# Patient Record
Sex: MALE | Race: WHITE | Employment: OTHER | URBAN - METROPOLITAN AREA
[De-identification: names, ages, dates, MRNs, and addresses within clinical notes are randomized per-mention and may not be internally consistent; named-entity substitution may affect disease eponyms.]

---

## 2022-01-13 ENCOUNTER — APPOINTMENT (OUTPATIENT)
Dept: CT IMAGING | Age: 77
DRG: 981 | End: 2022-01-13
Payer: MEDICARE

## 2022-01-13 ENCOUNTER — HOSPITAL ENCOUNTER (INPATIENT)
Age: 77
LOS: 21 days | Discharge: NURSING, REHAB OR LONG-TERM CARE | DRG: 981 | End: 2022-02-04
Attending: EMERGENCY MEDICINE | Admitting: STUDENT IN AN ORGANIZED HEALTH CARE EDUCATION/TRAINING PROGRAM
Payer: MEDICARE

## 2022-01-13 DIAGNOSIS — N30.00 ACUTE CYSTITIS WITHOUT HEMATURIA: ICD-10-CM

## 2022-01-13 DIAGNOSIS — U07.1 COVID: ICD-10-CM

## 2022-01-13 DIAGNOSIS — K92.2 GASTROINTESTINAL HEMORRHAGE, UNSPECIFIED GASTROINTESTINAL HEMORRHAGE TYPE: ICD-10-CM

## 2022-01-13 DIAGNOSIS — N17.9 AKI (ACUTE KIDNEY INJURY) (HCC): Primary | ICD-10-CM

## 2022-01-13 DIAGNOSIS — C18.9 MALIGNANT NEOPLASM OF COLON, UNSPECIFIED PART OF COLON (HCC): ICD-10-CM

## 2022-01-13 DIAGNOSIS — F41.9 ANXIETY: ICD-10-CM

## 2022-01-13 LAB
A/G RATIO: 0.8 (ref 1.1–2.2)
ALBUMIN SERPL-MCNC: 3 G/DL (ref 3.4–5)
ALP BLD-CCNC: 84 U/L (ref 40–129)
ALT SERPL-CCNC: 7 U/L (ref 10–40)
ANION GAP SERPL CALCULATED.3IONS-SCNC: 11 MMOL/L (ref 3–16)
AST SERPL-CCNC: 9 U/L (ref 15–37)
BASOPHILS ABSOLUTE: 0 K/UL (ref 0–0.2)
BASOPHILS RELATIVE PERCENT: 0.5 %
BILIRUB SERPL-MCNC: 0.3 MG/DL (ref 0–1)
BUN BLDV-MCNC: 34 MG/DL (ref 7–20)
CALCIUM SERPL-MCNC: 8 MG/DL (ref 8.3–10.6)
CHLORIDE BLD-SCNC: 95 MMOL/L (ref 99–110)
CO2: 24 MMOL/L (ref 21–32)
CREAT SERPL-MCNC: 3.7 MG/DL (ref 0.8–1.3)
EOSINOPHILS ABSOLUTE: 0.1 K/UL (ref 0–0.6)
EOSINOPHILS RELATIVE PERCENT: 1.9 %
GFR AFRICAN AMERICAN: 19
GFR NON-AFRICAN AMERICAN: 16
GLUCOSE BLD-MCNC: 208 MG/DL (ref 70–99)
HCT VFR BLD CALC: 29.7 % (ref 40.5–52.5)
HEMOGLOBIN: 9.9 G/DL (ref 13.5–17.5)
LYMPHOCYTES ABSOLUTE: 0.9 K/UL (ref 1–5.1)
LYMPHOCYTES RELATIVE PERCENT: 16.3 %
MCH RBC QN AUTO: 29.6 PG (ref 26–34)
MCHC RBC AUTO-ENTMCNC: 33.3 G/DL (ref 31–36)
MCV RBC AUTO: 88.8 FL (ref 80–100)
MONOCYTES ABSOLUTE: 0.5 K/UL (ref 0–1.3)
MONOCYTES RELATIVE PERCENT: 8.2 %
NEUTROPHILS ABSOLUTE: 4 K/UL (ref 1.7–7.7)
NEUTROPHILS RELATIVE PERCENT: 73.1 %
PDW BLD-RTO: 14 % (ref 12.4–15.4)
PLATELET # BLD: 253 K/UL (ref 135–450)
PMV BLD AUTO: 7.7 FL (ref 5–10.5)
POTASSIUM SERPL-SCNC: 3.7 MMOL/L (ref 3.5–5.1)
RBC # BLD: 3.34 M/UL (ref 4.2–5.9)
SODIUM BLD-SCNC: 130 MMOL/L (ref 136–145)
TOTAL PROTEIN: 6.6 G/DL (ref 6.4–8.2)
WBC # BLD: 5.5 K/UL (ref 4–11)

## 2022-01-13 PROCEDURE — 2580000003 HC RX 258: Performed by: EMERGENCY MEDICINE

## 2022-01-13 PROCEDURE — 82436 ASSAY OF URINE CHLORIDE: CPT

## 2022-01-13 PROCEDURE — 96375 TX/PRO/DX INJ NEW DRUG ADDON: CPT

## 2022-01-13 PROCEDURE — 85025 COMPLETE CBC W/AUTO DIFF WBC: CPT

## 2022-01-13 PROCEDURE — 70450 CT HEAD/BRAIN W/O DYE: CPT

## 2022-01-13 PROCEDURE — 99283 EMERGENCY DEPT VISIT LOW MDM: CPT

## 2022-01-13 PROCEDURE — 36415 COLL VENOUS BLD VENIPUNCTURE: CPT

## 2022-01-13 PROCEDURE — 74176 CT ABD & PELVIS W/O CONTRAST: CPT

## 2022-01-13 PROCEDURE — 80053 COMPREHEN METABOLIC PANEL: CPT

## 2022-01-13 PROCEDURE — 96365 THER/PROPH/DIAG IV INF INIT: CPT

## 2022-01-13 PROCEDURE — 84300 ASSAY OF URINE SODIUM: CPT

## 2022-01-13 PROCEDURE — 82270 OCCULT BLOOD FECES: CPT

## 2022-01-13 PROCEDURE — 83935 ASSAY OF URINE OSMOLALITY: CPT

## 2022-01-13 PROCEDURE — 84133 ASSAY OF URINE POTASSIUM: CPT

## 2022-01-13 PROCEDURE — 81001 URINALYSIS AUTO W/SCOPE: CPT

## 2022-01-13 PROCEDURE — 83880 ASSAY OF NATRIURETIC PEPTIDE: CPT

## 2022-01-13 RX ORDER — 0.9 % SODIUM CHLORIDE 0.9 %
1000 INTRAVENOUS SOLUTION INTRAVENOUS ONCE
Status: COMPLETED | OUTPATIENT
Start: 2022-01-13 | End: 2022-01-14

## 2022-01-13 RX ADMIN — SODIUM CHLORIDE 1000 ML: 9 INJECTION, SOLUTION INTRAVENOUS at 23:52

## 2022-01-14 ENCOUNTER — APPOINTMENT (OUTPATIENT)
Dept: ULTRASOUND IMAGING | Age: 77
DRG: 981 | End: 2022-01-14
Payer: MEDICARE

## 2022-01-14 ENCOUNTER — APPOINTMENT (OUTPATIENT)
Dept: MRI IMAGING | Age: 77
DRG: 981 | End: 2022-01-14
Payer: MEDICARE

## 2022-01-14 PROBLEM — R39.89 SUSPECTED UTI: Status: ACTIVE | Noted: 2022-01-14

## 2022-01-14 PROBLEM — N17.9 ACUTE KIDNEY INJURY SUPERIMPOSED ON CKD (HCC): Status: ACTIVE | Noted: 2022-01-14

## 2022-01-14 PROBLEM — K92.2 GI BLEED: Status: ACTIVE | Noted: 2022-01-14

## 2022-01-14 PROBLEM — Z86.16 HISTORY OF COVID-19: Status: ACTIVE | Noted: 2022-01-14

## 2022-01-14 PROBLEM — N18.9 ACUTE KIDNEY INJURY SUPERIMPOSED ON CKD (HCC): Status: ACTIVE | Noted: 2022-01-14

## 2022-01-14 PROBLEM — R53.83 LETHARGY: Status: ACTIVE | Noted: 2022-01-14

## 2022-01-14 PROBLEM — E87.1 HYPONATREMIA: Status: ACTIVE | Noted: 2022-01-14

## 2022-01-14 PROBLEM — R53.83 FATIGUE: Status: ACTIVE | Noted: 2022-01-14

## 2022-01-14 LAB
BASOPHILS ABSOLUTE: 0 K/UL (ref 0–0.2)
BASOPHILS RELATIVE PERCENT: 0.6 %
BASOPHILS RELATIVE PERCENT: 0.6 %
BASOPHILS RELATIVE PERCENT: 0.8 %
BILIRUBIN URINE: ABNORMAL
BLOOD, URINE: ABNORMAL
CHLORIDE URINE RANDOM: <20 MMOL/L
CLARITY: ABNORMAL
COLOR: YELLOW
COMMENT UA: ABNORMAL
EOSINOPHILS ABSOLUTE: 0.1 K/UL (ref 0–0.6)
EOSINOPHILS RELATIVE PERCENT: 2.2 %
EOSINOPHILS RELATIVE PERCENT: 2.4 %
EOSINOPHILS RELATIVE PERCENT: 2.7 %
EPITHELIAL CELLS, UA: 17 /HPF (ref 0–5)
GLUCOSE BLD-MCNC: 168 MG/DL (ref 70–99)
GLUCOSE URINE: NEGATIVE MG/DL
HCT VFR BLD CALC: 26.3 % (ref 40.5–52.5)
HCT VFR BLD CALC: 28.4 % (ref 40.5–52.5)
HCT VFR BLD CALC: 28.7 % (ref 40.5–52.5)
HEMOGLOBIN: 8.7 G/DL (ref 13.5–17.5)
HEMOGLOBIN: 9.4 G/DL (ref 13.5–17.5)
HEMOGLOBIN: 9.5 G/DL (ref 13.5–17.5)
KETONES, URINE: ABNORMAL MG/DL
LEUKOCYTE ESTERASE, URINE: ABNORMAL
LV EF: 58 %
LVEF MODALITY: NORMAL
LYMPHOCYTES ABSOLUTE: 0.8 K/UL (ref 1–5.1)
LYMPHOCYTES ABSOLUTE: 0.8 K/UL (ref 1–5.1)
LYMPHOCYTES ABSOLUTE: 0.9 K/UL (ref 1–5.1)
LYMPHOCYTES RELATIVE PERCENT: 16.8 %
LYMPHOCYTES RELATIVE PERCENT: 17.2 %
LYMPHOCYTES RELATIVE PERCENT: 19.3 %
MCH RBC QN AUTO: 29.3 PG (ref 26–34)
MCH RBC QN AUTO: 29.3 PG (ref 26–34)
MCH RBC QN AUTO: 29.4 PG (ref 26–34)
MCHC RBC AUTO-ENTMCNC: 32.9 G/DL (ref 31–36)
MCHC RBC AUTO-ENTMCNC: 33.2 G/DL (ref 31–36)
MCHC RBC AUTO-ENTMCNC: 33.2 G/DL (ref 31–36)
MCV RBC AUTO: 88.4 FL (ref 80–100)
MCV RBC AUTO: 88.5 FL (ref 80–100)
MCV RBC AUTO: 88.8 FL (ref 80–100)
MICROSCOPIC EXAMINATION: YES
MONOCYTES ABSOLUTE: 0.3 K/UL (ref 0–1.3)
MONOCYTES ABSOLUTE: 0.4 K/UL (ref 0–1.3)
MONOCYTES ABSOLUTE: 0.4 K/UL (ref 0–1.3)
MONOCYTES RELATIVE PERCENT: 6.5 %
MONOCYTES RELATIVE PERCENT: 8.6 %
MONOCYTES RELATIVE PERCENT: 9 %
MUCUS: ABNORMAL /LPF
NEUTROPHILS ABSOLUTE: 2.8 K/UL (ref 1.7–7.7)
NEUTROPHILS ABSOLUTE: 3.6 K/UL (ref 1.7–7.7)
NEUTROPHILS ABSOLUTE: 3.7 K/UL (ref 1.7–7.7)
NEUTROPHILS RELATIVE PERCENT: 68.9 %
NEUTROPHILS RELATIVE PERCENT: 71.3 %
NEUTROPHILS RELATIVE PERCENT: 73.1 %
NITRITE, URINE: NEGATIVE
OCCULT BLOOD DIAGNOSTIC: ABNORMAL
OSMOLALITY URINE: 319 MOSM/KG (ref 390–1070)
OSMOLALITY: 294 MOSM/KG (ref 280–301)
PDW BLD-RTO: 14.1 % (ref 12.4–15.4)
PDW BLD-RTO: 14.5 % (ref 12.4–15.4)
PDW BLD-RTO: 14.6 % (ref 12.4–15.4)
PERFORMED ON: ABNORMAL
PH UA: 5 (ref 5–8)
PLATELET # BLD: 213 K/UL (ref 135–450)
PLATELET # BLD: 229 K/UL (ref 135–450)
PLATELET # BLD: 234 K/UL (ref 135–450)
PMV BLD AUTO: 7.5 FL (ref 5–10.5)
PMV BLD AUTO: 7.5 FL (ref 5–10.5)
PMV BLD AUTO: 7.7 FL (ref 5–10.5)
POTASSIUM, UR: 38.3 MMOL/L
PRO-BNP: 1997 PG/ML (ref 0–449)
PROTEIN UA: 100 MG/DL
RAPID INFLUENZA  B AGN: NEGATIVE
RAPID INFLUENZA A AGN: NEGATIVE
RBC # BLD: 2.97 M/UL (ref 4.2–5.9)
RBC # BLD: 3.21 M/UL (ref 4.2–5.9)
RBC # BLD: 3.23 M/UL (ref 4.2–5.9)
RBC UA: ABNORMAL /HPF (ref 0–4)
SARS-COV-2, NAAT: NOT DETECTED
SODIUM URINE: 41 MMOL/L
SPECIFIC GRAVITY UA: 1.02 (ref 1–1.03)
URINE REFLEX TO CULTURE: YES
URINE TYPE: ABNORMAL
UROBILINOGEN, URINE: 0.2 E.U./DL
WBC # BLD: 4.1 K/UL (ref 4–11)
WBC # BLD: 5 K/UL (ref 4–11)
WBC # BLD: 5.1 K/UL (ref 4–11)
WBC UA: >900 /HPF (ref 0–5)

## 2022-01-14 PROCEDURE — 36415 COLL VENOUS BLD VENIPUNCTURE: CPT

## 2022-01-14 PROCEDURE — 6360000002 HC RX W HCPCS: Performed by: STUDENT IN AN ORGANIZED HEALTH CARE EDUCATION/TRAINING PROGRAM

## 2022-01-14 PROCEDURE — 2580000003 HC RX 258: Performed by: STUDENT IN AN ORGANIZED HEALTH CARE EDUCATION/TRAINING PROGRAM

## 2022-01-14 PROCEDURE — 6360000002 HC RX W HCPCS: Performed by: EMERGENCY MEDICINE

## 2022-01-14 PROCEDURE — 2060000000 HC ICU INTERMEDIATE R&B

## 2022-01-14 PROCEDURE — C9113 INJ PANTOPRAZOLE SODIUM, VIA: HCPCS | Performed by: EMERGENCY MEDICINE

## 2022-01-14 PROCEDURE — 83930 ASSAY OF BLOOD OSMOLALITY: CPT

## 2022-01-14 PROCEDURE — 87086 URINE CULTURE/COLONY COUNT: CPT

## 2022-01-14 PROCEDURE — 85025 COMPLETE CBC W/AUTO DIFF WBC: CPT

## 2022-01-14 PROCEDURE — 70551 MRI BRAIN STEM W/O DYE: CPT

## 2022-01-14 PROCEDURE — 86706 HEP B SURFACE ANTIBODY: CPT

## 2022-01-14 PROCEDURE — 87804 INFLUENZA ASSAY W/OPTIC: CPT

## 2022-01-14 PROCEDURE — 80074 ACUTE HEPATITIS PANEL: CPT

## 2022-01-14 PROCEDURE — 87186 SC STD MICRODIL/AGAR DIL: CPT

## 2022-01-14 PROCEDURE — 2580000003 HC RX 258: Performed by: EMERGENCY MEDICINE

## 2022-01-14 PROCEDURE — 76770 US EXAM ABDO BACK WALL COMP: CPT

## 2022-01-14 PROCEDURE — 87635 SARS-COV-2 COVID-19 AMP PRB: CPT

## 2022-01-14 PROCEDURE — 87077 CULTURE AEROBIC IDENTIFY: CPT

## 2022-01-14 PROCEDURE — 93306 TTE W/DOPPLER COMPLETE: CPT

## 2022-01-14 PROCEDURE — C9113 INJ PANTOPRAZOLE SODIUM, VIA: HCPCS | Performed by: STUDENT IN AN ORGANIZED HEALTH CARE EDUCATION/TRAINING PROGRAM

## 2022-01-14 RX ORDER — SODIUM CHLORIDE 0.9 % (FLUSH) 0.9 %
5-40 SYRINGE (ML) INJECTION PRN
Status: DISCONTINUED | OUTPATIENT
Start: 2022-01-14 | End: 2022-02-04 | Stop reason: HOSPADM

## 2022-01-14 RX ORDER — ATENOLOL 25 MG/1
25 TABLET ORAL EVERY EVENING
COMMUNITY

## 2022-01-14 RX ORDER — TAMSULOSIN HYDROCHLORIDE 0.4 MG/1
0.4 CAPSULE ORAL EVERY MORNING
COMMUNITY

## 2022-01-14 RX ORDER — LORAZEPAM 0.5 MG/1
0.5 TABLET ORAL 2 TIMES DAILY
Status: ON HOLD | COMMUNITY
End: 2022-02-04 | Stop reason: SDUPTHER

## 2022-01-14 RX ORDER — ASPIRIN 81 MG/1
81 TABLET, CHEWABLE ORAL DAILY
Status: ON HOLD | COMMUNITY
End: 2022-02-09 | Stop reason: SDUPTHER

## 2022-01-14 RX ORDER — SEVELAMER CARBONATE 800 MG/1
800 TABLET, FILM COATED ORAL
COMMUNITY

## 2022-01-14 RX ORDER — SODIUM CHLORIDE 9 MG/ML
25 INJECTION, SOLUTION INTRAVENOUS PRN
Status: DISCONTINUED | OUTPATIENT
Start: 2022-01-14 | End: 2022-02-04 | Stop reason: HOSPADM

## 2022-01-14 RX ORDER — PANTOPRAZOLE SODIUM 40 MG/10ML
80 INJECTION, POWDER, LYOPHILIZED, FOR SOLUTION INTRAVENOUS ONCE
Status: COMPLETED | OUTPATIENT
Start: 2022-01-14 | End: 2022-01-14

## 2022-01-14 RX ORDER — CLOPIDOGREL BISULFATE 75 MG/1
75 TABLET ORAL EVERY EVENING
Status: ON HOLD | COMMUNITY
End: 2022-02-09 | Stop reason: SDUPTHER

## 2022-01-14 RX ORDER — SODIUM CHLORIDE 9 MG/ML
INJECTION, SOLUTION INTRAVENOUS CONTINUOUS
Status: ACTIVE | OUTPATIENT
Start: 2022-01-14 | End: 2022-01-14

## 2022-01-14 RX ORDER — SODIUM CHLORIDE 0.9 % (FLUSH) 0.9 %
5-40 SYRINGE (ML) INJECTION EVERY 12 HOURS SCHEDULED
Status: DISCONTINUED | OUTPATIENT
Start: 2022-01-14 | End: 2022-02-04 | Stop reason: HOSPADM

## 2022-01-14 RX ORDER — ACETAMINOPHEN 325 MG/1
650 TABLET ORAL EVERY 6 HOURS PRN
Status: DISCONTINUED | OUTPATIENT
Start: 2022-01-14 | End: 2022-02-04 | Stop reason: HOSPADM

## 2022-01-14 RX ORDER — PANTOPRAZOLE SODIUM 20 MG/1
20 TABLET, DELAYED RELEASE ORAL NIGHTLY
COMMUNITY

## 2022-01-14 RX ORDER — CALCITRIOL 0.25 UG/1
0.25 CAPSULE, LIQUID FILLED ORAL EVERY EVENING
COMMUNITY

## 2022-01-14 RX ORDER — AMLODIPINE BESYLATE 5 MG/1
5 TABLET ORAL EVERY EVENING
Status: ON HOLD | COMMUNITY
End: 2022-02-04 | Stop reason: HOSPADM

## 2022-01-14 RX ORDER — ATORVASTATIN CALCIUM 10 MG/1
10 TABLET, FILM COATED ORAL NIGHTLY
COMMUNITY

## 2022-01-14 RX ORDER — GABAPENTIN 100 MG/1
100 CAPSULE ORAL 3 TIMES DAILY
COMMUNITY

## 2022-01-14 RX ORDER — SODIUM CHLORIDE 9 MG/ML
INJECTION, SOLUTION INTRAVENOUS CONTINUOUS
Status: CANCELLED | OUTPATIENT
Start: 2022-01-14 | End: 2022-01-14

## 2022-01-14 RX ORDER — ACETAMINOPHEN 650 MG/1
650 SUPPOSITORY RECTAL EVERY 6 HOURS PRN
Status: DISCONTINUED | OUTPATIENT
Start: 2022-01-14 | End: 2022-01-26

## 2022-01-14 RX ORDER — ONDANSETRON 2 MG/ML
4 INJECTION INTRAMUSCULAR; INTRAVENOUS EVERY 6 HOURS PRN
Status: DISCONTINUED | OUTPATIENT
Start: 2022-01-14 | End: 2022-01-15

## 2022-01-14 RX ADMIN — SODIUM CHLORIDE: 9 INJECTION, SOLUTION INTRAVENOUS at 04:07

## 2022-01-14 RX ADMIN — CEFTRIAXONE 1000 MG: 1 INJECTION, POWDER, FOR SOLUTION INTRAMUSCULAR; INTRAVENOUS at 02:34

## 2022-01-14 RX ADMIN — SODIUM CHLORIDE: 9 INJECTION, SOLUTION INTRAVENOUS at 21:30

## 2022-01-14 RX ADMIN — PANTOPRAZOLE SODIUM 80 MG: 40 INJECTION, POWDER, FOR SOLUTION INTRAVENOUS at 01:43

## 2022-01-14 RX ADMIN — SODIUM CHLORIDE, PRESERVATIVE FREE 10 ML: 5 INJECTION INTRAVENOUS at 08:41

## 2022-01-14 RX ADMIN — SODIUM CHLORIDE: 9 INJECTION, SOLUTION INTRAVENOUS at 16:48

## 2022-01-14 RX ADMIN — SODIUM CHLORIDE 8 MG/HR: 9 INJECTION, SOLUTION INTRAVENOUS at 04:07

## 2022-01-14 RX ADMIN — SODIUM CHLORIDE, PRESERVATIVE FREE 10 ML: 5 INJECTION INTRAVENOUS at 21:25

## 2022-01-14 ASSESSMENT — PAIN SCALES - GENERAL
PAINLEVEL_OUTOF10: 0

## 2022-01-14 NOTE — ED NOTES
Pts O2 placed at 2L NC due to sleep apnea. Per daughter dxd with sleep apnea while in [place] 2 weeks ago and pt to be getting cpap.      Jessie Morris RN  01/14/22 5515

## 2022-01-14 NOTE — ED PROVIDER NOTES
[address] [facility]      Pt Name: Bambi Wood  MRN: [id number]  [place] 1945  Date of evaluation: 1/13/2022  Provider: Elizabeth Chauhan MD    CHIEF COMPLAINT       Chief Complaint   Patient presents with    Fatigue     pt was recently d/cd from the hospital still with complaints of weakness and no energy    Fall     pt fell x 2 today        HISTORY OF PRESENT ILLNESS    Bambi Wood is a 77 y.o. male who presents to the emergency department with generalized weakness. Patient was recently discharged from hospital a week ago for COVID. Has had multiple falls with continued weakness. Patient is confused not able to answer a lot of questions secondary to confusions therefore history is limited    Nursing Notes were reviewed. Including nursing noted for FM, Surgical History, Past Medical History, Social History, vitals, and allergies; agree with all. REVIEW OF SYSTEMS       Review of Systems   Unable to perform ROS: Mental status change     PAST MEDICAL HISTORY   No past medical history on file. SURGICAL HISTORY     No past surgical history on file. CURRENT MEDICATIONS       Previous Medications    No medications on file       ALLERGIES     Patient has no known allergies. FAMILY HISTORY      No family history on file.     SOCIAL HISTORY       Social History     Socioeconomic History    Marital status:      Spouse name: Not on file    Number of children: Not on file    Years of education: Not on file    Highest education level: Not on file   Occupational History    Not on file   Tobacco Use    Smoking status: Not on file    Smokeless tobacco: Not on file   Substance and Sexual Activity    Alcohol use: Not on file    Drug use: Not on file    Sexual activity: Not on file   Other Topics Concern    Not on file   Social History Narrative    Not on file     Social Determinants of Health     Financial Resource Strain:    Rayo Difficulty of Paying Living Expenses: Not on file   Food Insecurity:     Worried About Running Out of Food in the Last Year: Not on file    Ran Out of Food in the Last Year: Not on file   Transportation Needs:     Lack of Transportation (Medical): Not on file    Lack of Transportation (Non-Medical): Not on file   Physical Activity:     Days of Exercise per Week: Not on file    Minutes of Exercise per Session: Not on file   Stress:     Feeling of Stress : Not on file   Social Connections:     Frequency of Communication with Friends and Family: Not on file    Frequency of Social Gatherings with Friends and Family: Not on file    Attends Hindu Services: Not on file    Active Member of Clubs or Organizations: Not on file    Attends Club or Organization Meetings: Not on file    Marital Status: Not on file   Intimate Partner Violence:     Fear of Current or Ex-Partner: Not on file    Emotionally Abused: Not on file    Physically Abused: Not on file    Sexually Abused: Not on file   Housing Stability:     Unable to Pay for Housing in the Last Year: Not on file    Number of [place] in the Last Year: Not on file    Unstable Housing in the Last Year: Not on file       PHYSICAL EXAM       ED Triage Vitals   BP Temp Temp Source Pulse Resp SpO2 Height Weight   01/13/22 2048 01/13/22 2048 01/13/22 2048 01/13/22 2048 01/13/22 2048 01/13/22 2048 01/13/22 2048 01/14/22 0345   102/61 96.8 °F (36 °C) Tympanic 62 16 97 % 6' 0.5\" (1.842 m) 244 lb 11.4 oz (111 kg)       Physical Exam  Vitals and nursing note reviewed. Constitutional:       General: He is not in acute distress. Appearance: He is well-developed. He is ill-appearing. He is not diaphoretic. HENT:      Head: Normocephalic and atraumatic. Eyes:      General:         Right eye: No discharge. Left eye: No discharge. Pupils: Pupils are equal, round, and reactive to light. Neck:      Thyroid: No thyromegaly.       Trachea: No Non- 16 (*)     GFR  19 (*)     Calcium 8.0 (*)     Albumin 3.0 (*)     Albumin/Globulin Ratio 0.8 (*)     ALT 7 (*)     AST 9 (*)     All other components within normal limits    Narrative:     Performed at:  [facility]  [address] [facility] [employer] [facility]   Phone [phone]   URINE RT REFLEX TO CULTURE - Abnormal; Notable for the following components:    Clarity, UA TURBID (*)     Bilirubin Urine SMALL (*)     Ketones, Urine TRACE (*)     Blood, Urine LARGE (*)     Protein,  (*)     Leukocyte Esterase, Urine LARGE (*)     All other components within normal limits    Narrative:     Performed at:  [facility]  [address] [facility] [employer] [facility]   Phone [phone]   BLOOD OCCULT STOOL DIAGNOSTIC - Abnormal; Notable for the following components:    Occult Blood Diagnostic   (*)     Value: Result: POSITIVE  Normal range: Negative      All other components within normal limits    Narrative:     ORDER#: [id number]                          ORDERED BY: Alfred Baeza  SOURCE: Stool                              COLLECTED:  01/13/22 23:32  ANTIBIOTICS AT JOSE.:                      RECEIVED :  01/13/22 23:46  Performed at:  [facility]  [address] [facility] [address]   Phone [phone]   BRAIN NATRIURETIC PEPTIDE - Abnormal; Notable for the following components:    Pro-BNP 1,997 (*)     All other components within normal limits    Narrative:     Performed at:  [facility]  [address] [facility] [employer] [facility]   Phone [phone]   MICROSCOPIC URINALYSIS - Abnormal; Notable for the following components:    Mucus, UA 1+ (*)     WBC, UA >900 (*)     Epithelial Cells, UA 17 (*)     All other components within normal limits    Narrative:     Performed at:  [facility]  [facility] [address] [facility] [facility] [address]   Phone [phone]   OSMOLALITY, URINE - Abnormal; Notable for the following components:    Osmolality, Ur 319 (*)     All other components within normal limits    Narrative:     Performed at:  [facility]  [address] [facility] [address] [facility] [address]   Phone [phone] [phone]    Narrative:     Performed at:  [facility]  [address] [place] [address]   Phone [phone]   RAPID INFLUENZA A/B ANTIGENS    Narrative:     Performed at:  [facility]  [address] [place] [address]   Phone [phone]   CULTURE, URINE   ELECTROLYTES URINE RANDOM    Narrative:     Performed at:  [facility]  [address] [place] [address]   Phone [phone]   OSMOLALITY   CBC WITH AUTO DIFFERENTIAL   CBC WITH AUTO DIFFERENTIAL   CBC WITH AUTO DIFFERENTIAL       All other labs were withinnormal range or not returned as of this dictation. EMERGENCY DEPARTMENT COURSE and DIFFERENTIAL DIAGNOSIS/MDM:     PMH, Surgical Hx, FH, Social Hx reviewed by myself (ETOH usage, Tobacco usage, Drug usage reviewed by myself, no pertinent Hx)- No Pertinent Hx     Old records were reviewed by me     36-year-old with weakness. Found to have a GI bleed for which Protonix was given. Also has evidence of an LESLEE for which fluids were given. Sounds like patient had COVID a week ago for which CT showed evidence of multifocal pneumonia. This is probably COVID. Patient given Rocephin for acute cystitis. Patient is to be admitted for GI bleed, cystitis, multifocal pneumonia, LESLEE, further evaluation    CRITICAL CARE TIME   Total Critical Caretime was 39 minutes, excluding separately reportable procedures.   There was a high probability of clinically significant/life threatening deterioration in the patient's condition which required my urgent intervention. PROCEDURES:  Unlessotherwise noted below, none    FINAL IMPRESSION      1. LESLEE (acute kidney injury) ([facility] [address]    2. Gastrointestinal hemorrhage, unspecified gastrointestinal hemorrhage type    3. COVID    4. Acute cystitis without hematuria          DISPOSITION/PLAN   DISPOSITION Admitted 01/14/2022 03:33:30 AM    PATIENT REFERRED TO:  No follow-up provider specified.     DISCHARGE MEDICATIONS:  New Prescriptions    No medications on file          (Please note that portions ofthis note were completed with a voice recognition program.  Efforts were made to edit the dictations but occasionally words are mis-transcribed.)    Stephani Ruiz MD(electronically signed)  Attending Emergency Physician       Stephani Ruiz MD  01/14/22 5341

## 2022-01-14 NOTE — ED NOTES
Attempted to call wife to get information for MRI screening number has been disconnected MRI staff notified of inability to complete screen      Oziel Bowling RN  01/14/22 5444

## 2022-01-14 NOTE — H&P
Hospital Medicine History & Physical      PCP: No primary care provider on file. Date of Admission: 1/13/2022    Date of Service: Pt seen/examined on 1/14/2022 and Admitted to Inpatient     Chief Complaint:  AMS, lethargy      History Of Present Illness: The patient is a 77 y.o. male w/ uncertain past medical hx but possibly was just hospitalized and recovered from [facility] a few weeks ago in [place] who presents to [facility] possibly from home for progressive worsening fatigue and somnolence that he reports has been going on for at least a week. Patient is somnolent at this time and difficult to clarify full history. He admits that he has not been eating or drinking much however but unable to quantify. He denies other symptoms of fever chills or shortness of breath. Unable to fully obtain further review of systems questions but he denies any focal weakness or vision changes. At this time he just feels as he is very sleepy. Unable to confirm any further chronic illnesses at this time however. Past Medical History:    No past medical history on file. Past Surgical History:    No past surgical history on file. Medications Prior to Admission:    Prior to Admission medications    Not on File       Allergies:  Patient has no known allergies. Social History:  The patient currently lives supposedly home    TOBACCO:   has no history on file for tobacco use. ETOH:   has no history on file for alcohol use. Family History:  Reviewed in detail and negative for DM, Early CAD, Cancer, CVA. Positive as follows:    No family history on file. REVIEW OF SYSTEMS:    as noted in the HPI. All other systems reviewed and negative.     PHYSICAL EXAM:    /70   Pulse 64   Temp 97.3 °F (36.3 °C) (Oral)   Resp 14   Ht 6' 0.5\" (1.842 m)   Wt 244 lb 11.4 oz (111 kg)   SpO2 99%   BMI 32.73 kg/m²     General appearance: Fatigued appearing, somnolent although still alert to presence and seems to able to answer some orientation questions but again questionable historian  HEENT Normal cephalic, atraumatic without obvious deformity. Pupils equal, round, and reactive to light. Extra ocular muscles intact. Dry mucous membranes  Neck: Supple, no JVD  Lungs: Clear to auscultation, bilaterally without Rales/Wheezes/Rhonchi with good respiratory effort. Heart: Regular rate and rhythm with Normal S1/S2 without murmurs, rubs or gallops, point of maximum impulse non-displaced  Abdomen: Soft, nontender, nondistended, active bowel sounds  Extremities: No edema  Skin: No rashes  Neurologic: Cranial nerves II through XII on rudimentary exam seems to be intact, strength seems to be 5 out of 5 to all extremities and negative pronator drift  Mental status: Alert although somnolent and sometimes difficult to understand conversation  Capillary Refill: Acceptable  < 3 seconds  Peripheral Pulses: +3 Easily felt, not easily obliterated with pressure    01/13/22 2345  CT CHEST ABDOMEN PELVIS WO CONTRAST   Performed: 01/13/22 2319  Final        Impression: No evidence of acute intrathoracic, intraabdominal or intrapelvic injury. Multifocal airspace disease. This pattern may reflect COVID pneumonia. Correlate with COVID testing.       01/13/22 2335  CT HEAD WO CONTRAST   Performed: 01/13/22 2319  Final        Impression: No acute intracranial abnormality. Specifically, no acute intracranial hemorrhage or mass effect. Extensive chronic small vessel ischemic disease.                CBC   Recent Labs     01/13/22 2110 01/14/22  0633   WBC 5.5 4.1   HGB 9.9* 8.7*   HCT 29.7* 26.3*    213      RENAL  Recent Labs     01/13/22 2110   *   K 3.7   CL 95*   CO2 24   BUN 34*   CREATININE 3.7*     LFT'S  Recent Labs     01/13/22 2110   AST 9*   ALT 7*   BILITOT 0.3   ALKPHOS 84 COAG  No results for input(s): INR in the last 72 hours. CARDIAC ENZYMES  No results for input(s): CKTOTAL, CKMB, CKMBINDEX, TROPONINI in the last 72 hours. U/A:    Lab Results   Component Value Date    COLORU Yellow 01/13/2022    WBCUA >900 01/13/2022    RBCUA 0-2 01/13/2022    MUCUS 1+ 01/13/2022    CLARITYU TURBID 01/13/2022    SPECGRAV 1.025 01/13/2022    LEUKOCYTESUR LARGE 01/13/2022    BLOODU LARGE 01/13/2022    GLUCOSEU Negative 01/13/2022       ABG  No results found for: ZMI9KZE, BEART, J5ADMJGS, PHART, THGBART, WKA1AQS, PO2ART, [place]        Active Hospital Problems    Diagnosis Date Noted    GI bleed [K92.2] 01/14/2022    History of COVID-19 [Z86.16] 01/14/2022    Hyponatremia [E87.1] 01/14/2022    Fatigue [R53.83] 01/14/2022    Acute kidney injury superimposed on CKD ([facility] [address] [N17.9, N18.9] 01/14/2022    Lethargy [R53.83] 01/14/2022    Suspected UTI [R39.89] 01/14/2022         PHYSICIANS CERTIFICATION:    I certify that Ruthy Lewis is expected to be hospitalized for greater than 2 midnights based on the following assessment and plan:      ASSESSMENT/PLAN:  · Fatigue  · GI bleed  · Hyponatremia  · Suspected UTI  · LESLEE on CKD  · Lethargy  · History of COVID infection    Plan:  · Difficulty get details from patient, patient notes that he just feels very sleepy but he has been fatigued for at least a number of days but uncertain etiology as to this. He is notably anemic and seems to have significant kidney injury but uncertain if this is superimposed on chronic kidney injury, notably also hyponatremic and of uncertain etiology.   Do not have any other labs to compare to previous baseline  · Start patient on Rocephin for suspected UTI  · Start patient on normal saline at 100/h for 20 hours for IV fluid hydration  · Ordered kidney ultrasound for further evaluation of LESLEE  · Start patient on Protonix IV infusion for suspected GI bleed  · Stool occult was already noted to be positive, iron studies ordered and pending  · Ordered urine electrolytes as well as urine and serum osmolality to further clarify hyponatremia  · MRI ordered without contrast of the brain to evaluate further for mental status change  · Ordering transthoracic echo for the morning  · Nephrology consult for LESLEE  · GI consult for suspicion of GI bleed  · Repeat labs in the morning    DVT Prophylaxis: SCDs  Diet: Diet NPO Exceptions are: Ice Chips  Code Status: Full Code  PT/OT Eval Status: Ambulatory    Dispo -pending clinical course       Raulito Jo DO    Thank you No primary care provider on file. for the opportunity to be involved in this patient's care. If you have any questions or concerns please feel free to contact me at [phone].

## 2022-01-14 NOTE — ED NOTES
Pt placed in hospital bed. No wants or needs at this time. Report given to hold RN.       [address] [facility], [address] [place] [address]  01/14/22 7669

## 2022-01-14 NOTE — CONSULTS
Neurology Consult Note  Reason for Consult: positive MRI for acute infarct in the posteromedial left parietal lobe within deep white matter    Chief complaint: \"My foot\"    Dr London Owens, DO asked me to see Norma Jones in consultation for evaluation of positive MRI for acute infarct in the posteromedial left parietal lobe within deep white matter    History of Present Illness:  I obtained my information via the patient, supplemented with chart review. Norma Jones is a 77 y.o. male with hx of recent covid 23, colon cancer, kidney disease is reportedly on dialysis who presented to the ED on 1/13/22 for evaluation of progressive worsening fatigue and somnolence for the last week. He reportedly had not been eating or drinking well and also reported multiple falls. His colon cancer diagnosis is a recent after he had been having hematochezia. He is in the process per reports of getting surgery for resection and colostomy. On arrival to the ED BP was 102/61. He was alert but seemed to be disoriented. Initial work up was revealing for GI bleed, LESLEE, multi focal pneumonia. A MRI brain was completed for altered mental status. Per RN there does not appear to have been an acute change in the patients mentation. Not quite sure what alterations there was as there is conflicting information in chart. MRI brain did reveal a punctate acute ischemic stroke of the posteromedial left parietal lobe, within the deep white matter. At time of encounter the patient was seen during dialysis. He is confused, unable to provide much reliable history. Does not appear to be in distress. He reports chronic LLE weakness. Denies any headache, vision changes, chest pain, shortness of breath. Again it is not clear how reliable he is. He denies any hx of stroke, also denies any hx of cancer. He tells me he uses wheelchair a lot in the nursing home.       Home medications listed in chart include DAPT with ASA and plavix, 10mg atorvastatin. Per RN patient was more oriented this morning. He has been having mild confusion per family, not sure if chronic. He does live at home, and is ambulatory for which is different than what he reports. Medical History:  Past Medical History:   Diagnosis Date    Arthritis     Cancer ([address]     Colon Cancer diagnosed last month    Hemodialysis patient [facility])      Past Surgical History:   Procedure Laterality Date    IR PERC ARTERIOVENOUS FISTULA CREATION Left     unsure of date     Scheduled Meds:   sodium chloride flush  5-40 mL IntraVENous 2 times per day    [START ON 1/15/2022] cefTRIAXone (ROCEPHIN) IV  1,000 mg IntraVENous Q24H     Continuous Infusions:   sodium chloride      sodium chloride 100 mL/hr at 01/14/22 1648     PRN Meds:.sodium chloride flush, sodium chloride, acetaminophen **OR** acetaminophen, ondansetron, perflutren lipid microspheres    Medications Prior to Admission: tamsulosin (FLOMAX) 0.4 MG capsule, Take 0.4 mg by mouth every morning  LORazepam (ATIVAN) 0.5 MG tablet, Take 0.5 mg by mouth 2 times daily. gabapentin (NEURONTIN) 100 MG capsule, Take 100 mg by mouth 3 times daily. sevelamer (RENVELA) 800 MG tablet, Take 1 tablet by mouth 3 times daily (with meals)  clopidogrel (PLAVIX) 75 MG tablet, Take 75 mg by mouth every evening  atenolol (TENORMIN) 25 MG tablet, Take 25 mg by mouth every evening  pantoprazole (PROTONIX) 20 MG tablet, Take 20 mg by mouth nightly  atorvastatin (LIPITOR) 10 MG tablet, Take 10 mg by mouth nightly  amLODIPine (NORVASC) 5 MG tablet, Take 5 mg by mouth every evening  calcitRIOL (ROCALTROL) 0.25 MCG capsule, Take 0.25 mcg by mouth every evening  aspirin 81 MG chewable tablet, Take 81 mg by mouth daily    Allergies   Allergen Reactions    Lisinopril      Allergy listed on paperwork from [facility], no reaction noted       History reviewed. No pertinent family history.     Social History     Tobacco Use   Smoking Status Former Smoker   Smokeless Tobacco Never Used     Social History     Substance and Sexual Activity   Drug Use Not on file     Social History     Substance and Sexual Activity   Alcohol Use Not Currently       ROS: limited given encephalopathy, uncertain reliability. Constitutional- No fevers  Eyes- No diplopia. No photophobia. Ears/nose/throat- No dysphagia. No Dysarthria  Cardiovascular- No palpitations. No chest pain  Respiratory- No dyspnea. No Cough  Gastrointestinal- No Abdominal pain. No Vomiting. Musculoskeletal- No myalgia. No arthralgia  Skin- No rash. No easy bruising. Endocrine- No diabetes. No thyroid issues. Hematologic- + bleeding difficulty. + fatigue  Neurologic- + chronic LLE weakness. No Headache. Exam:  Vitals:    01/14/22 0900 01/14/22 1145 01/14/22 1500 01/14/22 1601   BP: 117/67 125/64  110/64   Pulse: 64 53 56 95   Resp: 20 14 15 18   Temp:    97.8 °F (36.6 °C)   TempSrc:    Axillary   SpO2: 100% 94% 100% 98%   Weight:       Height:          Constitutional    Vital signs: BP, HR, and RR reviewed   General Alert, no distress, chronically ill appearing. Eyes: unable to visualize the fundi  Cardiovascular: pulses symmetric in all 4 extremities. No peripheral edema. Psychiatric: cooperative with examination, no  psychotic behavior noted. Neurologic  Mental status:   orientation to person, month. Uncertain of location, year, situation    General fund of knowledge:  Poor. Initially tells me Michelle Agudelo is president, Second attempt identifies Biden with choices. Memory: Long term intact. Poor short term. Attention  Mildly impaired, attends will to exam.     Language No obvious aphasia, answer in simple sentences. Comprehensionfollows simple commands  Cranial nerves:   CN2: Visual Fields full w/o extinction on confrontational testing  CN 3,4,6: extraocular muscles intact, pupillary assessment difficult given cooperation.  Seems to have a mild dysconjugate primary gaze, difficult to vessel ischemic disease. TTE 1/14/22:    Left ventricular size is normal.   Moderate concentric left ventricular hypertrophy is present. Global left ventricular function is normal with ejection fraction estimated   from 55 % to 60 %. Grade II diastolic dysfunction with elevated LV filling pressures. Normal right ventricular size and function. Left atrium severely dilated. Impression  1. Acute ischemic stroke   2. Encephalopathy: Uncertain of patients baseline  3. GI bleed  4. ESRD  5. Colon Cancer  6. Recent Covid 23.   7. UTI    Yanira Spencer is a 77 y.o. male with recent Covid 23, diagnosis of colon cancer, ESRD on dialysis who presented with progressive worsening fatigue, somnolence for the last week, falls. He was found to have GI bleed, LESLEE, multifocal pneumonia. Covid 19 was negative on this admission. MRI Brain w/o was completed for ? altered mental status and revealed a punctate acute infarct in the posteromedial left parietal lobe, within the deep white matter which is likely incidental.     Antiplatelets have been held 2/2 GI bleed. Given known cancer, recent Covid 23, mechanism could be of hypercoagulable state. TTE has severely left atrium dilation which can be seen in poorly controlled HTN, vs. Underlying atrial fibrillation/flutter. Do suspect a component of metabolic encephalopathy given recent infections, GI bleed/anemia. May have underlying neurocognitive disorder as family reports confusion, possibly at baseline. Recommendations  - MRA head & neck w/o to complete stroke work up. - Will check HbA1c, Lipid panel, TSH (ordered)   - Continue antiplatelet therapy if/when able from a GI bleed standpoint.   - Statin therapy. LDL goal < 70  - Trend towards normotension. Long term goal < 140/90  - Euglycemia HbA1c <7  - Telemetry monitoring. Please notify neurology of any atrial fibrillation/flutter. 30 day event monitor on discharge.    - Continued efforts addressing underlying infectious and metabolic abnormalities per primary team.   - PT, OT, SLP.   - Neurology attending will see this afternoon.        Sharif Cruz, [address] Neurology    A copy of this note was provided for Dr J Carlos Patterson, DO

## 2022-01-14 NOTE — CONSULTS
[address] [place] [address] Nephrology   [facility]  [phone]  Nephrology Consult Note          Patient ID: Tadeo Porras  Referring/ Physician: Altaf Tony MD      HPI/Summary:   Tadeo Porras is being seen by nephrology for ESRD. This is a 76 yo man with ESRD on HD three times weekly via left AVF who is here after a fall and AMS. He has been diagnosed with colon cancer and has been having rectal bleeding off and on for the past several weeks. From [facility]. Had [facility] last month so has been dialyzing at a different dialysis unit for 20 day period. Last HD on Thursday this week. No rectal bleeding since being here at [facility]. GI has no plans for him. Daughter is at bedside. Apparently her father and mother both had a fall yesterday prompting the ED visit. /64  94% on room air. Confused not able to provide much history   Hgb 8.7      Plan:   - no acute indications for for dialysis today. - HD tomorrow.   -Blood pressure acceptable, no change      ESRD  Does dialysis Monday Wednesday Friday usually but has been doing TTS at the [facility] unit near [facility]  He has a left AV fistula, positive thrill and bruit  Last at dialysis was on Thursday 1/14    Electrolytes  Hyponatremia, blood sugar 208  No acute issues. Hypertension  Blood pressure is soft, no acute issues. S HPT  Calcium 8  Check phosphorus    GI bleed  Has known colon cancer  No active bleeding  Hemoglobin stable    Altered mental status  MRI showed small acute stroke  Neurology consulted              [facility] would like to thank you for the opportunity to serve this patient. Please call with any questions or concerns.     Isaura Arredondo MD  [facility]  [address], [address]  Fax: [phone]  Office: [phone]         CC/Reason for consult:   Reason for consult: esrd  Chief Complaint   Patient presents with    Fatigue     pt was recently d/cd from the hospital still with complaints of weakness and no energy    Fall     pt fell x 2 today            Review of Systems:   [address]. All other remaining systems are negative. Constitutional:  fever, chills, weakness, weight change, fatigue,      Skin:  rash, pruritus, hair loss, bruising, dry skin, petechiae. Head, Face, Neck   headaches, swelling,  cervical adenopathy. Respiratory: shortness of breath, cough, or wheezing  Cardiovascular: chest pain, palpitations, dizzy, edema  Gastrointestinal: nausea, vomiting, diarrhea, constipation,belly pain    Yellow skin, blood in stool  Musculoskeletal:  back pain, muscle weakness, gait problems,       joint pain or swelling. Genitourinary:  dysuria, poor urine flow, flank pain, blood in urine  Neurologic:  vertigo, TIA'S, syncope, seizures, focal weakness  Psychosocial:  insomnia, anxiety, or depression. Additional positive findings: -     PMH/SH/FH:    Medical Hx: reviewed and updated as appropriate  No past medical history on file. Surgical Hx: reviewed and updated as appropriate   has no past surgical history on file. Social Hx: reviewed and updated as appropriate  Social History     Tobacco Use    Smoking status: Not on file    Smokeless tobacco: Not on file   Substance Use Topics    Alcohol use: Not on file        Family hx: reviewed and updated as appropriate  family history is not on file. Medications:   sodium chloride flush, 5-40 mL, 2 times per day  [START ON 1/15/2022] cefTRIAXone (ROCEPHIN) IV, 1,000 mg, Q24H       Lisinopril    Allergies: Allergies   Allergen Reactions    Lisinopril      Allergy listed on paperwork from [facility], no reaction noted         Physical Exam/Objective:   Vitals:    01/14/22 1145   BP: 125/64   Pulse: 53   Resp: 14   Temp:    SpO2: 94%     No intake or output data in the 24 hours ending 01/14/22 1400      General appearance: Male in no acute distress, comfortable, communicative, awake and alert.    HEENT: no icterus, EOM intact, trachea midline. Neck : no masses, appears symmetrical and no JVD appreciated. Respiratory: Respiratory effort normal, bilateral equal chest rise. No wheeze, no crackles   Cardiovascular: Ausculation shows RRR and no edema   Abdomen: abdomen is soft, non distended, no masses, no pain with palpation. Musculoskeletal:  no joint swelling, no deformity*  Skin: no rashes, no induration, no tightening, no jaundice   Neuro:   Follows commands, moves all extremities spontaneously   Left AV fistula positive thrill and bruit      Data:   CBC:   Recent Labs     01/13/22 2110 01/14/22  0633   WBC 5.5 4.1   HGB 9.9* 8.7*   HCT 29.7* 26.3*    213     BMP:    Recent Labs     01/13/22 2110   *   K 3.7   CL 95*   CO2 24   BUN 34*   CREATININE 3.7*   GLUCOSE 208*

## 2022-01-14 NOTE — CONSULTS
posteromedial left parietal lobe, within the   deep white matter. 2. 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 US renal  1. Simple cysts in the left kidney with no other significant renal finding. 2. Borderline enlargement of the prostate. Correlate with urologic history. Prior Endoscopic Evaluations: 1 month ago as reported by patient and family. No access to records. PAST MEDICAL, SURGICAL, FAMILY, and SOCIAL HISTORY   No past medical history on file. No past surgical history on file. No family history on file. Social History     Socioeconomic History    Marital status:      Spouse name: Not on file    Number of children: Not on file    Years of education: Not on file    Highest education level: Not on file   Occupational History    Not on file   Tobacco Use    Smoking status: Not on file    Smokeless tobacco: Not on file   Substance and Sexual Activity    Alcohol use: Not on file    Drug use: Not on file    Sexual activity: Not on file   Other Topics Concern    Not on file   Social History Narrative    Not on file     Social Determinants of Health     Financial Resource Strain:     Difficulty of Paying Living Expenses: Not on file   Food Insecurity:     Worried About Running Out of Food in the Last Year: Not on file    Brooklynn of Food in the Last Year: Not on file   Transportation Needs:     Lack of Transportation (Medical): Not on file    Lack of Transportation (Non-Medical):  Not on file   Physical Activity:     Days of Exercise per Week: Not on file    Minutes of Exercise per Session: Not on file   Stress:     Feeling of Stress : Not on file   Social Connections:     Frequency of Communication with Friends and Family: Not on file    Frequency of Social Gatherings with Friends and Family: Not on file    Attends Sikhism Services: Not on file    Active Member of Clubs or Organizations: Not on file   Simón Mohamud Attends Club or Organization Meetings: Not on file    Marital Status: Not on file   Intimate Partner Violence:     Fear of Current or Ex-Partner: Not on file    Emotionally Abused: Not on file    Physically Abused: Not on file    Sexually Abused: Not on file   Housing Stability:     Unable to Pay for Housing in the Last Year: Not on file    Number of Bhavanamojuan in the Last Year: Not on file    Unstable Housing in the Last Year: Not on file       MEDICATIONS   SCHEDULED:  sodium chloride flush, 5-40 mL, 2 times per day  [START ON 1/15/2022] cefTRIAXone (ROCEPHIN) IV, 1,000 mg, Q24H      FLUIDS/DRIPS:     sodium chloride      pantoprazole 8 mg/hr (01/14/22 0407)    sodium chloride 100 mL/hr at 01/14/22 0407     PRNs: sodium chloride flush, 5-40 mL, PRN  sodium chloride, 25 mL, PRN  acetaminophen, 650 mg, Q6H PRN   Or  acetaminophen, 650 mg, Q6H PRN  ondansetron, 4 mg, Q6H PRN  perflutren lipid microspheres, 1.5 mL, ONCE PRN      ALLERGIES:  He   Allergies   Allergen Reactions    Lisinopril      Allergy listed on paperwork from [address] [place] [address], no reaction noted       REVIEW OF SYSTEMS   Pertinent ROS noted in HPI    PHYSICAL EXAM     Vitals:    01/14/22 0700 01/14/22 0800 01/14/22 0900 01/14/22 1145   BP: 120/70 (!) 147/75 117/67 125/64   Pulse: 64 66 64 53   Resp: 14 21 20 14   Temp:       TempSrc:       SpO2: 99% 98% 100% 94%   Weight:       Height:           No intake/output data recorded. Physical Exam:  General appearance: alert, cooperative, no distress, appears stated age. Laying comfortably in bed. Able to answer questions appropriately. Eyes: Anicteric  Head: Normocephalic, without obvious abnormality  Lungs: clear to auscultation bilaterally, Normal Effort  Heart: regular rate and rhythm, normal S1 and S2, no murmurs or rubs  Abdomen: soft, non-distended, non-tender. Bowel sounds normal. No masses,  no organomegaly.    Extremities: atraumatic, no cyanosis or edema  Skin: warm and dry, no jaundice  Neuro: Grossly intact, A&OX3      LABS AND IMAGING   Laboratory   Recent Labs     01/13/22 2110 01/14/22  0633   WBC 5.5 4.1   HGB 9.9* 8.7*   HCT 29.7* 26.3*   MCV 88.8 88.4    213     Recent Labs     01/13/22 2110   *   K 3.7   CL 95*   CO2 24   BUN 34*   CREATININE 3.7*     Recent Labs     01/13/22 2110   AST 9*   ALT 7*   BILITOT 0.3   ALKPHOS 84     No results for input(s): LIPASE, AMYLASE in the last 72 hours. No results for input(s): PROTIME, INR in the last 72 hours. Imaging  MRI BRAIN WO CONTRAST   Preliminary Result   1. Punctate acute infarct in the posteromedial left parietal lobe, within the   deep white matter. 2. 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 RECOMMENDATIONS:   Unavailable         US RENAL COMPLETE   Final Result   1. Simple cysts in the left kidney with no other significant renal finding. 2. Borderline enlargement of the prostate. Correlate with urologic history. CT CHEST ABDOMEN PELVIS WO CONTRAST   Final Result   No evidence of acute intrathoracic, intraabdominal or intrapelvic injury. Multifocal airspace disease. This pattern may reflect COVID pneumonia. Correlate with COVID testing. CT HEAD WO CONTRAST   Final Result   No acute intracranial abnormality. Specifically, no acute intracranial   hemorrhage or mass effect. Extensive chronic small vessel ischemic disease. ASSESSMENT AND RECOMMENDATIONS   Bambi Wood is a 77 y.o. male with PMHx of CKD on HD, recent hospital admission for COVID / Pneumonia (d/c 1 week ago), and Colon cancer (dx 1 month ago) Presented to the ED on  1/13/2022 with Generalized Weakness. We have been consulted regarding Anemia    IMPRESSION:    1. Normocytic Anemia  -Patient denies recent hematochezia or melena. HgB levels stable at 8.7. Low suspicion for active hemorrhaging.   Source of anemia most likely due to colon cancer recently diagnosed, and anemia of chronic diease (CKD). No further diagnostic imaging indicated at this time. Continue to monitor HgB and support as needed. Ok to convert PPI drip to BID dosing  2. Colon Cancer  -Family in process of scheduling outpatient procedure for colon resection with colectomy  3. AMS. MRI brain suggestive of acute infarct  4. CKD  5. Recent COVID pneumonia      RECOMMENDATIONS:    -Monitor H&H and support as needed  -No further GI workup necessary d/t patient stable and blood loss likely chronic d/t PMHx  -Okay to advance diet as tolerated per GI team, defer diet orders based on further workup for non-GI related hospital conditions  -Family in process of scheduling surgery outpatient for colon cancer    If you have any questions or need any further information, please feel free to contact our consult team.  Thank you for allowing us to participate in the care of Cate Yu.    The note was completed using Dragon voice recognition transcription. Every effort was made to ensure accuracy; however, inadvertent transcription errors may be present despite my best efforts to edit errors.       Dorsie Meigs PA-C

## 2022-01-14 NOTE — ED NOTES
Handoff report given to [facility], RN to assume care. Denies further questions.      Any Naylor RN  01/14/22 09

## 2022-01-14 NOTE — PROGRESS NOTES
4 Eyes Skin Assessment     NAME:  Carter Marie  YOB: 1945  MEDICAL RECORD NUMBER:  [id number]    The patient is being assess for  Admission    I agree that 2 RN's have performed a thorough Head to Toe Skin Assessment on the patient. ALL assessment sites listed below have been assessed. Areas assessed by both nurses:    Head, Face, Ears, Shoulders, Back, Chest, Arms, Elbows, Hands, Sacrum. Buttock, Coccyx, Ischium and Legs. Feet and Heels        Does the Patient have a Wound?  No noted wound(s)       Cordell Prevention initiated:  Yes   Wound Care Orders initiated:  NA    Pressure Injury (Stage 3,4, Unstageable, DTI, NWPT, and Complex wounds) if present place consult order under [de-identified] NA    New and Established Ostomies if present place consult order under : NA      Nurse 1 eSignature: Electronically signed by Emma Vora RN on 1/14/22 at 4:27 PM EST    **SHARE this note so that the co-signing nurse is able to place an eSignature**    Nurse 2 eSignature: Electronically signed by Maddi Vargas RN on 1/14/22 at 4:33 PM EST

## 2022-01-14 NOTE — ED NOTES
Pt placed on 3 L nasal cannula at this time due to sleep apnea. Nikki Nunez MD made aware.      Chary Lopez RN  01/14/22 2449

## 2022-01-14 NOTE — ED NOTES
Per pts daughter pt was admitted in [facility] 2 weeks ago for fall and fatigue and tested positive for Covid.  Pt was released approx a week ago and fell yesterday twice after dialysis so they brought him here for further eval.      Marcos Salamanca RN  01/14/22 7598

## 2022-01-14 NOTE — ED NOTES
Report called to [employer]. Pt to go to floor.       Adrianne Reinoso RN  01/14/22 5520

## 2022-01-14 NOTE — ED NOTES
Vesta De La Cruz (patient's daughter) [phone] (patient's daughter) [phone]     Kalyn Westbrook RN  01/14/22 1230

## 2022-01-14 NOTE — PROGRESS NOTES
Medication Reconciliation    List of medications for Bambi Wood is currently taking is in progress. Source of Information:   Epic records  Medication list from [facility] list not thoroughly reviewed with patient at this time  Allergies listed in Epic as follows: Lisinopril    Notes Regarding Home Medications:    Added medications from [address] list in patient room  Medication list states last updated on 10/8/21, unsure if any changes were made during recent hospitalization  Calcitriol listed as requiring prior authorization as of 8/6/21  Medication list states allergy to lisinopril, no reaction noted  Patient too confused to confirm medications with him at this time       Sreedhar Lala [facility], PharmD   1/14/2022 11:48 AM

## 2022-01-15 LAB
ANION GAP SERPL CALCULATED.3IONS-SCNC: 9 MMOL/L (ref 3–16)
BASOPHILS ABSOLUTE: 0 K/UL (ref 0–0.2)
BASOPHILS RELATIVE PERCENT: 0.2 %
BASOPHILS RELATIVE PERCENT: 0.5 %
BASOPHILS RELATIVE PERCENT: 0.5 %
BASOPHILS RELATIVE PERCENT: 0.8 %
BUN BLDV-MCNC: 37 MG/DL (ref 7–20)
CALCIUM SERPL-MCNC: 6 MG/DL (ref 8.3–10.6)
CHLORIDE BLD-SCNC: 113 MMOL/L (ref 99–110)
CHOLESTEROL, TOTAL: 60 MG/DL (ref 0–199)
CO2: 19 MMOL/L (ref 21–32)
CREAT SERPL-MCNC: 3.5 MG/DL (ref 0.8–1.3)
EOSINOPHILS ABSOLUTE: 0.1 K/UL (ref 0–0.6)
EOSINOPHILS RELATIVE PERCENT: 2.3 %
EOSINOPHILS RELATIVE PERCENT: 2.5 %
EOSINOPHILS RELATIVE PERCENT: 3 %
EOSINOPHILS RELATIVE PERCENT: 3.1 %
ESTIMATED AVERAGE GLUCOSE: 128.4 MG/DL
GFR AFRICAN AMERICAN: 21
GFR NON-AFRICAN AMERICAN: 17
GLUCOSE BLD-MCNC: 95 MG/DL (ref 70–99)
HAV IGM SER IA-ACNC: NORMAL
HBA1C MFR BLD: 6.1 %
HBV SURFACE AB TITR SER: 7.69 MIU/ML
HCT VFR BLD CALC: 25.7 % (ref 40.5–52.5)
HCT VFR BLD CALC: 26.2 % (ref 40.5–52.5)
HCT VFR BLD CALC: 29.4 % (ref 40.5–52.5)
HCT VFR BLD CALC: 29.6 % (ref 40.5–52.5)
HDLC SERPL-MCNC: 24 MG/DL (ref 40–60)
HEMOGLOBIN: 8.6 G/DL (ref 13.5–17.5)
HEMOGLOBIN: 8.7 G/DL (ref 13.5–17.5)
HEMOGLOBIN: 9.3 G/DL (ref 13.5–17.5)
HEMOGLOBIN: 9.7 G/DL (ref 13.5–17.5)
HEPATITIS B CORE IGM ANTIBODY: NORMAL
HEPATITIS B SURFACE ANTIGEN INTERPRETATION: NORMAL
HEPATITIS C ANTIBODY INTERPRETATION: NORMAL
LDL CHOLESTEROL CALCULATED: 25 MG/DL
LYMPHOCYTES ABSOLUTE: 0.7 K/UL (ref 1–5.1)
LYMPHOCYTES ABSOLUTE: 0.8 K/UL (ref 1–5.1)
LYMPHOCYTES ABSOLUTE: 0.8 K/UL (ref 1–5.1)
LYMPHOCYTES ABSOLUTE: 0.9 K/UL (ref 1–5.1)
LYMPHOCYTES RELATIVE PERCENT: 15.4 %
LYMPHOCYTES RELATIVE PERCENT: 16.3 %
LYMPHOCYTES RELATIVE PERCENT: 16.8 %
LYMPHOCYTES RELATIVE PERCENT: 19.4 %
MAGNESIUM: 1.6 MG/DL (ref 1.8–2.4)
MCH RBC QN AUTO: 28.3 PG (ref 26–34)
MCH RBC QN AUTO: 29.3 PG (ref 26–34)
MCH RBC QN AUTO: 29.5 PG (ref 26–34)
MCH RBC QN AUTO: 29.8 PG (ref 26–34)
MCHC RBC AUTO-ENTMCNC: 31.6 G/DL (ref 31–36)
MCHC RBC AUTO-ENTMCNC: 32.7 G/DL (ref 31–36)
MCHC RBC AUTO-ENTMCNC: 33.2 G/DL (ref 31–36)
MCHC RBC AUTO-ENTMCNC: 33.4 G/DL (ref 31–36)
MCV RBC AUTO: 88.8 FL (ref 80–100)
MCV RBC AUTO: 89.3 FL (ref 80–100)
MCV RBC AUTO: 89.3 FL (ref 80–100)
MCV RBC AUTO: 89.7 FL (ref 80–100)
MONOCYTES ABSOLUTE: 0.3 K/UL (ref 0–1.3)
MONOCYTES RELATIVE PERCENT: 6.4 %
MONOCYTES RELATIVE PERCENT: 6.8 %
MONOCYTES RELATIVE PERCENT: 6.9 %
MONOCYTES RELATIVE PERCENT: 7.5 %
NEUTROPHILS ABSOLUTE: 3.3 K/UL (ref 1.7–7.7)
NEUTROPHILS ABSOLUTE: 3.3 K/UL (ref 1.7–7.7)
NEUTROPHILS ABSOLUTE: 3.4 K/UL (ref 1.7–7.7)
NEUTROPHILS ABSOLUTE: 3.8 K/UL (ref 1.7–7.7)
NEUTROPHILS RELATIVE PERCENT: 69.8 %
NEUTROPHILS RELATIVE PERCENT: 73.4 %
NEUTROPHILS RELATIVE PERCENT: 73.9 %
NEUTROPHILS RELATIVE PERCENT: 74.5 %
PDW BLD-RTO: 14.1 % (ref 12.4–15.4)
PDW BLD-RTO: 14.2 % (ref 12.4–15.4)
PDW BLD-RTO: 14.3 % (ref 12.4–15.4)
PDW BLD-RTO: 14.3 % (ref 12.4–15.4)
PLATELET # BLD: 205 K/UL (ref 135–450)
PLATELET # BLD: 214 K/UL (ref 135–450)
PLATELET # BLD: 214 K/UL (ref 135–450)
PLATELET # BLD: 234 K/UL (ref 135–450)
PMV BLD AUTO: 7.2 FL (ref 5–10.5)
PMV BLD AUTO: 7.3 FL (ref 5–10.5)
PMV BLD AUTO: 7.5 FL (ref 5–10.5)
PMV BLD AUTO: 7.7 FL (ref 5–10.5)
POTASSIUM SERPL-SCNC: 3.4 MMOL/L (ref 3.5–5.1)
RBC # BLD: 2.87 M/UL (ref 4.2–5.9)
RBC # BLD: 2.94 M/UL (ref 4.2–5.9)
RBC # BLD: 3.3 M/UL (ref 4.2–5.9)
RBC # BLD: 3.3 M/UL (ref 4.2–5.9)
SODIUM BLD-SCNC: 141 MMOL/L (ref 136–145)
TRIGL SERPL-MCNC: 54 MG/DL (ref 0–150)
TSH REFLEX FT4: 1.18 UIU/ML (ref 0.27–4.2)
VLDLC SERPL CALC-MCNC: 11 MG/DL
WBC # BLD: 4.5 K/UL (ref 4–11)
WBC # BLD: 4.6 K/UL (ref 4–11)
WBC # BLD: 4.7 K/UL (ref 4–11)
WBC # BLD: 5.1 K/UL (ref 4–11)

## 2022-01-15 PROCEDURE — 80061 LIPID PANEL: CPT

## 2022-01-15 PROCEDURE — 2580000003 HC RX 258: Performed by: STUDENT IN AN ORGANIZED HEALTH CARE EDUCATION/TRAINING PROGRAM

## 2022-01-15 PROCEDURE — 6360000002 HC RX W HCPCS: Performed by: HOSPITALIST

## 2022-01-15 PROCEDURE — 84443 ASSAY THYROID STIM HORMONE: CPT

## 2022-01-15 PROCEDURE — 90935 HEMODIALYSIS ONE EVALUATION: CPT

## 2022-01-15 PROCEDURE — 2060000000 HC ICU INTERMEDIATE R&B

## 2022-01-15 PROCEDURE — 80048 BASIC METABOLIC PNL TOTAL CA: CPT

## 2022-01-15 PROCEDURE — 5A1D70Z PERFORMANCE OF URINARY FILTRATION, INTERMITTENT, LESS THAN 6 HOURS PER DAY: ICD-10-PCS | Performed by: STUDENT IN AN ORGANIZED HEALTH CARE EDUCATION/TRAINING PROGRAM

## 2022-01-15 PROCEDURE — 6360000002 HC RX W HCPCS: Performed by: STUDENT IN AN ORGANIZED HEALTH CARE EDUCATION/TRAINING PROGRAM

## 2022-01-15 PROCEDURE — 85025 COMPLETE CBC W/AUTO DIFF WBC: CPT

## 2022-01-15 PROCEDURE — 36415 COLL VENOUS BLD VENIPUNCTURE: CPT

## 2022-01-15 PROCEDURE — 83735 ASSAY OF MAGNESIUM: CPT

## 2022-01-15 PROCEDURE — 83036 HEMOGLOBIN GLYCOSYLATED A1C: CPT

## 2022-01-15 PROCEDURE — 6370000000 HC RX 637 (ALT 250 FOR IP): Performed by: HOSPITALIST

## 2022-01-15 PROCEDURE — 94760 N-INVAS EAR/PLS OXIMETRY 1: CPT

## 2022-01-15 PROCEDURE — 2580000003 HC RX 258: Performed by: HOSPITALIST

## 2022-01-15 RX ORDER — MAGNESIUM SULFATE IN WATER 40 MG/ML
2000 INJECTION, SOLUTION INTRAVENOUS ONCE
Status: COMPLETED | OUTPATIENT
Start: 2022-01-15 | End: 2022-01-15

## 2022-01-15 RX ORDER — POLYETHYLENE GLYCOL 3350 17 G/17G
17 POWDER, FOR SOLUTION ORAL DAILY PRN
Status: DISCONTINUED | OUTPATIENT
Start: 2022-01-15 | End: 2022-02-04 | Stop reason: HOSPADM

## 2022-01-15 RX ORDER — TAMSULOSIN HYDROCHLORIDE 0.4 MG/1
0.4 CAPSULE ORAL EVERY MORNING
Status: DISCONTINUED | OUTPATIENT
Start: 2022-01-16 | End: 2022-02-04 | Stop reason: HOSPADM

## 2022-01-15 RX ORDER — ONDANSETRON 2 MG/ML
4 INJECTION INTRAMUSCULAR; INTRAVENOUS EVERY 6 HOURS PRN
Status: DISCONTINUED | OUTPATIENT
Start: 2022-01-15 | End: 2022-02-04 | Stop reason: HOSPADM

## 2022-01-15 RX ORDER — SEVELAMER CARBONATE 800 MG/1
800 TABLET, FILM COATED ORAL
Status: DISCONTINUED | OUTPATIENT
Start: 2022-01-15 | End: 2022-02-04 | Stop reason: HOSPADM

## 2022-01-15 RX ORDER — ATORVASTATIN CALCIUM 80 MG/1
80 TABLET, FILM COATED ORAL NIGHTLY
Status: DISCONTINUED | OUTPATIENT
Start: 2022-01-15 | End: 2022-02-04 | Stop reason: HOSPADM

## 2022-01-15 RX ORDER — GABAPENTIN 100 MG/1
100 CAPSULE ORAL 3 TIMES DAILY
Status: DISCONTINUED | OUTPATIENT
Start: 2022-01-15 | End: 2022-02-04 | Stop reason: HOSPADM

## 2022-01-15 RX ORDER — ONDANSETRON 4 MG/1
4 TABLET, ORALLY DISINTEGRATING ORAL EVERY 8 HOURS PRN
Status: DISCONTINUED | OUTPATIENT
Start: 2022-01-15 | End: 2022-02-04 | Stop reason: HOSPADM

## 2022-01-15 RX ORDER — PANTOPRAZOLE SODIUM 20 MG/1
20 TABLET, DELAYED RELEASE ORAL NIGHTLY
Status: DISCONTINUED | OUTPATIENT
Start: 2022-01-15 | End: 2022-02-04 | Stop reason: HOSPADM

## 2022-01-15 RX ADMIN — SODIUM CHLORIDE, PRESERVATIVE FREE 10 ML: 5 INJECTION INTRAVENOUS at 20:53

## 2022-01-15 RX ADMIN — SODIUM CHLORIDE 25 ML: 9 INJECTION, SOLUTION INTRAVENOUS at 21:01

## 2022-01-15 RX ADMIN — GABAPENTIN 100 MG: 100 CAPSULE ORAL at 16:03

## 2022-01-15 RX ADMIN — SODIUM CHLORIDE, PRESERVATIVE FREE 10 ML: 5 INJECTION INTRAVENOUS at 16:07

## 2022-01-15 RX ADMIN — GABAPENTIN 100 MG: 100 CAPSULE ORAL at 20:53

## 2022-01-15 RX ADMIN — PANTOPRAZOLE SODIUM 20 MG: 20 TABLET, DELAYED RELEASE ORAL at 20:53

## 2022-01-15 RX ADMIN — CEFTRIAXONE 1000 MG: 1 INJECTION, POWDER, FOR SOLUTION INTRAMUSCULAR; INTRAVENOUS at 00:16

## 2022-01-15 RX ADMIN — ATORVASTATIN CALCIUM 80 MG: 80 TABLET, FILM COATED ORAL at 20:53

## 2022-01-15 RX ADMIN — MAGNESIUM SULFATE HEPTAHYDRATE 2000 MG: 2 INJECTION, SOLUTION INTRAVENOUS at 16:05

## 2022-01-15 RX ADMIN — CEFTRIAXONE 1000 MG: 1 INJECTION, POWDER, FOR SOLUTION INTRAMUSCULAR; INTRAVENOUS at 21:02

## 2022-01-15 RX ADMIN — SEVELAMER CARBONATE 800 MG: 800 TABLET, FILM COATED ORAL at 18:07

## 2022-01-15 ASSESSMENT — PAIN SCALES - GENERAL
PAINLEVEL_OUTOF10: 0

## 2022-01-15 NOTE — PLAN OF CARE
Problem: Falls - Risk of:  Goal: Will remain free from falls  Description: Will remain free from falls  1/15/2022 1741 by Remedios Craft RN  Outcome: Ongoing  Note: Fall risk assessment completed every shift. All precautions in place. Pt has call light within reach at all times. Room clear of clutter. Pt aware to call for assistance when getting up. Bed alarm is on. Pt has made no attempts to get out of bed. Problem: Falls - Risk of:  Goal: Absence of physical injury  Description: Absence of physical injury  1/15/2022 1741 by Remedios Craft RN  Outcome: Ongoing  Note: No signs of physical injury. Problem: Skin Integrity:  Goal: Will show no infection signs and symptoms  Description: Will show no infection signs and symptoms  Outcome: Ongoing  Note: Skin assessment completed every shift. Pt assessed for incontinence, appropriate barrier cream applied prn. Pt encouraged to turn/rotate every 2 hours. Assistance provided if pt unable to do so themselves. Problem: Skin Integrity:  Goal: Absence of new skin breakdown  Description: Absence of new skin breakdown  1/15/2022 1741 by Remedios Craft RN  Outcome: Ongoing  Note: No signs of new skin breakdown. Problem: Discharge Planning:  Goal: Discharged to appropriate level of care  Description: Discharged to appropriate level of care  Outcome: Ongoing  Note: No discharge plans in place. Problem: Bowel Function - Altered:  Goal: Bowel elimination is within specified parameters  Description: Bowel elimination is within specified parameters  Outcome: Ongoing  Note: Pt has had no bleeding noted. He did have a small soft stool that was brown in color. Problem: Fluid Volume - Imbalance:  Description: Avoid the routine use of orogastric or nasogastric lavage. Goal: Absence of imbalanced fluid volume signs and symptoms  Description: Absence of imbalanced fluid volume signs and symptoms  Outcome: Ongoing  Note: VSS. Labs being monitored.  Pt did

## 2022-01-15 NOTE — PROGRESS NOTES
Patient's daughter Shorty Bermudez requested for pt to be treated here at [facility] for his colon cancer. Pt has known colon cancer and she said they were told he needs surgery and she is asking if he could be seen here by general surgery. Spoke to Dr. Bea Bell, new order received.

## 2022-01-15 NOTE — PROGRESS NOTES
[facility]                                               Progress note    Summary:   Gregory Sullivan is being seen by nephrology for ESRD. This is a 76 yo man with ESRD on HD three times weekly via left AVF who is here after a fall and AMS. He has been diagnosed with colon cancer and has been having rectal bleeding off and on for the past several weeks. From [facility]. Had [facility] last month so has been dialyzing at a different dialysis unit for 20 day period. Last HD on Thursday this week. No rectal bleeding since being here at [facility]. GI has no plans for him. Daughter is at bedside. Apparently her father and mother both had a fall yesterday prompting the ED visit. Interval History  Dialyzed this AM   Tolerated it well. No new complaints. Plan:   - HD today per schedule  - post HD weight 110.5 kg, 2 L removed. Jovani nAdujar MD  [address] [facility]  Office: [phone]    Assessment:   ESRD  Does dialysis Monday Wednesday Friday usually but has been doing TTS at the [facility] unit near [facility]  He has a left AV fistula, positive thrill and bruit  Last at dialysis was on Thursday 1/14     Electrolytes  Hyponatremia, blood sugar 208  No acute issues.     Hypertension  Blood pressure is soft, no acute issues.     S HPT  Calcium 8  Check phosphorus     GI bleed  Has known colon cancer  No active bleeding  Hemoglobin stable     Altered mental status  MRI showed small acute stroke  Neurology consulted      ROS:   [address]. All other remaining systems are negative.     Constitutional:  fever, chills, weakness, weight change, fatigue,      Skin:  rash, pruritus, hair loss, bruising, dry skin, petechiae.   Head, Face, Neck   headaches, swelling,  cervical adenopathy.     Respiratory: shortness of breath, cough, or wheezing  Cardiovascular: chest pain, palpitations, dizzy, edema  Gastrointestinal: nausea, vomiting, diarrhea, constipation,belly pain    Yellow skin, blood in stool  Musculoskeletal:  back pain, muscle weakness, gait problems,       joint pain or swelling. Genitourinary:  dysuria, poor urine flow, flank pain, blood in urine  Neurologic:  vertigo, TIA'S, syncope, seizures, focal weakness  Psychosocial:  insomnia, anxiety, or depression. Additional positive findings: -     PMH:   Past medical history, surgical history, social history, family history are reviewed and updated as appropriate. Reviewed current medication list.   Allergies reviewed and updated as needed. PE:   Vitals:    01/15/22 1142   BP: 137/70   Pulse: 60   Resp: 18   Temp: 97.6 °F (36.4 °C)   SpO2: 95%       General appearance: Male in no acute distress, comfortable, communicative, awake and alert. HEENT: no icterus, EOM intact, trachea midline. Neck : no masses, appears symmetrical and no JVD appreciated. Respiratory: Respiratory effort normal, bilateral equal chest rise. No wheeze, no crackles   Cardiovascular: Ausculation shows RRR and no edema   Abdomen: abdomen is soft, non distended, no masses, no pain with palpation. Musculoskeletal:  no joint swelling, no deformity*  Skin: no rashes, no induration, no tightening, no jaundice   Neuro:   Follows commands, moves all extremities spontaneously   Left AV fistula positive thrill and bruit      Lab Results   Component Value Date    CREATININE 3.5 (H) 01/15/2022    BUN 37 (H) 01/15/2022     01/15/2022    K 3.4 (L) 01/15/2022     (H) 01/15/2022    CO2 19 (L) 01/15/2022      Lab Results   Component Value Date    WBC 4.5 01/15/2022    HGB 8.6 (L) 01/15/2022    HCT 25.7 (L) 01/15/2022    MCV 89.3 01/15/2022     01/15/2022     Lab Results   Component Value Date    CALCIUM 6.0 (L) 01/15/2022

## 2022-01-15 NOTE — PROGRESS NOTES
Pt is a poor historian as far as providing medical history. Pt is a type II diabetic w/ continuous monitor in place to right upper arm. Daughter stated that pt takes insulin at home (possibly humalog and levemir) but these medications are not listed on his home medication list.  BS on arrival in ED on 1/13 was 208. BS at bedtime tonight 1/14 was 168. Pt appetite has decrease significantly since colon cancer diagnosis. Pt ate less than 25% of tray.

## 2022-01-15 NOTE — PROGRESS NOTES
Spoke to Dr. Светлана Raymond, general surgery, explained reason for consult. He said he would be in tomorrow to see him.

## 2022-01-15 NOTE — PROGRESS NOTES
Patient taken to dialysis via stretcher at this time. Pt is alert and oriented to person, place, time. Conversation is appropriate this morning.

## 2022-01-15 NOTE — PLAN OF CARE
Problem: Falls - Risk of:  Goal: Will remain free from falls  Description: Will remain free from falls  Outcome: Ongoing     Problem: Falls - Risk of:  Goal: Absence of physical injury  Description: Absence of physical injury  Outcome: Ongoing     Problem: Skin Integrity:  Goal: Absence of new skin breakdown  Description: Absence of new skin breakdown  Outcome: Ongoing     Problem: Fluid Volume - Imbalance:  Goal: Will show no signs and symptoms of excessive bleeding  Description: Will show no signs and symptoms of excessive bleeding  Outcome: Ongoing     Problem: HEMODYNAMIC STATUS  Goal: Patient has stable vital signs and fluid balance  Outcome: Ongoing     Problem: ACTIVITY INTOLERANCE/IMPAIRED MOBILITY  Goal: Mobility/activity is maintained at optimum level for patient  Outcome: Ongoing

## 2022-01-15 NOTE — PROGRESS NOTES
Magnesium level is 1.6, magnesium 2gm IV started at this time. Pt incontinent of urine and stool, small soft brown stool. Incontinent care provided, pt able to follow commands. Pt is currently resting in bed watching television. Call light within reach. Bed alarm is on.

## 2022-01-15 NOTE — PROGRESS NOTES
Treatment time: 3 hours  Net UF: 2000 ml     Pre weight: 112.6 kg   Post weight: 110.5 kg  EDW: TBD kg     Access used: LFT FA AVF  Access function: GOOD with  ml/min     Medications or blood products given: n/a     Regular outpatient schedule: TTS     Summary of response to treatment: HD tx completed in full, VSS, pt alert no distress, tolerated tx well w/o complications, 44XAP per sites held, hemostasis achieved, report given to RN, pt returned to room     Copy of dialysis treatment record placed in chart, to be scanned into EMR.       01/15/22 0754 01/15/22 1100   Vital Signs   BP (!) 142/73 (!) 145/72   Temp 97.5 °F (36.4 °C) 97.5 °F (36.4 °C)   Pulse 56 58   Resp 16 16   SpO2 97 %  --    Weight 248 lb 3.8 oz (112.6 kg) 243 lb 9.7 oz (110.5 kg)   Weight Method Bed scale Bed scale   Percent Weight Change 0 -1.87   Pain Assessment   Pain Assessment 0-10 0-10   Pain Level 0 0   Post-Hemodialysis Assessment   Post-Treatment Procedures  --  Blood returned; Access bleeding time < 10 minutes   Machine Disinfection Process  --  Acid/Vinegar Clean;Heat Disinfect; Exterior Machine Disinfection   Rinseback Volume (ml)  --  400 ml   Total Liters Processed (l/min)  --  62.7 l/min   Dialyzer Clearance  --  Moderately streaked   Duration of Treatment (minutes)  --  180 minutes   Heparin amount administered during treatment (units)  --  0 units   Hemodialysis Intake (ml)  --  400 ml   Hemodialysis Output (ml)  --  2400 ml   NET Removed (ml)  --  2000 ml   Tolerated Treatment  --  Good   Bilateral Breath Sounds Clear Clear   Edema None None   Physician Notified?  --  Yes

## 2022-01-15 NOTE — PROGRESS NOTES
Hospitalist Progress Note    Patient:  Tylor Del Real  Unit/Bed:[id number]-9487/5107-01   YOB: 1945       MRN: [id number] Acct: [de-identified]  PCP: No primary care provider on file. Date of Admission: 1/13/2022  --------------------------    Chief Complaint:     Mental status changes    Hospital Course:     Tylor Del Real is a 77 y.o. male hospitalized on 1/13/2022   multiple comorbidities presented to the ED found to have acute CVA. Assessment/plan:     Acute encephalopathy secondary to CVA  -MRI of the brain showed punctate acute infarct in the posterior medial left parietal lobe  -Probable underlying cognitive impairment  -Appreciate neurology input, holding dual antiplatelet therapy secondary to suspected GI bleed  Continue statin therapy  -Mental status improving.  -Activate stroke order set. End-stage renal failure, on hemodialysis  Nephrology following, continue HD    Hypomagnesemia  Replete      Hypokalemia  Monitoring, mild. Recent hospitalization for COVID  Currently on room air. History of colon cancer/GI bleed/acute blood loss anemia  -Doing antiplatelet therapy, awaiting GI input. Bacteriuria without urinary symptoms, culture data show Enterococcus faecalis  Discontinue ceftriaxone. Obesity Body mass index is 32.59 kg/m². Complicating assessment and treatment. Placing patient at risk for multiple co-morbidities as well as early death and contributing to the patient's presentation. Code Status: Full Code         DVT prophylaxis: SCD     Disposition: Continue patient care, may need ECF. Lives with his wife. I discussed my thought processes at length with patient/family and patient understood.  Question and concerns  Addressed      Discussed with RN      ----------------      Subjective:     Patient seen and examined  Overnight events noted  RN and ancillary staff note reviewed    Awake, conversant, poor insight of his medical problems, no major overnight events      Diet: ADULT DIET; Regular; Low Sodium (2 gm); Low Potassium (Less than 3000 mg/day); Low Phosphorus (Less than 1000 mg); 2000 ml    OBJECTIVE     Exam:  /70   Pulse 60   Temp 97.6 °F (36.4 °C) (Oral)   Resp 18   Ht 6' 0.5\" (1.842 m)   Wt 243 lb 9.7 oz (110.5 kg)   SpO2 95%   BMI 32.59 kg/m²            Gen: Not in distress. Alert. Oriented, pleasant but poor insight  Head: Normocephalic. Atraumatic. Eyes: Conjunctivae/corneas clear. ENT: Oral mucosa moist  Neck: No JVD. No obvious thyromegaly. CVS: Nml S1S2, no murmur  , RRR  Pulmomary: Clear bilaterally. No crackles. No wheezes. Gastrointestinal: Soft, non tender, non distend, . Musculoskeletal: Left upper extremity with fistula  Neuro: No focal deficit. Moves extremity spontaneously. Psychiatry: Appropriate affect. Not agitated. Medications:  Reviewed    Infusion Medications    sodium chloride       Scheduled Medications    sodium chloride flush  5-40 mL IntraVENous 2 times per day    cefTRIAXone (ROCEPHIN) IV  1,000 mg IntraVENous Q24H     PRN Meds: sodium chloride flush, sodium chloride, acetaminophen **OR** acetaminophen, ondansetron, perflutren lipid microspheres      Intake/Output Summary (Last 24 hours) at 1/15/2022 1413  Last data filed at 1/15/2022 1100  Gross per 24 hour   Intake 2590 ml   Output 2975 ml   Net -385 ml             Labs:   Recent Labs     01/15/22  0034 01/15/22  0531 01/15/22  1353   WBC 4.6 4.5 5.1   HGB 8.7* 8.6* 9.3*   HCT 26.2* 25.7* 29.4*    205 214     Recent Labs     01/13/22  2110 01/15/22  0531   * 141   K 3.7 3.4*   CL 95* 113*   CO2 24 19*   BUN 34* 37*   CREATININE 3.7* 3.5*   CALCIUM 8.0* 6.0*     Recent Labs     01/13/22 2110   AST 9*   ALT 7*   BILITOT 0.3   ALKPHOS 84     No results for input(s): INR in the last 72 hours. No results for input(s): Johnathon Vivas in the last 72 hours.     Urinalysis:      Lab Results   Component Value Date    NITRU Negative 01/13/2022 WBCUA >900 01/13/2022    RBCUA 0-2 01/13/2022    BLOODU LARGE 01/13/2022    SPECGRAV 1.025 01/13/2022    GLUCOSEU Negative 01/13/2022       Radiology:  MRI BRAIN WO CONTRAST   Final Result   1. Punctate acute infarct in the posteromedial left parietal lobe, within the   deep white matter. 2. 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 RECOMMENDATIONS:   Unavailable         US RENAL COMPLETE   Final Result   1. Simple cysts in the left kidney with no other significant renal finding. 2. Borderline enlargement of the prostate. Correlate with urologic history. CT CHEST ABDOMEN PELVIS WO CONTRAST   Final Result   No evidence of acute intrathoracic, intraabdominal or intrapelvic injury. Multifocal airspace disease. This pattern may reflect COVID pneumonia. Correlate with COVID testing. CT HEAD WO CONTRAST   Final Result   No acute intracranial abnormality. Specifically, no acute intracranial   hemorrhage or mass effect. Extensive chronic small vessel ischemic disease.                      Electronically signed by Familia Brice MD on 1/15/2022 at 2:13 PM

## 2022-01-15 NOTE — PROGRESS NOTES
Late entry  1600-pt arrived to PCU from the emergency room via bed. Pt is drowsy but awakens easily. Pt is alert to person, place and time but unable to verbalize why he is in the hospital. Pt is able to follow directions. Neuro check is unremarkable, PERRLA, pt able to move all extremities. Pt placed on telemetry monitor and verified with [facility]. Pt instructed on safety measures including not to get out of bed on his own. Pt verbalized understanding. Bed alarm on. Call light within reach. 1635-perfect serve message sent to Dr. Dallas Rodriguez regarding results of MRI that was completed in the emergency room this morning. New orders received for neurology consult. 1920-patient's daughter Ignacia Martinez is in room. She was given an update on patient. Patient is resting in bed with eyes closed, no signs of pain. Bed alarm is on. Call light within reach.

## 2022-01-16 PROBLEM — I63.9 ACUTE CVA (CEREBROVASCULAR ACCIDENT) (HCC): Status: ACTIVE | Noted: 2022-01-16

## 2022-01-16 LAB
ANION GAP SERPL CALCULATED.3IONS-SCNC: 11 MMOL/L (ref 3–16)
BASOPHILS ABSOLUTE: 0 K/UL (ref 0–0.2)
BASOPHILS RELATIVE PERCENT: 0.3 %
BASOPHILS RELATIVE PERCENT: 0.7 %
BASOPHILS RELATIVE PERCENT: 0.8 %
BUN BLDV-MCNC: 29 MG/DL (ref 7–20)
CALCIUM SERPL-MCNC: 8.2 MG/DL (ref 8.3–10.6)
CHLORIDE BLD-SCNC: 98 MMOL/L (ref 99–110)
CO2: 24 MMOL/L (ref 21–32)
CREAT SERPL-MCNC: 3.4 MG/DL (ref 0.8–1.3)
EOSINOPHILS ABSOLUTE: 0.1 K/UL (ref 0–0.6)
EOSINOPHILS RELATIVE PERCENT: 2.4 %
EOSINOPHILS RELATIVE PERCENT: 2.5 %
EOSINOPHILS RELATIVE PERCENT: 2.6 %
GFR AFRICAN AMERICAN: 21
GFR NON-AFRICAN AMERICAN: 18
GLUCOSE BLD-MCNC: 114 MG/DL (ref 70–99)
HCT VFR BLD CALC: 28 % (ref 40.5–52.5)
HCT VFR BLD CALC: 29.1 % (ref 40.5–52.5)
HCT VFR BLD CALC: 29.3 % (ref 40.5–52.5)
HEMOGLOBIN: 9.2 G/DL (ref 13.5–17.5)
HEMOGLOBIN: 9.6 G/DL (ref 13.5–17.5)
HEMOGLOBIN: 9.7 G/DL (ref 13.5–17.5)
LYMPHOCYTES ABSOLUTE: 0.9 K/UL (ref 1–5.1)
LYMPHOCYTES ABSOLUTE: 1 K/UL (ref 1–5.1)
LYMPHOCYTES ABSOLUTE: 1.1 K/UL (ref 1–5.1)
LYMPHOCYTES RELATIVE PERCENT: 15.6 %
LYMPHOCYTES RELATIVE PERCENT: 21.7 %
LYMPHOCYTES RELATIVE PERCENT: 23.5 %
MAGNESIUM: 2.3 MG/DL (ref 1.8–2.4)
MCH RBC QN AUTO: 29.2 PG (ref 26–34)
MCH RBC QN AUTO: 29.3 PG (ref 26–34)
MCH RBC QN AUTO: 29.5 PG (ref 26–34)
MCHC RBC AUTO-ENTMCNC: 32.8 G/DL (ref 31–36)
MCHC RBC AUTO-ENTMCNC: 32.9 G/DL (ref 31–36)
MCHC RBC AUTO-ENTMCNC: 33.3 G/DL (ref 31–36)
MCV RBC AUTO: 88.6 FL (ref 80–100)
MCV RBC AUTO: 88.9 FL (ref 80–100)
MCV RBC AUTO: 89 FL (ref 80–100)
MONOCYTES ABSOLUTE: 0.3 K/UL (ref 0–1.3)
MONOCYTES ABSOLUTE: 0.4 K/UL (ref 0–1.3)
MONOCYTES ABSOLUTE: 0.4 K/UL (ref 0–1.3)
MONOCYTES RELATIVE PERCENT: 6.3 %
MONOCYTES RELATIVE PERCENT: 7.6 %
MONOCYTES RELATIVE PERCENT: 8 %
NEUTROPHILS ABSOLUTE: 2.8 K/UL (ref 1.7–7.7)
NEUTROPHILS ABSOLUTE: 3.4 K/UL (ref 1.7–7.7)
NEUTROPHILS ABSOLUTE: 4.4 K/UL (ref 1.7–7.7)
NEUTROPHILS RELATIVE PERCENT: 65.6 %
NEUTROPHILS RELATIVE PERCENT: 67 %
NEUTROPHILS RELATIVE PERCENT: 75.4 %
ORGANISM: ABNORMAL
PDW BLD-RTO: 14 % (ref 12.4–15.4)
PDW BLD-RTO: 14.2 % (ref 12.4–15.4)
PDW BLD-RTO: 14.3 % (ref 12.4–15.4)
PLATELET # BLD: 219 K/UL (ref 135–450)
PLATELET # BLD: 223 K/UL (ref 135–450)
PLATELET # BLD: 235 K/UL (ref 135–450)
PMV BLD AUTO: 7.1 FL (ref 5–10.5)
PMV BLD AUTO: 7.2 FL (ref 5–10.5)
PMV BLD AUTO: 7.2 FL (ref 5–10.5)
POTASSIUM SERPL-SCNC: 4.4 MMOL/L (ref 3.5–5.1)
RBC # BLD: 3.15 M/UL (ref 4.2–5.9)
RBC # BLD: 3.27 M/UL (ref 4.2–5.9)
RBC # BLD: 3.3 M/UL (ref 4.2–5.9)
SODIUM BLD-SCNC: 133 MMOL/L (ref 136–145)
URINE CULTURE, ROUTINE: ABNORMAL
WBC # BLD: 4.3 K/UL (ref 4–11)
WBC # BLD: 5 K/UL (ref 4–11)
WBC # BLD: 5.9 K/UL (ref 4–11)

## 2022-01-16 PROCEDURE — 83735 ASSAY OF MAGNESIUM: CPT

## 2022-01-16 PROCEDURE — 2060000000 HC ICU INTERMEDIATE R&B

## 2022-01-16 PROCEDURE — 36415 COLL VENOUS BLD VENIPUNCTURE: CPT

## 2022-01-16 PROCEDURE — 97116 GAIT TRAINING THERAPY: CPT

## 2022-01-16 PROCEDURE — 97162 PT EVAL MOD COMPLEX 30 MIN: CPT

## 2022-01-16 PROCEDURE — 2580000003 HC RX 258: Performed by: STUDENT IN AN ORGANIZED HEALTH CARE EDUCATION/TRAINING PROGRAM

## 2022-01-16 PROCEDURE — 85025 COMPLETE CBC W/AUTO DIFF WBC: CPT

## 2022-01-16 PROCEDURE — 80048 BASIC METABOLIC PNL TOTAL CA: CPT

## 2022-01-16 PROCEDURE — 6370000000 HC RX 637 (ALT 250 FOR IP): Performed by: HOSPITALIST

## 2022-01-16 PROCEDURE — 97530 THERAPEUTIC ACTIVITIES: CPT

## 2022-01-16 RX ORDER — ASPIRIN 81 MG/1
81 TABLET, CHEWABLE ORAL DAILY
Status: DISCONTINUED | OUTPATIENT
Start: 2022-01-16 | End: 2022-02-04 | Stop reason: HOSPADM

## 2022-01-16 RX ADMIN — TAMSULOSIN HYDROCHLORIDE 0.4 MG: 0.4 CAPSULE ORAL at 08:48

## 2022-01-16 RX ADMIN — GABAPENTIN 100 MG: 100 CAPSULE ORAL at 14:02

## 2022-01-16 RX ADMIN — PANTOPRAZOLE SODIUM 20 MG: 20 TABLET, DELAYED RELEASE ORAL at 21:49

## 2022-01-16 RX ADMIN — SODIUM CHLORIDE, PRESERVATIVE FREE 10 ML: 5 INJECTION INTRAVENOUS at 08:49

## 2022-01-16 RX ADMIN — SEVELAMER CARBONATE 800 MG: 800 TABLET, FILM COATED ORAL at 12:44

## 2022-01-16 RX ADMIN — SEVELAMER CARBONATE 800 MG: 800 TABLET, FILM COATED ORAL at 08:48

## 2022-01-16 RX ADMIN — GABAPENTIN 100 MG: 100 CAPSULE ORAL at 08:48

## 2022-01-16 RX ADMIN — SEVELAMER CARBONATE 800 MG: 800 TABLET, FILM COATED ORAL at 18:00

## 2022-01-16 RX ADMIN — ATORVASTATIN CALCIUM 80 MG: 80 TABLET, FILM COATED ORAL at 21:49

## 2022-01-16 RX ADMIN — SODIUM CHLORIDE, PRESERVATIVE FREE 10 ML: 5 INJECTION INTRAVENOUS at 21:50

## 2022-01-16 RX ADMIN — ASPIRIN 81 MG 81 MG: 81 TABLET ORAL at 12:44

## 2022-01-16 RX ADMIN — GABAPENTIN 100 MG: 100 CAPSULE ORAL at 21:49

## 2022-01-16 ASSESSMENT — PAIN SCALES - GENERAL
PAINLEVEL_OUTOF10: 0

## 2022-01-16 NOTE — PLAN OF CARE
Problem: Falls - Risk of:  Goal: Will remain free from falls  Description: Will remain free from falls  1/16/2022 0132 by Cordell Choudhary RN  Outcome: Ongoing     Problem: Falls - Risk of:  Goal: Absence of physical injury  Description: Absence of physical injury  1/16/2022 0132 by Cordell Choudhary RN  Outcome: Ongoing     Problem: Skin Integrity:  Goal: Will show no infection signs and symptoms  Description: Will show no infection signs and symptoms  1/16/2022 0132 by Cordell Choudhary RN  Outcome: Ongoing     Problem: Skin Integrity:  Goal: Absence of new skin breakdown  Description: Absence of new skin breakdown  1/16/2022 0132 by Cordell Choudhary RN  Outcome: Ongoing     Problem: Discharge Planning:  Goal: Discharged to appropriate level of care  Description: Discharged to appropriate level of care  1/16/2022 0132 by Cordell Choudhary RN  Outcome: Ongoing     Problem: Bowel Function - Altered:  Goal: Bowel elimination is within specified parameters  Description: Bowel elimination is within specified parameters  1/16/2022 0132 by Cordell Choudhary RN  Outcome: Ongoing     Problem: Fluid Volume - Imbalance:  Description: Avoid the routine use of orogastric or nasogastric lavage. Goal: Absence of imbalanced fluid volume signs and symptoms  Description: Absence of imbalanced fluid volume signs and symptoms  1/16/2022 0132 by Cordell Choudhary RN  Outcome: Ongoing     Problem: Fluid Volume - Imbalance:  Description: Avoid the routine use of orogastric or nasogastric lavage.   Goal: Will show no signs and symptoms of excessive bleeding  Description: Will show no signs and symptoms of excessive bleeding  1/16/2022 0132 by Cordell Choudhary RN  Outcome: Ongoing     Problem: HEMODYNAMIC STATUS  Goal: Patient has stable vital signs and fluid balance  1/16/2022 0132 by Cordell Choudhary RN  Outcome: Ongoing     Problem: ACTIVITY INTOLERANCE/IMPAIRED MOBILITY  Goal: Mobility/activity is maintained at optimum level for patient  1/16/2022 0132 by Anthony Zavala RN  Outcome: Ongoing     Problem: Fluid Volume:  Goal: Will show no signs or symptoms of fluid imbalance  Description: Will show no signs or symptoms of fluid imbalance  Outcome: Ongoing

## 2022-01-16 NOTE — PROGRESS NOTES
This nurse has taken care of this patient since Friday. Pt is much more alert and appropriate in conversation than on Friday. On Friday and Saturday pt slept more. Today he has been more oriented, conversation appropriate. He has been up to the chair twice this shift. Appetite is good, tolerating well. Used call light appropriately. Pt is currently up in the chair, call light within reach, bed alarm is on.

## 2022-01-16 NOTE — PLAN OF CARE
Problem: Falls - Risk of:  Goal: Will remain free from falls  Description: Will remain free from falls  1/16/2022 1432 by Cesario Ferguson RN  Outcome: Ongoing  Note: Fall risk assessment completed every shift. All precautions in place. Pt has call light within reach at all times. Room clear of clutter. Pt aware to call for assistance when getting up. Bed/chair alarms in use. Problem: Falls - Risk of:  Goal: Absence of physical injury  Description: Absence of physical injury  1/16/2022 1432 by Cesario Ferguson RN  Outcome: Ongoing  Note: No signs of physical injury. Problem: Skin Integrity:  Goal: Will show no infection signs and symptoms  Description: Will show no infection signs and symptoms  1/16/2022 1432 by Cesario Ferguson RN  Outcome: Ongoing  Note: Skin assessment completed every shift. Pt assessed for incontinence, appropriate barrier cream applied prn. Pt encouraged to turn/rotate every 2 hours. Assistance provided if pt unable to do so themselves. Pt does change his position independently while in bed. Mepilex border dressings in place to his heels. Problem: Skin Integrity:  Goal: Absence of new skin breakdown  Description: Absence of new skin breakdown  1/16/2022 1432 by Cesario Ferguson RN  Outcome: Ongoing  Note: No areas of new skin breakdown noted. Problem: HEMODYNAMIC STATUS  Goal: Patient has stable vital signs and fluid balance  1/16/2022 1432 by Cesario Ferguson RN  Outcome: Ongoing  Note: VSS. Labs being monitored. Difficult to keep track of output due to pt being incontinent at times. Problem: ACTIVITY INTOLERANCE/IMPAIRED MOBILITY  Goal: Mobility/activity is maintained at optimum level for patient  1/16/2022 1432 by Cesario Ferguson RN  Outcome: Ongoing  Note: Patient is working with PT and OT. Pt was able to ambulate from the bed to the chair with walker and assist of one. Patient is able to move all extremities with no difficulty.      Problem: Fluid Volume:  Goal: Will show no signs or symptoms of fluid imbalance  Description: Will show no signs or symptoms of fluid imbalance  1/16/2022 1432 by Israel Genao RN  Outcome: Ongoing  Note: Patient is taking PO fluids well. No difficulty swallowing. No edema noted. Problem: Discharge Planning:  Goal: Discharged to appropriate level of care  Description: Discharged to appropriate level of care  1/16/2022 1432 by Israel Genao RN  Outcome: Completed     Problem: Bowel Function - Altered:  Goal: Bowel elimination is within specified parameters  Description: Bowel elimination is within specified parameters  1/16/2022 1432 by Israel Genao RN  Outcome: Completed     Problem: Fluid Volume - Imbalance:  Description: Avoid the routine use of orogastric or nasogastric lavage. Goal: Absence of imbalanced fluid volume signs and symptoms  Description: Absence of imbalanced fluid volume signs and symptoms  1/16/2022 1432 by Israel Genao RN  Outcome: Completed     Problem: Fluid Volume - Imbalance:  Description: Avoid the routine use of orogastric or nasogastric lavage.   Goal: Will show no signs and symptoms of excessive bleeding  Description: Will show no signs and symptoms of excessive bleeding  1/16/2022 1432 by Israel Genao RN  Outcome: Completed

## 2022-01-16 NOTE — PROGRESS NOTES
Hospitalist Progress Note    Patient:  Shivam Mooney  Unit/Bed:[id number]-4489/5107-01   YOB: 1945       MRN: [id number] Acct: [de-identified]  PCP: No primary care provider on file. Date of Admission: 1/13/2022  --------------------------    Chief Complaint:     Mental status changes    Hospital Course:     Shivam Mooney is a 77 y.o. male hospitalized on 1/13/2022   multiple comorbidities presented to the ED found to have acute CVA. Assessment/plan:     Acute encephalopathy secondary to CVA with underlying cognitive impairment. -MRI of the brain showed punctate acute infarct in the posterior medial left parietal lobe     Restart aspirin, okay per GI continue statin  -   Awaiting MRI of the head and neck  PT and OT. End-stage renal failure, on hemodialysis  Continue hemodialysis. Hypomagnesemia  Resolved      Hypokalemia  Resolved spontaneously    Mild hypokalemia        Recent hospitalization for COVID  Currently on room air. History of colon cancer/GI bleed/acute blood loss anemia  -Evaluated by GI, hemoglobin stable. Supposed to have colectomy as an outpatient but the patient up being hospitalized. Bacteriuria without urinary symptoms, culture data show Enterococcus faecalis  No symptoms, discontinue ceftriaxone. Obesity Body mass index is 32.64 kg/m². Complicating assessment and treatment. Placing patient at risk for multiple co-morbidities as well as early death and contributing to the patient's presentation. Code Status: Full Code         DVT prophylaxis: SCD     Disposition: Awaiting MRA of the head and neck, PT and OT evaluation. Family would like the patient to be see general surgery here.       Discussed with the patient    Discussed with RN      ----------------      Subjective:     Patient seen and examined  Overnight events noted  RN and ancillary staff note reviewed    Doing well, no complaint, no major overnight events, no nausea, vomiting, hematemesis. Diet: ADULT DIET; Regular; Low Sodium (2 gm); Low Potassium (Less than 3000 mg/day); Low Phosphorus (Less than 1000 mg); 2000 ml    OBJECTIVE     Exam:  BP (!) 157/84   Pulse 54   Temp 98 °F (36.7 °C) (Oral)   Resp 18   Ht 6' 0.5\" (1.842 m)   Wt 244 lb 0.8 oz (110.7 kg)   SpO2 96%   BMI 32.64 kg/m²          Overall unchanged physical finding from exam 1/15/2022  Gen: Not in distress. Alert. Oriented, pleasant but poor insight  Head: Normocephalic. Atraumatic. Eyes: Conjunctivae/corneas clear. ENT: Oral mucosa moist  Neck: No JVD. No obvious thyromegaly. CVS: Nml S1S2, no murmur  , RRR  Pulmomary: Clear bilaterally. No crackles. No wheezes. Gastrointestinal: Soft, non tender, non distend, . Musculoskeletal: Left upper extremity with fistula  Neuro: No focal deficit. Moves extremity spontaneously. Psychiatry: Appropriate affect. Not agitated.         Medications:  Reviewed    Infusion Medications    sodium chloride 5 mL/hr at 01/15/22 2102     Scheduled Medications    aspirin  81 mg Oral Daily    atorvastatin  80 mg Oral Nightly    gabapentin  100 mg Oral TID    pantoprazole  20 mg Oral Nightly    sevelamer  800 mg Oral TID WC    tamsulosin  0.4 mg Oral QAM    cefTRIAXone (ROCEPHIN) IV  1,000 mg IntraVENous Q24H    sodium chloride flush  5-40 mL IntraVENous 2 times per day     PRN Meds: ondansetron **OR** ondansetron, polyethylene glycol, sodium chloride flush, sodium chloride, acetaminophen **OR** acetaminophen, perflutren lipid microspheres      Intake/Output Summary (Last 24 hours) at 1/16/2022 1355  Last data filed at 1/16/2022 0905  Gross per 24 hour   Intake 654.77 ml   Output 100 ml   Net 554.77 ml             Labs:   Recent Labs     01/16/22  0100 01/16/22  0618 01/16/22  1208   WBC 5.0 4.3 5.9   HGB 9.2* 9.7* 9.6*   HCT 28.0* 29.3* 29.1*    219 223     Recent Labs     01/13/22  2110 01/15/22  0531 01/16/22  0618   * 141 133*   K 3.7 3.4* 4.4   CL 95* 113* 98* CO2 24 19* 24   BUN 34* 37* 29*   CREATININE 3.7* 3.5* 3.4*   CALCIUM 8.0* 6.0* 8.2*     Recent Labs     01/13/22 2110   AST 9*   ALT 7*   BILITOT 0.3   ALKPHOS 84     No results for input(s): INR in the last 72 hours. No results for input(s): Luis Sol in the last 72 hours. Urinalysis:      Lab Results   Component Value Date    NITRU Negative 01/13/2022    WBCUA >900 01/13/2022    RBCUA 0-2 01/13/2022    BLOODU LARGE 01/13/2022    SPECGRAV 1.025 01/13/2022    GLUCOSEU Negative 01/13/2022       Radiology:  MRI BRAIN WO CONTRAST   Final Result   1. Punctate acute infarct in the posteromedial left parietal lobe, within the   deep white matter. 2. 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 RECOMMENDATIONS:   Unavailable         US RENAL COMPLETE   Final Result   1. Simple cysts in the left kidney with no other significant renal finding. 2. Borderline enlargement of the prostate. Correlate with urologic history. CT CHEST ABDOMEN PELVIS WO CONTRAST   Final Result   No evidence of acute intrathoracic, intraabdominal or intrapelvic injury. Multifocal airspace disease. This pattern may reflect COVID pneumonia. Correlate with COVID testing. CT HEAD WO CONTRAST   Final Result   No acute intracranial abnormality. Specifically, no acute intracranial   hemorrhage or mass effect. Extensive chronic small vessel ischemic disease.          MRA head without contrast    (Results Pending)   MRA neck without contrast    (Results Pending)               Electronically signed by Josi Kathleen MD on 1/16/2022 at 1:55 PM

## 2022-01-16 NOTE — PROGRESS NOTES
General Surgery    Asked to see patient for colon mass    Workup has been at and OSH, will pursue records    Apparently there was a plan for colectomy but patient developed Covid then had CVA and GI bleed    No acute surgical plans given recent medical issues    Will obtain records and make additional recommendations when available    Electronically signed by Ottoniel Leslie MD on 1/16/2022 at 9:58 AM

## 2022-01-16 NOTE — PROGRESS NOTES
Physical Therapy    Facility/Department: [facility] 4E PROGRESSIVE CARE  Initial Assessment    NAME: Pedro Pablo Burnham  :   MRN: [id number]    Date of Service: 2022    Discharge Recommendations:  Continue to assess pending progress   PT Equipment Recommendations  Other: Will monitor for potential equipt needs. Assessment   Body structures, Functions, Activity limitations: Decreased functional mobility ; Decreased strength;Decreased safe awareness;Decreased endurance;Decreased balance  Assessment: 76 y/o male admit 2022 with LESLEE, GI Bleed; Weakness. 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 Surg consult (recent dx Colon Ca ~ 1 month ago; await surg). Recent dx Pneumonia, COVID+ (cont weak/falls). PMH as noted including CKD (HD), COVID+. PTA pt living with wife in mobile home with ramp access; independent daily care and functional mobility although recent cont weak following Pneumonia/COVID. Pt francisco eob/oob amb short distance (10') with Belvie Sake assist; very limited endurance. At this time, may need to consider cont PT (5-7) although will monitor pt's progress. Prognosis: Fair;Good  Decision Making: Medium Complexity  History: 76 y/o male admit 2022 with LESLEE, GI Bleed; Weakness. 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 Surg consult (recent dx Colon Ca ~ 1 month ago; await surg). Recent dx Pneumonia, COVID+ (cont weak/falls). PMH as noted including CKD (HD), COVID+. Exam: See above. Clinical Presentation: See above. Patient Education: Role of PT, POC, Need to call for assist, Safe use of Walker. Barriers to Learning: Alittle delayed at times with answers to various ques. REQUIRES PT FOLLOW UP: Yes  Activity Tolerance  Activity Tolerance: Patient limited by endurance  Activity Tolerance: Pt francisco eob/oob amb short distance (10') with Antione Fell Min assist; very limited endurance.        Patient Diagnosis(es): The primary encounter diagnosis was LESLEE (acute Independent (Occass use of Walker (longer distances). )  Transfer Assistance: Independent  Active : No (Able, although doesn't drive. Family takes care of errands. Medical transport to/from HD.)  Occupation: Retired  Type of occupation: Retired : various jobs ([employer], [employer], Jenni ). Additional Comments: 2 Dtrs, 1 Son live in area. Reports recent fall (slid off bed onto floor), wife able to assist up. Cognition   Cognition  Overall Cognitive Status: Exceptions  Arousal/Alertness: Appropriate responses to stimuli  Following Commands: Follows one step commands consistently  Attention Span: Attends with cues to redirect  Memory: Appears intact  Safety Judgement: Decreased awareness of need for safety  Insights: Decreased awareness of deficits    Objective          AROM RLE (degrees)  RLE AROM: WFL  AROM LLE (degrees)  LLE AROM : WFL  AROM RUE (degrees)  RUE AROM : WFL  AROM LUE (degrees)  LUE AROM : WFL  Strength RLE  Comment: Grossly 4- 4/5. Strength LLE  Comment: Grossly 4- 4/5. Strength RUE  Comment: Mild weak Shld. Strength LUE  Comment: Mild weak Shld. Bed mobility  Supine to Sit: Stand by assistance (HOB elevated.)  Sit to Supine: Contact guard assistance (Effort with LEs onto bed.)  Transfers  Sit to Stand: Contact guard assistance;Minimal Assistance  Stand to sit: Contact guard assistance;Minimal Assistance  Bed to Chair:  (Pt request return to bed following PT Eval.)  Comment: Initial Transfer from eob without assist device Min assist.  With Walker CGA; cues for safe hand placement. Ambulation  Ambulation?: Yes  Ambulation 1  Device: Rolling Walker  Distance: Pt amb 10' with Walker Min assist.  Diminished step length/clearance. Mild LE buckling R/L due to fatigue. Limited endurance.      Balance  Sitting - Static: Good  Sitting - Dynamic: Good  Standing - Static: Fair;+  Standing - Dynamic: [address]  Times per week: 3-5x week while in acute care setting. Current Treatment Recommendations: Strengthening,Functional Mobility Training,Transfer Training,Gait Training,Safety Education & Training,Patient/Caregiver Education & Training  Safety Devices  Type of devices: Bed alarm in place,Call light within reach,Left in bed,Nurse notified         AM-PAC Score  AM-PAC Inpatient Mobility Raw Score : 15 (01/16/22 1137)  AM-PAC Inpatient T-Scale Score : 39.45 (01/16/22 1137)  Mobility Inpatient CMS 0-100% Score: 57.7 (01/16/22 1137)  Mobility Inpatient CMS G-Code Modifier : CK (01/16/22 1137)          Goals  Short term goals  Time Frame for Short term goals: Upon d/c acute care setting. Short term goal 1: Bed Mob SBA. Short term goal 2: Transfers with assist device SBA. Short term goal 3: Amb with assist device 25-50' SBA/CGA. Short term goal 4: Pt participating in approp Strength Exs. Patient Goals   Patient goals : Get stronger and return home.        Therapy Time   Individual Concurrent Group Co-treatment   Time In 1000         Time Out 1040         Minutes [address] [facility] Jody Bass

## 2022-01-17 ENCOUNTER — APPOINTMENT (OUTPATIENT)
Dept: MRI IMAGING | Age: 77
DRG: 981 | End: 2022-01-17
Payer: MEDICARE

## 2022-01-17 LAB
ANION GAP SERPL CALCULATED.3IONS-SCNC: 11 MMOL/L (ref 3–16)
BASOPHILS ABSOLUTE: 0 K/UL (ref 0–0.2)
BASOPHILS RELATIVE PERCENT: 0.7 %
BUN BLDV-MCNC: 32 MG/DL (ref 7–20)
CALCIUM SERPL-MCNC: 7.8 MG/DL (ref 8.3–10.6)
CHLORIDE BLD-SCNC: 101 MMOL/L (ref 99–110)
CO2: 25 MMOL/L (ref 21–32)
CREAT SERPL-MCNC: 4.5 MG/DL (ref 0.8–1.3)
EOSINOPHILS ABSOLUTE: 0.1 K/UL (ref 0–0.6)
EOSINOPHILS RELATIVE PERCENT: 2 %
GFR AFRICAN AMERICAN: 16
GFR NON-AFRICAN AMERICAN: 13
GLUCOSE BLD-MCNC: 118 MG/DL (ref 70–99)
HCT VFR BLD CALC: 26.9 % (ref 40.5–52.5)
HEMOGLOBIN: 9 G/DL (ref 13.5–17.5)
LYMPHOCYTES ABSOLUTE: 1 K/UL (ref 1–5.1)
LYMPHOCYTES RELATIVE PERCENT: 23 %
MAGNESIUM: 2.3 MG/DL (ref 1.8–2.4)
MCH RBC QN AUTO: 29.9 PG (ref 26–34)
MCHC RBC AUTO-ENTMCNC: 33.7 G/DL (ref 31–36)
MCV RBC AUTO: 88.7 FL (ref 80–100)
MONOCYTES ABSOLUTE: 0.3 K/UL (ref 0–1.3)
MONOCYTES RELATIVE PERCENT: 6.4 %
NEUTROPHILS ABSOLUTE: 2.8 K/UL (ref 1.7–7.7)
NEUTROPHILS RELATIVE PERCENT: 67.9 %
PDW BLD-RTO: 13.8 % (ref 12.4–15.4)
PLATELET # BLD: 206 K/UL (ref 135–450)
PMV BLD AUTO: 7.6 FL (ref 5–10.5)
POTASSIUM SERPL-SCNC: 4.3 MMOL/L (ref 3.5–5.1)
RBC # BLD: 3.03 M/UL (ref 4.2–5.9)
SODIUM BLD-SCNC: 137 MMOL/L (ref 136–145)
WBC # BLD: 4.1 K/UL (ref 4–11)

## 2022-01-17 PROCEDURE — 2060000000 HC ICU INTERMEDIATE R&B

## 2022-01-17 PROCEDURE — 6370000000 HC RX 637 (ALT 250 FOR IP): Performed by: HOSPITALIST

## 2022-01-17 PROCEDURE — 92610 EVALUATE SWALLOWING FUNCTION: CPT

## 2022-01-17 PROCEDURE — 97116 GAIT TRAINING THERAPY: CPT | Performed by: PHYSICAL THERAPIST

## 2022-01-17 PROCEDURE — 83735 ASSAY OF MAGNESIUM: CPT

## 2022-01-17 PROCEDURE — 70547 MR ANGIOGRAPHY NECK W/O DYE: CPT

## 2022-01-17 PROCEDURE — 36415 COLL VENOUS BLD VENIPUNCTURE: CPT

## 2022-01-17 PROCEDURE — 2580000003 HC RX 258: Performed by: STUDENT IN AN ORGANIZED HEALTH CARE EDUCATION/TRAINING PROGRAM

## 2022-01-17 PROCEDURE — 70544 MR ANGIOGRAPHY HEAD W/O DYE: CPT

## 2022-01-17 PROCEDURE — APPSS45 APP SPLIT SHARED TIME 31-45 MINUTES: Performed by: PHYSICIAN ASSISTANT

## 2022-01-17 PROCEDURE — 92523 SPEECH SOUND LANG COMPREHEN: CPT

## 2022-01-17 PROCEDURE — 85025 COMPLETE CBC W/AUTO DIFF WBC: CPT

## 2022-01-17 PROCEDURE — 97166 OT EVAL MOD COMPLEX 45 MIN: CPT

## 2022-01-17 PROCEDURE — 97535 SELF CARE MNGMENT TRAINING: CPT

## 2022-01-17 PROCEDURE — 97530 THERAPEUTIC ACTIVITIES: CPT

## 2022-01-17 PROCEDURE — APPNB45 APP NON BILLABLE 31-45 MINUTES: Performed by: PHYSICIAN ASSISTANT

## 2022-01-17 PROCEDURE — 80048 BASIC METABOLIC PNL TOTAL CA: CPT

## 2022-01-17 PROCEDURE — 97530 THERAPEUTIC ACTIVITIES: CPT | Performed by: PHYSICAL THERAPIST

## 2022-01-17 PROCEDURE — 94761 N-INVAS EAR/PLS OXIMETRY MLT: CPT

## 2022-01-17 RX ADMIN — PANTOPRAZOLE SODIUM 20 MG: 20 TABLET, DELAYED RELEASE ORAL at 20:30

## 2022-01-17 RX ADMIN — GABAPENTIN 100 MG: 100 CAPSULE ORAL at 20:30

## 2022-01-17 RX ADMIN — ASPIRIN 81 MG 81 MG: 81 TABLET ORAL at 09:22

## 2022-01-17 RX ADMIN — GABAPENTIN 100 MG: 100 CAPSULE ORAL at 09:22

## 2022-01-17 RX ADMIN — GABAPENTIN 100 MG: 100 CAPSULE ORAL at 15:04

## 2022-01-17 RX ADMIN — ATORVASTATIN CALCIUM 80 MG: 80 TABLET, FILM COATED ORAL at 20:30

## 2022-01-17 RX ADMIN — SODIUM CHLORIDE, PRESERVATIVE FREE 10 ML: 5 INJECTION INTRAVENOUS at 09:22

## 2022-01-17 RX ADMIN — SEVELAMER CARBONATE 800 MG: 800 TABLET, FILM COATED ORAL at 12:47

## 2022-01-17 RX ADMIN — SEVELAMER CARBONATE 800 MG: 800 TABLET, FILM COATED ORAL at 09:22

## 2022-01-17 RX ADMIN — SODIUM CHLORIDE, PRESERVATIVE FREE 10 ML: 5 INJECTION INTRAVENOUS at 20:30

## 2022-01-17 RX ADMIN — TAMSULOSIN HYDROCHLORIDE 0.4 MG: 0.4 CAPSULE ORAL at 09:22

## 2022-01-17 RX ADMIN — SEVELAMER CARBONATE 800 MG: 800 TABLET, FILM COATED ORAL at 18:36

## 2022-01-17 ASSESSMENT — PAIN SCALES - GENERAL
PAINLEVEL_OUTOF10: 0

## 2022-01-17 NOTE — PROGRESS NOTES
General and Vascular Surgery                                                           Daily Progress Note                                                             Robyn Curry PA-C     Pt Name: Kaleb Mooney  Medical Record Number: [id number]  Date of Birth 1945   Today's Date: 1/17/2022    ASSESSMENT/PLAN  1. Acute encephalopathy, CVA  2. ESRD: gets HD  3. Melena secondary to colon cancer, has been worked up at [facility], was a plan for a colectomy but patient developed COVID and then had a CVA. Will need to obtain medical records and then will give more recommendations when available. 4. Up working with PT this AM  5. Will monitor for other medical illnesses to improve    EDUCATION  Patient educated about their illness/diagnosis, stated above, and all questions answered. We discussed the importance of nutrition, medications they are taking, and healthy lifestyle. SUBJECTIVE  Pain is well controlled. He has no nausea and no vomiting. He has passed flatus and has had a bowel movement. OBJECTIVE  VITALS:  height is 6' 0.5\" (1.842 m) and weight is 233 lb 0.4 oz (105.7 kg). His oral temperature is 98.1 °F (36.7 °C). His blood pressure is 155/77 (abnormal) and his pulse is 73. His respiration is 16 and oxygen saturation is 95%. VITALS:  BP (!) 155/77   Pulse 73   Temp 98.1 °F (36.7 °C) (Oral)   Resp 16   Ht 6' 0.5\" (1.842 m)   Wt 233 lb 0.4 oz (105.7 kg)   SpO2 95%   BMI 31.17 kg/m²   GENERAL: alert, no distress  ABDOMEN: soft, non-tender and non-distended  I/O last 3 completed shifts: In: 774.8 [P.O.:680; I.V.:0.4; IV Piggyback:94.4]  Out: 200 [Urine:200]  No intake/output data recorded.     LABS  Recent Labs     01/17/22  0449   WBC 4.1   HGB 9.0*   HCT 26.9*         K 4.3      CO2 25   BUN 32*   CREATININE 4.5*   MG 2.30   CALCIUM 7.8*     CBC:   Lab Results   Component Value Date    WBC 4.1 01/17/2022    RBC 3.03 01/17/2022    HGB 9.0 01/17/2022    HCT 26.9 01/17/2022    MCV 88.7 01/17/2022    MCH 29.9 01/17/2022    MCHC 33.7 01/17/2022    RDW 13.8 01/17/2022     01/17/2022    MPV 7.6 01/17/2022     CMP:    Lab Results   Component Value Date     01/17/2022    K 4.3 01/17/2022     01/17/2022    CO2 25 01/17/2022    BUN 32 01/17/2022    CREATININE 4.5 01/17/2022    GFRAA 16 01/17/2022    GFRAA 35 07/26/2011    AGRATIO 0.8 01/13/2022    LABGLOM 13 01/17/2022    GLUCOSE 118 01/17/2022    PROT 6.6 01/13/2022    PROT 7.5 07/26/2011    LABALBU 3.0 01/13/2022    CALCIUM 7.8 01/17/2022    BILITOT 0.3 01/13/2022    ALKPHOS 84 01/13/2022    AST 9 01/13/2022    ALT 7 01/13/2022         Jose Pandya PA-C  Electronically signed 1/17/2022 at 9:59 AM    As above  Stable overall  Will review records from OSH and make recommendation soon    Electronically signed by Carol Yap MD on 1/17/2022 at 4:04 PM

## 2022-01-17 NOTE — PROGRESS NOTES
Hospitalist Progress Note    Patient:  Tylor Del Real  Unit/Bed:Z8S-3835/5107-01   YOB: 1945       MRN: [id number] Acct: [de-identified]  PCP: No primary care provider on file. Date of Admission: 1/13/2022  --------------------------    Chief Complaint:     Mental status changes    Hospital Course:     Tylor Del Real is a 77 y.o. male hospitalized on 1/13/2022   multiple comorbidities presented to the ED found to have acute CVA. Assessment/plan:     Acute encephalopathy secondary to CVA with underlying cognitive impairment. -MRI of the brain showed punctate acute infarct in the posterior medial left parietal lobe  MRA of the head and neck showed no flow-limiting stenosis     Continue aspirin,   statin  -    PT and OT. Likely need ECF. End-stage renal failure, on hemodialysis  Continue hemodialysis. Discussed with nephrology, patient [place] to relocate to [place], nephrologist is assisting with finding a location for his outpatient HD. Hypomagnesemia  Resolved      Hypokalemia  Resolved       Recent hospitalization for COVID  Currently on room air. History of colon cancer/GI bleed/acute blood loss anemia     -Hemoglobin remained stable, evaluated by GI, tolerating aspirin  -Surgery service consulted, awaiting their final recommendation in terms of doing the surgery here as the patient is relocating to [facility]. Bacteriuria without urinary symptoms, culture data show Enterococcus faecalis  No symptoms, discontinue ceftriaxone. Obesity Body mass index is 31.17 kg/m². Complicating assessment and treatment. Placing patient at risk for multiple co-morbidities as well as early death and contributing to the patient's presentation.         Code Status: Full Code         DVT prophylaxis: SCD     Disposition:   Awaiting final surgery, input whether the surgery will be done this admissions  Patient is requesting to [facility], need ECF and HD chair    Discussed with the patient  Discussed with 4.4 4.3   * 98* 101   CO2 19* 24 25   BUN 37* 29* 32*   CREATININE 3.5* 3.4* 4.5*   CALCIUM 6.0* 8.2* 7.8*     No results for input(s): AST, ALT, BILIDIR, BILITOT, ALKPHOS in the last 72 hours. No results for input(s): INR in the last 72 hours. No results for input(s): Skippy Gault in the last 72 hours. Urinalysis:      Lab Results   Component Value Date    NITRU Negative 01/13/2022    WBCUA >900 01/13/2022    RBCUA 0-2 01/13/2022    BLOODU LARGE 01/13/2022    SPECGRAV 1.025 01/13/2022    GLUCOSEU Negative 01/13/2022       Radiology:  MRA neck without contrast   Final Result   No convincing flow limiting stenosis of the visualized carotid/vertebral   arteries within the limitations of this exam.         MRA head without contrast   Final Result   No apparent intracranial arterial high grade stenosis, occlusion or aneurysm   head at 5 within constraints of acquisition. MRI BRAIN WO CONTRAST   Final Result   1. Punctate acute infarct in the posteromedial left parietal lobe, within the   deep white matter. 2. 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 RECOMMENDATIONS:   Unavailable         US RENAL COMPLETE   Final Result   1. Simple cysts in the left kidney with no other significant renal finding. 2. Borderline enlargement of the prostate. Correlate with urologic history. CT CHEST ABDOMEN PELVIS WO CONTRAST   Final Result   No evidence of acute intrathoracic, intraabdominal or intrapelvic injury. Multifocal airspace disease. This pattern may reflect COVID pneumonia. Correlate with COVID testing. CT HEAD WO CONTRAST   Final Result   No acute intracranial abnormality. Specifically, no acute intracranial   hemorrhage or mass effect. Extensive chronic small vessel ischemic disease.                      Electronically signed by Amy Del Toro MD on 1/17/2022 at 2:27 PM

## 2022-01-17 NOTE — PROGRESS NOTES
Physical Therapy  Facility/Department: [facility] 5W PROGRESSIVE CARE  Daily Treatment Note  NAME: Katiana Lomax  :   MRN: [id number]    Date of Service: 2022    Discharge Recommendations:  5-7 sessions per week   PT Equipment Recommendations  Other: Will monitor for potential equipt needs. Katiana Lomax scored a 17/24 on the AM-PAC short mobility form. Current research shows that an AM-PAC score of 17 or less is typically not associated with a discharge to the patient's home setting. Based on the patient's AM-PAC score and their current functional mobility deficits, it is recommended that the patient have 5-7 sessions per week of Physical Therapy at d/c to increase the patient's independence. At this time, this patient demonstrates the endurance, and/or tolerance for 3 hours of therapy each day, with a treatment frequency of 5-7x/wk. Please see assessment section for further patient specific details. If patient discharges prior to next session this note will serve as a discharge summary. Please see below for the latest assessment towards goals. Assessment   Body structures, Functions, Activity limitations: Decreased functional mobility ; Decreased strength;Decreased safe awareness;Decreased endurance;Decreased balance  Assessment: 78 y/o male admit 2022 with LESLEE, GI Bleed; Weakness. 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 Surg consult (recent dx Colon Ca ~ 1 month ago; await surg). Recent dx Pneumonia, COVID+ (cont weak/falls). PMH as noted including CKD (HD), COVID+. PTA pt living with wife in mobile home with ramp access; independent daily care and functional mobility although recent cont weak following Pneumonia/COVID.  Pt weaker than his normal self and needed min A for transfers and CGA for amb short distances with a RW; pt is an elevated fall risk and needs to be more Ind to return home as his wife is unable to provide assist pt needs plus he needs to be able to get in and out of house to go to dialysis; In light of pt's current mobility status and need for surgery for colon CA recommend pt have conintued therapy 5-7x/wk to address deficits to improve his balance, strength and endurance to allow pt to return home safely  Prognosis: Good  History: 78 y/o male admit 1/13/2022 with LESLEE, GI Bleed; Weakness. 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 Surg consult (recent dx Colon Ca ~ 1 month ago; await surg). Recent dx Pneumonia, COVID+ (cont weak/falls). PMH as noted including CKD (HD), COVID+. PT Education: General Safety  Patient Education: reviewed call light and not getting up without assist  Barriers to Learning: Alittle delayed at times with answers to various ques. REQUIRES PT FOLLOW UP: Yes  Activity Tolerance  Activity Tolerance: Patient limited by endurance  Activity Tolerance: weaker than his normal self     Patient Diagnosis(es): The primary encounter diagnosis was LESLEE (acute kidney injury) ([facility] [address] Diagnoses of Gastrointestinal hemorrhage, unspecified gastrointestinal hemorrhage type, COVID, and Acute cystitis without hematuria were also pertinent to this visit. has a past medical history of Arthritis, Cancer ([address] Diabetes mellitus ([address] Hemodialysis patient ([address] and Hypertension. has a past surgical history that includes IR PERC ARTERIOVENOUS FISTULA CREATION (Left). Social/Functional History  Lives With: Spouse (Wife Ashton Ahumada). )  Type of Home: Mobile home  Home Layout: One level  Home Access: Ramped entrance  Bathroom Shower/Tub: Walk-in shower  Bathroom Toilet: Handicap height  Bathroom Equipment: Grab bars in [address] Accessibility: Martín Rocha: Rolling walker  ADL Assistance: [address]:  (Wife takes care of most homemaking needs.)  Ambulation Assistance: Independent (Occass use of Walker (longer distances). )  Transfer Assistance: Independent  Active : Good  Sitting - Dynamic: Good  Standing - Static: Fair;+  Standing - Dynamic: Fair            Comment: pt sat at sink to complete grooming tasks; positioned in chair for comfort with all needs in reach              G-Code     OutComes Score                                                     AM-PAC Score  AM-PAC Inpatient Mobility Raw Score : 17 (01/17/22 0930)  AM-PAC Inpatient T-Scale Score : 42.13 (01/17/22 0930)  Mobility Inpatient CMS 0-100% Score: 50.57 (01/17/22 0930)  Mobility Inpatient CMS G-Code Modifier : CK (01/17/22 0930)          Goals  Short term goals  Time Frame for Short term goals: Upon d/c acute care setting. Short term goal 1: Bed Mob SBA. Short term goal 2: Transfers with assist device SBA. Short term goal 3: Amb with assist device 25-50' SBA/CGA. Short term goal 4: Pt participating in approp Strength Exs. Patient Goals   Patient goals : Get stronger and return home. Plan    Plan  Times per week: 3-5x week while in acute care setting.   Current Treatment Recommendations: Strengthening,Functional Mobility Training,Transfer Training,Gait Training,Safety Education & Training,Patient/Caregiver Education & Training  Safety Devices  Type of devices: Chair alarm in place,Call light within reach,Left in chair,Nurse notified,All fall risk precautions in place     Therapy Time   Individual Concurrent Group Co-treatment   Time In 0855         Time Out 0930         Minutes 35                 ESPERANZA CADET PT    Electronically signed by ESPERANZA CADET PT on 1/17/2022 at 9:31 AM

## 2022-01-17 NOTE — PROGRESS NOTES
Late entry: Chart correction. Documentation of foot wound is incorrect please disregard. All remaining documentation unchanged.

## 2022-01-17 NOTE — PROGRESS NOTES
Occupational Therapy   Occupational Therapy Initial Assessment  Date: 2022   Patient Name: Shi Nixon  MRN: [id number]     : 1945    Date of Service: 2022    Discharge Recommendations:  Patient would benefit from continued therapy after discharge,5-7 sessions per week  OT Equipment Recommendations  Other: defer to [place] facility    Shi Nixon scored a 17/24 on the AM-PAC ADL Inpatient form. Current research shows that an AM-PAC score of 17 or less is typically not associated with a discharge to the patient's home setting. Based on the patient's AM-PAC score and their current ADL deficits, it is recommended that the patient have 5-7 sessions per week of Occupational Therapy at d/c to increase the patient's independence. At this time, this patient demonstrates the endurance, and/or tolerance for 3 hours of therapy each day, with a treatment frequency of 5-7x/wk. Please see assessment section for further patient specific details. If patient discharges prior to next session this note will serve as a discharge summary. Please see below for the latest assessment towards goals. Assessment   Performance deficits / Impairments: Decreased functional mobility ; Decreased safe awareness;Decreased balance;Decreased ADL status; Decreased cognition;Decreased high-level IADLs;Decreased endurance;Decreased strength  Assessment: 78 y/o male admit 2022 with LESLEE, GI Bleed; Weakness. 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 Surg consult (recent dx Colon Ca ~ 1 month ago; await surg). Recent dx Pneumonia, COVID+ (cont weak/falls). PTA pt lives at home with spouse and was independent with ADLs and functional mobility (RW only for long distances). Today, pt required min A to stand and CGA for functional mobility around room with RW. Pt with functional UE ROM for self care and anticipate will require min A for balance during standing components of ADLs.  Pt reports generalized fatigue/weakness following recent COVID and would benefit from skilled therapy. Prognosis: Good  Decision Making: Medium Complexity  OT Education: OT Role;Transfer Training;Plan of Care  REQUIRES OT FOLLOW UP: Yes  Activity Tolerance  Activity Tolerance: Patient Tolerated treatment well  Safety Devices  Safety Devices in place: Yes  Type of devices: Nurse notified;Gait belt;Call light within reach; Chair alarm in place; Left in chair           Patient Diagnosis(es): The primary encounter diagnosis was LESLEE (acute kidney injury) ([facility] Diagnoses of Gastrointestinal hemorrhage, unspecified gastrointestinal hemorrhage type, COVID, and Acute cystitis without hematuria were also pertinent to this visit. has a past medical history of Arthritis, Cancer ([facility] Diabetes mellitus ([facility] Hemodialysis patient ([facility] and Hypertension. has a past surgical history that includes IR PERC ARTERIOVENOUS FISTULA CREATION (Left). Restrictions  Restrictions/Precautions  Restrictions/Precautions: Fall Risk    Subjective   General  Chart Reviewed: Yes  Patient assessed for rehabilitation services?: Yes  Additional Pertinent Hx: 76 y/o male admit 1/13/2022 with LESLEE, GI Bleed; Weakness. 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 Surg consult (recent dx Colon Ca ~ 1 month ago; await surg). Recent dx Pneumonia, COVID+ (cont weak/falls). PMH as noted including CKD (HD), COVID+. Family / Caregiver Present: No  Referring Practitioner: Dr. Alfonso Kolb  Subjective  Subjective: Pt seen bedside and agreeable to therapy. Patient Currently in Pain: No  Vital Signs  Patient Currently in Pain: No  Oxygen Therapy  SpO2: 95 %  Pulse Oximeter Device Mode: Intermittent  Pulse Oximeter Device Location: Finger  O2 Device: None (Room air)     Social/Functional History  Social/Functional History  Lives With: Spouse (Wife Queen Jimena). )  Type of Home: Mobile home  Home Layout: One level  Home Access: Ramped entrance  [address] [facility] Shower/Tub: Walk-in shower  Bathroom Toilet: Handicap height  Bathroom Equipment: Grab bars in shower,Shower chair,Toilet raiser  Bathroom Accessibility: Accessible  Home Equipment: Rolling walker  ADL Assistance: [address]:  (Wife takes care of most homemaking needs.)  Ambulation Assistance: Independent (Occass use of Walker (longer distances). )  Transfer Assistance: Independent  Active : No (Able, although doesn't drive. Family takes care of errands. Medical transport to/from .)  Occupation: Retired  Type of occupation: Retired : various jobs ([employer], [employer], [employer]). Additional Comments: 2 Dtrs, 1 Son live in area. Reports recent fall (slid off bed onto floor), wife able to assist up. Objective   Vision: Impaired  Vision Exceptions: Wears glasses for reading  Hearing: Within functional limits      Orientation  Overall Orientation Status: Impaired  Orientation Level: Oriented to person;Oriented to place;Oriented to time;Disoriented to situation     Balance  Sitting Balance: Stand by assistance  Standing Balance: Contact guard assistance  Functional Mobility  Functional Mobility Comments: ambulated chair <> bathroom and chair <> doorway with RW and CGA     ADL  Grooming: Stand by assistance (seated in chair in front of sink)  LE Dressing:  (able to cross LE over knee to fernando/doff socks, anticipate will require min A to stand when pulling pants over hips)  Additional Comments: Anticipate pt will require min A for toileting, SBA for UB bathing/dressing and grooming based on balance and endurance observed           Transfers  Sit to stand: Minimal assistance  Stand to sit: Minimal assistance  Transfer Comments: to/from RW     Cognition  Overall Cognitive Status: Exceptions  Arousal/Alertness: Appropriate responses to stimuli  Following Commands:  Follows one step commands consistently  Attention Span: Attends with cues to redirect  Memory: Appears intact  Safety Judgement: Decreased awareness of need for safety  Insights: Decreased awareness of deficits  Perception  Overall Perceptual Status: WFL              LUE AROM (degrees)  LUE AROM : WFL  Left Hand AROM (degrees)  Left Hand AROM: WFL  RUE AROM (degrees)  RUE AROM : WFL  Right Hand AROM (degrees)  Right Hand AROM: [facility]     Plan   Plan  Times per week: 3-5  Current Treatment Recommendations: Strengthening,Endurance Training,Balance Training,Gait Training,Functional Mobility Training,Self-Care / ADL    AM-PAC Score   AM-PAC Inpatient Daily Activity Raw Score: 17 (01/17/22 0929)  AM-PAC Inpatient ADL T-Scale Score : 37.26 (01/17/22 0929)  ADL Inpatient CMS 0-100% Score: 50.11 (01/17/22 0929)  ADL Inpatient CMS G-Code Modifier : CK (01/17/22 0929)    Goals  Short term goals  Time Frame for Short term goals: Prior to DC: Short term goal 1: Pt will complete ADL transfers with supervision  Short term goal 2: Pt will complete functional mobility with supervision  Short term goal 3: Pt will tolerate standing > 5 min for functional task with supervision  Short term goal 4: Pt will complete toileting with supervision  Short term goal 5: Pt will complete LB dressing with supervision  Patient Goals   Patient goals : to return home       Therapy Time   Individual Concurrent Group Co-treatment   Time In 0855         Time Out 0929         Minutes 34         Timed Code Treatment Minutes: 15 Minutes     This note to serve as OT d/c summary if pt is d/c-ed prior to next therapy session.     Alberto Vance OTR/L

## 2022-01-17 NOTE — PROGRESS NOTES
small acute stroke  Neurology consulted  Mental status has improved. ROS:   Positives Listed Bold. All other remaining systems are negative.     Constitutional:  fever, chills, weakness, weight change, fatigue,      Skin:  rash, pruritus, hair loss, bruising, dry skin, petechiae. Head, Face, Neck   headaches, swelling,  cervical adenopathy.     Respiratory: shortness of breath, cough, or wheezing  Cardiovascular: chest pain, palpitations, dizzy, edema  Gastrointestinal: nausea, vomiting, diarrhea, constipation,belly pain    Yellow skin, blood in stool  Musculoskeletal:  back pain, muscle weakness, gait problems,       joint pain or swelling. Genitourinary:  dysuria, poor urine flow, flank pain, blood in urine  Neurologic:  vertigo, TIA'S, syncope, seizures, focal weakness  Psychosocial:  insomnia, anxiety, or depression. Additional positive findings: -     PMH:   Past medical history, surgical history, social history, family history are reviewed and updated as appropriate. Reviewed current medication list.   Allergies reviewed and updated as needed. PE:   Vitals:    01/17/22 1100   BP: (!) 147/77   Pulse: 70   Resp: 18   Temp: 97.6 °F (36.4 °C)   SpO2: 96%       General appearance: Male in no acute distress,awake and alert. HEENT: no icterus, EOM intact, trachea midline. Neck : no masses, appears symmetrical and no JVD appreciated. Respiratory: Respiratory effort normal, bilateral equal chest rise. Cardiovascular: Ausculation shows RRR and no edema   Abdomen: abdomen is soft, non distended, no masses, no pain with palpation. Musculoskeletal:  no joint swelling, no deformity  Skin: no rashes, no induration, no tightening, no jaundice   Neuro:   Follows commands, moves all extremities spontaneously   Left AV fistula positive thrill and bruit      Lab Results   Component Value Date    CREATININE 4.5 (H) 01/17/2022    BUN 32 (H) 01/17/2022     01/17/2022    K 4.3 01/17/2022     01/17/2022    CO2 25 01/17/2022      Lab Results   Component Value Date    WBC 4.1 01/17/2022    HGB 9.0 (L) 01/17/2022    HCT 26.9 (L) 01/17/2022    MCV 88.7 01/17/2022     01/17/2022     Lab Results   Component Value Date    CALCIUM 7.8 (L) 01/17/2022

## 2022-01-17 NOTE — PROGRESS NOTES
Progress Note  The patient is being evaluated for acute ischemic stroke. Updates  Mental status reportedly improved yesterday. Patient is alert, grossly oriented. Family is at bedside reports he has some confusion at baseline. Continues to be improved.      Active Ambulatory Problems     Diagnosis Date Noted    No Active Ambulatory Problems     Resolved Ambulatory Problems     Diagnosis Date Noted    No Resolved Ambulatory Problems     Past Medical History:   Diagnosis Date    Arthritis     [facility]     [facility] mellitus ([facility]     Hemodialysis patient ([facility]     Hypertension        Current Facility-Administered Medications:     aspirin chewable tablet 81 mg, 81 mg, Oral, Daily, Andressa Hernandez MD, 81 mg at 01/17/22 0922    ondansetron (ZOFRAN-ODT) disintegrating tablet 4 mg, 4 mg, Oral, Q8H PRN **OR** ondansetron (ZOFRAN) injection 4 mg, 4 mg, IntraVENous, Q6H PRN, Andressa Hernandez MD    polyethylene glycol (GLYCOLAX) packet 17 g, 17 g, Oral, Daily PRN, Andressa Hernandez MD    atorvastatin (LIPITOR) tablet 80 mg, 80 mg, Oral, Nightly, Andressa Hernandez MD, 80 mg at 01/16/22 2149    gabapentin (NEURONTIN) capsule 100 mg, 100 mg, Oral, TID, Andressa Hernandez MD, 100 mg at 01/17/22 0922    pantoprazole (PROTONIX) tablet 20 mg, 20 mg, Oral, Nightly, Andressa Hernandez MD, 20 mg at 01/16/22 2149    sevelamer (RENVELA) tablet 800 mg, 800 mg, Oral, TID WC, Andressa Hernandez MD, 800 mg at 01/17/22 0922    tamsulosin (FLOMAX) capsule 0.4 mg, 0.4 mg, Oral, QAM, Andressa Hernandez MD, 0.4 mg at 01/17/22 0270    sodium chloride flush 0.9 % injection 5-40 mL, 5-40 mL, IntraVENous, 2 times per day, Raulito DULCE Jo DO, 10 mL at 01/17/22 1128    sodium chloride flush 0.9 % injection 5-40 mL, 5-40 mL, IntraVENous, PRN, Raulito Jo DO    0.9 % sodium chloride infusion, 25 mL, IntraVENous, PRN, Raulito DULCE Jo DO, Last Rate: 5 mL/hr at 01/15/22 2102, Rate Verify at 01/15/22 2102    acetaminophen (TYLENOL) tablet 650 mg, 650 mg, Oral, Q6H PRN **OR** acetaminophen (TYLENOL) suppository 650 mg, 650 mg, Rectal, Q6H PRN, Raulito DULCE Jo,     perflutren lipid microspheres (DEFINITY) injection 1.65 mg, 1.5 mL, IntraVENous, ONCE PRN, Raulito M Deysi, DO      ROS -   Constitutional- No fevers  Eyes- No diplopia. No photophobia. Ears/nose/throat- No dysphagia. No Dysarthria  Cardiovascular- No palpitations. No chest pain  Respiratory- No dyspnea. No Cough  Neurologic- No weakness. No Headache.  aspirin  81 mg Oral Daily    atorvastatin  80 mg Oral Nightly    gabapentin  100 mg Oral TID    pantoprazole  20 mg Oral Nightly    sevelamer  800 mg Oral TID WC    tamsulosin  0.4 mg Oral QAM    sodium chloride flush  5-40 mL IntraVENous 2 times per day         sodium chloride 5 mL/hr at 01/15/22 2102        ondansetron **OR** ondansetron, polyethylene glycol, sodium chloride flush, sodium chloride, acetaminophen **OR** acetaminophen, perflutren lipid microspheres     Exam:  Blood pressure (!) 155/77, pulse 73, temperature 98.1 °F (36.7 °C), temperature source Oral, resp. rate 16, height 6' 0.5\" (1.842 m), weight 233 lb 0.4 oz (105.7 kg), SpO2 95 %. Constitutional    Vital signs: BP, HR, and RR reviewed   General Alert, no distress, well-nourished, chronically ill appearing. Eyes: unable to visualize the fundi  Cardiovascular: pulses symmetric in all 4 extremities. No peripheral edema. Psychiatric: cooperative with examination, no  psychotic behavior noted. Neurologic  Mental status:   orientation to person, place, time and situation   General fund of knowledge:  grossly intact   Memory: Short term and long term intact. Poor historian to hospitalization. Identifies family in room. Attention intact as able to attend well to the exam     Language answers in simple questions. No aphasia.     Comprehension  follows simple commands  Cranial nerves:   CN2: Visual Fields full w/o extinction on confrontational testing, CN 3,4,6: extraocular muscles intact. CN7: upper and lower facial symmetric without dysarthria  CN8: hearing grossly intact to conversation. CN9/10: palate elevated symmetrically  CN11: trap full strength on shoulder shrug bilaterally, SCM without weakness. CN12: tongue midline with protrusion. Able to move tongue side to side. Strength:  BUE: 4+/5. Initialyl LUE felt mildly weaker though symmetric on repeat testing. BLE: 3+/5 symmetric. Sensory: light symmetric in 4/4. Cerebellar/coordination: finger nose finger normal without ataxia. Continues to have mild tremor. Family at bedside reports chronic. Tone: normal in all 4 extremities  Gait: deferred for safety. Labs  Na: 137  K: 4.3  BUN: 32  Creatinine: 4.5  Glucose: 118  Calcium: 7.8     HbA1c: 6.1  LDL: 25  TSH: 1.18    ALT: 7  AST: 9     WBC: 4.1  RBC: 3.03  Hgb: 9.0  Hct: 26.9  Platelet: 510     Occult stool: positive.      Hepatitis Panel: Non reactive.      UA: Large leukocyte esterase. Negative for nitrites. >900. Urine Culture: 75,000 CFU/mL Enterococcus faecalis    Influenza A&B: negative. Covid 19: Not detected. Studies  MRI Brain w/o 1/14/22:  1. Punctate acute infarct in the posteromedial left parietal lobe, within the deep white matter. 2. Cerebral parenchymal volume loss with severe chronic microvascular white matter ischemic disease.         CT Head w/o 1/13/22: No acute intracranial abnormality.  Specifically, no acute intracranial hemorrhage or mass effect.   Extensive chronic small vessel ischemic disease. MRA Head & Neck w/o 1/17/22: Reviewed the read. Motion limited. HEAD: No apparent intracranial arterial high grade stenosis, occlusion or aneurysm head at 5 within constraints of acquisition.    NECK: No convincing flow limiting stenosis of the visualized carotid/vertebral arteries within the limitations of this exam.     TTE 1/14/22:    Left ventricular size is normal.   Moderate concentric left ventricular hypertrophy is present.   Global left ventricular function is normal with ejection fraction estimated   from 55 % to 60 %.  Grade II diastolic dysfunction with elevated LV filling pressures.   Normal right ventricular size and function. Left atrium severely dilated. Impression  1. Acute ischemic stroke   2. Encephalopathy  3. GI bleed  4. ESRD   5. Colon Cancer  6. Recent Covid 19.   7. UTI     uRthy Lewis is a 77 y.o. male with recent Covid 23, diagnosis of colon cancer, ESRD on dialysis who presented with progressive worsening fatigue, somnolence for the last week, falls. He was found to have GI bleed, LESLEE, multifocal pneumonia. Covid 19 was negative on this admission.      MRI Brain w/o was completed for ? altered mental status and revealed a punctate acute infarct in the posteromedial left parietal lobe, within the deep white matter which is incidental. MRA head & neck w/o limited given motion though without significant stenosis. Given known cancer, recent Covid 23, mechanism could be of hypercoagulable state. TTE has severely left atrium dilation which can be seen in poorly controlled HTN, vs. Underlying atrial fibrillation/flutter.      Do suspect a component of metabolic encephalopathy given recent infections, GI bleed/anemia. May have underlying neurocognitive disorder as family reports confusion, possibly at baseline. Recommendations  - Continue antiplatelet therapy as tolerable from an anemia standpoint.   - Statin therapy. LDL goal < 70  Maintain good control of secondary vascular risk factors with LDL < 70, HbA1c < 7, Long term BP goal < 140/90  - Telemetry monitoring. Please notify neurology of any atrial fibrillation/flutter. 30 day event monitor on discharge. - Continued efforts addressing underlying infectious and metabolic abnormalities per primary team.   - PT, OT, SLP.   - Follow up with Chas Courtney CNP in 1 month or sooner if needed.  May benefit from neurocognitive testing. Neurology will sign off. Please call back with any questions or concerns.        Maria Teresa Armstrong, [address] Neurology    A copy of this note was provided for Dr Radha Adams MD

## 2022-01-17 NOTE — PROGRESS NOTES
Physician Progress Note      Garret Wilks  CSN #:                  [id number]  :                       1945  ADMIT DATE:       2022 10:37 PM  [address] DATE:  RESPONDING  PROVIDER #:        Wendy Monzon MD          QUERY TEXT:    Patient with ESRD and colon cancer who was recently discharged after being   treated for COVID pneumonia admitted with suspected UTI, CVA, LESLEE, and   encephalopathy. Also has melena thought to be secondary to colon cancer. Noted documentation of \"Acute encephalopathy secondary to CVA\" per attending   however \"Acute ischemic stroke, very small, would not explain encephalopathy\"   and \"Do suspect a component of metabolic encephalopathy given recent   infections, GI bleed/anemia\" documented per neuro consult. May have   underlying neurocognitive disorder as family reports confusion, po    If possible, please document in progress notes and discharge summary if you   are evaluating and /or treating any of the following: The medical record reflects the following:  Risk Factors: ESRD with LESLEE, CVA, recent COVID pneumonia, GI bleed, colon   cancer  Clinical Indicators: confusion, disorientated to situation per nursing   flowsheet  Treatment: nephrology consult, neuro consult    Thank you,  Tammy Mcclendon, RN, BSN, CCDS  [email or web address]  Options provided:  -- Metabolic encephalopathy  -- Acute encephalopathy due to CVA  -- Acute encephalopathy due to CVA and metabolic encephalopathy  -- Other - I will add my own diagnosis  -- Disagree - Not applicable / Not valid  -- Disagree - Clinically unable to determine / Unknown  -- Refer to Clinical Documentation Reviewer    PROVIDER RESPONSE TEXT:    This patient has acute encephalopathy due to CVA.     Query created by: Sandra Bowie on 2022 5:55 AM      Electronically signed by:  Wendy Monzon MD 2022 9:50 AM

## 2022-01-17 NOTE — PROGRESS NOTES
Speech Language/Pathology   SPEECH LANGUAGE AND CLINICAL BEDSIDE SWALLOWING EVALUATION   Speech Therapy Department     Indira Horn  AGE: 77 y.o. GENDER: male  : 1945  [id number]  EPISODE DATE:  2022    MEDICAL DIAGNOSIS: CVA  ONSET: 2022     Chief Complaint   Patient presents with    Fatigue     pt was recently d/cd from the hospital still with complaints of weakness and no energy    Fall     pt fell x 2 today      PAST MEDICAL HISTORY        Diagnosis Date    Arthritis     Cancer [facility])     Colon Cancer diagnosed last month    [facility]     [facility]od[facility]     Hypertension        PAST SURGICAL HISTORY    Past Surgical History:   Procedure Laterality Date    IR PERC ARTERIOVENOUS FISTULA CREATION Left     unsure of date       ALLERGIES    Allergies   Allergen Reactions    Lisinopril      Allergy listed on paperwork from [facility], no reaction noted     CHART REVIEW:  2022 admitted with c/o AMS and lethargy  MD ADMISSION H&P HPI: The patient is a 77 y.o. male w/ uncertain past medical hx but possibly was just hospitalized and recovered from [place] a few weeks ago in [place] who presents to [facility] possibly from home for progressive worsening fatigue and somnolence that he reports has been going on for at least a week. Patient is somnolent at this time and difficult to clarify full history. He admits that he has not been eating or drinking much however but unable to quantify. He denies other symptoms of fever chills or shortness of breath. Unable to fully obtain further review of systems questions but he denies any focal weakness or vision changes. At this time he just feels as he is very sleepy. Unable to confirm any further chronic illnesses at this time however.     2022 CT CHEST  Impression   No evidence of acute intrathoracic, intraabdominal or intrapelvic injury.       Multifocal airspace disease.  This pattern may reflect COVID pneumonia. Correlate with COVID testing. 1/14/2022 MRI BRAIN  Impression   1. Punctate acute infarct in the posteromedial left parietal lobe, within the   deep white matter. 2. 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 SUBJECTIVE  Chart Reviewed: Yes  Patient assessed for rehabilitation services?: Yes  Family / Caregiver Present: No  Comments: patient denies motor speech, receptive/expressive/cognitive language, and/or swallowing difficulties; med team concern/note for CVA  Patient Currently in Pain: No  Vision Exceptions: Wears glasses for reading  Hearing Exceptions: Hard of hearing/hearing concerns    Prior Status: lives with wife; reports independent with ADLs; wife completes cooking, cleaning, laundry with assist of cleaning lady; wife manages medications/appointments; wife manages finances; drives sometimes - reports \"I can\";' completed 10th grade; retired ; enjoys piddling around    Reason for referral: Marvel Moctezuma was referred for a Speech-Language and Bedside Swallow Evaluation to assess the efficiency of communicative effectiveness; the efficiency of swallow function, rule out aspiration and make recommendations regarding safe dietary consistencies, effective compensatory strategies, and safe eating environment. Dysphagia Treatment Diagnosis: Oropharyngeal Dysphagia   Speech Language Treatment Diagnosis: Apraxia, Dysarthria, Aphasia, Cognitive-Language      IMPRESSIONS  Dysphagia Diagnosis: Mild oral stage dysphagia characterized by decreased mastication r/t edentulism and decreased lingual manipulation; prolonged but adequate bolus formation and movement with all with concern for premature bolus loss to the pharynx.  Mild pharyngeal stage dysphagia characterized by delayed swallow and decreased laryngeal elevation; cough x1 with serial thin swallows unable to be reduplicated -rec continued monitor for tolerance. Cognitive Diagnosis: Moderate-severe cognitive language impairments in the domains for selective attention, orientation, immediate/delayed recall, and problem solving. Patient reports dependence on wife prior to admission and reports current function as comparable to baseline; recommend confirmation of baseline function/skills  Aphasia Diagnosis: Mild receptive aphasia characterized by reduced comprehension for complex lengthy information with suspicion for reduced recall as a factor. Moderate expressive aphasia characterized by decreased word-finding for responsive and confirmation naming at word level, limited convergent/divergent naming, and suspicion for reduced word-finding contributing to limited conversation. Speech Diagnosis: Moderately reduced speech intelligibility d/t mild apraxic impairments and mild-mod dysarthric features (reduced articulatory precision and reduced breath support for phonation/volume). Recommended Diet:  Diet Solids Recommendation: Regular  Liquid Consistency Recommendation: Thin  Compensatory Swallowing Strategies: Upright as possible for all oral intake,Remain upright for 30-45 minutes after meals      PLAN  Requires SLP Intervention: Yes  Therapeutic Interventions: Diet tolerance monitoring,Patient/Family education, cognitive-communication remediation, motor speech remediation; aphasia remediation  Duration/Frequency of Treatment: ST to tx 3-5 times per week during acute admission unless otherwise notified    Dysphagia Goals  Dysphagia Goals: The patient will tolerate recommended diet without observed clinical signs of aspiration,The patient/caregiver will demonstrate understanding of compensatory strategies for improved swallowing safety.   Speech and Language Goals  Goals:   Short-term Goals  Goal 1: Pt will improve verbal expression for naming and self expression via graded tasks to min cues  Goal 2: Pt will improve speech intelligibility in connected speech via breath support with low volume)  Pragmatics/Social Functioning  Pragmatics: Within functional limits  Cognitive-Language  Orientation Level: Oriented to person;Oriented to place (disoriented to situation; disoriented to date/day; oriented to month/year)  Attention  Alternating Attention: To be assessed in therapy  Divided Attention: To be assessed in therapy  Selective Attention: Moderate  Sustained Attention:  [facility])  Memory  Daily Routines: Moderate  Short-term Memory: Moderate  Working Memory: Moderate  Problem Solving  Complex Functional Tasks: To be assessed in therapy  Simple Functional Tasks: Mild  Verbal Reasoning Skills: Severe  Numeric Reasoning  Calculations: Severe  Dysphagia  Impaired Mastication: Reg Solid (prolonged but adequate despite edentulism)  Decreased Anterior to Posterior Transit: All  Suspected Premature Bolus Loss: All  Delayed Swallow: All  Decreased Laryngeal Elevation: All  Pharyngeal: isolated coughing episode x1 with thin serial swallows unable to be reduplicated - rec monitor      Please accept this as Speech Therapy Discharge status, if pt is discharged prior to next therapy session. Timed Code Treatment:  SLP Individual Minutes  Time In: 8519  Time Out: 8616  Minutes: 60    Total Treatment Time:  20\" swallow; 40\" SLP     SIGNED:   Bee Guerra, #[id number]  Speech-Language Pathologist  Portable phone: [phone]  1/17/2022 1005 AM

## 2022-01-17 NOTE — PLAN OF CARE
Problem: Falls - Risk of:  Goal: Will remain free from falls  Description: Will remain free from falls  Outcome: Ongoing     Problem: Skin Integrity:  Goal: Will show no infection signs and symptoms  Description: Will show no infection signs and symptoms  Outcome: Ongoing     Problem: HEMODYNAMIC STATUS  Goal: Patient has stable vital signs and fluid balance  Outcome: Ongoing     Problem: ACTIVITY INTOLERANCE/IMPAIRED MOBILITY  Goal: Mobility/activity is maintained at optimum level for patient  Outcome: Ongoing

## 2022-01-18 LAB
ANION GAP SERPL CALCULATED.3IONS-SCNC: 13 MMOL/L (ref 3–16)
BASOPHILS ABSOLUTE: 0 K/UL (ref 0–0.2)
BASOPHILS RELATIVE PERCENT: 0.8 %
BUN BLDV-MCNC: 40 MG/DL (ref 7–20)
CALCIUM SERPL-MCNC: 8 MG/DL (ref 8.3–10.6)
CHLORIDE BLD-SCNC: 102 MMOL/L (ref 99–110)
CO2: 21 MMOL/L (ref 21–32)
CREAT SERPL-MCNC: 4.8 MG/DL (ref 0.8–1.3)
EOSINOPHILS ABSOLUTE: 0.1 K/UL (ref 0–0.6)
EOSINOPHILS RELATIVE PERCENT: 2.5 %
GFR AFRICAN AMERICAN: 14
GFR NON-AFRICAN AMERICAN: 12
GLUCOSE BLD-MCNC: 105 MG/DL (ref 70–99)
HCT VFR BLD CALC: 30.8 % (ref 40.5–52.5)
HEMOGLOBIN: 10.1 G/DL (ref 13.5–17.5)
LYMPHOCYTES ABSOLUTE: 1.1 K/UL (ref 1–5.1)
LYMPHOCYTES RELATIVE PERCENT: 23.2 %
MAGNESIUM: 2.3 MG/DL (ref 1.8–2.4)
MCH RBC QN AUTO: 29.6 PG (ref 26–34)
MCHC RBC AUTO-ENTMCNC: 32.8 G/DL (ref 31–36)
MCV RBC AUTO: 90.2 FL (ref 80–100)
MONOCYTES ABSOLUTE: 0.2 K/UL (ref 0–1.3)
MONOCYTES RELATIVE PERCENT: 4.8 %
NEUTROPHILS ABSOLUTE: 3.2 K/UL (ref 1.7–7.7)
NEUTROPHILS RELATIVE PERCENT: 68.7 %
PDW BLD-RTO: 13.9 % (ref 12.4–15.4)
PLATELET # BLD: 197 K/UL (ref 135–450)
PMV BLD AUTO: 7.4 FL (ref 5–10.5)
POTASSIUM SERPL-SCNC: 4.6 MMOL/L (ref 3.5–5.1)
RBC # BLD: 3.42 M/UL (ref 4.2–5.9)
SODIUM BLD-SCNC: 136 MMOL/L (ref 136–145)
WBC # BLD: 4.6 K/UL (ref 4–11)

## 2022-01-18 PROCEDURE — 97116 GAIT TRAINING THERAPY: CPT

## 2022-01-18 PROCEDURE — 36415 COLL VENOUS BLD VENIPUNCTURE: CPT

## 2022-01-18 PROCEDURE — 6370000000 HC RX 637 (ALT 250 FOR IP): Performed by: HOSPITALIST

## 2022-01-18 PROCEDURE — 83735 ASSAY OF MAGNESIUM: CPT

## 2022-01-18 PROCEDURE — 92507 TX SP LANG VOICE COMM INDIV: CPT

## 2022-01-18 PROCEDURE — APPNB45 APP NON BILLABLE 31-45 MINUTES: Performed by: PHYSICIAN ASSISTANT

## 2022-01-18 PROCEDURE — 97530 THERAPEUTIC ACTIVITIES: CPT | Performed by: PHYSICAL THERAPIST

## 2022-01-18 PROCEDURE — APPSS45 APP SPLIT SHARED TIME 31-45 MINUTES: Performed by: PHYSICIAN ASSISTANT

## 2022-01-18 PROCEDURE — 80048 BASIC METABOLIC PNL TOTAL CA: CPT

## 2022-01-18 PROCEDURE — 97129 THER IVNTJ 1ST 15 MIN: CPT

## 2022-01-18 PROCEDURE — 90935 HEMODIALYSIS ONE EVALUATION: CPT

## 2022-01-18 PROCEDURE — 85025 COMPLETE CBC W/AUTO DIFF WBC: CPT

## 2022-01-18 PROCEDURE — 2580000003 HC RX 258: Performed by: STUDENT IN AN ORGANIZED HEALTH CARE EDUCATION/TRAINING PROGRAM

## 2022-01-18 PROCEDURE — 97530 THERAPEUTIC ACTIVITIES: CPT

## 2022-01-18 PROCEDURE — 2060000000 HC ICU INTERMEDIATE R&B

## 2022-01-18 RX ADMIN — SODIUM CHLORIDE, PRESERVATIVE FREE 10 ML: 5 INJECTION INTRAVENOUS at 21:30

## 2022-01-18 RX ADMIN — ATORVASTATIN CALCIUM 80 MG: 80 TABLET, FILM COATED ORAL at 21:30

## 2022-01-18 RX ADMIN — ASPIRIN 81 MG 81 MG: 81 TABLET ORAL at 12:59

## 2022-01-18 RX ADMIN — PANTOPRAZOLE SODIUM 20 MG: 20 TABLET, DELAYED RELEASE ORAL at 21:30

## 2022-01-18 RX ADMIN — SODIUM CHLORIDE, PRESERVATIVE FREE 10 ML: 5 INJECTION INTRAVENOUS at 12:59

## 2022-01-18 RX ADMIN — SEVELAMER CARBONATE 800 MG: 800 TABLET, FILM COATED ORAL at 17:19

## 2022-01-18 RX ADMIN — GABAPENTIN 100 MG: 100 CAPSULE ORAL at 12:59

## 2022-01-18 RX ADMIN — GABAPENTIN 100 MG: 100 CAPSULE ORAL at 21:30

## 2022-01-18 RX ADMIN — SEVELAMER CARBONATE 800 MG: 800 TABLET, FILM COATED ORAL at 13:00

## 2022-01-18 RX ADMIN — TAMSULOSIN HYDROCHLORIDE 0.4 MG: 0.4 CAPSULE ORAL at 12:59

## 2022-01-18 ASSESSMENT — PAIN SCALES - GENERAL
PAINLEVEL_OUTOF10: 0

## 2022-01-18 NOTE — PROGRESS NOTES
Hospitalist Progress Note    Patient:  Elfego Torres  Unit/Bed:[id number]/5107-01   YOB: 1945       MRN: [id number] Acct: [de-identified]  PCP: Khushboo Dumont    Date of Admission: 1/13/2022  --------------------------    Chief Complaint:     Mental status changes    Hospital Course:     Elfego Torres is a 77 y.o. male hospitalized on 1/13/2022   multiple comorbidities presented to the ED found to have acute CVA. Assessment/plan:     Acute encephalopathy secondary to CVA with underlying cognitive impairment. -MRI of the brain showed punctate acute infarct in the posterior medial left parietal lobe  MRA of the head and neck showed no flow-limiting stenosis     Continue aspirin,   statin  -    PT and OT. Likely need ECF. End-stage renal failure, on hemodialysis  Continue hemodialysis. Discussed with nephrology, patient [place] to relocate to [place], nephrologist is assisting with finding a location for his outpatient HD. Hypomagnesemia  Resolved      Hypokalemia  Resolved       Recent hospitalization for COVID  Currently on room air. History of colon cancer/GI bleed/acute blood loss anemia     -Hemoglobin remained stable, evaluated by GI, tolerating aspirin  -Surgery service consulted, awaiting their final recommendation in terms of doing the surgery here as the patient is relocating to [facility]. Bacteriuria without urinary symptoms, culture data show Enterococcus faecalis  No symptoms, discontinue ceftriaxone. Obesity Body mass index is 30.2 kg/m². Complicating assessment and treatment. Placing patient at risk for multiple co-morbidities as well as early death and contributing to the patient's presentation.         Code Status: Full Code         DVT prophylaxis: SCD     Disposition:   Awaiting final surgery input whether the surgery will be done this admission  Patient is requesting to [facility], need ECF and HD chair    Discussed with the patient  ----------------      Subjective: Patient seen and examined  Doing well, has no complaints. Diet: ADULT DIET; Regular; Low Sodium (2 gm); Low Potassium (Less than 3000 mg/day); Low Phosphorus (Less than 1000 mg); 2000 ml    OBJECTIVE     Exam:  BP (!) 149/84   Pulse 66   Temp 97.6 °F (36.4 °C)   Resp 18   Ht 6' 0.5\" (1.842 m)   Wt 225 lb 12 oz (102.4 kg)   SpO2 92%   BMI 30.20 kg/m²        Gen: Not in distress. Alert. Head: Normocephalic. Atraumatic. Eyes: Conjunctivae/corneas clear. ENT: Oral mucosa moist  Neck: No JVD. No obvious thyromegaly. CVS: Nml S1S2, no murmur  , RRR  Pulmomary: Clear bilaterally. No crackles. No wheezes. Gastrointestinal: Soft, non tender, non distend, . Musculoskeletal: Left upper extremity fistula  Neuro: No focal deficit. Moves extremity spontaneously. Psychiatry: Appropriate affect. Not agitated.       Medications:  Reviewed    Infusion Medications    sodium chloride 5 mL/hr at 01/15/22 2102     Scheduled Medications    aspirin  81 mg Oral Daily    atorvastatin  80 mg Oral Nightly    gabapentin  100 mg Oral TID    pantoprazole  20 mg Oral Nightly    sevelamer  800 mg Oral TID WC    tamsulosin  0.4 mg Oral QAM    sodium chloride flush  5-40 mL IntraVENous 2 times per day     PRN Meds: ondansetron **OR** ondansetron, polyethylene glycol, sodium chloride flush, sodium chloride, acetaminophen **OR** acetaminophen, perflutren lipid microspheres      Intake/Output Summary (Last 24 hours) at 1/18/2022 1333  Last data filed at 1/18/2022 1259  Gross per 24 hour   Intake 1200 ml   Output 2950 ml   Net -1750 ml             Labs:   Recent Labs     01/16/22  1208 01/17/22  0449 01/18/22  0426   WBC 5.9 4.1 4.6   HGB 9.6* 9.0* 10.1*   HCT 29.1* 26.9* 30.8*    206 197     Recent Labs     01/16/22  0618 01/17/22  0449 01/18/22  0426   * 137 136   K 4.4 4.3 4.6   CL 98* 101 102   CO2 24 25 21   BUN 29* 32* 40*   CREATININE 3.4* 4.5* 4.8*   CALCIUM 8.2* 7.8* 8.0*     No results for input(s): AST, ALT, BILIDIR, BILITOT, ALKPHOS in the last 72 hours. No results for input(s): INR in the last 72 hours. No results for input(s): Johnny Norman in the last 72 hours. Urinalysis:      Lab Results   Component Value Date    NITRU Negative 01/13/2022    WBCUA >900 01/13/2022    RBCUA 0-2 01/13/2022    BLOODU LARGE 01/13/2022    SPECGRAV 1.025 01/13/2022    GLUCOSEU Negative 01/13/2022       Radiology:  MRA neck without contrast   Final Result   No convincing flow limiting stenosis of the visualized carotid/vertebral   arteries within the limitations of this exam.         MRA head without contrast   Final Result   No apparent intracranial arterial high grade stenosis, occlusion or aneurysm   head at 5 within constraints of acquisition. MRI BRAIN WO CONTRAST   Final Result   1. Punctate acute infarct in the posteromedial left parietal lobe, within the   deep white matter. 2. 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 RECOMMENDATIONS:   Unavailable         US RENAL COMPLETE   Final Result   1. Simple cysts in the left kidney with no other significant renal finding. 2. Borderline enlargement of the prostate. Correlate with urologic history. CT CHEST ABDOMEN PELVIS WO CONTRAST   Final Result   No evidence of acute intrathoracic, intraabdominal or intrapelvic injury. Multifocal airspace disease. This pattern may reflect COVID pneumonia. Correlate with COVID testing. CT HEAD WO CONTRAST   Final Result   No acute intracranial abnormality. Specifically, no acute intracranial   hemorrhage or mass effect. Extensive chronic small vessel ischemic disease.                      Electronically signed by Johnny Turner MD on 1/18/2022 at 1:33 PM

## 2022-01-18 NOTE — PROGRESS NOTES
General and Vascular Surgery                                                           Daily Progress Note                                                             Wesley Leslie PA-C     Pt Name: Rosa Elena Ardon  Medical Record Number: [id number]  Date of Birth 1945   Today's Date: 1/18/2022    ASSESSMENT/PLAN  1. Acute encephalopathy, CVA  2. ESRD: gets HD  3. Feeling better today. 4. Denies noticing any bleeding  5. Melena secondary to colon cancer, has been worked up at [facility], was a plan for a colectomy but patient developed COVID and then had a CVA. Searching for Old medical records. Will need to obtain medical records and then will give more recommendations when available. 6. Will monitor for other medical illnesses to improve    EDUCATION  Patient educated about their illness/diagnosis, stated above, and all questions answered. We discussed the importance of nutrition, medications they are taking, and healthy lifestyle. SUBJECTIVE  Pain is well controlled. He has no nausea and no vomiting. He has passed flatus and has had a bowel movement. OBJECTIVE  VITALS:  height is 6' 0.5\" (1.842 m) and weight is 233 lb 11 oz (106 kg). His oral temperature is 98.5 °F (36.9 °C). His blood pressure is 153/76 (abnormal) and his pulse is 61. His respiration is 17 and oxygen saturation is 92%. VITALS:  BP (!) 153/76   Pulse 61   Temp 98.5 °F (36.9 °C) (Oral)   Resp 17   Ht 6' 0.5\" (1.842 m)   Wt 233 lb 11 oz (106 kg)   SpO2 92%   BMI 31.26 kg/m²   GENERAL: alert, no distress  ABDOMEN: soft, non-tender and non-distended  I/O last 3 completed shifts: In: 360 [P.O.:360]  Out: 550 [Urine:550]  No intake/output data recorded.     LABS  Recent Labs     01/18/22  0426   WBC 4.6   HGB 10.1*   HCT 30.8*         K 4.6      CO2 21   BUN 40*   CREATININE 4.8*   MG 2.30   CALCIUM 8.0*     CBC:   Lab Results   Component Value Date    WBC 4.6 01/18/2022    RBC 3.42 01/18/2022    HGB 10.1 01/18/2022    HCT 30.8 01/18/2022    MCV 90.2 01/18/2022    MCH 29.6 01/18/2022    MCHC 32.8 01/18/2022    RDW 13.9 01/18/2022     01/18/2022    MPV 7.4 01/18/2022     CMP:    Lab Results   Component Value Date     01/18/2022    K 4.6 01/18/2022     01/18/2022    CO2 21 01/18/2022    BUN 40 01/18/2022    CREATININE 4.8 01/18/2022    GFRAA 14 01/18/2022    GFRAA 35 07/26/2011    AGRATIO 0.8 01/13/2022    LABGLOM 12 01/18/2022    GLUCOSE 105 01/18/2022    PROT 6.6 01/13/2022    PROT 7.5 07/26/2011    LABALBU 3.0 01/13/2022    CALCIUM 8.0 01/18/2022    BILITOT 0.3 01/13/2022    ALKPHOS 84 01/13/2022    AST 9 01/13/2022    ALT 7 01/13/2022         Erendira Pastrana PA-C  Electronically signed 1/18/2022 at 7:54 AM

## 2022-01-18 NOTE — PROGRESS NOTES
Physical Therapy  Facility/Department: [facility] 5W PROGRESSIVE CARE  Daily Treatment Note  NAME: Lalita Ontiveros  : [birth year]  MRN: [id number]    Date of Service: 2022    Discharge Recommendations:  5-7 sessions per week   PT Equipment Recommendations  Other: Will monitor for potential equipt needs. Lalita Ontiveros scored a 17/24 on the AM-PAC short mobility form. Current research shows that an AM-PAC score of 17 or less is typically not associated with a discharge to the patient's home setting. Based on the patient's AM-PAC score and their current functional mobility deficits, it is recommended that the patient have 5-7 sessions per week of Physical Therapy at d/c to increase the patient's independence. At this time, this patient demonstrates the endurance, and/or tolerance for 3 hours of therapy each day, with a treatment frequency of 5-7x/wk. Please see assessment section for further patient specific details. If patient discharges prior to next session this note will serve as a discharge summary. Please see below for the latest assessment towards goals. Assessment   Body structures, Functions, Activity limitations: Decreased functional mobility ; Decreased strength;Decreased safe awareness;Decreased endurance;Decreased balance  Assessment: 76 y/o male admit 2022 with LESLEE, GI Bleed; Weakness. 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 Surg consult (recent dx Colon Ca ~ 1 month ago; await surg). Recent dx Pneumonia, COVID+ (cont weak/falls). PMH as noted including CKD (HD), COVID+. PTA pt living with wife in mobile home with ramp access; independent daily care and functional mobility although recent cont weak following Pneumonia/COVID. Today, , pt continues to be weaker than his normal self. Pt able to ambulate increased distance of 72' with RW and CGA. Pt continued to need min A for transfers.  Pt is an elevated fall risk and needs to be more Ind to return home as his wife is placement)  Stand to sit: Minimal Assistance (Cues from hand placement and safe RW management prior to trans)  Ambulation  Ambulation?: Yes  Ambulation 1  Surface: level tile  Device: Rolling Walker  Assistance: Contact guard assistance;Stand by assistance  Quality of Gait: slow pace with leaning on walker, increased KEY with narrowed KEY during turns, mild unsteadiness/shakiness throughout, without any overt LOB  Gait Deviations: Slow [place]; Increased KEY; Decreased step length;Decreased step height;Staggers  Distance: 72' with several turns throughout  Comments: Pt reports slightly limited by fatigue, but \"mostly limited by wanting to eat lunch\". Stairs/Curb  Stairs?: No        Exercises  Hip Flexion: x15 BLE  Knee Long Arc Quad: x15 BLE  Ankle Pumps: x15 BLE (poor coordination)  Comments: Above therex done sitting in recliner                   AM-PAC Score  AM-PAC Inpatient Mobility Raw Score : 17 (01/18/22 1518)  AM-PAC Inpatient T-Scale Score : 42.13 (01/18/22 1518)  Mobility Inpatient CMS 0-100% Score: 50.57 (01/18/22 1518)  Mobility Inpatient CMS G-Code Modifier : CK (01/18/22 1518)          Goals  Short term goals  Time Frame for Short term goals: Upon d/c acute care setting. Short term goal 1: Bed Mob SBA. Short term goal 2: Transfers with assist device SBA. Short term goal 3: Amb with assist device 25-50' SBA/CGA. Short term goal 4: Pt participating in approp Strength Exs. Patient Goals   Patient goals : Get stronger and return home. Plan    Plan  Times per week: 3-5x week while in acute care setting.   Current Treatment Recommendations: Strengthening,Functional Mobility Training,Transfer Training,Gait Training,Safety Education & Training,Patient/Caregiver Education & Training  Safety Devices  Type of devices: Chair alarm in place,Call light within reach,Left in chair,Nurse notified,All fall risk precautions in place,Patient at risk for falls     Therapy Time   Individual Concurrent Group Co-treatment   Time In 1315         Time Out 1345         Minutes 30         Timed Code Treatment Minutes: 30 Minutes       Electronically signed by Van Mchugh, [place] [id number] on 1/18/2022 at 3:19 PM

## 2022-01-18 NOTE — CARE COORDINATION
CM received call from Jeanette at [facility]. Admission will depend on qualifications. Jeanette stated family is looking for LTC. Will require EMILIA pending. Patient is ambulating with CGA. History of falls. Jeanette is meeting with the family.      Sherine GARAY RN  Case Management  [phone]    Electronically signed by Sherine Santacruz RN on 1/18/2022 at 12:58 PM

## 2022-01-18 NOTE — CARE COORDINATION
INITIAL CASE MANAGEMENT ASSESSMENT    Met with patient to assess possible discharge needs. Explained Case Management role/services. Living Situation: Lives with spouse in a one story double wide trailer on their son's property. No GIUSEPPE. Has a ramp. ADLs: Requires some assistance. DME: FWW, cane, ramp, has recently been using a wheelchair since he fell and broke his hand. PT/OT Recs: Recommendations for 5-7 sessions per week. Active Services: Active with [facility] for aide services for 2-4 hours per day. Transportation: Not an active . Daughters provide transportation. Medications: [address]    PCP: Bhupendra Durbin      HD/PD: MARGE Brooks in [address]    PLAN/COMMENTS: Discussed discharge planning with the daughter Odessa Liao. Unable to meet with the patient due to confusion. Janeen stated the family would like to move the family to [facility] due to increased needs regarding care. Patient requires a skilled nursing facility discharge at this time and outpatient HD setup. Medical records faxed to [facility] as requested by Dr. Mel Maddox. Referral faxed to [facility]. The Plan for Transition of Care is related to the following treatment goals: SNF    The Patient and/or patient representative was provided with a choice of provider and agrees   with the discharge plan. [x] Yes [] No    Freedom of choice list was provided with basic dialogue that supports the patient's individualized plan of care/goals, treatment preferences and shares the quality data associated with the providers. [x] Yes [] No    Provided contact information for patient or family to call with any questions. Will follow and assist as needed.     Moses GARAY RN  Case Management  [phone]    Electronically signed by Moses Hernandez RN on 1/18/2022 at 11:45 AM

## 2022-01-18 NOTE — PROGRESS NOTES
Occupational Therapy      Attempted to see pt for OT tx. Pt off floor for dialysis. Will follow up as schedule and pt condition permit.     Electronically signed by Jorge Martinez OT on 1/18/2022 at 9:39 AM

## 2022-01-18 NOTE — PROGRESS NOTES
Physical Therapy  Ashly Rodriguez  [id number]  [id number]    Pt's chair alarm sounding; entered room and pt starting to get up; assisted pt with min A for sit<->stand to walker from recliner chair and to bed with verbal cues for hand placement; amb 3' to bed with RW CGA/min A; pt able to transfer sit to supine with SBA; assisted pt with positioning for comfort and warm blankest; bed alarm engaged; assisted with calling pt's wife and daughter as he wasn't able to manage the phone. All needs in reach.  Will continue to follow;   Electronically signed by ESPERANZA CADET, PT on 1/18/2022 at 3:41 PM   Time spent with pt: 10 min

## 2022-01-18 NOTE — FLOWSHEET NOTE
Treatment time:3hours  Net UF: 2000 ml     Pre weight: 104.7 kg   Post weight: 102.4 kg  EDW: TBD     Access used: LFA AVG  Access function: Good with  ml/min     Medications or blood products given: None     Regular outpatient schedule: [address], TTS     Summary of response to treatment: Tolerated tx well.  No HD related complaints or complications.      Copy of dialysis treatment record placed in chart, to be scanned into EMR.       01/18/22 0820 01/18/22 1150   Treatment   Time On  --  8867   Time Off  --  1143   Vital Signs   BP (!) 159/80 (!) 149/84   Temp 98.9 °F (37.2 °C) 97.6 °F (36.4 °C)   Pulse 58 66   Resp 18 18   Dialysis Bath   K+ (Potassium) 3  --    Ca+ (Calcium) 2.5  --    Na+ (Sodium) 138  --    HCO3 (Bicarb) 32  --

## 2022-01-18 NOTE — ACP (ADVANCE CARE PLANNING)
Advance Care Planning     Advance Care Planning Activator (Inpatient)  Conversation Note      Date of ACP Conversation: 1/18/2022     Conversation Conducted with:  Healthcare Decision Maker: Next of Kin by law (only applies in absence of above) (name) Radha Goldberg    ACP Activator: Booker Billings RN    Health Care Decision Maker:     Current Designated Health Care Decision Maker:     Primary Decision Maker: Dano Acosta Spouse - [phone]    Care Preferences    Ventilation: \"If you were in your present state of health and suddenly became very ill and were unable to breathe on your own, what would your preference be about the use of a ventilator (breathing machine) if it were available to you? \"      Would the patient desire the use of ventilator (breathing machine)?: yes    \"If your health worsens and it becomes clear that your chance of recovery is unlikely, what would your preference be about the use of a ventilator (breathing machine) if it were available to you? \"     Would the patient desire the use of ventilator (breathing machine)?: Unsure      Resuscitation  \"CPR works best to restart the heart when there is a sudden event, like a heart attack, in someone who is otherwise healthy. Unfortunately, CPR does not typically restart the heart for people who have serious health conditions or who are very sick. \"    \"In the event your heart stopped as a result of an underlying serious health condition, would you want attempts to be made to restart your heart (answer \"yes\" for attempt to resuscitate) or would you prefer a natural death (answer \"no\" for do not attempt to resuscitate)? \" yes       [] Yes   [x] No   Educated Patient / Nehal Salinas regarding differences between Advance Directives and portable DNR orders.     Length of ACP Conversation in minutes:  5    Conversation Outcomes:  [x] ACP discussion completed  [] Existing advance directive reviewed with patient; no changes to patient's previously recorded wishes  [] New Advance Directive completed  [] Portable Do Not Rescitate prepared for Provider review and signature  [] POLST/POST/MOLST/MOST prepared for Provider review and signature      Follow-up plan:    [] Schedule follow-up conversation to continue planning  [] Referred individual to Provider for additional questions/concerns   [] Advised patient/agent/surrogate to review completed ACP document and update if needed with changes in condition, patient preferences or care setting    [x] This note routed to one or more involved healthcare providers    Monica GARAY RN  Case Management  [phone]    Electronically signed by Monica Correa RN on 1/18/2022 at 11:44 AM

## 2022-01-18 NOTE — PROGRESS NOTES
Physical Therapy  Attempt Note  Sofiya Seek  [id number]  Attempted to see pt this AM for PT session. Pt off floor at this time for dialysis. Will check back later as the schedule permits.   Electronically signed by John Bush on 1/18/2022 at 11:23 AM

## 2022-01-18 NOTE — PLAN OF CARE
Problem: Falls - Risk of:  Goal: Will remain free from falls  Description: Will remain free from falls  Outcome: Ongoing  Goal: Absence of physical injury  Description: Absence of physical injury  Outcome: Ongoing     Problem: Skin Integrity:  Goal: Will show no infection signs and symptoms  Description: Will show no infection signs and symptoms  Outcome: Ongoing  Goal: Absence of new skin breakdown  Description: Absence of new skin breakdown  Outcome: Ongoing     Problem: HEMODYNAMIC STATUS  Goal: Patient has stable vital signs and fluid balance  Outcome: Ongoing     Problem: ACTIVITY INTOLERANCE/IMPAIRED MOBILITY  Goal: Mobility/activity is maintained at optimum level for patient  Outcome: Ongoing     Problem: Fluid Volume:  Goal: Will show no signs or symptoms of fluid imbalance  Description: Will show no signs or symptoms of fluid imbalance  Outcome: Ongoing

## 2022-01-18 NOTE — PROGRESS NOTES
Speech Language/Pathology   SPEECH LANGUAGE AND CLINICAL BEDSIDE SWALLOWING TREATMENT  Speech Therapy Department       Yanira Spencer  AGE: 77 y.o. GENDER: male  : 1945  [id number]  EPISODE DATE:  2022    MEDICAL DIAGNOSIS: CVA  Chief Complaint   Patient presents with    Fatigue     pt was recently d/cd from the hospital still with complaints of weakness and no energy    Fall     pt fell x 2 today            PAST MEDICAL HISTORY        Diagnosis Date    Arthritis     Cancer [facility])     Colon Cancer diagnosed last month    [facility]     [facility]odialysis [facility]     Hypertension        PAST SURGICAL HISTORY    Past Surgical History:   Procedure Laterality Date    IR PERC ARTERIOVENOUS FISTULA CREATION Left     unsure of date       ALLERGIES    Allergies   Allergen Reactions    Lisinopril      Allergy listed on paperwork from [facility], no reaction noted     2022 admitted with c/o AMS and lethargy  MD ADMISSION H&P HPI: The patient is a 68 y. o. male w/ uncertain past medical hx but possibly was just hospitalized and recovered from [facility] a few weeks ago in [place] presents to [facility] from home for progressive worsening fatigue and somnolence that he reports has been going on for at least a week.  Patient is somnolent at this time and difficult to clarify full history. Troy Daniels admits that he has not been eating or drinking much however but unable to quantify. Troy Daniels denies other symptoms of fever chills or shortness of breath.  Unable to fully obtain further review of systems questions but he denies any focal weakness or vision changes.  At this time he just feels as he is very sleepy.  Unable to confirm any further chronic illnesses at this time however.     2022 CT CHEST  Impression   No evidence of acute intrathoracic, intraabdominal or intrapelvic injury.       Multifocal airspace disease.  This pattern may reflect COVID pneumonia.    Correlate with COVID testing.      1/14/2022 MRI BRAIN  Impression   1. Punctate acute infarct in the posteromedial left parietal lobe, within the   deep white matter. 2. 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          Prior Status: lives with wife; reports independent with ADLs; wife completes cooking, cleaning, laundry with assist of cleaning lady; wife manages medications/appointments; wife manages finances; drives sometimes - reports \"I can\";' completed 10th grade; retired ; enjoys piddling around  Subjective:     Current diet  Regular  Thin    Pt/staff statements regarding diet tolerance:   RN reports pt at dialysis all morning, did not eat breakfast  Pt denies any difficulty     Cognitive-communication treatment progress:   Pt without complaint; pleasant and cooperative; pt unaware of reason for admission    Objective: Assessment/Therapy activities and impression of progress/goal status   Treatment this session  Objective:  COMPREHENSION  Auditory Comprehension: [x]WFL []Mild   [] Moderate  []Severe  []To be assessed  Functional for 1-2 step commands, basic questions, concrete questions, simple conversation  Occasionally delayed responses appears more r/t expressive deficits     EXPRESSION  Primary Mode of Expression: verbal  Verbal Expression: []WFL [x]Mild   [x] Moderate  []Severe  []To be assessed  Wh question/responsive naming: mild-moderate  Automatics: WFL  Simple conversation: mild-moderate for simple responses, requires extended time, mild anomia.   Moderately impaired expansion      Pragmatics/Social Functioning: [x]WFL []Mild   [] Moderate  []Severe  []To be assessed        MOTOR SPEECH  Motor Speech: []WFL [x]Mild   [] Moderate  []Severe  []To be assessed   [x]Apraxia (appears min compared to 1/17 eval notes)   [x]Dysarthria   [x]Decreased Intelligibility: responded to max cues/model for breath support and increased volume for hard count 1-5 after model; minimal to no carryover to spontaneous speech at word/phrase level    VOICE  Voice: []WFL [x]Mild   [] Moderate  []Severe  []To be assessed    COGNITION  []Unable to be assessed secondary to Aphasia     Overall Orientation : []WFL [x]Mild   [] Moderate  []Severe  []To be assessed   []Unable to be assessed secondary to Aphasia   Oriented to self, month, year (aphasic errors); mod cues for location, situation, city; min cues for EMMIE/date. Unable to read whiteboard from chair    Attention: []WFL [x]Mild   [x] Moderate  []Severe  []To be assessed  Sustained mild-mod  Memory: []WFL []Mild   [x] Moderate  []Severe  []To be assessed  Min cues for general recall of daily events  Mod-max cues for recall of details from day, MD visits, recommendations    Problem Solving: []WFL []Mild   [x] Moderate  [x]Severe  []To be assessed    Safety/Judgement: []WFL []Mild   [x] Moderate  [x]Severe  []To be assessed    Goal Status: Speech and Language Goals  Goals:   Goal 1: Pt will improve verbal expression for naming and self expression via graded tasks to min cues ongoing; mod cues  Goal 2: Pt will improve speech intelligibility in connected speech via graded tasks to min cues ongoing; min to max cues, reduced carryover, does follow a model  Goal 3: Patient will be Ox4 with independent use of external aids. Ongoing; unable to see whiteboard; not oriented to situation, location, city, LOS  Goal 4: The patient will tolerate ongoing evaluation/monitor for baseline level of function and determination of additional skilled ST needs ongoing       Positioning   Upright in chair    PO Trials: deferred this session; pt awaiting arrival of lunch tray and desires to defer PO until tray arrives    Dysphagia Tx:   Deferred this session, see above    Status of Goals:    The patient will tolerate recommended diet without observed clinical signs of aspiration not targeted  The patient/caregiver will demonstrate understanding of compensatory strategies for improved swallowing safety. Not targeted    IMPRESSIONS  Speech Therapy Diagnosis  Cognitive Diagnosis: Moderate to mod/severe congitive language impairments in the domains for selective attention, orientation, immedite/delayed recall, and problem solving. Patient reports dependence on wife prior to admission and reports current funcion as comparable to baseline; recommend confirmation of baseline function/skills  Aphasia Diagnosis: Mild receptive aphasia characterizeed by reduced comprehension for complex lengthy information with suspicion for reduced recall as a factor. Moderate expressive aphaisa characteirzed by decreased word-finding for responsive and confrontation naming at word level, limited convergent/divergent naming, and suspicion for reduced word-finding contributing to limited conversation. Speech Diagnosis: Mild-moderately reduced speech intelligiblity d/t mild apraxic impairments and mild-mod dysarthirc features (reduced articulatory precision and reduced breath support for phonation/volume). Dysphagia Diagnosis (from 1/17 initial eval): Mild oral stage dysphagia characterized by decreased mastication r/t edentulism and decreased lingual manipulation; prolonged but adequate bolus formation and movement with all with concern for premature bolus loss to the pharynx. Mild pharyngeal stage dysphagia characterized by delayed swallow and decreased laryngeal elevation; cough x1 with serial thin swallows unable to be reduplicated -rec continued monitor for tolerance.     Recommended Diet:  Regular, thin per 1/17 recommendations    Compensatory Strategies:   Compensatory Swallowing Strategies: Upright as possible for all oral intake,Remain upright for 30-45 minutes after meals    Plan   Plan/Recommendations: Speech therapy 3-5 times a weeks for speech-language treatment, and dysphagia treatment    Interventions:   Therapeutic Interventions: Diet tolerance monitoring,Patient/Family education,  cognitive-communication remediation, motor speech remediation; aphasia remediation    Therapy  Requires SLP Intervention: Yes  Therapeutic Interventions: Diet tolerance monitoring,Patient/Family education  Duration/Frequency of Treatment: ST to tx 3-5 times per week during aucte admission unless otherwise notified    Barriers toward progress toward pt goals:  Cognitive deficit    Prognosis  Guarded for ST d/t previous level of function and severity of imps    Education  Consulted and agree with results and recommendations: Patient,RN  Patient Education: Completed on results/recs/plan  Patient Education Response: Needs reinforcement    Discharge Recommendations:  Pt will benefit from continued skilled Speech Therapy for Speech and Dysphagia services, prior to returning home. Please accept this as Speech Therapy Discharge status, if pt is discharged prior to next therapy session.     Timed Code Treatment:  SLP Individual Minutes  Time In: 1300  Time Out: 5431  Minutes: 40  Total Treatment Time:  15 cognitive tx  25 SLP tx     Signed:  Kortney Lancaster MS, CCC-SLP #[id number]  Speech Language Pathologist

## 2022-01-19 LAB
ANION GAP SERPL CALCULATED.3IONS-SCNC: 11 MMOL/L (ref 3–16)
BASOPHILS ABSOLUTE: 0 K/UL (ref 0–0.2)
BASOPHILS RELATIVE PERCENT: 0.9 %
BUN BLDV-MCNC: 35 MG/DL (ref 7–20)
CALCIUM SERPL-MCNC: 8.2 MG/DL (ref 8.3–10.6)
CHLORIDE BLD-SCNC: 100 MMOL/L (ref 99–110)
CO2: 24 MMOL/L (ref 21–32)
CREAT SERPL-MCNC: 4.1 MG/DL (ref 0.8–1.3)
EOSINOPHILS ABSOLUTE: 0.2 K/UL (ref 0–0.6)
EOSINOPHILS RELATIVE PERCENT: 3 %
GFR AFRICAN AMERICAN: 17
GFR NON-AFRICAN AMERICAN: 14
GLUCOSE BLD-MCNC: 129 MG/DL (ref 70–99)
HCT VFR BLD CALC: 26.6 % (ref 40.5–52.5)
HEMOGLOBIN: 8.6 G/DL (ref 13.5–17.5)
LYMPHOCYTES ABSOLUTE: 1.3 K/UL (ref 1–5.1)
LYMPHOCYTES RELATIVE PERCENT: 24.2 %
MAGNESIUM: 2.2 MG/DL (ref 1.8–2.4)
MCH RBC QN AUTO: 29.7 PG (ref 26–34)
MCHC RBC AUTO-ENTMCNC: 32.4 G/DL (ref 31–36)
MCV RBC AUTO: 91.7 FL (ref 80–100)
MONOCYTES ABSOLUTE: 0.2 K/UL (ref 0–1.3)
MONOCYTES RELATIVE PERCENT: 4.3 %
NEUTROPHILS ABSOLUTE: 3.7 K/UL (ref 1.7–7.7)
NEUTROPHILS RELATIVE PERCENT: 67.6 %
PDW BLD-RTO: 14 % (ref 12.4–15.4)
PLATELET # BLD: 189 K/UL (ref 135–450)
PMV BLD AUTO: 7.4 FL (ref 5–10.5)
POTASSIUM SERPL-SCNC: 4.7 MMOL/L (ref 3.5–5.1)
RBC # BLD: 2.9 M/UL (ref 4.2–5.9)
SODIUM BLD-SCNC: 135 MMOL/L (ref 136–145)
WBC # BLD: 5.4 K/UL (ref 4–11)

## 2022-01-19 PROCEDURE — 2580000003 HC RX 258: Performed by: STUDENT IN AN ORGANIZED HEALTH CARE EDUCATION/TRAINING PROGRAM

## 2022-01-19 PROCEDURE — APPSS45 APP SPLIT SHARED TIME 31-45 MINUTES: Performed by: PHYSICIAN ASSISTANT

## 2022-01-19 PROCEDURE — 6370000000 HC RX 637 (ALT 250 FOR IP): Performed by: HOSPITALIST

## 2022-01-19 PROCEDURE — 83735 ASSAY OF MAGNESIUM: CPT

## 2022-01-19 PROCEDURE — 97530 THERAPEUTIC ACTIVITIES: CPT | Performed by: PHYSICAL THERAPIST

## 2022-01-19 PROCEDURE — 2060000000 HC ICU INTERMEDIATE R&B

## 2022-01-19 PROCEDURE — 94761 N-INVAS EAR/PLS OXIMETRY MLT: CPT

## 2022-01-19 PROCEDURE — 80048 BASIC METABOLIC PNL TOTAL CA: CPT

## 2022-01-19 PROCEDURE — APPNB45 APP NON BILLABLE 31-45 MINUTES: Performed by: NURSE PRACTITIONER

## 2022-01-19 PROCEDURE — 92507 TX SP LANG VOICE COMM INDIV: CPT

## 2022-01-19 PROCEDURE — 85025 COMPLETE CBC W/AUTO DIFF WBC: CPT

## 2022-01-19 PROCEDURE — 92526 ORAL FUNCTION THERAPY: CPT

## 2022-01-19 PROCEDURE — 97116 GAIT TRAINING THERAPY: CPT | Performed by: PHYSICAL THERAPIST

## 2022-01-19 PROCEDURE — APPNB45 APP NON BILLABLE 31-45 MINUTES: Performed by: PHYSICIAN ASSISTANT

## 2022-01-19 PROCEDURE — 97535 SELF CARE MNGMENT TRAINING: CPT

## 2022-01-19 PROCEDURE — 36415 COLL VENOUS BLD VENIPUNCTURE: CPT

## 2022-01-19 RX ADMIN — SEVELAMER CARBONATE 800 MG: 800 TABLET, FILM COATED ORAL at 14:47

## 2022-01-19 RX ADMIN — SEVELAMER CARBONATE 800 MG: 800 TABLET, FILM COATED ORAL at 17:26

## 2022-01-19 RX ADMIN — SEVELAMER CARBONATE 800 MG: 800 TABLET, FILM COATED ORAL at 09:30

## 2022-01-19 RX ADMIN — SODIUM CHLORIDE, PRESERVATIVE FREE 10 ML: 5 INJECTION INTRAVENOUS at 09:30

## 2022-01-19 RX ADMIN — SODIUM CHLORIDE, PRESERVATIVE FREE 10 ML: 5 INJECTION INTRAVENOUS at 21:20

## 2022-01-19 RX ADMIN — ASPIRIN 81 MG 81 MG: 81 TABLET ORAL at 09:30

## 2022-01-19 RX ADMIN — TAMSULOSIN HYDROCHLORIDE 0.4 MG: 0.4 CAPSULE ORAL at 09:30

## 2022-01-19 RX ADMIN — GABAPENTIN 100 MG: 100 CAPSULE ORAL at 21:19

## 2022-01-19 RX ADMIN — GABAPENTIN 100 MG: 100 CAPSULE ORAL at 14:47

## 2022-01-19 RX ADMIN — PANTOPRAZOLE SODIUM 20 MG: 20 TABLET, DELAYED RELEASE ORAL at 21:20

## 2022-01-19 RX ADMIN — ATORVASTATIN CALCIUM 80 MG: 80 TABLET, FILM COATED ORAL at 21:20

## 2022-01-19 RX ADMIN — GABAPENTIN 100 MG: 100 CAPSULE ORAL at 09:30

## 2022-01-19 ASSESSMENT — PAIN SCALES - GENERAL
PAINLEVEL_OUTOF10: 0
PAINLEVEL_OUTOF10: 0

## 2022-01-19 NOTE — PROGRESS NOTES
Speech Language/Pathology   SPEECH LANGUAGE AND CLINICAL BEDSIDE SWALLOWING TREATMENT  Speech Therapy Department       Saige Blind  AGE: 77 y.o. GENDER: male  : 1945  [id number]  EPISODE DATE:  2022    MEDICAL DIAGNOSIS: CVA  ONSET: 2022     Chief Complaint   Patient presents with    Fatigue     pt was recently d/cd from the hospital still with complaints of weakness and no energy    Fall     pt fell x 2 today            PAST MEDICAL HISTORY        Diagnosis Date    Arthritis     Cancer [facility])     Colon Cancer diagnosed last month    [facility]     [facility]od[facility] ([facility]     Hypertension        PAST SURGICAL HISTORY    Past Surgical History:   Procedure Laterality Date    IR PERC ARTERIOVENOUS FISTULA CREATION Left     unsure of date       ALLERGIES    Allergies   Allergen Reactions    Lisinopril      Allergy listed on paperwork from [facility], no reaction noted     2022 admitted with c/o AMS and lethargy  MD ADMISSION H&P HPI: The patient is a 68 y. o. male w/ uncertain past medical hx but possibly was just hospitalized and recovered from [facility] a few weeks ago in [place] presents to [facility] from home for progressive worsening fatigue and somnolence that he reports has been going on for at least a week.  Patient is somnolent at this time and difficult to clarify full history. Melly Braswell admits that he has not been eating or drinking much however but unable to quantify. Melly Braswell denies other symptoms of fever chills or shortness of breath.  Unable to fully obtain further review of systems questions but he denies any focal weakness or vision changes.  At this time he just feels as he is very sleepy.  Unable to confirm any further chronic illnesses at this time however.     2022 CT CHEST  Impression   No evidence of acute intrathoracic, intraabdominal or intrapelvic injury.       Multifocal airspace disease.  This pattern may reflect COVID pneumonia. Correlate with COVID testing.      1/14/2022 MRI BRAIN  Impression   1. Punctate acute infarct in the posteromedial left parietal lobe, within the   deep white matter. 2. 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      Prior Status: lives with wife; reports independent with ADLs; wife completes cooking, cleaning, laundry with assist of cleaning lady; wife manages medications/appointments; wife manages finances; drives sometimes - reports \"I can\";' completed 10th grade; retired ; enjoys piddling around    Subjective:     Current diet  Regular  Thin    Pt/staff statements regarding diet tolerance:   RN reports adequate tolerance  Pt denies any difficulty     Cognitive-communication treatment progress:   Pt without complaint; pleasant and cooperative; persistent reduced insight    Objective: Assessment/Therapy activities and impression of progress/goal status   Treatment this session  Objective:  COMPREHENSION  Auditory Comprehension: [x]WFL for concrete []Mild   [] Moderate  []Severe  []To be assessed  Functional for 1-2 step commands, basic questions, concrete questions, simple conversation    EXPRESSION  Primary Mode of Expression: verbal  Verbal Expression: []WFL [x]Mild   [x] Moderate  []Severe  []To be assessed  Wh question/responsive naming: mild-moderate  Simple conversation: mild-moderate for simple responses, requires extended time, mild anomia.   Moderately impaired expansion      Pragmatics/Social Functioning: [x]WFL []Mild   [] Moderate  []Severe  []To be assessed        MOTOR SPEECH  Motor Speech: [x]WFL []Mild   [] Moderate  []Severe  []To be assessed   []Apraxia (appears min compared to 1/17 eval notes)   []Dysarthria   []Decreased Intelligibility:    VOICE  Voice: [x]WFL []Mild   [] Moderate  []Severe  []To be assessed    COGNITION  []Unable to be assessed secondary to Aphasia     Overall Orientation : []WFL [x]Mild [] Moderate  []Severe  []To be assessed   []Unable to be assessed secondary to Aphasia   Oriented to self, month, year (aphasic errors); mod cues for location, situation, city; min cues for EMMIE/date. Unable to read whiteboard from chair    Attention: []WFL [x]Mild   [x] Moderate  []Severe  []To be assessed  Sustained mild-mod  Memory: []WFL []Mild   [x] Moderate  []Severe  []To be assessed  Min cues for general recall of daily events  Mod-max cues for recall of details from day, MD visits, recommendations    Problem Solving: []WFL []Mild   [x] Moderate  [x]Severe  []To be assessed    Safety/Judgement: []WFL []Mild   [x] Moderate  [x]Severe  []To be assessed    Goal Status: Speech and Language Goals  Goals:   Goal 1: Pt will improve verbal expression for naming and self expression via graded tasks to min cues ongoing; mod cues  Goal 2: Pt will improve speech intelligibility in connected speech via graded tasks to min cues appears resolved this date; monitor  Goal 3: Patient will be Ox4 with independent use of external aids. Ongoing; unable to see whiteboard; not oriented to situation, location, city, LOS  Goal 4: The patient will tolerate ongoing evaluation/monitor for baseline level of function and determination of additional skilled ST needs ongoing     DYSPHAGIA  Positioning: Upright in chair  PO Trials: seen with remainder of lunch tray: applesauce and coffee; already consumed hamburger and green beans  · Suspect premature bolus loss to the pharynx  · Delayed swallow  · Decreased laryngeal elevation  · No overt signs/symptoms of penetration/aspiration  · Rec continue d attempt to follow-up with regular solid to confirm tolerance    Status of Goals: The patient will tolerate recommended diet without observed clinical signs of aspiration continue  The patient/caregiver will demonstrate understanding of compensatory strategies for improved swallowing safety.  continue    IMPRESSIONS  Cognitive Diagnosis: Moderate to mod/severe cognitive language impairments in the domains for selective attention, orientation, immediate/delayed recall, and problem solving. Patient reports dependence on wife prior to admission and reports current function as comparable to baseline; recommend confirmation of baseline function/skills  Aphasia Diagnosis: Mild receptive aphasia characterized by reduced comprehension for complex lengthy information with suspicion for reduced recall as a factor. Moderate expressive aphasia characterized by decreased word-finding for responsive and confrontation naming at word level, limited convergent/divergent naming, and suspicion for reduced word-finding contributing to limited conversation. Speech Diagnosis: Motor speech impairments appear resolved     Dysphagia Diagnosis: Mild oral stage dysphagia characterized by decreased mastication r/t edentulism and decreased lingual manipulation; prolonged but adequate bolus formation and movement with all with concern for premature bolus loss to the pharynx. Mild pharyngeal stage dysphagia characterized by delayed swallow and decreased laryngeal elevation; No overt signs/symptoms of penetration/aspiration -rec continued monitor for tolerance.     Recommended Diet: Regular, thin   Compensatory Strategies: Upright as possible for all oral intake,Remain upright for 30-45 minutes after meals    Plan     Plan/Recommendations:   Speech therapy 3-5 times a weeks for speech-language treatment, and dysphagia treatment     Interventions:   Therapeutic Interventions: Diet tolerance monitoring,Patient/Family education,  cognitive-communication remediation, motor speech remediation; aphasia remediation    Therapy  Requires SLP Intervention: Yes  Therapeutic Interventions: Diet tolerance monitoring,Patient/Family education  Duration/Frequency of Treatment: ST to tx 3-5 times per week during acute admission unless otherwise notified    Barriers toward progress toward pt goals:  Cognitive deficit    Prognosis  Guarded for ST d/t previous level of function and severity of imps    Education  Consulted and agree with results and recommendations: Patient,RN  Patient Education: Completed on results/recs/plan  Patient Education Response: Needs reinforcement    Discharge Recommendations:  Pt will benefit from continued skilled Speech Therapy for Speech and Dysphagia services, prior to returning home. Please accept this as Speech Therapy Discharge status, if pt is discharged prior to next therapy session. Timed Code Treatment:  SLP Individual Minutes  Time In: 1430  Time Out: 1500  Minutes: 30    Total Treatment Time:  15 dysphagia tx  15 SLP tx     Signed:  Tali Villatoro, [id number] [place] [address], #[id number]  Speech-Language Pathologist  Portable phone: [phone]  01/19/22 3:00 PM None

## 2022-01-19 NOTE — PROGRESS NOTES
Hospitalist Progress Note    Patient:  Saige Birmingham  Unit/Bed:[id number]-1173/5107-01   YOB: 1945       MRN: [id number] Acct: [de-identified]  PCP: Natalie Haider    Date of Admission: 1/13/2022  --------------------------    Chief Complaint:     Mental status changes    Hospital Course:     Saige Birmingham is a 77 y.o. male hospitalized on 1/13/2022   multiple comorbidities presented to the ED found to have acute CVA. Assessment/plan:     Acute encephalopathy secondary to CVA with underlying cognitive impairment. -MRI of the brain showed punctate acute infarct in the posterior medial left parietal lobe  MRA of the head and neck showed no flow-limiting stenosis     Continue aspirin,   statin  -    PT and OT. Likely need ECF. End-stage renal failure, on hemodialysis  Continue hemodialysis. Discussed with nephrology, patient trying to relocate to [place], nephrologist is assisting with finding a location for his outpatient HD. Hypomagnesemia  Resolved      Hypokalemia  Resolved       Recent hospitalization for COVID  Currently on room air. History of colon cancer/GI bleed/acute blood loss anemia     -Hemoglobin remained stable, evaluated by GI, tolerating aspirin  -Surgery service consulted, awaiting their final recommendation in terms of doing the surgery here as the patient is relocating to [facility]. Bacteriuria without urinary symptoms, culture data show Enterococcus faecalis  No symptoms, discontinue ceftriaxone. Obesity Body mass index is 29.61 kg/m². Complicating assessment and treatment. Placing patient at risk for multiple co-morbidities as well as early death and contributing to the patient's presentation.         Code Status: Full Code         DVT prophylaxis: SCD     Disposition:   Awaiting final surgery input whether the surgery will be done this admission  Patient is requesting to [facility], need ECF and HD chair    Discussed with the patient  ----------------      Subjective: Patient seen and examined  Doing well, has no complaints. Resting. Diet: ADULT DIET; Regular; Low Sodium (2 gm); Low Potassium (Less than 3000 mg/day); Low Phosphorus (Less than 1000 mg); 2000 ml    OBJECTIVE     Exam:  BP (!) 155/83   Pulse 67   Temp 97.5 °F (36.4 °C) (Oral)   Resp 18   Ht 6' 0.5\" (1.842 m)   Wt 221 lb 5.5 oz (100.4 kg)   SpO2 91%   BMI 29.61 kg/m²        Gen: Not in distress. Alert. Head: Normocephalic. Atraumatic. Eyes: Conjunctivae/corneas clear. ENT: Oral mucosa moist  Neck: No JVD. No obvious thyromegaly. CVS: Nml S1S2, no murmur  , RRR  Pulmomary: Clear bilaterally. No crackles. No wheezes. Gastrointestinal: Soft, non tender, non distend, . Musculoskeletal: Left upper extremity fistula  Neuro: No focal deficit. Moves extremity spontaneously. Psychiatry: Appropriate affect. Not agitated.       Medications:  Reviewed    Infusion Medications    sodium chloride 5 mL/hr at 01/15/22 2102     Scheduled Medications    aspirin  81 mg Oral Daily    atorvastatin  80 mg Oral Nightly    gabapentin  100 mg Oral TID    pantoprazole  20 mg Oral Nightly    sevelamer  800 mg Oral TID WC    tamsulosin  0.4 mg Oral QAM    sodium chloride flush  5-40 mL IntraVENous 2 times per day     PRN Meds: ondansetron **OR** ondansetron, polyethylene glycol, sodium chloride flush, sodium chloride, acetaminophen **OR** acetaminophen, perflutren lipid microspheres      Intake/Output Summary (Last 24 hours) at 1/19/2022 1145  Last data filed at 1/18/2022 2133  Gross per 24 hour   Intake 1200 ml   Output 2600 ml   Net -1400 ml             Labs:   Recent Labs     01/17/22 0449 01/18/22 0426 01/19/22 0449   WBC 4.1 4.6 5.4   HGB 9.0* 10.1* 8.6*   HCT 26.9* 30.8* 26.6*    197 189     Recent Labs     01/17/22  0449 01/18/22 0426 01/19/22 0448    136 135*   K 4.3 4.6 4.7    102 100   CO2 25 21 24   BUN 32* 40* 35*   CREATININE 4.5* 4.8* 4.1*   CALCIUM 7.8* 8.0* 8.2*     No results for input(s): AST, ALT, BILIDIR, BILITOT, ALKPHOS in the last 72 hours. No results for input(s): INR in the last 72 hours. No results for input(s): Asia Rosaline in the last 72 hours. Urinalysis:      Lab Results   Component Value Date    NITRU Negative 01/13/2022    WBCUA >900 01/13/2022    RBCUA 0-2 01/13/2022    BLOODU LARGE 01/13/2022    SPECGRAV 1.025 01/13/2022    GLUCOSEU Negative 01/13/2022       Radiology:  MRA neck without contrast   Final Result   No convincing flow limiting stenosis of the visualized carotid/vertebral   arteries within the limitations of this exam.         MRA head without contrast   Final Result   No apparent intracranial arterial high grade stenosis, occlusion or aneurysm   head at 5 within constraints of acquisition. MRI BRAIN WO CONTRAST   Final Result   1. Punctate acute infarct in the posteromedial left parietal lobe, within the   deep white matter. 2. 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 RECOMMENDATIONS:   Unavailable         US RENAL COMPLETE   Final Result   1. Simple cysts in the left kidney with no other significant renal finding. 2. Borderline enlargement of the prostate. Correlate with urologic history. CT CHEST ABDOMEN PELVIS WO CONTRAST   Final Result   No evidence of acute intrathoracic, intraabdominal or intrapelvic injury. Multifocal airspace disease. This pattern may reflect COVID pneumonia. Correlate with COVID testing. CT HEAD WO CONTRAST   Final Result   No acute intracranial abnormality. Specifically, no acute intracranial   hemorrhage or mass effect. Extensive chronic small vessel ischemic disease.                      Electronically signed by Sophie Mehta MD on 1/19/2022 at 11:45 AM

## 2022-01-19 NOTE — PROGRESS NOTES
PLEASE NOTE: Family reports that patient's medical records are at [facility] in [address] [place] [address]. Phone number is [phone].

## 2022-01-19 NOTE — PROGRESS NOTES
[facility]                                               Progress note    Summary:   Sofiya Chen is being seen by nephrology for ESRD. This is a 78 yo man with ESRD on HD three times weekly via left AVF who is here after a fall and AMS. He has been diagnosed with colon cancer and has been having rectal bleeding off and on for the past several weeks. From [facility]. Had [facility] last month so has been dialyzing at a different dialysis unit for 20 day period. Last HD on Thursday this week. No rectal bleeding since being here at [facility]. GI has no plans for him. Daughter is at bedside. Apparently her father and mother both had a fall yesterday prompting the ED visit. Interval History  Seen and examined in his room . Feeling well. No complaints    /69  Sating 91% on room air. No edema    Labs reviewed. Had dialysis yesterday 2 L UF   Post weight 102.4 kilos    Plan:   - no indications for dialysis today   - BP acceptable. - HD tomorrow. - working on outpatient dialysis placement. Brendan Alvarado MD  [facility]  Office: [phone]    Assessment:   ESRD  Does dialysis Monday Wednesday Friday usually but has been doing TTS at the [facility] unit near [facility]  He has a left AV fistula, positive thrill and bruit  Last at dialysis was on Thursday 1/14     Electrolytes  No acute issues.     Hypertension  Blood pressure is acceptable. No changes.      S HPT  Calcium ok  Check phosphorus     GI bleed  Has known colon cancer  No active bleeding  Hemoglobin stable  General surgery consulted.      Altered mental status  MRI showed small acute stroke  Neurology consulted  Mental status has improved. ROS:   Positives Listed Bold. All other remaining systems are negative.     Constitutional:  fever, chills, weakness, weight change, fatigue,      Skin:  rash, pruritus, hair loss, bruising, dry skin, petechiae.   Head, Face, Neck   headaches, swelling,  cervical adenopathy.     Respiratory: shortness of breath, cough, or wheezing  Cardiovascular: chest pain, palpitations, dizzy, edema  Gastrointestinal: nausea, vomiting, diarrhea, constipation,belly pain    Yellow skin, blood in stool  Musculoskeletal:  back pain, muscle weakness, gait problems,       joint pain or swelling. Genitourinary:  dysuria, poor urine flow, flank pain, blood in urine  Neurologic:  vertigo, TIA'S, syncope, seizures, focal weakness  Psychosocial:  insomnia, anxiety, or depression. Additional positive findings: -     PMH:   Past medical history, surgical history, social history, family history are reviewed and updated as appropriate. Reviewed current medication list.   Allergies reviewed and updated as needed. PE:   Vitals:    01/19/22 1209   BP: 129/69   Pulse: 61   Resp:    Temp: 97.9 °F (36.6 °C)   SpO2:        General appearance: Male in no acute distress,awake and alert. HEENT: no icterus, EOM intact, trachea midline. Neck : no masses, appears symmetrical and no JVD appreciated. Respiratory: Respiratory effort normal, bilateral equal chest rise. Cardiovascular: Ausculation shows RRR and no edema   Abdomen: abdomen is soft, non distended, no masses, no pain with palpation. Musculoskeletal:  no joint swelling, no deformity  Skin: no rashes, no induration, no tightening, no jaundice   Neuro:   Follows commands, moves all extremities spontaneously   Left AV fistula positive thrill and bruit      Lab Results   Component Value Date    CREATININE 4.1 (H) 01/19/2022    BUN 35 (H) 01/19/2022     (L) 01/19/2022    K 4.7 01/19/2022     01/19/2022    CO2 24 01/19/2022      Lab Results   Component Value Date    WBC 5.4 01/19/2022    HGB 8.6 (L) 01/19/2022    HCT 26.6 (L) 01/19/2022    MCV 91.7 01/19/2022     01/19/2022     Lab Results   Component Value Date    CALCIUM 8.2 (L) 01/19/2022

## 2022-01-19 NOTE — PROGRESS NOTES
Occupational Therapy  Facility/Department: [facility] 5W PROGRESSIVE CARE  Daily Treatment Note  NAME: Tadeo Porras  :   MRN: [id number]    Date of Service: 2022    Discharge Recommendations:  Patient would benefit from continued therapy after discharge,5-7 sessions per week  OT Equipment Recommendations  Other: defer to SD facility    Tadeo Menchacas scored a 17/24 on the AM-PAC ADL Inpatient form. Current research shows that an AM-PAC score of 17 or less is typically not associated with a discharge to the patient's home setting. Based on the patient's AM-PAC score and their current ADL deficits, it is recommended that the patient have 5-7 sessions per week of Occupational Therapy at d/c to increase the patient's independence. At this time, this patient demonstrates the endurance, and/or tolerance for 3 hours of therapy each day, with a treatment frequency of 5-7x/wk. Please see assessment section for further patient specific details. If patient discharges prior to next session this note will serve as a discharge summary. Please see below for the latest assessment towards goals. Assessment   Performance deficits / Impairments: Decreased functional mobility ; Decreased safe awareness;Decreased balance;Decreased ADL status; Decreased cognition;Decreased high-level IADLs;Decreased endurance;Decreased strength  Assessment: 76 y/o male admit 2022 with LESLEE, GI Bleed; Weakness. 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 Surg consult (recent dx Colon Ca ~ 1 month ago; await surg). Recent dx Pneumonia, COVID+ (cont weak/falls). PTA pt lives at home with spouse and was independent with ADLs and functional mobility (RW only for long distances). Today, pt required min A to stand and CGA for functional mobility around room with RW. Pt completed grooming tasks seated in front of sink with set up.  Pt with functional UE ROM for self care and anticipate will require min A for balance during standing components of ADLs. Pt reports generalized fatigue/weakness following recent COVID and would benefit from skilled therapy. Prognosis: Good  OT Education: OT Role;Transfer Training;Plan of Care  REQUIRES OT FOLLOW UP: Yes  Activity Tolerance  Activity Tolerance: Patient Tolerated treatment well  Safety Devices  Safety Devices in place: Yes  Type of devices: Nurse notified;Gait belt;Call light within reach; Chair alarm in place; Left in chair         Patient Diagnosis(es): The primary encounter diagnosis was LESLEE (acute kidney injury) ([facility] [address] Diagnoses of Gastrointestinal hemorrhage, unspecified gastrointestinal hemorrhage type, COVID, and Acute cystitis without hematuria were also pertinent to this visit. has a past medical history of Arthritis, Cancer ([facility] [address] Diabetes mellitus ([facility] [address] Hemodialysis patient ([facility] [address] and Hypertension. has a past surgical history that includes IR PERC ARTERIOVENOUS FISTULA CREATION (Left). Restrictions  Restrictions/Precautions  Restrictions/Precautions: Fall Risk     Subjective   General  Chart Reviewed: Yes  Patient assessed for rehabilitation services?: Yes  Additional Pertinent Hx: 76 y/o male admit 1/13/2022 with LESLEE, GI Bleed; Weakness. 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 Surg consult (recent dx Colon Ca ~ 1 month ago; await surg). Recent dx Pneumonia, COVID+ (cont weak/falls). PMH as noted including CKD (HD), COVID+. Family / Caregiver Present: No  Referring Practitioner: Dr. Fito Galvan  Subjective  Subjective: Pt seen bedside and agreeable to therapy. Pt supine upon arrival and denies pain. General Comment  Comments: Per RN ok for therapy      Orientation  Orientation  Orientation Level: Oriented to person;Oriented to place;Oriented to time;Disoriented to situation     Objective    ADL  Grooming: Stand by assistance (completed grooming tasks seated in chair in front of sink.  Assisted with cleaning under pt nails seated in front of sink)  Additional Comments: Anticipate pt will require min A for toileting, SBA for UB bathing/dressing and grooming based on balance and endurance observed        Balance  Sitting Balance: Stand by assistance (EOB in prep for transfer)  Standing Balance: Contact guard assistance  Functional Mobility  Functional Mobility Comments: ambulated bed > bathroom > chair with RW and CGA     Bed mobility  Supine to Sit: Contact guard assistance (HOB elevated, cuing to initiate tasks 2/2 just waking up)  Sit to Supine:  (pt in chair at end of session)     Transfers  Sit to stand: Minimal assistance  Stand to sit: Minimal assistance  Transfer Comments: to/from RW, stood from bed and bedside chair in front of sink     Cognition  Overall Cognitive Status: Exceptions  Arousal/Alertness: Appropriate responses to stimuli  Following Commands:  Follows one step commands consistently  Attention Span: Attends with cues to redirect  Memory: Appears intact  Safety Judgement: Decreased awareness of need for safety  Insights: Decreased awareness of deficits        Plan   Plan  Times per week: 3-5  Current Treatment Recommendations: Strengthening,Endurance Training,Balance Training,Gait Training,Functional Mobility Training,Self-Care / ADL    AM-PAC Score  AM-[facility] Inpatient Daily Activity Raw Score: 17 (01/19/22 1446)  AM-PAC Inpatient ADL T-Scale Score : 37.26 (01/19/22 1446)  ADL Inpatient CMS 0-100% Score: 50.11 (01/19/22 1446)  ADL Inpatient CMS G-Code Modifier : CK (01/19/22 1446)    Goals  Short term goals  Time Frame for Short term goals: Prior to DC: goals ongoing  Short term goal 1: Pt will complete ADL transfers with supervision  Short term goal 2: Pt will complete functional mobility with supervision  Short term goal 3: Pt will tolerate standing > 5 min for functional task with supervision  Short term goal 4: Pt will complete toileting with supervision  Short term goal 5: Pt will complete LB dressing with supervision  Patient Goals Patient goals : to return home       Therapy Time   Individual Concurrent Group Co-treatment   Time In 1415         Time Out 1445         Minutes 30         Timed Code Treatment Minutes: 30 Minutes     This note to serve as OT d/c summary if pt is d/c-ed prior to next therapy session.     Pasquale Ybarra, OTR/L

## 2022-01-19 NOTE — PROGRESS NOTES
Physical Therapy  Facility/Department: [facility] 5W PROGRESSIVE CARE  Daily Treatment Note  NAME: Tylor Del Real  :   MRN: [id number]    Date of Service: 2022    Discharge Recommendations:  5-7 sessions per week   PT Equipment Recommendations  Other: Will monitor for potential equipt needs. Tylor Del Real scored a 17/24 on the AM-PAC short mobility form. Current research shows that an AM-PAC score of 17 or less is typically not associated with a discharge to the patient's home setting. Based on the patient's AM-PAC score and their current functional mobility deficits, it is recommended that the patient have 5-7 sessions per week of Physical Therapy at d/c to increase the patient's independence. At this time, this patient demonstrates the endurance, and/or tolerance for 3 hours of therapy each day, with a treatment frequency of 5-7x/wk. Please see assessment section for further patient specific details. If patient discharges prior to next session this note will serve as a discharge summary. Please see below for the latest assessment towards goals. Assessment   Body structures, Functions, Activity limitations: Decreased functional mobility ; Decreased strength;Decreased safe awareness;Decreased endurance;Decreased balance  Assessment: 76 y/o male admit 2022 with LESLEE, GI Bleed; Weakness. 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 Surg consult (recent dx Colon Ca ~ 1 month ago; await surg). Recent dx Pneumonia, COVID+ (cont weak/falls). PMH as noted including CKD (HD), COVID+. PTA pt living with wife in mobile home with ramp access; independent daily care and functional mobility although recent cont weak following Pneumonia/COVID. Today, , pt conotinues to be weaker than his normal self. Pt making slow steady progress however continues to need min A for transfers.  Pt is an elevated fall risk and needs to be more Ind to return home as his wife is unable to provide assist pt needs plus he needs to be able to get in and out of house to go to dialysis; In light of pt's current mobility status and need for surgery for colon CA recommend pt have conintued therapy 5-7x/wk to address deficits to improve his balance, strength and endurance to allow pt to return home safely  Prognosis: Good  Decision Making: Medium Complexity  History: 76 y/o male admit 1/13/2022 with LESLEE, GI Bleed; Weakness. 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 Surg consult (recent dx Colon Ca ~ 1 month ago; await surg). Recent dx Pneumonia, COVID+ (cont weak/falls). PMH as noted including CKD (HD), COVID+. PT Education: General Safety; Functional Mobility Training;Plan of Care  Patient Education: reviewed call light and not getting up without assist  Barriers to Learning: forgetful  REQUIRES PT FOLLOW UP: Yes  Activity Tolerance  Activity Tolerance: Patient limited by endurance     Patient Diagnosis(es): The primary encounter diagnosis was LESLEE (acute kidney injury) ([facility] [address] Diagnoses of Gastrointestinal hemorrhage, unspecified gastrointestinal hemorrhage type, COVID, and Acute cystitis without hematuria were also pertinent to this visit. has a past medical history of Arthritis, Cancer ([address] Diabetes mellitus ([address] Hemodialysis patient ([address] and Hypertension. has a past surgical history that includes IR PERC ARTERIOVENOUS FISTULA CREATION (Left). Restrictions  Restrictions/Precautions  Restrictions/Precautions: Fall Risk  Subjective   General  Chart Reviewed: Yes  Additional Pertinent Hx: 76 y/o male admit 1/13/2022 with LESLEE, GI Bleed; Weakness. 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 Surg consult (recent dx Colon Ca ~ 1 month ago; await surg). Recent dx Pneumonia, COVID+ (cont weak/falls). PMH as noted including CKD (HD), COVID+. Response To Previous Treatment: Patient with no complaints from previous session.   Family / Caregiver Present: No  Referring Practitioner: Dr. Denita Hair. Subjective  Subjective: pt supine in bed upon arrival, lunch tray on counter and pt agreeable to getting up to chair to eat lunch  General Comment  Comments: no c/o pain          Orientation  Orientation  Orientation Level: Oriented to person;Oriented to time;Oriented to place; Disoriented to situation  Cognition   Cognition  Overall Cognitive Status: Exceptions  Arousal/Alertness: Appropriate responses to stimuli  Following Commands: Follows one step commands consistently  Attention Span: Attends with cues to redirect  Memory: Appears intact  Safety Judgement: Decreased awareness of need for safety  Insights: Decreased awareness of deficits  Objective   Bed mobility  Supine to Sit: Stand by assistance (HOB elevated)  Transfers  Sit to Stand: Minimal Assistance; Moderate Assistance (min A from EOB and mod A from chair in front of sink)  Stand to sit: Minimal Assistance;Contact guard assistance  Ambulation  Ambulation?: Yes  Ambulation 1  Surface: level tile  Device: Rolling Walker  Assistance: Contact guard assistance;Stand by assistance  Quality of Gait: slow pace with flexed posture on walker; slightly unsteady with turns but no overt LOB while walking  Gait Deviations: Slow [place]; Increased KEY; Decreased step length;Decreased step height;Staggers  Distance: 25' x2     Balance  Sitting - Static: Good  Sitting - Dynamic: Good  Standing - Static: Fair;+  Standing - Dynamic: Fair            Comment: pt sat in front of the sink to wash his hands, assisted with cleaning under his nails with a toothbrush as his nails were soiled; pt completed some grooming tasks; positioned in chair for comfort and set up for lunch tray              G-Code     OutComes Score                                                     AM-PAC Score  AM-PAC Inpatient Mobility Raw Score : 17 (01/19/22 0934)  AM-PAC Inpatient T-Scale Score : 42.13 (01/19/22 0934)  Mobility Inpatient CMS 0-100% Score: 50.57 (01/19/22 1588)  Mobility Inpatient CMS G-Code Modifier : CK (01/19/22 0934)          Goals  Short term goals  Time Frame for Short term goals: Upon d/c acute care setting. Short term goal 1: Bed Mob SBA. Short term goal 2: Transfers with assist device SBA. Short term goal 3: Amb with assist device 25-50' SBA/CGA. Short term goal 4: Pt participating in approp Strength Exs. Patient Goals   Patient goals : Get stronger and return home. Plan    Plan  Times per week: 3-5x week while in acute care setting.   Current Treatment Recommendations: Strengthening,Functional Mobility Training,Transfer Training,Gait Training,Safety Education & Training,Patient/Caregiver Education & Training  Safety Devices  Type of devices: Call light within reach,Chair alarm in place,Left in chair,Nurse notified,All fall risk precautions in place     Therapy Time   Individual Concurrent Group Co-treatment   Time In 1415         Time Out 1445         Minutes 30                 ESPERANZA CADET, PT    Electronically signed by ESPERANZA CADET PT on 1/19/2022 at 2:46 PM

## 2022-01-19 NOTE — PROGRESS NOTES
General and Vascular Surgery                                                           Daily Progress Note                                                             Sofia Alberto PA-C     Pt Name: Zach Ford  Medical Record Number: [id number]  Date of Birth 1945   Today's Date: 1/19/2022    ASSESSMENT/PLAN  1. Acute encephalopathy, CVA  2. ESRD: gets HD  3. Sitting up in chair. Feeling better  4. Denies noticing any bleeding  5. Melena secondary to colon cancer, has been worked up at [facility] include 234 beds at the [facility], was a plan for a colectomy but patient developed COVID and then had a CVA. Searching for Old medical records, unable to find any records at this time. Will continue to see if we can find anything. Will need to obtain medical records and then will give more recommendations when available. 6. Will monitor for other medical illnesses to improve    EDUCATION  Patient educated about their illness/diagnosis, stated above, and all questions answered. We discussed the importance of nutrition, medications they are taking, and healthy lifestyle. SUBJECTIVE  Pain is well controlled. He has no nausea and no vomiting. He has passed flatus and has had a bowel movement. OBJECTIVE  VITALS:  height is 6' 0.5\" (1.842 m) and weight is 221 lb 5.5 oz (100.4 kg). His oral temperature is 97.5 °F (36.4 °C). His blood pressure is 155/83 (abnormal) and his pulse is 67. His respiration is 18 and oxygen saturation is 91%. VITALS:  BP (!) 155/83   Pulse 67   Temp 97.5 °F (36.4 °C) (Oral)   Resp 18   Ht 6' 0.5\" (1.842 m)   Wt 221 lb 5.5 oz (100.4 kg)   SpO2 91%   BMI 29.61 kg/m²   GENERAL: alert, no distress  ABDOMEN: soft, non-tender and non-distended  I/O last 3 completed shifts: In: 1200 [P.O.:600]  Out: 2950 [Urine:350]  No intake/output data recorded.     LABS  Recent Labs     01/18/22  0426 01/19/22  0448 01/19/22  0449   WBC   < >  --  5.4   HGB   < >  --  8.6*   HCT   < > --  26.6*   PLT   < >  --  189   NA  --  135*  --    K  --  4.7  --    CL  --  100  --    CO2  --  24  --    BUN  --  35*  --    CREATININE  --  4.1*  --    MG  --  2.20  --    CALCIUM  --  8.2*  --     < > = values in this interval not displayed.      CBC:   Lab Results   Component Value Date    WBC 5.4 01/19/2022    RBC 2.90 01/19/2022    HGB 8.6 01/19/2022    HCT 26.6 01/19/2022    MCV 91.7 01/19/2022    MCH 29.7 01/19/2022    MCHC 32.4 01/19/2022    RDW 14.0 01/19/2022     01/19/2022    MPV 7.4 01/19/2022     CMP:    Lab Results   Component Value Date     01/19/2022    K 4.7 01/19/2022     01/19/2022    CO2 24 01/19/2022    BUN 35 01/19/2022    CREATININE 4.1 01/19/2022    GFRAA 17 01/19/2022    GFRAA 35 07/26/2011    AGRATIO 0.8 01/13/2022    LABGLOM 14 01/19/2022    GLUCOSE 129 01/19/2022    PROT 6.6 01/13/2022    PROT 7.5 07/26/2011    LABALBU 3.0 01/13/2022    CALCIUM 8.2 01/19/2022    BILITOT 0.3 01/13/2022    ALKPHOS 84 01/13/2022    AST 9 01/13/2022    ALT 7 01/13/2022         Kleber Patrick PA-C  Electronically signed 1/19/2022 at 10:54 AM

## 2022-01-19 NOTE — PROGRESS NOTES
[facility]                                               Progress note    Summary:   Pedro Pablo Burnham is being seen by nephrology for ESRD. This is a 78 yo man with ESRD on HD three times weekly via left AVF who is here after a fall and AMS. He has been diagnosed with colon cancer and has been having rectal bleeding off and on for the past several weeks. From [facility]. Had [facility] last month so has been dialyzing at a different dialysis unit for 20 day period. Last HD on Thursday this week. No rectal bleeding since being here at [facility]. GI has no plans for him. Daughter is at bedside. Apparently her father and mother both had a fall yesterday prompting the ED visit. Interval History  Seen and examined in his room . Feeling well. No complaints  Daughters live here and would like him to go to a SNF here for rehab. He will need dialysis arranged while here > working on it. Vitals reviewed. Labs reviewed. No acute electrolyte issues. just had stroke. Not on ELENA right now. Has active colon cancer      Plan:   - HD today, keeping on TTS schedule. - BP acceptable. Appears euvolemic. 1-2 L UF  - dispo pending general surgery plans for colon mass. - discussed with SW about above dispo needs in terms of dialysis placement. Ananda Zhao MD  [facility]  Office: [phone]    Assessment:   ESRD  Does dialysis Monday Wednesday Friday usually but has been doing TTS at the [facility] unit near [facility]  He has a left AV fistula, positive thrill and bruit  Last at dialysis was on Thursday 1/14     Electrolytes  No acute issues.     Hypertension  Blood pressure is acceptable. No changes.      S HPT  Calcium ok  Check phosphorus     GI bleed  Has known colon cancer  No active bleeding  Hemoglobin stable  General surgery consulted.      Altered mental status  MRI showed small acute stroke  Neurology consulted  Mental status has improved.        ROS:   Positives Listed Bold. All other remaining systems are negative.     Constitutional:  fever, chills, weakness, weight change, fatigue,      Skin:  rash, pruritus, hair loss, bruising, dry skin, petechiae. Head, Face, Neck   headaches, swelling,  cervical adenopathy.     Respiratory: shortness of breath, cough, or wheezing  Cardiovascular: chest pain, palpitations, dizzy, edema  Gastrointestinal: nausea, vomiting, diarrhea, constipation,belly pain    Yellow skin, blood in stool  Musculoskeletal:  back pain, muscle weakness, gait problems,       joint pain or swelling. Genitourinary:  dysuria, poor urine flow, flank pain, blood in urine  Neurologic:  vertigo, TIA'S, syncope, seizures, focal weakness  Psychosocial:  insomnia, anxiety, or depression. Additional positive findings: -     PMH:   Past medical history, surgical history, social history, family history are reviewed and updated as appropriate. Reviewed current medication list.   Allergies reviewed and updated as needed. PE:   Vitals:    01/19/22 0800   BP: (!) 155/83   Pulse: 67   Resp: 18   Temp: 97.5 °F (36.4 °C)   SpO2: 91%       General appearance: Male in no acute distress,awake and alert. HEENT: no icterus, EOM intact, trachea midline. Neck : no masses, appears symmetrical and no JVD appreciated. Respiratory: Respiratory effort normal, bilateral equal chest rise. Cardiovascular: Ausculation shows RRR and no edema   Abdomen: abdomen is soft, non distended, no masses, no pain with palpation. Musculoskeletal:  no joint swelling, no deformity  Skin: no rashes, no induration, no tightening, no jaundice   Neuro:   Follows commands, moves all extremities spontaneously   Left AV fistula positive thrill and bruit      Lab Results   Component Value Date    CREATININE 4.1 (H) 01/19/2022    BUN 35 (H) 01/19/2022     (L) 01/19/2022    K 4.7 01/19/2022     01/19/2022    CO2 24 01/19/2022      Lab Results   Component Value Date    WBC 5.4 01/19/2022    HGB 8.6 (L) 01/19/2022    HCT 26.6 (L) 01/19/2022    MCV 91.7 01/19/2022     01/19/2022     Lab Results   Component Value Date    CALCIUM 8.2 (L) 01/19/2022

## 2022-01-19 NOTE — PROGRESS NOTES
Physical Therapy  Facility/Department: [facility] 5W PROGRESSIVE CARE  Daily Treatment Note  NAME: Marvel Moctezuma  :   MRN: [id number]    Date of Service: 2022    Discharge Recommendations:  5-7 sessions per week   PT Equipment Recommendations  Other: Will monitor for potential equipt needs. Marvel Moctezuma scored a 17/24 on the AM-PAC short mobility form. Current research shows that an AM-PAC score of 17 or less is typically not associated with a discharge to the patient's home setting. Based on the patient's AM-PAC score and their current functional mobility deficits, it is recommended that the patient have 5-7 sessions per week of Physical Therapy at d/c to increase the patient's independence. At this time, this patient demonstrates the endurance, and/or tolerance for 3 hours of therapy each day, with a treatment frequency of 5-7x/wk. Please see assessment section for further patient specific details. If patient discharges prior to next session this note will serve as a discharge summary. Please see below for the latest assessment towards goals. Assessment   Body structures, Functions, Activity limitations: Decreased functional mobility ; Decreased strength;Decreased safe awareness;Decreased endurance;Decreased balance  Assessment: 76 y/o male admit 2022 with LESLEE, GI Bleed; Weakness. 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 Surg consult (recent dx Colon Ca ~ 1 month ago; await surg). Recent dx Pneumonia, COVID+ (cont weak/falls). PMH as noted including CKD (HD), COVID+. PTA pt living with wife in mobile home with ramp access; independent daily care and functional mobility although recent cont weak following Pneumonia/COVID. Today, , pt conotinues to be weaker than his normal self. Pt making slow steady progress however continues to need min A for transfers.  Pt is an elevated fall risk and needs to be more Ind to return home as his wife is unable to provide assist pt needs plus he needs to be able to get in and out of house to go to dialysis; In light of pt's current mobility status and need for surgery for colon CA recommend pt have conintued therapy 5-7x/wk to address deficits to improve his balance, strength and endurance to allow pt to return home safely  Prognosis: Good  Decision Making: Medium Complexity  History: 78 y/o male admit 1/13/2022 with LESLEE, GI Bleed; Weakness. 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 Surg consult (recent dx Colon Ca ~ 1 month ago; await surg). Recent dx Pneumonia, COVID+ (cont weak/falls). PMH as noted including CKD (HD), COVID+. Patient Education: reviewed call light and not getting up without assist  Barriers to Learning: forgetful  REQUIRES PT FOLLOW UP: Yes  Activity Tolerance  Activity Tolerance: Patient limited by endurance     Patient Diagnosis(es): The primary encounter diagnosis was LESLEE (acute kidney injury) ([facility] [address] Diagnoses of Gastrointestinal hemorrhage, unspecified gastrointestinal hemorrhage type, COVID, and Acute cystitis without hematuria were also pertinent to this visit. has a past medical history of Arthritis, Cancer ([address] Diabetes mellitus ([address] Hemodialysis patient ([address] and Hypertension. has a past surgical history that includes IR PERC ARTERIOVENOUS FISTULA CREATION (Left). Restrictions  Restrictions/Precautions  Restrictions/Precautions: Fall Risk  Subjective   General  Chart Reviewed: Yes  Additional Pertinent Hx: 78 y/o male admit 1/13/2022 with LESLEE, GI Bleed; Weakness. 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 Surg consult (recent dx Colon Ca ~ 1 month ago; await surg). Recent dx Pneumonia, COVID+ (cont weak/falls). PMH as noted including CKD (HD), COVID+. Response To Previous Treatment: Patient with no complaints from previous session. Family / Caregiver Present: No  Referring Practitioner: Dr. Evelio Najera.   Subjective  Subjective: pt supine in bed upon arrival, breakfast tray arriving; pt agreeable to getting up to chair to allow pt to eat breakfast  General Comment  Comments: no c/o pain          Orientation  Orientation  Orientation Level: Oriented to person;Oriented to time;Oriented to place; Disoriented to situation  Cognition   Cognition  Overall Cognitive Status: Exceptions  Arousal/Alertness: Appropriate responses to stimuli  Following Commands: Follows one step commands consistently  Attention Span: Attends with cues to redirect  Memory: Appears intact  Safety Judgement: Decreased awareness of need for safety  Insights: Decreased awareness of deficits  Objective   Bed mobility  Supine to Sit: Stand by assistance (HOB elevated)  Transfers  Sit to Stand: Minimal Assistance (cues for hand placement)  Stand to sit: Contact guard assistance  Ambulation  Ambulation?: Yes  Ambulation 1  Surface: level tile  Device: Rolling Walker  Assistance: Contact guard assistance;Stand by assistance  Quality of Gait: slow pace with flexed posture on walker; slightly unsteady with turns but no overt LOB while walking     Balance  Sitting - Static: Good  Sitting - Dynamic: Good  Standing - Static: Fair;+  Standing - Dynamic: Fair            Comment: pt stood at sink to wash his hands; needed CGA due to slight posterior lean when focusing on washing his hands; positioned in chair for comfort and set up breakfast tray, all needs in reach              G-Code     OutComes Score                                                     AM-PAC Score  AM-PAC Inpatient Mobility Raw Score : 17 (01/19/22 0934)  AM-PAC Inpatient T-Scale Score : 42.13 (01/19/22 0934)  Mobility Inpatient CMS 0-100% Score: 50.57 (01/19/22 0934)  Mobility Inpatient CMS G-Code Modifier : CK (01/19/22 0934)          Goals  Short term goals  Time Frame for Short term goals: Upon d/c acute care setting. Short term goal 1: Bed Mob SBA. Short term goal 2: Transfers with assist device SBA.   Short term goal 3: Amb with assist device 25-50' SBA/CGA. Short term goal 4: Pt participating in approp Strength Exs. Patient Goals   Patient goals : Get stronger and return home. Plan    Plan  Times per week: 3-5x week while in acute care setting.   Current Treatment Recommendations: Strengthening,Functional Mobility Training,Transfer Training,Gait Training,Safety Education & Training,Patient/Caregiver Education & Training  Safety Devices  Type of devices: Call light within reach,Chair alarm in place,Left in chair,Nurse notified,All fall risk precautions in place     Therapy Time   Individual Concurrent Group Co-treatment   Time In 0836         Time Out 0910         Minutes 34                 ESPERANZA CADET, PT    Electronically signed by ESPERANZA CADET PT on 1/19/2022 at 9:35 AM

## 2022-01-20 LAB
ANION GAP SERPL CALCULATED.3IONS-SCNC: 14 MMOL/L (ref 3–16)
BASOPHILS ABSOLUTE: 0 K/UL (ref 0–0.2)
BASOPHILS RELATIVE PERCENT: 0.8 %
BUN BLDV-MCNC: 42 MG/DL (ref 7–20)
CALCIUM SERPL-MCNC: 8.5 MG/DL (ref 8.3–10.6)
CHLORIDE BLD-SCNC: 101 MMOL/L (ref 99–110)
CO2: 24 MMOL/L (ref 21–32)
CREAT SERPL-MCNC: 5.1 MG/DL (ref 0.8–1.3)
EOSINOPHILS ABSOLUTE: 0.1 K/UL (ref 0–0.6)
EOSINOPHILS RELATIVE PERCENT: 2.2 %
GFR AFRICAN AMERICAN: 13
GFR NON-AFRICAN AMERICAN: 11
GLUCOSE BLD-MCNC: 125 MG/DL (ref 70–99)
HCT VFR BLD CALC: 29.5 % (ref 40.5–52.5)
HEMOGLOBIN: 9.7 G/DL (ref 13.5–17.5)
LYMPHOCYTES ABSOLUTE: 1 K/UL (ref 1–5.1)
LYMPHOCYTES RELATIVE PERCENT: 18.6 %
MAGNESIUM: 2.2 MG/DL (ref 1.8–2.4)
MCH RBC QN AUTO: 29.5 PG (ref 26–34)
MCHC RBC AUTO-ENTMCNC: 32.8 G/DL (ref 31–36)
MCV RBC AUTO: 90 FL (ref 80–100)
MONOCYTES ABSOLUTE: 0.2 K/UL (ref 0–1.3)
MONOCYTES RELATIVE PERCENT: 4.6 %
NEUTROPHILS ABSOLUTE: 4 K/UL (ref 1.7–7.7)
NEUTROPHILS RELATIVE PERCENT: 73.8 %
PDW BLD-RTO: 13.8 % (ref 12.4–15.4)
PLATELET # BLD: 194 K/UL (ref 135–450)
PMV BLD AUTO: 7.3 FL (ref 5–10.5)
POTASSIUM SERPL-SCNC: 4.4 MMOL/L (ref 3.5–5.1)
RBC # BLD: 3.28 M/UL (ref 4.2–5.9)
SODIUM BLD-SCNC: 139 MMOL/L (ref 136–145)
WBC # BLD: 5.4 K/UL (ref 4–11)

## 2022-01-20 PROCEDURE — 97116 GAIT TRAINING THERAPY: CPT

## 2022-01-20 PROCEDURE — 94761 N-INVAS EAR/PLS OXIMETRY MLT: CPT

## 2022-01-20 PROCEDURE — 2060000000 HC ICU INTERMEDIATE R&B

## 2022-01-20 PROCEDURE — 83735 ASSAY OF MAGNESIUM: CPT

## 2022-01-20 PROCEDURE — 6370000000 HC RX 637 (ALT 250 FOR IP): Performed by: HOSPITALIST

## 2022-01-20 PROCEDURE — 90935 HEMODIALYSIS ONE EVALUATION: CPT

## 2022-01-20 PROCEDURE — 97110 THERAPEUTIC EXERCISES: CPT

## 2022-01-20 PROCEDURE — 92526 ORAL FUNCTION THERAPY: CPT

## 2022-01-20 PROCEDURE — 2580000003 HC RX 258: Performed by: STUDENT IN AN ORGANIZED HEALTH CARE EDUCATION/TRAINING PROGRAM

## 2022-01-20 PROCEDURE — APPSS45 APP SPLIT SHARED TIME 31-45 MINUTES: Performed by: NURSE PRACTITIONER

## 2022-01-20 PROCEDURE — APPNB45 APP NON BILLABLE 31-45 MINUTES: Performed by: NURSE PRACTITIONER

## 2022-01-20 PROCEDURE — 36415 COLL VENOUS BLD VENIPUNCTURE: CPT

## 2022-01-20 PROCEDURE — 85025 COMPLETE CBC W/AUTO DIFF WBC: CPT

## 2022-01-20 PROCEDURE — 80048 BASIC METABOLIC PNL TOTAL CA: CPT

## 2022-01-20 PROCEDURE — 92507 TX SP LANG VOICE COMM INDIV: CPT

## 2022-01-20 PROCEDURE — 97530 THERAPEUTIC ACTIVITIES: CPT

## 2022-01-20 RX ADMIN — SEVELAMER CARBONATE 800 MG: 800 TABLET, FILM COATED ORAL at 16:46

## 2022-01-20 RX ADMIN — ATORVASTATIN CALCIUM 80 MG: 80 TABLET, FILM COATED ORAL at 20:35

## 2022-01-20 RX ADMIN — PANTOPRAZOLE SODIUM 20 MG: 20 TABLET, DELAYED RELEASE ORAL at 20:35

## 2022-01-20 RX ADMIN — GABAPENTIN 100 MG: 100 CAPSULE ORAL at 20:35

## 2022-01-20 RX ADMIN — SODIUM CHLORIDE, PRESERVATIVE FREE 10 ML: 5 INJECTION INTRAVENOUS at 20:35

## 2022-01-20 RX ADMIN — GABAPENTIN 100 MG: 100 CAPSULE ORAL at 14:41

## 2022-01-20 ASSESSMENT — PAIN SCALES - GENERAL
PAINLEVEL_OUTOF10: 0

## 2022-01-20 NOTE — PROGRESS NOTES
[facility]                                               Progress note    Summary:   Katiana Lomax is being seen by nephrology for ESRD. This is a 76 yo man with ESRD on HD three times weekly via left AVF who is here after a fall and AMS. He has been diagnosed with colon cancer and has been having rectal bleeding off and on for the past several weeks. From [facility]. Had [facility] last month so has been dialyzing at a different dialysis unit for 20 day period. Last HD on Thursday this week. No rectal bleeding since being here at [place]. GI has no plans for him. Daughter is at bedside. Apparently her father and mother both had a fall yesterday prompting the ED visit. Interval History  Seen and examined on dialysis. Feeling well. No complaints  No chest pain or SOB. /68  UF 1600 cc   Post weight 101.5 kilos and appears euvolemic. Good BFR with catheter 400 cc/min  Labs reviewed. No issues. Plan:   - HD today, keeping on TTS schedule. - BP acceptable. Appears euvolemic. 1.6 L UF  - timing of colon surgery still pending. Chelsie Ruiz MD  [facility]  Office: [phone]    Assessment:   ESRD  Does dialysis Monday Wednesday Friday usually but has been doing TTS at the [facility] unit near [facility]  He has a left AV fistula, positive thrill and bruit  Last at dialysis was on Thursday 1/14     Electrolytes  No acute issues.     Hypertension  Blood pressure is acceptable. No changes.      S HPT  Calcium ok  Check phosphorus     GI bleed  Has known colon cancer  No active bleeding  Hemoglobin stable  General surgery consulted.      Altered mental status  MRI showed small acute stroke  Neurology consulted  Mental status has improved. ROS:   Positives Listed Bold. All other remaining systems are negative.     Constitutional:  fever, chills, weakness, weight change, fatigue,      Skin:  rash, pruritus, hair loss, bruising, dry skin, petechiae.   Head, Face, Neck   headaches, swelling,  cervical adenopathy.     Respiratory: shortness of breath, cough, or wheezing  Cardiovascular: chest pain, palpitations, dizzy, edema  Gastrointestinal: nausea, vomiting, diarrhea, constipation,belly pain    Yellow skin, blood in stool  Musculoskeletal:  back pain, muscle weakness, gait problems,       joint pain or swelling. Genitourinary:  dysuria, poor urine flow, flank pain, blood in urine  Neurologic:  vertigo, TIA'S, syncope, seizures, focal weakness  Psychosocial:  insomnia, anxiety, or depression. Additional positive findings: -     PMH:   Past medical history, surgical history, social history, family history are reviewed and updated as appropriate. Reviewed current medication list.   Allergies reviewed and updated as needed. PE:   Vitals:    01/20/22 1528   BP: 129/68   Pulse: 71   Resp:    Temp: 98.1 °F (36.7 °C)   SpO2:        General appearance: Male in no acute distress,awake and alert. HEENT: no icterus, EOM intact, trachea midline. Neck : no masses, appears symmetrical and no JVD appreciated. Respiratory: Respiratory effort normal, bilateral equal chest rise. Cardiovascular: Ausculation shows RRR and no edema   Abdomen: abdomen is soft, non distended, no masses, no pain with palpation. Musculoskeletal:  no joint swelling, no deformity  Skin: no rashes, no induration, no tightening, no jaundice   Neuro:   Follows commands, moves all extremities spontaneously   Left AV fistula positive thrill and bruit      Lab Results   Component Value Date    CREATININE 5.1 (HH) 01/20/2022    BUN 42 (H) 01/20/2022     01/20/2022    K 4.4 01/20/2022     01/20/2022    CO2 24 01/20/2022      Lab Results   Component Value Date    WBC 5.4 01/20/2022    HGB 9.7 (L) 01/20/2022    HCT 29.5 (L) 01/20/2022    MCV 90.0 01/20/2022     01/20/2022     Lab Results   Component Value Date    CALCIUM 8.5 01/20/2022

## 2022-01-20 NOTE — FLOWSHEET NOTE
Treatment time: 3hours  Net UF: 1600 ml     Pre weight: 103.5 kg   Post weight: 101.5 kg  EDW: TBD     Access used: LFaA AVF  Access function: Good with  ml/min     Medications or blood products given: None     Regular outpatient schedule: [address], TTS     Summary of response to treatment: Tolerated tx well. No HD related complaints or complications. Tx ended early because system clotted. Blood was able to be returned. Fluid removal goal of 2L was unable to be met due tx ending early . 1.6L removed. Dressings applied to cannulation sites, no abnormal bleeding noted.      Copy of dialysis treatment record placed in chart, to be scanned into EMR.

## 2022-01-20 NOTE — PROGRESS NOTES
Speech Language Pathology    SLP Treatment Attempt:    Pt currently is off the unit at dialysis. Will reattempt later today as schedule permits.     Kaitlynn Valencia MS, CCC-SLP #[id number]  Speech Language Pathologist

## 2022-01-20 NOTE — PROGRESS NOTES
Physical Therapy  Facility[facility]Department[facility] PROGRESSIVE CARE  Daily Treatment Note  NAME: Vannesa Eldridge  : 1519  MRN: [id number]    Date of Service: 2022    Discharge Recommendations:  5-7 sessions per week     Vannesa Eldridge scored a 17/24 on the AM-PAC short mobility form. Current research shows that an AM-PAC score of 17 or less is typically not associated with a discharge to the patient's home setting. Based on the patient's AM-PAC score and their current functional mobility deficits, it is recommended that the patient have 5-7 sessions per week of Physical Therapy at d/c to increase the patient's independence. At this time, this patient demonstrates the endurance, and/or tolerance for 3 hours of therapy each day, with a treatment frequency of 5-7x/wk. Please see assessment section for further patient specific details. If patient discharges prior to next session this note will serve as a discharge summary. Please see below for the latest assessment towards goals. Assessment   Body structures, Functions, Activity limitations: Decreased functional mobility ; Decreased strength;Decreased safe awareness;Decreased endurance;Decreased balance  Assessment: 76 y/o male admit 2022 with LESLEE, GI Bleed; Weakness. 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 Surg consult (recent dx Colon Ca ~ 1 month ago; await surg). Recent dx Pneumonia, COVID+ (cont weak/falls). PMH as noted including CKD (HD), COVID+. PTA pt living with wife in mobile home with ramp access; independent daily care and functional mobility although recent cont weak following Pneumonia/COVID. Today, , pt continues to be weaker than his normal self. Pt able to trasnfer from recliner with CGA and ambulate 72' with a wh walker CGA. Pt is impulsive and needs cues for safety.  Pt is an elevated fall risk and needs to be more Ind to return home as his wife is unable to provide assist pt needs plus he needs to be able to get in and out of house to go to dialysis; In light of pt's current mobility status and need for surgery for colon CA recommend pt have conintued therapy 5-7x/wk to address deficits to improve his balance, strength and endurance to allow pt to return home safely  REQUIRES PT FOLLOW UP: Yes  Activity Tolerance  Activity Tolerance: Patient Tolerated treatment well;Patient limited by endurance     Patient Diagnosis(es): The primary encounter diagnosis was LESLEE (acute kidney injury) ([facility] Diagnoses of Gastrointestinal hemorrhage, unspecified gastrointestinal hemorrhage type, COVID, and Acute cystitis without hematuria were also pertinent to this visit. has a past medical history of Arthritis, Cancer ([facility] [facility] Diabetes mellitus ([facility] [facility] Hemodialysis patient ([facility] [address] [facility] and Hypertension. has a past surgical history that includes IR PERC ARTERIOVENOUS FISTULA CREATION (Left). Restrictions  Restrictions/Precautions  Restrictions/Precautions: Fall Risk  Subjective   General  Chart Reviewed: Yes  Additional Pertinent Hx: 76 y/o male admit 1/13/2022 with LESLEE, GI Bleed; Weakness. 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 Surg consult (recent dx Colon Ca ~ 1 month ago; await surg). Recent dx Pneumonia, COVID+ (cont weak/falls). PMH as noted including CKD (HD), COVID+. Response To Previous Treatment: Patient with no complaints from previous session. Family / Caregiver Present: No  Referring Practitioner: Dr. Daisy Biswas.   Subjective  Subjective: Pt up in reclienr, agreeable to working with therapy, mildly impulsive and multilpe times starts to get up before therapist has retreived wh walker or put gt belt on pt  General Comment  Comments: no c/o pain  Pain Screening  Patient Currently in Pain: No  Vital Signs  Patient Currently in Pain: No       Orientation     Cognition      Objective      Transfers  Sit to Stand: Contact guard assistance;Minimal Assistance  Stand to sit: Contact guard assistance  Ambulation  Ambulation?: Yes  More Ambulation?: No  Ambulation 1  Surface: level tile  Device: Rolling Walker  Assistance: Contact guard assistance  Quality of Gait: slow pace with flexed posture on walker; needs cues for wh walker placement and direction (pt beagn to go around the bed toward the wall instead of toward the foot of the bed when trying to go to recliner on the other side of the bed)  Distance: 72'  Comments: pt turned when far away from chair,  when PT asked where he was going pt saed he was going to back up to the chair, pt backed up about 7' with wh walker CGA  Stairs/Curb  Stairs?: No     Balance  Posture: Fair  Sitting - Static: Good  Sitting - Dynamic: Good  Standing - Static: Good;-  Standing - Dynamic: Good;-  Exercises  Hip Flexion: x15 BLE  Hip Abduction: add sets x 15  Knee Long Arc Quad: x15 BLE  Ankle Pumps: x15 BLE  Comments: shoulder push-outs x 10, press-ups x 10 with pt reporting fatigue      AM-PAC Score  AM-PAC Inpatient Mobility Raw Score : 17 (01/20/22 1553)  AM-PAC Inpatient T-Scale Score : 42.13 (01/20/22 1553)  Mobility Inpatient CMS 0-100% Score: 50.57 (01/20/22 1553)  Mobility Inpatient CMS G-Code Modifier : CK (01/20/22 1553)     Goals  Short term goals  Time Frame for Short term goals: Upon d/c acute care setting. Short term goal 1: Bed Mob SBA. Short term goal 2: Transfers with assist device SBA. Short term goal 3: Amb with assist device 25-50' SBA/CGA. met 1-20: new goal amb 100' with wh walker SBA  Short term goal 4: Pt participating in approp Strength Exs. Patient Goals   Patient goals : Get stronger and return home. Plan    Plan  Times per week: 3-5x week while in acute care setting.   Current Treatment Recommendations: Strengthening,Functional Mobility Training,Transfer Training,Gait Training,Safety Education & Training,Patient/Caregiver Education & Training  Safety Devices  Type of devices: Call light within reach,Chair alarm in place,Left in chair,Nurse notified,All fall risk precautions in place,Gait belt     Therapy Time   Individual Concurrent Group Co-treatment   Time In 1520         Time Out 1553         Minutes 33         Timed Code Treatment Minutes: 33 Minutes   charges = 18 min gt 15 min ettax  Steven Linn, [address]

## 2022-01-20 NOTE — PROGRESS NOTES
Occupational Therapy  Facility/Department: [facility] 5W PROGRESSIVE CARE  Daily Treatment Note  NAME: Natividad Samano  :   MRN: [id number]    Date of Service: 2022    Discharge Recommendations:  Patient would benefit from continued therapy after discharge,5-7 sessions per week       Assessment   Performance deficits / Impairments: Decreased functional mobility ; Decreased safe awareness;Decreased balance;Decreased ADL status; Decreased cognition;Decreased high-level IADLs;Decreased endurance;Decreased strength  Assessment: 76 y/o male admit 2022 with LESLEE, GI Bleed; Weakness. 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 Surg consult (recent dx Colon Ca ~ 1 month ago; await surg). Recent dx Pneumonia, COVID+ (cont weak/falls). PTA pt lives at home with spouse and was independent with ADLs and functional mobility (RW only for long distances). Today, pt required min A to stand and CGA for functional mobility around room with RW. Pt completed grooming tasks standing at the sink with CGA. Pt reports has been requiring assist with transfers at times due to weakness. Pt would benefit from continued therapy prior to discharge home to increase strength and safety with transfers, and ADL's prior to discharge home. Prognosis: Good  Patient Education: Safe transfers  REQUIRES OT FOLLOW UP: Yes  Activity Tolerance  Activity Tolerance: Patient Tolerated treatment well  Safety Devices  Type of devices: Call light within reach; Chair alarm in place; Left in chair;Gait belt         Patient Diagnosis(es): The primary encounter diagnosis was LESLEE (acute kidney injury) ([facility] [address] Diagnoses of Gastrointestinal hemorrhage, unspecified gastrointestinal hemorrhage type, COVID, and Acute cystitis without hematuria were also pertinent to this visit. has a past medical history of Arthritis, Cancer ([address] Diabetes mellitus ([address] Hemodialysis patient ([facility] [address] and Hypertension.    has a past surgical history that includes IR PERC ARTERIOVENOUS FISTULA CREATION (Left). Restrictions  Restrictions/Precautions  Restrictions/Precautions: Fall Risk  Subjective   General  Chart Reviewed: Yes,Progress Notes  Patient assessed for rehabilitation services?: Yes  Additional Pertinent Hx: 76 y/o male admit 1/13/2022 with LESLEE, GI Bleed; Weakness. 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 Surg consult (recent dx Colon Ca ~ 1 month ago; await surg). Recent dx Pneumonia, COVID+ (cont weak/falls). PMH as noted including CKD (HD), COVID+. Family / Caregiver Present: No  Referring Practitioner: Dr. Abdias Huerta  Subjective  Subjective: Pt supine in bed upon entry and agreed to OT treatment. General Comment  Comments: Per RN ok for therapy      Objective    ADL  Grooming: Contact guard assistance (Stood at the sink to wash his hands and comb his hair.)  LE Dressing: Stand by assistance (Pt was able to cross his legs to manage both socks.)        Balance  Sitting Balance: Stand by assistance  Standing Balance: Contact guard assistance  Functional Mobility  Functional - Mobility Device: Rolling Walker  Activity: To/from bathroom  Assist Level: Contact guard assistance  Wheelchair Bed Transfers  Wheelchair/Bed - Technique: Ambulating  Equipment Used: Other (recliner)  Level of Asssistance: Minimal assistance  Wheelchair Transfers Comments: Cues to lean forward and for hand placement. Bed mobility  Supine to Sit: Stand by assistance  Sit to Supine: Unable to assess (Pt in the recliner at the end of the session.)  Transfers  Sit to stand: Minimal assistance  Stand to sit: Minimal assistance                       Cognition  Arousal/Alertness: Appropriate responses to stimuli  Following Commands:  Follows one step commands consistently  Attention Span: Attends with cues to redirect  Memory: Appears intact  Safety Judgement: Decreased awareness of need for safety  Insights: Decreased awareness of deficits Plan   Plan  Times per week: 3-5  Current Treatment Recommendations: Strengthening,Endurance Training,Balance Training,Gait Training,Functional Mobility Training,Self-Care / ADL    AM-PAC Score        AM-PAC Inpatient Daily Activity Raw Score: 17 (01/20/22 1535)  AM-PAC Inpatient ADL T-Scale Score : 37.26 (01/20/22 1535)  ADL Inpatient CMS 0-100% Score: 50.11 (01/20/22 1535)  ADL Inpatient CMS G-Code Modifier : CK (01/20/22 1535)    Goals  Short term goals  Time Frame for Short term goals: Prior to DC: goals ongoing  Short term goal 1: Pt will complete ADL transfers with supervision  Short term goal 2: Pt will complete functional mobility with supervision  Short term goal 3: Pt will tolerate standing > 5 min for functional task with supervision  Short term goal 4: Pt will complete toileting with supervision  Short term goal 5: Pt will complete LB dressing with supervision  Patient Goals   Patient goals : to return home       Therapy Time   Individual Concurrent Group Co-treatment   Time In 1430         Time Out 1510         Minutes 40               This note to serve as OT d/c summary if pt is d/c-ed from hospital prior to next OT session.       Cande Stapleton, [address]

## 2022-01-20 NOTE — PROGRESS NOTES
pneumonia. Correlate with COVID testing.      1/14/2022 MRI BRAIN  Impression   1. Punctate acute infarct in the posteromedial left parietal lobe, within the   deep white matter. 2. 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      Prior Status: lives with wife; reports independent with ADLs; wife completes cooking, cleaning, laundry with assist of cleaning lady; wife manages medications/appointments; wife manages finances; drives sometimes - reports \"I can\";' completed 10th grade; retired ; enjoys piddling around    Subjective:     Current diet  Regular  Thin    Pt/staff statements regarding diet tolerance:   aide reports adequate tolerance with AM breakfast  Pt denies any difficulty     Cognitive-communication treatment progress:   Pt without complaint; pleasant and cooperative; persistent reduced insight    Objective: Assessment/Therapy activities and impression of progress/goal status   Treatment this session  Objective:  COMPREHENSION  Auditory Comprehension: [x]WFL for concrete []Mild   [] Moderate  []Severe  []To be assessed  Functional for 1-2 step commands, basic questions, concrete questions, simple conversation    EXPRESSION  Primary Mode of Expression: verbal  Verbal Expression: []WFL [x]Mild   [] Moderate  []Severe  []To be assessed  Wh question/responsive naming: mild; functional word retrieval with extended time for information processing  Simple conversation: mild for simple responses, requires extended time, occasional anomia.   Moderately impaired expansion persists     Pragmatics/Social Functioning: [x]WFL []Mild   [] Moderate  []Severe  []To be assessed      MOTOR SPEECH  Motor Speech: [x]WFL []Mild   [] Moderate  []Severe  []To be assessed   []Apraxia (appears resolved)   []Dysarthria   []Decreased Intelligibility: slurred speech appears resolved; mildly low volume which is suspected as baseline, pt does respond to cues for increased volume but no carryover    VOICE  Voice: [x]WFL []Mild   [] Moderate  []Severe  []To be assessed    COGNITION  []Unable to be assessed secondary to Aphasia     Overall Orientation : []WFL [x]Mild   [] Moderate  []Severe  []To be assessed   []Unable to be assessed secondary to Aphasia   Oriented to self, month, year, location, situation IND; min cues for city, EMMIE/date. Unable to read whiteboard from chair    Attention: []WFL [x]Mild   [x] Moderate  []Severe  []To be assessed  Sustained mild-mod  Memory: []WFL []Mild   [x] Moderate  []Severe  []To be assessed  Min cues for general recall of daily events  Mod-max cues for recall of details from day, MD visits, recommendations    Problem Solving: []WFL []Mild   [x] Moderate  [x]Severe  []To be assessed    Safety/Judgement: []WFL []Mild   [x] Moderate  [x]Severe  []To be assessed    Goal Status: Speech and Language Goals  Goals:   Goal 1: Pt will improve verbal expression for naming and self expression via graded tasks to min cues ongoing; improved to min cues 1/20  Goal 2: Pt will improve speech intelligibility in connected speech via graded tasks to min cues appears resolved 1/19 and 1/20; monitor  Goal 3: Patient will be Ox4 with independent use of external aids.  Ongoing; unable to see whiteboard; not oriented to city, LOS  Goal 4: The patient will tolerate ongoing evaluation/monitor for baseline level of function and determination of additional skilled ST needs ongoing; suspect cog skills are at/near baseline     DYSPHAGIA  Positioning: Upright in chair  PO Trials: seen with lunch tray: hamburger, applesauce, thin via cup  · Prolonged but effective mastication suspected r/t edentulism; decreased bolus control  · Suspect premature bolus loss to the pharynx  · Delayed swallow  · Decreased laryngeal elevation  · No overt signs/symptoms of penetration/aspiration  · Occasional wet vocal quality and low volume which is present prior to PO; suspected as unrelated to PO but recommend ongoing monitoring    Status of Goals: The patient will tolerate recommended diet without observed clinical signs of aspiration continue  The patient/caregiver will demonstrate understanding of compensatory strategies for improved swallowing safety. continue    IMPRESSIONS  Cognitive Diagnosis: Moderate cognitive language impairments in the domains for selective attention, orientation, immediate/delayed recall, and problem solving. Patient reports dependence on wife prior to admission and reports current function as comparable to baseline; will continue to monitor and assess re: baseline function/skills, but suspect pt is at/near PLOF  Aphasia Diagnosis: Mild receptive aphasia characterized by reduced comprehension for complex lengthy information with suspicion for reduced recall as a factor. Mild to moderate expressive aphasia characterized by decreased word-finding for responsive and confrontation naming at word level, limited convergent/divergent naming, and suspicion for reduced word-finding contributing to delayed and limited to basic/concrete conversation. Speech Diagnosis: Motor speech impairments appear resolved     Dysphagia Diagnosis: Mild oral stage dysphagia characterized by decreased mastication r/t edentulism and decreased lingual manipulation; prolonged but adequate bolus formation and movement with all with concern for premature bolus loss to the pharynx. Mild pharyngeal stage dysphagia characterized by delayed swallow and decreased laryngeal elevation; No overt signs/symptoms of penetration/aspiration; however, variable wet vocal quality with and without PO -rec continued monitor for tolerance.     Recommended Diet: Regular, thin   Compensatory Strategies: Upright as possible for all oral intake,Remain upright for 30-45 minutes after meals    Plan     Plan/Recommendations:   Speech therapy 3-5 times a weeks for speech-language treatment, and dysphagia treatment Interventions:   Therapeutic Interventions: Diet tolerance monitoring,Patient/Family education,  cognitive-communication remediation, motor speech monitoring; aphasia remediation    Therapy  Requires SLP Intervention: Yes  Therapeutic Interventions: Diet tolerance monitoring,Patient/Family education  Duration/Frequency of Treatment: ST to tx 3-5 times per week during acute admission unless otherwise notified    Barriers toward progress toward pt goals:  Cognitive deficit    Prognosis  Guarded for ST d/t previous level of function and severity of imps    Education  Consulted and agree with results and recommendations: Patient,RN  Patient Education: Completed on results/recs/plan  Patient Education Response: Needs reinforcement    Discharge Recommendations:  Pt will benefit from continued skilled Speech Therapy for Speech and Dysphagia services, prior to returning home. Please accept this as Speech Therapy Discharge status, if pt is discharged prior to next therapy session.     Timed Code Treatment:  SLP Individual Minutes  Time In: 1325  Time Out: 1400  Minutes: 35    Total Treatment Time:  20 dysphagia tx  15 SLP tx     Signed:  Amador Lynn MS, CCC-SLP #[id number]  Speech Language Pathologist  Portable phone: [phone]  01/20/22 211 PM

## 2022-01-20 NOTE — PROGRESS NOTES
Physical Therapy  Attempted to see pt at 9:00, pt being transported to dialysis. Will see in the afternoon if schedule permits.   Electronically signed by Carlisle Seip, PT on 1/20/2022 at 9:53 AM

## 2022-01-20 NOTE — PROGRESS NOTES
[facility] General Surgery                                   Daily Progress Note                                                         Pt Name: Zach Ford  Medical Record Number: [id number]  Date of Birth 1945   Today's Date: 1/20/2022  Chief Complaint   Patient presents with    Fatigue     pt was recently d/cd from the hospital still with complaints of weakness and no energy    Fall     pt fell x 2 today         ASSESSMENT/PLAN  Colon cancer  -11/4/2021 Colonoscopy invasive moderately differentiated adenocarcinoma sigmoid mass. Partially obstructing. Was being worked up for surgery by Dr. Ira Garcia at [facility] in [place], [place]. Records now available in care everywhere.     -Discussed with wife. [facility] is [facility] distance from home as [facility] and she prefers to have his care transitioned here as she also has two daughters in [place]. She wants to proceed with surgery here when feasible. -Tentatively planning surgery Wednesday. Tia Mars is sitting up to the chair, no c/o. To get dialysis today. OBJECTIVE  VITALS:  height is 6' 0.5\" (1.842 m) and weight is 228 lb 2.8 oz (103.5 kg). His temperature is 97.6 °F (36.4 °C). His blood pressure is 132/71 and his pulse is 70. His respiration is 18 and oxygen saturation is 95%. INTAKE/OUTPUT:      Intake/Output Summary (Last 24 hours) at 1/20/2022 1150  Last data filed at 1/20/2022 0905  Gross per 24 hour   Intake 360 ml   Output 0 ml   Net 360 ml     GENERAL: alert, cooperative, no distress  LUNGS: clear to ausculation, without wheezes, rales or rhonci  HEART: normal rate and regular rhythm  ABDOMEN: Soft, non tender, non distended. EXTREMITY: no cyanosis, clubbing or edema    I/O last 3 completed shifts:   In: 120 [P.O.:120]  Out: 0       LABS  Recent Labs     01/20/22  0425   WBC 5.4   HGB 9.7*   HCT 29.5*         K 4.4      CO2 24   BUN 42* CREATININE 5.1*   MG 2.20   CALCIUM 8.5       EDUCATION  Patient educated about Disease Process, Medications, Smoking Cessation, Oxygenation, Incentive Spirometry and Deep Breath and Cough, Diabetes, Hyperlipidemia, Smoking Cessation, Nutrition, Exercise and Hypertension    Electronically signed by IRINA Kunz CNP on 1/20/2022 at 11:50 AM      [facility]   [phone] Office  [phone] Fax

## 2022-01-21 ENCOUNTER — APPOINTMENT (OUTPATIENT)
Dept: CT IMAGING | Age: 77
DRG: 981 | End: 2022-01-21
Payer: MEDICARE

## 2022-01-21 LAB
AMMONIA: 20 UMOL/L (ref 16–60)
ANION GAP SERPL CALCULATED.3IONS-SCNC: 13 MMOL/L (ref 3–16)
BACTERIA: ABNORMAL /HPF
BASOPHILS ABSOLUTE: 0 K/UL (ref 0–0.2)
BASOPHILS RELATIVE PERCENT: 0.7 %
BILIRUBIN URINE: ABNORMAL
BLOOD, URINE: ABNORMAL
BUN BLDV-MCNC: 29 MG/DL (ref 7–20)
CALCIUM SERPL-MCNC: 8.2 MG/DL (ref 8.3–10.6)
CHLORIDE BLD-SCNC: 100 MMOL/L (ref 99–110)
CLARITY: ABNORMAL
CO2: 25 MMOL/L (ref 21–32)
COLOR: ABNORMAL
CREAT SERPL-MCNC: 4.4 MG/DL (ref 0.8–1.3)
EOSINOPHILS ABSOLUTE: 0.1 K/UL (ref 0–0.6)
EOSINOPHILS RELATIVE PERCENT: 2.2 %
EPITHELIAL CELLS, UA: 21 /HPF (ref 0–5)
GFR AFRICAN AMERICAN: 16
GFR NON-AFRICAN AMERICAN: 13
GLUCOSE BLD-MCNC: 109 MG/DL (ref 70–99)
GLUCOSE URINE: NEGATIVE MG/DL
HCT VFR BLD CALC: 27.9 % (ref 40.5–52.5)
HEMOGLOBIN: 9.4 G/DL (ref 13.5–17.5)
KETONES, URINE: ABNORMAL MG/DL
LEUKOCYTE ESTERASE, URINE: ABNORMAL
LYMPHOCYTES ABSOLUTE: 1.3 K/UL (ref 1–5.1)
LYMPHOCYTES RELATIVE PERCENT: 25.1 %
MAGNESIUM: 2.1 MG/DL (ref 1.8–2.4)
MCH RBC QN AUTO: 29.8 PG (ref 26–34)
MCHC RBC AUTO-ENTMCNC: 33.5 G/DL (ref 31–36)
MCV RBC AUTO: 88.9 FL (ref 80–100)
MICROSCOPIC EXAMINATION: YES
MONOCYTES ABSOLUTE: 0.3 K/UL (ref 0–1.3)
MONOCYTES RELATIVE PERCENT: 5.9 %
NEUTROPHILS ABSOLUTE: 3.4 K/UL (ref 1.7–7.7)
NEUTROPHILS RELATIVE PERCENT: 66.1 %
NITRITE, URINE: NEGATIVE
PDW BLD-RTO: 14.5 % (ref 12.4–15.4)
PH UA: 6 (ref 5–8)
PLATELET # BLD: 166 K/UL (ref 135–450)
PMV BLD AUTO: 7.5 FL (ref 5–10.5)
POTASSIUM SERPL-SCNC: 4.6 MMOL/L (ref 3.5–5.1)
PROTEIN UA: >=300 MG/DL
RBC # BLD: 3.14 M/UL (ref 4.2–5.9)
RBC UA: 32 /HPF (ref 0–4)
SODIUM BLD-SCNC: 138 MMOL/L (ref 136–145)
SPECIFIC GRAVITY UA: 1.02 (ref 1–1.03)
T4 FREE: 1.2 NG/DL (ref 0.9–1.8)
TSH SERPL DL<=0.05 MIU/L-ACNC: 1.81 UIU/ML (ref 0.27–4.2)
URINE TYPE: ABNORMAL
UROBILINOGEN, URINE: 1 E.U./DL
WBC # BLD: 5.1 K/UL (ref 4–11)
WBC UA: >900 /HPF (ref 0–5)

## 2022-01-21 PROCEDURE — 97129 THER IVNTJ 1ST 15 MIN: CPT

## 2022-01-21 PROCEDURE — 6370000000 HC RX 637 (ALT 250 FOR IP): Performed by: HOSPITALIST

## 2022-01-21 PROCEDURE — 97530 THERAPEUTIC ACTIVITIES: CPT

## 2022-01-21 PROCEDURE — 87186 SC STD MICRODIL/AGAR DIL: CPT

## 2022-01-21 PROCEDURE — 81001 URINALYSIS AUTO W/SCOPE: CPT

## 2022-01-21 PROCEDURE — 87086 URINE CULTURE/COLONY COUNT: CPT

## 2022-01-21 PROCEDURE — 83735 ASSAY OF MAGNESIUM: CPT

## 2022-01-21 PROCEDURE — 85025 COMPLETE CBC W/AUTO DIFF WBC: CPT

## 2022-01-21 PROCEDURE — 36415 COLL VENOUS BLD VENIPUNCTURE: CPT

## 2022-01-21 PROCEDURE — 82140 ASSAY OF AMMONIA: CPT

## 2022-01-21 PROCEDURE — 92507 TX SP LANG VOICE COMM INDIV: CPT

## 2022-01-21 PROCEDURE — 84439 ASSAY OF FREE THYROXINE: CPT

## 2022-01-21 PROCEDURE — 70450 CT HEAD/BRAIN W/O DYE: CPT

## 2022-01-21 PROCEDURE — 2060000000 HC ICU INTERMEDIATE R&B

## 2022-01-21 PROCEDURE — 6360000002 HC RX W HCPCS: Performed by: INTERNAL MEDICINE

## 2022-01-21 PROCEDURE — 2580000003 HC RX 258: Performed by: STUDENT IN AN ORGANIZED HEALTH CARE EDUCATION/TRAINING PROGRAM

## 2022-01-21 PROCEDURE — 80048 BASIC METABOLIC PNL TOTAL CA: CPT

## 2022-01-21 PROCEDURE — 84443 ASSAY THYROID STIM HORMONE: CPT

## 2022-01-21 PROCEDURE — 87077 CULTURE AEROBIC IDENTIFY: CPT

## 2022-01-21 PROCEDURE — 97116 GAIT TRAINING THERAPY: CPT

## 2022-01-21 RX ADMIN — PANTOPRAZOLE SODIUM 20 MG: 20 TABLET, DELAYED RELEASE ORAL at 20:27

## 2022-01-21 RX ADMIN — SEVELAMER CARBONATE 800 MG: 800 TABLET, FILM COATED ORAL at 12:46

## 2022-01-21 RX ADMIN — ASPIRIN 81 MG 81 MG: 81 TABLET ORAL at 08:38

## 2022-01-21 RX ADMIN — ATORVASTATIN CALCIUM 80 MG: 80 TABLET, FILM COATED ORAL at 20:27

## 2022-01-21 RX ADMIN — SODIUM CHLORIDE, PRESERVATIVE FREE 10 ML: 5 INJECTION INTRAVENOUS at 20:27

## 2022-01-21 RX ADMIN — GABAPENTIN 100 MG: 100 CAPSULE ORAL at 08:38

## 2022-01-21 RX ADMIN — TAMSULOSIN HYDROCHLORIDE 0.4 MG: 0.4 CAPSULE ORAL at 08:37

## 2022-01-21 RX ADMIN — SEVELAMER CARBONATE 800 MG: 800 TABLET, FILM COATED ORAL at 08:38

## 2022-01-21 RX ADMIN — SEVELAMER CARBONATE 800 MG: 800 TABLET, FILM COATED ORAL at 17:18

## 2022-01-21 RX ADMIN — Medication 1500 MG: at 23:38

## 2022-01-21 RX ADMIN — SODIUM CHLORIDE, PRESERVATIVE FREE 10 ML: 5 INJECTION INTRAVENOUS at 08:38

## 2022-01-21 ASSESSMENT — PAIN SCALES - GENERAL: PAINLEVEL_OUTOF10: 0

## 2022-01-21 NOTE — PROGRESS NOTES
Speech Language/Pathology   SPEECH LANGUAGE AND CLINICAL BEDSIDE SWALLOWING TREATMENT  Speech Therapy Department       Malissa Lanes  AGE: 77 y.o. GENDER: male  : 1945  [id number]  EPISODE DATE:  2022    MEDICAL DIAGNOSIS: CVA  ONSET: 2022     Chief Complaint   Patient presents with    Fatigue     pt was recently d/cd from the hospital still with complaints of weakness and no energy    Fall     pt fell x 2 today            PAST MEDICAL HISTORY        Diagnosis Date    Arthritis     Cancer [facility])     Colon Cancer diagnosed last month    [facility] [address]     Hemodialysis patient [facility] [address] [facility]     Hypertension        PAST SURGICAL HISTORY    Past Surgical History:   Procedure Laterality Date    IR PERC ARTERIOVENOUS FISTULA CREATION Left     unsure of date       ALLERGIES    Allergies   Allergen Reactions    Lisinopril      Allergy listed on paperwork from [facility], no reaction noted     2022 admitted with c/o AMS and lethargy  MD ADMISSION H&P HPI: The patient is a 68 y. o. male w/ uncertain past medical hx but possibly was just hospitalized and recovered from [facility] a few weeks ago in [place] presents to [facility] from home for progressive worsening fatigue and somnolence that he reports has been going on for at least a week.  Patient is somnolent at this time and difficult to clarify full history. Magdalene Sylvester admits that he has not been eating or drinking much however but unable to quantify. Magdalene Sylvester denies other symptoms of fever chills or shortness of breath.  Unable to fully obtain further review of systems questions but he denies any focal weakness or vision changes.  At this time he just feels as he is very sleepy.  Unable to confirm any further chronic illnesses at this time however.     2022 CT CHEST  Impression   No evidence of acute intrathoracic, intraabdominal or intrapelvic injury.       Multifocal airspace disease.  This pattern may reflect COVID pneumonia. Correlate with COVID testing.      1/14/2022 MRI BRAIN  Impression   1. Punctate acute infarct in the posteromedial left parietal lobe, within the   deep white matter.    2. 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      Prior Status: lives with wife; reports independent with ADLs; wife completes cooking, cleaning, laundry with assist of cleaning lady; wife manages medications/appointments; wife manages finances; drives sometimes - reports \"I can\";' completed 10th grade; retired ; enjoys piddling around    Subjective:     Current diet  Regular  Thin    Pt/staff statements regarding diet tolerance:   Pt denies any difficulty     Cognitive-communication treatment progress:   Pt without complaint; pleasant and cooperative; persistent reduced insight    Objective: Assessment/Therapy activities and impression of progress/goal status   Treatment this session  Objective:  COMPREHENSION  Auditory Comprehension: [x]WFL for concrete []Mild   [] Moderate  []Severe  []To be assessed  Functional for 1-2 step commands, basic questions, concrete questions, simple conversation    EXPRESSION  Primary Mode of Expression: verbal  Verbal Expression: [x]WFL []Mild   [] Moderate  []Severe  []To be assessed  Wh question/responsive naming: functional word retrieval with extended time for information processing  Simple conversation: functional word retrieval with extended time for information processing     Pragmatics/Social Functioning: [x]WFL []Mild   [] Moderate  []Severe  []To be assessed      MOTOR SPEECH  Motor Speech: [x]WFL []Mild   [] Moderate  []Severe  []To be assessed   []Apraxia (appears resolved)   []Dysarthria   []Decreased Intelligibility: slurred speech appears resolved; mildly low volume which is suspected as baseline, pt does respond to cues for increased volume but no carryover    VOICE  Voice: [x]WFL []Mild   [] Moderate  []Severe  []To be assessed    COGNITION  []Unable to be assessed secondary to Aphasia     Overall Orientation : []WFL [x]Mild   [] Moderate  []Severe  []To be assessed   []Unable to be assessed secondary to Aphasia   Oriented to self, month, year, location, situation IND; min cues for city, [place]/date. Unable to read whiteboard from chair    Attention: []WFL [x]Mild   [x] Moderate  []Severe  []To be assessed  Sustained mild-mod    Memory: []WFL []Mild   [x] Moderate  []Severe  []To be assessed  Min cues for general recall of daily events  Mod-max cues for recall of details from day, MD visits, recommendations  Mod cues (category/semantic) for delayed recall (5\") of 3 novel units    Problem Solving: []WFL [x]Mild   [] Moderate  []Severe  []To be assessed  Generates solutions to pictured safety problems independently  Sequences steps (4) for daily tasks independently    Safety/Judgement: []WFL []Mild   [x] Moderate  [x]Severe  []To be assessed  Reduced insight persists    Goal Status: Speech and Language Goals  Goals:   Goal 1: Pt will improve verbal expression for naming and self expression via graded tasks to min cues ongoing  Goal 2: Pt will improve speech intelligibility in connected speech via graded tasks to min cues appears resolved  Goal 3: Patient will be Ox4 with independent use of external aids. Ongoing; unable to see whiteboard; not oriented to city, [facility]  Goal 4: The patient will tolerate ongoing evaluation/monitor for baseline level of function and determination of additional skilled ST needs ongoing; suspect cog skills are at/near baseline     DYSPHAGIA  Positioning: Upright in chair  PO Trials: Not addressed this date    Status of Goals: The patient will tolerate recommended diet without observed clinical signs of aspiration continue  The patient/caregiver will demonstrate understanding of compensatory strategies for improved swallowing safety.  continue    IMPRESSIONS  Cognitive Diagnosis: Moderate cognitive language impairments in the domains for selective attention, orientation, immediate/delayed recall, and problem solving. Patient reports dependence on wife prior to admission and reports current function as comparable to baseline; will continue to monitor and assess re: baseline function/skills, but suspect pt is at/near PLOF  Aphasia Diagnosis: Mildly reduced responsive naming d/t reduced thought organization for self expression  Speech Diagnosis: Motor speech impairments appear resolved     1/20/2022 Dysphagia Diagnosis: Mild oral stage dysphagia characterized by decreased mastication r/t edentulism and decreased lingual manipulation; prolonged but adequate bolus formation and movement with all with concern for premature bolus loss to the pharynx. Mild pharyngeal stage dysphagia characterized by delayed swallow and decreased laryngeal elevation; No overt signs/symptoms of penetration/aspiration; however, variable wet vocal quality with and without PO -rec continued monitor for tolerance.     Recommended Diet: Regular, thin   Compensatory Strategies: Upright as possible for all oral intake,Remain upright for 30-45 minutes after meals    Plan     Plan/Recommendations:   Requires SLP Intervention: Yes  Therapeutic Interventions: Diet tolerance monitoring,Patient/Family education, cognitive-communication remediation, motor speech monitoring; aphasia remediation  Duration/Frequency of Treatment: ST to tx 3-5 times per week during acute admission unless otherwise notified    Barriers toward progress toward pt goals:  Cognitive deficit    Prognosis  Guarded for ST d/t previous level of function and severity of imps    Education  Consulted and agree with results and recommendations: Patient,RN  Patient Education: Completed on results/recs/plan  Patient Education Response: Needs reinforcement    Discharge Recommendations:  Pt will benefit from continued skilled Speech Therapy for Speech and Dysphagia services, prior to returning home.    Please accept this as Speech Therapy Discharge status, if pt is discharged prior to next therapy session. Timed Code Treatment:  SLP Individual Minutes  Time In: 4803   Time Out: 1625  Minutes: 30    Total Treatment Time:  15 SLP, 15 cog     Signed:  Tali Donovan, [address] [facility], [id number]  Speech-Language Pathologist  Portable phone: [phone]  01/21/22 4:25 PM

## 2022-01-21 NOTE — PROGRESS NOTES
Speech Language Pathology    Treatment attempted. PT/OT with patient at this time. ST to re-attempt as schedule permits unless otherwise notified. Thank you. Tali Jiang [facility], [address] [facility], [id number]  Speech-Language Pathologist  Portable phone: [phone]

## 2022-01-21 NOTE — PROGRESS NOTES
Occupational Therapy  Facility/Department: [facility] 5W PROGRESSIVE CARE  Daily Treatment Note  NAME: Katiana Lomax  : [birth year]  MRN: [id number]    Date of Service: 2022    Discharge Recommendations:  Patient would benefit from continued therapy after discharge,5-7 sessions per week  OT Equipment Recommendations  Other: defer to DC facility    Katiana Lomax scored a 16/24 on the AM-PAC ADL Inpatient form. Current research shows that an AM-PAC score of 17 or less is typically not associated with a discharge to the patient's home setting. Based on the patient's AM-PAC score and their current ADL deficits, it is recommended that the patient have 5-7 sessions per week of Occupational Therapy at d/c to increase the patient's independence. At this time, this patient demonstrates the endurance, and/or tolerance for 3 hours of therapy each day, with a treatment frequency of 5-7x/wk. Please see assessment section for further patient specific details. If patient discharges prior to next session this note will serve as a discharge summary. Please see below for the latest assessment towards goals. Assessment   Performance deficits / Impairments: Decreased functional mobility ; Decreased safe awareness;Decreased balance;Decreased ADL status; Decreased cognition;Decreased high-level IADLs;Decreased endurance;Decreased strength  Assessment: 78 y/o male admit 2022 with LESLEE, GI Bleed; Weakness. 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 Surg consult (recent dx Colon Ca ~ 1 month ago; await surg). Recent dx Pneumonia, COVID+ (cont weak/falls). PTA pt lives at home with spouse and was independent with ADLs and functional mobility (RW only for long distances). Today, pt lethargic and difficult maintaining alertness. Pt required mod A to stand and min A to take a few steps chair > bed with RW. RN aware and in room to assess lethargy. Anticipate pt will require up to max A for LB dressing at this time. Pt would benefit from continued therapy prior to discharge home to increase strength and safety with transfers, and ADL's prior to discharge home. Prognosis: Good  OT Education: OT Role;Transfer Training;Plan of Care  REQUIRES OT FOLLOW UP: Yes  Activity Tolerance  Activity Tolerance: Patient limited by fatigue         Patient Diagnosis(es): The primary encounter diagnosis was LESLEE (acute kidney injury) ([facility] [address] Diagnoses of Gastrointestinal hemorrhage, unspecified gastrointestinal hemorrhage type, COVID, and Acute cystitis without hematuria were also pertinent to this visit. has a past medical history of Arthritis, Cancer ([facility] [address] Diabetes mellitus ([facility] [address] Hemodialysis patient ([facility] [address] and Hypertension. has a past surgical history that includes IR PERC ARTERIOVENOUS FISTULA CREATION (Left). Restrictions  Restrictions/Precautions  Restrictions/Precautions: Fall Risk     Subjective   General  Chart Reviewed: Yes  Patient assessed for rehabilitation services?: Yes  Additional Pertinent Hx: 78 y/o male admit 1/13/2022 with LESLEE, GI Bleed; Weakness. 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 Surg consult (recent dx Colon Ca ~ 1 month ago; await surg). Recent dx Pneumonia, COVID+ (cont weak/falls). PMH as noted including CKD (HD), COVID+. Family / Caregiver Present: No  Referring Practitioner: Dr. Hermila Tong: Pt seen beside, sleeping in chair upon arrival. Pt lethargic and difficult keeping eyes open to participate.  Pt mumbles \"I don't feel myself\"- RN aware of pt lethargy  General Comment  Comments: Per RN ok for therapy      Orientation  Orientation  Orientation Level: Oriented to person;Oriented to place;Oriented to time;Disoriented to situation     Objective     Balance  Sitting Balance: Contact guard assistance (EOB ~ 5 min -  falling asleep quickly and loosing balance)  Standing Balance: Minimal assistance  Functional Mobility  Functional Mobility Comments: few steps chair > bed with RW and min/mod A. Difficult with turning     Bed mobility  Supine to Sit:  (pt in chair at start of session)  Sit to Supine: Moderate assistance  Scooting: Dependent/Total (to move up towards [facility])     Transfers  Sit to stand: Moderate assistance  Stand to sit: Minimal assistance  Transfer Comments: stood from chair > RW- significant effort        Cognition  Overall Cognitive Status: Exceptions  Arousal/Alertness: Inconsistent responses to stimuli  Following Commands: Follows one step commands with repetition; Follows one step commands with increased time  Memory: Decreased recall of recent events  Safety Judgement: Decreased awareness of need for safety  Insights: Decreased awareness of deficits  Cognition Comment: Pt more lethargic this date- falling back to sleep quickly.  RN aware               Plan   Plan  Times per week: 3-5  Current Treatment Recommendations: Strengthening,Endurance Training,Balance Training,Gait Training,Functional Mobility Training,Self-Care / ADL    AM-PAC Score  [facility] Inpatient Daily Activity Raw Score: 16 (01/21/22 1302)  AM-PAC Inpatient ADL T-Scale Score : 35.96 (01/21/22 1302)  ADL Inpatient CMS 0-100% Score: 53.32 (01/21/22 1302)  ADL Inpatient CMS G-Code Modifier : CK (01/21/22 1302)    Goals  Short term goals  Time Frame for Short term goals: Prior to DC: goals ongoing  Short term goal 1: Pt will complete ADL transfers with supervision  Short term goal 2: Pt will complete functional mobility with supervision  Short term goal 3: Pt will tolerate standing > 5 min for functional task with supervision  Short term goal 4: Pt will complete toileting with supervision  Short term goal 5: Pt will complete LB dressing with supervision  Patient Goals   Patient goals : to return home       Therapy Time   Individual Concurrent Group Co-treatment   Time In 1105         Time Out 1130         Minutes 25         Timed Code Treatment Minutes: 25 Minutes     This note to serve as OT d/c summary if pt is d/c-ed prior to next therapy session.     Harini Patricio, OTR/L

## 2022-01-21 NOTE — PROGRESS NOTES
Physical Therapy  Facility/Department: [facility] 5W PROGRESSIVE CARE  Daily Treatment Note  NAME: Elfego Torres  : 1945  MRN: [id number]    Date of Service: 2022    Discharge Recommendations:  5-7 sessions per week,Patient would benefit from continued therapy after discharge   PT Equipment Recommendations  Equipment Needed: No  Other: defer to next level of care     Elfego Torres scored a 16/24 on the AM-PAC short mobility form. Current research shows that an AM-PAC score of 17 or less is typically not associated with a discharge to the patient's home setting. Based on the patient's AM-PAC score and their current functional mobility deficits, it is recommended that the patient have 5-7 sessions per week of Physical Therapy at d/c to increase the patient's independence. At this time, this patient demonstrates the endurance, and/or tolerance for 3 hours of therapy each day, with a treatment frequency of 5-7x/wk. Please see assessment section for further patient specific details. If patient discharges prior to next session this note will serve as a discharge summary. Please see below for the latest assessment towards goals. Assessment   Body structures, Functions, Activity limitations: Decreased functional mobility ; Decreased strength;Decreased safe awareness;Decreased endurance;Decreased balance  Assessment: 78 y/o male admit 2022 with LESLEE, GI Bleed; Weakness. 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 Surg consult (recent dx Colon Ca ~ 1 month ago; await surg). Recent dx Pneumonia, COVID+ (cont weak/falls). PMH as noted including CKD (HD), COVID+. PTA pt living with wife in mobile home with ramp access; independent daily care and functional mobility although recent cont weak following Pneumonia/COVID. Today, pt lethargic and limited participation in therapy session. He did ambulated 3' bed to chair with RW and CGA-Shelton and required Shelton to stand to RW from elevated bed.   Pt is an elevated fall risk and needs to be more Ind to return home as his wife is unable to provide assist pt needs plus he needs to be able to get in and out of house to go to dialysis; In light of pt's current mobility status and need for surgery for colon CA recommend pt have conintued therapy 5-7x/wk to address deficits to improve his balance, strength and endurance to allow pt to return home safely  Prognosis: Good  PT Education: General Safety; Functional Mobility Training;Plan of Care;Transfer Training;Orientation  REQUIRES PT FOLLOW UP: Yes  Activity Tolerance  Activity Tolerance: Patient limited by endurance; Patient limited by fatigue;Patient limited by cognitive status; Other  Activity Tolerance: limited by lethargy this session- RN Jane notified     Patient Diagnosis(es): The primary encounter diagnosis was LESLEE (acute kidney injury) ([facility] [address] Diagnoses of Gastrointestinal hemorrhage, unspecified gastrointestinal hemorrhage type, COVID, and Acute cystitis without hematuria were also pertinent to this visit. has a past medical history of Arthritis, Cancer ([facility] [address] Diabetes mellitus ([facility] [address] Hemodialysis patient ([facility] [address] and Hypertension. has a past surgical history that includes IR PERC ARTERIOVENOUS FISTULA CREATION (Left). Restrictions  Restrictions/Precautions  Restrictions/Precautions: Fall Risk     Social/Functional History  Lives With: Spouse (Wife Sridevi Rosario). )  Type of Home: Mobile home  Home Layout: One level  Home Access: Ramped entrance  Bathroom Shower/Tub: Walk-in shower  Bathroom Toilet: Handicap height  Bathroom Equipment: Grab bars in [address] Accessibility: Martín Rocha: Rolling walker  ADL Assistance: [address]:  (Wife takes care of most homemaking needs.)  Ambulation Assistance: Independent (Occass use of Walker (longer distances). )  Transfer Assistance: Independent  Active : No (Able, although doesn't drive.   Family takes care of errands. Medical transport to/from HD.)  Occupation: Retired  Type of occupation: Retired : various jobs ([employer], [employer], [employer]). Additional Comments: 2 Dtrs, 1 Son live in area. Reports recent fall (slid off bed onto floor), wife able to assist up. Subjective   General  Chart Reviewed: Yes  Additional Pertinent Hx: 78 y/o male admit 1/13/2022 with LESLEE, GI Bleed; Weakness. 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 Surg consult (recent dx Colon Ca ~ 1 month ago; await surg). Recent dx Pneumonia, COVID+ (cont weak/falls). PMH as noted including CKD (HD), COVID+. Response To Previous Treatment: Patient with no complaints from previous session. Family / Caregiver Present: No  Referring Practitioner: Dr. Denita Hair. Subjective  Subjective: Pt supine in bed upon arrival.  Required encouragement to participate in therapy this date. Lethargic. States \"I have no energy\"  throughout the session. Denies pain. Orientation  Orientation  Overall Orientation Status: Impaired  Orientation Level: Oriented to person;Oriented to place; Disoriented to situation;Disoriented to time     Objective   Bed mobility  Supine to Sit: Minimal assistance; Moderate assistance (HOB elevated, cues for technique)  Sit to Supine: Unable to assess (up in chair at end of session)     Transfers  Sit to Stand: Minimal Assistance  Stand to sit: Minimal Assistance  Comment: cues for hand placement and safety     Ambulation  Ambulation?: Yes  More Ambulation?: No  Ambulation 1  Surface: level tile  Device: Rolling Walker  Assistance: Contact guard assistance;Minimal assistance  Quality of Gait: slow pace with flexed posture on walker; assist with walker management during turn; walker too far in front; attempts to sit in chair too soon  Gait Deviations: Slow Tierra; Increased KEY; Decreased step length;Decreased step height;Staggers  Distance: 3' bed to chair  Comments: max distance this date due to lethargy  Stairs/Curb  Stairs?: No     Balance  Posture: Fair  Comments: CGA for sitting balance EOB; CGA-Shelton for standing balance at RW     Exercises  Knee Long Arc Quad: pt refused due to fatigue  Ankle Pumps: x10 heydi seated         Other Activities: Other (see comment)  Comment: pt set up with breakfast tray at end of session           AM-PAC Score  AM-PAC Inpatient Mobility Raw Score : 16 (01/21/22 0927)  AM-PAC Inpatient T-Scale Score : 40.78 (01/21/22 0927)  Mobility Inpatient CMS 0-100% Score: 54.16 (01/21/22 0927)  Mobility Inpatient CMS G-Code Modifier : CK (01/21/22 0927)          Goals  Short term goals  Time Frame for Short term goals: Upon d/c acute care setting. (goals ongoing as of 1/21)  Short term goal 1: Bed Mob SBA. Short term goal 2: Transfers with assist device SBA. Short term goal 3: Amb with assist device 25-50' SBA/CGA. met 1-20: new goal amb 100' with wh walker SBA  Short term goal 4: Pt participating in approp Strength Exs. Patient Goals   Patient goals : Get stronger and return home. Plan    Plan  Times per week: 3-5x week while in acute care setting.   Current Treatment Recommendations: Strengthening,Functional Mobility Training,Transfer Training,Gait Training,Safety Education & Training,Patient/Caregiver Education & Training  Safety Devices  Type of devices: Call light within reach,Chair alarm in place,Left in chair,Nurse notified,All fall risk precautions in place,Gait belt,Patient at risk for falls (RN Jane notified)  Restraints  Initially in place: No     Therapy Time   Individual Concurrent Group Co-treatment   Time In 0855         Time Out 0928         Minutes 33         Timed Code Treatment Minutes: 33 Minutes         Electronically signed by Gabrielle Cosby on 1/21/2022 at 9:31 AM

## 2022-01-21 NOTE — PROGRESS NOTES
Hospitalist Progress Note    Patient:  Gregory Sullivan  Unit/Bed:P3C-1889/5107-01   YOB: 1945       MRN: [id number] Acct: [de-identified]  PCP: Brittany Patrick    Date of Admission: 1/13/2022  --------------------------    Chief Complaint:     Mental status changes    Hospital Course:     Gregory Sullivan is a 77 y.o. male hospitalized on 1/13/2022   multiple comorbidities presented to the ED found to have acute CVA. Assessment/plan:     Acute encephalopathy secondary to CVA with underlying cognitive impairment. -MRI of the brain showed punctate acute infarct in the posterior medial left parietal lobe  MRA of the head and neck showed no flow-limiting stenosis    Check U/A, ammonia, TSH/free T4, CT Head without contrast today     Continue aspirin,   statin  -    PT and OT. Likely need ECF. End-stage renal failure, on hemodialysis  Continue hemodialysis. Discussed with nephrology, patient trying to relocate to [place], nephrologist is assisting with finding a location for his outpatient HD. Hypomagnesemia  Resolved      Hypokalemia  Resolved       Recent hospitalization for COVID  Currently on room air. History of colon cancer/GI bleed/acute blood loss anemia     -Hemoglobin remained stable, evaluated by GI, tolerating aspirin  -Surgery service consulted, awaiting their final recommendation in terms of doing the surgery here as the patient is relocating to [place]. Patient has a revised cardiac risk index score of 2 (with hx CVA and pre-operative creatinine > 2), indicating a class III risk, which carries a 10.1% 30-day risk of death, MI, or cardiac arrest. Patient is a moderate risk for the planned procedure and may proceed with the planned procedure as the benefit of the procedure outweigh the risks. Bacteriuria without urinary symptoms, culture data show Enterococcus faecalis  No symptoms, discontinue ceftriaxone. Obesity Body mass index is 30.02 kg/m².    Complicating assessment and treatment. Placing patient at risk for multiple co-morbidities as well as early death and contributing to the patient's presentation. Code Status: Full Code         DVT prophylaxis: SCD     Disposition:   Surgery tentatively planned for next Wednesday    Discussed with the patient  ----------------      Subjective:     Patient seen and examined  Doing well, has no complaints. Diet: ADULT DIET; Regular; Low Sodium (2 gm); Low Potassium (Less than 3000 mg/day); Low Phosphorus (Less than 1000 mg); 2000 ml    OBJECTIVE     Exam:  /75   Pulse 67   Temp 97.8 °F (36.6 °C) (Oral)   Resp 18   Ht 6' 0.5\" (1.842 m)   Wt 224 lb 6.9 oz (101.8 kg)   SpO2 97%   BMI 30.02 kg/m²        Gen: Not in distress. Alert. Head: Normocephalic. Atraumatic. Eyes: Conjunctivae/corneas clear. ENT: Oral mucosa moist  Neck: No JVD. No obvious thyromegaly. CVS: Nml S1S2, no murmur  , RRR  Pulmomary: Clear bilaterally. No crackles. No wheezes. Gastrointestinal: Soft, non tender, non distend, . Musculoskeletal: Left upper extremity fistula  Neuro: No focal deficit. Moves extremity spontaneously. Psychiatry: Appropriate affect. Not agitated.       Medications:  Reviewed    Infusion Medications    sodium chloride 5 mL/hr at 01/15/22 2102     Scheduled Medications    aspirin  81 mg Oral Daily    atorvastatin  80 mg Oral Nightly    [Held by provider] gabapentin  100 mg Oral TID    pantoprazole  20 mg Oral Nightly    sevelamer  800 mg Oral TID WC    tamsulosin  0.4 mg Oral QAM    sodium chloride flush  5-40 mL IntraVENous 2 times per day     PRN Meds: ondansetron **OR** ondansetron, polyethylene glycol, sodium chloride flush, sodium chloride, acetaminophen **OR** acetaminophen, perflutren lipid microspheres      Intake/Output Summary (Last 24 hours) at 1/21/2022 1130  Last data filed at 1/21/2022 0949  Gross per 24 hour   Intake 1120 ml   Output 2000 ml   Net -880 ml             Labs:   Recent Labs 01/19/22  0449 01/20/22  0425 01/21/22  0413   WBC 5.4 5.4 5.1   HGB 8.6* 9.7* 9.4*   HCT 26.6* 29.5* 27.9*    194 166     Recent Labs     01/19/22  0448 01/20/22  0425 01/21/22  0413   * 139 138   K 4.7 4.4 4.6    101 100   CO2 24 24 25   BUN 35* 42* 29*   CREATININE 4.1* 5.1* 4.4*   CALCIUM 8.2* 8.5 8.2*     No results for input(s): AST, ALT, BILIDIR, BILITOT, ALKPHOS in the last 72 hours. No results for input(s): INR in the last 72 hours. No results for input(s): Ellene Lazier in the last 72 hours. Urinalysis:      Lab Results   Component Value Date    NITRU Negative 01/13/2022    WBCUA >900 01/13/2022    RBCUA 0-2 01/13/2022    BLOODU LARGE 01/13/2022    SPECGRAV 1.025 01/13/2022    GLUCOSEU Negative 01/13/2022       Radiology:  MRA neck without contrast   Final Result   No convincing flow limiting stenosis of the visualized carotid/vertebral   arteries within the limitations of this exam.         MRA head without contrast   Final Result   No apparent intracranial arterial high grade stenosis, occlusion or aneurysm   head at 5 within constraints of acquisition. MRI BRAIN WO CONTRAST   Final Result   1. Punctate acute infarct in the posteromedial left parietal lobe, within the   deep white matter. 2. 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 RECOMMENDATIONS:   Unavailable         US RENAL COMPLETE   Final Result   1. Simple cysts in the left kidney with no other significant renal finding. 2. Borderline enlargement of the prostate. Correlate with urologic history. CT CHEST ABDOMEN PELVIS WO CONTRAST   Final Result   No evidence of acute intrathoracic, intraabdominal or intrapelvic injury. Multifocal airspace disease. This pattern may reflect COVID pneumonia. Correlate with COVID testing. CT HEAD WO CONTRAST   Final Result   No acute intracranial abnormality.   Specifically, no acute intracranial   hemorrhage or mass effect. Extensive chronic small vessel ischemic disease.          CT HEAD WO CONTRAST    (Results Pending)               Electronically signed by Cecile Mcneil MD on 1/21/2022 at 11:30 AM

## 2022-01-21 NOTE — PROGRESS NOTES
[facility]                                               Progress note    Summary:   Lalita Ontiveros is being seen by nephrology for ESRD. This is a 76 yo man with ESRD on HD three times weekly via left AVF who is here after a fall and AMS. He has been diagnosed with colon cancer and has been having rectal bleeding off and on for the past several weeks. From [facility]. Had [facility] last month so has been dialyzing at a different dialysis unit for 20 day period. Last HD on Thursday this week. No rectal bleeding since being here at [facility]. GI has no plans for him. Daughter is at bedside. Apparently her father and mother both had a fall yesterday prompting the ED visit. Interval History  Seen and examined on dialysis. Feeling well. No complaints  No chest pain or SOB. Spoke with his wife Otis Lovell on this phone. /75  Last Post weight 101.5 kilos and appears euvolemic. Labs reviewed. No issues. Plan:   - no HD needs today, will continue TTS schedule. - BP acceptable. Appears euvolemic.  - requesting dialysis spot at [address], awaiting confirmation prior to discharge. Xi Bowman MD  [facility]  Office: [phone]    Assessment:   ESRD  Does dialysis Monday Wednesday Friday usually but has been doing TTS at the [facility] unit near [facility]  He has a left AV fistula, positive thrill and bruit  Last at dialysis was on Thursday 1/14     Electrolytes  No acute issues.     Hypertension  Blood pressure is acceptable. No changes.      S HPT  Calcium ok  Check phosphorus     GI bleed  Has known colon cancer  No active bleeding  Hemoglobin stable  General surgery consulted.      Altered mental status  MRI showed small acute stroke  Neurology consulted  Mental status has improved. ROS:   Positives Listed Bold.  All other remaining systems are negative.     Constitutional:  fever, chills, weakness, weight change, fatigue,      Skin:  rash, pruritus, hair loss, bruising, dry skin, petechiae. Head, Face, Neck   headaches, swelling,  cervical adenopathy.     Respiratory: shortness of breath, cough, or wheezing  Cardiovascular: chest pain, palpitations, dizzy, edema  Gastrointestinal: nausea, vomiting, diarrhea, constipation,belly pain    Yellow skin, blood in stool  Musculoskeletal:  back pain, muscle weakness, gait problems,       joint pain or swelling. Genitourinary:  dysuria, poor urine flow, flank pain, blood in urine  Neurologic:  vertigo, TIA'S, syncope, seizures, focal weakness  Psychosocial:  insomnia, anxiety, or depression. Additional positive findings: -     PMH:   Past medical history, surgical history, social history, family history are reviewed and updated as appropriate. Reviewed current medication list.   Allergies reviewed and updated as needed. PE:   Vitals:    01/21/22 1120   BP: 125/75   Pulse: 67   Resp: 18   Temp: 97.8 °F (36.6 °C)   SpO2: 97%       General appearance: Male in no acute distress,awake and alert. HEENT: no icterus, EOM intact, trachea midline. Neck : no masses, appears symmetrical and no JVD appreciated. Respiratory: Respiratory effort normal, bilateral equal chest rise. Cardiovascular: Ausculation shows RRR and no edema   Abdomen: abdomen is soft, non distended, no masses, no pain with palpation. Musculoskeletal:  no joint swelling, no deformity  Skin: no rashes, no induration, no tightening, no jaundice   Neuro:   Follows commands, moves all extremities spontaneously   Left AV fistula positive thrill and bruit      Lab Results   Component Value Date    CREATININE 4.4 (H) 01/21/2022    BUN 29 (H) 01/21/2022     01/21/2022    K 4.6 01/21/2022     01/21/2022    CO2 25 01/21/2022      Lab Results   Component Value Date    WBC 5.1 01/21/2022    HGB 9.4 (L) 01/21/2022    HCT 27.9 (L) 01/21/2022    MCV 88.9 01/21/2022     01/21/2022     Lab Results   Component Value Date    CALCIUM 8.2 (L) 01/21/2022

## 2022-01-21 NOTE — PROGRESS NOTES
General Surgery    Patient stable  Seems ready to move ahead with colectomy for sigmoid colon cancer    Recent CVA but no ongoing issues    Can plan sigmoid resection next week    OK for discharge from my standpoint    Will arrange for 1/26/2022 if cleared from a medicine standpoint    Electronically signed by Fatou Rich MD on 1/21/2022 at 3:01 PM

## 2022-01-21 NOTE — PLAN OF CARE
Problem: Falls - Risk of:  Goal: Will remain free from falls  Description: Will remain free from falls  1/21/2022 1507 by London Hargrove RN  Outcome: Ongoing  1/21/2022 0122 by Madi Lilly RN  Outcome: Ongoing  Goal: Absence of physical injury  Description: Absence of physical injury  1/21/2022 1507 by London Hargrove RN  Outcome: Ongoing  1/21/2022 0122 by Madi Lilly RN  Outcome: Ongoing     Problem: Skin Integrity:  Goal: Will show no infection signs and symptoms  Description: Will show no infection signs and symptoms  1/21/2022 1507 by London Hargrove RN  Outcome: Ongoing  1/21/2022 0122 by Madi Lilly RN  Outcome: Ongoing  Goal: Absence of new skin breakdown  Description: Absence of new skin breakdown  1/21/2022 1507 by London Hargrove RN  Outcome: Ongoing  1/21/2022 0122 by Madi Lilly RN  Outcome: Ongoing     Problem: HEMODYNAMIC STATUS  Goal: Patient has stable vital signs and fluid balance  1/21/2022 1507 by London Hargrove RN  Outcome: Ongoing  1/21/2022 0122 by Madi Lilly RN  Outcome: Ongoing     Problem: ACTIVITY INTOLERANCE/IMPAIRED MOBILITY  Goal: Mobility/activity is maintained at optimum level for patient  1/21/2022 1507 by London Hargrove RN  Outcome: Ongoing  1/21/2022 0122 by Madi Lilly RN  Outcome: Ongoing     Problem: Fluid Volume:  Goal: Will show no signs or symptoms of fluid imbalance  Description: Will show no signs or symptoms of fluid imbalance  1/21/2022 1507 by London Hargrove RN  Outcome: Ongoing  1/21/2022 0122 by Madi Lilly RN  Outcome: Ongoing

## 2022-01-21 NOTE — PLAN OF CARE
Problem: Falls - Risk of:  Goal: Will remain free from falls  Description: Will remain free from falls  Outcome: Ongoing     Problem: Falls - Risk of:  Goal: Absence of physical injury  Description: Absence of physical injury  Outcome: Ongoing     Problem: Skin Integrity:  Goal: Will show no infection signs and symptoms  Description: Will show no infection signs and symptoms  Outcome: Ongoing     Problem: Skin Integrity:  Goal: Absence of new skin breakdown  Description: Absence of new skin breakdown  Outcome: Ongoing     Problem: HEMODYNAMIC STATUS  Goal: Patient has stable vital signs and fluid balance  Outcome: Ongoing     Problem: ACTIVITY INTOLERANCE/IMPAIRED MOBILITY  Goal: Mobility/activity is maintained at optimum level for patient  Outcome: Ongoing     Problem: Fluid Volume:  Goal: Will show no signs or symptoms of fluid imbalance  Description: Will show no signs or symptoms of fluid imbalance  Outcome: Ongoing

## 2022-01-21 NOTE — PROGRESS NOTES
Nutrition Assessment     Type and Reason for Visit: Initial,RD Nutrition Re-Screen/LOS    Nutrition Recommendations/Plan:   Continue on renal diet   Monitor intake  Reinforce diet with any future visits     Nutrition Assessment:  Pt seen d/t LOS. Pt had no nutritional concerns on admission. Pt admitted with CVA. Pt has ESRD on HD and colon Ca. Pt is planning for colectomy. Pt is on renal diet here. Difficult to get specific answers regarding diet. He states his home diet is similar to what he has here although no more specific. Snack foods on bedside table. Question how well diet is followed. He is eating % of meals as recorded although he reports he does not eat at all if he does not like what is served. Malnutrition Assessment:  Malnutrition Status: No malnutrition    Estimated Daily Nutrient Needs:  Energy (kcal):  ; Weight Used for Energy Requirements:        Protein (g):  ; Weight Used for Protein Requirements:           Fluid (ml/day):  ; Weight Used for Fluid Requirements:         Nutrition Related Findings: BM 1/21, BLE trace edema      Current Nutrition Therapies:    ADULT DIET; Regular; Low Sodium (2 gm); Low Potassium (Less than 3000 mg/day);  Low Phosphorus (Less than 1000 mg); 2000 ml    Anthropometric Measures:  · Height: 6' 0.5\" (184.2 cm)  · Current Body Wt: 224 lb 6.9 oz (101.8 kg)   · BMI: 30    Nutrition Diagnosis:   · No nutrition diagnosis at this time related to   as evidenced by        Nutrition Interventions:   Food and/or Nutrient Delivery:  Continue Current Diet  Nutrition Education/Counseling:  Education declined   Coordination of Nutrition Care:  Continue to monitor while inpatient    Goals:  Continued intake >50%       Nutrition Monitoring and Evaluation:   Behavioral-Environmental Outcomes:  None Identified   Food/Nutrient Intake Outcomes:  Food and Nutrient Intake  Physical Signs/Symptoms Outcomes:  Biochemical Data,Nutrition Focused Physical Findings,Skin,Weight Discharge Planning:    No discharge needs at this time     Electronically signed by Cameron Henry RD, LD on 1/21/22 at 2:55 PM EST    Contact: [phone]

## 2022-01-22 LAB
ANION GAP SERPL CALCULATED.3IONS-SCNC: 11 MMOL/L (ref 3–16)
BASOPHILS ABSOLUTE: 0 K/UL (ref 0–0.2)
BASOPHILS RELATIVE PERCENT: 0.8 %
BUN BLDV-MCNC: 45 MG/DL (ref 7–20)
CALCIUM SERPL-MCNC: 8.5 MG/DL (ref 8.3–10.6)
CHLORIDE BLD-SCNC: 97 MMOL/L (ref 99–110)
CO2: 25 MMOL/L (ref 21–32)
CREAT SERPL-MCNC: 5.2 MG/DL (ref 0.8–1.3)
EOSINOPHILS ABSOLUTE: 0.1 K/UL (ref 0–0.6)
EOSINOPHILS RELATIVE PERCENT: 3.5 %
GFR AFRICAN AMERICAN: 13
GFR NON-AFRICAN AMERICAN: 11
GLUCOSE BLD-MCNC: 127 MG/DL (ref 70–99)
HCT VFR BLD CALC: 28.8 % (ref 40.5–52.5)
HEMOGLOBIN: 9.5 G/DL (ref 13.5–17.5)
LYMPHOCYTES ABSOLUTE: 1 K/UL (ref 1–5.1)
LYMPHOCYTES RELATIVE PERCENT: 23.6 %
MAGNESIUM: 2.1 MG/DL (ref 1.8–2.4)
MCH RBC QN AUTO: 29.6 PG (ref 26–34)
MCHC RBC AUTO-ENTMCNC: 33.1 G/DL (ref 31–36)
MCV RBC AUTO: 89.4 FL (ref 80–100)
MONOCYTES ABSOLUTE: 0.3 K/UL (ref 0–1.3)
MONOCYTES RELATIVE PERCENT: 6.5 %
NEUTROPHILS ABSOLUTE: 2.7 K/UL (ref 1.7–7.7)
NEUTROPHILS RELATIVE PERCENT: 65.6 %
PDW BLD-RTO: 14.2 % (ref 12.4–15.4)
PLATELET # BLD: 154 K/UL (ref 135–450)
PMV BLD AUTO: 7.7 FL (ref 5–10.5)
POTASSIUM SERPL-SCNC: 4.4 MMOL/L (ref 3.5–5.1)
RBC # BLD: 3.22 M/UL (ref 4.2–5.9)
SODIUM BLD-SCNC: 133 MMOL/L (ref 136–145)
VANCOMYCIN RANDOM: 17.2 UG/ML
WBC # BLD: 4.1 K/UL (ref 4–11)

## 2022-01-22 PROCEDURE — 85025 COMPLETE CBC W/AUTO DIFF WBC: CPT

## 2022-01-22 PROCEDURE — 80048 BASIC METABOLIC PNL TOTAL CA: CPT

## 2022-01-22 PROCEDURE — 2060000000 HC ICU INTERMEDIATE R&B

## 2022-01-22 PROCEDURE — APPSS45 APP SPLIT SHARED TIME 31-45 MINUTES: Performed by: NURSE PRACTITIONER

## 2022-01-22 PROCEDURE — APPNB45 APP NON BILLABLE 31-45 MINUTES: Performed by: NURSE PRACTITIONER

## 2022-01-22 PROCEDURE — 80202 ASSAY OF VANCOMYCIN: CPT

## 2022-01-22 PROCEDURE — 36415 COLL VENOUS BLD VENIPUNCTURE: CPT

## 2022-01-22 PROCEDURE — 2580000003 HC RX 258: Performed by: STUDENT IN AN ORGANIZED HEALTH CARE EDUCATION/TRAINING PROGRAM

## 2022-01-22 PROCEDURE — 94760 N-INVAS EAR/PLS OXIMETRY 1: CPT

## 2022-01-22 PROCEDURE — 90935 HEMODIALYSIS ONE EVALUATION: CPT

## 2022-01-22 PROCEDURE — 6370000000 HC RX 637 (ALT 250 FOR IP): Performed by: HOSPITALIST

## 2022-01-22 PROCEDURE — 6360000002 HC RX W HCPCS: Performed by: HOSPITALIST

## 2022-01-22 PROCEDURE — 2580000003 HC RX 258: Performed by: HOSPITALIST

## 2022-01-22 PROCEDURE — 83735 ASSAY OF MAGNESIUM: CPT

## 2022-01-22 RX ADMIN — PANTOPRAZOLE SODIUM 20 MG: 20 TABLET, DELAYED RELEASE ORAL at 20:34

## 2022-01-22 RX ADMIN — ATORVASTATIN CALCIUM 80 MG: 80 TABLET, FILM COATED ORAL at 20:34

## 2022-01-22 RX ADMIN — SEVELAMER CARBONATE 800 MG: 800 TABLET, FILM COATED ORAL at 12:10

## 2022-01-22 RX ADMIN — SEVELAMER CARBONATE 800 MG: 800 TABLET, FILM COATED ORAL at 08:57

## 2022-01-22 RX ADMIN — SODIUM CHLORIDE, PRESERVATIVE FREE 10 ML: 5 INJECTION INTRAVENOUS at 08:58

## 2022-01-22 RX ADMIN — SEVELAMER CARBONATE 800 MG: 800 TABLET, FILM COATED ORAL at 18:18

## 2022-01-22 RX ADMIN — TAMSULOSIN HYDROCHLORIDE 0.4 MG: 0.4 CAPSULE ORAL at 08:57

## 2022-01-22 RX ADMIN — ASPIRIN 81 MG 81 MG: 81 TABLET ORAL at 08:57

## 2022-01-22 RX ADMIN — SODIUM CHLORIDE, PRESERVATIVE FREE 10 ML: 5 INJECTION INTRAVENOUS at 20:34

## 2022-01-22 RX ADMIN — VANCOMYCIN HYDROCHLORIDE 1000 MG: 1 INJECTION, POWDER, LYOPHILIZED, FOR SOLUTION INTRAVENOUS at 16:20

## 2022-01-22 ASSESSMENT — PAIN SCALES - GENERAL
PAINLEVEL_OUTOF10: 0

## 2022-01-22 NOTE — PROGRESS NOTES
[facility]                                               Progress note    Summary:   Ruthy Lewis is being seen by nephrology for ESRD. This is a 78 yo man with ESRD on HD three times weekly via left AVF who is here after a fall and AMS. He has been diagnosed with colon cancer and has been having rectal bleeding off and on for the past several weeks. From [facility]. Had [facility] last month so has been dialyzing at a different dialysis unit for 20 day period. Last HD on Thursday this week. No rectal bleeding since being here at [place]. GI has no plans for him. Daughter is at bedside. Apparently her father and mother both had a fall yesterday prompting the ED visit. Interval History  The patient on schedule for hemodialysis today  Per patient, he had been on dialysis schedule Tuesday, Thursday and Saturday  Per patient, he has been on hemodialysis for the past 3 years  His sodium level was 133  Explained to patient that his sodium level was low if probably due to too much fluid consumption and advised patient to cut back on fluids consumption. Review of medication list and he got 1 dose of vancomycin yesterday  No concerning medications identified on medication list.  The patient's urine culture was positive for Enterococcus faecalis, but not VRE. Not sure the patient needs vancomycin, because I do not see a positive blood culture. Plan:   Hemodialysis today as scheduled  Based on the information available, no need to push the patient's dialysis to be done urgently the patient's electrolytes are okay the patient does not require supplemental oxygen. Will check with pharmacist to see the vancomycin dosing issue.       Travis Sarabia MD  [facility]  Office: [phone]    Assessment:   ESRD  Does dialysis Monday Wednesday Friday usually but has been doing TTS at the [facility] unit near [facility]  He has a left AV fistula, positive thrill and bruit  Last at dialysis was on Thursday 1/14     Electrolytes  No acute issues.     Hypertension  Blood pressure is acceptable. No changes.      S HPT  Calcium ok  Check phosphorus     GI bleed  Has known colon cancer  No active bleeding  Hemoglobin stable  General surgery consulted.      Altered mental status  MRI showed small acute stroke  Neurology consulted  Mental status has improved. ROS:   Positives Listed Bold. All other remaining systems are negative.     Constitutional:  fever, chills, weakness, weight change, fatigue,      Skin:  rash, pruritus, hair loss, bruising, dry skin, petechiae. Head, Face, Neck   headaches, swelling,  cervical adenopathy.     Respiratory: shortness of breath, cough, or wheezing  Cardiovascular: chest pain, palpitations, dizzy, edema  Gastrointestinal: nausea, vomiting, diarrhea, constipation,belly pain    Yellow skin, blood in stool  Musculoskeletal:  back pain, muscle weakness, gait problems,       joint pain or swelling. Genitourinary:  dysuria, poor urine flow, flank pain, blood in urine  Neurologic:  vertigo, TIA'S, syncope, seizures, focal weakness  Psychosocial:  insomnia, anxiety, or depression. Additional positive findings: -     PMH:   Past medical history, surgical history, social history, family history are reviewed and updated as appropriate. Reviewed current medication list.   Allergies reviewed and updated as needed. PE:   Vitals:    01/22/22 0730   BP: 125/73   Pulse: 61   Resp: 16   Temp: 98.1 °F (36.7 °C)   SpO2: 96%       General appearance: Male in no acute distress,awake and alert. HEENT: no icterus, EOM intact, trachea midline. Neck : no masses, appears symmetrical and no JVD appreciated. Respiratory: Respiratory effort normal, bilateral equal chest rise. Cardiovascular: Ausculation shows RRR and no edema   Abdomen: abdomen is soft, non distended, no masses, no pain with palpation.   Musculoskeletal:  no joint swelling, no deformity  Skin: no rashes, no induration, no tightening, no jaundice   Neuro:   Follows commands, moves all extremities spontaneously   Left AV fistula positive thrill and bruit      Lab Results   Component Value Date    CREATININE 5.2 (HH) 01/22/2022    BUN 45 (H) 01/22/2022     (L) 01/22/2022    K 4.4 01/22/2022    CL 97 (L) 01/22/2022    CO2 25 01/22/2022      Lab Results   Component Value Date    WBC 4.1 01/22/2022    HGB 9.5 (L) 01/22/2022    HCT 28.8 (L) 01/22/2022    MCV 89.4 01/22/2022     01/22/2022     Lab Results   Component Value Date    CALCIUM 8.5 01/22/2022

## 2022-01-22 NOTE — CARE COORDINATION
Contacted by nursing re; plans. Called Consuelo at [facility] to check on referral status. Patient should qualify for skilled time under his [employer] however they are NOT in network and can only take him under medicaid. Daughter states patient does have [place]. Chiquis Holder has not had any discussion with family. Left message for Skye  at [facility]. It seems outpatient dialysis arrangements are still pending for an [place] location.     Electronically signed by Garth Charles on 1/22/2022 at 12:29 PM

## 2022-01-22 NOTE — PROGRESS NOTES
Clinical Pharmacy Note  Vancomycin Consult    Natividad Samano is a 77 y.o. male ordered Vancomycin for UTI; consult received from Dr. Emma Childs to manage therapy. Patient Active Problem List   Diagnosis    GI bleed    History of COVID-19    Hyponatremia    Fatigue    [facility] superimposed [facility] CKD [facility]    Lethargy    Suspected UTI    Acute [facility]A (cerebrovascular accident) ([facility]    LESLEE (acute kidney injury) ([facility]       Allergies:  Lisinopril     Temp max:  Temp (24hrs), Av.9 °F (36.6 °C), Min:97.2 °F (36.2 °C), Max:98.6 °F (37 °C)      Recent Labs     22  04222  0413 22  042   WBC 5.4 5.1 4.1       Recent Labs     22  04222  0413 22  042   BUN 42* 29* 45*   CREATININE 5.1* 4.4* 5.2*         Intake/Output Summary (Last 24 hours) at 2022 1432  Last data filed at 2022 1350  Gross per 24 hour   Intake 1410 ml   Output --   Net 1410 ml       Ht Readings from Last 1 Encounters:   22 6' 0.5\" (1.842 m)        Wt Readings from Last 1 Encounters:   22 227 lb 8.2 oz (103.2 kg)         Estimated Creatinine Clearance: 15 mL/min (A) (based on SCr of 5.2 mg/dL (HH)). Assessment/Plan:    Vanco day # 2  Random vanco = 17.2 mg/L this am  ESRD and HD today  Vanco 1000 mg x 1 on HD  Random vanco before next HD on Tuesday    Thank you for the consult.      Joao Virgen, PharmD.  2022  2:33 PM

## 2022-01-22 NOTE — PROGRESS NOTES
[facility] General Surgery                                   Daily Progress Note                                                         Pt Name: Micah Sterling  Medical Record Number: [id number]  Date of Birth 1945   Today's Date: 1/22/2022  CC: colon cancer    ASSESSMENT/PLAN  Colon cancer  -11/4/2021 Colonoscopy invasive moderately differentiated adenocarcinoma sigmoid mass. Partially obstructing. Was being worked up for surgery by Dr. Aleksandra Larson at [facility] in [place], [place]. Records now available in care everywhere.     -Discussed with wife. [facility] is same distance from home as [facility] and she prefers to have his care transitioned here as she also has two daughters in [place]. She wants to proceed with surgery here when feasible.   -Surgery scheduled for Wednesday. Appreciate medical clearance-moderate risk. -OK to DC from surgery standpoint and come back for surgery. Jyoti Zapien is sitting up to the chair, no c/o. To get dialysis today. OBJECTIVE  VITALS:  height is 6' 0.5\" (1.842 m) and weight is 227 lb 8.2 oz (103.2 kg). His oral temperature is 98.1 °F (36.7 °C). His blood pressure is 125/73 and his pulse is 61. His respiration is 16 and oxygen saturation is 96%. INTAKE/OUTPUT:      Intake/Output Summary (Last 24 hours) at 1/22/2022 1041  Last data filed at 1/22/2022 9049  Gross per 24 hour   Intake 1290 ml   Output --   Net 1290 ml     GENERAL: alert, cooperative, no distress  LUNGS: clear to ausculation, without wheezes, rales or rhonci  HEART: normal rate and regular rhythm  ABDOMEN: Soft, non tender, non distended. EXTREMITY: no cyanosis, clubbing or edema    I/O last 3 completed shifts:   In: 1450 [P.O.:1200; IV Piggyback:250]  Out: -       LABS  Recent Labs     01/21/22 0413 01/21/22 2036 01/22/22 0427   WBC   < >  --  4.1   HGB   < >  --  9.5*   HCT   < >  --  28.8*   PLT   < >  --  154   NA   < >  -- 133*   K   < >  --  4.4   CL   < >  --  97*   CO2   < >  --  25   BUN   < >  --  45*   CREATININE   < >  --  5.2*   MG   < >  --  2.10   CALCIUM   < >  --  8.5   NITRU  --  Negative  --    COLORU  --  DK YELLOW  --    BACTERIA  --  2+*  --     < > = values in this interval not displayed.        EDUCATION  Patient educated about Disease Process, Medications, Smoking Cessation, Oxygenation, Incentive Spirometry and Deep Breath and Cough, Diabetes, Hyperlipidemia, Smoking Cessation, Nutrition, Exercise and Hypertension    Electronically signed by Yasmin Gowers, APRN - CNP on 1/22/2022 at 10:41 AM      [facility]   [phone] Office  [phone] Fax

## 2022-01-22 NOTE — PROGRESS NOTES
Hospitalist Progress Note    Patient:  Ashly Rodriguez  Unit/Bed:R8Y-2232/5107-01   YOB: 1945       MRN: [id number] Acct: [de-identified]  PCP: Bette Pierre    Date of Admission: 1/13/2022  --------------------------    Chief Complaint:     Mental status changes    Hospital Course:     Ashly Rodriguez is a 77 y.o. male hospitalized on 1/13/2022   multiple comorbidities presented to the ED found to have acute CVA. Assessment/plan:     Acute encephalopathy secondary to CVA with underlying cognitive impairment. -MRI of the brain showed punctate acute infarct in the posterior medial left parietal lobe  MRA of the head and neck showed no flow-limiting stenosis    Check U/A, ammonia, TSH/free T4, CT Head without contrast today    UTI  On IV Vanc with hx Enterococcus     Continue aspirin,   statin  -    PT and OT. Likely need ECF. End-stage renal failure, on hemodialysis  Continue hemodialysis. Discussed with nephrology, patient trying to relocate to [place], nephrologist is assisting with finding a location for his outpatient HD. Hypomagnesemia  Resolved      Hypokalemia  Resolved       Recent hospitalization for COVID  Currently on room air. History of colon cancer/GI bleed/acute blood loss anemia     -Hemoglobin remained stable, evaluated by GI, tolerating aspirin  -Surgery service consulted, awaiting their final recommendation in terms of doing the surgery here as the patient is relocating to [place]. Patient has a revised cardiac risk index score of 2 (with hx CVA and pre-operative creatinine > 2), indicating a class III risk, which carries a 10.1% 30-day risk of death, MI, or cardiac arrest. Patient is a moderate risk for the planned procedure and may proceed with the planned procedure as the benefit of the procedure outweigh the risks. Obesity Body mass index is 30.43 kg/m². Complicating assessment and treatment.  Placing patient at risk for multiple co-morbidities as well as early death and contributing to the patient's presentation. Code Status: Full Code         DVT prophylaxis: SCD     Disposition:   Surgery tentatively planned for next Wednesday. Discharge once outpatient dialysis set up and patient may return as an outpatient for the procedure. Discussed with the patient  ----------------      Subjective:     Patient seen and examined  Doing well, has no complaints. Diet: ADULT DIET; Regular; Low Sodium (2 gm); Low Potassium (Less than 3000 mg/day); Low Phosphorus (Less than 1000 mg); 2000 ml    OBJECTIVE     Exam:  /73   Pulse 61   Temp 98.1 °F (36.7 °C) (Oral)   Resp 16   Ht 6' 0.5\" (1.842 m)   Wt 227 lb 8.2 oz (103.2 kg)   SpO2 96%   BMI 30.43 kg/m²        Gen: Not in distress. Alert. Head: Normocephalic. Atraumatic. Eyes: Conjunctivae/corneas clear. ENT: Oral mucosa moist  Neck: No JVD. No obvious thyromegaly. CVS: Nml S1S2, no murmur  , RRR  Pulmomary: Clear bilaterally. No crackles. No wheezes. Gastrointestinal: Soft, non tender, non distend, . Musculoskeletal: Left upper extremity fistula  Neuro: No focal deficit. Moves extremity spontaneously. Psychiatry: Appropriate affect. Not agitated.       Medications:  Reviewed    Infusion Medications    sodium chloride 5 mL/hr at 01/15/22 2102     Scheduled Medications    vancomycin (VANCOCIN) intermittent dosing (placeholder)   Other RX Placeholder    aspirin  81 mg Oral Daily    atorvastatin  80 mg Oral Nightly    [Held by provider] gabapentin  100 mg Oral TID    pantoprazole  20 mg Oral Nightly    sevelamer  800 mg Oral TID WC    tamsulosin  0.4 mg Oral QAM    sodium chloride flush  5-40 mL IntraVENous 2 times per day     PRN Meds: ondansetron **OR** ondansetron, polyethylene glycol, sodium chloride flush, sodium chloride, acetaminophen **OR** acetaminophen, perflutren lipid microspheres      Intake/Output Summary (Last 24 hours) at 1/22/2022 5887  Last data filed at 1/22/2022 3914  Gross per 24 hour   Intake 1290 ml   Output --   Net 1290 ml             Labs:   Recent Labs     01/20/22  0425 01/21/22  0413 01/22/22  0427   WBC 5.4 5.1 4.1   HGB 9.7* 9.4* 9.5*   HCT 29.5* 27.9* 28.8*    166 154     Recent Labs     01/20/22  0425 01/21/22  0413 01/22/22  0427    138 133*   K 4.4 4.6 4.4    100 97*   CO2 24 25 25   BUN 42* 29* 45*   CREATININE 5.1* 4.4* 5.2*   CALCIUM 8.5 8.2* 8.5     No results for input(s): AST, ALT, BILIDIR, BILITOT, ALKPHOS in the last 72 hours. No results for input(s): INR in the last 72 hours. No results for input(s): Evonnie Montane in the last 72 hours. Urinalysis:      Lab Results   Component Value Date    NITRU Negative 01/21/2022    WBCUA >900 01/21/2022    BACTERIA 2+ 01/21/2022    RBCUA 32 01/21/2022    BLOODU LARGE 01/21/2022    SPECGRAV 1.019 01/21/2022    GLUCOSEU Negative 01/21/2022       Radiology:  CT HEAD WO CONTRAST   Final Result   Atrophy and small-vessel ischemic change. No hemorrhage or mass identified. Paranasal sinus disease, moderate in degree, similar to prior      Small parotid nodule on the left, incidentally noted      RECOMMENDATIONS:   Unavailable         MRA neck without contrast   Final Result   No convincing flow limiting stenosis of the visualized carotid/vertebral   arteries within the limitations of this exam.         MRA head without contrast   Final Result   No apparent intracranial arterial high grade stenosis, occlusion or aneurysm   head at 5 within constraints of acquisition. MRI BRAIN WO CONTRAST   Final Result   1. Punctate acute infarct in the posteromedial left parietal lobe, within the   deep white matter. 2. 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 RECOMMENDATIONS:   Unavailable         US RENAL COMPLETE   Final Result   1. Simple cysts in the left kidney with no other significant renal finding.    2. Borderline enlargement of the prostate. Correlate with urologic history. CT CHEST ABDOMEN PELVIS WO CONTRAST   Final Result   No evidence of acute intrathoracic, intraabdominal or intrapelvic injury. Multifocal airspace disease. This pattern may reflect COVID pneumonia. Correlate with COVID testing. CT HEAD WO CONTRAST   Final Result   No acute intracranial abnormality. Specifically, no acute intracranial   hemorrhage or mass effect. Extensive chronic small vessel ischemic disease.                      Electronically signed by Aparna Bentley MD on 1/22/2022 at 11:40 AM

## 2022-01-22 NOTE — CONSULTS
Clinical Pharmacy Note  Vancomycin Consult    Jodi Ambrosio is a 77 y.o. male ordered Vancomycin for UTI; consult received from Dr. Millie Velazquez to manage therapy. Patient Active Problem List   Diagnosis    GI bleed    History of COVID-19    Hyponatremia    Fatigue    [facility] superimposed on CKD [facility] [address]    Lethargy    Suspected UTI    Acute CVA (cerebrovascular accident) ([facility] [address]    LESLEE (acute kidney injury) ([facility] [address]       Allergies:  Lisinopril     Temp max:  Temp (24hrs), Av.8 °F (36.6 °C), Min:97.2 °F (36.2 °C), Max:98.3 °F (36.8 °C)      Recent Labs     22  0449 22  0425 22  0413   WBC 5.4 5.4 5.1       Recent Labs     22  0448 22  0425 22  0413   BUN 35* 42* 29*   CREATININE 4.1* 5.1* 4.4*         Intake/Output Summary (Last 24 hours) at 2022 2254  Last data filed at 2022 1902  Gross per 24 hour   Intake 720 ml   Output --   Net 720 ml       Ht Readings from Last 1 Encounters:   22 6' 0.5\" (1.842 m)        Wt Readings from Last 1 Encounters:   22 224 lb 6.9 oz (101.8 kg)         Estimated Creatinine Clearance: 18 mL/min (A) (based on SCr of 4.4 mg/dL (H)). Assessment/Plan:  Patient with history of ESRD on HD, will dose intermittently based on levels. Vancomycin 1500 mg IV x 1 ordered. Thank you for the consult.      GAYATHRI [facility]  2022 11:19 PM

## 2022-01-22 NOTE — PLAN OF CARE
Problem: Falls - Risk of:  Goal: Will remain free from falls  Description: Will remain free from falls  1/22/2022 0230 by Princess Hiral RN  Outcome: Ongoing     Problem: Falls - Risk of:  Goal: Absence of physical injury  Description: Absence of physical injury  1/22/2022 0230 by Princess Hiral RN  Outcome: Ongoing     Problem: Skin Integrity:  Goal: Will show no infection signs and symptoms  Description: Will show no infection signs and symptoms  1/22/2022 0230 by Princess Hiral RN  Outcome: Ongoing     Problem: Skin Integrity:  Goal: Absence of new skin breakdown  Description: Absence of new skin breakdown  1/22/2022 0230 by Princess Hiral RN  Outcome: Ongoing     Problem: HEMODYNAMIC STATUS  Goal: Patient has stable vital signs and fluid balance  1/22/2022 0230 by Princess Hiral RN  Outcome: Ongoing     Problem: ACTIVITY INTOLERANCE/IMPAIRED MOBILITY  Goal: Mobility/activity is maintained at optimum level for patient  1/22/2022 0230 by Princess Hiral RN  Outcome: Ongoing     Problem: Fluid Volume:  Goal: Will show no signs or symptoms of fluid imbalance  Description: Will show no signs or symptoms of fluid imbalance  1/22/2022 0230 by Princess Hiral RN  Outcome: Ongoing     Problem: Sensory:  Goal: General experience of comfort will improve  Description: General experience of comfort will improve  Outcome: Ongoing     Problem: Urinary Elimination:  Goal: Signs and symptoms of infection will decrease  Description: Signs and symptoms of infection will decrease  Outcome: Ongoing     Problem: Urinary Elimination:  Goal: Ability to reestablish a normal urinary elimination pattern will improve - after catheter removal  Description: Ability to reestablish a normal urinary elimination pattern will improve  Outcome: Ongoing     Problem: Urinary Elimination:  Goal: Complications related to the disease process, condition or treatment will be avoided or minimized  Description: Complications related to the disease process, condition or treatment will be avoided or minimized  Outcome: Ongoing

## 2022-01-23 LAB
ANION GAP SERPL CALCULATED.3IONS-SCNC: 14 MMOL/L (ref 3–16)
BASOPHILS ABSOLUTE: 0 K/UL (ref 0–0.2)
BASOPHILS RELATIVE PERCENT: 0.5 %
BUN BLDV-MCNC: 23 MG/DL (ref 7–20)
CALCIUM SERPL-MCNC: 8.8 MG/DL (ref 8.3–10.6)
CHLORIDE BLD-SCNC: 99 MMOL/L (ref 99–110)
CO2: 23 MMOL/L (ref 21–32)
CREAT SERPL-MCNC: 4.2 MG/DL (ref 0.8–1.3)
EOSINOPHILS ABSOLUTE: 0.2 K/UL (ref 0–0.6)
EOSINOPHILS RELATIVE PERCENT: 4 %
GFR AFRICAN AMERICAN: 17
GFR NON-AFRICAN AMERICAN: 14
GLUCOSE BLD-MCNC: 126 MG/DL (ref 70–99)
HCT VFR BLD CALC: 30.8 % (ref 40.5–52.5)
HEMOGLOBIN: 10.2 G/DL (ref 13.5–17.5)
LYMPHOCYTES ABSOLUTE: 1.2 K/UL (ref 1–5.1)
LYMPHOCYTES RELATIVE PERCENT: 22.8 %
MAGNESIUM: 2.1 MG/DL (ref 1.8–2.4)
MCH RBC QN AUTO: 29.5 PG (ref 26–34)
MCHC RBC AUTO-ENTMCNC: 33.2 G/DL (ref 31–36)
MCV RBC AUTO: 89.1 FL (ref 80–100)
MONOCYTES ABSOLUTE: 0.3 K/UL (ref 0–1.3)
MONOCYTES RELATIVE PERCENT: 6.2 %
NEUTROPHILS ABSOLUTE: 3.4 K/UL (ref 1.7–7.7)
NEUTROPHILS RELATIVE PERCENT: 66.5 %
PDW BLD-RTO: 14.4 % (ref 12.4–15.4)
PLATELET # BLD: 182 K/UL (ref 135–450)
PMV BLD AUTO: 7.6 FL (ref 5–10.5)
POTASSIUM SERPL-SCNC: 4.4 MMOL/L (ref 3.5–5.1)
RBC # BLD: 3.45 M/UL (ref 4.2–5.9)
SODIUM BLD-SCNC: 136 MMOL/L (ref 136–145)
WBC # BLD: 5.2 K/UL (ref 4–11)

## 2022-01-23 PROCEDURE — 94761 N-INVAS EAR/PLS OXIMETRY MLT: CPT

## 2022-01-23 PROCEDURE — 2060000000 HC ICU INTERMEDIATE R&B

## 2022-01-23 PROCEDURE — 80048 BASIC METABOLIC PNL TOTAL CA: CPT

## 2022-01-23 PROCEDURE — 85025 COMPLETE CBC W/AUTO DIFF WBC: CPT

## 2022-01-23 PROCEDURE — 36415 COLL VENOUS BLD VENIPUNCTURE: CPT

## 2022-01-23 PROCEDURE — 6370000000 HC RX 637 (ALT 250 FOR IP): Performed by: HOSPITALIST

## 2022-01-23 PROCEDURE — 83735 ASSAY OF MAGNESIUM: CPT

## 2022-01-23 PROCEDURE — 2580000003 HC RX 258: Performed by: STUDENT IN AN ORGANIZED HEALTH CARE EDUCATION/TRAINING PROGRAM

## 2022-01-23 RX ADMIN — SEVELAMER CARBONATE 800 MG: 800 TABLET, FILM COATED ORAL at 09:14

## 2022-01-23 RX ADMIN — SODIUM CHLORIDE, PRESERVATIVE FREE 10 ML: 5 INJECTION INTRAVENOUS at 09:15

## 2022-01-23 RX ADMIN — SEVELAMER CARBONATE 800 MG: 800 TABLET, FILM COATED ORAL at 12:19

## 2022-01-23 RX ADMIN — TAMSULOSIN HYDROCHLORIDE 0.4 MG: 0.4 CAPSULE ORAL at 09:14

## 2022-01-23 RX ADMIN — ATORVASTATIN CALCIUM 80 MG: 80 TABLET, FILM COATED ORAL at 20:56

## 2022-01-23 RX ADMIN — PANTOPRAZOLE SODIUM 20 MG: 20 TABLET, DELAYED RELEASE ORAL at 20:56

## 2022-01-23 RX ADMIN — SEVELAMER CARBONATE 800 MG: 800 TABLET, FILM COATED ORAL at 16:55

## 2022-01-23 RX ADMIN — SODIUM CHLORIDE, PRESERVATIVE FREE 10 ML: 5 INJECTION INTRAVENOUS at 20:59

## 2022-01-23 RX ADMIN — ASPIRIN 81 MG 81 MG: 81 TABLET ORAL at 09:14

## 2022-01-23 ASSESSMENT — PAIN SCALES - GENERAL
PAINLEVEL_OUTOF10: 0

## 2022-01-23 NOTE — PLAN OF CARE
Problem:  CARE  Goal: Vital signs are medically acceptable  2020 by Juan José Saha RN  Outcome: Ongoing  Note: Vital signs WNL     Problem:  CARE  Goal: Thermoregulation maintained greater than 97/less than 99.4 Ax  2020 by Juan José Saha RN  Outcome: Ongoing  Note: Temperature WNL     Problem:  CARE  Goal: Infant exhibits minimal/reduced signs of pain/discomfort  2020 by Juan José Saha RN  Outcome: Ongoing  Note: Nips = 0     Problem:  CARE  Goal: Infant is maintained in safe environment  2020 by Juan José Saha RN  Outcome: Ongoing  Note: Security tag in place and secure     Problem:  CARE  Goal: Baby is with Mother and family  2020 by Juan José Saha RN  Outcome: Ongoing  Note: Infant is in SCN. Mother discharged home.      Problem: Discharge Planning:  Goal: Discharged to appropriate level of care  Description: Discharged to appropriate level of care  2020 by Juan José Saha RN  Outcome: Ongoing  Note: Discharge planning in process     Problem: Aspiration:  Goal: Absence of aspiration  Description: Absence of aspiration  2020 by Juan José Saha RN  Outcome: Ongoing  Note: No aspiration noted     Problem: Fluid Volume - Imbalance:  Goal: Absence of imbalanced fluid volume signs and symptoms  Description: Absence of imbalanced fluid volume signs and symptoms  2020 by Juan José Saha RN  Outcome: Ongoing  Note: No signs of fluid imbalance noted     Problem: Growth and Development - Risk of, Impaired:  Goal: Demonstration of normal  growth will improve to within specified parameters  Description: Demonstration of normal  growth will improve to within specified parameters  2020 by Juan José Saha RN  Outcome: Ongoing  Note: Infant lost weight this shift     Problem: Growth and Development - Risk of, Impaired:  Goal: Neurodevelopmental maturation within specified Problem: Falls - Risk of:  Goal: Will remain free from falls  Description: Will remain free from falls  1/23/2022 1050 by Sarahy Rolon RN  Outcome: Ongoing   Fall risk assessment completed per unit protocol. Patient's bed is in the lowest position, call light is within reach and the patient's room is free of clutter. The patient has been instructed to call for assistance before getting out of bed or the chair. Problem: Skin Integrity:  Goal: Will show no infection signs and symptoms  Description: Will show no infection signs and symptoms  1/23/2022 1050 by Sarahy Rolon RN  Outcome: Ongoing   Patient's skin has been assessed per unit protocol and the patient is being repositioned or encouraged to turn every two hours to prevent skin breakdown and promote healing. parameters  Description: Neurodevelopmental maturation within specified parameters  2020 2111 by Kayli Krishnan RN  Outcome: Ongoing  Note: Neurodevelopmental maturation WNL     Problem: Nutritional:  Goal: Knowledge of adequate nutritional intake and output  Description: Knowledge of adequate nutritional intake and output  2020 2111 by Kayli Krishnan RN  Outcome: Ongoing  Note: Infant is tolerating every 3 hour feeds well and having appropriate output     Problem: Nutritional:  Goal: Knowledge of breastfeeding  Description: Knowledge of breastfeeding  2020 2111 by Kayli Krishnan RN  Outcome: Ongoing  Note: Mother is breastfeeding and pumping     Problem: DISCHARGE BARRIERS  Goal: Patient's continuum of care needs are met  2020 2111 by Kayli Krishnan RN  Outcome: Ongoing  Note: Infant's care needs are met      No contact with with parents thus far this shift. Nurse will update them on the infant's plan of care if they call or visit.

## 2022-01-23 NOTE — PROGRESS NOTES
Tentatively scheduled for colectomy with possible colostomy this Wednesday  Awaiting dispo planning for outpatient HD.   Can be D/C if arranged with return for surgery on Wednesday    Electronically signed by Gin Romero MD on 1/23/2022 at 8:26 AM

## 2022-01-23 NOTE — FLOWSHEET NOTE
01/22/22 1425 01/22/22 1740   Treatment   Time On 1432  --    Time Off  --  1735   Vital Signs   BP (!) 151/85 128/67   Temp 97.2 °F (36.2 °C) 97.2 °F (36.2 °C)   Pulse 62 70   Resp 16 16   Weight 228 lb 13.4 oz (103.8 kg) 223 lb 8.7 oz (101.4 kg)   Weight Method Actual;Standing scale Bed scale       Treatment time: 3 hours  Net UF: 2000 ml     Pre weight: 103.8 kg   Post weight: 101.4 kg  EDW: tbd kg     Access used: LAVF  Access function: good with  ml/min     Medications or blood products given: Vancomycin     Regular outpatient schedule: [facility] TTS     Summary of response to treatment: Tolerated well, no distress noted, MD aware. Copy of dialysis treatment record placed in chart, to be scanned into EMR.

## 2022-01-23 NOTE — PROGRESS NOTES
early death and contributing to the patient's presentation. Code Status: Full Code         DVT prophylaxis: SCD     Disposition:   Surgery tentatively planned for Wednesday. Discharge once outpatient dialysis set up and patient may return as an outpatient for the procedure. Discussed with the patient  ----------------      Subjective:     Patient seen and examined  Doing well, has no complaints. Diet: ADULT DIET; Regular; Low Sodium (2 gm); Low Potassium (Less than 3000 mg/day); Low Phosphorus (Less than 1000 mg); 2000 ml    OBJECTIVE     Exam:  BP (!) 97/58   Pulse 62   Temp 97.8 °F (36.6 °C) (Oral)   Resp 16   Ht 6' 0.5\" (1.842 m)   Wt 223 lb 8.7 oz (101.4 kg)   SpO2 94%   BMI 29.90 kg/m²        Gen: Not in distress. Alert. Head: Normocephalic. Atraumatic. Eyes: Conjunctivae/corneas clear. ENT: Oral mucosa moist  Neck: No JVD. No obvious thyromegaly. CVS: Nml S1S2, no murmur  , RRR  Pulmomary: Clear bilaterally. No crackles. No wheezes. Gastrointestinal: Soft, non tender, non distend, . Musculoskeletal: Left upper extremity fistula  Neuro: No focal deficit. Moves extremity spontaneously. Psychiatry: Appropriate affect. Not agitated.       Medications:  Reviewed    Infusion Medications    sodium chloride 5 mL/hr at 01/15/22 2102     Scheduled Medications    vancomycin (VANCOCIN) intermittent dosing (placeholder)   Other RX Placeholder    aspirin  81 mg Oral Daily    atorvastatin  80 mg Oral Nightly    [Held by provider] gabapentin  100 mg Oral TID    pantoprazole  20 mg Oral Nightly    sevelamer  800 mg Oral TID WC    tamsulosin  0.4 mg Oral QAM    sodium chloride flush  5-40 mL IntraVENous 2 times per day     PRN Meds: ondansetron **OR** ondansetron, polyethylene glycol, sodium chloride flush, sodium chloride, acetaminophen **OR** acetaminophen, perflutren lipid microspheres      Intake/Output Summary (Last 24 hours) at 1/23/2022 1120  Last data filed at 1/23/2022 4669  Gross per 24 hour   Intake 240 ml   Output 2400 ml   Net -2160 ml             Labs:   Recent Labs     01/21/22  0413 01/22/22  0427 01/23/22  0526   WBC 5.1 4.1 5.2   HGB 9.4* 9.5* 10.2*   HCT 27.9* 28.8* 30.8*    154 182     Recent Labs     01/21/22  0413 01/22/22  0427 01/23/22  0526    133* 136   K 4.6 4.4 4.4    97* 99   CO2 25 25 23   BUN 29* 45* 23*   CREATININE 4.4* 5.2* 4.2*   CALCIUM 8.2* 8.5 8.8     No results for input(s): AST, ALT, BILIDIR, BILITOT, ALKPHOS in the last 72 hours. No results for input(s): INR in the last 72 hours. No results for input(s): Johnathon Ortega in the last 72 hours. Urinalysis:      Lab Results   Component Value Date    NITRU Negative 01/21/2022    WBCUA >900 01/21/2022    BACTERIA 2+ 01/21/2022    RBCUA 32 01/21/2022    BLOODU LARGE 01/21/2022    SPECGRAV 1.019 01/21/2022    GLUCOSEU Negative 01/21/2022       Radiology:  CT HEAD WO CONTRAST   Final Result   Atrophy and small-vessel ischemic change. No hemorrhage or mass identified. Paranasal sinus disease, moderate in degree, similar to prior      Small parotid nodule on the left, incidentally noted      RECOMMENDATIONS:   Unavailable         MRA neck without contrast   Final Result   No convincing flow limiting stenosis of the visualized carotid/vertebral   arteries within the limitations of this exam.         MRA head without contrast   Final Result   No apparent intracranial arterial high grade stenosis, occlusion or aneurysm   head at 5 within constraints of acquisition. MRI BRAIN WO CONTRAST   Final Result   1. Punctate acute infarct in the posteromedial left parietal lobe, within the   deep white matter. 2. 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 RECOMMENDATIONS:   Unavailable         US RENAL COMPLETE   Final Result   1. Simple cysts in the left kidney with no other significant renal finding.    2. Borderline enlargement of the prostate. Correlate with urologic history. CT CHEST ABDOMEN PELVIS WO CONTRAST   Final Result   No evidence of acute intrathoracic, intraabdominal or intrapelvic injury. Multifocal airspace disease. This pattern may reflect COVID pneumonia. Correlate with COVID testing. CT HEAD WO CONTRAST   Final Result   No acute intracranial abnormality. Specifically, no acute intracranial   hemorrhage or mass effect. Extensive chronic small vessel ischemic disease.                      Electronically signed by Bernadette Alcaraz MD on 1/23/2022 at 11:20 AM

## 2022-01-23 NOTE — PLAN OF CARE
Problem: Falls - Risk of:  Goal: Will remain free from falls  Description: Will remain free from falls  Outcome: Ongoing  Goal: Absence of physical injury  Description: Absence of physical injury  Outcome: Ongoing     Problem: Skin Integrity:  Goal: Will show no infection signs and symptoms  Description: Will show no infection signs and symptoms  Outcome: Ongoing  Goal: Absence of new skin breakdown  Description: Absence of new skin breakdown  Outcome: Ongoing     Problem: HEMODYNAMIC STATUS  Goal: Patient has stable vital signs and fluid balance  Outcome: Ongoing     Problem: ACTIVITY INTOLERANCE/IMPAIRED MOBILITY  Goal: Mobility/activity is maintained at optimum level for patient  Outcome: Ongoing     Problem: Fluid Volume:  Goal: Will show no signs or symptoms of fluid imbalance  Description: Will show no signs or symptoms of fluid imbalance  Outcome: Ongoing     Problem: Sensory:  Goal: General experience of comfort will improve  Description: General experience of comfort will improve  Outcome: Ongoing     Problem: Urinary Elimination:  Goal: Signs and symptoms of infection will decrease  Description: Signs and symptoms of infection will decrease  Outcome: Ongoing  Goal: Ability to reestablish a normal urinary elimination pattern will improve - after catheter removal  Description: Ability to reestablish a normal urinary elimination pattern will improve  Outcome: Ongoing  Goal: Complications related to the disease process, condition or treatment will be avoided or minimized  Description: Complications related to the disease process, condition or treatment will be avoided or minimized  Outcome: Ongoing

## 2022-01-24 LAB
ANION GAP SERPL CALCULATED.3IONS-SCNC: 11 MMOL/L (ref 3–16)
BASOPHILS ABSOLUTE: 0 K/UL (ref 0–0.2)
BASOPHILS RELATIVE PERCENT: 0.8 %
BUN BLDV-MCNC: 36 MG/DL (ref 7–20)
CALCIUM SERPL-MCNC: 8.7 MG/DL (ref 8.3–10.6)
CHLORIDE BLD-SCNC: 100 MMOL/L (ref 99–110)
CO2: 26 MMOL/L (ref 21–32)
CREAT SERPL-MCNC: 4.7 MG/DL (ref 0.8–1.3)
EOSINOPHILS ABSOLUTE: 0.2 K/UL (ref 0–0.6)
EOSINOPHILS RELATIVE PERCENT: 5.2 %
GFR AFRICAN AMERICAN: 15
GFR NON-AFRICAN AMERICAN: 12
GLUCOSE BLD-MCNC: 114 MG/DL (ref 70–99)
HCT VFR BLD CALC: 28.6 % (ref 40.5–52.5)
HEMOGLOBIN: 9.6 G/DL (ref 13.5–17.5)
LYMPHOCYTES ABSOLUTE: 1.2 K/UL (ref 1–5.1)
LYMPHOCYTES RELATIVE PERCENT: 30.8 %
MAGNESIUM: 2.1 MG/DL (ref 1.8–2.4)
MCH RBC QN AUTO: 29.6 PG (ref 26–34)
MCHC RBC AUTO-ENTMCNC: 33.4 G/DL (ref 31–36)
MCV RBC AUTO: 88.5 FL (ref 80–100)
MONOCYTES ABSOLUTE: 0.3 K/UL (ref 0–1.3)
MONOCYTES RELATIVE PERCENT: 7.3 %
NEUTROPHILS ABSOLUTE: 2.1 K/UL (ref 1.7–7.7)
NEUTROPHILS RELATIVE PERCENT: 55.9 %
ORGANISM: ABNORMAL
PDW BLD-RTO: 14.3 % (ref 12.4–15.4)
PLATELET # BLD: 141 K/UL (ref 135–450)
PMV BLD AUTO: 7.4 FL (ref 5–10.5)
POTASSIUM SERPL-SCNC: 4.6 MMOL/L (ref 3.5–5.1)
RBC # BLD: 3.23 M/UL (ref 4.2–5.9)
SODIUM BLD-SCNC: 137 MMOL/L (ref 136–145)
URINE CULTURE, ROUTINE: ABNORMAL
WBC # BLD: 3.7 K/UL (ref 4–11)

## 2022-01-24 PROCEDURE — 36415 COLL VENOUS BLD VENIPUNCTURE: CPT

## 2022-01-24 PROCEDURE — 97530 THERAPEUTIC ACTIVITIES: CPT

## 2022-01-24 PROCEDURE — 2580000003 HC RX 258: Performed by: STUDENT IN AN ORGANIZED HEALTH CARE EDUCATION/TRAINING PROGRAM

## 2022-01-24 PROCEDURE — 97535 SELF CARE MNGMENT TRAINING: CPT

## 2022-01-24 PROCEDURE — 94760 N-INVAS EAR/PLS OXIMETRY 1: CPT

## 2022-01-24 PROCEDURE — APPNB45 APP NON BILLABLE 31-45 MINUTES: Performed by: PHYSICIAN ASSISTANT

## 2022-01-24 PROCEDURE — 2060000000 HC ICU INTERMEDIATE R&B

## 2022-01-24 PROCEDURE — 97530 THERAPEUTIC ACTIVITIES: CPT | Performed by: PHYSICAL THERAPIST

## 2022-01-24 PROCEDURE — 97116 GAIT TRAINING THERAPY: CPT | Performed by: PHYSICAL THERAPIST

## 2022-01-24 PROCEDURE — 85025 COMPLETE CBC W/AUTO DIFF WBC: CPT

## 2022-01-24 PROCEDURE — 97129 THER IVNTJ 1ST 15 MIN: CPT

## 2022-01-24 PROCEDURE — APPSS45 APP SPLIT SHARED TIME 31-45 MINUTES: Performed by: PHYSICIAN ASSISTANT

## 2022-01-24 PROCEDURE — 83735 ASSAY OF MAGNESIUM: CPT

## 2022-01-24 PROCEDURE — 97130 THER IVNTJ EA ADDL 15 MIN: CPT

## 2022-01-24 PROCEDURE — 6370000000 HC RX 637 (ALT 250 FOR IP): Performed by: HOSPITALIST

## 2022-01-24 PROCEDURE — 80048 BASIC METABOLIC PNL TOTAL CA: CPT

## 2022-01-24 RX ADMIN — TAMSULOSIN HYDROCHLORIDE 0.4 MG: 0.4 CAPSULE ORAL at 08:22

## 2022-01-24 RX ADMIN — SEVELAMER CARBONATE 800 MG: 800 TABLET, FILM COATED ORAL at 17:29

## 2022-01-24 RX ADMIN — SEVELAMER CARBONATE 800 MG: 800 TABLET, FILM COATED ORAL at 12:22

## 2022-01-24 RX ADMIN — SODIUM CHLORIDE, PRESERVATIVE FREE 10 ML: 5 INJECTION INTRAVENOUS at 20:33

## 2022-01-24 RX ADMIN — PANTOPRAZOLE SODIUM 20 MG: 20 TABLET, DELAYED RELEASE ORAL at 20:33

## 2022-01-24 RX ADMIN — SEVELAMER CARBONATE 800 MG: 800 TABLET, FILM COATED ORAL at 08:21

## 2022-01-24 RX ADMIN — ASPIRIN 81 MG 81 MG: 81 TABLET ORAL at 08:22

## 2022-01-24 RX ADMIN — SODIUM CHLORIDE, PRESERVATIVE FREE 10 ML: 5 INJECTION INTRAVENOUS at 08:22

## 2022-01-24 RX ADMIN — ATORVASTATIN CALCIUM 80 MG: 80 TABLET, FILM COATED ORAL at 20:33

## 2022-01-24 ASSESSMENT — PAIN SCALES - GENERAL
PAINLEVEL_OUTOF10: 0

## 2022-01-24 NOTE — PROGRESS NOTES
Occupational Therapy  Facility/Department: [facility] 5W PROGRESSIVE CARE  Daily Treatment Note  Should patient be discharged prior to another treatment session, this note shall serve as the discharge summary. NAME: Bambi Wood  :   MRN: [id number]    Date of Service: 2022    Discharge Recommendations:  Patient would benefit from continued therapy after discharge,5-7 sessions per week       Assessment   Performance deficits / Impairments: Decreased functional mobility ; Decreased safe awareness;Decreased balance;Decreased ADL status; Decreased cognition;Decreased high-level IADLs;Decreased endurance;Decreased strength  Assessment: Seen in room, agreed to OT/PT. Plan is still for sx on Wednesday which pt is ready to have done. Pt showing better endurance and activity tolerance this date. Needs cues for hand placements with RW use, CGA with mobility. Pt donned pants with min A. Discussed potential difficulty with LB dressing/bathing after abd. surgery and pt verbalized understanding. Cont OT POC while in hospital and will cont to assess function after surgery for plans for home with assist vs. more therapy at 5-7x. OT Education: OT Role;Transfer Training;Plan of Care  Patient Education: Safe transfers  REQUIRES OT FOLLOW UP: Yes  Activity Tolerance  Activity Tolerance: Patient limited by fatigue  Safety Devices  Safety Devices in place: Yes  Type of devices: Call light within reach; Chair alarm in place; Left in chair;Gait belt;Nurse notified         Patient Diagnosis(es): The primary encounter diagnosis was LESLEE (acute kidney injury) ([facility] [address] Diagnoses of Gastrointestinal hemorrhage, unspecified gastrointestinal hemorrhage type, COVID, and Acute cystitis without hematuria were also pertinent to this visit. has a past medical history of Arthritis, Cancer ([address] Diabetes mellitus ([address] Hemodialysis patient ([facility] [address] and Hypertension.    has a past surgical history that includes IR PERC ARTERIOVENOUS FISTULA CREATION (Left). Restrictions  Restrictions/Precautions  Restrictions/Precautions: Fall Risk  Subjective   General  Chart Reviewed: Yes  Patient assessed for rehabilitation services?: Yes  Additional Pertinent Hx: 78 y/o male admit 1/13/2022 with LESLEE, GI Bleed; Weakness. 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 Surg consult (recent dx Colon Ca ~ 1 month ago; await surg). Recent dx Pneumonia, COVID+ (cont weak/falls). PMH as noted including CKD (HD), COVID+. Family / Caregiver Present: No  Referring Practitioner: Dr. Gonzales Fried  Subjective  Subjective: Seen in room, agreed to OT/PT. No complaints. General Comment  Comments: Per RN ok for therapy      Orientation  Orientation  Overall Orientation Status: Impaired  Orientation Level: Oriented to person;Oriented to place;Oriented to time;Disoriented to situation  Objective    ADL  LE Dressing: Minimal assistance (needed assist to get RLE into pants)        Balance  Sitting Balance: Stand by assistance  Standing Balance: Contact guard assistance  Functional Mobility  Functional - Mobility Device: Rolling Walker  Activity: Other  Assist Level: Contact guard assistance  Functional Mobility Comments: Amb in hallway with RW with CGA, no LOB, fatigues after significant distance but HR WNL  Bed mobility  Comment: Up in chair at beginning and end of session  Transfers  Sit to stand: Contact guard assistance;Minimal assistance (from lower surface in seat in hallway)  Stand to sit: Contact guard assistance;Minimal assistance                       Cognition  Overall Cognitive Status: Exceptions  Arousal/Alertness: Appropriate responses to stimuli  Following Commands: Follows one step commands consistently  Attention Span: Appears intact; Attends with cues to redirect  Memory: Decreased recall of recent events  Safety Judgement: Decreased awareness of need for safety  Insights: Decreased awareness of deficits Plan   Plan  Times per week: 3-5  Current Treatment Recommendations: Strengthening,Endurance Training,Balance Training,Gait Training,Functional Mobility Training,Self-Care / ADL       OutComes Score    Elfego Torres scored a 16/24 on the [facility] ADL Inpatient form. Current research shows that an AM-PAC score of 17 or less is typically not associated with a discharge to the patient's home setting. Based on the patient's AM-PAC score and their current ADL deficits, it is recommended that the patient have 5-7 sessions per week of Occupational Therapy at d/c to increase the patient's independence. At this time, this patient demonstrates the endurance, and/or tolerance for 3 hours of therapy each day, with a treatment frequency of 5-7x/wk. Please see assessment section for further patient specific details. If patient discharges prior to next session this note will serve as a discharge summary. Please see below for the latest assessment towards goals.                                                   AM-PAC Score        AM[facility] Inpatient Daily Activity Raw Score: 16 (01/24/22 1131)  AM-PAC Inpatient ADL T-Scale Score : 35.96 (01/24/22 1131)  ADL Inpatient CMS 0-100% Score: 53.32 (01/24/22 1131)  ADL Inpatient CMS G-Code Modifier : CK (01/24/22 1131)    Goals  Short term goals  Time Frame for Short term goals: Prior to DC: goals ongoing  Short term goal 1: Pt will complete ADL transfers with supervision  Short term goal 2: Pt will complete functional mobility with supervision  Short term goal 3: Pt will tolerate standing > 5 min for functional task with supervision  Short term goal 4: Pt will complete toileting with supervision  Short term goal 5: Pt will complete LB dressing with supervision  Patient Goals   Patient goals : to return home       Therapy Time   Individual Concurrent Group Co-treatment   Time In 1055         Time Out 1133         Minutes 38         Timed Code Treatment Minutes: [address] Karen Hernandez, OT

## 2022-01-24 NOTE — PROGRESS NOTES
Speech Language/Pathology   SPEECH LANGUAGE AND CLINICAL BEDSIDE SWALLOWING TREATMENT  Speech Therapy Department       Tadeo Moura  AGE: 77 y.o. GENDER: male  : 1945  [id number]  EPISODE DATE:  2022    MEDICAL DIAGNOSIS: CVA  ONSET: 2022     Chief Complaint   Patient presents with    Fatigue     pt was recently d/cd from the hospital still with complaints of weakness and no energy    Fall     pt fell x 2 today            PAST MEDICAL HISTORY        Diagnosis Date    Arthritis     Cancer [facility])     Colon Cancer diagnosed last month    Diabetes mellitus ([facility]     Hemodialysis patient ([facility] [address]     Hypertension        PAST SURGICAL HISTORY    Past Surgical History:   Procedure Laterality Date    IR PERC ARTERIOVENOUS FISTULA CREATION Left     unsure of date       ALLERGIES    Allergies   Allergen Reactions    Lisinopril      Allergy listed on paperwork from [employer]  [facility], no reaction noted     2022 admitted with c/o AMS and lethargy  MD ADMISSION H&P HPI: The patient is a 68 y. o. male w/ uncertain past medical hx but possibly was just hospitalized and recovered from [employer] a few weeks ago in [place] presents to [facility] from home for progressive worsening fatigue and somnolence that he reports has been going on for at least a week.  Patient is somnolent at this time and difficult to clarify full history. Gonzalo  admits that he has not been eating or drinking much however but unable to quantify. Gonzalo Bolden denies other symptoms of fever chills or shortness of breath.  Unable to fully obtain further review of systems questions but he denies any focal weakness or vision changes.  At this time he just feels as he is very sleepy.  Unable to confirm any further chronic illnesses at this time however.     2022 CT CHEST  Impression   No evidence of acute intrathoracic, intraabdominal or intrapelvic injury.       Multifocal airspace disease.  This pattern may reflect COVID pneumonia. Correlate with COVID testing.      1/14/2022 MRI BRAIN  Impression   1. Punctate acute infarct in the posteromedial left parietal lobe, within the   deep white matter.    2. 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      Prior Status: lives with wife; reports independent with ADLs; wife completes cooking, cleaning, laundry with assist of cleaning lady; wife manages medications/appointments; wife manages finances; drives sometimes - reports \"I can\";' completed 10th grade; retired ; enjoys piddling around    Subjective:     Current diet  Regular  Thin    Pt/staff statements regarding diet tolerance:   Pt denies any difficulty     Cognitive-communication treatment progress:   Pt without complaint; pleasant and cooperative; persistent reduced insight    Objective: Assessment/Therapy activities and impression of progress/goal status   Treatment this session  Objective:  COMPREHENSION  Auditory Comprehension: [x]WFL for concrete []Mild   [] Moderate  []Severe  []To be assessed  Functional for 1-2 step commands, basic questions, concrete questions, simple conversation    EXPRESSION  Primary Mode of Expression: verbal  Verbal Expression: [x]WFL []Mild   [] Moderate  []Severe  []To be assessed  Simple conversation: functional word retrieval; no extended time for information processing     Pragmatics/Social Functioning: [x]WFL []Mild   [] Moderate  []Severe  []To be assessed      MOTOR SPEECH  Motor Speech: [x]WFL []Mild   [] Moderate  []Severe  []To be assessed   []Apraxia (appears resolved)   []Dysarthria   []Decreased Intelligibility:     VOICE  Voice: [x]WFL []Mild   [] Moderate  []Severe  []To be assessed    COGNITION  []Unable to be assessed secondary to Aphasia     Overall Orientation : [x]WFL []Mild   [] Moderate  []Severe  []To be assessed   []Unable to be assessed secondary to Aphasia     Attention: []WFL [x]Mild for selective attention  [] Moderate  []Severe  []To be assessed  Sustained WFL  Selective: mild    Memory: []WFL []Mild   [x] Moderate  []Severe  []To be assessed  Independent for general recall of daily events  Min cues for recall of details from day, MD visits, recommendations  Mod cues (category/semantic) for delayed recall (5\") of 3 novel units    Problem Solving: []WFL [x]Mild   [] Moderate  []Severe  []To be assessed  Generates solutions to daily problems independently  Sequences steps (4) for daily tasks independently    Safety/Judgement: []WFL []Mild   [x] Moderate  [x]Severe  []To be assessed  Reduced insight persists    Goal Status: Speech and Language Goals  Goals:   Goal 1: Pt will improve verbal expression for naming and self expression via graded tasks to min cues met  Goal 2: Pt will improve speech intelligibility in connected speech via graded tasks to min cues resolved  Goal 3: Patient will be Ox4 with independent use of external aids. met  Goal 4: The patient will tolerate ongoing evaluation/monitor for baseline level of function and determination of additional skilled ST needs suspect cog skills are at/near baseline - f/u 1-2 to confirm no skilled ST needs     DYSPHAGIA  Positioning: Upright in chair  PO Trials: Not addressed this date    Status of Goals: The patient will tolerate recommended diet without observed clinical signs of aspiration continue  The patient/caregiver will demonstrate understanding of compensatory strategies for improved swallowing safety. continue    IMPRESSIONS  Cognitive Diagnosis: Mild-moderate cognitive language impairments in the domains of selective attention, delayed novel recall, and safety/judgment.  Patient reports dependence on wife prior to admission and reports current function as comparable to baseline; will continue to monitor and assess re: baseline function/skills, but suspect pt is at/near PLOF  Aphasia Diagnosis: Receptive/expressive language appears [facility] for concrete needs  Speech Diagnosis: Motor speech impairments resolved     1/20/2022 Dysphagia Diagnosis: Mild oral stage dysphagia characterized by decreased mastication r/t edentulism and decreased lingual manipulation; prolonged but adequate bolus formation and movement with all with concern for premature bolus loss to the pharynx. Mild pharyngeal stage dysphagia characterized by delayed swallow and decreased laryngeal elevation; No overt signs/symptoms of penetration/aspiration; however, variable wet vocal quality with and without PO -rec continued monitor for tolerance. Recommended Diet: Regular, thin   Compensatory Strategies: Upright as possible for all oral intake,Remain upright for 30-45 minutes after meals    Plan     Plan/Recommendations:   Requires SLP Intervention: Yes  Therapeutic Interventions: Diet tolerance monitoring,Patient/Family education, cognitive-communication remediation, motor speech monitoring; aphasia remediation  Duration/Frequency of Treatment: ST to tx 3-5 times per week during acute admission unless otherwise notified    Barriers toward progress toward pt goals:  Cognitive deficit    Prognosis  Guarded for ST d/t previous level of function and severity of imps    Education  Consulted and agree with results and recommendations: Patient,RN  Patient Education: Completed on results/recs/plan  Patient Education Response: Needs reinforcement    Discharge Recommendations:  Pt may benefit from continued skilled Speech Therapy for Speech and Dysphagia services, prior to returning home. Please accept this as Speech Therapy Discharge status, if pt is discharged prior to next therapy session. Timed Code Treatment:  SLP Individual Minutes  Time In: 1400  Time Out: 1430  Minutes: 30    Total Treatment Time:  30 cog     Signed:  Tali Demarco, [place] [facility], [id number]  Speech-Language Pathologist  Portable phone: [phone]  01/24/22 2:30 PM

## 2022-01-24 NOTE — PLAN OF CARE
Problem: Falls - Risk of:  Goal: Will remain free from falls  Description: Will remain free from falls  1/24/2022 0825 by Linda Calhoun RN  Outcome: Ongoing  1/24/2022 0640 by Tamra Baker RN  Outcome: Ongoing  Goal: Absence of physical injury  Description: Absence of physical injury  1/24/2022 0825 by Linda Calhoun RN  Outcome: Ongoing  1/24/2022 0640 by Tamra Baker RN  Outcome: Ongoing     Problem: Skin Integrity:  Goal: Will show no infection signs and symptoms  Description: Will show no infection signs and symptoms  1/24/2022 0825 by Linda Calhoun RN  Outcome: Ongoing  1/24/2022 0640 by Tamra Baker RN  Outcome: Ongoing  Goal: Absence of new skin breakdown  Description: Absence of new skin breakdown  1/24/2022 0825 by Linda Calhoun RN  Outcome: Ongoing  1/24/2022 0640 by Tamra Baker RN  Outcome: Ongoing     Problem: HEMODYNAMIC STATUS  Goal: Patient has stable vital signs and fluid balance  1/24/2022 0825 by Linda Calhoun RN  Outcome: Ongoing  1/24/2022 0640 by Tamra Baker RN  Outcome: Ongoing     Problem: ACTIVITY INTOLERANCE/IMPAIRED MOBILITY  Goal: Mobility/activity is maintained at optimum level for patient  1/24/2022 0825 by Linda Calhoun RN  Outcome: Ongoing  1/24/2022 0640 by Tamra Baker RN  Outcome: Ongoing     Problem: Fluid Volume:  Goal: Will show no signs or symptoms of fluid imbalance  Description: Will show no signs or symptoms of fluid imbalance  1/24/2022 0825 by Linda Calhoun RN  Outcome: Ongoing  1/24/2022 0640 by Tamra Baker RN  Outcome: Ongoing     Problem: Sensory:  Goal: General experience of comfort will improve  Description: General experience of comfort will improve  1/24/2022 0825 by Linda Calhoun RN  Outcome: Ongoing  1/24/2022 0640 by Tamra Baker RN  Outcome: Ongoing     Problem: Urinary Elimination:  Goal: Signs and symptoms of infection will decrease  Description: Signs and symptoms of infection will decrease  1/24/2022 0825 by Nadya Sheikh RN  Outcome: Ongoing  1/24/2022 0640 by Brandin Escobedo RN  Outcome: Ongoing  Goal: Ability to reestablish a normal urinary elimination pattern will improve - after catheter removal  Description: Ability to reestablish a normal urinary elimination pattern will improve  1/24/2022 0825 by Nadya Sheikh RN  Outcome: Ongoing  1/24/2022 0640 by Brandin Escobedo RN  Outcome: Ongoing  Goal: Complications related to the disease process, condition or treatment will be avoided or minimized  Description: Complications related to the disease process, condition or treatment will be avoided or minimized  1/24/2022 0825 by Nadya Sheikh RN  Outcome: Ongoing  1/24/2022 0640 by Brandin Escobedo RN  Outcome: Ongoing

## 2022-01-24 NOTE — PROGRESS NOTES
Physical Therapy  Facility/Department: [facility] 5W PROGRESSIVE CARE  Daily Treatment Note  NAME: Elfego Torres  : 1945  MRN: [id number]    Date of Service: 2022    Discharge Recommendations:  Patient would benefit from continued therapy after discharge,Continue to assess pending progress (pt will need to be reassess s/p colon surgery)   PT Equipment Recommendations  Other: will continue to assess  Elfego Torres scored a  on the AM-PAC short mobility form. At this time,  Will need to continue to assess for discharge disposition however will need to reassess after colon surgery. Assessment   Body structures, Functions, Activity limitations: Decreased functional mobility ; Decreased strength;Decreased safe awareness;Decreased endurance;Decreased balance  Assessment: 76 y/o male admit 2022 with LESLEE, GI Bleed; Weakness. 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 Surg consult (recent dx Colon Ca ~ 1 month ago; await surg). Recent dx Pneumonia, COVID+ (cont weak/falls). PMH as noted including CKD (HD), COVID+. PTA pt living with wife in mobile home with ramp access; independent daily care and functional mobility although recent cont weak following Pneumonia/COVID. Pt making progress in therapy however scheduled for surgery for colon CA on Wed. Pt currently would be safe to return home with family assist and home PT however will need to reassess s/p surgery  Prognosis: Good  Decision Making: Medium Complexity  History: 76 y/o male admit 2022 with LESLEE, GI Bleed; Weakness. 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 Surg consult (recent dx Colon Ca ~ 1 month ago; await surg). Recent dx Pneumonia, COVID+ (cont weak/falls). PMH as noted including CKD (HD), COVID+.   Patient Education: reviewed call light and not getting up without assist  Barriers to Learning: forgetful  REQUIRES PT FOLLOW UP: Yes  Activity Tolerance  Activity Tolerance: Patient Tolerated treatment well     Patient Diagnosis(es): The primary encounter diagnosis was LESLEE (acute kidney injury) ([facility] [address] Diagnoses of Gastrointestinal hemorrhage, unspecified gastrointestinal hemorrhage type, COVID, and Acute cystitis without hematuria were also pertinent to this visit. has a past medical history of Arthritis, Cancer ([facility] [address] Diabetes mellitus ([facility] [address] Hemodialysis patient ([facility] [address] and Hypertension. has a past surgical history that includes IR PERC ARTERIOVENOUS FISTULA CREATION (Left). Restrictions  Restrictions/Precautions  Restrictions/Precautions: Fall Risk  Subjective   General  Chart Reviewed: Yes  Additional Pertinent Hx: 78 y/o male admit 1/13/2022 with LESLEE, GI Bleed; Weakness. 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 Surg consult (recent dx Colon Ca ~ 1 month ago; await surg). Recent dx Pneumonia, COVID+ (cont weak/falls). PMH as noted including CKD (HD), COVID+. Response To Previous Treatment: Patient with no complaints from previous session. Family / Caregiver Present: No  Referring Practitioner: Dr. Zaki Angela. Subjective  Subjective: pt very pleasant and agreeable to working with therapy; no c/o pain          Orientation  Orientation  Overall Orientation Status: Within Normal Limits  Orientation Level: Oriented to person;Oriented to place  Cognition   Cognition  Overall Cognitive Status: Exceptions  Arousal/Alertness: Appropriate responses to stimuli  Following Commands: Follows one step commands consistently  Attention Span: Appears intact; Attends with cues to redirect  Memory: Decreased recall of recent events  Safety Judgement: Decreased awareness of need for safety  Insights: Decreased awareness of deficits  Objective   Bed mobility  Comment: Up in chair at beginning and end of session  Transfers  Sit to Stand: Contact guard assistance (verbal cues for hand placement)  Stand to sit: Contact guard assistance  Ambulation  Ambulation?: Yes  Ambulation 1  Surface: level tile  Device: Rolling Walker  Assistance: Contact guard assistance  Quality of Gait: slow pace with flexed posture; no LOB  Distance: 100' x2     Balance  Comments: SBA for sitting balance; CGA for standing balance            Comment: assisted with positioning in chair for comfort with all needs in reach              G-Code     OutComes Score                                                     AM-PAC Score  AM-PAC Inpatient Mobility Raw Score : 17 (01/24/22 1133)  AM-PAC Inpatient T-Scale Score : 42.13 (01/24/22 1133)  Mobility Inpatient CMS 0-100% Score: 50.57 (01/24/22 1133)  Mobility Inpatient CMS G-Code Modifier : CK (01/24/22 1133)          Goals  Short term goals  Time Frame for Short term goals: Upon d/c acute care setting. (goals ongoing as of 1/24)  Short term goal 1: Bed Mob SBA. Short term goal 2: Transfers with assist device SBA. Short term goal 3: Amb with assist device 25-50' SBA/CGA. met 1-20: new goal amb 100' with wh walker SBA  Short term goal 4: Pt participating in approp Strength Exs. Patient Goals   Patient goals : Get stronger and return home. Plan    Plan  Times per week: 3-5x week while in acute care setting.   Current Treatment Recommendations: Strengthening,Functional Mobility Training,Transfer Training,Gait Training,Safety Education & Training,Patient/Caregiver Education & Training  Safety Devices  Type of devices: Call light within reach,Chair alarm in place,Left in chair,Nurse notified,All fall risk precautions in place,Gait belt,Patient at risk for falls  Restraints  Initially in place: No     Therapy Time   Individual Concurrent Group Co-treatment   Time In 1055         Time Out 1133         Minutes 38                 ESPERANZA CADET PT    Electronically signed by ESPERANZA CADET PT on 1/24/2022 at 11:35 AM

## 2022-01-24 NOTE — PROGRESS NOTES
[facility]                                               Progress note    Summary:   Sofiya Chen is being seen by nephrology for ESRD. This is a 76 yo man with ESRD on HD three times weekly via left AVF who is here after a fall and AMS. He has been diagnosed with colon cancer and has been having rectal bleeding off and on for the past several weeks. From [facility]. Had [facility] last month so has been dialyzing at a different dialysis unit for 20 day period. Last HD on Thursday this week. No rectal bleeding since being here at [facility]. GI has no plans for him. Daughter is at bedside. Apparently her father and mother both had a fall yesterday prompting the ED visit. Interval History  The patient was seen and examined in his room  The patient was resting comfortably  The patient had 1 dialysis yesterday, tolerated well  The patient has no new complaints  His most recent set of vital signs showed temperature 98.4, heart rate 68 and blood pressure 126/63 with a MAP of 84    Lab work from this morning showed sodium 136, potassium 4.4, bicarb 23, BUN 23, creatinine 4.2, magnesium 2.1, glucose 126, calcium 8.8    WBC 5.2, hemoglobin 10.2 and platelets 203    Plan:   I came to evaluate patient to see whether he needs extra dialysis/ultrafiltration  The patient was examined  Vitals and lab works are reviewed, the patient did not require dialysis for today  Reviewed his medications, I do not see any concerning medications. Jamal Lagunas MD  [facility] Nephrology  Office: [phone]    Assessment:   ESRD  Does dialysis Monday Wednesday Friday usually but has been doing TTS at the [facility] unit near [facility]  He has a left AV fistula, positive thrill and bruit  Last at dialysis was on Thursday 1/14     Electrolytes  No acute issues.     Hypertension  Blood pressure is acceptable.    No changes.      S HPT  Calcium ok  Check phosphorus     GI bleed  Has known colon cancer  No active bleeding  Hemoglobin stable  General surgery consulted.      Altered mental status  MRI showed small acute stroke  Neurology consulted  Mental status has improved. ROS:   Positives Listed Bold. All other remaining systems are negative.     Constitutional:  fever, chills, weakness, weight change, fatigue,      Skin:  rash, pruritus, hair loss, bruising, dry skin, petechiae. Head, Face, Neck   headaches, swelling,  cervical adenopathy.     Respiratory: shortness of breath, cough, or wheezing  Cardiovascular: chest pain, palpitations, dizzy, edema  Gastrointestinal: nausea, vomiting, diarrhea, constipation,belly pain    Yellow skin, blood in stool  Musculoskeletal:  back pain, muscle weakness, gait problems,       joint pain or swelling. Genitourinary:  dysuria, poor urine flow, flank pain, blood in urine  Neurologic:  vertigo, TIA'S, syncope, seizures, focal weakness  Psychosocial:  insomnia, anxiety, or depression. Additional positive findings: -     PMH:   Past medical history, surgical history, social history, family history are reviewed and updated as appropriate. Reviewed current medication list.   Allergies reviewed and updated as needed. PE:   Vitals:    01/23/22 2011   BP: 126/63   Pulse: 68   Resp: 18   Temp: 98.4 °F (36.9 °C)   SpO2: 95%       General appearance: Male in no acute distress,awake and alert. HEENT: no icterus, EOM intact, trachea midline. Neck : no masses, appears symmetrical and no JVD appreciated. Respiratory: Respiratory effort normal, bilateral equal chest rise. Cardiovascular: Ausculation shows RRR and no edema   Abdomen: abdomen is soft, non distended, no masses, no pain with palpation. Musculoskeletal:  no joint swelling, no deformity  Skin: no rashes, no induration, no tightening, no jaundice   Neuro:   Follows commands, moves all extremities spontaneously   Left AV fistula positive thrill and bruit      Lab Results   Component Value Date    CREATININE 4.2 (H)

## 2022-01-24 NOTE — PROGRESS NOTES
[facility] General Surgery                                   Daily Progress Note                                                         Pt Name: Shahriar Mcdaniels  Medical Record Number: [id number]  Date of Birth 1945   Today's Date: 1/24/2022  CC: colon cancer    ASSESSMENT/PLAN  Colon cancer  -11/4/2021 Colonoscopy invasive moderately differentiated adenocarcinoma sigmoid mass. Partially obstructing. Was being worked up for surgery by Dr. Lyla Councilman at [facility] in [place], [place].     -Per wife:  [facility] is same distance from home as [facility] and she prefers to have his care transitioned here as she also has two daughters in [place]. She wants to proceed with surgery here when feasible.   -Possible surgery Wednesday. Appreciate medical clearance-moderate risk. Denis Albert is sitting up to the chair    OBJECTIVE  VITALS:  height is 6' 0.5\" (1.842 m) and weight is 216 lb 0.8 oz (98 kg). His oral temperature is 97.7 °F (36.5 °C). His blood pressure is 130/72 and his pulse is 68. His respiration is 18 and oxygen saturation is 99%. INTAKE/OUTPUT:      Intake/Output Summary (Last 24 hours) at 1/24/2022 1256  Last data filed at 1/24/2022 1003  Gross per 24 hour   Intake 250 ml   Output 200 ml   Net 50 ml     GENERAL: alert, cooperative, no distress  LUNGS: clear to ausculation, without wheezes, rales or rhonci  HEART: normal rate and regular rhythm  ABDOMEN: Soft, non tender, non distended. EXTREMITY: no cyanosis, clubbing or edema    I/O last 3 completed shifts:   In: 120 [P.O.:120]  Out: -       LABS  Recent Labs     01/21/22 2036 01/22/22  0427 01/24/22  0450   WBC  --    < > 3.7*   HGB  --    < > 9.6*   HCT  --    < > 28.6*   PLT  --    < > 141   NA  --    < > 137   K  --    < > 4.6   CL  --    < > 100   CO2  --    < > 26   BUN  --    < > 36*   CREATININE  --    < > 4.7*   MG  --    < > 2.10   CALCIUM  --    < > 8.7   NITRU Negative  -- --    COLORU DK YELLOW  --   --    BACTERIA 2+*  --   --     < > = values in this interval not displayed.        EDUCATION  Patient educated about Disease Process, Medications, Smoking Cessation, Oxygenation, Incentive Spirometry and Deep Breath and Cough, Diabetes, Hyperlipidemia, Smoking Cessation, Nutrition, Exercise and Hypertension    Electronically signed by River English PA-C on 1/24/2022 at 12:56 PM      [facility]   [phone] Office  [phone] Fax

## 2022-01-24 NOTE — PROGRESS NOTES
Hospitalist Progress Note    Patient:  Saige Birmingham  Unit/Bed:[id number]-4720/5107-01   YOB: 1945       MRN: [id number] Acct: [de-identified]  PCP: Natalie Haider    Date of Admission: 1/13/2022  --------------------------    Chief Complaint:     Mental status changes    Hospital Course:     Saige Birmingham is a 77 y.o. male hospitalized on 1/13/2022   multiple comorbidities presented to the ED found to have acute CVA. Assessment/plan:     Acute encephalopathy secondary to CVA with underlying cognitive impairment. -MRI of the brain showed punctate acute infarct in the posterior medial left parietal lobe  MRA of the head and neck showed no flow-limiting stenosis    Enterococcus faecalis UTI  On IV Vanc x 5 days     Continue aspirin,   statin  -    PT and OT. Likely need ECF. End-stage renal failure, on hemodialysis  Continue hemodialysis. Discussed with nephrology, patient trying to relocate to [place], nephrologist is assisting with finding a location for his outpatient HD. Hypomagnesemia  Resolved      Hypokalemia  Resolved       Recent hospitalization for COVID  Currently on room air. History of colon cancer/GI bleed/acute blood loss anemia     -Hemoglobin remained stable, evaluated by GI, tolerating aspirin  -Surgery service consulted, awaiting their final recommendation in terms of doing the surgery here as the patient is relocating to [place]. Patient has a revised cardiac risk index score of 2 (with hx CVA and pre-operative creatinine > 2), indicating a class III risk, which carries a 10.1% 30-day risk of death, MI, or cardiac arrest. Patient is a moderate risk for the planned procedure and may proceed with the planned procedure as the benefit of the procedure outweigh the risks. Obesity Body mass index is 28.9 kg/m². Complicating assessment and treatment. Placing patient at risk for multiple co-morbidities as well as early death and contributing to the patient's presentation. ml   Net 50 ml             Labs:   Recent Labs     01/22/22  0427 01/23/22  0526 01/24/22  0450   WBC 4.1 5.2 3.7*   HGB 9.5* 10.2* 9.6*   HCT 28.8* 30.8* 28.6*    182 141     Recent Labs     01/22/22  0427 01/23/22  0526 01/24/22  0450   * 136 137   K 4.4 4.4 4.6   CL 97* 99 100   CO2 25 23 26   BUN 45* 23* 36*   CREATININE 5.2* 4.2* 4.7*   CALCIUM 8.5 8.8 8.7     No results for input(s): AST, ALT, BILIDIR, BILITOT, ALKPHOS in the last 72 hours. No results for input(s): INR in the last 72 hours. No results for input(s): Remington Grates in the last 72 hours. Urinalysis:      Lab Results   Component Value Date    NITRU Negative 01/21/2022    WBCUA >900 01/21/2022    BACTERIA 2+ 01/21/2022    RBCUA 32 01/21/2022    BLOODU LARGE 01/21/2022    SPECGRAV 1.019 01/21/2022    GLUCOSEU Negative 01/21/2022       Radiology:  CT HEAD WO CONTRAST   Final Result   Atrophy and small-vessel ischemic change. No hemorrhage or mass identified. Paranasal sinus disease, moderate in degree, similar to prior      Small parotid nodule on the left, incidentally noted      RECOMMENDATIONS:   Unavailable         MRA neck without contrast   Final Result   No convincing flow limiting stenosis of the visualized carotid/vertebral   arteries within the limitations of this exam.         MRA head without contrast   Final Result   No apparent intracranial arterial high grade stenosis, occlusion or aneurysm   head at 5 within constraints of acquisition. MRI BRAIN WO CONTRAST   Final Result   1. Punctate acute infarct in the posteromedial left parietal lobe, within the   deep white matter. 2. 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 RECOMMENDATIONS:   Unavailable         US RENAL COMPLETE   Final Result   1. Simple cysts in the left kidney with no other significant renal finding. 2. Borderline enlargement of the prostate.   Correlate with urologic history. CT CHEST ABDOMEN PELVIS WO CONTRAST   Final Result   No evidence of acute intrathoracic, intraabdominal or intrapelvic injury. Multifocal airspace disease. This pattern may reflect COVID pneumonia. Correlate with COVID testing. CT HEAD WO CONTRAST   Final Result   No acute intracranial abnormality. Specifically, no acute intracranial   hemorrhage or mass effect. Extensive chronic small vessel ischemic disease.                      Electronically signed by Bernadette Alcaraz MD on 1/24/2022 at 11:52 AM

## 2022-01-24 NOTE — PROGRESS NOTES
[facility]                                               Progress note    Summary:   Yanira Spencer is being seen by nephrology for ESRD. This is a 76 yo man with ESRD on HD three times weekly via left AVF who is here after a fall and AMS. He has been diagnosed with colon cancer and has been having rectal bleeding off and on for the past several weeks. From [facility]. Had [facility] last month so has been dialyzing at a different dialysis unit for 20 day period. Last HD on Thursday this week. No rectal bleeding since being here at [facility]. GI has no plans for him. Daughter is at bedside. Apparently her father and mother both had a fall yesterday prompting the ED visit. Interval History  Seen at bedside. No new complaints  Vitals and labs reviewed    Plan:   No acute indication for HD today  Plan for HD tomorrow per schedule      Ariane Nolasco MD  [facility] Nephrology  Office: [phone]    Assessment:   ESRD  Does dialysis Monday Wednesday Friday usually but has been doing TTS at the [facility] unit near [facility]  He has a left AV fistula, positive thrill and bruit  Last at dialysis was on Thursday 1/14     Electrolytes  No acute issues.     Hypertension  Blood pressure is acceptable. No changes.      S HPT  Calcium ok  Check phosphorus     GI bleed  Has known colon cancer  No active bleeding  Hemoglobin stable  General surgery consulted.      Altered mental status  MRI showed small acute stroke  Neurology consulted  Mental status has improved. ROS:   Positives Listed Bold. All other remaining systems are negative.     Constitutional:  fever, chills, weakness, weight change, fatigue,      Skin:  rash, pruritus, hair loss, bruising, dry skin, petechiae.   Head, Face, Neck   headaches, swelling,  cervical adenopathy.     Respiratory: shortness of breath, cough, or wheezing  Cardiovascular: chest pain, palpitations, dizzy, edema  Gastrointestinal: nausea, vomiting, diarrhea, constipation,belly pain    Yellow skin, blood in stool  Musculoskeletal:  back pain, muscle weakness, gait problems,       joint pain or swelling. Genitourinary:  dysuria, poor urine flow, flank pain, blood in urine  Neurologic:  vertigo, TIA'S, syncope, seizures, focal weakness  Psychosocial:  insomnia, anxiety, or depression. Additional positive findings: -     PMH:   Past medical history, surgical history, social history, family history are reviewed and updated as appropriate. Reviewed current medication list.   Allergies reviewed and updated as needed. PE:   Vitals:    01/24/22 1503   BP: 132/76   Pulse: 68   Resp: 18   Temp: 98.3 °F (36.8 °C)   SpO2: 95%       General appearance: Male in no acute distress,awake and alert. HEENT: no icterus, EOM intact, trachea midline. Neck : no masses, appears symmetrical and no JVD appreciated. Respiratory: Respiratory effort normal, bilateral equal chest rise. Cardiovascular: Ausculation shows RRR and no edema   Abdomen: abdomen is soft, non distended, no masses, no pain with palpation. Musculoskeletal:  no joint swelling, no deformity  Skin: no rashes, no induration, no tightening, no jaundice   Neuro:   Follows commands, moves all extremities spontaneously   Left AV fistula positive thrill and bruit      Lab Results   Component Value Date    CREATININE 4.7 (H) 01/24/2022    BUN 36 (H) 01/24/2022     01/24/2022    K 4.6 01/24/2022     01/24/2022    CO2 26 01/24/2022      Lab Results   Component Value Date    WBC 3.7 (L) 01/24/2022    HGB 9.6 (L) 01/24/2022    HCT 28.6 (L) 01/24/2022    MCV 88.5 01/24/2022     01/24/2022     Lab Results   Component Value Date    CALCIUM 8.7 01/24/2022

## 2022-01-24 NOTE — CARE COORDINATION
CM received call from Dr. Osbaldo Gibbons office regarding referral faxed to Corey Amaya at [facility]. Referral refaxed to Corey Amaya at [phone]. Liza's phone number is [phone]. Per Dr. Osbaldo Gibbons office, patient will be going to [facility]. Received call from [facility] from [facility] inquiring about HD placement. Due to difficulties obtaining transportation to HD, it would be beneficial if patient can go by w/c and van.      Kasey Bernal BSN RN  Case Management  [phone]    Electronically signed by Kasey Bernal RN on 1/24/2022 at 3:13 PM

## 2022-01-25 ENCOUNTER — ANESTHESIA EVENT (OUTPATIENT)
Dept: OPERATING ROOM | Age: 77
DRG: 981 | End: 2022-01-25
Payer: MEDICARE

## 2022-01-25 LAB
ANION GAP SERPL CALCULATED.3IONS-SCNC: 13 MMOL/L (ref 3–16)
BASOPHILS ABSOLUTE: 0 K/UL (ref 0–0.2)
BASOPHILS RELATIVE PERCENT: 0.7 %
BUN BLDV-MCNC: 46 MG/DL (ref 7–20)
CALCIUM SERPL-MCNC: 8.7 MG/DL (ref 8.3–10.6)
CHLORIDE BLD-SCNC: 98 MMOL/L (ref 99–110)
CO2: 25 MMOL/L (ref 21–32)
CREAT SERPL-MCNC: 5.3 MG/DL (ref 0.8–1.3)
EOSINOPHILS ABSOLUTE: 0.2 K/UL (ref 0–0.6)
EOSINOPHILS RELATIVE PERCENT: 5.2 %
GFR AFRICAN AMERICAN: 13
GFR NON-AFRICAN AMERICAN: 11
GLUCOSE BLD-MCNC: 132 MG/DL (ref 70–99)
HCT VFR BLD CALC: 27.9 % (ref 40.5–52.5)
HEMOGLOBIN: 9.4 G/DL (ref 13.5–17.5)
LYMPHOCYTES ABSOLUTE: 1.2 K/UL (ref 1–5.1)
LYMPHOCYTES RELATIVE PERCENT: 30.7 %
MAGNESIUM: 2.3 MG/DL (ref 1.8–2.4)
MCH RBC QN AUTO: 29.7 PG (ref 26–34)
MCHC RBC AUTO-ENTMCNC: 33.5 G/DL (ref 31–36)
MCV RBC AUTO: 88.7 FL (ref 80–100)
MONOCYTES ABSOLUTE: 0.3 K/UL (ref 0–1.3)
MONOCYTES RELATIVE PERCENT: 6.8 %
NEUTROPHILS ABSOLUTE: 2.3 K/UL (ref 1.7–7.7)
NEUTROPHILS RELATIVE PERCENT: 56.6 %
PDW BLD-RTO: 14.4 % (ref 12.4–15.4)
PLATELET # BLD: 148 K/UL (ref 135–450)
PMV BLD AUTO: 7.4 FL (ref 5–10.5)
POTASSIUM SERPL-SCNC: 4.1 MMOL/L (ref 3.5–5.1)
RBC # BLD: 3.15 M/UL (ref 4.2–5.9)
SARS-COV-2, NAAT: NOT DETECTED
SODIUM BLD-SCNC: 136 MMOL/L (ref 136–145)
VANCOMYCIN RANDOM: 11.7 UG/ML
WBC # BLD: 4 K/UL (ref 4–11)

## 2022-01-25 PROCEDURE — APPNB45 APP NON BILLABLE 31-45 MINUTES: Performed by: PHYSICIAN ASSISTANT

## 2022-01-25 PROCEDURE — 87635 SARS-COV-2 COVID-19 AMP PRB: CPT

## 2022-01-25 PROCEDURE — 9990000010 HC NO CHARGE VISIT: Performed by: PHYSICAL THERAPIST

## 2022-01-25 PROCEDURE — 36415 COLL VENOUS BLD VENIPUNCTURE: CPT

## 2022-01-25 PROCEDURE — 2060000000 HC ICU INTERMEDIATE R&B

## 2022-01-25 PROCEDURE — 2580000003 HC RX 258: Performed by: FAMILY MEDICINE

## 2022-01-25 PROCEDURE — 83735 ASSAY OF MAGNESIUM: CPT

## 2022-01-25 PROCEDURE — 6360000002 HC RX W HCPCS: Performed by: FAMILY MEDICINE

## 2022-01-25 PROCEDURE — 90935 HEMODIALYSIS ONE EVALUATION: CPT

## 2022-01-25 PROCEDURE — 80202 ASSAY OF VANCOMYCIN: CPT

## 2022-01-25 PROCEDURE — 97530 THERAPEUTIC ACTIVITIES: CPT

## 2022-01-25 PROCEDURE — 80048 BASIC METABOLIC PNL TOTAL CA: CPT

## 2022-01-25 PROCEDURE — 6370000000 HC RX 637 (ALT 250 FOR IP): Performed by: HOSPITALIST

## 2022-01-25 PROCEDURE — APPSS45 APP SPLIT SHARED TIME 31-45 MINUTES: Performed by: PHYSICIAN ASSISTANT

## 2022-01-25 PROCEDURE — 97530 THERAPEUTIC ACTIVITIES: CPT | Performed by: PHYSICAL THERAPIST

## 2022-01-25 PROCEDURE — 2580000003 HC RX 258: Performed by: STUDENT IN AN ORGANIZED HEALTH CARE EDUCATION/TRAINING PROGRAM

## 2022-01-25 PROCEDURE — 85025 COMPLETE CBC W/AUTO DIFF WBC: CPT

## 2022-01-25 PROCEDURE — 94761 N-INVAS EAR/PLS OXIMETRY MLT: CPT

## 2022-01-25 RX ADMIN — PANTOPRAZOLE SODIUM 20 MG: 20 TABLET, DELAYED RELEASE ORAL at 20:19

## 2022-01-25 RX ADMIN — SODIUM CHLORIDE, PRESERVATIVE FREE 10 ML: 5 INJECTION INTRAVENOUS at 20:22

## 2022-01-25 RX ADMIN — TAMSULOSIN HYDROCHLORIDE 0.4 MG: 0.4 CAPSULE ORAL at 12:22

## 2022-01-25 RX ADMIN — ASPIRIN 81 MG 81 MG: 81 TABLET ORAL at 12:21

## 2022-01-25 RX ADMIN — SEVELAMER CARBONATE 800 MG: 800 TABLET, FILM COATED ORAL at 12:22

## 2022-01-25 RX ADMIN — VANCOMYCIN HYDROCHLORIDE 1000 MG: 1 INJECTION, POWDER, LYOPHILIZED, FOR SOLUTION INTRAVENOUS at 15:44

## 2022-01-25 RX ADMIN — ATORVASTATIN CALCIUM 80 MG: 80 TABLET, FILM COATED ORAL at 20:19

## 2022-01-25 RX ADMIN — SEVELAMER CARBONATE 800 MG: 800 TABLET, FILM COATED ORAL at 16:58

## 2022-01-25 RX ADMIN — SODIUM CHLORIDE 25 ML: 9 INJECTION, SOLUTION INTRAVENOUS at 15:43

## 2022-01-25 RX ADMIN — SODIUM CHLORIDE, PRESERVATIVE FREE 10 ML: 5 INJECTION INTRAVENOUS at 07:35

## 2022-01-25 ASSESSMENT — PAIN SCALES - GENERAL
PAINLEVEL_OUTOF10: 0

## 2022-01-25 NOTE — PROGRESS NOTES
Physical Therapy  Chad Lemon  [phone]  [id number]    Attempted to see for PT session however nursing reports transport called and are on their way to take pt to dialysis; will attempt again later as schedule permits  Electronically signed by ESPERANZA CADET PT on 1/25/2022 at 7:52 AM

## 2022-01-25 NOTE — PROGRESS NOTES
Neurology    Asked by Dr. Juan Manuel Maldonado to comment on the patient's recent stroke and surgical risk. Per chart review, the patient was first evaluated by Neurology on 1/15 for encephalopathy, probably in large part metabolic. He was found to have punctate stroke(s?). The stroke burden is small. Strictly from a neurology/stroke standpoint there remains risk in light of recent stroke though felt to be low. If the patient has been stable clinically from neurology perspective, would not have an objection to surgical procedure if needed. Avoid any significant hypotension. Discussed w/ attending Neurologist Dr. Jeanne Patterson.       Riana Rosario NP  [address] [place] [address] [address] Neurology

## 2022-01-25 NOTE — PROGRESS NOTES
Physical Therapy  Malissa Lanes  [id number]  [id number]    Pt supine in bed upon arrival; pt too fatigued to fully participate in therapy however pt was slid down in bed and agreed to sit up and stand to move toward head of bed for better positioning before laying back down;   Pt was min A for bed mob and transfer to stand with RW in front; took 3 side steps with CGA; assisted with positioning in bed for comfort with all needs in reach  Electronically signed by ESPERANZA CADET PT on 1/25/2022 at 3:14 PM

## 2022-01-25 NOTE — PROGRESS NOTES
Occupational Therapy Attempt      Name: Micah Sterling  :   MRN: [id number]    OT follow-up attempted however pt off floor for dialysis. Will attempt again later as schedule permits.     Electronically signed by Rosey Hollis OTR/L  License # [id number]

## 2022-01-25 NOTE — PROGRESS NOTES
[facility]                                               Progress note    Summary:   Leopold Drown is being seen by nephrology for ESRD. This is a 78 yo man with ESRD on HD three times weekly via left AVF who is here after a fall and AMS. He has been diagnosed with colon cancer and has been having rectal bleeding off and on for the past several weeks. From [facility]. Had [facility] last month so has been dialyzing at a different dialysis unit for 20 day period. Last HD on Thursday this week. No rectal bleeding since being here at [facility]. GI has no plans for him. Daughter is at bedside. Apparently her father and mother both had a fall yesterday prompting the ED visit. Interval History  Seen on dialysis. Pre-weight 67 kg  No new complaints  Vitals and labs reviewed    Plan:   HD today per schedule  UF ~ 2 L as tolerated  Holding off ESAs given active malignancy. Antonio Coyle MD  [facility] Nephrology  Office: [phone]    Assessment:   ESRD  Does dialysis Monday Wednesday Friday usually but has been doing TTS at the [facility] unit near [facility]  He has a left AV fistula, positive thrill and bruit   kg, now below previous EDW. Continue to re-adjust     Electrolytes  No acute issues.     Hypertension  Blood pressure is acceptable. No changes.      S HPT  Calcium ok  Check phosphorus     GI bleed  Has known colon cancer  No active bleeding  Hemoglobin stable  General surgery consulted.      Altered mental status  MRI showed small acute stroke  Neurology consulted  Mental status has improved. ROS:   Positives Listed Bold. All other remaining systems are negative.     Constitutional:  fever, chills, weakness, weight change, fatigue,      Skin:  rash, pruritus, hair loss, bruising, dry skin, petechiae.   Head, Face, Neck   headaches, swelling,  cervical adenopathy.     Respiratory: shortness of breath, cough, or wheezing  Cardiovascular: chest pain, palpitations, dizzy,

## 2022-01-25 NOTE — FLOWSHEET NOTE
Treatment time: 3 hours  Net UF: 2000 ml     Pre weight: 102.1 kg   Post weight: 100.1 kg  EDW: TBD kg     Access used: LFA AVF  Access function: Good with  ml/min     Medications or blood products given: N/A     Regular outpatient schedule: TTS     Summary of response to treatment:      01/25/22 0835 01/25/22 1135   Vital Signs   BP (!) 151/82 123/77   Temp 97.8 °F (36.6 °C) 97 °F (36.1 °C)   Pulse 69 75   Resp 16 16   Weight 225 lb 1.4 oz (102.1 kg) 220 lb 10.9 oz (100.1 kg)   Weight Method Actual;Standing scale  --    Percent Weight Change 0 -1.96   Pain Assessment   Pain Assessment 0-10 0-10   Pain Level 0 0   Post-Hemodialysis Assessment   Post-Treatment Procedures  --  Blood returned; Access bleeding time < 10 minutes   Machine Disinfection Process  --  Acid/Vinegar Clean;Heat Disinfect; Exterior Machine Disinfection   Rinseback Volume (ml)  --  400 ml   Total Liters Processed (l/min)  --  67.6 l/min   Dialyzer Clearance  --  Moderately streaked   Duration of Treatment (minutes)  --  180 minutes   Heparin amount administered during treatment (units)  --  0 units   Hemodialysis Intake (ml)  --  400 ml   Hemodialysis Output (ml)  --  2400 ml   NET Removed (ml)  --  2000 ml   Tolerated Treatment  --  Good   Copy of dialysis treatment record placed in chart, to be scanned into EMR.  Report called to Tariq Hannah RN

## 2022-01-25 NOTE — PROGRESS NOTES
Patient is resting in bed. His daughters are at bedside. They brought him dinner. Pt is eating dinner. Daughters reported that pt had an emesis. No emesis seen. Did talk with patient and daughters about signing consent for pt's surgery tomorrow. Pt understands what has been ordered. He said \"the doctor said he would be by to talk to me. \" Nurse asked if pt has any specific questions. Pt did not answer. Pt appears anxious. Nurse reassured pt that he does not have to sign the consent. That the surgeon can be notified in the morning to come by and talk with him again. Patient's daughters are agreeable to surgery. This nurse said if he has any questions to let me know and I would try and answer them. Daughters remain at bedside.

## 2022-01-25 NOTE — PROGRESS NOTES
[facility] General Surgery                                   Daily Progress Note                                                         Pt Name: Brett Valiente  Medical Record Number: [id number]  Date of Birth 1945   Today's Date: 1/25/2022  CC: colon cancer    ASSESSMENT/PLAN  Colon cancer  -11/4/2021 Colonoscopy invasive moderately differentiated adenocarcinoma sigmoid mass. Partially obstructing. Was being worked up for surgery by Dr. Marco Antonio Flores at [facility] in [place], [place].     -Per wife:  [facility] is same distance from home as [facility] and she prefers to have his care transitioned here as she also has two daughters in [place]. She wants to proceed with surgery here when feasible. -HD this AM. Receives HD Tuesdays, Thursdays, and Saturdays.   -Surgery planned for tomorrow. Appreciate medical clearance-moderate risk. -NPO after midnight  -Treatment consent. OBJECTIVE  VITALS:  height is 6' 0.5\" (1.842 m) and weight is 225 lb 1.4 oz (102.1 kg). His oral temperature is 97.4 °F (36.3 °C). His blood pressure is 150/85 (abnormal) and his pulse is 77. His respiration is 18 and oxygen saturation is 98%. INTAKE/OUTPUT:      Intake/Output Summary (Last 24 hours) at 1/25/2022 1227  Last data filed at 1/25/2022 0445  Gross per 24 hour   Intake 240 ml   Output 1000 ml   Net -760 ml     GENERAL: alert, cooperative, no distress  ABDOMEN: Soft, non tender, non distended. EXTREMITY: no cyanosis, clubbing or edema    I/O last 3 completed shifts:   In: 56 [P.O.:480; I.V.:10]  Out: 1200 [Urine:1200]      LABS  Recent Labs     01/25/22  0507   WBC 4.0   HGB 9.4*   HCT 27.9*         K 4.1   CL 98*   CO2 25   BUN 46*   CREATININE 5.3*   MG 2.30   CALCIUM 8.7       EDUCATION  Patient educated about Disease Process, Medications, Smoking Cessation, Oxygenation, Incentive Spirometry and Deep Breath and Cough, Diabetes, Hyperlipidemia, Smoking Cessation, Nutrition, Exercise and Hypertension    Electronically signed by Drew Tripathi PA-C on 1/25/2022 at 12:27 PM      [facility]   [phone] Office  [phone] Fax

## 2022-01-25 NOTE — PROGRESS NOTES
Occupational Therapy    Name: Norma Form  : 1945  MRN: [id number]    Pt met in room, supine in bed. Reports \"dialysis did not go well this AM\" and he didn't feel like doing anything. Assisted repositioning pt in bed. Pt required min A for bed mob and fxl mob/transfers with RW. Pt positioned for comfort with all needs met and call light within reach.      Therapy Time     Individual Co-treatment   Time In 4381     Time Out 1510     Minutes 15           Electronically signed by Smita Farrell OTR/ABIDA  License # [id number]

## 2022-01-25 NOTE — PLAN OF CARE
Problem: Falls - Risk of:  Goal: Will remain free from falls  Description: Will remain free from falls  1/25/2022 1522 by Eldon Hirsch RN  Outcome: Ongoing  Note: Fall risk assessment completed every shift. All precautions in place. Pt has call light within reach at all times. Room clear of clutter. Pt aware to call for assistance when getting up. Bed/chair alarms in use. Problem: Falls - Risk of:  Goal: Absence of physical injury  Description: Absence of physical injury  1/25/2022 1522 by Eldon Hirsch RN  Outcome: Ongoing  Note: No signs of physical injury. Problem: Skin Integrity:  Goal: Will show no infection signs and symptoms  Description: Will show no infection signs and symptoms  1/25/2022 1522 by Eldon Hirsch RN  Outcome: Ongoing  Note: Pt moves himself easily in bed. Skin assessment completed every shift. Pt assessed for incontinence, appropriate barrier cream applied prn. Pt encouraged to turn/rotate every 2 hours. Assistance provided if pt unable to do so themselves. Problem: Skin Integrity:  Goal: Absence of new skin breakdown  Description: Absence of new skin breakdown  1/25/2022 1522 by Eldon Hirsch RN  Outcome: Ongoing  Note: No areas of new skin breakdown. Problem: HEMODYNAMIC STATUS  Goal: Patient has stable vital signs and fluid balance  1/25/2022 1522 by Eldon Hirsch RN  Outcome: Ongoing  Note: VSS. Labs being monitored. Pt received hemodialysis today, 2L were removed. Problem: ACTIVITY INTOLERANCE/IMPAIRED MOBILITY  Goal: Mobility/activity is maintained at optimum level for patient  1/25/2022 1522 by Eldon Hirsch RN  Outcome: Ongoing  Note: Patient is receiving PT and OT. He is able to ambulate with walker and assist of one.  Pt needs assistance to get from sit to stand and then is contact guard assist.     Problem: Fluid Volume:  Goal: Will show no signs or symptoms of fluid imbalance  Description: Will show no signs or symptoms of fluid imbalance  1/25/2022 1522 by Myrna Akhtar RN  Outcome: Ongoing  Note: Labs being monitored. Problem: Sensory:  Goal: General experience of comfort will improve  Description: General experience of comfort will improve  1/25/2022 1522 by Myrna Ahktar RN  Outcome: Ongoing  Note: . Problem: Urinary Elimination:  Goal: Signs and symptoms of infection will decrease  Description: Signs and symptoms of infection will decrease  1/25/2022 1522 by Myrna Akhtar RN  Outcome: Ongoing  Note: Pt continues to receive IV antibiotics for UTI. Problem: Urinary Elimination:  Goal: Ability to reestablish a normal urinary elimination pattern will improve - after catheter removal  Description: Ability to reestablish a normal urinary elimination pattern will improve  1/25/2022 1522 by Myrna Akhtar RN  Outcome: Ongoing  Note: Pt is compliant with using a urinal. He does wear depends for occasional incontinence. Problem: Urinary Elimination:  Goal: Complications related to the disease process, condition or treatment will be avoided or minimized  Description: Complications related to the disease process, condition or treatment will be avoided or minimized  1/25/2022 1522 by Myrna Akhtar RN  Outcome: Ongoing  Note: Brittni Lemon

## 2022-01-26 ENCOUNTER — ANESTHESIA (OUTPATIENT)
Dept: OPERATING ROOM | Age: 77
DRG: 981 | End: 2022-01-26
Payer: MEDICARE

## 2022-01-26 VITALS
OXYGEN SATURATION: 100 % | RESPIRATION RATE: 6 BRPM | DIASTOLIC BLOOD PRESSURE: 80 MMHG | SYSTOLIC BLOOD PRESSURE: 141 MMHG | TEMPERATURE: 97.7 F

## 2022-01-26 LAB
ANION GAP SERPL CALCULATED.3IONS-SCNC: 11 MMOL/L (ref 3–16)
BASOPHILS ABSOLUTE: 0 K/UL (ref 0–0.2)
BASOPHILS RELATIVE PERCENT: 0.7 %
BUN BLDV-MCNC: 28 MG/DL (ref 7–20)
CALCIUM SERPL-MCNC: 8.8 MG/DL (ref 8.3–10.6)
CHLORIDE BLD-SCNC: 99 MMOL/L (ref 99–110)
CO2: 25 MMOL/L (ref 21–32)
CREAT SERPL-MCNC: 4.1 MG/DL (ref 0.8–1.3)
EOSINOPHILS ABSOLUTE: 0.1 K/UL (ref 0–0.6)
EOSINOPHILS RELATIVE PERCENT: 3 %
GFR AFRICAN AMERICAN: 17
GFR NON-AFRICAN AMERICAN: 14
GLUCOSE BLD-MCNC: 119 MG/DL (ref 70–99)
GLUCOSE BLD-MCNC: 122 MG/DL (ref 70–99)
GLUCOSE BLD-MCNC: 123 MG/DL (ref 70–99)
GLUCOSE BLD-MCNC: 149 MG/DL (ref 70–99)
GLUCOSE BLD-MCNC: 171 MG/DL (ref 70–99)
HCT VFR BLD CALC: 30.2 % (ref 40.5–52.5)
HEMOGLOBIN: 10.1 G/DL (ref 13.5–17.5)
LYMPHOCYTES ABSOLUTE: 1.1 K/UL (ref 1–5.1)
LYMPHOCYTES RELATIVE PERCENT: 27 %
MAGNESIUM: 2.2 MG/DL (ref 1.8–2.4)
MCH RBC QN AUTO: 29.9 PG (ref 26–34)
MCHC RBC AUTO-ENTMCNC: 33.5 G/DL (ref 31–36)
MCV RBC AUTO: 89.2 FL (ref 80–100)
MONOCYTES ABSOLUTE: 0.3 K/UL (ref 0–1.3)
MONOCYTES RELATIVE PERCENT: 7.6 %
NEUTROPHILS ABSOLUTE: 2.5 K/UL (ref 1.7–7.7)
NEUTROPHILS RELATIVE PERCENT: 61.7 %
PDW BLD-RTO: 14.3 % (ref 12.4–15.4)
PERFORMED ON: ABNORMAL
PLATELET # BLD: 152 K/UL (ref 135–450)
PMV BLD AUTO: 7.6 FL (ref 5–10.5)
POTASSIUM SERPL-SCNC: 4.8 MMOL/L (ref 3.5–5.1)
RBC # BLD: 3.38 M/UL (ref 4.2–5.9)
SODIUM BLD-SCNC: 135 MMOL/L (ref 136–145)
WBC # BLD: 4.1 K/UL (ref 4–11)

## 2022-01-26 PROCEDURE — 0DBN0ZZ EXCISION OF SIGMOID COLON, OPEN APPROACH: ICD-10-PCS | Performed by: SURGERY

## 2022-01-26 PROCEDURE — 6360000002 HC RX W HCPCS: Performed by: SURGERY

## 2022-01-26 PROCEDURE — 2060000000 HC ICU INTERMEDIATE R&B

## 2022-01-26 PROCEDURE — P9045 ALBUMIN (HUMAN), 5%, 250 ML: HCPCS

## 2022-01-26 PROCEDURE — 2580000003 HC RX 258

## 2022-01-26 PROCEDURE — 44145 PARTIAL REMOVAL OF COLON: CPT | Performed by: SURGERY

## 2022-01-26 PROCEDURE — 9990000010 HC NO CHARGE VISIT: Performed by: PHYSICAL THERAPIST

## 2022-01-26 PROCEDURE — 2580000003 HC RX 258: Performed by: SURGERY

## 2022-01-26 PROCEDURE — 2500000003 HC RX 250 WO HCPCS: Performed by: SURGERY

## 2022-01-26 PROCEDURE — 6360000002 HC RX W HCPCS

## 2022-01-26 PROCEDURE — 2500000003 HC RX 250 WO HCPCS

## 2022-01-26 PROCEDURE — 80048 BASIC METABOLIC PNL TOTAL CA: CPT

## 2022-01-26 PROCEDURE — 0D1N0ZP BYPASS SIGMOID COLON TO RECTUM, OPEN APPROACH: ICD-10-PCS | Performed by: SURGERY

## 2022-01-26 PROCEDURE — 88342 IMHCHEM/IMCYTCHM 1ST ANTB: CPT

## 2022-01-26 PROCEDURE — 7100000000 HC PACU RECOVERY - FIRST 15 MIN: Performed by: SURGERY

## 2022-01-26 PROCEDURE — 3600000004 HC SURGERY LEVEL 4 BASE: Performed by: SURGERY

## 2022-01-26 PROCEDURE — 94760 N-INVAS EAR/PLS OXIMETRY 1: CPT

## 2022-01-26 PROCEDURE — APPNB15 APP NON BILLABLE TIME 0-15 MINS: Performed by: NURSE PRACTITIONER

## 2022-01-26 PROCEDURE — 2709999900 HC NON-CHARGEABLE SUPPLY: Performed by: SURGERY

## 2022-01-26 PROCEDURE — 83735 ASSAY OF MAGNESIUM: CPT

## 2022-01-26 PROCEDURE — 3600000014 HC SURGERY LEVEL 4 ADDTL 15MIN: Performed by: SURGERY

## 2022-01-26 PROCEDURE — 88309 TISSUE EXAM BY PATHOLOGIST: CPT

## 2022-01-26 PROCEDURE — 3700000001 HC ADD 15 MINUTES (ANESTHESIA): Performed by: SURGERY

## 2022-01-26 PROCEDURE — 7100000001 HC PACU RECOVERY - ADDTL 15 MIN: Performed by: SURGERY

## 2022-01-26 PROCEDURE — 3700000000 HC ANESTHESIA ATTENDED CARE: Performed by: SURGERY

## 2022-01-26 PROCEDURE — 6370000000 HC RX 637 (ALT 250 FOR IP): Performed by: SURGERY

## 2022-01-26 PROCEDURE — 85025 COMPLETE CBC W/AUTO DIFF WBC: CPT

## 2022-01-26 PROCEDURE — 36415 COLL VENOUS BLD VENIPUNCTURE: CPT

## 2022-01-26 PROCEDURE — 88341 IMHCHEM/IMCYTCHM EA ADD ANTB: CPT

## 2022-01-26 PROCEDURE — 2720000010 HC SURG SUPPLY STERILE: Performed by: SURGERY

## 2022-01-26 RX ORDER — PROMETHAZINE HYDROCHLORIDE 25 MG/ML
6.25 INJECTION, SOLUTION INTRAMUSCULAR; INTRAVENOUS
Status: DISCONTINUED | OUTPATIENT
Start: 2022-01-26 | End: 2022-01-26 | Stop reason: HOSPADM

## 2022-01-26 RX ORDER — PHENYLEPHRINE HCL IN 0.9% NACL 1 MG/10 ML
SYRINGE (ML) INTRAVENOUS PRN
Status: DISCONTINUED | OUTPATIENT
Start: 2022-01-26 | End: 2022-01-26 | Stop reason: SDUPTHER

## 2022-01-26 RX ORDER — BUPIVACAINE HYDROCHLORIDE 5 MG/ML
INJECTION, SOLUTION EPIDURAL; INTRACAUDAL
Status: COMPLETED | OUTPATIENT
Start: 2022-01-26 | End: 2022-01-26

## 2022-01-26 RX ORDER — PROPOFOL 10 MG/ML
INJECTION, EMULSION INTRAVENOUS PRN
Status: DISCONTINUED | OUTPATIENT
Start: 2022-01-26 | End: 2022-01-26 | Stop reason: SDUPTHER

## 2022-01-26 RX ORDER — LIDOCAINE HYDROCHLORIDE 20 MG/ML
INJECTION, SOLUTION EPIDURAL; INFILTRATION; INTRACAUDAL; PERINEURAL PRN
Status: DISCONTINUED | OUTPATIENT
Start: 2022-01-26 | End: 2022-01-26 | Stop reason: SDUPTHER

## 2022-01-26 RX ORDER — SODIUM CHLORIDE 9 MG/ML
INJECTION, SOLUTION INTRAVENOUS CONTINUOUS PRN
Status: DISCONTINUED | OUTPATIENT
Start: 2022-01-26 | End: 2022-01-26 | Stop reason: SDUPTHER

## 2022-01-26 RX ORDER — MORPHINE SULFATE 2 MG/ML
2 INJECTION, SOLUTION INTRAMUSCULAR; INTRAVENOUS EVERY 4 HOURS PRN
Status: DISCONTINUED | OUTPATIENT
Start: 2022-01-26 | End: 2022-01-26 | Stop reason: SDUPTHER

## 2022-01-26 RX ORDER — FENTANYL CITRATE 50 UG/ML
INJECTION, SOLUTION INTRAMUSCULAR; INTRAVENOUS PRN
Status: DISCONTINUED | OUTPATIENT
Start: 2022-01-26 | End: 2022-01-26 | Stop reason: SDUPTHER

## 2022-01-26 RX ORDER — CIPROFLOXACIN 2 MG/ML
400 INJECTION, SOLUTION INTRAVENOUS ONCE
Status: COMPLETED | OUTPATIENT
Start: 2022-01-26 | End: 2022-01-26

## 2022-01-26 RX ORDER — ALBUMIN, HUMAN INJ 5% 5 %
SOLUTION INTRAVENOUS PRN
Status: DISCONTINUED | OUTPATIENT
Start: 2022-01-26 | End: 2022-01-26 | Stop reason: SDUPTHER

## 2022-01-26 RX ORDER — FENTANYL CITRATE 50 UG/ML
25 INJECTION, SOLUTION INTRAMUSCULAR; INTRAVENOUS EVERY 5 MIN PRN
Status: DISCONTINUED | OUTPATIENT
Start: 2022-01-26 | End: 2022-01-26 | Stop reason: HOSPADM

## 2022-01-26 RX ORDER — MORPHINE SULFATE 4 MG/ML
4 INJECTION, SOLUTION INTRAMUSCULAR; INTRAVENOUS
Status: DISCONTINUED | OUTPATIENT
Start: 2022-01-26 | End: 2022-02-04 | Stop reason: HOSPADM

## 2022-01-26 RX ORDER — GLYCOPYRROLATE 0.2 MG/ML
INJECTION INTRAMUSCULAR; INTRAVENOUS PRN
Status: DISCONTINUED | OUTPATIENT
Start: 2022-01-26 | End: 2022-01-26 | Stop reason: SDUPTHER

## 2022-01-26 RX ORDER — OXYCODONE HYDROCHLORIDE 5 MG/1
5 TABLET ORAL EVERY 4 HOURS PRN
Status: DISCONTINUED | OUTPATIENT
Start: 2022-01-26 | End: 2022-02-04 | Stop reason: HOSPADM

## 2022-01-26 RX ORDER — LIDOCAINE 40 MG/G
CREAM TOPICAL PRN
Status: DISCONTINUED | OUTPATIENT
Start: 2022-01-27 | End: 2022-02-04 | Stop reason: HOSPADM

## 2022-01-26 RX ORDER — OXYCODONE HYDROCHLORIDE 10 MG/1
10 TABLET ORAL EVERY 4 HOURS PRN
Status: DISCONTINUED | OUTPATIENT
Start: 2022-01-26 | End: 2022-02-04 | Stop reason: HOSPADM

## 2022-01-26 RX ORDER — MORPHINE SULFATE 4 MG/ML
4 INJECTION, SOLUTION INTRAMUSCULAR; INTRAVENOUS EVERY 4 HOURS PRN
Status: DISCONTINUED | OUTPATIENT
Start: 2022-01-26 | End: 2022-01-26 | Stop reason: SDUPTHER

## 2022-01-26 RX ORDER — DEXAMETHASONE SODIUM PHOSPHATE 4 MG/ML
INJECTION, SOLUTION INTRA-ARTICULAR; INTRALESIONAL; INTRAMUSCULAR; INTRAVENOUS; SOFT TISSUE PRN
Status: DISCONTINUED | OUTPATIENT
Start: 2022-01-26 | End: 2022-01-26 | Stop reason: SDUPTHER

## 2022-01-26 RX ORDER — ROCURONIUM BROMIDE 10 MG/ML
INJECTION, SOLUTION INTRAVENOUS PRN
Status: DISCONTINUED | OUTPATIENT
Start: 2022-01-26 | End: 2022-01-26 | Stop reason: SDUPTHER

## 2022-01-26 RX ORDER — MORPHINE SULFATE 2 MG/ML
2 INJECTION, SOLUTION INTRAMUSCULAR; INTRAVENOUS
Status: DISCONTINUED | OUTPATIENT
Start: 2022-01-26 | End: 2022-02-04 | Stop reason: HOSPADM

## 2022-01-26 RX ORDER — LABETALOL HYDROCHLORIDE 5 MG/ML
5 INJECTION, SOLUTION INTRAVENOUS EVERY 10 MIN PRN
Status: DISCONTINUED | OUTPATIENT
Start: 2022-01-26 | End: 2022-01-26 | Stop reason: HOSPADM

## 2022-01-26 RX ORDER — SUCCINYLCHOLINE/SOD CL,ISO/PF 200MG/10ML
SYRINGE (ML) INTRAVENOUS PRN
Status: DISCONTINUED | OUTPATIENT
Start: 2022-01-26 | End: 2022-01-26 | Stop reason: SDUPTHER

## 2022-01-26 RX ORDER — ONDANSETRON 2 MG/ML
INJECTION INTRAMUSCULAR; INTRAVENOUS PRN
Status: DISCONTINUED | OUTPATIENT
Start: 2022-01-26 | End: 2022-01-26 | Stop reason: SDUPTHER

## 2022-01-26 RX ADMIN — ROCURONIUM BROMIDE 50 MG: 10 SOLUTION INTRAVENOUS at 10:15

## 2022-01-26 RX ADMIN — METRONIDAZOLE 500 MG: 500 INJECTION, SOLUTION INTRAVENOUS at 10:15

## 2022-01-26 RX ADMIN — GLYCOPYRROLATE 0.2 MG: 0.2 INJECTION, SOLUTION INTRAMUSCULAR; INTRAVENOUS at 10:15

## 2022-01-26 RX ADMIN — Medication 100 MCG: at 10:18

## 2022-01-26 RX ADMIN — SUGAMMADEX 200 MG: 100 INJECTION, SOLUTION INTRAVENOUS at 11:56

## 2022-01-26 RX ADMIN — SODIUM CHLORIDE: 9 INJECTION, SOLUTION INTRAVENOUS at 10:01

## 2022-01-26 RX ADMIN — FENTANYL CITRATE 50 MCG: 50 INJECTION INTRAMUSCULAR; INTRAVENOUS at 11:14

## 2022-01-26 RX ADMIN — CIPROFLOXACIN 400 MG: 2 INJECTION, SOLUTION INTRAVENOUS at 10:15

## 2022-01-26 RX ADMIN — ALBUMIN (HUMAN) 12.5 G: 12.5 SOLUTION INTRAVENOUS at 10:54

## 2022-01-26 RX ADMIN — ONDANSETRON 4 MG: 2 INJECTION INTRAMUSCULAR; INTRAVENOUS at 11:56

## 2022-01-26 RX ADMIN — FENTANYL CITRATE 50 MCG: 50 INJECTION INTRAMUSCULAR; INTRAVENOUS at 10:35

## 2022-01-26 RX ADMIN — MORPHINE SULFATE 2 MG: 2 INJECTION, SOLUTION INTRAMUSCULAR; INTRAVENOUS at 13:57

## 2022-01-26 RX ADMIN — Medication 100 MCG: at 10:08

## 2022-01-26 RX ADMIN — Medication 100 MCG: at 10:24

## 2022-01-26 RX ADMIN — MORPHINE SULFATE 2 MG: 2 INJECTION, SOLUTION INTRAMUSCULAR; INTRAVENOUS at 16:41

## 2022-01-26 RX ADMIN — DEXAMETHASONE SODIUM PHOSPHATE 4 MG: 4 INJECTION, SOLUTION INTRAMUSCULAR; INTRAVENOUS at 10:15

## 2022-01-26 RX ADMIN — MORPHINE SULFATE 2 MG: 2 INJECTION, SOLUTION INTRAMUSCULAR; INTRAVENOUS at 23:52

## 2022-01-26 RX ADMIN — LIDOCAINE HYDROCHLORIDE 100 MG: 20 INJECTION, SOLUTION EPIDURAL; INFILTRATION; INTRACAUDAL; PERINEURAL at 10:08

## 2022-01-26 RX ADMIN — PANTOPRAZOLE SODIUM 20 MG: 20 TABLET, DELAYED RELEASE ORAL at 19:45

## 2022-01-26 RX ADMIN — PROPOFOL 100 MG: 10 INJECTION, EMULSION INTRAVENOUS at 10:08

## 2022-01-26 RX ADMIN — PROPOFOL 50 MG: 10 INJECTION, EMULSION INTRAVENOUS at 10:40

## 2022-01-26 RX ADMIN — CIPROFLOXACIN 400 MG: 2 INJECTION, SOLUTION INTRAVENOUS at 09:39

## 2022-01-26 RX ADMIN — SODIUM CHLORIDE, PRESERVATIVE FREE 10 ML: 5 INJECTION INTRAVENOUS at 19:45

## 2022-01-26 RX ADMIN — Medication 100 MCG: at 11:03

## 2022-01-26 RX ADMIN — FENTANYL CITRATE 50 MCG: 50 INJECTION INTRAMUSCULAR; INTRAVENOUS at 10:08

## 2022-01-26 RX ADMIN — FENTANYL CITRATE 50 MCG: 50 INJECTION INTRAMUSCULAR; INTRAVENOUS at 10:44

## 2022-01-26 RX ADMIN — Medication 160 MG: at 10:08

## 2022-01-26 RX ADMIN — ATORVASTATIN CALCIUM 80 MG: 80 TABLET, FILM COATED ORAL at 19:45

## 2022-01-26 RX ADMIN — MORPHINE SULFATE 2 MG: 2 INJECTION, SOLUTION INTRAMUSCULAR; INTRAVENOUS at 19:45

## 2022-01-26 RX ADMIN — ROCURONIUM BROMIDE 20 MG: 10 SOLUTION INTRAVENOUS at 10:46

## 2022-01-26 ASSESSMENT — PULMONARY FUNCTION TESTS
PIF_VALUE: 14
PIF_VALUE: 14
PIF_VALUE: 1
PIF_VALUE: 15
PIF_VALUE: 15
PIF_VALUE: 14
PIF_VALUE: 15
PIF_VALUE: 14
PIF_VALUE: 15
PIF_VALUE: 3
PIF_VALUE: 15
PIF_VALUE: 2
PIF_VALUE: 4
PIF_VALUE: 15
PIF_VALUE: 17
PIF_VALUE: 13
PIF_VALUE: 15
PIF_VALUE: 2
PIF_VALUE: 15
PIF_VALUE: 15
PIF_VALUE: 2
PIF_VALUE: 15
PIF_VALUE: 16
PIF_VALUE: 14
PIF_VALUE: 14
PIF_VALUE: 3
PIF_VALUE: 14
PIF_VALUE: 15
PIF_VALUE: 0
PIF_VALUE: 15
PIF_VALUE: 14
PIF_VALUE: 14
PIF_VALUE: 1
PIF_VALUE: 15
PIF_VALUE: 17
PIF_VALUE: 15
PIF_VALUE: 14
PIF_VALUE: 15
PIF_VALUE: 15
PIF_VALUE: 14
PIF_VALUE: 0
PIF_VALUE: 15
PIF_VALUE: 14
PIF_VALUE: 15
PIF_VALUE: 15
PIF_VALUE: 1
PIF_VALUE: 15
PIF_VALUE: 17
PIF_VALUE: 16
PIF_VALUE: 1
PIF_VALUE: 16
PIF_VALUE: 15
PIF_VALUE: 15
PIF_VALUE: 2
PIF_VALUE: 4
PIF_VALUE: 16
PIF_VALUE: 14
PIF_VALUE: 15
PIF_VALUE: 17
PIF_VALUE: 2
PIF_VALUE: 14
PIF_VALUE: 14
PIF_VALUE: 15
PIF_VALUE: 2
PIF_VALUE: 15
PIF_VALUE: 3
PIF_VALUE: 15
PIF_VALUE: 15
PIF_VALUE: 13
PIF_VALUE: 14
PIF_VALUE: 15
PIF_VALUE: 14
PIF_VALUE: 13
PIF_VALUE: 14
PIF_VALUE: 15
PIF_VALUE: 17
PIF_VALUE: 14
PIF_VALUE: 13
PIF_VALUE: 14
PIF_VALUE: 15
PIF_VALUE: 15
PIF_VALUE: 14
PIF_VALUE: 14
PIF_VALUE: 15
PIF_VALUE: 14
PIF_VALUE: 15
PIF_VALUE: 10
PIF_VALUE: 15
PIF_VALUE: 14
PIF_VALUE: 0
PIF_VALUE: 15
PIF_VALUE: 15
PIF_VALUE: 13
PIF_VALUE: 6
PIF_VALUE: 15
PIF_VALUE: 16
PIF_VALUE: 15
PIF_VALUE: 15
PIF_VALUE: 16
PIF_VALUE: 14
PIF_VALUE: 15
PIF_VALUE: 0
PIF_VALUE: 15
PIF_VALUE: 15
PIF_VALUE: 18
PIF_VALUE: 16
PIF_VALUE: 15
PIF_VALUE: 2
PIF_VALUE: 15
PIF_VALUE: 4
PIF_VALUE: 15
PIF_VALUE: 13
PIF_VALUE: 4
PIF_VALUE: 22
PIF_VALUE: 16
PIF_VALUE: 2
PIF_VALUE: 15
PIF_VALUE: 15
PIF_VALUE: 2

## 2022-01-26 ASSESSMENT — PAIN DESCRIPTION - ORIENTATION: ORIENTATION: MID

## 2022-01-26 ASSESSMENT — PAIN SCALES - GENERAL
PAINLEVEL_OUTOF10: 8
PAINLEVEL_OUTOF10: 8
PAINLEVEL_OUTOF10: 3
PAINLEVEL_OUTOF10: 10
PAINLEVEL_OUTOF10: 9

## 2022-01-26 ASSESSMENT — PAIN DESCRIPTION - LOCATION: LOCATION: ABDOMEN

## 2022-01-26 ASSESSMENT — PAIN DESCRIPTION - ONSET: ONSET: GRADUAL

## 2022-01-26 ASSESSMENT — PAIN - FUNCTIONAL ASSESSMENT
PAIN_FUNCTIONAL_ASSESSMENT: 0-10
PAIN_FUNCTIONAL_ASSESSMENT: PREVENTS OR INTERFERES SOME ACTIVE ACTIVITIES AND ADLS

## 2022-01-26 ASSESSMENT — PAIN DESCRIPTION - FREQUENCY: FREQUENCY: INTERMITTENT

## 2022-01-26 ASSESSMENT — PAIN DESCRIPTION - PROGRESSION: CLINICAL_PROGRESSION: GRADUALLY WORSENING

## 2022-01-26 ASSESSMENT — PAIN DESCRIPTION - PAIN TYPE: TYPE: SURGICAL PAIN

## 2022-01-26 ASSESSMENT — PAIN DESCRIPTION - DESCRIPTORS: DESCRIPTORS: ACHING;THROBBING

## 2022-01-26 NOTE — PROGRESS NOTES
Speech Language Pathology    SLP treatment attempt:    Pt currently off the unit to OR. Will reattempt, potentially at a later date.     Mario Sprague MS, CCC-SLP #[id number]  Speech Language Pathologist  [phone]

## 2022-01-26 NOTE — BRIEF OP NOTE
Brief Postoperative Note      Patient: Pedro Pablo Burnham  YOB: 1945  MRN: [id number]    Date of Procedure: 1/26/2022    Pre-Op Diagnosis: COLON CANCER    Post-Op Diagnosis: Same       Procedure(s):  SIGMOID COLECTOMY, SIGMOIDOSCOPY, coloproctostomy    Surgeon(s):  Maribel Guerrero MD    Assistant:  First Assistant: Clotilde Cherry    Anesthesia: General    Estimated Blood Loss (mL): less than 968     Complications: None    Specimens:   ID Type Source Tests Collected by Time Destination   A : A) SIGMOID COLON Specimen Abdomen SURGICAL PATHOLOGY Maribel Guerrero MD 1/26/2022 1137        Implants:  * No implants in log *      Drains: * No LDAs found *    Findings: mass in distal sigmoid colon    Electronically signed by Eric Burgess MD on 1/26/2022 at 11:56 AM

## 2022-01-26 NOTE — PROGRESS NOTES
Physical Therapy    Attempted to see pt for therapy however pt out of room to surgery; will follow and await new orders to resume therapy  Electronically signed by ESPERANZA CADET PT on 1/26/2022 at 10:08 AM

## 2022-01-26 NOTE — PROGRESS NOTES
Occupational Therapy    Chart reviewed, orders acknowledged.  Attempted to see pt for therapy however pt out of room to surgery; will follow and await new orders to resume therapy Skyler Brown, OTR/L #[id number]

## 2022-01-26 NOTE — H&P
Preoperative History and Physical Update    H&P from 1/14/2022 was reviewed    CVA stable, minimal residual deficit    PE  Alert and oriented  Non tender abdomen    A/P  Sigmoid colon cancer  For colectomy today    The risks, benefits and alternatives to the planned procedure were discussed. Patient expressed an understanding and is willing to proceed.     Electronically signed by Terence Liu MD on 1/26/2022 at 9:50 AM

## 2022-01-26 NOTE — PLAN OF CARE
Problem: Falls - Risk of:  Goal: Will remain free from falls  Description: Will remain free from falls  1/26/2022 0417 by Rj Haddad RN  Outcome: Ongoing  Note: Bed alarm on, bed in lowest position, wheels locked. Fall risk assessment completed every shift. Nonskid socks on, fall sign posted. Pt educated on use of call light. Problem: Skin Integrity:  Goal: Will show no infection signs and symptoms  Description: Will show no infection signs and symptoms  1/26/2022 0417 by Rj Haddad RN  Outcome: Ongoing  Note: Assessing dario scale Q shift. Performing skin assessments every shift. Maintaining dry and clean skin. Promoting adequate nutritional intake. Heels elevated off bed. Performing skin care q shift. Utilizing lift equipment when indicated.

## 2022-01-26 NOTE — ANESTHESIA POSTPROCEDURE EVALUATION
Department of Anesthesiology  Postprocedure Note    Patient: Carter Marie  MRN: [id number]  YOB: 1945  Date of evaluation: 1/26/2022  Time:  1:20 PM     Procedure Summary     Date: 01/26/22 Room / Location: [address] [place] [address]    Anesthesia Start: 1001 Anesthesia Stop: 1218    Procedure: SIGMOID COLECTOMY, SIGMOIDOSCOPY (N/A Abdomen) Diagnosis:       Malignant neoplasm of colon, unspecified part of colon ([facility] [address]      (COLON CANCER)    Surgeons: Stepan Yan MD Responsible Provider: Estrella Pritchard MD    Anesthesia Type: general ASA Status: 3          Anesthesia Type: general    Nba Phase I: Nba Score: 10    Nba Phase II:      Last vitals: Reviewed and per EMR flowsheets.        Anesthesia Post Evaluation    Patient location during evaluation: PACU  Level of consciousness: awake and alert  Airway patency: patent  Nausea & Vomiting: no nausea and no vomiting  Complications: no  Cardiovascular status: blood pressure returned to baseline  Respiratory status: acceptable  Hydration status: euvolemic  Comments: Postoperative Anesthesia Note    Name:    Carter Marie  MRN:      [id number]    Patient Vitals in the past 12 hrs:  01/26/22 1247, BP:(!) 138/95, Pulse:67, Resp:11, SpO2:95 %  01/26/22 1232, BP:135/83, Pulse:67, Resp:16, SpO2:93 %  01/26/22 1227, BP:(!) 143/111, Pulse:69, Resp:13, SpO2:99 %  01/26/22 1222, BP:(!) 145/71, Pulse:70, Resp:14, SpO2:94 %  01/26/22 1217, BP:(!) 155/79, Pulse:70, Resp:14, SpO2:96 %  01/26/22 1211, BP:(!) 162/83, Temp:97.7 °F (36.5 °C), Temp src:Temporal, Pulse:72, Resp:10, SpO2:99 %  01/26/22 0923, BP:(!) 159/88, Temp:97.6 °F (36.4 °C), Temp src:Temporal, Pulse:79, SpO2:92 %  01/26/22 0912, SpO2:95 %  01/26/22 0852, BP:(!) 153/80, Temp:97.9 °F (36.6 °C), Temp src:Oral, Pulse:74, Resp:18, SpO2:94 %  01/26/22 0425, BP:(!) 161/83, Temp:97.5 °F (36.4 °C), Temp src:Oral, Pulse:75, Resp:18, SpO2:95 %  01/26/22 0425, Weight:224 lb 13.9 oz (102 kg) LABS:    CBC  Lab Results       Component                Value               Date/Time                  WBC                      4.1                 01/26/2022 04:18 AM        HGB                      10.1 (L)            01/26/2022 04:18 AM        HCT                      30.2 (L)            01/26/2022 04:18 AM        PLT                      152                 01/26/2022 04:18 AM   RENAL  Lab Results       Component                Value               Date/Time                  NA                       135 (L)             01/26/2022 04:18 AM        K                        4.8                 01/26/2022 04:18 AM        CL                       99                  01/26/2022 04:18 AM        CO2                      25                  01/26/2022 04:18 AM        BUN                      28 (H)              01/26/2022 04:18 AM        CREATININE               4.1 (H)             01/26/2022 04:18 AM        GLUCOSE                  119 (H)             01/26/2022 04:18 AM   COAGS  No results found for: PROTIME, INR, APTT    Intake & Output:  @71KRHA@    Nausea & Vomiting:  No    Level of Consciousness:  Awake    Pain Assessment:  Adequate analgesia    Anesthesia Complications:  No apparent anesthetic complications    SUMMARY      Vital signs stable  OK to discharge from Stage I post anesthesia care.   Care transferred from Anesthesiology department on discharge from perioperative area

## 2022-01-26 NOTE — PROGRESS NOTES
Hospitalist Progress Note      PCP: Noris Pearce    Date of Admission: 1/13/2022    Chief Complaint: colon cancer    Hospital Course:      Subjective: waiting for surgery tomorrow       Medications:  Reviewed    Infusion Medications    sodium chloride Stopped (01/25/22 1746)     Scheduled Medications    vancomycin (VANCOCIN) intermittent dosing (placeholder)   Other RX Placeholder    aspirin  81 mg Oral Daily    atorvastatin  80 mg Oral Nightly    [Held by provider] gabapentin  100 mg Oral TID    pantoprazole  20 mg Oral Nightly    sevelamer  800 mg Oral TID WC    tamsulosin  0.4 mg Oral QAM    sodium chloride flush  5-40 mL IntraVENous 2 times per day     PRN Meds: ondansetron **OR** ondansetron, polyethylene glycol, sodium chloride flush, sodium chloride, acetaminophen **OR** acetaminophen, perflutren lipid microspheres      Intake/Output Summary (Last 24 hours) at 1/25/2022 2000  Last data filed at 1/25/2022 1759  Gross per 24 hour   Intake 920.21 ml   Output 2850 ml   Net -1929.79 ml       Exam:    BP (!) 156/90   Pulse 72   Temp 95.5 °F (35.3 °C) (Oral)   Resp 18   Ht 6' 0.5\" (1.842 m)   Wt 220 lb 10.9 oz (100.1 kg)   SpO2 96%   BMI 29.52 kg/m²     General appearance: No apparent distress, appears stated age and cooperative. HEENT: Pupils equal, round, and reactive to light. Conjunctivae/corneas clear. Neck: Supple, with full range of motion. No jugular venous distention. Trachea midline. Respiratory:  Normal respiratory effort. Clear to auscultation, bilaterally without Rales/Wheezes/Rhonchi. Cardiovascular: Regular rate and rhythm with normal S1/S2 without murmurs, rubs or gallops. Abdomen: Soft, non-tender, non-distended with normal bowel sounds. Musculoskeletal: No clubbing, cyanosis or edema bilaterally. Full range of motion without deformity. Skin: Skin color, texture, turgor normal.  No rashes or lesions.   Neurologic:  Neurovascularly intact without any focal RECOMMENDATIONS:   Unavailable         US RENAL COMPLETE   Final Result   1. Simple cysts in the left kidney with no other significant renal finding. 2. Borderline enlargement of the prostate. Correlate with urologic history. CT CHEST ABDOMEN PELVIS WO CONTRAST   Final Result   No evidence of acute intrathoracic, intraabdominal or intrapelvic injury. Multifocal airspace disease. This pattern may reflect COVID pneumonia. Correlate with COVID testing. CT HEAD WO CONTRAST   Final Result   No acute intracranial abnormality. Specifically, no acute intracranial   hemorrhage or mass effect. Extensive chronic small vessel ischemic disease. Assessment/Plan:    Active Hospital Problems    Diagnosis Date Noted    LESLEE (acute kidney injury) ([facility] [address] [N17.9]     Acute CVA (cerebrovascular accident) ([facility] [address] [I63.9] 01/16/2022    GI bleed [K92.2] 01/14/2022    History of COVID-19 [Z86.16] 01/14/2022    Hyponatremia [E87.1] 01/14/2022    Fatigue [R53.83] 01/14/2022    Acute kidney injury superimposed on CKD ([facility] [address] [N17.9, N18.9] 01/14/2022    Lethargy [R53.83] 01/14/2022    Suspected UTI [R39.89] 01/14/2022     Acute encephalopathy secondary to CVA with underlying cognitive impairment.     -MRI of the brain showed punctate acute infarct in the posterior medial left parietal lobe  MRA of the head and neck showed no flow-limiting stenosis  - avoid hypotension during surgery     Enterococcus faecalis UTI  On IV Vanc x 5 days     Continue aspirin,   statin  -    PT and OT. Likely need ECF. End-stage renal failure, on hemodialysis  Continue hemodialysis.   Discussed with nephrology, patient trying to relocate to [place], nephrologist is assisting with finding a location for his outpatient HD.     Hypomagnesemia  Resolved        Hypokalemia  Resolved        Recent hospitalization for COVID  Currently on room air.        History of colon cancer/GI bleed/acute blood loss anemia -Hemoglobin remained stable, evaluated by GI, tolerating aspirin  -Surgery service consulted, awaiting their final recommendation in terms of doing the surgery here as the patient is relocating to [place].     Patient has a revised cardiac risk index score of 2 (with hx CVA and pre-operative creatinine > 2), indicating a class III risk, which carries a 10.1% 30-day risk of death, MI, or cardiac arrest. Patient is a moderate risk for the planned procedure and may proceed with the planned procedure as the benefit of the procedure outweigh the risks.         Obesity Body mass index is 28.9 kg/m². Complicating assessment and treatment. Placing patient at risk for multiple co-morbidities as well as early death and contributing to the patient's presentation. DVT Prophylaxis: lovenox  Diet: ADULT DIET; Regular; Low Sodium (2 gm); Low Potassium (Less than 3000 mg/day);  Low Phosphorus (Less than 1000 mg); 2000 ml  Diet NPO Exceptions are: Sips of Water with Meds  Code Status: Full Code    PT/OT Eval Status: [facility]    Dispo - PCU    Javy Rosario MD

## 2022-01-26 NOTE — PROGRESS NOTES
[facility]                                               Progress note    Summary:   Tylor Del Real is being seen by nephrology for ESRD. This is a 76 yo man with ESRD on HD three times weekly via left AVF who is here after a fall and AMS. He has been diagnosed with colon cancer and has been having rectal bleeding off and on for the past several weeks. From [facility]. Had [place] last month so has been dialyzing at a different dialysis unit for 20 day period. Last HD on Thursday this week. No rectal bleeding since being here at [facility]. GI has no plans for him. Daughter is at bedside. Apparently her father and mother both had a fall yesterday prompting the ED visit. Interval History  Seen post sigmoid colectomy today  Post-weight 100.1 kg  No new complaints  Vitals and labs reviewed    Plan:   No acute indication for HD today  Plan on HD tomorrow per schedule  Holding off ESAs given active malignancy. Adonis Ramsey MD  [facility]  Office: [phone]    Assessment:   ESRD  Does dialysis Monday Wednesday Friday usually but [facility]  He has a left AV fistula, positive thrill and bruit   kg, now below previous EDW. Continue to re-adjust.  Outpatient HD unit placement process underway. Has chair at [address] (6:30AM) but timing difficult for SNF to accommodate.     Electrolytes  No acute issues.     Hypertension  Blood pressure is acceptable. No changes.      S HPT  Calcium ok  Check phosphorus     GI bleed  Has known colon cancer  No active bleeding  Hemoglobin stable  General surgery consulted. S/p sigmoid colectomy 1/26/2022     Altered mental status  MRI showed small acute stroke  Neurology consulted  Mental status has improved. ROS:   Positives Listed Bold.  All other remaining systems are negative.     Constitutional:  fever, chills, weakness, weight change, fatigue,      Skin:  rash, pruritus, hair loss,

## 2022-01-26 NOTE — PLAN OF CARE
Problem: Falls - Risk of:  Goal: Will remain free from falls  Description: Will remain free from falls  1/26/2022 1033 by Aura Bloch, RN  Outcome: Ongoing  1/26/2022 0417 by Easton Montague RN  Outcome: Ongoing  Note: Bed alarm on, bed in lowest position, wheels locked. Fall risk assessment completed every shift. Nonskid socks on, fall sign posted. Pt educated on use of call light. Goal: Absence of physical injury  Description: Absence of physical injury  Outcome: Ongoing     Problem: Skin Integrity:  Goal: Will show no infection signs and symptoms  Description: Will show no infection signs and symptoms  1/26/2022 1033 by Aura Bloch, RN  Outcome: Ongoing  1/26/2022 0417 by Easton Montague RN  Outcome: Ongoing  Note: Assessing dario scale Q shift. Performing skin assessments every shift. Maintaining dry and clean skin. Promoting adequate nutritional intake. Heels elevated off bed. Performing skin care q shift. Utilizing lift equipment when indicated.     Goal: Absence of new skin breakdown  Description: Absence of new skin breakdown  Outcome: Ongoing     Problem: HEMODYNAMIC STATUS  Goal: Patient has stable vital signs and fluid balance  Outcome: Ongoing     Problem: ACTIVITY INTOLERANCE/IMPAIRED MOBILITY  Goal: Mobility/activity is maintained at optimum level for patient  Outcome: Ongoing     Problem: Fluid Volume:  Goal: Will show no signs or symptoms of fluid imbalance  Description: Will show no signs or symptoms of fluid imbalance  Outcome: Ongoing     Problem: Sensory:  Goal: General experience of comfort will improve  Description: General experience of comfort will improve  Outcome: Ongoing     Problem: Urinary Elimination:  Goal: Signs and symptoms of infection will decrease  Description: Signs and symptoms of infection will decrease  Outcome: Ongoing  Goal: Ability to reestablish a normal urinary elimination pattern will improve - after catheter removal  Description: Ability to reestablish a normal urinary elimination pattern will improve  Outcome: Ongoing  Goal: Complications related to the disease process, condition or treatment will be avoided or minimized  Description: Complications related to the disease process, condition or treatment will be avoided or minimized  Outcome: Ongoing

## 2022-01-26 NOTE — PROGRESS NOTES
Pt up to use bathroom with nurse assist. When returning to bed pt had emesis occurrence. Pt states that this does occur from time to time. About 100 ml of emesis out. This RN also asked pt if he would be able to sign consent for procedure tomorrow. Pt does have intermittent confusion but, was adamant he does not want to sign consent tonight. Per day shift RN pt also told family to not sign consent for him. RN asked pt if he had any questions or concerns about the procedure. Pt states he is nervous about the procedure because of a recent death in the family. Per day shift RN family stated they would be up to hospital early in the morning. Will continue to monitor.     Electronically signed by Efren Cobb RN on 1/25/2022 at 10:24 PM

## 2022-01-26 NOTE — PROGRESS NOTES
Hospitalist Progress Note      PCP: Bentley Castaneda    Date of Admission: 1/13/2022    Chief Complaint: colon cancer    Hospital Course:      Subjective: s/p surgery today, doing appropriately      Medications:  Reviewed    Infusion Medications    sodium chloride Stopped (01/25/22 1746)     Scheduled Medications    vancomycin (VANCOCIN) intermittent dosing (placeholder)   Other RX Placeholder    aspirin  81 mg Oral Daily    atorvastatin  80 mg Oral Nightly    [Held by provider] gabapentin  100 mg Oral TID    pantoprazole  20 mg Oral Nightly    sevelamer  800 mg Oral TID WC    tamsulosin  0.4 mg Oral QAM    sodium chloride flush  5-40 mL IntraVENous 2 times per day     PRN Meds: ondansetron **OR** ondansetron, polyethylene glycol, sodium chloride flush, sodium chloride, acetaminophen **OR** acetaminophen, perflutren lipid microspheres      Intake/Output Summary (Last 24 hours) at 1/26/2022 0856  Last data filed at 1/25/2022 2205  Gross per 24 hour   Intake 920.21 ml   Output 2600 ml   Net -1679.79 ml       Exam:    BP (!) 153/80   Pulse 74   Temp 97.9 °F (36.6 °C) (Oral)   Resp 18   Ht 6' 0.5\" (1.842 m)   Wt 224 lb 13.9 oz (102 kg)   SpO2 94%   BMI 30.08 kg/m²     General appearance: No apparent distress, appears stated age and cooperative. HEENT: Pupils equal, round, and reactive to light. Conjunctivae/corneas clear. Neck: Supple, with full range of motion. No jugular venous distention. Trachea midline. Respiratory:  Normal respiratory effort. Clear to auscultation, bilaterally without Rales/Wheezes/Rhonchi. Cardiovascular: Regular rate and rhythm with normal S1/S2 without murmurs, rubs or gallops. Abdomen: Soft, post-op abdominal tenderness, non-distended with normal bowel sounds. Musculoskeletal: No clubbing, cyanosis or edema bilaterally. Full range of motion without deformity. Skin: Skin color, texture, turgor normal.  No rashes or lesions.   Neurologic:  Neurovascularly intact without any focal sensory/motor deficits. Cranial nerves: II-XII intact, grossly non-focal.  Psychiatric: Alert and oriented, thought content appropriate, normal insight  Capillary Refill: Brisk,< 3 seconds   Peripheral Pulses: +2 palpable, equal bilaterally       Labs:   Recent Labs     01/24/22  0450 01/25/22  0507 01/26/22  0418   WBC 3.7* 4.0 4.1   HGB 9.6* 9.4* 10.1*   HCT 28.6* 27.9* 30.2*    148 152     Recent Labs     01/24/22  0450 01/25/22  0507 01/26/22  0418    136 135*   K 4.6 4.1 4.8    98* 99   CO2 26 25 25   BUN 36* 46* 28*   CREATININE 4.7* 5.3* 4.1*   CALCIUM 8.7 8.7 8.8     No results for input(s): AST, ALT, BILIDIR, BILITOT, ALKPHOS in the last 72 hours. No results for input(s): INR in the last 72 hours. No results for input(s): Eran Forte in the last 72 hours. Urinalysis:      Lab Results   Component Value Date    NITRU Negative 01/21/2022    WBCUA >900 01/21/2022    BACTERIA 2+ 01/21/2022    RBCUA 32 01/21/2022    BLOODU LARGE 01/21/2022    SPECGRAV 1.019 01/21/2022    GLUCOSEU Negative 01/21/2022       Radiology:  CT HEAD WO CONTRAST   Final Result   Atrophy and small-vessel ischemic change. No hemorrhage or mass identified. Paranasal sinus disease, moderate in degree, similar to prior      Small parotid nodule on the left, incidentally noted      RECOMMENDATIONS:   Unavailable         MRA neck without contrast   Final Result   No convincing flow limiting stenosis of the visualized carotid/vertebral   arteries within the limitations of this exam.         MRA head without contrast   Final Result   No apparent intracranial arterial high grade stenosis, occlusion or aneurysm   head at 5 within constraints of acquisition. MRI BRAIN WO CONTRAST   Final Result   1. Punctate acute infarct in the posteromedial left parietal lobe, within the   deep white matter.    2. 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 RECOMMENDATIONS:   Unavailable         US RENAL COMPLETE   Final Result   1. Simple cysts in the left kidney with no other significant renal finding. 2. Borderline enlargement of the prostate. Correlate with urologic history. CT CHEST ABDOMEN PELVIS WO CONTRAST   Final Result   No evidence of acute intrathoracic, intraabdominal or intrapelvic injury. Multifocal airspace disease. This pattern may reflect COVID pneumonia. Correlate with COVID testing. CT HEAD WO CONTRAST   Final Result   No acute intracranial abnormality. Specifically, no acute intracranial   hemorrhage or mass effect. Extensive chronic small vessel ischemic disease. Assessment/Plan:    Active Hospital Problems    Diagnosis Date Noted    LESLEE (acute kidney injury) ([facility] [address] [N17.9]     Acute CVA (cerebrovascular accident) ([facility] [address] [I63.9] 01/16/2022    GI bleed [K92.2] 01/14/2022    History of COVID-19 [Z86.16] 01/14/2022    Hyponatremia [E87.1] 01/14/2022    Fatigue [R53.83] 01/14/2022    Acute kidney injury superimposed on CKD ([facility] [address] [N17.9, N18.9] 01/14/2022    Lethargy [R53.83] 01/14/2022    Suspected UTI [R39.89] 01/14/2022     Acute encephalopathy secondary to CVA with underlying cognitive impairment.     -MRI of the brain showed punctate acute infarct in the posterior medial left parietal lobe  MRA of the head and neck showed no flow-limiting stenosis  - avoid hypotension during surgery     Enterococcus faecalis UTI  On IV Vanc x 5 days     Continue aspirin,   statin  -    PT and OT. Likely need ECF. End-stage renal failure, on hemodialysis  Continue hemodialysis.   Discussed with nephrology, [place] to relocate to [place], nephrologist is assisting with finding a location for his outpatient HD.     Hypomagnesemia  Resolved        Hypokalemia  Resolved        Recent hospitalization for COVID  Currently on room air.        History of colon cancer/GI bleed/acute blood loss anemia     -Hemoglobin remained stable, evaluated by GI, tolerating aspirin  -s/p sigmoid colectomy, sigmoidoscopy, coloproctostomy 1/26     Patient has a revised cardiac risk index score of 2 (with hx CVA and pre-operative creatinine > 2), indicating a class III risk, which carries a 10.1% 30-day risk of death, MI, or cardiac arrest. Patient is a moderate risk for the planned procedure and may proceed with the planned procedure as the benefit of the procedure outweigh the risks.         Obesity Body mass index is 28.9 kg/m². Complicating assessment and treatment. Placing patient at risk for multiple co-morbidities as well as early death and contributing to the patient's presentation.       DVT Prophylaxis: lovenox  Diet: Diet NPO Exceptions are: Sips of Water with Meds  Code Status: Full Code    PT/OT Eval Status: [facility]    Dispo - PCU    Janice Tam MD

## 2022-01-26 NOTE — CARE COORDINATION
CM received voicemail from Osmin Williamson at [facility]. Apparently first choice, [facility] was unable to obtain approval from [facility]. Patient has been setup for outpatient HD at [facility] for TThSat with a 6:30am chair time. Message left for Osmin Williamson requesting a return call regarding a different start time due to transportation difficulties. CM spoke to [facility] at [facility]. Facility is aware they will only be able to accept under Medicaid pending. Patient's family member works at [facility]. [facility] is verifying with the facility patient is accepted and approved for admission. Expecting return call.      Maryanne GARAY RN  Case Management  [phone]    Electronically signed by Maryanne Cast RN on 1/26/2022 at 2:17 PM

## 2022-01-26 NOTE — ANESTHESIA PRE PROCEDURE
[facility] Department of Anesthesiology  Pre-Anesthesia Evaluation/Consultation       Name:  Leopold Drown  :   Age:  77 y.o. MRN:  [id number]  Date: 2022           Surgeon: Surgeon(s):  Stanislav Soriano MD    Procedure: Procedure(s):  SIGMOID COLECTOMY     Allergies   Allergen Reactions    Lisinopril      Allergy listed on paperwork from [facility], no reaction noted     Patient Active Problem List   Diagnosis    GI bleed    History of COVID-19    Hyponatremia    Fatigue    [facility] kidney injury superimposed on CKD [facility] [address]    Lethargy    Suspected UTI    Acute CVA (cerebrovascular accident) ([facility] [address]    LESLEE (acute kidney injury) ([facility] [address]     Past Medical History:   Diagnosis Date    Arthritis     Cancer ([facility] [address]     Colon Cancer diagnosed last month    Diabetes mellitus ([facility] [address]     Hemodialysis patient ([facility] [address]     Hypertension      Past Surgical History:   Procedure Laterality Date    IR PERC ARTERIOVENOUS FISTULA CREATION Left     unsure of date     Social History     Tobacco Use    Smoking status: Former Smoker    Smokeless tobacco: Never Used   Substance Use Topics    Alcohol use: Not Currently    Drug use: Not on file     Medications  No current facility-administered medications on file prior to encounter. Current Outpatient Medications on File Prior to Encounter   Medication Sig Dispense Refill    tamsulosin (FLOMAX) 0.4 MG capsule Take 0.4 mg by mouth every morning      LORazepam (ATIVAN) 0.5 MG tablet Take 0.5 mg by mouth 2 times daily.  gabapentin (NEURONTIN) 100 MG capsule Take 100 mg by mouth 3 times daily.       sevelamer (RENVELA) 800 MG tablet Take 1 tablet by mouth 3 times daily (with meals)      clopidogrel (PLAVIX) 75 MG tablet Take 75 mg by mouth every evening      atenolol (TENORMIN) 25 MG tablet Take 25 mg by mouth every evening      pantoprazole (PROTONIX) 20 MG tablet Take 20 mg by mouth nightly      atorvastatin (LIPITOR) 10 MG tablet Take 10 mg by mouth nightly      amLODIPine (NORVASC) 5 MG tablet Take 5 mg by mouth every evening      calcitRIOL (ROCALTROL) 0.25 MCG capsule Take 0.25 mcg by mouth every evening      aspirin 81 MG chewable tablet Take 81 mg by mouth daily       Current Facility-Administered Medications   Medication Dose Route Frequency Provider Last Rate Last Admin    ciprofloxacin (CIPRO) IVPB 400 mg  400 mg IntraVENous Once Donald Hernandez MD        metronidazole (FLAGYL) 500 mg in NaCl 100 mL IVPB premix  500 mg IntraVENous Once Donald Hernandez MD        vancomycin [place]) intermittent dosing (placeholder)   Other RX Dominick Olmedo MD        aspirin chewable tablet 81 mg  81 mg Oral Daily Maria D Brewer MD   81 mg at 01/25/22 1221    ondansetron (ZOFRAN-ODT) disintegrating tablet 4 mg  4 mg Oral Q8H PRN Maria D Brewer MD        Or    ondansetron (ZOFRAN) injection 4 mg  4 mg IntraVENous Q6H PRN Maria D Brewer MD        polyethylene glycol (GLYCOLAX) packet 17 g  17 g Oral Daily PRN Maria D Brewer MD        atorvastatin (LIPITOR) tablet 80 mg  80 mg Oral Nightly Maria D Brewer MD   80 mg at 01/25/22 2019    [Held by provider] gabapentin (NEURONTIN) capsule 100 mg  100 mg Oral TID Maria D Brewer MD   100 mg at 01/21/22 0838    pantoprazole (PROTONIX) tablet 20 mg  20 mg Oral Nightly Maria D Brewer MD   20 mg at 01/25/22 2019    sevelamer (RENVELA) tablet 800 mg  800 mg Oral TID  Maria D Brewer MD   800 mg at 01/25/22 1658    tamsulosin (FLOMAX) capsule 0.4 mg  0.4 mg Oral QAM Maria D Brewer MD   0.4 mg at 01/25/22 1222    sodium chloride flush 0.9 % injection 5-40 mL  5-40 mL IntraVENous 2 times per day Raulito DULCE Jo, DO   10 mL at 01/25/22 2022    sodium chloride flush 0.9 % injection 5-40 mL  5-40 mL IntraVENous PRN Raulito DULCE Jo, DO        0.9 % sodium chloride infusion  25 mL IntraVENous PRN Raulito Chato Calhoun, DO   Stopped at 22 1746    acetaminophen (TYLENOL) tablet 650 mg  650 mg Oral Q6H PRN Raulito Willistine Minden, DO        Or    acetaminophen (TYLENOL) suppository 650 mg  650 mg Rectal Q6H PRN Raulito M Deysi, DO        perflutren lipid microspheres (DEFINITY) injection 1.65 mg  1.5 mL IntraVENous ONCE PRN Raulito Willistine Minden, DO         Vital Signs (Current)   Vitals:    22 0923   BP: (!) 159/88   Pulse: 79   Resp:    Temp: 97.6 °F (36.4 °C)   SpO2: 92%     Vital Signs Statistics (for past 48 hrs)     Temp  Av.7 °F (36.5 °C)  Min: 95.5 °F (35.3 °C)   Min taken time: 22 1510  Max: 98.9 °F (37.2 °C)   Max taken time: 22 2308  Pulse  Av.9  Min: 76   Min taken time: 22 1503  Max: 79   Max taken time: 22 0923  Resp  Av.6  Min: 12   Min taken time: 22  Max: 18   Max taken time: 22 0852  BP  Min: 110/68   Min taken time: 22 044  Max: 166/78   Max taken time: 22 0005  MAP (mmHg)  Av.5  Min: 80   Min taken time: 22  Max: 112   Max taken time: 22 1510  SpO2  Av.6 %  Min: 91 %   Min taken time: 22 0727  Max: 99 %   Max taken time: 22 1124    BP Readings from Last 3 Encounters:   22 (!) 159/88     BMI  Body mass index is 30.08 kg/m². Estimated body mass index is 30.08 kg/m² as calculated from the following:    Height as of this encounter: 6' 0.5\" (1.842 m). Weight as of this encounter: 224 lb 13.9 oz (102 kg).     CBC   Lab Results   Component Value Date    WBC 4.1 2022    RBC 3.38 2022    HGB 10.1 2022    HCT 30.2 2022    MCV 89.2 2022    RDW 14.3 2022     2022     CMP    Lab Results   Component Value Date     2022    K 4.8 2022    CL 99 2022    CO2 25 2022    BUN 28 2022    CREATININE 4.1 2022    GFRAA 17 2022    GFRAA 35 2011    AGRATIO 0.8 2022    LABGLOM 14 2022    GLUCOSE 119 2022    PROT 6.6 01/13/2022    PROT 7.5 07/26/2011    CALCIUM 8.8 01/26/2022    BILITOT 0.3 01/13/2022    ALKPHOS 84 01/13/2022    AST 9 01/13/2022    ALT 7 01/13/2022     BMP    Lab Results   Component Value Date     01/26/2022    K 4.8 01/26/2022    CL 99 01/26/2022    CO2 25 01/26/2022    BUN 28 01/26/2022    CREATININE 4.1 01/26/2022    CALCIUM 8.8 01/26/2022    GFRAA 17 01/26/2022    GFRAA 35 07/26/2011    LABGLOM 14 01/26/2022    GLUCOSE 119 01/26/2022     POCGlucose  Recent Labs     01/24/22  0450 01/25/22  0507 01/26/22  0418   GLUCOSE 114* 132* 119*      Coags  No results found for: PROTIME, INR, APTT  HCG (If Applicable) No results found for: PREGTESTUR, PREGSERUM, HCG, HCGQUANT   ABGs No results found for: PHART, PO2ART, TKO3UJD, TLW3BAU, BEART, K3QGGQNB   Type & Screen (If Applicable)  No results found for: LABABO, LABRH                         BMI: Wt Readings from Last 3 Encounters:       NPO Status:   Date of last liquid consumption: 01/25/22   Time of last liquid consumption: 2200   Date of last solid food consumption: 01/25/22      Time of last solid consumption: 1900       Anesthesia Evaluation  Patient summary reviewed no history of anesthetic complications:   Airway: Mallampati: III  TM distance: >3 FB   Neck ROM: full   Dental:    (+) upper dentures      Pulmonary:normal exam    (+) sleep apnea: on noncompliant,                             Cardiovascular:  Exercise tolerance: poor (<4 METS),   (+) hypertension (EF 55):,         Rhythm: regular  Rate: normal  Echocardiogram reviewed         Beta Blocker:  Dose within 24 Hrs         Neuro/Psych:   (+) CVA (no residual effects per pt):,             GI/Hepatic/Renal:   (+) GERD:, PUD, renal disease (HD yesterday): dialysis and ESRD,           Endo/Other:    (+) DiabetesType II DM, , blood dyscrasia (plavix): anticoagulation therapy:., malignancy/cancer (colon ca). Abdominal:   (+) obese,           Vascular: negative vascular ROS.          Other Findings:             Anesthesia Plan      general     ASA 3       Induction: intravenous. MIPS: Postoperative opioids intended and Prophylactic antiemetics administered. Anesthetic plan and risks discussed with patient, spouse and child/children. Use of blood products discussed with patient, spouse and child/children whom consented to blood products. Plan discussed with CRNA. This pre-anesthesia assessment may be used as a history and physical.    DOS STAFF ADDENDUM:    Pt seen and examined, chart reviewed (including anesthesia, drug and allergy history). No interval changes to history and physical examination. Anesthetic plan, risks, benefits, alternatives, and personnel involved discussed with patient. Questions and concerns addressed. Patient(family) verbalized an understanding and agrees to proceed.       Taniya Quintero MD  January 26, 2022  9:37 AM

## 2022-01-27 LAB
ANION GAP SERPL CALCULATED.3IONS-SCNC: 13 MMOL/L (ref 3–16)
BASOPHILS ABSOLUTE: 0 K/UL (ref 0–0.2)
BASOPHILS RELATIVE PERCENT: 0.4 %
BUN BLDV-MCNC: 40 MG/DL (ref 7–20)
CALCIUM SERPL-MCNC: 8.6 MG/DL (ref 8.3–10.6)
CHLORIDE BLD-SCNC: 98 MMOL/L (ref 99–110)
CO2: 23 MMOL/L (ref 21–32)
CREAT SERPL-MCNC: 5.5 MG/DL (ref 0.8–1.3)
EOSINOPHILS ABSOLUTE: 0 K/UL (ref 0–0.6)
EOSINOPHILS RELATIVE PERCENT: 0.4 %
GFR AFRICAN AMERICAN: 12
GFR NON-AFRICAN AMERICAN: 10
GLUCOSE BLD-MCNC: 138 MG/DL (ref 70–99)
HCT VFR BLD CALC: 28.4 % (ref 40.5–52.5)
HEMOGLOBIN: 9.6 G/DL (ref 13.5–17.5)
LYMPHOCYTES ABSOLUTE: 1 K/UL (ref 1–5.1)
LYMPHOCYTES RELATIVE PERCENT: 19.4 %
MAGNESIUM: 2.3 MG/DL (ref 1.8–2.4)
MCH RBC QN AUTO: 30 PG (ref 26–34)
MCHC RBC AUTO-ENTMCNC: 33.7 G/DL (ref 31–36)
MCV RBC AUTO: 89 FL (ref 80–100)
MONOCYTES ABSOLUTE: 0.5 K/UL (ref 0–1.3)
MONOCYTES RELATIVE PERCENT: 9.2 %
NEUTROPHILS ABSOLUTE: 3.7 K/UL (ref 1.7–7.7)
NEUTROPHILS RELATIVE PERCENT: 70.6 %
PDW BLD-RTO: 14.5 % (ref 12.4–15.4)
PLATELET # BLD: 150 K/UL (ref 135–450)
PMV BLD AUTO: 7.5 FL (ref 5–10.5)
POTASSIUM SERPL-SCNC: 6 MMOL/L (ref 3.5–5.1)
RBC # BLD: 3.19 M/UL (ref 4.2–5.9)
SODIUM BLD-SCNC: 134 MMOL/L (ref 136–145)
VANCOMYCIN RANDOM: 17.2 UG/ML
WBC # BLD: 5.2 K/UL (ref 4–11)

## 2022-01-27 PROCEDURE — 36415 COLL VENOUS BLD VENIPUNCTURE: CPT

## 2022-01-27 PROCEDURE — 99024 POSTOP FOLLOW-UP VISIT: CPT | Performed by: SURGERY

## 2022-01-27 PROCEDURE — 83735 ASSAY OF MAGNESIUM: CPT

## 2022-01-27 PROCEDURE — 2580000003 HC RX 258: Performed by: SURGERY

## 2022-01-27 PROCEDURE — 6360000002 HC RX W HCPCS: Performed by: SURGERY

## 2022-01-27 PROCEDURE — 2060000000 HC ICU INTERMEDIATE R&B

## 2022-01-27 PROCEDURE — 99024 POSTOP FOLLOW-UP VISIT: CPT | Performed by: NURSE PRACTITIONER

## 2022-01-27 PROCEDURE — 97167 OT EVAL HIGH COMPLEX 60 MIN: CPT

## 2022-01-27 PROCEDURE — 97130 THER IVNTJ EA ADDL 15 MIN: CPT

## 2022-01-27 PROCEDURE — 97129 THER IVNTJ 1ST 15 MIN: CPT

## 2022-01-27 PROCEDURE — 6370000000 HC RX 637 (ALT 250 FOR IP): Performed by: SURGERY

## 2022-01-27 PROCEDURE — 97535 SELF CARE MNGMENT TRAINING: CPT

## 2022-01-27 PROCEDURE — APPSS45 APP SPLIT SHARED TIME 31-45 MINUTES: Performed by: NURSE PRACTITIONER

## 2022-01-27 PROCEDURE — 80202 ASSAY OF VANCOMYCIN: CPT

## 2022-01-27 PROCEDURE — 97530 THERAPEUTIC ACTIVITIES: CPT | Performed by: PHYSICAL THERAPIST

## 2022-01-27 PROCEDURE — 97116 GAIT TRAINING THERAPY: CPT | Performed by: PHYSICAL THERAPIST

## 2022-01-27 PROCEDURE — APPNB45 APP NON BILLABLE 31-45 MINUTES: Performed by: NURSE PRACTITIONER

## 2022-01-27 PROCEDURE — 6360000002 HC RX W HCPCS: Performed by: STUDENT IN AN ORGANIZED HEALTH CARE EDUCATION/TRAINING PROGRAM

## 2022-01-27 PROCEDURE — 80048 BASIC METABOLIC PNL TOTAL CA: CPT

## 2022-01-27 PROCEDURE — 94760 N-INVAS EAR/PLS OXIMETRY 1: CPT

## 2022-01-27 PROCEDURE — 90935 HEMODIALYSIS ONE EVALUATION: CPT

## 2022-01-27 PROCEDURE — 85025 COMPLETE CBC W/AUTO DIFF WBC: CPT

## 2022-01-27 PROCEDURE — 97112 NEUROMUSCULAR REEDUCATION: CPT

## 2022-01-27 RX ADMIN — TAMSULOSIN HYDROCHLORIDE 0.4 MG: 0.4 CAPSULE ORAL at 12:28

## 2022-01-27 RX ADMIN — IRON SUCROSE 100 MG: 20 INJECTION, SOLUTION INTRAVENOUS at 15:11

## 2022-01-27 RX ADMIN — OXYCODONE HYDROCHLORIDE 10 MG: 10 TABLET ORAL at 07:39

## 2022-01-27 RX ADMIN — SODIUM CHLORIDE, PRESERVATIVE FREE 10 ML: 5 INJECTION INTRAVENOUS at 19:57

## 2022-01-27 RX ADMIN — SEVELAMER CARBONATE 800 MG: 800 TABLET, FILM COATED ORAL at 17:41

## 2022-01-27 RX ADMIN — ATORVASTATIN CALCIUM 80 MG: 80 TABLET, FILM COATED ORAL at 19:56

## 2022-01-27 RX ADMIN — ASPIRIN 81 MG 81 MG: 81 TABLET ORAL at 12:28

## 2022-01-27 RX ADMIN — OXYCODONE HYDROCHLORIDE 10 MG: 10 TABLET ORAL at 19:56

## 2022-01-27 RX ADMIN — ONDANSETRON 4 MG: 4 TABLET, ORALLY DISINTEGRATING ORAL at 07:40

## 2022-01-27 RX ADMIN — MORPHINE SULFATE 2 MG: 2 INJECTION, SOLUTION INTRAMUSCULAR; INTRAVENOUS at 06:47

## 2022-01-27 RX ADMIN — MORPHINE SULFATE 2 MG: 2 INJECTION, SOLUTION INTRAMUSCULAR; INTRAVENOUS at 23:05

## 2022-01-27 RX ADMIN — SODIUM CHLORIDE, PRESERVATIVE FREE 10 ML: 5 INJECTION INTRAVENOUS at 15:21

## 2022-01-27 RX ADMIN — MORPHINE SULFATE 4 MG: 4 INJECTION, SOLUTION INTRAMUSCULAR; INTRAVENOUS at 17:42

## 2022-01-27 RX ADMIN — PANTOPRAZOLE SODIUM 20 MG: 20 TABLET, DELAYED RELEASE ORAL at 19:56

## 2022-01-27 RX ADMIN — SEVELAMER CARBONATE 800 MG: 800 TABLET, FILM COATED ORAL at 12:28

## 2022-01-27 RX ADMIN — OXYCODONE HYDROCHLORIDE 10 MG: 10 TABLET ORAL at 16:01

## 2022-01-27 ASSESSMENT — PAIN DESCRIPTION - PAIN TYPE
TYPE: SURGICAL PAIN

## 2022-01-27 ASSESSMENT — PAIN DESCRIPTION - FREQUENCY
FREQUENCY: INTERMITTENT
FREQUENCY: CONTINUOUS
FREQUENCY: INTERMITTENT
FREQUENCY: CONTINUOUS
FREQUENCY: INTERMITTENT
FREQUENCY: INTERMITTENT

## 2022-01-27 ASSESSMENT — PAIN SCALES - GENERAL
PAINLEVEL_OUTOF10: 1
PAINLEVEL_OUTOF10: 7
PAINLEVEL_OUTOF10: 9
PAINLEVEL_OUTOF10: 7
PAINLEVEL_OUTOF10: 2
PAINLEVEL_OUTOF10: 4
PAINLEVEL_OUTOF10: 9
PAINLEVEL_OUTOF10: 5
PAINLEVEL_OUTOF10: 8
PAINLEVEL_OUTOF10: 5
PAINLEVEL_OUTOF10: 0
PAINLEVEL_OUTOF10: 10

## 2022-01-27 ASSESSMENT — PAIN DESCRIPTION - LOCATION
LOCATION: ABDOMEN

## 2022-01-27 ASSESSMENT — PAIN DESCRIPTION - PROGRESSION
CLINICAL_PROGRESSION: GRADUALLY WORSENING
CLINICAL_PROGRESSION: NOT CHANGED
CLINICAL_PROGRESSION: GRADUALLY WORSENING
CLINICAL_PROGRESSION: GRADUALLY WORSENING

## 2022-01-27 ASSESSMENT — PAIN DESCRIPTION - DESCRIPTORS
DESCRIPTORS: DISCOMFORT
DESCRIPTORS: ACHING
DESCRIPTORS: DISCOMFORT
DESCRIPTORS: ACHING;DISCOMFORT

## 2022-01-27 ASSESSMENT — PAIN DESCRIPTION - ORIENTATION
ORIENTATION: MID
ORIENTATION: MID;LOWER
ORIENTATION: LOWER;MID
ORIENTATION: MID

## 2022-01-27 ASSESSMENT — PAIN DESCRIPTION - ONSET
ONSET: ON-GOING

## 2022-01-27 NOTE — PROGRESS NOTES
Occupational Therapy   Occupational Therapy Initial Assessment  Date: 2022   Patient Name: Shivam Mooney  MRN: [id number]     : 1945    Date of Service: 2022    Discharge Recommendations:  Patient would benefit from continued therapy after discharge,3-5 sessions per week  OT Equipment Recommendations  Other: [facility]  Shivam Mooney scored a 16/24 on the AM-PAC ADL Inpatient form. Current research shows that an AM-PAC score of 17 or less is typically not associated with a discharge to the patient's home setting. Based on the patient's AM-PAC score and their current ADL deficits, it is recommended that the patient have 3-5 sessions per week of Occupational Therapy at d/c to increase the patient's independence. Please see assessment section for further patient specific details. If patient discharges prior to next session this note will serve as a discharge summary. Please see below for the latest assessment towards goals. Assessment   Performance deficits / Impairments: Decreased functional mobility ; Decreased safe awareness;Decreased balance;Decreased ADL status; Decreased cognition;Decreased high-level IADLs;Decreased endurance;Decreased strength  Assessment: 76 y/o male admit 2022 with LESLEE, GI Bleed; Weakness. 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 Surg consult (recent dx Colon Ca ~ 1 month ago; udnerwent open sigmoid resection with coloproctostomy on 22. Recent dx Pneumonia, COVID+ (cont weak/falls). PTA pt lives at home with spouse and was independent with ADLs and functional mobility (RW only for long distances). Today, pt trying to get out of bed, needed min A of 2 using RW to/from bathrm; completed toileting w max A to manage brief & kelly hygiene. He is having L sided weakness & is new to HD. Anticipate pt will require up to max A for LB dressing at this time.    Pt would benefit from continued therapy prior to discharge home to increase strength and safety with transfers, and ADL's prior to discharge home. Treatment Diagnosis: impaired ADLs, t/f  Prognosis: Good  Decision Making: Medium Complexity  OT Education: OT Role;Transfer Training;Plan of Care  Patient Education: Safe transfers  REQUIRES OT FOLLOW UP: Yes  Activity Tolerance  Activity Tolerance: Patient limited by fatigue;Treatment limited secondary to decreased cognition  Activity Tolerance: trying to get up upon entry to room, lacks insight into deficits  Safety Devices  Safety Devices in place: Yes  Type of devices: Call light within reach; Chair alarm in place; Left in chair;Gait belt;Nurse notified           Patient Diagnosis(es): The primary encounter diagnosis was LESLEE (acute kidney injury) ([facility] [address] Diagnoses of Gastrointestinal hemorrhage, unspecified gastrointestinal hemorrhage type, COVID, Acute cystitis without hematuria, and Malignant neoplasm of colon, unspecified part of colon ([facility] [address] were also pertinent to this visit. has a past medical history of Arthritis, Cancer ([facility] [address] Diabetes mellitus ([facility] [address] Hemodialysis patient ([facility] [address] and Hypertension. has a past surgical history that includes IR PERC ARTERIOVENOUS FISTULA CREATION (Left) and colectomy (N/A, 1/26/2022). Treatment Diagnosis: impaired ADLs, t/f      Restrictions  Restrictions/Precautions  Restrictions/Precautions: Fall Risk  Position Activity Restriction  Other position/activity restrictions: clear liquid diet, teley, HD    Subjective   General  Chart Reviewed: Yes  Patient assessed for rehabilitation services?: Yes  Additional Pertinent Hx: 76 y/o male admit 1/13/2022 with LESLEE, GI Bleed; Weakness. 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 Surg consult (recent dx Colon Ca ~ 1 month ago; underwent  open sigmoid resection with coloproctostomy on 1/26/22. Recent dx Pneumonia, COVID+ (cont weak/falls). PMH as noted including CKD (HD), COVID+.   Family / Caregiver Present: No  Referring Practitioner:  Harpen  Diagnosis: CVA  Subjective  Subjective: met in room, he is trying to get out of bed, alarm sounding, states he's \"getting out of here\"  General Comment  Comments: Per RN ok to evaluate     Social/Functional History  Social/Functional History  Lives With: Spouse (Wife Jovon Tyson). )  Type of Home: Mobile home  Home Layout: One level  Home Access: Ramped entrance  Bathroom Shower/Tub: Walk-in shower  Bathroom Toilet: Handicap height  Bathroom Equipment: Grab bars in [address] Accessibility: Resendiz Aj: Rolling walker  ADL Assistance: [address]:  (Wife takes care of most homemaking needs.)  Ambulation Assistance: Independent (Occass use of Walker (longer distances). )  Transfer Assistance: Independent  Active : No (Able, although doesn't drive. Family takes care of errands. Medical transport to/from HD.)  Occupation: Retired  Type of occupation: Retired : various jobs ([employer], [employer], [employer]). Additional Comments: 2 Dtrs, 1 Son live in area. Reports recent fall (slid off bed onto floor), wife able to assist up.        Objective   Vision: Impaired (eyes seem a little jaundiced)  Vision Exceptions: Wears glasses for reading  Hearing: Within functional limits  Hearing Exceptions: Hard of hearing/hearing concerns    Orientation  Overall Orientation Status: Impaired  Orientation Level: Oriented to person;Oriented to place;Oriented to time;Disoriented to situation  Observation/Palpation  Observation: drainage noted on pt's sosa pad, RN aware; pt has bandage from sigmoid resect w/ coloprostostomy  Balance  Sitting Balance: Stand by assistance  Standing Balance: Minimal assistance (CG/min A)  Standing Balance  Time: @ 2 minutes  Activity: static stance, during toileting  Comment: using RW or GB  Functional Mobility  Functional - Mobility Device: Rolling Walker  Activity: To/from bathroom  Assist Level: Minimal assistance  Functional Mobility Comments: min A of 2 using RW in/out of bathrm, needed some guidance to steer walker & to transition hands to armrests or GB  Toilet Transfers  Toilet - Technique: Ambulating  Equipment Used: Grab bars  Toilet Transfer: 2 Person assistance;Minimal assistance  Toilet Transfers Comments: GB  Wheelchair Bed Transfers  Wheelchair/Bed - Technique: Ambulating  Equipment Used: Bed;Other  Level of Asssistance: Minimal assistance;2 Person assistance  Wheelchair Transfers Comments: Cues to lean forward and for hand placement. ADL  Feeding: Setup  Grooming: Stand by assistance;Setup;Verbal cueing (OT handed him a wipe to clean his hands after toilet task)  Toileting: Maximum assistance (needed A to manage brief & wipe kelly area, min A of 2)  Additional Comments: Anticipate pt will require mod/max A for LB ADLs,  SBA for UB bathing/dressing and grooming based on balance and endurance observed  Tone RUE  RUE Tone: Normotonic  Tone LUE  LUE Tone: Normotonic  Coordination  Movements Are Fluid And Coordinated: Yes  Coordination and Movement description: Decreased speed;Left UE;Fine motor impairments        Transfers  Sit to stand: Contact guard assistance;Minimal assistance  Stand to sit: Contact guard assistance;Minimal assistance  Transfer Comments: stood from bed, CG/min A of 2 using RW     Cognition  Overall Cognitive Status: Exceptions  Arousal/Alertness: Appropriate responses to stimuli  Following Commands: Follows one step commands with increased time; Follows one step commands with repetition  Attention Span: Difficulty dividing attention  Memory: Decreased recall of recent events;Decreased short term memory  Safety Judgement: Decreased awareness of need for safety;Decreased awareness of need for assistance  Insights: Decreased awareness of deficits  Initiation: Requires cues for some  Sequencing: Requires cues for some  Cognition Comment: decreased insight into deficits Sensation  Overall Sensation Status: WNL (denies numbness or tingling in hands or feet)        LUE AROM (degrees)  LUE AROM : WFL  Left Hand AROM (degrees)  Left Hand AROM: WFL  RUE AROM (degrees)  RUE AROM : WFL  Right Hand AROM (degrees)  Right Hand AROM: WFL  LUE Strength  Gross LUE Strength: Exceptions to [facility]  L Shoulder Flex: 3/5  L Hand General: 4+/5  RUE Strength  Gross RUE Strength: Exceptions to [facility]  R Shoulder Flex: 3/5  R Hand General: 4+/5                   Plan   Plan  Times per week: 3-5  Current Treatment Recommendations: Strengthening,Endurance Training,Balance Training,Gait Training,Functional Mobility Training,Self-Care / ADL    AM-PAC Score        AM-[facility] Inpatient Daily Activity Raw Score: 16 (01/27/22 1433)  AM-PAC Inpatient ADL T-Scale Score : 35.96 (01/27/22 1433)  ADL Inpatient CMS 0-100% Score: 53.32 (01/27/22 1433)  ADL Inpatient CMS G-Code Modifier : CK (01/27/22 1433)    Goals  Short term goals  Time Frame for Short term goals: Prior to DC: goals ongoing  Short term goal 1: Pt will complete ADL transfers with supervision  Short term goal 2: Pt will complete functional mobility with supervision  Short term goal 3: Pt will tolerate standing > 5 min for functional task with supervision  Short term goal 4: Pt will complete toileting with supervision  Short term goal 5: Pt will complete LB dressing with supervision  Patient Goals   Patient goals : to return home       Therapy Time   Individual Concurrent Group Co-treatment   Time In 1330         Time Out 1410         Minutes 40         Timed Code Treatment Minutes: [address], OTR/L #[id number]

## 2022-01-27 NOTE — PROGRESS NOTES
Hospitalist Progress Note      PCP: Pat Camejo    Date of Admission: 1/13/2022    Chief Complaint: colon cancer    Hospital Course:      Subjective: no flatus or bowel movement. Tolerating liquid diet      Medications:  Reviewed    Infusion Medications    sodium chloride Stopped (01/25/22 1746)     Scheduled Medications    aspirin  81 mg Oral Daily    atorvastatin  80 mg Oral Nightly    [Held by provider] gabapentin  100 mg Oral TID    pantoprazole  20 mg Oral Nightly    sevelamer  800 mg Oral TID WC    tamsulosin  0.4 mg Oral QAM    sodium chloride flush  5-40 mL IntraVENous 2 times per day     PRN Meds: morphine **OR** morphine, oxyCODONE **OR** oxyCODONE, lidocaine, ondansetron **OR** ondansetron, polyethylene glycol, sodium chloride flush, sodium chloride, acetaminophen **OR** [DISCONTINUED] acetaminophen, perflutren lipid microspheres      Intake/Output Summary (Last 24 hours) at 1/27/2022 1525  Last data filed at 1/27/2022 1357  Gross per 24 hour   Intake 1720 ml   Output 1325 ml   Net 395 ml       Exam:    /65   Pulse 86   Temp 97.8 °F (36.6 °C) (Oral)   Resp 16   Ht 6' 0.5\" (1.842 m)   Wt 215 lb 9.8 oz (97.8 kg)   SpO2 (!) 88%   BMI 28.84 kg/m²     General appearance: No apparent distress, appears stated age and cooperative. HEENT: Pupils equal, round, and reactive to light. Conjunctivae/corneas clear. Neck: Supple, with full range of motion. No jugular venous distention. Trachea midline. Respiratory:  Normal respiratory effort. Clear to auscultation, bilaterally without Rales/Wheezes/Rhonchi. Cardiovascular: Regular rate and rhythm with normal S1/S2 without murmurs, rubs or gallops. Abdomen: Soft, post-op abdominal tenderness, non-distended with normal bowel sounds. Musculoskeletal: No clubbing, cyanosis or edema bilaterally. Full range of motion without deformity. Skin: Skin color, texture, turgor normal.  No rashes or lesions.   Neurologic:  Neurovascularly intact without any focal sensory/motor deficits. Cranial nerves: II-XII intact, grossly non-focal.  Psychiatric: Alert and oriented, thought content appropriate, normal insight  Capillary Refill: Brisk,< 3 seconds   Peripheral Pulses: +2 palpable, equal bilaterally       Labs:   Recent Labs     01/25/22  0507 01/26/22  0418 01/27/22  0530   WBC 4.0 4.1 5.2   HGB 9.4* 10.1* 9.6*   HCT 27.9* 30.2* 28.4*    152 150     Recent Labs     01/25/22  0507 01/26/22  0418 01/27/22  0530    135* 134*   K 4.1 4.8 6.0*   CL 98* 99 98*   CO2 25 25 23   BUN 46* 28* 40*   CREATININE 5.3* 4.1* 5.5*   CALCIUM 8.7 8.8 8.6     No results for input(s): AST, ALT, BILIDIR, BILITOT, ALKPHOS in the last 72 hours. No results for input(s): INR in the last 72 hours. No results for input(s): Stephan Snuffer in the last 72 hours. Urinalysis:      Lab Results   Component Value Date    NITRU Negative 01/21/2022    WBCUA >900 01/21/2022    BACTERIA 2+ 01/21/2022    RBCUA 32 01/21/2022    BLOODU LARGE 01/21/2022    SPECGRAV 1.019 01/21/2022    GLUCOSEU Negative 01/21/2022       Radiology:  CT HEAD WO CONTRAST   Final Result   Atrophy and small-vessel ischemic change. No hemorrhage or mass identified. Paranasal sinus disease, moderate in degree, similar to prior      Small parotid nodule on the left, incidentally noted      RECOMMENDATIONS:   Unavailable         MRA neck without contrast   Final Result   No convincing flow limiting stenosis of the visualized carotid/vertebral   arteries within the limitations of this exam.         MRA head without contrast   Final Result   No apparent intracranial arterial high grade stenosis, occlusion or aneurysm   head at 5 within constraints of acquisition. MRI BRAIN WO CONTRAST   Final Result   1. Punctate acute infarct in the posteromedial left parietal lobe, within the   deep white matter.    2. 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 RECOMMENDATIONS:   Unavailable         US RENAL COMPLETE   Final Result   1. Simple cysts in the left kidney with no other significant renal finding. 2. Borderline enlargement of the prostate. Correlate with urologic history. CT CHEST ABDOMEN PELVIS WO CONTRAST   Final Result   No evidence of acute intrathoracic, intraabdominal or intrapelvic injury. Multifocal airspace disease. This pattern may reflect COVID pneumonia. Correlate with COVID testing. CT HEAD WO CONTRAST   Final Result   No acute intracranial abnormality. Specifically, no acute intracranial   hemorrhage or mass effect. Extensive chronic small vessel ischemic disease. Assessment/Plan:    Active Hospital Problems    Diagnosis Date Noted    Malignant neoplasm of colon ([facility] [address] [C18.9]     LESLEE (acute kidney injury) ([address] [N17.9]     Acute CVA (cerebrovascular accident) ([address] [I63.9] 01/16/2022    GI bleed [K92.2] 01/14/2022    History of COVID-19 [Z86.16] 01/14/2022    Hyponatremia [E87.1] 01/14/2022    Fatigue [R53.83] 01/14/2022    Acute kidney injury superimposed on CKD ([address] [N17.9, N18.9] 01/14/2022    Lethargy [R53.83] 01/14/2022    Suspected UTI [R39.89] 01/14/2022     Acute encephalopathy secondary to CVA with underlying cognitive impairment.     -MRI of the brain showed punctate acute infarct in the posterior medial left parietal lobe  MRA of the head and neck showed no flow-limiting stenosis  - avoid hypotension during surgery     Enterococcus faecalis UTI  On IV Vanc x 5 days     Continue aspirin,   statin  -    PT and OT. Likely need ECF. End-stage renal failure, on hemodialysis  Continue hemodialysis.   Discussed with nephrology, [place] to [place] to [place], nephrologist is assisting with finding a location for his outpatient HD.     Hypomagnesemia  Resolved        Hypokalemia  Resolved        Recent hospitalization for COVID  Currently on room air.        History of colon cancer/GI bleed/acute blood loss anemia     -Hemoglobin remained stable, evaluated by GI, tolerating aspirin  -s/p sigmoid colectomy, sigmoidoscopy, coloproctostomy 1/26  - waiting for return of bowel function  - ok for liquid diet for now            Obesity Body mass index is 28.9 kg/m². Complicating assessment and treatment. Placing patient at risk for multiple co-morbidities as well as early death and contributing to the patient's presentation. DVT Prophylaxis: lovenox  Diet: ADULT DIET;  Clear Liquid  Code Status: Full Code    PT/OT Eval Status: SNF    Dispo - PCU, waiting for return of bowel function    Ruth Treviño MD

## 2022-01-27 NOTE — PROGRESS NOTES
[facility]                                               Progress note    Summary:   Cate Yu is being seen by nephrology for ESRD. This is a 76 yo man with ESRD on HD three times weekly via left AVF who is here after a fall and AMS. He has been diagnosed with colon cancer and has been having rectal bleeding off and on for the past several weeks. From [facility]. [facility] so has been dialyzing at a different dialysis unit for 20 day period. Last HD on Thursday this week. No rectal bleeding since being here at [facility]. GI has no plans for him. Daughter is at bedside. Apparently her father and mother both had a fall yesterday prompting the ED visit. Interval History  s/p sigmoid colectomy yesterday  Seen on HD this AM  Last post-weight 100.1 kg. Post HD weight today 97.8 kg  Was hesitant to dialyze this AM because he is still recovering from his surgery. But he was quite hyperkalemic today. Plan:   iHD today  Address hyperkalemia with HD  UF to EDW  Give venofer 100 mg with HD today. Holding off on ESAs due to actie malignancy. Check iron studies with AM labs      Donavan Keyes MD  [facility]  Office: [phone]    Assessment:   ESRD  Does dialysis Monday Wednesday Friday usually but has been doing TTS at the [facility] unit near [facility]  He has a left AV fistula, positive thrill and bruit   kg, now below previous EDW. Continue to re-adjust.  Outpatient HD unit placement process underway. Has chair at [address] (6:30AM) but timing difficult for SNF to accommodate.     Electrolytes  No acute issues.     Hypertension  Blood pressure is acceptable. No changes.      S HPT  Calcium ok  Check phosphorus     GI bleed  Has known colon cancer  No active bleeding  Hemoglobin stable  General surgery consulted. S/p sigmoid colectomy 1/26/2022     Altered mental status  MRI showed small acute stroke  Neurology consulted  Mental status has improved.        ROS: Positives [employer]. All other remaining systems are negative.     Constitutional:  fever, chills, weakness, weight change, fatigue,      Skin:  rash, pruritus, hair loss, bruising, dry skin, petechiae. Head, Face, Neck   headaches, swelling,  cervical adenopathy.     Respiratory: shortness of breath, cough, or wheezing  Cardiovascular: chest pain, palpitations, dizzy, edema  Gastrointestinal: nausea, vomiting, diarrhea, constipation,belly pain    Yellow skin, blood in stool  Musculoskeletal:  back pain, muscle weakness, gait problems,       joint pain or swelling. Genitourinary:  dysuria, poor urine flow, flank pain, blood in urine  Neurologic:  vertigo, TIA'S, syncope, seizures, focal weakness  Psychosocial:  insomnia, anxiety, or depression. Additional positive findings: -     PMH:   Past medical history, surgical history, social history, family history are reviewed and updated as appropriate. Reviewed current medication list.   Allergies reviewed and updated as needed. PE:   Vitals:    01/27/22 0737   BP: (!) 144/80   Pulse: 67   Resp: 16   Temp: 97.4 °F (36.3 °C)   SpO2: 93%       General appearance: Male in no acute distress,awake and alert. HEENT: no icterus, EOM intact, trachea midline. Neck : no masses, appears symmetrical and no JVD appreciated. Respiratory: Respiratory effort normal, bilateral equal chest rise. Cardiovascular: Ausculation shows RRR and no edema   Abdomen: abdomen is soft, non distended, no masses, no pain with palpation. Musculoskeletal:  no joint swelling, no deformity  Skin: no rashes, no induration, no tightening, no jaundice   Neuro:   Follows commands, moves all extremities spontaneously   Left AV fistula positive thrill and bruit      Lab Results   Component Value Date    CREATININE 5.5 (HH) 01/27/2022    BUN 40 (H) 01/27/2022     (L) 01/27/2022    K 6.0 (HH) 01/27/2022    CL 98 (L) 01/27/2022    CO2 23 01/27/2022      Lab Results   Component Value Date

## 2022-01-27 NOTE — PROGRESS NOTES
Venofer overdue on mar-said to be completed once in dialysis. Notified DIalysis and not administered in dialysis.  Electronically signed by Gracie South RN on 1/27/2022 at 1:04 PM

## 2022-01-27 NOTE — PROGRESS NOTES
[facility] General Surgery                                   Daily Progress Note                                                         Pt Name: Tadeo Moura  Medical Record Number: [id number]  Date of Birth 1945   Today's Date: 1/27/2022  CC: colon cancer    ASSESSMENT/PLAN  Colon cancer  -POD #1 open sigmoid resection with coloproctostomy   -Denies flatus or BM  -OOB, ambulate, IS, cough and deep breathe  Await return of GI function        Subjective  Seen in HD. Minimal pain. No GI function            OBJECTIVE  VITALS:  height is 6' 0.5\" (1.842 m) and weight is 215 lb 9.8 oz (97.8 kg). His temperature is 98 °F (36.7 °C). His blood pressure is 127/76 and his pulse is 79. His respiration is 18 and oxygen saturation is 93%. INTAKE/OUTPUT:      Intake/Output Summary (Last 24 hours) at 1/27/2022 1255  Last data filed at 1/27/2022 1140  Gross per 24 hour   Intake 1120 ml   Output 1325 ml   Net -205 ml     GENERAL: alert, cooperative, no distress  ABDOMEN: Soft, incisional tenderness, non distended. Surgical dressing intact. EXTREMITY: no cyanosis, clubbing or edema    I/O last 3 completed shifts:   In: 1296.7 [P.O.:480; I.V.:700; IV Piggyback:116.7]  Out: 675 [Urine:425; Blood:250]      LABS  Recent Labs     01/27/22  0530   WBC 5.2   HGB 9.6*   HCT 28.4*      *   K 6.0*   CL 98*   CO2 23   BUN 40*   CREATININE 5.5*   MG 2.30   CALCIUM 8.6       EDUCATION  Patient educated about Disease Process, Medications, Smoking Cessation, Oxygenation, Incentive Spirometry and Deep Breath and Cough, Diabetes, Hyperlipidemia, Smoking Cessation, Nutrition, Exercise and Hypertension    Electronically signed by IRINA De Santiago CNP on 1/27/2022 at 12:55 PM      [facility]   [phone] Office  [phone] Fax     As above  Looks good POD 1 from sigmoid colectomy  Pain controlled  Labs stable  Did well with HD today  Clears for now  Await GI function    Electronically signed by Jennifer Boyer MD on 1/27/2022 at 4:15 PM

## 2022-01-27 NOTE — PROGRESS NOTES
Speech Language/Pathology   SPEECH LANGUAGE AND CLINICAL BEDSIDE SWALLOWING TREATMENT  Speech Therapy Department   DISCHARGE SUMMARY      Bambi Wood  AGE: 77 y.o. GENDER: male  : 1945  [id number]  EPISODE DATE:  2022    MEDICAL DIAGNOSIS: CVA  ONSET: 2022     Chief Complaint   Patient presents with    Fatigue     pt was recently d/cd from the hospital still with complaints of weakness and no energy    Fall     pt fell x 2 today            PAST MEDICAL HISTORY        Diagnosis Date    Arthritis     Cancer [facility])     Colon Cancer diagnosed last month    [facility]     Hemodialysis patient ([facility]     Hypertension        PAST SURGICAL HISTORY    Past Surgical History:   Procedure Laterality Date    COLECTOMY N/A 2022    SIGMOID COLECTOMY, SIGMOIDOSCOPY performed by Ric Ghosh MD at [address] [place] [address]  [address] Left     unsure of date       ALLERGIES    Allergies   Allergen Reactions    Lisinopril      Allergy listed on paperwork from [place]  [facility], no reaction noted     2022 admitted with c/o AMS and lethargy  MD ADMISSION H&P HPI: The patient is a 68 y. o. male w/ uncertain past medical hx but possibly was just hospitalized and recovered from [place] a few weeks ago in [facility] presents to [facility] from home for progressive worsening fatigue and somnolence that he reports has been going on for at least a week.  Patient is somnolent at this time and difficult to clarify full history. Omid Ferrer admits that he has not been eating or drinking much however but unable to quantify. Omid Ferrer denies other symptoms of fever chills or shortness of breath.  Unable to fully obtain further review of systems questions but he denies any focal weakness or vision changes.  At this time he just feels as he is very sleepy.  Unable to confirm any further chronic illnesses at this time however.     2022 CT CHEST  Impression   No evidence of acute intrathoracic, intraabdominal or intrapelvic injury.       Multifocal airspace disease.  This pattern may reflect COVID pneumonia. Correlate with COVID testing.      1/14/2022 MRI BRAIN  Impression   1. Punctate acute infarct in the posteromedial left parietal lobe, within the   deep white matter.    2. 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      Prior Status: lives with wife; reports independent with ADLs; wife completes cooking, cleaning, laundry with assist of cleaning lady; wife manages medications/appointments; wife manages finances; drives sometimes - reports \"I can\";' completed 10th grade; retired ; enjoys piddling around    Subjective:     Current diet  Regular  Thin    Pt/staff statements regarding diet tolerance:   Pt denies any difficulty     Cognitive-communication treatment progress:   Pt without complaint; pleasant and cooperative; persistent reduced insight; wife reports at baseline    Objective: Assessment/Therapy activities and impression of progress/goal status   Treatment this session  Objective:  COMPREHENSION  Auditory Comprehension: [x]WFL for concrete []Mild   [] Moderate  []Severe  []To be assessed  Functional for 1-2 step commands, basic questions, concrete questions, simple conversation    EXPRESSION  Primary Mode of Expression: verbal  Verbal Expression: [x]WFL []Mild   [] Moderate  []Severe  []To be assessed  Simple conversation: functional word retrieval; no extended time for information processing     Pragmatics/Social Functioning: [x]WFL []Mild   [] Moderate  []Severe  []To be assessed      MOTOR SPEECH  Motor Speech: [x]WFL []Mild   [] Moderate  []Severe  []To be assessed   []Apraxia (appears resolved)   []Dysarthria   []Decreased Intelligibility:     VOICE  Voice: [x]WFL []Mild   [] Moderate  []Severe  []To be assessed    COGNITION  []Unable to be assessed secondary to Aphasia     Overall Orientation : [x]WFL []Mild   [] Moderate  []Severe  []To be assessed   []Unable to be assessed secondary to Aphasia     Attention: []WFL [x]Mild for selective attention  [] Moderate  []Severe  []To be assessed  Sustained WFL  Selective: mild    Memory: []WFL []Mild   [x] Moderate  []Severe  []To be assessed  Min cues for general recall of daily events  Mod cues for recall of details from day, MD visits, recommendations  Mod cues (category/semantic) for delayed recall (5\") of 3 novel units    Problem Solving: []WFL [x]Mild   [] Moderate  []Severe  []To be assessed  Generates solutions to daily problems independently  Sequences steps (4) for daily tasks independently    Safety/Judgement: []WFL []Mild   [x] Moderate  [x]Severe  []To be assessed  Reduced insight persists    Goal Status: Speech and Language Goals  Goals:   Goal 1: Pt will improve verbal expression for naming and self expression via graded tasks to min cues met  Goal 2: Pt will improve speech intelligibility in connected speech via graded tasks to min cues resolved  Goal 3: Patient will be Ox4 with independent use of external aids. met  Goal 4: The patient will tolerate ongoing evaluation/monitor for baseline level of function and determination of additional skilled ST needs met     DYSPHAGIA  Positioning: Upright in chair  PO Trials: seen with tray    Status of Goals: The patient will tolerate recommended diet without observed clinical signs of aspiration met  The patient/caregiver will demonstrate understanding of compensatory strategies for improved swallowing safety. met    IMPRESSIONS  Cognitive Diagnosis: Mild-moderate cognitive language impairments in the domains of selective attention, delayed novel recall, and safety/judgment.  Patient reports dependence on wife prior to admission and reports current function as comparable to baseline; will continue to monitor and assess re: baseline function/skills, but suspect pt is at/near PLOF  Aphasia Diagnosis: Receptive/expressive language appears [place] [facility] for concrete needs  Speech Diagnosis: Motor speech impairments resolved     Dysphagia Diagnosis: Mild oral stage dysphagia characterized by decreased mastication r/t edentulism and decreased lingual manipulation; prolonged but adequate bolus formation and movement with all with concern for premature bolus loss to the pharynx. Mild pharyngeal stage dysphagia characterized by delayed swallow and decreased laryngeal elevation; No overt signs/symptoms of penetration/aspiration; Recommended Diet: Regular when okay with surgery, thin   Compensatory Strategies: Upright as possible for all oral intake,Remain upright for 30-45 minutes after meals    Plan     Plan/Recommendations:   Requires SLP Intervention: NO    Barriers toward progress toward pt goals:  Cognitive deficit    Prognosis  Guarded for ST d/t previous level of function and severity of imps    Education  Consulted and agree with results and recommendations: Patient,RN  Patient Education: Completed on results/recs/plan  Patient Education Response: Needs reinforcement    Discharge Recommendations: Wife reports cog-lang at baseline; no apparent skilled ST needs upon discharge    Timed Code Treatment:  SLP Individual Minutes  Time In: 2547  Time Out: 1315  Minutes: 30    Total Treatment Time:  30 cog     Signed:  Tali Donovan, [id number] [place] [address], [id number]  Speech-Language Pathologist  Portable phone: [phone]  01/27/22 1:15 PM

## 2022-01-27 NOTE — CARE COORDINATION
CM received call from [facility] at [facility]. Verified they are in-network with [facility] and the patient can be admitted as skilled. Still waiting for patient to be medically cleared by general surgery.      Vandana GARAY RN  Case Management  [phone]    Electronically signed by Vandana Guillermo RN on 1/27/2022 at 3:24 PM

## 2022-01-27 NOTE — PROGRESS NOTES
Physical Therapy  Zach Ford  [id number]  [id number]    Alarm sounding upon entering room; pt attempting to get up from chair; re-oriented pt to situation and assisted with positioning in chair for comfort with LEs partially elevated on step stool under his leg rest of recliner chair; all needs in reach and chair alarm re-engaged; will continue to follow and recommend 3-5x/wk therapy at discharge  Electronically signed by ESPERANZA CADET PT on 1/27/2022 at 3:54 PM

## 2022-01-27 NOTE — PROGRESS NOTES
Pt anxious about am HD. Pt concerned because he reports, \"I am cut from here to here (while pointing at abd). Daughter rodolfo called to attempt to help pt understand the importance of HD. Rodolfo was going to drive numbing cream up to hospital. Spoke with HD RN and she is going to numb it for pt.  Updated rodolfo. Electronically signed by Billy Preciado RN on 1/27/2022 at 8:17 AM

## 2022-01-27 NOTE — PROGRESS NOTES
Speech Language Pathology    Treatment attempted. Patient unavailable out of room at dialysis. ST to re-attempt as schedule permits. Thank you. Tali Mcknight, [place], [id number]  Speech-Language Pathologist  Portable phone: [phone]

## 2022-01-27 NOTE — PLAN OF CARE
Problem: Falls - Risk of:  Goal: Will remain free from falls  Description: Will remain free from falls  1/26/2022 2336 by Selvin Avila RN  Outcome: Ongoing     Problem: Falls - Risk of:  Goal: Absence of physical injury  Description: Absence of physical injury  1/26/2022 2336 by Selvin Avila RN  Outcome: Ongoing     Problem: Skin Integrity:  Goal: Will show no infection signs and symptoms  Description: Will show no infection signs and symptoms  1/26/2022 2336 by Selvin Avila RN  Outcome: Ongoing     Problem: Skin Integrity:  Goal: Absence of new skin breakdown  Description: Absence of new skin breakdown  1/26/2022 2336 by Selvin Avila RN  Outcome: Ongoing     Problem: HEMODYNAMIC STATUS  Goal: Patient has stable vital signs and fluid balance  1/26/2022 2336 by Selvin Avila RN  Outcome: Ongoing     Problem: ACTIVITY INTOLERANCE/IMPAIRED MOBILITY  Goal: Mobility/activity is maintained at optimum level for patient  1/26/2022 2336 by Selvin Avila RN  Outcome: Ongoing     Problem: Fluid Volume:  Goal: Will show no signs or symptoms of fluid imbalance  Description: Will show no signs or symptoms of fluid imbalance  1/26/2022 2336 by Selvin Avila RN  Outcome: Ongoing     Problem: Urinary Elimination:  Goal: Signs and symptoms of infection will decrease  Description: Signs and symptoms of infection will decrease  1/26/2022 2336 by Selvin Avila RN  Outcome: Ongoing     Problem: Pain:  Description: Pain management should include both nonpharmacologic and pharmacologic interventions.   Goal: Pain level will decrease  Description: Pain level will decrease  Outcome: Ongoing

## 2022-01-27 NOTE — OP NOTE
[address]                                OPERATIVE REPORT    PATIENT NAME: Omer Sood                          :        1945  MED REC NO:   [id number]                          ROOM:       5107  ACCOUNT NO:   [de-identified]                           ADMIT DATE: 2022  PROVIDER:     Hair Gay MD      DATE OF PROCEDURE:  2022    PREOPERATIVE DIAGNOSIS:  Adenocarcinoma of the sigmoid colon. POSTOPERATIVE DIAGNOSIS:  Adenocarcinoma of the sigmoid colon. PROCEDURE PERFORMED:  Open sigmoid resection with coloproctostomy. SURGEON:  Hair Gay MD    SPECIMEN:  Colon. ESTIMATED BLOOD LOSS:  100 mL. COMPLICATIONS:  None. DISPOSITION:  To recovery in stable condition. INDICATION:  The patient is a 67-year-old male who was diagnosed with  adenocarcinoma of the sigmoid colon at an outside hospital.  His plan  was to proceed with colectomy at the outside hospital, however, he  developed COVID and that procedure was delayed. He has since initiated  relocation to the  and unfortunately was admitted with a  stroke. That has resolved and colectomy was requested while in the  hospital.  The risks, benefits, and alternatives were reviewed and he  and the family have agreed to proceed. PROCEDURE:  The patient was brought to the operating room, placed  supine, general anesthesia and intubation were performed. He was  repositioned into lithotomy and the abdomen and perineum prepped and  draped in a sterile fashion. A midline incision was made from the  umbilicus extending to the pelvis and dissection carried through the  subcutaneous tissue, the fascia and the peritoneal cavity entered. Upon  entry into the abdominal cavity, the omentum was inspected and had  scattered ink from his tattoo. The omentum, small bowel and proximal  colon were all retracted cephalad. Palpation was then undertaken and in  the distal sigmoid colon an obvious mass was encountered. This was  visualized and had the tattoo indicator just distal.  We now mobilized  the proximal sigmoid colon off the peritoneal reflection with  electrocautery until the sigmoid was up on the mesentery. The left  lateral surface of the sigmoid colon was inflamed either due to prior  diverticulitis or possible reaction from the malignancy, although, there  did not appear to be tumor extension into the pelvic sidewall. Eventually, we were able to dissect all surfaces and divided the sigmoid  colon proximal to the tumor. A LigaSure device was now used to divide  the mesentery down to the pelvis and along the mesenteric sides distal  to the tumor. The distal transection was done at the proximal rectum  near the peritoneal reflection using a contour stapler. The specimen  was passed off and we suture-ligated one area of bleeding along the  proximal mesenteric dissection plane. With the specimen removed, the  colon was inspected and appeared there was some dilation of the proximal  colon. The distal rectum appeared normal.  The proximal staple line was  now removed and a 29 EEA anvil placed. The bowel was closed again with  a MOE stapler and the pin of the anvil brought through the midportion. The stapler was now passed transanally up to the proximal transection  point and the pin brought through the anterior midportion of the rectum. The stapler and anvil were attached, brought together and then fired. Two complete anastomotic rings were noted. The pelvis was now filled  with fluid and the rectum insufflated with a rigid sigmoidoscope. No  air bubbling was noted. The abdomen was now suctioned free. We did  place reinforcing sutures of 3-0 Vicryl across the anterior portion of  the staple line. Hemostasis was now confirmed along all dissection  planes and the omentum placed back into the abdomen.   Sponge and  instrument counts were confirmed, so the wound was closed with an 0 loop  PDS in the fascia. Skin was closed with staples. Dressings were  applied and the patient transferred to recovery in good condition.         Mainor Ortiz MD    D: 01/26/2022 12:42:23       T: 01/26/2022 12:47:09     [email or web address]  Job#: [id number]     Doc#: [id number]    CC:

## 2022-01-27 NOTE — PROGRESS NOTES
Physical Therapy  Facility/Department: [facility] PROGRESSIVE CARE  Re-evaluation/Daily Treatment Note  NAME: Katiana Lomax  :   MRN: [id number]    Date of Service: 2022    Discharge Recommendations:  3-5 sessions per week   PT Equipment Recommendations  Equipment Needed: No  Other: will continue to assess  Katiana Lomax scored a  on the AM-PAC short mobility form. Current research shows that an AM-PAC score of 17 or less is typically not associated with a discharge to the patient's home setting. Based on the patient's AM-PAC score and their current functional mobility deficits, it is recommended that the patient have 3-5 sessions per week of Physical Therapy at d/c to increase the patient's independence. Please see assessment section for further patient specific details. If patient discharges prior to next session this note will serve as a discharge summary. Please see below for the latest assessment towards goals. Assessment   Body structures, Functions, Activity limitations: Decreased functional mobility ; Decreased strength;Decreased safe awareness;Decreased endurance;Decreased balance  Assessment: 78 y/o male admit 2022 with LESLEE, GI Bleed; Weakness. 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 Surg consult (recent dx Colon Ca ~ 1 month ago; await surg). Recent dx Pneumonia, COVID+ (cont weak/falls). PMH as noted including CKD (HD), COVID+. PTA pt living with wife in mobile home with ramp access; independent daily care and functional mobility although recent cont weak following Pneumonia/COVID.  s/p open sigmoid resection with coloproctostomy. Pt currently is more unsteady and needing assist of 2 for mobility tasks; recommend 3-5x/wk therapy to address deficits to allow pt to regain his max potential  Prognosis: Good  History: 78 y/o male admit 2022 with LESLEE, GI Bleed; Weakness. 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   Surg consult (recent dx Colon Ca ~ 1 month ago; await surg). Recent dx Pneumonia, COVID+ (cont weak/falls). PMH as noted including CKD (HD), COVID+. PT Education: General Safety  Patient Education: reviewed call light and not getting up without assist  Barriers to Learning: forgetful  REQUIRES PT FOLLOW UP: Yes  Activity Tolerance  Activity Tolerance: Patient Tolerated treatment well  Activity Tolerance: pt kept saying \"I need to bust out of here\"; more confused to situation this date     Patient Diagnosis(es): The primary encounter diagnosis was LESLEE (acute kidney injury) ([facility] [address] Diagnoses of Gastrointestinal hemorrhage, unspecified gastrointestinal hemorrhage type, COVID, Acute cystitis without hematuria, and Malignant neoplasm of colon, unspecified part of colon ([facility] [facility] were also pertinent to this visit. has a past medical history of Arthritis, Cancer ([facility] [address] [facility] Diabetes mellitus ([facility] [address] [facility] Hemodialysis patient ([facility] [address] [facility] and Hypertension. has a past surgical history that includes IR PERC ARTERIOVENOUS FISTULA CREATION (Left) and colectomy (N/A, 1/26/2022). Social/Functional History  Lives With: Spouse (Wife Lydia Aguilera). )  Type of Home: Mobile home  Home Layout: One level  Home Access: Ramped entrance  Bathroom Shower/Tub: Walk-in shower  Bathroom Toilet: Handicap height  Bathroom Equipment: Grab bars in [employer] [address] Accessibility: [facility]: Rolling walker  ADL Assistance: [address]:  (Wife takes care of most homemaking needs.)  Ambulation Assistance: Independent (Occass use of Walker (longer distances). )  Transfer Assistance: Independent  Active : No (Able, although doesn't drive. Family takes care of errands. Medical transport to/from HD.)  Occupation: Retired  Type of occupation: Retired : various jobs ([employer], [employer], [employer]). Additional Comments: 2 Dtrs, 1 Son live in area.   Reports recent fall (slid off bed onto floor), wife able to assist up. Restrictions  Restrictions/Precautions  Restrictions/Precautions: Fall Risk  Subjective   General  Chart Reviewed: Yes  Additional Pertinent Hx: 78 y/o male admit 1/13/2022 with LESLEE, GI Bleed; Weakness. 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 Surg consult (recent dx Colon Ca ~ 1 month ago; await surg). Recent dx Pneumonia, COVID+ (cont weak/falls). PMH as noted including CKD (HD), COVID+. 1-26 s/p  open sigmoid resection with coloproctostomy  Response To Previous Treatment: Patient with no complaints from previous session. Family / Caregiver Present: No  Referring Practitioner: Dr. Lily Bueno. Subjective  Subjective: pt very pleasant and agreeable to working with therapy; no c/o pain  General Comment  Comments: no c/o pain          Orientation  Orientation  Orientation Level: Oriented to person;Oriented to time;Oriented to place (thought he had surgery last week and it was yesterday)  Cognition   Cognition  Overall Cognitive Status: Exceptions  Arousal/Alertness: Appropriate responses to stimuli  Following Commands: Follows one step commands with increased time; Follows one step commands with repetition  Attention Span: Difficulty dividing attention  Memory: Decreased recall of recent events  Safety Judgement: Decreased awareness of need for safety;Decreased awareness of need for assistance  Insights: Decreased awareness of deficits  Initiation: Requires cues for some  Sequencing: Requires cues for some  Objective   Bed mobility  Comment: pt sitting up at EOB upon arrival with bed alarm sounding  Transfers  Sit to Stand: Minimal Assistance;2 Person Assistance  Stand to sit: Minimal Assistance;2 Person Assistance  Ambulation  Ambulation?: Yes  Ambulation 1  Surface: level tile  Device: Rolling Walker  Assistance: Minimal assistance;2 Person assistance  Quality of Gait: slow pace with flexed posture; no LOB; needed some assist to maneuver walker  Gait Deviations: Slow Tierra; Increased KEY; Decreased step length;Decreased step height  Distance: 21' and 25'     Balance  Comments: SBA for sitting balance; min A of 1-2 for standing balance      AROM RLE (degrees)  RLE AROM: WFL  AROM LLE (degrees)  LLE AROM : WFL  Strength RLE  Strength RLE: WFL  Strength LLE  Strength LLE: WFL                 G-Code     OutComes Score                                                     AM-PAC Score  AM-PAC Inpatient Mobility Raw Score : 13 (01/27/22 1403)  AM-PAC Inpatient T-Scale Score : 36.74 (01/27/22 1403)  Mobility Inpatient CMS 0-100% Score: 64.91 (01/27/22 1403)  Mobility Inpatient CMS G-Code Modifier : CL (01/27/22 1403)          Goals  Short term goals  Time Frame for Short term goals: Upon d/c acute care setting. (goals ongoing)  Short term goal 1: Bed Mob SBA. Short term goal 2: Transfers with assist device SBA. Short term goal 3: Amb with assist device 25-50' SBA/CGA. met 1-20: new goal amb 100' with wh walker SBA  Short term goal 4: Pt participating in approp Strength Exs. Patient Goals   Patient goals : Get stronger and return home. Plan    Plan  Times per week: 3-5x week while in acute care setting.   Current Treatment Recommendations: Strengthening,Functional Mobility Training,Transfer Training,Gait Training,Safety Education & Training,Patient/Caregiver Education & Training  Safety Devices  Type of devices: Call light within reach,Chair alarm in place,Left in chair,Nurse notified,All fall risk precautions in place,Gait belt,Patient at risk for falls  Restraints  Initially in place: No     Therapy Time   Individual Concurrent Group Co-treatment   Time In 1335         Time Out 1403         Minutes 28                 ESPERANZA CADET PT    Electronically signed by ESPERANZA CADET PT on 1/27/2022 at 2:04 PM

## 2022-01-27 NOTE — PROGRESS NOTES
Occupational Therapy  Attempted OT tx however pt out to dialysis, will attempt to see at a later time if schedule allows.  Shyann Rodarte, OTR/L #[id number]

## 2022-01-27 NOTE — FLOWSHEET NOTE
01/27/22 0830 01/27/22 1140   Treatment   Time On 0823  --    Time Off  --  1124   Vital Signs   BP (!) 158/77 127/76   Temp 98.2 °F (36.8 °C) 98 °F (36.7 °C)   Pulse 69 79   Resp 18 18   Weight 217 lb 2.5 oz (98.5 kg) 215 lb 9.8 oz (97.8 kg)   Post-Hemodialysis Assessment   NET Removed (ml)  --  700 ml       Treatment time: 3 hours  Net UF: 700ml    Pre weight: 98.5kg  Post weight: 97.8kg  EDW: N/A    Access used: left lower arm fistula  Access function: good    Medications or blood products given: n/a    Regular outpatient schedule: TTS    Summary of response to treatment: pt tolerated treatment ok, hypotensive x1, UF goal decreased. Total removed 700ml. Copy of dialysis treatment record placed in chart, to be scanned into EMR.     Electronically signed by Ariel Billings RN on 1/27/2022 at 11:51 AM

## 2022-01-28 LAB
ANION GAP SERPL CALCULATED.3IONS-SCNC: 10 MMOL/L (ref 3–16)
BASOPHILS ABSOLUTE: 0 K/UL (ref 0–0.2)
BASOPHILS RELATIVE PERCENT: 0.5 %
BUN BLDV-MCNC: 25 MG/DL (ref 7–20)
CALCIUM SERPL-MCNC: 8.7 MG/DL (ref 8.3–10.6)
CHLORIDE BLD-SCNC: 97 MMOL/L (ref 99–110)
CO2: 27 MMOL/L (ref 21–32)
CREAT SERPL-MCNC: 4.7 MG/DL (ref 0.8–1.3)
EOSINOPHILS ABSOLUTE: 0.1 K/UL (ref 0–0.6)
EOSINOPHILS RELATIVE PERCENT: 1.3 %
GFR AFRICAN AMERICAN: 15
GFR NON-AFRICAN AMERICAN: 12
GLUCOSE BLD-MCNC: 133 MG/DL (ref 70–99)
GLUCOSE BLD-MCNC: 134 MG/DL (ref 70–99)
GLUCOSE BLD-MCNC: 159 MG/DL (ref 70–99)
HCT VFR BLD CALC: 27.9 % (ref 40.5–52.5)
HEMOGLOBIN: 9.4 G/DL (ref 13.5–17.5)
LYMPHOCYTES ABSOLUTE: 1.2 K/UL (ref 1–5.1)
LYMPHOCYTES RELATIVE PERCENT: 23.4 %
MAGNESIUM: 2.1 MG/DL (ref 1.8–2.4)
MCH RBC QN AUTO: 30.1 PG (ref 26–34)
MCHC RBC AUTO-ENTMCNC: 33.6 G/DL (ref 31–36)
MCV RBC AUTO: 89.7 FL (ref 80–100)
MONOCYTES ABSOLUTE: 0.4 K/UL (ref 0–1.3)
MONOCYTES RELATIVE PERCENT: 7.9 %
NEUTROPHILS ABSOLUTE: 3.5 K/UL (ref 1.7–7.7)
NEUTROPHILS RELATIVE PERCENT: 66.9 %
PDW BLD-RTO: 14.6 % (ref 12.4–15.4)
PERFORMED ON: ABNORMAL
PERFORMED ON: ABNORMAL
PLATELET # BLD: 149 K/UL (ref 135–450)
PMV BLD AUTO: 7.5 FL (ref 5–10.5)
POTASSIUM SERPL-SCNC: 4.4 MMOL/L (ref 3.5–5.1)
RBC # BLD: 3.11 M/UL (ref 4.2–5.9)
SODIUM BLD-SCNC: 134 MMOL/L (ref 136–145)
WBC # BLD: 5.3 K/UL (ref 4–11)

## 2022-01-28 PROCEDURE — 6370000000 HC RX 637 (ALT 250 FOR IP): Performed by: SURGERY

## 2022-01-28 PROCEDURE — 85025 COMPLETE CBC W/AUTO DIFF WBC: CPT

## 2022-01-28 PROCEDURE — 80048 BASIC METABOLIC PNL TOTAL CA: CPT

## 2022-01-28 PROCEDURE — 99024 POSTOP FOLLOW-UP VISIT: CPT | Performed by: SURGERY

## 2022-01-28 PROCEDURE — APPSS45 APP SPLIT SHARED TIME 31-45 MINUTES: Performed by: NURSE PRACTITIONER

## 2022-01-28 PROCEDURE — 97116 GAIT TRAINING THERAPY: CPT | Performed by: PHYSICAL THERAPIST

## 2022-01-28 PROCEDURE — 2060000000 HC ICU INTERMEDIATE R&B

## 2022-01-28 PROCEDURE — 2580000003 HC RX 258: Performed by: SURGERY

## 2022-01-28 PROCEDURE — 94761 N-INVAS EAR/PLS OXIMETRY MLT: CPT

## 2022-01-28 PROCEDURE — 97530 THERAPEUTIC ACTIVITIES: CPT | Performed by: PHYSICAL THERAPIST

## 2022-01-28 PROCEDURE — 83735 ASSAY OF MAGNESIUM: CPT

## 2022-01-28 PROCEDURE — 6360000002 HC RX W HCPCS: Performed by: SURGERY

## 2022-01-28 PROCEDURE — APPNB45 APP NON BILLABLE 31-45 MINUTES: Performed by: NURSE PRACTITIONER

## 2022-01-28 PROCEDURE — 36415 COLL VENOUS BLD VENIPUNCTURE: CPT

## 2022-01-28 PROCEDURE — 99024 POSTOP FOLLOW-UP VISIT: CPT | Performed by: NURSE PRACTITIONER

## 2022-01-28 RX ADMIN — PANTOPRAZOLE SODIUM 20 MG: 20 TABLET, DELAYED RELEASE ORAL at 20:02

## 2022-01-28 RX ADMIN — TAMSULOSIN HYDROCHLORIDE 0.4 MG: 0.4 CAPSULE ORAL at 08:29

## 2022-01-28 RX ADMIN — ONDANSETRON 4 MG: 2 INJECTION INTRAMUSCULAR; INTRAVENOUS at 08:32

## 2022-01-28 RX ADMIN — OXYCODONE HYDROCHLORIDE 10 MG: 10 TABLET ORAL at 20:02

## 2022-01-28 RX ADMIN — ATORVASTATIN CALCIUM 80 MG: 80 TABLET, FILM COATED ORAL at 20:02

## 2022-01-28 RX ADMIN — SEVELAMER CARBONATE 800 MG: 800 TABLET, FILM COATED ORAL at 08:29

## 2022-01-28 RX ADMIN — SODIUM CHLORIDE, PRESERVATIVE FREE 10 ML: 5 INJECTION INTRAVENOUS at 08:29

## 2022-01-28 RX ADMIN — OXYCODONE HYDROCHLORIDE 10 MG: 10 TABLET ORAL at 04:55

## 2022-01-28 RX ADMIN — SEVELAMER CARBONATE 800 MG: 800 TABLET, FILM COATED ORAL at 18:27

## 2022-01-28 RX ADMIN — OXYCODONE HYDROCHLORIDE 10 MG: 10 TABLET ORAL at 01:01

## 2022-01-28 RX ADMIN — ASPIRIN 81 MG 81 MG: 81 TABLET ORAL at 08:29

## 2022-01-28 RX ADMIN — MORPHINE SULFATE 4 MG: 4 INJECTION, SOLUTION INTRAMUSCULAR; INTRAVENOUS at 08:32

## 2022-01-28 ASSESSMENT — PAIN DESCRIPTION - LOCATION
LOCATION: ABDOMEN
LOCATION: ABDOMEN

## 2022-01-28 ASSESSMENT — PAIN SCALES - GENERAL
PAINLEVEL_OUTOF10: 6
PAINLEVEL_OUTOF10: 5
PAINLEVEL_OUTOF10: 0
PAINLEVEL_OUTOF10: 8
PAINLEVEL_OUTOF10: 0
PAINLEVEL_OUTOF10: 7
PAINLEVEL_OUTOF10: 2
PAINLEVEL_OUTOF10: 0
PAINLEVEL_OUTOF10: 0

## 2022-01-28 ASSESSMENT — PAIN DESCRIPTION - PAIN TYPE
TYPE: SURGICAL PAIN
TYPE: SURGICAL PAIN

## 2022-01-28 ASSESSMENT — PAIN DESCRIPTION - PROGRESSION
CLINICAL_PROGRESSION: GRADUALLY WORSENING
CLINICAL_PROGRESSION: GRADUALLY WORSENING

## 2022-01-28 ASSESSMENT — PAIN DESCRIPTION - FREQUENCY
FREQUENCY: CONTINUOUS
FREQUENCY: CONTINUOUS

## 2022-01-28 ASSESSMENT — PAIN DESCRIPTION - ORIENTATION
ORIENTATION: MID;LOWER
ORIENTATION: LOWER

## 2022-01-28 ASSESSMENT — PAIN DESCRIPTION - ONSET
ONSET: AWAKENED FROM SLEEP
ONSET: GRADUAL

## 2022-01-28 ASSESSMENT — PAIN - FUNCTIONAL ASSESSMENT
PAIN_FUNCTIONAL_ASSESSMENT: PREVENTS OR INTERFERES SOME ACTIVE ACTIVITIES AND ADLS
PAIN_FUNCTIONAL_ASSESSMENT: PREVENTS OR INTERFERES SOME ACTIVE ACTIVITIES AND ADLS

## 2022-01-28 ASSESSMENT — PAIN DESCRIPTION - DESCRIPTORS
DESCRIPTORS: DISCOMFORT;NAGGING
DESCRIPTORS: DISCOMFORT

## 2022-01-28 NOTE — PROGRESS NOTES
[facility]                                               Progress note    Summary:   Elo Morrison is being seen by nephrology for ESRD. This is a 78 yo man with ESRD on HD three times weekly via left AVF who is here after a fall and AMS. He has been diagnosed with colon cancer and has been having rectal bleeding off and on for the past several weeks. From [facility]. [facility] so has been dialyzing at a different dialysis unit for 20 day period. Last HD on Thursday this week. No rectal bleeding since being here at [facility]. GI has no plans for him. Daughter is at bedside. Apparently her father and mother both had a fall yesterday prompting the ED visit. Interval History  s/p sigmoid colectomy 1/26/2022  Seen oat bedside. Not feeling well. Post HD weight yesterday 97.8 kg  Hyperkalemia resolved with HD    Plan:   No acute indication for HD  Plan for HD tomorrow per schedule. Check iron studies. Vincent Taylor MD  [id number] [facility]  Office: [phone]    Assessment:   ESRD  Does dialysis Monday Wednesday Friday usually but has been doing TTS at the [facility] unit near [facility]  He has a left AV fistula, positive thrill and bruit   kg, now below previous EDW. Continue to re-adjust.  Outpatient HD unit placement process underway. Has chair at [address]  (6:30AM) but timing difficult for SNF to accommodate.     Electrolytes  No acute issues.     Hypertension  Blood pressure is acceptable. No changes.      S HPT  Calcium ok  Check phosphorus     GI bleed  Has known colon cancer  No active bleeding  Hemoglobin stable  General surgery consulted. S/p sigmoid colectomy 1/26/2022     Altered mental status  MRI showed small acute stroke  Neurology consulted  Mental status has improved. ROS:   Positives Listed Bold.  All other remaining systems are negative.     Constitutional:  fever, chills, weakness, weight change, fatigue,      Skin:  rash, pruritus, hair loss, bruising, dry skin, petechiae. Head, Face, Neck   headaches, swelling,  cervical adenopathy.     Respiratory: shortness of breath, cough, or wheezing  Cardiovascular: chest pain, palpitations, dizzy, edema  Gastrointestinal: nausea, vomiting, diarrhea, constipation,belly pain    Yellow skin, blood in stool  Musculoskeletal:  back pain, muscle weakness, gait problems,       joint pain or swelling. Genitourinary:  dysuria, poor urine flow, flank pain, blood in urine  Neurologic:  vertigo, TIA'S, syncope, seizures, focal weakness  Psychosocial:  insomnia, anxiety, or depression. Additional positive findings: -     PMH:   Past medical history, surgical history, social history, family history are reviewed and updated as appropriate. Reviewed current medication list.   Allergies reviewed and updated as needed. PE:   Vitals:    01/28/22 0716   BP: 128/75   Pulse: 80   Resp: 16   Temp: 97.4 °F (36.3 °C)   SpO2: 93%       General appearance: Male in no acute distress,awake and alert. HEENT: no icterus, EOM intact, trachea midline. Neck : no masses, appears symmetrical and no JVD appreciated. Respiratory: Respiratory effort normal, bilateral equal chest rise. Cardiovascular: Ausculation shows RRR and no edema   Abdomen: abdomen is soft, non distended, no masses, no pain with palpation. Musculoskeletal:  no joint swelling, no deformity  Skin: no rashes, no induration, no tightening, no jaundice   Neuro:   Follows commands, moves all extremities spontaneously   Left AV fistula positive thrill and bruit      Lab Results   Component Value Date    CREATININE 4.7 (H) 01/28/2022    BUN 25 (H) 01/28/2022     (L) 01/28/2022    K 4.4 01/28/2022    CL 97 (L) 01/28/2022    CO2 27 01/28/2022      Lab Results   Component Value Date    WBC 5.3 01/28/2022    HGB 9.4 (L) 01/28/2022    HCT 27.9 (L) 01/28/2022    MCV 89.7 01/28/2022     01/28/2022     Lab Results   Component Value Date    CALCIUM 8.7 01/28/2022

## 2022-01-28 NOTE — PROGRESS NOTES
[address] [facility] NP rounding on patient, stated patient threw up a lot of water. Patient complaining of feeling very hot, face flushed. RN and NP got patient back to bed via stedy. 1009 - patient still complaining of feeling bad and hot, /85, temp 97.9, and fsbs 159.

## 2022-01-28 NOTE — PLAN OF CARE
Problem: Falls - Risk of:  Goal: Will remain free from falls  Description: Will remain free from falls  1/28/2022 0912 by Kamryn Mariee RN  Outcome: Ongoing  Patient is wearing nonskid socks. Bed is alarmed, locked, and in lowest position. Room is free of clutter. Call light is within reach and used appropriately. Fall risk assessed per protocol. Will continue to monitor. Problem: Skin Integrity:  Goal: Will show no infection signs and symptoms  Description: Will show no infection signs and symptoms  1/28/2022 0912 by Kamryn Mariee RN  Outcome: Ongoing  Skin assessment completed every shift per protocol. Pt assessed for incontinence, appropriate barrier cream applied as needed. Pt encouraged to turn/rotate every 2 hours. Assistance provided if pt unable to do so themselves. Will continue to monitor. Problem: HEMODYNAMIC STATUS  Goal: Patient has stable vital signs and fluid balance  1/28/2022 0912 by Kamryn Mariee RN  Outcome: Ongoing  Heart rate and rhythm continuously monitored. Vitals taken every four hours. Palpable pulses. Daily weights.       Problem: ACTIVITY INTOLERANCE/IMPAIRED MOBILITY  Goal: Mobility/activity is maintained at optimum level for patient  1/28/2022 0912 by Kamryn Mariee RN  Outcome: Ongoing  Up with a stedy      Problem: Fluid Volume:  Goal: Will show no signs or symptoms of fluid imbalance  Description: Will show no signs or symptoms of fluid imbalance  1/28/2022 0912 by Kamryn Mariee RN  Outcome: Ongoing  Is and Os monitored      Problem: Sensory:  Goal: General experience of comfort will improve  Description: General experience of comfort will improve  1/28/2022 0912 by Kamryn Mariee RN  Outcome: Ongoing  Surgical pain      Problem: Urinary Elimination:  Goal: Signs and symptoms of infection will decrease  Description: Signs and symptoms of infection will decrease  1/28/2022 0912 by Kamryn Mariee RN  Outcome: Ongoing  HD pt   Goal: Ability to reestablish a normal urinary

## 2022-01-28 NOTE — PROGRESS NOTES
monitor while inpatient    Goals:  Continued intake >50%       Nutrition Monitoring and Evaluation:   Behavioral-Environmental Outcomes:  None Identified   Food/Nutrient Intake Outcomes:  Diet Advancement/Tolerance,Food and Nutrient Intake,Supplement Intake  Physical Signs/Symptoms Outcomes:  Biochemical Data,Constipation,Diarrhea,GI Status,Nausea or Vomiting,Weight,Skin,Fluid Status or Edema     Discharge Planning:     Too soon to determine     Electronically signed by Manuel Brady RD, LD on 1/28/22 at 2:23 PM EST    Contact: [phone]

## 2022-01-28 NOTE — PLAN OF CARE
Problem: Falls - Risk of:  Goal: Will remain free from falls  Description: Will remain free from falls  Outcome: Ongoing  Goal: Absence of physical injury  Description: Absence of physical injury  Outcome: Ongoing     Problem: Skin Integrity:  Goal: Will show no infection signs and symptoms  Description: Will show no infection signs and symptoms  Outcome: Ongoing  Goal: Absence of new skin breakdown  Description: Absence of new skin breakdown  Outcome: Ongoing     Problem: HEMODYNAMIC STATUS  Goal: Patient has stable vital signs and fluid balance  Outcome: Ongoing     Problem: ACTIVITY INTOLERANCE/IMPAIRED MOBILITY  Goal: Mobility/activity is maintained at optimum level for patient  Outcome: Ongoing     Problem: Fluid Volume:  Goal: Will show no signs or symptoms of fluid imbalance  Description: Will show no signs or symptoms of fluid imbalance  Outcome: Ongoing     Problem: Sensory:  Goal: General experience of comfort will improve  Description: General experience of comfort will improve  Outcome: Ongoing     Problem: Urinary Elimination:  Goal: Signs and symptoms of infection will decrease  Description: Signs and symptoms of infection will decrease  Outcome: Ongoing  Goal: Ability to reestablish a normal urinary elimination pattern will improve - after catheter removal  Description: Ability to reestablish a normal urinary elimination pattern will improve  Outcome: Ongoing  Goal: Complications related to the disease process, condition or treatment will be avoided or minimized  Description: Complications related to the disease process, condition or treatment will be avoided or minimized  Outcome: Ongoing     Problem: Pain:  Description: Pain management should include both nonpharmacologic and pharmacologic interventions.   Goal: Pain level will decrease  Description: Pain level will decrease  Outcome: Ongoing  Goal: Control of acute pain  Description: Control of acute pain  Outcome: Ongoing  Goal: Control of chronic pain  Description: Control of chronic pain  Outcome: Ongoing

## 2022-01-28 NOTE — PLAN OF CARE
Nutrition Problem #1: Increased nutrient needs  Intervention: Food and/or Nutrient Delivery: Continue Current Diet,Start Oral Nutrition Supplement  Nutritional Goals: Continued intake >50%

## 2022-01-28 NOTE — CONSULTS
BPH.    ALLERGIES:  He has no known drug allergies. MEDICINES:  Aspirin 81 mg p.o. daily, Lipitor 80 mg p.o. daily, Protonix  20 mg p.o. daily, Flomax 0.4 mg p.o. daily. SOCIAL HISTORY:  He is . He does not drink or smoke currently. He is retired. FAMILY HISTORY:  He is not familiar with any cancers in the family. REVIEW OF SYSTEMS:  He denies any recent fever, chills, sweats,  shortness of breath, chest pain, headaches, any new bone aches,  dysphagia, odynophagia, diarrhea, constipation, hemoptysis, hematemesis,  change in vision/hearing/smell/taste, neuropathy, skin rashes,  productive cough, urinary or bowel prolapse or incontinence, petechiae,  purpura, skin rashes, pruritus, hallucinations, nasal congestion or  drainage, seizure, stroke, syncope, depression, anxiety, suicidal  ideations, melena, or hematochezia. He has mild to moderate fatigue,  moderate generalized weakness, and mild confusion. His 10-system review  of systems is otherwise negative. PHYSICAL EXAMINATION:  VITAL SIGNS:  He is afebrile with normal vital signs. GENERAL:  He is in no acute distress. HEENT:  His pupils are round and reactive to light and accommodation. Extraocular muscles are intact. NECK:  He has no jugular venous distention. No thyromegaly. His  oropharynx is clear. He has no carotid bruits. He has no palpable  lymphadenopathy. HEART:  Regular rate and rhythm. LUNGS:  Clear to auscultation bilaterally. ABDOMEN:  Nondistended, nontender with bowel sounds x4. No  hepatosplenomegaly. EXTREMITIES:  He has trace lower extremity edema bilaterally. NEUROLOGIC EXAM:  Significant for mild generalized weakness. LABORATORY DATA:  His white blood cell count is 4.1, hemoglobin 9.5,  platelets of 397. ASSESSMENT AND PLAN:  1.  Stage III colon cancer (T3N1). I had a very long discussion with  the patient. I also called his daughter, Evan Mtz and spoke with her at  great length.   The standard of care for stage III colon cancer is  adjuvant chemotherapy. Unfortunately, due to his end-stage renal  disease, he is not a candidate for Xeloda. We would need to use  significantly reduced dose weekly 5FU and reduced dose oxaliplatin if we  wanted to do a chemotherapy. I explained the potential side effects of  chemotherapy including nausea, vomiting, hearing loss, hair loss, renal  failure, life-threatening infections, and even death. The median  survival without chemotherapy is in the 55 to 60% range. Adjuvant  chemotherapy has an absolute survival benefit of 15% if he is able to  tolerate chemotherapy. At this time, he is interested and potentially  being aggressive. He needs to focus on rehab at this point. I agree  with that. We typically do not start chemotherapy until for about six  to eight weeks after surgery. I will also assist in surveillance. He  does live in [place], [place] and may ultimately return to that  area but may stay here with his daughter. From a surveillance  perspective, we need to see him every three months for the first two  years and every six months from years two to five. We will check a CBC,  CMP, CEA at each visit. He will need yearly CT scans for five years and  a repeat colonoscopy in one year. 2.  Pain control. This is adequate. 3.  Anemia. This is multifactorial.  This is partially due to his  end-stage renal disease. Iron studies are pending. I would maximize  his Procrit dose. Thank you for the consultation. I will follow closely.         Min Kohli MD    D: 01/28/2022 13:49:44       T: 01/28/2022 15:59:50     [email or web address]  Job#: [id number]     Doc#: [id number]    CC:  Huan Mcmullen MD

## 2022-01-28 NOTE — PROGRESS NOTES
Hospitalist Progress Note      PCP: Elizabeth Zapata    Date of Admission: 1/13/2022    Chief Complaint: colon cancer    Hospital Course:      Subjective: still waiting for flatus/bowel movement      Medications:  Reviewed    Infusion Medications    sodium chloride Stopped (01/25/22 1746)     Scheduled Medications    aspirin  81 mg Oral Daily    atorvastatin  80 mg Oral Nightly    [Held by provider] gabapentin  100 mg Oral TID    pantoprazole  20 mg Oral Nightly    sevelamer  800 mg Oral TID WC    tamsulosin  0.4 mg Oral QAM    sodium chloride flush  5-40 mL IntraVENous 2 times per day     PRN Meds: morphine **OR** morphine, oxyCODONE **OR** oxyCODONE, lidocaine, ondansetron **OR** ondansetron, polyethylene glycol, sodium chloride flush, sodium chloride, acetaminophen **OR** [DISCONTINUED] acetaminophen, perflutren lipid microspheres      Intake/Output Summary (Last 24 hours) at 1/28/2022 1020  Last data filed at 1/28/2022 0951  Gross per 24 hour   Intake 1540 ml   Output 1100 ml   Net 440 ml       Exam:    /85   Pulse 80   Temp 97.8 °F (36.6 °C) (Oral)   Resp 16   Ht 6' 0.5\" (1.842 m)   Wt 220 lb 10.9 oz (100.1 kg)   SpO2 93%   BMI 29.52 kg/m²     General appearance: No apparent distress, appears stated age and cooperative. HEENT: Pupils equal, round, and reactive to light. Conjunctivae/corneas clear. Neck: Supple, with full range of motion. No jugular venous distention. Trachea midline. Respiratory:  Normal respiratory effort. Clear to auscultation, bilaterally without Rales/Wheezes/Rhonchi. Cardiovascular: Regular rate and rhythm with normal S1/S2 without murmurs, rubs or gallops. Abdomen: Soft, post-op abdominal tenderness, non-distended with normal bowel sounds. Musculoskeletal: No clubbing, cyanosis or edema bilaterally. Full range of motion without deformity. Skin: Skin color, texture, turgor normal.  No rashes or lesions.   Neurologic:  Neurovascularly intact without any focal sensory/motor deficits. Cranial nerves: II-XII intact, grossly non-focal.  Psychiatric: Alert and oriented, thought content appropriate, normal insight  Capillary Refill: Brisk,< 3 seconds   Peripheral Pulses: +2 palpable, equal bilaterally       Labs:   Recent Labs     01/26/22 0418 01/27/22  0530 01/28/22  0502   WBC 4.1 5.2 5.3   HGB 10.1* 9.6* 9.4*   HCT 30.2* 28.4* 27.9*    150 149     Recent Labs     01/26/22 0418 01/27/22  0530 01/28/22  0502   * 134* 134*   K 4.8 6.0* 4.4   CL 99 98* 97*   CO2 25 23 27   BUN 28* 40* 25*   CREATININE 4.1* 5.5* 4.7*   CALCIUM 8.8 8.6 8.7     No results for input(s): AST, ALT, BILIDIR, BILITOT, ALKPHOS in the last 72 hours. No results for input(s): INR in the last 72 hours. No results for input(s): Ellene Lazier in the last 72 hours. Urinalysis:      Lab Results   Component Value Date    NITRU Negative 01/21/2022    WBCUA >900 01/21/2022    BACTERIA 2+ 01/21/2022    RBCUA 32 01/21/2022    BLOODU LARGE 01/21/2022    SPECGRAV 1.019 01/21/2022    GLUCOSEU Negative 01/21/2022       Radiology:  CT HEAD WO CONTRAST   Final Result   Atrophy and small-vessel ischemic change. No hemorrhage or mass identified. Paranasal sinus disease, moderate in degree, similar to prior      Small parotid nodule on the left, incidentally noted      RECOMMENDATIONS:   Unavailable         MRA neck without contrast   Final Result   No convincing flow limiting stenosis of the visualized carotid/vertebral   arteries within the limitations of this exam.         MRA head without contrast   Final Result   No apparent intracranial arterial high grade stenosis, occlusion or aneurysm   head at 5 within constraints of acquisition. MRI BRAIN WO CONTRAST   Final Result   1. Punctate acute infarct in the posteromedial left parietal lobe, within the   deep white matter. 2. 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 RECOMMENDATIONS:   Unavailable         US RENAL COMPLETE   Final Result   1. Simple cysts in the left kidney with no other significant renal finding. 2. Borderline enlargement of the prostate. Correlate with urologic history. CT CHEST ABDOMEN PELVIS WO CONTRAST   Final Result   No evidence of acute intrathoracic, intraabdominal or intrapelvic injury. Multifocal airspace disease. This pattern may reflect COVID pneumonia. Correlate with COVID testing. CT HEAD WO CONTRAST   Final Result   No acute intracranial abnormality. Specifically, no acute intracranial   hemorrhage or mass effect. Extensive chronic small vessel ischemic disease. Assessment/Plan:    Active Hospital Problems    Diagnosis Date Noted    Malignant neoplasm of colon ([address] [C18.9]     LESLEE (acute kidney injury) ([address] [N17.9]     Acute CVA (cerebrovascular accident) ([address] [I63.9] 01/16/2022    GI bleed [K92.2] 01/14/2022    History of COVID-19 [Z86.16] 01/14/2022    Hyponatremia [E87.1] 01/14/2022    Fatigue [R53.83] 01/14/2022    Acute kidney injury superimposed on CKD ([address] [N17.9, N18.9] 01/14/2022    Lethargy [R53.83] 01/14/2022    Suspected UTI [R39.89] 01/14/2022     Acute encephalopathy secondary to CVA with underlying cognitive impairment.     -MRI of the brain showed punctate acute infarct in the posterior medial left parietal lobe  MRA of the head and neck showed no flow-limiting stenosis  - avoid hypotension during surgery     Enterococcus faecalis UTI  On IV Vanc x 5 days     Continue aspirin,   statin  -    PT and OT. Likely need ECF. End-stage renal failure, on hemodialysis  Continue hemodialysis.   Discussed with nephrology, [place] to [place]ocate to [place], nephrologist is assisting with finding a location for his outpatient HD.     Hypomagnesemia  Resolved        Hypokalemia  Resolved        Recent hospitalization for COVID  Currently on room air.        History of colon cancer/GI bleed/acute blood loss anemia     -Hemoglobin remained stable, evaluated by GI, tolerating aspirin  -s/p sigmoid colectomy, sigmoidoscopy, coloproctostomy 1/26  - waiting for return of bowel function  - ok for liquid diet for now            Obesity Body mass index is 28.9 kg/m². Complicating assessment and treatment. Placing patient at risk for multiple co-morbidities as well as early death and contributing to the patient's presentation. DVT Prophylaxis: lovenox  Diet: ADULT DIET;  Clear Liquid  Code Status: Full Code    PT/OT Eval Status: SNF    Dispo - PCU, waiting for return of bowel function    Moses Rosales MD

## 2022-01-28 NOTE — PROGRESS NOTES
Physical Therapy  Tylor Del Real   [id number]  [id number]    Pt slid down in bed and too close to EOB, nursing requesting assist; assisted nursing with scooting pt over in bed with mod A of 2 and scoot up toward [facility] with sosa pad. Pt's lunch arrived; assisted with raising HOB and placing clear liquid tray in front; obtained styrofoam cups to pour broth and juice in so pt could drink with lids on cup to avoid spilling; opened fruit ice.   All needs in reach  Electronically signed by ESPERANZA CADET PT on 1/28/2022 at 12:45 PM

## 2022-01-28 NOTE — PROGRESS NOTES
Physical Therapy  Facility/Department: [facility] 5W PROGRESSIVE CARE  Daily Treatment Note  NAME: Ashly Rodriguez  : [id number]  MRN: [id number]    Date of Service: 2022    Discharge Recommendations:  3-5 sessions per week   PT Equipment Recommendations  Equipment Needed: No  Other: will continue to assess  Ashly Rodriguez scored a 16/24 on the AM-PAC short mobility form. Current research shows that an AM-PAC score of 17 or less is typically not associated with a discharge to the patient's home setting. Based on the patient's AM-PAC score and their current functional mobility deficits, it is recommended that the patient have 3-5 sessions per week of Physical Therapy at d/c to increase the patient's independence. Please see assessment section for further patient specific details. If patient discharges prior to next session this note will serve as a discharge summary. Please see below for the latest assessment towards goals. Assessment   Body structures, Functions, Activity limitations: Decreased functional mobility ; Decreased strength;Decreased safe awareness;Decreased endurance;Decreased balance  Assessment: 76 y/o male admit 2022 with LESLEE, GI Bleed; Weakness. 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 Surg consult (recent dx Colon Ca ~ 1 month ago; await surg). Recent dx Pneumonia, COVID+ (cont weak/falls). PMH as noted including CKD (HD), COVID+. PTA pt living with wife in mobile home with ramp access; independent daily care and functional mobility although recent cont weak following Pneumonia/COVID.  s/p open sigmoid resection with coloproctostomy. Pt currently is more unsteady and needing more assist for mobility tasks and his a high fall risk; recommend 3-5x/wk therapy to address deficits to allow pt to regain his max potential  Prognosis: Good  Decision Making: Medium Complexity  History: 76 y/o male admit 2022 with LESLEE, GI Bleed; Weakness.   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 Surg consult (recent dx Colon Ca ~ 1 month ago; await surg). Recent dx Pneumonia, COVID+ (cont weak/falls). PMH as noted including CKD (HD), COVID+. PT Education: General Safety  Patient Education: reviewed call light and not getting up without assist  Barriers to Learning: forgetful  REQUIRES PT FOLLOW UP: Yes  Activity Tolerance  Activity Tolerance: Patient Tolerated treatment well     Patient Diagnosis(es): The primary encounter diagnosis was LESLEE (acute kidney injury) ([facility] [address] Diagnoses of Gastrointestinal hemorrhage, unspecified gastrointestinal hemorrhage type, COVID, Acute cystitis without hematuria, and Malignant neoplasm of colon, unspecified part of colon ([facility] [address] were also pertinent to this visit. has a past medical history of Arthritis, Cancer ([facility] [address] Diabetes mellitus ([facility] [address] Hemodialysis patient ([facility] [address] and Hypertension. has a past surgical history that includes IR PERC ARTERIOVENOUS FISTULA CREATION (Left) and colectomy (N/A, 1/26/2022). Social/Functional History  Lives With: Spouse (Wife Matt Gibson). )  Type of Home: Mobile home  Home Layout: One level  Home Access: Ramped entrance  Bathroom Shower/Tub: Walk-in shower  Bathroom Toilet: Handicap height  Bathroom Equipment: Grab bars in [address] Accessibility: Resendiz Aj: Rolling walker  ADL Assistance: [address]:  (Wife takes care of most homemaking needs.)  Ambulation Assistance: Independent (Occass use of Walker (longer distances). )  Transfer Assistance: Independent  Active : No (Able, although doesn't drive. Family takes care of errands. Medical transport to/from HD.)  Occupation: Retired  Type of occupation: Retired : various jobs ([employer], [employer], [employer]). Additional Comments: 2 Dtrs, 1 Son live in area. Reports recent fall (slid off bed onto floor), wife able to assist up. Restrictions  Restrictions/Precautions  Restrictions/Precautions: Fall Risk  Position Activity Restriction  Other position/activity restrictions: clear liquid diet, teley, HD  Subjective   General  Chart Reviewed: Yes  Additional Pertinent Hx: 78 y/o male admit 1/13/2022 with LESLEE, GI Bleed; Weakness. 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 Surg consult (recent dx Colon Ca ~ 1 month ago; await surg). Recent dx Pneumonia, COVID+ (cont weak/falls). PMH as noted including CKD (HD), COVID+. 1-26 s/p  open sigmoid resection with coloproctostomy  Response To Previous Treatment: Patient with no complaints from previous session. Family / Caregiver Present: No  Referring Practitioner: Dr. Abdias Huerta. Subjective  Subjective: pt very pleasant and agreeable to working with therapy; no c/o pain          Orientation  Orientation  Orientation Level: Oriented to person;Oriented to time;Oriented to place  Cognition   Cognition  Overall Cognitive Status: Exceptions  Arousal/Alertness: Appropriate responses to stimuli  Following Commands: Follows one step commands with increased time; Follows one step commands with repetition  Attention Span: Difficulty dividing attention  Memory: Decreased recall of recent events;Decreased short term memory  Safety Judgement: Decreased awareness of need for safety;Decreased awareness of need for assistance  Insights: Decreased awareness of deficits  Initiation: Requires cues for some  Sequencing: Requires cues for some  Cognition Comment: decreased insight into deficits  Objective      Transfers  Sit to Stand: Minimal Assistance  Stand to sit: Contact guard assistance (verbal cues for hand placement and to be squarely in front of seat he is sitting on)  Ambulation  Ambulation?: Yes  Ambulation 1  Surface: level tile  Device: Rolling Walker  Assistance: Minimal assistance  Quality of Gait: no LOB but slightly unsteady  Distance: 5' in room; pt stood to look out window for several minutes before moving to chair     Balance  Comments: SBA for sitting balance; CGA/min A for standing balance with walker  Exercises  Comments: encouraged ankle pumps while sitting up in chair         Comment: assisted with positioning in chair for comfort with all needs in reach; pt scratching at his legs; applied loation and nurse notified              G-Code     OutComes Score                                                     AM-PAC Score  AM-PAC Inpatient Mobility Raw Score : 16 (01/28/22 0833)  AM-PAC Inpatient T-Scale Score : 40.78 (01/28/22 0833)  Mobility Inpatient CMS 0-100% Score: 54.16 (01/28/22 0833)  Mobility Inpatient CMS G-Code Modifier : CK (01/28/22 0034)          Goals  Short term goals  Time Frame for Short term goals: Upon d/c acute care setting. (goals ongoing)  Short term goal 1: Bed Mob SBA. Short term goal 2: Transfers with assist device SBA. Short term goal 3: Amb with assist device 25-50' SBA/CGA. met 1-20: new goal amb 100' with wh walker SBA  Short term goal 4: Pt participating in approp Strength Exs. Patient Goals   Patient goals : Get stronger and return home. Plan    Plan  Times per week: 3-5x week while in acute care setting.   Current Treatment Recommendations: Strengthening,Functional Mobility Training,Transfer Training,Gait Training,Safety Education & Training,Patient/Caregiver Education & Training  Safety Devices  Type of devices: Call light within reach,Chair alarm in place,Left in chair,Nurse notified,All fall risk precautions in place,Gait belt,Patient at risk for falls  Restraints  Initially in place: No     Therapy Time   Individual Concurrent Group Co-treatment   Time In 0800         Time Out 0833         Minutes 33                 ESPERANZA CADET PT    Electronically signed by ESPERANZA CADET PT on 1/28/2022 at 8:33 AM

## 2022-01-28 NOTE — PROGRESS NOTES
[facility] General Surgery                                   Daily Progress Note                                                         Pt Name: Malissa Lanes  Medical Record Number: [id number]  Date of Birth 1945   Today's Date: 1/28/2022  CC: colon cancer    ASSESSMENT/PLAN  Colon cancer  -POD #2 open sigmoid resection with coloproctostomy   -Denies flatus or BM  -OOB, ambulate, IS, cough and deep breathe  -Await return of GI function  -Path noted, will ask oncology to see. Bren Bird is sitting up to the chair. Just had an episode of clear liquid emesis. Nausea possibly related to his pain medication administration. Feeling uncomfortable, bloated. Wants to go back to bed. OBJECTIVE  VITALS:  height is 6' 0.5\" (1.842 m) and weight is 220 lb 10.9 oz (100.1 kg). His oral temperature is 97.8 °F (36.6 °C). His blood pressure is 135/85 and his pulse is 80. His respiration is 16 and oxygen saturation is 93%. INTAKE/OUTPUT:      Intake/Output Summary (Last 24 hours) at 1/28/2022 1114  Last data filed at 1/28/2022 0951  Gross per 24 hour   Intake 1540 ml   Output 1100 ml   Net 440 ml     GENERAL: alert, cooperative, no distress  ABDOMEN: Soft, incisional tenderness, mild distention. Incision approximated, small amount of serosanguineous drainage distal aspect. EXTREMITY: no cyanosis, clubbing or edema    I/O last 3 completed shifts:   In: 1960 [P.O.:1560]  Out: 1325 [Urine:225]      LABS  Recent Labs     01/28/22  0502   WBC 5.3   HGB 9.4*   HCT 27.9*      *   K 4.4   CL 97*   CO2 27   BUN 25*   CREATININE 4.7*   MG 2.10   CALCIUM 8.7       EDUCATION  Patient educated about Disease Process, Medications, Smoking Cessation, Oxygenation, Incentive Spirometry and Deep Breath and Cough, Diabetes, Hyperlipidemia, Smoking Cessation, Nutrition, Exercise and Hypertension    Electronically signed by IRINA Galindo CNP on 1/28/2022 at [place] [id number] and Vascular Surgery   [phone] Office  [phone] Fax     As above  Stable POD 2 from sigmoid resection  Path noted, appreciate help from Dr. Lisa Moser  Await return of GI function    Electronically signed by Leonarda Del Rosario MD on 1/28/2022 at 2:11 PM

## 2022-01-28 NOTE — CARE COORDINATION
Discharge plan remains the same. [facility] [address] and [place] outpatient HD TThSat 11:30am chair time. Daughter Rafael Mendez updated on discharge plan, [facility] in network with [employer], and HD outpatient site, days and times.      Marge GARAY RN  Case Management  [phone]    Electronically signed by Marge Larson RN on 1/28/2022 at 4:18 PM

## 2022-01-29 LAB
ANION GAP SERPL CALCULATED.3IONS-SCNC: 13 MMOL/L (ref 3–16)
BASOPHILS ABSOLUTE: 0 K/UL (ref 0–0.2)
BASOPHILS RELATIVE PERCENT: 0.3 %
BUN BLDV-MCNC: 34 MG/DL (ref 7–20)
CALCIUM SERPL-MCNC: 8.5 MG/DL (ref 8.3–10.6)
CHLORIDE BLD-SCNC: 98 MMOL/L (ref 99–110)
CO2: 25 MMOL/L (ref 21–32)
CREAT SERPL-MCNC: 6 MG/DL (ref 0.8–1.3)
EOSINOPHILS ABSOLUTE: 0.1 K/UL (ref 0–0.6)
EOSINOPHILS RELATIVE PERCENT: 2.4 %
FERRITIN: 1359 NG/ML (ref 30–400)
GFR AFRICAN AMERICAN: 11
GFR NON-AFRICAN AMERICAN: 9
GLUCOSE BLD-MCNC: 122 MG/DL (ref 70–99)
GLUCOSE BLD-MCNC: 157 MG/DL (ref 70–99)
HCT VFR BLD CALC: 25.4 % (ref 40.5–52.5)
HEMOGLOBIN: 8.6 G/DL (ref 13.5–17.5)
IRON SATURATION: 40 % (ref 20–50)
IRON: 59 UG/DL (ref 59–158)
LYMPHOCYTES ABSOLUTE: 1 K/UL (ref 1–5.1)
LYMPHOCYTES RELATIVE PERCENT: 19.5 %
MAGNESIUM: 2.1 MG/DL (ref 1.8–2.4)
MCH RBC QN AUTO: 30.1 PG (ref 26–34)
MCHC RBC AUTO-ENTMCNC: 33.9 G/DL (ref 31–36)
MCV RBC AUTO: 89 FL (ref 80–100)
MONOCYTES ABSOLUTE: 0.3 K/UL (ref 0–1.3)
MONOCYTES RELATIVE PERCENT: 5.9 %
NEUTROPHILS ABSOLUTE: 3.8 K/UL (ref 1.7–7.7)
NEUTROPHILS RELATIVE PERCENT: 71.9 %
PDW BLD-RTO: 14.7 % (ref 12.4–15.4)
PERFORMED ON: ABNORMAL
PLATELET # BLD: 140 K/UL (ref 135–450)
PMV BLD AUTO: 7.5 FL (ref 5–10.5)
POTASSIUM SERPL-SCNC: 4.3 MMOL/L (ref 3.5–5.1)
RBC # BLD: 2.85 M/UL (ref 4.2–5.9)
SODIUM BLD-SCNC: 136 MMOL/L (ref 136–145)
TOTAL IRON BINDING CAPACITY: 146 UG/DL (ref 260–445)
WBC # BLD: 5.2 K/UL (ref 4–11)

## 2022-01-29 PROCEDURE — 90935 HEMODIALYSIS ONE EVALUATION: CPT

## 2022-01-29 PROCEDURE — 83550 IRON BINDING TEST: CPT

## 2022-01-29 PROCEDURE — 2580000003 HC RX 258: Performed by: SURGERY

## 2022-01-29 PROCEDURE — 83540 ASSAY OF IRON: CPT

## 2022-01-29 PROCEDURE — 6370000000 HC RX 637 (ALT 250 FOR IP): Performed by: NURSE PRACTITIONER

## 2022-01-29 PROCEDURE — 6370000000 HC RX 637 (ALT 250 FOR IP): Performed by: SURGERY

## 2022-01-29 PROCEDURE — 99024 POSTOP FOLLOW-UP VISIT: CPT | Performed by: SURGERY

## 2022-01-29 PROCEDURE — 2060000000 HC ICU INTERMEDIATE R&B

## 2022-01-29 PROCEDURE — 80048 BASIC METABOLIC PNL TOTAL CA: CPT

## 2022-01-29 PROCEDURE — 83735 ASSAY OF MAGNESIUM: CPT

## 2022-01-29 PROCEDURE — 36415 COLL VENOUS BLD VENIPUNCTURE: CPT

## 2022-01-29 PROCEDURE — 85025 COMPLETE CBC W/AUTO DIFF WBC: CPT

## 2022-01-29 PROCEDURE — 82728 ASSAY OF FERRITIN: CPT

## 2022-01-29 RX ORDER — LANOLIN ALCOHOL/MO/W.PET/CERES
6 CREAM (GRAM) TOPICAL NIGHTLY PRN
Status: DISCONTINUED | OUTPATIENT
Start: 2022-01-29 | End: 2022-02-04 | Stop reason: HOSPADM

## 2022-01-29 RX ORDER — OLANZAPINE 10 MG/1
5 INJECTION, POWDER, LYOPHILIZED, FOR SOLUTION INTRAMUSCULAR DAILY PRN
Status: DISCONTINUED | OUTPATIENT
Start: 2022-01-29 | End: 2022-02-04 | Stop reason: HOSPADM

## 2022-01-29 RX ADMIN — SODIUM CHLORIDE, PRESERVATIVE FREE 10 ML: 5 INJECTION INTRAVENOUS at 08:08

## 2022-01-29 RX ADMIN — SEVELAMER CARBONATE 800 MG: 800 TABLET, FILM COATED ORAL at 11:24

## 2022-01-29 RX ADMIN — ATORVASTATIN CALCIUM 80 MG: 80 TABLET, FILM COATED ORAL at 20:49

## 2022-01-29 RX ADMIN — PANTOPRAZOLE SODIUM 20 MG: 20 TABLET, DELAYED RELEASE ORAL at 20:49

## 2022-01-29 RX ADMIN — OXYCODONE HYDROCHLORIDE 5 MG: 5 TABLET ORAL at 21:53

## 2022-01-29 RX ADMIN — ASPIRIN 81 MG 81 MG: 81 TABLET ORAL at 08:07

## 2022-01-29 RX ADMIN — TAMSULOSIN HYDROCHLORIDE 0.4 MG: 0.4 CAPSULE ORAL at 08:07

## 2022-01-29 RX ADMIN — Medication 6 MG: at 21:53

## 2022-01-29 RX ADMIN — SEVELAMER CARBONATE 800 MG: 800 TABLET, FILM COATED ORAL at 08:07

## 2022-01-29 RX ADMIN — SODIUM CHLORIDE, PRESERVATIVE FREE 10 ML: 5 INJECTION INTRAVENOUS at 20:49

## 2022-01-29 RX ADMIN — SEVELAMER CARBONATE 800 MG: 800 TABLET, FILM COATED ORAL at 17:36

## 2022-01-29 ASSESSMENT — PAIN DESCRIPTION - ONSET
ONSET: GRADUAL
ONSET: GRADUAL

## 2022-01-29 ASSESSMENT — PAIN DESCRIPTION - PROGRESSION
CLINICAL_PROGRESSION: NOT CHANGED
CLINICAL_PROGRESSION: GRADUALLY WORSENING

## 2022-01-29 ASSESSMENT — PAIN DESCRIPTION - ORIENTATION
ORIENTATION: MID;LOWER
ORIENTATION: MID;LOWER

## 2022-01-29 ASSESSMENT — PAIN DESCRIPTION - DESCRIPTORS
DESCRIPTORS: SORE;DISCOMFORT
DESCRIPTORS: DISCOMFORT;ACHING

## 2022-01-29 ASSESSMENT — PAIN SCALES - GENERAL
PAINLEVEL_OUTOF10: 4
PAINLEVEL_OUTOF10: 0
PAINLEVEL_OUTOF10: 4
PAINLEVEL_OUTOF10: 0

## 2022-01-29 ASSESSMENT — PAIN DESCRIPTION - FREQUENCY
FREQUENCY: INTERMITTENT
FREQUENCY: CONTINUOUS

## 2022-01-29 ASSESSMENT — ENCOUNTER SYMPTOMS
NAUSEA: 0
VOMITING: 0

## 2022-01-29 ASSESSMENT — PAIN DESCRIPTION - LOCATION
LOCATION: ABDOMEN
LOCATION: ABDOMEN

## 2022-01-29 ASSESSMENT — PAIN DESCRIPTION - PAIN TYPE
TYPE: SURGICAL PAIN
TYPE: SURGICAL PAIN

## 2022-01-29 ASSESSMENT — PAIN - FUNCTIONAL ASSESSMENT
PAIN_FUNCTIONAL_ASSESSMENT: ACTIVITIES ARE NOT PREVENTED
PAIN_FUNCTIONAL_ASSESSMENT: PREVENTS OR INTERFERES SOME ACTIVE ACTIVITIES AND ADLS

## 2022-01-29 NOTE — PROGRESS NOTES
Hospitalist Progress Note      PCP: Fifi Bowling    Date of Admission: 1/13/2022    Chief Complaint: colon cancer    Hospital Course:      Subjective: diet advanced to full liquid. Passing some gas      Medications:  Reviewed    Infusion Medications    sodium chloride Stopped (01/25/22 1746)     Scheduled Medications    aspirin  81 mg Oral Daily    atorvastatin  80 mg Oral Nightly    [Held by provider] gabapentin  100 mg Oral TID    pantoprazole  20 mg Oral Nightly    sevelamer  800 mg Oral TID WC    tamsulosin  0.4 mg Oral QAM    sodium chloride flush  5-40 mL IntraVENous 2 times per day     PRN Meds: morphine **OR** morphine, oxyCODONE **OR** oxyCODONE, lidocaine, ondansetron **OR** ondansetron, polyethylene glycol, sodium chloride flush, sodium chloride, acetaminophen **OR** [DISCONTINUED] acetaminophen, perflutren lipid microspheres      Intake/Output Summary (Last 24 hours) at 1/29/2022 0855  Last data filed at 1/29/2022 7994  Gross per 24 hour   Intake 1230 ml   Output 0 ml   Net 1230 ml       Exam:    BP (!) 145/78   Pulse 76   Temp 97.8 °F (36.6 °C) (Oral)   Resp 18   Ht 6' 5\" (1.956 m)   Wt 218 lb 14.7 oz (99.3 kg)   SpO2 95%   BMI 25.96 kg/m²     General appearance: No apparent distress, appears stated age and cooperative. HEENT: Pupils equal, round, and reactive to light. Conjunctivae/corneas clear. Neck: Supple, with full range of motion. No jugular venous distention. Trachea midline. Respiratory:  Normal respiratory effort. Clear to auscultation, bilaterally without Rales/Wheezes/Rhonchi. Cardiovascular: Regular rate and rhythm with normal S1/S2 without murmurs, rubs or gallops. Abdomen: Soft, post-op abdominal tenderness, non-distended with normal bowel sounds. Musculoskeletal: No clubbing, cyanosis or edema bilaterally. Full range of motion without deformity. Skin: Skin color, texture, turgor normal.  No rashes or lesions.   Neurologic:  Neurovascularly intact without any focal sensory/motor deficits. Cranial nerves: II-XII intact, grossly non-focal.  Psychiatric: Alert and oriented, thought content appropriate, normal insight  Capillary Refill: Brisk,< 3 seconds   Peripheral Pulses: +2 palpable, equal bilaterally       Labs:   Recent Labs     01/27/22  0530 01/28/22  0502 01/29/22  0451   WBC 5.2 5.3 5.2   HGB 9.6* 9.4* 8.6*   HCT 28.4* 27.9* 25.4*    149 140     Recent Labs     01/27/22  0530 01/28/22  0502 01/29/22  0451   * 134* 136   K 6.0* 4.4 4.3   CL 98* 97* 98*   CO2 23 27 25   BUN 40* 25* 34*   CREATININE 5.5* 4.7* 6.0*   CALCIUM 8.6 8.7 8.5     No results for input(s): AST, ALT, BILIDIR, BILITOT, ALKPHOS in the last 72 hours. No results for input(s): INR in the last 72 hours. No results for input(s): Bonita Sarwat in the last 72 hours. Urinalysis:      Lab Results   Component Value Date    NITRU Negative 01/21/2022    WBCUA >900 01/21/2022    BACTERIA 2+ 01/21/2022    RBCUA 32 01/21/2022    BLOODU LARGE 01/21/2022    SPECGRAV 1.019 01/21/2022    GLUCOSEU Negative 01/21/2022       Radiology:  CT HEAD WO CONTRAST   Final Result   Atrophy and small-vessel ischemic change. No hemorrhage or mass identified. Paranasal sinus disease, moderate in degree, similar to prior      Small parotid nodule on the left, incidentally noted      RECOMMENDATIONS:   Unavailable         MRA neck without contrast   Final Result   No convincing flow limiting stenosis of the visualized carotid/vertebral   arteries within the limitations of this exam.         MRA head without contrast   Final Result   No apparent intracranial arterial high grade stenosis, occlusion or aneurysm   head at 5 within constraints of acquisition. MRI BRAIN WO CONTRAST   Final Result   1. Punctate acute infarct in the posteromedial left parietal lobe, within the   deep white matter.    2. 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 RECOMMENDATIONS:   Unavailable         US RENAL COMPLETE   Final Result   1. Simple cysts in the left kidney with no other significant renal finding. 2. Borderline enlargement of the prostate. Correlate with urologic history. CT CHEST ABDOMEN PELVIS WO CONTRAST   Final Result   No evidence of acute intrathoracic, intraabdominal or intrapelvic injury. Multifocal airspace disease. This pattern may reflect COVID pneumonia. Correlate with COVID testing. CT HEAD WO CONTRAST   Final Result   No acute intracranial abnormality. Specifically, no acute intracranial   hemorrhage or mass effect. Extensive chronic small vessel ischemic disease. Assessment/Plan:    Active Hospital Problems    Diagnosis Date Noted    Malignant neoplasm of colon ([facility] [address] [C18.9]     LESLEE (acute kidney injury) ([address] [N17.9]     Acute CVA (cerebrovascular accident) ([address] [I63.9] 01/16/2022    GI bleed [K92.2] 01/14/2022    History of COVID-19 [Z86.16] 01/14/2022    Hyponatremia [E87.1] 01/14/2022    Fatigue [R53.83] 01/14/2022    Acute kidney injury superimposed on CKD ([address] [N17.9, N18.9] 01/14/2022    Lethargy [R53.83] 01/14/2022    Suspected UTI [R39.89] 01/14/2022     Acute encephalopathy secondary to CVA with underlying cognitive impairment.     -MRI of the brain showed punctate acute infarct in the posterior medial left parietal lobe  MRA of the head and neck showed no flow-limiting stenosis  - avoid hypotension during surgery     Enterococcus faecalis UTI  On IV Vanc x 5 days     Continue aspirin,   statin  -    PT and OT. Likely need ECF. End-stage renal failure, on hemodialysis  Continue hemodialysis.   Discussed with nephrology, patient [place] to relocate to [place], nephrologist is assisting with finding a location for his outpatient HD.     Hypomagnesemia  Resolved        Hypokalemia  Resolved        Recent hospitalization for COVID  Currently on room air.        History of colon cancer/GI bleed/acute blood loss anemia     -Hemoglobin remained stable, evaluated by GI, tolerating aspirin  -s/p sigmoid colectomy, sigmoidoscopy, coloproctostomy 1/26  - waiting for return of bowel function  - ok for full liquid diet for now            Obesity Body mass index is 28.9 kg/m². Complicating assessment and treatment. Placing patient at risk for multiple co-morbidities as well as early death and contributing to the patient's presentation. DVT Prophylaxis: lovenox  Diet: ADULT DIET;  Clear Liquid  ADULT ORAL NUTRITION SUPPLEMENT; Breakfast, Lunch, Dinner; Clear Liquid Oral Supplement  Code Status: Full Code    PT/OT Eval Status: SNF    Dispo - PCU, waiting for return of bowel function    Flaco Joshi MD

## 2022-01-29 NOTE — FLOWSHEET NOTE
Pt awake resting in bed talking on telephone. Pt denies needs. Pt cooperative with assessment. No agitation noted at this time.

## 2022-01-29 NOTE — PROGRESS NOTES
RN has spoken to both daughters this AM regarding pt's POC. All questions answered.  Electronically signed by Joyce Valdez RN on 1/29/2022 at 9:08 AM

## 2022-01-29 NOTE — PROGRESS NOTES
[facility]                                               Progress note    Summary:   Zach Ford is being seen by nephrology for ESRD. This is a 76 yo man with ESRD on HD three times weekly via left AVF who is here after a fall and AMS. He has been diagnosed with colon cancer and has been having rectal bleeding off and on for the past several weeks. From [facility]. Had [facility] last month so has been dialyzing at a different dialysis unit for 20 day period. Last HD on Thursday this week. No rectal bleeding since being here at [place]. GI has no plans for him. Daughter is at bedside. Apparently her father and mother both had a fall yesterday prompting the ED visit. Interval History  Seen and examined on dialysis. No complaints, but feeling a little anxious, says he has a lot on his mind  No chest pain or SOB.     s/p sigmoid colectomy 1/26/2022  Last Post HD weight yesterday 97.8 kg     Plan:   - HD today perTTS schedule. - BP acceptable. Appears euvolemic.  - no IV iron, ferritin 1359         Ye Eagle MD  [address] [facility]  Office: [phone]    Assessment:   ESRD  Does dialysis Monday Wednesday Friday usually but has been doing TTS at the [facility] unit near [facility]  He has a left AV fistula, positive thrill and bruit  Last at dialysis was on Thursday 1/14     Electrolytes  No acute issues.     Hypertension  Blood pressure is acceptable. No changes.      S HPT  Calcium ok  Check phosphorus     GI bleed  Has known colon cancer  No active bleeding  Hemoglobin stable  General surgery consulted.      Altered mental status  MRI showed small acute stroke  Neurology consulted  Mental status has improved. ROS:   Positives Listed Bold. All other remaining systems are negative.     Constitutional:  fever, chills, weakness, weight change, fatigue,      Skin:  rash, pruritus, hair loss, bruising, dry skin, petechiae.   Head, Face, Neck   headaches, swelling,  cervical adenopathy.     Respiratory: shortness of breath, cough, or wheezing  Cardiovascular: chest pain, palpitations, dizzy, edema  Gastrointestinal: nausea, vomiting, diarrhea, constipation,belly pain    Yellow skin, blood in stool  Musculoskeletal:  back pain, muscle weakness, gait problems,       joint pain or swelling. Genitourinary:  dysuria, poor urine flow, flank pain, blood in urine  Neurologic:  vertigo, TIA'S, syncope, seizures, focal weakness  Psychosocial:  insomnia, anxiety, or depression. Additional positive findings: -     PMH:   Past medical history, surgical history, social history, family history are reviewed and updated as appropriate. Reviewed current medication list.   Allergies reviewed and updated as needed. PE:   Vitals:    01/29/22 0312   BP: 138/63   Pulse: 78   Resp: 20   Temp: 97.5 °F (36.4 °C)   SpO2:        General appearance: Male in no acute distress,awake and alert. HEENT: no icterus, EOM intact, trachea midline. Neck : no masses, appears symmetrical and no JVD appreciated. Respiratory: Respiratory effort normal, bilateral equal chest rise. Cardiovascular: Ausculation shows RRR and no edema   Abdomen: abdomen is soft, non distended  Musculoskeletal:  no joint swelling, no deformity  Skin: no rashes, no induration, no tightening, no jaundice   Neuro:   Follows commands, moves all extremities spontaneously   Left AV fistula positive thrill and bruit      Lab Results   Component Value Date    CREATININE 6.0 (HH) 01/29/2022    BUN 34 (H) 01/29/2022     01/29/2022    K 4.3 01/29/2022    CL 98 (L) 01/29/2022    CO2 25 01/29/2022      Lab Results   Component Value Date    WBC 5.2 01/29/2022    HGB 8.6 (L) 01/29/2022    HCT 25.4 (L) 01/29/2022    MCV 89.0 01/29/2022     01/29/2022     Lab Results   Component Value Date    CALCIUM 8.5 01/29/2022

## 2022-01-29 NOTE — PROGRESS NOTES
Patient tried to exit bed, \" I need to go to the living room\" patient able to answer orientation questions appropriately, patient became agitated while attempting to redirect and help back in bed. Attempted to give patient his night time medication which he became verbally and physically aggressive and not wanting to take medication. Attempts to educate patient were made and unsuccessful. Patient holding meds in had. Stated to patient I can set them aside for when he is ready for meds, but they cannot just be left with him especially with pain meds. Patient again becomes aggressive, staff called for help. Patient balling up fist and yelling \" These are my medicines Ill do what I want with them, You are not the boss I am \" Charge RN made aware of patient behavior.

## 2022-01-29 NOTE — PROGRESS NOTES
Treatment time: 3 hours  Net UF: 1600 ml     Pre weight: 97.5 kg   Post weight: 95.9 kg  EDW: unknown kg     Access used: LFT FA AVF  Access function: GOOD with  ml/min     Medications or blood products given: n/a     Regular outpatient schedule: TTS     Summary of response to treatment: HD tx completed in full, VSS, pt alert no distress, tolerated tx well, 10min per sites held, hemostasis achieved, report given to RN, pt returned to room     Copy of dialysis treatment record placed in chart, to be scanned into EMR.       01/29/22 1231 01/29/22 1541   Vital Signs   BP (!) 140/70 136/73   Temp 97.3 °F (36.3 °C) 98 °F (36.7 °C)   Pulse 80 96   Resp 16 16   SpO2 100 % 100 %   Weight 214 lb 15.2 oz (97.5 kg) 211 lb 6.7 oz (95.9 kg)   Weight Method Bed scale Bed scale   Percent Weight Change -1.81 -1.64   Pain Assessment   Pain Assessment 0-10 0-10   Pain Level 0 0   Post-Hemodialysis Assessment   Post-Treatment Procedures  --  Blood returned; Access bleeding time < 10 minutes   Machine Disinfection Process  --  Heat Disinfect;Acid/Vinegar Clean;Exterior Machine Disinfection   Rinseback Volume (ml)  --  400 ml   Total Liters Processed (l/min)  --  70 l/min   Dialyzer Clearance  --  Lightly streaked   Duration of Treatment (minutes)  --  180 minutes   Hemodialysis Intake (ml)  --  400 ml   Hemodialysis Output (ml)  --  2000 ml   NET Removed (ml)  --  1600 ml   Tolerated Treatment  --  Good   Patient Response to Treatment  --  well   Bilateral Breath Sounds Clear Clear   Edema None None   Physician Notified?  --  Yes

## 2022-01-29 NOTE — PROGRESS NOTES
Progress Note  Date:2022       Room:S8I-6574/5107-01  Patient Name:Ben Maldonado     Date of Birth:[birth year]     Age:76 y.o. Subjective    Subjective:  Symptoms:  Improved. Diet:  No nausea or vomiting. Activity level: Returning to normal.    Pain:  He complains of pain that is mild. Review of Systems   Gastrointestinal: Negative for nausea and vomiting. Objective         Vitals Last 24 Hours:  TEMPERATURE:  Temp  Av.8 °F (36.6 °C)  Min: 97.4 °F (36.3 °C)  Max: 98.2 °F (36.8 °C)  RESPIRATIONS RANGE: Resp  Av  Min: 16  Max: 20  PULSE OXIMETRY RANGE: SpO2  Av.5 %  Min: 94 %  Max: 97 %  PULSE RANGE: Pulse  Av.6  Min: 76  Max: 88  BLOOD PRESSURE RANGE: Systolic (54TQO), MSY:799 , Min:135 , JSV:814   ; Diastolic (72DAV), GEK:98, Min:63, Max:80    I/O (24Hr): Intake/Output Summary (Last 24 hours) at 2022 1104  Last data filed at 2022 1709  Gross per 24 hour   Intake 930 ml   Output 0 ml   Net 930 ml     Objective  Labs/Imaging/Diagnostics    Labs:  CBC:  Recent Labs     22  0530 22  0502 22  0451   WBC 5.2 5.3 5.2   RBC 3.19* 3.11* 2.85*   HGB 9.6* 9.4* 8.6*   HCT 28.4* 27.9* 25.4*   MCV 89.0 89.7 89.0   RDW 14.5 14.6 14.7    149 140     CHEMISTRIES:  Recent Labs     22  0530 22  0502 22  0451   * 134* 136   K 6.0* 4.4 4.3   CL 98* 97* 98*   CO2 23 27 25   BUN 40* 25* 34*   CREATININE 5.5* 4.7* 6.0*   GLUCOSE 138* 133* 122*   MG 2.30 2.10 2.10     PT/INR:No results for input(s): PROTIME, INR in the last 72 hours. APTT:No results for input(s): APTT in the last 72 hours. LIVER PROFILE:No results for input(s): AST, ALT, BILIDIR, BILITOT, ALKPHOS in the last 72 hours. Imaging Last 24 Hours:  No results found.   Assessment//Plan           Hospital Problems           Last Modified POA    * (Principal) Acute CVA (cerebrovascular accident) ([facility] [address] 2022 Yes    GI bleed 2022 Yes    History of COVID-19 2022 Yes

## 2022-01-30 LAB
ANION GAP SERPL CALCULATED.3IONS-SCNC: 14 MMOL/L (ref 3–16)
BASOPHILS ABSOLUTE: 0 K/UL (ref 0–0.2)
BASOPHILS RELATIVE PERCENT: 0.3 %
BUN BLDV-MCNC: 22 MG/DL (ref 7–20)
CALCIUM SERPL-MCNC: 8.5 MG/DL (ref 8.3–10.6)
CHLORIDE BLD-SCNC: 98 MMOL/L (ref 99–110)
CO2: 27 MMOL/L (ref 21–32)
CREAT SERPL-MCNC: 4.8 MG/DL (ref 0.8–1.3)
EOSINOPHILS ABSOLUTE: 0.2 K/UL (ref 0–0.6)
EOSINOPHILS RELATIVE PERCENT: 3.4 %
GFR AFRICAN AMERICAN: 14
GFR NON-AFRICAN AMERICAN: 12
GLUCOSE BLD-MCNC: 113 MG/DL (ref 70–99)
GLUCOSE BLD-MCNC: 124 MG/DL (ref 70–99)
HCT VFR BLD CALC: 27.9 % (ref 40.5–52.5)
HEMOGLOBIN: 9.4 G/DL (ref 13.5–17.5)
LYMPHOCYTES ABSOLUTE: 1.1 K/UL (ref 1–5.1)
LYMPHOCYTES RELATIVE PERCENT: 20.5 %
MAGNESIUM: 2.1 MG/DL (ref 1.8–2.4)
MCH RBC QN AUTO: 30.2 PG (ref 26–34)
MCHC RBC AUTO-ENTMCNC: 33.6 G/DL (ref 31–36)
MCV RBC AUTO: 90 FL (ref 80–100)
MONOCYTES ABSOLUTE: 0.3 K/UL (ref 0–1.3)
MONOCYTES RELATIVE PERCENT: 6.6 %
NEUTROPHILS ABSOLUTE: 3.6 K/UL (ref 1.7–7.7)
NEUTROPHILS RELATIVE PERCENT: 69.2 %
PDW BLD-RTO: 14.7 % (ref 12.4–15.4)
PERFORMED ON: ABNORMAL
PLATELET # BLD: 158 K/UL (ref 135–450)
PMV BLD AUTO: 7.5 FL (ref 5–10.5)
POTASSIUM SERPL-SCNC: 4.5 MMOL/L (ref 3.5–5.1)
RBC # BLD: 3.1 M/UL (ref 4.2–5.9)
SODIUM BLD-SCNC: 139 MMOL/L (ref 136–145)
WBC # BLD: 5.2 K/UL (ref 4–11)

## 2022-01-30 PROCEDURE — 2060000000 HC ICU INTERMEDIATE R&B

## 2022-01-30 PROCEDURE — 2580000003 HC RX 258: Performed by: SURGERY

## 2022-01-30 PROCEDURE — 6370000000 HC RX 637 (ALT 250 FOR IP): Performed by: SURGERY

## 2022-01-30 PROCEDURE — 6370000000 HC RX 637 (ALT 250 FOR IP): Performed by: NURSE PRACTITIONER

## 2022-01-30 PROCEDURE — 94760 N-INVAS EAR/PLS OXIMETRY 1: CPT

## 2022-01-30 PROCEDURE — 80048 BASIC METABOLIC PNL TOTAL CA: CPT

## 2022-01-30 PROCEDURE — 51798 US URINE CAPACITY MEASURE: CPT

## 2022-01-30 PROCEDURE — 99024 POSTOP FOLLOW-UP VISIT: CPT | Performed by: SURGERY

## 2022-01-30 PROCEDURE — 85025 COMPLETE CBC W/AUTO DIFF WBC: CPT

## 2022-01-30 PROCEDURE — 36415 COLL VENOUS BLD VENIPUNCTURE: CPT

## 2022-01-30 PROCEDURE — 83735 ASSAY OF MAGNESIUM: CPT

## 2022-01-30 RX ADMIN — OXYCODONE HYDROCHLORIDE 5 MG: 5 TABLET ORAL at 20:30

## 2022-01-30 RX ADMIN — SODIUM CHLORIDE, PRESERVATIVE FREE 10 ML: 5 INJECTION INTRAVENOUS at 20:31

## 2022-01-30 RX ADMIN — SEVELAMER CARBONATE 800 MG: 800 TABLET, FILM COATED ORAL at 18:03

## 2022-01-30 RX ADMIN — ATORVASTATIN CALCIUM 80 MG: 80 TABLET, FILM COATED ORAL at 20:30

## 2022-01-30 RX ADMIN — TAMSULOSIN HYDROCHLORIDE 0.4 MG: 0.4 CAPSULE ORAL at 08:48

## 2022-01-30 RX ADMIN — SODIUM CHLORIDE, PRESERVATIVE FREE 10 ML: 5 INJECTION INTRAVENOUS at 08:48

## 2022-01-30 RX ADMIN — Medication 6 MG: at 20:31

## 2022-01-30 RX ADMIN — ASPIRIN 81 MG 81 MG: 81 TABLET ORAL at 08:48

## 2022-01-30 RX ADMIN — PANTOPRAZOLE SODIUM 20 MG: 20 TABLET, DELAYED RELEASE ORAL at 20:30

## 2022-01-30 RX ADMIN — OXYCODONE HYDROCHLORIDE 5 MG: 5 TABLET ORAL at 08:57

## 2022-01-30 RX ADMIN — SEVELAMER CARBONATE 800 MG: 800 TABLET, FILM COATED ORAL at 08:48

## 2022-01-30 ASSESSMENT — PAIN SCALES - GENERAL
PAINLEVEL_OUTOF10: 0
PAINLEVEL_OUTOF10: 5
PAINLEVEL_OUTOF10: 0
PAINLEVEL_OUTOF10: 4

## 2022-01-30 ASSESSMENT — PAIN DESCRIPTION - ORIENTATION: ORIENTATION: MID;LOWER

## 2022-01-30 ASSESSMENT — PAIN DESCRIPTION - DESCRIPTORS: DESCRIPTORS: SORE

## 2022-01-30 ASSESSMENT — ENCOUNTER SYMPTOMS
NAUSEA: 0
VOMITING: 0

## 2022-01-30 ASSESSMENT — PAIN DESCRIPTION - PROGRESSION: CLINICAL_PROGRESSION: GRADUALLY WORSENING

## 2022-01-30 ASSESSMENT — PAIN DESCRIPTION - FREQUENCY: FREQUENCY: INTERMITTENT

## 2022-01-30 ASSESSMENT — PAIN DESCRIPTION - PAIN TYPE: TYPE: SURGICAL PAIN

## 2022-01-30 ASSESSMENT — PAIN - FUNCTIONAL ASSESSMENT: PAIN_FUNCTIONAL_ASSESSMENT: ACTIVITIES ARE NOT PREVENTED

## 2022-01-30 ASSESSMENT — PAIN DESCRIPTION - LOCATION: LOCATION: ABDOMEN

## 2022-01-30 ASSESSMENT — PAIN DESCRIPTION - ONSET: ONSET: GRADUAL

## 2022-01-30 NOTE — PROGRESS NOTES
Progress Note  Date:2022       Room:U6T-8328/5107-01  Patient Name:Ben Nguyễn     Date of Birth:[birth year]     Age:76 y.o. Subjective    Subjective:  Symptoms:  Stable. Diet:  No nausea or vomiting. Activity level: Returning to normal.    Pain:  He complains of pain that is mild. Review of Systems   Gastrointestinal: Negative for nausea and vomiting. Objective         Vitals Last 24 Hours:  TEMPERATURE:  Temp  Av.8 °F (36.6 °C)  Min: 97.3 °F (36.3 °C)  Max: 98.1 °F (36.7 °C)  RESPIRATIONS RANGE: Resp  Av.5  Min: 16  Max: 18  PULSE OXIMETRY RANGE: SpO2  Av %  Min: 93 %  Max: 100 %  PULSE RANGE: Pulse  Av.5  Min: 77  Max: 105  BLOOD PRESSURE RANGE: Systolic (91HSD), PRV:151 , Min:117 , WPN:028   ; Diastolic (82BMJ), GEL:54, Min:57, Max:85    I/O (24Hr): Intake/Output Summary (Last 24 hours) at 2022 1027  Last data filed at 2022 1541  Gross per 24 hour   Intake 400 ml   Output 2000 ml   Net -1600 ml     Objective  Labs/Imaging/Diagnostics    Labs:  CBC:  Recent Labs     22  0502 22  0451 22  0424   WBC 5.3 5.2 5.2   RBC 3.11* 2.85* 3.10*   HGB 9.4* 8.6* 9.4*   HCT 27.9* 25.4* 27.9*   MCV 89.7 89.0 90.0   RDW 14.6 14.7 14.7    140 158     CHEMISTRIES:  Recent Labs     22  0502 22  0451 22  0424   * 136 139   K 4.4 4.3 4.5   CL 97* 98* 98*   CO2 27 25 27   BUN 25* 34* 22*   CREATININE 4.7* 6.0* 4.8*   GLUCOSE 133* 122* 113*   MG 2.10 2.10 2.10     PT/INR:No results for input(s): PROTIME, INR in the last 72 hours. APTT:No results for input(s): APTT in the last 72 hours. LIVER PROFILE:No results for input(s): AST, ALT, BILIDIR, BILITOT, ALKPHOS in the last 72 hours. Imaging Last 24 Hours:  No results found.   Assessment//Plan           Hospital Problems           Last Modified POA    * (Principal) Acute CVA (cerebrovascular accident) ([facility] [address] 2022 Yes    GI bleed 2022 Yes    History of COVID-19 2022 Yes    Hyponatremia 1/14/2022 Yes    Fatigue 1/14/2022 Yes    Acute kidney injury superimposed on CKD ([facility] [address] 1/14/2022 Yes    Lethargy 1/14/2022 Yes    Suspected UTI 1/14/2022 Yes    LESLEE (acute kidney injury) ([facility] [address] 1/19/2022 Yes    Malignant [facility] colon ([facility] 1/26/2022 Yes        Assessment & Plan    Sigmoid colon cancer   POD 4 from colectomy   Stable   Having some GI function but no BM yet   On full liquids, continue for now   PT/OT    Electronically signed by Rita Lee MD on 1/30/2022 at 10:28 AM    Electronically signed by Rita Lee MD on 1/30/22 at 10:27 AM EST

## 2022-01-30 NOTE — PLAN OF CARE
Problem: Falls - Risk of:  Goal: Will remain free from falls  Description: Will remain free from falls  Outcome: Ongoing  Goal: Absence of physical injury  Description: Absence of physical injury  Outcome: Ongoing     Problem: Skin Integrity:  Goal: Will show no infection signs and symptoms  Description: Will show no infection signs and symptoms  Outcome: Ongoing  Goal: Absence of new skin breakdown  Description: Absence of new skin breakdown  Outcome: Ongoing     Problem: HEMODYNAMIC STATUS  Goal: Patient has stable vital signs and fluid balance  Outcome: Ongoing     Problem: ACTIVITY INTOLERANCE/IMPAIRED MOBILITY  Goal: Mobility/activity is maintained at optimum level for patient  Outcome: Ongoing     Problem: Fluid Volume:  Goal: Will show no signs or symptoms of fluid imbalance  Description: Will show no signs or symptoms of fluid imbalance  Outcome: Ongoing     Problem: Sensory:  Goal: General experience of comfort will improve  Description: General experience of comfort will improve  Outcome: Ongoing     Problem: Urinary Elimination:  Goal: Signs and symptoms of infection will decrease  Description: Signs and symptoms of infection will decrease  Outcome: Ongoing  Goal: Ability to reestablish a normal urinary elimination pattern will improve - after catheter removal  Description: Ability to reestablish a normal urinary elimination pattern will improve  Outcome: Ongoing  Goal: Complications related to the disease process, condition or treatment will be avoided or minimized  Description: Complications related to the disease process, condition or treatment will be avoided or minimized  Outcome: Ongoing     Problem: Pain:  Goal: Pain level will decrease  Description: Pain level will decrease  Outcome: Ongoing  Goal: Control of acute pain  Description: Control of acute pain  Outcome: Ongoing  Goal: Control of chronic pain  Description: Control of chronic pain  Outcome: Ongoing     Problem: Nutrition  Goal: Optimal nutrition therapy  Outcome: Ongoing     Problem: OXYGENATION/RESPIRATORY FUNCTION  Goal: Patient will achieve/maintain normal respiratory rate/effort  Outcome: Ongoing     Problem: MOBILITY  Goal: Early mobilization is achieved  Outcome: Ongoing     Problem: ELIMINATION  Goal: Elimination patterns are normal or improving  Description: Elimination patterns return to pre-surgery normal patterns  Outcome: Ongoing     Problem: SKIN INTEGRITY  Goal: Skin integrity is maintained or improved  Outcome: Ongoing

## 2022-01-30 NOTE — PLAN OF CARE
Problem: Falls - Risk of:  Goal: Will remain free from falls  Description: Will remain free from falls  1/30/2022 0905 by Martin Davis RN  Outcome: Ongoing     Problem: Skin Integrity:  Goal: Will show no infection signs and symptoms  Description: Will show no infection signs and symptoms  1/30/2022 0905 by Martin Davis RN  Outcome: Ongoing     Problem: HEMODYNAMIC STATUS  Goal: Patient has stable vital signs and fluid balance  1/30/2022 0905 by Martin Davis RN  Outcome: Ongoing     Problem: ACTIVITY INTOLERANCE/IMPAIRED MOBILITY  Goal: Mobility/activity is maintained at optimum level for patient  1/30/2022 6292 by Martin Davis RN  Outcome: Ongoing     Problem: Sensory:  Goal: General experience of comfort will improve  Description: General experience of comfort will improve  1/30/2022 0905 by Martin Davis RN  Outcome: Ongoing     Problem: Pain:  Goal: Pain level will decrease  Description: Pain level will decrease  1/30/2022 0905 by Martin Davis RN  Outcome: Ongoing     Problem: SKIN INTEGRITY  Goal: Skin integrity is maintained or improved  1/30/2022 0905 by Martin Davis RN  Outcome: Ongoing

## 2022-01-30 NOTE — PROGRESS NOTES
Call received from pt's daughter Ly Gomez. She informed this RN that her sister, Evan Mtz, will be staying the night tonight with the pt as he has been more confused at night. RN supervisor aware.

## 2022-01-30 NOTE — PROGRESS NOTES
Hospitalist Progress Note      PCP: Elsie Sanders    Date of Admission: 1/13/2022    Chief Complaint: colon cancer    Hospital Course:      Subjective: intermittent agitation, improved this morning      Medications:  Reviewed    Infusion Medications    sodium chloride Stopped (01/25/22 1746)     Scheduled Medications    aspirin  81 mg Oral Daily    atorvastatin  80 mg Oral Nightly    [Held by provider] gabapentin  100 mg Oral TID    pantoprazole  20 mg Oral Nightly    sevelamer  800 mg Oral TID WC    tamsulosin  0.4 mg Oral QAM    sodium chloride flush  5-40 mL IntraVENous 2 times per day     PRN Meds: OLANZapine, melatonin, morphine **OR** morphine, oxyCODONE **OR** oxyCODONE, lidocaine, ondansetron **OR** ondansetron, polyethylene glycol, sodium chloride flush, sodium chloride, acetaminophen **OR** [DISCONTINUED] acetaminophen, perflutren lipid microspheres      Intake/Output Summary (Last 24 hours) at 1/30/2022 0859  Last data filed at 1/29/2022 1541  Gross per 24 hour   Intake 400 ml   Output 2000 ml   Net -1600 ml       Exam:    BP (!) 152/80   Pulse 78   Temp 97.8 °F (36.6 °C) (Axillary)   Resp 18   Ht 6' 5\" (1.956 m)   Wt 210 lb 1.6 oz (95.3 kg)   SpO2 96%   BMI 24.91 kg/m²     General appearance: No apparent distress, appears stated age and cooperative. HEENT: Pupils equal, round, and reactive to light. Conjunctivae/corneas clear. Neck: Supple, with full range of motion. No jugular venous distention. Trachea midline. Respiratory:  Normal respiratory effort. Clear to auscultation, bilaterally without Rales/Wheezes/Rhonchi. Cardiovascular: Regular rate and rhythm with normal S1/S2 without murmurs, rubs or gallops. Abdomen: Soft, post-op abdominal tenderness, non-distended with normal bowel sounds. Musculoskeletal: No clubbing, cyanosis or edema bilaterally. Full range of motion without deformity. Skin: Skin color, texture, turgor normal.  No rashes or lesions.   Neurologic: Neurovascularly intact without any focal sensory/motor deficits. Cranial nerves: II-XII intact, grossly non-focal.  Psychiatric: Alert and oriented, thought content appropriate, normal insight  Capillary Refill: Brisk,< 3 seconds   Peripheral Pulses: +2 palpable, equal bilaterally       Labs:   Recent Labs     01/28/22  0502 01/29/22  0451 01/30/22  0424   WBC 5.3 5.2 5.2   HGB 9.4* 8.6* 9.4*   HCT 27.9* 25.4* 27.9*    140 158     Recent Labs     01/28/22  0502 01/29/22  0451 01/30/22  0424   * 136 139   K 4.4 4.3 4.5   CL 97* 98* 98*   CO2 27 25 27   BUN 25* 34* 22*   CREATININE 4.7* 6.0* 4.8*   CALCIUM 8.7 8.5 8.5     No results for input(s): AST, ALT, BILIDIR, BILITOT, ALKPHOS in the last 72 hours. No results for input(s): INR in the last 72 hours. No results for input(s): Kennyth Hammers in the last 72 hours. Urinalysis:      Lab Results   Component Value Date    NITRU Negative 01/21/2022    WBCUA >900 01/21/2022    BACTERIA 2+ 01/21/2022    RBCUA 32 01/21/2022    BLOODU LARGE 01/21/2022    SPECGRAV 1.019 01/21/2022    GLUCOSEU Negative 01/21/2022       Radiology:  CT HEAD WO CONTRAST   Final Result   Atrophy and small-vessel ischemic change. No hemorrhage or mass identified. Paranasal sinus disease, moderate in degree, similar to prior      Small parotid nodule on the left, incidentally noted      RECOMMENDATIONS:   Unavailable         MRA neck without contrast   Final Result   No convincing flow limiting stenosis of the visualized carotid/vertebral   arteries within the limitations of this exam.         MRA head without contrast   Final Result   No apparent intracranial arterial high grade stenosis, occlusion or aneurysm   head at 5 within constraints of acquisition. MRI BRAIN WO CONTRAST   Final Result   1. Punctate acute infarct in the posteromedial left parietal lobe, within the   deep white matter.    2. 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 RECOMMENDATIONS:   Unavailable         US RENAL COMPLETE   Final Result   1. Simple cysts in the left kidney with no other significant renal finding. 2. Borderline enlargement of the prostate. Correlate with urologic history. CT CHEST ABDOMEN PELVIS WO CONTRAST   Final Result   No evidence of acute intrathoracic, intraabdominal or intrapelvic injury. Multifocal airspace disease. This pattern may reflect COVID pneumonia. Correlate with COVID testing. CT HEAD WO CONTRAST   Final Result   No acute intracranial abnormality. Specifically, no acute intracranial   hemorrhage or mass effect. Extensive chronic small vessel ischemic disease. Assessment/Plan:    Active Hospital Problems    Diagnosis Date Noted    Malignant neoplasm of colon ([facility] [address] [C18.9]     LESLEE (acute kidney injury) ([address] [N17.9]     Acute CVA (cerebrovascular accident) ([facility] [address] [I63.9] 01/16/2022    GI bleed [K92.2] 01/14/2022    History of COVID-19 [Z86.16] 01/14/2022    Hyponatremia [E87.1] 01/14/2022    Fatigue [R53.83] 01/14/2022    Acute kidney injury superimposed on CKD ([address] [N17.9, N18.9] 01/14/2022    Lethargy [R53.83] 01/14/2022    Suspected UTI [R39.89] 01/14/2022     Acute encephalopathy secondary to CVA with underlying cognitive impairment.   -MRI of the brain showed punctate acute infarct in the posterior medial left parietal lobe  MRA of the head and neck showed no flow-limiting stenosis  - avoid hypotension during surgery    Colonic mass:  History of colon cancer/GI bleed/acute blood loss anemia  -Hemoglobin remained stable, evaluated by GI, tolerating aspirin  -s/p sigmoid colectomy, sigmoidoscopy, coloproctostomy 1/26  - waiting for return of bowel function  - ok for full liquid diet for now      Enterococcus faecalis UTI  -s/p IV Vanc x 5 days     End-stage renal failure, on hemodialysis  -[place], nephrologist is assisting with finding a location for his outpatient HD.     Hypomagnesemia  Resolved     Hypokalemia  Resolved        Recent hospitalization for COVID  Currently on room air.      Obesity Body mass index is 28.9 kg/m². Complicating assessment and treatment. Placing patient at risk for multiple co-morbidities as well as early death and contributing to the patient's presentation. DVT Prophylaxis: lovenox  Diet: ADULT ORAL NUTRITION SUPPLEMENT; Breakfast, Lunch, Dinner; Clear Liquid Oral Supplement  ADULT DIET;  Full Liquid  Code Status: Full Code    PT/OT Eval Status: SNF    Dispo - PCU, can D/C after return of bowel function    Herrera Boyce MD

## 2022-01-30 NOTE — PROGRESS NOTES
Pt without any BM's overnight; bowel sounds remain hypoactive but he is passing flatus. Pt's appetite has been poor, however. Pt's midline abdominal incision is LILIANA; staples remain in place and incision is approximated. Pt with minimal pain, 4/10 overnight and was given PRN roxicodone. Pt also given PRN melatonin per pt's daughter's request to help him sleep as his sleep has been poor the last few nights. Pt still confused overnight; oriented to person and place only. Pt's daughter remains at the bedside. No complaints from pt or pt's daughter over night. Will continue to monitor closely.

## 2022-01-31 LAB
ANION GAP SERPL CALCULATED.3IONS-SCNC: 12 MMOL/L (ref 3–16)
BASOPHILS ABSOLUTE: 0 K/UL (ref 0–0.2)
BASOPHILS RELATIVE PERCENT: 0.4 %
BUN BLDV-MCNC: 33 MG/DL (ref 7–20)
CALCIUM SERPL-MCNC: 8.2 MG/DL (ref 8.3–10.6)
CHLORIDE BLD-SCNC: 101 MMOL/L (ref 99–110)
CO2: 26 MMOL/L (ref 21–32)
CREAT SERPL-MCNC: 5.8 MG/DL (ref 0.8–1.3)
EOSINOPHILS ABSOLUTE: 0.4 K/UL (ref 0–0.6)
EOSINOPHILS RELATIVE PERCENT: 6.6 %
GFR AFRICAN AMERICAN: 12
GFR NON-AFRICAN AMERICAN: 10
GLUCOSE BLD-MCNC: 102 MG/DL (ref 70–99)
HCT VFR BLD CALC: 26.2 % (ref 40.5–52.5)
HEMOGLOBIN: 8.8 G/DL (ref 13.5–17.5)
LYMPHOCYTES ABSOLUTE: 1.3 K/UL (ref 1–5.1)
LYMPHOCYTES RELATIVE PERCENT: 23.7 %
MAGNESIUM: 2.1 MG/DL (ref 1.8–2.4)
MCH RBC QN AUTO: 30.1 PG (ref 26–34)
MCHC RBC AUTO-ENTMCNC: 33.6 G/DL (ref 31–36)
MCV RBC AUTO: 89.5 FL (ref 80–100)
MONOCYTES ABSOLUTE: 0.4 K/UL (ref 0–1.3)
MONOCYTES RELATIVE PERCENT: 6.7 %
NEUTROPHILS ABSOLUTE: 3.4 K/UL (ref 1.7–7.7)
NEUTROPHILS RELATIVE PERCENT: 62.6 %
PDW BLD-RTO: 15.2 % (ref 12.4–15.4)
PLATELET # BLD: 177 K/UL (ref 135–450)
PMV BLD AUTO: 7.5 FL (ref 5–10.5)
POTASSIUM SERPL-SCNC: 4.2 MMOL/L (ref 3.5–5.1)
RBC # BLD: 2.93 M/UL (ref 4.2–5.9)
SODIUM BLD-SCNC: 139 MMOL/L (ref 136–145)
WBC # BLD: 5.4 K/UL (ref 4–11)

## 2022-01-31 PROCEDURE — 6370000000 HC RX 637 (ALT 250 FOR IP): Performed by: SURGERY

## 2022-01-31 PROCEDURE — 36415 COLL VENOUS BLD VENIPUNCTURE: CPT

## 2022-01-31 PROCEDURE — 97535 SELF CARE MNGMENT TRAINING: CPT

## 2022-01-31 PROCEDURE — APPNB45 APP NON BILLABLE 31-45 MINUTES: Performed by: PHYSICIAN ASSISTANT

## 2022-01-31 PROCEDURE — 99024 POSTOP FOLLOW-UP VISIT: CPT | Performed by: PHYSICIAN ASSISTANT

## 2022-01-31 PROCEDURE — 83735 ASSAY OF MAGNESIUM: CPT

## 2022-01-31 PROCEDURE — 97530 THERAPEUTIC ACTIVITIES: CPT | Performed by: PHYSICAL THERAPIST

## 2022-01-31 PROCEDURE — APPSS45 APP SPLIT SHARED TIME 31-45 MINUTES: Performed by: PHYSICIAN ASSISTANT

## 2022-01-31 PROCEDURE — 85025 COMPLETE CBC W/AUTO DIFF WBC: CPT

## 2022-01-31 PROCEDURE — 2580000003 HC RX 258: Performed by: SURGERY

## 2022-01-31 PROCEDURE — 6370000000 HC RX 637 (ALT 250 FOR IP): Performed by: NURSE PRACTITIONER

## 2022-01-31 PROCEDURE — 97530 THERAPEUTIC ACTIVITIES: CPT

## 2022-01-31 PROCEDURE — 80048 BASIC METABOLIC PNL TOTAL CA: CPT

## 2022-01-31 PROCEDURE — 99024 POSTOP FOLLOW-UP VISIT: CPT | Performed by: SURGERY

## 2022-01-31 PROCEDURE — 94761 N-INVAS EAR/PLS OXIMETRY MLT: CPT

## 2022-01-31 PROCEDURE — 97116 GAIT TRAINING THERAPY: CPT | Performed by: PHYSICAL THERAPIST

## 2022-01-31 PROCEDURE — 2060000000 HC ICU INTERMEDIATE R&B

## 2022-01-31 RX ADMIN — ASPIRIN 81 MG 81 MG: 81 TABLET ORAL at 08:25

## 2022-01-31 RX ADMIN — OXYCODONE HYDROCHLORIDE 5 MG: 5 TABLET ORAL at 21:24

## 2022-01-31 RX ADMIN — PANTOPRAZOLE SODIUM 20 MG: 20 TABLET, DELAYED RELEASE ORAL at 21:24

## 2022-01-31 RX ADMIN — TAMSULOSIN HYDROCHLORIDE 0.4 MG: 0.4 CAPSULE ORAL at 08:25

## 2022-01-31 RX ADMIN — SEVELAMER CARBONATE 800 MG: 800 TABLET, FILM COATED ORAL at 08:25

## 2022-01-31 RX ADMIN — OXYCODONE HYDROCHLORIDE 5 MG: 5 TABLET ORAL at 08:26

## 2022-01-31 RX ADMIN — Medication 6 MG: at 21:24

## 2022-01-31 RX ADMIN — SODIUM CHLORIDE, PRESERVATIVE FREE 10 ML: 5 INJECTION INTRAVENOUS at 21:25

## 2022-01-31 RX ADMIN — SEVELAMER CARBONATE 800 MG: 800 TABLET, FILM COATED ORAL at 11:38

## 2022-01-31 RX ADMIN — ATORVASTATIN CALCIUM 80 MG: 80 TABLET, FILM COATED ORAL at 21:24

## 2022-01-31 RX ADMIN — SEVELAMER CARBONATE 800 MG: 800 TABLET, FILM COATED ORAL at 17:34

## 2022-01-31 RX ADMIN — SODIUM CHLORIDE, PRESERVATIVE FREE 10 ML: 5 INJECTION INTRAVENOUS at 08:26

## 2022-01-31 ASSESSMENT — PAIN SCALES - GENERAL
PAINLEVEL_OUTOF10: 4
PAINLEVEL_OUTOF10: 0
PAINLEVEL_OUTOF10: 2
PAINLEVEL_OUTOF10: 0
PAINLEVEL_OUTOF10: 4

## 2022-01-31 ASSESSMENT — PAIN DESCRIPTION - PROGRESSION: CLINICAL_PROGRESSION: GRADUALLY WORSENING

## 2022-01-31 ASSESSMENT — PAIN DESCRIPTION - ORIENTATION: ORIENTATION: MID;LOWER

## 2022-01-31 ASSESSMENT — PAIN - FUNCTIONAL ASSESSMENT: PAIN_FUNCTIONAL_ASSESSMENT: ACTIVITIES ARE NOT PREVENTED

## 2022-01-31 ASSESSMENT — PAIN DESCRIPTION - FREQUENCY: FREQUENCY: INTERMITTENT

## 2022-01-31 ASSESSMENT — PAIN DESCRIPTION - DESCRIPTORS: DESCRIPTORS: SORE

## 2022-01-31 ASSESSMENT — PAIN DESCRIPTION - LOCATION: LOCATION: ABDOMEN

## 2022-01-31 ASSESSMENT — PAIN DESCRIPTION - PAIN TYPE: TYPE: SURGICAL PAIN

## 2022-01-31 ASSESSMENT — PAIN DESCRIPTION - ONSET: ONSET: GRADUAL

## 2022-01-31 NOTE — PROGRESS NOTES
Hospitalist Progress Note      PCP: Emmanuel Ozuna    Date of Admission: 1/13/2022    Chief Complaint: colon cancer    Hospital Course:      Subjective: Pt seen and examined. Doing well this morning, has no complaints. Still awaiting return of bowel function. Medications:  Reviewed    Infusion Medications    sodium chloride Stopped (01/25/22 1746)     Scheduled Medications    [START ON 2/1/2022] epoetin davis-epbx  20,000 Units SubCUTAneous Once per day on Tue Thu Sat    aspirin  81 mg Oral Daily    atorvastatin  80 mg Oral Nightly    [Held by provider] gabapentin  100 mg Oral TID    pantoprazole  20 mg Oral Nightly    sevelamer  800 mg Oral TID WC    tamsulosin  0.4 mg Oral QAM    sodium chloride flush  5-40 mL IntraVENous 2 times per day     PRN Meds: OLANZapine, melatonin, morphine **OR** morphine, oxyCODONE **OR** oxyCODONE, lidocaine, ondansetron **OR** ondansetron, polyethylene glycol, sodium chloride flush, sodium chloride, acetaminophen **OR** [DISCONTINUED] acetaminophen, perflutren lipid microspheres      Intake/Output Summary (Last 24 hours) at 1/31/2022 1123  Last data filed at 1/31/2022 0916  Gross per 24 hour   Intake 600 ml   Output 0 ml   Net 600 ml       Exam:    BP (!) 142/76   Pulse 98   Temp 98 °F (36.7 °C) (Oral)   Resp 18   Ht 6' 5\" (1.956 m)   Wt 222 lb 0.1 oz (100.7 kg)   SpO2 94%   BMI 26.33 kg/m²     General appearance: No apparent distress, appears stated age and cooperative. HEENT: Pupils equal, round, and reactive to light. Conjunctivae/corneas clear. Neck: Supple, with full range of motion. No jugular venous distention. Trachea midline. Respiratory:  Normal respiratory effort. Clear to auscultation, bilaterally without Rales/Wheezes/Rhonchi. Cardiovascular: Regular rate and rhythm with normal S1/S2 without murmurs, rubs or gallops. Abdomen: Soft, post-op abdominal tenderness, non-distended with normal bowel sounds.   Musculoskeletal: No clubbing, cyanosis or edema bilaterally. Full range of motion without deformity. Skin: Skin color, texture, turgor normal.  No rashes or lesions. Neurologic:  Neurovascularly intact without any focal sensory/motor deficits. Cranial nerves: II-XII intact, grossly non-focal.  Psychiatric: Alert and oriented, thought content appropriate, normal insight  Capillary Refill: Brisk,< 3 seconds   Peripheral Pulses: +2 palpable, equal bilaterally       Labs:   Recent Labs     01/29/22 0451 01/30/22 0424 01/31/22 0431   WBC 5.2 5.2 5.4   HGB 8.6* 9.4* 8.8*   HCT 25.4* 27.9* 26.2*    158 177     Recent Labs     01/29/22 0451 01/30/22 0424 01/31/22 0431    139 139   K 4.3 4.5 4.2   CL 98* 98* 101   CO2 25 27 26   BUN 34* 22* 33*   CREATININE 6.0* 4.8* 5.8*   CALCIUM 8.5 8.5 8.2*     No results for input(s): AST, ALT, BILIDIR, BILITOT, ALKPHOS in the last 72 hours. No results for input(s): INR in the last 72 hours. No results for input(s): Ellene Lazier in the last 72 hours. Urinalysis:      Lab Results   Component Value Date    NITRU Negative 01/21/2022    WBCUA >900 01/21/2022    BACTERIA 2+ 01/21/2022    RBCUA 32 01/21/2022    BLOODU LARGE 01/21/2022    SPECGRAV 1.019 01/21/2022    GLUCOSEU Negative 01/21/2022       Radiology:  CT HEAD WO CONTRAST   Final Result   Atrophy and small-vessel ischemic change. No hemorrhage or mass identified. Paranasal sinus disease, moderate in degree, similar to prior      Small parotid nodule on the left, incidentally noted      RECOMMENDATIONS:   Unavailable         MRA neck without contrast   Final Result   No convincing flow limiting stenosis of the visualized carotid/vertebral   arteries within the limitations of this exam.         MRA head without contrast   Final Result   No apparent intracranial arterial high grade stenosis, occlusion or aneurysm   head at 5 within constraints of acquisition. MRI BRAIN WO CONTRAST   Final Result   1.  Punctate acute infarct in the posteromedial left parietal lobe, within the   deep white matter. 2. 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 RECOMMENDATIONS:   Unavailable         US RENAL COMPLETE   Final Result   1. Simple cysts in the left kidney with no other significant renal finding. 2. Borderline enlargement of the prostate. Correlate with urologic history. CT CHEST ABDOMEN PELVIS WO CONTRAST   Final Result   No evidence of acute intrathoracic, intraabdominal or intrapelvic injury. Multifocal airspace disease. This pattern may reflect COVID pneumonia. Correlate with COVID testing. CT HEAD WO CONTRAST   Final Result   No acute intracranial abnormality. Specifically, no acute intracranial   hemorrhage or mass effect. Extensive chronic small vessel ischemic disease. Assessment/Plan:    Active Hospital Problems    Diagnosis Date Noted    Malignant neoplasm of colon ([facility] [address] [C18.9]     LESLEE (acute kidney injury) ([address] [N17.9]     Acute CVA (cerebrovascular accident) ([facility] [address] [I63.9] 01/16/2022    GI bleed [K92.2] 01/14/2022    History of COVID-19 [Z86.16] 01/14/2022    Hyponatremia [E87.1] 01/14/2022    Fatigue [R53.83] 01/14/2022    Acute kidney injury superimposed on CKD ([address] [N17.9, N18.9] 01/14/2022    Lethargy [R53.83] 01/14/2022    Suspected UTI [R39.89] 01/14/2022     Acute encephalopathy secondary to CVA with underlying cognitive impairment.   -MRI of the brain showed punctate acute infarct in the posterior medial left parietal lobe  MRA of the head and neck showed no flow-limiting stenosis  - avoid hypotension during surgery    Colonic mass:  History of colon cancer/GI bleed/acute blood loss anemia  -Hemoglobin remained stable, evaluated by GI, tolerating aspirin  -s/p sigmoid colectomy, sigmoidoscopy, coloproctostomy 1/26  - waiting for return of bowel function  - ok for full liquid diet for now      Enterococcus faecalis UTI  -s/p IV Vanc x 5 days     End-stage renal failure, on hemodialysis  -[place] to relocate to [place], nephrologist is assisting with finding a location for his outpatient HD.     Hypomagnesemia  Resolved     Hypokalemia  Resolved        Recent hospitalization for COVID  Currently on room air.      Obesity Body mass index is 28.9 kg/m². Complicating assessment and treatment. Placing patient at risk for multiple co-morbidities as well as early death and contributing to the patient's presentation. DVT Prophylaxis: lovenox  Diet: ADULT ORAL NUTRITION SUPPLEMENT; Breakfast, Lunch, Dinner; Clear Liquid Oral Supplement  ADULT DIET;  Full Liquid  Code Status: Full Code    PT/OT Eval Status: SNF    Dispo - PCU, can D/C after return of bowel function    Ovidio Alfaro MD

## 2022-01-31 NOTE — PLAN OF CARE
Problem: Falls - Risk of:  Goal: Will remain free from falls  Description: Will remain free from falls  Outcome: Ongoing   Fall risk assessment completed every shift. All precautions in place. Pt has call light within reach at all times. Room clear of clutter. Pt aware to call for assistance when getting up. Bed locked in lowest position, alarm engaged, call light within reach, will continue to monitor. Problem: Skin Integrity:  Goal: Will show no infection signs and symptoms  Description: Will show no infection signs and symptoms  Outcome: Ongoing   Skin assessment completed every shift. Pt assessed for incontinence, appropriate barrier cream applied prn. Pt encouraged to turn/rotate every 2 hours. Assistance provided if pt unable to do so themselves. Problem: HEMODYNAMIC STATUS  Goal: Patient has stable vital signs and fluid balance  Outcome: Ongoing   Vitals completed q4h and assessed by RN. I&O charted and assessed per MD order. Pt tolerating adequate fluid intake.     Problem: Fluid Volume:  Goal: Will show no signs or symptoms of fluid imbalance  Description: Will show no signs or symptoms of fluid imbalance  Outcome: Ongoing     Problem: Pain:  Goal: Pain level will decrease  Description: Pain level will decrease  Outcome: Ongoing

## 2022-01-31 NOTE — PROGRESS NOTES
General and Vascular Surgery                                                           Daily Progress Note                                                             Lolly Ordaz PA-C     Pt Name: Leopold Drown  Medical Record Number: [id number]  Date of Birth 1945   Today's Date: 1/31/2022    ASSESSMENT/PLAN  Sigmoid colon cancer              POD 5 from colectomy              Stable, some confusion              Having some GI function but no BM yet              On full liquids, continue for now              PT/OT   Awaiting for GI function to improve    EDUCATION  Patient educated about their illness/diagnosis, stated above, and all questions answered. We discussed the importance of nutrition, medications they are taking, and healthy lifestyle. Srinivasan Riojas has slightly improved from yesterday. Pain is well controlled. He has no nausea and no vomiting. He has passed flatus and has not had a bowel movement. He is tolerating full liquid diet. OBJECTIVE  VITALS:  height is 6' 5\" (1.956 m) and weight is 222 lb 0.1 oz (100.7 kg). His oral temperature is 98 °F (36.7 °C). His blood pressure is 142/76 (abnormal) and his pulse is 98. His respiration is 18 and oxygen saturation is 94%. VITALS:  BP (!) 142/76   Pulse 98   Temp 98 °F (36.7 °C) (Oral)   Resp 18   Ht 6' 5\" (1.956 m)   Wt 222 lb 0.1 oz (100.7 kg)   SpO2 94%   BMI 26.33 kg/m²   GENERAL: some confusion, alert, no distress  ABDOMEN: non-distended and tenderness present- minimal incisional,  without rebound and guarding  I/O last 3 completed shifts:   In: 360 [P.O.:360]  Out: 0   I/O this shift:  In: 240 [P.O.:240]  Out: -     LABS  Recent Labs     01/31/22  0431   WBC 5.4   HGB 8.8*   HCT 26.2*         K 4.2      CO2 26   BUN 33*   CREATININE 5.8*   MG 2.10   CALCIUM 8.2*     CBC:   Lab Results   Component Value Date    WBC 5.4 01/31/2022    RBC 2.93 01/31/2022    HGB 8.8 01/31/2022    HCT 26.2 01/31/2022    MCV 89.5 01/31/2022    MCH 30.1 01/31/2022    MCHC 33.6 01/31/2022    RDW 15.2 01/31/2022     01/31/2022    MPV 7.5 01/31/2022     CMP:    Lab Results   Component Value Date     01/31/2022    K 4.2 01/31/2022     01/31/2022    CO2 26 01/31/2022    BUN 33 01/31/2022    CREATININE 5.8 01/31/2022    GFRAA 12 01/31/2022    GFRAA 35 07/26/2011    AGRATIO 0.8 01/13/2022    LABGLOM 10 01/31/2022    GLUCOSE 102 01/31/2022    PROT 6.6 01/13/2022    PROT 7.5 07/26/2011    LABALBU 3.0 01/13/2022    CALCIUM 8.2 01/31/2022    BILITOT 0.3 01/13/2022    ALKPHOS 84 01/13/2022    AST 9 01/13/2022    ALT 7 01/13/2022         Maykel Galeano PA-C  Electronically signed 1/31/2022 at 1:46 PM    As above  Stable POD 5 other than confusion  Seems to be tolerating fulls, no BM yet  Await bowel function    Electronically signed by Andry Zuniga MD on 1/31/2022 at 2:04 PM

## 2022-01-31 NOTE — PROGRESS NOTES
Occupational Therapy  Facility/Department: [facility] 5W PROGRESSIVE CARE  Daily Treatment Note  NAME: Radhika Mcdermott  :   MRN: [id number]    Date of Service: 2022    Discharge Recommendations:  Patient would benefit from continued therapy after discharge,3-5 sessions per week  OT Equipment Recommendations  Other: defer to [place] facility  Radhika Mcdermott scored a 16/24 on the AM-PAC ADL Inpatient form. Current research shows that an AM-PAC score of 17 or less is typically not associated with a discharge to the patient's home setting. Based on the patient's AM-PAC score and their current ADL deficits, it is recommended that the patient have 3-5 sessions per week of Occupational Therapy at d/c to increase the patient's independence. Please see assessment section for further patient specific details. If patient discharges prior to next session this note will serve as a discharge summary. Please see below for the latest assessment towards goals. Assessment   Performance deficits / Impairments: Decreased functional mobility ; Decreased safe awareness;Decreased balance;Decreased ADL status; Decreased cognition;Decreased high-level IADLs;Decreased endurance;Decreased strength  Assessment: Discussed with OTR: Pt tolerated tx fair today, limited by decreased activity tolerance/endurance, cognition, balance. Pt is however, making gradual progress, needing assist x1 for mob, transfers. Pt needing up to Min A for bed mob, Mod/Max A for ADL's and Min/Mod A x1, cues for safe transfers using RW. Pt is functioning below his baseline and would benefit from cont OT at [address] at d/c prior to returning home. Cont poc. Treatment Diagnosis: impaired ADLs, t/f  Prognosis: Good  History: 78 y/o male admit 2022 with LESLEE, GI Bleed; Weakness. 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 Surg consult (recent dx Colon Ca ~ 1 month ago; udnerwent open sigmoid resection with coloproctostomy on 22.   Recent Comment  Comments: ok to see per RN      Orientation  Orientation  Overall Orientation Status: Impaired  Orientation Level: Oriented to person;Oriented to place; Disoriented to situation;Oriented to time (generally to time-stated month, year, not exact date.)  Objective    ADL  Grooming: Setup (seated to wash face, hands. No teeth, used mouthwash.)  UE Bathing: Setup  LE Bathing: Moderate assistance  UE Dressing: Minimal assistance (struggled to don pullover shirt)  LE Dressing: Moderate assistance;Minimal assistance (to don hospital pants and footies.)  Toileting: Moderate assistance;Maximum assistance (voided urine. Assist for clothes management)        Standing Balance  Time: 1-2 min or less  Activity: ADL's, functional mob with RW. Functional Mobility  Functional - Mobility Device: Rolling Walker  Activity: To/from bathroom  Assist Level: Minimal assistance  Functional Mobility Comments: CG/Min A x1, RW and cues, assist for safely [employer] Transfers  Toilet - Technique: Ambulating  Equipment Used: Grab bars  Toilet Transfer: Minimal assistance; Moderate assistance  Toilet Transfers Comments: RW, cues for safe hand and body placement  Wheelchair Bed Transfers  Wheelchair/Bed - Technique: Ambulating  Equipment Used: Bed (chair without arms)  Level of Asssistance: Minimal assistance; Moderate assistance  Wheelchair Transfers Comments: RW, cues for safe hand and body placement. Min A off elevated bed. More Mod A off chair. Bed mobility  Supine to Sit: Minimal assistance;Contact guard assistance  Sit to Supine: Minimal assistance (bed flat, no rail. Assist for LE's. )  Scooting: Maximal assistance;2 Person assistance (up to [facility].)         Cognition  Overall Cognitive Status: Exceptions  Arousal/Alertness: Appropriate responses to stimuli  Following Commands:  Follows all commands without difficulty  Attention Span: Difficulty dividing attention  Memory: Decreased recall of recent events;Decreased short term memory  Safety Judgement: Decreased awareness of need for safety;Decreased awareness of need for assistance  Problem Solving: Assistance required to generate solutions  Insights: Decreased awareness of deficits  Initiation: Requires cues for some  Sequencing: Requires cues for some          Plan   Plan  Times per week: 3-5  Current Treatment Recommendations: Strengthening,Endurance Training,Balance Training,Gait Training,Functional Mobility Training,Self-Care / ADL    AM-PAC Score        AM-PAC Inpatient Daily Activity Raw Score: 16 (01/31/22 1455)  AM-PAC Inpatient ADL T-Scale Score : 35.96 (01/31/22 1455)  ADL Inpatient CMS 0-100% Score: 53.32 (01/31/22 1455)  ADL Inpatient CMS G-Code Modifier : CK (01/31/22 1455)    Goals  Short term goals  Time Frame for Short term goals: Prior to DC.  Status: goals ongoing  Short term goal 1: Pt will complete ADL transfers with supervision  Short term goal 2: Pt will complete functional mobility with supervision  Short term goal 3: Pt will tolerate standing > 5 min for functional task with supervision  Short term goal 4: Pt will complete toileting with supervision  Short term goal 5: Pt will complete LB dressing with supervision  Patient Goals   Patient goals : to return home       Therapy Time   Individual Concurrent Group Co-treatment   Time In 1407         Time Out 1450         Minutes 43                 Nilesh CORRALES/ABIDA,515

## 2022-01-31 NOTE — PROGRESS NOTES
Physical Therapy  Facility/Department: [facility] 5W PROGRESSIVE CARE  Daily Treatment Note  NAME: Cate Yu  :   MRN: [id number]    Date of Service: 2022    Discharge Recommendations:  3-5 sessions per week   PT Equipment Recommendations  Other: will continue to assess  Cate Yu scored a 16/24 on the AM-PAC short mobility form. Current research shows that an AM-PAC score of 17 or less is typically not associated with a discharge to the patient's home setting. Based on the patient's AM-PAC score and their current functional mobility deficits, it is recommended that the patient have 3-5 sessions per week of Physical Therapy at d/c to increase the patient's independence. Please see assessment section for further patient specific details. If patient discharges prior to next session this note will serve as a discharge summary. Please see below for the latest assessment towards goals. Assessment   Body structures, Functions, Activity limitations: Decreased functional mobility ; Decreased strength;Decreased safe awareness;Decreased endurance;Decreased balance  Assessment: 76 y/o male admit 2022 with LESLEE, GI Bleed; Weakness. 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 Surg consult (recent dx Colon Ca ~ 1 month ago; await surg). Recent dx Pneumonia, COVID+ (cont weak/falls). PMH as noted including CKD (HD), COVID+. PTA pt living with wife in mobile home with ramp access; independent daily care and functional mobility although recent cont weak following Pneumonia/COVID.  s/p open sigmoid resection with coloproctostomy. Pt currently is more unsteady, needing more assist for mobility tasks and his an elevated fall risk; recommend 3-5x/wk therapy to address deficits to allow pt to regain his max potential  Prognosis: Good  History: 76 y/o male admit 2022 with LESLEE, GI Bleed; Weakness. 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   Surg consult (recent dx Colon Ca ~ 1 month ago; await surg). Recent dx Pneumonia, COVID+ (cont weak/falls). PMH as noted including CKD (HD), COVID+. Patient Education: reviewed call light and not getting up without assist  Barriers to Learning: forgetful  REQUIRES PT FOLLOW UP: Yes  Activity Tolerance  Activity Tolerance: Patient Tolerated treatment well     Patient Diagnosis(es): The primary encounter diagnosis was LESLEE (acute kidney injury) ([facility] [address] Diagnoses of Gastrointestinal hemorrhage, unspecified gastrointestinal hemorrhage type, COVID, Acute cystitis without hematuria, and Malignant neoplasm of colon, unspecified part of colon ([facility] [address] were also pertinent to this visit. has a past medical history of Arthritis, Cancer ([facility] [address] Diabetes mellitus ([facility] [address] Hemodialysis patient ([facility] [address] and Hypertension. has a past surgical history that includes IR PERC ARTERIOVENOUS FISTULA CREATION (Left) and colectomy (N/A, 1/26/2022). Social/Functional History  Lives With: Spouse (Wife Hudson Jacob). )  Type of Home: Mobile home  Home Layout: One level  Home Access: Ramped entrance  Bathroom Shower/Tub: Walk-in shower  Bathroom Toilet: Handicap height  Bathroom Equipment: Grab bars in [address] Accessibility: Resendiz Aj: Rolling walker  ADL Assistance: [facility]:  (Wife takes care of most homemaking needs.)  Ambulation Assistance: Independent (Occass use of Walker (longer distances). )  Transfer Assistance: Independent  Active : No (Able, although doesn't drive. Family takes care of errands. Medical transport to/from HD.)  Occupation: Retired  Type of occupation: Retired : various jobs ([employer], [employer], [employer]). Additional Comments: 2 Dtrs, 1 Son live in area. Reports recent fall (slid off bed onto floor), wife able to assist up.     Restrictions  Restrictions/Precautions  Restrictions/Precautions: Fall Risk  Position Activity Restriction  Other position/activity restrictions: full liquid diet; HD  Subjective   General  Chart Reviewed: Yes  Additional Pertinent Hx: 76 y/o male admit 1/13/2022 with LESLEE, GI Bleed; Weakness. 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 Surg consult (recent dx Colon Ca ~ 1 month ago; await surg). Recent dx Pneumonia, COVID+ (cont weak/falls). PMH as noted including CKD (HD), COVID+. 1-26 s/p  open sigmoid resection with coloproctostomy  Response To Previous Treatment: Patient with no complaints from previous session. Family / Caregiver Present: No  Referring Practitioner: Dr. Denita Hair. Subjective  Subjective: pt very pleasant and agreeable to working with therapy; no c/o pain          Orientation  Orientation  Overall Orientation Status: Impaired (very forgetful)  Orientation Level: Oriented to person;Oriented to place  Cognition   Cognition  Overall Cognitive Status: Exceptions  Arousal/Alertness: Appropriate responses to stimuli  Following Commands: Follows all commands without difficulty  Attention Span: Difficulty dividing attention  Memory: Decreased recall of recent events;Decreased short term memory  Safety Judgement: Decreased awareness of need for safety;Decreased awareness of need for assistance  Initiation: Requires cues for some  Sequencing: Requires cues for some  Objective   Bed mobility  Supine to Sit: Minimal assistance  Transfers  Sit to Stand: Minimal Assistance; Moderate Assistance (mod A from lower bench in hallway without arm rests)  Stand to sit: Contact guard assistance;Minimal Assistance  Ambulation  Ambulation?: Yes  Ambulation 1  Surface: level tile  Device: Rolling Walker  Assistance: Contact guard assistance  Quality of Gait: no LOB, slow pace with intermittent narrow KEY  Gait Deviations: Slow Tierra; Increased KEY; Decreased step length;Decreased step height  Distance: 72' x2     Balance  Comments: SBA for sitting balance; CGA for standing balance with walker  Exercises  Comments: performed B LE ex in sitting x 10-15 reps         Comment: assisted with positioning in chair for comfort with all needs in reach; chair alarm engaged and LEs elevated on step stool under foot rest of recliner chair              G-Code     OutComes Score                                                     AM-PAC Score  AM-PAC Inpatient Mobility Raw Score : 16 (01/28/22 0833)  AM-PAC Inpatient T-Scale Score : 40.78 (01/28/22 0833)  Mobility Inpatient CMS 0-100% Score: 54.16 (01/28/22 0833)  Mobility Inpatient CMS G-Code Modifier : CK (01/28/22 7836)          Goals  Short term goals  Time Frame for Short term goals: Upon d/c acute care setting. (goals ongoing)  Short term goal 1: Bed Mob SBA. Short term goal 2: Transfers with assist device SBA. Short term goal 3: Amb with assist device 25-50' SBA/CGA. met 1-20: new goal amb 100' with wh walker SBA  Short term goal 4: Pt participating in approp Strength Exs. Patient Goals   Patient goals : Get stronger and return home. Plan    Plan  Times per week: 3-5x week while in acute care setting.   Current Treatment Recommendations: Strengthening,Functional Mobility Training,Transfer Training,Gait Training,Safety Education & Training,Patient/Caregiver Education & Training  Safety Devices  Type of devices: Call light within reach,Chair alarm in place,Left in chair,Nurse notified,All fall risk precautions in place,Gait belt,Patient at risk for falls  Restraints  Initially in place: No     Therapy Time   Individual Concurrent Group Co-treatment   Time In 1050         Time Out 1132         Minutes 42                 ESPERANZA CADET PT    Electronically signed by ESPERANZA CADET PT on 1/31/2022 at 11:33 AM

## 2022-01-31 NOTE — PROGRESS NOTES
Call received from pt's daughter Che Jara. Updates provided and all questions answered at this time. Pt still confused; oriented to person and place only. However, pt has been behaviorally appropriate for this RN overnight; no episodes of agitation or aggression. Pt still without a BM since his sigmoid colectomy done on 1/26, but does pass flatus. Incision site on his abdomen remains LILIANA with all staples still in place; edges approximated. Abdomen soft, without guarding, and tenderness. Bowel sounds hypoactive. Pt's appetite has improved and he is requesting food items, but he remains on a clear liquid diet. Pt denies complaints at this time. Will continue to monitor.

## 2022-01-31 NOTE — PROGRESS NOTES
[facility]                                               Progress note    Summary:   Vannesa Eldridge is being seen by nephrology for ESRD. This is a 76 yo man with ESRD on HD three times weekly via left AVF who is here after a fall and AMS. He has been diagnosed with colon cancer and has been having rectal bleeding off and on for the past several weeks. From [facility]. Had [facility] last month so has been dialyzing at a different dialysis unit for 20 day period. Last HD on Thursday this week. No rectal bleeding since being here at [facility]. GI has no plans for him. Daughter is at bedside. Apparently her father and mother both had a fall yesterday prompting the ED visit. Interval History  Seen and examined in his room   No complaints. No abdominal pain, no NVD  No chest pain or SOB.   s/p sigmoid colectomy 1/26/2022  Last Post HD weight 95.9 KILOS. AVF + thrill and bruit. Labs and vitals reviewed. Plan:   - HD today perTTS schedule. - BP acceptable. Appears euvolemic.  - oncology ok with ELENA on dialysis. Will order , will 20 units TIW. Kierra Aranda MD  [facility]  Office: [phone]    Assessment:   ESRD  Does dialysis Monday Wednesday Friday usually but has been doing TTS at the [facility] unit near [facility]  He has a left AV fistula, positive thrill and bruit  Last at dialysis was on Thursday 1/14     Electrolytes  No acute issues.     Hypertension  Blood pressure is acceptable. No changes.      S HPT  Calcium ok  Check phosphorus     GI bleed  Has known colon cancer  No active bleeding  Hemoglobin stable  General surgery consulted.      Altered mental status  MRI showed small acute stroke  Neurology consulted  Mental status has improved. ROS:   Positives Listed Bold.  All other remaining systems are negative.     Constitutional:  fever, chills, weakness, weight change, fatigue,      Skin:  rash, pruritus, hair loss, bruising, dry skin, petechiae. Head, Face, Neck   headaches, swelling,  cervical adenopathy.     Respiratory: shortness of breath, cough, or wheezing  Cardiovascular: chest pain, palpitations, dizzy, edema  Gastrointestinal: nausea, vomiting, diarrhea, constipation,belly pain    Yellow skin, blood in stool  Musculoskeletal:  back pain, muscle weakness, gait problems,       joint pain or swelling. Genitourinary:  dysuria, poor urine flow, flank pain, blood in urine  Neurologic:  vertigo, TIA'S, syncope, seizures, focal weakness  Psychosocial:  insomnia, anxiety, or depression. Additional positive findings: -     PMH:   Past medical history, surgical history, social history, family history are reviewed and updated as appropriate. Reviewed current medication list.   Allergies reviewed and updated as needed. PE:   Vitals:    01/31/22 0856   BP:    Pulse:    Resp:    Temp:    SpO2: 96%       General appearance: Male in no acute distress,awake and alert. HEENT: no icterus, EOM intact, trachea midline. Neck : no masses, appears symmetrical and no JVD appreciated. Respiratory: Respiratory effort normal, bilateral equal chest rise. Cardiovascular: Ausculation shows RRR and no edema   Abdomen: abdomen is soft, non distended  Musculoskeletal:  no joint swelling, no deformity  Skin: no rashes, no induration, no tightening, no jaundice   Neuro:   Follows commands, moves all extremities spontaneously   Left AV fistula positive thrill and bruit      Lab Results   Component Value Date    CREATININE 5.8 (HH) 01/31/2022    BUN 33 (H) 01/31/2022     01/31/2022    K 4.2 01/31/2022     01/31/2022    CO2 26 01/31/2022      Lab Results   Component Value Date    WBC 5.4 01/31/2022    HGB 8.8 (L) 01/31/2022    HCT 26.2 (L) 01/31/2022    MCV 89.5 01/31/2022     01/31/2022     Lab Results   Component Value Date    CALCIUM 8.2 (L) 01/31/2022

## 2022-01-31 NOTE — PROGRESS NOTES
Hematology Oncology Daily Progress Note    Admit Date: 1/13/2022  Hospital day several    Subjective:     Patient has complaints of mild confusion--denies sob/cp  Medication side effects: none    Scheduled Meds:   aspirin  81 mg Oral Daily    atorvastatin  80 mg Oral Nightly    [Held by provider] gabapentin  100 mg Oral TID    pantoprazole  20 mg Oral Nightly    sevelamer  800 mg Oral TID WC    tamsulosin  0.4 mg Oral QAM    sodium chloride flush  5-40 mL IntraVENous 2 times per day     Continuous Infusions:   sodium chloride Stopped (01/25/22 5696)     PRN Meds:OLANZapine, melatonin, morphine **OR** morphine, oxyCODONE **OR** oxyCODONE, lidocaine, ondansetron **OR** ondansetron, polyethylene glycol, sodium chloride flush, sodium chloride, acetaminophen **OR** [DISCONTINUED] acetaminophen, perflutren lipid microspheres    Review of Systems  Pertinent items are noted in HPI. REVIEW OF SYSTEMS:         · Constitutional: Denies fever, sweats, weight loss     · Eyes: No visual changes or diplopia. No scleral icterus. · ENT: No Headaches, hearing loss or vertigo. No mouth sores or sore throat. · Cardiovascular: No chest pain, dyspnea on exertion, palpitations or loss of consciousness. · Respiratory: No cough or wheezing, no sputum production. No hemoptysis. .    · Gastrointestinal: No abdominal pain, appetite loss, blood in stools. No change in bowel habits. · Genitourinary: No dysuria, trouble voiding, or hematuria. · Musculoskeletal:  Generalized weakness. No joint complaints. · Integumentary: No rash or pruritis. · Neurological: No headache, diplopia. No change in gait, balance, or coordination. No paresthesias. · Endocrine: No temperature intolerance. No excessive thirst, fluid intake, or urination. · Hematologic/Lymphatic: No abnormal bruising or ecchymoses, blood clots or swollen lymph nodes. · Allergic/Immunologic: No nasal congestion or hives.    ·     Objective:     Patient Vitals for the past 8 hrs: BP Temp Temp src Pulse Resp SpO2 Weight   01/31/22 0716 (!) 144/81 97.8 °F (36.6 °C) Axillary 78 16 93 % --   01/31/22 0504 (!) 152/85 97.6 °F (36.4 °C) Oral 66 16 95 % 222 lb 0.1 oz (100.7 kg)     I/O last 3 completed shifts: In: 360 [P.O.:360]  Out: 0   No intake/output data recorded.     BP (!) 144/81   Pulse 78   Temp 97.8 °F (36.6 °C) (Axillary)   Resp 16   Ht 6' 5\" (1.956 m)   Wt 222 lb 0.1 oz (100.7 kg)   SpO2 93%   BMI 26.33 kg/m²         Head:    Normocephalic, without obvious abnormality, atraumatic   Eyes:    PERRL, conjunctiva/corneas clear, EOM's intact, fundi     benign, both eyes        Ears:    Normal TM's and external ear canals, both ears   Nose:   Nares normal, septum midline, mucosa normal, no drainage    or sinus tenderness   Throat:   Lips, mucosa, and tongue normal; teeth and gums normal   Neck:   Supple, symmetrical, trachea midline, no adenopathy;        thyroid:  No enlargement/tenderness/nodules; no carotid    bruit or JVD   Back:     Symmetric, no curvature, ROM normal, no CVA tenderness   Lungs:     Clear to auscultation bilaterally, respirations unlabored   Chest wall:    No tenderness or deformity   Heart:    Regular rate and rhythm, S1 and S2 normal, no murmur, rub   or gallop   Abdomen:     Soft, non-tender, bowel sounds active all four quadrants,     no masses, no organomegaly           Extremities:   Extremities normal, atraumatic, no cyanosis or edema   Pulses:   2+ and symmetric all extremities   Skin:   Skin color, texture, turgor normal, no rashes or lesions   Lymph nodes:   Cervical, supraclavicular, and axillary nodes normal   Neurologic:   Stable       Data Review  CBC:   Lab Results   Component Value Date    WBC 5.4 01/31/2022    RBC 2.93 01/31/2022       Assessment:     Principal Problem:    Acute CVA (cerebrovascular accident) ([facility] [address]  Active Problems:    GI bleed    History of COVID-19    Hyponatremia    Fatigue    Acute kidney injury superimposed on CKD ([facility] [address]    Lethargy    Suspected UTI    LESLEE (acute kidney injury) ([facility] [address]    Malignant neoplasm of colon ([facility] [address]  Resolved Problems:    * No resolved hospital problems. *      Plan:     1. Stage III colon cancer. I spoke about adjuvant chemotherapy with the patient and his daughter on Friday. They are both interested. I would not start for 6 to 8 weeks. He will need low-dose weekly 5-FU and dose reduced oxaliplatin due to his end-stage renal disease. He is not a candidate for oral Xeloda. 2.  Delirium. I will defer to the primary team.    3.  Anemia. His iron studies are consistent with chronic disease. I would max out his EPO dose with dialysis if not already done. Transfuse as needed.       Electronically signed by Sindhu Samson MD on 1/31/2022 at 8:28 AM

## 2022-02-01 LAB
ANION GAP SERPL CALCULATED.3IONS-SCNC: 16 MMOL/L (ref 3–16)
BASOPHILS ABSOLUTE: 0.1 K/UL (ref 0–0.2)
BASOPHILS RELATIVE PERCENT: 1.4 %
BUN BLDV-MCNC: 41 MG/DL (ref 7–20)
CALCIUM SERPL-MCNC: 8.5 MG/DL (ref 8.3–10.6)
CHLORIDE BLD-SCNC: 96 MMOL/L (ref 99–110)
CO2: 24 MMOL/L (ref 21–32)
CREAT SERPL-MCNC: 6.6 MG/DL (ref 0.8–1.3)
EOSINOPHILS ABSOLUTE: 0.3 K/UL (ref 0–0.6)
EOSINOPHILS RELATIVE PERCENT: 5.7 %
GFR AFRICAN AMERICAN: 10
GFR NON-AFRICAN AMERICAN: 8
GLUCOSE BLD-MCNC: 102 MG/DL (ref 70–99)
HCT VFR BLD CALC: 26.9 % (ref 40.5–52.5)
HEMOGLOBIN: 9 G/DL (ref 13.5–17.5)
LYMPHOCYTES ABSOLUTE: 1.3 K/UL (ref 1–5.1)
LYMPHOCYTES RELATIVE PERCENT: 23.5 %
MAGNESIUM: 2.2 MG/DL (ref 1.8–2.4)
MCH RBC QN AUTO: 30 PG (ref 26–34)
MCHC RBC AUTO-ENTMCNC: 33.4 G/DL (ref 31–36)
MCV RBC AUTO: 90 FL (ref 80–100)
MONOCYTES ABSOLUTE: 0.4 K/UL (ref 0–1.3)
MONOCYTES RELATIVE PERCENT: 6.7 %
NEUTROPHILS ABSOLUTE: 3.6 K/UL (ref 1.7–7.7)
NEUTROPHILS RELATIVE PERCENT: 62.7 %
PDW BLD-RTO: 15.5 % (ref 12.4–15.4)
PLATELET # BLD: 175 K/UL (ref 135–450)
PMV BLD AUTO: 7.6 FL (ref 5–10.5)
POTASSIUM SERPL-SCNC: 3.7 MMOL/L (ref 3.5–5.1)
RBC # BLD: 2.99 M/UL (ref 4.2–5.9)
SODIUM BLD-SCNC: 136 MMOL/L (ref 136–145)
WBC # BLD: 5.7 K/UL (ref 4–11)

## 2022-02-01 PROCEDURE — 6370000000 HC RX 637 (ALT 250 FOR IP): Performed by: SURGERY

## 2022-02-01 PROCEDURE — 2580000003 HC RX 258: Performed by: SURGERY

## 2022-02-01 PROCEDURE — APPNB45 APP NON BILLABLE 31-45 MINUTES: Performed by: PHYSICIAN ASSISTANT

## 2022-02-01 PROCEDURE — 90935 HEMODIALYSIS ONE EVALUATION: CPT

## 2022-02-01 PROCEDURE — 6360000002 HC RX W HCPCS: Performed by: INTERNAL MEDICINE

## 2022-02-01 PROCEDURE — 83735 ASSAY OF MAGNESIUM: CPT

## 2022-02-01 PROCEDURE — 85025 COMPLETE CBC W/AUTO DIFF WBC: CPT

## 2022-02-01 PROCEDURE — 80048 BASIC METABOLIC PNL TOTAL CA: CPT

## 2022-02-01 PROCEDURE — 9990000010 HC NO CHARGE VISIT: Performed by: PHYSICAL THERAPIST

## 2022-02-01 PROCEDURE — 99024 POSTOP FOLLOW-UP VISIT: CPT | Performed by: PHYSICIAN ASSISTANT

## 2022-02-01 PROCEDURE — 36415 COLL VENOUS BLD VENIPUNCTURE: CPT

## 2022-02-01 PROCEDURE — 2060000000 HC ICU INTERMEDIATE R&B

## 2022-02-01 PROCEDURE — 94761 N-INVAS EAR/PLS OXIMETRY MLT: CPT

## 2022-02-01 PROCEDURE — APPSS45 APP SPLIT SHARED TIME 31-45 MINUTES: Performed by: PHYSICIAN ASSISTANT

## 2022-02-01 RX ADMIN — TAMSULOSIN HYDROCHLORIDE 0.4 MG: 0.4 CAPSULE ORAL at 11:46

## 2022-02-01 RX ADMIN — OXYCODONE HYDROCHLORIDE 5 MG: 5 TABLET ORAL at 17:43

## 2022-02-01 RX ADMIN — EPOETIN ALFA-EPBX 20000 UNITS: 20000 INJECTION, SOLUTION INTRAVENOUS; SUBCUTANEOUS at 10:23

## 2022-02-01 RX ADMIN — SEVELAMER CARBONATE 800 MG: 800 TABLET, FILM COATED ORAL at 13:27

## 2022-02-01 RX ADMIN — SODIUM CHLORIDE, PRESERVATIVE FREE 10 ML: 5 INJECTION INTRAVENOUS at 11:49

## 2022-02-01 RX ADMIN — ASPIRIN 81 MG 81 MG: 81 TABLET ORAL at 11:46

## 2022-02-01 RX ADMIN — PANTOPRAZOLE SODIUM 20 MG: 20 TABLET, DELAYED RELEASE ORAL at 20:43

## 2022-02-01 RX ADMIN — SODIUM CHLORIDE, PRESERVATIVE FREE 10 ML: 5 INJECTION INTRAVENOUS at 20:43

## 2022-02-01 RX ADMIN — ATORVASTATIN CALCIUM 80 MG: 80 TABLET, FILM COATED ORAL at 20:43

## 2022-02-01 ASSESSMENT — PAIN SCALES - GENERAL
PAINLEVEL_OUTOF10: 0
PAINLEVEL_OUTOF10: 8
PAINLEVEL_OUTOF10: 0
PAINLEVEL_OUTOF10: 3
PAINLEVEL_OUTOF10: 0

## 2022-02-01 ASSESSMENT — PAIN DESCRIPTION - ORIENTATION: ORIENTATION: LOWER;MID

## 2022-02-01 ASSESSMENT — PAIN DESCRIPTION - PAIN TYPE: TYPE: SURGICAL PAIN

## 2022-02-01 ASSESSMENT — PAIN DESCRIPTION - LOCATION: LOCATION: ABDOMEN

## 2022-02-01 NOTE — PROGRESS NOTES
General and Vascular Surgery                                                           Daily Progress Note                                                             Alta Traore PA-C     Pt Name: Katie Klein  Medical Record Number: [id number]  Date of Birth 1945   Today's Date: 2/1/2022    ASSESSMENT/PLAN  Sigmoid colon cancer              POD 6 from colectomy              Stable, still withsome confusion              Having some GI function but no BM yet              Tolerating full liquid diet. Advance diet to regular as tolerated              PT/OT   Awaiting for GI function to improve   HD this AM    EDUCATION  Patient educated about their illness/diagnosis, stated above, and all questions answered. We discussed the importance of nutrition, medications they are taking, and healthy lifestyle. Jian Fortis has slightly improved from yesterday. Pain is well controlled. He has no nausea and no vomiting. He has passed flatus and has not had a bowel movement. He is tolerating full liquid diet. OBJECTIVE  VITALS:  height is 6' 5\" (1.956 m) and weight is 213 lb 3 oz (96.7 kg). His temperature is 97.3 °F (36.3 °C). His blood pressure is 163/90 (abnormal) and his pulse is 100. His respiration is 16 and oxygen saturation is 96%. VITALS:  BP (!) 163/90   Pulse 100   Temp 97.3 °F (36.3 °C)   Resp 16   Ht 6' 5\" (1.956 m)   Wt 213 lb 3 oz (96.7 kg)   SpO2 96%   BMI 25.28 kg/m²   GENERAL: some confusion, alert, no distress  ABDOMEN: non-distended and tenderness present- minimal incisional,  without rebound and guarding  I/O last 3 completed shifts: In: 840 [P.O.:840]  Out: 0   I/O this shift:   In: 400   Out: 2400     LABS  Recent Labs     02/01/22  0356   WBC 5.7   HGB 9.0*   HCT 26.9*         K 3.7   CL 96*   CO2 24   BUN 41*   CREATININE 6.6*   MG 2.20   CALCIUM 8.5     CBC:   Lab Results   Component Value Date    WBC 5.7 02/01/2022    RBC 2.99 02/01/2022    HGB 9.0 02/01/2022    HCT 26.9 02/01/2022    MCV 90.0 02/01/2022    MCH 30.0 02/01/2022    MCHC 33.4 02/01/2022    RDW 15.5 02/01/2022     02/01/2022    MPV 7.6 02/01/2022     CMP:    Lab Results   Component Value Date     02/01/2022    K 3.7 02/01/2022    CL 96 02/01/2022    CO2 24 02/01/2022    BUN 41 02/01/2022    CREATININE 6.6 02/01/2022    GFRAA 10 02/01/2022    GFRAA 35 07/26/2011    AGRATIO 0.8 01/13/2022    LABGLOM 8 02/01/2022    GLUCOSE 102 02/01/2022    PROT 6.6 01/13/2022    PROT 7.5 07/26/2011    LABALBU 3.0 01/13/2022    CALCIUM 8.5 02/01/2022    BILITOT 0.3 01/13/2022    ALKPHOS 84 01/13/2022    AST 9 01/13/2022    ALT 7 01/13/2022         Carissa Wood PA-C  Electronically signed 2/1/2022 at 12:40 PM

## 2022-02-01 NOTE — PROGRESS NOTES
[facility]                                               Progress note    Summary:   Brett Valiente is being seen by nephrology for ESRD. This is a 76 yo man with ESRD on HD three times weekly via left AVF who is here after a fall and AMS. He has been diagnosed with colon cancer and has been having rectal bleeding off and on for the past several weeks. From [facility]. [facility] so has been dialyzing at a different dialysis unit for 20 day period. Last HD on Thursday this week. No rectal bleeding since being here at [facility]. GI has no plans for him. Daughter is at bedside. Apparently her father and mother both had a fall yesterday prompting the ED visit. Interval History  Seen and examined in his room   Family present. He is eating [employer]. No complaints. No abdominal pain, no NVD  In better spirits today   No chest pain or SOB.   s/p sigmoid colectomy 1/26/2022  Last Post HD weight 96.7 kilos  AVF + thrill and bruit. Had dialysis today     Labs and vitals reviewed. Plan:   - HD today perTTS schedule. - BP acceptable. Appears euvolemic.  - oncology ok with [facility] on dialysis. Will order , will 20 units TIW. Damaris Lester MD  [facility]  Office: [phone]    Assessment:   ESRD  Does dialysis Monday Wednesday Friday usually but has been [facility] near [facility]  He has a left AV fistula, positive thrill and bruit  Last at dialysis was on Thursday 1/14     Electrolytes  No acute issues.     Hypertension  Blood pressure is acceptable. No changes.      S HPT  Calcium ok  Check phosphorus     GI bleed  Has known colon cancer  No active bleeding  Hemoglobin stable  General surgery consulted.      Altered mental status  MRI showed small acute stroke  Neurology consulted  Mental status has improved. ROS:   Positives Listed Bold.  All other remaining systems are negative.     Constitutional:  fever, chills, weakness, weight change, fatigue,      Skin:  rash, pruritus, hair loss, bruising, dry skin, petechiae. Head, Face, Neck   headaches, swelling,  cervical adenopathy.     Respiratory: shortness of breath, cough, or wheezing  Cardiovascular: chest pain, palpitations, dizzy, edema  Gastrointestinal: nausea, vomiting, diarrhea, constipation,belly pain    Yellow skin, blood in stool  Musculoskeletal:  back pain, muscle weakness, gait problems,       joint pain or swelling. Genitourinary:  dysuria, poor urine flow, flank pain, blood in urine  Neurologic:  vertigo, TIA'S, syncope, seizures, focal weakness  Psychosocial:  insomnia, anxiety, or depression. Additional positive findings: -     PMH:   Past medical history, surgical history, social history, family history are reviewed and updated as appropriate. Reviewed current medication list.   Allergies reviewed and updated as needed. PE:   Vitals:    02/01/22 1131   BP: (!) 163/90   Pulse: 100   Resp:    Temp: 97.3 °F (36.3 °C)   SpO2: 96%       General appearance: Male in no acute distress,awake and alert. HEENT: no icterus, EOM intact, trachea midline. Neck : no masses, appears symmetrical and no JVD appreciated. Respiratory: Respiratory effort normal, bilateral equal chest rise. Cardiovascular: Ausculation shows RRR and no edema   Abdomen: abdomen is soft, non distended  Musculoskeletal:  no joint swelling, no deformity  Skin: no rashes, no induration, no tightening, no jaundice   Neuro:   Follows commands, moves all extremities spontaneously   Left AV fistula positive thrill and bruit      Lab Results   Component Value Date    CREATININE 6.6 (HH) 02/01/2022    BUN 41 (H) 02/01/2022     02/01/2022    K 3.7 02/01/2022    CL 96 (L) 02/01/2022    CO2 24 02/01/2022      Lab Results   Component Value Date    WBC 5.7 02/01/2022    HGB 9.0 (L) 02/01/2022    HCT 26.9 (L) 02/01/2022    MCV 90.0 02/01/2022     02/01/2022     Lab Results   Component Value Date CALCIUM 8.5 02/01/2022

## 2022-02-01 NOTE — FLOWSHEET NOTE
02/01/22 0738 02/01/22 1041   Treatment   Time On 0738  --    Time Off  --  1041   Vital Signs   BP (!) 154/85 (!) 170/87   Temp 97.6 °F (36.4 °C) 97.5 °F (36.4 °C)   Pulse 76 76   Resp 16 16   SpO2 100 % 100 %   Weight 217 lb 9.5 oz (98.7 kg) 213 lb 3 oz (96.7 kg)   Weight Method Standing scale Standing scale     Treatment time: 3 hours  Net UF: 2000 ml     Pre weight: 98.7 kg   Post weight: 96.7 kg     Access used: LAVF  Access function: good with  ml/min     Medications or blood products given: Retacrit     Summary of response to treatment: Tolerated well, no distress noted, MD aware. Copy of dialysis treatment record placed in chart, to be scanned into EMR.

## 2022-02-01 NOTE — PROGRESS NOTES
Hospitalist Progress Note      PCP: Emmanuel Ozuna    Date of Admission: 1/13/2022    Chief Complaint: colon cancer    Hospital Course:      Subjective: Pt seen and examined. Doing well this morning, has no complaints. Still awaiting return of bowel function. Still having some confusion. Medications:  Reviewed    Infusion Medications    sodium chloride Stopped (01/25/22 1746)     Scheduled Medications    epoetin davis-epbx  20,000 Units SubCUTAneous Once per day on Tue Thu Sat    aspirin  81 mg Oral Daily    atorvastatin  80 mg Oral Nightly    [Held by provider] gabapentin  100 mg Oral TID    pantoprazole  20 mg Oral Nightly    sevelamer  800 mg Oral TID WC    tamsulosin  0.4 mg Oral QAM    sodium chloride flush  5-40 mL IntraVENous 2 times per day     PRN Meds: OLANZapine, melatonin, morphine **OR** morphine, oxyCODONE **OR** oxyCODONE, lidocaine, ondansetron **OR** ondansetron, polyethylene glycol, sodium chloride flush, sodium chloride, acetaminophen **OR** [DISCONTINUED] acetaminophen, perflutren lipid microspheres      Intake/Output Summary (Last 24 hours) at 2/1/2022 1629  Last data filed at 2/1/2022 1530  Gross per 24 hour   Intake 880 ml   Output 2450 ml   Net -1570 ml       Exam:    BP (!) 163/90   Pulse 100   Temp 97.3 °F (36.3 °C)   Resp 16   Ht 6' 5\" (1.956 m)   Wt 213 lb 3 oz (96.7 kg)   SpO2 96%   BMI 25.28 kg/m²     General appearance: No apparent distress, appears stated age and cooperative. HEENT: Pupils equal, round, and reactive to light. Conjunctivae/corneas clear. Neck: Supple, with full range of motion. No jugular venous distention. Trachea midline. Respiratory:  Normal respiratory effort. Clear to auscultation, bilaterally without Rales/Wheezes/Rhonchi. Cardiovascular: Regular rate and rhythm with normal S1/S2 without murmurs, rubs or gallops. Abdomen: Soft, post-op abdominal tenderness, non-distended with normal bowel sounds.   Musculoskeletal: No clubbing, cyanosis or edema bilaterally. Full range of motion without deformity. Skin: Skin color, texture, turgor normal.  No rashes or lesions. Neurologic:  Neurovascularly intact without any focal sensory/motor deficits. Cranial nerves: II-XII intact, grossly non-focal.  Psychiatric: Alert and oriented, thought content appropriate, normal insight  Capillary Refill: Brisk,< 3 seconds   Peripheral Pulses: +2 palpable, equal bilaterally       Labs:   Recent Labs     01/30/22  0424 01/31/22  0431 02/01/22  0356   WBC 5.2 5.4 5.7   HGB 9.4* 8.8* 9.0*   HCT 27.9* 26.2* 26.9*    177 175     Recent Labs     01/30/22  0424 01/31/22  0431 02/01/22  0356    139 136   K 4.5 4.2 3.7   CL 98* 101 96*   CO2 27 26 24   BUN 22* 33* 41*   CREATININE 4.8* 5.8* 6.6*   CALCIUM 8.5 8.2* 8.5     No results for input(s): AST, ALT, BILIDIR, BILITOT, ALKPHOS in the last 72 hours. No results for input(s): INR in the last 72 hours. No results for input(s): Miriam Anchors in the last 72 hours. Urinalysis:      Lab Results   Component Value Date    NITRU Negative 01/21/2022    WBCUA >900 01/21/2022    BACTERIA 2+ 01/21/2022    RBCUA 32 01/21/2022    BLOODU LARGE 01/21/2022    SPECGRAV 1.019 01/21/2022    GLUCOSEU Negative 01/21/2022       Radiology:  CT HEAD WO CONTRAST   Final Result   Atrophy and small-vessel ischemic change. No hemorrhage or mass identified. Paranasal sinus disease, moderate in degree, similar to prior      Small parotid nodule on the left, incidentally noted      RECOMMENDATIONS:   Unavailable         MRA neck without contrast   Final Result   No convincing flow limiting stenosis of the visualized carotid/vertebral   arteries within the limitations of this exam.         MRA head without contrast   Final Result   No apparent intracranial arterial high grade stenosis, occlusion or aneurysm   head at 5 within constraints of acquisition. MRI BRAIN WO CONTRAST   Final Result   1.  Punctate acute infarct in the posteromedial left parietal lobe, within the   deep white matter. 2. 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 RECOMMENDATIONS:   Unavailable         US RENAL COMPLETE   Final Result   1. Simple cysts in the left kidney with no other significant renal finding. 2. Borderline enlargement of the prostate. Correlate with urologic history. CT CHEST ABDOMEN PELVIS WO CONTRAST   Final Result   No evidence of acute intrathoracic, intraabdominal or intrapelvic injury. Multifocal airspace disease. This pattern may reflect COVID pneumonia. Correlate with COVID testing. CT HEAD WO CONTRAST   Final Result   No acute intracranial abnormality. Specifically, no acute intracranial   hemorrhage or mass effect. Extensive chronic small vessel ischemic disease. Assessment/Plan:    Active Hospital Problems    Diagnosis Date Noted    Malignant neoplasm of colon ([facility] [address] [C18.9]     LESLEE (acute kidney injury) ([address] [N17.9]     Acute CVA (cerebrovascular accident) ([facility] [address] [I63.9] 01/16/2022    GI bleed [K92.2] 01/14/2022    History of COVID-19 [Z86.16] 01/14/2022    Hyponatremia [E87.1] 01/14/2022    Fatigue [R53.83] 01/14/2022    Acute kidney injury superimposed on CKD ([address] [N17.9, N18.9] 01/14/2022    Lethargy [R53.83] 01/14/2022    Suspected UTI [R39.89] 01/14/2022     Acute encephalopathy secondary to CVA with underlying cognitive impairment.   -MRI of the brain showed punctate acute infarct in the posterior medial left parietal lobe  MRA of the head and neck showed no flow-limiting stenosis  - avoid hypotension during surgery  -will check CT Head without contrast given ongoing encephalopathy    Colonic mass:  History of colon cancer/GI bleed/acute blood loss anemia  -Hemoglobin remained stable, evaluated by GI, tolerating aspirin  -s/p sigmoid colectomy, sigmoidoscopy, coloproctostomy 1/26  - waiting for return of bowel function  - advance to general diet     Enterococcus faecalis UTI  -s/p IV Vanc x 5 days     End-stage renal failure, on hemodialysis  -[place], nephrologist is assisting with finding a location for his outpatient HD.     Hypomagnesemia  Resolved     Hypokalemia  Resolved        Recent hospitalization for COVID  Currently on room air.        Obesity Body mass index is 28.9 kg/m². Complicating assessment and treatment. Placing patient at risk for multiple co-morbidities as well as early death and contributing to the patient's presentation. DVT Prophylaxis: lovenox  Diet: ADULT ORAL NUTRITION SUPPLEMENT; Breakfast, Lunch, Dinner; Clear Liquid Oral Supplement  ADULT DIET;  Regular; Low Fat (less than or equal to 50 gm/day)  Code Status: Full Code    PT/OT Eval Status: SNF    Dispo - PCU, can D/C after return of bowel function    Bertha Alvarado MD

## 2022-02-01 NOTE — PLAN OF CARE
Patient free from falls this shift. Fall precautions in place at all times. Bed in lowest position with two side rails up and wheels locked. Call light within reach. Patient able and agreeable to contact for safety appropriately. Skin assessment performed each shift per protocol. Patient turned and repositioned every two hours and prn with pillow support. Patient checked for incontence every two hours. Problem: HEMODYNAMIC STATUS  Goal: Patient has stable vital signs and fluid balance  Outcome: Ongoing     Problem: ACTIVITY INTOLERANCE/IMPAIRED MOBILITY  Goal: Mobility/activity is maintained at optimum level for patient  Outcome: Ongoing   Pt able to express presence/absence of pain and rate pain appropriately using numerical scale. Pain/discomfort being managed with PRN analgesics per MD orders (see MAR). Pain assessed every shift and after interventions.

## 2022-02-01 NOTE — PROGRESS NOTES
Occupational Therapy  Pt in dialysis. Will follow and re-attempt as schedule permits. Refer to the last note for status if pt is discharged prior to the next session.   Nilesh CORRALES/ABIDA,515

## 2022-02-01 NOTE — PROGRESS NOTES
Physical Therapy    Attempted to see for PT session however pt out of room for dialysis; will attempt later as schedule permits  Electronically signed by ESPERANZA CADET PT on 2/1/2022 at 10:39 AM

## 2022-02-02 ENCOUNTER — APPOINTMENT (OUTPATIENT)
Dept: CT IMAGING | Age: 77
DRG: 981 | End: 2022-02-02
Payer: MEDICARE

## 2022-02-02 LAB
ANION GAP SERPL CALCULATED.3IONS-SCNC: 13 MMOL/L (ref 3–16)
BASOPHILS ABSOLUTE: 0 K/UL (ref 0–0.2)
BASOPHILS RELATIVE PERCENT: 0.5 %
BUN BLDV-MCNC: 23 MG/DL (ref 7–20)
CALCIUM SERPL-MCNC: 8.5 MG/DL (ref 8.3–10.6)
CHLORIDE BLD-SCNC: 99 MMOL/L (ref 99–110)
CO2: 27 MMOL/L (ref 21–32)
CREAT SERPL-MCNC: 5.1 MG/DL (ref 0.8–1.3)
EOSINOPHILS ABSOLUTE: 0.3 K/UL (ref 0–0.6)
EOSINOPHILS RELATIVE PERCENT: 5.5 %
GFR AFRICAN AMERICAN: 13
GFR NON-AFRICAN AMERICAN: 11
GLUCOSE BLD-MCNC: 130 MG/DL (ref 70–99)
HCT VFR BLD CALC: 29.6 % (ref 40.5–52.5)
HEMOGLOBIN: 9.8 G/DL (ref 13.5–17.5)
LYMPHOCYTES ABSOLUTE: 1.1 K/UL (ref 1–5.1)
LYMPHOCYTES RELATIVE PERCENT: 21.7 %
MAGNESIUM: 2.1 MG/DL (ref 1.8–2.4)
MCH RBC QN AUTO: 29.8 PG (ref 26–34)
MCHC RBC AUTO-ENTMCNC: 33.1 G/DL (ref 31–36)
MCV RBC AUTO: 90 FL (ref 80–100)
MONOCYTES ABSOLUTE: 0.4 K/UL (ref 0–1.3)
MONOCYTES RELATIVE PERCENT: 7.5 %
NEUTROPHILS ABSOLUTE: 3.3 K/UL (ref 1.7–7.7)
NEUTROPHILS RELATIVE PERCENT: 64.8 %
PDW BLD-RTO: 15.6 % (ref 12.4–15.4)
PLATELET # BLD: 204 K/UL (ref 135–450)
PMV BLD AUTO: 7.6 FL (ref 5–10.5)
POTASSIUM SERPL-SCNC: 4 MMOL/L (ref 3.5–5.1)
RBC # BLD: 3.29 M/UL (ref 4.2–5.9)
SODIUM BLD-SCNC: 139 MMOL/L (ref 136–145)
WBC # BLD: 5.1 K/UL (ref 4–11)

## 2022-02-02 PROCEDURE — 70450 CT HEAD/BRAIN W/O DYE: CPT

## 2022-02-02 PROCEDURE — 6370000000 HC RX 637 (ALT 250 FOR IP): Performed by: SURGERY

## 2022-02-02 PROCEDURE — 83735 ASSAY OF MAGNESIUM: CPT

## 2022-02-02 PROCEDURE — 6370000000 HC RX 637 (ALT 250 FOR IP): Performed by: NURSE PRACTITIONER

## 2022-02-02 PROCEDURE — 2580000003 HC RX 258: Performed by: SURGERY

## 2022-02-02 PROCEDURE — APPSS45 APP SPLIT SHARED TIME 31-45 MINUTES: Performed by: PHYSICIAN ASSISTANT

## 2022-02-02 PROCEDURE — APPNB45 APP NON BILLABLE 31-45 MINUTES: Performed by: PHYSICIAN ASSISTANT

## 2022-02-02 PROCEDURE — 36415 COLL VENOUS BLD VENIPUNCTURE: CPT

## 2022-02-02 PROCEDURE — 85025 COMPLETE CBC W/AUTO DIFF WBC: CPT

## 2022-02-02 PROCEDURE — 2500000003 HC RX 250 WO HCPCS: Performed by: FAMILY MEDICINE

## 2022-02-02 PROCEDURE — 99024 POSTOP FOLLOW-UP VISIT: CPT | Performed by: PHYSICIAN ASSISTANT

## 2022-02-02 PROCEDURE — 94760 N-INVAS EAR/PLS OXIMETRY 1: CPT

## 2022-02-02 PROCEDURE — 99024 POSTOP FOLLOW-UP VISIT: CPT | Performed by: NURSE PRACTITIONER

## 2022-02-02 PROCEDURE — 97530 THERAPEUTIC ACTIVITIES: CPT

## 2022-02-02 PROCEDURE — 80048 BASIC METABOLIC PNL TOTAL CA: CPT

## 2022-02-02 PROCEDURE — 2060000000 HC ICU INTERMEDIATE R&B

## 2022-02-02 PROCEDURE — 6370000000 HC RX 637 (ALT 250 FOR IP): Performed by: INTERNAL MEDICINE

## 2022-02-02 PROCEDURE — APPNB45 APP NON BILLABLE 31-45 MINUTES: Performed by: NURSE PRACTITIONER

## 2022-02-02 RX ORDER — POLYETHYLENE GLYCOL 3350 17 G/17G
17 POWDER, FOR SOLUTION ORAL DAILY
Status: DISCONTINUED | OUTPATIENT
Start: 2022-02-02 | End: 2022-02-04 | Stop reason: HOSPADM

## 2022-02-02 RX ORDER — DOCUSATE SODIUM 100 MG/1
100 CAPSULE, LIQUID FILLED ORAL DAILY
Status: DISCONTINUED | OUTPATIENT
Start: 2022-02-02 | End: 2022-02-04 | Stop reason: HOSPADM

## 2022-02-02 RX ADMIN — Medication 6 MG: at 20:58

## 2022-02-02 RX ADMIN — SEVELAMER CARBONATE 800 MG: 800 TABLET, FILM COATED ORAL at 12:10

## 2022-02-02 RX ADMIN — ATORVASTATIN CALCIUM 80 MG: 80 TABLET, FILM COATED ORAL at 20:58

## 2022-02-02 RX ADMIN — TAMSULOSIN HYDROCHLORIDE 0.4 MG: 0.4 CAPSULE ORAL at 10:51

## 2022-02-02 RX ADMIN — SODIUM CHLORIDE, PRESERVATIVE FREE 10 ML: 5 INJECTION INTRAVENOUS at 20:58

## 2022-02-02 RX ADMIN — SODIUM CHLORIDE, PRESERVATIVE FREE 5 ML: 5 INJECTION INTRAVENOUS at 12:15

## 2022-02-02 RX ADMIN — ASPIRIN 81 MG 81 MG: 81 TABLET ORAL at 10:51

## 2022-02-02 RX ADMIN — OLANZAPINE 5 MG: 10 INJECTION, POWDER, FOR SOLUTION INTRAMUSCULAR at 08:45

## 2022-02-02 RX ADMIN — SEVELAMER CARBONATE 800 MG: 800 TABLET, FILM COATED ORAL at 18:17

## 2022-02-02 RX ADMIN — POLYETHYLENE GLYCOL 3350 17 G: 17 POWDER, FOR SOLUTION ORAL at 10:51

## 2022-02-02 RX ADMIN — DOCUSATE SODIUM 100 MG: 100 CAPSULE ORAL at 12:10

## 2022-02-02 RX ADMIN — PANTOPRAZOLE SODIUM 20 MG: 20 TABLET, DELAYED RELEASE ORAL at 20:58

## 2022-02-02 ASSESSMENT — PAIN SCALES - GENERAL
PAINLEVEL_OUTOF10: 0
PAINLEVEL_OUTOF10: 0

## 2022-02-02 NOTE — PLAN OF CARE
Problem: Falls - Risk of:  Goal: Will remain free from falls  Description: Will remain free from falls  2/2/2022 0320 by Crystal Temple RN  Outcome: Ongoing  Note: Bed alarm on, bed in lowest position, wheels locked. Fall risk assessment completed every shift. Nonskid socks on, fall sign posted. Pt educated on use of call light. Problem: Skin Integrity:  Goal: Will show no infection signs and symptoms  Description: Will show no infection signs and symptoms  2/2/2022 0320 by Crystal Temple RN  Outcome: Ongoing  Note: Assessing dario scale Q shift. Performing skin assessments every shift. Maintaining dry and clean skin. Promoting adequate nutritional intake. Heels elevated off bed. Performing skin care q shift. Utilizing lift equipment when indicated. Problem: Pain:  Goal: Pain level will decrease  Description: Pain level will decrease  2/2/2022 0320 by Crystal Temple RN  Outcome: Ongoing  Note: Assessing pain using appropriate pain rating scale q shift and PRN. Medicating pt as prescribed and indicated. Offering pt non-pharmacologic pain interventions. Reassessing pain and pts response to pain intervention.

## 2022-02-02 NOTE — PLAN OF CARE
Problem: Nutrition  Goal: Optimal nutrition therapy  Outcome: Ongoing     Nutrition Problem #1: Increased nutrient needs  Intervention: Food and/or Nutrient Delivery: Continue Current Diet,Continue Oral Nutrition Supplement  Nutritional Goals: Continued intake >50%

## 2022-02-02 NOTE — PROGRESS NOTES
[facility]                                               Progress note    Summary:   Rocío Stevenson is being seen by nephrology for ESRD. This is a 76 yo man with ESRD on HD three times weekly via left AVF who is here after a fall and AMS. He has been diagnosed with colon cancer and has been having rectal bleeding off and on for the past several weeks. From [facility]. Had [facility] last month so has been dialyzing at a different dialysis unit for 20 day period. Last HD on Thursday this week. No rectal bleeding since being here at [place]. GI has no plans for him. Daughter is at bedside. Apparently her father and mother both had a fall yesterday prompting the ED visit. Interval History  Seen and examined in his room   He is having constipation. No chest pain no SOB.   s/p sigmoid colectomy 1/26/2022  Last Post HD weight 96.7 kilos  AVF + thrill and bruit. Had dialysis yesterday     Labs and vitals reviewed. Plan:   - HD tomorrow per TTS  - BP acceptable. Appears euvolemic.  - oncology ok with ELENA on dialysis. on retacrit 20 units TIW. Deepak Galvan MD  [facility]  Office: [phone]    Assessment:   ESRD  Does dialysis Monday Wednesday Friday usually but has been doing TTS at the [facility] unit near [facility]  He has a left AV fistula, positive thrill and bruit  Last at dialysis was on Thursday 1/14     Electrolytes  No acute issues.     Hypertension  Blood pressure is acceptable. No changes.      S HPT  Calcium ok  Check phosphorus     GI bleed  Has known colon cancer  No active bleeding  Hemoglobin stable  General surgery consulted.      Altered mental status  MRI showed small acute stroke  Neurology consulted  Mental status has improved. ROS:   Positives Listed Bold. All other remaining systems are negative.     Constitutional:  fever, chills, weakness, weight change, fatigue,      Skin:  rash, pruritus, hair loss, bruising, dry skin, petechiae.   Head, Face, Neck   headaches, swelling,  cervical adenopathy.     Respiratory: shortness of breath, cough, or wheezing  Cardiovascular: chest pain, palpitations, dizzy, edema  Gastrointestinal: nausea, vomiting, diarrhea, constipation,belly pain    Yellow skin, blood in stool  Musculoskeletal:  back pain, muscle weakness, gait problems,       joint pain or swelling. Genitourinary:  dysuria, poor urine flow, flank pain, blood in urine  Neurologic:  vertigo, TIA'S, syncope, seizures, focal weakness  Psychosocial:  insomnia, anxiety, or depression. Additional positive findings: -     PMH:   Past medical history, surgical history, social history, family history are reviewed and updated as appropriate. Reviewed current medication list.   Allergies reviewed and updated as needed. PE:   Vitals:    02/02/22 1126   BP: 138/80   Pulse: 76   Resp: 20   Temp: 97.3 °F (36.3 °C)   SpO2: 95%       General appearance: Male in no acute distress,awake and alert. HEENT: no icterus, EOM intact, trachea midline. Neck : no masses, appears symmetrical and no JVD appreciated. Respiratory: Respiratory effort normal, bilateral equal chest rise. Cardiovascular: Ausculation shows RRR and no edema   Abdomen: abdomen is soft, non distended  Musculoskeletal:  no joint swelling, no deformity  Skin: no rashes, no induration, no tightening, no jaundice   Neuro:   Follows commands, moves all extremities spontaneously   Left AV fistula positive thrill and bruit      Lab Results   Component Value Date    CREATININE 5.1 (HH) 02/02/2022    BUN 23 (H) 02/02/2022     02/02/2022    K 4.0 02/02/2022    CL 99 02/02/2022    CO2 27 02/02/2022      Lab Results   Component Value Date    WBC 5.1 02/02/2022    HGB 9.8 (L) 02/02/2022    HCT 29.6 (L) 02/02/2022    MCV 90.0 02/02/2022     02/02/2022     Lab Results   Component Value Date    CALCIUM 8.5 02/02/2022

## 2022-02-02 NOTE — PROGRESS NOTES
General and Vascular Surgery                                                           Daily Progress Note                                                             Wesley Leslie PA-C     Pt Name: Rosa Elena Ardon  Medical Record Number: [id number]  Date of Birth 1945   Today's Date: 2/2/2022    ASSESSMENT/PLAN  Sigmoid colon cancer              POD 7 from colectomy              Stable, still with some confusion. Improved from yesterday              Having some GI function, +flatulence, but no BM yet              Tolerating regular diet. Denies any nausea or emesis   Incision site looks good. Healing well. Minimal incisional pain. PT/OT   Awaiting for GI function to improve   HD yesterday    EDUCATION  Patient educated about their illness/diagnosis, stated above, and all questions answered. We discussed the importance of nutrition, medications they are taking, and healthy lifestyle. Stevie Aguilera has slightly improved from yesterday. Pain is well controlled. He has no nausea and no vomiting. He has passed flatus and has not had a bowel movement. He is tolerating full liquid diet. OBJECTIVE  VITALS:  height is 6' 5\" (1.956 m) and weight is 212 lb 8.4 oz (96.4 kg). His oral temperature is 97.6 °F (36.4 °C). His blood pressure is 166/83 (abnormal) and his pulse is 101. His respiration is 16 and oxygen saturation is 95%. VITALS:  BP (!) 166/83   Pulse 101   Temp 97.6 °F (36.4 °C) (Oral)   Resp 16   Ht 6' 5\" (1.956 m)   Wt 212 lb 8.4 oz (96.4 kg) Comment: Pt Refused standing scle  SpO2 95%   BMI 25.20 kg/m²   GENERAL: some confusion, alert, no distress  ABDOMEN: non-distended and tenderness present- minimal incisional,  without rebound and guarding  I/O last 3 completed shifts: In: 1120 [P.O.:720]  Out: 2450 [Urine:50]  No intake/output data recorded.     LABS  Recent Labs     02/02/22  0504   WBC 5.1   HGB 9.8*   HCT 29.6*       K 4.0   CL 99   CO2 27   BUN 23*   CREATININE 5.1*   MG 2.10   CALCIUM 8.5     CBC:   Lab Results   Component Value Date    WBC 5.1 02/02/2022    RBC 3.29 02/02/2022    HGB 9.8 02/02/2022    HCT 29.6 02/02/2022    MCV 90.0 02/02/2022    MCH 29.8 02/02/2022    MCHC 33.1 02/02/2022    RDW 15.6 02/02/2022     02/02/2022    MPV 7.6 02/02/2022     CMP:    Lab Results   Component Value Date     02/02/2022    K 4.0 02/02/2022    CL 99 02/02/2022    CO2 27 02/02/2022    BUN 23 02/02/2022    CREATININE 5.1 02/02/2022    GFRAA 13 02/02/2022    GFRAA 35 07/26/2011    AGRATIO 0.8 01/13/2022    LABGLOM 11 02/02/2022    GLUCOSE 130 02/02/2022    PROT 6.6 01/13/2022    PROT 7.5 07/26/2011    LABALBU 3.0 01/13/2022    CALCIUM 8.5 02/02/2022    BILITOT 0.3 01/13/2022    ALKPHOS 84 01/13/2022    AST 9 01/13/2022    ALT 7 01/13/2022         Lolly Ordaz PA-C  Electronically signed 2/2/2022 at 10:56 AM    As above  Stable overall  Awaiting return of GI function  Add Miralax    Electronically signed by Aneta Cross MD on 2/2/2022 at 1:28 PM

## 2022-02-02 NOTE — PROGRESS NOTES
Comprehensive Nutrition Assessment    Type and Reason for Visit:  Reassess    Nutrition Recommendations/Plan:   Low Fat diet, monitor need for renal/carb restrictions   Ensure Clear TID  Will monitor nutritional adequacy, nutrition-related labs, weights, BMs, and clinical progress     Nutrition Assessment:  Follow-up. Pt on Low Fat diet with Ensure Clear TID. Is accepting some po and is taking supplement. Family bringing in some food. Pt with slow return of GI function, no BM yet. Continue current interventions. Malnutrition Assessment:  Malnutrition Status:  No malnutrition    Context:  Chronic Illness     Findings of the 6 clinical characteristics of malnutrition:  Energy Intake:  No significant decrease in energy intake  Weight Loss:  No significant weight loss     Body Fat Loss:  No significant body fat loss     Muscle Mass Loss:  No significant muscle mass loss    Fluid Accumulation:  1 - Mild Extremities   Strength:  Not Performed    Estimated Daily Nutrient Needs:  Energy (kcal):  6774-5950 (30-35 x  kg - 500 for overwt status); Weight Used for Energy Requirements:  Current     Protein (g):  120-140 (1.2-1.4 x  kg); Weight Used for Protein Requirements:           Fluid (ml/day):  per provider; Method Used for Fluid Requirements:         Nutrition Related Findings:  Reviewed labs      Wounds:  Surgical Incision (noted no issues with abd SI)       Current Nutrition Therapies:    ADULT ORAL NUTRITION SUPPLEMENT; Breakfast, Lunch, Dinner; Clear Liquid Oral Supplement  ADULT DIET; Regular; Low Fat (less than or equal to 50 gm/day)    Anthropometric Measures:  · Height: 6' 5\" (195.6 cm)  · Current Body Weight: 212 lb (96.2 kg)   · Admission Body Weight: 244 lb 11.4 oz (111 kg)    · Ideal Body Weight: 208 lbs; % Ideal Body Weight 106.3 %   · BMI: 25.1  · Adjusted Body Weight:  ; No Adjustment   · BMI Categories: Overweight (BMI 25.0-29. 9)       Nutrition Diagnosis:   · Increased nutrient

## 2022-02-02 NOTE — PROGRESS NOTES
Physical Therapy  Facility/Department: [facility] 5W PROGRESSIVE CARE  Daily Treatment Note  NAME: Kaleb Mooney  :   MRN: [id number]    Date of Service: 2022    Discharge Recommendations:  3-5 sessions per week   PT Equipment Recommendations  Other: will continue to assess  Kaleb Mooney scored a 16 on the AM-PAC short mobility form. Current research shows that an AM-PAC score of 17 or less is typically not associated with a discharge to the patient's home setting. Based on the patient's AM-PAC score and their current functional mobility deficits, it is recommended that the patient have 3-5 sessions per week of Physical Therapy at d/c to increase the patient's independence. Please see assessment section for further patient specific details. If patient discharges prior to next session this note will serve as a discharge summary. Please see below for the latest assessment towards goals. Assessment   Body structures, Functions, Activity limitations: Decreased functional mobility ; Decreased strength;Decreased safe awareness;Decreased endurance;Decreased balance  Assessment: 76 y/o male admit 2022 with LESLEE, GI Bleed; Weakness. 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 Surg consult (recent dx Colon Ca ~ 1 month ago; await surg). Recent dx Pneumonia, COVID+ (cont weak/falls). PMH as noted including CKD (HD), COVID+. PTA pt living with wife in mobile home with ramp access; independent daily care and functional mobility although recent cont weak following Pneumonia/COVID.  s/p open sigmoid resection with coloproctostomy. Pt currently is more unsteady, needing more assist for mobility tasks and his an elevated fall risk. Today pt very lethargic and reporting nausea, having difficulty keeping eyes open during session and answering questions.   Recommend 3-5x/wk therapy to address deficits to allow pt to regain his max potential  REQUIRES PT FOLLOW UP: Yes  Activity Tolerance  Activity Tolerance: Patient limited by fatigue;Patient limited by endurance     Patient Diagnosis(es): The primary encounter diagnosis was LESLEE (acute kidney injury) ([facility] Diagnoses of Gastrointestinal hemorrhage, unspecified gastrointestinal hemorrhage type, COVID, Acute cystitis without hematuria, and Malignant neoplasm of colon, unspecified part of colon ([facility] were also pertinent to this visit. has a past medical history of Arthritis, Cancer ([facility] Diabetes mellitus ([facility] Hemodialysis patient ([facility] and Hypertension. has a past surgical history that includes IR PERC ARTERIOVENOUS FISTULA CREATION (Left) and colectomy (N/A, 1/26/2022). Restrictions  Restrictions/Precautions  Restrictions/Precautions: Fall Risk  Position Activity Restriction  Other position/activity restrictions: low fat diet; HD  Subjective   General  Chart Reviewed: Yes  Additional Pertinent Hx: 78 y/o male admit 1/13/2022 with LESLEE, GI Bleed; Weakness. 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 Surg consult (recent dx Colon Ca ~ 1 month ago; await surg). Recent dx Pneumonia, COVID+ (cont weak/falls). PMH as noted including CKD (HD), COVID+. 1-26 s/p  open sigmoid resection with coloproctostomy  Response To Previous Treatment: Patient with no complaints from previous session. Family / Caregiver Present: No  Referring Practitioner: Dr. Mary Boyd. Subjective  Subjective: pt in bed, difficult to arouse, pt reports feeling nauseous, quickly falls back asleep, has difficulty keeping eyes open and answering questions.  Vitals taken: BP = 118/72, O2 = 94% HR = 73  General Comment  Comments: denies  Vital Signs  Pulse: 73  BP: 118/72  Oxygen Therapy  SpO2: 94 %       Orientation     Cognition      Objective   Bed mobility  Comment: mod to max A to reposition in bed with pt minimally participatory at this time     AM-PAC Score  AM-PAC Inpatient Mobility Raw Score : 17 (01/20/22 4995)  AM-PAC Inpatient T-Scale Score : 42.13 (01/20/22 1553)  Mobility Inpatient CMS 0-100% Score: 50.57 (01/20/22 1553)  Mobility Inpatient CMS G-Code Modifier : CK (01/20/22 1553)     Goals  Short term goals  Time Frame for Short term goals: Upon d/c acute care setting. (goals ongoing)  Short term goal 1: Bed Mob SBA. Short term goal 2: Transfers with assist device SBA. Short term goal 3: Amb with assist device 25-50' SBA/CGA. met 1-20: new goal amb 100' with wh walker SBA  Short term goal 4: Pt participating in approp Strength Exs. Patient Goals   Patient goals : Get stronger and return home. Plan    Plan  Times per week: 3-5x week while in acute care setting.   Current Treatment Recommendations: Strengthening,Functional Mobility Training,Transfer Training,Gait Training,Safety Education & Training,Patient/Caregiver Education & Training  Safety Devices  Type of devices: Bed alarm in place,Call light within reach,Left in bed,Nurse notified  Restraints  Initially in place: No     Therapy Time   Individual Concurrent Group Co-treatment   Time In 1267         Time Out 1605         Minutes 16         Timed Code Treatment Minutes: 16 Minutes  Charges = 16 min theract  Charmain Oppenheim, [address]

## 2022-02-02 NOTE — PROGRESS NOTES
Hospitalist Progress Note      PCP: Emmett Arevalo    Date of Admission: 1/13/2022    Chief Complaint: colon cancer    Hospital Course:      Subjective: Pt seen and examined. Doing ok, still having some abdominal discomfort. Still hasn't had a bowel movement but is passing gas. Some confusion overnight. Medications:  Reviewed    Infusion Medications    sodium chloride Stopped (01/25/22 1746)     Scheduled Medications    docusate sodium  100 mg Oral Daily    epoetin davis-epbx  20,000 Units SubCUTAneous Once per day on Tue Thu Sat    aspirin  81 mg Oral Daily    atorvastatin  80 mg Oral Nightly    [Held by provider] gabapentin  100 mg Oral TID    pantoprazole  20 mg Oral Nightly    sevelamer  800 mg Oral TID WC    tamsulosin  0.4 mg Oral QAM    sodium chloride flush  5-40 mL IntraVENous 2 times per day     PRN Meds: OLANZapine, melatonin, morphine **OR** morphine, oxyCODONE **OR** oxyCODONE, lidocaine, ondansetron **OR** ondansetron, polyethylene glycol, sodium chloride flush, sodium chloride, acetaminophen **OR** [DISCONTINUED] acetaminophen, perflutren lipid microspheres      Intake/Output Summary (Last 24 hours) at 2/2/2022 1057  Last data filed at 2/2/2022 0651  Gross per 24 hour   Intake 480 ml   Output 50 ml   Net 430 ml       Exam:    BP (!) 166/83   Pulse 101   Temp 97.6 °F (36.4 °C) (Oral)   Resp 16   Ht 6' 5\" (1.956 m)   Wt 212 lb 8.4 oz (96.4 kg) Comment: Pt Refused standing scle  SpO2 95%   BMI 25.20 kg/m²     General appearance: No apparent distress, appears stated age and cooperative. HEENT: Pupils equal, round, and reactive to light. Conjunctivae/corneas clear. Neck: Supple, with full range of motion. No jugular venous distention. Trachea midline. Respiratory:  Normal respiratory effort. Clear to auscultation, bilaterally without Rales/Wheezes/Rhonchi. Cardiovascular: Regular rate and rhythm with normal S1/S2 without murmurs, rubs or gallops.   Abdomen: Soft, post-op abdominal tenderness, non-distended with normal bowel sounds. Musculoskeletal: No clubbing, cyanosis or edema bilaterally. Full range of motion without deformity. Skin: Skin color, texture, turgor normal.  No rashes or lesions. Neurologic:  Neurovascularly intact without any focal sensory/motor deficits. Cranial nerves: II-XII intact, grossly non-focal.  Psychiatric: Alert and oriented, thought content appropriate, normal insight  Capillary Refill: Brisk,< 3 seconds   Peripheral Pulses: +2 palpable, equal bilaterally       Labs:   Recent Labs     01/31/22  0431 02/01/22  0356 02/02/22  0504   WBC 5.4 5.7 5.1   HGB 8.8* 9.0* 9.8*   HCT 26.2* 26.9* 29.6*    175 204     Recent Labs     01/31/22  0431 02/01/22  0356 02/02/22  0504    136 139   K 4.2 3.7 4.0    96* 99   CO2 26 24 27   BUN 33* 41* 23*   CREATININE 5.8* 6.6* 5.1*   CALCIUM 8.2* 8.5 8.5     No results for input(s): AST, ALT, BILIDIR, BILITOT, ALKPHOS in the last 72 hours. No results for input(s): INR in the last 72 hours. No results for input(s): Kaitlin Divine in the last 72 hours. Urinalysis:      Lab Results   Component Value Date    NITRU Negative 01/21/2022    WBCUA >900 01/21/2022    BACTERIA 2+ 01/21/2022    RBCUA 32 01/21/2022    BLOODU LARGE 01/21/2022    SPECGRAV 1.019 01/21/2022    GLUCOSEU Negative 01/21/2022       Radiology:  CT HEAD WO CONTRAST   Final Result   No acute intracranial abnormality. RECOMMENDATIONS:   Unavailable         CT HEAD WO CONTRAST   Final Result   Atrophy and small-vessel ischemic change. No hemorrhage or mass identified.       Paranasal sinus disease, moderate in degree, similar to prior      Small parotid nodule on the left, incidentally noted      RECOMMENDATIONS:   Unavailable         MRA neck without contrast   Final Result   No convincing flow limiting stenosis of the visualized carotid/vertebral   arteries within the limitations of this exam.         MRA head without contrast Final Result   No apparent intracranial arterial high grade stenosis, occlusion or aneurysm   head at 5 within constraints of acquisition. MRI BRAIN WO CONTRAST   Final Result   1. Punctate acute infarct in the posteromedial left parietal lobe, within the   deep white matter. 2. 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 RECOMMENDATIONS:   Unavailable         US RENAL COMPLETE   Final Result   1. Simple cysts in the left kidney with no other significant renal finding. 2. Borderline enlargement of the prostate. Correlate with urologic history. CT CHEST ABDOMEN PELVIS WO CONTRAST   Final Result   No evidence of acute intrathoracic, intraabdominal or intrapelvic injury. Multifocal airspace disease. This pattern may reflect COVID pneumonia. Correlate with COVID testing. CT HEAD WO CONTRAST   Final Result   No acute intracranial abnormality. Specifically, no acute intracranial   hemorrhage or mass effect. Extensive chronic small vessel ischemic disease. Assessment/Plan:    Active Hospital Problems    Diagnosis Date Noted    Malignant neoplasm of colon ([address] [C18.9]     LESLEE (acute kidney injury) ([address] [N17.9]     Acute CVA (cerebrovascular accident) ([address] [I63.9] 01/16/2022    GI bleed [K92.2] 01/14/2022    History of COVID-19 [Z86.16] 01/14/2022    Hyponatremia [E87.1] 01/14/2022    Fatigue [R53.83] 01/14/2022    Acute kidney injury superimposed on CKD ([address] [N17.9, N18.9] 01/14/2022    Lethargy [R53.83] 01/14/2022    Suspected UTI [R39.89] 01/14/2022     Acute encephalopathy secondary to CVA with underlying cognitive impairment.   -MRI of the brain showed punctate acute infarct in the posterior medial left parietal lobe  MRA of the head and neck showed no flow-limiting stenosis  - avoid hypotension during surgery  -will check CT Head without contrast given ongoing encephalopathy    Colonic mass:  History of colon cancer/GI bleed/acute blood loss anemia  -Hemoglobin remained stable, evaluated by GI, tolerating aspirin  -s/p sigmoid colectomy, sigmoidoscopy, coloproctostomy 1/26  - waiting for return of bowel function  - advance to general diet  - start bowel regimen     Enterococcus faecalis UTI  -s/p IV Vanc x 5 days    Delirium - multifactorial due to surgery, prolonged hospitalization  -CT Head without contrast is negative  -check U/A and urine culture     End-stage renal failure, on hemodialysis  -[place] to [place] to [place], nephrologist is assisting with finding a location for his outpatient HD.     Hypomagnesemia  Resolved     Hypokalemia  Resolved        Recent hospitalization for COVID  Currently on room air.        Obesity Body mass index is 28.9 kg/m². Complicating assessment and treatment. Placing patient at risk for multiple co-morbidities as well as early death and contributing to the patient's presentation. DVT Prophylaxis: lovenox  Diet: ADULT ORAL NUTRITION SUPPLEMENT; Breakfast, Lunch, Dinner; Clear Liquid Oral Supplement  ADULT DIET;  Regular; Low Fat (less than or equal to 50 gm/day)  Code Status: Full Code    PT/OT Eval Status: SNF    Dispo - PCU, can D/C after return of bowel function    Corin Cornell MD

## 2022-02-02 NOTE — PLAN OF CARE
Patient free from falls this shift. Fall precautions in place at all times. Bed in lowest position with two side rails up and wheels locked. Call light within reach. Patient able and agreeable to contact for safety appropriately. Skin assessment performed each shift per protocol. Patient turned and repositioned every two hours and prn with pillow support. Patient checked for incontence every two hours. Problem: HEMODYNAMIC STATUS  Goal: Patient has stable vital signs and fluid balance  Outcome: Ongoing     Problem: ACTIVITY INTOLERANCE/IMPAIRED MOBILITY  Goal: Mobility/activity is maintained at optimum level for patient  Outcome: Ongoing     Problem: Urinary Elimination:  Goal: Signs and symptoms of infection will decrease  Description: Signs and symptoms of infection will decrease  Outcome: Ongoing  Goal: Ability to reestablish a normal urinary elimination pattern will improve - after catheter removal  Description: Ability to reestablish a normal urinary elimination pattern will improve  Outcome: Ongoing  Goal: Complications related to the disease process, condition or treatment will be avoided or minimized  Description: Complications related to the disease process, condition or treatment will be avoided or minimized  Outcome: Ongoing     Problem: Sensory:  Goal: General experience of comfort will improve  Description: General experience of comfort will improve  Outcome: Ongoing   Pt able to express presence/absence of pain and rate pain appropriately using numerical scale. Pain/discomfort being managed with PRN analgesics per MD orders (see MAR). Pain assessed every shift and after interventions.

## 2022-02-03 LAB
ANION GAP SERPL CALCULATED.3IONS-SCNC: 14 MMOL/L (ref 3–16)
BASOPHILS ABSOLUTE: 0 K/UL (ref 0–0.2)
BASOPHILS RELATIVE PERCENT: 0.5 %
BUN BLDV-MCNC: 31 MG/DL (ref 7–20)
CALCIUM SERPL-MCNC: 8.4 MG/DL (ref 8.3–10.6)
CHLORIDE BLD-SCNC: 97 MMOL/L (ref 99–110)
CO2: 25 MMOL/L (ref 21–32)
CREAT SERPL-MCNC: 6.6 MG/DL (ref 0.8–1.3)
EOSINOPHILS ABSOLUTE: 0.2 K/UL (ref 0–0.6)
EOSINOPHILS RELATIVE PERCENT: 3.9 %
GFR AFRICAN AMERICAN: 10
GFR NON-AFRICAN AMERICAN: 8
GLUCOSE BLD-MCNC: 114 MG/DL (ref 70–99)
HCT VFR BLD CALC: 28.3 % (ref 40.5–52.5)
HEMOGLOBIN: 9.5 G/DL (ref 13.5–17.5)
LYMPHOCYTES ABSOLUTE: 1.2 K/UL (ref 1–5.1)
LYMPHOCYTES RELATIVE PERCENT: 20.6 %
MAGNESIUM: 2 MG/DL (ref 1.8–2.4)
MCH RBC QN AUTO: 30 PG (ref 26–34)
MCHC RBC AUTO-ENTMCNC: 33.5 G/DL (ref 31–36)
MCV RBC AUTO: 89.5 FL (ref 80–100)
MONOCYTES ABSOLUTE: 0.5 K/UL (ref 0–1.3)
MONOCYTES RELATIVE PERCENT: 8.2 %
NEUTROPHILS ABSOLUTE: 4 K/UL (ref 1.7–7.7)
NEUTROPHILS RELATIVE PERCENT: 66.8 %
PDW BLD-RTO: 15.7 % (ref 12.4–15.4)
PLATELET # BLD: 213 K/UL (ref 135–450)
PMV BLD AUTO: 7.4 FL (ref 5–10.5)
POTASSIUM SERPL-SCNC: 4.2 MMOL/L (ref 3.5–5.1)
RBC # BLD: 3.16 M/UL (ref 4.2–5.9)
SODIUM BLD-SCNC: 136 MMOL/L (ref 136–145)
WBC # BLD: 6 K/UL (ref 4–11)

## 2022-02-03 PROCEDURE — 94761 N-INVAS EAR/PLS OXIMETRY MLT: CPT

## 2022-02-03 PROCEDURE — 99024 POSTOP FOLLOW-UP VISIT: CPT | Performed by: NURSE PRACTITIONER

## 2022-02-03 PROCEDURE — 80048 BASIC METABOLIC PNL TOTAL CA: CPT

## 2022-02-03 PROCEDURE — 36415 COLL VENOUS BLD VENIPUNCTURE: CPT

## 2022-02-03 PROCEDURE — 6360000002 HC RX W HCPCS: Performed by: INTERNAL MEDICINE

## 2022-02-03 PROCEDURE — 2060000000 HC ICU INTERMEDIATE R&B

## 2022-02-03 PROCEDURE — 6360000002 HC RX W HCPCS: Performed by: SURGERY

## 2022-02-03 PROCEDURE — 6370000000 HC RX 637 (ALT 250 FOR IP): Performed by: SURGERY

## 2022-02-03 PROCEDURE — 97535 SELF CARE MNGMENT TRAINING: CPT

## 2022-02-03 PROCEDURE — 90935 HEMODIALYSIS ONE EVALUATION: CPT

## 2022-02-03 PROCEDURE — 6370000000 HC RX 637 (ALT 250 FOR IP): Performed by: INTERNAL MEDICINE

## 2022-02-03 PROCEDURE — 85025 COMPLETE CBC W/AUTO DIFF WBC: CPT

## 2022-02-03 PROCEDURE — 2580000003 HC RX 258: Performed by: SURGERY

## 2022-02-03 PROCEDURE — APPSS45 APP SPLIT SHARED TIME 31-45 MINUTES: Performed by: NURSE PRACTITIONER

## 2022-02-03 PROCEDURE — 99024 POSTOP FOLLOW-UP VISIT: CPT | Performed by: SURGERY

## 2022-02-03 PROCEDURE — 97530 THERAPEUTIC ACTIVITIES: CPT

## 2022-02-03 PROCEDURE — 83735 ASSAY OF MAGNESIUM: CPT

## 2022-02-03 PROCEDURE — APPNB45 APP NON BILLABLE 31-45 MINUTES: Performed by: NURSE PRACTITIONER

## 2022-02-03 RX ORDER — LORAZEPAM 0.5 MG/1
0.5 TABLET ORAL 2 TIMES DAILY
Status: DISCONTINUED | OUTPATIENT
Start: 2022-02-03 | End: 2022-02-04 | Stop reason: HOSPADM

## 2022-02-03 RX ADMIN — DOCUSATE SODIUM 100 MG: 100 CAPSULE ORAL at 12:27

## 2022-02-03 RX ADMIN — ASPIRIN 81 MG 81 MG: 81 TABLET ORAL at 12:28

## 2022-02-03 RX ADMIN — MORPHINE SULFATE 2 MG: 2 INJECTION, SOLUTION INTRAMUSCULAR; INTRAVENOUS at 17:48

## 2022-02-03 RX ADMIN — SEVELAMER CARBONATE 800 MG: 800 TABLET, FILM COATED ORAL at 12:27

## 2022-02-03 RX ADMIN — LORAZEPAM 0.5 MG: 0.5 TABLET ORAL at 13:45

## 2022-02-03 RX ADMIN — EPOETIN ALFA-EPBX 20000 UNITS: 20000 INJECTION, SOLUTION INTRAVENOUS; SUBCUTANEOUS at 11:20

## 2022-02-03 RX ADMIN — TAMSULOSIN HYDROCHLORIDE 0.4 MG: 0.4 CAPSULE ORAL at 12:27

## 2022-02-03 RX ADMIN — POLYETHYLENE GLYCOL 3350 17 G: 17 POWDER, FOR SOLUTION ORAL at 12:27

## 2022-02-03 RX ADMIN — SODIUM CHLORIDE, PRESERVATIVE FREE 10 ML: 5 INJECTION INTRAVENOUS at 12:28

## 2022-02-03 RX ADMIN — ONDANSETRON 4 MG: 2 INJECTION INTRAMUSCULAR; INTRAVENOUS at 12:52

## 2022-02-03 ASSESSMENT — PAIN SCALES - GENERAL
PAINLEVEL_OUTOF10: 0
PAINLEVEL_OUTOF10: 4

## 2022-02-03 ASSESSMENT — PAIN DESCRIPTION - PAIN TYPE: TYPE: SURGICAL PAIN

## 2022-02-03 ASSESSMENT — PAIN SCALES - WONG BAKER: WONGBAKER_NUMERICALRESPONSE: 0

## 2022-02-03 ASSESSMENT — PAIN DESCRIPTION - LOCATION: LOCATION: ABDOMEN

## 2022-02-03 NOTE — PROGRESS NOTES
S/P bowel resection on 1/26 pt had large soft bowel movement this evening for the first time since procedure. Will continue with plan of care.   Electronically signed by Mali Boggs RN on 2/2/2022 at 9:56 PM

## 2022-02-03 NOTE — PLAN OF CARE
Problem: Falls - Risk of:  Goal: Will remain free from falls  Description: Will remain free from falls  2/3/2022 0145 by Lamberto Muir RN  Outcome: Ongoing  Note: Bed alarm on, bed in lowest position, wheels locked. Fall risk assessment completed every shift. Nonskid socks on, fall sign posted. Pt educated on use of call light. Problem: Skin Integrity:  Goal: Will show no infection signs and symptoms  Description: Will show no infection signs and symptoms  2/3/2022 0145 by Lamberto Muir RN  Outcome: Ongoing  Note: Assessing dario scale Q shift. Performing skin assessments every shift. Maintaining dry and clean skin. Promoting adequate nutritional intake. Heels elevated off bed. Performing skin care q shift. Utilizing lift equipment when indicated. Problem: Pain:  Goal: Pain level will decrease  Description: Pain level will decrease  2/3/2022 0145 by Lamberto Muir RN  Outcome: Ongoing  Note: Assessing pain using appropriate pain rating scale q shift and PRN. Medicating pt as prescribed and indicated. Offering pt non-pharmacologic pain interventions. Reassessing pain and pts response to pain intervention. Problem: Nutrition  Goal: Optimal nutrition therapy  2/3/2022 0145 by Lamberto Muir RN  Outcome: Ongoing  Note: Encouraging pt to eat at least 50% of all meals. Assisting with feeding when needed. collaborating with dietician for optimal nutrition.

## 2022-02-03 NOTE — PROGRESS NOTES
Pt had one episode of emesis - contents strictly water. PRN Zofran administered per eMAR. Pt reports relief. Pt requesting water - he was encouraged to drink small sips of water.      Electronically signed by Carina Bermudez RN on 2/3/2022 at 1:05 PM

## 2022-02-03 NOTE — PROGRESS NOTES
General and Vascular Surgery                                                           Daily Progress Note                                                                 Pt Name: Chad Lemon  Medical Record Number: [id number]  Date of Birth 1945   Today's Date: 2/3/2022    ASSESSMENT/PLAN  Sigmoid colon cancer              POD 8 from colectomy 1/26/2022. Tolerating regular diet. Denies any nausea or emesis   Incision site looks good. Healing well. Minimal incisional pain. PT/OT   Dispo planning. Okay for discharge from surgical standpoint when medically cleared. Follow-up in 1 week for staple removal.    EDUCATION  Patient educated about their illness/diagnosis, stated above, and all questions answered. We discussed the importance of nutrition, medications they are taking, and healthy lifestyle. Darrelyn Mcburney is being seen while on dialysis. Less confused. However, got a little agitated when he could not remember the name of the facility he will be going to at discharge. Family had a bowel movement. Tolerating diet. OBJECTIVE  VITALS:  height is 6' 5\" (1.956 m) and weight is 212 lb 1.3 oz (96.2 kg). His temperature is 98 °F (36.7 °C). His blood pressure is 119/68 and his pulse is 79. His respiration is 18 and oxygen saturation is 96%. VITALS:  /68   Pulse 79   Temp 98 °F (36.7 °C)   Resp 18   Ht 6' 5\" (1.956 m)   Wt 212 lb 1.3 oz (96.2 kg)   SpO2 96%   BMI 25.15 kg/m²   GENERAL:  alert, no distress  ABDOMEN: Soft, minimal incisional tenderness. Nondistended. Incision with staples, clean dry and intact. I/O last 3 completed shifts:   In: 720 [P.O.:720]  Out: -   I/O this shift:  In: 240 [P.O.:240]  Out: -     LABS  Recent Labs     02/03/22  0432   WBC 6.0   HGB 9.5*   HCT 28.3*         K 4.2   CL 97*   CO2 25   BUN 31*   CREATININE 6.6*   MG 2.00   CALCIUM 8.4     CBC:   Lab Results Component Value Date    WBC 6.0 02/03/2022    RBC 3.16 02/03/2022    HGB 9.5 02/03/2022    HCT 28.3 02/03/2022    MCV 89.5 02/03/2022    MCH 30.0 02/03/2022    MCHC 33.5 02/03/2022    RDW 15.7 02/03/2022     02/03/2022    MPV 7.4 02/03/2022     CMP:    Lab Results   Component Value Date     02/03/2022    K 4.2 02/03/2022    CL 97 02/03/2022    CO2 25 02/03/2022    BUN 31 02/03/2022    CREATININE 6.6 02/03/2022    GFRAA 10 02/03/2022    GFRAA 35 07/26/2011    AGRATIO 0.8 01/13/2022    LABGLOM 8 02/03/2022    GLUCOSE 114 02/03/2022    PROT 6.6 01/13/2022    PROT 7.5 07/26/2011    LABALBU 3.0 01/13/2022    CALCIUM 8.4 02/03/2022    BILITOT 0.3 01/13/2022    ALKPHOS 84 01/13/2022    AST 9 01/13/2022    ALT 7 01/13/2022         IRINA Dodson - CNP  Electronically signed 2/3/2022 at 11:23 AM    As above  Stable overall  BM noted  Tolerating PO  Ok for discharge from my standpoint    Electronically signed by Leonarda Del Rosario MD on 2/3/2022 at 12:20 PM

## 2022-02-03 NOTE — PROGRESS NOTES
Occupational Therapy  [facility]  Daily Treatment Note  NAME: Elfego Torres  : [id number]  MRN: [id number]    Date of Service: 2/3/2022    Discharge Recommendations:  Patient would benefit from continued therapy after discharge,3-5 sessions per week     Elfego Torres scored a 16/24 on the AM-PAC ADL Inpatient form. Current research shows that an AM-PAC score of 17 or less is typically not associated with a discharge to the patient's home setting. Based on the patient's AM-PAC score and their current ADL deficits, it is recommended that the patient have 3-5 sessions per week of Occupational Therapy at d/c to increase the patient's independence. Please see assessment section for further patient specific details. If patient discharges prior to next session this note will serve as a discharge summary. Please see below for the latest assessment towards goals. Assessment   Performance deficits / Impairments: Decreased functional mobility ; Decreased safe awareness;Decreased balance;Decreased ADL status; Decreased cognition;Decreased high-level IADLs;Decreased endurance;Decreased strength  Assessment: Discussed with OTR: Pt lethargic for majority of session today. Pt needing up to Max A for bed mob, Max A for ADL's and Min/Mod A x1, cues for safe transfers using RW. Pt is functioning below his baseline and would benefit from cont OT at [address] at d/c prior to returning home. Cont poc. Treatment Diagnosis: impaired ADLs, t/f  Prognosis: Good  History: 76 y/o male admit 2022 with LESLEE, GI Bleed; Weakness. 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 Surg consult (recent dx Colon Ca ~ 1 month ago; udnerwent open sigmoid resection with coloproctostomy on 22. Recent dx Pneumonia, COVID+ (cont weak/falls). PTA pt lives at home with spouse and was independent with ADLs and functional mobility (RW only for long distances).   OT Education: OT Role;Transfer Training;Plan of Care;ADL Adaptive Strategies  REQUIRES OT FOLLOW UP: Yes  Activity Tolerance  Activity Tolerance: Treatment limited secondary to decreased cognition;Patient limited by fatigue  Activity Tolerance: Lethargic for majority of tx. Safety Devices  Safety Devices in place: Yes  Type of devices: Gait belt (pt to dialysis per transport)         Patient Diagnosis(es): The primary encounter diagnosis was LESLEE (acute kidney injury) ([facility] [address] Diagnoses of Gastrointestinal hemorrhage, unspecified gastrointestinal hemorrhage type, COVID, Acute cystitis without hematuria, and Malignant neoplasm of colon, unspecified part of colon ([address] were also pertinent to this visit. has a past medical history of Arthritis, Cancer ([facility] [address] Diabetes mellitus ([facility] [address] Hemodialysis patient ([facility] [address] and Hypertension. has a past surgical history that includes IR PERC ARTERIOVENOUS FISTULA CREATION (Left) and colectomy (N/A, 1/26/2022). Restrictions  Restrictions/Precautions  Restrictions/Precautions: Fall Risk  Position Activity Restriction  Other position/activity restrictions: low fat diet; HD  Subjective   General  Chart Reviewed: Trip Vargas  Patient assessed for rehabilitation services?: Yes  Additional Pertinent Hx: 76 y/o male admit 1/13/2022 with LESLEE, GI Bleed; Weakness. 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 Surg consult (recent dx Colon Ca ~ 1 month ago; underwent  open sigmoid resection with coloproctostomy on 1/26/22. Recent dx Pneumonia, COVID+ (cont weak/falls). PMH as noted including CKD (HD), COVID+. Family / Caregiver Present: Yes (daughter)  Referring Practitioner: Dr Jonatan Flores  Diagnosis: CVA  Subjective  Subjective: Pt in bed upon arrival to room. Pt very lethargic, difficulty waking up. Per daughter, pt received melatonin. Pt gradually increased alertness as tx progressed.   General Comment  Comments: ok to see per RN      Orientation  Orientation  Overall Orientation Status: Impaired  Orientation Level: Oriented to person (very lethargic this date)  Objective    ADL  Grooming: Maximum assistance;Verbal cueing (difficulty washing face with bath wipe,d/t lethargic)  UE Bathing: Moderate assistance; Increased time to complete;Verbal cueing (difficulty completing d/t lethargic)  LE Bathing: Maximum assistance (buttocks, kelly area while in stance at walker at EOB. LE's not washed)  UE Dressing: Moderate assistance;Minimal assistance (monitor gown changed)  LE Dressing: Maximum assistance (briefs donned, hospital pants changed)  Toileting: Unable to assess(comment)  Additional Comments: Pt lethargic for bathing task, seated at EOB. Balance  Sitting Balance: Contact guard assistance, x20 min at EOB (initial CGA at EOB d/t lethargy, to SBA as increased in alertness.)  Standing Balance  Time: 1 min or less  Activity: Several bouts of standing at walker at EOB, with CGA. Pt increased in alertness by end of tx to amb from bed to stretcher (for dialysis), with RW and Min A cues. Wheelchair Bed Transfers  Wheelchair/Bed - Technique: Ambulating  Equipment Used: Bed (stretcher)  Level of Asssistance: Minimal assistance; Moderate assistance  Wheelchair Transfers Comments: RW, cues for safe hand and body placement. Bed mobility  Supine to Sit: Moderate assistance;Maximum assistance (x1; HOB slightly raised. Pt lethargic)  Sit to Supine: Minimal assistance (for LE's-onto stretcher)  Transfers  Sit to stand: Minimal assistance; Moderate assistance  Stand to sit: Minimal assistance  Transfer Comments: to/from walker at EOB, 4 times, for ADL's and transfer to stretcher, for dialysis. Pt lethargic, needing increased cues, assist for safe hand placement. Cognition  Overall Cognitive Status: Exceptions  Arousal/Alertness: Delayed responses to stimuli  Following Commands: Follows one step commands with repetition; Follows one step commands with increased time  Attention Span: Difficulty attending to directions  Memory: Decreased recall of recent events;Decreased short term memory  Safety Judgement: Decreased awareness of need for safety;Decreased awareness of need for assistance  Problem Solving: Assistance required to generate solutions  Insights: Decreased awareness of deficits  Initiation: Requires cues for all  Sequencing: Requires cues for all  Cognition Comment: lethargic for majority of tx          Plan   Plan  Times per week: 3-5  Current Treatment Recommendations: Strengthening,Endurance Training,Balance Training,Gait Training,Functional Mobility Training,Self-Care / ADL    AM-PAC Score        AM-PAC Inpatient Daily Activity Raw Score: 16 (02/03/22 0816)  AM-PAC Inpatient ADL T-Scale Score : 35.96 (02/03/22 0816)  ADL Inpatient CMS 0-100% Score: 53.32 (02/03/22 0816)  ADL Inpatient CMS G-Code Modifier : CK (02/03/22 0816)    Goals  Short term goals  Time Frame for Short term goals: Prior to DC.  Status: goals ongoing  Short term goal 1: Pt will complete ADL transfers with supervision  Short term goal 2: Pt will complete functional mobility with supervision  Short term goal 3: Pt will tolerate standing > 5 min for functional task with supervision  Short term goal 4: Pt will complete toileting with supervision  Short term goal 5: Pt will complete LB dressing with supervision  Patient Goals   Patient goals : to return home       Therapy Time   Individual Concurrent Group Co-treatment   Time In 0745         Time Out 0823         Minutes [address],515

## 2022-02-03 NOTE — PROGRESS NOTES
Pt continues to cough and poss aspirate on water throughout the day. Pt unable to follow directions at this time and is becoming more agitated. He continuously is asking for water, and has a water bottle in his hands that he refuses to hand over. He has become physically and verbally violent regarding water/water bottle. Dr Gabe Yu notified of new NPO order d/t aspiration. Also requested a SLP eval. Awaiting response.     Electronically signed by Asia Pride RN on 2/3/2022 at 4:07 PM

## 2022-02-03 NOTE — FLOWSHEET NOTE
Treatment time:3hours  Net UF: 1900 ml     Pre weight: 96.2 kg (standing)  Post weight: 94.4 kg (standing)  EDW: 110 kg     Access used: LFA AVF  Access function: Good with  ml/min     Medications or blood products given: Retacrit     Regular outpatient schedule: [address], TTS     Summary of response to treatment: Tolerated tx well. No HD related complaints or complications.  System clotted x1 , but blood was able to be returned, and tx was resumed after new system was set up.      Copy of dialysis treatment record placed in chart, to be scanned into EMR.       02/03/22 0822 02/03/22 1142   Treatment   Time On  --  0829   Time Off  --  1137   Vital Signs   /68 136/84   Temp 98 °F (36.7 °C) 98.2 °F (36.8 °C)   Pulse 79 93   Resp 18 18

## 2022-02-03 NOTE — PROGRESS NOTES
Hospitalist Progress Note      PCP: Bryan Baron    Date of Admission: 1/13/2022    Chief Complaint: colon cancer    Hospital Course:      Subjective: Pt seen and examined. Had large bowel movement. Mentation improved. Medications:  Reviewed    Infusion Medications    sodium chloride Stopped (01/25/22 1746)     Scheduled Medications    LORazepam  0.5 mg Oral BID    docusate sodium  100 mg Oral Daily    polyethylene glycol  17 g Oral Daily    epoetin davis-epbx  20,000 Units SubCUTAneous Once per day on Tue Thu Sat    aspirin  81 mg Oral Daily    atorvastatin  80 mg Oral Nightly    gabapentin  100 mg Oral TID    pantoprazole  20 mg Oral Nightly    sevelamer  800 mg Oral TID WC    tamsulosin  0.4 mg Oral QAM    sodium chloride flush  5-40 mL IntraVENous 2 times per day     PRN Meds: OLANZapine, melatonin, morphine **OR** morphine, oxyCODONE **OR** oxyCODONE, lidocaine, ondansetron **OR** ondansetron, polyethylene glycol, sodium chloride flush, sodium chloride, acetaminophen **OR** [DISCONTINUED] acetaminophen, perflutren lipid microspheres      Intake/Output Summary (Last 24 hours) at 2/3/2022 1321  Last data filed at 2/3/2022 1229  Gross per 24 hour   Intake 1940 ml   Output 3000 ml   Net -1060 ml       Exam:    /81   Pulse 91   Temp 98.1 °F (36.7 °C) (Oral)   Resp 18   Ht 6' 5\" (1.956 m)   Wt 208 lb 1.8 oz (94.4 kg)   SpO2 96%   BMI 24.68 kg/m²     General appearance: No apparent distress, appears stated age and cooperative. HEENT: Pupils equal, round, and reactive to light. Conjunctivae/corneas clear. Neck: Supple, with full range of motion. No jugular venous distention. Trachea midline. Respiratory:  Normal respiratory effort. Clear to auscultation, bilaterally without Rales/Wheezes/Rhonchi. Cardiovascular: Regular rate and rhythm with normal S1/S2 without murmurs, rubs or gallops.   Abdomen: Soft, post-op abdominal tenderness, non-distended with normal bowel sounds. Musculoskeletal: No clubbing, cyanosis or edema bilaterally. Full range of motion without deformity. Skin: Skin color, texture, turgor normal.  No rashes or lesions. Neurologic:  Neurovascularly intact without any focal sensory/motor deficits. Cranial nerves: II-XII intact, grossly non-focal.  Psychiatric: Alert and oriented, thought content appropriate, normal insight  Capillary Refill: Brisk,< 3 seconds   Peripheral Pulses: +2 palpable, equal bilaterally       Labs:   Recent Labs     02/01/22  0356 02/02/22  0504 02/03/22  0432   WBC 5.7 5.1 6.0   HGB 9.0* 9.8* 9.5*   HCT 26.9* 29.6* 28.3*    204 213     Recent Labs     02/01/22  0356 02/02/22  0504 02/03/22 0432    139 136   K 3.7 4.0 4.2   CL 96* 99 97*   CO2 24 27 25   BUN 41* 23* 31*   CREATININE 6.6* 5.1* 6.6*   CALCIUM 8.5 8.5 8.4     No results for input(s): AST, ALT, BILIDIR, BILITOT, ALKPHOS in the last 72 hours. No results for input(s): INR in the last 72 hours. No results for input(s): Johnathon Ortega in the last 72 hours. Urinalysis:      Lab Results   Component Value Date    NITRU Negative 01/21/2022    WBCUA >900 01/21/2022    BACTERIA 2+ 01/21/2022    RBCUA 32 01/21/2022    BLOODU LARGE 01/21/2022    SPECGRAV 1.019 01/21/2022    GLUCOSEU Negative 01/21/2022       Radiology:  CT HEAD WO CONTRAST   Final Result   No acute intracranial abnormality. RECOMMENDATIONS:   Unavailable         CT HEAD WO CONTRAST   Final Result   Atrophy and small-vessel ischemic change. No hemorrhage or mass identified.       Paranasal sinus disease, moderate in degree, similar to prior      Small parotid nodule on the left, incidentally noted      RECOMMENDATIONS:   Unavailable         MRA neck without contrast   Final Result   No convincing flow limiting stenosis of the visualized carotid/vertebral   arteries within the limitations of this exam.         MRA head without contrast   Final Result   No apparent intracranial arterial high grade stenosis, occlusion or aneurysm   head at 5 within constraints of acquisition. MRI BRAIN WO CONTRAST   Final Result   1. Punctate acute infarct in the posteromedial left parietal lobe, within the   deep white matter. 2. 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 RECOMMENDATIONS:   Unavailable         US RENAL COMPLETE   Final Result   1. Simple cysts in the left kidney with no other significant renal finding. 2. Borderline enlargement of the prostate. Correlate with urologic history. CT CHEST ABDOMEN PELVIS WO CONTRAST   Final Result   No evidence of acute intrathoracic, intraabdominal or intrapelvic injury. Multifocal airspace disease. This pattern may reflect COVID pneumonia. Correlate with COVID testing. CT HEAD WO CONTRAST   Final Result   No acute intracranial abnormality. Specifically, no acute intracranial   hemorrhage or mass effect. Extensive chronic small vessel ischemic disease. Assessment/Plan:    Active Hospital Problems    Diagnosis Date Noted    Malignant neoplasm of colon ([facility] [address] [C18.9]     LESLEE (acute kidney injury) ([address] [N17.9]     Acute CVA (cerebrovascular accident) ([facility] [address] [I63.9] 01/16/2022    GI bleed [K92.2] 01/14/2022    History of COVID-19 [Z86.16] 01/14/2022    Hyponatremia [E87.1] 01/14/2022    Fatigue [R53.83] 01/14/2022    Acute kidney injury superimposed on CKD ([address] [N17.9, N18.9] 01/14/2022    Lethargy [R53.83] 01/14/2022    Suspected UTI [R39.89] 01/14/2022     Acute encephalopathy secondary to CVA with underlying cognitive impairment.   -MRI of the brain showed punctate acute infarct in the posterior medial left parietal lobe  MRA of the head and neck showed no flow-limiting stenosis  - avoid hypotension during surgery  -repeat CT Head without contrast showed no acute intracranial abnormality    Colonic mass:  History of colon cancer/GI bleed/acute blood loss anemia  -Hemoglobin remained stable, evaluated by GI, tolerating aspirin  -s/p sigmoid colectomy, sigmoidoscopy, coloproctostomy 1/26  - advance to general diet  - continue bowel regimen     Enterococcus faecalis UTI  -s/p IV Vanc x 5 days    Delirium - multifactorial due to surgery, prolonged hospitalization, improved  -CT Head without contrast is negative     End-stage renal failure, on hemodialysis  -[place], nephrologist is assisting with finding a location for his outpatient HD.     Hypomagnesemia  Resolved     Hypokalemia  Resolved        Recent hospitalization for COVID  Currently on room air.        Obesity Body mass index is 28.9 kg/m². Complicating assessment and treatment. Placing patient at risk for multiple co-morbidities as well as early death and contributing to the patient's presentation. DVT Prophylaxis: lovenox  Diet: ADULT ORAL NUTRITION SUPPLEMENT; Breakfast, Lunch, Dinner; Clear Liquid Oral Supplement  ADULT DIET;  Regular; Low Fat (less than or equal to 50 gm/day)  Code Status: Full Code    PT/OT Eval Status: SNF    Dispo - PCU, hopeful D/C to SNF tomorrow    Clementine Vicente MD

## 2022-02-04 VITALS
DIASTOLIC BLOOD PRESSURE: 66 MMHG | TEMPERATURE: 98.2 F | HEIGHT: 77 IN | SYSTOLIC BLOOD PRESSURE: 111 MMHG | HEART RATE: 88 BPM | BODY MASS INDEX: 24.57 KG/M2 | OXYGEN SATURATION: 92 % | RESPIRATION RATE: 18 BRPM | WEIGHT: 208.11 LBS

## 2022-02-04 LAB
ANION GAP SERPL CALCULATED.3IONS-SCNC: 13 MMOL/L (ref 3–16)
BASOPHILS ABSOLUTE: 0 K/UL (ref 0–0.2)
BASOPHILS RELATIVE PERCENT: 0.4 %
BUN BLDV-MCNC: 24 MG/DL (ref 7–20)
CALCIUM SERPL-MCNC: 8.5 MG/DL (ref 8.3–10.6)
CHLORIDE BLD-SCNC: 98 MMOL/L (ref 99–110)
CO2: 25 MMOL/L (ref 21–32)
CREAT SERPL-MCNC: 5.1 MG/DL (ref 0.8–1.3)
EOSINOPHILS ABSOLUTE: 0.2 K/UL (ref 0–0.6)
EOSINOPHILS RELATIVE PERCENT: 2.2 %
GFR AFRICAN AMERICAN: 13
GFR NON-AFRICAN AMERICAN: 11
GLUCOSE BLD-MCNC: 137 MG/DL (ref 70–99)
HCT VFR BLD CALC: 28.8 % (ref 40.5–52.5)
HEMOGLOBIN: 9.5 G/DL (ref 13.5–17.5)
LYMPHOCYTES ABSOLUTE: 1.2 K/UL (ref 1–5.1)
LYMPHOCYTES RELATIVE PERCENT: 15.7 %
MAGNESIUM: 2.1 MG/DL (ref 1.8–2.4)
MCH RBC QN AUTO: 30.2 PG (ref 26–34)
MCHC RBC AUTO-ENTMCNC: 33 G/DL (ref 31–36)
MCV RBC AUTO: 91.7 FL (ref 80–100)
MONOCYTES ABSOLUTE: 0.9 K/UL (ref 0–1.3)
MONOCYTES RELATIVE PERCENT: 11.7 %
NEUTROPHILS ABSOLUTE: 5.2 K/UL (ref 1.7–7.7)
NEUTROPHILS RELATIVE PERCENT: 70 %
PDW BLD-RTO: 15.7 % (ref 12.4–15.4)
PLATELET # BLD: 198 K/UL (ref 135–450)
PMV BLD AUTO: 7.6 FL (ref 5–10.5)
POTASSIUM SERPL-SCNC: 4.9 MMOL/L (ref 3.5–5.1)
RBC # BLD: 3.14 M/UL (ref 4.2–5.9)
SODIUM BLD-SCNC: 136 MMOL/L (ref 136–145)
WBC # BLD: 7.4 K/UL (ref 4–11)

## 2022-02-04 PROCEDURE — APPNB45 APP NON BILLABLE 31-45 MINUTES: Performed by: NURSE PRACTITIONER

## 2022-02-04 PROCEDURE — 97116 GAIT TRAINING THERAPY: CPT

## 2022-02-04 PROCEDURE — 97530 THERAPEUTIC ACTIVITIES: CPT

## 2022-02-04 PROCEDURE — 2580000003 HC RX 258: Performed by: SURGERY

## 2022-02-04 PROCEDURE — 92610 EVALUATE SWALLOWING FUNCTION: CPT

## 2022-02-04 PROCEDURE — 6370000000 HC RX 637 (ALT 250 FOR IP): Performed by: INTERNAL MEDICINE

## 2022-02-04 PROCEDURE — 36415 COLL VENOUS BLD VENIPUNCTURE: CPT

## 2022-02-04 PROCEDURE — 94761 N-INVAS EAR/PLS OXIMETRY MLT: CPT

## 2022-02-04 PROCEDURE — 99024 POSTOP FOLLOW-UP VISIT: CPT | Performed by: NURSE PRACTITIONER

## 2022-02-04 PROCEDURE — 6370000000 HC RX 637 (ALT 250 FOR IP): Performed by: SURGERY

## 2022-02-04 PROCEDURE — 99024 POSTOP FOLLOW-UP VISIT: CPT | Performed by: SURGERY

## 2022-02-04 PROCEDURE — 80048 BASIC METABOLIC PNL TOTAL CA: CPT

## 2022-02-04 PROCEDURE — 83735 ASSAY OF MAGNESIUM: CPT

## 2022-02-04 PROCEDURE — 85025 COMPLETE CBC W/AUTO DIFF WBC: CPT

## 2022-02-04 RX ORDER — LORAZEPAM 0.5 MG/1
0.5 TABLET ORAL 2 TIMES DAILY
Qty: 6 TABLET | Refills: 0 | Status: SHIPPED | OUTPATIENT
Start: 2022-02-04 | End: 2022-02-07

## 2022-02-04 RX ORDER — LANOLIN ALCOHOL/MO/W.PET/CERES
6 CREAM (GRAM) TOPICAL NIGHTLY PRN
Qty: 6 TABLET | Refills: 0 | Status: SHIPPED | OUTPATIENT
Start: 2022-02-04

## 2022-02-04 RX ORDER — OXYCODONE HYDROCHLORIDE 5 MG/1
5 TABLET ORAL EVERY 4 HOURS PRN
Qty: 9 TABLET | Refills: 0 | Status: ON HOLD | OUTPATIENT
Start: 2022-02-04 | End: 2022-02-09 | Stop reason: HOSPADM

## 2022-02-04 RX ORDER — DOCUSATE SODIUM 100 MG/1
100 CAPSULE, LIQUID FILLED ORAL DAILY
Qty: 7 CAPSULE | Refills: 0 | Status: ON HOLD | OUTPATIENT
Start: 2022-02-05 | End: 2022-02-16 | Stop reason: HOSPADM

## 2022-02-04 RX ORDER — POLYETHYLENE GLYCOL 3350 17 G/17G
17 POWDER, FOR SOLUTION ORAL DAILY PRN
Qty: 30 EACH | Refills: 0 | Status: SHIPPED | OUTPATIENT
Start: 2022-02-04 | End: 2022-03-06

## 2022-02-04 RX ADMIN — SODIUM CHLORIDE, PRESERVATIVE FREE 10 ML: 5 INJECTION INTRAVENOUS at 04:12

## 2022-02-04 RX ADMIN — SODIUM CHLORIDE, PRESERVATIVE FREE 10 ML: 5 INJECTION INTRAVENOUS at 08:36

## 2022-02-04 RX ADMIN — TAMSULOSIN HYDROCHLORIDE 0.4 MG: 0.4 CAPSULE ORAL at 08:36

## 2022-02-04 RX ADMIN — ASPIRIN 81 MG 81 MG: 81 TABLET ORAL at 08:36

## 2022-02-04 RX ADMIN — POLYETHYLENE GLYCOL 3350 17 G: 17 POWDER, FOR SOLUTION ORAL at 08:36

## 2022-02-04 RX ADMIN — SEVELAMER CARBONATE 800 MG: 800 TABLET, FILM COATED ORAL at 12:07

## 2022-02-04 RX ADMIN — DOCUSATE SODIUM 100 MG: 100 CAPSULE ORAL at 08:36

## 2022-02-04 RX ADMIN — GABAPENTIN 100 MG: 100 CAPSULE ORAL at 08:36

## 2022-02-04 RX ADMIN — LORAZEPAM 0.5 MG: 0.5 TABLET ORAL at 08:36

## 2022-02-04 RX ADMIN — SEVELAMER CARBONATE 800 MG: 800 TABLET, FILM COATED ORAL at 08:35

## 2022-02-04 ASSESSMENT — PAIN SCALES - GENERAL
PAINLEVEL_OUTOF10: 0

## 2022-02-04 ASSESSMENT — PAIN SCALES - WONG BAKER
WONGBAKER_NUMERICALRESPONSE: 0
WONGBAKER_NUMERICALRESPONSE: 0

## 2022-02-04 NOTE — PROGRESS NOTES
Occupational Therapy  Facility/Department: [facility] 5W PROGRESSIVE CARE  Daily Treatment Note  NAME: Leopold Drown  :   MRN: [id number]    Date of Service: 2022    Discharge Recommendations:  Patient would benefit from continued therapy after discharge,3-5 sessions per week  OT Equipment Recommendations  Other: defer to MI facility    Leopold Drown scored a 16/24 on the AM-PAC ADL Inpatient form. Current research shows that an AM-PAC score of 17 or less is typically not associated with a discharge to the patient's home setting. Based on the patient's AM-PAC score and their current ADL deficits, it is recommended that the patient have 3-5 sessions per week of Occupational Therapy at d/c to increase the patient's independence. Please see assessment section for further patient specific details. If patient discharges prior to next session this note will serve as a discharge summary. Please see below for the latest assessment towards goals. Assessment   Performance deficits / Impairments: Decreased functional mobility ; Decreased safe awareness;Decreased balance;Decreased ADL status; Decreased cognition;Decreased high-level IADLs;Decreased endurance;Decreased strength  Assessment: Pt tolerated session well today and was more alert than in past days. Required encouragment from daughter and therapist for participation. Pt required CGA/SBA for bed mob with mod A x2 for scooting in bed. Min A for fxl mob/transfers with RW. Pt declined ADL tasks this date and requested to return to bed at end of session. Pt continues to be functioning below baseline and would benefit from continued skilled OT at low-mod frequency prior to returning home. Will continue to follow during acute hospitlization  OT Education: OT Role;Transfer Training;Plan of Care;ADL Adaptive Strategies; Family Education  Patient Education: d/c planning  REQUIRES OT FOLLOW UP: Yes  Activity Tolerance  Activity Tolerance: Treatment limited secondary to decreased cognition;Patient limited by fatigue;Patient Tolerated treatment well  Safety Devices  Safety Devices in place: Yes  Type of devices: Gait belt;Nurse notified; Left in bed;Bed alarm in place;Call light within reach         Patient Diagnosis(es): The primary encounter diagnosis was LESLEE (acute kidney injury) ([facility] Diagnoses of Gastrointestinal hemorrhage, unspecified gastrointestinal hemorrhage type, COVID, Acute cystitis without hematuria, and Malignant neoplasm of colon, unspecified part of colon ([facility] were also pertinent to this visit. has a past medical history of Arthritis, Cancer ([facility] Diabetes mellitus ([facility] [facility] Hemodialysis patient ([facility] and Hypertension. has a past surgical history that includes IR PERC ARTERIOVENOUS FISTULA CREATION (Left) and colectomy (N/A, 1/26/2022). Restrictions  Restrictions/Precautions  Restrictions/Precautions: Fall Risk  Position Activity Restriction  Other position/activity restrictions: low fat diet; HD  Subjective   General  Chart Reviewed: Kalin Markham  Patient assessed for rehabilitation services?: Yes  Additional Pertinent Hx: 78 y/o male admit 1/13/2022 with LESLEE, GI Bleed; Weakness. 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 Surg consult (recent dx Colon Ca ~ 1 month ago; underwent  open sigmoid resection with coloproctostomy on 1/26/22. Recent dx Pneumonia, COVID+ (cont weak/falls). PMH as noted including CKD (HD), COVID+. Family / Caregiver Present: Yes (daughter)  Referring Practitioner: Dr Eliot De La Rosa  Diagnosis: CVA  Subjective  Subjective: Pt in bed upon arrival, tired and required encouragment from therapy and daughter for participation. Pt more alert than in past two days per chart review.   General Comment  Comments: ok to see per RN         Objective    ADL  Feeding: Setup  Grooming:  (Declined grooming tasks this date)  Toileting:  (Declined toileting needs)        Balance  Sitting Balance: Stand by assistance  Standing Balance: Contact guard assistance (CGA)  Functional Mobility  Functional - Mobility Device: Rolling Walker  Activity: Other (laps in room)  Assist Level: Contact guard assistance  Functional Mobility Comments: CGA with RW + min verbal cues for walker management  Bed mobility  Supine to Sit: Contact guard assistance (HOB elevated)  Sit to Supine: Stand by assistance  Scooting: Moderate assistance;2 Person assistance  Transfers  Sit to stand: Minimal assistance  Stand to sit: Minimal assistance  Transfer Comments: to/from RW; Cues for hand placement; posterior lean initially        Cognition  Overall Cognitive Status: Exceptions  Arousal/Alertness: Delayed responses to stimuli  Following Commands: Follows one step commands with repetition; Follows one step commands with increased time  Attention Span: Difficulty attending to directions  Memory: Decreased recall of recent events;Decreased short term memory  Safety Judgement: Decreased awareness of need for safety;Decreased awareness of need for assistance  Problem Solving: Assistance required to generate solutions  Insights: Decreased awareness of deficits  Initiation: Requires cues for all  Sequencing: Requires cues for all  Cognition Comment: lethargic for majority of tx       Plan   Plan  Times per week: 3-5  Current Treatment Recommendations: Strengthening,Endurance Training,Balance Training,Gait Training,Functional Mobility Training,Self-Care / ADL    AM-PAC Score        AM-[facility] Inpatient Daily Activity Raw Score: 16 (02/04/22 0926)  AM-PAC Inpatient ADL T-Scale Score : 35.96 (02/04/22 0926)  ADL Inpatient CMS 0-100% Score: 53.32 (02/04/22 0926)  ADL Inpatient CMS G-Code Modifier : CK (02/04/22 0926)    Goals  Short term goals  Time Frame for Short term goals: Prior to DC.  Status: goals ongoing  Short term goal 1: Pt will complete ADL transfers with supervision  Short term goal 2: Pt will complete functional mobility with supervision  Short term goal 3: Pt will tolerate standing > 5 min for functional task with supervision  Short term goal 4: Pt will complete toileting with supervision  Short term goal 5: Pt will complete LB dressing with supervision  Patient Goals   Patient goals : to return home       Therapy Time   Individual Concurrent Group Co-treatment   Time In 0900         Time Out 0925         Minutes 25         Timed Code Treatment Minutes: Via Duncan Boogie, OT   Electronically signed by JAK Greenfield/ABIDA  License # [id number]

## 2022-02-04 NOTE — PROGRESS NOTES
Report attempted to be called again to [facility]. Report unable to be given. RN stated transport is here for transport of pt and callback number given again if facility wants report. IV removed. Telemonitor removed and returned to [facility]. Report given to transportation. Pt stable for discharge. Janeen, daughter, called and updated on transportation to HD tomorrow and pt being discharged. Pt transported off of unit with all belongings without complications.  Electronically signed by Anais Hill RN on 2/4/2022 at 2:26 PM No

## 2022-02-04 NOTE — PROGRESS NOTES
Jaden from [facility] called back for report. Report given.  Electronically signed by Shima Woods RN on 2/4/2022 at 2:50 PM

## 2022-02-04 NOTE — DISCHARGE SUMMARY
Hospital Medicine Discharge Summary    Patient ID: Salima Garcia      Patient's PCP: Fifi Bowling    Admit Date: 1/13/2022     Discharge Date:   2/4/2022    Admitting Physician: Ravin Landry DO     Discharge Physician: Neha Harper MD     Discharge Diagnoses: Active Hospital Problems    Diagnosis Date Noted    Malignant neoplasm of colon ([facility] [C18.9]     LESLEE (acute kidney injury) ([facility] [N17.9]     Acute CVA (cerebrovascular accident) ([facility] [I63.9] 01/16/2022    GI bleed [K92.2] 01/14/2022    History of COVID-19 [Z86.16] 01/14/2022    Hyponatremia [E87.1] 01/14/2022    Fatigue [R53.83] 01/14/2022    Acute kidney injury superimposed on CKD ([facility] [N17.9, N18.9] 01/14/2022    Lethargy [R53.83] 01/14/2022    Suspected UTI [R39.89] 01/14/2022       The patient was seen and examined on day of discharge and this discharge summary is in conjunction with any daily progress note from day of discharge. Hospital Course: The patient is a 77 y.o. male w/ uncertain past medical hx but possibly was just hospitalized and recovered from [facility] a few weeks ago in [place] who presents to [facility] possibly from home for progressive worsening fatigue and somnolence that he reports has been going on for at least a week. Patient is somnolent at this time and difficult to clarify full history. He admits that he has not been eating or drinking much however but unable to quantify. He denies other symptoms of fever chills or shortness of breath. Unable to fully obtain further review of systems questions but he denies any focal weakness or vision changes. At this time he just feels as he is very sleepy. Unable to confirm any further chronic illnesses at this time however. Acute encephalopathy secondary to CVA with underlying cognitive impairment.   -MRI of the brain showed punctate acute infarct in the posterior medial left parietal lobe  MRA of the head and neck showed no flow-limiting stenosis  - avoid hypotension during surgery  -repeat CT Head without contrast showed no acute intracranial abnormality     Colonic mass:  History of colon cancer/GI bleed/acute blood loss anemia  -Hemoglobin remained stable, evaluated by GI, tolerating aspirin  -s/p sigmoid colectomy, sigmoidoscopy, coloproctostomy 1/26  - advance to general diet and tolerated, having bowel movements now  - continue bowel regimen at discharge     Enterococcus faecalis UTI  -s/p IV Vanc x 5 days     Delirium - multifactorial due to surgery, prolonged hospitalization, improved  -CT Head without contrast is negative     End-stage renal failure, on hemodialysis  -[place], nephrologist is assisting with finding a location for his outpatient HD.     Hypomagnesemia  Resolved     Hypokalemia  Resolved       Recent hospitalization for COVID  Currently on room air.     Obesity Body mass index is 92.7 kg/m².   Complicating assessment and treatment. Placing patient at risk for multiple co-morbidities as well as early death and contributing to the patient's presentation.        Exam:     /74   Pulse 84   Temp 98.4 °F (36.9 °C) (Oral)   Resp 16   Ht 6' 5\" (1.956 m)   Wt 208 lb 1.8 oz (94.4 kg)   SpO2 94%   BMI 24.68 kg/m²       General appearance:  No apparent distress, appears stated age and cooperative. HEENT:  Normal cephalic, atraumatic without obvious deformity. Pupils equal, round, and reactive to light. Extra ocular muscles intact. Conjunctivae/corneas clear. Neck: Supple, with full range of motion. No jugular venous distention. Trachea midline. Respiratory:  Normal respiratory effort. Clear to auscultation, bilaterally without Rales/Wheezes/Rhonchi. Cardiovascular:  Regular rate and rhythm with normal S1/S2 without murmurs, rubs or gallops. Abdomen: Surgical scars c/d/i. Soft, non-tender, non-distended with normal bowel sounds. Musculoskeletal:  No clubbing, cyanosis or edema bilaterally. Full range of motion without deformity. Skin: Skin color, texture, turgor normal.  No rashes or lesions. Neurologic:  Neurovascularly intact without any focal sensory/motor deficits. Cranial nerves: II-XII intact, grossly non-focal.  Psychiatric:  Alert and oriented, thought content appropriate, normal insight  Capillary Refill: Brisk,< 3 seconds   Peripheral Pulses: +2 palpable, equal bilaterally       Labs: For convenience and continuity at follow-up the following most recent labs are provided:      CBC:    Lab Results   Component Value Date    WBC 7.4 02/04/2022    HGB 9.5 02/04/2022    HCT 28.8 02/04/2022     02/04/2022       Renal:    Lab Results   Component Value Date     02/04/2022    K 4.9 02/04/2022    CL 98 02/04/2022    CO2 25 02/04/2022    BUN 24 02/04/2022    CREATININE 5.1 02/04/2022    CALCIUM 8.5 02/04/2022         Significant Diagnostic Studies    Radiology:   CT HEAD WO CONTRAST   Final Result   No acute intracranial abnormality. RECOMMENDATIONS:   Unavailable         CT HEAD WO CONTRAST   Final Result   Atrophy and small-vessel ischemic change. No hemorrhage or mass identified. Paranasal sinus disease, moderate in degree, similar to prior      Small parotid nodule on the left, incidentally noted      RECOMMENDATIONS:   Unavailable         MRA neck without contrast   Final Result   No convincing flow limiting stenosis of the visualized carotid/vertebral   arteries within the limitations of this exam.         MRA head without contrast   Final Result   No apparent intracranial arterial high grade stenosis, occlusion or aneurysm   head at 5 within constraints of acquisition. MRI BRAIN WO CONTRAST   Final Result   1. Punctate acute infarct in the posteromedial left parietal lobe, within the   deep white matter. 2. 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       RECOMMENDATIONS:   Unavailable         US RENAL COMPLETE   Final Result   1. Simple cysts in the left kidney with no other significant renal finding. 2. Borderline enlargement of the prostate. Correlate with urologic history. CT CHEST ABDOMEN PELVIS WO CONTRAST   Final Result   No evidence of acute intrathoracic, intraabdominal or intrapelvic injury. Multifocal airspace disease. This pattern may reflect COVID pneumonia. Correlate with COVID testing. CT HEAD WO CONTRAST   Final Result   No acute intracranial abnormality. Specifically, no acute intracranial   hemorrhage or mass effect. Extensive chronic small vessel ischemic disease. Consults:     IP CONSULT TO GI  IP CONSULT TO NEPHROLOGY  IP CONSULT TO SOCIAL WORK  IP CONSULT TO NEUROLOGY  IP CONSULT TO GENERAL SURGERY  IP CONSULT TO ONCOLOGY    Disposition:  SNF     Condition at Discharge: Stable    Discharge Instructions/Follow-up:  meds as prescribed, follow-up with oncology, PCP    Code Status:  Full Code     Activity: activity as tolerated    Diet: regular diet      Discharge Medications:     Current Discharge Medication List           Details   oxyCODONE (ROXICODONE) 5 MG immediate release tablet Take 1 tablet by mouth every 4 hours as needed for Pain for up to 3 days. Qty: 9 tablet, Refills: 0    Comments: Reduce doses taken as pain becomes manageable  Associated Diagnoses: Malignant neoplasm of colon, unspecified part of colon (HCC)      polyethylene glycol (GLYCOLAX) 17 g packet Take 17 g by mouth daily as needed for Constipation  Qty: 30 each, Refills: 0      docusate sodium (COLACE) 100 MG capsule Take 1 capsule by mouth daily for 7 days  Qty: 7 capsule, Refills: 0      melatonin 3 MG TABS tablet Take 2 tablets by mouth nightly as needed (sleep)  Qty: 6 tablet, Refills: 0              Details   LORazepam (ATIVAN) 0.5 MG tablet Take 1 tablet by mouth 2 times daily for 3 days.   Qty: 6 tablet, Refills: 0    Associated Diagnoses: Anxiety              Details tamsulosin (FLOMAX) 0.4 MG capsule Take 0.4 mg by mouth every morning      gabapentin (NEURONTIN) 100 MG capsule Take 100 mg by mouth 3 times daily. sevelamer (RENVELA) 800 MG tablet Take 1 tablet by mouth 3 times daily (with meals)      clopidogrel (PLAVIX) 75 MG tablet Take 75 mg by mouth every evening      atenolol (TENORMIN) 25 MG tablet Take 25 mg by mouth every evening      pantoprazole (PROTONIX) 20 MG tablet Take 20 mg by mouth nightly      atorvastatin (LIPITOR) 10 MG tablet Take 10 mg by mouth nightly      calcitRIOL (ROCALTROL) 0.25 MCG capsule Take 0.25 mcg by mouth every evening      aspirin 81 MG chewable tablet Take 81 mg by mouth daily             Time Spent on discharge is more than 30 minutes in the examination, evaluation, counseling and review of medications and discharge plan. Signed:    Brandon Hussein MD   2/4/2022      Thank you Mar Alcantar for the opportunity to be involved in this patient's care. If you have any questions or concerns please feel free to contact me at [phone].

## 2022-02-04 NOTE — PROGRESS NOTES
[facility]                                               Progress note    Summary:   Salima Garcia is being seen by nephrology for ESRD. This is a 76 yo man with ESRD on HD three times weekly via left AVF who is here after a fall and AMS. He has been diagnosed with colon cancer and has been having rectal bleeding off and on for the past several weeks. From [facility]. Had [facility] last month so has been dialyzing at a different dialysis unit for 20 day period. Last HD on Thursday this week. No rectal bleeding since being here at [place]. GI has no plans for him. Daughter is at bedside. Apparently her father and mother both had a fall yesterday prompting the ED visit. Interval History  Seen and examined at bedside. Discharging today   Had dialysis yesterday no issues. Constipation issue resolved. No chest pain no SOB.   s/p sigmoid colectomy 1/26/2022  Last Post HD weight 94.4 kilos    Labs and vitals reviewed. Plan:   - HD per TTS  - BP acceptable. Appears euvolemic.  - retacrit 20 units TIW. Dry weight 94.4 kilos. Going to [address] [place] [address]  HD on TTS outpatient. I have not yet heard if [facility] accepted him yet. Need to confirm prior to discharging        Vladislav Newsome MD  [facility]  Office: [phone]    Assessment:   ESRD  Does dialysis Monday Wednesday Friday usually but has been doing TTS at the [facility] near [facility]  He has a left AV fistula, positive thrill and bruit  Last at dialysis was on Thursday 1/14     Electrolytes  No acute issues.     Hypertension  Blood pressure is acceptable. No changes.      S HPT  Calcium ok  Check phosphorus     GI bleed  Has known colon cancer  No active bleeding  Hemoglobin stable  General surgery consulted.      Altered mental status  MRI showed small acute stroke  Neurology consulted  Mental status has improved. ROS:   Positives Listed Bold.  All other remaining systems are negative.     Constitutional:  fever, chills, weakness, weight change, fatigue,      Skin:  rash, pruritus, hair loss, bruising, dry skin, petechiae. Head, Face, Neck   headaches, swelling,  cervical adenopathy.     Respiratory: shortness of breath, cough, or wheezing  Cardiovascular: chest pain, palpitations, dizzy, edema  Gastrointestinal: nausea, vomiting, diarrhea, constipation,belly pain    Yellow skin, blood in stool  Musculoskeletal:  back pain, muscle weakness, gait problems,       joint pain or swelling. Genitourinary:  dysuria, poor urine flow, flank pain, blood in urine  Neurologic:  vertigo, TIA'S, syncope, seizures, focal weakness  Psychosocial:  insomnia, anxiety, or depression. Additional positive findings: -     PMH:   Past medical history, surgical history, social history, family history are reviewed and updated as appropriate. Reviewed current medication list.   Allergies reviewed and updated as needed. PE:   Vitals:    02/04/22 1136   BP: 111/66   Pulse: 88   Resp: 18   Temp: 98.2 °F (36.8 °C)   SpO2: 92%       General appearance: Male in no acute distress,awake and alert. HEENT: no icterus, EOM intact, trachea midline. Neck : no masses, appears symmetrical and no JVD appreciated. Respiratory: Respiratory effort normal, bilateral equal chest rise. Cardiovascular: Ausculation shows RRR and no edema   Abdomen: abdomen is soft, non distended  Musculoskeletal:  no joint swelling, no deformity  Skin: no rashes, no induration, no tightening, no jaundice   Neuro:   Follows commands, moves all extremities spontaneously   Left AV fistula positive thrill and bruit      Lab Results   Component Value Date    CREATININE 5.1 (HH) 02/04/2022    BUN 24 (H) 02/04/2022     02/04/2022    K 4.9 02/04/2022    CL 98 (L) 02/04/2022    CO2 25 02/04/2022      Lab Results   Component Value Date    WBC 7.4 02/04/2022    HGB 9.5 (L) 02/04/2022    HCT 28.8 (L) 02/04/2022    MCV 91.7 02/04/2022     02/04/2022     Lab Results Component Value Date    CALCIUM 8.5 02/04/2022

## 2022-02-04 NOTE — PROGRESS NOTES
RN attempted to call report to [facility]. Callback number was given and staff stated nurse will call for report within 10 minutes.  Electronically signed by Anais Hill RN on 2/4/2022 at 12:37 PM

## 2022-02-04 NOTE — PROGRESS NOTES
Speech Language Pathology  Facility/Department: [address] CARE   CLINICAL BEDSIDE SWALLOW EVALUATION    NAME: Demetria Cornell  : 1945  MRN: [id number]    ADMISSION DATE: 2022  ADMITTING DIAGNOSIS:  GI bleed [K92.2]  LESLEE (acute kidney injury) ([facility] [N17.9]  Acute cystitis without hematuria [N30.00]  Gastrointestinal hemorrhage, unspecified gastrointestinal hemorrhage type [K92.2]  COVID [U07.1]    Demetria Cornell has GI bleed; History of COVID-19; Hyponatremia; Fatigue; Acute kidney injury superimposed on CKD ([facility] Lethargy; Suspected UTI; Acute CVA (cerebrovascular accident) ([facility] LESLEE (acute kidney injury) ([facility] and Malignant neoplasm of colon ([facility] on their problem list.    ONSET DATE:  2022    CHART REVIEW:  2022 admitted with c/o AMS and lethargy  MD ADMISSION H&P HPI: The patient is a 68 y. o. male w/ uncertain past medical hx but possibly was just hospitalized and recovered from [facility] a few weeks ago in [place] presents to [facility] from home for progressive worsening fatigue and somnolence that he reports has been going on for at least a week.  Patient is somnolent at this time and difficult to clarify full history. [facility] admits that he has not been eating or drinking much however but unable to quantify. [facility] denies other symptoms of fever chills or shortness of breath.  Unable to fully obtain further review of systems questions but he denies any focal weakness or vision changes.  At this time he just feels as he is very sleepy.  Unable to confirm any further chronic illnesses at this time however.     2022 CT CHEST  Impression   No evidence of acute intrathoracic, intraabdominal or intrapelvic injury.       Multifocal airspace disease.  This pattern may reflect COVID pneumonia. Correlate with COVID testing.      2022 MRI BRAIN  Impression   1. Punctate acute infarct in the posteromedial left parietal lobe, within the   deep white matter.    2. 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 1/26/2022 colectomy      Date of Eval: 2/4/2022  Evaluating Therapist: LEIDY Bustamante    Current Diet level:  Current Diet : NPO  Current Liquid Diet : NPO      Primary Complaint  Patient Complaint: coughing episode with water 2/3/2022    Pain:  Pain Level: 0    Reason for Referral  Gregory Sullivan was referred for a bedside swallow evaluation to assess the efficiency of his swallow function, identify signs and symptoms of aspiration and make recommendations regarding safe dietary consistencies, effective compensatory strategies, and safe eating environment. Impression  Accepted and tolerated evaluation at bedside. · Patient alert, cooperative, pleasant, Oriented to self/place/situation; decreased recall at baseline. follows dx; conversant and good-humored. · Mild oral stage dysphagia characterized by decreased mastication r/t edentulism and decreased lingual manipulation; prolonged but adequate bolus formation with textured solids. Concern for premature bolus loss to the pharynx. · Mild pharyngeal stage dysphagia characterized by delayed swallow and decreased laryngeal elevation; no overt signs/symptoms of penetration/aspiration even when challenged with serial thin liquid boluses via straw. Dysphagia Outcome Severity Scale: Level 5: Mild dysphagia- Distant supervision. May need one diet consistency restricted     Treatment Plan  Requires SLP Intervention: Yes  Duration/Frequency of Treatment: ST to follow-up 1-3 times during acute admission    Recommended Diet and Intervention  Diet Solids Recommendation: Regular  Liquid Consistency Recommendation:  Thin    Therapeutic Interventions: Diet tolerance monitoring;Patient/Family education    Compensatory Swallowing Strategies  Compensatory Swallowing Strategies: Upright as possible for all oral intake;Remain upright for 30-45 minutes after meals    Treatment/Goals  Dysphagia Goals: The patient will tolerate recommended diet without observed clinical signs of aspiration; The patient/caregiver will demonstrate understanding of compensatory strategies for improved swallowing safety. General  Chart Reviewed: Yes  Behavior/Cognition: Alert; Cooperative;Pleasant mood;Confused  Communication Observation: Functional  Follows Directions: Simple  Dentition: Edentulous  Patient Positioning: Upright in chair  Baseline Vocal Quality: Normal  Volitional Cough: Strong  Volitional Swallow: Delayed  Consistencies Administered: Dysphagia Pureed (Dysphagia I); Honey - cup;Nectar - cup; Thin - cup; Thin - straw;Dysphagia Soft and Bite-Sized (Dysphagia III); Reg solid    Vision/Hearing  Vision Exceptions: Wears glasses for reading  Hearing Exceptions: Hard of hearing/hearing concerns    Oral Motor Deficits  Oral Motor: Within functional limits    Oral Phase Dysfunction  Impaired Mastication: Soft Solid; Reg Solid (prolonged but adequate d/t edentulism)  Suspected Premature Bolus Loss: All     Indicators of Pharyngeal Phase Dysfunction  Delayed Swallow: All  Decreased Laryngeal Elevation: All  Pharyngeal: no overt signs/symptoms of penetration/aspiration even with serial large boluses via straw    Prognosis  Prognosis for safe diet advancement: good  Consulted and agree with results and recommendations: Patient;RN;Family member  Family member consulted: daughter, Vannesa Finder    Education  Patient Education: Completed on results/recs/plan  Patient Education Response: Needs reinforcement         Therapy Time  SLP Individual Minutes  Time In: 0805  Time Out: 4916  Minutes: 27      Tali Balderrama, [address] [facility], [id number]  Speech-Language Pathologist  Portable phone: [phone]  2/4/2022 8:35 AM

## 2022-02-04 NOTE — PLAN OF CARE
Problem: Falls - Risk of:  Goal: Will remain free from falls  Description: Will remain free from falls  2/4/2022 0917 by Opal Palma RN  Outcome: Ongoing   Fall risk assessment completed every shift. All precautions in place. Pt has call light within reach at all times. Room clear of clutter. Pt aware to call for assistance when getting up. Problem: Skin Integrity:  Goal: Will show no infection signs and symptoms  Description: Will show no infection signs and symptoms  2/4/2022 0917 by Opal Palma RN  Outcome: Ongoing   Skin assessment completed every shift. Pt assessed for incontinence, appropriate barrier cream applied prn. Pt encouraged to turn/rotate every 2 hours. Assistance provided if pt unable to do so themselves. Problem: HEMODYNAMIC STATUS  Goal: Patient has stable vital signs and fluid balance  2/4/2022 0917 by Opal Palma RN  Outcome: Ongoing   Vitals completed q4h and assessed by RN. I&O charted and assessed per MD order. Pt tolerating adequate fluid intake. Problem: Sensory:  Goal: General experience of comfort will improve  Description: General experience of comfort will improve  2/4/2022 0917 by Opal Palma RN  Outcome: Ongoing     Problem: Urinary Elimination:  Goal: Signs and symptoms of infection will decrease  Description: Signs and symptoms of infection will decrease  2/4/2022 0917 by Opal Palma RN  Outcome: Ongoing     Problem: Pain:  Goal: Pain level will decrease  Description: Pain level will decrease  2/4/2022 0917 by Opal Palma RN  Outcome: Ongoing   Pain/discomfort being managed with PRN analgesics per MD orders. Pt able to express presence and absence of pain and rate pain appropriately using numerical scale.

## 2022-02-04 NOTE — PROGRESS NOTES
Progress Note  HNSP:0/3/0694       Room:[place] [address]  Patient Name:Ben Castaneda     Date of Birth:[birth year]     Age:76 y.o. Subjective    Subjective:  Symptoms:  Stable. Diet:  Adequate intake. Activity level: Returning to normal.       Review of Systems  Objective         Vitals Last 24 Hours:  TEMPERATURE:  Temp  Av.2 °F (36.8 °C)  Min: 97.7 °F (36.5 °C)  Max: 98.6 °F (37 °C)  RESPIRATIONS RANGE: Resp  Av.3  Min: 16  Max: 18  PULSE OXIMETRY RANGE: SpO2  Av %  Min: 93 %  Max: 96 %  PULSE RANGE: Pulse  Av  Min: 78  Max: 98  BLOOD PRESSURE RANGE: Systolic (89VGD), LUR:258 , Min:105 , SQX:749   ; Diastolic (06TWD), YLK:73, Min:57, Max:84    I/O (24Hr): Intake/Output Summary (Last 24 hours) at 2022 1044  Last data filed at 2022 3622  Gross per 24 hour   Intake 2180 ml   Output 3000 ml   Net -820 ml     Objective  Labs/Imaging/Diagnostics    Labs:  CBC:  Recent Labs     22  0504 22  0432 22  0427   WBC 5.1 6.0 7.4   RBC 3.29* 3.16* 3.14*   HGB 9.8* 9.5* 9.5*   HCT 29.6* 28.3* 28.8*   MCV 90.0 89.5 91.7   RDW 15.6* 15.7* 15.7*    213 198     CHEMISTRIES:  Recent Labs     22  0504 22  0432 22  0427    136 136   K 4.0 4.2 4.9   CL 99 97* 98*   CO2 27 25 25   BUN 23* 31* 24*   CREATININE 5.1* 6.6* 5.1*   GLUCOSE 130* 114* 137*   MG 2.10 2.00 2.10     PT/INR:No results for input(s): PROTIME, INR in the last 72 hours. APTT:No results for input(s): APTT in the last 72 hours. LIVER PROFILE:No results for input(s): AST, ALT, BILIDIR, BILITOT, ALKPHOS in the last 72 hours. Imaging Last 24 Hours:  No results found.   Assessment//Plan           Hospital Problems           Last Modified POA    * (Principal) Acute CVA (cerebrovascular accident) ([facility] 2022 Yes    GI bleed 2022 Yes    History of COVID-19 2022 Yes    Hyponatremia 2022 Yes    Fatigue 2022 Yes    Ac[facility] superimposed on CKD [facility] 2022 Yes    Lethargy 1/14/2022 Yes    Suspected UTI 1/14/2022 Yes    LESLEE (acute kidney injury) ([facility] 1/19/2022 Yes    [facility] 1/26/2022 Yes        Assessment & Plan    Colon cancer   Stable from colectomy   Disposition planning   Ok to discharge from my standpoint    Electronically signed by Andry Zuniga MD on 2/4/2022 at 10:44 AM    Electronically signed by Andry Zuniga MD on 2/4/22 at 10:44 AM EST

## 2022-02-04 NOTE — DISCHARGE INSTR - COC
Continuity of Care Form    Patient Name: Elo Morrison   :    MRN:  [id number]    Admit date:  2022  Discharge date:  22    Code Status Order: Full Code   Advance Directives:   [facility] Documentation       Date/Time Healthcare Directive Type of Healthcare Directive Copy in [facility] [address] Agent's Name Healthcare Agent's Phone Number    22 [phone] No, patient does not have an advance directive for healthcare treatment -- -- -- -- --            Admitting Physician:  Hernán Garces DO  PCP: Dahlia Sepulveda    Discharging Nurse: [facility] Unit/Room#: [id number]/7512-24  Discharging Unit Phone Number: [phone]    Emergency Contact:   Extended Emergency Contact Information  Primary Emergency Contact: Kerline Link  [facility] [address] [place] [address], [address] [place] [address] Phone: [phone]  Relation: Spouse  Secondary Emergency Contact: Janeen Linares  Mobile Phone: [phone]  Relation: Child   needed? No    Past Surgical History:  Past Surgical History:   Procedure Laterality Date    COLECTOMY N/A 2022    SIGMOID COLECTOMY, SIGMOIDOSCOPY performed by Ron Crowley MD at [address] [place] [address]     unsure of date       Immunization History: There is no immunization history on file for this patient.     Active Problems:  Patient Active Problem List   Diagnosis Code    GI bleed K92.2    History of COVID-19 Z86.16    Hyponatremia E87.1    Fatigue R53.83    Acute kidney injury superimposed on CKD (HCC) N17.9, N18.9    Lethargy R53.83    Suspected UTI R39.89    Acute CVA (cerebrovascular accident) ([facility] I63.9    LESLEE (acute kidney injury) ([facility] N17.9    Malignant neoplasm of colon ([facility] C18.9       Isolation/Infection:   Isolation            No Isolation          Patient Infection Status       Infection Onset Added Last Indicated Last Indicated By Review Planned Expiration Resolved Resolved By    None active    Resolved    COVID-19 (Rule Out) 01/13/22 01/13/22 01/14/22 COVID-19, Rapid (Ordered)   01/14/22 Rule-Out Test Resulted            Nurse Assessment:  Last Vital Signs: /74   Pulse 84   Temp 98.4 °F (36.9 °C) (Oral)   Resp 16   Ht 6' 5\" (1.956 m)   Wt 208 lb 1.8 oz (94.4 kg)   SpO2 94%   BMI 24.68 kg/m²     Last documented pain score (0-10 scale): Pain Level: 0  Last Weight:   Wt Readings from Last 1 Encounters:   02/03/22 208 lb 1.8 oz (94.4 kg)     Mental Status:  disoriented, alert, and able to concentrate and follow conversation, intermittent periods of confusion    IV Access:  - None    Nursing Mobility/ADLs:  Walking   Assisted  Transfer  Assisted  Bathing  Assisted  Dressing  Assisted  Toileting  Assisted  Feeding  [address]  Assisted  Med Delivery   whole    Wound Care Documentation and Therapy:        Elimination:  Continence: Bowel: Yes  Bladder: Yes  Urinary Catheter: None   Colostomy/Ileostomy/Ileal Conduit: No       Date of Last BM: 2/4/22    Intake/Output Summary (Last 24 hours) at 2/4/2022 1006  Last data filed at 2/4/2022 0833  Gross per 24 hour   Intake 2180 ml   Output 3000 ml   Net -820 ml     I/O last 3 completed shifts: In: 1940 [P.O.:840]  Out: 3000     Safety Concerns:     History of Falls (last 30 days)    Impairments/Disabilities:      None    Nutrition Therapy:  Current Nutrition Therapy:   - Oral Diet:  Low Fat    Routes of Feeding: Oral  Liquids: No Restrictions  Daily Fluid Restriction: no  Last Modified Barium Swallow with Video (Video Swallowing Test): not done    Treatments at the Time of Hospital Discharge:   Respiratory Treatments:   Oxygen Therapy:  is not on home oxygen therapy.   Ventilator:    - No ventilator support    Rehab Therapies: Physical Therapy, Occupational Therapy, and Speech/Language Therapy  Weight Bearing Status/Restrictions: No weight bearing restirctions  Other Medical Equipment (for information only, NOT a DME order):  walker  Other Treatments:     Patient's personal belongings (please select all that are sent with patient):  Prashanth BUTTERFIELD SIGNATURE:  Electronically signed by Aaron Cunningham RN on 2/4/22 at 12:35 PM EST    CASE MANAGEMENT/SOCIAL WORK SECTION    Inpatient Status Date: 1/14/2022    Readmission Risk Assessment Score:  Readmission Risk              Risk of Unplanned Readmission:  25           Discharging to Facility/ Agency   Name: Genna Wallace  Address:  [address] [place] [address], [address] [place] [address]   Phone:  [phone]  Fax:  [phone]    / signature: Electronically signed by HOLLI Henderson, LSW on 2/4/22 at 10:54 AM EST    PHYSICIAN SECTION    Prognosis: Good    Condition at Discharge: Stable    Rehab Potential (if transferring to Rehab): Good    Recommended Labs or Other Treatments After Discharge: meds as prescribed, follow-up with PCP    Physician Certification: I certify the above information and transfer of Shi Nixon  is necessary for the continuing treatment of the diagnosis listed and that he requires [facility] for less 30 days.      Update Admission H&P: No change in H&P    PHYSICIAN SIGNATURE:  Electronically signed by Dinorah Uribe MD on 2/4/22 at 10:06 AM EST

## 2022-02-04 NOTE — PROGRESS NOTES
Hematology Oncology Daily Progress Note    Admit Date: 1/13/2022  Hospital day a few    Subjective:     Patient has complaints of mild to mod fatigue--denies sob/cp. Medication side effects: none    Scheduled Meds:   LORazepam  0.5 mg Oral BID    docusate sodium  100 mg Oral Daily    polyethylene glycol  17 g Oral Daily    epoetin davis-epbx  20,000 Units SubCUTAneous Once per day on Tue Thu Sat    aspirin  81 mg Oral Daily    atorvastatin  80 mg Oral Nightly    gabapentin  100 mg Oral TID    pantoprazole  20 mg Oral Nightly    sevelamer  800 mg Oral TID WC    tamsulosin  0.4 mg Oral QAM    sodium chloride flush  5-40 mL IntraVENous 2 times per day     Continuous Infusions:   sodium chloride Stopped (01/25/22 7666)     PRN Meds:OLANZapine, melatonin, morphine **OR** morphine, oxyCODONE **OR** oxyCODONE, lidocaine, ondansetron **OR** ondansetron, polyethylene glycol, sodium chloride flush, sodium chloride, acetaminophen **OR** [DISCONTINUED] acetaminophen, perflutren lipid microspheres    Review of Systems  Pertinent items are noted in HPI. REVIEW OF SYSTEMS:         · Constitutional: Denies fever, sweats, weight loss     · Eyes: No visual changes or diplopia. No scleral icterus. · ENT: No Headaches, hearing loss or vertigo. No mouth sores or sore throat. · Cardiovascular: No chest pain, dyspnea on exertion, palpitations or loss of consciousness. · Respiratory: No cough or wheezing, no sputum production. No hemoptysis. .    · Gastrointestinal: No abdominal pain, appetite loss, blood in stools. No change in bowel habits. · Genitourinary: No dysuria, trouble voiding, or hematuria. · Musculoskeletal:  Generalized weakness. No joint complaints. · Integumentary: No rash or pruritis. · Neurological: No headache, diplopia. No change in gait, balance, or coordination. No paresthesias. · Endocrine: No temperature intolerance. No excessive thirst, fluid intake, or urination.    · Hematologic/Lymphatic: No abnormal bruising or ecchymoses, blood clots or swollen lymph nodes. · Allergic/Immunologic: No nasal congestion or hives. ·     Objective:     Patient Vitals for the past 8 hrs:   BP Temp Temp src Pulse Resp SpO2   02/04/22 1136 111/66 98.2 °F (36.8 °C) Oral 88 18 92 %   02/04/22 0830 -- -- -- -- -- 94 %   02/04/22 0746 121/74 98.4 °F (36.9 °C) Oral 84 16 93 %   02/04/22 0500 105/65 97.7 °F (36.5 °C) Oral 78 18 94 %     I/O last 3 completed shifts: In: 1940 [P.O.:840]  Out: 3000   I/O this shift:   In: 18 [P.O.:480]  Out: -     /66   Pulse 88   Temp 98.2 °F (36.8 °C) (Oral)   Resp 18   Ht 6' 5\" (1.956 m)   Wt 208 lb 1.8 oz (94.4 kg)   SpO2 92%   BMI 24.68 kg/m²     General Appearance:    Alert, cooperative, no distress, appears stated age   Head:    Normocephalic, without obvious abnormality, atraumatic   Eyes:    PERRL, conjunctiva/corneas clear, EOM's intact, fundi     benign, both eyes        Ears:    Normal TM's and external ear canals, both ears   Nose:   Nares normal, septum midline, mucosa normal, no drainage    or sinus tenderness   Throat:   Lips, mucosa, and tongue normal; teeth and gums normal   Neck:   Supple, symmetrical, trachea midline, no adenopathy;        thyroid:  No enlargement/tenderness/nodules; no carotid    bruit or JVD   Back:     Symmetric, no curvature, ROM normal, no CVA tenderness   Lungs:     Clear to auscultation bilaterally, respirations unlabored   Chest wall:    No tenderness or deformity   Heart:    Regular rate and rhythm, S1 and S2 normal, no murmur, rub   or gallop   Abdomen:     Soft, non-tender, bowel sounds active all four quadrants,     no masses, no organomegaly           Extremities:   Extremities normal, atraumatic, no cyanosis or edema   Pulses:   2+ and symmetric all extremities   Skin:   Skin color, texture, turgor normal, no rashes or lesions   Lymph nodes:   Cervical, supraclavicular, and axillary nodes normal   Neurologic:   Stable     Data Review  CBC:   Lab Results   Component Value Date    WBC 7.4 02/04/2022    RBC 3.14 02/04/2022       Assessment:     Principal Problem:    Acute CVA (cerebrovascular accident) ([facility]  Active Problems:    GI bleed    History of COVID-19    Hyponatremia    Fatigue    Acute kidney injury superimposed on CKD ([facility]    Lethargy    Suspected UTI    LESLEE (acute kidney injury) ([facility]    Malignant neoplasm of colon ([facility]  Resolved Problems:    * No resolved hospital problems. *      Plan:     1. Stage 3 colon cancer. I told pt to see me in 2 to 3 weeks. Will start modified adjuvant chemo in 6 to 8 weeks. 2. UTI--ATBs    3. Metabolic encephalopathy---resolved    4.  ESRD--on HD        Electronically signed by Jaiden Simeon MD on 2/4/2022 at 12:21 PM

## 2022-02-04 NOTE — PROGRESS NOTES
Physical Therapy  Facility/Department: [address] PROGRESSIVE CARE  Daily Treatment Note  NAME: Rocío Stevenson  : 1945  MRN: [id number]    Date of Service: 2022    Discharge Recommendations:  3-5 sessions per week,Patient would benefit from continued therapy after discharge   PT Equipment Recommendations  Equipment Needed: No  Other: defer to next level of care     Rocío Stevenson scored a 16/24 on the AM-PAC short mobility form. Current research shows that an AM-PAC score of 17 or less is typically not associated with a discharge to the patient's home setting. Based on the patient's AM-PAC score and their current functional mobility deficits, it is recommended that the patient have 3-5 sessions per week of Physical Therapy at d/c to increase the patient's independence. Please see assessment section for further patient specific details. If patient discharges prior to next session this note will serve as a discharge summary. Please see below for the latest assessment towards goals. Assessment   Body structures, Functions, Activity limitations: Decreased functional mobility ; Decreased strength;Decreased safe awareness;Decreased endurance;Decreased balance  Assessment: 76 y/o male admit 2022 with LESLEE, GI Bleed; Weakness. 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 Surg consult (recent dx Colon Ca ~ 1 month ago; await surg). Recent dx Pneumonia, COVID+ (cont weak/falls). PMH as noted including CKD (HD), COVID+. PTA pt living with wife in mobile home with ramp access; independent daily care and functional mobility although recent cont weak following Pneumonia/COVID.  s/p open sigmoid resection with coloproctostomy. Pt currently is more unsteady, needing more assist for mobility tasks and his an elevated fall risk. Today, pt fatigued but did partcipate with encouragement and able to ambulate approx 40' with RW and CGA. Requires safety cues and [place] for transfers.   Recommend 3-5x/wk therapy to address deficits to allow pt to regain his max potential prior to returning home. Will continue to follow. Treatment Diagnosis: functional mobility below baseline  Prognosis: Good  PT Education: General Safety;Transfer Training;Family Education;PT Role  REQUIRES PT FOLLOW UP: Yes  Activity Tolerance  Activity Tolerance: Patient limited by fatigue;Patient limited by endurance     Patient Diagnosis(es): The primary encounter diagnosis was LESLEE (acute kidney injury) ([facility] [address] Diagnoses of Gastrointestinal hemorrhage, unspecified gastrointestinal hemorrhage type, COVID, Acute cystitis without hematuria, and Malignant neoplasm of colon, unspecified part of colon ([facility] [address] were also pertinent to this visit. has a past medical history of Arthritis, Cancer ([facility] [address] Diabetes mellitus ([facility] [address] Hemodialysis patient ([facility] [address] and Hypertension. has a past surgical history that includes IR PERC ARTERIOVENOUS FISTULA CREATION (Left) and colectomy (N/A, 1/26/2022). Restrictions  Restrictions/Precautions  Restrictions/Precautions: Fall Risk  Position Activity Restriction  Other position/activity restrictions: low fat diet; HD     Social/Functional History  Lives With: Spouse (Wife Nathaniel Ochoa). )  Type of Home: Mobile home  Home Layout: One level  Home Access: Ramped entrance  Bathroom Shower/Tub: Walk-in shower  Bathroom Toilet: Handicap height  Bathroom Equipment: Grab bars in [employer] [address] Accessibility: Resendiz Aj: Rolling walker  ADL Assistance: [address]:  (Wife takes care of most homemaking needs.)  Ambulation Assistance: Independent (Occass use of Walker (longer distances). )  Transfer Assistance: Independent  Active : No (Able, although doesn't drive. Family takes care of errands. Medical transport to/from HD.)  Occupation: Retired  Type of occupation: Retired : various jobs ([address], [employer], [address]).   Additional T-Scale Score : 40.78 (01/21/22 0927)  Mobility Inpatient CMS 0-100% Score: 54.16 (01/21/22 0927)  Mobility Inpatient CMS G-Code Modifier : CK (01/21/22 0927)          Goals  Short term goals  Time Frame for Short term goals: Upon d/c acute care setting. (goals ongoing as of 2/4)  Short term goal 1: Bed Mob SBA. Short term goal 2: Transfers with assist device SBA. Short term goal 3: Amb with assist device 25-50' SBA/CGA. met 1-20: new goal amb 100' with wh walker SBA  Short term goal 4: Pt participating in approp Strength Exs. Patient Goals   Patient goals : Get stronger and return home. Plan    Plan  Times per week: 3-5x week while in acute care setting.   Current Treatment Recommendations: Strengthening,Functional Mobility Training,Transfer Training,Gait Training,Safety Education & Training,Patient/Caregiver Education & Training  Safety Devices  Type of devices: Bed alarm in place,Call light within reach,Left in bed,Nurse notified,Gait belt (RN Dann notified)  Restraints  Initially in place: No     Therapy Time   Individual Concurrent Group Co-treatment   Time In 0900         Time Out 0925         Minutes 25         Timed Code Treatment Minutes: 25 Minutes         Electronically signed by Jeanne Clay PT [id number] on 2/4/2022 at 9:26 AM

## 2022-02-04 NOTE — CARE COORDINATION
CASE MANAGEMENT DISCHARGE SUMMARY:    DISCHARGE DATE: 2/4/2022    DISCHARGED TO: [facility] [place]               REPORT NUMBER:   [phone]             FAX NUMBER: Confirmed Consuelo in admissions has access and can pull documents    TRANSPORTATION: [facility]             TIME: 2:00 PM     PREFERRED PHARMACY: At facility     [address] waived    FRED/JULIEN COMPLETED: [facility]. Facility notified. Yancey Crigler, MSW, LIANA, Social Work/Case Management   [phone]  Electronically signed by Yancey Crigler, MSW, LSW on 2/4/2022 at 11:22 AM    Spoke to Virgie Raygoza  at [facility] - transportation not confirmed for dialysis on 2/5/22. Spoke to patient's daughter Arianna Guevara. Confirmed family will be able to transport to and from dialysis on 2/5/22. Family able to assist until transport obtained. Patient, family, facility aware. Spoke to [employer] at [facility]. Notified of family transport. Jaden to work on acquiring hemodialysis transport for upcoming week. Encouraged Jaden to follow up with daughter with further questions.    Electronically signed by Yancey Crigler, MSW, LSW on 2/4/2022 at 2:38 PM

## 2022-02-05 NOTE — PROGRESS NOTES
Daughter called stating she is at Dialysis and that her father does not have an appointment at 11 am as she was told during discharge. I talked with Mary Lou Renner the RN at [facility]. I told her I would follow up with our  and call her back. Called Luisa back and let her know at this point if unable to dialyze patient he could be sent to the ED for dialysis. Luisa BUTTERFIELD states she was able to access his records from the [facility] [address] location and will be able to dialyze the patient.

## 2022-02-07 ENCOUNTER — HOSPITAL ENCOUNTER (INPATIENT)
Age: 77
LOS: 3 days | Discharge: NURSING, REHAB OR LONG-TERM CARE | DRG: 377 | End: 2022-02-10
Attending: EMERGENCY MEDICINE | Admitting: INTERNAL MEDICINE
Payer: MEDICARE

## 2022-02-07 ENCOUNTER — APPOINTMENT (OUTPATIENT)
Dept: CT IMAGING | Age: 77
DRG: 377 | End: 2022-02-07
Payer: MEDICARE

## 2022-02-07 ENCOUNTER — APPOINTMENT (OUTPATIENT)
Dept: GENERAL RADIOLOGY | Age: 77
DRG: 377 | End: 2022-02-07
Payer: MEDICARE

## 2022-02-07 DIAGNOSIS — N18.6 END STAGE RENAL DISEASE (HCC): ICD-10-CM

## 2022-02-07 DIAGNOSIS — K62.5 RECTAL BLEEDING: Primary | ICD-10-CM

## 2022-02-07 DIAGNOSIS — I95.9 HYPOTENSION, UNSPECIFIED HYPOTENSION TYPE: ICD-10-CM

## 2022-02-07 PROBLEM — R77.8 ELEVATED TROPONIN: Status: ACTIVE | Noted: 2022-02-07

## 2022-02-07 PROBLEM — D62 ACUTE BLOOD LOSS ANEMIA: Status: ACTIVE | Noted: 2022-02-07

## 2022-02-07 PROBLEM — R79.89 ELEVATED TROPONIN: Status: ACTIVE | Noted: 2022-02-07

## 2022-02-07 LAB
A/G RATIO: 1 (ref 1.1–2.2)
ABO/RH: NORMAL
ALBUMIN SERPL-MCNC: 3.2 G/DL (ref 3.4–5)
ALP BLD-CCNC: 92 U/L (ref 40–129)
ALT SERPL-CCNC: <5 U/L (ref 10–40)
ANION GAP SERPL CALCULATED.3IONS-SCNC: 15 MMOL/L (ref 3–16)
ANTIBODY SCREEN: NORMAL
AST SERPL-CCNC: 7 U/L (ref 15–37)
BASOPHILS ABSOLUTE: 0 K/UL (ref 0–0.2)
BASOPHILS RELATIVE PERCENT: 0.3 %
BILIRUB SERPL-MCNC: 0.5 MG/DL (ref 0–1)
BUN BLDV-MCNC: 36 MG/DL (ref 7–20)
CALCIUM SERPL-MCNC: 8.2 MG/DL (ref 8.3–10.6)
CHLORIDE BLD-SCNC: 96 MMOL/L (ref 99–110)
CO2: 26 MMOL/L (ref 21–32)
CREAT SERPL-MCNC: 6.1 MG/DL (ref 0.8–1.3)
EKG ATRIAL RATE: 65 BPM
EKG DIAGNOSIS: NORMAL
EKG P AXIS: 22 DEGREES
EKG P-R INTERVAL: 278 MS
EKG Q-T INTERVAL: 382 MS
EKG QRS DURATION: 76 MS
EKG QTC CALCULATION (BAZETT): 397 MS
EKG R AXIS: -15 DEGREES
EKG T AXIS: -6 DEGREES
EKG VENTRICULAR RATE: 65 BPM
EOSINOPHILS ABSOLUTE: 0.1 K/UL (ref 0–0.6)
EOSINOPHILS RELATIVE PERCENT: 1.1 %
GFR AFRICAN AMERICAN: 11
GFR NON-AFRICAN AMERICAN: 9
GLUCOSE BLD-MCNC: 181 MG/DL (ref 70–99)
HCT VFR BLD CALC: 23.9 % (ref 40.5–52.5)
HCT VFR BLD CALC: 26.1 % (ref 40.5–52.5)
HEMOGLOBIN: 8 G/DL (ref 13.5–17.5)
HEMOGLOBIN: 8.6 G/DL (ref 13.5–17.5)
INR BLD: 1.19 (ref 0.88–1.12)
LACTIC ACID, SEPSIS: 2.4 MMOL/L (ref 0.4–1.9)
LIPASE: 70 U/L (ref 13–60)
LYMPHOCYTES ABSOLUTE: 0.7 K/UL (ref 1–5.1)
LYMPHOCYTES RELATIVE PERCENT: 10.4 %
MCH RBC QN AUTO: 29.8 PG (ref 26–34)
MCHC RBC AUTO-ENTMCNC: 32.8 G/DL (ref 31–36)
MCV RBC AUTO: 90.8 FL (ref 80–100)
MONOCYTES ABSOLUTE: 0.5 K/UL (ref 0–1.3)
MONOCYTES RELATIVE PERCENT: 7.2 %
NEUTROPHILS ABSOLUTE: 5.4 K/UL (ref 1.7–7.7)
NEUTROPHILS RELATIVE PERCENT: 81 %
PDW BLD-RTO: 16.1 % (ref 12.4–15.4)
PLATELET # BLD: 213 K/UL (ref 135–450)
PMV BLD AUTO: 8 FL (ref 5–10.5)
POTASSIUM REFLEX MAGNESIUM: 3.8 MMOL/L (ref 3.5–5.1)
PRO-BNP: 1980 PG/ML (ref 0–449)
PROTHROMBIN TIME: 13.6 SEC (ref 9.9–12.7)
RBC # BLD: 2.87 M/UL (ref 4.2–5.9)
SODIUM BLD-SCNC: 137 MMOL/L (ref 136–145)
TOTAL PROTEIN: 6.3 G/DL (ref 6.4–8.2)
TROPONIN: 0.06 NG/ML
TROPONIN: 0.07 NG/ML
WBC # BLD: 6.6 K/UL (ref 4–11)

## 2022-02-07 PROCEDURE — APPNB180 APP NON BILLABLE TIME > 60 MINS: Performed by: PHYSICIAN ASSISTANT

## 2022-02-07 PROCEDURE — 85610 PROTHROMBIN TIME: CPT

## 2022-02-07 PROCEDURE — 2580000003 HC RX 258: Performed by: EMERGENCY MEDICINE

## 2022-02-07 PROCEDURE — 96365 THER/PROPH/DIAG IV INF INIT: CPT

## 2022-02-07 PROCEDURE — 83605 ASSAY OF LACTIC ACID: CPT

## 2022-02-07 PROCEDURE — 84484 ASSAY OF TROPONIN QUANT: CPT

## 2022-02-07 PROCEDURE — 74176 CT ABD & PELVIS W/O CONTRAST: CPT

## 2022-02-07 PROCEDURE — 83880 ASSAY OF NATRIURETIC PEPTIDE: CPT

## 2022-02-07 PROCEDURE — 86901 BLOOD TYPING SEROLOGIC RH(D): CPT

## 2022-02-07 PROCEDURE — 2580000003 HC RX 258: Performed by: INTERNAL MEDICINE

## 2022-02-07 PROCEDURE — 85018 HEMOGLOBIN: CPT

## 2022-02-07 PROCEDURE — APPSS180 APP SPLIT SHARED TIME > 60 MINUTES: Performed by: PHYSICIAN ASSISTANT

## 2022-02-07 PROCEDURE — 80053 COMPREHEN METABOLIC PANEL: CPT

## 2022-02-07 PROCEDURE — 83690 ASSAY OF LIPASE: CPT

## 2022-02-07 PROCEDURE — 71045 X-RAY EXAM CHEST 1 VIEW: CPT

## 2022-02-07 PROCEDURE — 36415 COLL VENOUS BLD VENIPUNCTURE: CPT

## 2022-02-07 PROCEDURE — 86850 RBC ANTIBODY SCREEN: CPT

## 2022-02-07 PROCEDURE — 6370000000 HC RX 637 (ALT 250 FOR IP): Performed by: INTERNAL MEDICINE

## 2022-02-07 PROCEDURE — C9113 INJ PANTOPRAZOLE SODIUM, VIA: HCPCS | Performed by: EMERGENCY MEDICINE

## 2022-02-07 PROCEDURE — 2060000000 HC ICU INTERMEDIATE R&B

## 2022-02-07 PROCEDURE — 93005 ELECTROCARDIOGRAM TRACING: CPT | Performed by: EMERGENCY MEDICINE

## 2022-02-07 PROCEDURE — 93010 ELECTROCARDIOGRAM REPORT: CPT | Performed by: INTERNAL MEDICINE

## 2022-02-07 PROCEDURE — 85014 HEMATOCRIT: CPT

## 2022-02-07 PROCEDURE — 96375 TX/PRO/DX INJ NEW DRUG ADDON: CPT

## 2022-02-07 PROCEDURE — 6360000002 HC RX W HCPCS: Performed by: EMERGENCY MEDICINE

## 2022-02-07 PROCEDURE — 85025 COMPLETE CBC W/AUTO DIFF WBC: CPT

## 2022-02-07 PROCEDURE — 99285 EMERGENCY DEPT VISIT HI MDM: CPT

## 2022-02-07 PROCEDURE — 86900 BLOOD TYPING SEROLOGIC ABO: CPT

## 2022-02-07 RX ORDER — LORAZEPAM 0.5 MG/1
0.5 TABLET ORAL 2 TIMES DAILY
Status: DISCONTINUED | OUTPATIENT
Start: 2022-02-07 | End: 2022-02-11 | Stop reason: HOSPADM

## 2022-02-07 RX ORDER — PANTOPRAZOLE SODIUM 40 MG/10ML
40 INJECTION, POWDER, LYOPHILIZED, FOR SOLUTION INTRAVENOUS ONCE
Status: COMPLETED | OUTPATIENT
Start: 2022-02-07 | End: 2022-02-07

## 2022-02-07 RX ORDER — LORAZEPAM 0.5 MG/1
0.5 TABLET ORAL 2 TIMES DAILY
Status: ON HOLD | COMMUNITY
End: 2022-02-16 | Stop reason: SDUPTHER

## 2022-02-07 RX ORDER — DOCUSATE SODIUM 100 MG/1
100 CAPSULE, LIQUID FILLED ORAL DAILY
Status: DISCONTINUED | OUTPATIENT
Start: 2022-02-07 | End: 2022-02-11 | Stop reason: HOSPADM

## 2022-02-07 RX ORDER — GABAPENTIN 100 MG/1
100 CAPSULE ORAL 3 TIMES DAILY
Status: DISCONTINUED | OUTPATIENT
Start: 2022-02-07 | End: 2022-02-11 | Stop reason: HOSPADM

## 2022-02-07 RX ORDER — SODIUM CHLORIDE 0.9 % (FLUSH) 0.9 %
5-40 SYRINGE (ML) INJECTION EVERY 12 HOURS SCHEDULED
Status: DISCONTINUED | OUTPATIENT
Start: 2022-02-07 | End: 2022-02-11 | Stop reason: HOSPADM

## 2022-02-07 RX ORDER — PANTOPRAZOLE SODIUM 20 MG/1
20 TABLET, DELAYED RELEASE ORAL NIGHTLY
Status: DISCONTINUED | OUTPATIENT
Start: 2022-02-07 | End: 2022-02-11 | Stop reason: HOSPADM

## 2022-02-07 RX ORDER — LANOLIN ALCOHOL/MO/W.PET/CERES
6 CREAM (GRAM) TOPICAL NIGHTLY PRN
Status: DISCONTINUED | OUTPATIENT
Start: 2022-02-07 | End: 2022-02-11 | Stop reason: HOSPADM

## 2022-02-07 RX ORDER — OXYCODONE HYDROCHLORIDE 5 MG/1
5 TABLET ORAL EVERY 6 HOURS PRN
Status: DISCONTINUED | OUTPATIENT
Start: 2022-02-07 | End: 2022-02-11 | Stop reason: HOSPADM

## 2022-02-07 RX ORDER — SODIUM CHLORIDE 0.9 % (FLUSH) 0.9 %
5-40 SYRINGE (ML) INJECTION PRN
Status: DISCONTINUED | OUTPATIENT
Start: 2022-02-07 | End: 2022-02-11 | Stop reason: HOSPADM

## 2022-02-07 RX ORDER — 0.9 % SODIUM CHLORIDE 0.9 %
30 INTRAVENOUS SOLUTION INTRAVENOUS ONCE
Status: DISCONTINUED | OUTPATIENT
Start: 2022-02-07 | End: 2022-02-07

## 2022-02-07 RX ORDER — ONDANSETRON 4 MG/1
4 TABLET, ORALLY DISINTEGRATING ORAL EVERY 8 HOURS PRN
Status: DISCONTINUED | OUTPATIENT
Start: 2022-02-07 | End: 2022-02-11 | Stop reason: HOSPADM

## 2022-02-07 RX ORDER — ONDANSETRON 2 MG/ML
4 INJECTION INTRAMUSCULAR; INTRAVENOUS EVERY 6 HOURS PRN
Status: DISCONTINUED | OUTPATIENT
Start: 2022-02-07 | End: 2022-02-11 | Stop reason: HOSPADM

## 2022-02-07 RX ORDER — ATORVASTATIN CALCIUM 10 MG/1
10 TABLET, FILM COATED ORAL NIGHTLY
Status: DISCONTINUED | OUTPATIENT
Start: 2022-02-07 | End: 2022-02-11 | Stop reason: HOSPADM

## 2022-02-07 RX ORDER — 0.9 % SODIUM CHLORIDE 0.9 %
500 INTRAVENOUS SOLUTION INTRAVENOUS ONCE
Status: COMPLETED | OUTPATIENT
Start: 2022-02-07 | End: 2022-02-07

## 2022-02-07 RX ORDER — OXYCODONE HYDROCHLORIDE 5 MG/1
5 TABLET ORAL EVERY 4 HOURS PRN
Status: DISCONTINUED | OUTPATIENT
Start: 2022-02-07 | End: 2022-02-07

## 2022-02-07 RX ORDER — ACETAMINOPHEN 650 MG/1
650 SUPPOSITORY RECTAL EVERY 6 HOURS PRN
Status: DISCONTINUED | OUTPATIENT
Start: 2022-02-07 | End: 2022-02-11 | Stop reason: HOSPADM

## 2022-02-07 RX ORDER — CALCITRIOL 0.25 UG/1
0.25 CAPSULE, LIQUID FILLED ORAL NIGHTLY
Status: DISCONTINUED | OUTPATIENT
Start: 2022-02-07 | End: 2022-02-11 | Stop reason: HOSPADM

## 2022-02-07 RX ORDER — TAMSULOSIN HYDROCHLORIDE 0.4 MG/1
0.4 CAPSULE ORAL EVERY MORNING
Status: DISCONTINUED | OUTPATIENT
Start: 2022-02-08 | End: 2022-02-11 | Stop reason: HOSPADM

## 2022-02-07 RX ORDER — ACETAMINOPHEN 325 MG/1
650 TABLET ORAL EVERY 6 HOURS PRN
Status: DISCONTINUED | OUTPATIENT
Start: 2022-02-07 | End: 2022-02-11 | Stop reason: HOSPADM

## 2022-02-07 RX ORDER — SODIUM CHLORIDE 9 MG/ML
25 INJECTION, SOLUTION INTRAVENOUS PRN
Status: DISCONTINUED | OUTPATIENT
Start: 2022-02-07 | End: 2022-02-11 | Stop reason: HOSPADM

## 2022-02-07 RX ORDER — POLYETHYLENE GLYCOL 3350 17 G/17G
17 POWDER, FOR SOLUTION ORAL DAILY PRN
Status: DISCONTINUED | OUTPATIENT
Start: 2022-02-07 | End: 2022-02-11 | Stop reason: HOSPADM

## 2022-02-07 RX ORDER — SEVELAMER CARBONATE 800 MG/1
800 TABLET, FILM COATED ORAL
Status: DISCONTINUED | OUTPATIENT
Start: 2022-02-07 | End: 2022-02-11 | Stop reason: HOSPADM

## 2022-02-07 RX ADMIN — PANTOPRAZOLE SODIUM 20 MG: 20 TABLET, DELAYED RELEASE ORAL at 22:04

## 2022-02-07 RX ADMIN — CEFEPIME HYDROCHLORIDE 2000 MG: 2 INJECTION, POWDER, FOR SOLUTION INTRAVENOUS at 08:21

## 2022-02-07 RX ADMIN — SODIUM CHLORIDE, PRESERVATIVE FREE 10 ML: 5 INJECTION INTRAVENOUS at 22:03

## 2022-02-07 RX ADMIN — CALCITRIOL 0.25 MCG: 0.25 CAPSULE ORAL at 22:04

## 2022-02-07 RX ADMIN — ATORVASTATIN CALCIUM 10 MG: 10 TABLET, FILM COATED ORAL at 22:04

## 2022-02-07 RX ADMIN — SODIUM CHLORIDE 500 ML: 9 INJECTION, SOLUTION INTRAVENOUS at 07:29

## 2022-02-07 RX ADMIN — SODIUM CHLORIDE 2832 ML: 9 INJECTION, SOLUTION INTRAVENOUS at 07:23

## 2022-02-07 RX ADMIN — LORAZEPAM 0.5 MG: 0.5 TABLET ORAL at 22:04

## 2022-02-07 RX ADMIN — GABAPENTIN 100 MG: 100 CAPSULE ORAL at 22:04

## 2022-02-07 RX ADMIN — ACETAMINOPHEN 650 MG: 325 TABLET ORAL at 15:48

## 2022-02-07 RX ADMIN — PANTOPRAZOLE SODIUM 40 MG: 40 INJECTION, POWDER, FOR SOLUTION INTRAVENOUS at 08:22

## 2022-02-07 ASSESSMENT — PAIN DESCRIPTION - LOCATION: LOCATION: ABDOMEN

## 2022-02-07 ASSESSMENT — PAIN SCALES - GENERAL
PAINLEVEL_OUTOF10: 0
PAINLEVEL_OUTOF10: 4
PAINLEVEL_OUTOF10: 0

## 2022-02-07 ASSESSMENT — PAIN DESCRIPTION - PAIN TYPE: TYPE: SURGICAL PAIN

## 2022-02-07 ASSESSMENT — PAIN - FUNCTIONAL ASSESSMENT: PAIN_FUNCTIONAL_ASSESSMENT: ACTIVITIES ARE NOT PREVENTED

## 2022-02-07 ASSESSMENT — PAIN DESCRIPTION - ONSET: ONSET: GRADUAL

## 2022-02-07 ASSESSMENT — PAIN DESCRIPTION - DESCRIPTORS: DESCRIPTORS: SORE

## 2022-02-07 ASSESSMENT — PAIN SCALES - WONG BAKER: WONGBAKER_NUMERICALRESPONSE: 0

## 2022-02-07 ASSESSMENT — PAIN DESCRIPTION - ORIENTATION: ORIENTATION: LOWER;MID

## 2022-02-07 ASSESSMENT — PAIN DESCRIPTION - FREQUENCY: FREQUENCY: INTERMITTENT

## 2022-02-07 NOTE — ED NOTES
Called lab again about cultures, they state someone will be down     Mendoza Cardoza, SCOUT  02/07/22 3151

## 2022-02-07 NOTE — CARE COORDINATION
INITIAL CASE MANAGEMENT ASSESSMENT     Spoke with patient's spouse/primary medical decision maker  Maura) via phone ([phone]) to assess possible discharge needs as patient was sound asleep in ED. Explained Case Management role/services.       Living Situation: Came from [address] [place] [address]. Expected to be at the SNF for three weeks to receive PT and return home.      ADLs: Dependent.      DME: [facility]. Has cane, ramp, and wheelchair at home. Was supposed to be receiving wheel chair at SNF today.      PT/OT Recs: Recommendation at last discharge on the 2/4 was 5-7 session per week. Was receiving PT at SNF.      Active Services: [address] [place] [address]. Patient's wife currently has covid and  stated that she would leave SNF list in patient's room for her daughter to .      Transportation: Not an active . Medical transport will be needed at discharge.      Medications: Medications provided by SNF.      PCP: Anthony Vazquez [phone] (phone).     HD/PD: HD [facility]. Tu,Th, and Saturday at 11:30am.      PLAN/COMMENTS: Patient's spouse would like him to return to [address] [place] [address] at discharge. Medical transport needed. Left voicemail for Dmitriy Baumann in admissions at [facility]. Phone ([phone]). ACP compelted. RQS completed. The Plan for Transition of Care is related to the following treatment goals: Return to [facility].      The Patient and/or patient representative was provided with a choice of provider and agrees   with the discharge plan. [x]? Yes []? No     Freedom of choice list was provided with basic dialogue that supports the patient's individualized plan of care/goals, treatment preferences and shares the quality data associated with the providers. [x]? Yes []? No     Provided contact information for patient or family to call with any questions.

## 2022-02-07 NOTE — PROGRESS NOTES
Dr Светлана Raymond at bedside. See new orders for adult diet, pain medication, and discontinuation of blood cultures. Pt daughter at bedside. All of pt and family questions answered by MD.  Denies further needs at this time. .      Electronically signed by Carly Terrazas RN on 2/7/2022 at 4:02 PM

## 2022-02-07 NOTE — ED NOTES
ED tech into attempt blood cultures, Lab has not been in yet to get them     Eliana Mcneil, SCOUT  02/07/22 3924

## 2022-02-07 NOTE — PROGRESS NOTES
Medication Reconciliation    List of medications patient is currently taking is complete. Source of information: 1. Conversation with patient's family over the phone                                      2. EPIC records                                       3. Medication records from [facility]     Allergies  Lisinopril     Notes regarding home medications:   1. Patient did not receive any of her home medications prior to arrival to the emergency department today.     Shanon Hedrick [facility], PharmD, BCPS  2/7/2022 11:29 AM

## 2022-02-07 NOTE — CONSULTS
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 male who presents with rectal bleeding. This patient is well known to our group he underwent an open sigmoid resection with coloproctostomy for an adenocarcinoma of the sigmoid colon on 1/26/22. At this time the patient has some confusion and an acuate history is hard to obtain. HE denies having any pain. And his incision site looks good. His BP was a little low on admission at 89/50, but has improved with IV fluids. He is unsure when the last time he noticed any bloody BM and how long he has been having them. He denies having any pain. Denies any CP, SOB, cough, lightheadedness or dizziness. Past Medical History  Reviewed  has a past medical history of Arthritis, Cancer ([facility] [address] Diabetes mellitus ([facility] [address] Hemodialysis patient ([facility] [address] and Hypertension. Past Surgical History  Reviewed has a past surgical history that includes IR PERC ARTERIOVENOUS FISTULA CREATION (Left) and colectomy (N/A, 1/26/2022). Medications  Prior to Admission medications    Medication Sig Start Date End Date Taking? Authorizing Provider   LORazepam (ATIVAN) 0.5 MG tablet Take 0.5 mg by mouth 2 times daily. Yes Historical Provider, MD   oxyCODONE (ROXICODONE) 5 MG immediate release tablet Take 1 tablet by mouth every 4 hours as needed for Pain for up to 3 days.  2/4/22 2/7/22 Yes Tonnie Fleischer, MD   polyethylene glycol (GLYCOLAX) 17 g packet Take 17 g by mouth daily as needed for Constipation 2/4/22 3/6/22 Yes Tonnie Fleischer, MD   docusate sodium (COLACE) 100 MG capsule Take 1 capsule by mouth daily for 7 days 2/5/22 2/12/22 Yes Johnson Hoang MD   melatonin 3 MG TABS tablet Take 2 tablets by mouth nightly as needed (sleep) 2/4/22  Yes Johnson Hoang MD   tamsulosin (FLOMAX) 0.4 MG capsule Take 0.4 mg by mouth every morning   Yes Historical Provider, MD   gabapentin (NEURONTIN) 100 MG capsule Take 100 mg by mouth 3 times daily. Yes Historical Provider, MD   sevelamer (RENVELA) 800 MG tablet Take 1 tablet by mouth 3 times daily (with meals)   Yes Historical Provider, MD   clopidogrel (PLAVIX) 75 MG tablet Take 75 mg by mouth every evening   Yes Historical Provider, MD   atenolol (TENORMIN) 25 MG tablet Take 25 mg by mouth every evening   Yes Historical Provider, MD   pantoprazole (PROTONIX) 20 MG tablet Take 20 mg by mouth nightly   Yes Historical Provider, MD   atorvastatin (LIPITOR) 10 MG tablet Take 10 mg by mouth nightly   Yes Historical Provider, MD   calcitRIOL (ROCALTROL) 0.25 MCG capsule Take 0.25 mcg by mouth every evening   Yes Historical Provider, MD   aspirin 81 MG chewable tablet Take 81 mg by mouth daily   Yes Historical Provider, MD    Scheduled Meds:   vancomycin  1,000 mg IntraVENous Once     Continuous Infusions:  PRN Meds:. Allergies  is allergic to lisinopril. Family History  Reviewedfamily history is not on file. Social History   reports that he has quit smoking. He has never used smokeless tobacco. He reports previous alcohol use. EDUCATION  Patient educated about their illness/diagnosis, stated above, and all questions answered. We discussed the importance of nutrition, medications they are taking, and healthy lifestyle. Review of Systems:  General Denies any fever or chills  HEENT Denies any diplopia, tinnitus or vertigo  Resp Denies any shortness of breath, cough or wheezing  Cardiac Denies any chest pain, palpitations, claudication or edema  GI Denies any hematemesis or pyrosis.  Admits to blood with BMs   Denies any frequency, urgency, hesitancy or incontinence  Heme Denies bruising or bleeding easily  Neuro Denies any focal motor or sensory deficits  OBJECTIVE:   VITALS:  height is 6' 0.5\" (1.842 m). His oral temperature is 98.7 °F (37.1 °C). His blood pressure is 112/64 and his pulse is 64. His respiration is 15 and oxygen saturation is 97%. CONSTITUTIONAL: Alert and oriented times 3, no acute distress and cooperative to examination with proper mood and affect. SKIN: Skin color, texture, turgor normal. No rashes or lesions. LYMPH: no cervical nodes, no inguinal nodes  HEENT: Head is normocephalic, atraumatic. EOMI, PERRLA. NECK: Supple, symmetrical, trachea midline, no adenopathy, thyroid symmetric, not enlarged and no tenderness, skin normal.  CHEST/LUNGS: chest symmetric with normal A/P diameter, normal respiratory rate and rhythm  CARDIOVASCULAR: Heart sounds are normal.  Regular rate and rhythm   ABDOMEN: Normal shape. Tenderness: absent. RECTAL: deferred, not clinically indicated  NEUROLOGIC: There are no focalizing motor or sensory deficits. CN II-XII are grossly intact. [place] EXTREMITIES: no cyanosis, no clubbing and no edema.   LABS:     Recent Labs     02/07/22 0716   WBC 6.6   HGB 8.6*   HCT 26.1*         K 3.8   CL 96*   CO2 26   BUN 36*   CREATININE 6.1*   CALCIUM 8.2*   INR 1.19*   AST 7*   ALT <5*   BILITOT 0.5     Recent Labs     02/07/22  0716   ALKPHOS 92   ALT <5*   AST 7*   BILITOT 0.5   LABALBU 3.2*   LIPASE 70.0*     CBC:   Lab Results   Component Value Date    WBC 6.6 02/07/2022    RBC 2.87 02/07/2022    HGB 8.6 02/07/2022    HCT 26.1 02/07/2022    MCV 90.8 02/07/2022    MCH 29.8 02/07/2022    MCHC 32.8 02/07/2022    RDW 16.1 02/07/2022     02/07/2022    MPV 8.0 02/07/2022     CMP:    Lab Results   Component Value Date     02/07/2022    K 3.8 02/07/2022    CL 96 02/07/2022    CO2 26 02/07/2022    BUN 36 02/07/2022    CREATININE 6.1 02/07/2022    GFRAA 11 02/07/2022 GFRAA 35 07/26/2011    AGRATIO 1.0 02/07/2022    LABGLOM 9 02/07/2022    GLUCOSE 181 02/07/2022    PROT 6.3 02/07/2022    PROT 7.5 07/26/2011    LABALBU 3.2 02/07/2022    CALCIUM 8.2 02/07/2022    BILITOT 0.5 02/07/2022    ALKPHOS 92 02/07/2022    AST 7 02/07/2022    ALT <5 02/07/2022     Urine Culture:  No components found for: CURINE  Blood Culture:  No components found for: CBLOOD, CFUNGUSBL    Thank you for the interesting evaluation. Further recommendations to follow. Elisa Sexton [facility]  General and Vascular Surgery [phone]  Electronically signed by CHRISTI Morales on 2/7/2022 at 1:33 PM      As above  Postop from recent sigmoid colectomy  Noted blood per rectum  Hg down slightly since discharge  Admit for monitoring  May need transfusion if bleeding persists    Electronically signed by Edmundo Donaldson MD on 2/7/2022 at 3:35 PM

## 2022-02-07 NOTE — ACP (ADVANCE CARE PLANNING)
Advance Care Planning     Advance Care Planning Activator (Inpatient)  Conversation Note      Date of ACP Conversation: 2/7/2022     Conversation Conducted with: Patient with [address] Decision Maker: Next of Kin by law (only applies in absence of above) (name) Gina Phipps    ACP Activator: [id number] Decision Maker:     Current Designated Health Care Decision Maker:     Primary Decision Maker: Brenden Morrell - [phone]    Today we documented Decision Maker(s) consistent with Legal Next of Kin hierarchy. Care Preferences    Ventilation: \"If you were in your present state of health and suddenly became very ill and were unable to breathe on your own, what would your preference be about the use of a ventilator (breathing machine) if it were available to you? \"      Would the patient desire the use of ventilator (breathing machine)?: yes    \"If your health worsens and it becomes clear that your chance of recovery is unlikely, what would your preference be about the use of a ventilator (breathing machine) if it were available to you? \"     Would the patient desire the use of ventilator (breathing machine)?: No      Resuscitation  \"CPR works best to restart the heart when there is a sudden event, like a heart attack, in someone who is otherwise healthy. Unfortunately, CPR does not typically restart the heart for people who have serious health conditions or who are very sick. \"    \"In the event your heart stopped as a result of an underlying serious health condition, would you want attempts to be made to restart your heart (answer \"yes\" for attempt to resuscitate) or would you prefer a natural death (answer \"no\" for do not attempt to resuscitate)? \" yes       [x] Yes   [] No   Educated Patient / True [place] regarding differences between Advance Directives and portable DNR orders.     Length of ACP Conversation in minutes:   5    Conversation Outcomes:  [x] ACP discussion completed  [] Existing advance directive reviewed with patient; no changes to patient's previously recorded wishes  [] New Advance Directive completed  [] Portable Do Not Rescitate prepared for Provider review and signature  [] POLST/POST/MOLST/MOST prepared for Provider review and signature      Follow-up plan:    [] Schedule follow-up conversation to continue planning  [] Referred individual to Provider for additional questions/concerns   [] Advised patient/agent/surrogate to review completed ACP document and update if needed with changes in condition, patient preferences or care setting    [x] This note routed to one or more involved healthcare providers

## 2022-02-07 NOTE — ED PROVIDER NOTES
[address] [facility]      Pt Name: Rosa Elena Ardon  MRN: [id number]  [place] 1945  Date of evaluation: 2/7/2022  Provider: Trevon Ceja, [address] [place] [address]       Chief Complaint   Patient presents with    Rectal Bleeding     Per aquad pt had blood in brief this morning         HISTORY OF PRESENT ILLNESS   (Location/Symptom, Timing/Onset, Context/Setting, Quality, Duration, Modifying Factors, Severity)  Note limiting factors. Rosa Elena Ardon is a 77 y.o. male who presents to the emergency department plaint of rectal bleeding. The patient states that he woke up this morning and noticed bright red blood in his pull-up. He denies any chest pain, shortness of breath, dizziness, lightheadedness, syncope, palpitations, chest pain, or shortness of breath. He denies any abdominal pain nausea vomiting or diarrhea. He denies any recent melena. He denies any fall trauma or injury. He was recently hospitalized on January 13 and discharged on February 4. Patient was admitted for generalized weakness, somnolence after recently being diagnosed with COVID for which she was previously hospitalized. He was found to have acute encephalopathy secondary to an acute punctate infarct in the posterior medial left parietal lobe, confirmed by MRI. He was also found to have a colonic mass. He underwent sigmoid colectomy sigmoidoscopy and coloproctostomy on January 26. He was evaluated by gastroenterology. He is anticoagulated on aspirin and Plavix. During his hospitalization he also had a urinary tract infection secondary to Enterococcus faecalis. Treated with IV vancomycin. He did have delirium while in the hospital which was multifactorial due to prolonged hospitalization, surgery. He is known to have a history of end-stage renal disease. He has a left AV fistula for dialysis access and is dialyzed on Thursdays and Saturdays.   His last dialysis was on Saturday. His daughter was at the bedside and provided additional information. Nursing Notes were reviewed. HPI        REVIEW OF SYSTEMS    (2-9 systems for level 4, 10 or more for level 5)       Constitutional: Negative for fever or chills. Respiratory: Negative for shortness of breath or dyspnea on exertion. Cardiovascular: Negative for chest pain. Gastrointestinal: Negative for abdominal pain. Negative for vomiting or diarrhea. Genitourinary: Negative for flank pain. Negative for dysuria. Negative for hematuria. Neurological: Negative for headache. Musculoskeletal:  Negative edema. Hematological: Negative for adenopathy. All systems are reviewed and are negative except for those listed above in the history of present illness and ROS. PAST MEDICAL HISTORY     Past Medical History:   Diagnosis Date    Arthritis     Cancer [facility])     Colon Cancer diagnosed last month    Diabetes mellitus ([facility]     Hemodialysis patient ([facility]     Hypertension          SURGICAL HISTORY       Past Surgical History:   Procedure Laterality Date    COLECTOMY N/A 1/26/2022    SIGMOID COLECTOMY, SIGMOIDOSCOPY performed by Nat Baig MD at [address] [place] [address]  [facility]     unsure of date         CURRENT MEDICATIONS       Previous Medications    ASPIRIN 81 MG CHEWABLE TABLET    Take 81 mg by mouth daily    ATENOLOL (TENORMIN) 25 MG TABLET    Take 25 mg by mouth every evening    ATORVASTATIN (LIPITOR) 10 MG TABLET    Take 10 mg by mouth nightly    CALCITRIOL (ROCALTROL) 0.25 MCG CAPSULE    Take 0.25 mcg by mouth every evening    CLOPIDOGREL (PLAVIX) 75 MG TABLET    Take 75 mg by mouth every evening    DOCUSATE SODIUM (COLACE) 100 MG CAPSULE    Take 1 capsule by mouth daily for 7 days    GABAPENTIN (NEURONTIN) 100 MG CAPSULE    Take 100 mg by mouth 3 times daily. LORAZEPAM (ATIVAN) 0.5 MG TABLET    Take 1 tablet by mouth 2 times daily for 3 days.     MELATONIN 3 MG TABS TABLET    Take 2 tablets by mouth nightly as needed (sleep)    OXYCODONE (ROXICODONE) 5 MG IMMEDIATE RELEASE TABLET    Take 1 tablet by mouth every 4 hours as needed for Pain for up to 3 days. PANTOPRAZOLE (PROTONIX) 20 MG TABLET    Take 20 mg by mouth nightly    POLYETHYLENE GLYCOL (GLYCOLAX) 17 G PACKET    Take 17 g by mouth daily as needed for Constipation    SEVELAMER (RENVELA) 800 MG TABLET    Take 1 tablet by mouth 3 times daily (with meals)    TAMSULOSIN (FLOMAX) 0.4 MG CAPSULE    Take 0.4 mg by mouth every morning       ALLERGIES     Lisinopril    FAMILY HISTORY     History reviewed. No pertinent family history. SOCIAL HISTORY       Social History     Socioeconomic History    Marital status:      Spouse name: None    Number of children: None    Years of education: None    Highest education level: None   Occupational History    None   Tobacco Use    Smoking status: Former Smoker    Smokeless tobacco: Never Used   Substance and Sexual Activity    Alcohol use: Not Currently    Drug use: None    Sexual activity: None   Other Topics Concern    None   Social History Narrative    None     Social Determinants of Health     Financial Resource Strain:     Difficulty of Paying Living Expenses: Not on file   Food Insecurity:     Worried About Running Out of Food in the Last Year: Not on file    Brooklynn of Food in the Last Year: Not on file   Transportation Needs:     Lack of Transportation (Medical): Not on file    Lack of Transportation (Non-Medical):  Not on file   Physical Activity:     Days of Exercise per Week: Not on file    Minutes of Exercise per Session: Not on file   Stress:     Feeling of Stress : Not on file   Social Connections:     Frequency of Communication with Friends and Family: Not on file    Frequency of Social Gatherings with Friends and Family: Not on file    Attends [employer] Services: Not on file    Active Member of Clubs or Organizations: Not on file   [address] or Organization Meetings: Not on file    Marital Status: Not on file   Intimate Partner Violence:     Fear of Current or Ex-Partner: Not on file    Emotionally Abused: Not on file    Physically Abused: Not on file    Sexually Abused: Not on file   Housing Stability:     Unable to Pay for Housing in the Last Year: Not on file    Number of [place] in the Last Year: Not on file    Unstable Housing in the Last Year: Not on file       SCREENINGS    [place] Coma Scale  Eye Opening: Spontaneous  Best Verbal Response: Oriented  Best Motor Response: Obeys commands  Karen Coma Scale Score: 15        PHYSICAL EXAM    (up to 7 for level 4, 8 or more for level 5)     ED Triage Vitals [02/07/22 0647]   BP Temp Temp Source Pulse Resp SpO2 Height Weight   98/61 98.1 °F (36.7 °C) Oral 65 19 99 % 6' 0.5\" (1.842 m) --         Physical Exam   Constitutional: Awake and alert. Very pleasant. Appears comfortable. Head: No visible evidence of trauma. Normocephalic. Eyes: Pupils equal and reactive. No photophobia. Conjunctiva normal.    HENT: Oral mucosa moist.  Airway patent. Pharynx without erythema. Nares were clear. Neck:  Soft and supple. Nontender. Heart:  Regular rate and rhythm. No murmur. Lungs:  Clear to auscultation. No wheezes, rales, or ronchi. No conversational dyspnea or accessory muscle use. Chest: Chest wall non-tender. No evidence of trauma. Abdomen:  Soft, nondistended, bowel sounds present. Nontender. Lower midline incision was intact and well approximated. Staples are in place. Nontender. No surrounding erythema or induration. No guarding rigidity or rebound. No masses. Penis is uncircumcised. Scrotum is normal in appearance. Testicles nontender. No hernia. Perineum appears normal.  Perirectal area was examined with the assistance of the nurse. Dark red bloody residue noted surrounding the perirectal area. Dark red blood noted in the rectal vault.   No clots.  No melena. Musculoskeletal: Extremities non-tender with full range of motion. Radial and dorsalis pedis pulses were intact. No calf tenderness erythema or edema. Left forearm fistula present with palpable thrill. Neurological: Alert and oriented person, place, and approximate date. He answered all questions appropriately. He had some difficulties with details of his recent medical history. Speech clear. No dysarthria. No aphasia. Some difficulty with short-term memory. Able to lift all extremities off the bed without difficulty. No facial droop. No acute focal motor or sensory deficits. Skin: Skin is warm and dry. No rash. Lymphatic:  No lympadenopathy. Psychiatric: Normal mood and affect. Behavior is normal.         DIAGNOSTIC RESULTS     EKG: All EKG's are interpreted by the Emergency Department Physician who either signs or Co-signs this chart in the absence of a cardiologist.    Normal sinus rhythm. First-degree AV block. WY interval 278 ms. QRS duration 76 ms. QTc 397 ms. R axis -15 degrees. No ST elevation. Baseline artifact was noted. RADIOLOGY:   Non-plain film images such as CT, Ultrasound and MRI are read by the radiologist. Plain radiographic images are visualized and preliminarily interpreted by the emergency physician with the below findings:        Interpretation per the Radiologist below, if available at the time of this note:    CT ABDOMEN PELVIS WO CONTRAST Additional Contrast? None   Final Result   1. Outpouching of the lumen of the colon to the left of the prior anastomosis   site. Recommend correlation with surgical history, as this could represent a   normal finding following end to side anastomosis. A contained anastomotic   leak cannot be excluded. 2. Mild inflammation in the adjacent peritoneal fat with several reactive   regional lymph nodes. 3. No evidence of pneumoperitoneum. 4. Cholelithiasis and splenomegaly are noted incidentally.    5. Multiple renal cysts including a stable hyperdense cyst on the left and a   partially calcified cyst on the right. No further follow-up of these   findings is necessary. XR CHEST PORTABLE   Final Result   Cardiomegaly without acute pulmonary process.                ED BEDSIDE ULTRASOUND:   Performed by ED Physician - none    LABS:  Labs Reviewed   CBC WITH AUTO DIFFERENTIAL - Abnormal; Notable for the following components:       Result Value    RBC 2.87 (*)     Hemoglobin 8.6 (*)     Hematocrit 26.1 (*)     RDW 16.1 (*)     Lymphocytes Absolute 0.7 (*)     All other components within normal limits    Narrative:     Performed at:  [address] [address]   Phone [phone]   COMPREHENSIVE METABOLIC PANEL W/ REFLEX TO MG FOR LOW K - Abnormal; Notable for the following components:    Chloride 96 (*)     Glucose 181 (*)     BUN 36 (*)     CREATININE 6.1 (*)     GFR Non- 9 (*)     GFR  11 (*)     Calcium 8.2 (*)     Total Protein 6.3 (*)     Albumin 3.2 (*)     Albumin/Globulin Ratio 1.0 (*)     ALT <5 (*)     AST 7 (*)     All other components within normal limits    Narrative:     Sanna Dorado tel. [phone],  Chemistry results called to and read back by Radha Henao RN,  02/07/2022 07:59, by Amado Landa  Performed at:  [address] [address]   Phone [phone]   TROPONIN - Abnormal; Notable for the following components:    Troponin 0.07 (*)     All other components within normal limits    Narrative:     Performed at:  [address] [address]   Phone [phone]   LIPASE - Abnormal; Notable for the following components:    Lipase 70.0 (*)     All other components within normal limits    Narrative:     Sanna Dorado tel. [phone],  Chemistry results called to and read back by Patsy Hopkins RN,  02/07/2022 07:59, by Jose F Pike  Performed at:  [facility]  [address] [facility] [place] [email or web address] [address]   Phone [phone]   BRAIN NATRIURETIC PEPTIDE - Abnormal; Notable for the following components:    Pro-BNP 1,980 (*)     All other components within normal limits    Narrative:     Performed at:  [facility]  [address] [facility], [place] [email or web address] [address]   Phone [phone]   PROTIME-INR - Abnormal; Notable for the following components:    Protime 13.6 (*)     INR 1.19 (*)     All other components within normal limits    Narrative:     Performed at:  [facility]  [address] [place] [facility] [place] [facility] [email or web address] [address]   Phone [phone]   CULTURE, BLOOD 1   CULTURE, BLOOD 2   URINE RT REFLEX TO CULTURE   LACTATE, SEPSIS   LACTATE, SEPSIS   TYPE AND SCREEN    Narrative:     Performed at:  [facility]  [address] [facility] [place] [facility] [employer] [facility] [email or web address] [address]   Phone [phone]       All other labs were within normal range or not returned as of this dictation. EMERGENCY DEPARTMENT COURSE and DIFFERENTIAL DIAGNOSIS/MDM:   Vitals:    Vitals:    02/07/22 0930 02/07/22 0945 02/07/22 1000 02/07/22 1015   BP: 95/68 98/61 112/67 99/62   Pulse: 73 70 70 71   Resp:   17 14   Temp:       TempSrc:       SpO2: 93% 97% 100% 96%   Height:             MDM      The patient presents with rectal bleeding it was discovered this morning and his underpants. He does have dark red blood in the rectal vault and some bloody residue surrounding the perirectal area. No evidence of any hemorrhoids. Perianal area was nontender. No visible active bleeding and no melena. Initial blood pressure was 98/61. He had a brief drop in his blood pressure into the upper 80s. Without intervention repeat blood pressure was 90/51. He was given normal saline 500 mL bolus. Type and screen was ordered.     CT abdomen and pelvis without contrast was ordered. Given Protonix 40 mg IV. Given the presence of GI bleeding, suspect that his hypotension may be secondary to GI bleed, however, he has had recent urinary tract infection secondary to Enterococcus. He was given IV antibiotics to cover the possibility of sepsis as well. It should be noted that 30 mL/kg fluid bolus is contraindicated due to the patient's history of end-stage renal disease and dialysis dependence. REASSESSMENT          8:35 AM: Repeat blood pressure is 112/68. Hemoglobin is 8.6. His most recent hemoglobin was 9.5 on February 4, 2022. Hematocrit 26.1. Platelets 823. BUN 36. Creatinine 6.1. Potassium 3.8. Glucose 181. Troponin is 0.07. He denies any chest pain. EKG does not show any ischemic changes. 8:45 AM: CT abdomen and pelvis was reviewed. A call was placed to general surgery for consult. 10:45 AM: General surgery evaluated the patient in the emergency department. General surgery recommends admission to hospitalist service. A call was placed to the hospitalist on-call for admission. Patient remains awake and alert. He is currently hemodynamically stable. CRITICAL CARE TIME   Total Critical Care time was 40 minutes, excluding separately reportable procedures. There was a high probability of clinically significant/life threatening deterioration in the patient's condition which required my urgent intervention. CONSULTS:  IP CONSULT TO GENERAL SURGERY    PROCEDURES:  Unless otherwise noted below, none     Procedures        FINAL IMPRESSION      1. Rectal bleeding    2. Hypotension, unspecified hypotension type    3. End stage renal disease ([facility])          DISPOSITION/PLAN   DISPOSITION        PATIENT REFERRED TO:  No follow-up provider specified. DISCHARGE MEDICATIONS:  New Prescriptions    No medications on file     Controlled Substances Monitoring:     No flowsheet data found.     (Please note that portions of this note were completed with a voice recognition program.  Efforts were made to edit the dictations but occasionally words are mis-transcribed. )    1859 Alexandro Ceja DO (electronically signed)  Attending Emergency Physician          Idalmis North DO  02/07/22 1119

## 2022-02-07 NOTE — ED NOTES
Bed: B-07  Expected date:   Expected time:   Means of arrival:   Comments:  [address] [facility] tw rectal bleed     Tawny Beverly RN  02/07/22 8752

## 2022-02-07 NOTE — ED NOTES
[facility] HE WAS UNABLE TO GET BLOOD CULTURES AND HE CALLED LAB     Mendoza Cardoza RN  02/07/22 5866

## 2022-02-07 NOTE — PROGRESS NOTES
Pt arrived to floor via stretcher from ED and ambulated to to bed with 2 RN assist. Telemetry activated. Patient oriented to room and use of call light. Call light and personal items within reach. Admission and assessment initiated. POC and education initiated and reviewed with patient. Telemetry-  42. Pt is refusing Blood Cultures to Be drawn, MD is aware. Pt states he is in pain 6/10 in his abd. MD notified for pain medication, awaiting response. Denied further needs or questions at this time. Will continue to monitor.         Electronically signed by Matteo Nunez RN on 2/7/2022 at 3:22 PM

## 2022-02-07 NOTE — H&P
Hospital Medicine History & Physical      PCP: Catarina Martinezeliezer    Date of Admission: 2/7/2022    Date of Service: Pt seen/examined on 02/07/2022 and Admitted to Inpatient with expected LOS greater than two midnights due to medical therapy. Chief Complaint: Rectal bleed      History Of Present Illness:      77 y.o. male who presented to [facility] with rectal bleed, patient had an open sigmoid resection for adenocarcinoma on January 26 2020-second, presented to emergency department with rectal bleed, on presentation he was mildly hypotensive given IV fluid and he feels better and his blood pressure improved still confused, denies abdominal pain nausea vomiting at this time, denies fever chills cough. No family member at bedside being admitted for further management and treatment    Past Medical History:          Diagnosis Date    Arthritis     Cancer [facility])     Colon Cancer diagnosed last month    Diabetes mellitus ([facility] [address] [facility]     Hemodialysis patient ([facility] [facility]     Hypertension        Past Surgical History:          Procedure Laterality Date    COLECTOMY N/A 1/26/2022    SIGMOID COLECTOMY, SIGMOIDOSCOPY performed by Donald Hernandez MD at [address] [place] [address]  [address]     unsure of date       Medications Prior to Admission:      Prior to Admission medications    Medication Sig Start Date End Date Taking? Authorizing Provider   LORazepam (ATIVAN) 0.5 MG tablet Take 0.5 mg by mouth 2 times daily. Yes Historical Provider, MD   oxyCODONE (ROXICODONE) 5 MG immediate release tablet Take 1 tablet by mouth every 4 hours as needed for Pain for up to 3 days.  2/4/22 2/7/22 Yes Bernadette Alcaraz MD   polyethylene glycol (GLYCOLAX) 17 g packet Take 17 g by mouth daily as needed for Constipation 2/4/22 3/6/22 Yes Bernadette Alcaraz MD   docusate sodium (COLACE) 100 MG capsule Take 1 capsule by mouth daily for 7 days 2/5/22 2/12/22 Yes Bernadette Alcaraz MD   melatonin 3 MG TABS tablet Take 2 tablets by mouth nightly as needed (sleep) 2/4/22  Yes Vimal Chamberlain MD   tamsulosin (FLOMAX) 0.4 MG capsule Take 0.4 mg by mouth every morning   Yes Historical Provider, MD   gabapentin (NEURONTIN) 100 MG capsule Take 100 mg by mouth 3 times daily. Yes Historical Provider, MD   sevelamer (RENVELA) 800 MG tablet Take 1 tablet by mouth 3 times daily (with meals)   Yes Historical Provider, MD   clopidogrel (PLAVIX) 75 MG tablet Take 75 mg by mouth every evening   Yes Historical Provider, MD   atenolol (TENORMIN) 25 MG tablet Take 25 mg by mouth every evening   Yes Historical Provider, MD   pantoprazole (PROTONIX) 20 MG tablet Take 20 mg by mouth nightly   Yes Historical Provider, MD   atorvastatin (LIPITOR) 10 MG tablet Take 10 mg by mouth nightly   Yes Historical Provider, MD   calcitRIOL (ROCALTROL) 0.25 MCG capsule Take 0.25 mcg by mouth every evening   Yes Historical Provider, MD   aspirin 81 MG chewable tablet Take 81 mg by mouth daily   Yes Historical Provider, MD       Allergies:  Lisinopril    Social History:      The patient currently lives at home    TOBACCO:   reports that he has quit smoking. He has never used smokeless tobacco.  ETOH:   reports previous alcohol use. E-Cigarettes/Vaping Use     Questions Responses    E-Cigarette/Vaping Use     Start Date     Passive Exposure     Quit Date     Counseling Given     Comments             Family History:      Reviewed in detail and negative for DM, CAD    History reviewed. No pertinent family history. REVIEW OF SYSTEMS COMPLETED:   Pertinent positives as noted in the HPI. All other systems reviewed and negative. PHYSICAL EXAM PERFORMED:    /64   Pulse 64   Temp 98.7 °F (37.1 °C) (Oral)   Resp 15   Ht 6' 0.5\" (1.842 m)   SpO2 97%   BMI 27.84 kg/m²     General appearance:  No apparent distress  HEENT:  Normal cephalic  Neck: Supple  Respiratory:  Normal respiratory effort.  Clear to auscultation, bilaterally without Rales/Wheezes/Rhonchi. Cardiovascular:  Regular rate and rhythm with normal S1/S2 without murmurs, rubs or gallops. Abdomen: Soft, non-tender, non-distended postoperative wound without oozing or drainage. Musculoskeletal:  No clubbing, cyanosis   Skin: Skin color, texture, turgor normal.  No rashes or lesions. Neurologic: No focal weakness  Psychiatric: Confused  Capillary Refill: Brisk,3 seconds, normal  Peripheral Pulses: +2 palpable, equal bilaterally       Labs:     Recent Labs     02/07/22 0716   WBC 6.6   HGB 8.6*   HCT 26.1*        Recent Labs     02/07/22 0716      K 3.8   CL 96*   CO2 26   BUN 36*   CREATININE 6.1*   CALCIUM 8.2*     Recent Labs     02/07/22 0716   AST 7*   ALT <5*   BILITOT 0.5   ALKPHOS 92     Recent Labs     02/07/22 0716   INR 1.19*     Recent Labs     02/07/22 0716   TROPONINI 0.07*       Urinalysis:      Lab Results   Component Value Date    NITRU Negative 01/21/2022    WBCUA >900 01/21/2022    BACTERIA 2+ 01/21/2022    RBCUA 32 01/21/2022    BLOODU LARGE 01/21/2022    SPECGRAV 1.019 01/21/2022    GLUCOSEU Negative 01/21/2022       Radiology:         CT ABDOMEN PELVIS WO CONTRAST Additional Contrast? None   Final Result   1. Outpouching of the lumen of the colon to the left of the prior anastomosis   site. Recommend correlation with surgical history, as this could represent a   normal finding following end to side anastomosis. A contained anastomotic   leak cannot be excluded. 2. Mild inflammation in the adjacent peritoneal fat with several reactive   regional lymph nodes. 3. No evidence of pneumoperitoneum. 4. Cholelithiasis and splenomegaly are noted incidentally. 5. Multiple renal cysts including a stable hyperdense cyst on the left and a   partially calcified cyst on the right. No further follow-up of these   findings is necessary. XR CHEST PORTABLE   Final Result   Cardiomegaly without acute pulmonary process. ASSESSMENT:    Active Hospital Problems    Diagnosis Date Noted    Acute blood loss anemia [D62] 02/07/2022    Elevated troponin [R77.8] 02/07/2022    Malignant neoplasm of colon ([facility] [address] [C18.9]     GI bleed [K92.2] 01/14/2022    History of COVID-19 [Z86.16] 01/14/2022         PLAN:      1. Rectal bleed, likely due to lower GI bleed, likely not significant, but still with some drop in hemoglobin, likely postop, patient will be admitted to the hospital, monitor closely, GI and general surgery consulted, plan of care discussed with patient in details, all questions answered. His daughter Johnson Trinidad called based on his request all questions answered  2. Colon adenocarcinoma status post resection, general surgery consulted  3. History of Covid infection, stable for now  4. End-stage renal disease, nephrology consulted  5. Acute metabolic encephalopathy, monitor closely fluctuating  6. Elevated troponin, no chest pain, minimal, has been elevated in the past, likely due to end-stage renal disease  7. Recently diagnosed with acute CVA, will hold aspirin and Plavix for now pending GI and general surgery recommendations  8. Recently treated for Enterococcus faecalis UTI      Diet: No diet orders on file  Code Status: Prior      Dispo -inpatient 2 to 3 days       Manohar Ramesh MD    Thank you Elias Vincent for the opportunity to be involved in this patient's care. If you have any questions or concerns please feel free to contact me at [phone].

## 2022-02-07 NOTE — PROGRESS NOTES
4 Eyes Skin Assessment     NAME:  Zach Ford  YOB: 1945  MEDICAL RECORD NUMBER:  [id number]    The patient is being assess for  Admission    I agree that 2 RN's have performed a thorough Head to Toe Skin Assessment on the patient. ALL assessment sites listed below have been assessed. Areas assessed by both nurses:    Head, Face, Ears, Shoulders, Back, Chest, Arms, Elbows, Hands, Sacrum. Buttock, Coccyx, Ischium and Legs. Feet and Heels        Does the Patient have a Wound?  No noted wound(s)       [place] Prevention initiated:  Yes   Wound Care Orders initiated:  NA    Pressure Injury (Stage 3,4, Unstageable, DTI, NWPT, and Complex wounds) if present place consult order under [de-identified] NA    New and Established Ostomies if present place consult order under : NA      Nurse 1 eSignature: Electronically signed by Felix Pettit RN on 2/7/22 at 4:03 PM EST    **SHARE this note so that the co-signing nurse is able to place an eSignature**    Nurse 2 eSignature: Electronically signed by Regina Levi RN on 2/7/22 at 4:03 PM EST         \

## 2022-02-08 LAB
A/G RATIO: 1 (ref 1.1–2.2)
ALBUMIN SERPL-MCNC: 2.9 G/DL (ref 3.4–5)
ALBUMIN SERPL-MCNC: 3.1 G/DL (ref 3.4–5)
ALP BLD-CCNC: 84 U/L (ref 40–129)
ALT SERPL-CCNC: <5 U/L (ref 10–40)
ANION GAP SERPL CALCULATED.3IONS-SCNC: 14 MMOL/L (ref 3–16)
ANION GAP SERPL CALCULATED.3IONS-SCNC: 15 MMOL/L (ref 3–16)
AST SERPL-CCNC: 9 U/L (ref 15–37)
BILIRUB SERPL-MCNC: 0.6 MG/DL (ref 0–1)
BUN BLDV-MCNC: 46 MG/DL (ref 7–20)
BUN BLDV-MCNC: 47 MG/DL (ref 7–20)
CALCIUM SERPL-MCNC: 7.9 MG/DL (ref 8.3–10.6)
CALCIUM SERPL-MCNC: 8 MG/DL (ref 8.3–10.6)
CHLORIDE BLD-SCNC: 96 MMOL/L (ref 99–110)
CHLORIDE BLD-SCNC: 96 MMOL/L (ref 99–110)
CO2: 23 MMOL/L (ref 21–32)
CO2: 25 MMOL/L (ref 21–32)
CREAT SERPL-MCNC: 6.4 MG/DL (ref 0.8–1.3)
CREAT SERPL-MCNC: 6.6 MG/DL (ref 0.8–1.3)
GFR AFRICAN AMERICAN: 10
GFR AFRICAN AMERICAN: 10
GFR NON-AFRICAN AMERICAN: 8
GFR NON-AFRICAN AMERICAN: 9
GLUCOSE BLD-MCNC: 116 MG/DL (ref 70–99)
GLUCOSE BLD-MCNC: 123 MG/DL (ref 70–99)
HCT VFR BLD CALC: 23.9 % (ref 40.5–52.5)
HCT VFR BLD CALC: 24 % (ref 40.5–52.5)
HCT VFR BLD CALC: 24.2 % (ref 40.5–52.5)
HEMOGLOBIN: 7.9 G/DL (ref 13.5–17.5)
HEMOGLOBIN: 7.9 G/DL (ref 13.5–17.5)
HEMOGLOBIN: 8 G/DL (ref 13.5–17.5)
IRON SATURATION: 19 % (ref 20–50)
IRON: 24 UG/DL (ref 59–158)
LACTIC ACID: 1.4 MMOL/L (ref 0.4–2)
MCH RBC QN AUTO: 29.9 PG (ref 26–34)
MCHC RBC AUTO-ENTMCNC: 32.9 G/DL (ref 31–36)
MCV RBC AUTO: 90.7 FL (ref 80–100)
PDW BLD-RTO: 16 % (ref 12.4–15.4)
PHOSPHORUS: 4.6 MG/DL (ref 2.5–4.9)
PLATELET # BLD: 194 K/UL (ref 135–450)
PMV BLD AUTO: 7.4 FL (ref 5–10.5)
POTASSIUM REFLEX MAGNESIUM: 4.3 MMOL/L (ref 3.5–5.1)
POTASSIUM SERPL-SCNC: 4.1 MMOL/L (ref 3.5–5.1)
RBC # BLD: 2.65 M/UL (ref 4.2–5.9)
SODIUM BLD-SCNC: 134 MMOL/L (ref 136–145)
SODIUM BLD-SCNC: 135 MMOL/L (ref 136–145)
TOTAL IRON BINDING CAPACITY: 128 UG/DL (ref 260–445)
TOTAL PROTEIN: 5.9 G/DL (ref 6.4–8.2)
WBC # BLD: 5.1 K/UL (ref 4–11)

## 2022-02-08 PROCEDURE — 6370000000 HC RX 637 (ALT 250 FOR IP): Performed by: INTERNAL MEDICINE

## 2022-02-08 PROCEDURE — 99024 POSTOP FOLLOW-UP VISIT: CPT | Performed by: PHYSICIAN ASSISTANT

## 2022-02-08 PROCEDURE — 83540 ASSAY OF IRON: CPT

## 2022-02-08 PROCEDURE — 97530 THERAPEUTIC ACTIVITIES: CPT

## 2022-02-08 PROCEDURE — APPNB45 APP NON BILLABLE 31-45 MINUTES: Performed by: PHYSICIAN ASSISTANT

## 2022-02-08 PROCEDURE — 94761 N-INVAS EAR/PLS OXIMETRY MLT: CPT

## 2022-02-08 PROCEDURE — 80053 COMPREHEN METABOLIC PANEL: CPT

## 2022-02-08 PROCEDURE — 90935 HEMODIALYSIS ONE EVALUATION: CPT

## 2022-02-08 PROCEDURE — 83550 IRON BINDING TEST: CPT

## 2022-02-08 PROCEDURE — 2060000000 HC ICU INTERMEDIATE R&B

## 2022-02-08 PROCEDURE — 36415 COLL VENOUS BLD VENIPUNCTURE: CPT

## 2022-02-08 PROCEDURE — APPSS45 APP SPLIT SHARED TIME 31-45 MINUTES: Performed by: PHYSICIAN ASSISTANT

## 2022-02-08 PROCEDURE — 85014 HEMATOCRIT: CPT

## 2022-02-08 PROCEDURE — 85018 HEMOGLOBIN: CPT

## 2022-02-08 PROCEDURE — 97162 PT EVAL MOD COMPLEX 30 MIN: CPT

## 2022-02-08 PROCEDURE — 2580000003 HC RX 258: Performed by: INTERNAL MEDICINE

## 2022-02-08 PROCEDURE — 83605 ASSAY OF LACTIC ACID: CPT

## 2022-02-08 PROCEDURE — 85027 COMPLETE CBC AUTOMATED: CPT

## 2022-02-08 PROCEDURE — 97166 OT EVAL MOD COMPLEX 45 MIN: CPT

## 2022-02-08 PROCEDURE — 5A1D70Z PERFORMANCE OF URINARY FILTRATION, INTERMITTENT, LESS THAN 6 HOURS PER DAY: ICD-10-PCS | Performed by: INTERNAL MEDICINE

## 2022-02-08 RX ORDER — LIDOCAINE HYDROCHLORIDE 20 MG/ML
JELLY TOPICAL PRN
Status: DISCONTINUED | OUTPATIENT
Start: 2022-02-08 | End: 2022-02-11 | Stop reason: HOSPADM

## 2022-02-08 RX ADMIN — SODIUM CHLORIDE, PRESERVATIVE FREE 10 ML: 5 INJECTION INTRAVENOUS at 08:12

## 2022-02-08 RX ADMIN — PANTOPRAZOLE SODIUM 20 MG: 20 TABLET, DELAYED RELEASE ORAL at 20:21

## 2022-02-08 RX ADMIN — ATORVASTATIN CALCIUM 10 MG: 10 TABLET, FILM COATED ORAL at 20:21

## 2022-02-08 RX ADMIN — LORAZEPAM 0.5 MG: 0.5 TABLET ORAL at 08:09

## 2022-02-08 RX ADMIN — CALCITRIOL 0.25 MCG: 0.25 CAPSULE ORAL at 20:21

## 2022-02-08 RX ADMIN — GABAPENTIN 100 MG: 100 CAPSULE ORAL at 08:09

## 2022-02-08 RX ADMIN — TAMSULOSIN HYDROCHLORIDE 0.4 MG: 0.4 CAPSULE ORAL at 08:09

## 2022-02-08 RX ADMIN — GABAPENTIN 100 MG: 100 CAPSULE ORAL at 20:21

## 2022-02-08 RX ADMIN — SEVELAMER CARBONATE 800 MG: 800 TABLET, FILM COATED ORAL at 18:44

## 2022-02-08 RX ADMIN — SEVELAMER CARBONATE 800 MG: 800 TABLET, FILM COATED ORAL at 13:09

## 2022-02-08 RX ADMIN — GABAPENTIN 100 MG: 100 CAPSULE ORAL at 13:09

## 2022-02-08 RX ADMIN — SODIUM CHLORIDE, PRESERVATIVE FREE 10 ML: 5 INJECTION INTRAVENOUS at 20:21

## 2022-02-08 RX ADMIN — LORAZEPAM 0.5 MG: 0.5 TABLET ORAL at 20:21

## 2022-02-08 RX ADMIN — DOCUSATE SODIUM 100 MG: 100 CAPSULE, LIQUID FILLED ORAL at 08:09

## 2022-02-08 ASSESSMENT — PAIN SCALES - GENERAL
PAINLEVEL_OUTOF10: 0
PAINLEVEL_OUTOF10: 0

## 2022-02-08 NOTE — PROGRESS NOTES
Hospitalist Progress Note      PCP: Leona Marroquin    Date of Admission: 2/7/2022        Subjective: He is better, no further bleeding, denies fever chills cough shortness of breath abdominal pain nausea vomiting or diarrhea. Medications:  Reviewed    Infusion Medications    sodium chloride       Scheduled Medications    vancomycin  1,000 mg IntraVENous Once    sodium chloride flush  5-40 mL IntraVENous 2 times per day    atorvastatin  10 mg Oral Nightly    calcitRIOL  0.25 mcg Oral Nightly    docusate sodium  100 mg Oral Daily    gabapentin  100 mg Oral TID    LORazepam  0.5 mg Oral BID    pantoprazole  20 mg Oral Nightly    sevelamer  800 mg Oral TID WC    tamsulosin  0.4 mg Oral QAM     PRN Meds: sodium chloride flush, sodium chloride, ondansetron **OR** ondansetron, acetaminophen **OR** acetaminophen, oxyCODONE, melatonin, polyethylene glycol      Intake/Output Summary (Last 24 hours) at 2/8/2022 0740  Last data filed at 2/8/2022 0600  Gross per 24 hour   Intake 360 ml   Output --   Net 360 ml       Physical Exam Performed:    BP (!) 108/57   Pulse 64   Temp 98 °F (36.7 °C) (Oral)   Resp 18   Ht 6' (1.829 m)   Wt 212 lb 8.7 oz (96.4 kg)   SpO2 96%   BMI 28.83 kg/m²     General appearance: No apparent distress  Neck: Supple  Respiratory:  Normal respiratory effort. Clear to auscultation, bilaterally without Rales/Wheezes/Rhonchi. Cardiovascular: Regular rate and rhythm with normal S1/S2 without murmurs, rubs or gallops. Abdomen: Soft, non-tender, non-distended, postoperative wound noted. Musculoskeletal: No clubbing, cyanosis   Skin: Skin color, texture, turgor normal.  No rashes or lesions.   Neurologic: No focal weakness  Psychiatric: Alert and oriented  Capillary Refill: Brisk,3 seconds, normal   Peripheral Pulses: +2 palpable, equal bilaterally       Labs:   Recent Labs     02/07/22  0716 02/07/22  1728 02/07/22  2356   WBC 6.6  --   --    HGB 8.6* 8.0* 8.0*   HCT 26.1* 23.9* 24.2*     --   --      Recent Labs     02/07/22  0716      K 3.8   CL 96*   CO2 26   BUN 36*   CREATININE 6.1*   CALCIUM 8.2*     Recent Labs     02/07/22  0716   AST 7*   ALT <5*   BILITOT 0.5   ALKPHOS 92     Recent Labs     02/07/22  0716   INR 1.19*     Recent Labs     02/07/22  0716 02/07/22  1728   TROPONINI 0.07* 0.06*       Urinalysis:      Lab Results   Component Value Date    NITRU Negative 01/21/2022    WBCUA >900 01/21/2022    BACTERIA 2+ 01/21/2022    RBCUA 32 01/21/2022    BLOODU LARGE 01/21/2022    SPECGRAV 1.019 01/21/2022    GLUCOSEU Negative 01/21/2022       Radiology:  CT ABDOMEN PELVIS WO CONTRAST Additional Contrast? None   Final Result   1. Outpouching of the lumen of the colon to the left of the prior anastomosis   site. Recommend correlation with surgical history, as this could represent a   normal finding following end to side anastomosis. A contained anastomotic   leak cannot be excluded. 2. Mild inflammation in the adjacent peritoneal fat with several reactive   regional lymph nodes. 3. No evidence of pneumoperitoneum. 4. Cholelithiasis and splenomegaly are noted incidentally. 5. Multiple renal cysts including a stable hyperdense cyst on the left and a   partially calcified cyst on the right. No further follow-up of these   findings is necessary. XR CHEST PORTABLE   Final Result   Cardiomegaly without acute pulmonary process. Assessment/Plan:    Active Hospital Problems    Diagnosis     Acute blood loss anemia [D62]     Elevated troponin [R77.8]     Malignant neoplasm of colon ([facility]) [C18.9]     GI bleed [K92.2]     History of COVID-19 [Z86.16]      1.   Rectal bleed, likely due to lower GI bleed, likely not significant, but still with some drop in hemoglobin, likely postop, hemoglobin frequently being checked and relatively stable, to note that patient was given antibiotics in ED at this time I do not think patient is having the infectious process we will continue to monitor. 2.  Colon adenocarcinoma status post resection, general surgery consulted  3. Anemia, part of it likely acute blood loss anemia, relatively stable for now, GI and general surgery consulted   4. End-stage renal disease, nephrology consulted  5. Acute metabolic encephalopathy, improved this a.m.  6.  Elevated troponin, no chest pain, minimal, has been elevated in the past, likely due to end-stage renal disease, flat. 7.  Recently diagnosed with acute CVA, will hold aspirin and Plavix for now pending GI and general surgery recommendations  8. Recently treated for Enterococcus faecalis UTI  9. History of Covid infection, stable for now  10. Elevated lactic acid, likely due to anemia, will repeat  Diet: ADULT DIET;  Regular  Code Status: Full Code        Amalia Potts MD

## 2022-02-08 NOTE — PROGRESS NOTES
Occupational Therapy   Occupational Therapy Initial Assessment  Date: 2022   Patient Name: Norma Jones  MRN: [id number]     : 1945    Date of Service: 2022    Discharge Recommendations:  Patient would benefit from continued therapy after discharge,3-5 sessions per week  OT Equipment Recommendations  Other: defer to DC facility    Norma Jones scored a 16/24 on the AM-PAC ADL Inpatient form. Current research shows that an AM-PAC score of 17 or less is typically not associated with a discharge to the patient's home setting. Based on the patient's AM-PAC score and their current ADL deficits, it is recommended that the patient have 3-5 sessions per week of Occupational Therapy at d/c to increase the patient's independence. Please see assessment section for further patient specific details. If patient discharges prior to next session this note will serve as a discharge summary. Please see below for the latest assessment towards goals. Assessment   Performance deficits / Impairments: Decreased functional mobility ; Decreased strength;Decreased endurance;Decreased safe awareness;Decreased ADL status; Decreased high-level IADLs;Decreased balance;Decreased cognition  Assessment: 78 y/o male admit 2022 from nursing home with rectal bleeding. Pt with recent admitted - 2022 with LESLEE, GI Bleed; Weakness. 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 Recent dx Colon Ca and underwent open sigmoid resection with coloproctostomy on 22. Pt COVID + beginning of January. Prior to initial admit in  pt lived at home with spouse and was independent with ADLs and functional mobility. Today, pt required min A for transfers and min A for functional mobility around room with RW. Pt with functional UE ROM for self care and anticipate will require up to max A for ADLs at this time. Pt is functioning below baseline and benefit from skilled therapy.   Prognosis: Good  Decision Making: Medium Complexity  OT Education: OT Role;Transfer Training;Plan of Care  REQUIRES OT FOLLOW UP: Yes  Activity Tolerance  Activity Tolerance: Patient limited by fatigue  Safety Devices  Safety Devices in place: Yes  Type of devices: Nurse notified;Gait belt;Call light within reach; Chair alarm in place; Left in chair           Patient Diagnosis(es): The primary encounter diagnosis was Rectal bleeding. Diagnoses of Hypotension, unspecified hypotension type and End stage renal disease ([facility] [address] were also pertinent to this visit. has a past medical history of Arthritis, Cancer ([facility] [address] Diabetes mellitus ([facility] [address] Hemodialysis patient ([facility] [address] and Hypertension. has a past surgical history that includes IR PERC ARTERIOVENOUS FISTULA CREATION (Left) and colectomy (N/A, 1/26/2022). Restrictions  Restrictions/Precautions  Restrictions/Precautions: Fall Risk  Position Activity Restriction  Other position/activity restrictions: abdominal incision    Subjective   General  Chart Reviewed: Yes  Patient assessed for rehabilitation services?: Yes  Additional Pertinent Hx: 78 y/o male admit 2/7/2022 from nursing home with rectal bleeding. Pt with recent admitted 1/13- 2/4/2022 with LESLEE, GI Bleed; Weakness. 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 Recent dx Colon Ca and underwent open sigmoid resection with coloproctostomy on 1/26/22. Pt COVID + beginning of January. Family / Caregiver Present: No  Referring Practitioner: Dr. Figueroa Martinez: Pt seen bedside and agreeable to therapy. General Comment  Comments: Per RN ok for therapy.      Social/Functional History  Social/Functional History  Lives With: Spouse (Wife Pecola Landau))  Type of Home: Mobile home  Home Layout: One level  Home Access: Ramped entrance  Bathroom Shower/Tub: Walk-in shower  Bathroom Toilet: Handicap height  Bathroom Equipment: Grab bars in [address] Accessibility: [place]: Rolling walker  ADL Assistance: Independent  Homemaking Assistance:  (Spouse assists with most homemaking tasks)  Ambulation Assistance: Independent (with RW for longer distances)  Transfer Assistance: Independent  Active : No (medical transport to/from )  Occupation: Retired  Additional Comments: Pt with recent admit to [facility] 1/13-2/4 and DC to [facility] for therapy. Objective   Vision: Within Functional Limits  Hearing: Exceptions to [facility]  Hearing Exceptions: Hard of hearing/hearing concerns; No hearing aid    Orientation  Overall Orientation Status:  (stated it was January but knew 2022)  Orientation Level: Oriented to person;Oriented to place;Oriented to situation     Balance  Sitting Balance: Stand by assistance (EOB in prep for transfer)  Standing Balance: Contact guard assistance  Functional Mobility  Functional Mobility Comments: ambulated around bed to chair with Rw and min A     ADL  Additional Comments: Anticipate pt will require min A for LB bathing/dressing and toileting, SBA for UB bathing/dressing and grooming when seated based on balance and endurance observed        Bed mobility  Supine to Sit: Contact guard assistance (HOB elevated, use of bed rail)  Sit to Supine:  (pt in chair at end of session)     Transfers  Sit to stand: Minimal assistance  Stand to sit: Minimal assistance  Transfer Comments: stood from bed and chair > RW with min A. Cuing for anterior weight shift and hand placement     Cognition  Overall Cognitive Status: Exceptions  Arousal/Alertness: Appropriate responses to stimuli  Following Commands:  Follows one step commands consistently  Safety Judgement: Decreased awareness of need for safety;Decreased awareness of need for assistance  Problem Solving: Decreased awareness of errors  Perception  Overall Perceptual Status: WFL     Sensation  Overall Sensation Status: WFL        LUE AROM (degrees)  LUE AROM : WFL  Left Hand AROM (degrees)  Left Hand AROM: WFL  RUE AROM (degrees)  RUE AROM : WFL  Right Hand AROM (degrees)  Right Hand AROM: [facility]          Plan   Plan  Times per week: 3-5  Current Treatment Recommendations: Strengthening,Endurance Training,Balance Training,Gait Training,Functional Mobility Training,Self-Care / ADL    AM-PAC Score  AM-PAC Inpatient Daily Activity Raw Score: 16 (02/08/22 1504)  AM-PAC Inpatient ADL T-Scale Score : 35.96 (02/08/22 1504)  ADL Inpatient CMS 0-100% Score: 53.32 (02/08/22 1504)  ADL Inpatient CMS G-Code Modifier : CK (02/08/22 1504)    Goals  Short term goals  Time Frame for Short term goals: Prior to DC: Short term goal 1: Pt will complete ADL transfers with supervision  Short term goal 2: Pt will complete functional mobility with supervision  Short term goal 3: Pt will tolerate standing > 5 min for functional task with supervision  Short term goal 4: Pt will complete LB dressing with supervision  Short term goal 5: Pt will complete toileting with supervision  Patient Goals   Patient goals : to get stronger and return to PLOF       Therapy Time   Individual Concurrent Group Co-treatment   Time In 1420         Time Out 1458         Minutes 38         Timed Code Treatment Minutes: 23 Minutes     This note to serve as OT d/c summary if pt is d/c-ed prior to next therapy session.     JAK Yanes/L

## 2022-02-08 NOTE — CONSULTS
[address] [place] [address] Nephrology   [facility]  [phone]  Nephrology Consult Note          Patient ID: Marvel Moctezuma  Referring/ Physician: Jenna Ocampo MD      HPI/Summary:   Marvel Moctezuma is being seen by nephrology for ESRD. This is a 76 yo man who is admitted with rectal bleeding. He recently had colon resection for cancer 1/26. He was reportedly hypotensive and received IVFs. He was reported to be confused but when seen he was at his baseline mental status. He denies any chest pain no SOB. No edema. He has no dizziness or lightheadedness. Seen on dialysis  AVG working fine   Lactic acidosis resolved. Hgb stable now. Plan:   - HD per TTS schedule. - UF to dry weight. - retacrit 20 units TIW. Dry weight 94.4 kilos. Has TTS spot at [facility] unit        ESRD  TTS at [facility] , has not started there yet. Is originally from [facility] but staying in [place] for rehab so dialyzing with us for now. He has a left AV fistula, positive thrill and bruit       Electrolytes  No acute issues.     Hypertension  Blood pressure is acceptable. No changes. Hypotension has resolved.      SHPT  No acute issues.      GI bleed  Has known colon cancer  No active bleeding  Hemoglobin stable   General surgery consulted.   [facility] Nephrology would like to thank you for the opportunity to serve this patient. Please call with any questions or concerns. Ping Lozano MD  [facility] Nephrology  [facility]  [place], [address]  Fax: [phone]  Office: [phone]         CC/Reason for consult:   Reason for consult: ESRD    Chief Complaint   Patient presents with    Rectal Bleeding     Per aquad pt had blood in brief this morning           Review of Systems:   [address]. All other remaining systems are negative. Constitutional:  fever, chills, weakness, weight change, fatigue,      Skin:  rash, pruritus, hair loss, bruising, dry skin, petechiae.   Head, Face, Neck headaches, swelling,  cervical adenopathy. Respiratory: shortness of breath, cough, or wheezing  Cardiovascular: chest pain, palpitations, dizzy, edema  Gastrointestinal: nausea, vomiting, diarrhea, constipation,belly pain    Yellow skin, blood in stool  Musculoskeletal:  back pain, muscle weakness, gait problems,       joint pain or swelling. Genitourinary:  dysuria, poor urine flow, flank pain, blood in urine  Neurologic:  vertigo, TIA'S, syncope, seizures, focal weakness  Psychosocial:  insomnia, anxiety, or depression. Additional positive findings: -     PMH/SH/FH:    Medical Hx: reviewed and updated as appropriate  Past Medical History:   Diagnosis Date    Arthritis     Cancer ([facility]     Colon Cancer diagnosed last month    Diabetes mellitus ([facility]     Hemodialysis patient ([facility]     Hypertension          Surgical Hx: reviewed and updated as appropriate   has a past surgical history that includes IR PERC ARTERIOVENOUS FISTULA CREATION (Left) and colectomy (N/A, 1/26/2022). Social Hx: reviewed and updated as appropriate  Social History     Tobacco Use    Smoking status: Former Smoker    Smokeless tobacco: Never Used   Substance Use Topics    Alcohol use: Not Currently        Family hx: reviewed and updated as appropriate  family history is not on file. Medications:   sodium chloride flush, 5-40 mL, 2 times per day  atorvastatin, 10 mg, Nightly  calcitRIOL, 0.25 mcg, Nightly  docusate sodium, 100 mg, Daily  gabapentin, 100 mg, TID  LORazepam, 0.5 mg, BID  pantoprazole, 20 mg, Nightly  sevelamer, 800 mg, TID WC  tamsulosin, 0.4 mg, QAM       Lisinopril    Allergies:    Allergies   Allergen Reactions    Lisinopril      Allergy listed on paperwork from [facility], no reaction noted         Physical Exam/Objective:   Vitals:    02/08/22 1300   BP: 109/71   Pulse: 68   Resp: 16   Temp: 97.5 °F (36.4 °C)   SpO2:        Intake/Output Summary (Last 24 hours) at 2/8/2022 1340  Last data filed at 2/8/2022 1157  Gross per 24 hour   Intake 960 ml   Output 3300 ml   Net -2340 ml         General appearance: pleasant male in NAD  HEENT: no icterus, EOM intact, trachea midline. Neck : no masses, appears symmetrical and no JVD appreciated. Respiratory: Respiratory effort normal, bilateral equal chest rise. No wheeze, no crackles   Cardiovascular: Ausculation shows RRR and  no edema   Abdomen: abdomen is soft, non distended, no masses, no pain with palpation. Musculoskeletal:  no joint swelling, no deformity, strength grossly normal.   Skin: no rashes, no induration, no tightening, no jaundice   Neuro: Follows commands, moves all extremities spontaneously   AVG left arm + thrill and bruit      Data:   CBC:   Recent Labs     02/07/22  0716 02/07/22  1728 02/07/22  2356 02/08/22  0705 02/08/22  0835   WBC 6.6  --   --   --  5.1   HGB 8.6*   < > 8.0* 7.9* 7.9*   HCT 26.1*   < > 24.2* 23.9* 24.0*     --   --   --  194    < > = values in this interval not displayed.      BMP:    Recent Labs     02/07/22  0716 02/08/22  0705 02/08/22  0835    135* 134*   K 3.8 4.3 4.1   CL 96* 96* 96*   CO2 26 25 23   BUN 36* 47* 46*   CREATININE 6.1* 6.4* 6.6*   GLUCOSE 181* 116* 123*   PHOS  --   --  4.6

## 2022-02-08 NOTE — CONSULTS
GASTROENTEROLOGY INPATIENT CONSULTATION        IDENTIFYING DATA/REASON FOR CONSULTATION   PATIENT:  Radhika Mcdermott  MRN:  [id number]  ADMIT DATE: 2/7/2022  TIME OF EVALUATION: 2/8/2022 8:48 AM  HOSPITAL STAY:   LOS: 1 day     REASON FOR CONSULTATION:  GI bleed    HISTORY OF PRESENT ILLNESS   Radhika Mcdermott is a 77 y.o. male with a PMH of  Colon adenocarcinoma dx 2 months ago s/p sigmoid resection (1/26/22), DM, ESRD on HD, HTN and arthritis who presented on 2/7/2022 with rectal bleed. We have been consulted regarding same. Today, patient evaluated for rectal bleed that started yesterday morning. Upon presentation to the ED he was mildly hypotensive and given IV fluids. Today he reports feeling well. Denies melena, abdominal pain, nausea or vomiting. He is anticoagulated on aspirin and Plavix. Labs: BUN 36-->47, Cr. 6.1-->6.4, Hgb 7.9, WBC 5.1. CT abdomen and Pelvis without contrast 2/7/22:     1. Outpouching of the lumen of the colon to the left of the prior anastomosis   site.  Recommend correlation with surgical history, as this could represent a   normal finding following end to side anastomosis.  A contained anastomotic   leak cannot be excluded. 2. Mild inflammation in the adjacent peritoneal fat with several reactive   regional lymph nodes. 3. No evidence of pneumoperitoneum. 4. Cholelithiasis and splenomegaly are noted incidentally. 5. Multiple renal cysts including a stable hyperdense cyst on the left and a   partially calcified cyst on the right.  No further follow-up of these   findings is necessary. Chest XRAY 2/7/22:      Cardiomegaly without acute pulmonary process. Prior Endoscopic Evaluations: 1 month ago as reported by patient and family. No access to records.     PAST MEDICAL, SURGICAL, FAMILY, and SOCIAL HISTORY     Past Medical History:   Diagnosis Date    Arthritis     Cancer [facility])     Colon Cancer diagnosed last month    [facility]     Hemodialysis patient ([facility]     Hypertension      Past Surgical History:   Procedure Laterality Date    COLECTOMY N/A 1/26/2022    SIGMOID COLECTOMY, SIGMOIDOSCOPY performed by Candelaria Hurtado MD at [address]     unsure of date     History reviewed. No pertinent family history. Social History     Socioeconomic History    Marital status:      Spouse name: None    Number of children: None    Years of education: None    Highest education level: None   Occupational History    None   Tobacco Use    Smoking status: Former Smoker    Smokeless tobacco: Never Used   Substance and Sexual Activity    Alcohol use: Not Currently    Drug use: None    Sexual activity: None   Other Topics Concern    None   Social History Narrative    None     Social Determinants of Health     Financial Resource Strain:     Difficulty of Paying Living Expenses: Not on file   Food Insecurity:     Worried About Running Out of Food in the Last Year: Not on file    Brooklynn of Food in the Last Year: Not on file   Transportation Needs:     Lack of Transportation (Medical): Not on file    Lack of Transportation (Non-Medical):  Not on file   Physical Activity:     Days of Exercise per Week: Not on file    Minutes of Exercise per Session: Not on file   Stress:     Feeling of Stress : Not on file   Social Connections:     Frequency of Communication with Friends and Family: Not on file    Frequency of Social Gatherings with Friends and Family: Not on file    Attends Zoroastrianism Services: Not on file    Active Member of Clubs or Organizations: Not on file    Attends Club or Organization Meetings: Not on file    Marital Status: Not on file   Intimate Partner Violence:     Fear of Current or Ex-Partner: Not on file    Emotionally Abused: Not on file    Physically Abused: Not on file    Sexually Abused: Not on file   Housing Stability:     Unable to Pay for Housing in the Last Year: Not on file  Number of Places Lived in the Last Year: Not on file    Unstable Housing in the Last Year: Not on file       MEDICATIONS   SCHEDULED:  vancomycin, 1,000 mg, Once  sodium chloride flush, 5-40 mL, 2 times per day  atorvastatin, 10 mg, Nightly  calcitRIOL, 0.25 mcg, Nightly  docusate sodium, 100 mg, Daily  gabapentin, 100 mg, TID  LORazepam, 0.5 mg, BID  pantoprazole, 20 mg, Nightly  sevelamer, 800 mg, TID WC  tamsulosin, 0.4 mg, QAM      FLUIDS/DRIPS:     sodium chloride       PRNs: sodium chloride flush, 5-40 mL, PRN  sodium chloride, 25 mL, PRN  ondansetron, 4 mg, Q8H PRN   Or  ondansetron, 4 mg, Q6H PRN  acetaminophen, 650 mg, Q6H PRN   Or  acetaminophen, 650 mg, Q6H PRN  oxyCODONE, 5 mg, Q6H PRN  melatonin, 6 mg, Nightly PRN  polyethylene glycol, 17 g, Daily PRN      ALLERGIES:  He   Allergies   Allergen Reactions    Lisinopril      Allergy listed on paperwork from [address] [place] [address], no reaction noted       REVIEW OF SYSTEMS   Pertinent ROS noted in HPI    PHYSICAL EXAM     Vitals:    02/08/22 0016 02/08/22 0325 02/08/22 0353 02/08/22 0745   BP: (!) 101/53  (!) 108/57 105/62   Pulse: 58  64 64   Resp: 18  18 20   Temp: 98.2 °F (36.8 °C)  98 °F (36.7 °C) 98 °F (36.7 °C)   TempSrc: Oral  Oral Oral   SpO2: 94%  96% 95%   Weight:  212 lb 8.7 oz (96.4 kg)     Height:           I/O last 3 completed shifts: In: 360 [P.O.:360]  Out: -       Physical Exam:  General appearance: alert, cooperative, no distress, appears stated age  Eyes: Anicteric  Head: Normocephalic, without obvious abnormality  Lungs: clear to auscultation bilaterally, Normal Effort  Heart: regular rate and rhythm, normal S1 and S2, no murmurs or rubs  Abdomen: soft, non-distended, non-tender. Bowel sounds normal. No masses,  no organomegaly.    Extremities: atraumatic, no cyanosis or edema  Skin: warm and dry, no jaundice  Neuro: Grossly intact, A&OX3      LABS AND IMAGING   Laboratory   Recent Labs     02/07/22  0716 02/07/22  0561 02/07/22  1728 02/07/22  2356 02/08/22  0705   WBC 6.6  --   --   --   --    HGB 8.6*   < > 8.0* 8.0* 7.9*   HCT 26.1*   < > 23.9* 24.2* 23.9*   MCV 90.8  --   --   --   --      --   --   --   --     < > = values in this interval not displayed. Recent Labs     02/07/22  0716      K 3.8   CL 96*   CO2 26   BUN 36*   CREATININE 6.1*     Recent Labs     02/07/22  0716   AST 7*   ALT <5*   BILITOT 0.5   ALKPHOS 92     Recent Labs     02/07/22  0716   LIPASE 70.0*     Recent Labs     02/07/22 0716   PROTIME 13.6*   INR 1.19*       Imaging  CT ABDOMEN PELVIS WO CONTRAST Additional Contrast? None   Final Result   1. Outpouching of the lumen of the colon to the left of the prior anastomosis   site. Recommend correlation with surgical history, as this could represent a   normal finding following end to side anastomosis. A contained anastomotic   leak cannot be excluded. 2. Mild inflammation in the adjacent peritoneal fat with several reactive   regional lymph nodes. 3. No evidence of pneumoperitoneum. 4. Cholelithiasis and splenomegaly are noted incidentally. 5. Multiple renal cysts including a stable hyperdense cyst on the left and a   partially calcified cyst on the right. No further follow-up of these   findings is necessary. XR CHEST PORTABLE   Final Result   Cardiomegaly without acute pulmonary process. ASSESSMENT AND RECOMMENDATIONS   Rocío Stevenson is a 77 y.o. male with a PMH of  colon adenocarcinoma dx 2 months ago s/p sigmoid resection 1/26/22, DM, ESRD on HD, HTN, and arthritiswho presented on 2/7/2022 with rectal bleed. We have been consulted regarding sane. IMPRESSION:    1. GI bleed. Patient presenting with 1x episode of hematochezia yesterday morning. Underwent sigmoid resection on 1/26/22 for colon adenocarcinoma. Is anticoagulated with aspirin and plavix. Patient was asymptomatic at the time of evaluation. Hgb stable 7.9.   CT scan did not discern any acute findings. No evidence of active hemorrhaging. Patient appears to be stable at this time. DDX mild post-op bleed in setting of blood thinner use, diverticula, hemmorrhoids. 2. Colon adenocarcinoma s/p resection 1/26/22. Surgery following. 3. Recent CVA. Plavix on hold 2/2 #1    RECOMMENDATIONS:    -Continue supportive care  -Trend H&H and monitor for ongoing rectal bleeding  -Plan to observe for now  -Please await further input by Dr. Eda Castrejon        If you have any questions or need any further information, please feel free to contact our consult team.  Thank you for allowing us to participate in the care of Katie Klein.    The note was completed using Dragon voice recognition transcription. Every effort was made to ensure accuracy; however, inadvertent transcription errors may be present despite my best efforts to edit errors.     Soham Chandler PA-student  Samanta Michele PA-C

## 2022-02-08 NOTE — PROGRESS NOTES
Physical Therapy    Facility/Department: hospitals 6 PROGRESSIVE CARE  Initial Assessment    NAME: Zach Ford  :   MRN: [id number]    Date of Service: 2022    Discharge Recommendations:  Continue to assess pending progress,3-5 sessions per week,Patient would benefit from continued therapy after discharge   PT Equipment Recommendations  Other: Cont to assess   Zach Ford scored a 16/24 on the AM-PAC short mobility form. Current research shows that an AM-PAC score of 17 or less is typically not associated with a discharge to the patient's home setting. Based on the patient's AM-PAC score and their current functional mobility deficits, it is recommended that the patient have 3-5 sessions per week of Physical Therapy at d/c to increase the patient's independence. Please see assessment section for further patient specific details. If patient discharges prior to next session this note will serve as a discharge summary. Please see below for the latest assessment towards goals. Assessment   Body structures, Functions, Activity limitations: Decreased functional mobility ; Decreased strength;Decreased endurance;Decreased balance  Assessment: Pt is a 77 y.o. male with a PMHx of Arthritis, Cancer, Diabetes mellitus and Hypertension. Pt admitted to [facility] due to a rectal bleed. Patient had an open sigmoid resection for adenocarcinoma on 22 and was discharged to [facility]. Pt brought back to [facility] due to rectal bleeding concerns prior to starting low intensity therapy at [facility]. Today, , pt with continued LE weakness, and impaired functional mobility. Pt required CGA for bed mobility, Shelton for transfers and Shelton for ambulation using RW> Pt required frequent cueing for safety and with mild-moderately unsteady while ambulating. At this time, recommend pt continues skilled low intensity therapy to address LE strength and functional mobility deficits. WIll continue to assess.   Treatment Diagnosis: Generalized weakness  Prognosis: Good;Fair  Decision Making: Medium Complexity  History: See below  Exam: See below  Clinical Presentation: Evolving  PT Education: Goals;PT Role;Transfer Training;Plan of Care;General Safety;Weight-bearing Education; Functional Mobility Training  Patient Education: use of call light  REQUIRES PT FOLLOW UP: Yes  Activity Tolerance  Activity Tolerance: Patient Tolerated treatment well;Patient limited by endurance       Patient Diagnosis(es): The primary encounter diagnosis was Rectal bleeding. Diagnoses of Hypotension, unspecified hypotension type and End stage renal disease ([facility] were also pertinent to this visit. has a past medical history of Arthritis, Cancer ([facility] Diabetes mellitus ([facility] Hemodialysis patient ([facility] and Hypertension. has a past surgical history that includes IR PERC ARTERIOVENOUS FISTULA CREATION (Left) and colectomy (N/A, 1/26/2022). Restrictions  Restrictions/Precautions  Restrictions/Precautions: Fall Risk  Position Activity Restriction  Other position/activity restrictions: abdominal incision     Vision/Hearing  Vision: Within Functional Limits  Hearing: Exceptions to [facility] Exceptions: Hard of hearing/hearing concerns; No hearing aid       Subjective  General  Chart Reviewed: Yes  Patient assessed for rehabilitation services?: Yes  Additional Pertinent Hx: Per H&P \"66 y.o. male who presented to [facility] with rectal bleed, patient had an open sigmoid resection for adenocarcinoma on January 26 2020-second, presented to emergency department with rectal bleed, on presentation he was mildly hypotensive given IV fluid and he feels better and his blood pressure improved still confused, denies abdominal pain nausea vomiting at this time, denies fever chills cough.   No family member at bedside being admitted for further management and treatment\"  Response To Previous Treatment: Not applicable  Family / Caregiver Present: Yes (daughter and ALEJO)  Referring Practitioner: Handy Hatfield MD  Referral Date : 02/07/22  Diagnosis: rectal bleed  Subjective  Subjective: Pt supine in bed and on the phone upon arrival. Pt pleasant and agreeable to therapy evaluation. Denies any pain at this time. Pain Screening  Patient Currently in Pain: Denies          Orientation  Orientation  Orientation Level: Oriented to person;Oriented to place;Oriented to situation;Oriented to time (incorrect month. Increased time to answer all questions)     Social/Functional History  Social/Functional History  Lives With: Spouse (Wife Marva Keating))  Type of Home: Mobile home  Home Layout: One level  Home Access: Ramped entrance  Bathroom Shower/Tub: Walk-in shower  Bathroom Toilet: Handicap height  Bathroom Equipment: Grab bars in [address] Accessibility: Resendiz Aj: Rolling walker  ADL Assistance: [facility] [address]:  (Spouse assists with most homemaking tasks)  Ambulation Assistance: Independent (with RW for longer distances)  Transfer Assistance: Independent  Active : No (medical transport to/from )  Occupation: Retired  Additional Comments: Pt with recent admit to [facility] 1/13-2/4 and DC to [facility] for therapy.     Objective  AROM RLE (degrees)  RLE AROM: WFL  AROM LLE (degrees)  LLE AROM : WFL  Strength RLE  Strength RLE: WFL  Comment: hip, knee, ankle grossly >3/5  Strength LLE  Strength LLE: WFL  Comment: hip, knee, ankle grossly >3/5  Tone RLE  RLE Tone: Normotonic  Tone LLE  LLE Tone: Normotonic  Motor Control  Gross Motor?: WFL  Sensation  Overall Sensation Status: WFL  Bed mobility  Supine to Sit: Contact guard assistance (HOB elevated, use of bed rail)  Sit to Supine:  (pt in chair at end of session)  Transfers  Sit to Stand: Minimal Assistance (cues for hand placement; from EOB and recliner to RW)  Stand to sit: Minimal Assistance (From RW to recliner)  Bed to Chair: Contact guard assistance;Minimal assistance (with RW)  Ambulation  Ambulation?: Yes  Ambulation 1  Surface: level tile  Device: Rolling Walker  Assistance: Minimal assistance  Quality of Gait: Increased KEY, mild-moderately unsteady, short small steps, poor RW management (picks up RW for turns), step-to pattern  Gait Deviations: Slow Tierra; Increased KEY; Decreased step length;Decreased step height  Distance: 27' with several turns  Comments: cues throughout for positioning and management of RW     Balance  Sitting - Static: Good  Sitting - Dynamic: Good;-  Standing - Static: Fair;+ (CGA at RW)  Standing - Dynamic: Fair;- (Shelton with RW)        Plan   Plan  Times per week: 3-5x  Current Treatment Recommendations: Strengthening,Transfer Training,Balance Training,Endurance Training,Gait Training,Functional Mobility Training,Patient/Caregiver Education & Training  Safety Devices  Type of devices: All fall risk precautions in place,Gait belt,Call light within reach,Patient at risk for falls,Chair alarm in place,Nurse notified,Left in chair    AM-PAC Score  AM-PAC Inpatient Mobility Raw Score : 16 (02/08/22 1511)  AM-PAC Inpatient T-Scale Score : 40.78 (02/08/22 1511)  Mobility Inpatient CMS 0-100% Score: 54.16 (02/08/22 1511)  Mobility Inpatient CMS G-Code Modifier : CK (02/08/22 1511)          Goals  Short term goals  Time Frame for Short term goals:  While in acute care  Short term goal 1: Bed mobility with supervision  Short term goal 2: Transfers with SBA  Short term goal 3: Amb 48' with RW and SBA  Long term goals  Time Frame for Long term goals : STG=LTG  Patient Goals   Patient goals : to regain his LE strength       Therapy Time   Individual Concurrent Group Co-treatment   Time In 1420         Time Out 1503         Minutes 43               Electronically signed by John Lance on 2/8/2022 at 3:13 PM

## 2022-02-08 NOTE — PROGRESS NOTES
General and Vascular Surgery                                                           Daily Progress Note                                                             Josie Ceron PA-C     Pt Name: Gregory Sullivan  Medical Record Number: [id number]  Date of Birth 1945   Today's Date: 2/8/2022    ASSESSMENT/PLAN  1. S/P open sigmoid resection with coloproctostomy for an adenocarcinoma of the sigmoid colon on 1/26/22  2. Rectal bleeding at home. No further bleeding   3. LActid acid improved: 2.4-->1.4  4. Hgb: 8.6-->8.0-->7.9-->7.9  5. CKD. HD this AM  6. Hypotension improved with IVF  7. Monitor for any further bleeding and monitor blood pressure  8. Denies any abdominal pain. Incision site looks good. Staples intake. Will be post op 2 weeks tomorrow. Possible D/C staple before patient is discharged. EDUCATION  Patient educated about their illness/diagnosis, stated above, and all questions answered. We discussed the importance of nutrition, medications they are taking, and healthy lifestyle. Mohan Ham has improved from yesterday. Pain is well controlled. He has no nausea and no vomiting. OBJECTIVE  VITALS:  height is 6' (1.829 m) and weight is 216 lb 4.3 oz (98.1 kg). His temperature is 97.4 °F (36.3 °C). His blood pressure is 122/61 and his pulse is 64. His respiration is 16 and oxygen saturation is 95%. VITALS:  /61   Pulse 64   Temp 97.4 °F (36.3 °C)   Resp 16   Ht 6' (1.829 m)   Wt 216 lb 4.3 oz (98.1 kg)   SpO2 95%   BMI 29.33 kg/m²   GENERAL: some confusion, no distress  ABDOMEN: soft, non-tender and non-distended, incision site ok  I/O last 3 completed shifts: In: 360 [P.O.:360]  Out: -   No intake/output data recorded.     LABS  Recent Labs     02/07/22  0716 02/07/22  0716 02/07/22  1728 02/08/22  0705 02/08/22  0835   WBC 6.6   < >  --   --  5.1   HGB 8.6*   < >   < > 7.9* 7.9*   HCT 26.1*   < >   < > 23.9* 24.0*   PLT 213   < >  --   --  194      < >  --  135* 134*   K 3.8  --   --  4.3 4.1   CL 96*   < >  --  96* 96*   CO2 26   < >  --  25 23   BUN 36*   < >  --  47* 46*   CREATININE 6.1*   < >  --  6.4* 6.6*   PHOS  --   --   --   --  4.6   CALCIUM 8.2*   < >  --  7.9* 8.0*   INR 1.19*  --   --   --   --    AST 7*  --   --  9*  --    ALT <5*  --   --  <5*  --    BILITOT 0.5  --   --  0.6  --     < > = values in this interval not displayed.      CBC:   Lab Results   Component Value Date    WBC 5.1 02/08/2022    RBC 2.65 02/08/2022    HGB 7.9 02/08/2022    HCT 24.0 02/08/2022    MCV 90.7 02/08/2022    MCH 29.9 02/08/2022    MCHC 32.9 02/08/2022    RDW 16.0 02/08/2022     02/08/2022    MPV 7.4 02/08/2022     CMP:    Lab Results   Component Value Date     02/08/2022    K 4.1 02/08/2022    K 4.3 02/08/2022    CL 96 02/08/2022    CO2 23 02/08/2022    BUN 46 02/08/2022    CREATININE 6.6 02/08/2022    GFRAA 10 02/08/2022    GFRAA 35 07/26/2011    AGRATIO 1.0 02/08/2022    LABGLOM 8 02/08/2022    GLUCOSE 123 02/08/2022    PROT 5.9 02/08/2022    PROT 7.5 07/26/2011    LABALBU 3.1 02/08/2022    CALCIUM 8.0 02/08/2022    BILITOT 0.6 02/08/2022    ALKPHOS 84 02/08/2022    AST 9 02/08/2022    ALT <5 02/08/2022         Drew Tripathi PA-C  Electronically signed 2/8/2022 at 10:11 AM

## 2022-02-08 NOTE — FLOWSHEET NOTE
Treatment time: 3 hours  Net UF: 2700 ml    Pre weight: 98.1 kg   Post weight: 95.8 kg  EDW: 94.5 kg    Access used: Left forearm AVG  Access function: Good with  ml/min    Medications or blood products given: no    Regular outpatient schedule: T.T.S    Summary of response to treatment: PT tolerated ok with HD, vital signs stable, HD system clotted once, blood returned ( no blood lost), HD completed in full, hemotasis less than 10 minutes, thrill and bruit are present. Copy of dialysis treatment record placed in chart, to be scanned into EMR.     02/08/22 0833 02/08/22 1157   Vital Signs   /61 131/63   Temp 97.4 °F (36.3 °C) 97.7 °F (36.5 °C)   Pulse 64 66   Resp 16 16   SpO2 92 %  --    Weight 216 lb 4.3 oz (98.1 kg) 211 lb 3.2 oz (95.8 kg)   Weight Method Actual;Bed scale Actual;Bed scale   Percent Weight Change 1.75 -2.34   Dry Weight 208 lb 5.4 oz (94.5 kg)  --    Post-Hemodialysis Assessment   Post-Treatment Procedures  --  Blood returned; Access bleeding time < 10 minutes   Machine Disinfection Process  --  Acid/Vinegar Clean;Heat Disinfect; Exterior Machine Disinfection   Rinseback Volume (ml)  --  300 ml   Total Liters Processed (l/min)  --  64 l/min   Dialyzer Clearance  --  Moderately streaked   Duration of Treatment (minutes)  --  180 minutes   Heparin amount administered during treatment (units)  --  0 units   Hemodialysis Intake (ml)  --  600 ml   Hemodialysis Output (ml)  --  3300 ml   NET Removed (ml)  --  2700 ml   Tolerated Treatment  --  Good

## 2022-02-08 NOTE — PROGRESS NOTES
Occupational Therapy      OT order received. Pt off floor at dialysis at time of attempt. Will follow up as schedule and pt condition permit.     Electronically signed by Mortimer Showers, OT on 2/8/2022 at 9:46 AM

## 2022-02-09 LAB
HCT VFR BLD CALC: 25 % (ref 40.5–52.5)
HCT VFR BLD CALC: 27.4 % (ref 40.5–52.5)
HCT VFR BLD CALC: 29 % (ref 40.5–52.5)
HEMOGLOBIN: 8.2 G/DL (ref 13.5–17.5)
HEMOGLOBIN: 9 G/DL (ref 13.5–17.5)
HEMOGLOBIN: 9.4 G/DL (ref 13.5–17.5)
MCH RBC QN AUTO: 29.5 PG (ref 26–34)
MCHC RBC AUTO-ENTMCNC: 32.6 G/DL (ref 31–36)
MCV RBC AUTO: 90.3 FL (ref 80–100)
PDW BLD-RTO: 16.1 % (ref 12.4–15.4)
PLATELET # BLD: 202 K/UL (ref 135–450)
PMV BLD AUTO: 7.6 FL (ref 5–10.5)
RBC # BLD: 2.77 M/UL (ref 4.2–5.9)
WBC # BLD: 4.4 K/UL (ref 4–11)

## 2022-02-09 PROCEDURE — 99024 POSTOP FOLLOW-UP VISIT: CPT | Performed by: PHYSICIAN ASSISTANT

## 2022-02-09 PROCEDURE — 85014 HEMATOCRIT: CPT

## 2022-02-09 PROCEDURE — 97530 THERAPEUTIC ACTIVITIES: CPT

## 2022-02-09 PROCEDURE — 94761 N-INVAS EAR/PLS OXIMETRY MLT: CPT

## 2022-02-09 PROCEDURE — 97535 SELF CARE MNGMENT TRAINING: CPT

## 2022-02-09 PROCEDURE — 6370000000 HC RX 637 (ALT 250 FOR IP): Performed by: INTERNAL MEDICINE

## 2022-02-09 PROCEDURE — 2580000003 HC RX 258: Performed by: INTERNAL MEDICINE

## 2022-02-09 PROCEDURE — 36415 COLL VENOUS BLD VENIPUNCTURE: CPT

## 2022-02-09 PROCEDURE — 85027 COMPLETE CBC AUTOMATED: CPT

## 2022-02-09 PROCEDURE — APPSS45 APP SPLIT SHARED TIME 31-45 MINUTES: Performed by: PHYSICIAN ASSISTANT

## 2022-02-09 PROCEDURE — 2060000000 HC ICU INTERMEDIATE R&B

## 2022-02-09 PROCEDURE — APPNB45 APP NON BILLABLE 31-45 MINUTES: Performed by: PHYSICIAN ASSISTANT

## 2022-02-09 PROCEDURE — 85018 HEMOGLOBIN: CPT

## 2022-02-09 RX ORDER — ASPIRIN 81 MG/1
81 TABLET, CHEWABLE ORAL DAILY
Qty: 30 TABLET | Refills: 0
Start: 2022-02-11

## 2022-02-09 RX ORDER — 0.9 % SODIUM CHLORIDE 0.9 %
250 INTRAVENOUS SOLUTION INTRAVENOUS ONCE
Status: COMPLETED | OUTPATIENT
Start: 2022-02-09 | End: 2022-02-09

## 2022-02-09 RX ORDER — CLOPIDOGREL BISULFATE 75 MG/1
75 TABLET ORAL EVERY EVENING
Qty: 30 TABLET | Refills: 0 | Status: ON HOLD
Start: 2022-02-11 | End: 2022-02-16 | Stop reason: HOSPADM

## 2022-02-09 RX ORDER — 0.9 % SODIUM CHLORIDE 0.9 %
500 INTRAVENOUS SOLUTION INTRAVENOUS ONCE
Status: DISCONTINUED | OUTPATIENT
Start: 2022-02-09 | End: 2022-02-09

## 2022-02-09 RX ADMIN — PANTOPRAZOLE SODIUM 20 MG: 20 TABLET, DELAYED RELEASE ORAL at 20:55

## 2022-02-09 RX ADMIN — SEVELAMER CARBONATE 800 MG: 800 TABLET, FILM COATED ORAL at 11:58

## 2022-02-09 RX ADMIN — TAMSULOSIN HYDROCHLORIDE 0.4 MG: 0.4 CAPSULE ORAL at 08:06

## 2022-02-09 RX ADMIN — LORAZEPAM 0.5 MG: 0.5 TABLET ORAL at 20:55

## 2022-02-09 RX ADMIN — SEVELAMER CARBONATE 800 MG: 800 TABLET, FILM COATED ORAL at 16:09

## 2022-02-09 RX ADMIN — SODIUM CHLORIDE, PRESERVATIVE FREE 10 ML: 5 INJECTION INTRAVENOUS at 20:55

## 2022-02-09 RX ADMIN — SODIUM CHLORIDE, PRESERVATIVE FREE 10 ML: 5 INJECTION INTRAVENOUS at 08:07

## 2022-02-09 RX ADMIN — SODIUM CHLORIDE 250 ML: 9 INJECTION, SOLUTION INTRAVENOUS at 11:45

## 2022-02-09 RX ADMIN — GABAPENTIN 100 MG: 100 CAPSULE ORAL at 13:39

## 2022-02-09 RX ADMIN — SEVELAMER CARBONATE 800 MG: 800 TABLET, FILM COATED ORAL at 08:06

## 2022-02-09 RX ADMIN — GABAPENTIN 100 MG: 100 CAPSULE ORAL at 20:55

## 2022-02-09 RX ADMIN — CALCITRIOL 0.25 MCG: 0.25 CAPSULE ORAL at 20:55

## 2022-02-09 RX ADMIN — LORAZEPAM 0.5 MG: 0.5 TABLET ORAL at 08:07

## 2022-02-09 RX ADMIN — GABAPENTIN 100 MG: 100 CAPSULE ORAL at 08:06

## 2022-02-09 RX ADMIN — DOCUSATE SODIUM 100 MG: 100 CAPSULE, LIQUID FILLED ORAL at 08:06

## 2022-02-09 RX ADMIN — ATORVASTATIN CALCIUM 10 MG: 10 TABLET, FILM COATED ORAL at 20:55

## 2022-02-09 ASSESSMENT — PAIN SCALES - GENERAL
PAINLEVEL_OUTOF10: 0
PAINLEVEL_OUTOF10: 0

## 2022-02-09 ASSESSMENT — PAIN SCALES - WONG BAKER: WONGBAKER_NUMERICALRESPONSE: 0

## 2022-02-09 NOTE — PLAN OF CARE
Problem: Falls - Risk of:  Goal: Will remain free from falls  Description: Will remain free from falls  2/8/2022 2310 by Eavn Harman RN  Outcome: Ongoing     Problem: Falls - Risk of:  Goal: Absence of physical injury  Description: Absence of physical injury  2/8/2022 2310 by Evan Harman RN  Outcome: Ongoing     Problem: Skin Integrity:  Goal: Will show no infection signs and symptoms  Description: Will show no infection signs and symptoms  2/8/2022 2310 by Evan Harman RN  Outcome: Ongoing     Problem: Skin Integrity:  Goal: Absence of new skin breakdown  Description: Absence of new skin breakdown  2/8/2022 2310 by Evan Harman RN  Outcome: Ongoing

## 2022-02-09 NOTE — PROGRESS NOTES
General and Vascular Surgery                                                           Daily Progress Note                                                             Neville Hennessy PA-C     Pt Name: Marvel Moctezuma  Medical Record Number: [id number]  Date of Birth 1945   Today's Date: 2/9/2022    ASSESSMENT/PLAN  1. S/P open sigmoid resection with coloproctostomy for an adenocarcinoma of the sigmoid colon on 1/26/22  2. Rectal bleeding at home. No further bleeding noted  3. LActid acid improved: 2.4-->1.4 (yesterday)  4. Hgb: 8.6-->8.0-->7.9-->7.9-->8.2  5. CKD. HD this AM  6. BP dropped again today. Is getting a fluid bolus : BP 74/48 at 1134  7. Monitor for any further bleeding and monitor blood pressure  8. Denies any abdominal pain. Incision site looks good. Staples intact. Will be post op 2 weeks tomorrow. Possible D/C staple before patient is discharged. EDUCATION  Patient educated about their illness/diagnosis, stated above, and all questions answered. We discussed the importance of nutrition, medications they are taking, and healthy lifestyle. Miladys Gonzalez has improved from yesterday. Pain is well controlled. He has no nausea and no vomiting. OBJECTIVE  VITALS:  height is 6' (1.829 m) and weight is 211 lb 6.7 oz (95.9 kg). His temperature is 98 °F (36.7 °C). His blood pressure is 81/50 (abnormal) and his pulse is 71. His respiration is 18 and oxygen saturation is 98%. VITALS:  BP (!) 81/50   Pulse 71   Temp 98 °F (36.7 °C)   Resp 18   Ht 6' (1.829 m)   Wt 211 lb 6.7 oz (95.9 kg)   SpO2 98%   BMI 28.67 kg/m²   GENERAL: some confusion, no distress  ABDOMEN: soft, non-tender and non-distended, incision site ok  I/O last 3 completed shifts:   In: 1440 [P.O.:840]  Out: 3300   I/O this shift:  In: 240 [P.O.:240]  Out: -     LABS  Recent Labs     02/07/22  0716 02/07/22  6247 02/07/22  1728 02/08/22 2007 02/08/22  5372 02/08/22  0097 02/09/22  0431   WBC 6.6   < >  --   --  5.1   < > 4.4   HGB 8.6*   < >   < > 7.9* 7.9*   < > 8.2*   HCT 26.1*   < >   < > 23.9* 24.0*   < > 25.0*      < >  --   --  194   < > 202      < >  --  135* 134*  --   --    K 3.8  --   --  4.3 4.1  --   --    CL 96*   < >  --  96* 96*  --   --    CO2 26   < >  --  25 23  --   --    BUN 36*   < >  --  47* 46*  --   --    CREATININE 6.1*   < >  --  6.4* 6.6*  --   --    PHOS  --   --   --   --  4.6  --   --    CALCIUM 8.2*   < >  --  7.9* 8.0*  --   --    INR 1.19*  --   --   --   --   --   --    AST 7*  --   --  9*  --   --   --    ALT <5*  --   --  <5*  --   --   --    BILITOT 0.5  --   --  0.6  --   --   --     < > = values in this interval not displayed.      CBC:   Lab Results   Component Value Date    WBC 4.4 02/09/2022    RBC 2.77 02/09/2022    HGB 8.2 02/09/2022    HCT 25.0 02/09/2022    MCV 90.3 02/09/2022    MCH 29.5 02/09/2022    MCHC 32.6 02/09/2022    RDW 16.1 02/09/2022     02/09/2022    MPV 7.6 02/09/2022     CMP:    Lab Results   Component Value Date     02/08/2022    K 4.1 02/08/2022    K 4.3 02/08/2022    CL 96 02/08/2022    CO2 23 02/08/2022    BUN 46 02/08/2022    CREATININE 6.6 02/08/2022    GFRAA 10 02/08/2022    GFRAA 35 07/26/2011    AGRATIO 1.0 02/08/2022    LABGLOM 8 02/08/2022    GLUCOSE 123 02/08/2022    PROT 5.9 02/08/2022    PROT 7.5 07/26/2011    LABALBU 3.1 02/08/2022    CALCIUM 8.0 02/08/2022    BILITOT 0.6 02/08/2022    ALKPHOS 84 02/08/2022    AST 9 02/08/2022    ALT <5 02/08/2022         Latasha Flanagan PA-C  Electronically signed 2/9/2022 at 12:23 PM    As above  No sign of bleeding  Hg stable today    Had an episode of hypotension so discharge is on hold  Will follow along    Electronically signed by Niurka Shannon MD on 2/9/2022 at 6:22 PM

## 2022-02-09 NOTE — DISCHARGE INSTR - COC
Continuity of Care Form    Patient Name: Salima Garcia   :  [birth year]  MRN:  [id number]    Admit date:  2022  Discharge date:  ***    Code Status Order: Full Code   Advance Directives:      Admitting Physician:  Zeynep Cloud MD  PCP: Fifi Bowling    Discharging Nurse: [facility] Unit/Room#: Q1T-[id number]/2360-47  Discharging Unit Phone Number: ***    Emergency Contact:   Extended Emergency Contact Information  Primary Emergency Contact: Kerline Link  Address: [address] [facility] Phone: [phone]  Mobile Phone: [phone]  Relation: Spouse  Secondary Emergency Contact: Janeen Linares  Mobile Phone: [phone]  Relation: Child   needed? No    Past Surgical History:  Past Surgical History:   Procedure Laterality Date    COLECTOMY N/A 2022    SIGMOID COLECTOMY, SIGMOIDOSCOPY performed by Sathya Pandya MD at [address] [place] [address] Left     unsure of date       Immunization History: There is no immunization history on file for this patient.     Active Problems:  Patient Active Problem List   Diagnosis Code    GI bleed K92.2    History of COVID-19 Z86.16    Hyponatremia E87.1    Fatigue R53.83    Acute kidney injury superimposed on CKD (HCC) N17.9, N18.9    Lethargy R53.83    Suspected UTI R39.89    Acute CVA (cerebrovascular accident) ([facility] I63.9    LESLEE (acute kidney injury) ([facility] N17.9    Malignant neoplasm of colon ([facility] C18.9    Acute blood loss anemia D62    Elevated troponin R77.8       Isolation/Infection:   Isolation            No Isolation          Patient Infection Status       Infection Onset Added Last Indicated Last Indicated By Review Planned Expiration Resolved Resolved By    None active    Resolved    COVID-19 (Rule Out) 22 COVID-19, Rapid (Ordered)   22 Rule-Out Test Resulted            Nurse Assessment:  Last Vital Signs: BP 107/69   Pulse 76   Temp 97.6 °F (36.4 °C) (Oral)   Resp 18   Ht 6' (1.829 m)   Wt 211 lb 6.7 oz (95.9 kg)   SpO2 97%   BMI 28.67 kg/m²     Last documented pain score (0-10 scale): Pain Level: 0  Last Weight:   Wt Readings from Last 1 Encounters:   02/09/22 211 lb 6.7 oz (95.9 kg)     Mental Status:  {IP PT MENTAL STATUS:20030}    IV Access:  { SHAMIKA IV ACCESS:985399107}    Nursing Mobility/ADLs:  Walking   {CHP DME PIGK:500739448}  Transfer  {CHP DME DJKK:275745193}  Bathing  {CHP DME LBWO:867711516}  Dressing  {CHP DME HEXO:705611929}  Toileting  {CHP DME IMGX:812856592}  Feeding  {CHP DME URLW:099686108}  Med Admin  {P DME NMUJ:588484740}  Med Delivery   { SHAMIKA MED Delivery:520896156}    Wound Care Documentation and Therapy:        Elimination:  Continence: Bowel: {YES / NJ:08633}  Bladder: {YES / ZV:38714}  Urinary Catheter: {Urinary Catheter:518753724}   Colostomy/Ileostomy/Ileal Conduit: {YES / KO:30098}       Date of Last BM: ***    Intake/Output Summary (Last 24 hours) at 2/9/2022 1127  Last data filed at 2/9/2022 1001  Gross per 24 hour   Intake 1320 ml   Output 3300 ml   Net -1980 ml     I/O last 3 completed shifts:   In: 1440 [P.O.:840]  Out: 3300     Safety Concerns:     [address] Safety Concerns:334422079}    Impairments/Disabilities:      [address] Impairments/Disabilities:971538358}    Nutrition Therapy:  Current Nutrition Therapy:   [address] Diet List:529040854}    Routes of Feeding: {CHP DME Other Feedings:697294398}  Liquids: {Slp liquid thickness:10934}  Daily Fluid Restriction: {CHP DME Yes amt example:662323045}  Last Modified Barium Swallow with Video (Video Swallowing Test): {Done Not Done BLIM:941532028}    Treatments at the Time of Hospital Discharge:   Respiratory Treatments: ***  Oxygen Therapy:  {Therapy; copd oxygen:19198}  Ventilator:    {GUNJAN PERES Vent LEFO:343826616}    Rehab Therapies: {THERAPEUTIC INTERVENTION:6754601718}  Weight Bearing Status/Restrictions: {GUNJAN PERES Weight Bearin}  Other Medical Equipment (for information only, NOT a DME order):  {EQUIPMENT:013340937}  Other Treatments: ***    Patient's personal belongings (please select all that are sent with patient):  {CHP DME Belongings:192786817}    RN SIGNATURE:  {Esignature:157647466}    CASE MANAGEMENT/SOCIAL WORK SECTION    Inpatient Status Date: 2022    Readmission Risk Assessment Score:  Readmission Risk              Risk of Unplanned Readmission:  32         Discharging to Facility/ Agency   Name: [facility]  Address:  [address] [place] [address], [address] [place] [address]   Phone:  [phone]  Fax:  [phone]     Dialysis Facility (if applicable)   Name: [facility]  Address: [address] [place] [address], [address] [place] [address]  Dialysis Schedule:  11:45 am  Phone: [phone]  Fax: [phone]    / signature: Electronically signed by Alex Lundberg RN on 2/10/22 at 3:33 PM EST    PHYSICIAN SECTION    Prognosis: Good    Condition at Discharge: Stable    Rehab Potential (if transferring to Rehab): Good    Recommended Labs or Other Treatments After Discharge: PT, OT FOLLOW UP WITH GENERAL SURGERY IN 1 WEEK, FOLLOW UP WITH GI IN 2 WEEKS, FOLLOW UP WITH ONCOLOGY IN 1 WEEK     Physician Certification: I certify the above information and transfer of Suzanne Cartwright  is necessary for the continuing treatment of the diagnosis listed and that he requires [facility] for less 30 days.      Update Admission H&P: No change in H&P    PHYSICIAN SIGNATURE:  Electronically signed by Emily Cruz MD on 22 at 11:28 AM EST

## 2022-02-09 NOTE — PLAN OF CARE
Problem: Falls - Risk of:  Goal: Will remain free from falls  Description: Will remain free from falls  2/9/2022 0902 by Chari Church RN  Outcome: Ongoing     Problem: Falls - Risk of:  Goal: Absence of physical injury  Description: Absence of physical injury  2/9/2022 0902 by Chari Church RN  Outcome: Ongoing     Problem: Skin Integrity:  Goal: Will show no infection signs and symptoms  Description: Will show no infection signs and symptoms  2/9/2022 0902 by Chari Church RN  Outcome: Ongoing     Problem: Skin Integrity:  Goal: Absence of new skin breakdown  Description: Absence of new skin breakdown  2/9/2022 0902 by Chari Church RN  Outcome: Ongoing

## 2022-02-09 NOTE — PROGRESS NOTES
Hospitalist Progress Note      PCP: Emmanuel Ozuna    Date of Admission: 2/7/2022        Subjective: Feels more energetic, no weakness, no more blood per rectum. No abdominal pain      Medications:  Reviewed    Infusion Medications    sodium chloride       Scheduled Medications    sodium chloride flush  5-40 mL IntraVENous 2 times per day    atorvastatin  10 mg Oral Nightly    calcitRIOL  0.25 mcg Oral Nightly    docusate sodium  100 mg Oral Daily    gabapentin  100 mg Oral TID    LORazepam  0.5 mg Oral BID    pantoprazole  20 mg Oral Nightly    sevelamer  800 mg Oral TID WC    tamsulosin  0.4 mg Oral QAM     PRN Meds: lidocaine, sodium chloride flush, sodium chloride, ondansetron **OR** ondansetron, acetaminophen **OR** acetaminophen, oxyCODONE, melatonin, polyethylene glycol      Intake/Output Summary (Last 24 hours) at 2/9/2022 0502  Last data filed at 2/8/2022 2300  Gross per 24 hour   Intake 1320 ml   Output 3300 ml   Net -1980 ml       Physical Exam Performed:    /66   Pulse 71   Temp 98 °F (36.7 °C) (Oral)   Resp 18   Ht 6' (1.829 m)   Wt 211 lb 6.7 oz (95.9 kg)   SpO2 94%   BMI 28.67 kg/m²     General appearance: No apparent distress  Respiratory:  Normal respiratory effort. Clear to auscultation, bilaterally without Rales/Wheezes/Rhonchi. Cardiovascular: Regular rate and rhythm with normal S1/S2 without murmurs, rubs or gallops. Abdomen: Soft, non-tender, non-distended   Musculoskeletal: No clubbing  Neurologic: No focal weakness  Psychiatric: Alert and oriented  Capillary Refill: Brisk,3 seconds, normal   Peripheral Pulses: +2 palpable, equal bilaterally       Labs:   Recent Labs     02/07/22  0716 02/07/22  1728 02/08/22  0705 02/08/22  0835 02/09/22  0431   WBC 6.6  --   --  5.1 4.4   HGB 8.6*   < > 7.9* 7.9* 8.2*   HCT 26.1*   < > 23.9* 24.0* 25.0*     --   --  194 202    < > = values in this interval not displayed.      Recent Labs     02/07/22  9353 02/08/22  0705 02/08/22  0835    135* 134*   K 3.8 4.3 4.1   CL 96* 96* 96*   CO2 26 25 23   BUN 36* 47* 46*   CREATININE 6.1* 6.4* 6.6*   CALCIUM 8.2* 7.9* 8.0*   PHOS  --   --  4.6     Recent Labs     02/07/22  0716 02/08/22  0705   AST 7* 9*   ALT <5* <5*   BILITOT 0.5 0.6   ALKPHOS 92 84     Recent Labs     02/07/22  0716   INR 1.19*     Recent Labs     02/07/22  0716 02/07/22  1728   TROPONINI 0.07* 0.06*       Urinalysis:      Lab Results   Component Value Date    NITRU Negative 01/21/2022    WBCUA >900 01/21/2022    BACTERIA 2+ 01/21/2022    RBCUA 32 01/21/2022    BLOODU LARGE 01/21/2022    SPECGRAV 1.019 01/21/2022    GLUCOSEU Negative 01/21/2022       Radiology:  CT ABDOMEN PELVIS WO CONTRAST Additional Contrast? None   Final Result   1. Outpouching of the lumen of the colon to the left of the prior anastomosis   site. Recommend correlation with surgical history, as this could represent a   normal finding following end to side anastomosis. A contained anastomotic   leak cannot be excluded. 2. Mild inflammation in the adjacent peritoneal fat with several reactive   regional lymph nodes. 3. No evidence of pneumoperitoneum. 4. Cholelithiasis and splenomegaly are noted incidentally. 5. Multiple renal cysts including a stable hyperdense cyst on the left and a   partially calcified cyst on the right. No further follow-up of these   findings is necessary. XR CHEST PORTABLE   Final Result   Cardiomegaly without acute pulmonary process. Assessment/Plan:    Active Hospital Problems    Diagnosis     Acute blood loss anemia [D62]     Elevated troponin [R77.8]     Malignant neoplasm of colon (HCC) [C18.9]     GI bleed [K92.2]     History of COVID-19 [Z86.16]      1.  Rectal bleed, likely due to lower GI bleed, likely not significant, but still with some drop in hemoglobin, likely postop.   2.  Colon adenocarcinoma status post resection, general surgery consulted  3.   Anemia, part of it likely acute blood loss anemia, relatively stable for now, GI and general surgery consulted   4.  End-stage renal disease, nephrology consulted  5.  Acute metabolic encephalopathy, improved this a.m. 6.  Elevated troponin, no chest pain, minimal, has been elevated in the past, likely due to end-stage renal disease, flat. 7.  Recently diagnosed with acute CVA,  aspirin and Plavix on hold for now pending GI and general surgery recommendations. 8.  Recently treated for Enterococcus faecalis UTI  9. History of Covid infection, stable for now  10. Elevated lactic acid, likely due to anemia, improved      Diet: ADULT DIET;  Regular  Code Status: Full Code        Darrelyn Cooks, MD

## 2022-02-09 NOTE — PROGRESS NOTES
[address] [place] [address] Nephrology   [facility]  [phone]  Nephrology Note          Patient ID: Ashly Rodriguez  Referring/ Physician: Primitivo Prado MD      HPI/Summary:   Ashly Rodriguez is being seen by nephrology for ESRD. This is a 78 yo man who is admitted with rectal bleeding. He recently had colon resection for cancer 1/26. He was reportedly hypotensive and received IVFs. He was reported to be confused but when seen he was at his baseline mental status. He denies any chest pain no SOB. No edema. He has no dizziness or lightheadedness. Seen on dialysis  AVG working fine   Lactic acidosis resolved. Hgb stable now. Interval hx:   Patient seen at bedside. No complaints   Had dialysis yesterday   No new issues. Plan:   - HD tomorrow per TTS schedule. - retacrit 20 units TIW. Dry weight 94.4 kilos. Has TTS spot at [facility] unit        ESRD  TTS at [facility] , has not started there yet. Is originally from [facility] but staying in [place] for rehab so dialyzing with us for now. He has a left AV fistula, positive thrill and bruit       Electrolytes  No acute issues.     Hypertension  Blood pressure is acceptable. No changes. Hypotension has resolved.      SHPT  No acute issues.      GI bleed  Has known colon cancer  No active bleeding  Hemoglobin stable   General surgery consulted.   [facility] would like to thank you for the opportunity to serve this patient. Please call with any questions or concerns. Suhail Kohli MD  [facility]  [address]  Fax: [phone]  Office: [phone]         CC/Reason for consult:   Reason for consult: ESRD    Chief Complaint   Patient presents with    Rectal Bleeding     Per aquad pt had blood in brief this morning           Review of Systems:   [address]. All other remaining systems are negative.     Constitutional:  fever, chills, weakness, weight change, fatigue, Skin:  rash, pruritus, hair loss, bruising, dry skin, petechiae. Head, Face, Neck   headaches, swelling,  cervical adenopathy. Respiratory: shortness of breath, cough, or wheezing  Cardiovascular: chest pain, palpitations, dizzy, edema  Gastrointestinal: nausea, vomiting, diarrhea, constipation,belly pain    Yellow skin, blood in stool  Musculoskeletal:  back pain, muscle weakness, gait problems,       joint pain or swelling. Genitourinary:  dysuria, poor urine flow, flank pain, blood in urine  Neurologic:  vertigo, TIA'S, syncope, seizures, focal weakness  Psychosocial:  insomnia, anxiety, or depression. Additional positive findings: -     PMH/SH/FH:    Medical Hx: reviewed and updated as appropriate  Past Medical History:   Diagnosis Date    Arthritis     Cancer ([facility] [address] [facility]     Colon Cancer diagnosed last month    Diabetes mellitus ([facility]     Hemodialysis patient ([facility]     Hypertension          Surgical Hx: reviewed and updated as appropriate   has a past surgical history that includes IR PERC ARTERIOVENOUS FISTULA CREATION (Left) and colectomy (N/A, 1/26/2022). Social Hx: reviewed and updated as appropriate  Social History     Tobacco Use    Smoking status: Former Smoker    Smokeless tobacco: Never Used   Substance Use Topics    Alcohol use: Not Currently        Family hx: reviewed and updated as appropriate  family history is not on file. Medications:   sodium chloride flush, 5-40 mL, 2 times per day  atorvastatin, 10 mg, Nightly  calcitRIOL, 0.25 mcg, Nightly  docusate sodium, 100 mg, Daily  gabapentin, 100 mg, TID  LORazepam, 0.5 mg, BID  pantoprazole, 20 mg, Nightly  sevelamer, 800 mg, TID WC  tamsulosin, 0.4 mg, QAM       Lisinopril    Allergies:    Allergies   Allergen Reactions    Lisinopril      Allergy listed on paperwork from [facility], no reaction noted         Physical Exam/Objective:   Vitals:    02/09/22 1522   BP: 110/70   Pulse: 71   Resp: 18   Temp: 97.5 °F (36.4 °C)   SpO2: 97%       Intake/Output Summary (Last 24 hours) at 2/9/2022 1552  Last data filed at 2/9/2022 1505  Gross per 24 hour   Intake 970 ml   Output --   Net 970 ml         General appearance: pleasant male in NAD  HEENT: no icterus, EOM intact, trachea midline. Neck : no masses, appears symmetrical and no JVD appreciated. Respiratory: Respiratory effort normal, bilateral equal chest rise. No wheeze, no crackles   Cardiovascular: Ausculation shows RRR and  no edema   Abdomen: abdomen is soft, non distended, no masses, no pain with palpation. Musculoskeletal:  no joint swelling, no deformity, strength grossly normal.   Skin: no rashes, no induration, no tightening, no jaundice   Neuro: Follows commands, moves all extremities spontaneously   AVG left arm + thrill and bruit      Data:   CBC:   Recent Labs     02/07/22  0716 02/07/22  1728 02/08/22  0835 02/09/22  0431 02/09/22  1305   WBC 6.6  --  5.1 4.4  --    HGB 8.6*   < > 7.9* 8.2* 9.4*   HCT 26.1*   < > 24.0* 25.0* 29.0*     --  194 202  --     < > = values in this interval not displayed.      BMP:    Recent Labs     02/07/22  0716 02/08/22  0705 02/08/22  0835    135* 134*   K 3.8 4.3 4.1   CL 96* 96* 96*   CO2 26 25 23   BUN 36* 47* 46*   CREATININE 6.1* 6.4* 6.6*   GLUCOSE 181* 116* 123*   PHOS  --   --  4.6

## 2022-02-09 NOTE — PROGRESS NOTES
Occupational Therapy  Facility/Department: [facility] 5W PROGRESSIVE CARE  Daily Treatment Note  Should patient be discharged prior to another treatment session, this note shall serve as the discharge summary. NAME: Demetria Cornell  :   MRN: [id number]    Date of Service: 2022    Discharge Recommendations:  Patient would benefit from continued therapy after discharge,3-5 sessions per week       Assessment   Performance deficits / Impairments: Decreased functional mobility ; Decreased strength;Decreased endurance;Decreased safe awareness;Decreased ADL status; Decreased high-level IADLs;Decreased balance;Decreased cognition  Assessment: Seen in room and agreed to OT. Pt completed bed mobility with SBA and completed transfers with Cg/SBA with RW with cues for safety. Pt with diminished endurance. Would benefit from cont OT to increase independence with self care and functional mobility at facility when medically stable, 3-5x week. OT Education: OT Role;Transfer Training;Plan of Care;ADL Adaptive Strategies  REQUIRES OT FOLLOW UP: Yes  Activity Tolerance  Activity Tolerance: Patient limited by fatigue;Patient Tolerated treatment well  Safety Devices  Safety Devices in place: Yes  Type of devices: Nurse notified;Gait belt;Call light within reach; Chair alarm in place; Left in chair         Patient Diagnosis(es): The primary encounter diagnosis was Rectal bleeding. Diagnoses of Hypotension, unspecified hypotension type and End stage renal disease ([facility] [address] were also pertinent to this visit. has a past medical history of Arthritis, Cancer ([facility] [address] Diabetes mellitus ([address] Hemodialysis patient ([facility] [address] and Hypertension. has a past surgical history that includes IR PERC ARTERIOVENOUS FISTULA CREATION (Left) and colectomy (N/A, 2022).     Restrictions  Restrictions/Precautions  Restrictions/Precautions: Fall Risk  Position Activity Restriction  Other position/activity restrictions: abdominal incision  Subjective General  Chart Reviewed: Yes  Patient assessed for rehabilitation services?: Yes  Additional Pertinent Hx: 78 y/o male admit 2/7/2022 from nursing home with rectal bleeding. Pt with recent admitted 1/13- 2/4/2022 with LESLEE, GI Bleed; Weakness. 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 Recent dx Colon Ca and underwent open sigmoid resection with coloproctostomy on 1/26/22. Pt COVID + beginning of January. Family / Caregiver Present: No  Referring Practitioner: Dr. Jayla Eli: Pt seen bedside and agreeable to therapy. General Comment  Comments: Per RN ok for therapy. Orientation     Objective    ADL  Feeding: Independent        Balance  Sitting Balance: Stand by assistance  Standing Balance: Contact guard assistance (very close SBA/CGA)  Functional Mobility  Functional - Mobility Device: Rolling Walker  Activity: Other  Assist Level: Contact guard assistance  Functional Mobility Comments: CG/SBA with RW to bedside chair, no LOB or unsteadiness, cues for walker safety repeated  Bed mobility  Supine to Sit: Stand by assistance  Sit to Supine: Unable to assess (left in chair at end of session)  Transfers  Sit to stand: Contact guard assistance  Stand to sit: Contact guard assistance                                                                 Plan   Plan  Times per week: 3-5  Times per day: Daily  Current Treatment Recommendations: Strengthening,Endurance Training,Balance Training,Gait Training,Functional Mobility Training,Self-Care / ADL       OutComes Score    Micah Sterling scored a 16/24 on the AM-PAC ADL Inpatient form. Current research shows that an AM-PAC score of 17 or less is typically not associated with a discharge to the patient's home setting. Based on the patient's AM-PAC score and their current ADL deficits, it is recommended that the patient have 3-5 sessions per week of Occupational Therapy at d/c to increase the patient's independence.   Please see assessment section for further patient specific details. If patient discharges prior to next session this note will serve as a discharge summary. Please see below for the latest assessment towards goals. AM-PAC Score        AM-PAC Inpatient Daily Activity Raw Score: 16 (02/09/22 1506)  AM-PAC Inpatient ADL T-Scale Score : 35.96 (02/09/22 1506)  ADL Inpatient CMS 0-100% Score: 53.32 (02/09/22 1506)  ADL Inpatient CMS G-Code Modifier : CK (02/09/22 1506)    Goals  Short term goals  Time Frame for Short term goals: Prior to DC:   Short term goal 1: Pt will complete ADL transfers with supervision  Short term goal 2: Pt will complete functional mobility with supervision  Short term goal 3: Pt will tolerate standing > 5 min for functional task with supervision  Short term goal 4: Pt will complete LB dressing with supervision  Short term goal 5: Pt will complete toileting with supervision  Patient Goals   Patient goals : to get stronger and return to PLOF       Therapy Time   Individual Concurrent Group Co-treatment   Time In 0772         Time Out 0935         Minutes 30         Timed Code Treatment Minutes: Sean QUIÑONEZ Snider [address], OT

## 2022-02-10 VITALS
HEART RATE: 90 BPM | HEIGHT: 72 IN | SYSTOLIC BLOOD PRESSURE: 114 MMHG | TEMPERATURE: 99.6 F | RESPIRATION RATE: 16 BRPM | DIASTOLIC BLOOD PRESSURE: 76 MMHG | OXYGEN SATURATION: 96 % | WEIGHT: 214.29 LBS | BODY MASS INDEX: 29.02 KG/M2

## 2022-02-10 LAB
ALBUMIN SERPL-MCNC: 2.8 G/DL (ref 3.4–5)
ANION GAP SERPL CALCULATED.3IONS-SCNC: 14 MMOL/L (ref 3–16)
ANISOCYTOSIS: ABNORMAL
BASOPHILS ABSOLUTE: 0 K/UL (ref 0–0.2)
BASOPHILS RELATIVE PERCENT: 0.6 %
BUN BLDV-MCNC: 38 MG/DL (ref 7–20)
CALCIUM SERPL-MCNC: 8.2 MG/DL (ref 8.3–10.6)
CHLORIDE BLD-SCNC: 97 MMOL/L (ref 99–110)
CO2: 22 MMOL/L (ref 21–32)
CREAT SERPL-MCNC: 5.5 MG/DL (ref 0.8–1.3)
EOSINOPHILS ABSOLUTE: 0.1 K/UL (ref 0–0.6)
EOSINOPHILS RELATIVE PERCENT: 1.7 %
GFR AFRICAN AMERICAN: 12
GFR NON-AFRICAN AMERICAN: 10
GLUCOSE BLD-MCNC: 148 MG/DL (ref 70–99)
HCT VFR BLD CALC: 23.5 % (ref 40.5–52.5)
HEMOGLOBIN: 7.8 G/DL (ref 13.5–17.5)
HEPATITIS B SURFACE ANTIGEN INTERPRETATION: NORMAL
LYMPHOCYTES ABSOLUTE: 0.9 K/UL (ref 1–5.1)
LYMPHOCYTES RELATIVE PERCENT: 14.7 %
MCH RBC QN AUTO: 30.1 PG (ref 26–34)
MCHC RBC AUTO-ENTMCNC: 33.2 G/DL (ref 31–36)
MCV RBC AUTO: 90.8 FL (ref 80–100)
MONOCYTES ABSOLUTE: 0.4 K/UL (ref 0–1.3)
MONOCYTES RELATIVE PERCENT: 6.4 %
NEUTROPHILS ABSOLUTE: 4.9 K/UL (ref 1.7–7.7)
NEUTROPHILS RELATIVE PERCENT: 76.6 %
PDW BLD-RTO: 15.6 % (ref 12.4–15.4)
PHOSPHORUS: 4.2 MG/DL (ref 2.5–4.9)
PLATELET # BLD: 167 K/UL (ref 135–450)
PLATELET SLIDE REVIEW: ADEQUATE
PMV BLD AUTO: 7.6 FL (ref 5–10.5)
POTASSIUM SERPL-SCNC: 4.5 MMOL/L (ref 3.5–5.1)
RBC # BLD: 2.59 M/UL (ref 4.2–5.9)
SARS-COV-2, NAAT: DETECTED
SARS-COV-2, NAAT: DETECTED
SLIDE REVIEW: ABNORMAL
SODIUM BLD-SCNC: 133 MMOL/L (ref 136–145)
WBC # BLD: 6.4 K/UL (ref 4–11)

## 2022-02-10 PROCEDURE — 99024 POSTOP FOLLOW-UP VISIT: CPT | Performed by: NURSE PRACTITIONER

## 2022-02-10 PROCEDURE — 90935 HEMODIALYSIS ONE EVALUATION: CPT

## 2022-02-10 PROCEDURE — 6370000000 HC RX 637 (ALT 250 FOR IP): Performed by: INTERNAL MEDICINE

## 2022-02-10 PROCEDURE — 36415 COLL VENOUS BLD VENIPUNCTURE: CPT

## 2022-02-10 PROCEDURE — 2580000003 HC RX 258: Performed by: INTERNAL MEDICINE

## 2022-02-10 PROCEDURE — 85025 COMPLETE CBC W/AUTO DIFF WBC: CPT

## 2022-02-10 PROCEDURE — 87635 SARS-COV-2 COVID-19 AMP PRB: CPT

## 2022-02-10 PROCEDURE — 87340 HEPATITIS B SURFACE AG IA: CPT

## 2022-02-10 PROCEDURE — APPNB45 APP NON BILLABLE 31-45 MINUTES: Performed by: NURSE PRACTITIONER

## 2022-02-10 PROCEDURE — 97110 THERAPEUTIC EXERCISES: CPT

## 2022-02-10 PROCEDURE — 97116 GAIT TRAINING THERAPY: CPT

## 2022-02-10 PROCEDURE — 97530 THERAPEUTIC ACTIVITIES: CPT

## 2022-02-10 PROCEDURE — 80069 RENAL FUNCTION PANEL: CPT

## 2022-02-10 RX ADMIN — PANTOPRAZOLE SODIUM 20 MG: 20 TABLET, DELAYED RELEASE ORAL at 19:56

## 2022-02-10 RX ADMIN — LORAZEPAM 0.5 MG: 0.5 TABLET ORAL at 11:59

## 2022-02-10 RX ADMIN — ATORVASTATIN CALCIUM 10 MG: 10 TABLET, FILM COATED ORAL at 19:56

## 2022-02-10 RX ADMIN — DOCUSATE SODIUM 100 MG: 100 CAPSULE, LIQUID FILLED ORAL at 11:59

## 2022-02-10 RX ADMIN — SODIUM CHLORIDE, PRESERVATIVE FREE 10 ML: 5 INJECTION INTRAVENOUS at 20:15

## 2022-02-10 RX ADMIN — GABAPENTIN 100 MG: 100 CAPSULE ORAL at 11:59

## 2022-02-10 RX ADMIN — SODIUM CHLORIDE, PRESERVATIVE FREE 10 ML: 5 INJECTION INTRAVENOUS at 12:01

## 2022-02-10 RX ADMIN — GABAPENTIN 100 MG: 100 CAPSULE ORAL at 19:56

## 2022-02-10 RX ADMIN — LORAZEPAM 0.5 MG: 0.5 TABLET ORAL at 19:57

## 2022-02-10 RX ADMIN — SEVELAMER CARBONATE 800 MG: 800 TABLET, FILM COATED ORAL at 11:59

## 2022-02-10 RX ADMIN — TAMSULOSIN HYDROCHLORIDE 0.4 MG: 0.4 CAPSULE ORAL at 11:59

## 2022-02-10 RX ADMIN — SEVELAMER CARBONATE 800 MG: 800 TABLET, FILM COATED ORAL at 16:39

## 2022-02-10 ASSESSMENT — PAIN SCALES - GENERAL: PAINLEVEL_OUTOF10: 0

## 2022-02-10 ASSESSMENT — PAIN SCALES - WONG BAKER: WONGBAKER_NUMERICALRESPONSE: 0

## 2022-02-10 NOTE — PROGRESS NOTES
Physical Therapy  Attempt  Attempted to see pt at 903 on 2/10/22. Per RN, pt is currently off floor for dialysis. Will re-attempt as schedule allows.     Nikki Fam, PT

## 2022-02-10 NOTE — PROGRESS NOTES
Occupational Therapy  Pt off the floor for dialysis. Will attempt later as schedule allows.  JAK Salazar/L #[id number] 2/10/2022 9:39 AM

## 2022-02-10 NOTE — PROGRESS NOTES
Physical Therapy  Facility/Department: [facility] 5W PROGRESSIVE CARE  Daily Treatment Note  NAME: Carter Marie  :   MRN: [id number]    Date of Service: 2/10/2022    Discharge Recommendations:  Carter Marie scored a 15/24 on the AM-PAC short mobility form. Current research shows that an AM-PAC score of 17 or less is typically not associated with a discharge to the patient's home setting. Based on the patient's AM-PAC score and their current functional mobility deficits, it is recommended that the patient have 3-5 sessions per week of Physical Therapy at d/c to increase the patient's independence. Please see assessment section for further patient specific details. If patient discharges prior to next session this note will serve as a discharge summary. Please see below for the latest assessment towards goals. Continue to assess pending progress,3-5 sessions per week,Patient would benefit from continued therapy after discharge   PT Equipment Recommendations  Other: defer    Assessment   Body structures, Functions, Activity limitations: Decreased functional mobility ; Decreased strength;Decreased endurance;Decreased balance  Assessment: Pt continues to require Osiel for most OOB mobility with RW for increased safety, including cues for RW management and upright posture. Pt limited this session by fatigue after dialysis, but was motivated to participate. Safety concerns for return home due to hands on assist required for mobility. Pt will benefit from continued skilled therapy to maximize safety and independence. Treatment Diagnosis: Generalized weakness  Patient Education: Seated therex  REQUIRES PT FOLLOW UP: Yes  Activity Tolerance  Activity Tolerance: Patient limited by fatigue;Patient limited by endurance     Patient Diagnosis(es): The primary encounter diagnosis was Rectal bleeding.  Diagnoses of Hypotension, unspecified hypotension type and End stage renal disease ([facility] [address] were also pertinent to this visit.     has a past medical history of Arthritis, Cancer ([facility] [address] Diabetes mellitus ([facility] [address] [facility] Hemodialysis patient ([facility] [address] and Hypertension. has a past surgical history that includes IR PERC ARTERIOVENOUS FISTULA CREATION (Left) and colectomy (N/A, 1/26/2022). Restrictions  Restrictions/Precautions  Restrictions/Precautions: Fall Risk  Position Activity Restriction  Other position/activity restrictions: abdominal incision  Subjective   General  Chart Reviewed: Yes  Additional Pertinent Hx: Per H&P \"66 y.o. male who presented to [facility] with rectal bleed, patient had an open sigmoid resection for adenocarcinoma on January 26 2020-second, presented to emergency department with rectal bleed, on presentation he was mildly hypotensive given IV fluid and he feels better and his blood pressure improved still confused, denies abdominal pain nausea vomiting at this time, denies fever chills cough. No family member at bedside being admitted for further management and treatment\"  Family / Caregiver Present: No  Referring Practitioner: Darrelyn Cooks, MD  Subjective  Subjective: \"I feel ok. \"  General Comment  Comments: Pt found supine in bed upon PT arrival.  Pt agreeable to therapy session. Pain Screening  Patient Currently in Pain: Denies  Vital Signs  Patient Currently in Pain: Denies       Orientation     Cognition      Objective   Bed mobility  Supine to Sit: Stand by assistance (HOB elevated)  Transfers  Sit to Stand: Minimal Assistance; Moderate Assistance (x1 trial Osiel, x1 trial ModA, from EOB to RW, facilitation for anterior weight shifting, increased time)  Stand to sit: Minimal Assistance (cues for hand placement and to not abandon RW upon reaching surface)  Ambulation  Ambulation?: Yes  Ambulation 1  Surface: level tile  Device: Rolling Walker  Assistance: Minimal assistance  Quality of Gait: decreased kinga, forward trunk flexion with RW too far in front of body, decreased B step height/length, wide KEY, pt picking up RW vs rolling it despite cues  Distance: 20'  Comments: Cues for upright posture and RW management, pt moderately receptive. Pt denying lightheadedness, but reports significant fatigue at end of bout. Pt declines further ambulation due to fatigue. Stairs/Curb  Stairs?: No     Balance  Comments: Osiel to maintain standing balance briefly with BUE support on RW for pants management, pt with posterior lean. Pt reporting sudden lightheadness after approximately 5 seconds in standing, prompting him to sit EOB. Pt reporting lightheadedness went away immediately upon sitting. Exercises  Quad Sets: x20 reps seated with BLE elevated  Gluteal Sets: x20 reps seated with BLE elevated  Hip Abduction: x20 reps hip abd/add slides seated with BLE elevated  Knee Short Arc Quad: x20 reps seated with BLE elevated  Ankle Pumps: x20 reps seated with BLE elevated  Comments: Cues and demonstration for technique. Occasional extended rest breaks between activities due to reported fatigue. G-Code     OutComes Score                                                     AM-PAC Score  AM-PAC Inpatient Mobility Raw Score : 15 (02/10/22 1242)  AM-PAC Inpatient T-Scale Score : 39.45 (02/10/22 1242)  Mobility Inpatient CMS 0-100% Score: 57.7 (02/10/22 1242)  Mobility Inpatient CMS G-Code Modifier : CK (02/10/22 1242)          Goals  Short term goals  Time Frame for Short term goals:  While in acute care  Short term goal 1: Bed mobility with supervision -ongoing  Short term goal 2: Transfers with SBA -ongoing  Short term goal 3: Amb 48' with RW and SBA -ongoing  Long term goals  Time Frame for Long term goals : STG=LTG  Patient Goals   Patient goals : to regain his LE strength    Plan    Plan  Times per week: 3-5x  Current Treatment Recommendations: Strengthening,Transfer Training,Balance Training,Endurance Training,Gait Training,Functional Mobility Training,Patient/Caregiver Education & Training  Safety Devices  Type of devices: All fall risk precautions in place,Call light within reach,Chair alarm in place,Gait belt,Left in chair,Nurse notified     Therapy Time   Individual Concurrent Group Co-treatment   Time In 1155         Time Out 1234         Minutes 39              Timed Code Treatment Minutes:   39    Total Treatment Minutes:  [facility], PT    This note to serve as discharge summary if patient discharged before next session.

## 2022-02-10 NOTE — PROGRESS NOTES
General and Vascular Surgery                                                           Daily Progress Note      Pt Name: Demetria Cornell  Medical Record Number: [id number]  Date of Birth 1945   Today's Date: 2/10/2022    ASSESSMENT/PLAN  1. S/P open sigmoid resection with coloproctostomy for an adenocarcinoma of the sigmoid colon on 1/26/22. DC and readmitted for rectal bleeding at home. No further bleeding noted. 2. Staples removed today. F/u Dr. Ramiro Coleman one month. 3. OK to DC from surgery standpoint. Was supposed to DC but rapid covid came back positive. EDUCATION  Patient educated about their illness/diagnosis, stated above, and all questions answered. We discussed the importance of nutrition, medications they are taking, and healthy lifestyle. Nancy Biswas is in HD. Feels OK. OBJECTIVE  VITALS:  height is 6' (1.829 m) and weight is 214 lb 4.6 oz (97.2 kg). His oral temperature is 97.2 °F (36.2 °C). His blood pressure is 129/67 and his pulse is 92. His respiration is 18 and oxygen saturation is 95%. VITALS:  /67   Pulse 92   Temp 97.2 °F (36.2 °C) (Oral)   Resp 18   Ht 6' (1.829 m)   Wt 214 lb 4.6 oz (97.2 kg)   SpO2 95%   BMI 29.06 kg/m²   GENERAL:  no distress  ABDOMEN: soft, non-tender and non-distended, incision site ok staples removed. I/O last 3 completed shifts: In: 1413.5 [P.O.:940; I.V.:250; IV Piggyback:223.5]  Out: 0   I/O this shift: In: 500   Out: 42 [place]     02/08/22  0705 02/08/22  0835 02/10/22  0533   WBC  --    < > 6.4   HGB 7.9*   < > 7.8*   HCT 23.9*   < > 23.5*   PLT  --    < > 167   *   < > 133*   K 4.3   < > 4.5   CL 96*   < > 97*   CO2 25   < > 22   BUN 47*   < > 38*   CREATININE 6.4*   < > 5.5*   PHOS  --    < > 4.2   CALCIUM 7.9*   < > 8.2*   AST 9*  --   --    ALT <5*  --   --    BILITOT 0.6  --   --     < > = values in this interval not displayed.      CBC:   Lab Results   Component Value Date    WBC 6.4 02/10/2022    RBC 2.59 02/10/2022    HGB 7.8 02/10/2022    HCT 23.5 02/10/2022    MCV 90.8 02/10/2022    MCH 30.1 02/10/2022    MCHC 33.2 02/10/2022    RDW 15.6 02/10/2022     02/10/2022    MPV 7.6 02/10/2022     CMP:    Lab Results   Component Value Date     02/10/2022    K 4.5 02/10/2022    K 4.3 02/08/2022    CL 97 02/10/2022    CO2 22 02/10/2022    BUN 38 02/10/2022    CREATININE 5.5 02/10/2022    GFRAA 12 02/10/2022    GFRAA 35 07/26/2011    AGRATIO 1.0 02/08/2022    LABGLOM 10 02/10/2022    GLUCOSE 148 02/10/2022    PROT 5.9 02/08/2022    PROT 7.5 07/26/2011    LABALBU 2.8 02/10/2022    CALCIUM 8.2 02/10/2022    BILITOT 0.6 02/08/2022    ALKPHOS 84 02/08/2022    AST 9 02/08/2022    ALT <5 02/08/2022         IRINA Monroe CNP  Electronically signed 2/10/2022 at 4:07 PM glasses

## 2022-02-10 NOTE — DISCHARGE SUMMARY
Hospital Medicine Discharge Summary    Patient ID: Shahriar Mcdaniels      Patient's PCP: Juanis Huang    Admit Date: 2/7/2022     Discharge Date:   02/10/2022    Admitting Provider: Amalia Potts MD     Discharge Provider: Amalia Potts MD     Discharge Diagnoses: Active Hospital Problems    Diagnosis     Acute blood loss anemia [D62]     Elevated troponin [R77.8]     Malignant neoplasm of colon (HCC) [C18.9]     GI bleed [K92.2]     History of COVID-19 [Z86.16]        The patient was seen and examined on day of discharge and this discharge summary is in conjunction with any daily progress note from day of discharge. Hospital Course:       From HPI:\"76 y.o. male who presented to [facility] with rectal bleed, patient had an open sigmoid resection for adenocarcinoma on January 26 2020-second, presented to emergency department with rectal bleed, on presentation he was mildly hypotensive given IV fluid and he feels better and his blood pressure improved still confused, denies abdominal pain nausea vomiting at this time, denies fever chills cough. No family member at bedside being admitted for further management and treatment\"      1.  Rectal bleed, likely due to lower GI bleed, likely not significant, but still with some drop in hemoglobin, likely postop. GI and GS ok to discharge and ok to restart asa and plavix. 2.  Colon adenocarcinoma status post resection, general surgery consulted. Follow up as outpatient. 3.   Anemia, part of it likely acute blood loss anemia, relatively stable for now, GI and general surgery consulted   4.  End-stage renal disease, nephrology consulted  5.  Acute metabolic encephalopathy, improved this a.m. 6.  Elevated troponin, no chest pain, minimal, has been elevated in the past, likely due to end-stage renal disease, flat. 7.  Recently diagnosed with acute CVA,  aspirin and Plavix on hold for now pending GI and general surgery recommendations.   8.  Recently treated for Enterococcus faecalis UTI  9. History of Covid infection, stable for now  10.  Elevated lactic acid, likely due to anemia, improved  11. Episode of hypotension, asymptomatic, resolved, likely intravascular volume decrease and autoregulation. ADDENDUM  Patient tested positive for COVID, he is completely asymptomatic, no cough, sob, fever, nausea or vomiting, no diarrhea, either asymptomatic infection, or most likely recent recovered infection. Physical Exam Performed:     /62   Pulse 84   Temp 99 °F (37.2 °C) (Oral)   Resp 18   Ht 6' (1.829 m)   Wt 217 lb 9.5 oz (98.7 kg)   SpO2 96%   BMI 29.51 kg/m²       General appearance:  No apparent distress  HEENT:  Normal cephalic. Neck: Supple  Respiratory:  Normal respiratory effort. Clear to auscultation, bilaterally without Rales/Wheezes/Rhonchi. Cardiovascular:  Regular rate and rhythm with normal S1/S2 without murmurs, rubs or gallops. Abdomen: Soft, non-tender  Musculoskeletal:  No clubbing, cyanosis or edema bilaterally. Full range of motion without deformity. Skin: Skin color, texture, turgor normal.  No rashes or lesions. Neurologic:  No focal weakness  Psychiatric:  Alert  Capillary Refill: Brisk,< 3 seconds   Peripheral Pulses: +2 palpable, equal bilaterally       Labs: For convenience and continuity at follow-up the following most recent labs are provided:      CBC:    Lab Results   Component Value Date    WBC 6.4 02/10/2022    HGB 7.8 02/10/2022    HCT 23.5 02/10/2022     02/10/2022       Renal:    Lab Results   Component Value Date     02/10/2022    K 4.5 02/10/2022    K 4.3 02/08/2022    CL 97 02/10/2022    CO2 22 02/10/2022    BUN 38 02/10/2022    CREATININE 5.5 02/10/2022    CALCIUM 8.2 02/10/2022    PHOS 4.2 02/10/2022         Significant Diagnostic Studies    Radiology:   CT ABDOMEN PELVIS WO CONTRAST Additional Contrast? None   Final Result   1.  Outpouching of the lumen of the colon to the left of the prior anastomosis   site. Recommend correlation with surgical history, as this could represent a   normal finding following end to side anastomosis. A contained anastomotic   leak cannot be excluded. 2. Mild inflammation in the adjacent peritoneal fat with several reactive   regional lymph nodes. 3. No evidence of pneumoperitoneum. 4. Cholelithiasis and splenomegaly are noted incidentally. 5. Multiple renal cysts including a stable hyperdense cyst on the left and a   partially calcified cyst on the right. No further follow-up of these   findings is necessary. XR CHEST PORTABLE   Final Result   Cardiomegaly without acute pulmonary process. Consults:     IP CONSULT TO GENERAL SURGERY  IP CONSULT TO NEPHROLOGY  IP CONSULT TO GI    Disposition: SNF     Condition at Discharge: Stable    Discharge Instructions/Follow-up:  GS, GI, ONCOLOGY    Code Status:  Full Code     Activity: activity as tolerated    Diet: renal diet      Discharge Medications:     Current Discharge Medication List           Details   aspirin 81 MG chewable tablet Take 1 tablet by mouth daily  Qty: 30 tablet, Refills: 0      clopidogrel (PLAVIX) 75 MG tablet Take 1 tablet by mouth every evening  Qty: 30 tablet, Refills: 0              Details   LORazepam (ATIVAN) 0.5 MG tablet Take 0.5 mg by mouth 2 times daily. polyethylene glycol (GLYCOLAX) 17 g packet Take 17 g by mouth daily as needed for Constipation  Qty: 30 each, Refills: 0      docusate sodium (COLACE) 100 MG capsule Take 1 capsule by mouth daily for 7 days  Qty: 7 capsule, Refills: 0      melatonin 3 MG TABS tablet Take 2 tablets by mouth nightly as needed (sleep)  Qty: 6 tablet, Refills: 0      tamsulosin (FLOMAX) 0.4 MG capsule Take 0.4 mg by mouth every morning      gabapentin (NEURONTIN) 100 MG capsule Take 100 mg by mouth 3 times daily.       sevelamer (RENVELA) 800 MG tablet Take 1 tablet by mouth 3 times daily (with meals)      atenolol (TENORMIN) 25 MG tablet Take 25 mg by mouth every evening      pantoprazole (PROTONIX) 20 MG tablet Take 20 mg by mouth nightly      atorvastatin (LIPITOR) 10 MG tablet Take 10 mg by mouth nightly      calcitRIOL (ROCALTROL) 0.25 MCG capsule Take 0.25 mcg by mouth every evening             Time Spent on discharge is more than 45 minutes in the examination, evaluation, counseling and review of medications and discharge plan. Signed:    Dalia Carney MD   2/10/2022      Thank you Mar Alcantar for the opportunity to be involved in this patient's care. If you have any questions or concerns please feel free to contact me at [phone].

## 2022-02-10 NOTE — PLAN OF CARE
Problem: Falls - Risk of:  Goal: Will remain free from falls  Description: Will remain free from falls  2/10/2022 1604 by Lesley Peter  Outcome: Ongoing  2/10/2022 1601 by Phillip Blanchard RN  Outcome: Ongoing     Problem: Skin Integrity:  Goal: Will show no infection signs and symptoms  Description: Will show no infection signs and symptoms  2/10/2022 1604 by Lesley Peter  Outcome: Ongoing  2/10/2022 1601 by Phillip Blanchard RN  Outcome: Ongoing     Problem: Skin Integrity:  Goal: Absence of new skin breakdown  Description: Absence of new skin breakdown  2/10/2022 1604 by Lesley Peter  Outcome: Ongoing  2/10/2022 1601 by Phillip Blanchard RN  Outcome: Ongoing     Problem: Bleeding:  Goal: Will show no signs and symptoms of excessive bleeding  Description: Will show no signs and symptoms of excessive bleeding  2/10/2022 1604 by Lesley Peter  Outcome: Ongoing  2/10/2022 1601 by Phillip Blanchard RN  Outcome: Ongoing     Problem: SAFETY  Goal: Free from accidental physical injury  2/10/2022 1604 by Lesley Peter  Outcome: Ongoing  2/10/2022 1601 by Phillip Blanchard RN  Outcome: Ongoing     Problem: DAILY CARE  Goal: Daily care needs are met  2/10/2022 1604 by Lesley Peter  Outcome: Ongoing  2/10/2022 1601 by Phillip Blanchard RN  Outcome: Ongoing     Problem: Isolation Precautions - Risk of Spread of Infection  Goal: Prevent transmission of infection  2/10/2022 1604 by Lesley Peter  Outcome: Ongoing  2/10/2022 1601 by Phillip Blanchard RN  Outcome: Ongoing     Problem: Nutrition Deficits  Goal: Optimize nutritional status  2/10/2022 1604 by Lesley Peter  Outcome: Ongoing  2/10/2022 1601 by Phillip Blanchard RN  Outcome: Ongoing     Problem: Fatigue  Goal: Verbalize increase energy and improved vitality  2/10/2022 1604 by Lesley Peter  Outcome: Ongoing  2/10/2022 1601 by Phillip Blanchard RN  Outcome: Ongoing

## 2022-02-10 NOTE — FLOWSHEET NOTE
02/10/22 0745 02/10/22 1110   Treatment   Time On 0756  --    Time Off  --  1056   Vital Signs   /62 135/66   Temp 99 °F (37.2 °C) 97.8 °F (36.6 °C)   Pulse 84 87   Resp 18 18   Weight 217 lb 9.5 oz (98.7 kg) 214 lb 4.6 oz (97.2 kg)     Treatment time: 3 hours  Net UF: 1500 ml     Pre weight: 98.7 kg   Post weight: 97.2 kg  EDW: tbd kg     Access used: LAVF  Access function: good with  ml/min     Medications or blood products given: none     Regular outpatient schedule: [place], [place] TTS     Summary of response to treatment: Tolerated tx well, no distress noted, MD aware. Copy of dialysis treatment record placed in chart, to be scanned into EMR.

## 2022-02-10 NOTE — PLAN OF CARE
Problem: Falls - Risk of:  Goal: Will remain free from falls  Description: Will remain free from falls  2/10/2022 1741 by Marcus Kumari RN  Outcome: Completed  2/10/2022 1604 by Morelia Jessica  Outcome: Ongoing  2/10/2022 1601 by Marcus Kumari RN  Outcome: Ongoing  Goal: Absence of physical injury  Description: Absence of physical injury  2/10/2022 1741 by Marcus Kumari RN  Outcome: Completed  2/10/2022 1604 by Morelia Jessica  Outcome: Ongoing  2/10/2022 1601 by Marcus Kumari RN  Outcome: Ongoing     Problem: Skin Integrity:  Goal: Will show no infection signs and symptoms  Description: Will show no infection signs and symptoms  2/10/2022 1741 by Marcus Kumari RN  Outcome: Completed  2/10/2022 1604 by Morelia Jessica  Outcome: Ongoing  2/10/2022 1601 by Marcus Kumari RN  Outcome: Ongoing  Goal: Absence of new skin breakdown  Description: Absence of new skin breakdown  2/10/2022 1741 by Marcus Kumari RN  Outcome: Completed  2/10/2022 1604 by Morelia Jessica  Outcome: Ongoing  2/10/2022 1601 by Marcus Kumari RN  Outcome: Ongoing     Problem: Bleeding:  Goal: Will show no signs and symptoms of excessive bleeding  Description: Will show no signs and symptoms of excessive bleeding  2/10/2022 1741 by Marcus Kumari RN  Outcome: Completed  2/10/2022 1604 by Morelia Jessica  Outcome: Ongoing  2/10/2022 1601 by Marcus Kumari RN  Outcome: Ongoing

## 2022-02-10 NOTE — PLAN OF CARE
Problem: Falls - Risk of:  Goal: Will remain free from falls  Description: Will remain free from falls  Outcome: Ongoing  Goal: Absence of physical injury  Description: Absence of physical injury  Outcome: Ongoing   Fall risk assessment completed every shift. All precautions in place. Pt has call light within reach at all times. Room clear of clutter. Pt aware to call for assistance when getting up.      Problem: SAFETY  Goal: Free from accidental physical injury  Outcome: Ongoing     Problem: Loneliness or Risk for Loneliness  Goal: Demonstrate positive use of time alone when socialization is not possible  Outcome: Ongoing

## 2022-02-10 NOTE — CARE COORDINATION
CASE MANAGEMENT DISCHARGE SUMMARY: Discharge order noted. Patient to return to [facility] for HD.     DISCHARGE DATE: 2/10/22     DISCHARGED TO:     Discharging to Facility/ Agency   · Name: Genna Wallace  · Address:  [address] [facility] [address], [address] [facility]   · Phone:  [phone]  · Fax: [phone]               REPORT NUMBER: [phone]             FAX NUMBER: [phone]    Hemodialysis Agency:  · Name: [facility]  · Address:  [address] [place] [address], [address] [facility]  · Phone:  [phone]  · Fax: [phone]     TRANSPORTATION: [facility]   TIME: 2045     INSURANCE PRECERT OBTAINED: [employer] Medicare waiving precert    HENS/PASAAR COMPLETED: Return to facility not needed     Yes SHAMIKA Updated   Yes Case Management   Yes Physician   Yes Nurse    Yes Destination updated (SNF/HHC)    Yes Whiteboard Note Updated with above    Alex GARAY, RN  Case Management  [phone]    Electronically signed by Alex Lundberg RN on 2/10/2022 at 3:12 PM

## 2022-02-10 NOTE — PROGRESS NOTES
[address] [place] [address] Nephrology   [facility]  [phone]  Nephrology Note          Patient ID: Suzanne Cartwright  Referring/ Physician: Emily Cruz MD      HPI/Summary:   Suzanne Cartwright is being seen by nephrology for ESRD. This is a 76 yo man who is admitted with rectal bleeding. He recently had colon resection for cancer 1/26. He was reportedly hypotensive and received IVFs. He was reported to be confused but when seen he was at his baseline mental status. He denies any chest pain no SOB. No edema. He has no dizziness or lightheadedness. Seen on dialysis  AVG working fine   Lactic acidosis resolved. Hgb stable now. Interval hx:   Patient seen and examined on dialysis. He feels well, no chest pain no SOB  No edema  Access working well  BP 391M systolic on HD    Bp 401/26  On room air  Labs reviewed. Hgb dropped again 9 > 7.8  Electrolytes ok, no acute issues. Plan:   - HD today per TTS schedule. - retacrit 20 units TIW. Dry weight 94.4 kilos. Has TTS spot at [facility] unit        ESRD  TTS at [facility] , has not started there yet. Is originally from [facility] but staying in [place] for rehab so dialyzing with us for now. He has a left AV fistula, positive thrill and bruit       Electrolytes  No acute issues.     Hypertension  Blood pressure is acceptable. No changes. Hypotension has resolved.      SHPT  No acute issues.      GI bleed  Has known colon cancer  No active bleeding  Hemoglobin stable   General surgery consulted.   [facility] would like to thank you for the opportunity to serve this patient. Please call with any questions or concerns.     Traci Vaughn MD  [facility]  [address], [address]  Fax: [phone]  Office: [phone]         CC/Reason for consult:   Reason for consult: ESRD    Chief Complaint   Patient presents with    Rectal Bleeding     Per aquad pt had blood in brief this morning           Review of Systems:   [place]. All other remaining systems are negative. Constitutional:  fever, chills, weakness, weight change, fatigue,      Skin:  rash, pruritus, hair loss, bruising, dry skin, petechiae. Head, Face, Neck   headaches, swelling,  cervical adenopathy. Respiratory: shortness of breath, cough, or wheezing  Cardiovascular: chest pain, palpitations, dizzy, edema  Gastrointestinal: nausea, vomiting, diarrhea, constipation,belly pain    Yellow skin, blood in stool  Musculoskeletal:  back pain, muscle weakness, gait problems,       joint pain or swelling. Genitourinary:  dysuria, poor urine flow, flank pain, blood in urine  Neurologic:  vertigo, TIA'S, syncope, seizures, focal weakness  Psychosocial:  insomnia, anxiety, or depression. Additional positive findings: -     PMH/SH/FH:    Medical Hx: reviewed and updated as appropriate  Past Medical History:   Diagnosis Date    Arthritis     Cancer ([facility]     Colon Cancer diagnosed last month    Diabetes mellitus ([facility]     Hemodialysis patient ([facility]     Hypertension          Surgical Hx: reviewed and updated as appropriate   has a past surgical history that includes IR PERC ARTERIOVENOUS FISTULA CREATION (Left) and colectomy (N/A, 1/26/2022). Social Hx: reviewed and updated as appropriate  Social History     Tobacco Use    Smoking status: Former Smoker    Smokeless tobacco: Never Used   Substance Use Topics    Alcohol use: Not Currently        Family hx: reviewed and updated as appropriate  family history is not on file. Medications:   sodium chloride flush, 5-40 mL, 2 times per day  atorvastatin, 10 mg, Nightly  calcitRIOL, 0.25 mcg, Nightly  docusate sodium, 100 mg, Daily  gabapentin, 100 mg, TID  LORazepam, 0.5 mg, BID  pantoprazole, 20 mg, Nightly  sevelamer, 800 mg, TID WC  tamsulosin, 0.4 mg, QAM       Lisinopril    Allergies:    Allergies   Allergen Reactions    Lisinopril      Allergy listed on paperwork from [facility], no reaction noted         Physical Exam/Objective:   Vitals:    02/10/22 0745   BP: 125/62   Pulse: 84   Resp: 18   Temp: 99 °F (37.2 °C)   SpO2:        Intake/Output Summary (Last 24 hours) at 2/10/2022 7975  Last data filed at 2/10/2022 0606  Gross per 24 hour   Intake 1173.5 ml   Output 0 ml   Net 1173.5 ml         General appearance: pleasant male in NAD  HEENT: no icterus, EOM intact, trachea midline. Neck : no masses, appears symmetrical and no JVD appreciated. Respiratory: Respiratory effort normal, bilateral equal chest rise. No wheeze, no crackles   Cardiovascular: Ausculation shows RRR and  no edema   Abdomen: abdomen is soft, non distended, no masses, no pain with palpation. Musculoskeletal:  no joint swelling, no deformity, strength grossly normal.   Skin: no rashes, no induration, no tightening, no jaundice   Neuro: Follows commands, moves all extremities spontaneously   AVG left arm + thrill and bruit      Data:   CBC:   Recent Labs     02/08/22  0835 02/08/22  0835 02/09/22  0431 02/09/22  0431 02/09/22  1305 02/09/22  1914 02/10/22  0533   WBC 5.1  --  4.4  --   --   --  6.4   HGB 7.9*   < > 8.2*   < > 9.4* 9.0* 7.8*   HCT 24.0*   < > 25.0*   < > 29.0* 27.4* 23.5*     --  202  --   --   --  167    < > = values in this interval not displayed.      BMP:    Recent Labs     02/08/22  0705 02/08/22  0835 02/10/22  0533   * 134* 133*   K 4.3 4.1 4.5   CL 96* 96* 97*   CO2 25 23 22   BUN 47* 46* 38*   CREATININE 6.4* 6.6* 5.5*   GLUCOSE 116* 123* 148*   PHOS  --  4.6 4.2

## 2022-02-10 NOTE — CARE COORDINATION
Request for [address] to [facility] Medicare:    Patient Name: Irene Abel  : [id number]    Current Location: [address] [place] [address] Date to Hospital:  2022    Requesting [address] [place] [address] Date to [facility] Level of Care: As soon as Possible    Ordering MD: Manohar Gómez MD    NPI Number: [id number]    Connie GARAY RN  Case Management  [phone]    Electronically signed by Connie Acuña RN on 2/10/2022 at 11:45 AM

## 2022-02-11 NOTE — PROGRESS NOTES
Data- discharge order received, pt verbalized agreement to discharge, disposition to [address] [place] [address]    Action- discharge instructions prepared and given to transportation, pt verbalized understanding. Medication information packet given r/t NEW and/or CHANGED prescriptions emphasizing name/purpose/side effects, pt verbalized understanding. Response- Pt belongings gathered, IV removed, telemetry box removed.  Being transported to [address] [place] [address]

## 2022-02-12 ENCOUNTER — HOSPITAL ENCOUNTER (INPATIENT)
Age: 77
LOS: 13 days | Discharge: NURSING, REHAB OR LONG-TERM CARE | DRG: 907 | End: 2022-02-25
Attending: STUDENT IN AN ORGANIZED HEALTH CARE EDUCATION/TRAINING PROGRAM | Admitting: STUDENT IN AN ORGANIZED HEALTH CARE EDUCATION/TRAINING PROGRAM
Payer: MEDICARE

## 2022-02-12 ENCOUNTER — APPOINTMENT (OUTPATIENT)
Dept: CT IMAGING | Age: 77
DRG: 907 | End: 2022-02-12
Payer: MEDICARE

## 2022-02-12 DIAGNOSIS — F41.9 ANXIETY: ICD-10-CM

## 2022-02-12 DIAGNOSIS — R57.8 HEMORRHAGIC SHOCK (HCC): Primary | ICD-10-CM

## 2022-02-12 DIAGNOSIS — K92.2 GASTROINTESTINAL HEMORRHAGE, UNSPECIFIED GASTROINTESTINAL HEMORRHAGE TYPE: ICD-10-CM

## 2022-02-12 PROBLEM — U07.1 COVID-19: Status: ACTIVE | Noted: 2022-02-12

## 2022-02-12 LAB
ABO/RH: NORMAL
ALBUMIN SERPL-MCNC: 2.5 G/DL (ref 3.4–5)
ALP BLD-CCNC: 91 U/L (ref 40–129)
ALT SERPL-CCNC: 7 U/L (ref 10–40)
ANION GAP SERPL CALCULATED.3IONS-SCNC: 16 MMOL/L (ref 3–16)
ANTIBODY SCREEN: NORMAL
APTT: 22.7 SEC (ref 26.2–38.6)
AST SERPL-CCNC: 11 U/L (ref 15–37)
BASOPHILS ABSOLUTE: 0 K/UL (ref 0–0.2)
BASOPHILS RELATIVE PERCENT: 0.7 %
BILIRUB SERPL-MCNC: 0.3 MG/DL (ref 0–1)
BILIRUBIN DIRECT: <0.2 MG/DL (ref 0–0.3)
BILIRUBIN, INDIRECT: ABNORMAL MG/DL (ref 0–1)
BLOOD BANK DISPENSE STATUS: NORMAL
BLOOD BANK DISPENSE STATUS: NORMAL
BLOOD BANK PRODUCT CODE: NORMAL
BLOOD BANK PRODUCT CODE: NORMAL
BPU ID: NORMAL
BPU ID: NORMAL
BUN BLDV-MCNC: 41 MG/DL (ref 7–20)
CALCIUM SERPL-MCNC: 7.9 MG/DL (ref 8.3–10.6)
CHLORIDE BLD-SCNC: 95 MMOL/L (ref 99–110)
CO2: 20 MMOL/L (ref 21–32)
CREAT SERPL-MCNC: 5.3 MG/DL (ref 0.8–1.3)
DESCRIPTION BLOOD BANK: NORMAL
DESCRIPTION BLOOD BANK: NORMAL
EMERGENCY ISSUE BLOOD PRODUCTS SIGNED FORM: NORMAL
EOSINOPHILS ABSOLUTE: 0.2 K/UL (ref 0–0.6)
EOSINOPHILS RELATIVE PERCENT: 2.9 %
GFR AFRICAN AMERICAN: 13
GFR NON-AFRICAN AMERICAN: 11
GLUCOSE BLD-MCNC: 104 MG/DL (ref 70–99)
GLUCOSE BLD-MCNC: 105 MG/DL (ref 70–99)
GLUCOSE BLD-MCNC: 120 MG/DL (ref 70–99)
GLUCOSE BLD-MCNC: 245 MG/DL (ref 70–99)
GLUCOSE BLD-MCNC: 91 MG/DL (ref 70–99)
HBV SURFACE AB TITR SER: 10.91 MIU/ML
HCT VFR BLD CALC: 22.8 % (ref 40.5–52.5)
HCT VFR BLD CALC: 24 % (ref 40.5–52.5)
HCT VFR BLD CALC: 25.3 % (ref 40.5–52.5)
HCT VFR BLD CALC: 25.9 % (ref 40.5–52.5)
HEMOGLOBIN: 7.1 G/DL (ref 13.5–17.5)
HEMOGLOBIN: 8 G/DL (ref 13.5–17.5)
HEMOGLOBIN: 8.3 G/DL (ref 13.5–17.5)
HEMOGLOBIN: 8.5 G/DL (ref 13.5–17.5)
HEPATITIS B SURFACE ANTIGEN INTERPRETATION: NORMAL
INR BLD: 1.14 (ref 0.88–1.12)
LACTIC ACID: 1.2 MMOL/L (ref 0.4–2)
LACTIC ACID: 4.4 MMOL/L (ref 0.4–2)
LYMPHOCYTES ABSOLUTE: 2.1 K/UL (ref 1–5.1)
LYMPHOCYTES RELATIVE PERCENT: 30.1 %
MCH RBC QN AUTO: 28.9 PG (ref 26–34)
MCHC RBC AUTO-ENTMCNC: 31.3 G/DL (ref 31–36)
MCV RBC AUTO: 92.5 FL (ref 80–100)
MONOCYTES ABSOLUTE: 0.4 K/UL (ref 0–1.3)
MONOCYTES RELATIVE PERCENT: 6.1 %
NEUTROPHILS ABSOLUTE: 4.2 K/UL (ref 1.7–7.7)
NEUTROPHILS RELATIVE PERCENT: 60.2 %
PDW BLD-RTO: 15.9 % (ref 12.4–15.4)
PERFORMED ON: ABNORMAL
PERFORMED ON: NORMAL
PLATELET # BLD: 258 K/UL (ref 135–450)
PMV BLD AUTO: 7.9 FL (ref 5–10.5)
POTASSIUM REFLEX MAGNESIUM: 4.3 MMOL/L (ref 3.5–5.1)
PROTHROMBIN TIME: 12.9 SEC (ref 9.9–12.7)
RBC # BLD: 2.47 M/UL (ref 4.2–5.9)
SODIUM BLD-SCNC: 131 MMOL/L (ref 136–145)
TOTAL PROTEIN: 5.6 G/DL (ref 6.4–8.2)
WBC # BLD: 6.9 K/UL (ref 4–11)

## 2022-02-12 PROCEDURE — 6360000002 HC RX W HCPCS: Performed by: STUDENT IN AN ORGANIZED HEALTH CARE EDUCATION/TRAINING PROGRAM

## 2022-02-12 PROCEDURE — 86923 COMPATIBILITY TEST ELECTRIC: CPT

## 2022-02-12 PROCEDURE — 99285 EMERGENCY DEPT VISIT HI MDM: CPT

## 2022-02-12 PROCEDURE — 99024 POSTOP FOLLOW-UP VISIT: CPT | Performed by: PHYSICIAN ASSISTANT

## 2022-02-12 PROCEDURE — 2580000003 HC RX 258: Performed by: STUDENT IN AN ORGANIZED HEALTH CARE EDUCATION/TRAINING PROGRAM

## 2022-02-12 PROCEDURE — 85610 PROTHROMBIN TIME: CPT

## 2022-02-12 PROCEDURE — 86901 BLOOD TYPING SEROLOGIC RH(D): CPT

## 2022-02-12 PROCEDURE — 96361 HYDRATE IV INFUSION ADD-ON: CPT

## 2022-02-12 PROCEDURE — 85730 THROMBOPLASTIN TIME PARTIAL: CPT

## 2022-02-12 PROCEDURE — 94760 N-INVAS EAR/PLS OXIMETRY 1: CPT

## 2022-02-12 PROCEDURE — C9113 INJ PANTOPRAZOLE SODIUM, VIA: HCPCS | Performed by: STUDENT IN AN ORGANIZED HEALTH CARE EDUCATION/TRAINING PROGRAM

## 2022-02-12 PROCEDURE — 5A1D70Z PERFORMANCE OF URINARY FILTRATION, INTERMITTENT, LESS THAN 6 HOURS PER DAY: ICD-10-PCS | Performed by: STUDENT IN AN ORGANIZED HEALTH CARE EDUCATION/TRAINING PROGRAM

## 2022-02-12 PROCEDURE — 85018 HEMOGLOBIN: CPT

## 2022-02-12 PROCEDURE — 2060000000 HC ICU INTERMEDIATE R&B

## 2022-02-12 PROCEDURE — 99024 POSTOP FOLLOW-UP VISIT: CPT | Performed by: SURGERY

## 2022-02-12 PROCEDURE — APPNB180 APP NON BILLABLE TIME > 60 MINS: Performed by: PHYSICIAN ASSISTANT

## 2022-02-12 PROCEDURE — 80048 BASIC METABOLIC PNL TOTAL CA: CPT

## 2022-02-12 PROCEDURE — 85025 COMPLETE CBC W/AUTO DIFF WBC: CPT

## 2022-02-12 PROCEDURE — 6370000000 HC RX 637 (ALT 250 FOR IP): Performed by: STUDENT IN AN ORGANIZED HEALTH CARE EDUCATION/TRAINING PROGRAM

## 2022-02-12 PROCEDURE — 90935 HEMODIALYSIS ONE EVALUATION: CPT

## 2022-02-12 PROCEDURE — 36415 COLL VENOUS BLD VENIPUNCTURE: CPT

## 2022-02-12 PROCEDURE — 87340 HEPATITIS B SURFACE AG IA: CPT

## 2022-02-12 PROCEDURE — P9016 RBC LEUKOCYTES REDUCED: HCPCS

## 2022-02-12 PROCEDURE — 6370000000 HC RX 637 (ALT 250 FOR IP): Performed by: NURSE PRACTITIONER

## 2022-02-12 PROCEDURE — 86704 HEP B CORE ANTIBODY TOTAL: CPT

## 2022-02-12 PROCEDURE — 96365 THER/PROPH/DIAG IV INF INIT: CPT

## 2022-02-12 PROCEDURE — 85014 HEMATOCRIT: CPT

## 2022-02-12 PROCEDURE — 86706 HEP B SURFACE ANTIBODY: CPT

## 2022-02-12 PROCEDURE — 83605 ASSAY OF LACTIC ACID: CPT

## 2022-02-12 PROCEDURE — 6360000004 HC RX CONTRAST MEDICATION: Performed by: STUDENT IN AN ORGANIZED HEALTH CARE EDUCATION/TRAINING PROGRAM

## 2022-02-12 PROCEDURE — 80076 HEPATIC FUNCTION PANEL: CPT

## 2022-02-12 PROCEDURE — 86850 RBC ANTIBODY SCREEN: CPT

## 2022-02-12 PROCEDURE — 86900 BLOOD TYPING SEROLOGIC ABO: CPT

## 2022-02-12 PROCEDURE — 74174 CTA ABD&PLVS W/CONTRAST: CPT

## 2022-02-12 PROCEDURE — A4216 STERILE WATER/SALINE, 10 ML: HCPCS | Performed by: STUDENT IN AN ORGANIZED HEALTH CARE EDUCATION/TRAINING PROGRAM

## 2022-02-12 PROCEDURE — APPSS180 APP SPLIT SHARED TIME > 60 MINUTES: Performed by: PHYSICIAN ASSISTANT

## 2022-02-12 RX ORDER — LANOLIN ALCOHOL/MO/W.PET/CERES
6 CREAM (GRAM) TOPICAL NIGHTLY PRN
Status: DISCONTINUED | OUTPATIENT
Start: 2022-02-12 | End: 2022-02-25 | Stop reason: HOSPADM

## 2022-02-12 RX ORDER — PANTOPRAZOLE SODIUM 40 MG/10ML
40 INJECTION, POWDER, LYOPHILIZED, FOR SOLUTION INTRAVENOUS EVERY 12 HOURS
Status: DISCONTINUED | OUTPATIENT
Start: 2022-02-12 | End: 2022-02-25 | Stop reason: HOSPADM

## 2022-02-12 RX ORDER — ACETAMINOPHEN 650 MG/1
650 SUPPOSITORY RECTAL EVERY 6 HOURS PRN
Status: DISCONTINUED | OUTPATIENT
Start: 2022-02-12 | End: 2022-02-20

## 2022-02-12 RX ORDER — DEXTROSE MONOHYDRATE 50 MG/ML
100 INJECTION, SOLUTION INTRAVENOUS PRN
Status: DISCONTINUED | OUTPATIENT
Start: 2022-02-12 | End: 2022-02-25 | Stop reason: HOSPADM

## 2022-02-12 RX ORDER — SODIUM CHLORIDE 0.9 % (FLUSH) 0.9 %
5-40 SYRINGE (ML) INJECTION PRN
Status: DISCONTINUED | OUTPATIENT
Start: 2022-02-12 | End: 2022-02-19 | Stop reason: SDUPTHER

## 2022-02-12 RX ORDER — SODIUM CHLORIDE 0.9 % (FLUSH) 0.9 %
5-40 SYRINGE (ML) INJECTION EVERY 12 HOURS SCHEDULED
Status: DISCONTINUED | OUTPATIENT
Start: 2022-02-12 | End: 2022-02-25 | Stop reason: HOSPADM

## 2022-02-12 RX ORDER — ACETAMINOPHEN 325 MG/1
650 TABLET ORAL EVERY 6 HOURS PRN
Status: DISCONTINUED | OUTPATIENT
Start: 2022-02-12 | End: 2022-02-20

## 2022-02-12 RX ORDER — GABAPENTIN 100 MG/1
100 CAPSULE ORAL 3 TIMES DAILY
Status: DISCONTINUED | OUTPATIENT
Start: 2022-02-12 | End: 2022-02-25 | Stop reason: HOSPADM

## 2022-02-12 RX ORDER — SODIUM CHLORIDE 9 MG/ML
10 INJECTION INTRAVENOUS EVERY 12 HOURS
Status: DISCONTINUED | OUTPATIENT
Start: 2022-02-12 | End: 2022-02-25 | Stop reason: HOSPADM

## 2022-02-12 RX ORDER — SODIUM CHLORIDE 9 MG/ML
INJECTION, SOLUTION INTRAVENOUS PRN
Status: DISCONTINUED | OUTPATIENT
Start: 2022-02-12 | End: 2022-02-19

## 2022-02-12 RX ORDER — SEVELAMER CARBONATE 800 MG/1
800 TABLET, FILM COATED ORAL
Status: DISCONTINUED | OUTPATIENT
Start: 2022-02-12 | End: 2022-02-25 | Stop reason: HOSPADM

## 2022-02-12 RX ORDER — NICOTINE POLACRILEX 4 MG
15 LOZENGE BUCCAL PRN
Status: DISCONTINUED | OUTPATIENT
Start: 2022-02-12 | End: 2022-02-25 | Stop reason: HOSPADM

## 2022-02-12 RX ORDER — DEXTROSE MONOHYDRATE 25 G/50ML
12.5 INJECTION, SOLUTION INTRAVENOUS PRN
Status: DISCONTINUED | OUTPATIENT
Start: 2022-02-12 | End: 2022-02-25 | Stop reason: HOSPADM

## 2022-02-12 RX ORDER — LIDOCAINE HYDROCHLORIDE 20 MG/ML
JELLY TOPICAL PRN
Status: DISCONTINUED | OUTPATIENT
Start: 2022-02-12 | End: 2022-02-25 | Stop reason: HOSPADM

## 2022-02-12 RX ORDER — GUAIFENESIN/DEXTROMETHORPHAN 100-10MG/5
5 SYRUP ORAL EVERY 4 HOURS PRN
Status: DISCONTINUED | OUTPATIENT
Start: 2022-02-12 | End: 2022-02-20

## 2022-02-12 RX ORDER — LANOLIN ALCOHOL/MO/W.PET/CERES
6 CREAM (GRAM) TOPICAL NIGHTLY PRN
Status: DISCONTINUED | OUTPATIENT
Start: 2022-02-13 | End: 2022-02-12

## 2022-02-12 RX ORDER — ONDANSETRON 2 MG/ML
4 INJECTION INTRAMUSCULAR; INTRAVENOUS EVERY 6 HOURS PRN
Status: DISCONTINUED | OUTPATIENT
Start: 2022-02-12 | End: 2022-02-25 | Stop reason: HOSPADM

## 2022-02-12 RX ORDER — 0.9 % SODIUM CHLORIDE 0.9 %
1000 INTRAVENOUS SOLUTION INTRAVENOUS ONCE
Status: COMPLETED | OUTPATIENT
Start: 2022-02-12 | End: 2022-02-12

## 2022-02-12 RX ORDER — ATORVASTATIN CALCIUM 10 MG/1
10 TABLET, FILM COATED ORAL NIGHTLY
Status: DISCONTINUED | OUTPATIENT
Start: 2022-02-12 | End: 2022-02-25 | Stop reason: HOSPADM

## 2022-02-12 RX ORDER — TAMSULOSIN HYDROCHLORIDE 0.4 MG/1
0.4 CAPSULE ORAL EVERY MORNING
Status: DISCONTINUED | OUTPATIENT
Start: 2022-02-12 | End: 2022-02-25 | Stop reason: HOSPADM

## 2022-02-12 RX ORDER — SODIUM CHLORIDE 9 MG/ML
25 INJECTION, SOLUTION INTRAVENOUS PRN
Status: DISCONTINUED | OUTPATIENT
Start: 2022-02-12 | End: 2022-02-20

## 2022-02-12 RX ADMIN — PANTOPRAZOLE SODIUM 40 MG: 40 INJECTION, POWDER, FOR SOLUTION INTRAVENOUS at 21:40

## 2022-02-12 RX ADMIN — GABAPENTIN 100 MG: 100 CAPSULE ORAL at 15:20

## 2022-02-12 RX ADMIN — SODIUM CHLORIDE, PRESERVATIVE FREE 10 ML: 5 INJECTION INTRAVENOUS at 09:52

## 2022-02-12 RX ADMIN — IOPAMIDOL 75 ML: 755 INJECTION, SOLUTION INTRAVENOUS at 02:46

## 2022-02-12 RX ADMIN — SODIUM CHLORIDE 80 MG: 9 INJECTION, SOLUTION INTRAVENOUS at 01:20

## 2022-02-12 RX ADMIN — ATORVASTATIN CALCIUM 10 MG: 10 TABLET, FILM COATED ORAL at 21:40

## 2022-02-12 RX ADMIN — SODIUM CHLORIDE 1000 ML: 9 INJECTION, SOLUTION INTRAVENOUS at 01:20

## 2022-02-12 RX ADMIN — SODIUM CHLORIDE, PRESERVATIVE FREE 10 ML: 5 INJECTION INTRAVENOUS at 21:40

## 2022-02-12 RX ADMIN — PANTOPRAZOLE SODIUM 40 MG: 40 INJECTION, POWDER, FOR SOLUTION INTRAVENOUS at 09:26

## 2022-02-12 RX ADMIN — GABAPENTIN 100 MG: 100 CAPSULE ORAL at 21:40

## 2022-02-12 RX ADMIN — Medication 6 MG: at 23:05

## 2022-02-12 RX ADMIN — TAMSULOSIN HYDROCHLORIDE 0.4 MG: 0.4 CAPSULE ORAL at 15:20

## 2022-02-12 RX ADMIN — SODIUM CHLORIDE, PRESERVATIVE FREE 10 ML: 5 INJECTION INTRAVENOUS at 09:53

## 2022-02-12 RX ADMIN — SODIUM CHLORIDE, PRESERVATIVE FREE 10 ML: 5 INJECTION INTRAVENOUS at 21:29

## 2022-02-12 ASSESSMENT — PAIN DESCRIPTION - DESCRIPTORS: DESCRIPTORS: ACHING

## 2022-02-12 ASSESSMENT — PAIN SCALES - GENERAL
PAINLEVEL_OUTOF10: 0
PAINLEVEL_OUTOF10: 5
PAINLEVEL_OUTOF10: 0
PAINLEVEL_OUTOF10: 0

## 2022-02-12 ASSESSMENT — PAIN DESCRIPTION - LOCATION: LOCATION: ABDOMEN

## 2022-02-12 ASSESSMENT — PAIN DESCRIPTION - PAIN TYPE: TYPE: ACUTE PAIN

## 2022-02-12 NOTE — ED NOTES
Report called to [facility] SYS RN, all questions answered.      Dominick Ruby RN  02/12/22 0849

## 2022-02-12 NOTE — PLAN OF CARE
Absence of new skin breakdown  Outcome: Ongoing     Problem: Discharge Planning:  Goal: Discharged to appropriate level of care  Description: Discharged to appropriate level of care  Outcome: Ongoing     Problem:  Bowel Function - Altered:  Goal: Bowel elimination is within specified parameters  Description: Bowel elimination is within specified parameters  Outcome: Ongoing     Problem: Fluid Volume - Imbalance:  Goal: Will show no signs and symptoms of excessive bleeding  Description: Will show no signs and symptoms of excessive bleeding  Outcome: Ongoing  Goal: Absence of imbalanced fluid volume signs and symptoms  Description: Absence of imbalanced fluid volume signs and symptoms  Outcome: Ongoing     Problem: Nausea/Vomiting:  Goal: Absence of nausea/vomiting  Description: Absence of nausea/vomiting  Outcome: Ongoing  Goal: Able to drink  Description: Able to drink  Outcome: Ongoing  Goal: Able to eat  Description: Able to eat  Outcome: Ongoing  Goal: Ability to achieve adequate nutritional intake will improve  Description: Ability to achieve adequate nutritional intake will improve  Outcome: Ongoing

## 2022-02-12 NOTE — FLOWSHEET NOTE
02/12/22 1159 02/12/22 1500   Treatment   Time On 1159  --    Time Off  --  1500   Treatment Goal 1000  --    Observations & Evaluations   Level of Consciousness Alert (0) Alert (0)   Oriented X 3 3   Vital Signs   /66 106/62   Temp 97 °F (36.1 °C) 98 °F (36.7 °C)   Pulse 59 85   Resp 20 20   Weight 213 lb 6.5 oz (96.8 kg) 213 lb 3 oz (96.7 kg)   Weight Method Bed scale Bed scale   Percent Weight Change -0.1 -0.1   Dry Weight 242 lb 8.1 oz (110 kg)  --    Pain Assessment   Pain Assessment 0-10 0-10   Pain Level 0 0   Access   Needle Size Used 15  --    Technical Checks   RO Machine Log Sheet Completed Yes  --    Machine Alarm Self Test Completed  --    Machine Autotest Completed  --    Air Foam Detector Tested  --    Extracorporeal Circuit Tested for Integrity Yes  --    Treatment Initiation   Dialyze Hours 3  --    Treatment  Initiation Universal Precautions maintained;Lines secured to patient; Connections secured;Prime given;Venous Parameters set; Arterial Parameters set; Air foam detector engaged; Hemosafe Device; Saline line double clamped; Hemo-Safe Applied;Dialyzer;F180  --    During Hemodialysis Assessment   Blood Flow Rate (ml/min) 400 ml/min  --    Ultrafiltration Rate (ml/hr) 470 ml/hr  --    Arterial Pressure (mmHg) -160 mmHg  --    Venous Pressure (mmHg) 150  --    TMP 50  --      --    Access Visible Yes  --    Dialysis Bath   K+ (Potassium) 3  --    Ca+ (Calcium) 2.5  --    Na+ (Sodium) 138  --    HCO3 (Bicarb) 32  --    Hemodialysis Fistula/Graft Left Forearm   Placement Date: 01/14/22   Pre-existing: Yes  Orientation: Left  Access Location: Forearm   Site Assessment Clean;Dry; Intact Clean;Dry; Intact   Thrill Present Present   Bruit Present Present   Access Interventions Aseptic Technique Aseptic Technique   Dressing Intervention Dressing reinforced  --    Dressing Status Clean;Dry; Intact  --    Post-Hemodialysis Assessment   Post-Treatment Procedures  --  Blood returned; Access bleeding time > 10 minutes   Machine Disinfection Process  --  Acid/Vinegar Clean;Heat Disinfect; Exterior Machine Disinfection   Rinseback Volume (ml)  --  400 ml   Total Liters Processed (l/min)  --  56.3 l/min   Dialyzer Clearance  --  Lightly streaked   Duration of Treatment (minutes)  --  180 minutes   Hemodialysis Intake (ml)  --  400 ml   Hemodialysis Output (ml)  --  1076 ml   NET Removed (ml)  --  600 ml   Tolerated Treatment  --  Good

## 2022-02-12 NOTE — CONSULTS
GASTROENTEROLOGY INPATIENT CONSULTATION        IDENTIFYING DATA/REASON FOR CONSULTATION   PATIENT:  Katie Klein  MRN:  [id number]  ADMIT DATE: 2/12/2022  TIME OF EVALUATION: 2/12/2022 11:47 AM  HOSPITAL STAY:   LOS: 0 days     REASON FOR CONSULTATION:  GI bleed    HISTORY OF PRESENT ILLNESS   Katie Klein is a 77 y.o. male with a PMH of  Colon adenocarcinoma dx 2 months ago s/p sigmoid resection (1/26/22), DM, ESRD on HD, HTN and arthritis who presented on 2/12/2022 with rectal bleed. We have been consulted regarding same. He notes symptoms of rectal bleeding that started yesterday. He is not a great historian, initially tells me that he had \"a lot of rectal bleeding\" and also tells me that his last episode was 3 days ago. He was last admitted a few days ago and discharged about 2 days ago. He resumed his Plavix after discharge and the bleeding was noted to start again. Abdominal pain, nausea, vomiting he does not have lightheadedness or dizziness. Lactic acid is elevated at admission. Does have chronic kidney disease, hypotensive while in the ER. Last reported bowel movement by nursing was about 5 AM downstairs    Prior Endoscopic Evaluations: 1 month ago as reported by patient and family. No access to records. PAST MEDICAL, SURGICAL, FAMILY, and SOCIAL HISTORY     Past Medical History:   Diagnosis Date    Arthritis     Cancer [facility])     Colon Cancer diagnosed last month    Diabetes mellitus ([facility]     Hemodialysis patient ([facility]     Hypertension      Past Surgical History:   Procedure Laterality Date    COLECTOMY N/A 1/26/2022    SIGMOID COLECTOMY, SIGMOIDOSCOPY performed by Krissy Mclain MD at [address] [place] [address]  [facility]     unsure of date     History reviewed. No pertinent family history.   Social History     Socioeconomic History    Marital status:      Spouse name: None    Number of children: None    Years of education: None    Highest education level: None   Occupational History    None   Tobacco Use    Smoking status: Former Smoker    Smokeless tobacco: Never Used   Vaping Use    Vaping Use: Never used   Substance and Sexual Activity    Alcohol use: Not Currently    Drug use: Never    Sexual activity: None   Other Topics Concern    None   Social History Narrative    None     Social Determinants of Health     Financial Resource Strain:     Difficulty of Paying Living Expenses: Not on file   Food Insecurity:     Worried About Running Out of Food in the Last Year: Not on file    [place] of Food in the Last Year: Not on file   Transportation Needs:     Lack of Transportation (Medical): Not on file    Lack of Transportation (Non-Medical):  Not on file   Physical Activity:     Days of Exercise per Week: Not on file    Minutes of Exercise per Session: Not on file   Stress:     Feeling of Stress : Not on file   Social Connections:     Frequency of Communication with Friends and Family: Not on file    Frequency of Social Gatherings with Friends and Family: Not on file    Attends Orthodox Services: Not on file    Active Member of [address] [place] [address] or Organizations: Not on file    Attends Club or Organization Meetings: Not on file    Marital Status: Not on file   Intimate Partner Violence:     Fear of Current or Ex-Partner: Not on file    Emotionally Abused: Not on file    Physically Abused: Not on file    Sexually Abused: Not on file   Housing Stability:     Unable to Pay for Housing in the Last Year: Not on file    Number of [place] in the Last Year: Not on file    Unstable Housing in the Last Year: Not on file       MEDICATIONS   SCHEDULED:  atorvastatin, 10 mg, Nightly  gabapentin, 100 mg, TID  sevelamer, 800 mg, TID WC  tamsulosin, 0.4 mg, QAM  sodium chloride flush, 5-40 mL, 2 times per day  pantoprazole, 40 mg, Q12H   And  sodium chloride (PF), 10 mL, Q12H  insulin lispro, 0-6 Units, Q4H      FLUIDS/DRIPS:     sodium chloride      sodium chloride      sodium chloride      dextrose       PRNs: sodium chloride, , PRN  sodium chloride, , PRN  sodium chloride flush, 5-40 mL, PRN  sodium chloride, 25 mL, PRN  acetaminophen, 650 mg, Q6H PRN   Or  acetaminophen, 650 mg, Q6H PRN  ondansetron, 4 mg, Q6H PRN  guaiFENesin-dextromethorphan, 5 mL, Q4H PRN  glucose, 15 g, PRN  dextrose, 12.5 g, PRN  glucagon (rDNA), 1 mg, PRN  dextrose, 100 mL/hr, PRN      ALLERGIES:  He   Allergies   Allergen Reactions    Lisinopril      Allergy listed on paperwork from [address] [place] [address], no reaction noted       REVIEW OF SYSTEMS   Pertinent ROS noted in HPI    PHYSICAL EXAM     Vitals:    02/12/22 0515 02/12/22 0915 02/12/22 1123 02/12/22 1126   BP: 123/83 112/71     Pulse: 69 68     Resp: 16 16     Temp: 97.7 °F (36.5 °C) 97.3 °F (36.3 °C)     TempSrc: Oral Oral     SpO2: 100% 94%  96%   Weight: 213 lb 10 oz (96.9 kg)      Height: 6' (1.829 m)  6' (1.829 m)        I/O last 3 completed shifts: In: 161.3 [Blood:161.3]  Out: -       Physical Exam:  General appearance: alert, cooperative, no distress, appears stated age  Eyes: Anicteric  Head: Normocephalic, without obvious abnormality  Lungs: clear to auscultation bilaterally, Normal Effort  Heart: regular rate and rhythm, normal S1 and S2, no murmurs or rubs  Abdomen: soft, non-distended, non-tender. Staple lines/scars clean and dry and lower abdomen.   Extremities: atraumatic, no cyanosis or edema  Skin: warm and dry, no jaundice  Neuro: Normal speech, A&OX3    LABS AND IMAGING   Laboratory   Recent Labs     02/10/22  0533 02/12/22  0024 02/12/22  1045   WBC 6.4 6.9  --    HGB 7.8* 7.1* 8.5*   HCT 23.5* 22.8* 25.9*   MCV 90.8 92.5  --     258  --      Recent Labs     02/10/22  0533 02/12/22  0024   * 131*   K 4.5 4.3   CL 97* 95*   CO2 22 20*   PHOS 4.2  --    BUN 38* 41*   CREATININE 5.5* 5.3*     Recent Labs     02/12/22  0024   AST 11*   ALT 7*   BILIDIR <0.2   BILITOT 0.3   ALKPHOS 91     No results for input(s): LIPASE, AMYLASE in the last 72 hours. Recent Labs     02/12/22  0024   PROTIME 12.9*   INR 1.14*       Imaging  CTA ABDOMEN PELVIS W WO CONTRAST   Final Result   1. No high-density contrast within the lumen of the stomach, small bowel,   and colon to indicate a site of active hemorrhage. 2.  Previous partial colectomy with an anastomosis at the expected   rectosigmoid junction. There is a focal outpouching along the left side of   the anastomosis. This was also noted on the previous exam but has decreased   in size. Mild inflammatory changes are still present at the site. 3.  No general ascites and no pneumoperitoneum. There is no bowel   obstruction or hydronephrosis. 4.  Multiple cysts are present within the kidneys some of which are too small   to characterize. ASSESSMENT AND RECOMMENDATIONS   Shivam Mooney is a 77 y.o. male with a PMH of  colon adenocarcinoma dx 2 months ago s/p sigmoid resection 1/26/22, DM, ESRD on HD, HTN, and arthritiswho presented on 2/7/2022 with rectal bleed. We have been consulted regarding sane. IMPRESSION:    1. Lower GI bleed. Patient presenting with hematochezia. Underwent sigmoid resection on 1/26/22 for colon adenocarcinoma. Is anticoagulated with aspirin and plavix. Patient was asymptomatic at the time of evaluation. No brisk bleeding on CT the abdomen. No evidence of active hemorrhaging. Patient appears to be stable at this time. DDX post-op bleed in setting of blood thinner use, diverticula, hemmorrhoids. 2. Colon adenocarcinoma s/p resection 1/26/22. Surgery following. Notably a small outpouching is seen at the anastomosis which is smaller from his last cross-sectional imaging. 3. Recent CVA. Plavix on hold due to #1. RECOMMENDATIONS:    Discussed with general surgery. We will plan to continue to observe for now off of Plavix.     If you have any questions or need any further information, please feel free to contact our consult team.  Thank you for allowing us to participate in the care of Yanira Spencer.    The note was completed using Dragon voice recognition transcription. Every effort was made to ensure accuracy; however, inadvertent transcription errors may be present despite my best efforts to edit errors.     Maryanne Diaz MD

## 2022-02-12 NOTE — PROGRESS NOTES
Pt to Rm#[id number] via ED stretcher with all personal belongings. Oriented to room and role as RN. Telemonitor #[id number] on and verified by [facility]. Pt transfered to bed, alert and oriented x4. Assessment of pt in progress. POC and education initiated per protocol. Call light within reach. Bed in lowest position and wheels locked. Room is free of clutter. Personal belongings within reach. No current complaints at this time. Will continue to monitor.

## 2022-02-12 NOTE — CONSULTS
[address] [place] [address] Nephrology   [facility]  [phone]  Nephrology Consult Note          Patient ID: Vannesa Eldridge  Referring/ Physician: Kayleigh Pryor MD      HPI/Summary:   Vannesa Eldridge is being seen by nephrology for ESRD. He is a 77 y.o. male with a PMH significant for ESRD on HD TTS, colon cancer, T2DM, hypertension at baseline who was just discharged after admission 2/7/2022-2/10/2022 for a rectal bleed. He now presented back to the hospital 2/12/2022 with the same complaint. Bleeding started after resuming plavix. Plan:   iHD today per schedule. Seen on dialysis  BP is soft, will UF only about 0.5 L as tolerated. Feeling better today    Assessment:  ESRD  TTS at [facility] , has not started there yet. Is originally from [facility] but staying in [place] for rehab so dialyzing with us for now. He has a left AV fistula, positive thrill and bruit     Electrolytes  No acute issues.     Hypertension  BP soft 2/2 gI bleeding.      SHPT  No acute issues.      GI bleed  Has known colon cancer  No active bleeding  Hemoglobin down from admission. GI consulted. [facility] would like to thank you for the opportunity to serve this patient. Please call with any questions or concerns. Alyson Gutierres MD  [facility]  [facility]  [place], [address]  Fax: [phone]  Office: [phone]         CC/Reason for consult:   Reason for consult: ESRD  Chief Complaint   Patient presents with    Rectal Bleeding           Review of Systems:   [address]. All other remaining systems are negative. Constitutional:  fever, chills, weakness, weight change, fatigue,      Skin:  rash, pruritus, hair loss, bruising, dry skin, petechiae. Head, Face, Neck   headaches, swelling,  cervical adenopathy.      Respiratory: shortness of breath, cough, or wheezing  Cardiovascular: chest pain, palpitations, dizzy, edema  Gastrointestinal: nausea, vomiting, diarrhea, constipation,belly pain    Yellow skin, blood in stool  Musculoskeletal:  back pain, muscle weakness, gait problems,       joint pain or swelling. Genitourinary:  dysuria, poor urine flow, flank pain, blood in urine  Neurologic:  vertigo, TIA'S, syncope, seizures, focal weakness  Psychosocial:  insomnia, anxiety, or depression. Additional positive findings: -     PMH/SH/FH:    Medical Hx: reviewed and updated as appropriate  Past Medical History:   Diagnosis Date    Arthritis     [facility]     Colon Cancer diagnosed last month    Diabetes mellitus ([facility]     Hemodialysis patient ([facility]     Hypertension          Surgical Hx: reviewed and updated as appropriate   has a past surgical history that includes IR PERC ARTERIOVENOUS FISTULA CREATION (Left) and colectomy (N/A, 1/26/2022). Social Hx: reviewed and updated as appropriate  Social History     Tobacco Use    Smoking status: Former Smoker    Smokeless tobacco: Never Used   Substance Use Topics    Alcohol use: Not Currently        Family hx: reviewed and updated as appropriate  family history is not on file. Medications:   atorvastatin, 10 mg, Nightly  gabapentin, 100 mg, TID  sevelamer, 800 mg, TID WC  tamsulosin, 0.4 mg, QAM  sodium chloride flush, 5-40 mL, 2 times per day  pantoprazole, 40 mg, Q12H   And  sodium chloride (PF), 10 mL, Q12H  insulin lispro, 0-6 Units, Q4H       Lisinopril    Allergies: Allergies   Allergen Reactions    Lisinopril      Allergy listed on paperwork from , no reaction noted         Physical Exam/Objective:   Vitals:    02/12/22 1235   BP: 94/60   Pulse: 75   Resp: 18   Temp: 97.4 °F (36.3 °C)   SpO2: 99%       Intake/Output Summary (Last 24 hours) at 2/12/2022 1300  Last data filed at 2/12/2022 0515  Gross per 24 hour   Intake 161.25 ml   Output --   Net 161.25 ml         General appearance: AOx3 in no acute distress, comfortable, communicative, awake and alert.    HEENT: no icterus, EOM intact, trachea midline. Neck : no masses, appears symmetrical and no JVD appreciated. Respiratory: Respiratory effort normal, bilateral equal chest rise. No wheeze, no crackles   Cardiovascular: Ausculation shows RRR and  no edema   Abdomen: abdomen is soft, non distended, no masses, no pain with palpation. Musculoskeletal:  no joint swelling, no deformity, strength grossly normal.   Skin: no rashes, no induration, no tightening, no jaundice   Neuro:   Follows commands, moves all extremities spontaneously       Data:   CBC:   Recent Labs     02/10/22  0533 02/12/22  0024 02/12/22  1045   WBC 6.4 6.9  --    HGB 7.8* 7.1* 8.5*   HCT 23.5* 22.8* 25.9*    258  --      BMP:    Recent Labs     02/10/22  0533 02/12/22  0024   * 131*   K 4.5 4.3   CL 97* 95*   CO2 22 20*   BUN 38* 41*   CREATININE 5.5* 5.3*   GLUCOSE 148* 245*   PHOS 4.2  --

## 2022-02-12 NOTE — CARE COORDINATION
02/12/22 7855   Readmission Assessment   Number of Days since last admission? 1-7 days   Previous Disposition SNF   Who is being Interviewed   (CHART REVIEW)   What was the patient's/caregiver's perception as to why they think they needed to return back to the hospital?   ([address])   Did you visit your Primary Care Physician after you left the hospital, before you returned this time? No   Why weren't you able to visit your PCP? Did not have an appointment   Did you see a specialist, such as Cardiac, Pulmonary, Orthopedic Physician, etc. after you left the hospital? No   Who advised the patient to return to the hospital? Skilled Unit;Self-referral   Does the patient report anything that got in the way of taking their medications? No   In our efforts to provide the best possible care to you and others like you, can you think of anything that we could have done to help you after you left the hospital the first time, so that you might not have needed to return so soon?  Identify patient's health literacy needs

## 2022-02-12 NOTE — PROGRESS NOTES
Treatment time: 3 hours  Net UF: 600 ml    Pre weight: 96.8 kg   Post weight: 96.7 kg  EDW: 110 kg    Access used: LAVF   Access function: good with  ml/min    Medications or blood products given: none    Regular outpatient schedule: [facility] [place]    Summary of response to treatment: Tolerated good. In with GI bleed. BP <603 systolic during treatment. Goal was 1L-got 600ml    Copy of dialysis treatment record placed in chart, to be scanned into EMR.

## 2022-02-12 NOTE — PROGRESS NOTES
Patient seen and examined  H&P reviewed  Admitted earlier by the hospitalist    Hospitalized for recurrent rectal bleeding  Recent hospitalization 2/7-2/10 for the same  Underwent colectomy 1/26/2022    On exam abdomen soft, nontender. Received 2 units of packed RBCs    Assessment  Acute GI bleeding, the setting of recent colectomy for colon cancer CTA of the abdomen reviewed, no brisk bleeding.   GI and surgery service consulted  Continue IV PPI, awaiting surgery and GI service input  Holding antiplatelet therapy      Acute blood loss anemia  Transfused units packed RBCs, monitor    Hypotension, multifactorial possibly related to acute blood loss anemia, blood pressure improved    Lactic acidosis, likely secondary to hypotension, resolved    Hyponatremia    Comorbid condition  End-stage renal failure on hemodialysis  COVID-19 infection, asymptomatic  Recent CVA    Full code

## 2022-02-12 NOTE — ED NOTES
Bed: [facility]  Expected date: 2/11/22  Expected time: 11:54 PM  Means of arrival: Ambulance  Comments:  Rectal Bleed     Bozena Knox RN  02/12/22 0006

## 2022-02-12 NOTE — CONSULTS
Surgery Consult Note     Drew Tripathi PA-C  Pt Name: Tylor Del Real  MRN: [id number]  [place]: 1945  Date of evaluation: 2/12/2022  Primary Care Physician: Lj Mccall  IMPRESSIONS:   1. Recurrent rectal bleeding once D/Ant for 24 hrs   2. S/P open sigmoid resection with coloproctostomy for an adenocarcinoma of the sigmoid colon on 1/26/22  3. CKD. HD on Tues. , Thurs. , Sat.  4. Hypotension   5. Hgb: 7.1 on admission  6. Lactic acid 4.4 at 12:40 this AM. Will check another lactic acid  PLANS:     1. Hold Plavix  2. Monitor for any further bleeding and monitor blood pressure  3. HD per nephrology  4. Check lactic acid levels   SUBJECTIVE:   History of Chief Complaint:    Tylor Del Real is a 77 y.o. male who presents with recurrent rectal bleeding. This patient is well known to our group he underwent an open sigmoid resection with coloproctostomy for an adenocarcinoma of the sigmoid colon on 1/26/22. He returned to the hospital last week after having rectal bleeding at home. His Plavix was stopped ant the bleeding stopped also. He did have some episodes of hypotension and those were treated with fluid boluses and he responded well. He was also found to be COVID +. HE was Discharged 2 days ago. Yesterday he resumed his Plavix and then the bleeding was noted to start again so he came back into the hospital. He denies having any pain. Denies any CP, SOB, cough, lightheadedness or dizziness. Past Medical History  Reviewed  has a past medical history of Arthritis, Cancer ([facility] Diabetes mellitus ([facility] Hemodialysis patient ([facility] and Hypertension. Past Surgical History  Reviewed has a past surgical history that includes IR PERC ARTERIOVENOUS FISTULA CREATION (Left) and colectomy (N/A, 1/26/2022). Medications  Prior to Admission medications    Medication Sig Start Date End Date Taking?  Authorizing Provider   aspirin 81 MG chewable tablet Take 1 tablet by mouth daily 2/11/22   Elina Parnell MD clopidogrel (PLAVIX) 75 MG tablet Take 1 tablet by mouth every evening 2/11/22   Manohar Diaz MD   LORazepam (ATIVAN) 0.5 MG tablet Take 0.5 mg by mouth 2 times daily. Historical Provider, MD   polyethylene glycol (GLYCOLAX) 17 g packet Take 17 g by mouth daily as needed for Constipation 2/4/22 3/6/22  Unique Huang MD   docusate sodium (COLACE) 100 MG capsule Take 1 capsule by mouth daily for 7 days 2/5/22 2/12/22  Unique Huang MD   melatonin 3 MG TABS tablet Take 2 tablets by mouth nightly as needed (sleep) 2/4/22   Unique Huang MD   tamsulosin (FLOMAX) 0.4 MG capsule Take 0.4 mg by mouth every morning    Historical Provider, MD   gabapentin (NEURONTIN) 100 MG capsule Take 100 mg by mouth 3 times daily. Historical Provider, MD   sevelamer (RENVELA) 800 MG tablet Take 1 tablet by mouth 3 times daily (with meals)    Historical Provider, MD   atenolol (TENORMIN) 25 MG tablet Take 25 mg by mouth every evening    Historical Provider, MD   pantoprazole (PROTONIX) 20 MG tablet Take 20 mg by mouth nightly    Historical Provider, MD   atorvastatin (LIPITOR) 10 MG tablet Take 10 mg by mouth nightly    Historical Provider, MD   calcitRIOL (ROCALTROL) 0.25 MCG capsule Take 0.25 mcg by mouth every evening    Historical Provider, MD    Scheduled Meds:   atorvastatin  10 mg Oral Nightly    gabapentin  100 mg Oral TID    sevelamer  800 mg Oral TID WC    tamsulosin  0.4 mg Oral QAM    sodium chloride flush  5-40 mL IntraVENous 2 times per day    pantoprazole  40 mg IntraVENous Q12H    And    sodium chloride (PF)  10 mL IntraVENous Q12H    insulin lispro  0-6 Units SubCUTAneous Q4H     Continuous Infusions:   sodium chloride      sodium chloride      sodium chloride      dextrose       PRN Meds:. Allergies  is allergic to lisinopril. Family History  Reviewedfamily history is not on file. Social History   reports that he has quit smoking.  He has never used smokeless tobacco. He reports 95*   CO2  --  22 20*   BUN  --  38* 41*   CREATININE  --  5.5* 5.3*   PHOS  --  4.2  --    CALCIUM  --  8.2* 7.9*   INR  --   --  1.14*   AST  --   --  11*   ALT  --   --  7*   BILITOT  --   --  0.3   BILIDIR  --   --  <0.2     Recent Labs     02/12/22  0024   ALKPHOS 91   ALT 7*   AST 11*   BILITOT 0.3   BILIDIR <0.2   LABALBU 2.5*     CBC:   Lab Results   Component Value Date    WBC 6.9 02/12/2022    RBC 2.47 02/12/2022    HGB 7.1 02/12/2022    HCT 22.8 02/12/2022    MCV 92.5 02/12/2022    MCH 28.9 02/12/2022    MCHC 31.3 02/12/2022    RDW 15.9 02/12/2022     02/12/2022    MPV 7.9 02/12/2022     CMP:    Lab Results   Component Value Date     02/12/2022    K 4.3 02/12/2022    CL 95 02/12/2022    CO2 20 02/12/2022    BUN 41 02/12/2022    CREATININE 5.3 02/12/2022    GFRAA 13 02/12/2022    GFRAA 35 07/26/2011    AGRATIO 1.0 02/08/2022    LABGLOM 11 02/12/2022    GLUCOSE 245 02/12/2022    PROT 5.6 02/12/2022    PROT 7.5 07/26/2011    LABALBU 2.5 02/12/2022    CALCIUM 7.9 02/12/2022    BILITOT 0.3 02/12/2022    ALKPHOS 91 02/12/2022    AST 11 02/12/2022    ALT 7 02/12/2022     Urine Culture:  No components found for: CURINE  Blood Culture:  No components found for: CBLOOD, CFUNGUSBL    Thank you for the interesting evaluation. Further recommendations to follow. Live Yoon  General and Vascular Surgery [phone]  Electronically signed by Jose Pandya PA-C on 2/12/2022 at 10:52 AM      Surgery Staff  I have examined this patient and read and agree with the note by Rosa Dorsey PA-C from today. Unusual for staple line bleed nearly 3 weeks postop  H/H stable and lactic acidosis resolved since admission. Hold on endoscopic evaluation for now. D/W GI  Continue to hold plavix. ? If he can remain off for more prolonged period of time.   Will discuss with primary team.    May be able to trial clear liquids soon if serial H/H remain stable      Electronically signed by Marylen Ditch, MD on 2/12/2022 at 1:02 PM

## 2022-02-12 NOTE — PROGRESS NOTES
RD did not conduct direct, in-person nutrition evaluation in efforts to reduce exposure and use of PPE for high risk persons, PUI persons, patients who have tested positive for Covid-19 or those awaiting respiratory panel results. EMR was screened for nutrition risk factors, as defined per nutrition standards of care. Comprehensive Nutrition Assessment    Type and Reason for Visit:  Initial,Positive Nutrition Screen    Nutrition Recommendations/Plan:   NPO, will monitor for start of nutrition   Will order ONS when appropriate     Nutrition Assessment:  + Screen for MST 3. Pt with hx of colon cancer, ESRD on hemodialysis, type 2 diabetes, and hypertension. Admitted with acute blood loss anemia (rectal bleeding). Also with Covid-19, in isolation. Familiar with pt from recent admission, intake was diminished after surgery while here. Limited weights in our records to monitor trends. Pt currently NPO. Will order ONS when po resumes. Malnutrition Assessment:  Malnutrition Status:  Insufficient data      Estimated Daily Nutrient Needs:  Energy (kcal):  9547-9489 kcal (22-25 kcal/kg ABW); Weight Used for Energy Requirements:  Current     Protein (g):  116-146 gm (1.2-1.5 gm/kg ABW); Weight Used for Protein Requirements:  Current        Fluid (ml/day):   ; Method Used for Fluid Requirements:  Standard Renal      Nutrition Related Findings:  Reviewed labs      Wounds:  None       Current Nutrition Therapies:    Diet NPO Exceptions are: Ice Chips    Anthropometric Measures:  · Height: 6' (182.9 cm)  · Current Body Weight: 213 lb (96.6 kg)   · Admission Body Weight: 213 lb (96.6 kg)    · Usual Body Weight:  (FROYLAN)     · Ideal Body Weight: 178 lbs; % Ideal Body Weight 119.7 %   · BMI: 28.9  · Adjusted Body Weight:  ; No Adjustment   · BMI Categories: Overweight (BMI 25.0-29. 9)       Nutrition Diagnosis:   · Inadequate oral intake related to altered GI function as evidenced by NPO or clear liquid status due to medical condition      Nutrition Interventions:   Food and/or Nutrient Delivery:  Continue NPO (start nutrition when appropriate)  Nutrition Education/Counseling:  No recommendation at this time   Coordination of Nutrition Care:  Continue to monitor while inpatient    Goals:  Consume greater than 50% of meals and supplements this admission       Nutrition Monitoring and Evaluation:   Behavioral-Environmental Outcomes:  None Identified   Food/Nutrient Intake Outcomes:  Diet Advancement/Tolerance,Food and Nutrient Intake,Supplement Intake  Physical Signs/Symptoms Outcomes:  Biochemical Data,GI Status,Nutrition Focused Physical Findings,Skin,Weight,Hemodynamic Status     Discharge Planning:     Too soon to determine     Electronically signed by Gabino Wei RD, LD on 2/12/22 at 11:30 AM EST    Contact: [phone]

## 2022-02-12 NOTE — ED TRIAGE NOTES
Patient presents to ED via EMS from nursing home for c/o GI bleed. Patient states he starting bleeding rectally approximately 3 hours ago. Per EMS, multiple large blood clots noted. Upon arrival to ED, patient A&O x4, pale, large amount of blood noted on sheets under patient. Dr. Katelyn Menon at bedside, performed rectal exam, during exam, patient passed another blood clot. Patient voicing c/o abdominal pain. Patient reports recent colon surgery and having approximately 1 foot removed.

## 2022-02-12 NOTE — PROGRESS NOTES
Call placed to pt's wife Ashish Cobos with no answer. Voicemail left with call back number provided. Call also made to pt's daughter Edil First. Updates provided on pt status and current POC. All questions answered at this time. Janeen would like to be notified of any changes in status or plans for procedures.

## 2022-02-12 NOTE — PROGRESS NOTES
4 Eyes Skin Assessment     NAME:  Tadeo Porras  YOB: 1945  MEDICAL RECORD NUMBER:  [id number]    The patient is being assess for  Admission    I agree that 2 RN's have performed a thorough Head to Toe Skin Assessment on the patient. ALL assessment sites listed below have been assessed. Areas assessed by both nurses:    Head, Face, Ears, Shoulders, Back, Chest, Arms, Elbows, Hands, Sacrum. Buttock, Coccyx, Ischium and Legs. Feet and Heels        Does the Patient have a Wound?  No noted wound(s)       Cordell Prevention initiated:  Yes   Wound Care Orders initiated:  No    Pressure Injury (Stage 3,4, Unstageable, DTI, NWPT, and Complex wounds) if present place consult order under [de-identified] No    New and Established Ostomies if present place consult order under : No      Nurse 1 eSignature: Electronically signed by Mahsa Mera RN on 2/12/22 at 6:43 AM EST    **SHARE this note so that the co-signing nurse is able to place an eSignature**    Nurse 2 eSignature: Electronically signed by Cristóbal Gunn RN on 2/12/22 at 6:46 AM EST

## 2022-02-12 NOTE — H&P
Hospital Medicine History & Physical      PCP: Bryan Baron    Date of Admission: 2/12/2022    Date of Service: Pt seen/examined on 2/12/2022 and Admitted to Inpatient     Chief Complaint: Significant rectal bleeding at nursing home, fatigued and some component of lethargy      History Of Present Illness: The patient is a 77 y.o. male with most recent past medical history of colon cancer resection with reconnection of colon on most recent admission, ESRD on hemodialysis, type 2 diabetes, and hypertension who presents to [facility] with acute worsening rectal bleeding and some component of fatigue and lethargy while family was visiting patient at the nursing home today. Daughter who was at bedside did show me pictures that patient had significant bloody clot-like output per rectum today at the nursing home that came on all of a sudden without any warning. Patient was initially looking okay to them while they were visiting but then patient became more pale and somewhat lethargic. His blood pressures were also noted to be low on presentation to the ED. Patient has not had any fevers but was also tested positive for Covid at the facility most recently in the last few days. He not had any new bleeding until today. Daughter denies that patient has had any other recent symptoms of dizziness, syncope, chest pain, shortness of breath, dysuria, cough or sputum, diarrhea, nausea, vomiting.     Past Medical History:        Diagnosis Date    Arthritis     Cancer [facility])     Colon Cancer diagnosed last month    [facility] [address]     [facility]od[facility] [address]     Hypertension        Past Surgical History:        Procedure Laterality Date    COLECTOMY N/A 1/26/2022    SIGMOID COLECTOMY, SIGMOIDOS[facility] Left     unsure of date       Medications Prior to Admission:    Prior to Admission medications    Medication Sig Start Date End Date Taking? Authorizing Provider   aspirin 81 MG chewable tablet Take 1 tablet by mouth daily 2/11/22   Manohar Diaz MD   clopidogrel (PLAVIX) 75 MG tablet Take 1 tablet by mouth every evening 2/11/22   Manohar Diaz MD   LORazepam (ATIVAN) 0.5 MG tablet Take 0.5 mg by mouth 2 times daily. Historical Provider, MD   polyethylene glycol (GLYCOLAX) 17 g packet Take 17 g by mouth daily as needed for Constipation 2/4/22 3/6/22  Jorene Cooks, MD   docusate sodium (COLACE) 100 MG capsule Take 1 capsule by mouth daily for 7 days 2/5/22 2/12/22  Jorene Cooks, MD   melatonin 3 MG TABS tablet Take 2 tablets by mouth nightly as needed (sleep) 2/4/22   Jorene Cooks, MD   tamsulosin (FLOMAX) 0.4 MG capsule Take 0.4 mg by mouth every morning    Historical Provider, MD   gabapentin (NEURONTIN) 100 MG capsule Take 100 mg by mouth 3 times daily. Historical Provider, MD   sevelamer (RENVELA) 800 MG tablet Take 1 tablet by mouth 3 times daily (with meals)    Historical Provider, MD   atenolol (TENORMIN) 25 MG tablet Take 25 mg by mouth every evening    Historical Provider, MD   pantoprazole (PROTONIX) 20 MG tablet Take 20 mg by mouth nightly    Historical Provider, MD   atorvastatin (LIPITOR) 10 MG tablet Take 10 mg by mouth nightly    Historical Provider, MD   calcitRIOL (ROCALTROL) 0.25 MCG capsule Take 0.25 mcg by mouth every evening    Historical Provider, MD       Allergies:  Lisinopril    Social History:  The patient currently lives nursing home    TOBACCO:   reports that he has quit smoking. He has never used smokeless tobacco.  ETOH:   reports previous alcohol use. Family History:  Reviewed in detail and negative for DM, Early CAD, Cancer, CVA. Positive as follows:    History reviewed. No pertinent family history.     REVIEW OF SYSTEMS: as noted in the HPI. All other systems reviewed and negative. PHYSICAL EXAM:    /83   Pulse 69   Temp 97.7 °F (36.5 °C) (Oral)   Resp 16   Ht 6' (1.829 m)   Wt 213 lb 10 oz (96.9 kg)   SpO2 100%   BMI 28.97 kg/m²     General appearance: Fatigued appearing, pale appearing, seems alert and oriented at this time, no acute respiratory distress  HEENT Normal cephalic, atraumatic without obvious deformity. Pupils equal, round, and reactive to light. Extra ocular muscles intact. Dry mucous membranes, pale conjunctiva  Neck: Supple, no JVD  Lungs: Diminished breath sounds but overall clear  Heart: Intermittently tachycardic, regular rhythm, no murmurs  Abdomen: Somewhat distended abdomen due to previous most recent surgical incisions, active bowel sounds, mildly tender to multiple quadrants but nothing severe  Extremities: No edema  Skin: No rashes  Neurologic: Difficult to evaluate this time but seems to be alert and oriented and strength 5 out of 5 to all extremities  Mental status: Alert and seems oriented  Capillary Refill: Acceptable  < 3 seconds  Peripheral Pulses: +3 Easily felt, not easily obliterated with pressure    CTA abdomen and pelvis with and without IV contrast:  1.  No high-density contrast within the lumen of the stomach, small bowel,   and colon to indicate a site of active hemorrhage.       2.  Previous partial colectomy with an anastomosis at the expected   rectosigmoid junction.  There is a focal outpouching along the left side of   the anastomosis.  This was also noted on the previous exam but has decreased   in size.  Mild inflammatory changes are still present at the site.       3.  No general ascites and no pneumoperitoneum.  There is no bowel   obstruction or hydronephrosis.       4.  Multiple cysts are present within the kidneys some of which are too small   to characterize.          CBC   Recent Labs     02/09/22  1914 02/10/22  0533 02/12/22  0024   WBC  --  6.4 6.9   HGB 9. 0* 7.8* 7.1*   HCT 27.4* 23.5* 22.8*   PLT  --  167 258      RENAL  Recent Labs     02/10/22  0533 02/12/22  0024   * 131*   K 4.5 4.3   CL 97* 95*   CO2 22 20*   PHOS 4.2  --    BUN 38* 41*   CREATININE 5.5* 5.3*     LFT'S  Recent Labs     02/12/22  0024   AST 11*   ALT 7*   BILIDIR <0.2   BILITOT 0.3   ALKPHOS 91     COAG  Recent Labs     02/12/22  0024   INR 1.14*     CARDIAC ENZYMES  No results for input(s): CKTOTAL, CKMB, CKMBINDEX, TROPONINI in the last 72 hours.     U/A:    Lab Results   Component Value Date    COLORU DK YELLOW 01/21/2022    WBCUA >900 01/21/2022    RBCUA 32 01/21/2022    MUCUS 1+ 01/13/2022    BACTERIA 2+ 01/21/2022    CLARITYU TURBID 01/21/2022    SPECGRAV 1.019 01/21/2022    LEUKOCYTESUR LARGE 01/21/2022    BLOODU LARGE 01/21/2022    GLUCOSEU Negative 01/21/2022       ABG  No results found for: AKA7MLC, BEART, K4AIXWTK, PHART, THGBART, AAD6JAZ, PO2ART, HHH3URX        Active Hospital Problems    Diagnosis Date Noted    COVID-19 [U07.1] 02/12/2022    Acute blood loss anemia [D62] 02/07/2022    GI bleed [K92.2] 01/14/2022         PHYSICIANS CERTIFICATION:    I certify that Tadeo Husbands is expected to be hospitalized for greater than 2 midnights based on the following assessment and plan:      ASSESSMENT/PLAN:  · GI bleed  · Acute blood loss anemia  · COVID-19    Plan:  · Patient currently not hypoxic from COVID although desaturates when he sleeps, likely some component of sleep apnea, continue to monitor  · Patient transfusing 2 units packed red blood cells in the ED, will repeat labs afterwards  · Patient slightly hypotensive but seems to be improving with the blood, continue to monitor, at this time no need for pressors  · Holding any antihypertensives for patient  · Start patient on 40 twice daily Protonix although suspect most likely patient has lower GI bleed  · GI and general surgery both consult for the morning  · Nephrology consult for ESRD  · CBC every 6 hours, BMP daily, magnesium daily    DVT Prophylaxis: SCDs  Diet: Diet NPO Exceptions are: Ice Chips  Code Status: Full Code  PT/OT Eval Status: Uncertain    Dispo -pending clinical course       Raulito Jo DO    Thank you Elizabeth Zapata for the opportunity to be involved in this patient's care. If you have any questions or concerns please feel free to contact me at [phone].

## 2022-02-12 NOTE — PLAN OF CARE
Problem: Nutrition  Goal: Optimal nutrition therapy  Outcome: Ongoing     Nutrition Problem #1: Inadequate oral intake  Intervention: Food and/or Nutrient Delivery: Continue NPO (start nutrition when appropriate)  Nutritional Goals: Consume greater than 50% of meals and supplements this admission

## 2022-02-12 NOTE — ED PROVIDER NOTES
[address]      Pt Name: Marvel Moctezuma  MRN: [id number]  [place] 1945  Date of evaluation: 2/12/2022  Provider: Refugio Lares MD    CHIEF COMPLAINT       Chief Complaint   Patient presents with    Rectal Bleeding     Rectal bleeding. HISTORY OF PRESENT ILLNESS   (Location/Symptom, Timing/Onset,Context/Setting, Quality, Duration, Modifying Factors, Severity)  Note limiting factors. Marvel Moctezuma is a 77 y.o. male who presents to the emergency department complaining of rectal bleeding. He is found by EMS hypotensive at his rehab facility with passage of large clots and mixture of dark and bright red blood in his bowel movements. He feels lightheaded, thirsty, generally weak, sweaty and feels cold, requests blankets, requests water. He is on aspirin and Plavix. He has recent pertinent history of partial colectomy and postoperative rectal bleeding on his prior admission. Symptoms not otherwise alleviated or exacerbated by other factors. History otherwise limited secondary to clinical condition      NursingNotes were reviewed. REVIEW OF SYSTEMS    (2-9 systems for level 4, 10 or more for level 5)       Constitutional: No fever, +chills. + Lightheadedness,  Eye: No visual disturbances. No eye pain. Ear/Nose/Mouth/Throat: No nasal congestion. No sore throat. Respiratory: No cough, No shortness of breath, No sputum production. Cardiovascular: No chest pain. No palpitations. Gastrointestinal: + Abdominal pain, rectal bleeding+  Genitourinary: No dysuria. No hematuria. Hematology/Lymphatics: No bleeding or bruising tendency. Immunologic: No malaise. No swollen glands. Musculoskeletal: No back pain. No joint pain. Integumentary: No rash. No abrasions. Neurologic: No headache. No focal numbness or weakness.       PAST MEDICAL HISTORY     Past Medical History:   Diagnosis Date    Arthritis     Cancer [facility])     Colon Cancer diagnosed last month    [facility] mellitus [facility]     Hemodialysis patient ([facility]     Hypertension          SURGICALHISTORY       Past Surgical History:   Procedure Laterality Date    COLECTOMY N/A 1/26/2022    SIGMOID COLECTOMY, SIGMOIDOSCOPY performed by Chava Stubbs MD at [address] [place] [address]  [facility] [address]     unsure of date         CURRENT MEDICATIONS       Current Discharge Medication List      CONTINUE these medications which have NOT CHANGED    Details   aspirin 81 MG chewable tablet Take 1 tablet by mouth daily  Qty: 30 tablet, Refills: 0      clopidogrel (PLAVIX) 75 MG tablet Take 1 tablet by mouth every evening  Qty: 30 tablet, Refills: 0      LORazepam (ATIVAN) 0.5 MG tablet Take 0.5 mg by mouth 2 times daily. polyethylene glycol (GLYCOLAX) 17 g packet Take 17 g by mouth daily as needed for Constipation  Qty: 30 each, Refills: 0      docusate sodium (COLACE) 100 MG capsule Take 1 capsule by mouth daily for 7 days  Qty: 7 capsule, Refills: 0      melatonin 3 MG TABS tablet Take 2 tablets by mouth nightly as needed (sleep)  Qty: 6 tablet, Refills: 0      tamsulosin (FLOMAX) 0.4 MG capsule Take 0.4 mg by mouth every morning      gabapentin (NEURONTIN) 100 MG capsule Take 100 mg by mouth 3 times daily. sevelamer (RENVELA) 800 MG tablet Take 1 tablet by mouth 3 times daily (with meals)      atenolol (TENORMIN) 25 MG tablet Take 25 mg by mouth every evening      pantoprazole (PROTONIX) 20 MG tablet Take 20 mg by mouth nightly      atorvastatin (LIPITOR) 10 MG tablet Take 10 mg by mouth nightly      calcitRIOL (ROCALTROL) 0.25 MCG capsule Take 0.25 mcg by mouth every evening             ALLERGIES     Lisinopril    FAMILY HISTORY     History reviewed. No pertinent family history.        SOCIAL HISTORY       Social History     Socioeconomic History    Marital status:      Spouse name: None    Number of children: None    Years of education: None    Highest education level: None Occupational History    None   Tobacco Use    Smoking status: Former Smoker    Smokeless tobacco: Never Used   Vaping Use    Vaping Use: Never used   Substance and Sexual Activity    Alcohol use: Not Currently    Drug use: Never    Sexual activity: None   Other Topics Concern    None   Social History Narrative    None     Social Determinants of Health     Financial Resource Strain:     Difficulty of Paying Living Expenses: Not on file   Food Insecurity:     Worried About Running Out of Food in the Last Year: Not on file    Brooklynn of Food in the Last Year: Not on file   Transportation Needs:     Lack of Transportation (Medical): Not on file    Lack of Transportation (Non-Medical):  Not on file   Physical Activity:     Days of Exercise per Week: Not on file    Minutes of Exercise per Session: Not on file   Stress:     Feeling of Stress : Not on file   Social Connections:     Frequency of Communication with Friends and Family: Not on file    Frequency of Social Gatherings with Friends and Family: Not on file    Attends Gnosticist Services: Not on file    Active Member of [address] [place] [address] or Organizations: Not on file    Attends Club or Organization Meetings: Not on file    Marital Status: Not on file   Intimate Partner Violence:     Fear of Current or Ex-Partner: Not on file    Emotionally Abused: Not on file    Physically Abused: Not on file    Sexually Abused: Not on file   Housing Stability:     Unable to Pay for Housing in the Last Year: Not on file    Number of [place] in the Last Year: Not on file    Unstable Housing in the Last Year: Not on file       SCREENINGS             PHYSICAL EXAM    (up to 7 for level 4, 8 or more for level 5)     ED Triage Vitals   BP Temp Temp Source Pulse Resp SpO2 Height Weight   02/12/22 0015 02/12/22 0115 02/12/22 0115 02/12/22 0015 02/12/22 0015 02/12/22 0030 02/12/22 0515 02/12/22 0515   (!) 128/108 97.8 °F (36.6 °C) Oral 86 19 100 % 6' (1.829 m) 213 lb 10 oz (96.9 kg)       General: Alert and oriented appropriately for age, in distress, pale, diaphoretic. Patient's clothes and sheets covered in blood. Eye: Pale conjunctiva. Pupils equal and reactive. HENT: Oral mucosa is moist.  Oropharynx patent. Protecting airway. Respiratory: Respirations even and non-labored. Clear to auscultation bilaterally. Cardiovascular: Normal rate, Regular rhythm. Intact peripheral pulses. Gastrointestinal: Soft, mild tenderness to palpation in the left lower quadrant , Non-distended. Rectal vault contains multiple dark and bright red blood clots, some liquid blood. Nontender, intact tone. Musculoskeletal: No swelling. Integumentary: Warm, diaphoretic  Neurologic: Alert and appropriate for age. No focal deficits. Psychiatric: Cooperative. DIAGNOSTIC RESULTS       RADIOLOGY:   Non-plain filmimages such as CT, Ultrasound and MRI are read by the radiologist. Plain radiographic images are visualized and preliminarily interpreted by the emergency physician with the below findings:      Interpretation per the Radiologist below, if available at the time ofthis note:     [facility] [address]   Final Result   1. No high-density contrast within the lumen of the stomach, small bowel,   and colon to indicate a site of active hemorrhage. 2.  Previous partial colectomy with an anastomosis at the expected   rectosigmoid junction. There is a focal outpouching along the left side of   the anastomosis. This was also noted on the previous exam but has decreased   in size. Mild inflammatory changes are still present at the site. 3.  No general ascites and no pneumoperitoneum. There is no bowel   obstruction or hydronephrosis. 4.  Multiple cysts are present within the kidneys some of which are too small   to characterize.                LABS:  Labs Reviewed   CBC WITH AUTO DIFFERENTIAL - Abnormal; Notable for the following components:       Result Value RBC 2.47 (*)     Hemoglobin 7.1 (*)     Hematocrit 22.8 (*)     RDW 15.9 (*)     All other components within normal limits    Narrative:     Performed at:  [address] [facility]   Phone [phone]   BASIC METABOLIC PANEL W/ REFLEX TO MG FOR LOW K - Abnormal; Notable for the following components:    Sodium 131 (*)     Chloride 95 (*)     CO2 20 (*)     Glucose 245 (*)     BUN 41 (*)     CREATININE 5.3 (*)     GFR Non- 11 (*)     GFR  13 (*)     Calcium 7.9 (*)     All other components within normal limits    Narrative:     Pop Chandler tel. [phone],  Chemistry results called to and read back by José Edgar RN, 02/12/2022 01:01,  by [facility]  Performed at:  [facility]  [address] [place], [place] [employer]   Phone [phone]   HEPATIC FUNCTION PANEL - Abnormal; Notable for the following components:     Total Protein 5.6 (*)     Albumin 2.5 (*)     ALT 7 (*)     AST 11 (*)     All other components within normal limits    Narrative:     [phone],  Chemistry results called to and read back by José Edgar RN, 02/12/2022 01:01,  by [facility]  Performed at:  [facility]  [address] [place], [address] [employer]   Phone [phone]   PROTIME-INR - Abnormal; Notable for the following components:    Protime 12.9 (*)     INR 1.14 (*)     All other components within normal limits    Narrative:     Performed at:  [facility]  [address] [place], [address] [employer]   Phone [phone]   APTT - Abnormal; Notable for the following components:    aPTT 22.7 (*)     All other components within normal limits    Narrative:     Performed at:  [address] [facility]   Phone [phone]   LACTIC ACID, PLASMA - Abnormal; Notable for the following components:    Lactic Acid 4.4 (*)     All other components within normal limits    Narrative:     Tank Calderon tel. [phone],  Chemistry results called to and read back by Jovani Masterson RN, 02/12/2022 01:36,  by [facility]  Performed at:  [facility]  [address] [facility] [address]   Phone [phone]   CBC WITH AUTO DIFFERENTIAL   CBC WITH AUTO DIFFERENTIAL   HEMOGLOBIN AND HEMATOCRIT, BLOOD   TYPE AND SCREEN    Narrative:     Performed at:  [facility]  [address] [address]   Phone [phone]   PREPARE RBC (CROSSMATCH)   PREPARE RBC (CROSSMATCH)       All other labs were within normal range or not returned as of this dictation. EMERGENCY DEPARTMENT COURSE and DIFFERENTIAL DIAGNOSIS/MDM:   Vitals:    Vitals:    02/12/22 0415 02/12/22 0430 02/12/22 0445 02/12/22 0515   BP: (!) 89/48 (!) 100/55 (!) 94/56 123/83   Pulse: 80 80 75 69   Resp: 17 26 16 16   Temp:    97.7 °F (36.5 °C)   TempSrc:    Oral   SpO2: 100% 100% 100% 100%   Weight:    213 lb 10 oz (96.9 kg)   Height:    6' (1.829 m)         Medical decision making:  Rocío Stevenson is a 78 yo M s/p partial colectomy for colon Ca who p/w recurrent rectal bleeding, large volume BRB and passage of clots, pale and hypotensive on arrival, initial /108 but then falling, becoming hypotensive 80s/40s, requiring 1 u uncrossmatched PRBC and subsequent crossmatched PRBC after type and screen resulted. Hemodynamics improving with transfusion of PRBCs, Hb 7.1, paged GI and gen surgery, obtained CTA a/p GI bleeding protocol to potentially establish source of bleeding, improved mentation and hemodynamics on serial reassessments. Possible anastomotic ulcer. Admitted to the ICU. GI to scope, general surgery to follow.       Medications   0.9 % sodium chloride bolus (0 mLs IntraVENous Stopped 2/12/22 0220)   iopamidol (ISOVUE-370) 76 % injection 75 mL (75 mLs

## 2022-02-13 LAB
ANION GAP SERPL CALCULATED.3IONS-SCNC: 12 MMOL/L (ref 3–16)
BASOPHILS ABSOLUTE: 0 K/UL (ref 0–0.2)
BASOPHILS RELATIVE PERCENT: 0.7 %
BUN BLDV-MCNC: 27 MG/DL (ref 7–20)
CALCIUM SERPL-MCNC: 8.4 MG/DL (ref 8.3–10.6)
CHLORIDE BLD-SCNC: 98 MMOL/L (ref 99–110)
CO2: 26 MMOL/L (ref 21–32)
CREAT SERPL-MCNC: 3.9 MG/DL (ref 0.8–1.3)
EOSINOPHILS ABSOLUTE: 0.1 K/UL (ref 0–0.6)
EOSINOPHILS RELATIVE PERCENT: 3.7 %
GFR AFRICAN AMERICAN: 18
GFR NON-AFRICAN AMERICAN: 15
GLUCOSE BLD-MCNC: 119 MG/DL (ref 70–99)
GLUCOSE BLD-MCNC: 124 MG/DL (ref 70–99)
GLUCOSE BLD-MCNC: 128 MG/DL (ref 70–99)
GLUCOSE BLD-MCNC: 147 MG/DL (ref 70–99)
GLUCOSE BLD-MCNC: 156 MG/DL (ref 70–99)
GLUCOSE BLD-MCNC: 173 MG/DL (ref 70–99)
HCT VFR BLD CALC: 24.6 % (ref 40.5–52.5)
HEMOGLOBIN: 8.2 G/DL (ref 13.5–17.5)
LYMPHOCYTES ABSOLUTE: 0.7 K/UL (ref 1–5.1)
LYMPHOCYTES RELATIVE PERCENT: 18.6 %
MAGNESIUM: 2.1 MG/DL (ref 1.8–2.4)
MCH RBC QN AUTO: 29.8 PG (ref 26–34)
MCHC RBC AUTO-ENTMCNC: 33.2 G/DL (ref 31–36)
MCV RBC AUTO: 89.8 FL (ref 80–100)
MONOCYTES ABSOLUTE: 0.2 K/UL (ref 0–1.3)
MONOCYTES RELATIVE PERCENT: 5.6 %
NEUTROPHILS ABSOLUTE: 2.7 K/UL (ref 1.7–7.7)
NEUTROPHILS RELATIVE PERCENT: 71.4 %
PDW BLD-RTO: 15.2 % (ref 12.4–15.4)
PERFORMED ON: ABNORMAL
PLATELET # BLD: 219 K/UL (ref 135–450)
PMV BLD AUTO: 7.2 FL (ref 5–10.5)
POTASSIUM SERPL-SCNC: 4.7 MMOL/L (ref 3.5–5.1)
RBC # BLD: 2.75 M/UL (ref 4.2–5.9)
SODIUM BLD-SCNC: 136 MMOL/L (ref 136–145)
WBC # BLD: 3.8 K/UL (ref 4–11)

## 2022-02-13 PROCEDURE — 6360000002 HC RX W HCPCS: Performed by: STUDENT IN AN ORGANIZED HEALTH CARE EDUCATION/TRAINING PROGRAM

## 2022-02-13 PROCEDURE — 6370000000 HC RX 637 (ALT 250 FOR IP): Performed by: NURSE PRACTITIONER

## 2022-02-13 PROCEDURE — 80048 BASIC METABOLIC PNL TOTAL CA: CPT

## 2022-02-13 PROCEDURE — C9113 INJ PANTOPRAZOLE SODIUM, VIA: HCPCS | Performed by: STUDENT IN AN ORGANIZED HEALTH CARE EDUCATION/TRAINING PROGRAM

## 2022-02-13 PROCEDURE — 6370000000 HC RX 637 (ALT 250 FOR IP): Performed by: STUDENT IN AN ORGANIZED HEALTH CARE EDUCATION/TRAINING PROGRAM

## 2022-02-13 PROCEDURE — 2580000003 HC RX 258: Performed by: STUDENT IN AN ORGANIZED HEALTH CARE EDUCATION/TRAINING PROGRAM

## 2022-02-13 PROCEDURE — 36415 COLL VENOUS BLD VENIPUNCTURE: CPT

## 2022-02-13 PROCEDURE — 99024 POSTOP FOLLOW-UP VISIT: CPT | Performed by: SURGERY

## 2022-02-13 PROCEDURE — A4216 STERILE WATER/SALINE, 10 ML: HCPCS | Performed by: STUDENT IN AN ORGANIZED HEALTH CARE EDUCATION/TRAINING PROGRAM

## 2022-02-13 PROCEDURE — 1200000000 HC SEMI PRIVATE

## 2022-02-13 PROCEDURE — 85025 COMPLETE CBC W/AUTO DIFF WBC: CPT

## 2022-02-13 PROCEDURE — 83735 ASSAY OF MAGNESIUM: CPT

## 2022-02-13 PROCEDURE — 94760 N-INVAS EAR/PLS OXIMETRY 1: CPT

## 2022-02-13 RX ORDER — LORAZEPAM 0.5 MG/1
0.5 TABLET ORAL 2 TIMES DAILY PRN
Status: DISCONTINUED | OUTPATIENT
Start: 2022-02-13 | End: 2022-02-16

## 2022-02-13 RX ADMIN — GABAPENTIN 100 MG: 100 CAPSULE ORAL at 20:31

## 2022-02-13 RX ADMIN — TAMSULOSIN HYDROCHLORIDE 0.4 MG: 0.4 CAPSULE ORAL at 09:55

## 2022-02-13 RX ADMIN — ONDANSETRON 4 MG: 2 INJECTION INTRAMUSCULAR; INTRAVENOUS at 06:28

## 2022-02-13 RX ADMIN — SODIUM CHLORIDE, PRESERVATIVE FREE 10 ML: 5 INJECTION INTRAVENOUS at 20:31

## 2022-02-13 RX ADMIN — LIDOCAINE HYDROCHLORIDE: 20 JELLY TOPICAL at 06:28

## 2022-02-13 RX ADMIN — SEVELAMER CARBONATE 800 MG: 800 TABLET, FILM COATED ORAL at 16:58

## 2022-02-13 RX ADMIN — INSULIN LISPRO 1 UNITS: 100 INJECTION, SOLUTION INTRAVENOUS; SUBCUTANEOUS at 20:33

## 2022-02-13 RX ADMIN — GABAPENTIN 100 MG: 100 CAPSULE ORAL at 16:57

## 2022-02-13 RX ADMIN — PANTOPRAZOLE SODIUM 40 MG: 40 INJECTION, POWDER, FOR SOLUTION INTRAVENOUS at 20:31

## 2022-02-13 RX ADMIN — SODIUM CHLORIDE, PRESERVATIVE FREE 10 ML: 5 INJECTION INTRAVENOUS at 09:55

## 2022-02-13 RX ADMIN — SODIUM CHLORIDE, PRESERVATIVE FREE 10 ML: 5 INJECTION INTRAVENOUS at 20:33

## 2022-02-13 RX ADMIN — INSULIN LISPRO 1 UNITS: 100 INJECTION, SOLUTION INTRAVENOUS; SUBCUTANEOUS at 09:58

## 2022-02-13 RX ADMIN — PANTOPRAZOLE SODIUM 40 MG: 40 INJECTION, POWDER, FOR SOLUTION INTRAVENOUS at 09:58

## 2022-02-13 RX ADMIN — Medication 6 MG: at 23:29

## 2022-02-13 RX ADMIN — LORAZEPAM 0.5 MG: 0.5 TABLET ORAL at 22:22

## 2022-02-13 RX ADMIN — ATORVASTATIN CALCIUM 10 MG: 10 TABLET, FILM COATED ORAL at 20:31

## 2022-02-13 RX ADMIN — GABAPENTIN 100 MG: 100 CAPSULE ORAL at 09:55

## 2022-02-13 ASSESSMENT — PAIN SCALES - GENERAL
PAINLEVEL_OUTOF10: 0
PAINLEVEL_OUTOF10: 0

## 2022-02-13 NOTE — PROGRESS NOTES
Call received from both of pt's daughters, Jer Conley and Slava. Both daughters updated on pt's current status and POC. Daughters did make this RN aware that pt has difficulty sleeping at night and requested PRN melatonin be ordered as this has helped him in previous admissions to sleep. Daughters also requesting lidocaine jelly be ordered PRN prior to start of HD and prior to lab sticks as pt does not tolerate needle sticks well. On call NP notified of these requests and new orders placed. Daughters deny any further questions or concerns at this time. Call back number provided for any additional questions/concerns in the future.

## 2022-02-13 NOTE — PLAN OF CARE
Problem: Airway Clearance - Ineffective  Goal: Achieve or maintain patent airway  Outcome: Ongoing     Problem: Gas Exchange - Impaired  Goal: Absence of hypoxia  Outcome: Ongoing  Goal: Promote optimal lung function  Outcome: Ongoing     Problem: Breathing Pattern - Ineffective  Goal: Ability to achieve and maintain a regular respiratory rate  Outcome: Ongoing     Problem:  Body Temperature -  Risk of, Imbalanced  Goal: Ability to maintain a body temperature within defined limits  Outcome: Ongoing  Goal: Will regain or maintain usual level of consciousness  Outcome: Ongoing  Goal: Complications related to the disease process, condition or treatment will be avoided or minimized  Outcome: Ongoing     Problem: Isolation Precautions - Risk of Spread of Infection  Goal: Prevent transmission of infection  Outcome: Ongoing     Problem: Nutrition Deficits  Goal: Optimize nutritional status  Outcome: Ongoing     Problem: Risk for Fluid Volume Deficit  Goal: Maintain normal heart rhythm  Outcome: Ongoing  Goal: Maintain absence of muscle cramping  Outcome: Ongoing  Goal: Maintain normal serum potassium, sodium, calcium, phosphorus, and pH  Outcome: Ongoing     Problem: Loneliness or Risk for Loneliness  Goal: Demonstrate positive use of time alone when socialization is not possible  Outcome: Ongoing     Problem: Fatigue  Goal: Verbalize increase energy and improved vitality  Outcome: Ongoing     Problem: Patient Education: Go to Patient Education Activity  Goal: Patient/Family Education  Outcome: Ongoing     Problem: Falls - Risk of:  Goal: Will remain free from falls  Description: Will remain free from falls  Outcome: Ongoing  Goal: Absence of physical injury  Description: Absence of physical injury  Outcome: Ongoing     Problem: Skin Integrity:  Goal: Will show no infection signs and symptoms  Description: Will show no infection signs and symptoms  Outcome: Ongoing  Goal: Absence of new skin breakdown  Description: Absence of new skin breakdown  Outcome: Ongoing     Problem: Discharge Planning:  Goal: Discharged to appropriate level of care  Description: Discharged to appropriate level of care  Outcome: Ongoing     Problem:  Bowel Function - Altered:  Goal: Bowel elimination is within specified parameters  Description: Bowel elimination is within specified parameters  Outcome: Ongoing     Problem: Fluid Volume - Imbalance:  Goal: Will show no signs and symptoms of excessive bleeding  Description: Will show no signs and symptoms of excessive bleeding  Outcome: Ongoing  Goal: Absence of imbalanced fluid volume signs and symptoms  Description: Absence of imbalanced fluid volume signs and symptoms  Outcome: Ongoing     Problem: Nausea/Vomiting:  Goal: Absence of nausea/vomiting  Description: Absence of nausea/vomiting  Outcome: Ongoing  Goal: Able to drink  Description: Able to drink  Outcome: Ongoing  Goal: Able to eat  Description: Able to eat  Outcome: Ongoing  Goal: Ability to achieve adequate nutritional intake will improve  Description: Ability to achieve adequate nutritional intake will improve  Outcome: Ongoing     Problem: Nutrition  Goal: Optimal nutrition therapy  Outcome: Ongoing

## 2022-02-13 NOTE — PROGRESS NOTES
[facility]                                               Progress note    CC: hematochezia, ESRD  Summary:   Katie Klein is being seen by nephrology for ESRD. He is a 77 y.o. male with a PMH significant for ESRD on HD TTS, colon cancer, T2DM, hypertension at baseline who was just discharged after admission 2/7/2022-2/10/2022 for a rectal bleed. He now presented back to the hospital 2/12/2022 with the same complaint. Bleeding started after resuming plavix. Interval History  Dialyzed yesterday. Minimal UF tolerated due to hypotension  Seen at bedside. BP better today  Labs reviewed. Plan:   - no acute indication for RRT today. - plan for HD on Tuesday per schedule. Reynaldo Tinoco MD  [facility]  Office: [phone]    Assessment:   ESRD  TTS at [facility] , has not started there yet. Is originally from [facility] but staying in [place] for rehab so dialyzing with us for now. He has a left AV fistula, positive thrill and bruit     Electrolytes  No acute issues.     Hypertension  BP back to goal. Hypotensive at presentation      SHPT  No acute issues.      GI bleed  Has known colon cancer  No active bleeding  Hemoglobin down from admission. GI consulted. ROS:     Positives Listed Bold. All other remaining systems are negative. Constitutional:  fever, chills, weakness, weight change, fatigue,      Skin:  rash, pruritus, hair loss, bruising, dry skin, petechiae. Head, Face, Neck   headaches, swelling,  cervical adenopathy. Respiratory: shortness of breath, cough, or wheezing  Cardiovascular: chest pain, palpitations, dizzy, edema  Gastrointestinal: nausea, vomiting, diarrhea, constipation,belly pain    Yellow skin, blood in stool  Musculoskeletal:  back pain, muscle weakness, gait problems,       joint pain or swelling.   Genitourinary:  dysuria, poor urine flow, flank pain, blood in urine  Neurologic:  vertigo, TIA'S, syncope, seizures, focal weakness  Psychosocial:  insomnia, anxiety, or depression. Additional positive findings: -     PMH:   Past medical history, surgical history, social history, family history are reviewed and updated as appropriate. Reviewed current medication list.   Allergies reviewed and updated as needed. PE:   Vitals:    02/13/22 1213   BP: 120/60   Pulse: 79   Resp: 18   Temp: 97.8 °F (36.6 °C)   SpO2: 96%       General appearance: AOx3 in no acute distress, comfortable, communicative, awake and alert. HEENT: no icterus, EOM intact, trachea midline. Neck : no masses, appears symmetrical and no JVD appreciated. Respiratory: Respiratory effort normal, bilateral equal chest rise. No wheeze, no crackles   Cardiovascular: Ausculation shows RRR and  no edema   Abdomen: abdomen is soft, non distended, no masses, no pain with palpation. Musculoskeletal:  no joint swelling, no deformity, strength grossly normal.   Skin: no rashes, no induration, no tightening, no jaundice   Neuro:   Follows commands, moves all extremities spontaneously       Lab Results   Component Value Date    CREATININE 3.9 (H) 02/13/2022    BUN 27 (H) 02/13/2022     02/13/2022    K 4.7 02/13/2022    CL 98 (L) 02/13/2022    CO2 26 02/13/2022      Lab Results   Component Value Date    WBC 3.8 (L) 02/13/2022    HGB 8.2 (L) 02/13/2022    HCT 24.6 (L) 02/13/2022    MCV 89.8 02/13/2022     02/13/2022     Lab Results   Component Value Date    CALCIUM 8.4 02/13/2022    PHOS 4.2 02/10/2022

## 2022-02-13 NOTE — PLAN OF CARE
Absence of new skin breakdown  Outcome: Ongoing     Problem: Discharge Planning:  Goal: Discharged to appropriate level of care  Description: Discharged to appropriate level of care  Outcome: Ongoing     Problem:  Bowel Function - Altered:  Goal: Bowel elimination is within specified parameters  Description: Bowel elimination is within specified parameters  Outcome: Ongoing     Problem: Fluid Volume - Imbalance:  Goal: Will show no signs and symptoms of excessive bleeding  Description: Will show no signs and symptoms of excessive bleeding  Outcome: Ongoing  Goal: Absence of imbalanced fluid volume signs and symptoms  Description: Absence of imbalanced fluid volume signs and symptoms  Outcome: Ongoing     Problem: Nausea/Vomiting:  Goal: Absence of nausea/vomiting  Description: Absence of nausea/vomiting  Outcome: Ongoing  Goal: Able to drink  Description: Able to drink  Outcome: Ongoing  Goal: Able to eat  Description: Able to eat  Outcome: Ongoing  Goal: Ability to achieve adequate nutritional intake will improve  Description: Ability to achieve adequate nutritional intake will improve  Outcome: Ongoing     Problem: Nutrition  Goal: Optimal nutrition therapy  Outcome: Ongoing

## 2022-02-13 NOTE — PROGRESS NOTES
4 Eyes Skin Assessment     NAME:  Shi Nixon  YOB: 1945  MEDICAL RECORD NUMBER:  [id number]    The patient is being assess for  Transfer to New Unit    I agree that 2 RN's have performed a thorough Head to Toe Skin Assessment on the patient. ALL assessment sites listed below have been assessed. Areas assessed by both nurses:    Head, Face, Ears, Shoulders, Back, Chest, Arms, Elbows, Hands, Sacrum. Buttock, Coccyx, Ischium and Legs. Feet and Heels        Does the Patient have a Wound?  No noted wound(s)       Cordell Prevention initiated:  Yes   Wound Care Orders initiated:  NA    Pressure Injury (Stage 3,4, Unstageable, DTI, NWPT, and Complex wounds) if present place consult order under [de-identified] NA    New and Established Ostomies if present place consult order under : NA      Nurse 1 eSignature: Electronically signed by Dyana Holden RN on 2/13/22 at 4:23 PM EST    **SHARE this note so that the co-signing nurse is able to place an eSignature**    Nurse 2 eSignature: {Esignature:[id number]}

## 2022-02-13 NOTE — PROGRESS NOTES
Patient transferred to [id number] with existing telemetry. Report given to [facility]. All questions answered. Daughter Corine Antoine called and made aware. Patient sent with all belongings.

## 2022-02-13 NOTE — PROGRESS NOTES
Gastroenterology Progress Note    Radhika Mcdermott is a 77 y.o. male patient. Hospitalization day:1    SUBJECTIVE:    No acute events overnight   1 bowel movement with blood, examined in the toilet -- formed brown stool with blood in the toilet bowl     ROS:  Cardiovascular ROS: no chest pain or dyspnea on exertion  Gastrointestinal ROS: positive for - rectal bleeding  negative for - abdominal pain or diarrhea  Respiratory ROS: no cough, shortness of breath, or wheezing    Physical    VITALS:  /60   Pulse 79   Temp 97.8 °F (36.6 °C) (Oral)   Resp 18   Ht 6' (1.829 m)   Wt 213 lb 3 oz (96.7 kg)   SpO2 96%   BMI 28.91 kg/m²   TEMPERATURE:  Current - Temp: 97.8 °F (36.6 °C); Max - Temp  Av.8 °F (36.6 °C)  Min: 97.4 °F (36.3 °C)  Max: 98.2 °F (36.8 °C)    General:  Alert and oriented,  No apparent distress  Skin- without jaundice  Eyes: anicteric sclera  Cardiac: RRR, Nl s1s2, without murmurs  Lungs CTA Bilaterally, normal effort  Abdomen soft, ND, NT, no HSM, Bowel sounds normal  Ext: without edema  Neuro: no asterixis     Data    CBC:   Lab Results   Component Value Date    WBC 3.8 2022    RBC 2.75 2022    HGB 8.2 2022    HCT 24.6 2022    MCV 89.8 2022    MCH 29.8 2022    MCHC 33.2 2022    RDW 15.2 2022     2022    MPV 7.2 2022     Hepatic Function Panel:    Lab Results   Component Value Date    ALKPHOS 91 2022    ALT 7 2022    AST 11 2022    PROT 5.6 2022    PROT 7.5 2011    BILITOT 0.3 2022    BILIDIR <0.2 2022    IBILI see below 2022         Radiology Review:    CTA ABDOMEN PELVIS W WO CONTRAST   Final Result   1. No high-density contrast within the lumen of the stomach, small bowel,   and colon to indicate a site of active hemorrhage. 2.  Previous partial colectomy with an anastomosis at the expected   rectosigmoid junction.   There is a focal outpouching along the left side of   the anastomosis. This was also noted on the previous exam but has decreased   in size. Mild inflammatory changes are still present at the site. 3.  No general ascites and no pneumoperitoneum. There is no bowel   obstruction or hydronephrosis. 4.  Multiple cysts are present within the kidneys some of which are too small   to characterize. ASSESSMENT:  Vannesa Eldridge is a 77 y.o. male with a PMH of  colon adenocarcinoma dx 2 months ago s/p sigmoid resection 1/26/22, DM, ESRD on HD, HTN, and arthritiswho presented on 2/7/2022 with rectal bleed. We have been consulted regarding sane.     IMPRESSION:     1. Lower GI bleed. Patient presenting with hematochezia. Underwent sigmoid resection on 1/26/22 for colon adenocarcinoma. Is anticoagulated with aspirin and plavix. Patient was asymptomatic at the time of evaluation. No brisk bleeding on CT the abdomen. No evidence of active hemorrhaging. Patient appears to be stable at this time. DDX post-op bleed in setting of blood thinner use, diverticula, hemmorrhoids. 2. Colon adenocarcinoma s/p resection 1/26/22. Surgery following. Notably a small outpouching is seen at the anastomosis which is smaller from his last cross-sectional imaging. 3. Recent CVA. Plavix on hold due to #1. PLAN:  - continue to monitor for now   - will reserve endoscopy for clinically significant bleeding or hemodynamic instability and drop in h&h     Thank you for allowing me to participate in the care of your patient. Feel free to contact me with any questions or concerns.     Joshua Martinez MD

## 2022-02-13 NOTE — PROGRESS NOTES
Patient arrived room [id number] from PCU. Alert and oriented. Bed alarm on call light in reach. Denies pain, denies nausea. Called daughter to update on room movement.

## 2022-02-13 NOTE — PROGRESS NOTES
Hospitalist Progress Note    Patient:  Cate Yu  Unit/Bed:X9G-4029/5252-01   YOB: 1945       MRN: [id number] Acct: [de-identified]  PCP: Elizabeth Zapata    Date of Admission: 2/12/2022  --------------------------    Chief Complaint:     Rectal bleed    Hospital Course:     Cate Yu is a 77 y.o. male hospitalized on 2/12/2022   for rectal bleeding, he was recently underwent colectomy for colorectal malignancy on 1/26/2022. Patient was rehospitalized on 2/7/2020 through 2/10/2022 for lower GI bleeding. Patient was discharged to extended care facility and readmitted for the same. Assessment/plan:      Assessment  Acute GI bleeding, the setting of recent colectomy for colon cancer CTA of the abdomen reviewed, no brisk bleeding. GI and surgery service consulted    Evaluated by GI, general surgery  Reserving endoscopic evaluation for significant bleeding    Continue IV PPI, awaiting surgery and GI service input  Continue holding antiplatelet agent  Continue PPI  Clear diet        Acute blood loss anemia  Hemoglobin stabilized, will monitor daily.     Hypotension, multifactorial possibly related to acute blood loss anemia, blood pressure reading acceptable     Lactic acidosis, likely secondary to hypotension, resolved     Hyponatremia, secondary to renal failure,  Monitor    COVID-19 infection, asymptomatic diagnosed 2/10/2022    Recent CVA  Continue statin, holding antiplatelet therapy secondary to the bleeding. End-stage renal failure on hemodialysis  Nephrology following, continue hemodialysis          Code Status: Full Code         DVT prophylaxis: SCD secondary to GI bleed     Disposition: Back to ECF in 2 to 3 days    I discussed my thought processes at length with patient/family and patient understood.  Question and concerns  Addressed     Discussed with RN     ----------------      Subjective:     Patient seen and examined  Overnight events noted  RN and ancillary staff note reviewed    No complaint  Overnight had nausea vomiting,  This morning had brown stool small no blood on the toilet      Diet: ADULT DIET; Clear Liquid    OBJECTIVE     Exam:  /60   Pulse 79   Temp 97.8 °F (36.6 °C) (Oral)   Resp 18   Ht 6' (1.829 m)   Wt 213 lb 3 oz (96.7 kg)   SpO2 96%   BMI 28.91 kg/m²          Gen: Not in distress. Alert. Chronically ill  Head: Normocephalic. Atraumatic. Eyes: Conjunctivae/corneas clear. ENT: Oral mucosa moist  Neck: No JVD. No obvious thyromegaly. CVS: Nml S1S2, no murmur  , RRR  Pulmomary: Clear bilaterally. No crackles. No wheezes. Gastrointestinal: Soft, non tender, non distend, . Musculoskeletal: No edema. Warm  Neuro: No focal deficit. Moves extremity spontaneously. Psychiatry: Appropriate affect. Not agitated.         Medications:  Reviewed    Infusion Medications    sodium chloride      sodium chloride      sodium chloride      dextrose       Scheduled Medications    atorvastatin  10 mg Oral Nightly    gabapentin  100 mg Oral TID    sevelamer  800 mg Oral TID WC    tamsulosin  0.4 mg Oral QAM    sodium chloride flush  5-40 mL IntraVENous 2 times per day    pantoprazole  40 mg IntraVENous Q12H    And    sodium chloride (PF)  10 mL IntraVENous Q12H    insulin lispro  0-6 Units SubCUTAneous Q4H     PRN Meds: sodium chloride, sodium chloride, sodium chloride flush, sodium chloride, acetaminophen **OR** acetaminophen, ondansetron, guaiFENesin-dextromethorphan, glucose, dextrose, glucagon (rDNA), dextrose, lidocaine, melatonin      Intake/Output Summary (Last 24 hours) at 2/13/2022 1345  Last data filed at 2/12/2022 1500  Gross per 24 hour   Intake 400 ml   Output 1076 ml   Net -676 ml             Labs:   Recent Labs     02/12/22  0024 02/12/22  1045 02/12/22  2057 02/12/22  2320 02/13/22  0723   WBC 6.9  --   --   --  3.8*   HGB 7.1*   < > 8.0* 8.3* 8.2*   HCT 22.8*   < > 24.0* 25.3* 24.6*     --   --   --  219    < > = values in this interval not displayed. Recent Labs     02/12/22  0024 02/13/22  0723   * 136   K 4.3 4.7   CL 95* 98*   CO2 20* 26   BUN 41* 27*   CREATININE 5.3* 3.9*   CALCIUM 7.9* 8.4     Recent Labs     02/12/22 0024   AST 11*   ALT 7*   BILIDIR <0.2   BILITOT 0.3   ALKPHOS 91     Recent Labs     02/12/22 0024   INR 1.14*     No results for input(s): CKTOTAL, TROPONINI in the last 72 hours. Urinalysis:      Lab Results   Component Value Date    NITRU Negative 01/21/2022    WBCUA >900 01/21/2022    BACTERIA 2+ 01/21/2022    RBCUA 32 01/21/2022    BLOODU LARGE 01/21/2022    SPECGRAV 1.019 01/21/2022    GLUCOSEU Negative 01/21/2022       Radiology:  CTA ABDOMEN PELVIS W WO CONTRAST   Final Result   1. No high-density contrast within the lumen of the stomach, small bowel,   and colon to indicate a site of active hemorrhage. 2.  Previous partial colectomy with an anastomosis at the expected   rectosigmoid junction. There is a focal outpouching along the left side of   the anastomosis. This was also noted on the previous exam but has decreased   in size. Mild inflammatory changes are still present at the site. 3.  No general ascites and no pneumoperitoneum. There is no bowel   obstruction or hydronephrosis. 4.  Multiple cysts are present within the kidneys some of which are too small   to characterize.                      Electronically signed by Wilda Candelaria MD on 2/13/2022 at 1:45 PM

## 2022-02-13 NOTE — PROGRESS NOTES
General and Vascular Surgery                                                           Daily Progress Note                                                                 Pt Name: Chad Lemon  Medical Record Number: [id number]  Date of Birth 1945   Today's Date: 2/13/2022      ASSESSMENT/PLAN  1. GI bleed s/p sigmoid colectomy 1/26/22  -small amount of bleeding this AM but otherwise stable H/H  -trial clear liquids today  -serial H/H  -continue to hold plavix.  -reserve endoscopic evaluation for significant bleeding or drop in H/H        Darrelyn Mcburney denies abdominal pain. Small amount of bleeding this AM otherwise no complaints. OBJECTIVE  VITALS:   Vitals:    02/12/22 2016 02/12/22 2314 02/13/22 0417 02/13/22 0808   BP: 128/71 124/72 100/64 109/68   Pulse: 86 81 76 80   Resp: 18 18 20 16   Temp: 98.1 °F (36.7 °C) 97.4 °F (36.3 °C) 97.6 °F (36.4 °C) 98.2 °F (36.8 °C)   TempSrc: Oral Oral Oral Oral   SpO2: 96% 97% 91% 93%   Weight:   213 lb 3 oz (96.7 kg)    Height:         I/O last 3 completed shifts:   In: 561.3 [Blood:161.3]  Out: 1076   GENERAL: alert, no distress  ABDOMEN: soft, non-tender and non-distended        LABS  CBC:   Lab Results   Component Value Date    WBC 3.8 02/13/2022    RBC 2.75 02/13/2022    HGB 8.2 02/13/2022    HCT 24.6 02/13/2022    MCV 89.8 02/13/2022    MCH 29.8 02/13/2022    MCHC 33.2 02/13/2022    RDW 15.2 02/13/2022     02/13/2022    MPV 7.2 02/13/2022     BMP:    Lab Results   Component Value Date     02/13/2022    K 4.7 02/13/2022    K 4.3 02/12/2022    CL 98 02/13/2022    CO2 26 02/13/2022    BUN 27 02/13/2022    LABALBU 2.5 02/12/2022    CREATININE 3.9 02/13/2022    CALCIUM 8.4 02/13/2022    GFRAA 18 02/13/2022    GFRAA 35 07/26/2011    LABGLOM 15 02/13/2022    GLUCOSE 147 02/13/2022         Divya Randall MD  Electronically signed 2/13/2022 at 9:28 AM

## 2022-02-13 NOTE — PROGRESS NOTES
Pt up to the bathroom with this RN. Pt noted to have a formed BM with some gisselle blood. Shortly after returning to bed and having sips of water, pt had an episode of emesis that was yellow/clear in color. Pt given PRN zofran at this time. Will continue to monitor pt closely.

## 2022-02-14 LAB
ANION GAP SERPL CALCULATED.3IONS-SCNC: 10 MMOL/L (ref 3–16)
BASOPHILS ABSOLUTE: 0 K/UL (ref 0–0.2)
BASOPHILS RELATIVE PERCENT: 0.9 %
BUN BLDV-MCNC: 32 MG/DL (ref 7–20)
CALCIUM SERPL-MCNC: 8.1 MG/DL (ref 8.3–10.6)
CHLORIDE BLD-SCNC: 101 MMOL/L (ref 99–110)
CO2: 25 MMOL/L (ref 21–32)
CREAT SERPL-MCNC: 5 MG/DL (ref 0.8–1.3)
EOSINOPHILS ABSOLUTE: 0.1 K/UL (ref 0–0.6)
EOSINOPHILS RELATIVE PERCENT: 4 %
GFR AFRICAN AMERICAN: 14
GFR NON-AFRICAN AMERICAN: 11
GLUCOSE BLD-MCNC: 108 MG/DL (ref 70–99)
GLUCOSE BLD-MCNC: 111 MG/DL (ref 70–99)
GLUCOSE BLD-MCNC: 120 MG/DL (ref 70–99)
GLUCOSE BLD-MCNC: 120 MG/DL (ref 70–99)
GLUCOSE BLD-MCNC: 124 MG/DL (ref 70–99)
GLUCOSE BLD-MCNC: 137 MG/DL (ref 70–99)
GLUCOSE BLD-MCNC: 155 MG/DL (ref 70–99)
HCT VFR BLD CALC: 23.2 % (ref 40.5–52.5)
HEMOGLOBIN: 7.6 G/DL (ref 13.5–17.5)
LYMPHOCYTES ABSOLUTE: 1 K/UL (ref 1–5.1)
LYMPHOCYTES RELATIVE PERCENT: 28.3 %
MAGNESIUM: 2.2 MG/DL (ref 1.8–2.4)
MCH RBC QN AUTO: 29.7 PG (ref 26–34)
MCHC RBC AUTO-ENTMCNC: 32.9 G/DL (ref 31–36)
MCV RBC AUTO: 90.2 FL (ref 80–100)
MONOCYTES ABSOLUTE: 0.2 K/UL (ref 0–1.3)
MONOCYTES RELATIVE PERCENT: 5.3 %
NEUTROPHILS ABSOLUTE: 2.2 K/UL (ref 1.7–7.7)
NEUTROPHILS RELATIVE PERCENT: 61.5 %
PDW BLD-RTO: 14.9 % (ref 12.4–15.4)
PERFORMED ON: ABNORMAL
PLATELET # BLD: 238 K/UL (ref 135–450)
PMV BLD AUTO: 7.2 FL (ref 5–10.5)
POTASSIUM SERPL-SCNC: 4.9 MMOL/L (ref 3.5–5.1)
RBC # BLD: 2.57 M/UL (ref 4.2–5.9)
SODIUM BLD-SCNC: 136 MMOL/L (ref 136–145)
WBC # BLD: 3.6 K/UL (ref 4–11)

## 2022-02-14 PROCEDURE — 83735 ASSAY OF MAGNESIUM: CPT

## 2022-02-14 PROCEDURE — 6370000000 HC RX 637 (ALT 250 FOR IP): Performed by: STUDENT IN AN ORGANIZED HEALTH CARE EDUCATION/TRAINING PROGRAM

## 2022-02-14 PROCEDURE — 94760 N-INVAS EAR/PLS OXIMETRY 1: CPT

## 2022-02-14 PROCEDURE — 36415 COLL VENOUS BLD VENIPUNCTURE: CPT

## 2022-02-14 PROCEDURE — APPSS45 APP SPLIT SHARED TIME 31-45 MINUTES: Performed by: PHYSICIAN ASSISTANT

## 2022-02-14 PROCEDURE — 1200000000 HC SEMI PRIVATE

## 2022-02-14 PROCEDURE — APPNB45 APP NON BILLABLE 31-45 MINUTES: Performed by: PHYSICIAN ASSISTANT

## 2022-02-14 PROCEDURE — 6360000002 HC RX W HCPCS: Performed by: STUDENT IN AN ORGANIZED HEALTH CARE EDUCATION/TRAINING PROGRAM

## 2022-02-14 PROCEDURE — 99024 POSTOP FOLLOW-UP VISIT: CPT | Performed by: PHYSICIAN ASSISTANT

## 2022-02-14 PROCEDURE — C9113 INJ PANTOPRAZOLE SODIUM, VIA: HCPCS | Performed by: STUDENT IN AN ORGANIZED HEALTH CARE EDUCATION/TRAINING PROGRAM

## 2022-02-14 PROCEDURE — 85025 COMPLETE CBC W/AUTO DIFF WBC: CPT

## 2022-02-14 PROCEDURE — 80048 BASIC METABOLIC PNL TOTAL CA: CPT

## 2022-02-14 PROCEDURE — 2580000003 HC RX 258: Performed by: STUDENT IN AN ORGANIZED HEALTH CARE EDUCATION/TRAINING PROGRAM

## 2022-02-14 RX ADMIN — SEVELAMER CARBONATE 800 MG: 800 TABLET, FILM COATED ORAL at 09:34

## 2022-02-14 RX ADMIN — SODIUM CHLORIDE, PRESERVATIVE FREE 10 ML: 5 INJECTION INTRAVENOUS at 20:46

## 2022-02-14 RX ADMIN — SEVELAMER CARBONATE 800 MG: 800 TABLET, FILM COATED ORAL at 13:08

## 2022-02-14 RX ADMIN — SEVELAMER CARBONATE 800 MG: 800 TABLET, FILM COATED ORAL at 17:09

## 2022-02-14 RX ADMIN — SODIUM CHLORIDE, PRESERVATIVE FREE 10 ML: 5 INJECTION INTRAVENOUS at 20:48

## 2022-02-14 RX ADMIN — SODIUM CHLORIDE, PRESERVATIVE FREE 10 ML: 5 INJECTION INTRAVENOUS at 09:36

## 2022-02-14 RX ADMIN — PANTOPRAZOLE SODIUM 40 MG: 40 INJECTION, POWDER, FOR SOLUTION INTRAVENOUS at 20:47

## 2022-02-14 RX ADMIN — PANTOPRAZOLE SODIUM 40 MG: 40 INJECTION, POWDER, FOR SOLUTION INTRAVENOUS at 09:34

## 2022-02-14 RX ADMIN — INSULIN LISPRO 1 UNITS: 100 INJECTION, SOLUTION INTRAVENOUS; SUBCUTANEOUS at 09:00

## 2022-02-14 RX ADMIN — GABAPENTIN 100 MG: 100 CAPSULE ORAL at 20:47

## 2022-02-14 RX ADMIN — SODIUM CHLORIDE, PRESERVATIVE FREE 10 ML: 5 INJECTION INTRAVENOUS at 09:35

## 2022-02-14 RX ADMIN — GABAPENTIN 100 MG: 100 CAPSULE ORAL at 09:35

## 2022-02-14 RX ADMIN — TAMSULOSIN HYDROCHLORIDE 0.4 MG: 0.4 CAPSULE ORAL at 09:35

## 2022-02-14 RX ADMIN — ATORVASTATIN CALCIUM 10 MG: 10 TABLET, FILM COATED ORAL at 20:47

## 2022-02-14 RX ADMIN — GABAPENTIN 100 MG: 100 CAPSULE ORAL at 13:08

## 2022-02-14 ASSESSMENT — PAIN SCALES - GENERAL
PAINLEVEL_OUTOF10: 0

## 2022-02-14 NOTE — PROGRESS NOTES
Hospitalist Progress Note    Patient:  Tadeo Moura  Unit/Bed:I2I-2399/4274-01   YOB: 1945       MRN: [id number] Acct: [de-identified]  PCP: José Luis Garcia    Date of Admission: 2/12/2022  --------------------------    Chief Complaint:     Rectal bleed    Hospital Course:     Tadeo Moura is a 77 y.o. male hospitalized on 2/12/2022   for rectal bleeding, he was recently underwent colectomy for colorectal malignancy on 1/26/2022. Patient was rehospitalized on 2/7/2020 through 2/10/2022 for lower GI bleeding. Patient was discharged to extended care facility and readmitted for the same. Assessment/plan:      Assessment  Acute GI bleeding, the setting of recent colectomy for colon cancer CTA of the abdomen reviewed, no brisk bleeding. GI and surgery service consulted    Evaluated by GI, general surgery  No further bleeding reported  Continue IV Protonix  Surgery input noted, plan to advance her diet as tolerated  Continue holding Plavix             Acute blood loss anemia  Monitoring hemoglobin, trending down slowly8.2-7.6     Hypotension, multifactorial possibly. Resolved     Lactic acidosis, likely secondary to hypotension, resolved     Hyponatremia, secondary to renal failure,  Resolved    COVID-19 infection, asymptomatic diagnosed 2/10/2022  Patient received vaccination ( Resendiz Peter 2 doses) should come off isolation tomorrow. Recent CVA  Continue statin, holding antiplatelet therapy secondary to the bleeding. End-stage renal failure on hemodialysis  Nephrology following, continue hemodialysis          Code Status: Full Code         DVT prophylaxis: SCD secondary to GI bleed     Disposition: Discharge to extended-care facility once okay per GI/surgery team, ensure resolution of GI bleeding for discharge given the recurrent admissions.     Discussed with the patient.    ----------------      Subjective:     Patient seen and examined  Overnight events noted  RN and ancillary staff note reviewed    Doing well, did not have rectal bleeding, no nausea or vomiting. Diet: ADULT DIET; Full Liquid    OBJECTIVE     Exam:  /76   Pulse 71   Temp 97.7 °F (36.5 °C) (Axillary)   Resp 18   Ht 6' (1.829 m)   Wt 215 lb 2.7 oz (97.6 kg)   SpO2 94%   BMI 29.18 kg/m²        Change physical finding from exam 2/13/2022 as below. Gen: Not in distress. Alert. Chronically ill  Head: Normocephalic. Atraumatic. Eyes: Conjunctivae/corneas clear. ENT: Oral mucosa moist  Neck: No JVD. No obvious thyromegaly. CVS: Nml S1S2, no murmur  , RRR  Pulmomary: Clear bilaterally. No crackles. No wheezes. Gastrointestinal: Soft, non tender, non distend, . Musculoskeletal: No edema. Warm  Neuro: No focal deficit. Moves extremity spontaneously. Psychiatry: Appropriate affect. Not agitated.         Medications:  Reviewed    Infusion Medications    sodium chloride      sodium chloride      sodium chloride      dextrose       Scheduled Medications    atorvastatin  10 mg Oral Nightly    gabapentin  100 mg Oral TID    sevelamer  800 mg Oral TID WC    tamsulosin  0.4 mg Oral QAM    sodium chloride flush  5-40 mL IntraVENous 2 times per day    pantoprazole  40 mg IntraVENous Q12H    And    sodium chloride (PF)  10 mL IntraVENous Q12H    insulin lispro  0-6 Units SubCUTAneous Q4H     PRN Meds: LORazepam, sodium chloride, sodium chloride, sodium chloride flush, sodium chloride, acetaminophen **OR** acetaminophen, ondansetron, guaiFENesin-dextromethorphan, glucose, dextrose, glucagon (rDNA), dextrose, lidocaine, melatonin      Intake/Output Summary (Last 24 hours) at 2/14/2022 1232  Last data filed at 2/13/2022 1858  Gross per 24 hour   Intake 800 ml   Output --   Net 800 ml             Labs:   Recent Labs     02/12/22  0024 02/12/22  1045 02/12/22  2320 02/13/22  0723 02/14/22  0837   WBC 6.9  --   --  3.8* 3.6*   HGB 7.1*   < > 8.3* 8.2* 7.6*   HCT 22.8*   < > 25.3* 24.6* 23.2*     --   --  219 238    < > = values in this interval not displayed. Recent Labs     02/12/22  0024 02/13/22  0723 02/14/22  0837   * 136 136   K 4.3 4.7 4.9   CL 95* 98* 101   CO2 20* 26 25   BUN 41* 27* 32*   CREATININE 5.3* 3.9* 5.0*   CALCIUM 7.9* 8.4 8.1*     Recent Labs     02/12/22  0024   AST 11*   ALT 7*   BILIDIR <0.2   BILITOT 0.3   ALKPHOS 91     Recent Labs     02/12/22  0024   INR 1.14*     No results for input(s): CKTOTAL, TROPONINI in the last 72 hours. Urinalysis:      Lab Results   Component Value Date    NITRU Negative 01/21/2022    WBCUA >900 01/21/2022    BACTERIA 2+ 01/21/2022    RBCUA 32 01/21/2022    BLOODU LARGE 01/21/2022    SPECGRAV 1.019 01/21/2022    GLUCOSEU Negative 01/21/2022       Radiology:  CTA ABDOMEN PELVIS W WO CONTRAST   Final Result   1. No high-density contrast within the lumen of the stomach, small bowel,   and colon to indicate a site of active hemorrhage. 2.  Previous partial colectomy with an anastomosis at the expected   rectosigmoid junction. There is a focal outpouching along the left side of   the anastomosis. This was also noted on the previous exam but has decreased   in size. Mild inflammatory changes are still present at the site. 3.  No general ascites and no pneumoperitoneum. There is no bowel   obstruction or hydronephrosis. 4.  Multiple cysts are present within the kidneys some of which are too small   to characterize.                      Electronically signed by Amy Del Toro MD on 2/14/2022 at 12:32 PM

## 2022-02-14 NOTE — CARE COORDINATION
Patient came from  · Name: [facility]  · Address:  [address] [facility], [place], [address] [facility]   · Phone:  [phone]  · Fax:  [phone]  prior to arrival.    Attempted to call [facility] [place] but no answer, pt is there skilled as of 2/7/22 initial assessment.  [] [address], no Precert required for return.  [] [address] will be required for return. [x] Skilled Nursing Care.  [] [address] required for return.    [] Is fully vaccinated for Covid. [] Has started Covid vaccination, but is not complete. [] Is not vaccinated for Covid. [] No Covid Test needed for return.  [] Rapid Covid test needed before return within: [] 48 hours, [] 72 hours, [] 5 days, [] other  [] PCR Covid test needed before return.    [] Confirmed with the patient or family that the plan is to return to this facility at D/C. Attempted to call pt but no answer; called wife but no answer & unable to leave a vm.    [] Patient and/or designated decision maker does not plan for the patient to return to this facility at D/C.      Electronically signed by Tamir Farias RN on 2/14/2022 at 4:20 PM

## 2022-02-14 NOTE — PROGRESS NOTES
General and Vascular Surgery                                                           Daily Progress Note                                                                 Pt Name: Tylor Del Real  Medical Record Number: [id number]  Date of Birth 1945   Today's Date: 2/14/2022      ASSESSMENT/PLAN  1. GI bleed s/p sigmoid colectomy 1/26/22  -Denies any further bleeding. RN stated that they have not noticed any further bleeding either. Hgb: 8.3-->8.2-->7.6.   -Tolerating clear liquids will advance diet to full liquids as tolerated  -serial H/H  -continue to hold plavix.  -reserve endoscopic evaluation for significant bleeding or drop in H/H    Avelino Webb denies abdominal pain. Denies any further bleeding. OBJECTIVE  VITALS:   Vitals:    02/14/22 0115 02/14/22 0515 02/14/22 0805 02/14/22 0930   BP: (!) 147/79 100/65  133/76   Pulse: 77 76  71   Resp: 18 18 18 18   Temp: 97.7 °F (36.5 °C) 97.7 °F (36.5 °C)  97.7 °F (36.5 °C)   TempSrc: Oral Oral  Axillary   SpO2:   92% 94%   Weight:  215 lb 2.7 oz (97.6 kg)     Height:         I/O last 3 completed shifts:   In: 800 [P.O.:800]  Out: -   GENERAL: alert, no distress  ABDOMEN: soft, non-tender and non-distended        LABS  CBC:   Lab Results   Component Value Date    WBC 3.6 02/14/2022    RBC 2.57 02/14/2022    HGB 7.6 02/14/2022    HCT 23.2 02/14/2022    MCV 90.2 02/14/2022    MCH 29.7 02/14/2022    MCHC 32.9 02/14/2022    RDW 14.9 02/14/2022     02/14/2022    MPV 7.2 02/14/2022     BMP:    Lab Results   Component Value Date     02/14/2022    K 4.9 02/14/2022    K 4.3 02/12/2022     02/14/2022    CO2 25 02/14/2022    BUN 32 02/14/2022    LABALBU 2.5 02/12/2022    CREATININE 5.0 02/14/2022    CALCIUM 8.1 02/14/2022    GFRAA 14 02/14/2022    GFRAA 35 07/26/2011    LABGLOM 11 02/14/2022    GLUCOSE 111 02/14/2022         Drew Tripathi PA-C  Electronically signed 2/14/2022 at 12:19 PM

## 2022-02-14 NOTE — PROGRESS NOTES
[facility]                                               Progress note    CC: hematochezia, ESRD  Summary:   Elfego Torres is being seen by nephrology for ESRD. He is a 77 y.o. male with a PMH significant for ESRD on HD TTS, colon cancer, T2DM, hypertension at baseline who was just discharged after admission 2/7/2022-2/10/2022 for a rectal bleed. He now presented back to the hospital 2/12/2022 with the same complaint. Bleeding started after resuming plavix. Interval History  Hb is drifting down  No intervention planned for now  BP better today    Plan:   - no acute indication for RRT today. - plan for HD on Tuesday per schedule. - Retacrit 20K units with HD tomorrow      Celine Hebert MD  [facility]  Office: [phone]    Assessment:   ESRD  TTS at [facility] , has not started there yet. Is originally from [facility] but staying in [place] for rehab so dialyzing with us for now. He has a left AV fistula, positive thrill and bruit     Electrolytes  No acute issues.     Hypertension  BP back to goal. Hypotensive at presentation      SHPT  No acute issues.      GI bleed  Has known colon cancer  No active bleeding  Hemoglobin down from admission. GI consulted. ROS:     Positives Listed Bold. All other remaining systems are negative. Constitutional:  fever, chills, weakness, weight change, fatigue,      Skin:  rash, pruritus, hair loss, bruising, dry skin, petechiae. Head, Face, Neck   headaches, swelling,  cervical adenopathy. Respiratory: shortness of breath, cough, or wheezing  Cardiovascular: chest pain, palpitations, dizzy, edema  Gastrointestinal: nausea, vomiting, diarrhea, constipation,belly pain    Yellow skin, blood in stool  Musculoskeletal:  back pain, muscle weakness, gait problems,       joint pain or swelling.   Genitourinary:  dysuria, poor urine flow, flank pain, blood in urine  Neurologic:  vertigo, TIA'S, syncope, seizures, focal weakness  Psychosocial: insomnia, anxiety, or depression. Additional positive findings: -     PMH:   Past medical history, surgical history, social history, family history are reviewed and updated as appropriate. Reviewed current medication list.   Allergies reviewed and updated as needed. PE:   Vitals:    02/14/22 1249   BP: 111/65   Pulse: 67   Resp: 18   Temp: 97.7 °F (36.5 °C)   SpO2: 98%       General appearance: AOx3 in no acute distress, comfortable, communicative, awake and alert. HEENT: no icterus, EOM intact, trachea midline. Neck : no masses, appears symmetrical and no JVD appreciated. Respiratory: Respiratory effort normal, bilateral equal chest rise. No wheeze, no crackles   Cardiovascular: Ausculation shows RRR and  no edema   Abdomen: abdomen is soft, non distended, no masses, no pain with palpation. Musculoskeletal:  no joint swelling, no deformity, strength grossly normal.   Skin: no rashes, no induration, no tightening, no jaundice   Neuro:   Follows commands, moves all extremities spontaneously       Lab Results   Component Value Date    CREATININE 5.0 (H) 02/14/2022    BUN 32 (H) 02/14/2022     02/14/2022    K 4.9 02/14/2022     02/14/2022    CO2 25 02/14/2022      Lab Results   Component Value Date    WBC 3.6 (L) 02/14/2022    HGB 7.6 (L) 02/14/2022    HCT 23.2 (L) 02/14/2022    MCV 90.2 02/14/2022     02/14/2022     Lab Results   Component Value Date    CALCIUM 8.1 (L) 02/14/2022    PHOS 4.2 02/10/2022

## 2022-02-14 NOTE — PLAN OF CARE
Problem: Airway Clearance - Ineffective  Goal: Achieve or maintain patent airway  2/14/2022 0336 by Alla Mann RN  Outcome: Ongoing  2/13/2022 1701 by Craig Pryor RN  Outcome: Ongoing     Problem: Gas Exchange - Impaired  Goal: Absence of hypoxia  2/14/2022 0336 by Alla Mann RN  Outcome: Ongoing  2/13/2022 1701 by Craig Pryor RN  Outcome: Ongoing  Goal: Promote optimal lung function  2/14/2022 0336 by Alla Mann RN  Outcome: Ongoing  2/13/2022 1701 by Craig Pryor RN  Outcome: Ongoing     Problem: Breathing Pattern - Ineffective  Goal: Ability to achieve and maintain a regular respiratory rate  2/14/2022 0336 by Alla aMnn RN  Outcome: Ongoing  2/13/2022 1701 by Craig Pryor RN  Outcome: Ongoing     Problem:  Body Temperature -  Risk of, Imbalanced  Goal: Ability to maintain a body temperature within defined limits  2/14/2022 0336 by Alla Mann RN  Outcome: Ongoing  2/13/2022 1701 by Craig Pryor RN  Outcome: Ongoing  Goal: Will regain or maintain usual level of consciousness  2/14/2022 0336 by Alla Mann RN  Outcome: Ongoing  2/13/2022 1701 by Craig Pryor RN  Outcome: Ongoing  Goal: Complications related to the disease process, condition or treatment will be avoided or minimized  2/14/2022 0336 by Alla Mann RN  Outcome: Ongoing  2/13/2022 1701 by Craig Pryor RN  Outcome: Ongoing

## 2022-02-14 NOTE — PROGRESS NOTES
decreased   in size. Mild inflammatory changes are still present at the site. 3.  No general ascites and no pneumoperitoneum. There is no bowel   obstruction or hydronephrosis. 4.  Multiple cysts are present within the kidneys some of which are too small   to characterize. ASSESSMENT:  Micah Sterling is a 77 y.o. male with a PMH of  colon adenocarcinoma dx 2 months ago s/p sigmoid resection 1/26/22, DM, ESRD on HD, HTN, and arthritiswho presented on 2/7/2022 with rectal bleed. We have been consulted regarding sane.     IMPRESSION:     1. Lower GI bleed. Resolved. Patient underwent sigmoid resection on 1/26/22 for colon adenocarcinoma, and is anticoagulated with aspirin and plavix. Suspect anastomosis  post-op bleed in setting of blood thinner use, versus diverticular bleed versus more likely outlet bleeding from hemmorrhoids. 2. Colon adenocarcinoma s/p resection 1/26/22. Surgery following. Notably a small outpouching is seen at the anastomosis which is smaller from his last cross-sectional imaging. 3. Recent CVA. Plavix on hold due to #1. PLAN:  - Continue to monitor for now   - Will reserve endoscopy for clinically significant bleeding or hemodynamic instability and drop in H&H. Thank you for allowing me to participate in the care of your patient. Feel free to contact me with any questions or concerns.     Shawn Burrell MD

## 2022-02-15 LAB
ANION GAP SERPL CALCULATED.3IONS-SCNC: 11 MMOL/L (ref 3–16)
BASOPHILS ABSOLUTE: 0 K/UL (ref 0–0.2)
BASOPHILS RELATIVE PERCENT: 0.7 %
BUN BLDV-MCNC: 43 MG/DL (ref 7–20)
CALCIUM SERPL-MCNC: 7.8 MG/DL (ref 8.3–10.6)
CHLORIDE BLD-SCNC: 102 MMOL/L (ref 99–110)
CO2: 25 MMOL/L (ref 21–32)
CREAT SERPL-MCNC: 5.8 MG/DL (ref 0.8–1.3)
EOSINOPHILS ABSOLUTE: 0.2 K/UL (ref 0–0.6)
EOSINOPHILS RELATIVE PERCENT: 4.6 %
GFR AFRICAN AMERICAN: 12
GFR NON-AFRICAN AMERICAN: 10
GLUCOSE BLD-MCNC: 101 MG/DL (ref 70–99)
GLUCOSE BLD-MCNC: 111 MG/DL (ref 70–99)
GLUCOSE BLD-MCNC: 113 MG/DL (ref 70–99)
GLUCOSE BLD-MCNC: 119 MG/DL (ref 70–99)
GLUCOSE BLD-MCNC: 175 MG/DL (ref 70–99)
GLUCOSE BLD-MCNC: 196 MG/DL (ref 70–99)
HCT VFR BLD CALC: 22.4 % (ref 40.5–52.5)
HEMOGLOBIN: 7.4 G/DL (ref 13.5–17.5)
HEPATITIS B CORE TOTAL ANTIBODY: NEGATIVE
LYMPHOCYTES ABSOLUTE: 0.9 K/UL (ref 1–5.1)
LYMPHOCYTES RELATIVE PERCENT: 25.1 %
MAGNESIUM: 2.3 MG/DL (ref 1.8–2.4)
MCH RBC QN AUTO: 29.9 PG (ref 26–34)
MCHC RBC AUTO-ENTMCNC: 33.2 G/DL (ref 31–36)
MCV RBC AUTO: 90 FL (ref 80–100)
MONOCYTES ABSOLUTE: 0.2 K/UL (ref 0–1.3)
MONOCYTES RELATIVE PERCENT: 4.8 %
NEUTROPHILS ABSOLUTE: 2.3 K/UL (ref 1.7–7.7)
NEUTROPHILS RELATIVE PERCENT: 64.8 %
PDW BLD-RTO: 15 % (ref 12.4–15.4)
PERFORMED ON: ABNORMAL
PLATELET # BLD: 230 K/UL (ref 135–450)
PMV BLD AUTO: 6.9 FL (ref 5–10.5)
POTASSIUM SERPL-SCNC: 4.9 MMOL/L (ref 3.5–5.1)
RBC # BLD: 2.49 M/UL (ref 4.2–5.9)
SODIUM BLD-SCNC: 138 MMOL/L (ref 136–145)
WBC # BLD: 3.5 K/UL (ref 4–11)

## 2022-02-15 PROCEDURE — 6370000000 HC RX 637 (ALT 250 FOR IP): Performed by: STUDENT IN AN ORGANIZED HEALTH CARE EDUCATION/TRAINING PROGRAM

## 2022-02-15 PROCEDURE — 1200000000 HC SEMI PRIVATE

## 2022-02-15 PROCEDURE — 90935 HEMODIALYSIS ONE EVALUATION: CPT

## 2022-02-15 PROCEDURE — APPSS45 APP SPLIT SHARED TIME 31-45 MINUTES: Performed by: PHYSICIAN ASSISTANT

## 2022-02-15 PROCEDURE — 94760 N-INVAS EAR/PLS OXIMETRY 1: CPT

## 2022-02-15 PROCEDURE — 99024 POSTOP FOLLOW-UP VISIT: CPT | Performed by: PHYSICIAN ASSISTANT

## 2022-02-15 PROCEDURE — APPNB45 APP NON BILLABLE 31-45 MINUTES: Performed by: PHYSICIAN ASSISTANT

## 2022-02-15 PROCEDURE — 80048 BASIC METABOLIC PNL TOTAL CA: CPT

## 2022-02-15 PROCEDURE — C9113 INJ PANTOPRAZOLE SODIUM, VIA: HCPCS | Performed by: STUDENT IN AN ORGANIZED HEALTH CARE EDUCATION/TRAINING PROGRAM

## 2022-02-15 PROCEDURE — 85025 COMPLETE CBC W/AUTO DIFF WBC: CPT

## 2022-02-15 PROCEDURE — 83735 ASSAY OF MAGNESIUM: CPT

## 2022-02-15 PROCEDURE — 6360000002 HC RX W HCPCS: Performed by: STUDENT IN AN ORGANIZED HEALTH CARE EDUCATION/TRAINING PROGRAM

## 2022-02-15 PROCEDURE — 36415 COLL VENOUS BLD VENIPUNCTURE: CPT

## 2022-02-15 PROCEDURE — 2580000003 HC RX 258: Performed by: STUDENT IN AN ORGANIZED HEALTH CARE EDUCATION/TRAINING PROGRAM

## 2022-02-15 RX ADMIN — TAMSULOSIN HYDROCHLORIDE 0.4 MG: 0.4 CAPSULE ORAL at 10:08

## 2022-02-15 RX ADMIN — GABAPENTIN 100 MG: 100 CAPSULE ORAL at 21:01

## 2022-02-15 RX ADMIN — SODIUM CHLORIDE, PRESERVATIVE FREE 10 ML: 5 INJECTION INTRAVENOUS at 14:53

## 2022-02-15 RX ADMIN — INSULIN LISPRO 1 UNITS: 100 INJECTION, SOLUTION INTRAVENOUS; SUBCUTANEOUS at 18:10

## 2022-02-15 RX ADMIN — PANTOPRAZOLE SODIUM 40 MG: 40 INJECTION, POWDER, FOR SOLUTION INTRAVENOUS at 10:07

## 2022-02-15 RX ADMIN — SEVELAMER CARBONATE 800 MG: 800 TABLET, FILM COATED ORAL at 14:52

## 2022-02-15 RX ADMIN — PANTOPRAZOLE SODIUM 40 MG: 40 INJECTION, POWDER, FOR SOLUTION INTRAVENOUS at 21:00

## 2022-02-15 RX ADMIN — INSULIN LISPRO 1 UNITS: 100 INJECTION, SOLUTION INTRAVENOUS; SUBCUTANEOUS at 21:05

## 2022-02-15 RX ADMIN — GABAPENTIN 100 MG: 100 CAPSULE ORAL at 14:52

## 2022-02-15 RX ADMIN — EPOETIN ALFA-EPBX 20000 UNITS: 20000 INJECTION, SOLUTION INTRAVENOUS; SUBCUTANEOUS at 10:22

## 2022-02-15 RX ADMIN — SODIUM CHLORIDE, PRESERVATIVE FREE 10 ML: 5 INJECTION INTRAVENOUS at 21:01

## 2022-02-15 RX ADMIN — ATORVASTATIN CALCIUM 10 MG: 10 TABLET, FILM COATED ORAL at 21:01

## 2022-02-15 RX ADMIN — SODIUM CHLORIDE, PRESERVATIVE FREE 10 ML: 5 INJECTION INTRAVENOUS at 10:07

## 2022-02-15 RX ADMIN — GABAPENTIN 100 MG: 100 CAPSULE ORAL at 10:08

## 2022-02-15 RX ADMIN — SEVELAMER CARBONATE 800 MG: 800 TABLET, FILM COATED ORAL at 18:10

## 2022-02-15 ASSESSMENT — PAIN SCALES - GENERAL
PAINLEVEL_OUTOF10: 0

## 2022-02-15 ASSESSMENT — PAIN SCALES - WONG BAKER: WONGBAKER_NUMERICALRESPONSE: 0

## 2022-02-15 NOTE — PROGRESS NOTES
Physician Progress Note      Dorcas Ashley  CSN #:                  [id number]  :                       1945  ADMIT DATE:       2022 12:05 AM  DISCH DATE:  RESPONDING  PROVIDER #:        Tyrese Fields MD          QUERY TEXT:    Dear Dr. Kayce Curry,  Patient admitted with GI bleed and acute blood loss anemia and is on chronic   anticoagulation. If possible, please document in the progress notes and   discharge summary if you are evaluating and/or treating any of the following: The medical record reflects the following:  Risk Factors: Hx CVA, recent OR for adenocarcinoma of the sigmoid colon on   22, post-op GIB and readmitted 1 week ago, on Plavix that was resumed 2   days ago  Clinical Indicators:  ED for rectal bleeding-\"found by EMS hypotensive at   his rehab facility with passage of large clots,  H/H=7.1/22.8, In ED \"becoming   hypotensive 80s/40s\", Surg notes \"underwent an open sigmoid resection with   coloproctostomy for an adenocarcinoma of the sigmoid colon on 22. He   returned to the hospital last week after having rectal bleeding at home. His   Plavix was stopped ant the bleeding stopped also. Sang Childress Discharged 2 days ago. Yesterday he resumed his Plavix and then the bleeding was noted to start again   so he came back into the hospital\"  Treatment: Hold Plavix, PRBC transfusion, monitor labs, GI and Surg consult,   monitor for bleeding and monitor labs  Thank you,  Sedrick Hillman RN, CDS  [email or web address]  Options provided:  -- Bleeding associated with Plavix. -- Bleeding unrelated to anticoagulation  -- Other - I will add my own diagnosis  -- Disagree - Not applicable / Not valid  -- Disagree - Clinically unable to determine / Unknown  -- Refer to Clinical Documentation Reviewer    PROVIDER RESPONSE TEXT:    This patient has bleeding associated with Plavix. Query created by:  [id number] on 2022 12:34 PM      QUERY TEXT:    Dear  Crow Mercedes,  Patient admitted with GI bleed and Acute Blood Loss anemia . Documentation   reflects Hemorrhagic shock  in ED note dated 2/12. If possible, please   document in the progress notes and discharge summary if Hemorrhagic shock was: The medical record reflects the following:  Risk Factors: On Plavix, recent OR for colon resection for adenocarcinoma of   sigmoid colon, Current GI bleed, Acute blood loss anemia  Clinical Indicators: 2/12 ED for rectal bleeding-\"found by EMS hypotensive at   his rehab facility with passage of large clots and mixture of dark and bright   red blood in his bowel movements. He feels lightheaded, thirsty, generally   weak\", H/H=7.1/22.8, 128/108 initially, then \"becoming hypotensive 80s/40s,   requiring PRBC transfusion\", ED notes \" hemorrhagic shock\"  Treatment: ICU admission, IVF, PRBC transfusion 2 units, Gi and Surg consults  Thank you,  Frances Archibald RN, CDS  [email or web address]  Options provided:  -- Hemorrhagic shock  treated and resolved  -- Hemorrhagic shock confirmed after study  -- Hemorrhagic shock ruled out after study and hypotension only  -- Other - I will add my own diagnosis  -- Disagree - Not applicable / Not valid  -- Disagree - Clinically unable to determine / Unknown  -- Refer to Clinical Documentation Reviewer    PROVIDER RESPONSE TEXT:    Hemorrhagic shock ruled out after study and hypotension only. Query created by: [facility] on 2/14/2022 12:46 PM      QUERY TEXT:    Dear Dr. Crow Mercedes,  Pt admitted with GI bleed ? Pt noted to have had recent surgery. ? If possible,   please document in progress notes and discharge summary:      The medical record reflects the following:  Risk Factors: Hx Colon adenocarcinoma and OR for  sigmoid resection on   1/26/22, anticoagulated with ASA and Plavix  Clinical Indicators: 2/12 ED for rectal bleeding-\"found by EMS hypotensive at   his rehab facility with passage of large clots and mixture of dark and bright red blood in his bowel movements, Admit for GI bleed, Acute blood loss anemia,   Surg notes \"underwent an open sigmoid resection with coloproctostomy for an   adenocarcinoma of the sigmoid colon on 1/26/22. He returned to the hospital   last week after having rectal bleeding at home. His Plavix was stopped ant   the bleeding stopped also. Destiny Abarmando Dixon Abarmando Olson Discharged 2 days ago. Yesterday he resumed his   Plavix and then the bleeding was noted t  Treatment: Hold Plavix, GI consult, Monitor H/H, PRBC transfusion  Thank you,  Joel Virk RN, CDS  [email or web address]  Options provided:  -- GI bleed is a postoperative complication  -- GI bleed is not a postoperative complication, but is due to other   incidental risk factor, Please specify other incidental risk factor  -- Other - I will add my own diagnosis  -- Disagree - Not applicable / Not valid  -- Disagree - Clinically unable to determine / Unknown  -- Refer to Clinical Documentation Reviewer    PROVIDER RESPONSE TEXT:    Patient has GI bleed as a postoperative complication. Query created by:  Abigail Kramer on 2/15/2022 7:15 AM      Electronically signed by:  Nancy Nunez MD 2/15/2022 2:41 PM

## 2022-02-15 NOTE — PROGRESS NOTES
General and Vascular Surgery                                                           Daily Progress Note                                                                 Pt Name: Jamie Acevedo Record Number: [id number]  Date of Birth 1945   Today's Date: 2/15/2022      ASSESSMENT/PLAN  1. GI bleed s/p sigmoid colectomy 1/26/22  -Denies any further bleeding. Hgb: 8.3-->8.2-->7.6-->7.4.   -Tolerating full liquid diet. OK to ADAT from a general surgery standpoint  -serial H/H  -continue to hold plavix.  -reserve endoscopic evaluation for significant bleeding or drop in H/H    Edgardo Walters denies abdominal pain. Denies any further bleeding. OBJECTIVE  VITALS:   Vitals:    02/15/22 1000 02/15/22 1041 02/15/22 1201 02/15/22 1230   BP: 115/69   124/75   Pulse: 81   87   Resp:       Temp: 97.5 °F (36.4 °C)   97.5 °F (36.4 °C)   TempSrc:       SpO2:   100%    Weight:    211 lb 10.3 oz (96 kg)   Height:  6' (1.829 m)       I/O last 3 completed shifts:   In: 580 [P.O.:580]  Out: -   GENERAL: alert, no distress  ABDOMEN: soft, non-tender and non-distended        LABS  CBC:   Lab Results   Component Value Date    WBC 3.5 02/15/2022    RBC 2.49 02/15/2022    HGB 7.4 02/15/2022    HCT 22.4 02/15/2022    MCV 90.0 02/15/2022    MCH 29.9 02/15/2022    MCHC 33.2 02/15/2022    RDW 15.0 02/15/2022     02/15/2022    MPV 6.9 02/15/2022     BMP:    Lab Results   Component Value Date     02/15/2022    K 4.9 02/15/2022    K 4.3 02/12/2022     02/15/2022    CO2 25 02/15/2022    BUN 43 02/15/2022    LABALBU 2.5 02/12/2022    CREATININE 5.8 02/15/2022    CALCIUM 7.8 02/15/2022    GFRAA 12 02/15/2022    GFRAA 35 07/26/2011    LABGLOM 10 02/15/2022    GLUCOSE 111 02/15/2022         River English PA-C  Electronically signed 2/15/2022 at 2:30 PM

## 2022-02-15 NOTE — PLAN OF CARE
Problem: Airway Clearance - Ineffective  Goal: Achieve or maintain patent airway  Outcome: Ongoing     Problem: Gas Exchange - Impaired  Goal: Absence of hypoxia  Outcome: Ongoing  Goal: Promote optimal lung function  Outcome: Ongoing     Problem: Breathing Pattern - Ineffective  Goal: Ability to achieve and maintain a regular respiratory rate  Outcome: Ongoing     Problem:  Body Temperature -  Risk of, Imbalanced  Goal: Ability to maintain a body temperature within defined limits  Outcome: Ongoing  Goal: Will regain or maintain usual level of consciousness  Outcome: Ongoing  Goal: Complications related to the disease process, condition or treatment will be avoided or minimized  Outcome: Ongoing     Problem: Isolation Precautions - Risk of Spread of Infection  Goal: Prevent transmission of infection  Outcome: Ongoing     Problem: Nutrition Deficits  Goal: Optimize nutritional status  Outcome: Ongoing     Problem: Risk for Fluid Volume Deficit  Goal: Maintain normal heart rhythm  Outcome: Ongoing  Goal: Maintain absence of muscle cramping  Outcome: Ongoing  Goal: Maintain normal serum potassium, sodium, calcium, phosphorus, and pH  Outcome: Ongoing     Problem: Loneliness or Risk for Loneliness  Goal: Demonstrate positive use of time alone when socialization is not possible  Outcome: Ongoing     Problem: Fatigue  Goal: Verbalize increase energy and improved vitality  Outcome: Ongoing     Problem: Patient Education: Go to Patient Education Activity  Goal: Patient/Family Education  Outcome: Ongoing     Problem: Falls - Risk of:  Goal: Will remain free from falls  Description: Will remain free from falls  Outcome: Ongoing  Goal: Absence of physical injury  Description: Absence of physical injury  Outcome: Ongoing     Problem: Skin Integrity:  Goal: Will show no infection signs and symptoms  Description: Will show no infection signs and symptoms  Outcome: Ongoing  Goal: Absence of new skin breakdown  Description: Absence of new skin breakdown  Outcome: Ongoing     Problem: Discharge Planning:  Goal: Discharged to appropriate level of care  Description: Discharged to appropriate level of care  Outcome: Ongoing     Problem:  Bowel Function - Altered:  Goal: Bowel elimination is within specified parameters  Description: Bowel elimination is within specified parameters  Outcome: Ongoing     Problem: Fluid Volume - Imbalance:  Goal: Will show no signs and symptoms of excessive bleeding  Description: Will show no signs and symptoms of excessive bleeding  Outcome: Ongoing  Goal: Absence of imbalanced fluid volume signs and symptoms  Description: Absence of imbalanced fluid volume signs and symptoms  Outcome: Ongoing     Problem: Nausea/Vomiting:  Goal: Absence of nausea/vomiting  Description: Absence of nausea/vomiting  Outcome: Ongoing  Goal: Able to drink  Description: Able to drink  Outcome: Ongoing  Goal: Able to eat  Description: Able to eat  Outcome: Ongoing  Goal: Ability to achieve adequate nutritional intake will improve  Description: Ability to achieve adequate nutritional intake will improve  Outcome: Ongoing     Problem: Nutrition  Goal: Optimal nutrition therapy  2/15/2022 1151 by Lolis Patten RN  Outcome: Ongoing  2/15/2022 1046 by Han Castro RD, LD  Outcome: Ongoing

## 2022-02-15 NOTE — PROGRESS NOTES
Gastroenterology Progress Note    Natividad Samano is a 77 y.o. male patient. Hospitalization day:3    SUBJECTIVE:    No acute events overnight. Reports he is feeling well. He denies any BM yesterday. No further complaints. ROS:  Cardiovascular ROS: no chest pain or dyspnea on exertion  Gastrointestinal ROS: positive for - rectal bleeding  negative for - abdominal pain or diarrhea  Respiratory ROS: no cough, shortness of breath, or wheezing    Physical      Gen: Resting in bed, NAD  HEENT: Normocephalic, atraumatic, no scleral icterus   CV: RRR no MRG   Pul: CTAB, normal work of breathing without wheezing  Abd: Good bowel sounds throughout, no scars, soft, NT/ND, no masses, no HSM   Ext: No edema, moves all 4 extremities. Neuro: Moves all four extremities, no gross deficits, follows commands   Skin: No jaundice, spider angiomas, palmar erythema     Data    CBC:   Lab Results   Component Value Date    WBC 3.5 02/15/2022    RBC 2.49 02/15/2022    HGB 7.4 02/15/2022    HCT 22.4 02/15/2022    MCV 90.0 02/15/2022    MCH 29.9 02/15/2022    MCHC 33.2 02/15/2022    RDW 15.0 02/15/2022     02/15/2022    MPV 6.9 02/15/2022     Hepatic Function Panel:    Lab Results   Component Value Date    ALKPHOS 91 02/12/2022    ALT 7 02/12/2022    AST 11 02/12/2022    PROT 5.6 02/12/2022    PROT 7.5 07/26/2011    BILITOT 0.3 02/12/2022    BILIDIR <0.2 02/12/2022    IBILI see below 02/12/2022         Radiology Review:    CTA ABDOMEN PELVIS W WO CONTRAST   Final Result   1. No high-density contrast within the lumen of the stomach, small bowel,   and colon to indicate a site of active hemorrhage. 2.  Previous partial colectomy with an anastomosis at the expected   rectosigmoid junction. There is a focal outpouching along the left side of   the anastomosis. This was also noted on the previous exam but has decreased   in size. Mild inflammatory changes are still present at the site.       3.  No general ascites and no pneumoperitoneum. There is no bowel   obstruction or hydronephrosis. 4.  Multiple cysts are present within the kidneys some of which are too small   to characterize. ASSESSMENT:  Vannesa Eldridge is a 77 y.o. male with a PMH of  colon adenocarcinoma dx 2 months ago s/p sigmoid resection 1/26/22, DM, ESRD on HD, HTN, and arthritiswho presented on 2/7/2022 with rectal bleed. We have been consulted regarding sane.     IMPRESSION:     1. Lower GI bleed. Resolved. Patient underwent sigmoid resection on 1/26/22 for colon adenocarcinoma, and is anticoagulated with aspirin and plavix. Suspect anastomosis  post-op bleed in setting of blood thinner use, versus diverticular bleed versus more likely outlet bleeding from hemmorrhoids. 2. Colon adenocarcinoma s/p resection 1/26/22. Surgery following. Notably a small outpouching is seen at the anastomosis which is smaller from his last cross-sectional imaging. 3. Recent CVA. Plavix on hold due to #1. PLAN:  - Continue to monitor for now   - Will reserve endoscopy for clinically significant bleeding or hemodynamic instability and drop in H&H. Thank you for allowing me to participate in the care of your patient. Feel free to contact me with any questions or concerns.     Angelina Crook MD

## 2022-02-15 NOTE — PROGRESS NOTES
Hospitalist Progress Note    Patient:  Lalita Ontiveros  Unit/Bed:I4A-7595/4274-01   YOB: 1945       MRN: [id number] Acct: [de-identified]  PCP: Enmanuel Echavarria    Date of Admission: 2/12/2022  --------------------------    Chief Complaint:     Rectal bleed    Hospital Course:     Lalita Ontiveros is a 77 y.o. male hospitalized on 2/12/2022   for rectal bleeding, he was recently underwent colectomy for colorectal malignancy on 1/26/2022. Patient was rehospitalized on 2/7/2020 through 2/10/2022 for lower GI bleeding. Patient was discharged to extended care facility and readmitted for the same. Assessment/plan:      Assessment  Acute GI bleeding, the setting of recent colectomy for colon cancer CTA of the abdomen reviewed, no brisk bleeding. GI and surgery service consulted    Evaluated by GI, general surgery  No further bleeding reported  Continue IV Protonix  Surgery input noted, diet advance per surgery  Continue holding Plavix     Acute blood loss anemia  Monitoring hemoglobin, trending down slowly8.2-7.6-7.4     Hypotension, multifactorial possibly. Resolved     Lactic acidosis, likely secondary to hypotension, resolved     Hyponatremia, secondary to renal failure,  Resolved    COVID-19 infection, asymptomatic diagnosed 2/10/2022  Patient received vaccination ( Resendiz Peter 2 doses) should come off isolation tomorrow. Recent CVA  Continue statin, holding antiplatelet therapy secondary to the bleeding. End-stage renal failure on hemodialysis  Nephrology following, continue hemodialysis          Code Status: Full Code         DVT prophylaxis: SCD secondary to GI bleed     Disposition: Discharge to extended-care facility once okay per GI/surgery team, ensure resolution of GI bleeding for discharge given the recurrent admissions. Discussed with the patient.    ----------------      Subjective:     Patient seen and examined  Doing well, did not have rectal bleeding, no nausea or vomiting.     Diet: ADULT DIET; Full Liquid  ADULT ORAL NUTRITION SUPPLEMENT; Lunch, Dinner; Renal Oral Supplement    OBJECTIVE     Exam:  /69   Pulse 81   Temp 97.5 °F (36.4 °C)   Resp 16   Ht 6' (1.829 m)   Wt 217 lb 9.5 oz (98.7 kg)   SpO2 98%   BMI 29.51 kg/m²        Change physical finding from exam 2/13/2022 as below. Gen: Not in distress. Alert. Chronically ill  Head: Normocephalic. Atraumatic. Eyes: Conjunctivae/corneas clear. ENT: Oral mucosa moist  Neck: No JVD. No obvious thyromegaly. CVS: Nml S1S2, no murmur  , RRR  Pulmomary: Clear bilaterally. No crackles. No wheezes. Gastrointestinal: Soft, non tender, non distend, . Musculoskeletal: No edema. Warm  Neuro: No focal deficit. Moves extremity spontaneously. Psychiatry: Appropriate affect. Not agitated.         Medications:  Reviewed    Infusion Medications    sodium chloride      sodium chloride      sodium chloride      dextrose       Scheduled Medications    epoetin davis-epbx  20,000 Units IntraVENous Once per day on Tue Thu Sat    atorvastatin  10 mg Oral Nightly    gabapentin  100 mg Oral TID    sevelamer  800 mg Oral TID WC    tamsulosin  0.4 mg Oral QAM    sodium chloride flush  5-40 mL IntraVENous 2 times per day    pantoprazole  40 mg IntraVENous Q12H    And    sodium chloride (PF)  10 mL IntraVENous Q12H    insulin lispro  0-6 Units SubCUTAneous Q4H     PRN Meds: LORazepam, sodium chloride, sodium chloride, sodium chloride flush, sodium chloride, acetaminophen **OR** acetaminophen, ondansetron, guaiFENesin-dextromethorphan, glucose, dextrose, glucagon (rDNA), dextrose, lidocaine, melatonin      Intake/Output Summary (Last 24 hours) at 2/15/2022 1133  Last data filed at 2/14/2022 1708  Gross per 24 hour   Intake 280 ml   Output --   Net 280 ml             Labs:   Recent Labs     02/13/22  0723 02/14/22  0837 02/15/22  0747   WBC 3.8* 3.6* 3.5*   HGB 8.2* 7.6* 7.4*   HCT 24.6* 23.2* 22.4*    238 230     Recent Labs 02/13/22  0723 02/14/22  0837 02/15/22  0747    136 138   K 4.7 4.9 4.9   CL 98* 101 102   CO2 26 25 25   BUN 27* 32* 43*   CREATININE 3.9* 5.0* 5.8*   CALCIUM 8.4 8.1* 7.8*     No results for input(s): AST, ALT, BILIDIR, BILITOT, ALKPHOS in the last 72 hours. No results for input(s): INR in the last 72 hours. No results for input(s): [place] in the last 72 hours. Urinalysis:      Lab Results   Component Value Date    NITRU Negative 01/21/2022    WBCUA >900 01/21/2022    BACTERIA 2+ 01/21/2022    RBCUA 32 01/21/2022    BLOODU LARGE 01/21/2022    SPECGRAV 1.019 01/21/2022    GLUCOSEU Negative 01/21/2022       Radiology:  CTA ABDOMEN PELVIS W WO CONTRAST   Final Result   1. No high-density contrast within the lumen of the stomach, small bowel,   and colon to indicate a site of active hemorrhage. 2.  Previous partial colectomy with an anastomosis at the expected   rectosigmoid junction. There is a focal outpouching along the left side of   the anastomosis. This was also noted on the previous exam but has decreased   in size. Mild inflammatory changes are still present at the site. 3.  No general ascites and no pneumoperitoneum. There is no bowel   obstruction or hydronephrosis. 4.  Multiple cysts are present within the kidneys some of which are too small   to characterize.                      Electronically signed by Patsy Barclay MD on 2/15/2022 at 11:33 AM

## 2022-02-15 NOTE — PROGRESS NOTES
[facility]                                               Progress note    CC: hematochezia, ESRD  Summary:   Cate Yu is being seen by nephrology for ESRD. He is a 77 y.o. male with a PMH significant for ESRD on HD TTS, colon cancer, T2DM, hypertension at baseline who was just discharged after admission 2/7/2022-2/10/2022 for a rectal bleed. He now presented back to the hospital 2/12/2022 with the same complaint. Bleeding started after resuming plavix. Interval History  Hb is drifting down  BP stable. Labs reviewed. Seen on dialysis today  Post HD weight 96 kg    Plan:   - iHD today per schedule  - Retacrit 20K units with HD      Donavan Keyes MD  [facility]  Office: [phone]    Assessment:   ESRD  TTS at [facility] , has not started there yet. Is originally from [facility] but staying in [place] for rehab so dialyzing with us for now. He has a left AV fistula, positive thrill and bruit     Electrolytes  No acute issues.     Hypertension  BP back to goal. Hypotensive at presentation      SHPT  No acute issues.      GI bleed  Has known colon cancer  No active bleeding  Hemoglobin down from admission. GI consulted. ROS:     Positives Listed Bold. All other remaining systems are negative. Constitutional:  fever, chills, weakness, weight change, fatigue,      Skin:  rash, pruritus, hair loss, bruising, dry skin, petechiae. Head, Face, Neck   headaches, swelling,  cervical adenopathy. Respiratory: shortness of breath, cough, or wheezing  Cardiovascular: chest pain, palpitations, dizzy, edema  Gastrointestinal: nausea, vomiting, diarrhea, constipation,belly pain    Yellow skin, blood in stool  Musculoskeletal:  back pain, muscle weakness, gait problems,       joint pain or swelling.   Genitourinary:  dysuria, poor urine flow, flank pain, blood in urine  Neurologic:  vertigo, TIA'S, syncope, seizures, focal weakness  Psychosocial:  insomnia, anxiety, or depression. Additional positive findings: -     PMH:   Past medical history, surgical history, social history, family history are reviewed and updated as appropriate. Reviewed current medication list.   Allergies reviewed and updated as needed. PE:   Vitals:    02/15/22 1000   BP: 115/69   Pulse: 81   Resp:    Temp: 97.5 °F (36.4 °C)   SpO2:        General appearance: AOx3 in no acute distress, comfortable, communicative, awake and alert. HEENT: no icterus, EOM intact, trachea midline. Neck : no masses, appears symmetrical and no JVD appreciated. Respiratory: Respiratory effort normal, bilateral equal chest rise. No wheeze, no crackles   Cardiovascular: Ausculation shows RRR and  no edema   Abdomen: abdomen is soft, non distended, no masses, no pain with palpation. Musculoskeletal:  no joint swelling, no deformity, strength grossly normal.   Skin: no rashes, no induration, no tightening, no jaundice   Neuro:   Follows commands, moves all extremities spontaneously       Lab Results   Component Value Date    CREATININE 5.8 (HH) 02/15/2022    BUN 43 (H) 02/15/2022     02/15/2022    K 4.9 02/15/2022     02/15/2022    CO2 25 02/15/2022      Lab Results   Component Value Date    WBC 3.5 (L) 02/15/2022    HGB 7.4 (L) 02/15/2022    HCT 22.4 (L) 02/15/2022    MCV 90.0 02/15/2022     02/15/2022     Lab Results   Component Value Date    CALCIUM 7.8 (L) 02/15/2022    PHOS 4.2 02/10/2022

## 2022-02-15 NOTE — CARE COORDINATION
Called [facility] at [phone] & left a  asking for a return call to verify what pt needs to return. Earl Henderson RN, BSN,   [phone]  Electronically signed by Earl Henderson RN on 2/15/2022 at 3:29 PM     Talked with Chasidy Lewis at [facility], pt is able to return; will not need a covid test or a pre-cert.     Earl Henderson RN, BSN,   [phone]  Electronically signed by Earl Henderson RN on 2/15/2022 at 4:44 PM

## 2022-02-15 NOTE — PLAN OF CARE
Problem: Nutrition  Goal: Optimal nutrition therapy  Outcome: Ongoing     Nutrition Problem #1: Inadequate oral intake  Intervention: Food and/or Nutrient Delivery: Continue Current Diet,Continue Oral Nutrition Supplement (Adv diet as appropriate)  Nutritional Goals: Consume greater than 50% of meals and supplements this admission

## 2022-02-15 NOTE — FLOWSHEET NOTE
Treatment time: 3 hours   Net UF: 2 liters     Pre weight: 98.7 kg   Post weight: 96 kg   EDW: 96    Access used: Left FA AVF  Access function: good     Medications or blood products given: Retacrit 20,000 units     Regular outpatient schedule: [facility] TTS    Summary of response to treatment: 2 liters removed, pt tolerated tx well. Post VSS.   retacrit given, report given to primary nurse Pema Mcwilliams RN    Copy of dialysis treatment record placed in chart, to be scanned into EMR.     02/15/22 0929 02/15/22 1230   Vital Signs   /69 124/75   Temp 98 °F (36.7 °C) 97.5 °F (36.4 °C)   Pulse 69 87   Weight 217 lb 9.5 oz (98.7 kg) 211 lb 10.3 oz (96 kg)   Weight Method Bed scale  (one sheet one pillow ) Bed scale   Post-Hemodialysis Assessment   Hemodialysis Intake (ml)  --  500 ml   Hemodialysis Output (ml)  --  2500 ml   NET Removed (ml)  --  2000 ml   Tolerated Treatment  --  Good

## 2022-02-16 LAB
ANION GAP SERPL CALCULATED.3IONS-SCNC: 11 MMOL/L (ref 3–16)
BASOPHILS ABSOLUTE: 0 K/UL (ref 0–0.2)
BASOPHILS RELATIVE PERCENT: 0.8 %
BUN BLDV-MCNC: 20 MG/DL (ref 7–20)
CALCIUM SERPL-MCNC: 8.2 MG/DL (ref 8.3–10.6)
CHLORIDE BLD-SCNC: 105 MMOL/L (ref 99–110)
CO2: 25 MMOL/L (ref 21–32)
CREAT SERPL-MCNC: 4.2 MG/DL (ref 0.8–1.3)
EOSINOPHILS ABSOLUTE: 0.1 K/UL (ref 0–0.6)
EOSINOPHILS RELATIVE PERCENT: 3.6 %
GFR AFRICAN AMERICAN: 17
GFR NON-AFRICAN AMERICAN: 14
GLUCOSE BLD-MCNC: 102 MG/DL (ref 70–99)
GLUCOSE BLD-MCNC: 110 MG/DL (ref 70–99)
GLUCOSE BLD-MCNC: 118 MG/DL (ref 70–99)
GLUCOSE BLD-MCNC: 159 MG/DL (ref 70–99)
GLUCOSE BLD-MCNC: 99 MG/DL (ref 70–99)
HCT VFR BLD CALC: 22.7 % (ref 40.5–52.5)
HCT VFR BLD CALC: 23.4 % (ref 40.5–52.5)
HEMOGLOBIN: 7.7 G/DL (ref 13.5–17.5)
HEMOGLOBIN: 7.9 G/DL (ref 13.5–17.5)
LYMPHOCYTES ABSOLUTE: 1.1 K/UL (ref 1–5.1)
LYMPHOCYTES RELATIVE PERCENT: 28.2 %
MAGNESIUM: 2.1 MG/DL (ref 1.8–2.4)
MCH RBC QN AUTO: 30.2 PG (ref 26–34)
MCHC RBC AUTO-ENTMCNC: 33.6 G/DL (ref 31–36)
MCV RBC AUTO: 89.8 FL (ref 80–100)
MONOCYTES ABSOLUTE: 0.2 K/UL (ref 0–1.3)
MONOCYTES RELATIVE PERCENT: 5.7 %
NEUTROPHILS ABSOLUTE: 2.4 K/UL (ref 1.7–7.7)
NEUTROPHILS RELATIVE PERCENT: 61.7 %
PDW BLD-RTO: 14.9 % (ref 12.4–15.4)
PERFORMED ON: ABNORMAL
PLATELET # BLD: 223 K/UL (ref 135–450)
PMV BLD AUTO: 7 FL (ref 5–10.5)
POTASSIUM SERPL-SCNC: 4.8 MMOL/L (ref 3.5–5.1)
RBC # BLD: 2.61 M/UL (ref 4.2–5.9)
SODIUM BLD-SCNC: 141 MMOL/L (ref 136–145)
WBC # BLD: 3.9 K/UL (ref 4–11)

## 2022-02-16 PROCEDURE — 85018 HEMOGLOBIN: CPT

## 2022-02-16 PROCEDURE — 6370000000 HC RX 637 (ALT 250 FOR IP): Performed by: INTERNAL MEDICINE

## 2022-02-16 PROCEDURE — 6370000000 HC RX 637 (ALT 250 FOR IP): Performed by: STUDENT IN AN ORGANIZED HEALTH CARE EDUCATION/TRAINING PROGRAM

## 2022-02-16 PROCEDURE — 80048 BASIC METABOLIC PNL TOTAL CA: CPT

## 2022-02-16 PROCEDURE — 85014 HEMATOCRIT: CPT

## 2022-02-16 PROCEDURE — 1200000000 HC SEMI PRIVATE

## 2022-02-16 PROCEDURE — 2580000003 HC RX 258: Performed by: STUDENT IN AN ORGANIZED HEALTH CARE EDUCATION/TRAINING PROGRAM

## 2022-02-16 PROCEDURE — 6360000002 HC RX W HCPCS: Performed by: STUDENT IN AN ORGANIZED HEALTH CARE EDUCATION/TRAINING PROGRAM

## 2022-02-16 PROCEDURE — 83735 ASSAY OF MAGNESIUM: CPT

## 2022-02-16 PROCEDURE — C9113 INJ PANTOPRAZOLE SODIUM, VIA: HCPCS | Performed by: STUDENT IN AN ORGANIZED HEALTH CARE EDUCATION/TRAINING PROGRAM

## 2022-02-16 PROCEDURE — APPSS45 APP SPLIT SHARED TIME 31-45 MINUTES: Performed by: PHYSICIAN ASSISTANT

## 2022-02-16 PROCEDURE — 99024 POSTOP FOLLOW-UP VISIT: CPT | Performed by: PHYSICIAN ASSISTANT

## 2022-02-16 PROCEDURE — 2580000003 HC RX 258: Performed by: INTERNAL MEDICINE

## 2022-02-16 PROCEDURE — APPNB45 APP NON BILLABLE 31-45 MINUTES: Performed by: PHYSICIAN ASSISTANT

## 2022-02-16 PROCEDURE — 36415 COLL VENOUS BLD VENIPUNCTURE: CPT

## 2022-02-16 PROCEDURE — 85025 COMPLETE CBC W/AUTO DIFF WBC: CPT

## 2022-02-16 RX ORDER — 0.9 % SODIUM CHLORIDE 0.9 %
500 INTRAVENOUS SOLUTION INTRAVENOUS ONCE
Status: DISCONTINUED | OUTPATIENT
Start: 2022-02-16 | End: 2022-02-21

## 2022-02-16 RX ORDER — LORAZEPAM 0.5 MG/1
0.5 TABLET ORAL 2 TIMES DAILY
Qty: 6 TABLET | Refills: 0 | Status: SHIPPED | OUTPATIENT
Start: 2022-02-16 | End: 2022-02-19

## 2022-02-16 RX ORDER — LORAZEPAM 0.5 MG/1
0.5 TABLET ORAL 2 TIMES DAILY
Status: CANCELLED | OUTPATIENT
Start: 2022-02-16

## 2022-02-16 RX ORDER — LORAZEPAM 0.5 MG/1
0.5 TABLET ORAL 2 TIMES DAILY
Status: DISCONTINUED | OUTPATIENT
Start: 2022-02-16 | End: 2022-02-25 | Stop reason: HOSPADM

## 2022-02-16 RX ADMIN — LORAZEPAM 0.5 MG: 0.5 TABLET ORAL at 20:37

## 2022-02-16 RX ADMIN — SODIUM CHLORIDE 500 ML: 9 INJECTION, SOLUTION INTRAVENOUS at 19:00

## 2022-02-16 RX ADMIN — SODIUM CHLORIDE, PRESERVATIVE FREE 10 ML: 5 INJECTION INTRAVENOUS at 20:38

## 2022-02-16 RX ADMIN — SEVELAMER CARBONATE 800 MG: 800 TABLET, FILM COATED ORAL at 12:38

## 2022-02-16 RX ADMIN — SODIUM CHLORIDE, PRESERVATIVE FREE 10 ML: 5 INJECTION INTRAVENOUS at 09:42

## 2022-02-16 RX ADMIN — TAMSULOSIN HYDROCHLORIDE 0.4 MG: 0.4 CAPSULE ORAL at 09:43

## 2022-02-16 RX ADMIN — ATORVASTATIN CALCIUM 10 MG: 10 TABLET, FILM COATED ORAL at 20:37

## 2022-02-16 RX ADMIN — GABAPENTIN 100 MG: 100 CAPSULE ORAL at 12:38

## 2022-02-16 RX ADMIN — SEVELAMER CARBONATE 800 MG: 800 TABLET, FILM COATED ORAL at 09:42

## 2022-02-16 RX ADMIN — GABAPENTIN 100 MG: 100 CAPSULE ORAL at 20:37

## 2022-02-16 RX ADMIN — PANTOPRAZOLE SODIUM 40 MG: 40 INJECTION, POWDER, FOR SOLUTION INTRAVENOUS at 20:37

## 2022-02-16 RX ADMIN — GABAPENTIN 100 MG: 100 CAPSULE ORAL at 09:43

## 2022-02-16 RX ADMIN — SEVELAMER CARBONATE 800 MG: 800 TABLET, FILM COATED ORAL at 17:41

## 2022-02-16 RX ADMIN — INSULIN LISPRO 1 UNITS: 100 INJECTION, SOLUTION INTRAVENOUS; SUBCUTANEOUS at 12:38

## 2022-02-16 RX ADMIN — SODIUM CHLORIDE, PRESERVATIVE FREE 10 ML: 5 INJECTION INTRAVENOUS at 09:43

## 2022-02-16 RX ADMIN — SODIUM CHLORIDE, PRESERVATIVE FREE 10 ML: 5 INJECTION INTRAVENOUS at 20:37

## 2022-02-16 RX ADMIN — PANTOPRAZOLE SODIUM 40 MG: 40 INJECTION, POWDER, FOR SOLUTION INTRAVENOUS at 09:42

## 2022-02-16 ASSESSMENT — PAIN SCALES - GENERAL
PAINLEVEL_OUTOF10: 0

## 2022-02-16 ASSESSMENT — PAIN SCALES - WONG BAKER
WONGBAKER_NUMERICALRESPONSE: 0
WONGBAKER_NUMERICALRESPONSE: 0

## 2022-02-16 NOTE — DISCHARGE SUMMARY
Hospital Medicine Discharge Summary    Patient ID: Melissa Ruby      Patient's PCP: Robyn Auguste Date: 2/12/2022     Discharge Date:   2/16/2022    Admitting Physician: Bro Rick DO     Discharge Physician: Unique Huang MD     Discharge Diagnoses: Active Hospital Problems    Diagnosis Date Noted    COVID-19 [U07.1] 02/12/2022    Acute blood loss anemia [D62] 02/07/2022    GI bleed [K92.2] 01/14/2022       The patient was seen and examined on day of discharge and this discharge summary is in conjunction with any daily progress note from day of discharge. Hospital Course: The patient is a 77 y.o. male with most recent past medical history of colon cancer resection with reconnection of colon on most recent admission, ESRD on hemodialysis, type 2 diabetes, and hypertension who presents to [facility] with acute worsening rectal bleeding and some component of fatigue and lethargy while family was visiting patient at the nursing home today. Daughter who was at bedside did show me pictures that patient had significant bloody clot-like output per rectum today at the nursing home that came on all of a sudden without any warning. Patient was initially looking okay to them while they were visiting but then patient became more pale and somewhat lethargic. His blood pressures were also noted to be low on presentation to the ED. Patient has not had any fevers but was also tested positive for Covid at the facility most recently in the last few days. He not had any new bleeding until today. Daughter denies that patient has had any other recent symptoms of dizziness, syncope, chest pain, shortness of breath, dysuria, cough or sputum, diarrhea, nausea, vomiting.       Acute GI bleeding, the setting of recent colectomy for colon cancer CTA of the abdomen reviewed, no brisk bleeding.  GI and surgery service consulted     Evaluated by GI, general surgery  No further bleeding reported  Continue IV Protonix -> switch to PO Protonix at discharge  Surgery input noted, diet advance per surgery  Continue holding Plavix at discharge - he is likely outside the window of benefit for CVA prevention at this point anyway     Acute blood loss anemia  Monitoring hemoglobin, was trending down but now improved     Hypotension, multifactorial possibly. Resolved     Lactic acidosis, likely secondary to hypotension, resolved     Hyponatremia, secondary to renal failure,  Resolved     COVID-19 infection, asymptomatic diagnosed 2/10/2022  Patient received vaccination ( [employer] [place] 2 doses) should come off isolation tomorrow.     Recent CVA  Continue statin, holding Plavix at discharge and resume aspirin.        End-stage renal failure on hemodialysis  Nephrology following, continue hemodialysis      Exam:     /71   Pulse 83   Temp 97.4 °F (36.3 °C) (Oral)   Resp 16   Ht 6' (1.829 m)   Wt 211 lb 10.3 oz (96 kg)   SpO2 98%   BMI 28.70 kg/m²       General appearance:  No apparent distress, appears stated age and cooperative. HEENT:  Normal cephalic, atraumatic without obvious deformity. Pupils equal, round, and reactive to light. Extra ocular muscles intact. Conjunctivae/corneas clear. Neck: Supple, with full range of motion. No jugular venous distention. Trachea midline. Respiratory:  Normal respiratory effort. Clear to auscultation, bilaterally without Rales/Wheezes/Rhonchi. Cardiovascular:  Regular rate and rhythm with normal S1/S2 without murmurs, rubs or gallops. Abdomen: Soft, non-tender, non-distended with normal bowel sounds. Musculoskeletal:  No clubbing, cyanosis or edema bilaterally. Full range of motion without deformity. Skin: Skin color, texture, turgor normal.  No rashes or lesions. Neurologic:  Neurovascularly intact without any focal sensory/motor deficits.  Cranial nerves: II-XII intact, grossly non-focal.  Psychiatric:  Alert and oriented, thought content appropriate, normal insight  Capillary Refill: Brisk,< 3 seconds   Peripheral Pulses: +2 palpable, equal bilaterally       Labs: For convenience and continuity at follow-up the following most recent labs are provided:      CBC:    Lab Results   Component Value Date    WBC 3.9 02/16/2022    HGB 7.9 02/16/2022    HCT 23.4 02/16/2022     02/16/2022       Renal:    Lab Results   Component Value Date     02/16/2022    K 4.8 02/16/2022    K 4.3 02/12/2022     02/16/2022    CO2 25 02/16/2022    BUN 20 02/16/2022    CREATININE 4.2 02/16/2022    CALCIUM 8.2 02/16/2022    PHOS 4.2 02/10/2022         Significant Diagnostic Studies    Radiology:   CTA ABDOMEN PELVIS W WO CONTRAST   Final Result   1. No high-density contrast within the lumen of the stomach, small bowel,   and colon to indicate a site of active hemorrhage. 2.  Previous partial colectomy with an anastomosis at the expected   rectosigmoid junction. There is a focal outpouching along the left side of   the anastomosis. This was also noted on the previous exam but has decreased   in size. Mild inflammatory changes are still present at the site. 3.  No general ascites and no pneumoperitoneum. There is no bowel   obstruction or hydronephrosis. 4.  Multiple cysts are present within the kidneys some of which are too small   to characterize. Consults:     IP CONSULT TO GI  IP CONSULT TO GENERAL SURGERY  IP CONSULT TO NEPHROLOGY  IP CONSULT TO GI  IP CONSULT TO GENERAL SURGERY    Disposition:  SNF     Condition at Discharge: Stable    Discharge Instructions/Follow-up:  Follow-up with general surgery, oncology, PCP    Code Status:  Full Code     Activity: activity as tolerated    Diet: regular diet      Discharge Medications:     Current Discharge Medication List           Details   LORazepam (ATIVAN) 0.5 MG tablet Take 1 tablet by mouth 2 times daily for 3 days.   Qty: 6 tablet, Refills: 0    Associated Diagnoses: Anxiety Details   aspirin 81 MG chewable tablet Take 1 tablet by mouth daily  Qty: 30 tablet, Refills: 0      polyethylene glycol (GLYCOLAX) 17 g packet Take 17 g by mouth daily as needed for Constipation  Qty: 30 each, Refills: 0      melatonin 3 MG TABS tablet Take 2 tablets by mouth nightly as needed (sleep)  Qty: 6 tablet, Refills: 0      tamsulosin (FLOMAX) 0.4 MG capsule Take 0.4 mg by mouth every morning      gabapentin (NEURONTIN) 100 MG capsule Take 100 mg by mouth 3 times daily. sevelamer (RENVELA) 800 MG tablet Take 1 tablet by mouth 3 times daily (with meals)      atenolol (TENORMIN) 25 MG tablet Take 25 mg by mouth every evening      pantoprazole (PROTONIX) 20 MG tablet Take 20 mg by mouth nightly      atorvastatin (LIPITOR) 10 MG tablet Take 10 mg by mouth nightly      calcitRIOL (ROCALTROL) 0.25 MCG capsule Take 0.25 mcg by mouth every evening             Time Spent on discharge is more than 30 minutes in the examination, evaluation, counseling and review of medications and discharge plan. Signed:    Vandana Ruano MD   2/16/2022      Thank you [facility] for the opportunity to be involved in this patient's care. If you have any questions or concerns please feel free to contact me at [phone].

## 2022-02-16 NOTE — PROGRESS NOTES
[facility]                                               Progress note    CC: hematochezia, ESRD  Summary:   Marvel Moctezuma is being seen by nephrology for ESRD. He is a 77 y.o. male with a PMH significant for ESRD on HD TTS, colon cancer, T2DM, hypertension at baseline who was just discharged after admission 2/7/2022-2/10/2022 for a rectal bleed. He now presented back to the hospital 2/12/2022 with the same complaint. Bleeding started after resuming plavix. Interval History  Hb stable. BP stable. Labs reviewed. Post HD weight 96 kg. Likely his EDW  Seen at bedside. Plan:   - no acute indication for HD today  - ok for discharge from nephrology perspective. - should go for HD tomorrow at outpatient unit      Enrico Dinh MD  [facility]  Office: [phone]    Assessment:   ESRD  TTS at [facility] , has not started there yet. Is originally from [facility] but staying in [place] for rehab so dialyzing with us for now. He has a left AV fistula, positive thrill and bruit     Electrolytes  No acute issues.     Hypertension  BP back to goal. Hypotensive at presentation      SHPT  No acute issues.      GI bleed  Has known colon cancer  No active bleeding  Hemoglobin down from admission. GI consulted. ROS:     Positives Listed Bold. All other remaining systems are negative. Constitutional:  fever, chills, weakness, weight change, fatigue,      Skin:  rash, pruritus, hair loss, bruising, dry skin, petechiae. Head, Face, Neck   headaches, swelling,  cervical adenopathy. Respiratory: shortness of breath, cough, or wheezing  Cardiovascular: chest pain, palpitations, dizzy, edema  Gastrointestinal: nausea, vomiting, diarrhea, constipation,belly pain    Yellow skin, blood in stool  Musculoskeletal:  back pain, muscle weakness, gait problems,       joint pain or swelling.   Genitourinary:  dysuria, poor urine flow, flank pain, blood in urine  Neurologic:  vertigo, TIA'S, syncope, seizures, focal weakness  Psychosocial:  insomnia, anxiety, or depression. Additional positive findings: -     PMH:   Past medical history, surgical history, social history, family history are reviewed and updated as appropriate. Reviewed current medication list.   Allergies reviewed and updated as needed. PE:   Vitals:    02/16/22 0511   BP: (!) 103/55   Pulse: 78   Resp: 17   Temp: 97.9 °F (36.6 °C)   SpO2: 97%       General appearance: AOx3 in no acute distress, comfortable, communicative, awake and alert. HEENT: no icterus, EOM intact, trachea midline. Neck : no masses, appears symmetrical and no JVD appreciated. Respiratory: Respiratory effort normal, bilateral equal chest rise. No wheeze, no crackles   Cardiovascular: Ausculation shows RRR and  no edema   Abdomen: abdomen is soft, non distended, no masses, no pain with palpation. Musculoskeletal:  no joint swelling, no deformity, strength grossly normal.   Skin: no rashes, no induration, no tightening, no jaundice   Neuro:   Follows commands, moves all extremities spontaneously       Lab Results   Component Value Date    CREATININE 4.2 (H) 02/16/2022    BUN 20 02/16/2022     02/16/2022    K 4.8 02/16/2022     02/16/2022    CO2 25 02/16/2022      Lab Results   Component Value Date    WBC 3.9 (L) 02/16/2022    HGB 7.9 (L) 02/16/2022    HCT 23.4 (L) 02/16/2022    MCV 89.8 02/16/2022     02/16/2022     Lab Results   Component Value Date    CALCIUM 8.2 (L) 02/16/2022    PHOS 4.2 02/10/2022

## 2022-02-16 NOTE — CARE COORDINATION
CASE MANAGEMENT DISCHARGE SUMMARY:    DISCHARGE DATE: 02/16/22    Discharging to Facility/ Agency   · Name: [facility]  · Address:  [address] [facility], [place], [address] [place] [address]   · Phone:  [phone]  · Fax:  [phone]      Covid test date: 2/10/22   Results: positive    TRANSPORTATION: [facility]             TIME: [facility] RN, BSN, Case Management  [phone]    Electronically signed by Bozena Barnett RN on 2/16/2022 at 2:16 PM

## 2022-02-16 NOTE — DISCHARGE INSTR - COC
Continuity of Care Form    Patient Name: Kevin Breaux   :  [id number]  MRN:  [id number]    Admit date:  2022  Discharge date:  2022    Code Status Order: Full Code   Advance Directives:      Admitting Physician:  Mirta Garcia DO  PCP: Erum Lazo    Discharging Nurse: [facility] Unit/Room#: 2122  Discharging Unit Phone Number: [phone]    Emergency Contact:   Extended Emergency Contact Information  Primary Emergency Contact: Kerline Link  Address: [address], [address] [place] [address] Phone: [phone]  Mobile Phone: [phone]  Relation: Spouse  Secondary Emergency Contact: Janeen Linares  Mobile Phone: [phone]  Relation: Child   needed? No    Past Surgical History:  Past Surgical History:   Procedure Laterality Date    COLECTOMY N/A 2022    SIGMOID COLECTOMY, SIGMOIDOSCOPY performed by Candelaria Hurtado MD at [address] [place] [address] Left     unsure of date       Immunization History: There is no immunization history on file for this patient.     Active Problems:  Patient Active Problem List   Diagnosis Code    GI bleed K92.2    History of COVID-19 Z86.16    Hyponatremia E87.1    Fatigue R53.83    Acute kidney injury superimposed on CKD (HCC) N17.9, N18.9    Lethargy R53.83    Suspected UTI R39.89    Acute CVA (cerebrovascular accident) ([facility] I63.9    LESLEE (acute kidney injury) ([facility] N17.9    Malignant neoplasm of colon ([facility] [address] C18.9    Acute blood loss anemia D62    Elevated troponin R77.8    COVID-19 U07.1       Isolation/Infection:   Isolation            Droplet Plus          Patient Infection Status       Infection Onset Added Last Indicated Last Indicated By Review Planned Expiration Resolved Resolved By    COVID-19 02/10/22 02/10/22 02/10/22 COVID-19, Rapid 22      Resolved    COVID-19 (Rule Out) 22 COVID-19, Rapid (Ordered) 01/14/22 Rule-Out Test Resulted            Nurse Assessment:  Last Vital Signs: /71   Pulse 83   Temp 97.4 °F (36.3 °C) (Oral)   Resp 16   Ht 6' (1.829 m)   Wt 211 lb 10.3 oz (96 kg)   SpO2 98%   BMI 28.70 kg/m²     Last documented pain score (0-10 scale): Pain Level: 0  Last Weight:   Wt Readings from Last 1 Encounters:   02/15/22 211 lb 10.3 oz (96 kg)     Mental Status:  oriented, alert, and forgetful    IV Access:  R subclavian tunneled catheter    Nursing Mobility/ADLs:  Walking   Assisted   Transfer  Assisted with stedy  Bathing  Assisted  Dressing  Assisted  Toileting  Assisted  Feeding  Independent  Med Admin  Assisted  Med Delivery   whole    Wound Care Documentation and Therapy:        Elimination:  Continence: Bowel: Yes  Bladder: Yes  Urinary Catheter: None   Colostomy/Ileostomy/Ileal Conduit: No       Date of Last BM: 2/24/2022    Intake/Output Summary (Last 24 hours) at 2/16/2022 1350  Last data filed at 2/15/2022 1745  Gross per 24 hour   Intake 120 ml   Output --   Net 120 ml     I/O last 3 completed shifts: In: 200 [P.O.:480]  Out: 2500     Safety Concerns: At Risk for Falls    Impairments/Disabilities:      None    Nutrition Therapy:  Current Nutrition Therapy:   Diet: Easy to chew; Low potassium (less than 3000 mg/day) Adult oral nutritional supplement; Lunch, Dinner, Frozen Oral Supplement    Routes of Feeding: Oral  Liquids: Thin Liquids  Daily Fluid Restriction: no  Last Modified Barium Swallow with Video (Video Swallowing Test): not done    Treatments at the Time of Hospital Discharge:   Respiratory Treatments:   Oxygen Therapy:  is not on home oxygen therapy.   Ventilator:    - No ventilator support    Rehab Therapies: Physical Therapy and Occupational Therapy  Weight Bearing Status/Restrictions: No weight bearing restirctions  Other Medical Equipment (for information only, NOT a DME order):  walker  Other Treatments:     Patient's personal belongings (please select all that are sent with patient):  None    RN SIGNATURE:  Electronically signed by Soni Leal RN on 2/25/2022 at 11:55 AM      CASE MANAGEMENT/SOCIAL WORK SECTION    Inpatient Status Date: ***    Readmission Risk Assessment Score:  Readmission Risk              Risk of Unplanned Readmission:  30         Discharging to Facility/ Agency   Name: [facility]  Address:  [address] [facility] [address] [facility] [place], [address] [place] [address]   Phone:  [phone]  Fax:  [phone]    D[place] Facility [place]if applicable)   [place]  [place]  Dialysis Schedule:T/TH/S 1115am  Phone:  Fax:    / signature: Electronically signed by Arpita Ortega on 2/25/22 at 12:23 PM EST    PHYSICIAN SECTION    Prognosis: Good    Condition at Discharge: Stable    Rehab Potential (if transferring to Rehab): Good    Recommended Labs or Other Treatments After Discharge: meds as prescribed, follow-up with general surgery, PCP    Physician Certification: I certify the above information and transfer of Dulce Waterman  is necessary for the continuing treatment of the diagnosis listed and that he requires [facility] for less 30 days. Update Admission H&P: No change in H&P      PHYSICIAN SIGNATURE:  Electronically signed by Kori Park.  [facility] MD CAMPBELL on 2/25/22 at 10:21 AM EST

## 2022-02-16 NOTE — PLAN OF CARE
Problem: Airway Clearance - Ineffective  Goal: Achieve or maintain patent airway  2/16/2022 0017 by Kelechi Hylton RN  Outcome: Ongoing  2/15/2022 1151 by Shannan Phelan RN  Outcome: Ongoing     Problem: Gas Exchange - Impaired  Goal: Absence of hypoxia  2/16/2022 0017 by Kelechi Hylton RN  Outcome: Ongoing  2/15/2022 1151 by Shannan Phelan RN  Outcome: Ongoing  Goal: Promote optimal lung function  2/16/2022 0017 by Kelechi Hylton RN  Outcome: Ongoing  2/15/2022 1151 by Shannan Phelan RN  Outcome: Ongoing     Problem: Breathing Pattern - Ineffective  Goal: Ability to achieve and maintain a regular respiratory rate  2/16/2022 0017 by Kelechi Hylton RN  Outcome: Ongoing  2/15/2022 1151 by Shannan Phelan RN  Outcome: Ongoing     Problem:  Body Temperature -  Risk of, Imbalanced  Goal: Ability to maintain a body temperature within defined limits  2/16/2022 0017 by Kelechi Hylton RN  Outcome: Ongoing  2/15/2022 1151 by Shannan Phelan RN  Outcome: Ongoing  Goal: Will regain or maintain usual level of consciousness  2/16/2022 0017 by Kelechi Hylton RN  Outcome: Ongoing  2/15/2022 1151 by Shannan Phelan RN  Outcome: Ongoing  Goal: Complications related to the disease process, condition or treatment will be avoided or minimized  2/16/2022 0017 by Kelechi Hylton RN  Outcome: Ongoing  2/15/2022 1151 by Shannan Phelan RN  Outcome: Ongoing     Problem: Isolation Precautions - Risk of Spread of Infection  Goal: Prevent transmission of infection  2/16/2022 0017 by Kelechi Hylton RN  Outcome: Ongoing  2/15/2022 1151 by Shannan Phelan RN  Outcome: Ongoing     Problem: Nutrition Deficits  Goal: Optimize nutritional status  2/16/2022 0017 by Kelechi Hylton RN  Outcome: Ongoing  2/15/2022 1151 by Shannan Phelan RN  Outcome: Ongoing     Problem: Risk for Fluid Volume Deficit  Goal: Maintain normal heart rhythm  2/16/2022 0017 by Kelechi Hylton RN  Outcome: Ongoing  2/15/2022 1151 by Willian Cantor, RN  Outcome: Ongoing  Goal: Maintain absence of muscle cramping  2/16/2022 0017 by Emmie Lozano RN  Outcome: Ongoing  2/15/2022 1151 by Junaito Galdamez RN  Outcome: Ongoing  Goal: Maintain normal serum potassium, sodium, calcium, phosphorus, and pH  2/16/2022 0017 by Emmie Lozano RN  Outcome: Ongoing  2/15/2022 1151 by Juanito Galdamez RN  Outcome: Ongoing     Problem: Loneliness or Risk for Loneliness  Goal: Demonstrate positive use of time alone when socialization is not possible  2/16/2022 0017 by Emmie Lozano RN  Outcome: Ongoing  2/15/2022 1151 by Juanito Galdamez RN  Outcome: Ongoing     Problem: Fatigue  Goal: Verbalize increase energy and improved vitality  2/16/2022 0017 by Emmie Lozano RN  Outcome: Ongoing  2/15/2022 1151 by Juanito Galdamez RN  Outcome: Ongoing     Problem: Patient Education: Go to Patient Education Activity  Goal: Patient/Family Education  2/16/2022 0017 by Emmie Lozano RN  Outcome: Ongoing  2/15/2022 1151 by Juanito Galdamez RN  Outcome: Ongoing     Problem: Falls - Risk of:  Goal: Will remain free from falls  Description: Will remain free from falls  2/16/2022 0017 by Emmie Lozano RN  Outcome: Ongoing  2/15/2022 1151 by Juanito Galdamez RN  Outcome: Ongoing  Goal: Absence of physical injury  Description: Absence of physical injury  2/16/2022 0017 by Emmie Lozano RN  Outcome: Ongoing  2/15/2022 1151 by Juanito Galdamez RN  Outcome: Ongoing     Problem: Skin Integrity:  Goal: Will show no infection signs and symptoms  Description: Will show no infection signs and symptoms  2/16/2022 0017 by Emmie Lozano RN  Outcome: Ongoing  2/15/2022 1151 by Juanito Galdamez RN  Outcome: Ongoing  Goal: Absence of new skin breakdown  Description: Absence of new skin breakdown  2/16/2022 0017 by Emmie Lozano RN  Outcome: Ongoing  2/15/2022 1151 by Juanito Galdamez RN  Outcome: Ongoing     Problem: Discharge Planning:  Goal: Discharged to appropriate level of care  Description: Discharged to appropriate level of care  2/16/2022 0017 by Miguel A Rogers RN  Outcome: Ongoing  2/15/2022 1151 by Kurt Wade RN  Outcome: Ongoing     Problem:  Bowel Function - Altered:  Goal: Bowel elimination is within specified parameters  Description: Bowel elimination is within specified parameters  2/16/2022 0017 by Miguel A Rogers RN  Outcome: Ongoing  2/15/2022 1151 by Kurt Wade RN  Outcome: Ongoing     Problem: Fluid Volume - Imbalance:  Goal: Will show no signs and symptoms of excessive bleeding  Description: Will show no signs and symptoms of excessive bleeding  2/16/2022 0017 by Miguel A Rogers RN  Outcome: Ongoing  2/15/2022 1151 by Kurt Wade RN  Outcome: Ongoing  Goal: Absence of imbalanced fluid volume signs and symptoms  Description: Absence of imbalanced fluid volume signs and symptoms  2/16/2022 0017 by Miguel A Rogers RN  Outcome: Ongoing  2/15/2022 1151 by Kurt Wade RN  Outcome: Ongoing     Problem: Nausea/Vomiting:  Goal: Absence of nausea/vomiting  Description: Absence of nausea/vomiting  2/16/2022 0017 by Miguel A Rogers RN  Outcome: Ongoing  2/15/2022 1151 by Kurt Wade RN  Outcome: Ongoing  Goal: Able to drink  Description: Able to drink  2/16/2022 0017 by Miguel A Rogers RN  Outcome: Ongoing  2/15/2022 1151 by Kurt Wade RN  Outcome: Ongoing  Goal: Able to eat  Description: Able to eat  2/16/2022 0017 by Miguel A Rogers RN  Outcome: Ongoing  2/15/2022 1151 by Kurt Wade RN  Outcome: Ongoing  Goal: Ability to achieve adequate nutritional intake will improve  Description: Ability to achieve adequate nutritional intake will improve  2/16/2022 0017 by Miguel A Rogers RN  Outcome: Ongoing  2/15/2022 1151 by Kurt Wade RN  Outcome: Ongoing     Problem: Nutrition  Goal: Optimal nutrition therapy  2/16/2022 0017 by Miguel A Rogers RN  Outcome: Ongoing  2/15/2022 1151 by Shivam Tirado RN  Outcome: Ongoing  2/15/2022 1046 by Larisa Escalante RD, LD  Outcome: Ongoing

## 2022-02-16 NOTE — PROGRESS NOTES
Patient is alert and oriented to person, place, and date. Forgetful at times to situation. Patient denies pain or discomfort. Respirations easy and easy. VS stable. Tolerating breakfast without any complaints. Denies needs/wants. Call light in reach. Bed alarm active. See am assessment.

## 2022-02-16 NOTE — PROGRESS NOTES
General and Vascular Surgery                                                           Daily Progress Note                                                                 Pt Name: Jamie Acevedo Record Number: [id number]  Date of Birth 1945   Today's Date: 2/16/2022      ASSESSMENT/PLAN  1. GI bleed s/p sigmoid colectomy 1/26/22  -Denies any further bleeding. Hgb: 8.3-->8.2-->7.6-->7.4-->7.9  -Tolerating full liquid diet. Advance diet to low fiber as tolerated. -serial H/H  -continue to hold plavix.    -reserve endoscopic evaluation for significant bleeding or drop in H/H    Dallie Round denies abdominal pain. Denies any further bleeding. OBJECTIVE  VITALS:   Vitals:    02/15/22 2021 02/16/22 0010 02/16/22 0511 02/16/22 0939   BP: (!) 93/55 111/69 (!) 103/55 127/71   Pulse: 115 74 78 83   Resp: 18 18 17 16   Temp: 97.5 °F (36.4 °C) 97.6 °F (36.4 °C) 97.9 °F (36.6 °C) 97.4 °F (36.3 °C)   TempSrc: Oral Oral  Oral   SpO2: 92% 95% 97% 98%   Weight:       Height:         I/O last 3 completed shifts:   In: 980 [P.O.:480]  Out: 2500   GENERAL: alert, no distress  ABDOMEN: soft, non-tender and non-distended        LABS  CBC:   Lab Results   Component Value Date    WBC 3.9 02/16/2022    RBC 2.61 02/16/2022    HGB 7.9 02/16/2022    HCT 23.4 02/16/2022    MCV 89.8 02/16/2022    MCH 30.2 02/16/2022    MCHC 33.6 02/16/2022    RDW 14.9 02/16/2022     02/16/2022    MPV 7.0 02/16/2022     BMP:    Lab Results   Component Value Date     02/16/2022    K 4.8 02/16/2022    K 4.3 02/12/2022     02/16/2022    CO2 25 02/16/2022    BUN 20 02/16/2022    LABALBU 2.5 02/12/2022    CREATININE 4.2 02/16/2022    CALCIUM 8.2 02/16/2022    GFRAA 17 02/16/2022    GFRAA 35 07/26/2011    LABGLOM 14 02/16/2022    GLUCOSE 99 02/16/2022         Maykel Galeano PA-C  Electronically signed 2/16/2022 at 9:44 AM

## 2022-02-16 NOTE — PROGRESS NOTES
Gastroenterology Progress Note    Carson Hidalgo is a 77 y.o. male patient. Hospitalization day:4    SUBJECTIVE:    No acute events overnight. Reports he is feeling well. He reports he had a non-bloody BM yesterday. No abdominal pain, and no further complaints. Patient is interested in returning home. ROS:  Cardiovascular ROS: no chest pain or dyspnea on exertion  Gastrointestinal ROS: positive for - rectal bleeding  negative for - abdominal pain or diarrhea  Respiratory ROS: no cough, shortness of breath, or wheezing    Physical      Gen: Resting in bed, NAD  HEENT: Normocephalic, atraumatic, no scleral icterus   CV: RRR no MRG   Pul: CTAB, normal work of breathing without wheezing  Abd: Good bowel sounds throughout, no scars, soft, NT/ND, no masses, no HSM   Ext: No edema, moves all 4 extremities. Neuro: Moves all four extremities, no gross deficits, follows commands   Skin: No jaundice, spider angiomas, palmar erythema     Data    CBC:   Lab Results   Component Value Date    WBC 3.5 02/15/2022    RBC 2.49 02/15/2022    HGB 7.4 02/15/2022    HCT 22.4 02/15/2022    MCV 90.0 02/15/2022    MCH 29.9 02/15/2022    MCHC 33.2 02/15/2022    RDW 15.0 02/15/2022     02/15/2022    MPV 6.9 02/15/2022     Hepatic Function Panel:    Lab Results   Component Value Date    ALKPHOS 91 02/12/2022    ALT 7 02/12/2022    AST 11 02/12/2022    PROT 5.6 02/12/2022    PROT 7.5 07/26/2011    BILITOT 0.3 02/12/2022    BILIDIR <0.2 02/12/2022    IBILI see below 02/12/2022         Radiology Review:    CTA ABDOMEN PELVIS W WO CONTRAST   Final Result   1. No high-density contrast within the lumen of the stomach, small bowel,   and colon to indicate a site of active hemorrhage. 2.  Previous partial colectomy with an anastomosis at the expected   rectosigmoid junction. There is a focal outpouching along the left side of   the anastomosis. This was also noted on the previous exam but has decreased   in size.   Mild inflammatory changes are still present at the site. 3.  No general ascites and no pneumoperitoneum. There is no bowel   obstruction or hydronephrosis. 4.  Multiple cysts are present within the kidneys some of which are too small   to characterize. ASSESSMENT:  Demetria Cornell is a 77 y.o. male with a PMH of  colon adenocarcinoma dx 2 months ago s/p sigmoid resection 1/26/22, DM, ESRD on HD, HTN, and arthritiswho presented on 2/7/2022 with rectal bleed. We have been consulted regarding sane.     IMPRESSION:     1. Lower GI bleed. Resolved. Patient underwent sigmoid resection on 1/26/22 for colon adenocarcinoma, and is anticoagulated with aspirin and plavix. Suspect anastomosis  post-op bleed in setting of blood thinner use, versus diverticular bleed versus more likely outlet bleeding from hemmorrhoids. 2. Colon adenocarcinoma s/p resection 1/26/22. Surgery following. Notably a small outpouching is seen at the anastomosis which is smaller from his last cross-sectional imaging. 3. Recent CVA. Plavix on hold due to #1. PLAN:  -Okay for discharge from GI standpoint    Thank you for allowing me to participate in this patient's care. No further GI work-up needed at this time. We will sign off, however please contact us with any additional questions at [phone].       Jerrica Mcgee MD

## 2022-02-16 NOTE — CARE COORDINATION
SW contacted by RN Yvette Seals- Patient discharge Tomasshiraz Miller called [place] [phone] and canceled Transport. PATRICK called pt's wife Tori George and advised discharge canceled and provided RN phone # for details.

## 2022-02-16 NOTE — PROGRESS NOTES
Patient's daughter, Brielle Calles called this nurse and updated of  time for patient to return to SNF.

## 2022-02-17 ENCOUNTER — ANESTHESIA (OUTPATIENT)
Dept: ENDOSCOPY | Age: 77
DRG: 907 | End: 2022-02-17
Payer: MEDICARE

## 2022-02-17 ENCOUNTER — ANESTHESIA EVENT (OUTPATIENT)
Dept: ENDOSCOPY | Age: 77
DRG: 907 | End: 2022-02-17
Payer: MEDICARE

## 2022-02-17 VITALS
DIASTOLIC BLOOD PRESSURE: 66 MMHG | OXYGEN SATURATION: 76 % | RESPIRATION RATE: 23 BRPM | SYSTOLIC BLOOD PRESSURE: 123 MMHG

## 2022-02-17 LAB
ANION GAP SERPL CALCULATED.3IONS-SCNC: 11 MMOL/L (ref 3–16)
BASOPHILS ABSOLUTE: 0 K/UL (ref 0–0.2)
BASOPHILS RELATIVE PERCENT: 0.6 %
BUN BLDV-MCNC: 12 MG/DL (ref 7–20)
CALCIUM SERPL-MCNC: 8.1 MG/DL (ref 8.3–10.6)
CHLORIDE BLD-SCNC: 101 MMOL/L (ref 99–110)
CO2: 23 MMOL/L (ref 21–32)
CREAT SERPL-MCNC: 2.4 MG/DL (ref 0.8–1.3)
EOSINOPHILS ABSOLUTE: 0.2 K/UL (ref 0–0.6)
EOSINOPHILS RELATIVE PERCENT: 3.1 %
GFR AFRICAN AMERICAN: 32
GFR NON-AFRICAN AMERICAN: 26
GLUCOSE BLD-MCNC: 103 MG/DL (ref 70–99)
GLUCOSE BLD-MCNC: 105 MG/DL (ref 70–99)
GLUCOSE BLD-MCNC: 111 MG/DL (ref 70–99)
GLUCOSE BLD-MCNC: 120 MG/DL (ref 70–99)
GLUCOSE BLD-MCNC: 127 MG/DL (ref 70–99)
GLUCOSE BLD-MCNC: 127 MG/DL (ref 70–99)
GLUCOSE BLD-MCNC: 96 MG/DL (ref 70–99)
HCT VFR BLD CALC: 22.3 % (ref 40.5–52.5)
HEMOGLOBIN: 7.3 G/DL (ref 13.5–17.5)
LYMPHOCYTES ABSOLUTE: 1.4 K/UL (ref 1–5.1)
LYMPHOCYTES RELATIVE PERCENT: 24.6 %
MAGNESIUM: 1.8 MG/DL (ref 1.8–2.4)
MCH RBC QN AUTO: 29.7 PG (ref 26–34)
MCHC RBC AUTO-ENTMCNC: 32.7 G/DL (ref 31–36)
MCV RBC AUTO: 90.6 FL (ref 80–100)
MONOCYTES ABSOLUTE: 0.3 K/UL (ref 0–1.3)
MONOCYTES RELATIVE PERCENT: 4.6 %
NEUTROPHILS ABSOLUTE: 3.9 K/UL (ref 1.7–7.7)
NEUTROPHILS RELATIVE PERCENT: 67.1 %
PDW BLD-RTO: 15.2 % (ref 12.4–15.4)
PERFORMED ON: ABNORMAL
PERFORMED ON: NORMAL
PLATELET # BLD: 280 K/UL (ref 135–450)
PMV BLD AUTO: 7.3 FL (ref 5–10.5)
POTASSIUM SERPL-SCNC: 3.9 MMOL/L (ref 3.5–5.1)
RBC # BLD: 2.46 M/UL (ref 4.2–5.9)
SODIUM BLD-SCNC: 135 MMOL/L (ref 136–145)
WBC # BLD: 5.8 K/UL (ref 4–11)

## 2022-02-17 PROCEDURE — 2580000003 HC RX 258: Performed by: STUDENT IN AN ORGANIZED HEALTH CARE EDUCATION/TRAINING PROGRAM

## 2022-02-17 PROCEDURE — 85025 COMPLETE CBC W/AUTO DIFF WBC: CPT

## 2022-02-17 PROCEDURE — 2580000003 HC RX 258: Performed by: INTERNAL MEDICINE

## 2022-02-17 PROCEDURE — 3700000001 HC ADD 15 MINUTES (ANESTHESIA): Performed by: INTERNAL MEDICINE

## 2022-02-17 PROCEDURE — 2720000010 HC SURG SUPPLY STERILE: Performed by: INTERNAL MEDICINE

## 2022-02-17 PROCEDURE — 6360000002 HC RX W HCPCS: Performed by: STUDENT IN AN ORGANIZED HEALTH CARE EDUCATION/TRAINING PROGRAM

## 2022-02-17 PROCEDURE — 2500000003 HC RX 250 WO HCPCS: Performed by: NURSE ANESTHETIST, CERTIFIED REGISTERED

## 2022-02-17 PROCEDURE — 36415 COLL VENOUS BLD VENIPUNCTURE: CPT

## 2022-02-17 PROCEDURE — 94760 N-INVAS EAR/PLS OXIMETRY 1: CPT

## 2022-02-17 PROCEDURE — 0W3P8ZZ CONTROL BLEEDING IN GASTROINTESTINAL TRACT, VIA NATURAL OR ARTIFICIAL OPENING ENDOSCOPIC: ICD-10-PCS | Performed by: INTERNAL MEDICINE

## 2022-02-17 PROCEDURE — P9045 ALBUMIN (HUMAN), 5%, 250 ML: HCPCS | Performed by: NURSE ANESTHETIST, CERTIFIED REGISTERED

## 2022-02-17 PROCEDURE — 80048 BASIC METABOLIC PNL TOTAL CA: CPT

## 2022-02-17 PROCEDURE — 3700000000 HC ANESTHESIA ATTENDED CARE: Performed by: INTERNAL MEDICINE

## 2022-02-17 PROCEDURE — 90935 HEMODIALYSIS ONE EVALUATION: CPT

## 2022-02-17 PROCEDURE — 3609008600 HC SIGMOIDOSCOPY CONTROL HEMORRHAGE: Performed by: INTERNAL MEDICINE

## 2022-02-17 PROCEDURE — 83735 ASSAY OF MAGNESIUM: CPT

## 2022-02-17 PROCEDURE — 2709999900 HC NON-CHARGEABLE SUPPLY: Performed by: INTERNAL MEDICINE

## 2022-02-17 PROCEDURE — 6370000000 HC RX 637 (ALT 250 FOR IP): Performed by: STUDENT IN AN ORGANIZED HEALTH CARE EDUCATION/TRAINING PROGRAM

## 2022-02-17 PROCEDURE — 6370000000 HC RX 637 (ALT 250 FOR IP): Performed by: INTERNAL MEDICINE

## 2022-02-17 PROCEDURE — 6360000002 HC RX W HCPCS: Performed by: NURSE ANESTHETIST, CERTIFIED REGISTERED

## 2022-02-17 PROCEDURE — APPNB45 APP NON BILLABLE 31-45 MINUTES: Performed by: NURSE PRACTITIONER

## 2022-02-17 PROCEDURE — 1200000000 HC SEMI PRIVATE

## 2022-02-17 PROCEDURE — C9113 INJ PANTOPRAZOLE SODIUM, VIA: HCPCS | Performed by: STUDENT IN AN ORGANIZED HEALTH CARE EDUCATION/TRAINING PROGRAM

## 2022-02-17 RX ORDER — SODIUM CHLORIDE 9 MG/ML
INJECTION, SOLUTION INTRAVENOUS CONTINUOUS
Status: DISCONTINUED | OUTPATIENT
Start: 2022-02-17 | End: 2022-02-20

## 2022-02-17 RX ORDER — SODIUM CHLORIDE 9 MG/ML
25 INJECTION, SOLUTION INTRAVENOUS PRN
Status: DISCONTINUED | OUTPATIENT
Start: 2022-02-17 | End: 2022-02-17

## 2022-02-17 RX ORDER — PROPOFOL 10 MG/ML
INJECTION, EMULSION INTRAVENOUS CONTINUOUS PRN
Status: DISCONTINUED | OUTPATIENT
Start: 2022-02-17 | End: 2022-02-17 | Stop reason: SDUPTHER

## 2022-02-17 RX ORDER — ALBUMIN, HUMAN INJ 5% 5 %
SOLUTION INTRAVENOUS PRN
Status: DISCONTINUED | OUTPATIENT
Start: 2022-02-17 | End: 2022-02-17 | Stop reason: SDUPTHER

## 2022-02-17 RX ORDER — ONDANSETRON 2 MG/ML
4 INJECTION INTRAMUSCULAR; INTRAVENOUS
Status: DISCONTINUED | OUTPATIENT
Start: 2022-02-17 | End: 2022-02-17

## 2022-02-17 RX ORDER — SODIUM CHLORIDE 0.9 % (FLUSH) 0.9 %
5-40 SYRINGE (ML) INJECTION EVERY 12 HOURS SCHEDULED
Status: DISCONTINUED | OUTPATIENT
Start: 2022-02-17 | End: 2022-02-17

## 2022-02-17 RX ORDER — SODIUM CHLORIDE 0.9 % (FLUSH) 0.9 %
5-40 SYRINGE (ML) INJECTION PRN
Status: DISCONTINUED | OUTPATIENT
Start: 2022-02-17 | End: 2022-02-17

## 2022-02-17 RX ORDER — PHENYLEPHRINE HCL IN 0.9% NACL 1 MG/10 ML
SYRINGE (ML) INTRAVENOUS PRN
Status: DISCONTINUED | OUTPATIENT
Start: 2022-02-17 | End: 2022-02-17 | Stop reason: SDUPTHER

## 2022-02-17 RX ORDER — LIDOCAINE HYDROCHLORIDE 20 MG/ML
INJECTION, SOLUTION EPIDURAL; INFILTRATION; INTRACAUDAL; PERINEURAL PRN
Status: DISCONTINUED | OUTPATIENT
Start: 2022-02-17 | End: 2022-02-17 | Stop reason: SDUPTHER

## 2022-02-17 RX ADMIN — PROPOFOL 300 MCG/KG/MIN: 10 INJECTION, EMULSION INTRAVENOUS at 15:25

## 2022-02-17 RX ADMIN — SODIUM CHLORIDE, PRESERVATIVE FREE 10 ML: 5 INJECTION INTRAVENOUS at 21:18

## 2022-02-17 RX ADMIN — Medication 150 MCG: at 16:03

## 2022-02-17 RX ADMIN — SODIUM CHLORIDE: 9 INJECTION, SOLUTION INTRAVENOUS at 21:24

## 2022-02-17 RX ADMIN — Medication 150 MCG: at 15:49

## 2022-02-17 RX ADMIN — LORAZEPAM 0.5 MG: 0.5 TABLET ORAL at 21:18

## 2022-02-17 RX ADMIN — GABAPENTIN 100 MG: 100 CAPSULE ORAL at 21:18

## 2022-02-17 RX ADMIN — SODIUM CHLORIDE: 9 INJECTION, SOLUTION INTRAVENOUS at 15:15

## 2022-02-17 RX ADMIN — ATORVASTATIN CALCIUM 10 MG: 10 TABLET, FILM COATED ORAL at 21:18

## 2022-02-17 RX ADMIN — ALBUMIN (HUMAN) 12.5 G: 12.5 SOLUTION INTRAVENOUS at 16:06

## 2022-02-17 RX ADMIN — Medication 150 MCG: at 15:36

## 2022-02-17 RX ADMIN — PANTOPRAZOLE SODIUM 40 MG: 40 INJECTION, POWDER, FOR SOLUTION INTRAVENOUS at 21:17

## 2022-02-17 RX ADMIN — SEVELAMER CARBONATE 800 MG: 800 TABLET, FILM COATED ORAL at 18:22

## 2022-02-17 RX ADMIN — Medication 150 MCG: at 15:41

## 2022-02-17 RX ADMIN — LIDOCAINE HYDROCHLORIDE 50 MG: 20 INJECTION, SOLUTION EPIDURAL; INFILTRATION; INTRACAUDAL; PERINEURAL at 15:25

## 2022-02-17 RX ADMIN — SODIUM CHLORIDE, PRESERVATIVE FREE 10 ML: 5 INJECTION INTRAVENOUS at 21:17

## 2022-02-17 RX ADMIN — EPOETIN ALFA-EPBX 20000 UNITS: 20000 INJECTION, SOLUTION INTRAVENOUS; SUBCUTANEOUS at 18:08

## 2022-02-17 ASSESSMENT — PULMONARY FUNCTION TESTS
PIF_VALUE: 1
PIF_VALUE: 0
PIF_VALUE: 1
PIF_VALUE: 0
PIF_VALUE: 1
PIF_VALUE: 0
PIF_VALUE: 1

## 2022-02-17 NOTE — H&P
Pre-operative History and Physical    Patient: Jessica Maldonado  : 1945  Acct#:     Intended Procedure:  Flexible sigmoidoscopy    HISTORY OF PRESENT ILLNESS:  The patient is a 77 y.o. male  who presents for flexible sigmoidoscopy due to rectal bleeding with history of sigmoid colectomy 22. Past Medical History:        Diagnosis Date    Arthritis     Cancer [facility])     Colon Cancer diagnosed last month    Diabetes mellitus ([facility] [address]     Hemodialysis patient ([facility] [address]     Hypertension      Past Surgical History:        Procedure Laterality Date    COLECTOMY N/A 2022    SIGMOID COLECTOMY, SIGMOIDOSCOPY performed by Yadi Maki MD at [address] [facility]  [address]     unsure of date     Medications Prior to Admission:   No current facility-administered medications on file prior to encounter. Current Outpatient Medications on File Prior to Encounter   Medication Sig Dispense Refill    aspirin 81 MG chewable tablet Take 1 tablet by mouth daily 30 tablet 0    polyethylene glycol (GLYCOLAX) 17 g packet Take 17 g by mouth daily as needed for Constipation 30 each 0    melatonin 3 MG TABS tablet Take 2 tablets by mouth nightly as needed (sleep) 6 tablet 0    tamsulosin (FLOMAX) 0.4 MG capsule Take 0.4 mg by mouth every morning      gabapentin (NEURONTIN) 100 MG capsule Take 100 mg by mouth 3 times daily.  sevelamer (RENVELA) 800 MG tablet Take 1 tablet by mouth 3 times daily (with meals)      atenolol (TENORMIN) 25 MG tablet Take 25 mg by mouth every evening      pantoprazole (PROTONIX) 20 MG tablet Take 20 mg by mouth nightly      atorvastatin (LIPITOR) 10 MG tablet Take 10 mg by mouth nightly      calcitRIOL (ROCALTROL) 0.25 MCG capsule Take 0.25 mcg by mouth every evening          Allergies:  Lisinopril    Social History:   TOBACCO:   reports that he has quit smoking. He has never used smokeless tobacco.  ETOH:   reports previous alcohol use.   DRUGS: reports no history of drug use. PHYSICAL EXAM:      Vital Signs: /65   Pulse 91   Temp 97.7 °F (36.5 °C) (Oral)   Resp 18   Ht 6' (1.829 m)   Wt 212 lb 15.4 oz (96.6 kg)   SpO2 94%   BMI 28.88 kg/m²    Airway: No stridor or wheezing noted. Good air movement  Pulmonary: without wheezes. Clear to auscultation  Cardiac:regular rate and rhythm without loud murmurs  Abdomen:soft, nontender,  Bowel sounds present    Pre-Procedure Assessment / Plan:  1) Flexible sigmoidoscopy    ASA Grade:  ASA 3 - Patient with moderate systemic disease with functional limitations  Mallampati Classification:  Class III    Level of Sedation Plan:MAC    Post Procedure plan: Return to same level of care    I assessed the patient and find that the patient is in satisfactory condition to proceed with the planned procedure and sedation plan. I have explained the risk, benefits, and alternatives to the procedure; the patient understands and agrees to proceed.        Raymond Ponce MD  2/17/2022

## 2022-02-17 NOTE — PROGRESS NOTES
This nurse went into patient's room to remove his IV site for discharge and noted patient to have a large amount of bloody stool seeping from his brief. Patient with no complaints at this time. Patient denies pain, denies dizziness when asked. Baseball sized clot noted during kelly care. BP 82/45 via right upper arm. Secure message sent to Dr. Crow Mercedes and MD notified. See new orders received. Request MD to see patient. Per MD, will see patient.

## 2022-02-17 NOTE — PROGRESS NOTES
Pt returned to room from dialysis. Alert and oriented, Pt daughter stopped by to say hello through window. Shes aware of visitor policy and will stay outside of room.   VSS and called endo to let them know initiation of bowel prep with tap water enema

## 2022-02-17 NOTE — ANESTHESIA PRE PROCEDURE
[facility] Department of Anesthesiology  Pre-Anesthesia Evaluation/Consultation       Name:  Shweta Mckeon  :   Age:  77 y.o. MRN:  [id number]  Date: 2022           Surgeon: Surgeon(s):  Camacho Guevara MD    Procedure: Procedure(s):  SIGMOIDOSCOPY DIAGNOSTIC FLEXIBLE     Allergies   Allergen Reactions    Lisinopril      Allergy listed on paperwork from [facility], no reaction noted     Patient Active Problem List   Diagnosis    GI bleed    History of COVID-19    Hyponatremia    Fatigue    Acute kidney injury superimposed on CKD ([address]    Lethargy    Suspected UTI    [facility]    LESLEE (acute kidney injury) [facility]    Malignant neoplasm of colon ([facility]    Acute blood loss anemia    Elevated troponin    COVID-19     Past Medical History:   Diagnosis Date    Arthritis     Cancer ([facility]     Colon Cancer diagnosed last month    Diabetes mellitus ([facility]     Hemodialysis patient ([facility]     Hypertension      Past Surgical History:   Procedure Laterality Date    COLECTOMY N/A 2022    SIGMOID COLECTOMY, SIGMOIDOSCOPY performed by Jovanni Tim MD at [address] [facility]  [address]     unsure of date     Social History     Tobacco Use    Smoking status: Former Smoker    Smokeless tobacco: Never Used   Vaping Use    Vaping Use: Never used   Substance Use Topics    Alcohol use: Not Currently    Drug use: Never     Medications  No current facility-administered medications on file prior to encounter.      Current Outpatient Medications on File Prior to Encounter   Medication Sig Dispense Refill    aspirin 81 MG chewable tablet Take 1 tablet by mouth daily 30 tablet 0    polyethylene glycol (GLYCOLAX) 17 g packet Take 17 g by mouth daily as needed for Constipation 30 each 0    melatonin 3 MG TABS tablet Take 2 tablets by mouth nightly as needed (sleep) 6 tablet 0    tamsulosin (FLOMAX) 0.4 MG capsule Take 0.4 mg by mouth every morning      gabapentin (NEURONTIN) 100 MG capsule Take 100 mg by mouth 3 times daily.       sevelamer (RENVELA) 800 MG tablet Take 1 tablet by mouth 3 times daily (with meals)      atenolol (TENORMIN) 25 MG tablet Take 25 mg by mouth every evening      pantoprazole (PROTONIX) 20 MG tablet Take 20 mg by mouth nightly      atorvastatin (LIPITOR) 10 MG tablet Take 10 mg by mouth nightly      calcitRIOL (ROCALTROL) 0.25 MCG capsule Take 0.25 mcg by mouth every evening       Current Facility-Administered Medications   Medication Dose Route Frequency Provider Last Rate Last Admin    0.9 % sodium chloride infusion   IntraVENous Continuous Denise Staples MD        0.9 % sodium chloride bolus  500 mL IntraVENous Once Denise Staples MD 4.1 mL/hr at 02/16/22 1900 500 mL at 02/16/22 1900    LORazepam (ATIVAN) tablet 0.5 mg  0.5 mg Oral BID Denise Staples MD   0.5 mg at 02/16/22 2037    Epoetin Kurt-epbx (RETACRIT) injection 20,000 Units  20,000 Units IntraVENous Once per day on Tue Thu Sat Coventry Ang Jules MD   20,000 Units at 02/15/22 1022    0.9 % sodium chloride infusion   IntraVENous PRN Imer Escalante MD        0.9 % sodium chloride infusion   IntraVENous PRN Imer Escalante MD        atorvastatin (LIPITOR) tablet 10 mg  10 mg Oral Nightly Raulito M Deysi, DO   10 mg at 02/16/22 2037    gabapentin (NEURONTIN) capsule 100 mg  100 mg Oral TID Raulito M Deysi, DO   100 mg at 02/16/22 2037    sevelamer (RENVELA) tablet 800 mg  800 mg Oral TID  Raulito M Deysi, DO   800 mg at 02/16/22 1741    tamsulosin (FLOMAX) capsule 0.4 mg  0.4 mg Oral QAM Raulito M Deysi, DO   0.4 mg at 02/16/22 0943    sodium chloride flush 0.9 % injection 5-40 mL  5-40 mL IntraVENous 2 times per day Solomon Taylor, DO   10 mL at 02/16/22 2037    sodium chloride flush 0.9 % injection 5-40 mL  5-40 mL IntraVENous PRN Raulito Jo DO        0.9 % sodium chloride infusion  25 mL IntraVENous PRN [facility]        acetaminophen (TYLENOL) tablet 650 mg  650 mg Oral Q6H PRN [facility], DO        Or    acetaminophen (TYLENOL) suppository 650 mg  650 mg Rectal Q6H PRN Raulito Pema Cho, DO        ondansetron [facility]) injection 4 mg  4 mg IntraVENous Q6H PRN Raulito [facility], DO   4 mg at 22 6743    pantoprazole (PROTONIX) injection 40 mg  40 mg IntraVENous Q12H Raulito [facility], DO   40 mg at 22    And    sodium chloride (PF) 0.9 % injection 10 mL  10 mL IntraVENous Q12H Raulito [facility], DO   10 mL at 22    guaiFENesin-dextromethorphan (ROBITUSSIN DM) 100-10 MG/5ML syrup 5 mL  5 mL Oral Q4H PRN [facility], DO        insulin lispro (HUMALOG) injection vial 0-6 Units  0-6 Units SubCUTAneous Q4H Raulito [facility], DO   1 Units at 22 1238    glucose (GLUTOSE) 40 % oral gel 15 g  15 g Oral PRN [facility], DO        dextrose 50 % IV solution  12.5 g IntraVENous PRN [facility]        glucagon (rDNA) injection 1 mg  1 mg IntraMUSCular PRN Raulito [facility], DO        dextrose 5 % solution  100 mL/hr IntraVENous PRN Raulito [facility], DO        lidocaine (XYLOCAINE) 2 % jelly   Topical PRN Chhaya Lips, APRN - CNP   Given at 22 0628    melatonin tablet 6 mg  6 mg Oral Nightly PRN Chhaya Lips, APRN - CNP   6 mg at 22 9048     Vital Signs (Current)   Vitals:    22 1300   BP: 105/65   Pulse: 91   Resp: 18   Temp: 97.7 °F (36.5 °C)   SpO2: 94%     Vital Signs Statistics (for past 48 hrs)     Temp  Av °F (36.7 °C)  Min: 97.4 °F (36.3 °C)   Min taken time: 22 0939  Max: 98.7 °F (37.1 °C)   Max taken time: 22  Pulse  Av.2  Min: 76   Min taken time: 22 0010  Max: 80   Max taken time: 02/15/22 2021  Resp  Av  Min: 13   Min taken time: 22 0516  Max: 21   Max taken time: 22  BP  Min: 82/45   Min taken time: 22 1800  Max: 155/80   Max taken time: 22 1418  MAP (mmHg)  Av.8  Min: 62   Min taken time: 22 1800  Max: 105   Max taken time: 22 1418  SpO2  Av.2 %  Min: 78 %   Min taken time: 22 1800  Max: 98 %   Max taken time: 22    BP Readings from Last 3 Encounters:   22 105/65   02/10/22 114/76   22 111/66     BMI  Body mass index is 28.88 kg/m². Estimated body mass index is 28.88 kg/m² as calculated from the following:    Height as of this encounter: 6' (1.829 m). Weight as of this encounter: 212 lb 15.4 oz (96.6 kg).     CBC   Lab Results   Component Value Date    WBC 5.8 2022    RBC 2.46 2022    HGB 7.3 2022    HCT 22.3 2022    MCV 90.6 2022    RDW 15.2 2022     2022     CMP    Lab Results   Component Value Date     2022    K 3.9 2022    K 4.3 2022     2022    CO2 23 2022    BUN 12 2022    CREATININE 2.4 2022    GFRAA 32 2022    GFRAA 35 2011    AGRATIO 1.0 2022    LABGLOM 26 2022    GLUCOSE 103 2022    PROT 5.6 2022    PROT 7.5 2011    CALCIUM 8.1 2022    BILITOT 0.3 2022    ALKPHOS 91 2022    AST 11 2022    ALT 7 2022     BMP    Lab Results   Component Value Date     2022    K 3.9 2022    K 4.3 2022     2022    CO2 23 2022    BUN 12 2022    CREATININE 2.4 2022    CALCIUM 8.1 2022    GFRAA 32 2022    GFRAA 35 2011    LABGLOM 26 2022    GLUCOSE 103 2022     POCGlucose  Recent Labs     02/15/22  0747 22  0730 22  1027   GLUCOSE 111* 99 103*      Coags    Lab Results   Component Value Date    PROTIME 12.9 2022    INR 1.14 2022    APTT 22.7      HCG (If Applicable) No results found for: PREGTESTUR, PREGSERUM, HCG, HCGQUANT   ABGs No results found for: PHART, PO2ART, JKK7FPZ, CII7YTD, BEART, O2TIUTFZ   Type & Screen (If Applicable)  No results found for: LABABO, LABRH                         BMI: Wt Readings from Last 3 Encounters:       NPO Status: 8 HOURS                          Anesthesia Evaluation  Patient summary reviewed no history of anesthetic complications:   Airway: Mallampati: III  TM distance: >3 FB   Neck ROM: full   Dental:    (+) edentulous      Pulmonary:normal exam    (+) pneumonia (COVID POSITIVE 2-10-22): unresolved,                             Cardiovascular:  Exercise tolerance: poor (<4 METS),   (+) hypertension (EF 55):, dysrhythmias (1avb):,       ECG reviewed  Rhythm: irregular  Rate: normal  Echocardiogram reviewed         Beta Blocker:  Dose within 24 Hrs         Neuro/Psych:   (+) CVA (no residual effects per pt):,             GI/Hepatic/Renal:   (+) GERD:, PUD, renal disease (HD today): ESRD and dialysis,           Endo/Other:    (+) DiabetesType II DM, , blood dyscrasia: anemia:., .                 Abdominal:             Vascular: negative vascular ROS. Other Findings:           Anesthesia Plan      MAC     ASA 3       Induction: intravenous. Anesthetic plan and risks discussed with patient. Plan discussed with CRNA. This pre-anesthesia assessment may be used as a history and physical.    DOS STAFF ADDENDUM:    Pt seen and examined, chart reviewed (including anesthesia, drug and allergy history). No interval changes to history and physical examination. Anesthetic plan, risks, benefits, alternatives, and personnel involved discussed with patient. Questions and concerns addressed. Patient(family) verbalized an understanding and agrees to proceed.       Kristina Banuelos MD  February 17, 2022  1:21 PM

## 2022-02-17 NOTE — OP NOTE
Flexible Sigmoidoscopy Note      Patient: Hank Self  : [id number]  Acct#:     Procedure: Flexible Sigmoidoscopy to transverse colon with control of bleeding    Date:  2022    Surgeon:  Claude Wilder MD    Referring Physician:  Demetria White MD    Preoperative Diagnosis:  77 y.o. male  who presents for flexible sigmoidoscopy due to rectal bleeding with history of sigmoid colectomy 22. Postoperative Diagnosis:    1. Friable surgical anastomosis 18 cm from anorectal verge, with visible suture. No bleeding or signs of bleeding on initial evaluation, but with lavage, two areas demonstrated oozing. Two hemoclips were placed to achieve hemostasis. 2. Descending colon diverticulosis  3. Multiple polyps in the transverse colon, 3-10 mm in size, not resected    Anesthesia:  MAC    Consent:  The patient or their legal guardian has signed a consent, and is aware of the potential risks, benefits, alternatives, and potential complications of this procedure. These include, but are not limited to hemorrhage, bleeding, post procedural pain, perforation, phlebitis, aspiration, hypotension, hypoxia, cardiovascular events such as arryhthmia, and possibly death. Additionally, the possibility of missed colonic polyps and interval colon cancer was discussed in the consent. Procedure: An informed consent was obtained from the patient after explanation of indications, benefits, possible risks and complications of the procedure. The patient was then taken to the endoscopy suite, placed in the left lateral decubitus position, and the above IV anesthesia was administered. A digital rectal examination was performed and revealed negative without mass, lesions or tenderness. The Olympus video colonoscope was placed in the patient's rectum under digital direction and advanced to the transverse colon. The prep was fair.     The scope was then withdrawn back through the transverse, descending and sigmoid colons. Carefull circumferential examination of the mucosa in these areas demonstrated:    1. Friable surgical anastomosis 18 cm from anorectal verge, with visible suture. No bleeding or signs of bleeding on initial evaluation, but with lavage, two areas demonstrated oozing. Two hemoclips were placed to achieve hemostasis. 2. Descending colon diverticulosis  3. Multiple polyps in the transverse colon, 3-10 mm in size, not resected    The scope was then withdrawn into the rectum and retroflexed. The retroflexed view of the anal verge and rectum demonstrates no abnormalities. The scope was straightened, the colon was decompressed and the scope was withdrawn from the patient. The patient tolerated the procedure well and was taken to the PACU in good condition. Estimated Blood Loss: Minimal     Impression:  Oozing from anastomosis site with ulceration or visible vessel    Recommendations:   - Return to the hospital [place], ok for discharge from GI standpoint today  - Resume regular medications. - Resume diet as tolerated. - Repeat colonoscopy in 4-6 weeks for polypectomy of many polyps see and not resected in light of bowel preparation and evaluation of bleeding. Thank you for allowing me to participate in this patient's care. No further GI work-up needed at this time. We will sign off, however please contact us with any additional questions at [phone].       MONTANA [address] and [address]  2/17/2022

## 2022-02-17 NOTE — PROGRESS NOTES
[facility]                                               Progress note    CC: hematochezia, ESRD  Summary:   Saige Birmingham is being seen by nephrology for ESRD. He is a 77 y.o. male with a PMH significant for ESRD on HD TTS, colon cancer, T2DM, hypertension at baseline who was just discharged after admission 2/7/2022-2/10/2022 for a rectal bleed. He now presented back to the hospital 2/12/2022 with the same complaint. Bleeding started after resuming plavix. Interval History  Seen on HD today,  BP soft today again  Labs reviewed. Last Post HD weight 96 kg. Likely his EDW  Plan for colonocopy todayt  Was getting discharged yesterday but had a large amount of hematochezia, was hypotensive yesterday so DC cancelled. Plan:   - iHD today per schedule  - UF as hemodynamically tolerated  - hematochezia management per GI/gen surg      Purnima Rios MD  [facility]  Office: [phone]    Assessment:   ESRD  TTS at [facility] , has not started there yet. Is originally from [facility] but staying in [place] for rehab so dialyzing with us for now. He has a left AV fistula, positive thrill and bruit     Electrolytes  No acute issues.     Hypertension  BP back to goal. Hypotensive at presentation      SHPT  No acute issues.      GI bleed  Has known colon cancer  No active bleeding  Hemoglobin down from admission. GI consulted. ROS:     Positives Listed Bold. All other remaining systems are negative. Constitutional:  fever, chills, weakness, weight change, fatigue,      Skin:  rash, pruritus, hair loss, bruising, dry skin, petechiae. Head, Face, Neck   headaches, swelling,  cervical adenopathy.      Respiratory: shortness of breath, cough, or wheezing  Cardiovascular: chest pain, palpitations, dizzy, edema  Gastrointestinal: nausea, vomiting, diarrhea, constipation,belly pain    Yellow skin, blood in stool  Musculoskeletal:  back pain, muscle weakness, gait problems,       joint pain or swelling. Genitourinary:  dysuria, poor urine flow, flank pain, blood in urine  Neurologic:  vertigo, TIA'S, syncope, seizures, focal weakness  Psychosocial:  insomnia, anxiety, or depression. Additional positive findings: -     PMH:   Past medical history, surgical history, social history, family history are reviewed and updated as appropriate. Reviewed current medication list.   Allergies reviewed and updated as needed. PE:   Vitals:    02/17/22 0700   BP:    Pulse:    Resp: 16   Temp:    SpO2: 95%       General appearance: AOx3 in no acute distress, comfortable, communicative, awake and alert. HEENT: no icterus, EOM intact, trachea midline. Neck : no masses, appears symmetrical and no JVD appreciated. Respiratory: Respiratory effort normal, bilateral equal chest rise. No wheeze, no crackles   Cardiovascular: Ausculation shows RRR and  no edema   Abdomen: abdomen is soft, non distended, no masses, no pain with palpation. Musculoskeletal:  no joint swelling, no deformity, strength grossly normal.   Skin: no rashes, no induration, no tightening, no jaundice   Neuro:   Follows commands, moves all extremities spontaneously       Lab Results   Component Value Date    CREATININE 4.2 (H) 02/16/2022    BUN 20 02/16/2022     02/16/2022    K 4.8 02/16/2022     02/16/2022    CO2 25 02/16/2022      Lab Results   Component Value Date    WBC 3.9 (L) 02/16/2022    HGB 7.7 (L) 02/16/2022    HCT 22.7 (L) 02/16/2022    MCV 89.8 02/16/2022     02/16/2022     Lab Results   Component Value Date    CALCIUM 8.2 (L) 02/16/2022    PHOS 4.2 02/10/2022

## 2022-02-17 NOTE — PROGRESS NOTES
Medications administered and monitored by CRNA, see anesthesia record.     Electronically signed by Anali Shirley RN on 2/17/2022 at 3:22 PM

## 2022-02-17 NOTE — PROGRESS NOTES
Hospitalist Progress Note    Patient:  Erin Shipman  Unit/Bed:G3Q-4369/4274-01   YOB: 1945       MRN: [id number] Acct: [de-identified]  PCP: Pat Camejo    Date of Admission: 2/12/2022  --------------------------    Chief Complaint:     Rectal bleed    Hospital Course:     Erin Shipman is a 77 y.o. male hospitalized on 2/12/2022   for rectal bleeding, he was recently underwent colectomy for colorectal malignancy on 1/26/2022. Patient was rehospitalized on 2/7/2020 through 2/10/2022 for lower GI bleeding. Patient was discharged to extended care facility and readmitted for the same. Assessment/plan:      Assessment  Acute GI bleeding, the setting of recent colectomy for colon cancer CTA of the abdomen reviewed, no brisk bleeding. GI and surgery service consulted    Evaluated by GI, general surgery  Had a large blood clot pass yesterday right before discharge  Continue IV Protonix  Surgery input noted, diet advance per surgery  Continue holding Plavix  GI to perform flex sig today, discussed with Dr. Dallis Mortimer     Acute blood loss anemia  Monitoring hemoglobin, trending down slowly8.2-7.6-7.4-7.9-7.7-7.3     Hypotension, multifactorial possibly. Resolved     Lactic acidosis, likely secondary to hypotension, resolved     Hyponatremia, secondary to renal failure,  Resolved    COVID-19 infection, asymptomatic diagnosed 2/10/2022  Patient received vaccination ( Rseendiz Peter 2 doses) should come off isolation tomorrow. Recent CVA  Continue statin, holding antiplatelet therapy secondary to the bleeding.       End-stage renal failure on hemodialysis  Nephrology following, continue hemodialysis          Code Status: Full Code         DVT prophylaxis: SCD secondary to GI bleed     Disposition: Pending results of flex sig    Discussed with the patient.    ----------------      Subjective:     Patient seen and examined  Blood clot passed yesterday  No further bleeding  Flex sig today    Diet: Diet NPO Exceptions are: Ice Chips    OBJECTIVE     Exam:  BP (!) 96/54   Pulse 75   Temp 98.7 °F (37.1 °C)   Resp 16   Ht 6' (1.829 m)   Wt 213 lb 13.5 oz (97 kg)   SpO2 95%   BMI 29.00 kg/m²        Change physical finding from exam 2/13/2022 as below. Gen: Not in distress. Alert. Chronically ill  Head: Normocephalic. Atraumatic. Eyes: Conjunctivae/corneas clear. ENT: Oral mucosa moist  Neck: No JVD. No obvious thyromegaly. CVS: Nml S1S2, no murmur  , RRR  Pulmomary: Clear bilaterally. No crackles. No wheezes. Gastrointestinal: Soft, non tender, non distend, . Musculoskeletal: No edema. Warm  Neuro: No focal deficit. Moves extremity spontaneously. Psychiatry: Appropriate affect. Not agitated.         Medications:  Reviewed    Infusion Medications    sodium chloride      sodium chloride      sodium chloride      sodium chloride      dextrose       Scheduled Medications    sodium chloride  500 mL IntraVENous Once    LORazepam  0.5 mg Oral BID    epoetin davis-epbx  20,000 Units IntraVENous Once per day on Tue Thu Sat    atorvastatin  10 mg Oral Nightly    gabapentin  100 mg Oral TID    sevelamer  800 mg Oral TID WC    tamsulosin  0.4 mg Oral QAM    sodium chloride flush  5-40 mL IntraVENous 2 times per day    pantoprazole  40 mg IntraVENous Q12H    And    sodium chloride (PF)  10 mL IntraVENous Q12H    insulin lispro  0-6 Units SubCUTAneous Q4H     PRN Meds: sodium chloride, sodium chloride, sodium chloride flush, sodium chloride, acetaminophen **OR** acetaminophen, ondansetron, guaiFENesin-dextromethorphan, glucose, dextrose, glucagon (rDNA), dextrose, lidocaine, melatonin      Intake/Output Summary (Last 24 hours) at 2/17/2022 1124  Last data filed at 2/17/2022 1032  Gross per 24 hour   Intake 0 ml   Output --   Net 0 ml             Labs:   Recent Labs     02/15/22  0747 02/15/22  0747 02/16/22  0730 02/16/22  1846 02/17/22  1027   WBC 3.5*  --  3.9*  --  5.8   HGB 7.4*   < > 7.9* 7.7* 7.3*   HCT 22.4*   < > 23.4* 22.7* 22.3*     --  223  --  280    < > = values in this interval not displayed. Recent Labs     02/15/22  0747 02/16/22  0730    141   K 4.9 4.8    105   CO2 25 25   BUN 43* 20   CREATININE 5.8* 4.2*   CALCIUM 7.8* 8.2*     No results for input(s): AST, ALT, BILIDIR, BILITOT, ALKPHOS in the last 72 hours. No results for input(s): INR in the last 72 hours. No results for input(s): Johnathon Ortega in the last 72 hours. Urinalysis:      Lab Results   Component Value Date    NITRU Negative 01/21/2022    WBCUA >900 01/21/2022    BACTERIA 2+ 01/21/2022    RBCUA 32 01/21/2022    BLOODU LARGE 01/21/2022    SPECGRAV 1.019 01/21/2022    GLUCOSEU Negative 01/21/2022       Radiology:  CTA ABDOMEN PELVIS W WO CONTRAST   Final Result   1. No high-density contrast within the lumen of the stomach, small bowel,   and colon to indicate a site of active hemorrhage. 2.  Previous partial colectomy with an anastomosis at the expected   rectosigmoid junction. There is a focal outpouching along the left side of   the anastomosis. This was also noted on the previous exam but has decreased   in size. Mild inflammatory changes are still present at the site. 3.  No general ascites and no pneumoperitoneum. There is no bowel   obstruction or hydronephrosis. 4.  Multiple cysts are present within the kidneys some of which are too small   to characterize.                      Electronically signed by Clementine Vicente MD on 2/17/2022 at 11:24 AM

## 2022-02-17 NOTE — PROGRESS NOTES
General and Vascular Surgery                                                           Daily Progress Note                                                                 Pt Name: Jamie Acevedo Record Number: [id number]  Date of Birth 1945   Today's Date: 2/17/2022      ASSESSMENT/PLAN  GI bleed s/p sigmoid colectomy 1/26/22  -bleeding last night just prior to discharge. GI to do flex sig today to further evaluate. Will follow for results. OBJECTIVE  VITALS:   Vitals:    02/17/22 0009 02/17/22 0516 02/17/22 0700 02/17/22 0910   BP: 99/62 96/63  (!) 96/54   Pulse: 81 80  75   Resp: 16 15 16 16   Temp: 98.7 °F (37.1 °C) 97.9 °F (36.6 °C)  98.7 °F (37.1 °C)   TempSrc: Oral Oral     SpO2: 96% 94% 95%    Weight: 225 lb 15.5 oz (102.5 kg)   213 lb 13.5 oz (97 kg)   Height:         No intake/output data recorded.   GENERAL: alert, no distress          LABS  CBC:   Lab Results   Component Value Date    WBC 3.9 02/16/2022    RBC 2.61 02/16/2022    HGB 7.7 02/16/2022    HCT 22.7 02/16/2022    MCV 89.8 02/16/2022    MCH 30.2 02/16/2022    MCHC 33.6 02/16/2022    RDW 14.9 02/16/2022     02/16/2022    MPV 7.0 02/16/2022     BMP:    Lab Results   Component Value Date     02/16/2022    K 4.8 02/16/2022    K 4.3 02/12/2022     02/16/2022    CO2 25 02/16/2022    BUN 20 02/16/2022    LABALBU 2.5 02/12/2022    CREATININE 4.2 02/16/2022    CALCIUM 8.2 02/16/2022    GFRAA 17 02/16/2022    GFRAA 35 07/26/2011    LABGLOM 14 02/16/2022    GLUCOSE 99 02/16/2022         IRINA Sheehan CNP  Electronically signed 2/17/2022 at 11:03 AM    As above  Was ready to discharge then noted recurrent blood per rectum  Plan flex sig with GI today  Will follow along    Electronically signed by Finn Chatman MD on 2/17/2022 at 2:22 PM

## 2022-02-17 NOTE — PROGRESS NOTES
Pt had near syncopal episode while trying to assist x2 with steady with PCA to commode. PCA just finished VS. Stable. Blood glucose is 102. Pt has been NPO and recently returned from dialysis. Pt began leaning and eyes rolled back momentarily and did not respond. Called staff assist and Nurses Vivek Zhou and Manny came to assist with returning patient back to bed. Pt became more alert and answered questions stated \"I feel weak\"    I called down to endo and spoke with Maya Lam. To let her  And endo team know bowel prep delayed and not tolerated. Advised to make hospitalist aware before bringing patient down for procedure. Messaged  to make him aware. He will assess patient at bedside.

## 2022-02-17 NOTE — FLOWSHEET NOTE
02/17/22 0910 02/17/22 1218   Treatment   Time On 0906  --    Time Off  --  1206   Vital Signs   BP (!) 96/54 (!) 107/54   Temp 98.7 °F (37.1 °C) 98.2 °F (36.8 °C)   Pulse 75 90   Resp 16 16   Weight 213 lb 13.5 oz (97 kg) 212 lb 15.4 oz (96.6 kg)   Post-Hemodialysis Assessment   NET Removed (ml)  --  300 ml     Treatment time: 3 hours  Net UF: 300ml    Pre weight: 97kg  Post weight: 96.6kg  EDW: N/A    Access used: central line  Access function: good    Medications or blood products given: N/A    Regular outpatient schedule: TTS    Summary of response to treatment: pt tolerated treatment ok, was able to remove 300ml, pt has been on bowel prep, blood pressure was soft. Copy of dialysis treatment record placed in chart, to be scanned into EMR.     Electronically signed by Bryn Mcginnis RN on 2/17/2022 at 12:27 PM

## 2022-02-17 NOTE — PROGRESS NOTES
x2 tap water enema completed with patient to best of his tolerance. Pt passing brownish red water. Called Endo to make them aware and Consent signed in roow with nurses Aleksandra and Pinky Chan present  Pt Verbalizes understanding and consent for procedure.     Electronically signed by Salty Matamoros RN on 2/17/2022 at 3:45 PM

## 2022-02-17 NOTE — PROGRESS NOTES
INPATIENT PROGRESS NOTE        IDENTIFYING DATA/REASON FOR CONSULTATION   PATIENT:  Jessica Maldonado  MRN:  [id number]  ADMIT DATE: 2022  TIME OF EVALUATION: 2022 9:12 AM  HOSPITAL STAY:   LOS: 5 days   CONSULTING PHYSICIAN: Ovidio Alfrao MD   REASON FOR CONSULTATION: rectal bleeding    Subjective:    Patient seen in follow up. He passed a blood clot per rectum this morning. He denies abdominal pain or rectal pain. Denies lightheadedness or dizziness. MEDICATIONS   SCHEDULED:  sodium chloride, 500 mL, Once  LORazepam, 0.5 mg, BID  epoetin davis-epbx, 20,000 Units, Once per day on  Sat  atorvastatin, 10 mg, Nightly  gabapentin, 100 mg, TID  sevelamer, 800 mg, TID WC  tamsulosin, 0.4 mg, QAM  sodium chloride flush, 5-40 mL, 2 times per day  pantoprazole, 40 mg, Q12H   And  sodium chloride (PF), 10 mL, Q12H  insulin lispro, 0-6 Units, Q4H      FLUIDS/DRIPS:     sodium chloride      sodium chloride      sodium chloride      sodium chloride      dextrose       PRNs: sodium chloride, , PRN  sodium chloride, , PRN  sodium chloride flush, 5-40 mL, PRN  sodium chloride, 25 mL, PRN  acetaminophen, 650 mg, Q6H PRN   Or  acetaminophen, 650 mg, Q6H PRN  ondansetron, 4 mg, Q6H PRN  guaiFENesin-dextromethorphan, 5 mL, Q4H PRN  glucose, 15 g, PRN  dextrose, 12.5 g, PRN  glucagon (rDNA), 1 mg, PRN  dextrose, 100 mL/hr, PRN  lidocaine, , PRN  melatonin, 6 mg, Nightly PRN      ALLERGIES:    Allergies   Allergen Reactions    Lisinopril      Allergy listed on paperwork from [facility], no reaction noted         PHYSICAL EXAM   VITALS:  BP 96/63   Pulse 80   Temp 97.9 °F (36.6 °C) (Oral)   Resp 16   Ht 6' (1.829 m)   Wt 225 lb 15.5 oz (102.5 kg)   SpO2 95%   BMI 30.65 kg/m²   TEMPERATURE:  Current - Temp: 97.9 °F (36.6 °C);  Max - Temp  Av °F (36.7 °C)  Min: 97.4 °F (36.3 °C)  Max: 98.7 °F (37.1 °C)    Physical Exam:  General appearance: alert, cooperative, no distress, appears stated age  Eyes: Anicteric  Head: Normocephalic, without obvious abnormality  Lungs: clear to auscultation bilaterally, Normal Effort  Heart: regular rate and rhythm, normal S1 and S2, no murmurs or rubs  Abdomen: soft, non-distended, non-tender. Bowel sounds normal.   Extremities: atraumatic, no cyanosis or edema  Skin: warm and dry, no jaundice  Neuro: Grossly intact, A&OX3    LABS AND IMAGING   Laboratory   Recent Labs     02/15/22  0747 02/16/22  0730 02/16/22  1846   WBC 3.5* 3.9*  --    HGB 7.4* 7.9* 7.7*   HCT 22.4* 23.4* 22.7*   MCV 90.0 89.8  --     223  --      Recent Labs     02/15/22  0747 02/16/22  0730    141   K 4.9 4.8    105   CO2 25 25   BUN 43* 20   CREATININE 5.8* 4.2*     No results for input(s): AST, ALT, ALB, BILIDIR, BILITOT, ALKPHOS in the last 72 hours. No results for input(s): LIPASE, AMYLASE in the last 72 hours. No results for input(s): PROTIME, INR in the last 72 hours. Imaging  CTA ABDOMEN PELVIS W WO CONTRAST   Final Result   1. No high-density contrast within the lumen of the stomach, small bowel,   and colon to indicate a site of active hemorrhage. 2.  Previous partial colectomy with an anastomosis at the expected   rectosigmoid junction. There is a focal outpouching along the left side of   the anastomosis. This was also noted on the previous exam but has decreased   in size. Mild inflammatory changes are still present at the site. 3.  No general ascites and no pneumoperitoneum. There is no bowel   obstruction or hydronephrosis. 4.  Multiple cysts are present within the kidneys some of which are too small   to characterize. Endoscopy      ASSESSMENT AND RECOMMENDATIONS   Bobbi Kern is a 77 y.o. male with PMH of  PMH of  colon adenocarcinoma dx 2 months ago s/p sigmoid resection 1/26/22, DM, ESRD on HD, HTN, and arthritis who presented on 2/7/2022 with rectal bleed. We have been consulted regarding sane    1. Lower GI bleed. Recurrent episode this morning. Patient underwent sigmoid resection on 1/26/22 for colon adenocarcinoma, and is anticoagulated with aspirin and plavix.  Suspect anastomosis post-op bleed in setting of blood thinner use, versus diverticular bleed versus more likely outlet bleeding from hemmorrhoids. Will proceed with flex sig to further evaluate   2. Colon adenocarcinoma s/p resection 1/26/22. Surgery following.  Notably a small outpouching is seen at the anastomosis which is smaller from his last cross-sectional imaging. 3. Recent CVA.  Plavix on hold due to #1. RECOMMENDATIONS:    Flexible sigmoidoscopy today  Will order tap water enemas for bowel prep  Keep npo      If you have any questions or need any further information, please feel free to contact us [phone]. Thank you for allowing us to participate in the care of Rommel Turner.    The note was completed using Dragon voice recognition transcription. Every effort was made to ensure accuracy; however, inadvertent transcription errors may be present despite my best efforts to edit errors.     Neel BARRAZA

## 2022-02-17 NOTE — ANESTHESIA POSTPROCEDURE EVALUATION
[facility] Department of Anesthesiology  Post-Anesthesia Note       Name:  Moses Polanco                                  Age:  77 y.o. MRN:  [id number]     Last Vitals & Oxygen Saturation: /65   Pulse 91   Temp 97.7 °F (36.5 °C) (Oral)   Resp 18   Ht 6' (1.829 m)   Wt 212 lb 15.4 oz (96.6 kg)   SpO2 94%   BMI 28.88 kg/m²   No data found. Level of consciousness:  Awake, alert    Respiratory: Respirations easy, no distress. Stable. Cardiovascular: Hemodynamically stable. Hydration: Adequate. PONV: Adequately managed. Post-op pain: Adequately controlled. Post-op assessment: Tolerated anesthetic well without complication. Complications:  None.     Kristina Banuelos MD  February 17, 2022   5:29 PM

## 2022-02-18 LAB
ABO/RH: NORMAL
ANION GAP SERPL CALCULATED.3IONS-SCNC: 8 MMOL/L (ref 3–16)
ANTIBODY SCREEN: NORMAL
BASOPHILS ABSOLUTE: 0 K/UL (ref 0–0.2)
BASOPHILS ABSOLUTE: 0 K/UL (ref 0–0.2)
BASOPHILS RELATIVE PERCENT: 0.6 %
BASOPHILS RELATIVE PERCENT: 0.8 %
BLOOD BANK DISPENSE STATUS: NORMAL
BLOOD BANK PRODUCT CODE: NORMAL
BPU ID: NORMAL
BUN BLDV-MCNC: 18 MG/DL (ref 7–20)
CALCIUM SERPL-MCNC: 7.7 MG/DL (ref 8.3–10.6)
CHLORIDE BLD-SCNC: 103 MMOL/L (ref 99–110)
CO2: 24 MMOL/L (ref 21–32)
CREAT SERPL-MCNC: 4.3 MG/DL (ref 0.8–1.3)
DESCRIPTION BLOOD BANK: NORMAL
EOSINOPHILS ABSOLUTE: 0.1 K/UL (ref 0–0.6)
EOSINOPHILS ABSOLUTE: 0.1 K/UL (ref 0–0.6)
EOSINOPHILS RELATIVE PERCENT: 3.8 %
EOSINOPHILS RELATIVE PERCENT: 3.9 %
GFR AFRICAN AMERICAN: 16
GFR NON-AFRICAN AMERICAN: 14
GLUCOSE BLD-MCNC: 113 MG/DL (ref 70–99)
GLUCOSE BLD-MCNC: 121 MG/DL (ref 70–99)
GLUCOSE BLD-MCNC: 127 MG/DL (ref 70–99)
GLUCOSE BLD-MCNC: 92 MG/DL (ref 70–99)
GLUCOSE BLD-MCNC: 94 MG/DL (ref 70–99)
HAPTOGLOBIN: 83 MG/DL (ref 30–200)
HCT VFR BLD CALC: 17.9 % (ref 40.5–52.5)
HCT VFR BLD CALC: 20.6 % (ref 40.5–52.5)
HEMOGLOBIN: 5.8 G/DL (ref 13.5–17.5)
HEMOGLOBIN: 6.8 G/DL (ref 13.5–17.5)
LACTATE DEHYDROGENASE: 166 U/L (ref 100–190)
LYMPHOCYTES ABSOLUTE: 0.9 K/UL (ref 1–5.1)
LYMPHOCYTES ABSOLUTE: 1.4 K/UL (ref 1–5.1)
LYMPHOCYTES RELATIVE PERCENT: 28.4 %
LYMPHOCYTES RELATIVE PERCENT: 35.4 %
MAGNESIUM: 2 MG/DL (ref 1.8–2.4)
MCH RBC QN AUTO: 29.5 PG (ref 26–34)
MCH RBC QN AUTO: 30 PG (ref 26–34)
MCHC RBC AUTO-ENTMCNC: 32.4 G/DL (ref 31–36)
MCHC RBC AUTO-ENTMCNC: 32.8 G/DL (ref 31–36)
MCV RBC AUTO: 91 FL (ref 80–100)
MCV RBC AUTO: 91.4 FL (ref 80–100)
MONOCYTES ABSOLUTE: 0.2 K/UL (ref 0–1.3)
MONOCYTES ABSOLUTE: 0.2 K/UL (ref 0–1.3)
MONOCYTES RELATIVE PERCENT: 4.9 %
MONOCYTES RELATIVE PERCENT: 5.5 %
NEUTROPHILS ABSOLUTE: 1.9 K/UL (ref 1.7–7.7)
NEUTROPHILS ABSOLUTE: 2.1 K/UL (ref 1.7–7.7)
NEUTROPHILS RELATIVE PERCENT: 54.6 %
NEUTROPHILS RELATIVE PERCENT: 62.1 %
PDW BLD-RTO: 14.7 % (ref 12.4–15.4)
PDW BLD-RTO: 15.3 % (ref 12.4–15.4)
PERFORMED ON: ABNORMAL
PERFORMED ON: NORMAL
PLATELET # BLD: 197 K/UL (ref 135–450)
PLATELET # BLD: 212 K/UL (ref 135–450)
PMV BLD AUTO: 6.6 FL (ref 5–10.5)
PMV BLD AUTO: 7 FL (ref 5–10.5)
POTASSIUM SERPL-SCNC: 4.4 MMOL/L (ref 3.5–5.1)
RBC # BLD: 1.97 M/UL (ref 4.2–5.9)
RBC # BLD: 2.25 M/UL (ref 4.2–5.9)
SODIUM BLD-SCNC: 135 MMOL/L (ref 136–145)
WBC # BLD: 3.1 K/UL (ref 4–11)
WBC # BLD: 3.9 K/UL (ref 4–11)

## 2022-02-18 PROCEDURE — 2500000003 HC RX 250 WO HCPCS: Performed by: INTERNAL MEDICINE

## 2022-02-18 PROCEDURE — 85025 COMPLETE CBC W/AUTO DIFF WBC: CPT

## 2022-02-18 PROCEDURE — P9016 RBC LEUKOCYTES REDUCED: HCPCS

## 2022-02-18 PROCEDURE — 2000000000 HC ICU R&B

## 2022-02-18 PROCEDURE — C9113 INJ PANTOPRAZOLE SODIUM, VIA: HCPCS | Performed by: STUDENT IN AN ORGANIZED HEALTH CARE EDUCATION/TRAINING PROGRAM

## 2022-02-18 PROCEDURE — 94761 N-INVAS EAR/PLS OXIMETRY MLT: CPT

## 2022-02-18 PROCEDURE — 6370000000 HC RX 637 (ALT 250 FOR IP): Performed by: STUDENT IN AN ORGANIZED HEALTH CARE EDUCATION/TRAINING PROGRAM

## 2022-02-18 PROCEDURE — 86850 RBC ANTIBODY SCREEN: CPT

## 2022-02-18 PROCEDURE — 86923 COMPATIBILITY TEST ELECTRIC: CPT

## 2022-02-18 PROCEDURE — 86900 BLOOD TYPING SEROLOGIC ABO: CPT

## 2022-02-18 PROCEDURE — 6360000002 HC RX W HCPCS: Performed by: STUDENT IN AN ORGANIZED HEALTH CARE EDUCATION/TRAINING PROGRAM

## 2022-02-18 PROCEDURE — 80048 BASIC METABOLIC PNL TOTAL CA: CPT

## 2022-02-18 PROCEDURE — 86901 BLOOD TYPING SEROLOGIC RH(D): CPT

## 2022-02-18 PROCEDURE — 2580000003 HC RX 258: Performed by: STUDENT IN AN ORGANIZED HEALTH CARE EDUCATION/TRAINING PROGRAM

## 2022-02-18 PROCEDURE — 6370000000 HC RX 637 (ALT 250 FOR IP): Performed by: INTERNAL MEDICINE

## 2022-02-18 PROCEDURE — 2580000003 HC RX 258: Performed by: INTERNAL MEDICINE

## 2022-02-18 PROCEDURE — 6370000000 HC RX 637 (ALT 250 FOR IP): Performed by: NURSE PRACTITIONER

## 2022-02-18 PROCEDURE — APPSS45 APP SPLIT SHARED TIME 31-45 MINUTES: Performed by: NURSE PRACTITIONER

## 2022-02-18 PROCEDURE — 83010 ASSAY OF HAPTOGLOBIN QUANT: CPT

## 2022-02-18 PROCEDURE — 83615 LACTATE (LD) (LDH) ENZYME: CPT

## 2022-02-18 PROCEDURE — APPNB45 APP NON BILLABLE 31-45 MINUTES: Performed by: NURSE PRACTITIONER

## 2022-02-18 PROCEDURE — 83735 ASSAY OF MAGNESIUM: CPT

## 2022-02-18 PROCEDURE — P9017 PLASMA 1 DONOR FRZ W/IN 8 HR: HCPCS

## 2022-02-18 PROCEDURE — 36430 TRANSFUSION BLD/BLD COMPNT: CPT

## 2022-02-18 PROCEDURE — 36415 COLL VENOUS BLD VENIPUNCTURE: CPT

## 2022-02-18 PROCEDURE — P9035 PLATELET PHERES LEUKOREDUCED: HCPCS

## 2022-02-18 RX ORDER — SODIUM CHLORIDE 9 MG/ML
INJECTION, SOLUTION INTRAVENOUS PRN
Status: DISCONTINUED | OUTPATIENT
Start: 2022-02-18 | End: 2022-02-19

## 2022-02-18 RX ORDER — 0.9 % SODIUM CHLORIDE 0.9 %
1000 INTRAVENOUS SOLUTION INTRAVENOUS ONCE
Status: COMPLETED | OUTPATIENT
Start: 2022-02-18 | End: 2022-02-18

## 2022-02-18 RX ADMIN — SEVELAMER CARBONATE 800 MG: 800 TABLET, FILM COATED ORAL at 17:10

## 2022-02-18 RX ADMIN — Medication 6 MG: at 20:34

## 2022-02-18 RX ADMIN — SEVELAMER CARBONATE 800 MG: 800 TABLET, FILM COATED ORAL at 08:39

## 2022-02-18 RX ADMIN — GABAPENTIN 100 MG: 100 CAPSULE ORAL at 08:39

## 2022-02-18 RX ADMIN — ATORVASTATIN CALCIUM 10 MG: 10 TABLET, FILM COATED ORAL at 20:34

## 2022-02-18 RX ADMIN — LORAZEPAM 0.5 MG: 0.5 TABLET ORAL at 08:39

## 2022-02-18 RX ADMIN — GABAPENTIN 100 MG: 100 CAPSULE ORAL at 20:34

## 2022-02-18 RX ADMIN — LORAZEPAM 0.5 MG: 0.5 TABLET ORAL at 20:34

## 2022-02-18 RX ADMIN — ONDANSETRON 4 MG: 2 INJECTION INTRAMUSCULAR; INTRAVENOUS at 23:11

## 2022-02-18 RX ADMIN — TAMSULOSIN HYDROCHLORIDE 0.4 MG: 0.4 CAPSULE ORAL at 10:47

## 2022-02-18 RX ADMIN — TAMSULOSIN HYDROCHLORIDE 0.4 MG: 0.4 CAPSULE ORAL at 08:39

## 2022-02-18 RX ADMIN — PANTOPRAZOLE SODIUM 40 MG: 40 INJECTION, POWDER, FOR SOLUTION INTRAVENOUS at 20:34

## 2022-02-18 RX ADMIN — Medication 5 MCG/MIN: at 23:27

## 2022-02-18 RX ADMIN — GABAPENTIN 100 MG: 100 CAPSULE ORAL at 14:36

## 2022-02-18 RX ADMIN — PANTOPRAZOLE SODIUM 40 MG: 40 INJECTION, POWDER, FOR SOLUTION INTRAVENOUS at 08:39

## 2022-02-18 RX ADMIN — SODIUM CHLORIDE 1000 ML: 9 INJECTION, SOLUTION INTRAVENOUS at 21:15

## 2022-02-18 RX ADMIN — SODIUM CHLORIDE, PRESERVATIVE FREE 10 ML: 5 INJECTION INTRAVENOUS at 08:47

## 2022-02-18 RX ADMIN — SODIUM CHLORIDE, PRESERVATIVE FREE 10 ML: 5 INJECTION INTRAVENOUS at 20:35

## 2022-02-18 RX ADMIN — SEVELAMER CARBONATE 800 MG: 800 TABLET, FILM COATED ORAL at 14:36

## 2022-02-18 ASSESSMENT — PAIN SCALES - GENERAL
PAINLEVEL_OUTOF10: 0

## 2022-02-18 ASSESSMENT — PAIN SCALES - WONG BAKER
WONGBAKER_NUMERICALRESPONSE: 0
WONGBAKER_NUMERICALRESPONSE: 0

## 2022-02-18 NOTE — PROGRESS NOTES
Gastroenterology Progress Note    Melissa Ruby is a 77 y.o. male patient. Hospitalization day:6    SUBJECTIVE:    No acute events overnight. Reports he is feeling well. He denies any hematochezia or BM since his colonoscopy yesterday. No abdominal pain, and no further complaints. Patient is interested in returning home. ROS:  Cardiovascular ROS: no chest pain or dyspnea on exertion  Gastrointestinal ROS: positive for - rectal bleeding  negative for - abdominal pain or diarrhea  Respiratory ROS: no cough, shortness of breath, or wheezing    Physical      Gen: Resting in bed, NAD  HEENT: Normocephalic, atraumatic, no scleral icterus   CV: RRR no MRG   Pul: CTAB, normal work of breathing without wheezing  Abd: Good bowel sounds throughout, no scars, soft, NT/ND, no masses, no HSM   Ext: No edema, moves all 4 extremities. Neuro: Moves all four extremities, no gross deficits, follows commands   Skin: No jaundice, spider angiomas, palmar erythema     Data    CBC:   Lab Results   Component Value Date    WBC 3.1 02/18/2022    RBC 1.97 02/18/2022    HGB 5.8 02/18/2022    HCT 17.9 02/18/2022    MCV 91.0 02/18/2022    MCH 29.5 02/18/2022    MCHC 32.4 02/18/2022    RDW 15.3 02/18/2022     02/18/2022    MPV 6.6 02/18/2022     Hepatic Function Panel:    Lab Results   Component Value Date    ALKPHOS 91 02/12/2022    ALT 7 02/12/2022    AST 11 02/12/2022    PROT 5.6 02/12/2022    PROT 7.5 07/26/2011    BILITOT 0.3 02/12/2022    BILIDIR <0.2 02/12/2022    IBILI see below 02/12/2022         Radiology Review:    CTA ABDOMEN PELVIS W WO CONTRAST   Final Result   1. No high-density contrast within the lumen of the stomach, small bowel,   and colon to indicate a site of active hemorrhage. 2.  Previous partial colectomy with an anastomosis at the expected   rectosigmoid junction. There is a focal outpouching along the left side of   the anastomosis.   This was also noted on the previous exam but has decreased in size. Mild inflammatory changes are still present at the site. 3.  No general ascites and no pneumoperitoneum. There is no bowel   obstruction or hydronephrosis. 4.  Multiple cysts are present within the kidneys some of which are too small   to characterize. ASSESSMENT:  Carter Marie is a 77 y.o. male with a PMH of  colon adenocarcinoma dx 2 months ago s/p sigmoid resection 1/26/22, DM, ESRD on HD, HTN, and arthritiswho presented on 2/7/2022 with rectal bleed. We have been consulted regarding sane.     IMPRESSION:     1. Lower GI bleed. Resolved. Patient underwent sigmoid resection on 1/26/22 for colon adenocarcinoma. Flexible sigmoidoscopy with two areas of friability of the suture/staple line, and two hemoclips were placed. There was minimal bleeding before and no bleeding following intervention. Hgb to 5.8 today, but no further bleeding. Recommend transfusion with 2U PRBC, but would confirm that this was not lab error. 2. Colon adenocarcinoma s/p resection 1/26/22. Surgery following. Notably a small outpouching is seen at the anastomosis which is smaller from his last cross-sectional imaging. 3. Recent CVA. Plavix on hold due to #1. No plans for restart per documentation. 4. Colon polyps: Patient with many other polyps seen on flexible sigmoidoscopy but not resected. Recommend outpatient colonoscopy in 4-6 weeks. PLAN:  -2U PRBC  -Okay for discharge from GI standpoint  -Outpatient colonoscopy     Thank you for allowing me to participate in this patient's care. No further GI work-up needed at this time. We will sign off, however please contact us with any additional questions at [phone].       Jessica Neri MD

## 2022-02-18 NOTE — PROGRESS NOTES
General and Vascular Surgery                                                           Daily Progress Note                                                                 Pt Name: Jamie Acevedo Record Number: [id number]  Date of Birth 1945   Today's Date: 2/18/2022      ASSESSMENT/PLAN  GI bleed s/p sigmoid colectomy 1/26/22  -flex sig 2/17 friable surgical anastomosis 18 cm from rectal verse, two oozing areas with hemoclips placed.   -No further GI bleeding. Hg down to 5.8. To be transfused. -monitor for further bleeding. OBJECTIVE  VITALS:   Vitals:    02/18/22 0046 02/18/22 0456 02/18/22 0458 02/18/22 0830   BP: (!) 143/70 (!) 113/58  118/66   Pulse: 84 81  79   Resp: 18 18 16   Temp: 97.7 °F (36.5 °C) 98 °F (36.7 °C)  99.3 °F (37.4 °C)   TempSrc: Oral Oral  Oral   SpO2: 96% 94%  93%   Weight:   220 lb 7.4 oz (100 kg)    Height:         I/O last 3 completed shifts:   In: 1750 [I.V.:700; IV Piggyback:250]  Out: 1100   GENERAL: alert, no distress          LABS  CBC:   Lab Results   Component Value Date    WBC 3.1 02/18/2022    RBC 1.97 02/18/2022    HGB 5.8 02/18/2022    HCT 17.9 02/18/2022    MCV 91.0 02/18/2022    MCH 29.5 02/18/2022    MCHC 32.4 02/18/2022    RDW 15.3 02/18/2022     02/18/2022    MPV 6.6 02/18/2022     BMP:    Lab Results   Component Value Date     02/18/2022    K 4.4 02/18/2022    K 4.3 02/12/2022     02/18/2022    CO2 24 02/18/2022    BUN 18 02/18/2022    LABALBU 2.5 02/12/2022    CREATININE 4.3 02/18/2022    CALCIUM 7.7 02/18/2022    GFRAA 16 02/18/2022    GFRAA 35 07/26/2011    LABGLOM 14 02/18/2022    GLUCOSE 92 02/18/2022         Damian Deshpande, IRINA - CNP  Electronically signed 2/18/2022 at 11:41 AM    As above  No further bleeding noted but Hg has equilibrated at 5.8  Transfusion underway  Hopeful for discharge soon as long as no additional bleeding noted    Electronically signed by Vince Hernandez MD on 2/18/2022 at 1:37 PM

## 2022-02-18 NOTE — PROGRESS NOTES
[facility]                                               Progress note    CC: hematochezia, ESRD  Summary:   Yanira Spencer is being seen by nephrology for ESRD. He is a 77 y.o. male with a PMH significant for ESRD on HD TTS, colon cancer, T2DM, hypertension at baseline who was just discharged after admission 2/7/2022-2/10/2022 for a rectal bleed. He now presented back to the hospital 2/12/2022 with the same complaint. Bleeding started after resuming plavix. Interval History  Seen at bedside. Hb down to 5.8 today  Had flex sig yesterday, oozing from anastomosis site, clipped. Labs reviewed. Last Post HD weight 96.6 kg  Agitated overnight, likely due to anesthesia. Now back to baseline    Plan:   - no acute indication for HD today  - transfusion per primary  - plan for HD tomorrow per schedule. Ariane Nolasco MD  [facility]  Office: [phone]    Assessment:   ESRD  TTS at [facility] , has not started there yet. Is originally from [facility] but staying in [place] for rehab so dialyzing with us for now. He has a left AV fistula, positive thrill and bruit     Electrolytes  No acute issues.     Hypertension  BP back to goal. Hypotensive at presentation      SHPT  No acute issues.      GI bleed  Has known colon cancer  No active bleeding  Hemoglobin down from admission. GI consulted. ROS:     Positives Listed Bold. All other remaining systems are negative. Constitutional:  fever, chills, weakness, weight change, fatigue,      Skin:  rash, pruritus, hair loss, bruising, dry skin, petechiae. Head, Face, Neck   headaches, swelling,  cervical adenopathy. Respiratory: shortness of breath, cough, or wheezing  Cardiovascular: chest pain, palpitations, dizzy, edema  Gastrointestinal: nausea, vomiting, diarrhea, constipation,belly pain    Yellow skin, blood in stool  Musculoskeletal:  back pain, muscle weakness, gait problems,       joint pain or swelling.   Genitourinary: dysuria, poor urine flow, flank pain, blood in urine  Neurologic:  vertigo, TIA'S, syncope, seizures, focal weakness  Psychosocial:  insomnia, anxiety, or depression. Additional positive findings: -     PMH:   Past medical history, surgical history, social history, family history are reviewed and updated as appropriate. Reviewed current medication list.   Allergies reviewed and updated as needed. PE:   Vitals:    02/18/22 0830   BP: 118/66   Pulse: 79   Resp: 16   Temp: 99.3 °F (37.4 °C)   SpO2: 93%       General appearance: AOx3 in no acute distress, comfortable, communicative, awake and alert. HEENT: no icterus, EOM intact, trachea midline. Neck : no masses, appears symmetrical and no JVD appreciated. Respiratory: Respiratory effort normal, bilateral equal chest rise. No wheeze, no crackles   Cardiovascular: Ausculation shows RRR and  no edema   Abdomen: abdomen is soft, non distended, no masses, no pain with palpation. Musculoskeletal:  no joint swelling, no deformity, strength grossly normal.   Skin: no rashes, no induration, no tightening, no jaundice   Neuro:   Follows commands, moves all extremities spontaneously       Lab Results   Component Value Date    CREATININE 4.3 (H) 02/18/2022    BUN 18 02/18/2022     (L) 02/18/2022    K 4.4 02/18/2022     02/18/2022    CO2 24 02/18/2022      Lab Results   Component Value Date    WBC 3.1 (L) 02/18/2022    HGB 5.8 (LL) 02/18/2022    HCT 17.9 (LL) 02/18/2022    MCV 91.0 02/18/2022     02/18/2022     Lab Results   Component Value Date    CALCIUM 7.7 (L) 02/18/2022    PHOS 4.2 02/10/2022

## 2022-02-18 NOTE — PROGRESS NOTES
Blood administration initiated in L forearm. Vital signs obtained per protocol. No reaction noted after 15 minutes. Blood infusing at 75ml/hr will continue to monitor.

## 2022-02-18 NOTE — PLAN OF CARE
Problem: Infection:  Goal: Will remain free from infection  Description: Will remain free from infection  2/18/2022 1114 by Doni Haider RN  Outcome: Ongoing     Problem: Safety:  Goal: Free from accidental physical injury  Description: Free from accidental physical injury  2/18/2022 1114 by Doni Haider RN  Outcome: Ongoing     Problem: Safety:  Goal: Free from intentional harm  Description: Free from intentional harm  2/18/2022 1114 by Doni Haider RN  Outcome: Ongoing     Problem: Daily Care:  Goal: Daily care needs are met  Description: Daily care needs are met  2/18/2022 1114 by Doni Haider RN  Outcome: Ongoing     Problem: Pain:  Goal: Patient's pain/discomfort is manageable  Description: Patient's pain/discomfort is manageable  2/18/2022 1114 by Doni Haider RN  Outcome: Ongoing     Problem: Skin Integrity:  Goal: Skin integrity will stabilize  Description: Skin integrity will stabilize  2/18/2022 1114 by Doni Haider RN  Outcome: Ongoing     Problem: Discharge Planning:  Goal: Patients continuum of care needs are met  Description: Patients continuum of care needs are met  2/18/2022 1114 by Doni Haider RN  Outcome: Ongoing     Problem: Bleeding:  Goal: Will show no signs and symptoms of excessive bleeding  Description: Will show no signs and symptoms of excessive bleeding  2/18/2022 1114 by Doni Haider RN  Outcome: Ongoing

## 2022-02-18 NOTE — PROGRESS NOTES
Nursing observed IV site leaking and blood transfusion was slowed to 25 ml/hr until IV site could be assessed. IV was repositioned and new dressing applied and transfusion rate increased to 75 ml/hr. IV not suitable to run seconfd unit of PRBC. Charge Nurse called for an ultrasound guided IV to be placed.

## 2022-02-18 NOTE — PROGRESS NOTES
Pt verbally aggressive, yelling, and demanding to staff, refusing to take PO medications(Lipitor, Gabapentin, Ativan) stating he wants to receive them from another nurse. Charge nurse Skylar Silveira attempted to give pt his medications with pt continuing to be rude and demanding to both charge nurse and myself. Pt was witnessed taking PO medications. Pt left with bed locked in lowest position, bed alarm activated, and call light and water within reach. Pt instructed to call if he needs assistance. Will monitor.

## 2022-02-18 NOTE — PROGRESS NOTES
Hospitalist Progress Note    Patient:  Danya Howard  Unit/Bed:K3R-8888/4274-01   YOB: 1945       MRN: [id number] Acct: [de-identified]  PCP: Madelyn Esparza    Date of Admission: 2/12/2022  --------------------------    Chief Complaint:     Rectal bleed    Hospital Course:     Danya Howard is a 77 y.o. male hospitalized on 2/12/2022   for rectal bleeding, he was recently underwent colectomy for colorectal malignancy on 1/26/2022. Patient was rehospitalized on 2/7/2020 through 2/10/2022 for lower GI bleeding. Patient was discharged to extended care facility and readmitted for the same. Assessment/plan:      Assessment  Acute GI bleeding, the setting of recent colectomy for colon cancer CTA of the abdomen reviewed, no brisk bleeding. GI and surgery service consulted    Evaluated by GI, general surgery  Had a large blood clot pass yesterday right before discharge  Continue IV Protonix  Surgery input noted, diet advance per surgery  Continue holding Plavix  Flex sig on 2/17 showed a friable surgical anstomosis 18 cm from anorectal verge, with visible suture and two areas of oozing with lavage and two hemoclips placed     Acute blood loss anemia  Monitoring hemoglobin, trending down slowly: 8.2-7.6-7.4-7.9-7.7-7.3-5.8  - will give 2U PRBC today and recheck H/H this afternoon     Hypotension, multifactorial possibly. Resolved     Lactic acidosis, likely secondary to hypotension, resolved     Hyponatremia, secondary to renal failure,  Resolved    COVID-19 infection, asymptomatic diagnosed 2/10/2022  Patient received vaccination ( Resendiz Peter 2 doses) should come off isolation tomorrow. Recent CVA  Continue statin, holding antiplatelet therapy secondary to the bleeding.       End-stage renal failure on hemodialysis  Nephrology following, continue hemodialysis          Code Status: Full Code         DVT prophylaxis: SCD secondary to GI bleed     Disposition: Pending improvement in Hgb    Discussed with the patient.    ----------------      Subjective:     Patient seen and examined  No further bleeding   Agitated overnight    Diet: ADULT DIET; Regular; Low Phosphorus (Less than 1000 mg)    OBJECTIVE     Exam:  /66   Pulse 79   Temp 99.3 °F (37.4 °C) (Oral)   Resp 16   Ht 6' (1.829 m)   Wt 220 lb 7.4 oz (100 kg)   SpO2 93%   BMI 29.90 kg/m²        Change physical finding from exam 2/13/2022 as below. Gen: Not in distress. Alert. Chronically ill  Head: Normocephalic. Atraumatic. Eyes: Conjunctivae/corneas clear. ENT: Oral mucosa moist  Neck: No JVD. No obvious thyromegaly. CVS: Nml S1S2, no murmur  , RRR  Pulmomary: Clear bilaterally. No crackles. No wheezes. Gastrointestinal: Soft, non tender, non distend, . Musculoskeletal: No edema. Warm  Neuro: No focal deficit. Moves extremity spontaneously. Psychiatry: Appropriate affect. Not agitated.         Medications:  Reviewed    Infusion Medications    sodium chloride      sodium chloride 75 mL/hr at 02/17/22 2124    sodium chloride      sodium chloride      sodium chloride      dextrose       Scheduled Medications    sodium chloride  500 mL IntraVENous Once    LORazepam  0.5 mg Oral BID    epoetin davis-epbx  20,000 Units IntraVENous Once per day on Tue Thu Sat    atorvastatin  10 mg Oral Nightly    gabapentin  100 mg Oral TID    sevelamer  800 mg Oral TID WC    tamsulosin  0.4 mg Oral QAM    sodium chloride flush  5-40 mL IntraVENous 2 times per day    pantoprazole  40 mg IntraVENous Q12H    And    sodium chloride (PF)  10 mL IntraVENous Q12H    insulin lispro  0-6 Units SubCUTAneous Q4H     PRN Meds: sodium chloride, sodium chloride, sodium chloride, sodium chloride flush, sodium chloride, acetaminophen **OR** acetaminophen, ondansetron, guaiFENesin-dextromethorphan, glucose, dextrose, glucagon (rDNA), dextrose, lidocaine, melatonin      Intake/Output Summary (Last 24 hours) at 2/18/2022 1016  Last data filed at 2/17/2022 1620  Gross per 24 hour   Intake 1750 ml   Output 1100 ml   Net 650 ml             Labs:   Recent Labs     02/16/22  0730 02/16/22  0730 02/16/22  1846 02/17/22  1027 02/18/22  0654   WBC 3.9*  --   --  5.8 3.1*   HGB 7.9*   < > 7.7* 7.3* 5.8*   HCT 23.4*   < > 22.7* 22.3* 17.9*     --   --  280 197    < > = values in this interval not displayed. Recent Labs     02/16/22  0730 02/17/22  1027 02/18/22  0654    135* 135*   K 4.8 3.9 4.4    101 103   CO2 25 23 24   BUN 20 12 18   CREATININE 4.2* 2.4* 4.3*   CALCIUM 8.2* 8.1* 7.7*     No results for input(s): AST, ALT, BILIDIR, BILITOT, ALKPHOS in the last 72 hours. No results for input(s): INR in the last 72 hours. No results for input(s): [place] in the last 72 hours. Urinalysis:      Lab Results   Component Value Date    NITRU Negative 01/21/2022    WBCUA >900 01/21/2022    BACTERIA 2+ 01/21/2022    RBCUA 32 01/21/2022    BLOODU LARGE 01/21/2022    SPECGRAV 1.019 01/21/2022    GLUCOSEU Negative 01/21/2022       Radiology:  CTA ABDOMEN PELVIS W WO CONTRAST   Final Result   1. No high-density contrast within the lumen of the stomach, small bowel,   and colon to indicate a site of active hemorrhage. 2.  Previous partial colectomy with an anastomosis at the expected   rectosigmoid junction. There is a focal outpouching along the left side of   the anastomosis. This was also noted on the previous exam but has decreased   in size. Mild inflammatory changes are still present at the site. 3.  No general ascites and no pneumoperitoneum. There is no bowel   obstruction or hydronephrosis. 4.  Multiple cysts are present within the kidneys some of which are too small   to characterize.                      Electronically signed by Evelio Madsen MD on 2/18/2022 at 10:16 AM

## 2022-02-18 NOTE — PLAN OF CARE
Problem: Infection:  Goal: Will remain free from infection  Description: Will remain free from infection  2/18/2022 0555 by Luiza Fontaine RN  Outcome: Ongoing  2/17/2022 1642 by Rj Silva RN  Outcome: Ongoing     Problem: Safety:  Goal: Free from accidental physical injury  Description: Free from accidental physical injury  2/18/2022 0555 by Luiza Fontaine RN  Outcome: Ongoing  2/17/2022 1642 by Rj Silva RN  Outcome: Ongoing  Goal: Free from intentional harm  Description: Free from intentional harm  2/18/2022 0555 by Luiza Fontaine RN  Outcome: Ongoing  2/17/2022 1642 by Rj Silva RN  Outcome: Ongoing     Problem: Daily Care:  Goal: Daily care needs are met  Description: Daily care needs are met  2/18/2022 0555 by Luiza Fontaine RN  Outcome: Ongoing  2/17/2022 1642 by Rj Silva RN  Outcome: Ongoing     Problem: Pain:  Goal: Patient's pain/discomfort is manageable  Description: Patient's pain/discomfort is manageable  2/18/2022 0555 by Luiza Fontaine RN  Outcome: Ongoing  2/17/2022 1642 by Rj Silva RN  Outcome: Ongoing     Problem: Skin Integrity:  Goal: Skin integrity will stabilize  Description: Skin integrity will stabilize  2/18/2022 0555 by Luiza Fontaine RN  Outcome: Ongoing  2/17/2022 1642 by Rj Silva RN  Outcome: Ongoing     Problem: Discharge Planning:  Goal: Patients continuum of care needs are met  Description: Patients continuum of care needs are met  2/18/2022 0555 by Luiza Fontaine RN  Outcome: Ongoing  2/17/2022 1642 by Rj Silva RN  Outcome: Ongoing     Problem: Bleeding:  Goal: Will show no signs and symptoms of excessive bleeding  Description: Will show no signs and symptoms of excessive bleeding  2/18/2022 0555 by Luiza Fontaine RN  Outcome: Ongoing  2/17/2022 1642 by Rj Silva RN  Outcome: Ongoing

## 2022-02-19 ENCOUNTER — APPOINTMENT (OUTPATIENT)
Dept: CT IMAGING | Age: 77
DRG: 907 | End: 2022-02-19
Payer: MEDICARE

## 2022-02-19 ENCOUNTER — APPOINTMENT (OUTPATIENT)
Dept: INTERVENTIONAL RADIOLOGY/VASCULAR | Age: 77
DRG: 907 | End: 2022-02-19
Payer: MEDICARE

## 2022-02-19 LAB
ANION GAP SERPL CALCULATED.3IONS-SCNC: 15 MMOL/L (ref 3–16)
ANION GAP SERPL CALCULATED.3IONS-SCNC: 19 MMOL/L (ref 3–16)
BASOPHILS ABSOLUTE: 0 K/UL (ref 0–0.2)
BASOPHILS ABSOLUTE: 0 K/UL (ref 0–0.2)
BASOPHILS RELATIVE PERCENT: 0.2 %
BASOPHILS RELATIVE PERCENT: 0.3 %
BLOOD BANK DISPENSE STATUS: NORMAL
BLOOD BANK PRODUCT CODE: NORMAL
BPU ID: NORMAL
BUN BLDV-MCNC: 23 MG/DL (ref 7–20)
BUN BLDV-MCNC: 26 MG/DL (ref 7–20)
CALCIUM SERPL-MCNC: 7.6 MG/DL (ref 8.3–10.6)
CALCIUM SERPL-MCNC: 8 MG/DL (ref 8.3–10.6)
CHLORIDE BLD-SCNC: 102 MMOL/L (ref 99–110)
CHLORIDE BLD-SCNC: 102 MMOL/L (ref 99–110)
CO2: 19 MMOL/L (ref 21–32)
CO2: 20 MMOL/L (ref 21–32)
CREAT SERPL-MCNC: 5.1 MG/DL (ref 0.8–1.3)
CREAT SERPL-MCNC: 5.3 MG/DL (ref 0.8–1.3)
DESCRIPTION BLOOD BANK: NORMAL
EOSINOPHILS ABSOLUTE: 0 K/UL (ref 0–0.6)
EOSINOPHILS ABSOLUTE: 0.1 K/UL (ref 0–0.6)
EOSINOPHILS RELATIVE PERCENT: 0.2 %
EOSINOPHILS RELATIVE PERCENT: 1.1 %
GFR AFRICAN AMERICAN: 13
GFR AFRICAN AMERICAN: 13
GFR NON-AFRICAN AMERICAN: 11
GFR NON-AFRICAN AMERICAN: 11
GLUCOSE BLD-MCNC: 100 MG/DL (ref 70–99)
GLUCOSE BLD-MCNC: 104 MG/DL (ref 70–99)
GLUCOSE BLD-MCNC: 116 MG/DL (ref 70–99)
GLUCOSE BLD-MCNC: 256 MG/DL (ref 70–99)
GLUCOSE BLD-MCNC: 277 MG/DL (ref 70–99)
GLUCOSE BLD-MCNC: 309 MG/DL (ref 70–99)
GLUCOSE BLD-MCNC: 315 MG/DL (ref 70–99)
HCT VFR BLD CALC: 24.6 % (ref 40.5–52.5)
HCT VFR BLD CALC: 26.4 % (ref 40.5–52.5)
HCT VFR BLD CALC: 30.5 % (ref 40.5–52.5)
HEMOGLOBIN: 10 G/DL (ref 13.5–17.5)
HEMOGLOBIN: 7.8 G/DL (ref 13.5–17.5)
HEMOGLOBIN: 8.3 G/DL (ref 13.5–17.5)
INR BLD: 1.12 (ref 0.88–1.12)
LACTIC ACID: 3.1 MMOL/L (ref 0.4–2)
LYMPHOCYTES ABSOLUTE: 0.7 K/UL (ref 1–5.1)
LYMPHOCYTES ABSOLUTE: 0.7 K/UL (ref 1–5.1)
LYMPHOCYTES RELATIVE PERCENT: 11.4 %
LYMPHOCYTES RELATIVE PERCENT: 6.5 %
MAGNESIUM: 1.9 MG/DL (ref 1.8–2.4)
MCH RBC QN AUTO: 29.8 PG (ref 26–34)
MCH RBC QN AUTO: 29.8 PG (ref 26–34)
MCH RBC QN AUTO: 30.1 PG (ref 26–34)
MCHC RBC AUTO-ENTMCNC: 31.6 G/DL (ref 31–36)
MCHC RBC AUTO-ENTMCNC: 31.9 G/DL (ref 31–36)
MCHC RBC AUTO-ENTMCNC: 32.6 G/DL (ref 31–36)
MCV RBC AUTO: 91.3 FL (ref 80–100)
MCV RBC AUTO: 94.3 FL (ref 80–100)
MCV RBC AUTO: 94.4 FL (ref 80–100)
MONOCYTES ABSOLUTE: 0.3 K/UL (ref 0–1.3)
MONOCYTES ABSOLUTE: 0.5 K/UL (ref 0–1.3)
MONOCYTES RELATIVE PERCENT: 4.9 %
MONOCYTES RELATIVE PERCENT: 5.1 %
NEUTROPHILS ABSOLUTE: 5 K/UL (ref 1.7–7.7)
NEUTROPHILS ABSOLUTE: 8.9 K/UL (ref 1.7–7.7)
NEUTROPHILS RELATIVE PERCENT: 82.3 %
NEUTROPHILS RELATIVE PERCENT: 88 %
PDW BLD-RTO: 14.4 % (ref 12.4–15.4)
PDW BLD-RTO: 14.7 % (ref 12.4–15.4)
PDW BLD-RTO: 15.6 % (ref 12.4–15.4)
PERFORMED ON: ABNORMAL
PLATELET # BLD: 184 K/UL (ref 135–450)
PLATELET # BLD: 213 K/UL (ref 135–450)
PLATELET # BLD: 275 K/UL (ref 135–450)
PMV BLD AUTO: 7.2 FL (ref 5–10.5)
PMV BLD AUTO: 7.4 FL (ref 5–10.5)
PMV BLD AUTO: 7.8 FL (ref 5–10.5)
POTASSIUM SERPL-SCNC: 4.4 MMOL/L (ref 3.5–5.1)
POTASSIUM SERPL-SCNC: 4.7 MMOL/L (ref 3.5–5.1)
PROTHROMBIN TIME: 12.7 SEC (ref 9.9–12.7)
RBC # BLD: 2.6 M/UL (ref 4.2–5.9)
RBC # BLD: 2.8 M/UL (ref 4.2–5.9)
RBC # BLD: 3.34 M/UL (ref 4.2–5.9)
SODIUM BLD-SCNC: 137 MMOL/L (ref 136–145)
SODIUM BLD-SCNC: 140 MMOL/L (ref 136–145)
WBC # BLD: 10.2 K/UL (ref 4–11)
WBC # BLD: 11 K/UL (ref 4–11)
WBC # BLD: 6.1 K/UL (ref 4–11)

## 2022-02-19 PROCEDURE — 2580000003 HC RX 258: Performed by: STUDENT IN AN ORGANIZED HEALTH CARE EDUCATION/TRAINING PROGRAM

## 2022-02-19 PROCEDURE — 93005 ELECTROCARDIOGRAM TRACING: CPT | Performed by: INTERNAL MEDICINE

## 2022-02-19 PROCEDURE — 93308 TTE F-UP OR LMTD: CPT

## 2022-02-19 PROCEDURE — 37244 VASC EMBOLIZE/OCCLUDE BLEED: CPT

## 2022-02-19 PROCEDURE — 6360000002 HC RX W HCPCS: Performed by: STUDENT IN AN ORGANIZED HEALTH CARE EDUCATION/TRAINING PROGRAM

## 2022-02-19 PROCEDURE — 2500000003 HC RX 250 WO HCPCS: Performed by: INTERNAL MEDICINE

## 2022-02-19 PROCEDURE — 99152 MOD SED SAME PHYS/QHP 5/>YRS: CPT

## 2022-02-19 PROCEDURE — 2500000003 HC RX 250 WO HCPCS: Performed by: NURSE PRACTITIONER

## 2022-02-19 PROCEDURE — 99223 1ST HOSP IP/OBS HIGH 75: CPT | Performed by: INTERNAL MEDICINE

## 2022-02-19 PROCEDURE — 6360000002 HC RX W HCPCS: Performed by: NURSE PRACTITIONER

## 2022-02-19 PROCEDURE — 6370000000 HC RX 637 (ALT 250 FOR IP): Performed by: INTERNAL MEDICINE

## 2022-02-19 PROCEDURE — 6360000002 HC RX W HCPCS: Performed by: INTERNAL MEDICINE

## 2022-02-19 PROCEDURE — APPSS45 APP SPLIT SHARED TIME 31-45 MINUTES: Performed by: NURSE PRACTITIONER

## 2022-02-19 PROCEDURE — 6370000000 HC RX 637 (ALT 250 FOR IP): Performed by: STUDENT IN AN ORGANIZED HEALTH CARE EDUCATION/TRAINING PROGRAM

## 2022-02-19 PROCEDURE — 6360000002 HC RX W HCPCS: Performed by: RADIOLOGY

## 2022-02-19 PROCEDURE — APPNB45 APP NON BILLABLE 31-45 MINUTES: Performed by: NURSE PRACTITIONER

## 2022-02-19 PROCEDURE — C9113 INJ PANTOPRAZOLE SODIUM, VIA: HCPCS | Performed by: STUDENT IN AN ORGANIZED HEALTH CARE EDUCATION/TRAINING PROGRAM

## 2022-02-19 PROCEDURE — 80048 BASIC METABOLIC PNL TOTAL CA: CPT

## 2022-02-19 PROCEDURE — 2709999900 IR EMBOLIZATION HEMORRHAGE

## 2022-02-19 PROCEDURE — 85025 COMPLETE CBC W/AUTO DIFF WBC: CPT

## 2022-02-19 PROCEDURE — 99024 POSTOP FOLLOW-UP VISIT: CPT | Performed by: SURGERY

## 2022-02-19 PROCEDURE — 04L53DZ OCCLUSION OF SUPERIOR MESENTERIC ARTERY WITH INTRALUMINAL DEVICE, PERCUTANEOUS APPROACH: ICD-10-PCS | Performed by: RADIOLOGY

## 2022-02-19 PROCEDURE — 90935 HEMODIALYSIS ONE EVALUATION: CPT

## 2022-02-19 PROCEDURE — 6370000000 HC RX 637 (ALT 250 FOR IP): Performed by: RADIOLOGY

## 2022-02-19 PROCEDURE — 75605 CONTRAST EXAM THORACIC AORTA: CPT

## 2022-02-19 PROCEDURE — 36246 INS CATH ABD/L-EXT ART 2ND: CPT

## 2022-02-19 PROCEDURE — 93970 EXTREMITY STUDY: CPT

## 2022-02-19 PROCEDURE — 93356 MYOCRD STRAIN IMG SPCKL TRCK: CPT

## 2022-02-19 PROCEDURE — 83605 ASSAY OF LACTIC ACID: CPT

## 2022-02-19 PROCEDURE — 2580000003 HC RX 258: Performed by: INTERNAL MEDICINE

## 2022-02-19 PROCEDURE — 6360000004 HC RX CONTRAST MEDICATION: Performed by: RADIOLOGY

## 2022-02-19 PROCEDURE — 85027 COMPLETE CBC AUTOMATED: CPT

## 2022-02-19 PROCEDURE — 83735 ASSAY OF MAGNESIUM: CPT

## 2022-02-19 PROCEDURE — 6360000004 HC RX CONTRAST MEDICATION: Performed by: SURGERY

## 2022-02-19 PROCEDURE — 2000000000 HC ICU R&B

## 2022-02-19 PROCEDURE — 94761 N-INVAS EAR/PLS OXIMETRY MLT: CPT

## 2022-02-19 PROCEDURE — 99153 MOD SED SAME PHYS/QHP EA: CPT

## 2022-02-19 PROCEDURE — G0269 OCCLUSIVE DEVICE IN VEIN ART: HCPCS

## 2022-02-19 PROCEDURE — 36415 COLL VENOUS BLD VENIPUNCTURE: CPT

## 2022-02-19 PROCEDURE — APPNB15 APP NON BILLABLE TIME 0-15 MINS: Performed by: NURSE PRACTITIONER

## 2022-02-19 PROCEDURE — 74174 CTA ABD&PLVS W/CONTRAST: CPT

## 2022-02-19 PROCEDURE — 6370000000 HC RX 637 (ALT 250 FOR IP): Performed by: NURSE PRACTITIONER

## 2022-02-19 PROCEDURE — 85610 PROTHROMBIN TIME: CPT

## 2022-02-19 PROCEDURE — 2700000000 HC OXYGEN THERAPY PER DAY

## 2022-02-19 RX ORDER — LIDOCAINE HYDROCHLORIDE 10 MG/ML
5 INJECTION, SOLUTION EPIDURAL; INFILTRATION; INTRACAUDAL; PERINEURAL ONCE
Status: DISCONTINUED | OUTPATIENT
Start: 2022-02-19 | End: 2022-02-20

## 2022-02-19 RX ORDER — SODIUM CHLORIDE 9 MG/ML
INJECTION, SOLUTION INTRAVENOUS PRN
Status: CANCELLED | OUTPATIENT
Start: 2022-02-18

## 2022-02-19 RX ORDER — MAGNESIUM SULFATE IN WATER 40 MG/ML
2000 INJECTION, SOLUTION INTRAVENOUS ONCE
Status: COMPLETED | OUTPATIENT
Start: 2022-02-19 | End: 2022-02-19

## 2022-02-19 RX ORDER — IODIXANOL 320 MG/ML
300 INJECTION, SOLUTION INTRAVASCULAR
Status: COMPLETED | OUTPATIENT
Start: 2022-02-19 | End: 2022-02-19

## 2022-02-19 RX ORDER — SODIUM CHLORIDE 9 MG/ML
INJECTION, SOLUTION INTRAVENOUS PRN
Status: DISCONTINUED | OUTPATIENT
Start: 2022-02-19 | End: 2022-02-19

## 2022-02-19 RX ORDER — ONDANSETRON 2 MG/ML
INJECTION INTRAMUSCULAR; INTRAVENOUS DAILY PRN
Status: COMPLETED | OUTPATIENT
Start: 2022-02-19 | End: 2022-02-19

## 2022-02-19 RX ORDER — ACETAMINOPHEN 325 MG/1
650 TABLET ORAL EVERY 4 HOURS PRN
Status: DISCONTINUED | OUTPATIENT
Start: 2022-02-19 | End: 2022-02-25 | Stop reason: HOSPADM

## 2022-02-19 RX ORDER — SODIUM CHLORIDE 0.9 % (FLUSH) 0.9 %
5-40 SYRINGE (ML) INJECTION PRN
Status: DISCONTINUED | OUTPATIENT
Start: 2022-02-19 | End: 2022-02-20

## 2022-02-19 RX ORDER — FENTANYL CITRATE 50 UG/ML
INJECTION, SOLUTION INTRAMUSCULAR; INTRAVENOUS DAILY PRN
Status: COMPLETED | OUTPATIENT
Start: 2022-02-19 | End: 2022-02-19

## 2022-02-19 RX ORDER — SODIUM CHLORIDE 0.9 % (FLUSH) 0.9 %
5-40 SYRINGE (ML) INJECTION EVERY 12 HOURS SCHEDULED
Status: DISCONTINUED | OUTPATIENT
Start: 2022-02-19 | End: 2022-02-19 | Stop reason: SDUPTHER

## 2022-02-19 RX ORDER — SODIUM CHLORIDE 9 MG/ML
25 INJECTION, SOLUTION INTRAVENOUS PRN
Status: DISCONTINUED | OUTPATIENT
Start: 2022-02-19 | End: 2022-02-20

## 2022-02-19 RX ORDER — MIDAZOLAM HYDROCHLORIDE 1 MG/ML
INJECTION INTRAMUSCULAR; INTRAVENOUS DAILY PRN
Status: COMPLETED | OUTPATIENT
Start: 2022-02-19 | End: 2022-02-19

## 2022-02-19 RX ORDER — CALCIUM GLUCONATE 20 MG/ML
1000 INJECTION, SOLUTION INTRAVENOUS ONCE
Status: COMPLETED | OUTPATIENT
Start: 2022-02-19 | End: 2022-02-19

## 2022-02-19 RX ORDER — TRISODIUM CITRATE DIHYDRATE AND CITRIC ACID MONOHYDRATE 500; 334 MG/5ML; MG/5ML
30 SOLUTION ORAL 2 TIMES DAILY
Status: DISCONTINUED | OUTPATIENT
Start: 2022-02-19 | End: 2022-02-21

## 2022-02-19 RX ADMIN — SODIUM CHLORIDE, PRESERVATIVE FREE 10 ML: 5 INJECTION INTRAVENOUS at 20:23

## 2022-02-19 RX ADMIN — MIDAZOLAM 0.5 MG: 1 INJECTION INTRAMUSCULAR; INTRAVENOUS at 10:52

## 2022-02-19 RX ADMIN — VASOPRESSIN 0.03 UNITS/MIN: 20 INJECTION PARENTERAL at 00:24

## 2022-02-19 RX ADMIN — SODIUM CITRATE AND CITRIC ACID MONOHYDRATE 30 ML: 500; 334 SOLUTION ORAL at 22:05

## 2022-02-19 RX ADMIN — MAGNESIUM SULFATE HEPTAHYDRATE 2000 MG: 40 INJECTION, SOLUTION INTRAVENOUS at 14:23

## 2022-02-19 RX ADMIN — SEVELAMER CARBONATE 800 MG: 800 TABLET, FILM COATED ORAL at 09:20

## 2022-02-19 RX ADMIN — SEVELAMER CARBONATE 800 MG: 800 TABLET, FILM COATED ORAL at 18:03

## 2022-02-19 RX ADMIN — GABAPENTIN 100 MG: 100 CAPSULE ORAL at 20:30

## 2022-02-19 RX ADMIN — LORAZEPAM 0.5 MG: 0.5 TABLET ORAL at 20:23

## 2022-02-19 RX ADMIN — INSULIN LISPRO 3 UNITS: 100 INJECTION, SOLUTION INTRAVENOUS; SUBCUTANEOUS at 01:12

## 2022-02-19 RX ADMIN — FENTANYL CITRATE 25 MCG: 50 INJECTION INTRAMUSCULAR; INTRAVENOUS at 11:07

## 2022-02-19 RX ADMIN — Medication 6 MG: at 21:18

## 2022-02-19 RX ADMIN — PHENYLEPHRINE HYDROCHLORIDE 10 MCG/MIN: 10 INJECTION INTRAVENOUS at 00:24

## 2022-02-19 RX ADMIN — PANTOPRAZOLE SODIUM 40 MG: 40 INJECTION, POWDER, FOR SOLUTION INTRAVENOUS at 09:12

## 2022-02-19 RX ADMIN — CALCIUM GLUCONATE 1000 MG: 20 INJECTION, SOLUTION INTRAVENOUS at 09:11

## 2022-02-19 RX ADMIN — ONDANSETRON 4 MG: 2 INJECTION INTRAMUSCULAR; INTRAVENOUS at 12:43

## 2022-02-19 RX ADMIN — ACETAMINOPHEN 650 MG: 325 TABLET ORAL at 21:17

## 2022-02-19 RX ADMIN — FENTANYL CITRATE 25 MCG: 50 INJECTION INTRAMUSCULAR; INTRAVENOUS at 10:51

## 2022-02-19 RX ADMIN — IODIXANOL 160 ML: 320 INJECTION, SOLUTION INTRAVASCULAR at 13:52

## 2022-02-19 RX ADMIN — INSULIN LISPRO 4 UNITS: 100 INJECTION, SOLUTION INTRAVENOUS; SUBCUTANEOUS at 05:38

## 2022-02-19 RX ADMIN — SODIUM CHLORIDE, PRESERVATIVE FREE 10 ML: 5 INJECTION INTRAVENOUS at 07:46

## 2022-02-19 RX ADMIN — GABAPENTIN 100 MG: 100 CAPSULE ORAL at 09:11

## 2022-02-19 RX ADMIN — ATORVASTATIN CALCIUM 10 MG: 10 TABLET, FILM COATED ORAL at 20:23

## 2022-02-19 RX ADMIN — LORAZEPAM 0.5 MG: 0.5 TABLET ORAL at 09:11

## 2022-02-19 RX ADMIN — IOPAMIDOL 75 ML: 755 INJECTION, SOLUTION INTRAVENOUS at 03:43

## 2022-02-19 RX ADMIN — PANTOPRAZOLE SODIUM 40 MG: 40 INJECTION, POWDER, FOR SOLUTION INTRAVENOUS at 20:23

## 2022-02-19 RX ADMIN — FENTANYL CITRATE 25 MCG: 50 INJECTION INTRAMUSCULAR; INTRAVENOUS at 11:10

## 2022-02-19 RX ADMIN — ONDANSETRON 4 MG: 2 INJECTION INTRAMUSCULAR; INTRAVENOUS at 12:11

## 2022-02-19 RX ADMIN — MIDAZOLAM 0.5 MG: 1 INJECTION INTRAMUSCULAR; INTRAVENOUS at 11:08

## 2022-02-19 RX ADMIN — TAMSULOSIN HYDROCHLORIDE 0.4 MG: 0.4 CAPSULE ORAL at 09:11

## 2022-02-19 RX ADMIN — MIDAZOLAM 0.5 MG: 1 INJECTION INTRAMUSCULAR; INTRAVENOUS at 11:12

## 2022-02-19 ASSESSMENT — PAIN SCALES - GENERAL
PAINLEVEL_OUTOF10: 9
PAINLEVEL_OUTOF10: 0

## 2022-02-19 NOTE — PROGRESS NOTES
[facility]                                               Progress note    CC: hematochezia, ESRD  Summary:   Carter Marie is being seen by nephrology for ESRD. He is a 77 y.o. male with a PMH significant for ESRD on HD TTS, colon cancer, T2DM, hypertension at baseline who was just discharged after admission 2/7/2022-2/10/2022 for a rectal bleed. He now presented back to the hospital 2/12/2022 with the same complaint. Bleeding started after resuming plavix. Interval History  Seen at bedside in ICU and d/w nurse  Overnight patient had GI bleed and required multiple blood transfusions and also required pressors which is improving and now on low dose of 1 pressor and SBP is in 120 range. Patient is on 2 liter of nasal canula oxygen and denied SOB  Lytes stable. Hb better after transfusions. Plan:   - HD today and will keep even as BP was soft and required pressors and also patient does not look volume overloaded. D/W dialysis nurse. - Monitor vitals, labs and I/O closely  - Low K diet. D/W nurse. Taj Rahman MD  [facility]  Office: [phone]    Assessment:   ESRD  TTS at [facility] , has not started there yet. Is originally from [facility] but staying in [place] for rehab so dialyzing with us for now. He has a left AV fistula, positive thrill and bruit     Electrolytes  No acute issues.     Hypotension  Required pressors which is improving now.      SHPT  No acute issues.      GI bleed  Has known colon cancer  GI consulted. ROS:     Positives Listed Bold. All other remaining systems are negative. Constitutional:  fever, chills, weakness, weight change, fatigue,      Skin:  rash, pruritus, hair loss, bruising, dry skin, petechiae. Head, Face, Neck   headaches, swelling,  cervical adenopathy.      Respiratory: shortness of breath, cough, or wheezing  Cardiovascular: chest pain, palpitations, dizzy, edema  Gastrointestinal: nausea, vomiting, diarrhea, constipation,belly pain    Yellow skin, blood in stool  Musculoskeletal:  back pain, muscle weakness, gait problems,       joint pain or swelling. Genitourinary:  dysuria, poor urine flow, flank pain, blood in urine  Neurologic:  vertigo, TIA'S, syncope, seizures, focal weakness  Psychosocial:  insomnia, anxiety, or depression. Additional positive findings: -     PMH:   Past medical history, surgical history, social history, family history are reviewed and updated as appropriate. Reviewed current medication list.   Allergies reviewed and updated as needed. PE:   Vitals:    02/18/22 1700   BP: 119/66   Pulse: 75   Resp: 18   Temp: 98.4 °F (36.9 °C)   SpO2: 94%       General appearance: AOx3 in no acute distress, comfortable, communicative, awake and alert. HEENT: no icterus, EOM intact, trachea midline. Neck : no masses, appears symmetrical and no JVD appreciated. Respiratory: Respiratory effort normal, bilateral equal chest rise. No wheeze, no crackles   Cardiovascular: Ausculation shows RRR and  no edema   Abdomen: abdomen is soft, non distended, no masses, no pain with palpation. Musculoskeletal:  no joint swelling, no deformity, strength grossly normal.   Skin: no rashes, no induration, no tightening, no jaundice   Neuro:   Follows commands, moves all extremities spontaneously       Lab Results   Component Value Date    CREATININE 4.3 (H) 02/18/2022    BUN 18 02/18/2022     (L) 02/18/2022    K 4.4 02/18/2022     02/18/2022    CO2 24 02/18/2022      Lab Results   Component Value Date    WBC 3.1 (L) 02/18/2022    HGB 5.8 (LL) 02/18/2022    HCT 17.9 (LL) 02/18/2022    MCV 91.0 02/18/2022     02/18/2022     Lab Results   Component Value Date    CALCIUM 7.7 (L) 02/18/2022    PHOS 4.2 02/10/2022

## 2022-02-19 NOTE — PROGRESS NOTES
Patient came back from IR embolization at 1330. Patient with c/o nausea at this time. Prior to surgery patient was in NSR. Patient came back from procedure in SR with 1st degree AV block with occasional PVCs. Dr. Zhang Barajas notified. Currently on 2L NC. Dr Robyn Bean with surgery informed of two bloody BMs post surgical intervention.

## 2022-02-19 NOTE — PROGRESS NOTES
02/19/2022    GLUCOSE 277 02/19/2022         IRINA Menchaca CNP  Electronically signed 2/19/2022 at 7:08 AM     Agree with above note. The patient was personally seen and examined. Krishna Cai is doing OK. Had bleeding last evening/overnight. Received 6 units total of pRBCs yesterday and last evening, along with 2 of ffp and 2 of platelets. He was on 3 pressors at one point, but has now been weaned off. No additional bloody BMs since last evening. 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 Hgb up to 10 after transfusion overnight and off pressors. Discussed with GI. No obvious bleeding source at anastomosis with recent colonoscopy. Further endoscopy would not be useful to control pseudoaneurysm. Discussed with IR. Plan for IR coil embolization to prevent future bleeding as the patient needs anticoagulation for his PE. I discussed with the patient that this may cause ischemia at the anastomosis and possible future surgery, but the risk of not going to anticoagulation for his PEs could be life threatening. Bilateral PEs with right heart strain on CT - echo ordered, cardiology consulted. Plan for heparin gtt after coil embolization. ESRD - HD per nephrology. He did receive dye load with CTA overnight.     Jannie Morris MD

## 2022-02-19 NOTE — PROGRESS NOTES
This RN paged hospitalist Dr. Sarmiento Jackie Dr. Castro Haddad, and surgeon Dr. Andrey Pickard various times throughout the night after patient had large bloody bowel movement which saturated through all pads and linens. Transfusions, CTA abdomen pelvis, pressors all managed per orders. Dr. Andrey Pickard from general surgery also communicated with Dr. Ivonne Srivastava in IR for consideration of embolization based on what CT results came back. See student RN note for further details. [id number] Daughter, Priyanka Hanson, called for update. Updated on transfusions and pressors overnight but improving overall now. All questions answered at this time. Day shift RN will assist him to call his family during morning care. 0700 Spoke with nephrologist Dr. Pb Yang in unit. He will place orders for HD with keep fluid even due to overnight events.

## 2022-02-19 NOTE — PROGRESS NOTES
Patient had sats in the 50s while sleeping, was noted to be snoring. Patient aroused easily and sats returned immediately to 90s. Started on 1 L NC as precaution. Patient states they are not on oxygen or CPAP at home. Will continue to monitor oxygenation needs.

## 2022-02-19 NOTE — PROGRESS NOTES
Patient has had three bloody BMs post IR procedure. Follow up H&H ordered and MD Girard notified.  Trending H&H Q6.

## 2022-02-19 NOTE — PROGRESS NOTES
Patient nurse in room with aide to take patient to bathroom . Patient found to have extensive bleeding bright red blood with big clots from the rectum,. .. RAPID RESPONSE CALLED. MD Frank at bedside. Patient alert and orienteed at this time. Vitals : BP low see MAR. [place] Pyo Fluid bolus, and order to transfuse blood ordered by MD Karel Moy. Patient family given updates/ and patient general surgery and GI made aware at this time .

## 2022-02-19 NOTE — BRIEF OP NOTE
Brief Postoperative Note    Jaxson Connors  YOB: 1945  [id number]    Pre-operative Diagnosis: GI bleed    Post-operative Diagnosis: Same    Procedure: Mesenteric Angiogram and Coil embolization of the pseudoaneurysm    Anesthesia: Moderate Sedation    Surgeons: Bogdan Genao MD    Estimated Blood Loss: Less than 5 mL    Complications: None    Specimens: Was Not Obtained    Findings: Successful coil embolization of the pseudoaneurysm, which was from a side branch of the superior rectal artery. The superior rectal artery was also coil embolized distal to the side branch as this was first thought to be where the pseudoaneurysm was located. Unable to get passed the pseudoaneurysm in the side branch therefore the pseudoaneurysm was coiled and also the more proximal part of the side branch. There should be no adverse outcomes from coil embolizing the superior rectal artery as there is collateral circulation from the inferior and middle rectal arteries.     Electronically signed by Bogdan Genao MD on 2/19/2022 at 1:21 PM

## 2022-02-19 NOTE — CONSULTS
PATIENT IS SEEN AT THE REQUEST OF DR. Dolores Martin for ICU admission    CONSULTING PHYSICIAN: Pascual    HISTORY OF PRESENT ILLNESS:  This is a 77 y.o. male who was transferred to the ICU early this am due to acute GI bleeding with hypotension/shock. He was started on pressors and transfused PRBC. Apparently has been bleeding from an anastomosis from previous surgery. He said he had the worst few hours of his life last night with belly pain and passing blood. He says he is better now but feels worn out. No breathing issues currently. No belly pain currently either. Established Pulmonologist:  None    PAST MEDICAL HISTORY:  Past Medical History:   Diagnosis Date    Arthritis     [facility] ([facility] [address] [facility]     Colon Cancer diagnosed last month    Diabetes mellitus ([facility] [facility]     Hemodialysis patient ([facility] [facility]     Hypertension        PAST SURGICAL HISTORY:  Past Surgical History:   Procedure Laterality Date    COLECTOMY N/A 1/26/2022    SIGMOID COLECTOMY, SIGMOIDOSCOPY performed by Kathrine Riddle MD at [facility]  Left     unsure of date    SIGMOIDOSCOPY N/A 2/17/2022    SIGMOIDOSCOPY CONTROL HEMORRHAGE performed by Cyndie Armendariz MD at [address]:  Brother had renal cancer  Other brother had \"hip cancer\"    SOCIAL HISTORY:   reports that he has quit smoking.  He has never used smokeless tobacco.    Scheduled Meds:   sodium chloride  500 mL IntraVENous Once    LORazepam  0.5 mg Oral BID    epoetin davis-epbx  20,000 Units IntraVENous Once per day on Tue Thu Sat    atorvastatin  10 mg Oral Nightly    gabapentin  100 mg Oral TID    sevelamer  800 mg Oral TID WC    tamsulosin  0.4 mg Oral QAM    sodium chloride flush  5-40 mL IntraVENous 2 times per day    pantoprazole  40 mg IntraVENous Q12H    And    sodium chloride (PF)  10 mL IntraVENous Q12H    insulin lispro  0-6 Units SubCUTAneous Q4H       Continuous Infusions:   phenylephrine (LAINE-SYNEPHRINE) 50mg/250mL infusion 30 mcg/min (02/19/22 0615)    vasopressin (Septic Shock) infusion Stopped (02/19/22 0309)    norepinephrine Stopped (02/19/22 0117)    sodium chloride Stopped (02/18/22 2215)    sodium chloride      dextrose         PRN Meds:  sodium chloride flush, sodium chloride, acetaminophen **OR** acetaminophen, ondansetron, guaiFENesin-dextromethorphan, glucose, dextrose, glucagon (rDNA), dextrose, lidocaine, melatonin    ALLERGIES:  Patient is allergic to lisinopril. REVIEW OF SYSTEMS:  Constitutional: Negative for fever or chills  HENT: Negative for sore throat  Eyes: Negative for redness   Respiratory: Negative for dyspnea, cough  Cardiovascular: Negative for chest pain  Gastrointestinal: Belly pain, passing large amount of blood per rectum   Genitourinary: Negative for hematuria, negative for dysuria  Musculoskeletal: Negative for arthralgias   Skin: Negative for rash  Neurological: Negative for syncope  Hematological: Negative for adenopathy  Extremities:  Negative for swelling    PHYSICAL EXAM:  Blood pressure (!) 146/73, pulse 93, temperature 97.5 °F (36.4 °C), temperature source Axillary, resp. rate 23, height 6' (1.829 m), weight 220 lb 7.4 oz (100 kg), SpO2 99 %.'  Gen: No distress. Pale  Eyes: PERRL. No sclera icterus. No conjunctival injection. ENT: No discharge. Pharynx clear. Neck: Trachea midline. No obvious mass. Resp: Diminished   CV: Regular rate. Regular rhythm. No murmur or rub. GI: Non-tender. Non-distended. No hernia. BS present. Skin: Pale  Lymph: No cervical LAD. No supraclavicular LAD. M/S: No cyanosis. No joint deformity. Neuro: Awake. Alert. Moves all four extremities.    EXT:   no edema, no clubbing    LABS:  CBC:   Recent Labs     02/18/22  2110 02/19/22  0119 02/19/22  0433   WBC 3.9* 11.0 10.2   HGB 6.8* 8.3* 10.0*   HCT 20.6* 26.4* 30.5*   MCV 91.4 94.4 91.3    275 213     BMP:   Recent Labs     02/17/22  1027 02/18/22  0654 02/19/22  0138   * 135* 140   K 3.9 4.4 4.7    103 102   CO2 23 24 19*   BUN 12 18 23*   CREATININE 2.4* 4.3* 5.1*     LIVER PROFILE: No results for input(s): AST, ALT, LIPASE, BILIDIR, BILITOT, ALKPHOS in the last 72 hours. Invalid input(s): AMYLASE,  ALB  PT/INR:   Recent Labs     02/19/22  0138   PROTIME 12.7   INR 1.12     APTT: No results for input(s): APTT in the last 72 hours. UA:No results for input(s): NITRITE, COLORU, PHUR, LABCAST, WBCUA, RBCUA, MUCUS, TRICHOMONAS, YEAST, BACTERIA, CLARITYU, SPECGRAV, LEUKOCYTESUR, UROBILINOGEN, BILIRUBINUR, BLOODU, GLUCOSEU, AMORPHOUS in the last 72 hours. Invalid input(s): KETONESU  No results for input(s): PHART, GPV3LGT, PO2ART in the last 72 hours. Cultures:  None      ECHO: 1/22  Left ventricular size is normal. Moderate concentric left ventricular hypertrophy is present. Global left ventricular function is normal with ejection fraction estimated from 55 % to 60 %. Grade II diastolic dysfunction with elevated LV filling pressures. Normal right ventricular size and function. ABG:  None    AB CT:  1. Lobar, segmental, and subsegmental pulmonary emboli in the right lower   lobe with findings of right heart strain.  Critical results were called by   Dr. Christine Aquino MD to Dr. Kemar Patel on 2/19/2022 at 03:02.   2. 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  Adjacent stranding may be related to the recent surgery but   could also be seen with acute diverticulitis. Ahmed Ship is no evident   extravasation of contrast into the bowel lumen. 3. Partially liquid stool in the colon could be due to diarrhea with no   findings of enterocolitis. I reviewed all the above labs and studies pertaining to this visit. ASSESSMENT/PLAN:  · Shock which could be both circulatory and obstructive (from PE)  · Currently off pressors.   Resume levophed if MAP drops below 60  · IV fluids for volume  · Acute Blood Loss Anemia s/p 6 units   · Goal Hb is 7. Surgery and GI following. Awaiting IR input for possible embolization  · Acute Bilateral PE  · Unable to anticoagulated  · ECHO for RV strain  · Dopplers for DVT.   If DVT are present and extensive, likely will need filter   · Lactic Acidosis due to hypotension   · ESRD   · COVID-19    DVT prophylaxis  SCD    Thanks    Kayce Lundy DO  [facility]

## 2022-02-19 NOTE — PLAN OF CARE
Problem: Infection:  Goal: Will remain free from infection  Description: Will remain free from infection  2/19/2022 0404 by Collette Dais  Outcome: Ongoing  2/18/2022 2144 by Ottoniel Gottlieb RN  Outcome: Ongoing     Problem: Safety:  Goal: Free from accidental physical injury  Description: Free from accidental physical injury  2/19/2022 0404 by Collette Dais  Outcome: Ongoing  2/18/2022 2144 by Ottoniel Gottlieb RN  Outcome: Ongoing  Goal: Free from intentional harm  Description: Free from intentional harm  2/19/2022 0404 by Collette Dais  Outcome: Ongoing  2/18/2022 2144 by Ottoniel Gottlieb RN  Outcome: Ongoing     Problem: Daily Care:  Goal: Daily care needs are met  Description: Daily care needs are met  2/19/2022 0404 by Collette Dais  Outcome: Ongoing  2/18/2022 2144 by Ottoniel Gottlieb RN  Outcome: Ongoing     Problem: Pain:  Goal: Patient's pain/discomfort is manageable  Description: Patient's pain/discomfort is manageable  2/19/2022 0404 by Collette Dais  Outcome: Ongoing  2/18/2022 2144 by Ottoniel Gottlieb RN  Outcome: Ongoing     Problem: Skin Integrity:  Goal: Skin integrity will stabilize  Description: Skin integrity will stabilize  2/19/2022 0404 by Collette Dais  Outcome: Ongoing  2/18/2022 2144 by Ottoniel Gottlieb RN  Outcome: Ongoing     Problem: Discharge Planning:  Goal: Patients continuum of care needs are met  Description: Patients continuum of care needs are met  2/19/2022 0404 by Collette Dais  Outcome: Ongoing  2/18/2022 2144 by Ottoniel Gottlieb RN  Outcome: Ongoing     Problem: Bleeding:  Goal: Will show no signs and symptoms of excessive bleeding  Description: Will show no signs and symptoms of excessive bleeding  2/19/2022 0404 by Collette Dais  Outcome: Ongoing  2/18/2022 2144 by Ottoniel Gottlieb RN  Outcome: Ongoing

## 2022-02-19 NOTE — PROGRESS NOTES
Gastroenterology Progress Note    Ophelia Dorsey is a 77 y.o. male patient. Hospitalization day:7    SUBJECTIVE:    Patient with an episode of large-volume hematochezia with resulting hypotension, resulting in transfer to the ICU and transfusion of 2 units PRBC and pressor support. GI and general surgery were consulted, and CTA was performed noting a pseudoaneurysm at the anastomotic site. He reports he is feeling better this morning. He denies any abdominal pain. No further bleeding since transfer to the ICU.    ROS:  Cardiovascular ROS: no chest pain or dyspnea on exertion  Gastrointestinal ROS: positive for - rectal bleeding  negative for - abdominal pain or diarrhea  Respiratory ROS: no cough, shortness of breath, or wheezing    Physical      Gen: Resting in bed, NAD  HEENT: Normocephalic, atraumatic, no scleral icterus   CV: RRR no MRG   Pul: CTAB, normal work of breathing without wheezing  Abd: Good bowel sounds throughout, no scars, soft, NT/ND, no masses, no HSM   Ext: No edema, moves all 4 extremities. Neuro: Moves all four extremities, no gross deficits, follows commands   Skin: No jaundice, spider angiomas, palmar erythema     Data    CBC:   Lab Results   Component Value Date    WBC 10.2 02/19/2022    RBC 3.34 02/19/2022    HGB 10.0 02/19/2022    HCT 30.5 02/19/2022    MCV 91.3 02/19/2022    MCH 29.8 02/19/2022    MCHC 32.6 02/19/2022    RDW 14.4 02/19/2022     02/19/2022    MPV 7.4 02/19/2022     Hepatic Function Panel:    Lab Results   Component Value Date    ALKPHOS 91 02/12/2022    ALT 7 02/12/2022    AST 11 02/12/2022    PROT 5.6 02/12/2022    PROT 7.5 07/26/2011    BILITOT 0.3 02/12/2022    BILIDIR <0.2 02/12/2022    IBILI see below 02/12/2022         Radiology Review:    CTA ABDOMEN PELVIS W WO CONTRAST   Preliminary Result   1. Lobar, segmental, and subsegmental pulmonary emboli in the right lower   lobe with findings of right heart strain.   Critical results were called by    Paco Rodriguez MD to Dr. Luc Mckinney on 2/19/2022 at 03:02.   2. 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 Adjacent stranding may be related to the recent surgery but   could also be seen with acute diverticulitis. There is no evident   extravasation of contrast into the bowel lumen. 3. Partially liquid stool in the colon could be due to diarrhea with no   findings of enterocolitis. CTA ABDOMEN PELVIS W WO CONTRAST   Final Result   1. No high-density contrast within the lumen of the stomach, small bowel,   and colon to indicate a site of active hemorrhage. 2.  Previous partial colectomy with an anastomosis at the expected   rectosigmoid junction. There is a focal outpouching along the left side of   the anastomosis. This was also noted on the previous exam but has decreased   in size. Mild inflammatory changes are still present at the site. 3.  No general ascites and no pneumoperitoneum. There is no bowel   obstruction or hydronephrosis. 4.  Multiple cysts are present within the kidneys some of which are too small   to characterize. ASSESSMENT:  Yanira Spencer is a 77 y.o. male with a PMH of  colon adenocarcinoma dx 2 months ago s/p sigmoid resection 1/26/22, DM, ESRD on HD, HTN, and arthritiswho presented on 2/7/2022 with rectal bleed. We have been consulted regarding osmani.     IMPRESSION:     1. Lower GI bleed. Patient underwent sigmoid resection on 1/26/22 for colon adenocarcinoma with episodes of bright red blood per rectum postoperatively. Flexible sigmoidoscopy  2/17/22 with two areas of friability of the suture/staple line and mucosal tattoo at the anastomosis, and two hemoclips were placed. There was minimal bleeding before and no bleeding following intervention without any blood in the colon lumen.   Patient with recurrent large-volume hematochezia with CTA showing anastomotic pseudoaneurysm, which is likely the source of episodic bleeding. IR was consulted for embolization. Will defer to general surgery for surgical exploration and revision of anastomosis versus attempted embolization by IR. The pseudoaneurysm was not evident on endoscopic evaluation and is not amenable to endoscopic treatment. 2. Colon adenocarcinoma s/p resection 1/26/22. Surgery following. Notably a small outpouching is seen at the anastomosis which is smaller from his last cross-sectional imaging. Suspect this may represent pseudoaneurysm. 3. Pulmonary embolism: CTA notable for PE with right heart strain. Recommend cardiology consult for management. Patient will need anticoagulation as soon as bleeding source is controlled. 4. Recent CVA. Plavix on hold due to #1. No plans for restart per documentation. 5. Colon polyps: Patient with many other polyps seen on flexible sigmoidoscopy but not resected. Recommend outpatient colonoscopy in 4-6 weeks. 6. Covid: Defer to primary team for management    Recommendations:  -IR embolization of pseudoaneurysm versus surgical revision of sigmoid anastomosis  -Cardiology consult for PE with right heart strain  -Outpatient colonoscopy     Thank you for allowing me to participate in this patient's care. No further GI work-up needed at this time. We will sign off, however please contact us with any additional questions at [phone].       Franklin Villareal MD

## 2022-02-19 NOTE — PROGRESS NOTES
2nd unit of PRBC's started patient tolerating well. IV site clean dry and intact. Vital signs within normal limits. Will continue to monitor.

## 2022-02-19 NOTE — PROGRESS NOTES
Patient transported to ICU at this time. RN given report. Patient stable, will be infusing PRBC order in ICU. No distress noted during rapid response. Patient alert and oriented during rapid response. MD aware. Patient daughter given updates.

## 2022-02-19 NOTE — PROGRESS NOTES
Patient arrived to the ICU at 930pm. 2 units PRBC were ordered and transfused after the rapid response. Patient had a large bright red bloody and clotted BM at 11:30p. Patient also had emesis of last meal they ate during the BM. Patient was completely bathed, regowned and linens changed. Patients pressures then began to drop while performing care. Levo started, then changed to mayank d/t tachycardia, and vaso was then added b/c of little improvement. D/t the loss of blood and decreased H+H, 2 more units of PRBCs, 2 PLT, and 2 FFP were ordered. All were transfused per orders, second bag of PLT had to be ordered from [facility], so other blood products were transfused in the meantime. Pressures improved within the next couple hours as blood products were infusing. Vaso and levo were stopped at around 03:00. Patient went for a CTA and no active bleeding was found. Mayank currently @ 25 with stable pressures. No further BM noted since 11p.

## 2022-02-19 NOTE — PROGRESS NOTES
Hospitalist Progress Note    Patient:  Erin Shipman  Unit/Bed:N1I-8593/2122-01   YOB: 1945       MRN: [id number] Acct: [de-identified]  PCP: Pat Camejo    Date of Admission: 2/12/2022  --------------------------    Chief Complaint:     Rectal bleed    Hospital Course:     Erin Shipman is a 77 y.o. male hospitalized on 2/12/2022   for rectal bleeding, he was recently underwent colectomy for colorectal malignancy on 1/26/2022. Patient was rehospitalized on 2/7/2020 through 2/10/2022 for lower GI bleeding. Patient was discharged to extended care facility and readmitted for the same. Assessment/plan:      Assessment  Acute GI bleeding, the setting of recent colectomy for colon cancer CTA of the abdomen reviewed, no brisk bleeding. GI and surgery service consulted    Evaluated by GI, general surgery  Had a large blood clot pass yesterday right before discharge  Continue IV Protonix  Surgery input noted, diet advance per surgery  Continue holding Plavix  Flex sig on 2/17 showed a friable surgical anstomosis 18 cm from anorectal verge, with visible suture and two areas of oozing with lavage and two hemoclips placed  Still with bleeding, transfused 4 U in last 24 hours, transferred to the ICU for closer monitoring  CTA showed pseudoaneurysm  IR consulted for possible embolization of psueoaneurysm     Acute blood loss anemia  Monitoring hemoglobin, trending down slowly: 8.2-7.6-7.4-7.9-7.7-7.3-5.8-->10  - given 4U PRBC in last 24 hours    Acute PE, possible right heart strain  -check limited Echo  -cardiology consulted  -will need pseudoaneurysm addressed first, and the will likely will need anticoagluation     Hypotension, multifactorial possibly.  Due to PE and ongoing bleeding     Lactic acidosis, likely secondary to hypotension, resolved     Hyponatremia, secondary to renal failure,  Resolved    COVID-19 infection, asymptomatic diagnosed 2/10/2022  Patient received vaccination ( Pfizer 2 doses) should come off isolation tomorrow. Recent CVA  Continue statin, holding antiplatelet therapy secondary to the bleeding. End-stage renal failure on hemodialysis  Nephrology following, continue hemodialysis          Code Status: Full Code         DVT prophylaxis: SCD secondary to GI bleed     Disposition: Pending improvement in Hgb    Discussed with the patient.    ----------------      Subjective:     Patient seen and examined  Became hypotensive overnight with more bleeding  Feels better now  Moved to the ICU  IR consulted for possible embolization  Found to have PE    Diet: Diet NPO Exceptions are: Sips of Water with Meds    OBJECTIVE     Exam:  /65   Pulse 82   Temp 98.7 °F (37.1 °C) (Oral)   Resp 12   Ht 6' (1.829 m)   Wt 211 lb 6.7 oz (95.9 kg)   SpO2 96%   BMI 28.67 kg/m²        Change physical finding from exam 2/13/2022 as below. Gen: Not in distress. Alert. Chronically ill  Head: Normocephalic. Atraumatic. Eyes: Conjunctivae/corneas clear. ENT: Oral mucosa moist  Neck: No JVD. No obvious thyromegaly. CVS: Nml S1S2, no murmur  , RRR  Pulmomary: Clear bilaterally. No crackles. No wheezes. Gastrointestinal: Soft, non tender, non distend, . Musculoskeletal: No edema. Warm  Neuro: No focal deficit. Moves extremity spontaneously. Psychiatry: Appropriate affect. Not agitated.         Medications:  Reviewed    Infusion Medications    phenylephrine (LAINE-SYNEPHRINE) 50mg/250mL infusion Stopped (02/19/22 1039)    vasopressin (Septic Shock) infusion Stopped (02/19/22 0309)    norepinephrine Stopped (02/19/22 0117)    sodium chloride Stopped (02/18/22 2215)    sodium chloride      dextrose       Scheduled Medications    magnesium sulfate  2,000 mg IntraVENous Once    insulin lispro  0-18 Units SubCUTAneous Q4H    calcium gluconate-NaCl  1,000 mg IntraVENous Once    sodium chloride  500 mL IntraVENous Once    LORazepam  0.5 mg Oral BID    epoetin davis-epbx  20,000 Units IntraVENous Adjacent stranding may be related to the recent surgery but   could also be seen with acute diverticulitis. There is no evident   extravasation of contrast into the bowel lumen. 3. Partially liquid stool in the colon could be due to diarrhea with no   findings of enterocolitis. CTA ABDOMEN PELVIS W WO CONTRAST   Final Result   1. No high-density contrast within the lumen of the stomach, small bowel,   and colon to indicate a site of active hemorrhage. 2.  Previous partial colectomy with an anastomosis at the expected   rectosigmoid junction. There is a focal outpouching along the left side of   the anastomosis. This was also noted on the previous exam but has decreased   in size. Mild inflammatory changes are still present at the site. 3.  No general ascites and no pneumoperitoneum. There is no bowel   obstruction or hydronephrosis. 4.  Multiple cysts are present within the kidneys some of which are too small   to characterize.                      Electronically signed by Sophie Mehta MD on 2/19/2022 at 9:51 AM

## 2022-02-19 NOTE — PROGRESS NOTES
Surgery    Discussed with nurse and GI. Patient with ongoing bloody BMs. Patient placed on beck gtt, levo gtt, and vaso gtt. Weaning levo gtt currently. Patient has received 4 units of pRBCs so far from 2/18 to now. Repeat H&H, BMP, and INR ordered. Will give another 2 units of pRBCs and 2 units of platelets as well as 2 units ffp. Per discussion with GI, the patient may benefit from IR gel foam embolization given hypotension and significant bleeding from colorectal anastomosis, with repeat colonoscopy if needed tomorrow.  IR consulted for emergent angiogram.    Danilo Pradhan MD

## 2022-02-20 LAB
ANION GAP SERPL CALCULATED.3IONS-SCNC: 13 MMOL/L (ref 3–16)
BASOPHILS ABSOLUTE: 0 K/UL (ref 0–0.2)
BASOPHILS RELATIVE PERCENT: 0.4 %
BASOPHILS RELATIVE PERCENT: 0.5 %
BASOPHILS RELATIVE PERCENT: 0.6 %
BASOPHILS RELATIVE PERCENT: 0.6 %
BUN BLDV-MCNC: 30 MG/DL (ref 7–20)
CALCIUM SERPL-MCNC: 8.1 MG/DL (ref 8.3–10.6)
CHLORIDE BLD-SCNC: 104 MMOL/L (ref 99–110)
CO2: 23 MMOL/L (ref 21–32)
CREAT SERPL-MCNC: 5.5 MG/DL (ref 0.8–1.3)
EOSINOPHILS ABSOLUTE: 0 K/UL (ref 0–0.6)
EOSINOPHILS ABSOLUTE: 0 K/UL (ref 0–0.6)
EOSINOPHILS ABSOLUTE: 0.1 K/UL (ref 0–0.6)
EOSINOPHILS ABSOLUTE: 0.1 K/UL (ref 0–0.6)
EOSINOPHILS RELATIVE PERCENT: 0.6 %
EOSINOPHILS RELATIVE PERCENT: 0.7 %
EOSINOPHILS RELATIVE PERCENT: 1.6 %
EOSINOPHILS RELATIVE PERCENT: 2 %
GFR AFRICAN AMERICAN: 12
GFR NON-AFRICAN AMERICAN: 10
GLUCOSE BLD-MCNC: 101 MG/DL (ref 70–99)
GLUCOSE BLD-MCNC: 102 MG/DL (ref 70–99)
GLUCOSE BLD-MCNC: 102 MG/DL (ref 70–99)
GLUCOSE BLD-MCNC: 111 MG/DL (ref 70–99)
GLUCOSE BLD-MCNC: 115 MG/DL (ref 70–99)
GLUCOSE BLD-MCNC: 89 MG/DL (ref 70–99)
GLUCOSE BLD-MCNC: 91 MG/DL (ref 70–99)
HCT VFR BLD CALC: 21.2 % (ref 40.5–52.5)
HCT VFR BLD CALC: 21.4 % (ref 40.5–52.5)
HCT VFR BLD CALC: 22.1 % (ref 40.5–52.5)
HCT VFR BLD CALC: 23.4 % (ref 40.5–52.5)
HEMOGLOBIN: 7.1 G/DL (ref 13.5–17.5)
HEMOGLOBIN: 7.2 G/DL (ref 13.5–17.5)
HEMOGLOBIN: 7.6 G/DL (ref 13.5–17.5)
HEMOGLOBIN: 7.8 G/DL (ref 13.5–17.5)
LYMPHOCYTES ABSOLUTE: 0.5 K/UL (ref 1–5.1)
LYMPHOCYTES ABSOLUTE: 0.6 K/UL (ref 1–5.1)
LYMPHOCYTES ABSOLUTE: 0.8 K/UL (ref 1–5.1)
LYMPHOCYTES ABSOLUTE: 0.8 K/UL (ref 1–5.1)
LYMPHOCYTES RELATIVE PERCENT: 12.7 %
LYMPHOCYTES RELATIVE PERCENT: 16.4 %
LYMPHOCYTES RELATIVE PERCENT: 7.4 %
LYMPHOCYTES RELATIVE PERCENT: 9.3 %
MAGNESIUM: 2.2 MG/DL (ref 1.8–2.4)
MCH RBC QN AUTO: 30.1 PG (ref 26–34)
MCH RBC QN AUTO: 30.1 PG (ref 26–34)
MCH RBC QN AUTO: 31.5 PG (ref 26–34)
MCH RBC QN AUTO: 33 PG (ref 26–34)
MCHC RBC AUTO-ENTMCNC: 33.2 G/DL (ref 31–36)
MCHC RBC AUTO-ENTMCNC: 33.4 G/DL (ref 31–36)
MCHC RBC AUTO-ENTMCNC: 33.7 G/DL (ref 31–36)
MCHC RBC AUTO-ENTMCNC: 34.5 G/DL (ref 31–36)
MCV RBC AUTO: 90.1 FL (ref 80–100)
MCV RBC AUTO: 90.6 FL (ref 80–100)
MCV RBC AUTO: 93.6 FL (ref 80–100)
MCV RBC AUTO: 95.7 FL (ref 80–100)
MONOCYTES ABSOLUTE: 0.3 K/UL (ref 0–1.3)
MONOCYTES ABSOLUTE: 0.4 K/UL (ref 0–1.3)
MONOCYTES RELATIVE PERCENT: 4.5 %
MONOCYTES RELATIVE PERCENT: 4.8 %
MONOCYTES RELATIVE PERCENT: 5.3 %
MONOCYTES RELATIVE PERCENT: 5.7 %
NEUTROPHILS ABSOLUTE: 3.8 K/UL (ref 1.7–7.7)
NEUTROPHILS ABSOLUTE: 4.8 K/UL (ref 1.7–7.7)
NEUTROPHILS ABSOLUTE: 5.5 K/UL (ref 1.7–7.7)
NEUTROPHILS ABSOLUTE: 5.9 K/UL (ref 1.7–7.7)
NEUTROPHILS RELATIVE PERCENT: 76.1 %
NEUTROPHILS RELATIVE PERCENT: 80 %
NEUTROPHILS RELATIVE PERCENT: 83.8 %
NEUTROPHILS RELATIVE PERCENT: 87 %
PDW BLD-RTO: 14.5 % (ref 12.4–15.4)
PDW BLD-RTO: 14.6 % (ref 12.4–15.4)
PDW BLD-RTO: 14.8 % (ref 12.4–15.4)
PDW BLD-RTO: 14.8 % (ref 12.4–15.4)
PERFORMED ON: ABNORMAL
PERFORMED ON: NORMAL
PHOSPHORUS: 5.3 MG/DL (ref 2.5–4.9)
PLATELET # BLD: 170 K/UL (ref 135–450)
PLATELET # BLD: 177 K/UL (ref 135–450)
PLATELET # BLD: 183 K/UL (ref 135–450)
PLATELET # BLD: 192 K/UL (ref 135–450)
PMV BLD AUTO: 6.9 FL (ref 5–10.5)
PMV BLD AUTO: 7.2 FL (ref 5–10.5)
PMV BLD AUTO: 7.3 FL (ref 5–10.5)
PMV BLD AUTO: 7.4 FL (ref 5–10.5)
POTASSIUM SERPL-SCNC: 4.7 MMOL/L (ref 3.5–5.1)
RBC # BLD: 2.29 M/UL (ref 4.2–5.9)
RBC # BLD: 2.31 M/UL (ref 4.2–5.9)
RBC # BLD: 2.36 M/UL (ref 4.2–5.9)
RBC # BLD: 2.58 M/UL (ref 4.2–5.9)
SODIUM BLD-SCNC: 140 MMOL/L (ref 136–145)
WBC # BLD: 4.9 K/UL (ref 4–11)
WBC # BLD: 6 K/UL (ref 4–11)
WBC # BLD: 6.6 K/UL (ref 4–11)
WBC # BLD: 6.8 K/UL (ref 4–11)

## 2022-02-20 PROCEDURE — 99221 1ST HOSP IP/OBS SF/LOW 40: CPT | Performed by: SURGERY

## 2022-02-20 PROCEDURE — 83735 ASSAY OF MAGNESIUM: CPT

## 2022-02-20 PROCEDURE — 6370000000 HC RX 637 (ALT 250 FOR IP): Performed by: STUDENT IN AN ORGANIZED HEALTH CARE EDUCATION/TRAINING PROGRAM

## 2022-02-20 PROCEDURE — 6360000002 HC RX W HCPCS: Performed by: STUDENT IN AN ORGANIZED HEALTH CARE EDUCATION/TRAINING PROGRAM

## 2022-02-20 PROCEDURE — 85025 COMPLETE CBC W/AUTO DIFF WBC: CPT

## 2022-02-20 PROCEDURE — 2580000003 HC RX 258: Performed by: STUDENT IN AN ORGANIZED HEALTH CARE EDUCATION/TRAINING PROGRAM

## 2022-02-20 PROCEDURE — 99233 SBSQ HOSP IP/OBS HIGH 50: CPT | Performed by: INTERNAL MEDICINE

## 2022-02-20 PROCEDURE — 80048 BASIC METABOLIC PNL TOTAL CA: CPT

## 2022-02-20 PROCEDURE — 6370000000 HC RX 637 (ALT 250 FOR IP): Performed by: INTERNAL MEDICINE

## 2022-02-20 PROCEDURE — A4216 STERILE WATER/SALINE, 10 ML: HCPCS | Performed by: STUDENT IN AN ORGANIZED HEALTH CARE EDUCATION/TRAINING PROGRAM

## 2022-02-20 PROCEDURE — 36415 COLL VENOUS BLD VENIPUNCTURE: CPT

## 2022-02-20 PROCEDURE — APPNB15 APP NON BILLABLE TIME 0-15 MINS: Performed by: NURSE PRACTITIONER

## 2022-02-20 PROCEDURE — 2000000000 HC ICU R&B

## 2022-02-20 PROCEDURE — 94761 N-INVAS EAR/PLS OXIMETRY MLT: CPT

## 2022-02-20 PROCEDURE — C9113 INJ PANTOPRAZOLE SODIUM, VIA: HCPCS | Performed by: STUDENT IN AN ORGANIZED HEALTH CARE EDUCATION/TRAINING PROGRAM

## 2022-02-20 PROCEDURE — 84100 ASSAY OF PHOSPHORUS: CPT

## 2022-02-20 PROCEDURE — 99024 POSTOP FOLLOW-UP VISIT: CPT | Performed by: SURGERY

## 2022-02-20 RX ADMIN — SEVELAMER CARBONATE 800 MG: 800 TABLET, FILM COATED ORAL at 12:11

## 2022-02-20 RX ADMIN — SODIUM CHLORIDE, PRESERVATIVE FREE 10 ML: 5 INJECTION INTRAVENOUS at 21:03

## 2022-02-20 RX ADMIN — SODIUM CITRATE AND CITRIC ACID MONOHYDRATE 30 ML: 500; 334 SOLUTION ORAL at 09:18

## 2022-02-20 RX ADMIN — LORAZEPAM 0.5 MG: 0.5 TABLET ORAL at 21:03

## 2022-02-20 RX ADMIN — SODIUM CHLORIDE, PRESERVATIVE FREE 10 ML: 5 INJECTION INTRAVENOUS at 08:41

## 2022-02-20 RX ADMIN — PANTOPRAZOLE SODIUM 40 MG: 40 INJECTION, POWDER, FOR SOLUTION INTRAVENOUS at 08:38

## 2022-02-20 RX ADMIN — GABAPENTIN 100 MG: 100 CAPSULE ORAL at 08:38

## 2022-02-20 RX ADMIN — ATORVASTATIN CALCIUM 10 MG: 10 TABLET, FILM COATED ORAL at 21:03

## 2022-02-20 RX ADMIN — PANTOPRAZOLE SODIUM 40 MG: 40 INJECTION, POWDER, FOR SOLUTION INTRAVENOUS at 21:03

## 2022-02-20 RX ADMIN — SODIUM CITRATE AND CITRIC ACID MONOHYDRATE 30 ML: 500; 334 SOLUTION ORAL at 21:03

## 2022-02-20 RX ADMIN — GABAPENTIN 100 MG: 100 CAPSULE ORAL at 21:03

## 2022-02-20 RX ADMIN — SEVELAMER CARBONATE 800 MG: 800 TABLET, FILM COATED ORAL at 16:59

## 2022-02-20 RX ADMIN — TAMSULOSIN HYDROCHLORIDE 0.4 MG: 0.4 CAPSULE ORAL at 08:38

## 2022-02-20 RX ADMIN — GABAPENTIN 100 MG: 100 CAPSULE ORAL at 14:20

## 2022-02-20 RX ADMIN — SEVELAMER CARBONATE 800 MG: 800 TABLET, FILM COATED ORAL at 08:38

## 2022-02-20 ASSESSMENT — PAIN SCALES - GENERAL
PAINLEVEL_OUTOF10: 0

## 2022-02-20 NOTE — PROGRESS NOTES
[facility]                                               Progress note    CC: hematochezia, ESRD  Summary:   Malissa Lanes is being seen by nephrology for ESRD. He is a 77 y.o. male with a PMH significant for ESRD on HD TTS, colon cancer, T2DM, hypertension at baseline who was just discharged after admission 2/7/2022-2/10/2022 for a rectal bleed. He now presented back to the hospital 2/12/2022 with the same complaint. Bleeding started after resuming plavix. Interval History  Seen at bedside in ICU and d/w nurse  Patient is in ICU after major GI bleed and required transfusions and pressors. Now off pressors  On room air and denied SOB  BP stable. Lytes stable  Yesterday unable to do dialysis due to AVF clotted. No bruit on exam      Plan:   - No urgency for dialysis at this time  - Vascular surgery consulted to assess for AVG clotting and might need Tunneled dialysis catheter to continue dialysis. Will do dialysis as soon as have access. - Monitor vitals, labs and I/O closely  - Low K diet. D/W nurse. Meme Feliciano MD  [facility]  Office: [phone]    Assessment:   ESRD  TTS at [facility] , has not started there yet. Is originally from [place] but staying in [place] for rehab so dialyzing with us for now. He has a left AV fistula. No bruit.      Electrolytes  No acute issues.     Hypotension  Required pressors. Now resolved and BP stable without pressors.      SHPT  No acute issues.      GI bleed  Has known colon cancer  GI consulted. ROS:     Positives Listed Bold. All other remaining systems are negative. Constitutional:  fever, chills, weakness, weight change, fatigue,      Skin:  rash, pruritus, hair loss, bruising, dry skin, petechiae. Head, Face, Neck   headaches, swelling,  cervical adenopathy.      Respiratory: shortness of breath, cough, or wheezing  Cardiovascular: chest pain, palpitations, dizzy, edema  Gastrointestinal: nausea, vomiting, diarrhea, constipation,belly pain    Yellow skin, blood in stool  Musculoskeletal:  back pain, muscle weakness, gait problems,       joint pain or swelling. Genitourinary:  dysuria, poor urine flow, flank pain, blood in urine  Neurologic:  vertigo, TIA'S, syncope, seizures, focal weakness  Psychosocial:  insomnia, anxiety, or depression. Additional positive findings: -     PMH:   Past medical history, surgical history, social history, family history are reviewed and updated as appropriate. Reviewed current medication list.   Allergies reviewed and updated as needed. PE:   Vitals:    02/20/22 0500   BP: (!) 127/58   Pulse: 90   Resp: 15   Temp:    SpO2:        General appearance: AOx3 in no acute distress, comfortable, communicative, awake and alert. HEENT: no icterus, EOM intact, trachea midline. Neck : no masses, appears symmetrical and no JVD appreciated. Respiratory: Respiratory effort normal, bilateral equal chest rise. No wheeze, no crackles   Cardiovascular: Ausculation shows RRR and  no edema   Abdomen: abdomen is soft, non distended, no masses, no pain with palpation. Musculoskeletal:  no joint swelling, no deformity, strength grossly normal.   Skin: no rashes, no induration, no tightening, no jaundice   Neuro:   Follows commands, moves all extremities spontaneously       Lab Results   Component Value Date    CREATININE 5.5 (HH) 02/20/2022    BUN 30 (H) 02/20/2022     02/20/2022    K 4.7 02/20/2022     02/20/2022    CO2 23 02/20/2022      Lab Results   Component Value Date    WBC 6.0 02/20/2022    HGB 7.8 (L) 02/20/2022    HCT 23.4 (L) 02/20/2022    MCV 90.6 02/20/2022     02/20/2022     Lab Results   Component Value Date    CALCIUM 8.1 (L) 02/20/2022    PHOS 4.2 02/10/2022

## 2022-02-20 NOTE — CONSULTS
VASCULAR CONSULTATION    Asked to see pt re: loss of HD access and need for reestablishment of access. S/P L radiocephalic AVF 3 years ago in [place]. Last worked on Thursday. Unable to cannulate yesterday with loss of thrill. Currently being treated for a postoperative GI bleed due to superior rectal artery pseudoaneurysm - yesterday coil embolized successfully. Also with LLE DVT.     Past Medical History:   Diagnosis Date    Arthritis     Cancer [facility])     Colon Cancer diagnosed last month    Diabetes mellitus ([facility]     Hemodialysis patient ([facility]     Hypertension      Past Surgical History:   Procedure Laterality Date    COLECTOMY N/A 1/26/2022    SIGMOID COLECTOMY, SIGMOIDOSCOPY performed by Champ Weller MD at [address] [facility] [employer]  2/19/2022    IR EMBOLIZATION HEMORRHAGE 2/19/2022 [facility]    IR PERC ARTERIOVENOUS FISTULA CREATION Left     unsure of date    SIGMOIDOSCOPY N/A 2/17/2022    SIGMOIDOSCOPY CONTROL HEMORRHAGE performed by Raven Rai MD at [address] [place] [address]   Allergen Reactions    Lisinopril      Allergy listed on paperwork from [facility], no reaction noted     Current Facility-Administered Medications   Medication Dose Route Frequency Provider Last Rate Last Admin    insulin lispro (HUMALOG) injection vial 0-18 Units  0-18 Units SubCUTAneous Q4H IRINA Santos - TONEY        perflutren lipid microspheres (DEFINITY) injection 1.65 mg  1.5 mL IntraVENous ONCE PRN Ophelia Yen, APRN - CNP        acetaminophen (TYLENOL) tablet 650 mg  650 mg Oral Q4H PRN Srini To MD   650 mg at 02/19/22 2117    citric acid-sodium citrate (BICITRA) solution 30 mL  30 mL Oral BID Melita Purcell MD   30 mL at 02/20/22 9028    LORazepam (ATIVAN) tablet 0.5 mg  0.5 mg Oral BID Tonnie Fleischer, MD   0.5 mg at 02/19/22 2023    Epoetin Kurt-epbx (RETACRIT) injection 20,000 Units  20,000 Units IntraVENous Once per day on Tue adelina Coyle MD   20,000 Units at 02/17/22 1808    atorvastatin (LIPITOR) tablet 10 mg  10 mg Oral Nightly [facility], DO   10 mg at 02/19/22 2023    gabapentin (NEURONTIN) capsule 100 mg  100 mg Oral TID [facility], DO   100 mg at 02/20/22 9246    sevelamer (RENVELA) tablet 800 mg  800 mg Oral TID  [facility]   800 mg at 02/20/22 1211    tamsulosin (FLOMAX) capsule 0.4 mg  0.4 mg Oral QAM [facility]   0.4 mg at 02/20/22 6416    sodium chloride flush 0.9 % injection 5-40 mL  5-40 mL IntraVENous 2 times per day Ariel Avelino, DO   10 mL at 02/19/22 2023    ondansetron (ZOFRAN) injection 4 mg  4 mg IntraVENous Q6H PRN [facility], DO   4 mg at 02/18/22 2311    pantoprazole (PROTONIX) injection 40 mg  40 mg IntraVENous Q12H [facility], DO   40 mg at 02/20/22 8210    And    sodium chloride (PF) 0.9 % injection 10 mL  10 mL IntraVENous Q12H [facility], DO   10 mL at 02/20/22 0841    glucose (GLUTOSE) 40 % oral gel 15 g  15 g Oral PRN [facility]        dextrose 50 % IV solution  12.5 g IntraVENous PRN [facility], DO        glucagon (rDNA) injection 1 mg  1 mg IntraMUSCular PRN [facility], DO        dextrose 5 % solution  100 mL/hr IntraVENous PRN [facility]        lidocaine (XYLOCAINE) 2 % jelly   Topical PRN Hermila Rosado APRN - CNP   Given at 02/13/22 3016    melatonin tablet 6 mg  6 mg Oral Nightly PRN Ana García APRN - CNP   6 mg at 02/19/22 2118     Social History     Socioeconomic History    Marital status:      Spouse name: Not on file    Number of children: Not on file    Years of education: Not on file    Highest education level: Not on file   Occupational History    Not on file   Tobacco Use    Smoking status: Former Smoker    Smokeless tobacco: Never Used   Vaping Use    Vaping Use: Never used   Substance and Sexual Activity    Alcohol use: Not Currently    Drug use: Never  Sexual activity: Not on file   Other Topics Concern    Not on file   Social History Narrative    Not on file     Social Determinants of Health     Financial Resource Strain:     Difficulty of Paying Living Expenses: Not on file   Food Insecurity:     Worried About Running Out of Food in the Last Year: Not on file    [place]nn of Food in the Last Year: Not on file   Transportation Needs:     Lack of Transportation (Medical): Not on file    Lack of Transportation (Non-Medical): Not on file   Physical Activity:     Days of Exercise per Week: Not on file    Minutes of Exercise per Session: Not on file   Stress:     Feeling of Stress : Not on file   Social Connections:     Frequency of Communication with Friends and Family: Not on file    Frequency of Social Gatherings with Friends and Family: Not on file    Attends Catholic Services: Not on file    Active Member of [address] [place] [address] [employer] or Organizations: Not on file    Attends Club or Organization Meetings: Not on file    Marital Status: Not on file   Intimate Partner Violence:     Fear of Current or Ex-Partner: Not on file    Emotionally Abused: Not on file    Physically Abused: Not on file    Sexually Abused: Not on file   Housing Stability:     Unable to Pay for Housing in the Last Year: Not on file    Number of [place] in the Last Year: Not on file    Unstable Housing in the Last Year: Not on file     History reviewed. No pertinent family history. EXAM  VSS afeb    Heart RRR     Lungs clear    Abd soft    LUE - AVF without thrill or bruit; firm up to proximal forearm without extension into AC fossa or upper arm    IMP: Thrombosed L radiocephalic AVF   ESRD on HD - clinically stable. S/P coil embolization of PSA - site of GI bleed   DVT L leg    PLAN: Will place tunneled HD catheter tomorrow - possibly by IR    Keep npo after MN.    Would be available if IVC filter needed for protection      Mary Beth Deutsch

## 2022-02-20 NOTE — PROGRESS NOTES
Hospitalist Progress Note    Patient:  Jessica Maldonado  Unit/Bed:G1Y-1157/2122-01   YOB: 1945       MRN: [id number] Acct: [de-identified]  PCP: Emmett Arevalo    Date of Admission: 2/12/2022  --------------------------    Chief Complaint:     Rectal bleed    Hospital Course:     Jessica Maldonado is a 77 y.o. male hospitalized on 2/12/2022   for rectal bleeding, he was recently underwent colectomy for colorectal malignancy on 1/26/2022. Patient was rehospitalized on 2/7/2020 through 2/10/2022 for lower GI bleeding. Patient was discharged to extended care facility and readmitted for the same. Assessment/plan:      Assessment  Acute GI bleeding, the setting of recent colectomy for colon cancer CTA of the abdomen reviewed, no brisk bleeding. GI and surgery service consulted    Evaluated by GI, general surgery  Had a large blood clot pass yesterday right before discharge  Continue IV Protonix  Surgery input noted, diet advance per surgery  Continue holding Plavix  Flex sig on 2/17 showed a friable surgical anstomosis 18 cm from anorectal verge, with visible suture and two areas of oozing with lavage and two hemoclips placed  Still with bleeding, transfused 4 U in last 24 hours, transferred to the ICU for closer monitoring  CTA showed pseudoaneurysm  IR consulted, s/p embolization of psueoaneurysm     Acute blood loss anemia  Monitoring hemoglobin, trending down slowly: 8.2-7.6-7.4-7.9-7.7-7.3-5.8-->10-7.8  - given 4U PRBC     Acute DVT/PE with right heart strain  -checked limited Echo  -Doppler showed acute RLE DVT  -cardiology consulted  -s/p embolization of pseudoaneurysm, surgery recommends starting heparin gtt tomorrow     Hypotension, multifactorial possibly.  Due to PE and ongoing bleeding     Lactic acidosis, likely secondary to hypotension, resolved     Hyponatremia, secondary to renal failure,  Resolved    COVID-19 infection, asymptomatic diagnosed 2/10/2022  Patient received vaccination Kuldat 2 doses) should come off isolation tomorrow. Recent CVA  Continue statin, holding antiplatelet therapy secondary to the bleeding. End-stage renal failure on hemodialysis  Nephrology following, continue hemodialysis          Code Status: Full Code         DVT prophylaxis: SCD secondary to GI bleed     Disposition: Pending improvement in Hgb    Discussed with the patient.    ----------------      Subjective:     Patient seen and examined  Became hypotensive overnight with more bleeding  Feels ok, just tired from being in the hospital    Diet: ADULT DIET; Easy to Chew; Low Potassium (Less than 3000 mg/day)    OBJECTIVE     Exam:  /60   Pulse 88   Temp 98.7 °F (37.1 °C) (Oral)   Resp 18   Ht 6' (1.829 m)   Wt 225 lb 5 oz (102.2 kg)   SpO2 93%   BMI 30.56 kg/m²        Change physical finding from exam 2/13/2022 as below. Gen: Not in distress. Alert. Chronically ill  Head: Normocephalic. Atraumatic. Eyes: Conjunctivae/corneas clear. ENT: Oral mucosa moist  Neck: No JVD. No obvious thyromegaly. CVS: Nml S1S2, no murmur  , RRR  Pulmomary: Clear bilaterally. No crackles. No wheezes. Gastrointestinal: Soft, non tender, non distend, . Musculoskeletal: No edema. Warm  Neuro: No focal deficit. Moves extremity spontaneously. Psychiatry: Appropriate affect. Not agitated.         Medications:  Reviewed    Infusion Medications    dextrose       Scheduled Medications    insulin lispro  0-18 Units SubCUTAneous Q4H    citric acid-sodium citrate  30 mL Oral BID    LORazepam  0.5 mg Oral BID    epoetin davis-epbx  20,000 Units IntraVENous Once per day on Tue Thu Sat    atorvastatin  10 mg Oral Nightly    gabapentin  100 mg Oral TID    sevelamer  800 mg Oral TID WC    tamsulosin  0.4 mg Oral QAM    sodium chloride flush  5-40 mL IntraVENous 2 times per day    pantoprazole  40 mg IntraVENous Q12H    And    sodium chloride (PF)  10 mL IntraVENous Q12H     PRN Meds: perflutren lipid microspheres, indicate a site of active hemorrhage. 2.  Previous partial colectomy with an anastomosis at the expected   rectosigmoid junction. There is a focal outpouching along the left side of   the anastomosis. This was also noted on the previous exam but has decreased   in size. Mild inflammatory changes are still present at the site. 3.  No general ascites and no pneumoperitoneum. There is no bowel   obstruction or hydronephrosis. 4.  Multiple cysts are present within the kidneys some of which are too small   to characterize.          IR EMBOLIZATION HEMORRHAGE    (Results Pending)               Electronically signed by Michael Lackey MD on 2/20/2022 at 9:35 AM

## 2022-02-20 NOTE — PROGRESS NOTES
Pulmonary Progress Note    CC:  Follow up shock, blood loss anemia    Subjective: Went to documistic suite yesterday for coil embolization of pseudoaneurysm  Hb is currently >7  Has been off pressors since yesterday am  Dopplers showed DVT  Still with some pain in belly not as bad as before  Passing some blood in stool    ROS  NO SOB  No chest pain  Some belly pain      Intake/Output Summary (Last 24 hours) at 2/20/2022 0650  Last data filed at 2/19/2022 2000  Gross per 24 hour   Intake 1892.96 ml   Output --   Net 1892.96 ml         PHYSICAL EXAM:  Blood pressure (!) 127/58, pulse 90, temperature 98.7 °F (37.1 °C), temperature source Oral, resp. rate 15, height 6' (1.829 m), weight 225 lb 5 oz (102.2 kg), SpO2 97 %.'  Gen: No distress. Chronically ill   Eyes: PERRL. No sclera icterus. No conjunctival injection. ENT: No discharge. Pharynx clear. External appearance of ears and nose normal.  Neck: Trachea midline. No obvious mass. Resp: Diminished . CV: Regular rate. Regular rhythm. No murmur or rub. GI: Non-tender. Non-distended. No hernia. Skin: Stasis changes    Lymph: No cervical LAD. No supraclavicular LAD. M/S: No cyanosis. No clubbing. No joint deformity. Neuro: Moves all four extremities. CN 2-12 tested, no defect noted.   Ext:   no edema    Medications:    Scheduled Meds:   insulin lispro  0-18 Units SubCUTAneous Q4H    lidocaine 1 % injection  5 mL IntraDERmal Once    citric acid-sodium citrate  30 mL Oral BID    sodium chloride  500 mL IntraVENous Once    LORazepam  0.5 mg Oral BID    epoetin davis-epbx  20,000 Units IntraVENous Once per day on Tue Thu Sat    atorvastatin  10 mg Oral Nightly    gabapentin  100 mg Oral TID    sevelamer  800 mg Oral TID WC    tamsulosin  0.4 mg Oral QAM    sodium chloride flush  5-40 mL IntraVENous 2 times per day    pantoprazole  40 mg IntraVENous Q12H    And    sodium chloride (PF)  10 mL IntraVENous Q12H       Continuous Infusions:   phenylephrine (LAINE-SYNEPHRINE) 50mg/250mL infusion Stopped (02/19/22 5739)    vasopressin (Septic Shock) infusion Stopped (02/19/22 0309)    sodium chloride      norepinephrine Stopped (02/19/22 0117)    sodium chloride Stopped (02/18/22 2215)    sodium chloride      dextrose         PRN Meds:  perflutren lipid microspheres, sodium chloride flush, sodium chloride, acetaminophen, sodium chloride, acetaminophen **OR** acetaminophen, ondansetron, guaiFENesin-dextromethorphan, glucose, dextrose, glucagon (rDNA), dextrose, lidocaine, melatonin    Labs:  CBC:   Recent Labs     02/19/22  1856 02/20/22  0031 02/20/22  0435   WBC 6.1 4.9 6.0   HGB 7.8* 7.2* 7.8*   HCT 24.6* 21.4* 23.4*   MCV 94.3 93.6 90.6    177 192     BMP:   Recent Labs     02/19/22  0138 02/19/22  0433 02/20/22  0435    137 140   K 4.7 4.4 4.7    102 104   CO2 19* 20* 23   BUN 23* 26* 30*   CREATININE 5.1* 5.3* 5.5*     LIVER PROFILE: No results for input(s): AST, ALT, LIPASE, BILIDIR, BILITOT, ALKPHOS in the last 72 hours. Invalid input(s): AMYLASE,  ALB  PT/INR:   Recent Labs     02/19/22 0138   PROTIME 12.7   INR 1.12     APTT: No results for input(s): APTT in the last 72 hours. UA:No results for input(s): NITRITE, COLORU, PHUR, LABCAST, WBCUA, RBCUA, MUCUS, TRICHOMONAS, YEAST, BACTERIA, CLARITYU, SPECGRAV, LEUKOCYTESUR, UROBILINOGEN, BILIRUBINUR, BLOODU, GLUCOSEU, AMORPHOUS in the last 72 hours. Invalid input(s): [place] Liter  No results for input(s): PH, PCO2, PO2 in the last 72 hours. Films:  Chest imaging reports were reviewed and imaging was reviewed by me and showed no new films    ABG:  None    Cultures:  None    I reviewed the labs and images listed above    ASSESSMENT/PLAN:  · Shock which is likely both circulatory and obstructive (from PE)  ? Resume pressors only if MAP drops below 60  · Acute Blood Loss Anemia s/p 6 units   ? Goal Hb is 7. Surgery and GI following.   Awaiting IR input for possible embolization  · Acute Bilateral PE with DVT  ? Hb still trending down, and may not be ready to anticoagulate. Will need approval from surgery and GI before anticoagulating   ?  Needs filter if unable to anticoagulate in the next 24-48 hours  · Lactic Acidosis due to hypotension   · ESRD   · COVID-19      DVT prophylaxis  SCD        Jazmín Coburn DO  [facility] Pulmonary

## 2022-02-20 NOTE — PROGRESS NOTES
Gastroenterology Progress Note    Danya Howard is a 77 y.o. male patient. Hospitalization day:8    SUBJECTIVE:    Patient seen and examined. He remains in the ICU, off pressors. He had one episode of hematochezia following IR embolization 2/19/2022. He denies any abdominal pain. He denies any shortness of breath. ROS:  Cardiovascular ROS: no chest pain or dyspnea on exertion  Gastrointestinal ROS: positive for - rectal bleeding  negative for - abdominal pain or diarrhea  Respiratory ROS: no cough, shortness of breath, or wheezing    Physical      Gen: Resting in bed, NAD  HEENT: Normocephalic, atraumatic, no scleral icterus   CV: RRR no MRG   Pul: CTAB, normal work of breathing without wheezing  Abd: Good bowel sounds throughout, no scars, soft, NT/ND, no masses, no HSM   Ext: No edema, moves all 4 extremities. Neuro: Moves all four extremities, no gross deficits, follows commands   Skin: No jaundice, spider angiomas, palmar erythema     Data    CBC:   Lab Results   Component Value Date    WBC 6.0 02/20/2022    RBC 2.58 02/20/2022    HGB 7.8 02/20/2022    HCT 23.4 02/20/2022    MCV 90.6 02/20/2022    MCH 30.1 02/20/2022    MCHC 33.2 02/20/2022    RDW 14.5 02/20/2022     02/20/2022    MPV 6.9 02/20/2022     Hepatic Function Panel:    Lab Results   Component Value Date    ALKPHOS 91 02/12/2022    ALT 7 02/12/2022    AST 11 02/12/2022    PROT 5.6 02/12/2022    PROT 7.5 07/26/2011    BILITOT 0.3 02/12/2022    BILIDIR <0.2 02/12/2022    IBILI see below 02/12/2022         Radiology Review:    VL Extremity Venous Bilateral         CTA ABDOMEN PELVIS W WO CONTRAST   Final Result   1. Lobar, segmental, and subsegmental pulmonary emboli in the right lower   lobe with findings of right heart strain. Critical results were called by   Dr. Paco Rodriguez MD to Dr. Luc Mckinney on 2/19/2022 at 03:02.   2. 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   Adjacent stranding may be related to the recent surgery but   could also be seen with acute diverticulitis. There is no evident   extravasation of contrast into the bowel lumen. 3. Partially liquid stool in the colon could be due to diarrhea with no   findings of enterocolitis. CTA ABDOMEN PELVIS W WO CONTRAST   Final Result   1. No high-density contrast within the lumen of the stomach, small bowel,   and colon to indicate a site of active hemorrhage. 2.  Previous partial colectomy with an anastomosis at the expected   rectosigmoid junction. There is a focal outpouching along the left side of   the anastomosis. This was also noted on the previous exam but has decreased   in size. Mild inflammatory changes are still present at the site. 3.  No general ascites and no pneumoperitoneum. There is no bowel   obstruction or hydronephrosis. 4.  Multiple cysts are present within the kidneys some of which are too small   to characterize. IR EMBOLIZATION HEMORRHAGE    (Results Pending)          ASSESSMENT:  Shahriar Mcdaniels is a 77 y.o. male with a PMH of  colon adenocarcinoma dx 2 months ago s/p sigmoid resection 1/26/22, DM, ESRD on HD, HTN, and arthritiswho presented on 2/7/2022 with rectal bleed. We have been consulted regarding sane.     IMPRESSION:     1. Lower GI bleed. Patient underwent sigmoid resection on 1/26/22 for colon adenocarcinoma with episodes of bright red blood per rectum postoperatively. Flexible sigmoidoscopy  2/17/22 with two areas of friability of the suture/staple line and mucosal tattoo at the anastomosis, and two hemoclips were placed. There was minimal bleeding before and no bleeding following intervention without any blood in the colon lumen. Patient with recurrent large-volume hematochezia 2/18/22. with CTA showing anastomotic pseudoaneurysm, which is likely the source of episodic bleeding. IR performed embolization 2/19/22.   The pseudoaneurysm was not evident on endoscopic evaluation and is not amenable to endoscopic treatment. If patient has ongoing bleeding, will likely require surgical revision of anastomosis. 2. Colon adenocarcinoma s/p resection 1/26/22: Need surveillance colonoscopy in 4-6 weeks. 3. Pulmonary embolism: CTA notable for PE with right heart strain. Appreciate cardiology consult input. Patient will need anticoagulation as soon as bleeding source is controlled. 4. Recent CVA. Plavix on hold due to #1. No plans for restart per documentation. 5. Colon polyps: Patient with many other polyps seen on flexible sigmoidoscopy but not resected. Recommend outpatient colonoscopy in 4-6 weeks. 6. Covid: Defer to primary team for management    Recommendations:  -IR embolization of pseudoaneurysm versus surgical revision of sigmoid anastomosis  -Monitor for ongoing bleeding  -Outpatient colonoscopy     Thank you for allowing me to participate in this patient's care. No further GI work-up needed at this time. We will sign off, however please contact us with any additional questions at [phone].       Gemma Aggarwal MD

## 2022-02-20 NOTE — PLAN OF CARE
Problem: Infection:  Goal: Will remain free from infection  Description: Will remain free from infection  2/20/2022 1518 by Maryanne Hernandez RN  Outcome: Ongoing  2/20/2022 1517 by Maryanne Hernandez RN  Outcome: Ongoing     Problem: Safety:  Goal: Free from accidental physical injury  Description: Free from accidental physical injury  2/20/2022 1518 by Maryanne Hernandez RN  Outcome: Ongoing  2/20/2022 1517 by Maryanne Hernandez RN  Outcome: Ongoing  Goal: Free from intentional harm  Description: Free from intentional harm  2/20/2022 1518 by Maryanne Hernandez RN  Outcome: Ongoing  2/20/2022 1517 by Maryanne Hernandez RN  Outcome: Ongoing     Problem: Daily Care:  Goal: Daily care needs are met  Description: Daily care needs are met  2/20/2022 1518 by Maryanne Hernandez RN  Outcome: Ongoing  2/20/2022 1517 by Maryanne Hernandez RN  Outcome: Ongoing     Problem: Pain:  Goal: Patient's pain/discomfort is manageable  Description: Patient's pain/discomfort is manageable  2/20/2022 1518 by Maryanne Hernandez RN  Outcome: Ongoing  2/20/2022 1517 by Maryanne Hernandez RN  Outcome: Ongoing     Problem: Skin Integrity:  Goal: Skin integrity will stabilize  Description: Skin integrity will stabilize  2/20/2022 1518 by Maryanne Hernandez RN  Outcome: Ongoing  2/20/2022 1517 by Maryanne Hernandez RN  Outcome: Ongoing     Problem: Discharge Planning:  Goal: Patients continuum of care needs are met  Description: Patients continuum of care needs are met  2/20/2022 1518 by Maryanne Hernandez RN  Outcome: Ongoing  2/20/2022 1517 by Maryanne Hernandez RN  Outcome: Ongoing     Problem: Bleeding:  Goal: Will show no signs and symptoms of excessive bleeding  Description: Will show no signs and symptoms of excessive bleeding  2/20/2022 1518 by Maryanne Hernandez RN  Outcome: Ongoing  2/20/2022 1517 by Maryanne Hernandez RN  Outcome: Ongoing

## 2022-02-21 ENCOUNTER — APPOINTMENT (OUTPATIENT)
Dept: GENERAL RADIOLOGY | Age: 77
DRG: 907 | End: 2022-02-21
Payer: MEDICARE

## 2022-02-21 ENCOUNTER — APPOINTMENT (OUTPATIENT)
Dept: INTERVENTIONAL RADIOLOGY/VASCULAR | Age: 77
DRG: 907 | End: 2022-02-21
Payer: MEDICARE

## 2022-02-21 LAB
ANION GAP SERPL CALCULATED.3IONS-SCNC: 14 MMOL/L (ref 3–16)
BASOPHILS ABSOLUTE: 0 K/UL (ref 0–0.2)
BASOPHILS RELATIVE PERCENT: 0.2 %
BASOPHILS RELATIVE PERCENT: 0.3 %
BUN BLDV-MCNC: 36 MG/DL (ref 7–20)
CALCIUM SERPL-MCNC: 7.4 MG/DL (ref 8.3–10.6)
CHLORIDE BLD-SCNC: 102 MMOL/L (ref 99–110)
CO2: 22 MMOL/L (ref 21–32)
CREAT SERPL-MCNC: 6 MG/DL (ref 0.8–1.3)
EKG ATRIAL RATE: 96 BPM
EKG DIAGNOSIS: NORMAL
EKG P AXIS: 61 DEGREES
EKG P-R INTERVAL: 248 MS
EKG Q-T INTERVAL: 366 MS
EKG QRS DURATION: 84 MS
EKG QTC CALCULATION (BAZETT): 462 MS
EKG R AXIS: 10 DEGREES
EKG T AXIS: -12 DEGREES
EKG VENTRICULAR RATE: 96 BPM
EOSINOPHILS ABSOLUTE: 0 K/UL (ref 0–0.6)
EOSINOPHILS ABSOLUTE: 0.1 K/UL (ref 0–0.6)
EOSINOPHILS RELATIVE PERCENT: 0.6 %
EOSINOPHILS RELATIVE PERCENT: 0.8 %
EOSINOPHILS RELATIVE PERCENT: 0.8 %
EOSINOPHILS RELATIVE PERCENT: 1 %
FERRITIN: 1127 NG/ML (ref 30–400)
GFR AFRICAN AMERICAN: 11
GFR NON-AFRICAN AMERICAN: 9
GLUCOSE BLD-MCNC: 106 MG/DL (ref 70–99)
GLUCOSE BLD-MCNC: 109 MG/DL (ref 70–99)
GLUCOSE BLD-MCNC: 117 MG/DL (ref 70–99)
GLUCOSE BLD-MCNC: 170 MG/DL (ref 70–99)
GLUCOSE BLD-MCNC: 84 MG/DL (ref 70–99)
GLUCOSE BLD-MCNC: 95 MG/DL (ref 70–99)
HCT VFR BLD CALC: 20.2 % (ref 40.5–52.5)
HCT VFR BLD CALC: 22.5 % (ref 40.5–52.5)
HCT VFR BLD CALC: 23.4 % (ref 40.5–52.5)
HCT VFR BLD CALC: 23.5 % (ref 40.5–52.5)
HEMOGLOBIN: 6.7 G/DL (ref 13.5–17.5)
HEMOGLOBIN: 7.6 G/DL (ref 13.5–17.5)
HEMOGLOBIN: 7.7 G/DL (ref 13.5–17.5)
HEMOGLOBIN: 7.8 G/DL (ref 13.5–17.5)
LYMPHOCYTES ABSOLUTE: 0.5 K/UL (ref 1–5.1)
LYMPHOCYTES ABSOLUTE: 0.5 K/UL (ref 1–5.1)
LYMPHOCYTES ABSOLUTE: 0.6 K/UL (ref 1–5.1)
LYMPHOCYTES ABSOLUTE: 0.8 K/UL (ref 1–5.1)
LYMPHOCYTES RELATIVE PERCENT: 11.7 %
LYMPHOCYTES RELATIVE PERCENT: 8 %
LYMPHOCYTES RELATIVE PERCENT: 8.4 %
LYMPHOCYTES RELATIVE PERCENT: 9.3 %
MAGNESIUM: 2.1 MG/DL (ref 1.8–2.4)
MCH RBC QN AUTO: 29.5 PG (ref 26–34)
MCH RBC QN AUTO: 30.1 PG (ref 26–34)
MCH RBC QN AUTO: 30.4 PG (ref 26–34)
MCH RBC QN AUTO: 34.1 PG (ref 26–34)
MCHC RBC AUTO-ENTMCNC: 32.3 G/DL (ref 31–36)
MCHC RBC AUTO-ENTMCNC: 33 G/DL (ref 31–36)
MCHC RBC AUTO-ENTMCNC: 33.2 G/DL (ref 31–36)
MCHC RBC AUTO-ENTMCNC: 34.5 G/DL (ref 31–36)
MCV RBC AUTO: 91.3 FL (ref 80–100)
MCV RBC AUTO: 91.3 FL (ref 80–100)
MCV RBC AUTO: 91.5 FL (ref 80–100)
MCV RBC AUTO: 99 FL (ref 80–100)
MONOCYTES ABSOLUTE: 0.2 K/UL (ref 0–1.3)
MONOCYTES ABSOLUTE: 0.3 K/UL (ref 0–1.3)
MONOCYTES ABSOLUTE: 0.4 K/UL (ref 0–1.3)
MONOCYTES ABSOLUTE: 0.5 K/UL (ref 0–1.3)
MONOCYTES RELATIVE PERCENT: 3.2 %
MONOCYTES RELATIVE PERCENT: 5 %
MONOCYTES RELATIVE PERCENT: 5.8 %
MONOCYTES RELATIVE PERCENT: 7 %
NEUTROPHILS ABSOLUTE: 5.4 K/UL (ref 1.7–7.7)
NEUTROPHILS ABSOLUTE: 5.4 K/UL (ref 1.7–7.7)
NEUTROPHILS ABSOLUTE: 5.5 K/UL (ref 1.7–7.7)
NEUTROPHILS ABSOLUTE: 5.8 K/UL (ref 1.7–7.7)
NEUTROPHILS RELATIVE PERCENT: 81.5 %
NEUTROPHILS RELATIVE PERCENT: 82.7 %
NEUTROPHILS RELATIVE PERCENT: 85.8 %
NEUTROPHILS RELATIVE PERCENT: 87.5 %
PARATHYROID HORMONE INTACT: 272.5 PG/ML (ref 14–72)
PDW BLD-RTO: 14.6 % (ref 12.4–15.4)
PDW BLD-RTO: 15 % (ref 12.4–15.4)
PDW BLD-RTO: 15 % (ref 12.4–15.4)
PDW BLD-RTO: 15.1 % (ref 12.4–15.4)
PERFORMED ON: ABNORMAL
PERFORMED ON: NORMAL
PERFORMED ON: NORMAL
PHOSPHORUS: 5.9 MG/DL (ref 2.5–4.9)
PLATELET # BLD: 145 K/UL (ref 135–450)
PLATELET # BLD: 157 K/UL (ref 135–450)
PLATELET # BLD: 164 K/UL (ref 135–450)
PLATELET # BLD: 166 K/UL (ref 135–450)
PMV BLD AUTO: 7 FL (ref 5–10.5)
PMV BLD AUTO: 7.2 FL (ref 5–10.5)
PMV BLD AUTO: 7.2 FL (ref 5–10.5)
PMV BLD AUTO: 7.4 FL (ref 5–10.5)
POTASSIUM SERPL-SCNC: 4.7 MMOL/L (ref 3.5–5.1)
RBC # BLD: 2.21 M/UL (ref 4.2–5.9)
RBC # BLD: 2.27 M/UL (ref 4.2–5.9)
RBC # BLD: 2.57 M/UL (ref 4.2–5.9)
RBC # BLD: 2.58 M/UL (ref 4.2–5.9)
REASON FOR REJECTION: NORMAL
REJECTED TEST: NORMAL
SODIUM BLD-SCNC: 138 MMOL/L (ref 136–145)
WBC # BLD: 6.2 K/UL (ref 4–11)
WBC # BLD: 6.3 K/UL (ref 4–11)
WBC # BLD: 6.6 K/UL (ref 4–11)
WBC # BLD: 7 K/UL (ref 4–11)

## 2022-02-21 PROCEDURE — 36430 TRANSFUSION BLD/BLD COMPNT: CPT

## 2022-02-21 PROCEDURE — 99231 SBSQ HOSP IP/OBS SF/LOW 25: CPT | Performed by: SURGERY

## 2022-02-21 PROCEDURE — 83970 ASSAY OF PARATHORMONE: CPT

## 2022-02-21 PROCEDURE — 36415 COLL VENOUS BLD VENIPUNCTURE: CPT

## 2022-02-21 PROCEDURE — 0JH60XZ INSERTION OF TUNNELED VASCULAR ACCESS DEVICE INTO CHEST SUBCUTANEOUS TISSUE AND FASCIA, OPEN APPROACH: ICD-10-PCS | Performed by: STUDENT IN AN ORGANIZED HEALTH CARE EDUCATION/TRAINING PROGRAM

## 2022-02-21 PROCEDURE — 6370000000 HC RX 637 (ALT 250 FOR IP): Performed by: STUDENT IN AN ORGANIZED HEALTH CARE EDUCATION/TRAINING PROGRAM

## 2022-02-21 PROCEDURE — APPNB15 APP NON BILLABLE TIME 0-15 MINS: Performed by: NURSE PRACTITIONER

## 2022-02-21 PROCEDURE — 2500000003 HC RX 250 WO HCPCS: Performed by: NURSE PRACTITIONER

## 2022-02-21 PROCEDURE — 2580000003 HC RX 258: Performed by: STUDENT IN AN ORGANIZED HEALTH CARE EDUCATION/TRAINING PROGRAM

## 2022-02-21 PROCEDURE — 85025 COMPLETE CBC W/AUTO DIFF WBC: CPT

## 2022-02-21 PROCEDURE — 2000000000 HC ICU R&B

## 2022-02-21 PROCEDURE — 99232 SBSQ HOSP IP/OBS MODERATE 35: CPT | Performed by: NURSE PRACTITIONER

## 2022-02-21 PROCEDURE — 99153 MOD SED SAME PHYS/QHP EA: CPT

## 2022-02-21 PROCEDURE — 99024 POSTOP FOLLOW-UP VISIT: CPT | Performed by: SURGERY

## 2022-02-21 PROCEDURE — 80048 BASIC METABOLIC PNL TOTAL CA: CPT

## 2022-02-21 PROCEDURE — APPSS45 APP SPLIT SHARED TIME 31-45 MINUTES: Performed by: PHYSICIAN ASSISTANT

## 2022-02-21 PROCEDURE — 6360000002 HC RX W HCPCS: Performed by: STUDENT IN AN ORGANIZED HEALTH CARE EDUCATION/TRAINING PROGRAM

## 2022-02-21 PROCEDURE — 99291 CRITICAL CARE FIRST HOUR: CPT | Performed by: INTERNAL MEDICINE

## 2022-02-21 PROCEDURE — 71045 X-RAY EXAM CHEST 1 VIEW: CPT

## 2022-02-21 PROCEDURE — 99152 MOD SED SAME PHYS/QHP 5/>YRS: CPT

## 2022-02-21 PROCEDURE — 77001 FLUOROGUIDE FOR VEIN DEVICE: CPT

## 2022-02-21 PROCEDURE — 99024 POSTOP FOLLOW-UP VISIT: CPT | Performed by: PHYSICIAN ASSISTANT

## 2022-02-21 PROCEDURE — C9113 INJ PANTOPRAZOLE SODIUM, VIA: HCPCS | Performed by: STUDENT IN AN ORGANIZED HEALTH CARE EDUCATION/TRAINING PROGRAM

## 2022-02-21 PROCEDURE — 2709999900 IR TUNNELED CVC PLACE WO SQ PORT/PUMP > 5 YEARS

## 2022-02-21 PROCEDURE — A4216 STERILE WATER/SALINE, 10 ML: HCPCS | Performed by: STUDENT IN AN ORGANIZED HEALTH CARE EDUCATION/TRAINING PROGRAM

## 2022-02-21 PROCEDURE — 36558 INSERT TUNNELED CV CATH: CPT

## 2022-02-21 PROCEDURE — APPNB45 APP NON BILLABLE 31-45 MINUTES: Performed by: PHYSICIAN ASSISTANT

## 2022-02-21 PROCEDURE — 02H633Z INSERTION OF INFUSION DEVICE INTO RIGHT ATRIUM, PERCUTANEOUS APPROACH: ICD-10-PCS | Performed by: STUDENT IN AN ORGANIZED HEALTH CARE EDUCATION/TRAINING PROGRAM

## 2022-02-21 PROCEDURE — 76937 US GUIDE VASCULAR ACCESS: CPT

## 2022-02-21 PROCEDURE — 93010 ELECTROCARDIOGRAM REPORT: CPT | Performed by: INTERNAL MEDICINE

## 2022-02-21 PROCEDURE — 82728 ASSAY OF FERRITIN: CPT

## 2022-02-21 PROCEDURE — 6370000000 HC RX 637 (ALT 250 FOR IP): Performed by: INTERNAL MEDICINE

## 2022-02-21 PROCEDURE — 84100 ASSAY OF PHOSPHORUS: CPT

## 2022-02-21 PROCEDURE — 83735 ASSAY OF MAGNESIUM: CPT

## 2022-02-21 RX ORDER — CALCIUM GLUCONATE 20 MG/ML
1000 INJECTION, SOLUTION INTRAVENOUS ONCE
Status: COMPLETED | OUTPATIENT
Start: 2022-02-21 | End: 2022-02-21

## 2022-02-21 RX ORDER — FENTANYL CITRATE 50 UG/ML
INJECTION, SOLUTION INTRAMUSCULAR; INTRAVENOUS DAILY PRN
Status: COMPLETED | OUTPATIENT
Start: 2022-02-21 | End: 2022-02-21

## 2022-02-21 RX ORDER — SODIUM CHLORIDE 9 MG/ML
INJECTION, SOLUTION INTRAVENOUS PRN
Status: DISCONTINUED | OUTPATIENT
Start: 2022-02-21 | End: 2022-02-25 | Stop reason: HOSPADM

## 2022-02-21 RX ORDER — MIDAZOLAM HYDROCHLORIDE 1 MG/ML
INJECTION INTRAMUSCULAR; INTRAVENOUS DAILY PRN
Status: COMPLETED | OUTPATIENT
Start: 2022-02-21 | End: 2022-02-21

## 2022-02-21 RX ADMIN — GABAPENTIN 100 MG: 100 CAPSULE ORAL at 10:15

## 2022-02-21 RX ADMIN — SODIUM CITRATE AND CITRIC ACID MONOHYDRATE 30 ML: 500; 334 SOLUTION ORAL at 10:15

## 2022-02-21 RX ADMIN — FENTANYL CITRATE 25 MCG: 50 INJECTION INTRAMUSCULAR; INTRAVENOUS at 09:25

## 2022-02-21 RX ADMIN — PANTOPRAZOLE SODIUM 40 MG: 40 INJECTION, POWDER, FOR SOLUTION INTRAVENOUS at 10:15

## 2022-02-21 RX ADMIN — PANTOPRAZOLE SODIUM 40 MG: 40 INJECTION, POWDER, FOR SOLUTION INTRAVENOUS at 21:01

## 2022-02-21 RX ADMIN — SODIUM CHLORIDE, PRESERVATIVE FREE 10 ML: 5 INJECTION INTRAVENOUS at 10:16

## 2022-02-21 RX ADMIN — CALCIUM GLUCONATE 1000 MG: 20 INJECTION, SOLUTION INTRAVENOUS at 10:34

## 2022-02-21 RX ADMIN — INSULIN LISPRO 2 UNITS: 100 INJECTION, SOLUTION INTRAVENOUS; SUBCUTANEOUS at 21:02

## 2022-02-21 RX ADMIN — CEFAZOLIN SODIUM 2000 MG: 10 INJECTION, POWDER, FOR SOLUTION INTRAVENOUS at 09:33

## 2022-02-21 RX ADMIN — MIDAZOLAM 0.5 MG: 1 INJECTION INTRAMUSCULAR; INTRAVENOUS at 09:25

## 2022-02-21 RX ADMIN — GABAPENTIN 100 MG: 100 CAPSULE ORAL at 14:24

## 2022-02-21 RX ADMIN — LORAZEPAM 0.5 MG: 0.5 TABLET ORAL at 10:15

## 2022-02-21 RX ADMIN — SODIUM CHLORIDE, PRESERVATIVE FREE 10 ML: 5 INJECTION INTRAVENOUS at 21:11

## 2022-02-21 RX ADMIN — SODIUM CHLORIDE, PRESERVATIVE FREE 10 ML: 5 INJECTION INTRAVENOUS at 21:02

## 2022-02-21 RX ADMIN — TAMSULOSIN HYDROCHLORIDE 0.4 MG: 0.4 CAPSULE ORAL at 10:15

## 2022-02-21 RX ADMIN — ATORVASTATIN CALCIUM 10 MG: 10 TABLET, FILM COATED ORAL at 21:02

## 2022-02-21 RX ADMIN — GABAPENTIN 100 MG: 100 CAPSULE ORAL at 21:02

## 2022-02-21 RX ADMIN — SEVELAMER CARBONATE 800 MG: 800 TABLET, FILM COATED ORAL at 16:06

## 2022-02-21 ASSESSMENT — PAIN SCALES - GENERAL
PAINLEVEL_OUTOF10: 0

## 2022-02-21 ASSESSMENT — PAIN SCALES - WONG BAKER
WONGBAKER_NUMERICALRESPONSE: 0

## 2022-02-21 NOTE — PROGRESS NOTES
Pulmonary Critical Care Progress Note     Patient's name:  Jamie Acevedo Record Number: [id number]  Patient's account/billing number: [de-identified]  Patient's YOB: 1945  Age: 77 y.o. Date of Admission: 2/12/2022 12:05 AM  Date of Consult: 2/21/2022      Primary Care Physician: Pardeep Gonzales      Code Status: Full Code    Chief complaint: hypovolemic shock    Assessment and Plan      Hypovolemic shock resolved. 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   Acute PE and DVT   ESRD on HD   Colons Adenocarcinoma s/p resection 1/26/22   JUDE has a home unit per patient     Plan:  If H&H stable resume heparin in the am  Monitor H&H transfuse if < 7  Going for [facility] today  O2 to keep sat > 90%  Bring home cpap       Overnight:  No acute events overnight    REVIEW OF SYSTEMS:  Review of Systems -   General ROS: negative  Psychological ROS: negative  Ophthalmic ROS: negative  ENT ROS: negative  Allergy and Immunology ROS: negative  Hematological and Lymphatic ROS: negative  Endocrine ROS: negative  Breast ROS: negative  Respiratory ROS: no cough, shortness of breath, or wheezing  Cardiovascular ROS: no chest pain or dyspnea on exertion  Gastrointestinal ROS:negative  Genito-Urinary ROS: negative  Musculoskeletal ROS: negative  Neurological ROS: negative  Dermatological ROS: negative        Physical Exam:    Vitals: /68   Pulse 82   Temp 98.9 °F (37.2 °C) (Oral)   Resp 20   Ht 6' (1.829 m)   Wt 218 lb 7.6 oz (99.1 kg)   SpO2 99%   BMI 29.63 kg/m²     Last Body weight:   Wt Readings from Last 3 Encounters:   02/21/22 218 lb 7.6 oz (99.1 kg)   02/10/22 214 lb 4.6 oz (97.2 kg)   02/03/22 208 lb 1.8 oz (94.4 kg)       Body Mass Index : Body mass index is 29.63 kg/m².       Intake and Output summary:     Intake/Output Summary (Last 24 hours) at 2/21/2022 1348  Last data filed at 2/21/2022 0318  Gross per 24 hour   Intake 410 ml   Output

## 2022-02-21 NOTE — BRIEF OP NOTE
Brief Postoperative Note    Conrad Lewis  YOB: 1945  [id number]    Pre-operative Diagnosis: ESRD, need for HD    Post-operative Diagnosis: Same    Procedure: Insertion of Tunneled CVC    Anesthesia: Local and Moderate Sedation    Surgeons/Assistants: Asuncion Lujan MD    Estimated Blood Loss: less than 5 mL    Complications: None    Specimens: Was Not Obtained    Findings: Vascath successfully placed in right internal jugular vein  Tip of vascath visualized in right atrium. Aspirated, Flushed, and HD lumens Locked with heparin.    OK to Use    Electronically signed by Asuncion Lujan MD on 2/21/2022 at 9:57 AM

## 2022-02-21 NOTE — PROGRESS NOTES
[facility]   Daily Progress Note      Admit Date:  2/12/2022    CC: SOB    HPI:   Dheeraj Fleming is a 77 y.o. male with PMH colon adenocarcinoma s/p resection 1/26/2022. DM, CKD on HD, HTN. Adm with lower GIB s/p 6 u PRBC. Underwent flex sig 2/17 w 2 hemoclips placed at anastomosis site, Underwent successful coil embolization of superior rectal artery and pseudoaneurysm 2/19/2022. Cardiology consulted for PE. BLE venous doppler showed LLE DVT (see report below). Today he continues with fatigue and generalized weakness. He had tunnel cath placed with IR today. He has been drowsy since. O2 sats drop while asleep - placed on O2 NC until more awake. He has been on RA and breathing without SOB. Review of Systems:   General: Denies fever, chills  Skin: Denies skin changes, rash, itching, lesions.   HEENT: Denies headache, dizziness, vision changes, nosebleeds, sore throat, nasal drainage  RESP: Denies cough, sputum, dyspnea, wheeze, snoring  CARD: Denies palpitations,  murmur  GI:Denies nausea, vomiting, heartburn, loss of appetite, change in bowels  : Denies frequency, pain, incontinence, polyuria  VASC: Denies claudication, leg cramps, clots  MUSC/SKEL: Denies pain, stiffness, arthritis  PSYCH: Denies anxiety, depression, stress  NEURO: Denies numbness, tingling, weakness,change in mood or memory  HEME: Denies abn bruising, bleeding, anemia  ENDO: Denies intolerance to heat, cold, excessive thirst or hunger, hx thyroid disease    Objective:   BP (!) 141/74   Pulse 79   Temp 98.9 °F (37.2 °C) (Oral)   Resp 13   Ht 6' (1.829 m)   Wt 218 lb 7.6 oz (99.1 kg)   SpO2 100%   BMI 29.63 kg/m²         Intake/Output Summary (Last 24 hours) at 2/21/2022 0820  Last data filed at 2/21/2022 0318  Gross per 24 hour   Intake 410 ml   Output 100 ml   Net 310 ml     WEIGHT:Admit Weight: 213 lb 10 oz (96.9 kg)         Today  Weight: 218 lb 7.6 oz (99.1 kg)   DRY WEIGHT:  Wt Readings from Last 3 Encounters: 02/21/22 218 lb 7.6 oz (99.1 kg)   02/10/22 214 lb 4.6 oz (97.2 kg)   02/03/22 208 lb 1.8 oz (94.4 kg)       Physical Exam:  GEN: Appears ill, no acute distress  SKIN: Pink, warm, dry. Nails without clubbing. HEENT: PERRLA. Normocephalic, atraumatic. Neck supple. No adenopathy. LUNG: AP diameter normal. Diminished bilaterallly,  No wheeze, rales, or ronchi. Respiratory effort normal.  HEART: S1S2 A/R. No JVD. No carotid bruit. No murmur, rub or gallop. ABD: Soft, nontender. +BS X 4 quads. No hepatomegaly. EXT: Radial and pedal pulses 2+ and symmetric. Without varicosities. Trace BLE edema. MUSCSKEL: Good ROM X4 extremities. No deformity. NEURO: A/O X3. Calm and cooperative. Telemetry: NSR     Medications:    calcium gluconate-NaCl  1,000 mg IntraVENous Once    insulin lispro  0-18 Units SubCUTAneous Q4H    citric acid-sodium citrate  30 mL Oral BID    LORazepam  0.5 mg Oral BID    epoetin davis-epbx  20,000 Units IntraVENous Once per day on Tue Thu Sat    atorvastatin  10 mg Oral Nightly    gabapentin  100 mg Oral TID    sevelamer  800 mg Oral TID WC    tamsulosin  0.4 mg Oral QAM    sodium chloride flush  5-40 mL IntraVENous 2 times per day    pantoprazole  40 mg IntraVENous Q12H    And    sodium chloride (PF)  10 mL IntraVENous Q12H      sodium chloride      dextrose       sodium chloride, perflutren lipid microspheres, acetaminophen, ondansetron, glucose, dextrose, glucagon (rDNA), dextrose, lidocaine, melatonin    Lab Data: I have reviewed all labs below today.    CBC:   Recent Labs     02/20/22  1241 02/20/22  1814 02/21/22  0020   WBC 6.8 6.6 6.3   HGB 7.6* 7.1* 6.7*   HCT 22.1* 21.2* 20.2*   MCV 95.7 90.1 91.5    170 166     BMP:   Recent Labs     02/19/22  0433 02/20/22  0435 02/21/22  0439    140 138   K 4.4 4.7 4.7    104 102   CO2 20* 23 22   PHOS  --  5.3* 5.9*   BUN 26* 30* 36*   CREATININE 5.3* 5.5* 6.0*     GLUCOSE:   Recent Labs     02/19/22  0438 02/20/22  0435 02/21/22  0439   GLUCOSE 277* 91 106*     LIVER PROFILE:   Lab Results   Component Value Date    AST 11 02/12/2022    ALT 7 02/12/2022    LIPASE 70.0 02/07/2022    LABALBU 2.5 02/12/2022    BILIDIR <0.2 02/12/2022    BILITOT 0.3 02/12/2022    ALKPHOS 91 02/12/2022     PT/INR:   Lab Results   Component Value Date    PROTIME 12.7 02/19/2022    INR 1.12 02/19/2022    INR 1.14 02/12/2022    INR 1.19 02/07/2022     APTT:   Lab Results   Component Value Date    APTT 22.7 02/12/2022     Pro-BNP:    Lab Results   Component Value Date    PROBNP 1,980 02/07/2022    PROBNP 1,997 01/13/2022     Parameters:   > 450 pg/mL under age 48  > 900 pg/mL ages 54-65  > 1800 pg/mL over age 76    ENZYMES:  Lab Results   Component Value Date    TROPONINI 0.06 02/07/2022    TROPONINI 0.07 02/07/2022     FASTING LIPID PANEL:  Lab Results   Component Value Date    CHOL 60 01/15/2022    HDL 24 01/15/2022    HDL 27 07/26/2011    LDLCALC 25 01/15/2022    TRIG 54 01/15/2022       Diagnostics:    EKG:    Sinus rhythm with 1st degree A-V block with occasional Premature ventricular complexesLow voltage QRSSeptal infarct (cited on or before 19-FEB-2022)Abnormal ECGWhen compared with ECG of 07-FEB-2022 07:35,Premature ventricular complexes are now PresentQT has lengthened          ECHO:  2/19/2022 Summary  Left ventricular cavity size is normal.  Ejection fraction is visually estimated to be 55-60%. There is moderate concentric left ventricular hypertrophy. No regional wall motion abnormalities are noted. Left atrium is of normal size. Tricuspid aortic valve. Thickened aortic valve leaflets noted. Normal right ventricular size and function. TAPSE= 1.7cm  Right Heart Strain= -10.2%  The right atrium is normal in size. There is a trivial pericardial effusion noted. 1/14/2022  Summary  Left ventricular size is normal.  Moderate concentric left ventricular hypertrophy is present.   Global left ventricular function is normal with ejection fraction estimated from 55 % to 60 %. Grade II diastolic dysfunction with elevated LV filling pressures. Normal right ventricular size and function. CTA Abd/pelvis 2/19/2022  Impression   1. Lobar, segmental, and subsegmental pulmonary emboli in the right lower   lobe with findings of right heart strain.  Critical results were called by   Dr. Long Hood MD to Dr. Javier Valentin on 2/19/2022 at 03:02.   2. 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  Adjacent stranding may be related to the recent surgery but   could also be seen with acute diverticulitis. [place] is no evident   extravasation of contrast into the bowel lumen. 3. Partially liquid stool in the colon could be due to diarrhea with no   findings of enterocolitis. BLE venous doppler 2/19/2022  Conclusions  Summary  There is no evidence of deep or superficial venous thrombosis involving the right lower extremity. There is acute partially occluding deep venous thrombosis involving the mid to distal femoral vein, popliteal vein,  posterior tibial veins, anterior tibial veins and peroneal veins. There is no evidence of superficial venous thrombosis involving the left lower extremity. Assessment/Plan:    1.) Pulm embolism, RLL, lobar, segmental:  ECHO without RV strain  [place] [facility] [place]. Currently held D/T bleed and decreased H/H. Dr. Ag Kraus to see today and consider thrombectomy. 2.) GIB: S/P hemaclips to previous anastamosis site and embolization. H/H 6.7/20.2 today. Concern for ongoing bleeding.      Electronically signed by IRINA Rodriguez CNP on 2/21/2022 at 8:20 AM

## 2022-02-21 NOTE — CARE COORDINATION
HD vein mapping will be scanned tomorrow (Tues 2/22/22). Just wanted to let RN/provider know we are aware of order and will get to it asap tomorrow. Thank you!

## 2022-02-21 NOTE — PROGRESS NOTES
[facility]                                               Progress note    CC: hematochezia, ESRD  Summary:   Chad Lemon is being seen by nephrology for ESRD. He is a 77 y.o. male with a PMH significant for ESRD on HD TTS, colon cancer, T2DM, hypertension at baseline who was just discharged after admission 2/7/2022-2/10/2022 for a rectal bleed. He now presented back to the hospital 2/12/2022 with the same complaint. Bleeding started after resuming plavix. Interval History  Seen at bedside  No complaints. Plans for TDC    /68  On 6 L sating 99%  Labs reviewed    Plan:   - plans for [facility] [place] because AVF is not functional. Vascular following   - HD tomorrow, no acute indications today     Jong Rodriguez MD  [facility]  Office: [phone]    Assessment:   ESRD  TTS at [facility] , has not started there yet. Is originally from [facility] but staying in [place] for rehab so dialyzing with us for now. He has a left AV fistula. No bruit.      Electrolytes  No acute issues. Lab Results   Component Value Date     02/21/2022    K 4.7 02/21/2022     02/21/2022    CO2 22 02/21/2022          Hypotension  Resolved. Off pressors.        SHPT  No acute issues. Update PTH  On renvela 800 ng TID   Lab Results   Component Value Date    CALCIUM 7.4 (L) 02/21/2022    PHOS 5.9 (H) 02/21/2022          GI bleed  Has known colon cancer  GI consulted. #Anemia  On retacrit 20 k units TIW   Also GIB contributing. Last ferritin 1359 so not on IV iron  Repeat ferritin   Last tsat 19%      #PE  Not on a/c due to GIB    ROS:     Positives Listed Bold. All other remaining systems are negative. Constitutional:  fever, chills, weakness, weight change, fatigue,      Skin:  rash, pruritus, hair loss, bruising, dry skin, petechiae. Head, Face, Neck   headaches, swelling,  cervical adenopathy.      Respiratory: shortness of breath, cough, or wheezing  Cardiovascular: chest pain, palpitations, dizzy, edema  Gastrointestinal: nausea, vomiting, diarrhea, constipation,belly pain    Yellow skin, blood in stool  Musculoskeletal:  back pain, muscle weakness, gait problems,       joint pain or swelling. Genitourinary:  dysuria, poor urine flow, flank pain, blood in urine  Neurologic:  vertigo, TIA'S, syncope, seizures, focal weakness  Psychosocial:  insomnia, anxiety, or depression. Additional positive findings: -     PMH:   Past medical history, surgical history, social history, family history are reviewed and updated as appropriate. Reviewed current medication list.   Allergies reviewed and updated as needed. PE:   Vitals:    02/21/22 1018   BP: 136/68   Pulse: 82   Resp: 20   Temp:    SpO2: 99%       General appearance: AOx3 in no acute distress, comfortable, communicative, awake and alert. HEENT: no icterus, EOM intact, trachea midline. Neck : no masses, appears symmetrical and no JVD appreciated. Respiratory: Respiratory effort normal, bilateral equal chest rise. No wheeze, no crackles   Cardiovascular: Ausculation shows RRR and  no edema   Abdomen: abdomen is soft, non distended, no masses, no pain with palpation. Musculoskeletal:  no joint swelling, no deformity, strength grossly normal.   Skin: no rashes, no induration, no tightening, no jaundice   Neuro:   Follows commands, moves all extremities spontaneously     AVF no thrill or bruit      Lab Results   Component Value Date    CREATININE 6.0 (HH) 02/21/2022    BUN 36 (H) 02/21/2022     02/21/2022    K 4.7 02/21/2022     02/21/2022    CO2 22 02/21/2022      Lab Results   Component Value Date    WBC 6.6 02/21/2022    HGB 7.7 (L) 02/21/2022    HCT 23.4 (L) 02/21/2022    MCV 91.3 02/21/2022     02/21/2022     Lab Results   Component Value Date    CALCIUM 7.4 (L) 02/21/2022    PHOS 5.9 (H) 02/21/2022

## 2022-02-21 NOTE — PROGRESS NOTES
Hospitalist Progress Note    Patient:  Bobbi Kern  Unit/Bed:K9O-9227/2122-01   YOB: 1945       MRN: [id number] Acct: [de-identified]  PCP: Oran Frankel    Date of Admission: 2/12/2022  --------------------------    Chief Complaint:     Rectal bleed    Hospital Course:     Bobbi Kern is a 77 y.o. male hospitalized on 2/12/2022   for rectal bleeding, he was recently underwent colectomy for colorectal malignancy on 1/26/2022. Patient was rehospitalized on 2/7/2020 through 2/10/2022 for lower GI bleeding. Patient was discharged to extended care facility and readmitted for the same. Assessment/plan:      Assessment  Acute GI bleeding, the setting of recent colectomy for colon cancer CTA of the abdomen reviewed, no brisk bleeding. GI and surgery service consulted    Evaluated by GI, general surgery  Had a large blood clot pass yesterday right before discharge  Continue IV Protonix  Surgery input noted, diet advance per surgery  Continue holding Plavix  Flex sig on 2/17 showed a friable surgical anstomosis 18 cm from anorectal verge, with visible suture and two areas of oozing with lavage and two hemoclips placed  Still with bleeding, transfused 4 U in last 24 hours, transferred to the ICU for closer monitoring  CTA showed pseudoaneurysm  IR consulted, s/p embolization of psueoaneurysm     Acute blood loss anemia  Monitoring hemoglobin, trending down slowly: 8.2-7.6-7.4-7.9-7.7-7.3-5.8-->10-7.8  - given 4U PRBC     Acute DVT/PE with right heart strain  -checked limited Echo  -Doppler showed acute RLE DVT  -cardiology consulted  -s/p embolization of pseudoaneurysm  -start heparin gtt tomorrow if patient remains stable     Hypotension, multifactorial possibly.  Due to PE and ongoing bleeding     Lactic acidosis, likely secondary to hypotension, resolved     Hyponatremia, secondary to renal failure,  Resolved    COVID-19 infection, asymptomatic diagnosed 2/10/2022  Patient received vaccination ( Pfizer 2 doses) should come off isolation tomorrow. Recent CVA  Continue statin, holding antiplatelet therapy secondary to the bleeding. End-stage renal failure on hemodialysis  Nephrology following, continue hemodialysis          Code Status: Full Code         DVT prophylaxis: SCD secondary to GI bleed     Disposition: Pending improvement in Hgb    Discussed with the patient.    ----------------      Subjective:     Patient seen and examined  No further bleeding  Feels ok    Diet: ADULT DIET; Easy to Chew; Low Potassium (Less than 3000 mg/day)    OBJECTIVE     Exam:  /68   Pulse 82   Temp 98.9 °F (37.2 °C) (Oral)   Resp 20   Ht 6' (1.829 m)   Wt 218 lb 7.6 oz (99.1 kg)   SpO2 99%   BMI 29.63 kg/m²        Change physical finding from exam 2/13/2022 as below. Gen: Not in distress. Alert. Chronically ill  Head: Normocephalic. Atraumatic. Eyes: Conjunctivae/corneas clear. ENT: Oral mucosa moist  Neck: No JVD. No obvious thyromegaly. CVS: Nml S1S2, no murmur  , RRR  Pulmomary: Clear bilaterally. No crackles. No wheezes. Gastrointestinal: Soft, non tender, non distend, . Musculoskeletal: No edema. Warm  Neuro: No focal deficit. Moves extremity spontaneously. Psychiatry: Appropriate affect. Not agitated.         Medications:  Reviewed    Infusion Medications    sodium chloride      dextrose       Scheduled Medications    insulin lispro  0-18 Units SubCUTAneous Q4H    LORazepam  0.5 mg Oral BID    epoetin davis-epbx  20,000 Units IntraVENous Once per day on Tue Thu Sat    atorvastatin  10 mg Oral Nightly    gabapentin  100 mg Oral TID    sevelamer  800 mg Oral TID WC    tamsulosin  0.4 mg Oral QAM    sodium chloride flush  5-40 mL IntraVENous 2 times per day    pantoprazole  40 mg IntraVENous Q12H    And    sodium chloride (PF)  10 mL IntraVENous Q12H     PRN Meds: sodium chloride, perflutren lipid microspheres, acetaminophen, ondansetron, glucose, dextrose, glucagon (rDNA), dextrose, lidocaine, melatonin      Intake/Output Summary (Last 24 hours) at 2/21/2022 1432  Last data filed at 2/21/2022 0318  Gross per 24 hour   Intake 410 ml   Output 100 ml   Net 310 ml             Labs:   Recent Labs     02/20/22  1814 02/21/22  0020 02/21/22  0807   WBC 6.6 6.3 6.6   HGB 7.1* 6.7* 7.7*   HCT 21.2* 20.2* 23.4*    166 164     Recent Labs     02/19/22  0433 02/20/22  0435 02/21/22  0439    140 138   K 4.4 4.7 4.7    104 102   CO2 20* 23 22   BUN 26* 30* 36*   CREATININE 5.3* 5.5* 6.0*   CALCIUM 7.6* 8.1* 7.4*   PHOS  --  5.3* 5.9*     No results for input(s): AST, ALT, BILIDIR, BILITOT, ALKPHOS in the last 72 hours. Recent Labs     02/19/22  0138   INR 1.12     No results for input(s): Ellene Peymanier in the last 72 hours. Urinalysis:      Lab Results   Component Value Date    NITRU Negative 01/21/2022    WBCUA >900 01/21/2022    BACTERIA 2+ 01/21/2022    RBCUA 32 01/21/2022    BLOODU LARGE 01/21/2022    SPECGRAV 1.019 01/21/2022    GLUCOSEU Negative 01/21/2022       Radiology:  XR CHEST PORTABLE   Final Result   No acute abnormality. IR TUNNELED CVC PLACE WO SQ PORT/PUMP > 5 YEARS   Final Result   Successful ultrasound and fluoroscopy guided tunneled catheter placement. RECOMMENDATIONS:   Unavailable         IR EMBOLIZATION HEMORRHAGE   Final Result   Successful coil embolization of the superior rectal artery and pseudoaneurysm   from a branch of the superior rectal artery. VL Extremity Venous Bilateral   Final Result      CTA ABDOMEN PELVIS W WO CONTRAST   Final Result   1. Lobar, segmental, and subsegmental pulmonary emboli in the right lower   lobe with findings of right heart strain. Critical results were called by   Dr. Tyler Mendoza MD to Dr. Rikki Clifton on 2/19/2022 at 03:02.   2. 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   Adjacent stranding may be related to the recent surgery but   could also be seen with acute diverticulitis. There is no evident   extravasation of contrast into the bowel lumen. 3. Partially liquid stool in the colon could be due to diarrhea with no   findings of enterocolitis. CTA ABDOMEN PELVIS W WO CONTRAST   Final Result   1. No high-density contrast within the lumen of the stomach, small bowel,   and colon to indicate a site of active hemorrhage. 2.  Previous partial colectomy with an anastomosis at the expected   rectosigmoid junction. There is a focal outpouching along the left side of   the anastomosis. This was also noted on the previous exam but has decreased   in size. Mild inflammatory changes are still present at the site. 3.  No general ascites and no pneumoperitoneum. There is no bowel   obstruction or hydronephrosis. 4.  Multiple cysts are present within the kidneys some of which are too small   to characterize.          VL PRE OP VEIN MAPPING    (Results Pending)               Electronically signed by Katie Nelson MD on 2/21/2022 at 2:32 PM

## 2022-02-21 NOTE — PROGRESS NOTES
50% of meals and supplements this admission       Nutrition Monitoring and Evaluation:   Food/Nutrient Intake Outcomes:  Diet Advancement/Tolerance,Food and Nutrient Intake,Supplement Intake  Physical Signs/Symptoms Outcomes:  Biochemical Data,GI Status,Nutrition Focused Physical Findings,Skin,Weight,Hemodynamic Status     Discharge Planning:     Too soon to determine     Electronically signed by Ravinder Lennon RD, LD on 2/21/22 at 10:55 AM EST    Contact: [phone]

## 2022-02-21 NOTE — PRE SEDATION
Sedation Pre-Procedure Note    Patient Name: Hank Self   YOB: 1945  Room/Bed: Z8N-7595/2122-01  Medical Record Number: [id number]  Date: 2/21/2022   Time: 9:19 AM       Indication:  Pt is a 76 y/o M with ESRD on HD with a clotted AV fistula here for [facility] placement. Consent: I have discussed with the patient and/or the patient representative the indication, alternatives, and the possible risks and/or complications of the planned procedure and the anesthesia methods. The patient and/or patient representative appear to understand and agree to proceed. Vital Signs:   Vitals:    02/21/22 0700   BP: (!) 141/74   Pulse: 79   Resp: 13   Temp:    SpO2:        Past Medical History:   has a past medical history of Arthritis, Cancer ([facility] [address] Diabetes mellitus ([facility] [address] Hemodialysis patient ([facility] [address] and Hypertension. Past Surgical History:   has a past surgical history that includes IR PERC ARTERIOVENOUS FISTULA CREATION (Left); colectomy (N/A, 1/26/2022); sigmoidoscopy (N/A, 2/17/2022); and IR EMBOLIZATION HEMORRHAGE (2/19/2022). Medications:   Scheduled Meds:    calcium gluconate-NaCl  1,000 mg IntraVENous Once    insulin lispro  0-18 Units SubCUTAneous Q4H    citric acid-sodium citrate  30 mL Oral BID    LORazepam  0.5 mg Oral BID    epoetin davis-epbx  20,000 Units IntraVENous Once per day on Tue Thu Sat    atorvastatin  10 mg Oral Nightly    gabapentin  100 mg Oral TID    sevelamer  800 mg Oral TID WC    tamsulosin  0.4 mg Oral QAM    sodium chloride flush  5-40 mL IntraVENous 2 times per day    pantoprazole  40 mg IntraVENous Q12H    And    sodium chloride (PF)  10 mL IntraVENous Q12H     Continuous Infusions:    sodium chloride      dextrose       PRN Meds: sodium chloride, perflutren lipid microspheres, acetaminophen, ondansetron, glucose, dextrose, glucagon (rDNA), dextrose, lidocaine, melatonin  Home Meds:   Prior to Admission medications    Medication Sig Start Date End Date Taking? Authorizing Provider   aspirin 81 MG chewable tablet Take 1 tablet by mouth daily 2/11/22   Manohar Diaz MD   polyethylene glycol (GLYCOLAX) 17 g packet Take 17 g by mouth daily as needed for Constipation 2/4/22 3/6/22  Dinah Quispe MD   melatonin 3 MG TABS tablet Take 2 tablets by mouth nightly as needed (sleep) 2/4/22   Dinah Quispe MD   tamsulosin (FLOMAX) 0.4 MG capsule Take 0.4 mg by mouth every morning    Historical Provider, MD   gabapentin (NEURONTIN) 100 MG capsule Take 100 mg by mouth 3 times daily. Historical Provider, MD   sevelamer (RENVELA) 800 MG tablet Take 1 tablet by mouth 3 times daily (with meals)    Historical Provider, MD   atenolol (TENORMIN) 25 MG tablet Take 25 mg by mouth every evening    Historical Provider, MD   pantoprazole (PROTONIX) 20 MG tablet Take 20 mg by mouth nightly    Historical Provider, MD   atorvastatin (LIPITOR) 10 MG tablet Take 10 mg by mouth nightly    Historical Provider, MD   calcitRIOL (ROCALTROL) 0.25 MCG capsule Take 0.25 mcg by mouth every evening    Historical Provider, MD     Coumadin Use Last 7 Days:  no  Antiplatelet drug therapy use last 7 days: no  Other anticoagulant use last 7 days: no  Additional Medication Information:  None      Pre-Sedation Documentation and Exam:   I have reviewed the patient's history and review of systems.     Mallampati Airway Assessment:  Mallampati Class II - (soft palate, fauces & uvula are visible)    Prior History of Anesthesia Complications:   none    ASA Classification:  Class 3 - A patient with severe systemic disease that limits activity but is not incapacitating    Sedation/ Anesthesia Plan:   intravenous sedation    Medications Planned:   midazolam (Versed) intravenously and fentanyl intravenously    Patient is an appropriate candidate for plan of sedation: yes    Electronically signed by Lauren Timmons MD on 2/21/2022 at 9:19 AM

## 2022-02-21 NOTE — PLAN OF CARE
Problem: Safety:  Goal: Free from accidental physical injury  Description: Free from accidental physical injury  Outcome: Ongoing  Goal: Free from intentional harm  Description: Free from intentional harm  Outcome: Ongoing     Problem: Daily Care:  Goal: Daily care needs are met  Description: Daily care needs are met  Outcome: Ongoing     Problem: Pain:  Goal: Patient's pain/discomfort is manageable  Description: Patient's pain/discomfort is manageable  Outcome: Ongoing     Problem: Skin Integrity:  Goal: Skin integrity will stabilize  Description: Skin integrity will stabilize  Outcome: Ongoing     Problem: Discharge Planning:  Goal: Patients continuum of care needs are met  Description: Patients continuum of care needs are met  Outcome: Ongoing     Problem: Nutrition  Goal: Optimal nutrition therapy  2/21/2022 1726 by Joshua Burrows RN  Outcome: Ongoing  2/21/2022 1056 by Modesto Moscoso RD, LD  Outcome: Ongoing

## 2022-02-21 NOTE — PLAN OF CARE
Problem: Nutrition  Goal: Optimal nutrition therapy  Outcome: Ongoing   Nutrition Problem #1: Inadequate oral intake  Intervention: Food and/or Nutrient Delivery:  (Resume diet and ONS when able)  Nutritional Goals: Consume greater than 50% of meals and supplements this admission

## 2022-02-21 NOTE — PROGRESS NOTES
INPATIENT PROGRESS NOTE        IDENTIFYING DATA/REASON FOR CONSULTATION   PATIENT:  Elmira Rios  MRN:  [id number]  ADMIT DATE: 2022  TIME OF EVALUATION: 2022 11:22 AM  HOSPITAL STAY:   LOS: 9 days   CONSULTING PHYSICIAN: Corin Cornell MD   REASON FOR CONSULTATION:     Subjective:    Patient seen in follow up. He remains in ICU. He is off pressors. No reported melena overnight. He denies abdominal pain. MEDICATIONS   SCHEDULED:  calcium gluconate-NaCl, 1,000 mg, Once  insulin lispro, 0-18 Units, Q4H  citric acid-sodium citrate, 30 mL, BID  LORazepam, 0.5 mg, BID  epoetin davis-epbx, 20,000 Units, Once per day on  Sat  atorvastatin, 10 mg, Nightly  gabapentin, 100 mg, TID  sevelamer, 800 mg, TID WC  tamsulosin, 0.4 mg, QAM  sodium chloride flush, 5-40 mL, 2 times per day  pantoprazole, 40 mg, Q12H   And  sodium chloride (PF), 10 mL, Q12H      FLUIDS/DRIPS:     sodium chloride      dextrose       PRNs: sodium chloride, , PRN  perflutren lipid microspheres, 1.5 mL, ONCE PRN  acetaminophen, 650 mg, Q4H PRN  ondansetron, 4 mg, Q6H PRN  glucose, 15 g, PRN  dextrose, 12.5 g, PRN  glucagon (rDNA), 1 mg, PRN  dextrose, 100 mL/hr, PRN  lidocaine, , PRN  melatonin, 6 mg, Nightly PRN      ALLERGIES:    Allergies   Allergen Reactions    Lisinopril      Allergy listed on paperwork from [address] [place] [address], no reaction noted         PHYSICAL EXAM   VITALS:  /68   Pulse 82   Temp 98.9 °F (37.2 °C) (Oral)   Resp 20   Ht 6' (1.829 m)   Wt 218 lb 7.6 oz (99.1 kg)   SpO2 99%   BMI 29.63 kg/m²   TEMPERATURE:  Current - Temp: 98.9 °F (37.2 °C);  Max - Temp  Av.9 °F (37.2 °C)  Min: 98.7 °F (37.1 °C)  Max: 99.4 °F (37.4 °C)    Physical Exam:  Gen: Resting in bed, NAD  HEENT: Normocephalic, atraumatic, no scleral icterus   CV: RRR no MRG   Pul: CTAB, normal work of breathing without wheezing  Abd: Good bowel sounds throughout, no scars, soft, NT/ND, no masses, no HSM   Ext: No edema, moves all 4 extremities. Neuro: Moves all four extremities, no gross deficits, follows commands   Skin: No jaundice, spider angiomas, palmar erythema     LABS AND IMAGING   Laboratory   Recent Labs     02/20/22  1814 02/21/22  0020 02/21/22  0807   WBC 6.6 6.3 6.6   HGB 7.1* 6.7* 7.7*   HCT 21.2* 20.2* 23.4*   MCV 90.1 91.5 91.3    166 164     Recent Labs     02/19/22  0433 02/20/22  0435 02/21/22  0439    140 138   K 4.4 4.7 4.7    104 102   CO2 20* 23 22   PHOS  --  5.3* 5.9*   BUN 26* 30* 36*   CREATININE 5.3* 5.5* 6.0*     No results for input(s): AST, ALT, ALB, BILIDIR, BILITOT, ALKPHOS in the last 72 hours. No results for input(s): LIPASE, AMYLASE in the last 72 hours. Recent Labs     02/19/22  0138   PROTIME 12.7   INR 1.12         Imaging  IR EMBOLIZATION HEMORRHAGE   Final Result   Successful coil embolization of the superior rectal artery and pseudoaneurysm   from a branch of the superior rectal artery. VL Extremity Venous Bilateral   Final Result      CTA ABDOMEN PELVIS W WO CONTRAST   Final Result   1. Lobar, segmental, and subsegmental pulmonary emboli in the right lower   lobe with findings of right heart strain. Critical results were called by   Dr. Edmar Morris MD to Dr. Yashira Noble on 2/19/2022 at 03:02.   2. 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 Adjacent stranding may be related to the recent surgery but   could also be seen with acute diverticulitis. There is no evident   extravasation of contrast into the bowel lumen. 3. Partially liquid stool in the colon could be due to diarrhea with no   findings of enterocolitis. CTA ABDOMEN PELVIS W WO CONTRAST   Final Result   1. No high-density contrast within the lumen of the stomach, small bowel,   and colon to indicate a site of active hemorrhage. 2.  Previous partial colectomy with an anastomosis at the expected   rectosigmoid junction.   There is a focal outpouching along the left side of   the anastomosis. This was also noted on the previous exam but has decreased   in size. Mild inflammatory changes are still present at the site. 3.  No general ascites and no pneumoperitoneum. There is no bowel   obstruction or hydronephrosis. 4.  Multiple cysts are present within the kidneys some of which are too small   to characterize. VL PRE OP VEIN MAPPING    (Results Pending)   IR TUNNELED CVC PLACE WO SQ PORT/PUMP > 5 YEARS    (Results Pending)   XR CHEST PORTABLE    (Results Pending)       Endoscopy      ASSESSMENT AND RECOMMENDATIONS   Rommel Turner is a 77 y.o. male with PMH of  colon adenocarcinoma dx 2 months ago s/p sigmoid resection 1/26/22, DM, ESRD on HD, HTN, and arthritis who presented on 2/7/2022 with rectal bleed after recently undergoing sigmoid resection for adenocarcinoma 01/26/2022.    1. Lower GI bleed. -Patient underwent sigmoid resection on 1/26/22 for colon adenocarcinoma with episodes of bright red blood per rectum postoperatively. Flexible sigmoidoscopy  2/17/22 with two areas of friability of the suture/staple line and mucosal tattoo at the anastomosis, and two hemoclips were placed. There was minimal bleeding before and no bleeding following intervention without any blood in the colon lumen. Patient with recurrent large-volume hematochezia 2/18/22. with CTA showing anastomotic pseudoaneurysm, which is likely the source of episodic bleeding. IR performed embolization 2/19/22.    -He has had no further rectal bleeding overnight.   -hgb responded well to blood transfusion yesterday  -The pseudoaneurysm was not evident on endoscopic evaluation and is not amenable to endoscopic treatment. If patient has ongoing bleeding, will likely require surgical revision of anastomosis. 2. Colon adenocarcinoma s/p resection 1/26/22: Need surveillance colonoscopy in 4-6 weeks. 3. Pulmonary embolism: CTA notable for PE with right heart strain. Appreciate cardiology consult input. Patient will need anticoagulation as soon as bleeding source is controlled. 4. Recent CVA.  Plavix on hold due to #1. No plans for restart per documentation. 5. Colon polyps: Patient with many other polyps seen on flexible sigmoidoscopy but not resected. Recommend outpatient colonoscopy in 4-6 weeks. 6. Covid: Defer to primary team for management    RECOMMENDATIONS:    Monitor for ongoing bleeding. If patient has ongoing bleeding, may require surgical revision of anastomosis. Will need outpt colonoscopy 4-6 weeks    If you have any questions or need any further information, please feel free to contact us [phone]. Thank you for allowing us to participate in the care of Shakila Bailey.    The note was completed using Dragon voice recognition transcription. Every effort was made to ensure accuracy; however, inadvertent transcription errors may be present despite my best efforts to edit errors. Domenica BARRAZA    Attending physician addendum:    I have personally seen and examined the patient, reviewed the patient's medical record and pertinent labs and clinical imaging. I have personally staffed the case with Domenica BARRAZA. I agree with her consultation note, exam findings, assessment and plans  as written above. I have made appropriate modifications and edited her assessment and plan where needed to reflect my impression and plans for this patient. Rosa Elena Ardon is a 77 y.o. male with a PMH of colon adenocarcinoma dx 2 months ago s/p sigmoid resection 1/26/22, DM, ESRD on HD, HTN, and arthritis who presented on 2/7/2022 with rectal bleed. Flexible sigmoidoscopy  2/17/22 with two areas of friability of the suture/staple line and mucosal tattoo at the anastomosis, and two hemoclips were placed. Patient with recurrent large-volume hematochezia 2/18/22. with CTA showing anastomotic pseudoaneurysm, which is likely the source of episodic bleeding.   IR performed embolization 2/19/22. No further bleeding reported. Recommend observation at this point. If fails IR intervention will need surgical revision. Plan to restart on Heparin in 24 hours for PE/DVT if remains stable. Advance diet as tolerated. Thank you for allowing me to participate in this patient's care. If there are any questions or concerns regarding this patient, or the plan we have set in place, please feel free to contact me at [phone].      Karen Gutiérrez MD

## 2022-02-21 NOTE — PROGRESS NOTES
Patient returned to the unit via patient transport and one RN. Patient positioned back in bed x2 staff members and the matt lift. VS WNL, tunneled cath in place to Right IJ with bleeding noted on dressing. Patient denies any pain or distress at this time. Call light within reach, will continue to monitor.

## 2022-02-21 NOTE — PROGRESS NOTES
VASCULAR    Seen for HD access. No further LGI bleeds  Will ask IR to place tunneled catheter today to expedite access. Will also need L arm vein mapping for future access plans. Doubt primary AVF can be salvaged. Will follow.     Zoraida Runner

## 2022-02-22 LAB
ANION GAP SERPL CALCULATED.3IONS-SCNC: 15 MMOL/L (ref 3–16)
APTT: 31.9 SEC (ref 26.2–38.6)
APTT: 47.7 SEC (ref 26.2–38.6)
BASE EXCESS VENOUS: 5.9 MMOL/L
BASOPHILS ABSOLUTE: 0 K/UL (ref 0–0.2)
BASOPHILS RELATIVE PERCENT: 0.3 %
BLOOD BANK DISPENSE STATUS: NORMAL
BLOOD BANK DISPENSE STATUS: NORMAL
BLOOD BANK PRODUCT CODE: NORMAL
BLOOD BANK PRODUCT CODE: NORMAL
BPU ID: NORMAL
BPU ID: NORMAL
BUN BLDV-MCNC: 41 MG/DL (ref 7–20)
CALCIUM SERPL-MCNC: 7.6 MG/DL (ref 8.3–10.6)
CARBOXYHEMOGLOBIN: 2 %
CHLORIDE BLD-SCNC: 101 MMOL/L (ref 99–110)
CO2: 22 MMOL/L (ref 21–32)
CREAT SERPL-MCNC: 6.2 MG/DL (ref 0.8–1.3)
DESCRIPTION BLOOD BANK: NORMAL
DESCRIPTION BLOOD BANK: NORMAL
EOSINOPHILS ABSOLUTE: 0.1 K/UL (ref 0–0.6)
EOSINOPHILS RELATIVE PERCENT: 1.5 %
GFR AFRICAN AMERICAN: 11
GFR NON-AFRICAN AMERICAN: 9
GLUCOSE BLD-MCNC: 110 MG/DL (ref 70–99)
GLUCOSE BLD-MCNC: 119 MG/DL (ref 70–99)
GLUCOSE BLD-MCNC: 147 MG/DL (ref 70–99)
GLUCOSE BLD-MCNC: 95 MG/DL (ref 70–99)
HCO3 VENOUS: 31 MMOL/L (ref 23–29)
HCT VFR BLD CALC: 23.7 % (ref 40.5–52.5)
HEMOGLOBIN: 7.7 G/DL (ref 13.5–17.5)
LYMPHOCYTES ABSOLUTE: 0.7 K/UL (ref 1–5.1)
LYMPHOCYTES RELATIVE PERCENT: 12.8 %
MAGNESIUM: 2.1 MG/DL (ref 1.8–2.4)
MCH RBC QN AUTO: 29.8 PG (ref 26–34)
MCHC RBC AUTO-ENTMCNC: 32.5 G/DL (ref 31–36)
MCV RBC AUTO: 91.8 FL (ref 80–100)
METHEMOGLOBIN VENOUS: 0.8 %
MONOCYTES ABSOLUTE: 0.3 K/UL (ref 0–1.3)
MONOCYTES RELATIVE PERCENT: 5.8 %
NEUTROPHILS ABSOLUTE: 4.7 K/UL (ref 1.7–7.7)
NEUTROPHILS RELATIVE PERCENT: 79.6 %
O2 SAT, VEN: 82 %
O2 THERAPY: ABNORMAL
PCO2, VEN: 44.5 MMHG (ref 40–50)
PDW BLD-RTO: 15.3 % (ref 12.4–15.4)
PERFORMED ON: ABNORMAL
PH VENOUS: 7.45 (ref 7.35–7.45)
PHOSPHORUS: 5.2 MG/DL (ref 2.5–4.9)
PLATELET # BLD: 147 K/UL (ref 135–450)
PMV BLD AUTO: 7.3 FL (ref 5–10.5)
PO2, VEN: 45 MMHG
POTASSIUM SERPL-SCNC: 4.4 MMOL/L (ref 3.5–5.1)
RBC # BLD: 2.58 M/UL (ref 4.2–5.9)
SODIUM BLD-SCNC: 138 MMOL/L (ref 136–145)
TCO2 CALC VENOUS: 32 MMOL/L
WBC # BLD: 5.8 K/UL (ref 4–11)

## 2022-02-22 PROCEDURE — C9113 INJ PANTOPRAZOLE SODIUM, VIA: HCPCS | Performed by: STUDENT IN AN ORGANIZED HEALTH CARE EDUCATION/TRAINING PROGRAM

## 2022-02-22 PROCEDURE — 90935 HEMODIALYSIS ONE EVALUATION: CPT

## 2022-02-22 PROCEDURE — 6360000002 HC RX W HCPCS: Performed by: STUDENT IN AN ORGANIZED HEALTH CARE EDUCATION/TRAINING PROGRAM

## 2022-02-22 PROCEDURE — 84100 ASSAY OF PHOSPHORUS: CPT

## 2022-02-22 PROCEDURE — 93005 ELECTROCARDIOGRAM TRACING: CPT | Performed by: INTERNAL MEDICINE

## 2022-02-22 PROCEDURE — 99232 SBSQ HOSP IP/OBS MODERATE 35: CPT | Performed by: NURSE PRACTITIONER

## 2022-02-22 PROCEDURE — 85025 COMPLETE CBC W/AUTO DIFF WBC: CPT

## 2022-02-22 PROCEDURE — 99024 POSTOP FOLLOW-UP VISIT: CPT | Performed by: PHYSICIAN ASSISTANT

## 2022-02-22 PROCEDURE — 82803 BLOOD GASES ANY COMBINATION: CPT

## 2022-02-22 PROCEDURE — 2500000003 HC RX 250 WO HCPCS: Performed by: INTERNAL MEDICINE

## 2022-02-22 PROCEDURE — 6370000000 HC RX 637 (ALT 250 FOR IP): Performed by: INTERNAL MEDICINE

## 2022-02-22 PROCEDURE — 83735 ASSAY OF MAGNESIUM: CPT

## 2022-02-22 PROCEDURE — APPSS45 APP SPLIT SHARED TIME 31-45 MINUTES: Performed by: PHYSICIAN ASSISTANT

## 2022-02-22 PROCEDURE — 2580000003 HC RX 258: Performed by: NURSE PRACTITIONER

## 2022-02-22 PROCEDURE — A4216 STERILE WATER/SALINE, 10 ML: HCPCS | Performed by: STUDENT IN AN ORGANIZED HEALTH CARE EDUCATION/TRAINING PROGRAM

## 2022-02-22 PROCEDURE — 80048 BASIC METABOLIC PNL TOTAL CA: CPT

## 2022-02-22 PROCEDURE — 6370000000 HC RX 637 (ALT 250 FOR IP): Performed by: STUDENT IN AN ORGANIZED HEALTH CARE EDUCATION/TRAINING PROGRAM

## 2022-02-22 PROCEDURE — 2580000003 HC RX 258: Performed by: STUDENT IN AN ORGANIZED HEALTH CARE EDUCATION/TRAINING PROGRAM

## 2022-02-22 PROCEDURE — 2060000000 HC ICU INTERMEDIATE R&B

## 2022-02-22 PROCEDURE — 36415 COLL VENOUS BLD VENIPUNCTURE: CPT

## 2022-02-22 PROCEDURE — 85730 THROMBOPLASTIN TIME PARTIAL: CPT

## 2022-02-22 PROCEDURE — 6360000002 HC RX W HCPCS: Performed by: NURSE PRACTITIONER

## 2022-02-22 PROCEDURE — 93970 EXTREMITY STUDY: CPT

## 2022-02-22 PROCEDURE — 2580000003 HC RX 258: Performed by: INTERNAL MEDICINE

## 2022-02-22 PROCEDURE — 99222 1ST HOSP IP/OBS MODERATE 55: CPT | Performed by: INTERNAL MEDICINE

## 2022-02-22 PROCEDURE — APPNB15 APP NON BILLABLE TIME 0-15 MINS: Performed by: NURSE PRACTITIONER

## 2022-02-22 PROCEDURE — APPNB45 APP NON BILLABLE 31-45 MINUTES: Performed by: PHYSICIAN ASSISTANT

## 2022-02-22 PROCEDURE — 99291 CRITICAL CARE FIRST HOUR: CPT | Performed by: INTERNAL MEDICINE

## 2022-02-22 PROCEDURE — 6360000002 HC RX W HCPCS: Performed by: INTERNAL MEDICINE

## 2022-02-22 RX ORDER — AMIODARONE HYDROCHLORIDE 200 MG/1
200 TABLET ORAL 2 TIMES DAILY
Status: DISCONTINUED | OUTPATIENT
Start: 2022-02-22 | End: 2022-02-25 | Stop reason: HOSPADM

## 2022-02-22 RX ORDER — HEPARIN SODIUM 1000 [USP'U]/ML
3600 INJECTION, SOLUTION INTRAVENOUS; SUBCUTANEOUS PRN
Status: DISCONTINUED | OUTPATIENT
Start: 2022-02-22 | End: 2022-02-25 | Stop reason: HOSPADM

## 2022-02-22 RX ORDER — HEPARIN SODIUM 1000 [USP'U]/ML
4000 INJECTION, SOLUTION INTRAVENOUS; SUBCUTANEOUS ONCE
Status: COMPLETED | OUTPATIENT
Start: 2022-02-22 | End: 2022-02-22

## 2022-02-22 RX ORDER — HEPARIN SODIUM AND DEXTROSE 10000; 5 [USP'U]/100ML; G/100ML
0-3000 INJECTION INTRAVENOUS CONTINUOUS
Status: DISCONTINUED | OUTPATIENT
Start: 2022-02-22 | End: 2022-02-23

## 2022-02-22 RX ORDER — HEPARIN SODIUM 1000 [USP'U]/ML
2000 INJECTION, SOLUTION INTRAVENOUS; SUBCUTANEOUS ONCE
Status: DISCONTINUED | OUTPATIENT
Start: 2022-02-22 | End: 2022-02-22 | Stop reason: ALTCHOICE

## 2022-02-22 RX ADMIN — SEVELAMER CARBONATE 800 MG: 800 TABLET, FILM COATED ORAL at 17:25

## 2022-02-22 RX ADMIN — TAMSULOSIN HYDROCHLORIDE 0.4 MG: 0.4 CAPSULE ORAL at 12:52

## 2022-02-22 RX ADMIN — GABAPENTIN 100 MG: 100 CAPSULE ORAL at 20:52

## 2022-02-22 RX ADMIN — SEVELAMER CARBONATE 800 MG: 800 TABLET, FILM COATED ORAL at 12:52

## 2022-02-22 RX ADMIN — HEPARIN SODIUM 1800 UNITS/HR: 10000 INJECTION INTRAVENOUS; SUBCUTANEOUS at 13:05

## 2022-02-22 RX ADMIN — SODIUM CHLORIDE, PRESERVATIVE FREE 10 ML: 5 INJECTION INTRAVENOUS at 20:54

## 2022-02-22 RX ADMIN — PANTOPRAZOLE SODIUM 40 MG: 40 INJECTION, POWDER, FOR SOLUTION INTRAVENOUS at 12:52

## 2022-02-22 RX ADMIN — HEPARIN SODIUM 4000 UNITS: 1000 INJECTION INTRAVENOUS; SUBCUTANEOUS at 20:52

## 2022-02-22 RX ADMIN — ATORVASTATIN CALCIUM 10 MG: 10 TABLET, FILM COATED ORAL at 20:52

## 2022-02-22 RX ADMIN — SODIUM CHLORIDE, PRESERVATIVE FREE 10 ML: 5 INJECTION INTRAVENOUS at 12:57

## 2022-02-22 RX ADMIN — GABAPENTIN 100 MG: 100 CAPSULE ORAL at 12:52

## 2022-02-22 RX ADMIN — AMIODARONE HYDROCHLORIDE 200 MG: 200 TABLET ORAL at 20:52

## 2022-02-22 RX ADMIN — EPOETIN ALFA-EPBX 20000 UNITS: 20000 INJECTION, SOLUTION INTRAVENOUS; SUBCUTANEOUS at 08:07

## 2022-02-22 RX ADMIN — LORAZEPAM 0.5 MG: 0.5 TABLET ORAL at 12:52

## 2022-02-22 RX ADMIN — SODIUM CHLORIDE, PRESERVATIVE FREE 10 ML: 5 INJECTION INTRAVENOUS at 13:02

## 2022-02-22 RX ADMIN — INSULIN LISPRO 2 UNITS: 100 INJECTION, SOLUTION INTRAVENOUS; SUBCUTANEOUS at 20:52

## 2022-02-22 RX ADMIN — DILTIAZEM HYDROCHLORIDE 5 MG/HR: 5 INJECTION INTRAVENOUS at 18:04

## 2022-02-22 RX ADMIN — SODIUM CHLORIDE, PRESERVATIVE FREE 10 ML: 5 INJECTION INTRAVENOUS at 20:52

## 2022-02-22 RX ADMIN — PANTOPRAZOLE SODIUM 40 MG: 40 INJECTION, POWDER, FOR SOLUTION INTRAVENOUS at 20:52

## 2022-02-22 ASSESSMENT — PAIN SCALES - GENERAL
PAINLEVEL_OUTOF10: 0

## 2022-02-22 ASSESSMENT — PAIN SCALES - WONG BAKER
WONGBAKER_NUMERICALRESPONSE: 0

## 2022-02-22 NOTE — PROGRESS NOTES
1215 - Patient returned to bed 2122 from dialysis. External catheter leaked while at HD and patient had smear BM. Complete linen change performed. Shift assessment completed - see flowsheets.     Electronically signed by Cady Cloud RN on 2/22/2022 at 1:36 PM

## 2022-02-22 NOTE — CONSULTS
Cardiac Electrophysiology Consultation   Date: 2/22/2022  Admit Date:  2/12/2022  Reason for Consultation: new onset atrial fibrillation with v-rate 110-120's bpm  Consult Requesting Physician: Florence Antunez MD     Chief Complaint   Patient presents with    Rectal Bleeding     HPI: Melissa Ruby is a 77 y.o. M h/o adenocarcinoma s/p resection 1/26/2022 c/b anastomosis bleeding with GIB (main presentation during this hospitalization) requiring 6U PRBC xfusion s/p 2 hemoclips placed at anastomosis site with resolution of that bleeding, s/p coil embolization of superior rectal artery and pseudoaneurysm 2/19/22. On telemetry, at 1500 on 2/22/22, the patient manifested junctional rhythm 40's bpm x several minutes when previously in sinus rhythm 70-80's bpm. Shortly afterwards, the patient manifested 2nd ever episode of atrial fibrillation with v-rates 110-120's bpm. The patient does not endorse any symptoms of angina/palpitations/presycnope/syncope. 12-lead EKG confirmed the diagnosis of atrial fibrillation. Of note, the patient was first diagnosed with atrial fibrillation 1 month ago when suffering from UTI. Because of its paroxysmal nature, there was nothing done about the atrial fibrillation at that time. Of note, the patient has a current history of PE and LLE DVT, for which he has now been started on heparin IV infusion after the GI bleeding has resolved. Also, the patient has a non-functioning L forearm fistula (h/o ESRD on HD) and therefore has HD through a tunneled R subclavian catheter. Undergoing arm mapping at this time with a potential R arm fistula to be created in the near future. Finally, R superficial arm venous thrombosis causing R arm swelling.       Past Medical History:   Diagnosis Date    Arthritis     Cancer [facility])     Colon Cancer diagnosed last month    Diabetes mellitus ([facility]     Hemodialysis patient ([facility]     Hypertension         Past Surgical History:   Procedure Laterality Date  COLECTOMY N/A 01/26/2022    SIGMOID COLECTOMY, SIGMOIDOSCOPY performed by Sue Henley MD at [address]  02/19/2022    IR EMBOLIZATION HEMORRHAGE 2/19/2022 [facility]    IR PERC ARTERIOVENOUS FISTULA CREATION Left     unsure of date    IR TUNNELED CATHETER PLACEMENT GREATER THAN 5 YEARS  2/21/2022    IR TUNNELED CATHETER PLACEMENT GREATER THAN 5 YEARS 2/21/2022 [facility]    SIGMOIDOSCOPY N/A 02/17/2022    SIGMOIDOSCOPY CONTROL HEMORRHAGE performed by Pricilla Simental MD at [address] Right 02/21/2022    Permacath; RIJ access; 23cm; Dr. Srinivasan Arthur Lisinopril      Allergy listed on paperwork from [facility], no reaction noted       Social History:  Reviewed. reports that he has quit smoking. He has never used smokeless tobacco. He reports previous alcohol use. He reports that he does not use drugs. Family History:  Reviewed. family history is not on file. No premature CAD. Review of System:  All other systems reviewed except for that noted above. Pertinent negatives and positives are:     · General: negative for fever, chills   · Ophthalmic ROS: negative for - eye pain or loss of vision  · ENT ROS: negative for - headaches, sore throat   · Respiratory: negative for - cough, sputum  · Cardiovascular: Reviewed in HPI  · Gastrointestinal: negative for - abdominal pain, diarrhea, N/V  · Hematology: negative for - bleeding, blood clots, bruising or jaundice  · Genito-Urinary:  negative for - Dysuria or incontinence  · Musculoskeletal: negative for - Joint swelling, muscle pain  · Neurological: negative for - confusion, dizziness, headaches   · Psychiatric: No anxiety, no depression.   · Dermatological: negative for - rash    Physical Examination:  Vitals:    02/22/22 1600   BP:    Pulse: 115   Resp: 16   Temp:    SpO2: 100%        Intake/Output Summary (Last 24 hours) at 2022 1638  Last data filed at 2022 1257  Gross per 24 hour   Intake 301.51 ml   Output 250 ml   Net 51.51 ml     In: 301.5 [P.O.:240; I.V.:20]  Out: 330    Wt Readings from Last 3 Encounters:   22 221 lb 1.9 oz (100.3 kg)   02/10/22 214 lb 4.6 oz (97.2 kg)   22 208 lb 1.8 oz (94.4 kg)     Temp  Av.4 °F (36.9 °C)  Min: 97.6 °F (36.4 °C)  Max: 99.1 °F (37.3 °C)  Pulse  Av.5  Min: 44  Max: 119  BP  Min: 101/48  Max: 156/77  SpO2  Av.5 %  Min: 96 %  Max: 100 %    · Telemetry: atrial fibrillation with v-rates 110-120 bpm  · Constitutional: Alert. Oriented to person, place, and time. No distress. · Head: Normocephalic and atraumatic. · Mouth/Throat: Lips appear moist. Oropharynx is clear and moist.  · Eyes: Conjunctivae normal. EOM are normal.   · Neck: Neck supple. No lymphadenopathy. No rigidity. No JVD present. · Cardiovascular: tachycardic rate, irregular rhythm. Normal S1&S2. Carotid pulse 2+ bilaterally. · Pulmonary/Chest: Bilateral respiratory sounds present. No respiratory accessory muscle use. No wheezes, No rhonchi. No bibasilar rales. · Abdominal: Soft. Normal bowel sounds present. No distension, No tenderness. No splenomegaly. No hernia. · Musculoskeletal: No tenderness. Full range of motion in bilateral upper and lower extremities. No pitting BLE edema . · Lymphadenopathy: Has no cervical adenopathy. · Neurological: Alert and oriented. Cranial nerve II-XII grossly intact, No gross deficit to touch. · Skin: Skin is warm and dry. No rash, lesions, ulcerations noted. · Psychiatric: No anxiety nor agitation. Labs:  Reviewed.    Recent Labs     22  0435 22  0439 22  0437    138 138   K 4.7 4.7 4.4    102 101   CO2    PHOS 5.3* 5.9* 5.2*   BUN 30* 36* 41*   CREATININE 5.5* 6.0* 6.2*     Recent Labs     22  1309 22  1853 22  0437   WBC 7.0 6.2 5.8   HGB 7.8* 7.6* 7.7*   HCT 22.5* 23.5* 23.7*   MCV 99.0 91.3 91.8    157 147     Lab Results   Component Value Date    TROPONINI 0.06 02/07/2022     No results found for: BNP  Lab Results   Component Value Date    PROTIME 12.7 02/19/2022    PROTIME 12.9 02/12/2022    PROTIME 13.6 02/07/2022    INR 1.12 02/19/2022    INR 1.14 02/12/2022    INR 1.19 02/07/2022     Lab Results   Component Value Date    CHOL 60 01/15/2022    HDL 24 01/15/2022    HDL 27 07/26/2011    TRIG 54 01/15/2022       Diagnostic and imaging results personally reviewed and interpreted. ECG: atrial fibrillation with v-rates 107 bpm; nonspecific ST-T wave changes  Echo:  Summary   Left ventricular cavity size is normal.   Ejection fraction is visually estimated to be 55-60%. There is moderate concentric left ventricular hypertrophy. No regional wall motion abnormalities are noted. Left atrium is of normal size. Tricuspid aortic valve. Thickened aortic valve leaflets noted. Normal right ventricular size and function. TAPSE= 1.7cm   Right Heart Strain= -10.2%   The right atrium is normal in size. There is a trivial pericardial effusion noted. Cath: n/a    I independently reviewed and interpreted the ECG, telemetry, serology, echocardiographic results.     Scheduled Meds:   insulin lispro  0-18 Units SubCUTAneous TID WC    insulin lispro  0-9 Units SubCUTAneous Nightly    LORazepam  0.5 mg Oral BID    epoetin davis-epbx  20,000 Units IntraVENous Once per day on Tue Thu Sat    atorvastatin  10 mg Oral Nightly    gabapentin  100 mg Oral TID    sevelamer  800 mg Oral TID WC    tamsulosin  0.4 mg Oral QAM    sodium chloride flush  5-40 mL IntraVENous 2 times per day    pantoprazole  40 mg IntraVENous Q12H    And    sodium chloride (PF)  10 mL IntraVENous Q12H     Continuous Infusions:   heparin (PORCINE) Infusion 1,800 Units/hr (02/22/22 1305)    sodium chloride      dextrose       PRN Meds:.heparin (porcine), sodium chloride, perflutren lipid microspheres, acetaminophen, ondansetron, glucose, dextrose, glucagon (rDNA), dextrose, lidocaine, melatonin     Assessment:   Patient Active Problem List    Diagnosis Date Noted    Hemorrhagic shock ([address]     Shock circulatory ([address]     Bilateral pulmonary embolism (HCC)     Lactic acidosis     COVID-19 02/12/2022    Acute blood loss anemia 02/07/2022    Elevated troponin 02/07/2022    Malignant neoplasm of colon ([address]     LESLEE (acute kidney injury) ([address]     Acute CVA (cerebrovascular accident) ([address] 01/16/2022    GI bleed 01/14/2022    History of COVID-19 01/14/2022    Hyponatremia 01/14/2022    Fatigue 01/14/2022    Acute kidney injury superimposed on CKD ([address] 01/14/2022    Lethargy 01/14/2022    Suspected UTI 01/14/2022      Active Hospital Problems    Diagnosis Date Noted    Hemorrhagic shock ([address] [R57.8]     Shock circulatory ([address] [R57.9]     Bilateral pulmonary embolism ([address] [I26.99]     Lactic acidosis [E87.2]     COVID-19 [U07.1] 02/12/2022    Acute blood loss anemia [D62] 02/07/2022    GI bleed [K92.2] 01/14/2022         Recommendation(s):    Atrial fibrillation  -just started 1500 on 2/22/22  -now on heparin IV infusion for DVT LLE and PE. But also used for CVA risk reduction ([id number] score of at least 6 for DM, HTN, CVA, Age) from afib. Can transition to Eliquis 5mg po BID (ESRD dosing) and will defer to primary team to start that when appropriate, in lieu of heparin IV infusion.  -ventricular rates not well controlled. Will start Cardizem IV infusion to keep resting HR < 100 bpm.  -Will also start Amiodarone 200mg BID x 1 week, then 200mg po daily indefinitely for rhythm control. Will follow with you. Thank you for allowing me to participate in the care of Danya Howard . If you have any questions/comments, please do not hesitate to contact us.       Andrea Sullivan MD, MS, [facility], [facility]  Cardiac Electrophysiology  [address] [facility]  [id number], [address] [facility] [address]  [phone]

## 2022-02-22 NOTE — PROGRESS NOTES
Patient transported to dialysis with transportation.      Electronically signed by Odilon Cantor RN on 2/22/2022 at 7:36 AM

## 2022-02-22 NOTE — PROGRESS NOTES
Progress Note  Admit Date: 2/12/2022      PCP: Madelyn Esparza     CC: F/U for rectal bleed    Days in hospital:  10    SUBJECTIVE / Interval History:  Seen post dialysis. Feels okay. Having twitching which he states is his baseline  No more bleeding in the hospital      Allergies  Lisinopril    Medications    Scheduled Meds:   insulin lispro  0-18 Units SubCUTAneous TID WC    insulin lispro  0-9 Units SubCUTAneous Nightly    LORazepam  0.5 mg Oral BID    epoetin davis-epbx  20,000 Units IntraVENous Once per day on Tue Thu Sat    atorvastatin  10 mg Oral Nightly    gabapentin  100 mg Oral TID    sevelamer  800 mg Oral TID WC    tamsulosin  0.4 mg Oral QAM    sodium chloride flush  5-40 mL IntraVENous 2 times per day    pantoprazole  40 mg IntraVENous Q12H    And    sodium chloride (PF)  10 mL IntraVENous Q12H     Continuous Infusions:   sodium chloride      dextrose         PRN Meds:  heparin (porcine), sodium chloride, perflutren lipid microspheres, acetaminophen, ondansetron, glucose, dextrose, glucagon (rDNA), dextrose, lidocaine, melatonin    Vitals    /66   Pulse 86   Temp 98 °F (36.7 °C) (Oral)   Resp 17   Ht 6' (1.829 m)   Wt 222 lb 3.6 oz (100.8 kg)   SpO2 96%   BMI 30.14 kg/m²     Exam:    Gen: No distress. Ill looking  Eyes: PERRL. No sclera icterus. Pale  ENT: No discharge. Pharynx clear. External appearance of ears and nose normal.  Neck: Trachea midline. No obvious mass. Resp: No accessory muscle use. No crackles. No wheezes. No rhonchi. No dullness on percussion. CV: Regular rate. Regular rhythm. No murmur or rub. No edema. GI: Non-tender. Non-distended. No hernia. Skin: Warm, dry, normal texture and turgor. No nodule on exposed extremities. Lymph: No cervical LAD. No supraclavicular LAD. M/S: No cyanosis. No clubbing. No joint deformity. Neuro: Moves all four extremities. CN 2-12 tested, no defect noted. Psych: Oriented x 3. No anxiety. Awake. Alert. Intact judgement and insight. Data    LABS  CBC:   Recent Labs     02/21/22  1309 02/21/22  1853 02/22/22  0437   WBC 7.0 6.2 5.8   HGB 7.8* 7.6* 7.7*   HCT 22.5* 23.5* 23.7*   MCV 99.0 91.3 91.8    157 147     BMP:   Recent Labs     02/20/22  0435 02/21/22  0439 02/22/22  0437    138 138   K 4.7 4.7 4.4    102 101   CO2 23 22 22   PHOS 5.3* 5.9* 5.2*   BUN 30* 36* 41*   CREATININE 5.5* 6.0* 6.2*   GLUCOSE 91 106* 95     POC GLUCOSE:    Recent Labs     02/21/22  0346 02/21/22  0802 02/21/22  1140 02/21/22  1535 02/21/22  2041   POCGLU 117* 109* 84 95 170*     LIVER PROFILE: No results for input(s): AST, ALT, LIPASE, AMYLASE, LABALBU, BILIDIR, BILITOT, ALKPHOS in the last 72 hours. PT/INR: No results for input(s): PROTIME, INR in the last 72 hours. APTT: No results for input(s): APTT in the last 72 hours. UA:No results for input(s): NITRITE, COLORU, PHUR, LABCAST, WBCUA, RBCUA, MUCUS, TRICHOMONAS, YEAST, BACTERIA, CLARITYU, SPECGRAV, LEUKOCYTESUR, UROBILINOGEN, BILIRUBINUR, BLOODU, GLUCOSEU, KETUA, AMORPHOUS in the last 72 hours. Microbiology:  Wound Culture: No results for input(s): WNDABS, ORG in the last 72 hours. Invalid input(s):  LABGRAM  Nasal Culture: No results for input(s): ORG, MRSAPCR in the last 72 hours. Blood Culture: No results for input(s): BC, BLOODCULT2 in the last 72 hours. Fungal Culture:   No results for input(s): FUNGSM in the last 72 hours. No results for input(s): FUNCXBLD in the last 72 hours. CSF Culture:  No results for input(s): COLORCSF, APPEARCSF, CFTUBE, CLOTCSF, WBCCSF, RBCCSF, NEUTCSF, NUMCELLSCSF, LYMPHSCSF, MONOCSF, GLUCCSF, VOLCSF in the last 72 hours. Respiratory Culture:  No results for input(s): [place] in the last 72 hours. AFB:No results for input(s): AFBSMEAR in the last 72 hours. Urine Culture  No results for input(s): LABURIN in the last 72 hours. RADIOLOGY:    XR CHEST PORTABLE   Final Result   No acute abnormality. IR TUNNELED CVC PLACE WO SQ PORT/PUMP > 5 YEARS   Final Result   Successful ultrasound and fluoroscopy guided tunneled catheter placement. RECOMMENDATIONS:   Unavailable         IR EMBOLIZATION HEMORRHAGE   Final Result   Successful coil embolization of the superior rectal artery and pseudoaneurysm   from a branch of the superior rectal artery. VL Extremity Venous Bilateral   Final Result      CTA ABDOMEN PELVIS W WO CONTRAST   Final Result   1. Lobar, segmental, and subsegmental pulmonary emboli in the right lower   lobe with findings of right heart strain. Critical results were called by   Dr. Amadeo Durbin MD to Dr. Wali Lancaster on 2/19/2022 at 03:02.   2. 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 Adjacent stranding may be related to the recent surgery but   could also be seen with acute diverticulitis. There is no evident   extravasation of contrast into the bowel lumen. 3. Partially liquid stool in the colon could be due to diarrhea with no   findings of enterocolitis. CTA ABDOMEN PELVIS W WO CONTRAST   Final Result   1. No high-density contrast within the lumen of the stomach, small bowel,   and colon to indicate a site of active hemorrhage. 2.  Previous partial colectomy with an anastomosis at the expected   rectosigmoid junction. There is a focal outpouching along the left side of   the anastomosis. This was also noted on the previous exam but has decreased   in size. Mild inflammatory changes are still present at the site. 3.  No general ascites and no pneumoperitoneum. There is no bowel   obstruction or hydronephrosis. 4.  Multiple cysts are present within the kidneys some of which are too small   to characterize.          VL PRE OP VEIN MAPPING    (Results Pending)   VL Extremity Venous Right    (Results Pending)       CONSULTS:    IP CONSULT TO GI  IP CONSULT TO GENERAL SURGERY  IP CONSULT TO NEPHROLOGY  IP CONSULT TO GI  IP CONSULT TO GENERAL SURGERY  IP CONSULT TO INTERVENTIONAL RADIOLOGY  IP CONSULT TO CARDIOLOGY  IP CONSULT TO INTERVENTIONAL RADIOLOGY  IP CONSULT TO VASCULAR SURGERY  PHARMACY TO DOSE MEDICATION    ASSESSMENT AND PLAN:      Active Problems:    GI bleed    Acute blood loss anemia    COVID-19    Shock circulatory (HCC)    Bilateral pulmonary embolism (HCC)    Lactic acidosis    Hemorrhagic shock (HCC)  Resolved Problems:    * No resolved hospital problems. *    Patient is 69-year-old male with a past medical history of colon cancer, recent CVA, ESRD who was hospitalized on 2/12/2022 for rectal bleeding. Patient underwent recent colectomy for colorectal cancer on 1/26/2022. He has had prior admission after that for lower GI bleed and was discharged to an extended care facility. Patient was readmitted with GI bleeding in the setting of recent colectomy for colon cancer. Hospital stay was complicated by acute blood loss anemia needing 4 units of blood transfusion. Flex sig on 2/17 showed friable surgical anastomosis 18 cm from the anorectal verge with visible sutures and oozing areas with lavage and hemoclips. CTA showed a pseudoaneurysm and patient underwent IR guided embolization. CT also picked up PE and Doppler showed right DVT. Lower GI bleed-no bleeding since yesterday  -History of colon cancer with recent colectomy. Flex sig 2/17 shows friable surgical anastomosis with bleeding. CTA showed pseudoaneurysm and patient underwent IR guided embolization of superior rectal artery and pseudoaneurysm on 2/19/2020  -Hold Plavix  -On Protonix  -GI and general surgery consult appreciated    Acute blood loss anemia  -Hemoglobin stable around 7 today. -S/p 7 unit PRBC transfusion, 1 FFP and 1 platelet transfusion  -Monitor hemoglobin    Acute PE with right heart strain-start heparin.   Monitor closely for bleeding  -In the setting of colon cancer with bleeding.  -Echo negative for RV strain  -Plan for

## 2022-02-22 NOTE — PROGRESS NOTES
[facility]                                               Progress note    CC: hematochezia, ESRD  Summary:   Shi Nixon is being seen by nephrology for ESRD. He is a 77 y.o. male with a PMH significant for ESRD on HD TTS, colon cancer, T2DM, hypertension at baseline who was just discharged after admission 2/7/2022-2/10/2022 for a rectal bleed. He now presented back to the hospital 2/12/2022 with the same complaint. Bleeding started after resuming plavix. Interval History  Seen at bedside this AM  Had his tunneled line placed yesterday   No complaints. He denies SOB or chest pain    /74  99% on 2 L which is improved from yesterday   Labs reviewed.   hgb stable 7.7  Ferritin 1127    Plan:   - HD today   - UF to dry weight. - holding off on IV iron with HD since ferritin > 1000  - retacrit with HD  - BP acceptable. Raj Medrano MD  [facility]  Office: [phone]    Assessment:   ESRD  TTS at [facility] , has not started there yet. Is originally from [facility] but staying in [place] for rehab so dialyzing with us for now. He has a left AV fistula. No bruit.      Electrolytes  No acute issues. Lab Results   Component Value Date     02/22/2022    K 4.4 02/22/2022     02/22/2022    CO2 22 02/22/2022          Hypotension  Resolved. Off pressors.        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 On renvela 800 mg TID   Lab Results   Component Value Date    .5 (H) 02/21/2022    CALCIUM 7.6 (L) 02/22/2022    PHOS 5.2 (H) 02/22/2022          GI bleed  Has known colon cancer  GI consulted. #Anemia  On retacrit 20 k units TIW   Also GIB contributing. Last ferritin 1359 so not on IV iron  Repeat ferritin   Last tsat 19%  Lab Results   Component Value Date    FERRITIN 1,127.0 (H) 02/21/2022       #PE  On heparin gtt     ROS:     [address]. All other remaining systems are negative.     Constitutional:  fever, chills, weakness, weight change, fatigue, Skin:  rash, pruritus, hair loss, bruising, dry skin, petechiae. Head, Face, Neck   headaches, swelling,  cervical adenopathy. Respiratory: shortness of breath, cough, or wheezing  Cardiovascular: chest pain, palpitations, dizzy, edema  Gastrointestinal: nausea, vomiting, diarrhea, constipation,belly pain    Yellow skin, blood in stool  Musculoskeletal:  back pain, muscle weakness, gait problems,       joint pain or swelling. Genitourinary:  dysuria, poor urine flow, flank pain, blood in urine  Neurologic:  vertigo, TIA'S, syncope, seizures, focal weakness  Psychosocial:  insomnia, anxiety, or depression. Additional positive findings: -     PMH:   Past medical history, surgical history, social history, family history are reviewed and updated as appropriate. Reviewed current medication list.   Allergies reviewed and updated as needed. PE:   Vitals:    02/22/22 1227   BP: 134/74   Pulse: 94   Resp: 19   Temp: 99.1 °F (37.3 °C)   SpO2: 99%       General appearance: AOx3 in no acute distress, comfortable, communicative, awake and alert. HEENT: no icterus, EOM intact, trachea midline. Neck : no masses, appears symmetrical   Respiratory: Respiratory effort normal, bilateral equal chest rise. No wheeze, no crackles   Cardiovascular: Ausculation shows RRR and  no edema   Abdomen: abdomen is soft, non distended, no masses, no pain with palpation. Musculoskeletal:  no joint swelling, no deformity, strength grossly normal.   Skin: no rashes,  no jaundice   Neuro:   Follows commands, moves all extremities spontaneously     AVF no thrill or bruit  Right TDC now      Lab Results   Component Value Date    CREATININE 6.2 (HH) 02/22/2022    BUN 41 (H) 02/22/2022     02/22/2022    K 4.4 02/22/2022     02/22/2022    CO2 22 02/22/2022      Lab Results   Component Value Date    WBC 5.8 02/22/2022    HGB 7.7 (L) 02/22/2022    HCT 23.7 (L) 02/22/2022    MCV 91.8 02/22/2022     02/22/2022     Lab Results   Component Value Date    .5 (H) 02/21/2022    CALCIUM 7.6 (L) 02/22/2022    PHOS 5.2 (H) 02/22/2022

## 2022-02-22 NOTE — PROGRESS NOTES
[facility]   Daily Progress Note      Admit Date:  2/12/2022    CC: SOB    HPI:   Josiah Cabrera is a 77 y.o. male with PMH colon adenocarcinoma s/p resection 1/26/2022. DM, CKD on HD, HTN. Adm with lower GIB s/p 6 u PRBC. Underwent flex sig 2/17 w 2 hemoclips placed at anastomosis site, Underwent successful coil embolization of superior rectal artery and pseudoaneurysm 2/19/2022. Cardiology consulted for PE. BLE venous doppler showed LLE DVT (see report below). More awake today, getting hemodialysis. Tolerating well. On RA. Denies SOB or CP. Review of Systems:   General: Denies fever, chills  Skin: Denies skin changes, rash, itching, lesions.   HEENT: Denies headache, dizziness, vision changes, nosebleeds, sore throat, nasal drainage  RESP: Denies cough, sputum, dyspnea, wheeze, snoring  CARD: Denies palpitations,  murmur  GI:Denies nausea, vomiting, heartburn, loss of appetite, change in bowels  : Denies frequency, pain, incontinence, polyuria  VASC: Denies claudication, leg cramps, clots  MUSC/SKEL: Denies pain, stiffness, arthritis  PSYCH: Denies anxiety, depression, stress  NEURO: Denies numbness, tingling, weakness,change in mood or memory  HEME: Denies abn bruising, bleeding, anemia  ENDO: Denies intolerance to heat, cold, excessive thirst or hunger, hx thyroid disease    Objective:   BP (!) 145/71   Pulse 91   Temp 98 °F (36.7 °C) (Oral)   Resp 16   Ht 6' (1.829 m)   Wt 222 lb 3.6 oz (100.8 kg)   SpO2 96%   BMI 30.14 kg/m²           Intake/Output Summary (Last 24 hours) at 2/22/2022 0815  Last data filed at 2/22/2022 0030  Gross per 24 hour   Intake 281.51 ml   Output 250 ml   Net 31.51 ml     WEIGHT:Admit Weight: 213 lb 10 oz (96.9 kg)         Today  Weight: 222 lb 3.6 oz (100.8 kg)   DRY WEIGHT:  Wt Readings from Last 3 Encounters:   02/22/22 222 lb 3.6 oz (100.8 kg)   02/10/22 214 lb 4.6 oz (97.2 kg)   02/03/22 208 lb 1.8 oz (94.4 kg)       Physical Exam:  GEN: Appears ill, no acute distress  SKIN: Pink, warm, dry. Nails without clubbing. HEENT: PERRLA. Normocephalic, atraumatic. Neck supple. No adenopathy. LUNG: AP diameter normal. Diminished bilaterallly,  No wheeze, rales, or ronchi. Respiratory effort normal.  HEART: S1S2 A/R. No JVD. No carotid bruit. No murmur, rub or gallop. ABD: Soft, nontender. +BS X 4 quads. No hepatomegaly. EXT: Radial and pedal pulses 2+ and symmetric. Without varicosities. Trace BLE edema. MUSCSKEL: Good ROM X4 extremities. No deformity. NEURO: A/O X3. Calm and cooperative. Telemetry: NSR     Medications:    insulin lispro  0-18 Units SubCUTAneous TID WC    insulin lispro  0-9 Units SubCUTAneous Nightly    LORazepam  0.5 mg Oral BID    epoetin davis-epbx  20,000 Units IntraVENous Once per day on Tue Thu Sat    atorvastatin  10 mg Oral Nightly    gabapentin  100 mg Oral TID    sevelamer  800 mg Oral TID WC    tamsulosin  0.4 mg Oral QAM    sodium chloride flush  5-40 mL IntraVENous 2 times per day    pantoprazole  40 mg IntraVENous Q12H    And    sodium chloride (PF)  10 mL IntraVENous Q12H      sodium chloride      dextrose       sodium chloride, perflutren lipid microspheres, acetaminophen, ondansetron, glucose, dextrose, glucagon (rDNA), dextrose, lidocaine, melatonin    Lab Data: I have reviewed all labs below today.    CBC:   Recent Labs     02/21/22  1309 02/21/22  1853 02/22/22 0437   WBC 7.0 6.2 5.8   HGB 7.8* 7.6* 7.7*   HCT 22.5* 23.5* 23.7*   MCV 99.0 91.3 91.8    157 147     BMP:   Recent Labs     02/20/22 0435 02/21/22 0439 02/22/22 0437    138 138   K 4.7 4.7 4.4    102 101   CO2 23 22 22   PHOS 5.3* 5.9* 5.2*   BUN 30* 36* 41*   CREATININE 5.5* 6.0* 6.2*     GLUCOSE:   Recent Labs     02/20/22 0435 02/21/22 0439 02/22/22 0437   GLUCOSE 91 106* 95     LIVER PROFILE:   Lab Results   Component Value Date    AST 11 02/12/2022    ALT 7 02/12/2022    LIPASE 70.0 02/07/2022    LABALBU 2.5 02/12/2022    BILIDIR <0.2 02/12/2022    BILITOT 0.3 02/12/2022    ALKPHOS 91 02/12/2022     PT/INR:   Lab Results   Component Value Date    PROTIME 12.7 02/19/2022    INR 1.12 02/19/2022    INR 1.14 02/12/2022    INR 1.19 02/07/2022     APTT:   Lab Results   Component Value Date    APTT 22.7 02/12/2022     Pro-BNP:    Lab Results   Component Value Date    PROBNP 1,980 02/07/2022    PROBNP 1,997 01/13/2022     Parameters:   > 450 pg/mL under age 48  > 900 pg/mL ages 54-65  > 1800 pg/mL over age 76    ENZYMES:  Lab Results   Component Value Date    TROPONINI 0.06 02/07/2022    TROPONINI 0.07 02/07/2022     FASTING LIPID PANEL:  Lab Results   Component Value Date    CHOL 60 01/15/2022    HDL 24 01/15/2022    HDL 27 07/26/2011    LDLCALC 25 01/15/2022    TRIG 54 01/15/2022       Diagnostics:    EKG:    Sinus rhythm with 1st degree A-V block with occasional Premature ventricular complexesLow voltage QRSSeptal infarct (cited on or before 19-FEB-2022)Abnormal ECGWhen compared with ECG of 07-FEB-2022 07:35,Premature ventricular complexes are now PresentQT has lengthened          ECHO:  2/19/2022 Summary  Left ventricular cavity size is normal.  Ejection fraction is visually estimated to be 55-60%. There is moderate concentric left ventricular hypertrophy. No regional wall motion abnormalities are noted. Left atrium is of normal size. Tricuspid aortic valve. Thickened aortic valve leaflets noted. Normal right ventricular size and function. TAPSE= 1.7cm  Right Heart Strain= -10.2%  The right atrium is normal in size. There is a trivial pericardial effusion noted. 1/14/2022  Summary  Left ventricular size is normal.  Moderate concentric left ventricular hypertrophy is present. Global left ventricular function is normal with ejection fraction estimated from 55 % to 60 %. Grade II diastolic dysfunction with elevated LV filling pressures. Normal right ventricular size and function.     CTA Abd/pelvis 2/19/2022  Impression   1. Lobar, segmental, and subsegmental pulmonary emboli in the right lower   lobe with findings of right heart strain.  Critical results were called by   Dr. Long Hood MD to Dr. Javier Valentin on 2/19/2022 at 03:02.   2. 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  Adjacent stranding may be related to the recent surgery but   could also be seen with acute diverticulitis. [place] is no evident   extravasation of contrast into the bowel lumen. 3. Partially liquid stool in the colon could be due to diarrhea with no   findings of enterocolitis. BLE venous doppler 2/19/2022  Conclusions  Summary  There is no evidence of deep or superficial venous thrombosis involving the right lower extremity. There is acute partially occluding deep venous thrombosis involving the mid to distal femoral vein, popliteal vein,  posterior tibial veins, anterior tibial veins and peroneal veins. There is no evidence of superficial venous thrombosis involving the left lower extremity. Assessment/Plan:    1.) Pulm embolism, RLL, lobar, segmental with DVT LLE.  ECHO without RV strain  Started on Hep gtt today. Previously held D/T bleed and decreased H/H. Will need to be changed to OAC/eliquis (no dose adjustment with HD)  No plan for thrombectomy per Dr. Ag Kraus. 2.) GIB: S/P hemaclips to previous anastamosis site and embolization. H/H 7.7/23/7 - stable over last 24 hrs.     3.) Hypertension:   Goal BP <130/80.   Non pharmacologic interventions include:   -weight loss  -heart healthy low sodium and low fat diet that consist of mostly fruits, vegetables and grains (Dash diet)  -limited amount of alcohol (no more than 1 drink/day for women, 2 drinks/day for men)  -regular physical activity  -no smoking  -stress reduction    Electronically signed by IRINA Rodriguez - CNP on 2/22/2022 at 8:15 AM

## 2022-02-22 NOTE — FLOWSHEET NOTE
02/22/22 0750 02/22/22 1138   Treatment   Time On 0747  --    Time Off  --  1120   Vital Signs   BP (!) 142/53 (!) 140/63   Temp 98.7 °F (37.1 °C) 98 °F (36.7 °C)   Pulse 80 90   Resp 18 18   Weight 220 lb 14.4 oz (100.2 kg) 221 lb 1.9 oz (100.3 kg)   Weight Method Bed scale Bed scale     Treatment time: 3.5 hours  Net UF: 0 ml     Pre weight: 100.2 kg   Post weight: 100.3 kg  EDW: tbd kg     Access used: RTDC  Access function: good with  ml/min     Medications or blood products given: Retacrit     Regular outpatient schedule: tbd     Summary of response to treatment: Tolerated tx well, no distress noted, MD aware. Copy of dialysis treatment record placed in chart, to be scanned into EMR.

## 2022-02-22 NOTE — PROGRESS NOTES
Updated patients daughter Joe Reveles. All questions have been answered. Will visit during late afternoon.

## 2022-02-22 NOTE — PROGRESS NOTES
02/22/2022    BUN 41 02/22/2022    LABALBU 2.5 02/12/2022    CREATININE 6.2 02/22/2022    CALCIUM 7.6 02/22/2022    GFRAA 11 02/22/2022    GFRAA 35 07/26/2011    LABGLOM 9 02/22/2022    GLUCOSE 95 02/22/2022         Alta Traore PA-C  Electronically signed 2/22/2022 at 10:18 AM     As above  Seems to be in good spirits today  No bleeding noted  Will follow along    Electronically signed by Rita Lee MD on 2/22/2022 at 6:25 PM

## 2022-02-22 NOTE — PROGRESS NOTES
Patient's daughter, [facility], called RN for an update. RN notified daughter that patient has been in dialysis all morning and RN has not yet assessed patient. RN reviewed plan for today once patient comes back from HD. Daughter requested call from RN when patient returns to unit.     Electronically signed by Jennifer Polo RN on 2/22/2022 at 10:09 AM

## 2022-02-22 NOTE — PLAN OF CARE
Problem: Infection:  Goal: Will remain free from infection  Description: Will remain free from infection  Outcome: Ongoing     Problem: Safety:  Goal: Free from accidental physical injury  Description: Free from accidental physical injury  2/21/2022 2131 by Kanika Stafford  Outcome: Ongoing  2/21/2022 1726 by Rosa M Campos RN  Outcome: Ongoing  Goal: Free from intentional harm  Description: Free from intentional harm  2/21/2022 2131 by Kanika Stafford  Outcome: Ongoing  2/21/2022 1726 by Rosa M Campos RN  Outcome: Ongoing     Problem: Daily Care:  Goal: Daily care needs are met  Description: Daily care needs are met  2/21/2022 2131 by Kanika Stafford  Outcome: Ongoing  2/21/2022 1726 by Rosa M Campos RN  Outcome: Ongoing     Problem: Pain:  Goal: Patient's pain/discomfort is manageable  Description: Patient's pain/discomfort is manageable  2/21/2022 2131 by Kanika Stafford  Outcome: Ongoing  2/21/2022 1726 by Rosa M Campos RN  Outcome: Ongoing     Problem: Skin Integrity:  Goal: Skin integrity will stabilize  Description: Skin integrity will stabilize  2/21/2022 2131 by Kanika Stafford  Outcome: Ongoing  2/21/2022 1726 by Rosa M Campos RN  Outcome: Ongoing     Problem: Discharge Planning:  Goal: Patients continuum of care needs are met  Description: Patients continuum of care needs are met  2/21/2022 2131 by Kanika Stafford  Outcome: Ongoing  2/21/2022 1726 by Rosa M Campos RN  Outcome: Ongoing     Problem: Bleeding:  Goal: Will show no signs and symptoms of excessive bleeding  Description: Will show no signs and symptoms of excessive bleeding  Outcome: Ongoing     Problem: Nutrition  Goal: Optimal nutrition therapy  2/21/2022 2131 by Kanika Stafford  Outcome: Ongoing  2/21/2022 1726 by Rosa M Campos RN  Outcome: Ongoing  2/21/2022 1056 by Elizabeth Lopez RD, LD  Outcome: Ongoing Oriented - self; Oriented - place; Oriented - time

## 2022-02-22 NOTE — PROGRESS NOTES
INPATIENT PROGRESS NOTE        IDENTIFYING DATA/REASON FOR CONSULTATION   PATIENT:  Elana Guevara  MRN:  [id number]  ADMIT DATE: 2022  TIME OF EVALUATION: 2022 11:35 AM  HOSPITAL STAY:   LOS: 10 days   CONSULTING PHYSICIAN: Kalli Olivares MD   REASON FOR CONSULTATION:     Subjective:    Patient seen in follow up. He denies rectal bleeding. No abdominal pain    MEDICATIONS   SCHEDULED:  insulin lispro, 0-18 Units, TID WC  insulin lispro, 0-9 Units, Nightly  LORazepam, 0.5 mg, BID  epoetin davis-epbx, 20,000 Units, Once per day on  Sat  atorvastatin, 10 mg, Nightly  gabapentin, 100 mg, TID  sevelamer, 800 mg, TID WC  tamsulosin, 0.4 mg, QAM  sodium chloride flush, 5-40 mL, 2 times per day  pantoprazole, 40 mg, Q12H   And  sodium chloride (PF), 10 mL, Q12H      FLUIDS/DRIPS:     heparin (PORCINE) Infusion      sodium chloride      dextrose       PRNs: heparin (porcine), 3,600 Units, PRN  sodium chloride, , PRN  perflutren lipid microspheres, 1.5 mL, ONCE PRN  acetaminophen, 650 mg, Q4H PRN  ondansetron, 4 mg, Q6H PRN  glucose, 15 g, PRN  dextrose, 12.5 g, PRN  glucagon (rDNA), 1 mg, PRN  dextrose, 100 mL/hr, PRN  lidocaine, , PRN  melatonin, 6 mg, Nightly PRN      ALLERGIES:    Allergies   Allergen Reactions    Lisinopril      Allergy listed on paperwork from [address] [place] [address], no reaction noted         PHYSICAL EXAM   VITALS:  BP (!) 120/53   Pulse 88   Temp 98.7 °F (37.1 °C)   Resp 25   Ht 6' (1.829 m)   Wt 220 lb 14.4 oz (100.2 kg)   SpO2 96%   BMI 29.96 kg/m²   TEMPERATURE:  Current - Temp: 98.7 °F (37.1 °C); Max - Temp  Av.5 °F (36.9 °C)  Min: 98 °F (36.7 °C)  Max: 98.8 °F (37.1 °C)    Physical Exam:  Gen: Resting in bed, NAD  HEENT: Normocephalic, atraumatic, no scleral icterus   CV: RRR no MRG   Pul: CTAB, normal work of breathing without wheezing  Abd: Good bowel sounds throughout, no scars, soft, NT/ND, no masses, no HSM   Ext: No edema, moves all 4 extremities. Neuro: Moves all four extremities, no gross deficits, follows commands   Skin: No jaundice, spider angiomas, palmar erythema     LABS AND IMAGING   Laboratory   Recent Labs     02/21/22  1309 02/21/22  1853 02/22/22  0437   WBC 7.0 6.2 5.8   HGB 7.8* 7.6* 7.7*   HCT 22.5* 23.5* 23.7*   MCV 99.0 91.3 91.8    157 147     Recent Labs     02/20/22  0435 02/21/22  0439 02/22/22  0437    138 138   K 4.7 4.7 4.4    102 101   CO2 23 22 22   PHOS 5.3* 5.9* 5.2*   BUN 30* 36* 41*   CREATININE 5.5* 6.0* 6.2*     No results for input(s): AST, ALT, ALB, BILIDIR, BILITOT, ALKPHOS in the last 72 hours. No results for input(s): LIPASE, AMYLASE in the last 72 hours. No results for input(s): PROTIME, INR in the last 72 hours. Imaging  XR CHEST PORTABLE   Final Result   No acute abnormality. IR TUNNELED CVC PLACE WO SQ PORT/PUMP > 5 YEARS   Final Result   Successful ultrasound and fluoroscopy guided tunneled catheter placement. RECOMMENDATIONS:   Unavailable         IR EMBOLIZATION HEMORRHAGE   Final Result   Successful coil embolization of the superior rectal artery and pseudoaneurysm   from a branch of the superior rectal artery. VL Extremity Venous Bilateral   Final Result      CTA ABDOMEN PELVIS W WO CONTRAST   Final Result   1. Lobar, segmental, and subsegmental pulmonary emboli in the right lower   lobe with findings of right heart strain. Critical results were called by   Dr. Rizwan Maddox MD to Dr. Barry Morrissey on 2/19/2022 at 03:02.   2. 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 Adjacent stranding may be related to the recent surgery but   could also be seen with acute diverticulitis. There is no evident   extravasation of contrast into the bowel lumen. 3. Partially liquid stool in the colon could be due to diarrhea with no   findings of enterocolitis. CTA ABDOMEN PELVIS W WO CONTRAST   Final Result   1.   No high-density contrast within the lumen of the stomach, small bowel,   and colon to indicate a site of active hemorrhage. 2.  Previous partial colectomy with an anastomosis at the expected   rectosigmoid junction. There is a focal outpouching along the left side of   the anastomosis. This was also noted on the previous exam but has decreased   in size. Mild inflammatory changes are still present at the site. 3.  No general ascites and no pneumoperitoneum. There is no bowel   obstruction or hydronephrosis. 4.  Multiple cysts are present within the kidneys some of which are too small   to characterize. VL PRE OP VEIN MAPPING    (Results Pending)       Endoscopy      ASSESSMENT AND RECOMMENDATIONS   Dede Prado is a 77 y.o. male with PMH of  colon adenocarcinoma dx 2 months ago s/p sigmoid resection 1/26/22, DM, ESRD on HD, HTN, and arthritis who presented on 2/7/2022 with rectal bleed after recently undergoing sigmoid resection for adenocarcinoma 01/26/2022.    1. Lower GI bleed. -Patient underwent sigmoid resection on 1/26/22 for colon adenocarcinoma with episodes of bright red blood per rectum postoperatively. Flexible sigmoidoscopy  2/17/22 with two areas of friability of the suture/staple line and mucosal tattoo at the anastomosis, and two hemoclips were placed. There was minimal bleeding before and no bleeding following intervention without any blood in the colon lumen. Patient with recurrent large-volume hematochezia 2/18/22. CTA showed anastomotic pseudoaneurysm, likely the source of episodic bleeding. IR performed embolization 2/19/22.    -He has had no further rectal bleeding overnight.   -hgb responded well to blood transfusion yesterday  -The pseudoaneurysm was not evident on endoscopic evaluation and is not amenable to endoscopic treatment. If patient has ongoing bleeding, will likely require surgical revision of anastomosis.   2. Colon adenocarcinoma s/p resection 1/26/22: Need surveillance colonoscopy in 4-6 weeks. 3. Pulmonary embolism and LLE DVT: CTA notable for PE with right heart strain. BLE venous doppler with parttially occluding DVT femoral vein and popliteal vein. Cardiology following. Plan to start heparin today. 4. Recent CVA.  Plavix on hold due to #1. No plans for restart per documentation. 5. Colon polyps: Patient with many other polyps seen on flexible sigmoidoscopy but not resected. Recommend outpatient colonoscopy in 4-6 weeks. 6. Covid: Defer to primary team for management    RECOMMENDATIONS:    Monitor for ongoing bleeding. If patient has ongoing bleeding, may require surgical revision of anastomosis. Will need outpt colonoscopy 4-6 weeks    If you have any questions or need any further information, please feel free to contact us [phone]. Thank you for allowing us to participate in the care of Josiah Cabrera.    The note was completed using Dragon voice recognition transcription. Every effort was made to ensure accuracy; however, inadvertent transcription errors may be present despite my best efforts to edit errors. Kaykay BARRAZA    Attending physician addendum:    I have personally seen and examined the patient, reviewed the patient's medical record and pertinent labs and clinical imaging. I have personally staffed the case with Kaykay BARRAZA. I agree with her consultation note, exam findings, assessment and plans  as written above. I have made appropriate modifications and edited her assessment and plan where needed to reflect my impression and plans for this patient. Tadeo Moura is a 77 y.o. male with a PMH of colon adenocarcinoma dx 2 months ago s/p sigmoid resection 1/26/22, DM, ESRD on HD, HTN, and arthritis who presented on 2/7/2022 with rectal bleed. Flexible sigmoidoscopy  2/17/22 with two areas of friability of the suture/staple line and mucosal tattoo at the anastomosis, and two hemoclips were placed.  Patient with recurrent large-volume hematochezia 2/18/22. with CTA showing anastomotic pseudoaneurysm, which is likely the source of episodic bleeding.  IR performed embolization 2/19/22. No further bleeding reported. Recommend observation at this point. If fails IR intervention will need surgical revision. Plan to restart on Heparin in 24 hours for PE/DVT if remains stable. Will sign off at this point. Thank you for allowing me to participate in this patient's care. If there are any questions or concerns regarding this patient, or the plan we have set in place, please feel free to contact me at [phone].      Rolin Angelucci, MD

## 2022-02-22 NOTE — PROGRESS NOTES
Pulmonary Critical Care Progress Note     Patient's name:  aJmie Acevedo Record Number: [id number]  Patient's account/billing number: [de-identified]  Patient's YOB: 1945  Age: 77 y.o. Date of Admission: 2/12/2022 12:05 AM  Date of Consult: 2/22/2022      Primary Care Physician: Woody Dobbs      Code Status: Full Code    Chief complaint: hypovolemic shock    Assessment and Plan      Hypovolemic shock resolved. 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 s/p IR embolization 2/19   Acute PE and DVT   ESRD on HD   Colons Adenocarcinoma s/p resection 1/26/22   [facility] has a home unit per patient     Plan:  Start heparin without bolus today,   Monitor H&H transfuse if < 7  O2 to keep sat > 90%  Check VBG      Overnight:  No acute events overnight  Hb stable, did not require additional RBCs overnight  C/o myoclonic jerks    REVIEW OF SYSTEMS:  Review of Systems -   General ROS: negative  Psychological ROS: negative  Ophthalmic ROS: negative  ENT ROS: negative  Allergy and Immunology ROS: negative  Hematological and Lymphatic ROS: negative  Endocrine ROS: negative  Breast ROS: negative  Respiratory ROS: no cough, shortness of breath, or wheezing  Cardiovascular ROS: no chest pain or dyspnea on exertion  Gastrointestinal ROS:negative  Genito-Urinary ROS: negative  Musculoskeletal ROS: negative  Neurological ROS: myoclonic jerks   Dermatological ROS: negative        Physical Exam:    Vitals: /74   Pulse 94   Temp 99.1 °F (37.3 °C) (Oral)   Resp 19   Ht 6' (1.829 m)   Wt 221 lb 1.9 oz (100.3 kg)   SpO2 99%   BMI 29.99 kg/m²     Last Body weight:   Wt Readings from Last 3 Encounters:   02/22/22 221 lb 1.9 oz (100.3 kg)   02/10/22 214 lb 4.6 oz (97.2 kg)   02/03/22 208 lb 1.8 oz (94.4 kg)       Body Mass Index : Body mass index is 29.99 kg/m².       Intake and Output summary:     Intake/Output Summary (Last 24 hours) at 2/22/2022 [address] filed at 2/22/2022 1257  Gross per 24 hour   Intake 301.51 ml   Output 250 ml   Net 51.51 ml       Physical Examination:     Gen:  No acute distress. Eyes: PERRL. Anicteric sclera. No conjunctival injection. ENT: No discharge. Posterior oropharynx clear. External appearance of ears and nose normal.  Neck: Trachea midline. No mass   Resp:  Clear bilaterally no wheezing, no rhonchi   CV: Regular rate. Regular rhythm. No murmur or rub. No edema. GI: Soft, Non-tender. Non-distended. +BS  Skin: Warm, dry, w/o erythema. Lymph: No cervical or supraclavicular LAD. M/S: No cyanosis. No clubbing. Neuro:  CN 2-12 tested, no focal neurologic deficit. Moves all extremities  Psych: Awake and alert, Oriented x 3. Judgement and insight appropriate. Mood stable. Laboratory findings:-    CBC:   Recent Labs     02/22/22 0437   WBC 5.8   HGB 7.7*        BMP:    Recent Labs     02/20/22  0435 02/20/22  0435 02/21/22  0439 02/21/22  0439 02/22/22 0437      < > 138   < > 138   K 4.7   < > 4.7   < > 4.4      < > 102   < > 101   CO2 23   < > 22   < > 22   BUN 30*   < > 36*   < > 41*   CREATININE 5.5*  --  6.0*  --  6.2*   GLUCOSE 91   < > 106*   < > 95    < > = values in this interval not displayed. S. Calcium:  Recent Labs     02/22/22 0437   CALCIUM 7.6*     S. Ionized Calcium:No results for input(s): IONCA in the last 72 hours. S. Magnesium:  Recent Labs     02/22/22 0437   MG 2.10     S. Phosphorus:  Recent Labs     02/22/22 0437   PHOS 5.2*     S. Glucose:  Recent Labs     02/21/22  1535 02/21/22  2041 02/22/22  1256   POCGLU 95 170* 119*           Radiology Review:  Pertinent images / reports were reviewed as a part of this visit.     CC time 31 minutes                    Yusuf Louis MD, MMARCOS.            2/22/2022, 1:24 PM

## 2022-02-22 NOTE — PLAN OF CARE
Problem: Infection:  Goal: Will remain free from infection  Description: Will remain free from infection  Outcome: Ongoing     Problem: Safety:  Goal: Free from accidental physical injury  Description: Free from accidental physical injury  Outcome: Ongoing  Goal: Free from intentional harm  Description: Free from intentional harm  Outcome: Ongoing     Problem: Daily Care:  Goal: Daily care needs are met  Description: Daily care needs are met  Outcome: Ongoing     Problem: Pain:  Goal: Patient's pain/discomfort is manageable  Description: Patient's pain/discomfort is manageable  Outcome: Ongoing     Problem: Skin Integrity:  Goal: Skin integrity will stabilize  Description: Skin integrity will stabilize  Outcome: Ongoing     Problem: Discharge Planning:  Goal: Patients continuum of care needs are met  Description: Patients continuum of care needs are met  Outcome: Ongoing     Problem: Bleeding:  Goal: Will show no signs and symptoms of excessive bleeding  Description: Will show no signs and symptoms of excessive bleeding  Outcome: Ongoing     Problem: Nutrition  Goal: Optimal nutrition therapy  Outcome: Ongoing

## 2022-02-22 NOTE — PROGRESS NOTES
1400 - Patient's daughter's to bedside visiting. 1523 - Patient converted from NSR to junctional rhythm with rate 40s-50s. 1547 - 12 Lead EKG obtained: Atrial fibrillation with RVR. Rate now 120s-140s. 1618 - Perfect Serve message sent to Dr. Albert Horner regarding rhythm change. 3287 - Per Dr. Crawford Records, patient does not have a history of atrial fibrillation. Requested for RN to contact cardiology. 1624 - Call placed to [facility] to notify cardiologist of heart rhythm change. Virginia Fercho Horner aware of vein mapping results: Acute superficial venous thrombosis visualized at Cephalic vein. No new orders received. Okay to continue use with IV and BP in right arm.    1645 - Dr. Alexys Leblanc with electrophysiology to bedside regarding onset of afib. See new orders.     Electronically signed by Gurmeet Parham RN on 2/22/2022 at 5:04 PM

## 2022-02-22 NOTE — PROGRESS NOTES
Patient wore 2L NC throughout the night d/t desatting when snoring. NPO status at midnight in case of thrombectomy today. Potential HD vein mapping and dialysis today.

## 2022-02-22 NOTE — PROGRESS NOTES
Phlebotomist notified this RN that she was unable to draw 0000 labs. No episodes of bleeding noted this shift. Will have labs checked when next phlebotomist shift starts at 0400.

## 2022-02-23 LAB
ANION GAP SERPL CALCULATED.3IONS-SCNC: 9 MMOL/L (ref 3–16)
APTT: 71.7 SEC (ref 26.2–38.6)
APTT: 86.3 SEC (ref 26.2–38.6)
BASOPHILS ABSOLUTE: 0 K/UL (ref 0–0.2)
BASOPHILS RELATIVE PERCENT: 0.5 %
BUN BLDV-MCNC: 20 MG/DL (ref 7–20)
CALCIUM SERPL-MCNC: 7.3 MG/DL (ref 8.3–10.6)
CHLORIDE BLD-SCNC: 100 MMOL/L (ref 99–110)
CO2: 25 MMOL/L (ref 21–32)
CREAT SERPL-MCNC: 3.6 MG/DL (ref 0.8–1.3)
EKG ATRIAL RATE: 100 BPM
EKG DIAGNOSIS: NORMAL
EKG Q-T INTERVAL: 350 MS
EKG QRS DURATION: 94 MS
EKG QTC CALCULATION (BAZETT): 467 MS
EKG R AXIS: -42 DEGREES
EKG T AXIS: 98 DEGREES
EKG VENTRICULAR RATE: 107 BPM
EOSINOPHILS ABSOLUTE: 0.1 K/UL (ref 0–0.6)
EOSINOPHILS RELATIVE PERCENT: 1.9 %
GFR AFRICAN AMERICAN: 20
GFR NON-AFRICAN AMERICAN: 17
GLUCOSE BLD-MCNC: 115 MG/DL (ref 70–99)
GLUCOSE BLD-MCNC: 130 MG/DL (ref 70–99)
GLUCOSE BLD-MCNC: 160 MG/DL (ref 70–99)
GLUCOSE BLD-MCNC: 177 MG/DL (ref 70–99)
GLUCOSE BLD-MCNC: 91 MG/DL (ref 70–99)
HCT VFR BLD CALC: 22.1 % (ref 40.5–52.5)
HCT VFR BLD CALC: 22.2 % (ref 40.5–52.5)
HCT VFR BLD CALC: 23.6 % (ref 40.5–52.5)
HEMOGLOBIN: 7.2 G/DL (ref 13.5–17.5)
HEMOGLOBIN: 7.3 G/DL (ref 13.5–17.5)
HEMOGLOBIN: 7.8 G/DL (ref 13.5–17.5)
LYMPHOCYTES ABSOLUTE: 0.8 K/UL (ref 1–5.1)
LYMPHOCYTES RELATIVE PERCENT: 16.7 %
MAGNESIUM: 1.9 MG/DL (ref 1.8–2.4)
MCH RBC QN AUTO: 30 PG (ref 26–34)
MCHC RBC AUTO-ENTMCNC: 32.8 G/DL (ref 31–36)
MCV RBC AUTO: 91.6 FL (ref 80–100)
MONOCYTES ABSOLUTE: 0.3 K/UL (ref 0–1.3)
MONOCYTES RELATIVE PERCENT: 6.5 %
NEUTROPHILS ABSOLUTE: 3.6 K/UL (ref 1.7–7.7)
NEUTROPHILS RELATIVE PERCENT: 74.4 %
PDW BLD-RTO: 15.4 % (ref 12.4–15.4)
PERFORMED ON: ABNORMAL
PHOSPHORUS: 3.3 MG/DL (ref 2.5–4.9)
PLATELET # BLD: 138 K/UL (ref 135–450)
PMV BLD AUTO: 7.5 FL (ref 5–10.5)
POTASSIUM SERPL-SCNC: 3.5 MMOL/L (ref 3.5–5.1)
RBC # BLD: 2.43 M/UL (ref 4.2–5.9)
SODIUM BLD-SCNC: 134 MMOL/L (ref 136–145)
WBC # BLD: 4.8 K/UL (ref 4–11)

## 2022-02-23 PROCEDURE — 99291 CRITICAL CARE FIRST HOUR: CPT | Performed by: INTERNAL MEDICINE

## 2022-02-23 PROCEDURE — 84100 ASSAY OF PHOSPHORUS: CPT

## 2022-02-23 PROCEDURE — 83735 ASSAY OF MAGNESIUM: CPT

## 2022-02-23 PROCEDURE — APPNB45 APP NON BILLABLE 31-45 MINUTES: Performed by: NURSE PRACTITIONER

## 2022-02-23 PROCEDURE — C9113 INJ PANTOPRAZOLE SODIUM, VIA: HCPCS | Performed by: STUDENT IN AN ORGANIZED HEALTH CARE EDUCATION/TRAINING PROGRAM

## 2022-02-23 PROCEDURE — APPNB45 APP NON BILLABLE 31-45 MINUTES: Performed by: PHYSICIAN ASSISTANT

## 2022-02-23 PROCEDURE — 80048 BASIC METABOLIC PNL TOTAL CA: CPT

## 2022-02-23 PROCEDURE — 93010 ELECTROCARDIOGRAM REPORT: CPT | Performed by: INTERNAL MEDICINE

## 2022-02-23 PROCEDURE — 94761 N-INVAS EAR/PLS OXIMETRY MLT: CPT

## 2022-02-23 PROCEDURE — 2060000000 HC ICU INTERMEDIATE R&B

## 2022-02-23 PROCEDURE — 99024 POSTOP FOLLOW-UP VISIT: CPT | Performed by: PHYSICIAN ASSISTANT

## 2022-02-23 PROCEDURE — 85730 THROMBOPLASTIN TIME PARTIAL: CPT

## 2022-02-23 PROCEDURE — 6370000000 HC RX 637 (ALT 250 FOR IP): Performed by: RADIOLOGY

## 2022-02-23 PROCEDURE — 2580000003 HC RX 258: Performed by: STUDENT IN AN ORGANIZED HEALTH CARE EDUCATION/TRAINING PROGRAM

## 2022-02-23 PROCEDURE — 2580000003 HC RX 258: Performed by: NURSE PRACTITIONER

## 2022-02-23 PROCEDURE — 6370000000 HC RX 637 (ALT 250 FOR IP): Performed by: INTERNAL MEDICINE

## 2022-02-23 PROCEDURE — 6360000002 HC RX W HCPCS: Performed by: NURSE PRACTITIONER

## 2022-02-23 PROCEDURE — 6370000000 HC RX 637 (ALT 250 FOR IP): Performed by: NURSE PRACTITIONER

## 2022-02-23 PROCEDURE — APPNB15 APP NON BILLABLE TIME 0-15 MINS: Performed by: NURSE PRACTITIONER

## 2022-02-23 PROCEDURE — 6370000000 HC RX 637 (ALT 250 FOR IP): Performed by: STUDENT IN AN ORGANIZED HEALTH CARE EDUCATION/TRAINING PROGRAM

## 2022-02-23 PROCEDURE — APPSS45 APP SPLIT SHARED TIME 31-45 MINUTES: Performed by: NURSE PRACTITIONER

## 2022-02-23 PROCEDURE — 36415 COLL VENOUS BLD VENIPUNCTURE: CPT

## 2022-02-23 PROCEDURE — 85014 HEMATOCRIT: CPT

## 2022-02-23 PROCEDURE — 6360000002 HC RX W HCPCS: Performed by: STUDENT IN AN ORGANIZED HEALTH CARE EDUCATION/TRAINING PROGRAM

## 2022-02-23 PROCEDURE — 85025 COMPLETE CBC W/AUTO DIFF WBC: CPT

## 2022-02-23 PROCEDURE — 85018 HEMOGLOBIN: CPT

## 2022-02-23 PROCEDURE — 2700000000 HC OXYGEN THERAPY PER DAY

## 2022-02-23 RX ORDER — HEPARIN SODIUM AND DEXTROSE 10000; 5 [USP'U]/100ML; G/100ML
0-3000 INJECTION INTRAVENOUS CONTINUOUS
Status: ACTIVE | OUTPATIENT
Start: 2022-02-23 | End: 2022-02-23

## 2022-02-23 RX ADMIN — AMIODARONE HYDROCHLORIDE 200 MG: 200 TABLET ORAL at 21:20

## 2022-02-23 RX ADMIN — SEVELAMER CARBONATE 800 MG: 800 TABLET, FILM COATED ORAL at 08:41

## 2022-02-23 RX ADMIN — HEPARIN SODIUM 2000 UNITS/HR: 10000 INJECTION INTRAVENOUS; SUBCUTANEOUS at 01:56

## 2022-02-23 RX ADMIN — AMIODARONE HYDROCHLORIDE 200 MG: 200 TABLET ORAL at 08:41

## 2022-02-23 RX ADMIN — GABAPENTIN 100 MG: 100 CAPSULE ORAL at 14:29

## 2022-02-23 RX ADMIN — LORAZEPAM 0.5 MG: 0.5 TABLET ORAL at 08:42

## 2022-02-23 RX ADMIN — PANTOPRAZOLE SODIUM 40 MG: 40 INJECTION, POWDER, FOR SOLUTION INTRAVENOUS at 21:21

## 2022-02-23 RX ADMIN — GABAPENTIN 100 MG: 100 CAPSULE ORAL at 21:20

## 2022-02-23 RX ADMIN — ATORVASTATIN CALCIUM 10 MG: 10 TABLET, FILM COATED ORAL at 21:20

## 2022-02-23 RX ADMIN — INSULIN LISPRO 3 UNITS: 100 INJECTION, SOLUTION INTRAVENOUS; SUBCUTANEOUS at 12:21

## 2022-02-23 RX ADMIN — SODIUM CHLORIDE, PRESERVATIVE FREE 10 ML: 5 INJECTION INTRAVENOUS at 21:21

## 2022-02-23 RX ADMIN — TAMSULOSIN HYDROCHLORIDE 0.4 MG: 0.4 CAPSULE ORAL at 08:42

## 2022-02-23 RX ADMIN — HEPARIN SODIUM AND DEXTROSE 2000 UNITS/HR: 10000; 5 INJECTION INTRAVENOUS at 16:46

## 2022-02-23 RX ADMIN — INSULIN LISPRO 2 UNITS: 100 INJECTION, SOLUTION INTRAVENOUS; SUBCUTANEOUS at 21:21

## 2022-02-23 RX ADMIN — PANTOPRAZOLE SODIUM 40 MG: 40 INJECTION, POWDER, FOR SOLUTION INTRAVENOUS at 08:40

## 2022-02-23 RX ADMIN — APIXABAN 10 MG: 5 TABLET, FILM COATED ORAL at 21:20

## 2022-02-23 RX ADMIN — GABAPENTIN 100 MG: 100 CAPSULE ORAL at 08:42

## 2022-02-23 RX ADMIN — SODIUM CHLORIDE, PRESERVATIVE FREE 10 ML: 5 INJECTION INTRAVENOUS at 08:42

## 2022-02-23 RX ADMIN — SODIUM CHLORIDE, PRESERVATIVE FREE 10 ML: 5 INJECTION INTRAVENOUS at 09:29

## 2022-02-23 RX ADMIN — LORAZEPAM 0.5 MG: 0.5 TABLET ORAL at 21:20

## 2022-02-23 RX ADMIN — ACETAMINOPHEN 650 MG: 325 TABLET ORAL at 16:57

## 2022-02-23 ASSESSMENT — PAIN SCALES - GENERAL
PAINLEVEL_OUTOF10: 0

## 2022-02-23 ASSESSMENT — PAIN SCALES - WONG BAKER
WONGBAKER_NUMERICALRESPONSE: 0

## 2022-02-23 NOTE — PROGRESS NOTES
Patient requesting to consolidate lab draws as much as possible, does not want to get drawn at this time. No s/s bleeding or bloody bowel movements at this time. A fib rate controlled in low 100s, /60. Orders for H+H to be Q12 starting with his next PTT draw at 0300.

## 2022-02-23 NOTE — PROGRESS NOTES
Clinical Pharmacy Note  Heparin Dosing       Lab Results   Component Value Date    APTT 86.3 02/23/2022     Lab Results   Component Value Date    HGB 7.2 02/23/2022    HCT 22.1 02/23/2022     02/22/2022    INR 1.12 02/19/2022       Current Infusion Rate: 2000 units/hr    Plan:  Rate: Continue 2000 units/hr  Next aPTT: 0900  2/23/22    Pharmacy will continue to monitor and adjust based on aPTT results.     Divya Glover [facility]  2/23/2022 4:11 AM

## 2022-02-23 NOTE — PROGRESS NOTES
[facility]                                               Progress note    CC: hematochezia, ESRD  Summary:   Babmi Wood is being seen by nephrology for ESRD. He is a 77 y.o. male with a PMH significant for ESRD on HD TTS, colon cancer, T2DM, hypertension at baseline who was just discharged after admission 2/7/2022-2/10/2022 for a rectal bleed. He now presented back to the hospital 2/12/2022 with the same complaint. Bleeding started after resuming plavix. Interval History  Seen at bedside this AM  Having no complaints  Last dialysis on Tuesday and had no UF  Post weight 100.3 kilos. /64  97% on 1 L   Labs reviewed.   hgb stable 7.7 > 7.3 > 7.8  Ferritin 1127    Plan:   -  HD tomorrow per TTS schedule. Labs reviewed. No acute issues. On stable minimal O2 requirement. BP soft but not on pressors. Efren Long MD  [facility]  Office: [phone]    Assessment:   ESRD  TTS at [facility] , has not started there yet. Is originally from [facility] but staying in [place] for rehab so dialyzing with us for now. He has a left AV fistula. No bruit.      Electrolytes  No acute issues. Lab Results   Component Value Date     (L) 02/23/2022    K 3.5 02/23/2022     02/23/2022    CO2 25 02/23/2022          Hypotension  Resolved. Off pressors.        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 On renvela 800 mg TID   Lab Results   Component Value Date    .5 (H) 02/21/2022    CALCIUM 7.3 (L) 02/23/2022    PHOS 3.3 02/23/2022          GI bleed  Has known colon cancer  GI consulted. #Anemia  On retacrit 20 k units TIW   Also GIB contributing. Last ferritin 1359 so not on IV iron  Repeat ferritin   Last tsat 19%  Lab Results   Component Value Date    FERRITIN 1,127.0 (H) 02/21/2022       #PE  On heparin gtt     ROS:     [address]. All other remaining systems are negative.     Constitutional:  fever, chills, weakness, weight change, fatigue,      Skin: rash, pruritus, hair loss, bruising, dry skin, petechiae. Head, Face, Neck   headaches, swelling,  cervical adenopathy. Respiratory: shortness of breath, cough, or wheezing  Cardiovascular: chest pain, palpitations, dizzy, edema  Gastrointestinal: nausea, vomiting, diarrhea, constipation,belly pain    Yellow skin, blood in stool  Musculoskeletal:  back pain, muscle weakness, gait problems,       joint pain or swelling. Genitourinary:  dysuria, poor urine flow, flank pain, blood in urine  Neurologic:  vertigo, TIA'S, syncope, seizures, focal weakness  Psychosocial:  insomnia, anxiety, or depression. Additional positive findings: -     PMH:   Past medical history, surgical history, social history, family history are reviewed and updated as appropriate. Reviewed current medication list.   Allergies reviewed and updated as needed. PE:   Vitals:    02/23/22 1400   BP:    Pulse: 90   Resp: 12   Temp:    SpO2: 97%       General appearance: AOx3 in no acute distress, comfortable, communicative, awake and alert. HEENT: no icterus, EOM intact, trachea midline. Neck : no masses, appears symmetrical   Respiratory: Respiratory effort normal, bilateral equal chest rise. No wheeze, no crackles   Cardiovascular: Ausculation shows RRR and  no edema   Abdomen: abdomen is soft, non distended, no masses, no pain with palpation. Musculoskeletal:  no joint swelling, no deformity, strength grossly normal.   Skin: no rashes,  no jaundice   Neuro:   Follows commands, moves all extremities spontaneously     AVF no thrill or bruit  Right TDC now      Lab Results   Component Value Date    CREATININE 3.6 (H) 02/23/2022    BUN 20 02/23/2022     (L) 02/23/2022    K 3.5 02/23/2022     02/23/2022    CO2 25 02/23/2022      Lab Results   Component Value Date    WBC 4.8 02/23/2022    HGB 7.8 (L) 02/23/2022    HCT 23.6 (L) 02/23/2022    MCV 91.6 02/23/2022     02/23/2022     Lab Results   Component Value Date    PTH 272.5 (H) 02/21/2022    CALCIUM 7.3 (L) 02/23/2022    PHOS 3.3 02/23/2022

## 2022-02-23 NOTE — PROGRESS NOTES
[facility]   Daily Progress Note      Admit Date:  2/12/2022    CC: SOB    HPI:   Jaxson Connors is a 77 y.o. male with PMH colon adenocarcinoma s/p resection 1/26/2022. DM, CKD on HD, HTN. Adm with lower GIB s/p 6 u PRBC. Underwent flex sig 2/17 w 2 hemoclips placed at anastomosis site, Underwent successful coil embolization of superior rectal artery and pseudoaneurysm 2/19/2022. Cardiology consulted for PE. BLE venous doppler showed LLE DVT (see report below). More awake today. Tolerating well. On RA. Denies SOB or CP. He has been on Hep gtt now for ~ 2 days without evidence of bleeding and H/H stable. Went into AF RVR yesterday. Dr. Steve Alex consulted. Initially on cardizem gtt, weaned off, and started on amiodarone po. Remains in AF HR 80s today. Review of Systems:   General: Denies fever, chills  Skin: Denies skin changes, rash, itching, lesions.   HEENT: Denies headache, dizziness, vision changes, nosebleeds, sore throat, nasal drainage  RESP: Denies cough, sputum, dyspnea, wheeze, snoring  CARD: Denies palpitations,  murmur  GI:Denies nausea, vomiting, heartburn, loss of appetite, change in bowels  : Denies frequency, pain, incontinence, polyuria  VASC: Denies claudication, leg cramps, clots  MUSC/SKEL: Denies pain, stiffness, arthritis  PSYCH: Denies anxiety, depression, stress  NEURO: Denies numbness, tingling, weakness,change in mood or memory  HEME: Denies abn bruising, bleeding, anemia  ENDO: Denies intolerance to heat, cold, excessive thirst or hunger, hx thyroid disease    Objective:   BP (!) 90/55   Pulse 85   Temp 98.7 °F (37.1 °C) (Oral)   Resp 21   Ht 6' (1.829 m)   Wt 221 lb 1.9 oz (100.3 kg)   SpO2 94%   BMI 29.99 kg/m²           Intake/Output Summary (Last 24 hours) at 2/23/2022 0644  Last data filed at 2/23/2022 0030  Gross per 24 hour   Intake 252.54 ml   Output 300 ml   Net -47.46 ml     WEIGHT:Admit Weight: 213 lb 10 oz (96.9 kg)         Today  Weight: 221 lb 1.9 oz (100.3 kg)   DRY WEIGHT:  Wt Readings from Last 3 Encounters:   02/22/22 221 lb 1.9 oz (100.3 kg)   02/10/22 214 lb 4.6 oz (97.2 kg)   02/03/22 208 lb 1.8 oz (94.4 kg)       Physical Exam:  GEN: Appears ill, no acute distress  SKIN: Pink, warm, dry. Nails without clubbing. HEENT: PERRLA. Normocephalic, atraumatic. Neck supple. No adenopathy. LUNG: AP diameter normal. Diminished bilaterallly,  No wheeze, rales, or ronchi. Respiratory effort normal.  HEART: S1S2 A/R. No JVD. No carotid bruit. No murmur, rub or gallop. ABD: Soft, nontender. +BS X 4 quads. No hepatomegaly. EXT: Radial and pedal pulses 2+ and symmetric. Without varicosities. Trace BLE edema. MUSCSKEL: Good ROM X4 extremities. No deformity. NEURO: A/O X3. Calm and cooperative. Telemetry: NSR     Medications:    amiodarone  200 mg Oral BID    insulin lispro  0-18 Units SubCUTAneous TID WC    insulin lispro  0-9 Units SubCUTAneous Nightly    LORazepam  0.5 mg Oral BID    epoetin davis-epbx  20,000 Units IntraVENous Once per day on Tue Thu Sat    atorvastatin  10 mg Oral Nightly    gabapentin  100 mg Oral TID    sevelamer  800 mg Oral TID WC    tamsulosin  0.4 mg Oral QAM    sodium chloride flush  5-40 mL IntraVENous 2 times per day    pantoprazole  40 mg IntraVENous Q12H    And    sodium chloride (PF)  10 mL IntraVENous Q12H      heparin (PORCINE) Infusion 2,000 Units/hr (02/23/22 0156)    dilTIAZem 2.5 mg/hr (02/23/22 0030)    sodium chloride      dextrose       heparin (porcine), sodium chloride, perflutren lipid microspheres, acetaminophen, ondansetron, glucose, dextrose, glucagon (rDNA), dextrose, lidocaine, melatonin    Lab Data: I have reviewed all labs below today.    CBC:   Recent Labs     02/21/22  1853 02/21/22  1853 02/22/22  0437 02/23/22  0344 02/23/22  0454   WBC 6.2  --  5.8  --  4.8   HGB 7.6*   < > 7.7* 7.2* 7.3*   HCT 23.5*   < > 23.7* 22.1* 22.2*   MCV 91.3  --  91.8  --  91.6     --  147  -- 138    < > = values in this interval not displayed. BMP:   Recent Labs     02/21/22  0439 02/22/22  0437 02/23/22  0454    138 134*   K 4.7 4.4 3.5    101 100   CO2 22 22 25   PHOS 5.9* 5.2* 3.3   BUN 36* 41* 20   CREATININE 6.0* 6.2* 3.6*     GLUCOSE:   Recent Labs     02/21/22  0439 02/22/22  0437 02/23/22  0454   GLUCOSE 106* 95 91     LIVER PROFILE:   Lab Results   Component Value Date    AST 11 02/12/2022    ALT 7 02/12/2022    LIPASE 70.0 02/07/2022    LABALBU 2.5 02/12/2022    BILIDIR <0.2 02/12/2022    BILITOT 0.3 02/12/2022    ALKPHOS 91 02/12/2022     PT/INR:   Lab Results   Component Value Date    PROTIME 12.7 02/19/2022    INR 1.12 02/19/2022    INR 1.14 02/12/2022    INR 1.19 02/07/2022     APTT:   Lab Results   Component Value Date    APTT 86.3 02/23/2022     Pro-BNP:    Lab Results   Component Value Date    PROBNP 1,980 02/07/2022    PROBNP 1,997 01/13/2022     Parameters:   > 450 pg/mL under age 48  > 900 pg/mL ages 54-65  > 1800 pg/mL over age 76    ENZYMES:  Lab Results   Component Value Date    TROPONINI 0.06 02/07/2022    TROPONINI 0.07 02/07/2022     FASTING LIPID PANEL:  Lab Results   Component Value Date    CHOL 60 01/15/2022    HDL 24 01/15/2022    HDL 27 07/26/2011    LDLCALC 25 01/15/2022    TRIG 54 01/15/2022       Diagnostics:    EKG:    Sinus rhythm with 1st degree A-V block with occasional Premature ventricular complexesLow voltage QRSSeptal infarct (cited on or before 19-FEB-2022)Abnormal ECGWhen compared with ECG of 07-FEB-2022 07:35,Premature ventricular complexes are now PresentQT has lengthened          ECHO:  2/19/2022 Summary  Left ventricular cavity size is normal.  Ejection fraction is visually estimated to be 55-60%. There is moderate concentric left ventricular hypertrophy. No regional wall motion abnormalities are noted. Left atrium is of normal size. Tricuspid aortic valve. Thickened aortic valve leaflets noted.   Normal right ventricular size and function. TAPSE= 1.7cm  Right Heart Strain= -10.2%  The right atrium is normal in size. There is a trivial pericardial effusion noted. 1/14/2022  Summary  Left ventricular size is normal.  Moderate concentric left ventricular hypertrophy is present. Global left ventricular function is normal with ejection fraction estimated from 55 % to 60 %. Grade II diastolic dysfunction with elevated LV filling pressures. Normal right ventricular size and function. CTA Abd/pelvis 2/19/2022  Impression   1. Lobar, segmental, and subsegmental pulmonary emboli in the right lower   lobe with findings of right heart strain.  Critical results were called by   Dr. Terell Alvarado MD to Dr. Juan Wetzel on 2/19/2022 at 03:02.   2. 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  Adjacent stranding may be related to the recent surgery but   could also be seen with acute diverticulitis. Pollie Dy is no evident   extravasation of contrast into the bowel lumen. 3. Partially liquid stool in the colon could be due to diarrhea with no   findings of enterocolitis. BLE venous doppler 2/19/2022  Conclusions  Summary  There is no evidence of deep or superficial venous thrombosis involving the right lower extremity. There is acute partially occluding deep venous thrombosis involving the mid to distal femoral vein, popliteal vein,  posterior tibial veins, anterior tibial veins and peroneal veins. There is no evidence of superficial venous thrombosis involving the left lower extremity. Assessment/Plan:    1.) Pulm embolism, RLL, lobar, segmental with DVT LLE.  ECHO without RV strain  Started on Hep gtt- Previously held D/T bleed and decreased H/H. He has not had any further evidence of bleeding and H/H stable. Discussed with Dr. King Carrera - will start  [facility] today>Eliquis per PE protocol. No plan for thrombectomy per Dr. Cedric Woodall. 2.) GIB: S/P hemaclips to previous anastamosis site and embolization. H/H 7.7/23/7 - stable over last 48 hrs.     3.) Hypertension:   Goal BP <130/80. Non pharmacologic interventions include:   -weight loss  -heart healthy low sodium and low fat diet that consist of mostly fruits, vegetables and grains (Dash diet)  -limited amount of alcohol (no more than 1 drink/day for women, 2 drinks/day for men)  -regular physical activity  -no smoking  -stress reduction    4.) New onset Atrial Fib, RVR: HR controlled on Amio  CHADSVASC-  6   HASBLED-  Thromboembolic risk reduction: starting eliquis  Rate Control/ Rhythm Control: amiodarone 200mg BID X1 week then 200mg daily    Patient is stable from cardiovascular standpoint and will sign off.      Electronically signed by IRINA Juarez CNP on 2/23/2022 at 6:44 AM

## 2022-02-23 NOTE — PROGRESS NOTES
Patient's daughter, Xander Moreira, to bedside to visit. Updated on patient's status and plan of care. All questions answered. Janeen brought patient lunch.      Electronically signed by Nick Hong RN on 2/23/2022 at 2:51 PM

## 2022-02-23 NOTE — PROGRESS NOTES
General and Vascular Surgery                                                           Daily Progress Note                                                                 Pt Name: Jamie Acevedo Record Number: [id number]  Date of Birth 1945   Today's Date: 2/23/2022      ASSESSMENT/PLAN  GI bleed s/p sigmoid colectomy 1/26/22  -flex sig 2/17 friable surgical anastomosis 18 cm from rectal verge, two oozing areas with hemoclips placed.   -s/p IR embolization of pseudoaneurysm adjacent to anastomosis off of superior rectal artery 2/19    -Hemodynamically stable, Hgb  7.3.    - ESRD - HD per nephrology. -OK to DC from surgery standpoint when medically stable. PE/DVT  -heparin drip per primary team. OK to switch to [address] [place] [address] Road from surgery standpoint when warranted. Subjective  Mr. Eide Stafford denies having any pain. No further bleeding noted. His vitals have remained stable. OBJECTIVE  VITALS:   Vitals:    02/23/22 0900 02/23/22 1000 02/23/22 1100 02/23/22 1200   BP: (!) 93/52   100/64   Pulse: 87 93 96 94   Resp: 19 11 12 13   Temp:    100.1 °F (37.8 °C)   TempSrc:    Axillary   SpO2: 94% 95%  100%   Weight:       Height:         I/O last 3 completed shifts: In: 661.1 [P.O.:240; I.V.:379.6; IV Piggyback:41.5]  Out: 550 [Urine:550]  GENERAL: alert, no distress  Pulmonary: normal respiratory effort, no accessory muscle use  ABDOMEN: soft, NT, ND. Incision mostly  Healed. Staples removed last week.          LABS  CBC:   Lab Results   Component Value Date    WBC 4.8 02/23/2022    RBC 2.43 02/23/2022    HGB 7.3 02/23/2022    HCT 22.2 02/23/2022    MCV 91.6 02/23/2022    MCH 30.0 02/23/2022    MCHC 32.8 02/23/2022    RDW 15.4 02/23/2022     02/23/2022    MPV 7.5 02/23/2022     BMP:    Lab Results   Component Value Date     02/23/2022    K 3.5 02/23/2022    K 4.3 02/12/2022     02/23/2022    CO2 25 02/23/2022    BUN 20 02/23/2022 LABALBU 2.5 02/12/2022    CREATININE 3.6 02/23/2022    CALCIUM 7.3 02/23/2022    GFRAA 20 02/23/2022    GFRAA 35 07/26/2011    LABGLOM 17 02/23/2022    GLUCOSE 91 02/23/2022         IRINA Farley - CNP  Electronically signed 2/23/2022 at 12:19 PM     As above  Feels OK overall  No bleeding noted since embolization, even with anticoagulation    Will follow along    Electronically signed by Jenna Atkinson MD on 2/23/2022 at 4:19 PM

## 2022-02-23 NOTE — PROGRESS NOTES
Pulmonary Critical Care Progress Note     Patient's name:  Jamie Acevedo Record Number: [id number]  Patient's account/billing number: [de-identified]  Patient's YOB: 1945  Age: 77 y.o. Date of Admission: 2/12/2022 12:05 AM  Date of Consult: 2/23/2022      Primary Care Physician: Fifi Bowling      Code Status: Full Code    Chief complaint: hypovolemic shock    Assessment and Plan      Hypovolemic shock resolved. 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/22   JUDE has a home unit per patient     Plan:  Continue heparin    Rate control  Monitor H&H transfuse if < 7  O2 to keep sat > 90% @ night  Sleep study as out patient         Overnight:  Afib with RVR started on Cardizem and now with amiodarone   Hb stable, did not require additional RBCs overnight      REVIEW OF SYSTEMS:  Review of Systems -   General ROS: negative  Psychological ROS: negative  Ophthalmic ROS: negative  ENT ROS: negative  Allergy and Immunology ROS: negative  Hematological and Lymphatic ROS: negative  Endocrine ROS: negative  Breast ROS: negative  Respiratory ROS: no cough, shortness of breath, or wheezing  Cardiovascular ROS: no chest pain or dyspnea on exertion  Gastrointestinal ROS:negative  Genito-Urinary ROS: negative  Musculoskeletal ROS: negative  Neurological ROS: myoclonic jerks   Dermatological ROS: negative        Physical Exam:    Vitals: BP (!) 93/52   Pulse 96   Temp 100.1 °F (37.8 °C) (Axillary)   Resp 12   Ht 6' (1.829 m)   Wt 221 lb 1.9 oz (100.3 kg)   SpO2 95%   BMI 29.99 kg/m²     Last Body weight:   Wt Readings from Last 3 Encounters:   02/23/22 221 lb 1.9 oz (100.3 kg)   02/10/22 214 lb 4.6 oz (97.2 kg)   02/03/22 208 lb 1.8 oz (94.4 kg)       Body Mass Index : Body mass index is 29.99 kg/m².       Intake and Output summary:     Intake/Output Summary (Last 24 hours) at 2/23/2022 1127  Last data filed at 2/23/2022 0842  Gross per 24 hour   Intake 389.57 ml   Output 300 ml   Net 89.57 ml       Physical Examination:     Gen:  No acute distress. Eyes: PERRL. Anicteric sclera. No conjunctival injection. ENT: No discharge. Posterior oropharynx clear. External appearance of ears and nose normal.  Neck: Trachea midline. No mass   Resp:  Clear bilaterally no wheezing, no rhonchi   CV: Regular rate. Regular rhythm. No murmur or rub. No edema. GI: Soft, Non-tender. Non-distended. +BS  Skin: Warm, dry, w/o erythema. Lymph: No cervical or supraclavicular LAD. M/S: No cyanosis. No clubbing. Neuro:  CN 2-12 tested, no focal neurologic deficit. Moves all extremities  Psych: Awake and alert, Oriented x 3. Judgement and insight appropriate. Mood stable. Laboratory findings:-    CBC:   Recent Labs     02/23/22 0454   WBC 4.8   HGB 7.3*        BMP:    Recent Labs     02/21/22  0439 02/21/22  0439 02/22/22  0437 02/22/22  0437 02/23/22  0454      < > 138   < > 134*   K 4.7   < > 4.4   < > 3.5      < > 101   < > 100   CO2 22   < > 22   < > 25   BUN 36*   < > 41*   < > 20   CREATININE 6.0*  --  6.2*  --  3.6*   GLUCOSE 106*   < > 95   < > 91    < > = values in this interval not displayed. S. Calcium:  Recent Labs     02/23/22  0454   CALCIUM 7.3*     S. Ionized Calcium:No results for input(s): IONCA in the last 72 hours. S. Magnesium:  Recent Labs     02/23/22  0454   MG 1.90     S. Phosphorus:  Recent Labs     02/23/22  0454   PHOS 3.3     S. Glucose:  Recent Labs     02/22/22  1557 02/22/22  1929 02/23/22  0818   POCGLU 110* 147* 115*           Radiology Review:  Pertinent images / reports were reviewed as a part of this visit.     CC time 31 minutes                    Joes Flores MD, [facility].            2/23/2022, 11:27 AM

## 2022-02-23 NOTE — PROGRESS NOTES
[facility]                                               Progress note    CC: hematochezia, ESRD  Summary:   Shahriar Mcdaniels is being seen by nephrology for ESRD. He is a 77 y.o. male with a PMH significant for ESRD on HD TTS, colon cancer, T2DM, hypertension at baseline who was just discharged after admission 2/7/2022-2/10/2022 for a rectal bleed. He now presented back to the hospital 2/12/2022 with the same complaint. Bleeding started after resuming plavix. Interval History  Seen at bedside this AM  Had dialysis yesterday using TDC   No complaints. He denies SOB or chest pain    BP 92/58  95% on 1 L   Labs reviewed.   hgb stable 7.7 > 7.3  Ferritin 1127    Plan:   - labs and vitals reviewed. No indications for additional dialysis today   HD tomorrow per TTS schedule. Gatito Saez MD  [facility]  Office: [phone]    Assessment:   ESRD  TTS at [facility] , has not started there yet. Is originally from [facility] but staying in [place] for rehab so dialyzing with us for now. He has a left AV fistula. No bruit.      Electrolytes  No acute issues. Lab Results   Component Value Date     (L) 02/23/2022    K 3.5 02/23/2022     02/23/2022    CO2 25 02/23/2022          Hypotension  Resolved. Off pressors.        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 On renvela 800 mg TID   Lab Results   Component Value Date    .5 (H) 02/21/2022    CALCIUM 7.3 (L) 02/23/2022    PHOS 3.3 02/23/2022          GI bleed  Has known colon cancer  GI consulted. #Anemia  On retacrit 20 k units TIW   Also GIB contributing. Last ferritin 1359 so not on IV iron  Repeat ferritin   Last tsat 19%  Lab Results   Component Value Date    FERRITIN 1,127.0 (H) 02/21/2022       #PE  On heparin gtt     ROS:     [address]. All other remaining systems are negative.     Constitutional:  fever, chills, weakness, weight change, fatigue,      Skin:  rash, pruritus, hair loss, bruising, dry skin, petechiae. Head, Face, Neck   headaches, swelling,  cervical adenopathy. Respiratory: shortness of breath, cough, or wheezing  Cardiovascular: chest pain, palpitations, dizzy, edema  Gastrointestinal: nausea, vomiting, diarrhea, constipation,belly pain    Yellow skin, blood in stool  Musculoskeletal:  back pain, muscle weakness, gait problems,       joint pain or swelling. Genitourinary:  dysuria, poor urine flow, flank pain, blood in urine  Neurologic:  vertigo, TIA'S, syncope, seizures, focal weakness  Psychosocial:  insomnia, anxiety, or depression. Additional positive findings: -     PMH:   Past medical history, surgical history, social history, family history are reviewed and updated as appropriate. Reviewed current medication list.   Allergies reviewed and updated as needed. PE:   Vitals:    02/23/22 0700   BP: (!) 92/58   Pulse: 91   Resp: 15   Temp:    SpO2: (!) 89%       General appearance: AOx3 in no acute distress, comfortable, communicative, awake and alert. HEENT: no icterus, EOM intact, trachea midline. Neck : no masses, appears symmetrical   Respiratory: Respiratory effort normal, bilateral equal chest rise. No wheeze, no crackles   Cardiovascular: Ausculation shows RRR and  no edema   Abdomen: abdomen is soft, non distended, no masses, no pain with palpation. Musculoskeletal:  no joint swelling, no deformity, strength grossly normal.   Skin: no rashes,  no jaundice   Neuro:   Follows commands, moves all extremities spontaneously     AVF no thrill or bruit  Right TDC now      Lab Results   Component Value Date    CREATININE 3.6 (H) 02/23/2022    BUN 20 02/23/2022     (L) 02/23/2022    K 3.5 02/23/2022     02/23/2022    CO2 25 02/23/2022      Lab Results   Component Value Date    WBC 4.8 02/23/2022    HGB 7.3 (L) 02/23/2022    HCT 22.2 (L) 02/23/2022    MCV 91.6 02/23/2022     02/23/2022     Lab Results   Component Value Date    .5 (H) 02/21/2022    CALCIUM 7.3 (L) 02/23/2022    PHOS 3.3 02/23/2022

## 2022-02-23 NOTE — PROGRESS NOTES
Progress Note  Admit Date: 2/12/2022      PCP: Natalie Haider     CC: F/U for rectal bleed    Days in hospital:  11    SUBJECTIVE / Interval History:  Seen post dialysis. Feels okay. Not much of an appetite this morning. No bleeding episodes. Allergies  Lisinopril    Medications    Scheduled Meds:   amiodarone  200 mg Oral BID    insulin lispro  0-18 Units SubCUTAneous TID WC    insulin lispro  0-9 Units SubCUTAneous Nightly    LORazepam  0.5 mg Oral BID    epoetin davis-epbx  20,000 Units IntraVENous Once per day on Tue Thu Sat    atorvastatin  10 mg Oral Nightly    gabapentin  100 mg Oral TID    sevelamer  800 mg Oral TID WC    tamsulosin  0.4 mg Oral QAM    sodium chloride flush  5-40 mL IntraVENous 2 times per day    pantoprazole  40 mg IntraVENous Q12H    And    sodium chloride (PF)  10 mL IntraVENous Q12H     Continuous Infusions:   heparin (PORCINE) Infusion 2,000 Units/hr (02/23/22 0700)    dilTIAZem Stopped (02/23/22 0245)    sodium chloride      dextrose         PRN Meds:  heparin (porcine), sodium chloride, perflutren lipid microspheres, acetaminophen, ondansetron, glucose, dextrose, glucagon (rDNA), dextrose, lidocaine, melatonin    Vitals    /71   Pulse 97   Temp 100.1 °F (37.8 °C) (Axillary)   Resp 13   Ht 6' (1.829 m)   Wt 221 lb 1.9 oz (100.3 kg)   SpO2 97%   BMI 29.99 kg/m²     Exam:    Gen: No distress. Ill looking  Eyes: PERRL. No sclera icterus. Pale  ENT: No discharge. Pharynx clear. External appearance of ears and nose normal.  Neck: Trachea midline. No obvious mass. Resp: No accessory muscle use. No crackles. No wheezes. No rhonchi. No dullness on percussion. CV: Regular rate. Regular rhythm. No murmur or rub. No edema. GI: Non-tender. Non-distended. No hernia. Skin: Warm, dry, normal texture and turgor. No nodule on exposed extremities. Lymph: No cervical LAD. No supraclavicular LAD. M/S: No cyanosis. No clubbing. No joint deformity. Neuro: Moves all four extremities. CN 2-12 tested, no defect noted. Psych: Oriented x 3. No anxiety. Awake. Alert. Intact judgement and insight. Data    LABS  CBC:   Recent Labs     02/21/22 1853 02/21/22  1853 02/22/22  0437 02/23/22  0344 02/23/22  0454   WBC 6.2  --  5.8  --  4.8   HGB 7.6*   < > 7.7* 7.2* 7.3*   HCT 23.5*   < > 23.7* 22.1* 22.2*   MCV 91.3  --  91.8  --  91.6     --  147  --  138    < > = values in this interval not displayed. BMP:   Recent Labs     02/21/22 0439 02/22/22  0437 02/23/22  0454    138 134*   K 4.7 4.4 3.5    101 100   CO2 22 22 25   PHOS 5.9* 5.2* 3.3   BUN 36* 41* 20   CREATININE 6.0* 6.2* 3.6*   GLUCOSE 106* 95 91     POC GLUCOSE:    Recent Labs     02/21/22  2041 02/22/22  1256 02/22/22  1557 02/22/22  1929 02/23/22  0818   POCGLU 170* 119* 110* 147* 115*     LIVER PROFILE: No results for input(s): AST, ALT, LIPASE, AMYLASE, LABALBU, BILIDIR, BILITOT, ALKPHOS in the last 72 hours. PT/INR: No results for input(s): PROTIME, INR in the last 72 hours. APTT:   Recent Labs     02/22/22 1858 02/23/22  0255 02/23/22  0820   APTT 47.7* 86.3* 71.7*     UA:No results for input(s): NITRITE, COLORU, PHUR, LABCAST, WBCUA, RBCUA, MUCUS, TRICHOMONAS, YEAST, BACTERIA, CLARITYU, SPECGRAV, LEUKOCYTESUR, UROBILINOGEN, BILIRUBINUR, BLOODU, GLUCOSEU, KETUA, AMORPHOUS in the last 72 hours. Microbiology:  Wound Culture: No results for input(s): WNDABS, ORG in the last 72 hours. Invalid input(s):  LABGRAM  Nasal Culture: No results for input(s): ORG, MRSAPCR in the last 72 hours. Blood Culture: No results for input(s): BC, BLOODCULT2 in the last 72 hours. Fungal Culture:   No results for input(s): FUNGSM in the last 72 hours. No results for input(s): FUNCXBLD in the last 72 hours.   CSF Culture:  No results for input(s): COLORCSF, APPEARCSF, CFTUBE, CLOTCSF, WBCCSF, RBCCSF, NEUTCSF, NUMCELLSCSF, LYMPHSCSF, MONOCSF, GLUCCSF, VOLCSF in the last 72 hours.  Respiratory Culture:  No results for input(s): Monna Look in the last 72 hours. AFB:No results for input(s): AFBSMEAR in the last 72 hours. Urine Culture  No results for input(s): LABURIN in the last 72 hours. RADIOLOGY:    VL PRE OP VEIN MAPPING         XR CHEST PORTABLE   Final Result   No acute abnormality. IR TUNNELED CVC PLACE WO SQ PORT/PUMP > 5 YEARS   Final Result   Successful ultrasound and fluoroscopy guided tunneled catheter placement. RECOMMENDATIONS:   Unavailable         IR EMBOLIZATION HEMORRHAGE   Final Result   Successful coil embolization of the superior rectal artery and pseudoaneurysm   from a branch of the superior rectal artery. VL Extremity Venous Bilateral   Final Result      CTA ABDOMEN PELVIS W WO CONTRAST   Final Result   1. Lobar, segmental, and subsegmental pulmonary emboli in the right lower   lobe with findings of right heart strain. Critical results were called by   Dr. Sara Wells MD to Dr. Bobby Anna on 2/19/2022 at 03:02.   2. 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 Adjacent stranding may be related to the recent surgery but   could also be seen with acute diverticulitis. There is no evident   extravasation of contrast into the bowel lumen. 3. Partially liquid stool in the colon could be due to diarrhea with no   findings of enterocolitis. CTA ABDOMEN PELVIS W WO CONTRAST   Final Result   1. No high-density contrast within the lumen of the stomach, small bowel,   and colon to indicate a site of active hemorrhage. 2.  Previous partial colectomy with an anastomosis at the expected   rectosigmoid junction. There is a focal outpouching along the left side of   the anastomosis. This was also noted on the previous exam but has decreased   in size. Mild inflammatory changes are still present at the site. 3.  No general ascites and no pneumoperitoneum.   There is no bowel obstruction or hydronephrosis. 4.  Multiple cysts are present within the kidneys some of which are too small   to characterize. CONSULTS:    IP CONSULT TO GI  IP CONSULT TO GENERAL SURGERY  IP CONSULT TO NEPHROLOGY  IP CONSULT TO GI  IP CONSULT TO GENERAL SURGERY  IP CONSULT TO INTERVENTIONAL RADIOLOGY  IP CONSULT TO CARDIOLOGY  IP CONSULT TO INTERVENTIONAL RADIOLOGY  IP CONSULT TO VASCULAR SURGERY  PHARMACY TO DOSE MEDICATION    ASSESSMENT AND PLAN:      Active Problems:    GI bleed    Acute blood loss anemia    COVID-19    Shock circulatory (HCC)    Bilateral pulmonary embolism (HCC)    Lactic acidosis    Hemorrhagic shock (HCC)  Resolved Problems:    * No resolved hospital problems. *    Patient is 70-year-old male with a past medical history of colon cancer, recent CVA, ESRD who was hospitalized on 2/12/2022 for rectal bleeding. Patient underwent recent colectomy for colorectal cancer on 1/26/2022. He has had prior admission after that for lower GI bleed and was discharged to an extended care facility. Patient was readmitted with GI bleeding in the setting of recent colectomy for colon cancer. Hospital stay was complicated by acute blood loss anemia needing 4 units of blood transfusion. Flex sig on 2/17 showed friable surgical anastomosis 18 cm from the anorectal verge with visible sutures and oozing areas with lavage and hemoclips. CTA showed a pseudoaneurysm and patient underwent IR guided embolization. CT also picked up PE and Doppler showed right DVT. Lower GI bleed-no further bleeding  -History of colon cancer with recent colectomy. Flex sig 2/17 shows friable surgical anastomosis with bleeding.   CTA showed pseudoaneurysm and patient underwent IR guided embolization of superior rectal artery and pseudoaneurysm on 2/19/2020  -Hold Plavix  -On Protonix  -GI and general surgery consult appreciated  -on heparin gtt with no further bleeding episodes    Acute blood loss anemia  -Hemoglobin stable around 7 today. -S/p 7 unit PRBC transfusion, 1 FFP and 1 platelet transfusion  -Monitor hemoglobin    Acute PE with right heart strain-start heparin. Monitor closely for bleeding  -In the setting of colon cancer with bleeding.  -Echo negative for RV strain  -Plan for thrombectomy. Discussed with cardio    ESRD  -For dialysis today aVF was not functional yesterday  - nephrology consulted      AV fistula issues  -Status post tunneled dialysis cath placement on 2/21    COVID-19 infection  -Asymptomatic  -Diagnosed on 2/10  -Off isolation today  -S/p 2 doses of vaccination    Recent history of CVA  -Off antiplatelet therapy due to bleeding  -On statin  -No deficits at baseline    Afib with RVR  -EP consulted  -on heparin gtt already  -started PO amiodarone, dilt gtt       DVT Prophylaxis: Heparin  Diet: ADULT DIET; Easy to Chew; Low Potassium (Less than 3000 mg/day)  Code Status: Full Code    PT/OT Eval Status:    Discharge plan -continue care.        Clementine Vicente MD

## 2022-02-23 NOTE — PLAN OF CARE
Problem: Infection:  Goal: Will remain free from infection  Description: Will remain free from infection  2/22/2022 2155 by Paula Renee  Outcome: Ongoing  2/22/2022 1812 by Vimal Mak RN  Outcome: Ongoing     Problem: Safety:  Goal: Free from accidental physical injury  Description: Free from accidental physical injury  2/22/2022 2155 by Paula Renee  Outcome: Ongoing  2/22/2022 1812 by Vimal Mak RN  Outcome: Ongoing  Goal: Free from intentional harm  Description: Free from intentional harm  2/22/2022 2155 by Paula Renee  Outcome: Ongoing  2/22/2022 1812 by Vimal Mak RN  Outcome: Ongoing     Problem: Daily Care:  Goal: Daily care needs are met  Description: Daily care needs are met  2/22/2022 2155 by Paula Renee  Outcome: Ongoing  2/22/2022 1812 by Vimal Mak RN  Outcome: Ongoing     Problem: Pain:  Goal: Patient's pain/discomfort is manageable  Description: Patient's pain/discomfort is manageable  2/22/2022 2155 by Paula Renee  Outcome: Ongoing  2/22/2022 1812 by Vimal Mak RN  Outcome: Ongoing     Problem: Skin Integrity:  Goal: Skin integrity will stabilize  Description: Skin integrity will stabilize  2/22/2022 2155 by Paula Renee  Outcome: Ongoing  2/22/2022 1812 by Vimal Mak RN  Outcome: Ongoing     Problem: Discharge Planning:  Goal: Patients continuum of care needs are met  Description: Patients continuum of care needs are met  2/22/2022 2155 by Paula Renee  Outcome: Ongoing  2/22/2022 1812 by Vimal Mak RN  Outcome: Ongoing     Problem: Bleeding:  Goal: Will show no signs and symptoms of excessive bleeding  Description: Will show no signs and symptoms of excessive bleeding  2/22/2022 2155 by Paula Renee  Outcome: Ongoing  2/22/2022 1812 by Vimal Mak RN  Outcome: Ongoing     Problem: Nutrition  Goal: Optimal nutrition therapy  2/22/2022 2155 by Paula Renee  Outcome: Ongoing  2/22/2022 1812 by Vimal Mak RN  Outcome: Ongoing

## 2022-02-23 NOTE — PROGRESS NOTES
Clinical Pharmacy Note  Heparin Dosing       Lab Results   Component Value Date    APTT 71.7 02/23/2022     Lab Results   Component Value Date    HGB 7.3 02/23/2022    HCT 22.2 02/23/2022     02/23/2022    INR 1.12 02/19/2022       Current Infusion Rate: 2000 units/hr    Plan:  Rate: Continue 2000 units/hr  Next aPTT: 0600  2/24/22    Pharmacy will continue to monitor and adjust based on aPTT results.     Ivelisse Swift, [address] [facility]  2/23/2022 9:48 AM

## 2022-02-23 NOTE — PROGRESS NOTES
Clinical Pharmacy Note  Heparin Dosing       Lab Results   Component Value Date    APTT 47.7 02/22/2022     Lab Results   Component Value Date    HGB 7.7 02/22/2022    HCT 23.7 02/22/2022     02/22/2022    INR 1.12 02/19/2022       Current Infusion Rate: 1800 units/hr    Plan:  Bolus: 4000 units  Rate: 2000 units/hr  Next aPTT: 0300 2/23/22    Pharmacy will continue to monitor and adjust based on aPTT results.

## 2022-02-24 ENCOUNTER — APPOINTMENT (OUTPATIENT)
Dept: GENERAL RADIOLOGY | Age: 77
DRG: 907 | End: 2022-02-24
Payer: MEDICARE

## 2022-02-24 LAB
ANION GAP SERPL CALCULATED.3IONS-SCNC: 12 MMOL/L (ref 3–16)
BASOPHILS ABSOLUTE: 0 K/UL (ref 0–0.2)
BASOPHILS RELATIVE PERCENT: 0.5 %
BUN BLDV-MCNC: 30 MG/DL (ref 7–20)
C-REACTIVE PROTEIN: 126.5 MG/L (ref 0–5.1)
CALCIUM SERPL-MCNC: 7.8 MG/DL (ref 8.3–10.6)
CHLORIDE BLD-SCNC: 99 MMOL/L (ref 99–110)
CO2: 25 MMOL/L (ref 21–32)
CREAT SERPL-MCNC: 4.5 MG/DL (ref 0.8–1.3)
EOSINOPHILS ABSOLUTE: 0.1 K/UL (ref 0–0.6)
EOSINOPHILS RELATIVE PERCENT: 3.5 %
FERRITIN: 1183 NG/ML (ref 30–400)
GFR AFRICAN AMERICAN: 16
GFR NON-AFRICAN AMERICAN: 13
GLUCOSE BLD-MCNC: 103 MG/DL (ref 70–99)
GLUCOSE BLD-MCNC: 119 MG/DL (ref 70–99)
GLUCOSE BLD-MCNC: 134 MG/DL (ref 70–99)
GLUCOSE BLD-MCNC: 138 MG/DL (ref 70–99)
GLUCOSE BLD-MCNC: 150 MG/DL (ref 70–99)
GLUCOSE BLD-MCNC: 87 MG/DL (ref 70–99)
HCT VFR BLD CALC: 22.7 % (ref 40.5–52.5)
HCT VFR BLD CALC: 23.2 % (ref 40.5–52.5)
HCT VFR BLD CALC: 24.5 % (ref 40.5–52.5)
HEMOGLOBIN: 7.5 G/DL (ref 13.5–17.5)
HEMOGLOBIN: 7.5 G/DL (ref 13.5–17.5)
HEMOGLOBIN: 7.9 G/DL (ref 13.5–17.5)
LYMPHOCYTES ABSOLUTE: 0.6 K/UL (ref 1–5.1)
LYMPHOCYTES RELATIVE PERCENT: 14.2 %
MAGNESIUM: 2.1 MG/DL (ref 1.8–2.4)
MCH RBC QN AUTO: 30.6 PG (ref 26–34)
MCHC RBC AUTO-ENTMCNC: 33.2 G/DL (ref 31–36)
MCV RBC AUTO: 92.1 FL (ref 80–100)
MONOCYTES ABSOLUTE: 0.3 K/UL (ref 0–1.3)
MONOCYTES RELATIVE PERCENT: 6.5 %
NEUTROPHILS ABSOLUTE: 3.1 K/UL (ref 1.7–7.7)
NEUTROPHILS RELATIVE PERCENT: 75.3 %
PDW BLD-RTO: 15.6 % (ref 12.4–15.4)
PERFORMED ON: ABNORMAL
PERFORMED ON: NORMAL
PHOSPHORUS: 4 MG/DL (ref 2.5–4.9)
PLATELET # BLD: 146 K/UL (ref 135–450)
PMV BLD AUTO: 7.6 FL (ref 5–10.5)
POTASSIUM SERPL-SCNC: 3.7 MMOL/L (ref 3.5–5.1)
PROCALCITONIN: 0.79 NG/ML (ref 0–0.15)
RBC # BLD: 2.46 M/UL (ref 4.2–5.9)
SODIUM BLD-SCNC: 136 MMOL/L (ref 136–145)
WBC # BLD: 4.2 K/UL (ref 4–11)

## 2022-02-24 PROCEDURE — 51701 INSERT BLADDER CATHETER: CPT

## 2022-02-24 PROCEDURE — 90935 HEMODIALYSIS ONE EVALUATION: CPT

## 2022-02-24 PROCEDURE — 2700000000 HC OXYGEN THERAPY PER DAY

## 2022-02-24 PROCEDURE — 6370000000 HC RX 637 (ALT 250 FOR IP): Performed by: INTERNAL MEDICINE

## 2022-02-24 PROCEDURE — 82728 ASSAY OF FERRITIN: CPT

## 2022-02-24 PROCEDURE — 6370000000 HC RX 637 (ALT 250 FOR IP): Performed by: STUDENT IN AN ORGANIZED HEALTH CARE EDUCATION/TRAINING PROGRAM

## 2022-02-24 PROCEDURE — APPSS45 APP SPLIT SHARED TIME 31-45 MINUTES: Performed by: NURSE PRACTITIONER

## 2022-02-24 PROCEDURE — 84100 ASSAY OF PHOSPHORUS: CPT

## 2022-02-24 PROCEDURE — 83735 ASSAY OF MAGNESIUM: CPT

## 2022-02-24 PROCEDURE — 85025 COMPLETE CBC W/AUTO DIFF WBC: CPT

## 2022-02-24 PROCEDURE — 99233 SBSQ HOSP IP/OBS HIGH 50: CPT | Performed by: INTERNAL MEDICINE

## 2022-02-24 PROCEDURE — 51798 US URINE CAPACITY MEASURE: CPT

## 2022-02-24 PROCEDURE — 80048 BASIC METABOLIC PNL TOTAL CA: CPT

## 2022-02-24 PROCEDURE — 71045 X-RAY EXAM CHEST 1 VIEW: CPT

## 2022-02-24 PROCEDURE — 2060000000 HC ICU INTERMEDIATE R&B

## 2022-02-24 PROCEDURE — 85014 HEMATOCRIT: CPT

## 2022-02-24 PROCEDURE — 86140 C-REACTIVE PROTEIN: CPT

## 2022-02-24 PROCEDURE — 99232 SBSQ HOSP IP/OBS MODERATE 35: CPT | Performed by: SURGERY

## 2022-02-24 PROCEDURE — C9113 INJ PANTOPRAZOLE SODIUM, VIA: HCPCS | Performed by: STUDENT IN AN ORGANIZED HEALTH CARE EDUCATION/TRAINING PROGRAM

## 2022-02-24 PROCEDURE — 6360000002 HC RX W HCPCS: Performed by: STUDENT IN AN ORGANIZED HEALTH CARE EDUCATION/TRAINING PROGRAM

## 2022-02-24 PROCEDURE — 84145 PROCALCITONIN (PCT): CPT

## 2022-02-24 PROCEDURE — 36415 COLL VENOUS BLD VENIPUNCTURE: CPT

## 2022-02-24 PROCEDURE — 2500000003 HC RX 250 WO HCPCS: Performed by: NURSE PRACTITIONER

## 2022-02-24 PROCEDURE — 2580000003 HC RX 258: Performed by: STUDENT IN AN ORGANIZED HEALTH CARE EDUCATION/TRAINING PROGRAM

## 2022-02-24 PROCEDURE — 6370000000 HC RX 637 (ALT 250 FOR IP): Performed by: NURSE PRACTITIONER

## 2022-02-24 PROCEDURE — 94761 N-INVAS EAR/PLS OXIMETRY MLT: CPT

## 2022-02-24 PROCEDURE — APPNB15 APP NON BILLABLE TIME 0-15 MINS: Performed by: NURSE PRACTITIONER

## 2022-02-24 PROCEDURE — 85018 HEMOGLOBIN: CPT

## 2022-02-24 PROCEDURE — APPNB45 APP NON BILLABLE 31-45 MINUTES: Performed by: NURSE PRACTITIONER

## 2022-02-24 RX ORDER — CALCIUM GLUCONATE 20 MG/ML
1000 INJECTION, SOLUTION INTRAVENOUS ONCE
Status: COMPLETED | OUTPATIENT
Start: 2022-02-24 | End: 2022-02-24

## 2022-02-24 RX ADMIN — PANTOPRAZOLE SODIUM 40 MG: 40 INJECTION, POWDER, FOR SOLUTION INTRAVENOUS at 08:34

## 2022-02-24 RX ADMIN — SODIUM CHLORIDE, PRESERVATIVE FREE 10 ML: 5 INJECTION INTRAVENOUS at 08:35

## 2022-02-24 RX ADMIN — GABAPENTIN 100 MG: 100 CAPSULE ORAL at 21:15

## 2022-02-24 RX ADMIN — SODIUM CHLORIDE, PRESERVATIVE FREE 10 ML: 5 INJECTION INTRAVENOUS at 21:16

## 2022-02-24 RX ADMIN — SODIUM CHLORIDE, PRESERVATIVE FREE 10 ML: 5 INJECTION INTRAVENOUS at 21:15

## 2022-02-24 RX ADMIN — APIXABAN 10 MG: 5 TABLET, FILM COATED ORAL at 08:33

## 2022-02-24 RX ADMIN — AMIODARONE HYDROCHLORIDE 200 MG: 200 TABLET ORAL at 08:34

## 2022-02-24 RX ADMIN — ATORVASTATIN CALCIUM 10 MG: 10 TABLET, FILM COATED ORAL at 21:15

## 2022-02-24 RX ADMIN — LORAZEPAM 0.5 MG: 0.5 TABLET ORAL at 08:34

## 2022-02-24 RX ADMIN — GABAPENTIN 100 MG: 100 CAPSULE ORAL at 08:33

## 2022-02-24 RX ADMIN — SEVELAMER CARBONATE 800 MG: 800 TABLET, FILM COATED ORAL at 08:33

## 2022-02-24 RX ADMIN — INSULIN LISPRO 2 UNITS: 100 INJECTION, SOLUTION INTRAVENOUS; SUBCUTANEOUS at 21:13

## 2022-02-24 RX ADMIN — CALCIUM GLUCONATE 1000 MG: 20 INJECTION, SOLUTION INTRAVENOUS at 08:42

## 2022-02-24 RX ADMIN — APIXABAN 10 MG: 5 TABLET, FILM COATED ORAL at 21:15

## 2022-02-24 RX ADMIN — PANTOPRAZOLE SODIUM 40 MG: 40 INJECTION, POWDER, FOR SOLUTION INTRAVENOUS at 21:15

## 2022-02-24 RX ADMIN — SEVELAMER CARBONATE 800 MG: 800 TABLET, FILM COATED ORAL at 17:25

## 2022-02-24 RX ADMIN — TAMSULOSIN HYDROCHLORIDE 0.4 MG: 0.4 CAPSULE ORAL at 08:34

## 2022-02-24 RX ADMIN — LORAZEPAM 0.5 MG: 0.5 TABLET ORAL at 21:15

## 2022-02-24 RX ADMIN — EPOETIN ALFA-EPBX 20000 UNITS: 20000 INJECTION, SOLUTION INTRAVENOUS; SUBCUTANEOUS at 08:47

## 2022-02-24 RX ADMIN — AMIODARONE HYDROCHLORIDE 200 MG: 200 TABLET ORAL at 21:15

## 2022-02-24 RX ADMIN — SODIUM CHLORIDE, PRESERVATIVE FREE 10 ML: 5 INJECTION INTRAVENOUS at 08:34

## 2022-02-24 ASSESSMENT — PAIN SCALES - GENERAL
PAINLEVEL_OUTOF10: 0

## 2022-02-24 ASSESSMENT — PAIN SCALES - WONG BAKER: WONGBAKER_NUMERICALRESPONSE: 0

## 2022-02-24 NOTE — PROGRESS NOTES
Pulmonary Critical Care Progress Note     Patient's name:  Jamie Acevedo Record Number: [id number]  Patient's account/billing number: [de-identified]  Patient's YOB: 1945  Age: 77 y.o. Date of Admission: 2/12/2022 12:05 AM  Date of Consult: 2/24/2022      Primary Care Physician: Catarina Wheeler      Code Status: Full Code    Chief complaint: hypovolemic shock    Assessment and Plan      Hypovolemic shock resolved. 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/22   [facility] has a home unit per patient     Plan:  Eliquis for anticoagulation   Amiodarone, rate controlled, still in Afib  Hb stable,   O2 to keep sat > 90% @ night  Sleep study as out patient   Stable to transfer out of ICU        Overnight:  No acute events overnight  Hb stable  Started on Eliquis last night      REVIEW OF SYSTEMS:  Review of Systems -   General ROS: negative  Psychological ROS: negative  Ophthalmic ROS: negative  ENT ROS: negative  Allergy and Immunology ROS: negative  Hematological and Lymphatic ROS: negative  Endocrine ROS: negative  Breast ROS: negative  Respiratory ROS: no cough, shortness of breath, or wheezing  Cardiovascular ROS: no chest pain or dyspnea on exertion  Gastrointestinal ROS:negative  Genito-Urinary ROS: negative  Musculoskeletal ROS: negative  Neurological ROS: negative   Dermatological ROS: negative        Physical Exam:    Vitals: /69   Pulse 86   Temp 98.8 °F (37.1 °C) (Axillary)   Resp 29   Ht 6' (1.829 m)   Wt 221 lb 1.9 oz (100.3 kg)   SpO2 100%   BMI 29.99 kg/m²     Last Body weight:   Wt Readings from Last 3 Encounters:   02/23/22 221 lb 1.9 oz (100.3 kg)   02/10/22 214 lb 4.6 oz (97.2 kg)   02/03/22 208 lb 1.8 oz (94.4 kg)       Body Mass Index : Body mass index is 29.99 kg/m².       Intake and Output summary:     Intake/Output Summary (Last 24 hours) at 2/24/2022 0932  Last data filed at 2/24/2022 0400  Gross per 24 hour   Intake 191.62 ml   Output 0 ml   Net 191.62 ml       Physical Examination:     Gen:  No acute distress. Eyes: PERRL. Anicteric sclera. No conjunctival injection. ENT: No discharge. Posterior oropharynx clear. External appearance of ears and nose normal.  Neck: Trachea midline. No mass   Resp:  Clear bilaterally no wheezing, no rhonchi   CV: Regular rate. Regular rhythm. No murmur or rub. No edema. GI: Soft, Non-tender. Non-distended. +BS  Skin: Warm, dry, w/o erythema. Lymph: No cervical or supraclavicular LAD. M/S: No cyanosis. No clubbing. Neuro:  CN 2-12 tested, no focal neurologic deficit. Moves all extremities  Psych: Awake and alert, Oriented x 3. Judgement and insight appropriate. Mood stable. Laboratory findings:-    CBC:   Recent Labs     02/24/22 0443   WBC 4.2   HGB 7.5*        BMP:    Recent Labs     02/22/22  0437 02/22/22  0437 02/23/22  0454 02/23/22  0454 02/24/22  0443      < > 134*   < > 136   K 4.4   < > 3.5   < > 3.7      < > 100   < > 99   CO2 22   < > 25   < > 25   BUN 41*   < > 20   < > 30*   CREATININE 6.2*  --  3.6*  --  4.5*   GLUCOSE 95   < > 91   < > 103*    < > = values in this interval not displayed. S. Calcium:  Recent Labs     02/24/22 0443   CALCIUM 7.8*     S. Ionized Calcium:No results for input(s): IONCA in the last 72 hours. S. Magnesium:  Recent Labs     02/24/22 0443   MG 2.10     S. Phosphorus:  Recent Labs     02/24/22 0443   PHOS 4.0     S. Glucose:  Recent Labs     02/24/22  0011 02/24/22  0415 02/24/22  0844   POCGLU 138* 134* 119*           Radiology Review:  Pertinent images / reports were reviewed as a part of this visit.                        Caridad Buitrago MD, MSHANI            2/24/2022, 9:32 AM

## 2022-02-24 NOTE — PROGRESS NOTES
VASCULAR    Seen for HD access. Tunneled line in place per IR and working well. No reported bleeding. VSS afeb  L AVF thrombosed    Vein mapping reviewed - L cephalic vein in upper arm of good caliber    A/P: ESRD - access by line now with thrombosed L radiocephalic AVF. Would recommend creation of L brachiocephalic AVF for permanent acces. Timing can be on readmission or during this stay if possible. Would not delay DC for surgery and could schedule in the future. Will discuss with medical service.      Gumaro Miller

## 2022-02-24 NOTE — PROGRESS NOTES
General and Vascular Surgery                                                           Daily Progress Note                                                                 Pt Name: Jamie Acevedo Record Number: [id number]  Date of Birth 1945   Today's Date: 2/24/2022      ASSESSMENT/PLAN  GI bleed s/p sigmoid colectomy 1/26/22  -flex sig 2/17 friable surgical anastomosis 18 cm from rectal verge, two oozing areas with hemoclips placed.   -s/p IR embolization of pseudoaneurysm adjacent to anastomosis off of superior rectal artery 2/19    -Hemodynamically stable, Hgb  7.3.    - ESRD - HD per nephrology. -OK to DC from surgery standpoint when medically stable. PE/DVT  -heparin drip per primary team. OK to switch to [facility] from surgery standpoint when warranted. Subjective  Mr. Harley Ramirez denies having any pain. No further bleeding noted. Seen in HD. OBJECTIVE  VITALS:   Vitals:    02/24/22 0800 02/24/22 0818 02/24/22 0900 02/24/22 1113   BP: 108/69   108/67   Pulse: 86  85 60   Resp: 16 29 13 16   Temp: 98.8 °F (37.1 °C)   98.5 °F (36.9 °C)   TempSrc: Axillary      SpO2: 97% 100% 100%    Weight:    219 lb 5.7 oz (99.5 kg)   Height:         I/O last 3 completed shifts: In: 482.3 [I.V.:482.3]  Out: 0   GENERAL: alert, no distress  Pulmonary: normal respiratory effort, no accessory muscle use  ABDOMEN: soft, NT, ND. Incision mostly  Healed. Staples removed last week.          LABS  CBC:   Lab Results   Component Value Date    WBC 4.2 02/24/2022    RBC 2.46 02/24/2022    HGB 7.5 02/24/2022    HCT 22.7 02/24/2022    MCV 92.1 02/24/2022    MCH 30.6 02/24/2022    MCHC 33.2 02/24/2022    RDW 15.6 02/24/2022     02/24/2022    MPV 7.6 02/24/2022     BMP:    Lab Results   Component Value Date     02/24/2022    K 3.7 02/24/2022    K 4.3 02/12/2022    CL 99 02/24/2022    CO2 25 02/24/2022    BUN 30 02/24/2022    LABALBU 2.5 02/12/2022 CREATININE 4.5 02/24/2022    CALCIUM 7.8 02/24/2022    GFRAA 16 02/24/2022    GFRAA 35 07/26/2011    LABGLOM 13 02/24/2022    GLUCOSE 103 02/24/2022         IRINA Galindo CNP  Electronically signed 2/24/2022 at 1:12 PM

## 2022-02-24 NOTE — PLAN OF CARE
Problem: Infection:  Goal: Will remain free from infection  Description: Will remain free from infection  2/24/2022 1207 by Ammon Delatorre RN  Outcome: Ongoing     Problem: Safety:  Goal: Free from accidental physical injury  Description: Free from accidental physical injury  2/24/2022 1207 by Ammon Delatorre RN  Outcome: Ongoing     Problem: Safety:  Goal: Free from intentional harm  Description: Free from intentional harm  2/24/2022 1207 by Ammon Delatorre RN  Outcome: Ongoing     Problem: Daily Care:  Goal: Daily care needs are met  Description: Daily care needs are met  2/24/2022 1207 by Ammon Delatorre RN  Outcome: Ongoing     Problem: Pain:  Goal: Patient's pain/discomfort is manageable  Description: Patient's pain/discomfort is manageable  2/24/2022 1207 by Ammon Delatorre RN  Outcome: Ongoing  Note: Patient was monitor for pain during this shift. Numeric scale was used. No pain med was given during this shift. Problem: Skin Integrity:  Goal: Skin integrity will stabilize  Description: Skin integrity will stabilize  2/24/2022 1207 by Ammon Delatorre RN  Outcome: Ongoing     Problem: Discharge Planning:  Goal: Patients continuum of care needs are met  Description: Patients continuum of care needs are met  2/24/2022 1207 by Ammon Delatorre RN  Outcome: Ongoing     Problem: Bleeding:  Goal: Will show no signs and symptoms of excessive bleeding  Description: Will show no signs and symptoms of excessive bleeding  2/24/2022 1207 by Ammon Delatorre RN  Outcome: Ongoing     Problem: Nutrition  Goal: Optimal nutrition therapy  2/24/2022 1207 by Ammon Delatorre RN  Outcome: Ongoing  Note: Patient with low appetite during this shift.

## 2022-02-24 NOTE — PROGRESS NOTES
Hospitalist Progress Note      PCP: Pat Camejo    Date of Admission: 2/12/2022    Chief Complaint: rectal bleed    Hospital Course: 76m s/p sigmoid rxn/anastamosis with recurrent hematochezia requiring embolization 2/19, recent CVA, Covid (+), ESRD, LLE DVT,     Subjective: low grade fever, hypoxemic overnight, however seen during HD on < 1 lpm NC O2, awake, alert, denying malaise, chest pain, increased sob, cough. Denies abd pain, bloody bm      Medications:  Reviewed    Infusion Medications    dilTIAZem Stopped (02/23/22 0245)    sodium chloride      dextrose       Scheduled Medications    apixaban  10 mg Oral BID    Followed by   Pinky Friend ON 3/2/2022] apixaban  5 mg Oral BID    amiodarone  200 mg Oral BID    insulin lispro  0-18 Units SubCUTAneous TID WC    insulin lispro  0-9 Units SubCUTAneous Nightly    LORazepam  0.5 mg Oral BID    epoetin davis-epbx  20,000 Units IntraVENous Once per day on Tue Thu Sat    atorvastatin  10 mg Oral Nightly    gabapentin  100 mg Oral TID    sevelamer  800 mg Oral TID WC    tamsulosin  0.4 mg Oral QAM    sodium chloride flush  5-40 mL IntraVENous 2 times per day    pantoprazole  40 mg IntraVENous Q12H    And    sodium chloride (PF)  10 mL IntraVENous Q12H     PRN Meds: heparin (porcine), sodium chloride, perflutren lipid microspheres, acetaminophen, ondansetron, glucose, dextrose, glucagon (rDNA), dextrose, lidocaine, melatonin      Intake/Output Summary (Last 24 hours) at 2/24/2022 0948  Last data filed at 2/24/2022 0400  Gross per 24 hour   Intake 191.62 ml   Output 0 ml   Net 191.62 ml       Physical Exam Performed:    /69   Pulse 85   Temp 98.8 °F (37.1 °C) (Axillary)   Resp 13   Ht 6' (1.829 m)   Wt 221 lb 1.9 oz (100.3 kg)   SpO2 100%   BMI 29.99 kg/m²     General appearance: No apparent distress, appears stated age and cooperative. HEENT: Pupils equal, round, and reactive to light. Conjunctivae/corneas clear.   Neck: Supple, with full range of motion. No jugular venous distention. Trachea midline. Respiratory:  Normal respiratory effort. No use of accessory muscles, no intercostal retractions, speaking in full coherent sentences  Cardiovascular: Regular rate and rhythm    Abdomen: Soft, non-tender, non-distended    Musculoskeletal: No clubbing, cyanosis or edema bilaterally. No calf tenderness  Skin: Skin color, texture, turgor normal.     Neurologic:   grossly non-focal.  Psychiatric: Alert and oriented, thought content appropriate, normal insight       Labs:   Recent Labs     02/22/22  0437 02/23/22  0344 02/23/22  0454 02/23/22  0454 02/23/22  1433 02/24/22  0236 02/24/22  0443   WBC 5.8  --  4.8  --   --   --  4.2   HGB 7.7*   < > 7.3*   < > 7.8* 7.5* 7.5*   HCT 23.7*   < > 22.2*   < > 23.6* 23.2* 22.7*     --  138  --   --   --  146    < > = values in this interval not displayed. Recent Labs     02/22/22  0437 02/23/22  0454 02/24/22  0443    134* 136   K 4.4 3.5 3.7    100 99   CO2 22 25 25   BUN 41* 20 30*   CREATININE 6.2* 3.6* 4.5*   CALCIUM 7.6* 7.3* 7.8*   PHOS 5.2* 3.3 4.0     No results for input(s): AST, ALT, BILIDIR, BILITOT, ALKPHOS in the last 72 hours. No results for input(s): INR in the last 72 hours. No results for input(s): Subha Ends in the last 72 hours. Urinalysis:      Lab Results   Component Value Date    NITRU Negative 01/21/2022    WBCUA >900 01/21/2022    BACTERIA 2+ 01/21/2022    RBCUA 32 01/21/2022    BLOODU LARGE 01/21/2022    SPECGRAV 1.019 01/21/2022    GLUCOSEU Negative 01/21/2022       Radiology:  VL PRE OP VEIN MAPPING   Final Result      XR CHEST PORTABLE   Final Result   No acute abnormality. IR TUNNELED CVC PLACE WO SQ PORT/PUMP > 5 YEARS   Final Result   Successful ultrasound and fluoroscopy guided tunneled catheter placement.       RECOMMENDATIONS:   Unavailable         IR EMBOLIZATION HEMORRHAGE   Final Result   Successful coil embolization of the superior rectal artery and pseudoaneurysm   from a branch of the superior rectal artery. VL Extremity Venous Bilateral   Final Result      CTA ABDOMEN PELVIS W WO CONTRAST   Final Result   1. Lobar, segmental, and subsegmental pulmonary emboli in the right lower   lobe with findings of right heart strain. Critical results were called by   Dr. Graham Rivera MD to Dr. Bowen Lainez on 2/19/2022 at 03:02.   2. 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 Adjacent stranding may be related to the recent surgery but   could also be seen with acute diverticulitis. There is no evident   extravasation of contrast into the bowel lumen. 3. Partially liquid stool in the colon could be due to diarrhea with no   findings of enterocolitis. CTA ABDOMEN PELVIS W WO CONTRAST   Final Result   1. No high-density contrast within the lumen of the stomach, small bowel,   and colon to indicate a site of active hemorrhage. 2.  Previous partial colectomy with an anastomosis at the expected   rectosigmoid junction. There is a focal outpouching along the left side of   the anastomosis. This was also noted on the previous exam but has decreased   in size. Mild inflammatory changes are still present at the site. 3.  No general ascites and no pneumoperitoneum. There is no bowel   obstruction or hydronephrosis. 4.  Multiple cysts are present within the kidneys some of which are too small   to characterize.                  Assessment/Plan:    Active Hospital Problems    Diagnosis Date Noted    Hemorrhagic shock ([address] [R57.8]     Shock circulatory ([address] [R57.9]     Bilateral pulmonary embolism ([address] [I26.99]     Lactic acidosis [E87.2]     COVID-19 [U07.1] 02/12/2022    Acute blood loss anemia [D62] 02/07/2022    GI bleed [K92.2] 01/14/2022     Hypoxemia  - dc cxr, wean O2 to off    Anemia / GIB  - stable since embolization, starting anticoag for LLE DVT  - monitor H/H    ESRD  - RRT    DVT  - eliquis    Disp hawk home edinson, pt/ot    DVT Prophylaxis: anticoagulated  Diet: ADULT DIET; Easy to Chew; Low Potassium (Less than 3000 mg/day)  ADULT ORAL NUTRITION SUPPLEMENT; Lunch, Dinner; Frozen Oral Supplement  Code Status: Full Code       Dispo - dc edinson Brown MD

## 2022-02-24 NOTE — PLAN OF CARE
Problem: Nutrition  Goal: Optimal nutrition therapy  2/24/2022 8119 by Mitch Thomas RD, LD  Outcome: Ongoing  2/23/2022 2340 by No Fuentes RN  Outcome: Ongoing   Nutrition Problem #1: Inadequate oral intake  Intervention: Food and/or Nutrient Delivery: Continue Current Diet,Modify Oral Nutrition Supplement  Nutritional Goals: Consume greater than 50% of meals and supplements this admission

## 2022-02-24 NOTE — FLOWSHEET NOTE
Treatment time: 3.5 hours  Net UF: 500 ml     Pre weight: 99.5 kg   Post weight: 99 kg  EDW: 110 kg     Access used: RCW CVC  Access function: Good with  ml/min     Medications or blood products given: Retacrit     Regular outpatient schedule: [facility], [facility]     Summary of response to treatment: Tolerated tx well.  No HD related complaints or complications.      Copy of dialysis treatment record placed in chart, to be scanned into EMR.       02/24/22 1113 02/24/22 1450   Treatment   Time On  --  1113   Time Off  --  1444   Vital Signs   /67 129/75   Temp 98.5 °F (36.9 °C) 98 °F (36.7 °C)   Pulse 60 91   Resp 16 18   Dialysis Bath   K+ (Potassium) 4  --    Ca+ (Calcium) 2.5  --    Na+ (Sodium) 138  --    HCO3 (Bicarb) 35  --

## 2022-02-24 NOTE — PROGRESS NOTES
Comprehensive Nutrition Assessment    Type and Reason for Visit:  Reassess    Nutrition Recommendations/Plan:   Continue Easy to Chew, Low K+ diet. D/C Nepro. Trial Magic Cup BID. Pt with poor nutrition status and little intakes over the past 2 weeks. Need to consider alternative nutrition. Nutrition Assessment:  Follow-up. Pt COVID+. Pt remains on dialysis. Diet and ONS resumed but continues with poor PO intakes. Pt with intakes <25% since admission (~12 days). Will trial different ONS. Need to consider alternative nutrition d/t poor nutrition status ~2 weeks now. Malnutrition Assessment:  Malnutrition Status: At risk for malnutrition    Context:  Chronic Illness     Findings of the 6 clinical characteristics of malnutrition:  Energy Intake:  Mild decrease in energy intake  Weight Loss:  No significant weight loss     Body Fat Loss:  Unable to assess     Muscle Mass Loss:  Unable to assess    Fluid Accumulation:  No significant fluid accumulation     Strength:  Not Performed    Estimated Daily Nutrient Needs:  Energy (kcal):  0519-9838 kcal (22-25 kcal/kg ABW)  Protein (g):  116-146 gm (1.2-1.5 gm/kg ABW)  Fluid (ml/day):   ;Standard Renal      Nutrition Related Findings:  trace BLE edema; BM 2/23; Labs reviewed      Wounds:  Surgical Incision       Current Nutrition Therapies:    ADULT DIET; Easy to Chew; Low Potassium (Less than 3000 mg/day)  ADULT ORAL NUTRITION SUPPLEMENT; Lunch, Dinner; Renal Oral Supplement    Anthropometric Measures:  · Height: 6' (182.9 cm)  · Current Body Weight: 221 lb (100.2 kg)   · Admission Body Weight: 213 lb (96.6 kg)    · Usual Body Weight:  (FROYLAN)     · Ideal Body Weight: 178 lbs; % Ideal Body Weight 124.2 %   · BMI: 30  · BMI Categories: Overweight (BMI 25.0-29. 9)       Nutrition Diagnosis:   · Inadequate oral intake related to altered GI function as evidenced by intake 0-25%    Nutrition Interventions:   Food and/or Nutrient Delivery:  Continue Current Diet,Modify Oral Nutrition Supplement  Nutrition Education/Counseling:  No recommendation at this time   Coordination of Nutrition Care:  Continue to monitor while inpatient    Goals:  Consume greater than 50% of meals and supplements this admission       Nutrition Monitoring and Evaluation:   Food/Nutrient Intake Outcomes:  Food and Nutrient Intake,Supplement Intake  Physical Signs/Symptoms Outcomes:  Biochemical Data,GI Status,Nutrition Focused Physical Findings,Skin,Weight,Hemodynamic Status     Discharge Planning:     Too soon to determine     Electronically signed by Eldon Greenberg RD, LD on 2/24/22 at 6:53 AM EST    Contact: [phone]

## 2022-02-24 NOTE — PROGRESS NOTES
0534: Pt not having any urine output since 0800 on 2/23. Bladder scan done to find 623 mL in bladder. Provider notified  4072: Order placed for intermittent catheter. Pt refused while inserting catheter and pulled catheter out.

## 2022-02-24 NOTE — CARE COORDINATION
Case Management continues to follow for discharge planning needs. Chart reviewed. Pt is from [facility] facility. Spoke with Jeanette in admissions. He can return without a COVID swab or a HENs. He will continue to go to  @ [facility]. Spoke with Mr. Khoi Jones spouse, Juhi Hewitt, this morning. She is in agreement with the plan.     Electronically signed by SWATI Darling RN-[facility]  Case Management  [phone]

## 2022-02-24 NOTE — PLAN OF CARE
Problem: Pain:  Goal: Patient's pain/discomfort is manageable  Description: Patient's pain/discomfort is manageable  2/23/2022 2340 by Galileo Caballero RN  Outcome: Met This Shift  2/23/2022 1501 by Alejandro Castelan RN  Outcome: Ongoing     Problem: Infection:  Goal: Will remain free from infection  Description: Will remain free from infection  2/23/2022 2340 by Galileo Caballero RN  Outcome: Ongoing  2/23/2022 1501 by Alejandro Castelan RN  Outcome: Ongoing     Problem: Safety:  Goal: Free from accidental physical injury  Description: Free from accidental physical injury  2/23/2022 2340 by Galileo Caballero RN  Outcome: Ongoing  2/23/2022 1501 by Alejandro Castelan RN  Outcome: Ongoing  Goal: Free from intentional harm  Description: Free from intentional harm  2/23/2022 2340 by Galileo Caballero RN  Outcome: Ongoing  2/23/2022 1501 by Alejandro Castelan RN  Outcome: Ongoing     Problem: Daily Care:  Goal: Daily care needs are met  Description: Daily care needs are met  2/23/2022 2340 by Galileo Caballero RN  Outcome: Ongoing  2/23/2022 1501 by Alejandro Castelan RN  Outcome: Ongoing     Problem: Skin Integrity:  Goal: Skin integrity will stabilize  Description: Skin integrity will stabilize  2/23/2022 2340 by Galileo Caballero RN  Outcome: Ongoing  2/23/2022 1501 by Alejandro Castelan RN  Outcome: Ongoing     Problem: Discharge Planning:  Goal: Patients continuum of care needs are met  Description: Patients continuum of care needs are met  2/23/2022 2340 by Galileo Caballero RN  Outcome: Ongoing  2/23/2022 1501 by Alejandro Castelan RN  Outcome: Ongoing     Problem: Bleeding:  Goal: Will show no signs and symptoms of excessive bleeding  Description: Will show no signs and symptoms of excessive bleeding  2/23/2022 2340 by Galileo Caballero RN  Outcome: Ongoing  2/23/2022 1501 by Alejandro Castelan RN  Outcome: Ongoing     Problem: Nutrition  Goal: Optimal nutrition therapy  2/23/2022 2340 by Galileo Caballero RN  Outcome: Ongoing  2/23/2022 1501 by Reza Espinosa, RN  Outcome: Ongoing

## 2022-02-24 NOTE — PROGRESS NOTES
[facility]                                               Progress note    CC: hematochezia, ESRD  Summary:   Shahriar Mcdaniels is being seen by nephrology for ESRD. He is a 77 y.o. male with a PMH significant for ESRD on HD TTS, colon cancer, T2DM, hypertension at baseline who was just discharged after admission 2/7/2022-2/10/2022 for a rectal bleed. He now presented back to the hospital 2/12/2022 with the same complaint. Bleeding started after resuming plavix. Interval History  Seen at bedside this AM and again on dialysis. Having no complaints  Dialysis today   Last Post weight 100.3 kilos. /69  100% on 2 L   + 5.8 L for admission  Labs reviewed. K 3.7  Bicarb 25  BUN 30  Phos 4  Ca 7.8  hgb stable 7.7 > 7.3 > 7.8 > 7.5  Ferritin 1127    Plan:   -  HD today per TTS schedule. - UF to last post weight 103 kilos as tolerated for SBP > 100  - has been on retacrit with HD.   - TDC working fine  - now on eliquis  - plans for new access creation per vascular. Gatito Saez MD  [facility]  Office: [phone]    Assessment:   ESRD  TTS at [facility] , has not started there yet. Is originally from [facility] but staying in [place] for rehab so dialyzing with us for now. He has a left AV fistula. No bruit.      Electrolytes  No acute issues. Lab Results   Component Value Date     02/24/2022    K 3.7 02/24/2022    CL 99 02/24/2022    CO2 25 02/24/2022          Hypotension  Resolved. Off pressors.        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 On renvela 800 mg TID   Lab Results   Component Value Date    .5 (H) 02/21/2022    CALCIUM 7.8 (L) 02/24/2022    PHOS 4.0 02/24/2022          GI bleed  Has known colon cancer  GI consulted. #Anemia  On retacrit 20 k units TIW   Also GIB contributing.    Last ferritin 1359 so not on IV iron  Repeat ferritin   Last tsat 19%  Lab Results   Component Value Date    FERRITIN 1,183.0 (H) 02/24/2022       #PE  On heparin gtt     ROS: Positives [employer]. All other remaining systems are negative. Constitutional:  fever, chills, weakness, weight change, fatigue,      Skin:  rash, pruritus, hair loss, bruising, dry skin, petechiae. Head, Face, Neck   headaches, swelling,  cervical adenopathy. Respiratory: shortness of breath, cough, or wheezing  Cardiovascular: chest pain, palpitations, dizzy, edema  Gastrointestinal: nausea, vomiting, diarrhea, constipation,belly pain    Yellow skin, blood in stool  Musculoskeletal:  back pain, muscle weakness, gait problems,       joint pain or swelling. Genitourinary:  dysuria, poor urine flow, flank pain, blood in urine  Neurologic:  vertigo, TIA'S, syncope, seizures, focal weakness  Psychosocial:  insomnia, anxiety, or depression. Additional positive findings: -     PMH:   Past medical history, surgical history, social history, family history are reviewed and updated as appropriate. Reviewed current medication list.   Allergies reviewed and updated as needed. PE:   Vitals:    02/24/22 0900   BP:    Pulse: 85   Resp: 13   Temp:    SpO2: 100%       General appearance: AOx3 in no acute distress, comfortable, communicative, awake and alert. HEENT: no icterus, EOM intact, trachea midline. Neck : no masses, appears symmetrical   Respiratory: Respiratory effort normal, bilateral equal chest rise. No wheeze, no crackles   Cardiovascular: Ausculation shows RRR and  no edema   Abdomen: abdomen is soft, non distended, no masses, no pain with palpation. Musculoskeletal:  no joint swelling, no deformity, strength grossly normal.   Skin: no rashes,  no jaundice   Neuro:   Follows commands, moves all extremities spontaneously     AVF no thrill or bruit  Right TDC now      Lab Results   Component Value Date    CREATININE 4.5 (H) 02/24/2022    BUN 30 (H) 02/24/2022     02/24/2022    K 3.7 02/24/2022    CL 99 02/24/2022    CO2 25 02/24/2022      Lab Results   Component Value Date    WBC 4.2 02/24/2022    HGB 7.5 (L) 02/24/2022    HCT 22.7 (L) 02/24/2022    MCV 92.1 02/24/2022     02/24/2022     Lab Results   Component Value Date    .5 (H) 02/21/2022    CALCIUM 7.8 (L) 02/24/2022    PHOS 4.0 02/24/2022

## 2022-02-25 VITALS
SYSTOLIC BLOOD PRESSURE: 139 MMHG | DIASTOLIC BLOOD PRESSURE: 69 MMHG | TEMPERATURE: 97.8 F | HEIGHT: 72 IN | WEIGHT: 215.17 LBS | BODY MASS INDEX: 29.14 KG/M2 | OXYGEN SATURATION: 97 % | HEART RATE: 93 BPM | RESPIRATION RATE: 20 BRPM

## 2022-02-25 LAB
ANION GAP SERPL CALCULATED.3IONS-SCNC: 9 MMOL/L (ref 3–16)
BASOPHILS ABSOLUTE: 0 K/UL (ref 0–0.2)
BASOPHILS RELATIVE PERCENT: 0.5 %
BUN BLDV-MCNC: 17 MG/DL (ref 7–20)
CALCIUM SERPL-MCNC: 7.8 MG/DL (ref 8.3–10.6)
CHLORIDE BLD-SCNC: 100 MMOL/L (ref 99–110)
CO2: 26 MMOL/L (ref 21–32)
CREAT SERPL-MCNC: 3 MG/DL (ref 0.8–1.3)
EOSINOPHILS ABSOLUTE: 0.1 K/UL (ref 0–0.6)
EOSINOPHILS RELATIVE PERCENT: 4 %
GFR AFRICAN AMERICAN: 25
GFR NON-AFRICAN AMERICAN: 20
GLUCOSE BLD-MCNC: 125 MG/DL (ref 70–99)
GLUCOSE BLD-MCNC: 87 MG/DL (ref 70–99)
GLUCOSE BLD-MCNC: 90 MG/DL (ref 70–99)
GLUCOSE BLD-MCNC: 97 MG/DL (ref 70–99)
GLUCOSE BLD-MCNC: 99 MG/DL (ref 70–99)
HCT VFR BLD CALC: 23.9 % (ref 40.5–52.5)
HEMOGLOBIN: 7.9 G/DL (ref 13.5–17.5)
LYMPHOCYTES ABSOLUTE: 0.6 K/UL (ref 1–5.1)
LYMPHOCYTES RELATIVE PERCENT: 19.2 %
MAGNESIUM: 1.8 MG/DL (ref 1.8–2.4)
MCH RBC QN AUTO: 29.8 PG (ref 26–34)
MCHC RBC AUTO-ENTMCNC: 32.8 G/DL (ref 31–36)
MCV RBC AUTO: 90.8 FL (ref 80–100)
MONOCYTES ABSOLUTE: 0.2 K/UL (ref 0–1.3)
MONOCYTES RELATIVE PERCENT: 7.2 %
NEUTROPHILS ABSOLUTE: 2.3 K/UL (ref 1.7–7.7)
NEUTROPHILS RELATIVE PERCENT: 69.1 %
PDW BLD-RTO: 15 % (ref 12.4–15.4)
PERFORMED ON: ABNORMAL
PERFORMED ON: NORMAL
PHOSPHORUS: 2.4 MG/DL (ref 2.5–4.9)
PLATELET # BLD: 162 K/UL (ref 135–450)
PMV BLD AUTO: 7.3 FL (ref 5–10.5)
POTASSIUM SERPL-SCNC: 3.9 MMOL/L (ref 3.5–5.1)
RBC # BLD: 2.64 M/UL (ref 4.2–5.9)
SODIUM BLD-SCNC: 135 MMOL/L (ref 136–145)
WBC # BLD: 3.3 K/UL (ref 4–11)

## 2022-02-25 PROCEDURE — 6370000000 HC RX 637 (ALT 250 FOR IP): Performed by: INTERNAL MEDICINE

## 2022-02-25 PROCEDURE — 85025 COMPLETE CBC W/AUTO DIFF WBC: CPT

## 2022-02-25 PROCEDURE — 97530 THERAPEUTIC ACTIVITIES: CPT

## 2022-02-25 PROCEDURE — 97162 PT EVAL MOD COMPLEX 30 MIN: CPT

## 2022-02-25 PROCEDURE — 99232 SBSQ HOSP IP/OBS MODERATE 35: CPT | Performed by: INTERNAL MEDICINE

## 2022-02-25 PROCEDURE — 94761 N-INVAS EAR/PLS OXIMETRY MLT: CPT

## 2022-02-25 PROCEDURE — 6370000000 HC RX 637 (ALT 250 FOR IP): Performed by: NURSE PRACTITIONER

## 2022-02-25 PROCEDURE — C9113 INJ PANTOPRAZOLE SODIUM, VIA: HCPCS | Performed by: STUDENT IN AN ORGANIZED HEALTH CARE EDUCATION/TRAINING PROGRAM

## 2022-02-25 PROCEDURE — 2580000003 HC RX 258: Performed by: STUDENT IN AN ORGANIZED HEALTH CARE EDUCATION/TRAINING PROGRAM

## 2022-02-25 PROCEDURE — 36415 COLL VENOUS BLD VENIPUNCTURE: CPT

## 2022-02-25 PROCEDURE — 6370000000 HC RX 637 (ALT 250 FOR IP): Performed by: STUDENT IN AN ORGANIZED HEALTH CARE EDUCATION/TRAINING PROGRAM

## 2022-02-25 PROCEDURE — 80048 BASIC METABOLIC PNL TOTAL CA: CPT

## 2022-02-25 PROCEDURE — 84100 ASSAY OF PHOSPHORUS: CPT

## 2022-02-25 PROCEDURE — 51798 US URINE CAPACITY MEASURE: CPT

## 2022-02-25 PROCEDURE — 6360000002 HC RX W HCPCS: Performed by: STUDENT IN AN ORGANIZED HEALTH CARE EDUCATION/TRAINING PROGRAM

## 2022-02-25 PROCEDURE — 83735 ASSAY OF MAGNESIUM: CPT

## 2022-02-25 PROCEDURE — 97166 OT EVAL MOD COMPLEX 45 MIN: CPT

## 2022-02-25 RX ORDER — AMIODARONE HYDROCHLORIDE 200 MG/1
200 TABLET ORAL 2 TIMES DAILY
Qty: 8 TABLET | Refills: 0 | Status: SHIPPED | OUTPATIENT
Start: 2022-02-25 | End: 2022-03-24

## 2022-02-25 RX ADMIN — LORAZEPAM 0.5 MG: 0.5 TABLET ORAL at 09:09

## 2022-02-25 RX ADMIN — SODIUM CHLORIDE, PRESERVATIVE FREE 10 ML: 5 INJECTION INTRAVENOUS at 09:09

## 2022-02-25 RX ADMIN — GABAPENTIN 100 MG: 100 CAPSULE ORAL at 15:14

## 2022-02-25 RX ADMIN — TAMSULOSIN HYDROCHLORIDE 0.4 MG: 0.4 CAPSULE ORAL at 09:09

## 2022-02-25 RX ADMIN — AMIODARONE HYDROCHLORIDE 200 MG: 200 TABLET ORAL at 09:09

## 2022-02-25 RX ADMIN — SEVELAMER CARBONATE 800 MG: 800 TABLET, FILM COATED ORAL at 09:09

## 2022-02-25 RX ADMIN — GABAPENTIN 100 MG: 100 CAPSULE ORAL at 09:09

## 2022-02-25 RX ADMIN — APIXABAN 10 MG: 5 TABLET, FILM COATED ORAL at 09:09

## 2022-02-25 RX ADMIN — PANTOPRAZOLE SODIUM 40 MG: 40 INJECTION, POWDER, FOR SOLUTION INTRAVENOUS at 09:09

## 2022-02-25 RX ADMIN — SODIUM CHLORIDE, PRESERVATIVE FREE 10 ML: 5 INJECTION INTRAVENOUS at 09:11

## 2022-02-25 ASSESSMENT — PAIN SCALES - GENERAL
PAINLEVEL_OUTOF10: 0

## 2022-02-25 NOTE — FLOWSHEET NOTE
02/25/22 1220   IMM Letter   IMM Letter given to Patient/Family/Significant other/Guardian/POA/by: provided second IMM letter to patient and daughter, [employer], present in room; they are agreeable to discharge today     IMM Letter date given: 02/25/22   IMM Letter time given: 1200

## 2022-02-25 NOTE — PROGRESS NOTES
Physical Therapy    Facility/Department: [facility] 0U ICU  Initial Assessment    NAME: Jaxson Connors  : [id number]  MRN: [id number]    Date of Service: 2022    Discharge Recommendations:  Continue to assess pending progress,3-5 sessions per week   PT Equipment Recommendations  Other: Defer to next level of care. Jaxson Connors scored a  on the AM-PAC short mobility form. Current research shows that an AM-PAC score of 17 or less is typically not associated with a discharge to the patient's home setting. Based on the patient's AM-PAC score and their current functional mobility deficits, it is recommended that the patient have 3-5 sessions per week of Physical Therapy at d/c to increase the patient's independence. Please see assessment section for further patient specific details. If patient discharges prior to next session this note will serve as a discharge summary. Please see below for the latest assessment towards goals. Assessment   Body structures, Functions, Activity limitations: Decreased functional mobility ; Decreased strength;Decreased safe awareness;Decreased cognition;Decreased endurance;Decreased balance  Assessment: 76 y/o male admit from SNF setting 2022 with GI Bleed, Hemorrhagic Shock. 2022 S/P Sigmoidoscopy, Hemoclip to contol Hemorrhage. 2022 IR Embolization of Hemorrhage. 2022 L LE DVT (Eliquis). Recent admit from SNF setting -2/10/2022 with Rectal Bleed (return to SNF setting). PMH as noted including Colon Ca, Colectomy (2022), CKD (HD), CVA (2022). Pt admit from SNF setting where he has been (in/out due to few hospital admits) since home early 2022. Currently, Pt francisco oob, few steps with Walker Min/Mod assist x 2. Weak, very limited endurance. At this time, anticipate return to SNF setting with cont PT. Will cont to monitor pt's progress.   Prognosis: Fair;Good  Decision Making: Medium Complexity  History: 76 y/o male admit from SNF setting 2/12/2022 with GI Bleed, Hemorrhagic Shock. 2/17/2022 S/P Sigmoidoscopy, Hemoclip to contol Hemorrhage. 2/19/2022 IR Embolization of Hemorrhage. 2/20/2022 L LE DVT (Eliquis). Recent admit from SNF setting 2/7-2/10/2022 with Rectal Bleed (return to SNF setting). PMH as noted including Colon Ca, Colectomy (1/26/2022), CKD (HD), CVA (1/2022). Exam: See above. Clinical Presentation: See above. Patient Education: Role of PT, POC, Need to call for assist, Safe use of Walker. Barriers to Learning: None. REQUIRES PT FOLLOW UP: Yes  Activity Tolerance  Activity Tolerance: Patient limited by endurance  Activity Tolerance: Pt francisco oob, few steps with Walker Min/Mod assist x 2. Weak, very limited endurance. Patient Diagnosis(es): The primary encounter diagnosis was Hemorrhagic shock ([address] Diagnoses of Gastrointestinal hemorrhage, unspecified gastrointestinal hemorrhage type and Anxiety were also pertinent to this visit. has a past medical history of Arthritis, Cancer ([address] Diabetes mellitus ([address] Hemodialysis patient ([address] and Hypertension. has a past surgical history that includes IR PERC ARTERIOVENOUS FISTULA CREATION (Left); colectomy (N/A, 01/26/2022); sigmoidoscopy (N/A, 02/17/2022); IR EMBOLIZATION HEMORRHAGE (02/19/2022); Tunneled venous catheter placement (Right, 02/21/2022); and IR TUNNELED CVC PLACE WO SQ PORT/PUMP > 5 YEARS (2/21/2022). Restrictions  Restrictions/Precautions  Restrictions/Precautions: Fall Risk  Vision/Hearing  Vision: Within Functional Limits  Hearing: Exceptions to [facility].)     Subjective  General  Chart Reviewed: Yes  Patient assessed for rehabilitation services?: Yes  Additional Pertinent Hx: 78 y/o male admit from SNF setting 2/12/2022 with GI Bleed, Hemorrhagic Shock. 2/17/2022 S/P Sigmoidoscopy, Hemoclip to contol Hemorrhage. 2/19/2022 IR Embolization of Hemorrhage. 2/20/2022 L LE DVT (Eliquis).   Recent admit from SNF setting 2/7-2/10/2022 with Rectal Bleed (return to SNF setting). PMH as noted including Colon Ca, Colectomy (1/26/2022), CKD (HD), CVA (1/2022). Family / Caregiver Present: No  Referring Practitioner: Dr. Harjinder Fields  Diagnosis: GI Bleed, Hemorrhagic Shock. Follows Commands: Within Functional Limits  Subjective  Subjective: Pt agreeable to PT Eval/Rx. Pain Screening  Patient Currently in Pain: Denies          Orientation  Orientation  Overall Orientation Status: Impaired  Orientation Level: Oriented to person;Oriented to place (Pt somewhat confused to recent events/situation.)  Social/Functional History  Social/Functional History  Lives With: Spouse Satinder Nunez)  Type of Home: Mobile home  Home Layout: One level  Home Access: Ramped entrance  Bathroom Shower/Tub: Walk-in shower  Bathroom Toilet: Handicap height  Bathroom Equipment: Grab bars in shower,Shower chair,Toilet raiser  Bathroom Accessibility: Accessible  Home Equipment: Rolling walker  ADL Assistance: [address]:  (Wife takes care of most homemaking needs.)  Ambulation Assistance: Independent (With [employer].)  Transfer Assistance: Independent  Active : No (Family drives; Medical Transport to/from HD.)  Occupation: Retired  Additional Comments: Pt with multiple recent admits : 1/13-2/4/2022 and 2/7-2/10/2022 and discharged to [facility] setting. Cognition        Objective          AROM RLE (degrees)  RLE AROM: WFL  AROM LLE (degrees)  LLE AROM : WFL  AROM RUE (degrees)  RUE AROM : WFL  AROM LUE (degrees)  LUE AROM : WFL  Strength Other  Other: Generalized weak throughout with functional activities. Bed mobility  Supine to Sit: Moderate assistance (HOB elevated. Mod assist to bring trunk to upright at eob.)  Transfers  Sit to Stand: Moderate Assistance;2 Person Assistance (With [employer]. Cues for safe hand placement.)  Stand to sit: Moderate Assistance;2 Person Assistance (With [employer].   Cues for safe hand placement.)  Comment: Attempt x 2 from eob; x 2 from chair with Walker Mod assist x 2. Ambulation  Ambulation?: No  WB Status: Pt able to maintain brief Static Stand with Flonnie Faden assist x 2 and alternately lift each LE 5x. Weak/fatigues quickly. Plan   Plan  Times per week: 3-5x week while in acute care setting. Current Treatment Recommendations: Strengthening,Functional Mobility Training,Transfer Training,Gait Training,Safety Education & Training,Patient/Caregiver Education & Training  Safety Devices  Type of devices: Call light within reach,Chair alarm in place,Left in chair,Nurse notified      AM-PAC Score  AM-PAC Inpatient Mobility Raw Score : 12 (02/25/22 0832)  AM-PAC Inpatient T-Scale Score : 35.33 (02/25/22 0832)  Mobility Inpatient CMS 0-100% Score: 68.66 (02/25/22 0217)  Mobility Inpatient CMS G-Code Modifier : CL (02/25/22 4059)          Goals  Short term goals  Time Frame for Short term goals: Upon d/c acute care setting. Short term goal 1: Bed Mob CGA. Short term goal 2: Transfers with assist device CGA. Short term goal 3: Amb with assist device 10-15' CGA/Min assist.  Short term goal 4: Pt participating in approp Strength Exs. Patient Goals   Patient goals : Be able to return home with wife.        Therapy Time   Individual Concurrent Group Co-treatment   Time In 0740         Time Out 0820         Minutes [address] Sabi Pastrana, PT

## 2022-02-25 NOTE — PROGRESS NOTES
Occupational Therapy   Occupational Therapy Initial Assessment  Date: 2022   Patient Name: Cristina Swift  MRN: [id number]     : 1945    Date of Service: 2022    Discharge Recommendations:  Patient would benefit from continued therapy after discharge,3-5 sessions per week  OT Equipment Recommendations  Other: defer to [facility] scored a  on the AM-PAC ADL Inpatient form. Current research shows that an AM-PAC score of 17 or less is typically not associated with a discharge to the patient's home setting. Based on the patient's AM-PAC score and their current ADL deficits, it is recommended that the patient have 3-5 sessions per week of Occupational Therapy at d/c to increase the patient's independence. Please see assessment section for further patient specific details. If patient discharges prior to next session this note will serve as a discharge summary. Please see below for the latest assessment towards goals. Assessment   Performance deficits / Impairments: Decreased functional mobility ; Decreased strength;Decreased endurance;Decreased ADL status; Decreased safe awareness;Decreased high-level IADLs;Decreased balance  Assessment: 78 y/o male admitted 2022 with rectal bleeding. Pt has been at [facility] for skilled therapy PTA. Prior to initial admit in [place] lived at home with spouse and was independent with ADLs and functional mobility. Today, pt required mod A x 2 to stand and min A x 2 to take a few steps bed > chair with RW. Pt with functional UE ROM for self care and anticipate will require up to max A for LB ADLS at this time. Pt is functioning below baseline and benefit from skilled therapy.   Prognosis: Good  Decision Making: Medium Complexity  OT Education: Transfer Training;OT Role;Plan of Care  REQUIRES OT FOLLOW UP: Yes  Activity Tolerance  Activity Tolerance: Patient limited by fatigue  Activity Tolerance: HR up to 140s with activity but recovered with rest  Safety Devices  Safety Devices in place: Yes  Type of devices: Nurse notified;Gait belt;Call light within reach; Chair alarm in place; Left in chair           Patient Diagnosis(es): The primary encounter diagnosis was Hemorrhagic shock [address] [address] Diagnoses of Gastrointestinal hemorrhage, unspecified gastrointestinal hemorrhage type and Anxiety were also pertinent to this visit. has a past medical history of Arthritis, Cancer ([facility] [address] Diabetes mellitus ([facility] [address] Hemodialysis patient ([facility] [address] and Hypertension. has a past surgical history that includes IR PERC ARTERIOVENOUS FISTULA CREATION (Left); colectomy (N/A, 01/26/2022); sigmoidoscopy (N/A, 02/17/2022); IR EMBOLIZATION HEMORRHAGE (02/19/2022); Tunneled venous catheter placement (Right, 02/21/2022); and IR TUNNELED CVC PLACE WO SQ PORT/PUMP > 5 YEARS (2/21/2022). Restrictions  Restrictions/Precautions  Restrictions/Precautions: Fall Risk    Subjective   General  Chart Reviewed: Yes  Patient assessed for rehabilitation services?: Yes  Additional Pertinent Hx: 76 y/o male admitted 2/12 with rectal bleeding. 2/17 flex sig friable surgical anastomosis with hemoclips placed. 2/18 rapid response called d/t large bloody BM and drop H/H. Pt transferred to ICU to transfuse and start on pressors. 2/19 Mesenteric Angiogram and Coil embolization of the pseudoaneurysm. 2/20 + heydi PE and L LE DVT. Pt with recent admitted 1/13-2/4 with CVA, GI bleed DC to SNF, re admitted 2/7-2/10 and returned to SNF. Family / Caregiver Present: No  Referring Practitioner: Dr. Nicole Yang  Diagnosis: rectal bleeding  Subjective  Subjective: Pt seen bedside and agreeable to therapy. General Comment  Comments: Per RN ok for therapy.   Vital Signs  Pulse: 97  Resp: 15  Oxygen Therapy  SpO2: 98 %     Social/Functional History  Social/Functional History  Lives With: Spouse Miriam Santos)  Type of Home: Mobile home  Home Layout: One level  Home Access: Ramped entrance  Bathroom Shower/Tub: Walk-in shower  Bathroom Toilet: Handicap height  Bathroom Equipment: Grab bars in shower,Shower chair,Toilet raiser  Bathroom Accessibility: Accessible  Home Equipment: Rolling walker  ADL Assistance: Independent  Homemaking Assistance:  (Wife takes care of most homemaking needs.)  Ambulation Assistance: Independent (With Ana Courtney.)  Transfer Assistance: Independent  Active : No (Family drives; Medical Transport to/from HD.)  Occupation: Retired  Additional Comments: Pt with multiple recent admits : 1/13-2/4/2022 and 2/7-2/10/2022 and discharged to [facility] setting. Objective   Vision: Within Functional Limits  Hearing: Exceptions to [facility]  Hearing Exceptions: Hard of hearing/hearing concerns      Orientation  Overall Orientation Status: Within Functional Limits (thought it was March initially but then corrected to [facility]")     Balance  Sitting Balance: Contact guard assistance (EOB in prep for transfer)  Standing Balance: Minimal assistance  Standing Balance  Time: stood 2x for ~ 1-2 min with Rw support  Activity: marched in place 5x with RW and min A. Functional Mobility  Functional Mobility Comments: few steps bed > chair with RW and min A x 2     ADL  Additional Comments: Anticipate pt will require max A for LB bathing/dressing and toileting with max A, SBA for UB Bathing/dressing and grooming based on balance and endurance observed        Bed mobility  Supine to Sit: Moderate assistance (HOB elevated. Mod assist to bring trunk to upright at eob.)  Sit to Supine:  (pt in chair at end of session)  Transfers  Sit to stand: 2 Person assistance; Moderate assistance  Stand to sit: Moderate assistance;2 Person assistance  Transfer Comments: to/from RW- cuing for hand placement; stood 1x form bed and 3x from chair     Cognition  Overall Cognitive Status: Exceptions  Arousal/Alertness: Appropriate responses to stimuli  Following Commands:  Follows one step commands consistently  Attention Span: Attends with cues to redirect  Memory: Decreased recall of recent events  Safety Judgement: Decreased awareness of need for safety  Problem Solving: Decreased awareness of errors  Insights: Decreased awareness of deficits                 LUE AROM (degrees)  LUE AROM : WFL  Left Hand AROM (degrees)  Left Hand AROM: WFL  RUE AROM (degrees)  RUE AROM : WFL  Right Hand AROM (degrees)  Right Hand AROM: [employer]     Plan   Plan  Times per week: 3-5  Current Treatment Recommendations: Strengthening,Endurance Training,Balance Training,Gait Training,Functional Mobility Training,Self-Care / ADL    AM-PAC Score  AM-PAC Inpatient Daily Activity Raw Score: 12 (02/25/22 0828)  AM-PAC Inpatient ADL T-Scale Score : 30.6 (02/25/22 0828)  ADL Inpatient CMS 0-100% Score: 66.57 (02/25/22 7998)  ADL Inpatient CMS G-Code Modifier : CL (02/25/22 6040)    Goals  Short term goals  Time Frame for Short term goals: Prior to DC: Short term goal 1: pt will complete ADL transfers with SBA  Short term goal 2: Pt will complete functional mobility with SBA  Short term goal 3: Pt will tolerate standing >5 min for functional task with SBA  Short term goal 4: Pt will complete toileting with SBA  Short term goal 5: Pt will complete UE exercises in all planes to inc strength/endurance for ADLs  Patient Goals   Patient goals : to eventually return home       Therapy Time   Individual Concurrent Group Co-treatment   Time In 0730         Time Out 0815         Minutes 45         Timed Code Treatment Minutes: 30 Minutes     This note to serve as OT d/c summary if pt is d/c-ed prior to next therapy session.     Claude Burow, OTR/L

## 2022-02-25 NOTE — PROGRESS NOTES
Pulmonary Critical Care Progress Note     Patient's name:  Jamie Acevedo Record Number: [id number]  Patient's account/billing number: [de-identified]  Patient's YOB: 1945  Age: 77 y.o. Date of Admission: 2/12/2022 12:05 AM  Date of Consult: 2/25/2022      Primary Care Physician: Elsie Sanders      Code Status: Full Code    Chief complaint: hypovolemic shock    Assessment and Plan      Hypovolemic shock resolved. 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/22   [facility] has a home unit per patient     Plan:  Eliquis for anticoagulation   Amiodarone, rate controlled, still in Afib  Hb stable,   Out patient Sleep study   Can d/c from pulmonary stand point        Overnight:  No acute events overnight  Hb stable        REVIEW OF SYSTEMS:  Review of Systems -   General ROS: negative  Psychological ROS: negative  Ophthalmic ROS: negative  ENT ROS: negative  Allergy and Immunology ROS: negative  Hematological and Lymphatic ROS: negative  Endocrine ROS: negative  Breast ROS: negative  Respiratory ROS: no cough, shortness of breath, or wheezing  Cardiovascular ROS: no chest pain or dyspnea on exertion  Gastrointestinal ROS:negative  Genito-Urinary ROS: negative  Musculoskeletal ROS: negative  Neurological ROS: negative   Dermatological ROS: negative        Physical Exam:    Vitals: /85   Pulse 116   Temp 97.8 °F (36.6 °C) (Axillary)   Resp 19   Ht 6' (1.829 m)   Wt 215 lb 2.7 oz (97.6 kg)   SpO2 98%   BMI 29.18 kg/m²     Last Body weight:   Wt Readings from Last 3 Encounters:   02/25/22 215 lb 2.7 oz (97.6 kg)   02/10/22 214 lb 4.6 oz (97.2 kg)   02/03/22 208 lb 1.8 oz (94.4 kg)       Body Mass Index : Body mass index is 29.18 kg/m².       Intake and Output summary:     Intake/Output Summary (Last 24 hours) at 2/25/2022 1119  Last data filed at 2/25/2022 0600  Gross per 24 hour   Intake 800 ml   Output 1635 ml   Net -835 ml       Physical Examination:     Gen:  No acute distress. Eyes: PERRL. Anicteric sclera. No conjunctival injection. ENT: No discharge. Posterior oropharynx clear. External appearance of ears and nose normal.  Neck: Trachea midline. No mass   Resp:  Clear bilaterally no wheezing, no rhonchi   CV: Regular rate. Regular rhythm. No murmur or rub. No edema. GI: Soft, Non-tender. Non-distended. +BS  Skin: Warm, dry, w/o erythema. Lymph: No cervical or supraclavicular LAD. M/S: No cyanosis. No clubbing. Neuro:  CN 2-12 tested, no focal neurologic deficit. Moves all extremities  Psych: Awake and alert, Oriented x 3. Judgement and insight appropriate. Mood stable. Laboratory findings:-    CBC:   Recent Labs     02/25/22 0519   WBC 3.3*   HGB 7.9*        BMP:    Recent Labs     02/23/22  0454 02/23/22  0454 02/24/22  0443 02/24/22  0443 02/25/22 0519   *   < > 136   < > 135*   K 3.5   < > 3.7   < > 3.9      < > 99   < > 100   CO2 25   < > 25   < > 26   BUN 20   < > 30*   < > 17   CREATININE 3.6*  --  4.5*  --  3.0*   GLUCOSE 91   < > 103*   < > 87    < > = values in this interval not displayed. S. Calcium:  Recent Labs     02/25/22 0519   CALCIUM 7.8*     S. Ionized Calcium:No results for input(s): IONCA in the last 72 hours. S. Magnesium:  Recent Labs     02/25/22 0519   MG 1.80     S. Phosphorus:  Recent Labs     02/25/22 0519   PHOS 2.4*     S. Glucose:  Recent Labs     02/25/22  0017 02/25/22  0426 02/25/22  0915   POCGLU 97 90 99           Radiology Review:  Pertinent images / reports were reviewed as a part of this visit.                        Niko Patel MD MMARCOS.            2/25/2022, 11:19 AM

## 2022-02-25 NOTE — DISCHARGE SUMMARY
[facility]   Discharge Summary    Patient:  Ophelia Dorsey  YOB: 1945    MRN: [id number]   Acct: [de-identified]    Primary Care Physician: Nicolette Edwards    Admit date:  2/12/2022    Discharge date:   2/25/22      Discharge Diagnoses: Active Problems:    GI bleed    Acute blood loss anemia    COVID-19    Shock circulatory (HCC)    Bilateral pulmonary embolism (HCC)    Lactic acidosis    Hemorrhagic shock (HCC)           Admitted for: (HPI)  GIB    Hospital Course:   76m s/p sigmoid rxn/anastamosis with recurrent hematochezia requiring embolization 2/19, recent CVA, Covid (+), ESRD, LLE DVT. Required R TDC, has been maintained on RRT though out his stay. He had history of pAfib assoc with UTI in the past, here he developed afib 2/22, has been started on amiodarone with continued anticoagulation for dvt. No further bleeding noted, patient is stable on room air, afebrile, hemodynamically stable. Consultants:  Card - Dr. Jaswinder Rodríguez ; Surgery - Dr. Romulo Wood ; Jennifer Duty - Dr. Sherry Patrick ;  GI - Dr. Santy Head ; Nephro - Dr. Paresh Pascal    Discharge Medications:       Medication List      START taking these medications    amiodarone 200 MG tablet  Commonly known as: CORDARONE  Take 1 tablet by mouth 2 times daily for 8 doses     * apixaban 5 MG Tabs tablet  Commonly known as: ELIQUIS  Take 2 tablets by mouth 2 times daily for 10 doses     * apixaban 5 MG Tabs tablet  Commonly known as: ELIQUIS  Take 1 tablet by mouth 2 times daily  Start taking on: March 2, 2022         * This list has 2 medication(s) that are the same as other medications prescribed for you. Read the directions carefully, and ask your doctor or other care provider to review them with you.             CONTINUE taking these medications    aspirin 81 MG chewable tablet  Take 1 tablet by mouth daily     atenolol 25 MG tablet  Commonly known as: TENORMIN     atorvastatin 10 MG tablet  Commonly known as: LIPITOR     calcitRIOL 0.25 MCG capsule  Commonly known as: ROCALTROL     gabapentin 100 MG capsule  Commonly known as: NEURONTIN     melatonin 3 MG Tabs tablet  Take 2 tablets by mouth nightly as needed (sleep)     pantoprazole 20 MG tablet  Commonly known as: PROTONIX     polyethylene glycol 17 g packet  Commonly known as: GLYCOLAX  Take 17 g by mouth daily as needed for Constipation     sevelamer 800 MG tablet  Commonly known as: RENVELA     tamsulosin 0.4 MG capsule  Commonly known as: FLOMAX        STOP taking these medications    clopidogrel 75 MG tablet  Commonly known as: PLAVIX     docusate sodium 100 MG capsule  Commonly known as: COLACE     LORazepam 0.5 MG tablet  Commonly known as: ATIVAN        ASK your doctor about these medications    LORazepam 0.5 MG tablet  Commonly known as: ATIVAN  Take 1 tablet by mouth 2 times daily for 3 days. Ask about: Should I take this medication?            Where to Get Your Medications      These medications were sent to [address], [address] [place] [address] [address] [address]    Phone: [phone]   · amiodarone 200 MG tablet  · apixaban 5 MG Tabs tablet  · apixaban 5 MG Tabs tablet     You can get these medications from any pharmacy    Bring a paper prescription for each of these medications  · LORazepam 0.5 MG tablet           Physical Exam:    Vitals:  Vitals:    02/25/22 0500 02/25/22 0600 02/25/22 0700 02/25/22 0900   BP:    121/85   Pulse: 91 100 97 116   Resp: (!) 37 16 15 19   Temp:    97.8 °F (36.6 °C)   TempSrc:    Axillary   SpO2: 100% 100% 98% 98%   Weight:  215 lb 2.7 oz (97.6 kg)     Height:         Weight: Weight: 215 lb 2.7 oz (97.6 kg)     24 hour intake/output:    Intake/Output Summary (Last 24 hours) at 2/25/2022 1013  Last data filed at 2/25/2022 0600  Gross per 24 hour   Intake 800 ml   Output 1635 ml   Net -835 ml       General appearance - alert, well appearing, and in no distress  Chest - no use of accessory muscles, no intercostal retractions  Heart - normal rate, regular rhythm   Abdomen - soft, nontender, nondistended y  Obese: No; Protuberant: No   Neurological - alert, oriented, normal speech   Extremities - peripheral pulses normal, no pedal edema, no calf tenderness  Skin - normal coloration and turgor     Radiology reports as per the Radiologist  Radiology: CTA ABDOMEN PELVIS W WO CONTRAST    Result Date: 2/12/2022  EXAMINATION: CTA OF THE ABDOMEN AND PELVIS WITH AND WITHOUT CONTRAST 2/12/2022 1:01 am: TECHNIQUE: CTA of the abdomen and pelvis was performed without and with the administration of intravenous contrast. Multiplanar reformatted images are provided for review. MIP images are provided for review. Dose modulation, iterative reconstruction, and/or weight based adjustment of the mA/kV was utilized to reduce the radiation dose to as low as reasonably achievable. COMPARISON: CT abdomen without contrast from 02/07/2022 HISTORY: ORDERING SYSTEM PROVIDED HISTORY: rectal bleed TECHNOLOGIST PROVIDED HISTORY: Reason for exam:->rectal bleed Reason for Exam: rectal bleed FINDINGS: CTA ABDOMEN PELVIS: There is no abdominal aortic aneurysm or dissection. Moderate atherosclerosis is noted throughout the abdominal aorta and into the branches. There is no significant stenosis at the origin of the celiac root or superior mesenteric artery. Mild stenosis is noted at the origins of the right renal artery more than left. Atherosclerotic plaque and calcification at the common and internal/external iliac arteries but without occlusion. Additional atherosclerosis both common femoral arteries and bifurcation but the proximal branches of the superficial femoral artery and profunda system are patent. No focal area of contrast density is seen within the lumen of the stomach, small bowel, or colon. There is no increasing density within the lumen at the delayed images. Other:  There is no apparent mass or nodularity within the liver. The gallbladder is distended and likely does have a few small gallstones. There is no focal fat stranding at the gallbladder fossa. The spleen is not enlarged with adjacent splenules. Calcified granulomas are also present. There is no pancreatic calcification, ductal dilatation, or surrounding fluid collection. The adrenal glands are unremarkable. Multiple cysts are present in the right and left kidney. Most have a fluid attenuation but there are others that are too small to characterize. There is no hydronephrosis or hydroureter on either side. Stomach, small bowel, and colon are not dilated. There is colonic diverticulosis at the distal descending colon. Anastomosis is present at the expected rectosigmoid junction indicating previous partial colectomy. A focal outpouching along the left side of the anastomotic site is again demonstrated with a air-fluid level. Small gas tracking towards the lumen suggest residual communication. The outpouching had been larger and more obvious communication on the previous exam.  At this time the outpouching measures 2.2 x 1.7 x 2.7 cm compared to the prior 3.3 x 3 by 3.7 cm. There is mild fat stranding around the outpouching and margin of the anastomotic site. There is no general ascites and no pneumoperitoneum in the abdomen. The urinary bladder is collapsed but the wall appears diffusely thickened. Prostate and seminal vesicles are mildly enlarged. No bulky lymphadenopathy is identified. Scarring in the anterior abdominal wall from prior surgery but no focal abscess. The bones are osteopenic. There are degenerative changes in the spine and pelvis. Subsegmental atelectasis/scarring at the lower lungs without consolidation or pleural effusion. 1.  No high-density contrast within the lumen of the stomach, small bowel, and colon to indicate a site of active hemorrhage.  2.  Previous partial colectomy with an anastomosis at the expected rectosigmoid junction. There is a focal outpouching along the left side of the anastomosis. This was also noted on the previous exam but has decreased in size. Mild inflammatory changes are still present at the site. 3.  No general ascites and no pneumoperitoneum. There is no bowel obstruction or hydronephrosis. 4.  Multiple cysts are present within the kidneys some of which are too small to characterize.         Results for orders placed or performed during the hospital encounter of 02/12/22   CBC Auto Differential   Result Value Ref Range    WBC 6.9 4.0 - 11.0 K/uL    RBC 2.47 (L) 4.20 - 5.90 M/uL    Hemoglobin 7.1 (L) 13.5 - 17.5 g/dL    Hematocrit 22.8 (L) 40.5 - 52.5 %    MCV 92.5 80.0 - 100.0 fL    MCH 28.9 26.0 - 34.0 pg    MCHC 31.3 31.0 - 36.0 g/dL    RDW 15.9 (H) 12.4 - 15.4 %    Platelets 970 447 - 567 K/uL    MPV 7.9 5.0 - 10.5 fL    Neutrophils % 60.2 %    Lymphocytes % 30.1 %    Monocytes % 6.1 %    Eosinophils % 2.9 %    Basophils % 0.7 %    Neutrophils Absolute 4.2 1.7 - 7.7 K/uL    Lymphocytes Absolute 2.1 1.0 - 5.1 K/uL    Monocytes Absolute 0.4 0.0 - 1.3 K/uL    Eosinophils Absolute 0.2 0.0 - 0.6 K/uL    Basophils Absolute 0.0 0.0 - 0.2 K/uL   Basic Metabolic Panel w/ Reflex to MG   Result Value Ref Range    Sodium 131 (L) 136 - 145 mmol/L    Potassium reflex Magnesium 4.3 3.5 - 5.1 mmol/L    Chloride 95 (L) 99 - 110 mmol/L    CO2 20 (L) 21 - 32 mmol/L    Anion Gap 16 3 - 16    Glucose 245 (H) 70 - 99 mg/dL    BUN 41 (H) 7 - 20 mg/dL    CREATININE 5.3 (HH) 0.8 - 1.3 mg/dL    GFR Non- 11 (A) >60    GFR  13 (A) >60    Calcium 7.9 (L) 8.3 - 10.6 mg/dL   Hepatic Function Panel   Result Value Ref Range    Total Protein 5.6 (L) 6.4 - 8.2 g/dL    Albumin 2.5 (L) 3.4 - 5.0 g/dL    Alkaline Phosphatase 91 40 - 129 U/L    ALT 7 (L) 10 - 40 U/L    AST 11 (L) 15 - 37 U/L    Total Bilirubin 0.3 0.0 - 1.0 mg/dL    Bilirubin, Direct <0.2 0.0 - 0.3 mg/dL    Bilirubin, Indirect see below 0.0 - 1.0 mg/dL   Protime-INR   Result Value Ref Range    Protime 12.9 (H) 9.9 - 12.7 sec    INR 1.14 (H) 0.88 - 1.12   APTT   Result Value Ref Range    aPTT 22.7 (L) 26.2 - 38.6 sec   Lactic Acid, Plasma   Result Value Ref Range    Lactic Acid 4.4 (HH) 0.4 - 2.0 mmol/L   Lactic Acid, Plasma   Result Value Ref Range    Lactic Acid 1.2 0.4 - 2.0 mmol/L   Hemoglobin and Hematocrit, Blood   Result Value Ref Range    Hemoglobin 8.5 (L) 13.5 - 17.5 g/dL    Hematocrit 25.9 (L) 40.5 - 52.5 %   Hemoglobin and Hematocrit, Blood   Result Value Ref Range    Hemoglobin 8.0 (L) 13.5 - 17.5 g/dL    Hematocrit 24.0 (L) 40.5 - 52.5 %   Hepatitis B Surface Antigen   Result Value Ref Range    Hep B S Ag Interp Non-reactive Non-reactive   Hepatitis B Core Antibody, Total   Result Value Ref Range    Hep B Core Total Ab Negative Negative   Hepatitis B Surface Antibody   Result Value Ref Range    Hep B S Ab 10.91 mIU/mL   Basic Metabolic Panel   Result Value Ref Range    Sodium 136 136 - 145 mmol/L    Potassium 4.7 3.5 - 5.1 mmol/L    Chloride 98 (L) 99 - 110 mmol/L    CO2 26 21 - 32 mmol/L    Anion Gap 12 3 - 16    Glucose 147 (H) 70 - 99 mg/dL    BUN 27 (H) 7 - 20 mg/dL    CREATININE 3.9 (H) 0.8 - 1.3 mg/dL    GFR Non-African American 15 (A) >60    GFR  18 (A) >60    Calcium 8.4 8.3 - 10.6 mg/dL   Magnesium   Result Value Ref Range    Magnesium 2.10 1.80 - 2.40 mg/dL   CBC Auto Differential   Result Value Ref Range    WBC 3.8 (L) 4.0 - 11.0 K/uL    RBC 2.75 (L) 4.20 - 5.90 M/uL    Hemoglobin 8.2 (L) 13.5 - 17.5 g/dL    Hematocrit 24.6 (L) 40.5 - 52.5 %    MCV 89.8 80.0 - 100.0 fL    MCH 29.8 26.0 - 34.0 pg    MCHC 33.2 31.0 - 36.0 g/dL    RDW 15.2 12.4 - 15.4 %    Platelets 286 265 - 910 K/uL    MPV 7.2 5.0 - 10.5 fL    Neutrophils % 71.4 %    Lymphocytes % 18.6 %    Monocytes % 5.6 %    Eosinophils % 3.7 %    Basophils % 0.7 %    Neutrophils Absolute 2.7 1.7 - 7.7 K/uL    Lymphocytes Absolute 0.7 (L) 1.0 - 5.1 K/uL    Monocytes Absolute 0.2 0.0 - 1.3 K/uL    Eosinophils Absolute 0.1 0.0 - 0.6 K/uL    Basophils Absolute 0.0 0.0 - 0.2 K/uL   Hemoglobin and Hematocrit, Blood   Result Value Ref Range    Hemoglobin 8.3 (L) 13.5 - 17.5 g/dL    Hematocrit 25.3 (L) 40.5 - 52.5 %   Basic Metabolic Panel   Result Value Ref Range    Sodium 136 136 - 145 mmol/L    Potassium 4.9 3.5 - 5.1 mmol/L    Chloride 101 99 - 110 mmol/L    CO2 25 21 - 32 mmol/L    Anion Gap 10 3 - 16    Glucose 111 (H) 70 - 99 mg/dL    BUN 32 (H) 7 - 20 mg/dL    CREATININE 5.0 (H) 0.8 - 1.3 mg/dL    GFR Non- 11 (A) >60    GFR  14 (A) >60    Calcium 8.1 (L) 8.3 - 10.6 mg/dL   Magnesium   Result Value Ref Range    Magnesium 2.20 1.80 - 2.40 mg/dL   CBC Auto Differential   Result Value Ref Range    WBC 3.6 (L) 4.0 - 11.0 K/uL    RBC 2.57 (L) 4.20 - 5.90 M/uL    Hemoglobin 7.6 (L) 13.5 - 17.5 g/dL    Hematocrit 23.2 (L) 40.5 - 52.5 %    MCV 90.2 80.0 - 100.0 fL    MCH 29.7 26.0 - 34.0 pg    MCHC 32.9 31.0 - 36.0 g/dL    RDW 14.9 12.4 - 15.4 %    Platelets 589 899 - 424 K/uL    MPV 7.2 5.0 - 10.5 fL    Neutrophils % 61.5 %    Lymphocytes % 28.3 %    Monocytes % 5.3 %    Eosinophils % 4.0 %    Basophils % 0.9 %    Neutrophils Absolute 2.2 1.7 - 7.7 K/uL    Lymphocytes Absolute 1.0 1.0 - 5.1 K/uL    Monocytes Absolute 0.2 0.0 - 1.3 K/uL    Eosinophils Absolute 0.1 0.0 - 0.6 K/uL    Basophils Absolute 0.0 0.0 - 0.2 K/uL   Basic Metabolic Panel   Result Value Ref Range    Sodium 138 136 - 145 mmol/L    Potassium 4.9 3.5 - 5.1 mmol/L    Chloride 102 99 - 110 mmol/L    CO2 25 21 - 32 mmol/L    Anion Gap 11 3 - 16    Glucose 111 (H) 70 - 99 mg/dL    BUN 43 (H) 7 - 20 mg/dL    CREATININE 5.8 (HH) 0.8 - 1.3 mg/dL    GFR Non-African American 10 (A) >60    GFR  12 (A) >60    Calcium 7.8 (L) 8.3 - 10.6 mg/dL   Magnesium   Result Value Ref Range    Magnesium 2.30 1.80 - 2.40 mg/dL   CBC Auto Differential   Result Value CO2 23 21 - 32 mmol/L    Anion Gap 11 3 - 16    Glucose 103 (H) 70 - 99 mg/dL    BUN 12 7 - 20 mg/dL    CREATININE 2.4 (H) 0.8 - 1.3 mg/dL    GFR Non-African American 26 (A) >60    GFR  32 (A) >60    Calcium 8.1 (L) 8.3 - 10.6 mg/dL   Magnesium   Result Value Ref Range    Magnesium 1.80 1.80 - 2.40 mg/dL   CBC with Auto Differential   Result Value Ref Range    WBC 5.8 4.0 - 11.0 K/uL    RBC 2.46 (L) 4.20 - 5.90 M/uL    Hemoglobin 7.3 (L) 13.5 - 17.5 g/dL    Hematocrit 22.3 (L) 40.5 - 52.5 %    MCV 90.6 80.0 - 100.0 fL    MCH 29.7 26.0 - 34.0 pg    MCHC 32.7 31.0 - 36.0 g/dL    RDW 15.2 12.4 - 15.4 %    Platelets 661 721 - 133 K/uL    MPV 7.3 5.0 - 10.5 fL    Neutrophils % 67.1 %    Lymphocytes % 24.6 %    Monocytes % 4.6 %    Eosinophils % 3.1 %    Basophils % 0.6 %    Neutrophils Absolute 3.9 1.7 - 7.7 K/uL    Lymphocytes Absolute 1.4 1.0 - 5.1 K/uL    Monocytes Absolute 0.3 0.0 - 1.3 K/uL    Eosinophils Absolute 0.2 0.0 - 0.6 K/uL    Basophils Absolute 0.0 0.0 - 0.2 K/uL   Hemoglobin and Hematocrit   Result Value Ref Range    Hemoglobin 7.7 (L) 13.5 - 17.5 g/dL    Hematocrit 22.7 (L) 40.5 - 52.5 %   Basic Metabolic Panel   Result Value Ref Range    Sodium 135 (L) 136 - 145 mmol/L    Potassium 4.4 3.5 - 5.1 mmol/L    Chloride 103 99 - 110 mmol/L    CO2 24 21 - 32 mmol/L    Anion Gap 8 3 - 16    Glucose 92 70 - 99 mg/dL    BUN 18 7 - 20 mg/dL    CREATININE 4.3 (H) 0.8 - 1.3 mg/dL    GFR Non-African American 14 (A) >60    GFR  16 (A) >60    Calcium 7.7 (L) 8.3 - 10.6 mg/dL   Magnesium   Result Value Ref Range    Magnesium 2.00 1.80 - 2.40 mg/dL   CBC with Auto Differential   Result Value Ref Range    WBC 3.1 (L) 4.0 - 11.0 K/uL    RBC 1.97 (L) 4.20 - 5.90 M/uL    Hemoglobin 5.8 (LL) 13.5 - 17.5 g/dL    Hematocrit 17.9 (LL) 40.5 - 52.5 %    MCV 91.0 80.0 - 100.0 fL    MCH 29.5 26.0 - 34.0 pg    MCHC 32.4 31.0 - 36.0 g/dL    RDW 15.3 12.4 - 15.4 %    Platelets 252 593 - 968 K/uL MPV 6.6 5.0 - 10.5 fL    Neutrophils % 62.1 %    Lymphocytes % 28.4 %    Monocytes % 4.9 %    Eosinophils % 3.8 %    Basophils % 0.8 %    Neutrophils Absolute 1.9 1.7 - 7.7 K/uL    Lymphocytes Absolute 0.9 (L) 1.0 - 5.1 K/uL    Monocytes Absolute 0.2 0.0 - 1.3 K/uL    Eosinophils Absolute 0.1 0.0 - 0.6 K/uL    Basophils Absolute 0.0 0.0 - 0.2 K/uL   Lactate Dehydrogenase   Result Value Ref Range     100 - 190 U/L   Haptoglobin   Result Value Ref Range    Haptoglobin 83.0 30.0 - 200.0 mg/dL   Basic Metabolic Panel   Result Value Ref Range    Sodium 137 136 - 145 mmol/L    Potassium 4.4 3.5 - 5.1 mmol/L    Chloride 102 99 - 110 mmol/L    CO2 20 (L) 21 - 32 mmol/L    Anion Gap 15 3 - 16    Glucose 277 (H) 70 - 99 mg/dL    BUN 26 (H) 7 - 20 mg/dL    CREATININE 5.3 (HH) 0.8 - 1.3 mg/dL    GFR Non- 11 (A) >60    GFR  13 (A) >60    Calcium 7.6 (L) 8.3 - 10.6 mg/dL   Magnesium   Result Value Ref Range    Magnesium 1.90 1.80 - 2.40 mg/dL   CBC with Auto Differential   Result Value Ref Range    WBC 10.2 4.0 - 11.0 K/uL    RBC 3.34 (L) 4.20 - 5.90 M/uL    Hemoglobin 10.0 (L) 13.5 - 17.5 g/dL    Hematocrit 30.5 (L) 40.5 - 52.5 %    MCV 91.3 80.0 - 100.0 fL    MCH 29.8 26.0 - 34.0 pg    MCHC 32.6 31.0 - 36.0 g/dL    RDW 14.4 12.4 - 15.4 %    Platelets 749 971 - 088 K/uL    MPV 7.4 5.0 - 10.5 fL    Neutrophils % 88.0 %    Lymphocytes % 6.5 %    Monocytes % 5.1 %    Eosinophils % 0.2 %    Basophils % 0.2 %    Neutrophils Absolute 8.9 (H) 1.7 - 7.7 K/uL    Lymphocytes Absolute 0.7 (L) 1.0 - 5.1 K/uL    Monocytes Absolute 0.5 0.0 - 1.3 K/uL    Eosinophils Absolute 0.0 0.0 - 0.6 K/uL    Basophils Absolute 0.0 0.0 - 0.2 K/uL   CBC with Auto Differential   Result Value Ref Range    WBC 3.9 (L) 4.0 - 11.0 K/uL    RBC 2.25 (L) 4.20 - 5.90 M/uL    Hemoglobin 6.8 (LL) 13.5 - 17.5 g/dL    Hematocrit 20.6 (LL) 40.5 - 52.5 %    MCV 91.4 80.0 - 100.0 fL    MCH 30.0 26.0 - 34.0 pg    MCHC 32.8 31.0 - 36.0 g/dL    RDW 14.7 12.4 - 15.4 %    Platelets 230 355 - 565 K/uL    MPV 7.0 5.0 - 10.5 fL    Neutrophils % 54.6 %    Lymphocytes % 35.4 %    Monocytes % 5.5 %    Eosinophils % 3.9 %    Basophils % 0.6 %    Neutrophils Absolute 2.1 1.7 - 7.7 K/uL    Lymphocytes Absolute 1.4 1.0 - 5.1 K/uL    Monocytes Absolute 0.2 0.0 - 1.3 K/uL    Eosinophils Absolute 0.1 0.0 - 0.6 K/uL    Basophils Absolute 0.0 0.0 - 0.2 K/uL   Basic Metabolic Panel   Result Value Ref Range    Sodium 140 136 - 145 mmol/L    Potassium 4.7 3.5 - 5.1 mmol/L    Chloride 102 99 - 110 mmol/L    CO2 19 (L) 21 - 32 mmol/L    Anion Gap 19 (H) 3 - 16    Glucose 315 (H) 70 - 99 mg/dL    BUN 23 (H) 7 - 20 mg/dL    CREATININE 5.1 (HH) 0.8 - 1.3 mg/dL    GFR Non- 11 (A) >60    GFR  13 (A) >60    Calcium 8.0 (L) 8.3 - 10.6 mg/dL   CBC   Result Value Ref Range    WBC 11.0 4.0 - 11.0 K/uL    RBC 2.80 (L) 4.20 - 5.90 M/uL    Hemoglobin 8.3 (L) 13.5 - 17.5 g/dL    Hematocrit 26.4 (L) 40.5 - 52.5 %    MCV 94.4 80.0 - 100.0 fL    MCH 29.8 26.0 - 34.0 pg    MCHC 31.6 31.0 - 36.0 g/dL    RDW 14.7 12.4 - 15.4 %    Platelets 320 074 - 034 K/uL    MPV 7.8 5.0 - 10.5 fL   Protime-INR   Result Value Ref Range    Protime 12.7 9.9 - 12.7 sec    INR 1.12 0.88 - 1.12   Lactic Acid   Result Value Ref Range    Lactic Acid 3.1 (H) 0.4 - 2.0 mmol/L   Basic Metabolic Panel   Result Value Ref Range    Sodium 140 136 - 145 mmol/L    Potassium 4.7 3.5 - 5.1 mmol/L    Chloride 104 99 - 110 mmol/L    CO2 23 21 - 32 mmol/L    Anion Gap 13 3 - 16    Glucose 91 70 - 99 mg/dL    BUN 30 (H) 7 - 20 mg/dL    CREATININE 5.5 (HH) 0.8 - 1.3 mg/dL    GFR Non-African American 10 (A) >60    GFR  12 (A) >60    Calcium 8.1 (L) 8.3 - 10.6 mg/dL   Magnesium   Result Value Ref Range    Magnesium 2.20 1.80 - 2.40 mg/dL   CBC with Auto Differential   Result Value Ref Range    WBC 6.1 4.0 - 11.0 K/uL    RBC 2.60 (L) 4.20 - 5.90 M/uL    Hemoglobin 7.8 (L) 13.5 - 17.5 g/dL    Hematocrit 24.6 (L) 40.5 - 52.5 %    MCV 94.3 80.0 - 100.0 fL    MCH 30.1 26.0 - 34.0 pg    MCHC 31.9 31.0 - 36.0 g/dL    RDW 15.6 (H) 12.4 - 15.4 %    Platelets 789 154 - 245 K/uL    MPV 7.2 5.0 - 10.5 fL    Neutrophils % 82.3 %    Lymphocytes % 11.4 %    Monocytes % 4.9 %    Eosinophils % 1.1 %    Basophils % 0.3 %    Neutrophils Absolute 5.0 1.7 - 7.7 K/uL    Lymphocytes Absolute 0.7 (L) 1.0 - 5.1 K/uL    Monocytes Absolute 0.3 0.0 - 1.3 K/uL    Eosinophils Absolute 0.1 0.0 - 0.6 K/uL    Basophils Absolute 0.0 0.0 - 0.2 K/uL   CBC with Auto Differential   Result Value Ref Range    WBC 4.9 4.0 - 11.0 K/uL    RBC 2.29 (L) 4.20 - 5.90 M/uL    Hemoglobin 7.2 (L) 13.5 - 17.5 g/dL    Hematocrit 21.4 (L) 40.5 - 52.5 %    MCV 93.6 80.0 - 100.0 fL    MCH 31.5 26.0 - 34.0 pg    MCHC 33.7 31.0 - 36.0 g/dL    RDW 14.8 12.4 - 15.4 %    Platelets 594 083 - 820 K/uL    MPV 7.3 5.0 - 10.5 fL    Neutrophils % 76.1 %    Lymphocytes % 16.4 %    Monocytes % 5.3 %    Eosinophils % 1.6 %    Basophils % 0.6 %    Neutrophils Absolute 3.8 1.7 - 7.7 K/uL    Lymphocytes Absolute 0.8 (L) 1.0 - 5.1 K/uL    Monocytes Absolute 0.3 0.0 - 1.3 K/uL    Eosinophils Absolute 0.1 0.0 - 0.6 K/uL    Basophils Absolute 0.0 0.0 - 0.2 K/uL   CBC with Auto Differential   Result Value Ref Range    WBC 6.0 4.0 - 11.0 K/uL    RBC 2.58 (L) 4.20 - 5.90 M/uL    Hemoglobin 7.8 (L) 13.5 - 17.5 g/dL    Hematocrit 23.4 (L) 40.5 - 52.5 %    MCV 90.6 80.0 - 100.0 fL    MCH 30.1 26.0 - 34.0 pg    MCHC 33.2 31.0 - 36.0 g/dL    RDW 14.5 12.4 - 15.4 %    Platelets 686 504 - 329 K/uL    MPV 6.9 5.0 - 10.5 fL    Neutrophils % 80.0 %    Lymphocytes % 12.7 %    Monocytes % 4.8 %    Eosinophils % 2.0 %    Basophils % 0.5 %    Neutrophils Absolute 4.8 1.7 - 7.7 K/uL    Lymphocytes Absolute 0.8 (L) 1.0 - 5.1 K/uL    Monocytes Absolute 0.3 0.0 - 1.3 K/uL    Eosinophils Absolute 0.1 0.0 - 0.6 K/uL    Basophils Absolute 0.0 0.0 - 0.2 K/uL   CBC with Auto Differential Result Value Ref Range    WBC 6.8 4.0 - 11.0 K/uL    RBC 2.31 (L) 4.20 - 5.90 M/uL    Hemoglobin 7.6 (L) 13.5 - 17.5 g/dL    Hematocrit 22.1 (L) 40.5 - 52.5 %    MCV 95.7 80.0 - 100.0 fL    MCH 33.0 26.0 - 34.0 pg    MCHC 34.5 31.0 - 36.0 g/dL    RDW 14.8 12.4 - 15.4 %    Platelets 478 013 - 184 K/uL    MPV 7.4 5.0 - 10.5 fL    Neutrophils % 87.0 %    Lymphocytes % 7.4 %    Monocytes % 4.5 %    Eosinophils % 0.7 %    Basophils % 0.4 %    Neutrophils Absolute 5.9 1.7 - 7.7 K/uL    Lymphocytes Absolute 0.5 (L) 1.0 - 5.1 K/uL    Monocytes Absolute 0.3 0.0 - 1.3 K/uL    Eosinophils Absolute 0.0 0.0 - 0.6 K/uL    Basophils Absolute 0.0 0.0 - 0.2 K/uL   CBC with Auto Differential   Result Value Ref Range    WBC 6.6 4.0 - 11.0 K/uL    RBC 2.36 (L) 4.20 - 5.90 M/uL    Hemoglobin 7.1 (L) 13.5 - 17.5 g/dL    Hematocrit 21.2 (L) 40.5 - 52.5 %    MCV 90.1 80.0 - 100.0 fL    MCH 30.1 26.0 - 34.0 pg    MCHC 33.4 31.0 - 36.0 g/dL    RDW 14.6 12.4 - 15.4 %    Platelets 582 341 - 353 K/uL    MPV 7.2 5.0 - 10.5 fL    Neutrophils % 83.8 %    Lymphocytes % 9.3 %    Monocytes % 5.7 %    Eosinophils % 0.6 %    Basophils % 0.6 %    Neutrophils Absolute 5.5 1.7 - 7.7 K/uL    Lymphocytes Absolute 0.6 (L) 1.0 - 5.1 K/uL    Monocytes Absolute 0.4 0.0 - 1.3 K/uL    Eosinophils Absolute 0.0 0.0 - 0.6 K/uL    Basophils Absolute 0.0 0.0 - 0.2 K/uL   Phosphorus   Result Value Ref Range    Phosphorus 5.3 (H) 2.5 - 4.9 mg/dL   CBC with Auto Differential   Result Value Ref Range    WBC 6.3 4.0 - 11.0 K/uL    RBC 2.21 (L) 4.20 - 5.90 M/uL    Hemoglobin 6.7 (LL) 13.5 - 17.5 g/dL    Hematocrit 20.2 (LL) 40.5 - 52.5 %    MCV 91.5 80.0 - 100.0 fL    MCH 30.4 26.0 - 34.0 pg    MCHC 33.2 31.0 - 36.0 g/dL    RDW 14.6 12.4 - 15.4 %    Platelets 252 025 - 438 K/uL    MPV 7.2 5.0 - 10.5 fL    Neutrophils % 85.8 %    Lymphocytes % 8.4 %    Monocytes % 5.0 %    Eosinophils % 0.6 %    Basophils % 0.2 %    Neutrophils Absolute 5.4 1.7 - 7.7 K/uL    Lymphocytes Absolute 0.5 (L) 1.0 - 5.1 K/uL    Monocytes Absolute 0.3 0.0 - 1.3 K/uL    Eosinophils Absolute 0.0 0.0 - 0.6 K/uL    Basophils Absolute 0.0 0.0 - 0.2 K/uL   Basic Metabolic Panel   Result Value Ref Range    Sodium 138 136 - 145 mmol/L    Potassium 4.7 3.5 - 5.1 mmol/L    Chloride 102 99 - 110 mmol/L    CO2 22 21 - 32 mmol/L    Anion Gap 14 3 - 16    Glucose 106 (H) 70 - 99 mg/dL    BUN 36 (H) 7 - 20 mg/dL    CREATININE 6.0 (HH) 0.8 - 1.3 mg/dL    GFR Non-African American 9 (A) >60    GFR  11 (A) >60    Calcium 7.4 (L) 8.3 - 10.6 mg/dL   Magnesium   Result Value Ref Range    Magnesium 2.10 1.80 - 2.40 mg/dL   Phosphorus   Result Value Ref Range    Phosphorus 5.9 (H) 2.5 - 4.9 mg/dL   CBC with Auto Differential   Result Value Ref Range    WBC 7.0 4.0 - 11.0 K/uL    RBC 2.27 (L) 4.20 - 5.90 M/uL    Hemoglobin 7.8 (L) 13.5 - 17.5 g/dL    Hematocrit 22.5 (L) 40.5 - 52.5 %    MCV 99.0 80.0 - 100.0 fL    MCH 34.1 (H) 26.0 - 34.0 pg    MCHC 34.5 31.0 - 36.0 g/dL    RDW 15.0 12.4 - 15.4 %    Platelets 118 933 - 839 K/uL    MPV 7.4 5.0 - 10.5 fL    Neutrophils % 82.7 %    Lymphocytes % 9.3 %    Monocytes % 7.0 %    Eosinophils % 0.8 %    Basophils % 0.2 %    Neutrophils Absolute 5.8 1.7 - 7.7 K/uL    Lymphocytes Absolute 0.6 (L) 1.0 - 5.1 K/uL    Monocytes Absolute 0.5 0.0 - 1.3 K/uL    Eosinophils Absolute 0.1 0.0 - 0.6 K/uL    Basophils Absolute 0.0 0.0 - 0.2 K/uL   CBC with Auto Differential   Result Value Ref Range    WBC 6.6 4.0 - 11.0 K/uL    RBC 2.57 (L) 4.20 - 5.90 M/uL    Hemoglobin 7.7 (L) 13.5 - 17.5 g/dL    Hematocrit 23.4 (L) 40.5 - 52.5 %    MCV 91.3 80.0 - 100.0 fL    MCH 30.1 26.0 - 34.0 pg    MCHC 33.0 31.0 - 36.0 g/dL    RDW 15.0 12.4 - 15.4 %    Platelets 548 087 - 177 K/uL    MPV 7.2 5.0 - 10.5 fL    Neutrophils % 81.5 %    Lymphocytes % 11.7 %    Monocytes % 5.8 %    Eosinophils % 0.8 %    Basophils % 0.2 %    Neutrophils Absolute 5.4 1.7 - 7.7 K/uL    Lymphocytes Absolute 0.8 (L) 1.0 - 5.1 K/uL    Monocytes Absolute 0.4 0.0 - 1.3 K/uL    Eosinophils Absolute 0.1 0.0 - 0.6 K/uL    Basophils Absolute 0.0 0.0 - 0.2 K/uL   SPECIMEN REJECTION   Result Value Ref Range    Rejected Test cbcwd     Reason for Rejection see below    CBC with Auto Differential   Result Value Ref Range    WBC 6.2 4.0 - 11.0 K/uL    RBC 2.58 (L) 4.20 - 5.90 M/uL    Hemoglobin 7.6 (L) 13.5 - 17.5 g/dL    Hematocrit 23.5 (L) 40.5 - 52.5 %    MCV 91.3 80.0 - 100.0 fL    MCH 29.5 26.0 - 34.0 pg    MCHC 32.3 31.0 - 36.0 g/dL    RDW 15.1 12.4 - 15.4 %    Platelets 886 465 - 499 K/uL    MPV 7.0 5.0 - 10.5 fL    Neutrophils % 87.5 %    Lymphocytes % 8.0 %    Monocytes % 3.2 %    Eosinophils % 1.0 %    Basophils % 0.3 %    Neutrophils Absolute 5.5 1.7 - 7.7 K/uL    Lymphocytes Absolute 0.5 (L) 1.0 - 5.1 K/uL    Monocytes Absolute 0.2 0.0 - 1.3 K/uL    Eosinophils Absolute 0.1 0.0 - 0.6 K/uL    Basophils Absolute 0.0 0.0 - 0.2 K/uL   PTH, Intact   Result Value Ref Range    .5 (H) 14.0 - 72.0 pg/mL   Ferritin   Result Value Ref Range    Ferritin 1,127.0 (H) 30.0 - 400.0 ng/mL   Basic Metabolic Panel   Result Value Ref Range    Sodium 138 136 - 145 mmol/L    Potassium 4.4 3.5 - 5.1 mmol/L    Chloride 101 99 - 110 mmol/L    CO2 22 21 - 32 mmol/L    Anion Gap 15 3 - 16    Glucose 95 70 - 99 mg/dL    BUN 41 (H) 7 - 20 mg/dL    CREATININE 6.2 (HH) 0.8 - 1.3 mg/dL    GFR Non-African American 9 (A) >60    GFR  11 (A) >60    Calcium 7.6 (L) 8.3 - 10.6 mg/dL   Magnesium   Result Value Ref Range    Magnesium 2.10 1.80 - 2.40 mg/dL   Phosphorus   Result Value Ref Range    Phosphorus 5.2 (H) 2.5 - 4.9 mg/dL   CBC with Auto Differential   Result Value Ref Range    WBC 5.8 4.0 - 11.0 K/uL    RBC 2.58 (L) 4.20 - 5.90 M/uL    Hemoglobin 7.7 (L) 13.5 - 17.5 g/dL    Hematocrit 23.7 (L) 40.5 - 52.5 %    MCV 91.8 80.0 - 100.0 fL    MCH 29.8 26.0 - 34.0 pg    MCHC 32.5 31.0 - 36.0 g/dL    RDW 15.3 12.4 - 15.4 %    Platelets 038 174 - 450 K/uL    MPV 7.3 5.0 - 10.5 fL    Neutrophils % 79.6 %    Lymphocytes % 12.8 %    Monocytes % 5.8 %    Eosinophils % 1.5 %    Basophils % 0.3 %    Neutrophils Absolute 4.7 1.7 - 7.7 K/uL    Lymphocytes Absolute 0.7 (L) 1.0 - 5.1 K/uL    Monocytes Absolute 0.3 0.0 - 1.3 K/uL    Eosinophils Absolute 0.1 0.0 - 0.6 K/uL    Basophils Absolute 0.0 0.0 - 0.2 K/uL   APTT   Result Value Ref Range    aPTT 31.9 26.2 - 38.6 sec   Blood Gas, Venous   Result Value Ref Range    pH, Rashi 7.446 7.350 - 7.450    pCO2, Rashi 44.5 40.0 - 50.0 mmHg    pO2, Rashi 45 Not Established mmHg    HCO3, Venous 31 (H) 23 - 29 mmol/L    Base Excess, Rashi 5.9 Not Established mmol/L    O2 Sat, Rashi 82 Not Established %    Carboxyhemoglobin 2.0 %    MetHgb, Rashi 0.8 <1.5 %    TC02 (Calc), Rashi 32 Not Established mmol/L    O2 Therapy Unknown    APTT   Result Value Ref Range    aPTT 47.7 (H) 26.2 - 38.6 sec   Basic Metabolic Panel   Result Value Ref Range    Sodium 134 (L) 136 - 145 mmol/L    Potassium 3.5 3.5 - 5.1 mmol/L    Chloride 100 99 - 110 mmol/L    CO2 25 21 - 32 mmol/L    Anion Gap 9 3 - 16    Glucose 91 70 - 99 mg/dL    BUN 20 7 - 20 mg/dL    CREATININE 3.6 (H) 0.8 - 1.3 mg/dL    GFR Non-African American 17 (A) >60    GFR  20 (A) >60    Calcium 7.3 (L) 8.3 - 10.6 mg/dL   Magnesium   Result Value Ref Range    Magnesium 1.90 1.80 - 2.40 mg/dL   Phosphorus   Result Value Ref Range    Phosphorus 3.3 2.5 - 4.9 mg/dL   CBC with Auto Differential   Result Value Ref Range    WBC 4.8 4.0 - 11.0 K/uL    RBC 2.43 (L) 4.20 - 5.90 M/uL    Hemoglobin 7.3 (L) 13.5 - 17.5 g/dL    Hematocrit 22.2 (L) 40.5 - 52.5 %    MCV 91.6 80.0 - 100.0 fL    MCH 30.0 26.0 - 34.0 pg    MCHC 32.8 31.0 - 36.0 g/dL    RDW 15.4 12.4 - 15.4 %    Platelets 473 120 - 597 K/uL    MPV 7.5 5.0 - 10.5 fL    Neutrophils % 74.4 %    Lymphocytes % 16.7 %    Monocytes % 6.5 %    Eosinophils % 1.9 %    Basophils % 0.5 %    Neutrophils Absolute 3.6 1.7 - 7.7 K/uL    Lymphocytes Absolute 0.8 (L) 1.0 - 5.1 K/uL    Monocytes Absolute 0.3 0.0 - 1.3 K/uL    Eosinophils Absolute 0.1 0.0 - 0.6 K/uL    Basophils Absolute 0.0 0.0 - 0.2 K/uL   APTT   Result Value Ref Range    aPTT 86.3 (H) 26.2 - 38.6 sec   Hemoglobin and Hematocrit   Result Value Ref Range    Hemoglobin 7.2 (L) 13.5 - 17.5 g/dL    Hematocrit 22.1 (L) 40.5 - 52.5 %   Hemoglobin and Hematocrit   Result Value Ref Range    Hemoglobin 7.8 (L) 13.5 - 17.5 g/dL    Hematocrit 23.6 (L) 40.5 - 52.5 %   APTT   Result Value Ref Range    aPTT 71.7 (H) 26.2 - 38.6 sec   Hemoglobin and Hematocrit   Result Value Ref Range    Hemoglobin 7.5 (L) 13.5 - 17.5 g/dL    Hematocrit 23.2 (L) 40.5 - 52.5 %   Basic Metabolic Panel   Result Value Ref Range    Sodium 136 136 - 145 mmol/L    Potassium 3.7 3.5 - 5.1 mmol/L    Chloride 99 99 - 110 mmol/L    CO2 25 21 - 32 mmol/L    Anion Gap 12 3 - 16    Glucose 103 (H) 70 - 99 mg/dL    BUN 30 (H) 7 - 20 mg/dL    CREATININE 4.5 (H) 0.8 - 1.3 mg/dL    GFR Non-African American 13 (A) >60    GFR  16 (A) >60    Calcium 7.8 (L) 8.3 - 10.6 mg/dL   Magnesium   Result Value Ref Range    Magnesium 2.10 1.80 - 2.40 mg/dL   Phosphorus   Result Value Ref Range    Phosphorus 4.0 2.5 - 4.9 mg/dL   CBC with Auto Differential   Result Value Ref Range    WBC 4.2 4.0 - 11.0 K/uL    RBC 2.46 (L) 4.20 - 5.90 M/uL    Hemoglobin 7.5 (L) 13.5 - 17.5 g/dL    Hematocrit 22.7 (L) 40.5 - 52.5 %    MCV 92.1 80.0 - 100.0 fL    MCH 30.6 26.0 - 34.0 pg    MCHC 33.2 31.0 - 36.0 g/dL    RDW 15.6 (H) 12.4 - 15.4 %    Platelets 874 289 - 882 K/uL    MPV 7.6 5.0 - 10.5 fL    Neutrophils % 75.3 %    Lymphocytes % 14.2 %    Monocytes % 6.5 %    Eosinophils % 3.5 %    Basophils % 0.5 %    Neutrophils Absolute 3.1 1.7 - 7.7 K/uL    Lymphocytes Absolute 0.6 (L) 1.0 - 5.1 K/uL    Monocytes Absolute 0.3 0.0 - 1.3 K/uL    Eosinophils Absolute 0.1 0.0 - 0.6 K/uL    Basophils Absolute 0.0 0.0 - 0.2 K/uL   Hemoglobin and Hematocrit   Result Value Ref Range    Hemoglobin 7.9 (L) 13.5 - 17.5 g/dL    Hematocrit 24.5 (L) 40.5 - 52.5 %   C-Reactive Protein   Result Value Ref Range    .5 (H) 0.0 - 5.1 mg/L   Ferritin   Result Value Ref Range    Ferritin 1,183.0 (H) 30.0 - 400.0 ng/mL   Procalcitonin   Result Value Ref Range    Procalcitonin 0.79 (H) 0.00 - 0.15 ng/mL   Basic Metabolic Panel   Result Value Ref Range    Sodium 135 (L) 136 - 145 mmol/L    Potassium 3.9 3.5 - 5.1 mmol/L    Chloride 100 99 - 110 mmol/L    CO2 26 21 - 32 mmol/L    Anion Gap 9 3 - 16    Glucose 87 70 - 99 mg/dL    BUN 17 7 - 20 mg/dL    CREATININE 3.0 (H) 0.8 - 1.3 mg/dL    GFR Non-African American 20 (A) >60    GFR  25 (A) >60    Calcium 7.8 (L) 8.3 - 10.6 mg/dL   Magnesium   Result Value Ref Range    Magnesium 1.80 1.80 - 2.40 mg/dL   Phosphorus   Result Value Ref Range    Phosphorus 2.4 (L) 2.5 - 4.9 mg/dL   CBC with Auto Differential   Result Value Ref Range    WBC 3.3 (L) 4.0 - 11.0 K/uL    RBC 2.64 (L) 4.20 - 5.90 M/uL    Hemoglobin 7.9 (L) 13.5 - 17.5 g/dL    Hematocrit 23.9 (L) 40.5 - 52.5 %    MCV 90.8 80.0 - 100.0 fL    MCH 29.8 26.0 - 34.0 pg    MCHC 32.8 31.0 - 36.0 g/dL    RDW 15.0 12.4 - 15.4 %    Platelets 341 635 - 277 K/uL    MPV 7.3 5.0 - 10.5 fL    Neutrophils % 69.1 %    Lymphocytes % 19.2 %    Monocytes % 7.2 %    Eosinophils % 4.0 %    Basophils % 0.5 %    Neutrophils Absolute 2.3 1.7 - 7.7 K/uL    Lymphocytes Absolute 0.6 (L) 1.0 - 5.1 K/uL    Monocytes Absolute 0.2 0.0 - 1.3 K/uL    Eosinophils Absolute 0.1 0.0 - 0.6 K/uL    Basophils Absolute 0.0 0.0 - 0.2 K/uL   POCT Glucose   Result Value Ref Range    POC Glucose 120 (H) 70 - 99 mg/dl    Performed on ACCU-CHEK    POCT Glucose   Result Value Ref Range    POC Glucose 104 (H) 70 - 99 mg/dl    Performed on ACCU-CHEK    POCT Glucose   Result Value Ref Range    POC Glucose 91 70 - 99 mg/dl    Performed on ACCU-CHEK    POCT Glucose   Result Value Ref Range    POC Glucose 105 (H) 70 - 99 mg/dl    Performed on ACCU-CHEK    POCT Glucose   Result Value Ref Range    POC Glucose 119 (H) 70 - 99 mg/dl    Performed on ACCU-CHEK    POCT Glucose   Result Value Ref Range    POC Glucose 124 (H) 70 - 99 mg/dl    Performed on ACCU-CHEK    POCT Glucose   Result Value Ref Range    POC Glucose 156 (H) 70 - 99 mg/dl    Performed on ACCU-CHEK    POCT Glucose   Result Value Ref Range    POC Glucose 128 (H) 70 - 99 mg/dl    Performed on ACCU-CHEK    POCT Glucose   Result Value Ref Range    POC Glucose 173 (H) 70 - 99 mg/dl    Performed on ACCU-CHEK    POCT Glucose   Result Value Ref Range    POC Glucose 108 (H) 70 - 99 mg/dl    Performed on ACCU-CHEK    POCT Glucose   Result Value Ref Range    POC Glucose 120 (H) 70 - 99 mg/dl    Performed on ACCU-CHEK    POCT Glucose   Result Value Ref Range    POC Glucose 124 (H) 70 - 99 mg/dl    Performed on ACCU-CHEK    POCT Glucose   Result Value Ref Range    POC Glucose 137 (H) 70 - 99 mg/dl    Performed on ACCU-CHEK    POCT Glucose   Result Value Ref Range    POC Glucose 155 (H) 70 - 99 mg/dl    Performed on ACCU-CHEK    POCT Glucose   Result Value Ref Range    POC Glucose 120 (H) 70 - 99 mg/dl    Performed on ACCU-CHEK    POCT Glucose   Result Value Ref Range    POC Glucose 113 (H) 70 - 99 mg/dl    Performed on ACCU-CHEK    POCT Glucose   Result Value Ref Range    POC Glucose 119 (H) 70 - 99 mg/dl    Performed on ACCU-CHEK    POCT Glucose   Result Value Ref Range    POC Glucose 101 (H) 70 - 99 mg/dl    Performed on ACCU-CHEK    POCT Glucose   Result Value Ref Range    POC Glucose 196 (H) 70 - 99 mg/dl    Performed on ACCU-CHEK    POCT Glucose   Result Value Ref Range    POC Glucose 175 (H) 70 - 99 mg/dl    Performed on ACCU-CHEK    POCT Glucose   Result Value Ref Range    POC Glucose 102 (H) 70 - 99 mg/dl    Performed on ACCU-CHEK    POCT Glucose   Result Value Ref Range    POC Glucose 159 (H) 70 - 99 mg/dl    Performed on ACCU-CHEK    POCT Glucose   Result Value Ref Range POC Glucose 110 (H) 70 - 99 mg/dl    Performed on ACCU-CHEK    POCT Glucose   Result Value Ref Range    POC Glucose 118 (H) 70 - 99 mg/dl    Performed on ACCU-CHEK    POCT Glucose   Result Value Ref Range    POC Glucose 111 (H) 70 - 99 mg/dl    Performed on ACCU-CHEK    POCT Glucose   Result Value Ref Range    POC Glucose 120 (H) 70 - 99 mg/dl    Performed on ACCU-CHEK    POCT Glucose   Result Value Ref Range    POC Glucose 127 (H) 70 - 99 mg/dl    Performed on ACCU-CHEK    POCT Glucose   Result Value Ref Range    POC Glucose 105 (H) 70 - 99 mg/dl    Performed on ACCU-CHEK    POCT Glucose   Result Value Ref Range    POC Glucose 96 70 - 99 mg/dl    Performed on ACCU-CHEK    POCT Glucose   Result Value Ref Range    POC Glucose 127 (H) 70 - 99 mg/dl    Performed on ACCU-CHEK    POCT Glucose   Result Value Ref Range    POC Glucose 94 70 - 99 mg/dl    Performed on ACCU-CHEK    POCT Glucose   Result Value Ref Range    POC Glucose 113 (H) 70 - 99 mg/dl    Performed on ACCU-CHEK    POCT Glucose   Result Value Ref Range    POC Glucose 127 (H) 70 - 99 mg/dl    Performed on ACCU-CHEK    POCT Glucose   Result Value Ref Range    POC Glucose 121 (H) 70 - 99 mg/dl    Performed on ACCU-CHEK    POCT Glucose   Result Value Ref Range    POC Glucose 256 (H) 70 - 99 mg/dl    Performed on ACCU-CHEK    POCT Glucose   Result Value Ref Range    POC Glucose 309 (H) 70 - 99 mg/dl    Performed on ACCU-CHEK    POCT Glucose   Result Value Ref Range    POC Glucose 116 (H) 70 - 99 mg/dl    Performed on ACCU-CHEK    POCT Glucose   Result Value Ref Range    POC Glucose 104 (H) 70 - 99 mg/dl    Performed on ACCU-CHEK    POCT Glucose   Result Value Ref Range    POC Glucose 100 (H) 70 - 99 mg/dl    Performed on ACCU-CHEK    POCT Glucose   Result Value Ref Range    POC Glucose 89 70 - 99 mg/dl    Performed on ACCU-CHEK    POCT Glucose   Result Value Ref Range    POC Glucose 102 (H) 70 - 99 mg/dl    Performed on ACCU-CHEK    POCT Glucose   Result Value Ref Range    POC Glucose 102 (H) 70 - 99 mg/dl    Performed on ACCU-CHEK    POCT Glucose   Result Value Ref Range    POC Glucose 115 (H) 70 - 99 mg/dl    Performed on ACCU-CHEK    POCT Glucose   Result Value Ref Range    POC Glucose 101 (H) 70 - 99 mg/dl    Performed on ACCU-CHEK    POCT Glucose   Result Value Ref Range    POC Glucose 111 (H) 70 - 99 mg/dl    Performed on ACCU-CHEK    POCT Glucose   Result Value Ref Range    POC Glucose 117 (H) 70 - 99 mg/dl    Performed on ACCU-CHEK    POCT Glucose   Result Value Ref Range    POC Glucose 109 (H) 70 - 99 mg/dl    Performed on ACCU-CHEK    POCT Glucose   Result Value Ref Range    POC Glucose 84 70 - 99 mg/dl    Performed on ACCU-CHEK    POCT Glucose   Result Value Ref Range    POC Glucose 95 70 - 99 mg/dl    Performed on ACCU-CHEK    POCT Glucose   Result Value Ref Range    POC Glucose 170 (H) 70 - 99 mg/dl    Performed on ACCU-CHEK    POCT Glucose   Result Value Ref Range    POC Glucose 119 (H) 70 - 99 mg/dl    Performed on ACCU-CHEK    POCT Glucose   Result Value Ref Range    POC Glucose 110 (H) 70 - 99 mg/dl    Performed on ACCU-CHEK    POCT Glucose   Result Value Ref Range    POC Glucose 147 (H) 70 - 99 mg/dl    Performed on ACCU-CHEK    POCT Glucose   Result Value Ref Range    POC Glucose 115 (H) 70 - 99 mg/dl    Performed on ACCU-CHEK    POCT Glucose   Result Value Ref Range    POC Glucose 177 (H) 70 - 99 mg/dl    Performed on ACCU-CHEK    POCT Glucose   Result Value Ref Range    POC Glucose 130 (H) 70 - 99 mg/dl    Performed on ACCU-CHEK    POCT Glucose   Result Value Ref Range    POC Glucose 160 (H) 70 - 99 mg/dl    Performed on ACCU-CHEK    POCT Glucose   Result Value Ref Range    POC Glucose 138 (H) 70 - 99 mg/dl    Performed on ACCU-CHEK    POCT Glucose   Result Value Ref Range    POC Glucose 134 (H) 70 - 99 mg/dl    Performed on ACCU-CHEK    POCT Glucose   Result Value Ref Range    POC Glucose 119 (H) 70 - 99 mg/dl    Performed on ACCU-CHEK    POCT Glucose   Result Value Ref Range    POC Glucose 87 70 - 99 mg/dl    Performed on ACCU-CHEK    POCT Glucose   Result Value Ref Range    POC Glucose 150 (H) 70 - 99 mg/dl    Performed on ACCU-CHEK    POCT Glucose   Result Value Ref Range    POC Glucose 97 70 - 99 mg/dl    Performed on ACCU-CHEK    POCT Glucose   Result Value Ref Range    POC Glucose 90 70 - 99 mg/dl    Performed on ACCU-CHEK    POCT Glucose   Result Value Ref Range    POC Glucose 99 70 - 99 mg/dl    Performed on ACCU-CHEK    EKG 12 Lead   Result Value Ref Range    Ventricular Rate 96 BPM    Atrial Rate 96 BPM    P-R Interval 248 ms    QRS Duration 84 ms    Q-T Interval 366 ms    QTc Calculation (Bazett) 462 ms    P Axis 61 degrees    R Axis 10 degrees    T Axis -12 degrees    Diagnosis       Sinus rhythm with 1st degree A-V block with occasional Premature atrial complexes with Aberrant conductionLow voltage in the limb leadsCannot rule out Septal infarct (cited on or before 19-FEB-2022)Abnormal ECGWhen compared with ECG of 07-FEB-2022 07:35,Premature atrial complexes are now PresentQT has lengthenedConfirmed by NIRANJAN GUNN MD [id number]) on 2/21/2022 5:38:30 PM   EKG 12 Lead   Result Value Ref Range    Ventricular Rate 107 BPM    Atrial Rate 100 BPM    QRS Duration 94 ms    Q-T Interval 350 ms    QTc Calculation (Bazett) 467 ms    R Axis -42 degrees    T Axis 98 degrees    Diagnosis       Atrial fibrillation with rapid ventricular responseLeft axis deviationNonspecific T wave abnormalityAbnormal ECGWhen compared with ECG of 19-FEB-2022 14:36,Atrial fibrillation has replaced Sinus rhythmQRS axis Shifted leftConfirmed by TILA Bacon ([id number]) on 2/23/2022 4:48:04 PM   TYPE AND SCREEN   Result Value Ref Range    ABO/Rh O POS     Antibody Screen NEG    PREPARE RBC (CROSSMATCH), 1 Units   Result Value Ref Range    Product Code Blood Bank [id number]     Description Blood Bank Red Blood Cells, Leuko-reduced     Unit Number [id number]     Dispense Status Blood Bank transfused     Product Code Blood Bank [id number]     Description Blood Bank Red Blood Cells, Apheresis, Leuko-reduced     Unit Number [id number]     Dispense Status Blood Bank transfused    Emergency Issue of Products   Result Value Ref Range    Emergency Issue Blood Products Signed Form SEE IMAGE    TYPE AND SCREEN   Result Value Ref Range    ABO/Rh O POS     Antibody Screen NEG    PREPARE RBC (CROSSMATCH), 2 Units   Result Value Ref Range    Product Code Blood Bank [id number]     Description Blood Bank Red Blood Cells, Apheresis, Leuko-reduced     Unit Number [id number]     Dispense Status Blood Bank transfused     Product Code Blood Bank C5193X77     Description Blood Bank Red Blood Cells, Apheresis, Leuko-reduced     Unit Number [id number]     Dispense Status Blood Bank transfused     Product Code Blood Bank I0190B27     Description Blood Bank Red Blood Cells, Apheresis, Leuko-reduced     Unit Number [id number]     Dispense Status Blood Bank transfused     Product Code Blood Bank [id number]     Description Blood Bank Red Blood Cells, Apheresis, Leuko-reduced     Unit Number [id number]     Dispense Status Blood Bank transfused    PREPARE RBC (CROSSMATCH), 2 Units   Result Value Ref Range    Product Code Blood Bank K6944O29     Description Blood Bank Red Blood Cells, Apheresis, Leuko-reduced     Unit Number [id number]     Dispense Status Blood Bank transfused     Product Code Blood Bank G6673E84     Description Blood Bank Red Blood Cells, Apheresis, Leuko-reduced     Unit Number [id number]     Dispense Status Blood Bank transfused    PREPARE RBC (CROSSMATCH), 2 Units   Result Value Ref Range    Product Code Blood Bank W4984P75     Description Blood Bank Red Blood Cells, Apheresis, Leuko-reduced     Unit Number [id number]     Dispense Status Blood Bank released     Product Code Blood Bank L3428W05     Description Blood Bank Red Blood Cells, Apheresis, Leuko-reduced     Unit Number [id number]     Dispense Status Blood Bank transfused    PREPARE PLATELETS, 2 Product   Result Value Ref Range    Product Code Blood Bank G1093R14     Description Blood Bank      Unit Number [id number]     Dispense Status Blood Bank transfused     Product Code Blood Bank N3441B54     Description Blood Bank      Unit Number [id number]     Dispense Status Blood Bank transfused    PREPARE FRESH FROZEN PLASMA, 2 Units   Result Value Ref Range    Product Code Blood Bank A0460V17     Description Blood Bank Plasma, 5 Day, Thawed     Unit Number [id number]     Dispense Status Blood Bank transfused     Product Code Blood Bank Y1016E80     Description Blood Bank Fresh Frozen Plasma, Thawed     Unit Number [id number]     Dispense Status Blood Bank transfused        Diet:  ADULT DIET; Easy to Chew; Low Potassium (Less than 3000 mg/day)  ADULT ORAL NUTRITION SUPPLEMENT; Lunch, Dinner; Frozen Oral Supplement    Activity:  Activity as tolerated (Patient may move about with assist as indicated or with supervision.)    Follow-up:  in the next few days with Mavis Tate,  Follow up with vascular for av fistula    Disposition: SNF    Condition: Stable      Time Spent: 35 minutes    Electronically signed by Rey Brown MD on 2/25/2022 at 10:13 AM    Discharging Hospitalist

## 2022-02-25 NOTE — PROGRESS NOTES
VASCULAR    Discharge planned for today. Pt should follow up with me as an outpt in 2-4 to discuss and plan creation L brachiocephalic AVF. Please make note of this on discharge planning.     Paula Dominguez

## 2022-02-25 NOTE — PROGRESS NOTES
4 Eyes Skin Assessment     NAME:  Gerard Galindo OF BIRTH:  1945  MEDICAL RECORD NUMBER:  [id number]    The patient is being assess for  Shift Handoff    I agree that 2 RN's have performed a thorough Head to Toe Skin Assessment on the patient. ALL assessment sites listed below have been assessed. Areas assessed by both nurses:    Head, Face, Ears, Arms, Elbows, Hands and Legs. Feet and Heels        Does the Patient have a Wound?  No noted wound(s)       [place] Prevention initiated:  Yes   Wound Care Orders initiated:  NA    Pressure Injury (Stage 3,4, Unstageable, DTI, NWPT, and Complex wounds) if present place consult order under [de-identified] NA    New and Established Ostomies if present place consult order under : NA      Nurse 1 eSignature: Electronically signed by Kandi Poep RN on 2/25/22 at 6:39 AM EST    **SHARE this note so that the co-signing nurse is able to place an eSignature**    Nurse 2 eSignature: Electronically signed by Miriam Massey RN on 2/25/22 at 8:20 AM EST

## 2022-02-25 NOTE — CARE COORDINATION
[facility] PRE-CERT REQUEST SUBMITTED VIA [facility] ACCESS PORTAL    REQUESTED SETTING:   [facility]    ANTICIPATED ADMIT DATE TO SNF:  2/25/2022    [facility] #:  [id number]    Eliazar Loaiza Sr.  Administrative Assist, Case Management  [phone]  Electronically signed by Eliazar Loaiza on 2/25/2022 at 12:42 PM

## 2022-02-25 NOTE — PROGRESS NOTES
1627 Pt bladder scanned and showed 462ml urine. Message sent to Dr Maryanne Howard and rec'd order for one time straight cath. 1630 Pt refusing straight cath and stating he needs to relax and he will be able to urinate. Pt repositioned in chair with 2 RN assist. New external taylor placed and hooked up to suction. 1645 Pt still has not made any urine. Pt on telephone with family member. This RN explaining to patient the benefit of a straight cath. Pt still believing he will be able to relax and urinate on his own.    1646 Call placed to [facility] to Call report. RN will call back. 5 Pt family member called and Talon Becker RN who is aware of above situation spoke to family member. Family member was going to attempt to talk pt into agreeing to a straight cath. [address] arrived. Report given to transport team.     [phone] RN from [facility] called to take report- will call back. Getting patient settled for transfer. 1732 Report called to Deborah at [facility]. Discussed pts refusal for straight cath. Deborah aware that pt's bladder scan showed 462ml of urine.      Electronically signed by Shaina Araujo RN on 2/25/2022 at 5:58 PM

## 2022-02-25 NOTE — PLAN OF CARE
Problem: Infection:  Goal: Will remain free from infection  Description: Will remain free from infection  2/25/2022 0051 by Nenita Galdamez RN  Outcome: Ongoing  2/24/2022 1207 by Ric Rosario RN  Outcome: Ongoing     Problem: Safety:  Goal: Free from accidental physical injury  Description: Free from accidental physical injury  2/25/2022 0051 by Nenita Galdamez RN  Outcome: Ongoing  2/24/2022 1207 by Ric Rosario RN  Outcome: Ongoing  Goal: Free from intentional harm  Description: Free from intentional harm  2/25/2022 0051 by Nenita Galdamez RN  Outcome: Ongoing  2/24/2022 1207 by Ric Rosario RN  Outcome: Ongoing     Problem: Daily Care:  Goal: Daily care needs are met  Description: Daily care needs are met  2/25/2022 0051 by Nenita Galdamez RN  Outcome: Ongoing  2/24/2022 1207 by Ric Rosario RN  Outcome: Ongoing     Problem: Pain:  Goal: Patient's pain/discomfort is manageable  Description: Patient's pain/discomfort is manageable  2/25/2022 0051 by Nenita Galdamez RN  Outcome: Ongoing  2/24/2022 1207 by Ric Rosario RN  Outcome: Ongoing  Note: Patient was monitor for pain during this shift. Numeric scale was used. No pain med was given during this shift.      Problem: Skin Integrity:  Goal: Skin integrity will stabilize  Description: Skin integrity will stabilize  2/25/2022 0051 by Nenita Galdamez RN  Outcome: Ongoing  2/24/2022 1207 by Ric Rosario RN  Outcome: Ongoing     Problem: Discharge Planning:  Goal: Patients continuum of care needs are met  Description: Patients continuum of care needs are met  2/25/2022 0051 by Nenita Galdamez RN  Outcome: Ongoing  2/24/2022 1207 by Ric Rosario RN  Outcome: Ongoing     Problem: Bleeding:  Goal: Will show no signs and symptoms of excessive bleeding  Description: Will show no signs and symptoms of excessive bleeding  2/25/2022 0051 by Nenita Galdamez RN  Outcome: Ongoing  2/24/2022 1207 by Ric Rosario RN  Outcome: Ongoing     Problem: Nutrition  Goal: Optimal nutrition therapy  2/25/2022 0051 by Pedro Luis Tucker RN  Outcome: Ongoing  2/24/2022 1207 by Charly Olivares RN  Outcome: Ongoing  Note: Patient with low appetite during this shift.

## 2022-02-25 NOTE — CARE COORDINATION
[facility] Authorization Received via [employer] Portal:    Bhavana Kojo ID:  [id number]    Service:  SNF at [address] [facility] Dates:  2/25/2022  To  3/1/2022    Next Review Date:  3/1/2022    Informed Sampson Jackson in admissions with [facility] regarding precert being completed.      Electronically signed by HOLLI Bates, MAINE, Case Management on 2/25/2022 at 4:28 PM  [place] [phone]

## 2022-02-25 NOTE — PROGRESS NOTES
[facility]                                               Progress note    CC: hematochezia, ESRD  Summary:   Carter Marie is being seen by nephrology for ESRD. He is a 77 y.o. male with a PMH significant for ESRD on HD TTS, colon cancer, T2DM, hypertension at baseline who was just discharged after admission 2/7/2022-2/10/2022 for a rectal bleed. He now presented back to the hospital 2/12/2022 with the same complaint. Bleeding started after resuming plavix. Interval History  Seen at bedside this AM   Having no complaints  Plans for discharge  He is off oxygen and feeling well  Daughter at bedside. /70  On room air    Labs reviewed. Hgb 7.9    Plan:   -  No plans for HD today. - if he discharges today then HD tomorrow at [facility] dialysis unit. I called them to let them to expect him tomorrow. - dry weight 103 kilos. - has TDC for access now and will need follow up with vascular about definitive access. - he is on eliquis. Jose Luis Barboza MD  [facility]  Office: [phone]    Assessment:   ESRD  TTS at [facility] , has not started there yet. Is originally from [facility] but staying in [place] for rehab so dialyzing with us for now. He has a left AV fistula. No bruit.      Electrolytes  No acute issues. Lab Results   Component Value Date     (L) 02/25/2022    K 3.9 02/25/2022     02/25/2022    CO2 26 02/25/2022          Hypotension  Resolved. Off pressors.        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 On renvela 800 mg TID   Lab Results   Component Value Date    .5 (H) 02/21/2022    CALCIUM 7.8 (L) 02/25/2022    PHOS 2.4 (L) 02/25/2022          GI bleed  Has known colon cancer  GI consulted. #Anemia  On retacrit 20 k units TIW   Also GIB contributing.    Last ferritin 1359 so not on IV iron  Repeat ferritin   Last tsat 19%  Lab Results   Component Value Date    FERRITIN 1,183.0 (H) 02/24/2022       #PE  On heparin gtt     ROS:     Positives Listed Bold. All other remaining systems are negative. Constitutional:  fever, chills, weakness, weight change, fatigue,      Skin:  rash, pruritus, hair loss, bruising, dry skin, petechiae. Head, Face, Neck   headaches, swelling,  cervical adenopathy. Respiratory: shortness of breath, cough, or wheezing  Cardiovascular: chest pain, palpitations, dizzy, edema  Gastrointestinal: nausea, vomiting, diarrhea, constipation,belly pain    Yellow skin, blood in stool  Musculoskeletal:  back pain, muscle weakness, gait problems,       joint pain or swelling. Genitourinary:  dysuria, poor urine flow, flank pain, blood in urine  Neurologic:  vertigo, TIA'S, syncope, seizures, focal weakness  Psychosocial:  insomnia, anxiety, or depression. Additional positive findings: -     PMH:   Past medical history, surgical history, social history, family history are reviewed and updated as appropriate. Reviewed current medication list.   Allergies reviewed and updated as needed. PE:   Vitals:    02/25/22 1300   BP: 117/70   Pulse: 101   Resp: 14   Temp:    SpO2: 99%       General appearance: AOx3 in no acute distress, comfortable, communicative, awake and alert. HEENT: no icterus, EOM intact, trachea midline. Neck : no masses, appears symmetrical   Respiratory: Respiratory effort normal, bilateral equal chest rise. No wheeze, no crackles   Cardiovascular: Ausculation shows RRR and  no edema   Abdomen: abdomen is soft, non distended, no masses, no pain with palpation. Musculoskeletal:  no joint swelling, no deformity, strength grossly normal.   Skin: no rashes,  no jaundice   Neuro:   Follows commands, moves all extremities spontaneously     AVF no thrill or bruit  Right TDC now      Lab Results   Component Value Date    CREATININE 3.0 (H) 02/25/2022    BUN 17 02/25/2022     (L) 02/25/2022    K 3.9 02/25/2022     02/25/2022    CO2 26 02/25/2022      Lab Results   Component Value Date    WBC 3.3 (L) 02/25/2022 HGB 7.9 (L) 02/25/2022    HCT 23.9 (L) 02/25/2022    MCV 90.8 02/25/2022     02/25/2022     Lab Results   Component Value Date    .5 (H) 02/21/2022    CALCIUM 7.8 (L) 02/25/2022    PHOS 2.4 (L) 02/25/2022

## 2022-02-25 NOTE — CARE COORDINATION
DISCHARGE SUMMARY     DATE OF DISCHARGE: 2/25/2022    DISCHARGE DESTINATION: skilled unit    Discharging to Facility/ Agency   · Name: [facility]  · Address:  [address], [place], [address]   · Phone:  [phone]  · Fax:  [address] Medicare pending     COVID RESULT: not needed    TRANSPORTATION:     Company Name:  [employer] up Time: 515pm    Phone Number: [phone]      COMMENTS: Spoke with patient and daughter Dante Ho present in room regarding discharge at 515pm. Presented patient and daughter with IMM letter. They are agreeable to discharge today. Patient's spouse happened to be on speaker phone while in room as well. Left message for Nikolay Roberts at [facility] regarding discharge today at 515pm. Nikolay Roberts returned call--she reported patient will need [employer] precert to return skilled level of care. She is aware of the 515pm  time. She is also aware patient will need to resume HD tomorrow at 1115am.   Spoke with [facility] regarding patient's discharge today and need to resume his outpatient HD schedule; faxed patient's HD runs and last nephrology note to [phone]. Rn aware. Report number [phone].     Electronically signed by HOLLI Bates, MAINE, Case Management on 2/25/2022 at 12:20 PM  [place] [phone]

## 2022-02-28 ENCOUNTER — APPOINTMENT (OUTPATIENT)
Dept: CT IMAGING | Age: 77
End: 2022-02-28
Payer: MEDICARE

## 2022-02-28 ENCOUNTER — HOSPITAL ENCOUNTER (EMERGENCY)
Age: 77
Discharge: HOME | End: 2022-03-01
Payer: MEDICARE

## 2022-02-28 VITALS
OXYGEN SATURATION: 95 % | HEART RATE: 59 BPM | TEMPERATURE: 97.7 F | SYSTOLIC BLOOD PRESSURE: 107 MMHG | DIASTOLIC BLOOD PRESSURE: 64 MMHG | RESPIRATION RATE: 19 BRPM

## 2022-02-28 DIAGNOSIS — K62.5 RECTAL BLEEDING: Primary | ICD-10-CM

## 2022-02-28 LAB
A/G RATIO: 1.1 (ref 1.1–2.2)
ALBUMIN SERPL-MCNC: 2.8 G/DL (ref 3.4–5)
ALP BLD-CCNC: 81 U/L (ref 40–129)
ALT SERPL-CCNC: <5 U/L (ref 10–40)
ANION GAP SERPL CALCULATED.3IONS-SCNC: 10 MMOL/L (ref 3–16)
AST SERPL-CCNC: 13 U/L (ref 15–37)
BASOPHILS ABSOLUTE: 0 K/UL (ref 0–0.2)
BASOPHILS RELATIVE PERCENT: 0.5 %
BILIRUB SERPL-MCNC: 0.4 MG/DL (ref 0–1)
BUN BLDV-MCNC: 28 MG/DL (ref 7–20)
CALCIUM SERPL-MCNC: 7.8 MG/DL (ref 8.3–10.6)
CHLORIDE BLD-SCNC: 100 MMOL/L (ref 99–110)
CO2: 29 MMOL/L (ref 21–32)
CREAT SERPL-MCNC: 4.6 MG/DL (ref 0.8–1.3)
EOSINOPHILS ABSOLUTE: 0.1 K/UL (ref 0–0.6)
EOSINOPHILS RELATIVE PERCENT: 3.1 %
GFR AFRICAN AMERICAN: 15
GFR NON-AFRICAN AMERICAN: 12
GLUCOSE BLD-MCNC: 157 MG/DL (ref 70–99)
HCT VFR BLD CALC: 25.1 % (ref 40.5–52.5)
HEMOGLOBIN: 8 G/DL (ref 13.5–17.5)
INR BLD: 2.11 (ref 0.88–1.12)
LIPASE: 18 U/L (ref 13–60)
LYMPHOCYTES ABSOLUTE: 0.7 K/UL (ref 1–5.1)
LYMPHOCYTES RELATIVE PERCENT: 19.1 %
MCH RBC QN AUTO: 29.6 PG (ref 26–34)
MCHC RBC AUTO-ENTMCNC: 32.1 G/DL (ref 31–36)
MCV RBC AUTO: 92.4 FL (ref 80–100)
MONOCYTES ABSOLUTE: 0.2 K/UL (ref 0–1.3)
MONOCYTES RELATIVE PERCENT: 6.4 %
NEUTROPHILS ABSOLUTE: 2.7 K/UL (ref 1.7–7.7)
NEUTROPHILS RELATIVE PERCENT: 70.9 %
PDW BLD-RTO: 15.7 % (ref 12.4–15.4)
PLATELET # BLD: 197 K/UL (ref 135–450)
PMV BLD AUTO: 7.9 FL (ref 5–10.5)
POTASSIUM REFLEX MAGNESIUM: 3.7 MMOL/L (ref 3.5–5.1)
PROTHROMBIN TIME: 24.6 SEC (ref 9.9–12.7)
RBC # BLD: 2.72 M/UL (ref 4.2–5.9)
SODIUM BLD-SCNC: 139 MMOL/L (ref 136–145)
TOTAL PROTEIN: 5.4 G/DL (ref 6.4–8.2)
WBC # BLD: 3.8 K/UL (ref 4–11)

## 2022-02-28 PROCEDURE — 36415 COLL VENOUS BLD VENIPUNCTURE: CPT

## 2022-02-28 PROCEDURE — 80053 COMPREHEN METABOLIC PANEL: CPT

## 2022-02-28 PROCEDURE — 85025 COMPLETE CBC W/AUTO DIFF WBC: CPT

## 2022-02-28 PROCEDURE — 83690 ASSAY OF LIPASE: CPT

## 2022-02-28 PROCEDURE — 85610 PROTHROMBIN TIME: CPT

## 2022-02-28 PROCEDURE — 81001 URINALYSIS AUTO W/SCOPE: CPT

## 2022-02-28 PROCEDURE — 6360000004 HC RX CONTRAST MEDICATION: Performed by: NURSE PRACTITIONER

## 2022-02-28 PROCEDURE — 74175 CTA ABDOMEN W/CONTRAST: CPT

## 2022-02-28 PROCEDURE — 2580000003 HC RX 258: Performed by: NURSE PRACTITIONER

## 2022-02-28 PROCEDURE — 99284 EMERGENCY DEPT VISIT MOD MDM: CPT

## 2022-02-28 RX ORDER — SODIUM CHLORIDE, SODIUM LACTATE, POTASSIUM CHLORIDE, AND CALCIUM CHLORIDE .6; .31; .03; .02 G/100ML; G/100ML; G/100ML; G/100ML
1000 INJECTION, SOLUTION INTRAVENOUS ONCE
Status: COMPLETED | OUTPATIENT
Start: 2022-02-28 | End: 2022-03-01

## 2022-02-28 RX ADMIN — SODIUM CHLORIDE, POTASSIUM CHLORIDE, SODIUM LACTATE AND CALCIUM CHLORIDE 1000 ML: 600; 310; 30; 20 INJECTION, SOLUTION INTRAVENOUS at 23:43

## 2022-02-28 ASSESSMENT — ENCOUNTER SYMPTOMS
COUGH: 0
COLOR CHANGE: 0
SHORTNESS OF BREATH: 0
VOMITING: 0
BACK PAIN: 0
NAUSEA: 0
DIARRHEA: 0
ABDOMINAL PAIN: 0
WHEEZING: 0
RECTAL PAIN: 1
BLOOD IN STOOL: 1

## 2022-03-01 LAB
BILIRUBIN URINE: ABNORMAL
BLOOD, URINE: NEGATIVE
CLARITY: CLEAR
COLOR: ABNORMAL
EPITHELIAL CELLS, UA: 3 /HPF (ref 0–5)
GLUCOSE URINE: 100 MG/DL
HYALINE CASTS: NORMAL /LPF (ref 0–2)
KETONES, URINE: ABNORMAL MG/DL
LEUKOCYTE ESTERASE, URINE: NEGATIVE
MICROSCOPIC EXAMINATION: YES
NITRITE, URINE: NEGATIVE
PH UA: 6 (ref 5–8)
PROTEIN UA: 100 MG/DL
RBC UA: 2 /HPF (ref 0–4)
SPECIFIC GRAVITY UA: 1.02 (ref 1–1.03)
URINE REFLEX TO CULTURE: ABNORMAL
URINE TYPE: ABNORMAL
UROBILINOGEN, URINE: 1 E.U./DL
WBC UA: 3 /HPF (ref 0–5)

## 2022-03-01 PROCEDURE — 6360000004 HC RX CONTRAST MEDICATION: Performed by: NURSE PRACTITIONER

## 2022-03-01 PROCEDURE — 6370000000 HC RX 637 (ALT 250 FOR IP): Performed by: NURSE PRACTITIONER

## 2022-03-01 RX ORDER — DICYCLOMINE HYDROCHLORIDE 10 MG/1
10 CAPSULE ORAL EVERY 6 HOURS PRN
Qty: 20 CAPSULE | Refills: 1 | Status: ON HOLD | OUTPATIENT
Start: 2022-03-01 | End: 2022-05-12 | Stop reason: ALTCHOICE

## 2022-03-01 RX ORDER — ONDANSETRON 4 MG/1
4 TABLET, ORALLY DISINTEGRATING ORAL EVERY 8 HOURS PRN
Qty: 20 TABLET | Refills: 0 | Status: ON HOLD | OUTPATIENT
Start: 2022-03-01 | End: 2022-05-12 | Stop reason: ALTCHOICE

## 2022-03-01 RX ORDER — HYDROCODONE BITARTRATE AND ACETAMINOPHEN 5; 325 MG/1; MG/1
1 TABLET ORAL ONCE
Status: COMPLETED | OUTPATIENT
Start: 2022-03-01 | End: 2022-03-01

## 2022-03-01 RX ORDER — DOCUSATE SODIUM 100 MG/1
100 CAPSULE, LIQUID FILLED ORAL 2 TIMES DAILY
Qty: 60 CAPSULE | Refills: 1 | Status: SHIPPED | OUTPATIENT
Start: 2022-03-01 | End: 2022-03-31

## 2022-03-01 RX ADMIN — HYDROCODONE BITARTRATE AND ACETAMINOPHEN 1 TABLET: 5; 325 TABLET ORAL at 02:58

## 2022-03-01 RX ADMIN — IOPAMIDOL 75 ML: 755 INJECTION, SOLUTION INTRAVENOUS at 00:11

## 2022-03-01 ASSESSMENT — PAIN SCALES - GENERAL: PAINLEVEL_OUTOF10: 9

## 2022-03-01 NOTE — ED TRIAGE NOTES
Pt was brought in by ambulance from a nursing home. Patient has been having blood stained stool according to nursing facility. Patient denies any pain.

## 2022-03-01 NOTE — ED PROVIDER NOTES
**ADVANCED PRACTICE PROVIDER, I HAVE EVALUATED THIS PATIENT**        [address] [facility] ENCOUNTER      Pt Name: Cristopher Panda  HLH:[id number]  Vandagfadan 1945  Date of evaluation: 2/28/2022  Provider: IRINA Shultz CNP      Chief Complaint:    Chief Complaint   Patient presents with    Rectal Bleeding     blood in stool         Nursing Notes, Past Medical Hx, Past Surgical Hx, Social Hx, Allergies, and Family Hx were all reviewed and agreed with or any disagreements were addressed in the HPI.    HPI: (Location, Duration, Timing, Severity, Quality, Assoc Sx, Context, Modifying factors)    Chief Complaint of rectal bleeding    This is a  77 y.o. 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 Additionally he has been on and off the blood thinners as he has end-stage renal disease on hemodialysis but his left arm fistula no longer works and he now has a right subclavian Vas-Cath. He is complaining of some mild rectal discomfort however no severe abdominal pain, nausea, vomiting. No chest pain pleuritic chest pain or shortness of breath. He was recently diagnosed with COVID-19 earlier this month however, he is denying any symptoms of this. He denies any complaints on exam besides the rectal discomfort, he states it feels like a pressure sensation rates it a 3 out of 10. He denies any additional complaints, no additional aggravating relieving factors.   Patient presents awake, alert and in no acute distress or toxic appearance    PastMedical/Surgical History:      Diagnosis Date    Arthritis     Cancer [facility])     Colon Cancer diagnosed last month    Diabetes mellitus ([facility] [place] [address]     Hemodialysis patient [facility])     Hypertension          Procedure Laterality Date    COLECTOMY N/A 01/26/2022    SIGMOID COLECTOMY, SIGMOIDOSCOPY performed by Neftali Liang MD at [address]  02/19/2022    IR EMBOLIZATION HEMORRHAGE 2/19/2022 [facility] PROCED[facility]    IR PERC ARTERIOVENOUS FISTULA CREATION Left     unsure of date    IR TUNNELED CATHETER PLACEMENT GREATER THAN 5 YEARS  2/21/2022    IR TUNNELED CATHETER PLACEMENT GREATER THAN 5 YEARS 2/21/2022 [facility] PROCED[facility]    SIGMOIDOSCOPY N/A 02/17/2022    SIGMOIDOSCOPY CONTROL HEMORRHAGE performed by Savanna Swan MD at [address] Right 02/21/2022    Permacath; RIJ access; 23cm; Dr. Jaci Espino       Medications:  Previous Medications    AMIODARONE (CORDARONE) 200 MG TABLET    Take 1 tablet by mouth 2 times daily for 8 doses    APIXABAN (ELIQUIS) 5 MG TABS TABLET    Take 2 tablets by mouth 2 times daily for 10 doses    APIXABAN (ELIQUIS) 5 MG TABS TABLET    Take 1 tablet by mouth 2 times daily    ASPIRIN 81 MG CHEWABLE TABLET    Take 1 tablet by mouth daily    ATENOLOL (TENORMIN) 25 MG TABLET    Take 25 mg by mouth every evening    ATORVASTATIN (LIPITOR) 10 MG TABLET    Take 10 mg by mouth nightly    CALCITRIOL (ROCALTROL) 0.25 MCG CAPSULE    Take 0.25 mcg by mouth every evening    GABAPENTIN (NEURONTIN) 100 MG CAPSULE    Take 100 mg by mouth 3 times daily. MELATONIN 3 MG TABS TABLET    Take 2 tablets by mouth nightly as needed (sleep)    PANTOPRAZOLE (PROTONIX) 20 MG TABLET    Take 20 mg by mouth nightly    POLYETHYLENE GLYCOL (GLYCOLAX) 17 G PACKET    Take 17 g by mouth daily as needed for Constipation    SEVELAMER (RENVELA) 800 MG TABLET    Take 1 tablet by mouth 3 times daily (with meals)    TAMSULOSIN (FLOMAX) 0.4 MG CAPSULE    Take 0.4 mg by mouth every morning         Review of Systems:  (2-9 systems needed)  Review of Systems   Constitutional: Negative for chills and fever. HENT: Negative for congestion. Respiratory: Negative for cough, shortness of breath and wheezing. Cardiovascular: Negative for chest pain. Gastrointestinal: Positive for blood in stool and rectal pain. Negative for abdominal pain, diarrhea, nausea and vomiting. Patient complains of rectal bleeding, patient was recently in the hospital for similar symptoms, has a history of colon cancer that had a colon resection, however recently in the past week he had to have a pseudoaneurysm of his rectal artery repaired after he came in with rectal bleeding then. Genitourinary: Negative for difficulty urinating, dysuria, frequency and hematuria. Patient has end-stage renal disease, is on hemodialysis, has a left arm AV fistula that no longer works, has a right subclavian Vas-Cath for dialysis   Musculoskeletal: Negative for back pain. Skin: Negative for color change. Neurological: Negative for weakness, numbness and headaches. \"Positives and Pertinent negatives as per HPI\"    Physical Exam:  Physical Exam  Vitals and nursing note reviewed. Constitutional:       Appearance: He is well-developed. He is not diaphoretic. HENT:      Head: Normocephalic. Right Ear: External ear normal.      Left Ear: External ear normal.   Eyes:      General: No scleral icterus. Right eye: No discharge. Left eye: No discharge. Cardiovascular:      Rate and Rhythm: Normal rate. Comments: Normal S1 and 2, peripheral pulses are 2+, no edema observed  Pulmonary:      Effort: Pulmonary effort is normal. No respiratory distress. Comments: Lungs are clear anteriorly, diminished posteriorly, he is not tachypneic or dyspneic, saturations 93% on room  Abdominal:      Palpations: Abdomen is soft. Tenderness: There is no abdominal tenderness. Comments: Abdomen is generally soft and nondistended.   Bowel sounds are hypoactive, patient has a healed lower abdominal surgical incision site that is well approximated, there is no surrounding erythema or edema, no wound dehiscence. He has no ascites or rigidity. Genitourinary:     Comments: I did perform a rectal exam, no visible active bleeding, no visible hemorrhoid  Musculoskeletal:         General: Normal range of motion. Cervical back: Normal range of motion and neck supple. Skin:     General: Skin is warm. Capillary Refill: Capillary refill takes less than 2 seconds. Coloration: Skin is not pale. Neurological:      General: No focal deficit present. Mental Status: He is alert and oriented to person, place, and time. GCS: GCS eye subscore is 4. GCS verbal subscore is 5. GCS motor subscore is 6. Cranial Nerves: Cranial nerves are intact. Comments: Patient is awake, alert following commands correctly, neurologically intact no obvious deficits.    Psychiatric:         Behavior: Behavior normal.         MEDICAL DECISION MAKING    Vitals:    Vitals:    02/28/22 1921 02/28/22 2013   BP: (!) 92/52 107/64   Pulse: 62 59   Resp: 16 19   Temp: 97.7 °F (36.5 °C)    TempSrc: Oral    SpO2: 93% 95%       LABS:  Labs Reviewed   CBC WITH AUTO DIFFERENTIAL - Abnormal; Notable for the following components:       Result Value    WBC 3.8 (*)     RBC 2.72 (*)     Hemoglobin 8.0 (*)     Hematocrit 25.1 (*)     RDW 15.7 (*)     Lymphocytes Absolute 0.7 (*)     All other components within normal limits    Narrative:     Performed at:  [address] [address]   Phone [phone]   COMPREHENSIVE METABOLIC PANEL W/ REFLEX TO MG FOR LOW K - Abnormal; Notable for the following components:    Glucose 157 (*)     BUN 28 (*)     CREATININE 4.6 (*)     GFR Non- 12 (*)     GFR  15 (*)     Calcium 7.8 (*)     Total Protein 5.4 (*)     Albumin 2.8 (*)     ALT <5 (*)     AST 13 (*)     All other components within normal limits    Narrative: Performed at:  [facility]  [address], [place] [facility] [address]   Phone [phone]   URINALYSIS WITH REFLEX TO CULTURE - Abnormal; Notable for the following components:    Glucose, Ur 100 (*)     Bilirubin Urine SMALL (*)     Ketones, Urine TRACE (*)     Protein,  (*)     All other components within normal limits    Narrative:     Performed at:  [facility]  [address] [facility] [address]   Phone [phone]   PROTIME-INR - Abnormal; Notable for the following components:    Protime 24.6 (*)     INR 2.11 (*)     All other components within normal limits    Narrative:     Performed at:  [facility]  [address] [facility] [address]   Phone [phone]   LIPASE    Narrative:     Performed at:  [facility]  [address] [facility] [address]   Phone [phone]   MICROSCOPIC URINALYSIS    Narrative:     Performed at:  [facility]  [address] [facility] [address]   Phone [phone]   BLOOD OCCULT STOOL DIAGNOSTIC        Remainder of labs reviewed and were negative at this time or not returned at the time of this note. RADIOLOGY:   Non-plain film images such as CT, Ultrasound and MRI are read by the radiologist. Shama MAGANA, APRN - CNP have directly visualized the radiologic plain film image(s) with the below findings:      Interpretation per the Radiologist below, if available at the time of this note:    CTA [address]   Final Result   1. No focal contrast extravasation into the GI lumen is identified to   indicate a site of active bleeding. There is circumferential wall thickening   of the rectum and anal rectal junction. Correlate for proctitis. There is   no surrounding abscess or perforation with anastomotic sutures at the rectum.       2.  Previously seen pseudoaneurysm along the rectal anastomosis site is not   identified on the current exam.  There does appear to be increased metallic   foci in the region suggesting intervention and successful embolization. .      3.  The multiple cysts within the kidneys are redemonstrated. There is no   hydronephrosis or hydroureter. 4.  Part of the previously noted right lower lobe pulmonary emboli are still   visualized. No infarct or effusion at the lower lungs. Echo Limited    Result Date: 2/19/2022  Transthoracic Echocardiography Report (TTE)  Demographics   Patient Name       Meaghan Hollis   Date of Study      02/19/2022         Gender              Male   Patient Number     [id number]         Date of Birth       1945   Visit Number       [id number]          Age                 77 year(s)   Accession Number   [id number]         Room Number         2122   Corporate ID       [id number]            Sonographer         Emerson Soriano,                                                            [address] [facility]   Ordering Physician Priscila Arciniega,   Interpreting        [address] [facility].                     CNP                Physician           Leighton Lancaster MD  Procedure Type of Study   TTE procedure:ECHOCARDIOGRAM LIMITED. Procedure Date Date: 02/19/2022 Start: 09:25 AM Study Location: [facility] Technical Quality: Limited visualization due to poor acoustical window. Indications:Pulmonary embolus. Additional Indications:Hx of Colon Cancer DM HTN . Patient Status: Routine Height: 72 inches Weight: 211 pounds BSA: 2.18 m2 BMI: 28.62 kg/m2 Rhythm: Within normal limits HR: 83 bpm BP: 156/73 mmHg  Conclusions   Summary  Left ventricular cavity size is normal.  Ejection fraction is visually estimated to be 55-60%. There is moderate concentric left ventricular hypertrophy. No regional wall motion abnormalities are noted. Left atrium is of normal size. Tricuspid aortic valve.   Thickened aortic valve leaflets noted.  Normal right ventricular size and function. TAPSE= 1.7cm  Right Heart Strain= -10.2%  The right atrium is normal in size. There is a trivial pericardial effusion noted. Signature   ------------------------------------------------------------------  Electronically signed by Ankur Orellana MD (Interpreting  physician) on 02/19/2022 at 01:28 PM  ------------------------------------------------------------------   Findings   Left Ventricle  Left ventricular cavity size is normal.  There is moderate concentric left ventricular hypertrophy. Ejection fraction is visually estimated to be 55-60%. No regional wall motion abnormalities are noted. Mitral Valve  Thickened distal anterior mitral valve and posterior MAC (mitral annular  calcification) in some views. Left Atrium  Left atrium is of normal size. Aortic Valve  Tricuspid aortic valve. Thickened aortic valve leaflets noted. Right Ventricle  Normal right ventricular size and function. TAPSE= 1.7cm  Right Heart Strain= -10.2%   Tricuspid Valve  The tricuspid valve was not well visualized. Right Atrium  The right atrium is normal in size. Pericardial Effusion  There is a trivial pericardial effusion noted. Pleural Effusion  No pleural effusion. M-Mode/2D Measurements (cm)   LV Diastolic Dimension: 7.67 cm LV Systolic Dimension: 0.62 cm  LV Septum Diastolic: 7.39 cm  LV PW Diastolic: 2.70 cm  RV Diastolic Dimension: 3.4 cm        IR TUNNELED CVC PLACE WO SQ PORT/PUMP > 5 YEARS    Result Date: 2/21/2022  PROCEDURE: ULTRASOUND GUIDED VASCULAR ACCESS. FLUOROSCOPY GUIDED PLACEMENT OF A TUNNELED CATHETER. MODERATE CONSCIOUS SEDATION 2/21/2022. HISTORY: ORDERING SYSTEM PROVIDED HISTORY: HD Access TECHNOLOGIST PROVIDED HISTORY: IR Tunneled HD Catheter placement request Reason for exam:->HD Access How many lumens are being requested?->2 What side should this line be placed? ->Either What site is the preferred site? ->No preference SEDATION: Moderate review. MIP images are provided for review. Dose modulation, iterative reconstruction, and/or weight based adjustment of the mA/kV was utilized to reduce the radiation dose to as low as reasonably achievable. Additional three-dimensional reconstructed images were obtained on a dedicated workstation and reviewed. COMPARISON: Abdomen and pelvis CTA 02/12/2022 HISTORY: ORDERING SYSTEM PROVIDED HISTORY: gi bleed TECHNOLOGIST PROVIDED HISTORY: Reason for exam:->gi bleed Reason for Exam: GI bleed FINDINGS: Patient motion in some areas, partially limiting evaluation of those areas. VASCULAR Central filling defects consistent with emboli beginning at the bifurcation of the right lower lobe artery with extension into segmental and subsegmental branches. 0.7 cm x 0.6 cm suspected pseudoaneurysm along the left side of the below colonic anastomosis. No active contrast extravasation. Moderate to severe coronary and moderate systemic atherosclerosis. Mild stenosis in the proximal right renal artery, and mild to moderate stenosis at the origin of the inferior mesenteric artery. No intramural hematoma nor dissection. ABDOMEN LOWER CHEST:  Calcified granuloma in the left lower lobe. Minimal gravity dependent atelectasis in the bilateral lower lobes. Mild cardiomegaly. Ratio of right ventricular to left ventricular diameters of approximately 1.2:1.  Severe right ventricular hypertrophy. Moderate concentric left ventricular hypertrophy. Aortic valve annular calcifications. No pleural nor pericardial effusions. ORGANS:  Hyperdensity in the gallbladder lumen potentially due to biliary sludge or vicariously excreted intravenous contrast from the recent comparison study. No findings of cholecystitis. No intrahepatic nor extrahepatic biliary dilation. Mild to moderate pancreatic atrophy. Splenic granulomatous calcification. Mild bilateral renal atrophy.   Simple appearing bilateral renal cysts measuring up to 2.6 cm (Bosniak category 2). Normal liver and adrenal glands. GI/BOWEL:  Changes of partial sigmoid colectomy with primary anastomosis. Otherwise normal course and caliber of the stomach, small bowel, remnant colon, and rectum without obstruction. Normal appendix. Partially liquid stool in the colon and rectum. Minimal colonic diverticulosis. Persistent stranding adjacent to the anastomosis. PERITONEUM/RETROPERITONEUM:  No abdominal lymphadenopathy. No free intraperitoneal fluid nor gas. SOFT TISSUES/BONES:  At least minimal bilateral gynecomastia. Closed midline laparotomy incision. Diffuse bony demineralization. No acute fractures nor suspicious bony lesions. PELVIS GI/BOWEL:  As above. PELVIS:  Moderate prostatomegaly. Normal urinary bladder. PERITONEUM/RETROPERITONEUM:  No pelvic lymphadenopathy. No free intraperitoneal fluid nor gas. SOFT TISSUES/BONES:  No inguinal lymphadenopathy. Diffuse bony demineralization. No acute fractures nor suspicious bony lesions. 1. Lobar, segmental, and subsegmental pulmonary emboli in the right lower lobe with findings of right heart strain. Critical results were called by Dr. Lety Benson MD to Dr. Dwain Kerr on 2/19/2022 at 03:02. 2. 0.7 cm suspected pseudoaneurysm along the left side of the colonic anastomosis. Adjacent stranding may be related to the recent surgery but could also be seen with acute diverticulitis. There is no evident extravasation of contrast into the bowel lumen. 3. Partially liquid stool in the colon could be due to diarrhea with no findings of enterocolitis.        IR EMBOLIZATION HEMORRHAGE    Result Date: 2/20/2022  PROCEDURE: MESENTERIC ANGIOGRAM COIL EMBOLIZATION OF THE SUPERIOR RECTAL ARTERY AND PSEUDOANEURYSM MODERATE CONSCIOUS SEDATION 2/19/2022 HISTORY: ORDERING SYSTEM PROVIDED HISTORY: -IR embolization of pseudoaneurysm TECHNOLOGIST PROVIDED HISTORY: Reason for exam:->-IR embolization of pseudoaneurysm CONTRAST: 140 cc visipaque SEDATION: 1.5 mgversed and 75 mcg fentanyl were titrated intravenously for moderate sedation monitored under my direction. Total intraservice time of sedation was 144 minutes. The patient's vital signs were monitored throughout the procedure and recorded in the patient's medical record by the nurse. FLUOROSCOPY DOSE AND TYPE OR TIME AND EXPOSURES: Fluoro time: 32.7 minutes Cumulative air kerma: 3307.33 mGy DESCRIPTION OF PROCEDURE: Informed consent was obtained after a detailed explanation of the procedure including risks, benefits, and alternatives. Universal protocol was observed. The right groin was sterilely prepared draped and anesthetized. A small skin incision was made and blunt dissection performed superficially. Using ultrasound guidance, a 21 gauge needle was used to access the right common femoral artery. A single image was obtained and stored in PACs documenting accurate access. An 018 wire was inserted through the needle and the arterial puncture site was dilated with a micro sheath. An 035 wire was then inserted through the sheath and the micro sheath was exchanged over the wire for a 5 Iranian sheath. The side port was intermittently flushed with sterile saline to maintain patency and prevent thrombosis. A 5 Iranian SOS catheter and wire were advanced into the abdominal aorta and the inferior mesenteric artery was selected. Angiogram was performed, which demonstrated a pseudoaneurysm in the region of the superior rectal artery. Then, a 2.4 Iranian microcatheter and 018 angled glidewire were utilized for super selection into the superior rectal artery. Multiple hand injections were performed, which better demonstrated the pseudoaneurysm. Digital subtraction angiograms were also performed from Nauruan and ELDRIDGE projections. Multiple coils were then deployed successfully in the superior rectal artery distal to the pseudoaneurysm.   However, it was demonstrated from an oblique projection that the pseudoaneurysm was originating from a side branch off the superior rectal artery. Therefore at this point the microcatheter and microwire were manipulated into the side branch in into the pseudoaneurysm. Multiple attempts were made to gain access across the pseudoaneurysm, which were unsuccessful. Therefore multiple coils were deployed within the pseudoaneurysm and more proximal to it in the side branch. Post coil embolization angiogram demonstrated no further flow within the pseudoaneurysm or superior rectal artery. The catheter and wire were then removed. A Mynx closure device was then used at the arterial puncture site and manual compression was held until there was hemostasis. The patient tolerated the procedure well. Estimated blood loss: Less than 15 cc     Successful coil embolization of the superior rectal artery and pseudoaneurysm from a branch of the superior rectal artery. MEDICAL DECISION MAKING / ED COURSE:    Because of high probability of sudden clinical deterioration of the patient's condition and risk of further deterioration, critical care time required my full attention to the patient's condition; which included chart data review, documentation, medication ordering, reviewing the patient's old records, reevaluation patient's cardiac, pulmonary and neurological status. Reevaluation of vital signs. Consultations with ED attending and admitting physician. Ordering, interpreting reviewing diagnostic testing. Therefore a critical care time was 22 minutes of direct attention to the patient's condition did not include time spent on procedures.     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 After evaluation and examination the patient I did evaluate the patient's chart. Patient was recently admitted to the hospital, on February 19, 2022, patient had a lobular segmental and subsegmental pulmonary emboli in the right lower lobe with right heart strain. Additionally on the CT scan of the abdomen, he a suspected pseudoaneurysm along the left side of the colonic anastomosis adjacent stranding related to recent surgery could also be seen with acute diverticulitis, there was no evidence of extravasation into the bowel. I did perform a rectal exam, no visible active bleeding, no visible hemorrhoid  Patient then had a interventional radiology appointment with a successful coil embolization of the superior rectal artery and pseudoaneurysm from a branch of the superior rectal artery. Patient has been on and off of the Eliquis because of the new diagnosis of atrial fibrillation, but then developed bleeding and pseudoaneurysm. However, today I ordered IV access, blood work, urinalysis, fluids and a CTA of the abdomen and pelvis. Urinalysis shows some ketonuria and glucosuria but no acute infection. CBC shows some chronic anemia with an H&H of 8 and 25.1 however this does turn to be normal for the patient. Metabolic panel is consistent with his end-stage renal disease on hemodialysis with a GFR of 12, creatinine of 4.6, BUN of 28 however the gap is closed, potassium and sodium are normal.  CT abdomen shows no focal contrast extravasation into the GI lumen identified to indicate active bleeding. There is circumferential wall thickening of the rectum concerning for proctitis.   There is no surrounding abscess or perforation within the anastomotic sutures at the bleeding        DISPOSITION Decision To Discharge 03/01/2022 02:32:31 AM      PATIENT REFERRED TO:  Kareem Saleh  [address] [facility] [address]  [phone]    Schedule an appointment as soon as possible for a visit in 2 days  Follow-up with your family doctor in the next 2 days for reevaluation    [facility]  [address] [facility]  [phone]  Go to   If symptoms worsen    Watson Bernheim, MD  [address]  [phone]    Schedule an appointment as soon as possible for a visit in 1 day  Call GI doctor tomorrow make an appointment to be seen in the next 2 days      DISCHARGE MEDICATIONS:  New Prescriptions    DICYCLOMINE (BENTYL) 10 MG CAPSULE    Take 1 capsule by mouth every 6 hours as needed (cramps)    DOCUSATE SODIUM (COLACE) 100 MG CAPSULE    Take 1 capsule by mouth 2 times daily    ONDANSETRON (ZOFRAN ODT) 4 MG DISINTEGRATING TABLET    Take 1 tablet by mouth every 8 hours as needed for Nausea       DISCONTINUED MEDICATIONS:  Discontinued Medications    No medications on file              (Please note the MDM and HPI sections of this note were completed with a voice recognition program.  Efforts were made to edit the dictations but occasionally words are mis-transcribed.)    Electronically signed, Sheryle Hammersmith, APRN - CNP,          Sheryle Hammersmith, APRN - CNP  03/01/22 5001       Sheryle Hammersmith, APRN - CNP  03/01/22 2749

## 2022-03-01 NOTE — ED NOTES
Failed to get IV access, waiting for patient of have an ultra sound guided IV.      Jd Hodges RN  02/28/22 2155

## 2022-03-08 ENCOUNTER — HOSPITAL ENCOUNTER (EMERGENCY)
Age: 77
Discharge: HOME | End: 2022-03-08
Attending: EMERGENCY MEDICINE
Payer: MEDICARE

## 2022-03-08 VITALS
WEIGHT: 224 LBS | BODY MASS INDEX: 30.38 KG/M2 | DIASTOLIC BLOOD PRESSURE: 63 MMHG | RESPIRATION RATE: 18 BRPM | TEMPERATURE: 98.5 F | HEART RATE: 66 BPM | SYSTOLIC BLOOD PRESSURE: 101 MMHG | OXYGEN SATURATION: 94 %

## 2022-03-08 DIAGNOSIS — E87.6 HYPOKALEMIA: ICD-10-CM

## 2022-03-08 DIAGNOSIS — D64.9 ANEMIA, UNSPECIFIED TYPE: Primary | ICD-10-CM

## 2022-03-08 DIAGNOSIS — Z99.2 ESRD (END STAGE RENAL DISEASE) ON DIALYSIS (HCC): ICD-10-CM

## 2022-03-08 DIAGNOSIS — N18.6 ESRD (END STAGE RENAL DISEASE) ON DIALYSIS (HCC): ICD-10-CM

## 2022-03-08 LAB
A/G RATIO: 1.2 (ref 1.1–2.2)
ABO/RH: NORMAL
ALBUMIN SERPL-MCNC: 3.3 G/DL (ref 3.4–5)
ALP BLD-CCNC: 80 U/L (ref 40–129)
ALT SERPL-CCNC: <5 U/L (ref 10–40)
ANION GAP SERPL CALCULATED.3IONS-SCNC: 11 MMOL/L (ref 3–16)
ANTIBODY SCREEN: NORMAL
APTT: 43.4 SEC (ref 26.2–38.6)
AST SERPL-CCNC: 11 U/L (ref 15–37)
BASOPHILS ABSOLUTE: 0 K/UL (ref 0–0.2)
BASOPHILS RELATIVE PERCENT: 0.6 %
BILIRUB SERPL-MCNC: 0.8 MG/DL (ref 0–1)
BUN BLDV-MCNC: 11 MG/DL (ref 7–20)
CALCIUM SERPL-MCNC: 7.9 MG/DL (ref 8.3–10.6)
CHLORIDE BLD-SCNC: 97 MMOL/L (ref 99–110)
CO2: 30 MMOL/L (ref 21–32)
CREAT SERPL-MCNC: 2.3 MG/DL (ref 0.8–1.3)
EOSINOPHILS ABSOLUTE: 0.1 K/UL (ref 0–0.6)
EOSINOPHILS RELATIVE PERCENT: 2.3 %
GFR AFRICAN AMERICAN: 34
GFR NON-AFRICAN AMERICAN: 28
GLUCOSE BLD-MCNC: 151 MG/DL (ref 70–99)
HCT VFR BLD CALC: 27 % (ref 40.5–52.5)
HEMOGLOBIN: 8.9 G/DL (ref 13.5–17.5)
INR BLD: 1.81 (ref 0.88–1.12)
LACTIC ACID: 1.6 MMOL/L (ref 0.4–2)
LYMPHOCYTES ABSOLUTE: 0.9 K/UL (ref 1–5.1)
LYMPHOCYTES RELATIVE PERCENT: 23.3 %
MAGNESIUM: 1.9 MG/DL (ref 1.8–2.4)
MCH RBC QN AUTO: 30.3 PG (ref 26–34)
MCHC RBC AUTO-ENTMCNC: 33 G/DL (ref 31–36)
MCV RBC AUTO: 91.9 FL (ref 80–100)
MONOCYTES ABSOLUTE: 0.2 K/UL (ref 0–1.3)
MONOCYTES RELATIVE PERCENT: 5 %
NEUTROPHILS ABSOLUTE: 2.6 K/UL (ref 1.7–7.7)
NEUTROPHILS RELATIVE PERCENT: 68.8 %
PDW BLD-RTO: 16.6 % (ref 12.4–15.4)
PLATELET # BLD: 185 K/UL (ref 135–450)
PMV BLD AUTO: 7.2 FL (ref 5–10.5)
POTASSIUM REFLEX MAGNESIUM: 3.3 MMOL/L (ref 3.5–5.1)
PROTHROMBIN TIME: 21 SEC (ref 9.9–12.7)
RBC # BLD: 2.94 M/UL (ref 4.2–5.9)
SODIUM BLD-SCNC: 138 MMOL/L (ref 136–145)
TOTAL PROTEIN: 6 G/DL (ref 6.4–8.2)
WBC # BLD: 3.8 K/UL (ref 4–11)

## 2022-03-08 PROCEDURE — 80053 COMPREHEN METABOLIC PANEL: CPT

## 2022-03-08 PROCEDURE — 83605 ASSAY OF LACTIC ACID: CPT

## 2022-03-08 PROCEDURE — 85730 THROMBOPLASTIN TIME PARTIAL: CPT

## 2022-03-08 PROCEDURE — 86900 BLOOD TYPING SEROLOGIC ABO: CPT

## 2022-03-08 PROCEDURE — 99284 EMERGENCY DEPT VISIT MOD MDM: CPT

## 2022-03-08 PROCEDURE — 83735 ASSAY OF MAGNESIUM: CPT

## 2022-03-08 PROCEDURE — 85025 COMPLETE CBC W/AUTO DIFF WBC: CPT

## 2022-03-08 PROCEDURE — 6370000000 HC RX 637 (ALT 250 FOR IP): Performed by: EMERGENCY MEDICINE

## 2022-03-08 PROCEDURE — 86850 RBC ANTIBODY SCREEN: CPT

## 2022-03-08 PROCEDURE — 85610 PROTHROMBIN TIME: CPT

## 2022-03-08 PROCEDURE — 86901 BLOOD TYPING SEROLOGIC RH(D): CPT

## 2022-03-08 PROCEDURE — 6370000000 HC RX 637 (ALT 250 FOR IP): Performed by: STUDENT IN AN ORGANIZED HEALTH CARE EDUCATION/TRAINING PROGRAM

## 2022-03-08 RX ORDER — LORAZEPAM 1 MG/1
1 TABLET ORAL ONCE
Status: COMPLETED | OUTPATIENT
Start: 2022-03-08 | End: 2022-03-08

## 2022-03-08 RX ORDER — POTASSIUM CHLORIDE 20 MEQ/1
20 TABLET, EXTENDED RELEASE ORAL ONCE
Status: COMPLETED | OUTPATIENT
Start: 2022-03-08 | End: 2022-03-08

## 2022-03-08 RX ADMIN — POTASSIUM CHLORIDE 20 MEQ: 20 TABLET, EXTENDED RELEASE ORAL at 18:51

## 2022-03-08 RX ADMIN — LORAZEPAM 1 MG: 1 TABLET ORAL at 20:22

## 2022-03-08 NOTE — ED PROVIDER NOTES
[address] EMERGENCY DEPARTMENT    CHIEF COMPLAINT  Fatigue (Was at dialysis and hgb was 6.4. Pt feels very weak)       HISTORY OF PRESENT ILLNESS  Dulce Waterman is a 77 y.o. male who presents to the ED with concern for anemia. Hemoglobin at dialysis was noted to be 6.4. Patient complains of mild nausea and lightheadedness, now resolved. He denies fever, chest pain, shortness of breath, vomiting, diarrhea. Of note, patient was discharged from this hospital 2/25/2022 after admission for gastrointestinal bleeding. He status post sigmoid resection/anastomosis and had recurrent hematochezia requiring embolization 2/19. Also history of end-stage renal disease and left lower extremity DVT and PE. He was ultimately discharged with anticoagulation continued (apixaban). Patient was seen in this emergency department 3/1/2022 with concern for rectal bleeding. At that time, he underwent CTA abdomen that showed no focal contrast extravasation. The previously seen pseudoaneurysm was not visualized, suggesting successful embolization. Patient reports having noticed blood in his stool on Monday, none since then. He intends to follow with gastroenterology in the next several days. No other complaints, modifying factors or associated symptoms.      I have reviewed the following from the nursing documentation:    Past Medical History:   Diagnosis Date    Arthritis     Cancer [facility])     Colon Cancer diagnosed last month    [facility]     Hemodialysis patient ([facility]     Hypertension      Past Surgical History:   Procedure Laterality Date    COLECTOMY N/A 01/26/2022    SIGMOID COLECTOMY, SIGMOIDOSCOPY performed by Stepan Yan MD at [facility]  02/19/2022    IR EMBOLIZATION HEMORRHAGE 2/19/2022 [facility]    IR PERC ARTERIOVENOUS FISTULA CREATION Left     unsure of date    IR TUNNELED CATHETER PLACEMENT GREATER THAN 5 YEARS  2/21/2022    IR TUNNELED CATHETER PLACEMENT GREATER THAN 5 YEARS 2/21/2022 [facility]    SIGMOIDOSCOPY N/A 02/17/2022    SIGMOIDOSCOPY CONTROL HEMORRHAGE performed by Fabian Horner MD at [address] 02/21/2022    Permacath; RIJ access; 23cm; Dr. Carolin Cazares     No family history on file. Social History     Socioeconomic History    Marital status:      Spouse name: Not on file    Number of children: Not on file    Years of education: Not on file    Highest education level: Not on file   Occupational History    Not on file   Tobacco Use    Smoking status: Former Smoker    Smokeless tobacco: Never Used   Vaping Use    Vaping Use: Never used   Substance and Sexual Activity    Alcohol use: Not Currently    Drug use: Never    Sexual activity: Not on file   Other Topics Concern    Not on file   Social History Narrative    Not on file     Social Determinants of Health     Financial Resource Strain:     Difficulty of Paying Living Expenses: Not on file   Food Insecurity:     Worried About [address] in the Last Year: Not on file    [address] in the Last Year: Not on file   Transportation Needs:     Lack of Transportation (Medical): Not on file    Lack of Transportation (Non-Medical):  Not on file   Physical Activity:     Days of Exercise per Week: Not on file    Minutes of Exercise per Session: Not on file   Stress:     Feeling of Stress : Not on file   Social Connections:     Frequency of Communication with Friends and Family: Not on file    Frequency of Social Gatherings with Friends and Family: Not on file    Attends Yazidism Services: Not on file    Active Member of Clubs or Organizations: Not on file    Attends Club or Organization Meetings: Not on file    Marital Status: Not on file   Intimate Partner Violence:     Fear of Current or Ex-Partner: Not on file    Emotionally Abused: Not on file    Physically Abused: Not on file  Sexually Abused: Not on file   Housing Stability:     Unable to Pay for Housing in the Last Year: Not on file    Number of Places Lived in the Last Year: Not on file    Unstable Housing in the Last Year: Not on file     Current Facility-Administered Medications   Medication Dose Route Frequency Provider Last Rate Last Admin    potassium chloride (KLOR-CON M) extended release tablet 20 mEq  20 mEq Oral Once Doug Hayes MD         Current Outpatient Medications   Medication Sig Dispense Refill    dicyclomine (BENTYL) 10 MG capsule Take 1 capsule by mouth every 6 hours as needed (cramps) 20 capsule 1    ondansetron (ZOFRAN ODT) 4 MG disintegrating tablet Take 1 tablet by mouth every 8 hours as needed for Nausea 20 tablet 0    docusate sodium (COLACE) 100 MG capsule Take 1 capsule by mouth 2 times daily 60 capsule 1    amiodarone (CORDARONE) 200 MG tablet Take 1 tablet by mouth 2 times daily for 8 doses 8 tablet 0    apixaban (ELIQUIS) 5 MG TABS tablet Take 2 tablets by mouth 2 times daily for 10 doses 60 tablet 0    apixaban (ELIQUIS) 5 MG TABS tablet Take 1 tablet by mouth 2 times daily 60 tablet 0    aspirin 81 MG chewable tablet Take 1 tablet by mouth daily 30 tablet 0    melatonin 3 MG TABS tablet Take 2 tablets by mouth nightly as needed (sleep) 6 tablet 0    tamsulosin (FLOMAX) 0.4 MG capsule Take 0.4 mg by mouth every morning      gabapentin (NEURONTIN) 100 MG capsule Take 100 mg by mouth 3 times daily.       sevelamer (RENVELA) 800 MG tablet Take 1 tablet by mouth 3 times daily (with meals)      atenolol (TENORMIN) 25 MG tablet Take 25 mg by mouth every evening      pantoprazole (PROTONIX) 20 MG tablet Take 20 mg by mouth nightly      atorvastatin (LIPITOR) 10 MG tablet Take 10 mg by mouth nightly      calcitRIOL (ROCALTROL) 0.25 MCG capsule Take 0.25 mcg by mouth every evening       Allergies   Allergen Reactions    Lisinopril      Allergy listed on paperwork from [facility], no reaction noted       REVIEW OF SYSTEMS  10 systems reviewed, pertinent positives and negatives per HPI, otherwise noted to be negative. PHYSICAL EXAM  ED Triage Vitals [03/08/22 1642]   BP Temp Temp Source Pulse Resp SpO2 Height Weight   110/74 97.7 °F (36.5 °C) Oral 88 18 94 % -- 224 lb (101.6 kg)     General appearance: Awake and alert. Cooperative. No acute distress. HENT: Normocephalic. Atraumatic. Mucous membranes are moist.  Neck: Supple. Eyes: PERRL. EOMI. +conjunctival pallor  Heart/Chest: RRR. No murmurs. R chest wall TDC without surrounding erythema or fluctuance. Lungs: Respirations unlabored. CTAB. Good air exchange. Speaking comfortably in full sentences. Abdomen: Soft. Non-tender. Non-distended. No rebound or guarding. Musculoskeletal: No extremity edema. No deformity. No tenderness in the extremities. All extremities neurovascularly intact. Skin: Warm and dry. No acute rashes. Neurological: Alert and oriented. CN II-XII intact. Strength 5/5 bilateral upper and lower extremities. Sensation intact to light touch. Gait normal.  Psychiatric: Mood/affect: normal      LABS  I have reviewed all labs for this visit.    Results for orders placed or performed during the hospital encounter of 03/08/22   CBC with Auto Differential   Result Value Ref Range    WBC 3.8 (L) 4.0 - 11.0 K/uL    RBC 2.94 (L) 4.20 - 5.90 M/uL    Hemoglobin 8.9 (L) 13.5 - 17.5 g/dL    Hematocrit 27.0 (L) 40.5 - 52.5 %    MCV 91.9 80.0 - 100.0 fL    MCH 30.3 26.0 - 34.0 pg    MCHC 33.0 31.0 - 36.0 g/dL    RDW 16.6 (H) 12.4 - 15.4 %    Platelets 204 254 - 734 K/uL    MPV 7.2 5.0 - 10.5 fL    Neutrophils % 68.8 %    Lymphocytes % 23.3 %    Monocytes % 5.0 %    Eosinophils % 2.3 %    Basophils % 0.6 %    Neutrophils Absolute 2.6 1.7 - 7.7 K/uL    Lymphocytes Absolute 0.9 (L) 1.0 - 5.1 K/uL    Monocytes Absolute 0.2 0.0 - 1.3 K/uL    Eosinophils Absolute 0.1 0.0 - 0.6 K/uL    Basophils Absolute 0.0 0.0 - 0.2 K/uL Comprehensive Metabolic Panel w/ Reflex to MG   Result Value Ref Range    Sodium 138 136 - 145 mmol/L    Potassium reflex Magnesium 3.3 (L) 3.5 - 5.1 mmol/L    Chloride 97 (L) 99 - 110 mmol/L    CO2 30 21 - 32 mmol/L    Anion Gap 11 3 - 16    Glucose 151 (H) 70 - 99 mg/dL    BUN 11 7 - 20 mg/dL    CREATININE 2.3 (H) 0.8 - 1.3 mg/dL    GFR Non-African American 28 (A) >60    GFR  34 (A) >60    Calcium 7.9 (L) 8.3 - 10.6 mg/dL    Total Protein 6.0 (L) 6.4 - 8.2 g/dL    Albumin 3.3 (L) 3.4 - 5.0 g/dL    Albumin/Globulin Ratio 1.2 1.1 - 2.2    Total Bilirubin 0.8 0.0 - 1.0 mg/dL    Alkaline Phosphatase 80 40 - 129 U/L    ALT <5 (L) 10 - 40 U/L    AST 11 (L) 15 - 37 U/L   Lactic Acid   Result Value Ref Range    Lactic Acid 1.6 0.4 - 2.0 mmol/L   Protime-INR   Result Value Ref Range    Protime 21.0 (H) 9.9 - 12.7 sec    INR 1.81 (H) 0.88 - 1.12   APTT   Result Value Ref Range    aPTT 43.4 (H) 26.2 - 38.6 sec   Magnesium   Result Value Ref Range    Magnesium 1.90 1.80 - 2.40 mg/dL       RADIOLOGY  I have reviewed all radiographic studies for this visit. No orders to display        ED COURSE/MDM  Patient seen and evaluated. Old records reviewed. Labs and imaging reviewed and results discussed with patient/family to extent possible. 61-year-old male presents for evaluation of low hemoglobin level noted while patient was at dialysis. On arrival, patient is afebrile and nontoxic in appearance with otherwise reassuring vital signs. Renal panel shows mild hypokalemia, repleted orally. CBC shows he will 8.9, up trended from 8.0 at the end of February. Lactate is within normal limits. Coags are slightly elevated, consistent with patient's history of anticoagulation on a DOAC. Patient remained stable while observed in the emergency department. There is no active bleeding. Will discharge to home with follow-up with gastroenterology as previously arranged. Close follow-up with PCP in several days. Usual strict return precautions for new or worsening symptoms communicated    During the patient's ED course, the patient was given:  Medications   potassium chloride (KLOR-CON M) extended release tablet 20 mEq (has no administration in time range)        All questions were answered and the patient/family expressed understanding and agreement with the plan. PROCEDURES  None    CRITICAL CARE  N/A    CLINICAL IMPRESSION  1. Anemia, unspecified type    2. Hypokalemia    3. ESRD (end stage renal disease) on dialysis [facility])        DISPOSITION   discharge     Royce Webber MD    Note: This chart was created using voice recognition dictation software. Efforts were made by me to ensure accuracy, however some errors may be present due to limitations of this technology and occasionally words are not transcribed correctly.         Royce Webber MD  03/08/22 2892

## 2022-03-08 NOTE — ED TRIAGE NOTES
Pt arrives via EMS. Reports pt was at dialysis and Hgb was 6.4 and pt reports of weakness. Pt colon removed 1/26/2022. .  Had an episode of sever bleeding from the rectum and was previously admitted to hospital and received a blood transfusion. Today feels very weak., nausea and lightheadedness.  .  Denies chest pain, SOB,

## 2022-03-09 ENCOUNTER — CARE COORDINATION (OUTPATIENT)
Dept: CARE COORDINATION | Age: 77
End: 2022-03-09

## 2022-03-09 PROBLEM — R77.8 ELEVATED TROPONIN: Status: RESOLVED | Noted: 2022-02-07 | Resolved: 2022-03-09

## 2022-03-09 PROBLEM — R79.89 ELEVATED TROPONIN: Status: RESOLVED | Noted: 2022-02-07 | Resolved: 2022-03-09

## 2022-03-09 NOTE — ED NOTES
D/C: Order noted for d/c. Pt confirmed d/c paperwork has correct name. Discharge and education instructions reviewed with patient. Teach-back successful. Pt and family verbalized understanding and signed d/c papers. Pt denied questions at this time. No acute distress noted. Patient instructed to follow-up as noted - return to emergency department if symptoms worsen. Patient verbalized understanding. Discharged per EDMD with discharge instructions. Pt discharged per self with family to private vehicle. Patient stable upon departure. Thanked patient for choosing [facility]) for care.           Raine Patel RN  03/08/22 2423

## 2022-03-09 NOTE — ED NOTES
Pt needs transport arrange to [address] [place] [address].       Susannah John RN  03/08/22 3487

## 2022-03-09 NOTE — ED NOTES
Pt daughter has arrived to take pt back to [address] [place] [address] and Rehab. Spoke with Dr. Doug Aden and received verbal that pt can be sent with daughter. Cancelled private transport.       Nighat Romero RN  03/08/22 9464

## 2022-03-10 ENCOUNTER — CARE COORDINATION (OUTPATIENT)
Dept: CARE COORDINATION | Age: 77
End: 2022-03-10

## 2022-03-10 NOTE — CARE COORDINATION
ACM made outreach to patient to follow up from recent ED visit. Patient's wife Moraima Castelan answered the call and advised ACM that patient is unavailable. [facility]. Per Moraima Castelan patient's nursing staff is taking care of all his nursing needs.

## 2022-04-06 ENCOUNTER — OFFICE VISIT (OUTPATIENT)
Dept: VASCULAR SURGERY | Age: 77
End: 2022-04-06
Payer: MEDICARE

## 2022-04-06 VITALS
WEIGHT: 224 LBS | SYSTOLIC BLOOD PRESSURE: 95 MMHG | DIASTOLIC BLOOD PRESSURE: 65 MMHG | BODY MASS INDEX: 30.34 KG/M2 | HEIGHT: 72 IN

## 2022-04-06 DIAGNOSIS — Z99.2 ESRD (END STAGE RENAL DISEASE) ON DIALYSIS (HCC): Primary | ICD-10-CM

## 2022-04-06 DIAGNOSIS — N18.6 ESRD (END STAGE RENAL DISEASE) ON DIALYSIS (HCC): Primary | ICD-10-CM

## 2022-04-06 PROCEDURE — 1123F ACP DISCUSS/DSCN MKR DOCD: CPT | Performed by: SURGERY

## 2022-04-06 PROCEDURE — 3017F COLORECTAL CA SCREEN DOC REV: CPT | Performed by: SURGERY

## 2022-04-06 PROCEDURE — 99212 OFFICE O/P EST SF 10 MIN: CPT | Performed by: SURGERY

## 2022-04-06 PROCEDURE — 4040F PNEUMOC VAC/ADMIN/RCVD: CPT | Performed by: SURGERY

## 2022-04-06 PROCEDURE — G8417 CALC BMI ABV UP PARAM F/U: HCPCS | Performed by: SURGERY

## 2022-04-06 PROCEDURE — 1036F TOBACCO NON-USER: CPT | Performed by: SURGERY

## 2022-04-06 PROCEDURE — G8427 DOCREV CUR MEDS BY ELIG CLIN: HCPCS | Performed by: SURGERY

## 2022-04-06 NOTE — PROGRESS NOTES
Subjective:      Patient ID: Kanika Saenz is a 77 y.o. male.     HPI    Review of Systems    Objective:   Physical Exam    Assessment:      ***      Plan:      ***

## 2022-04-06 NOTE — PROGRESS NOTES
Seen for HD access evaluation. Last seen at [facility] late February with thrombosed L radiocephalic AVF. Vein mapping demonstrated good caliber patent L upper arm cephalic vein. HD through catheter. EXAM:  R IJV THDC    Visible, palpable L cephalic vein in upper arm with tourniquet use. Normal L brachial pulse    Thrombosed L RC AVF    A/P: ESRD with failed L radiocephalic AVF   Plan outpt L brachiocephalic AVF creation at [facility]. Pt understands, agrees and will schedule.

## 2022-04-08 ENCOUNTER — TELEPHONE (OUTPATIENT)
Dept: VASCULAR SURGERY | Age: 77
End: 2022-04-08

## 2022-04-08 ENCOUNTER — PREP FOR PROCEDURE (OUTPATIENT)
Dept: VASCULAR SURGERY | Age: 77
End: 2022-04-08

## 2022-04-08 DIAGNOSIS — Z01.818 PRE-OP TESTING: Primary | ICD-10-CM

## 2022-04-08 NOTE — TELEPHONE ENCOUNTER
Called the facility to get transportation lined up for his procedure on 4/29. Gave them the details about when and what procedure was. They will call me back to confirm transportation has been arranged.     [facility] [phone]

## 2022-04-12 RX ORDER — SODIUM CHLORIDE 9 MG/ML
25 INJECTION, SOLUTION INTRAVENOUS PRN
Status: CANCELLED | OUTPATIENT
Start: 2022-04-12

## 2022-04-12 RX ORDER — SODIUM CHLORIDE 0.9 % (FLUSH) 0.9 %
5-40 SYRINGE (ML) INJECTION EVERY 12 HOURS SCHEDULED
Status: CANCELLED | OUTPATIENT
Start: 2022-04-12

## 2022-04-12 RX ORDER — SODIUM CHLORIDE 0.9 % (FLUSH) 0.9 %
5-40 SYRINGE (ML) INJECTION PRN
Status: CANCELLED | OUTPATIENT
Start: 2022-04-12

## 2022-04-12 NOTE — PROGRESS NOTES
RN Home- [address] [place] [address] [phone]   called 2x and message left for Hoang to call us back. I was able to contact [place]-  and confirm that she is working on transportation for 4/29 DOS, arrival time 1100 for 1300 surgery. She will call us back if there is an issue, with transport.

## 2022-04-13 NOTE — PROGRESS NOTES
MARLON FROM [facility]- RETURNED CALL FAX # OBTAINED FROM MARLON TO [address] [place] [address]-NO DIRECT LINE TO NURSES BUT EXT # GIVEN.  PER MARLON PATIENT ABLE TO SIGN CONSENT

## 2022-04-13 NOTE — PROGRESS NOTES
Phone message left to call PAT dept at [phone]  With DON at [facility] to call with fax # so required information can be completed unable to leave message for floor nurse.

## 2022-04-14 NOTE — PROGRESS NOTES
C-diff Questionnaire:     * Admitted with diarrhea? [] YES    [x]  NO     *Prior history of C-Diff. In last 3 months? [] YES    [x]  NO     *Antibiotic use in the past 6-8 weeks? []  NO    []  YES      If yes, which: REASON_________unsure________     *Prior hospitalization or nursing home in the last month? [x]  YES    []  NO     SAFETY FIRST. .call before you fall    [address] time__1100 per [address] [facility] [phone] requested [facility]   Surgery time 1300    Do not eat or drink anything after 12:00 midnight prior to your surgery. This includes water chewing gum, mints and ice chips- the Day of Surgery. You may brush your teeth and gargle the morning of your surgery, but do not swallow the water     Please see your family doctor/pediatrician for a history and physical and/or questions concerning medications. Bring any test results/reports from your physicians office. If you are under the care of a heart doctor or specialist doctor, please be aware that you may be asked to them for clearance    You may be asked to stop blood thinners such as Coumadin, Plavix, Fragmin, Lovenox, etc., or any anti-inflammatories such as:  Aspirin, Ibuprofen, Advil, Naproxen prior to your surgery. We also ask that you stop any OTC medications such as fish oil, vitamin E, glucosamine, garlic, Multivitamins, COQ 10, etc.    We ask that you do not smoke 24 hours prior to surgery  We ask that you do not  drink any alcoholic beverages 24 hours prior to surgery     You must make arrangements for a responsible adult to take you home after your surgery. For your safety you will not be allowed to leave alone or drive yourself home. Your surgery will be cancelled if you do not have a ride home. Also for your safety, it is strongly suggested that someone stay with you the first 24 hours after your surgery.      A parent or legal guardian must accompany a child scheduled for surgery and plan to stay at the hospital until the child is discharged. Please do not bring other children with you. For your comfort, please wear simple loose fitting clothing to the hospital.  Please do not bring valuables. Do not wear any make-up or nail polish on your fingers or toes. For your safety, please do not wear any jewelry or body piercing's on the day of surgery. All jewelry must be removed. If you have dentures, they will be removed before going to operating room. For your convenience, we will provide you with a container. If you wear contact lenses or glasses, they will be removed, please bring a case for them. If you have a living will and a durable power of  for healthcare, please bring in a copy. As part of our patient safety program to minimize surgical site infections, we ask you to do the following:    · Please notify your surgeon if you develop any illness between         now and the day of your surgery. · This includes a cough, cold, fever, sore throat, nausea,         or vomiting, and diarrhea, etc.  ·  Please notify your surgeon if you experience dizziness, shortness         of breath or blurred vision between now and the time of your surgery. Do not shave your operative site 96 hours prior to surgery. For face and neck surgery, men may use an electric razor 48 hours   prior to surgery. You may shower the night before surgery or the morning of   your surgery with an antibacterial soap. You will need to bring a photo ID and insurance card     If you use a C-pap or Bi-pap machine, please bring your machine with you to the hospital     Our goal is to provide you with excellent care, therefore, visitors will be limited to so that we may focus on providing this care for you. Please contact your surgeon office, if you have any further questions.                  [facility] phone number:  [phone] [place] [address] fax number:  [phone]    Please note these are generalized instructions for all surgical cases, you may be provided with more specific instructions according to your surgery.

## 2022-04-22 ENCOUNTER — HOSPITAL ENCOUNTER (EMERGENCY)
Age: 77
Discharge: HOME | End: 2022-04-22
Attending: EMERGENCY MEDICINE
Payer: MEDICARE

## 2022-04-22 ENCOUNTER — APPOINTMENT (OUTPATIENT)
Dept: GENERAL RADIOLOGY | Age: 77
End: 2022-04-22
Payer: MEDICARE

## 2022-04-22 ENCOUNTER — APPOINTMENT (OUTPATIENT)
Dept: CT IMAGING | Age: 77
End: 2022-04-22
Payer: MEDICARE

## 2022-04-22 VITALS
BODY MASS INDEX: 24.18 KG/M2 | TEMPERATURE: 97.4 F | OXYGEN SATURATION: 94 % | SYSTOLIC BLOOD PRESSURE: 101 MMHG | RESPIRATION RATE: 18 BRPM | DIASTOLIC BLOOD PRESSURE: 67 MMHG | HEART RATE: 64 BPM | WEIGHT: 203.93 LBS

## 2022-04-22 DIAGNOSIS — N30.00 ACUTE CYSTITIS WITHOUT HEMATURIA: Primary | ICD-10-CM

## 2022-04-22 LAB
A/G RATIO: 1 (ref 1.1–2.2)
ALBUMIN SERPL-MCNC: 3.1 G/DL (ref 3.4–5)
ALP BLD-CCNC: 166 U/L (ref 40–129)
ALT SERPL-CCNC: 24 U/L (ref 10–40)
ANION GAP SERPL CALCULATED.3IONS-SCNC: 9 MMOL/L (ref 3–16)
AST SERPL-CCNC: 13 U/L (ref 15–37)
BACTERIA: ABNORMAL /HPF
BASOPHILS ABSOLUTE: 0 K/UL (ref 0–0.2)
BASOPHILS RELATIVE PERCENT: 0.8 %
BILIRUB SERPL-MCNC: 0.7 MG/DL (ref 0–1)
BILIRUBIN URINE: NEGATIVE
BLOOD, URINE: ABNORMAL
BUN BLDV-MCNC: 19 MG/DL (ref 7–20)
CALCIUM SERPL-MCNC: 8.4 MG/DL (ref 8.3–10.6)
CHLORIDE BLD-SCNC: 100 MMOL/L (ref 99–110)
CLARITY: ABNORMAL
CO2: 31 MMOL/L (ref 21–32)
COLOR: YELLOW
COMMENT UA: ABNORMAL
CREAT SERPL-MCNC: 4 MG/DL (ref 0.8–1.3)
EOSINOPHILS ABSOLUTE: 0.1 K/UL (ref 0–0.6)
EOSINOPHILS RELATIVE PERCENT: 3.3 %
EPITHELIAL CELLS, UA: >36 /HPF (ref 0–5)
GFR AFRICAN AMERICAN: 18
GFR NON-AFRICAN AMERICAN: 15
GLUCOSE BLD-MCNC: 158 MG/DL (ref 70–99)
GLUCOSE BLD-MCNC: 168 MG/DL (ref 70–99)
GLUCOSE URINE: NEGATIVE MG/DL
HCT VFR BLD CALC: 33.9 % (ref 40.5–52.5)
HEMOGLOBIN: 10.9 G/DL (ref 13.5–17.5)
KETONES, URINE: ABNORMAL MG/DL
LACTIC ACID: 1.9 MMOL/L (ref 0.4–2)
LEUKOCYTE ESTERASE, URINE: ABNORMAL
LYMPHOCYTES ABSOLUTE: 1.4 K/UL (ref 1–5.1)
LYMPHOCYTES RELATIVE PERCENT: 32.5 %
MAGNESIUM: 2.2 MG/DL (ref 1.8–2.4)
MCH RBC QN AUTO: 29.3 PG (ref 26–34)
MCHC RBC AUTO-ENTMCNC: 32.3 G/DL (ref 31–36)
MCV RBC AUTO: 90.9 FL (ref 80–100)
MICROSCOPIC EXAMINATION: YES
MONOCYTES ABSOLUTE: 0.4 K/UL (ref 0–1.3)
MONOCYTES RELATIVE PERCENT: 8.8 %
NEUTROPHILS ABSOLUTE: 2.4 K/UL (ref 1.7–7.7)
NEUTROPHILS RELATIVE PERCENT: 54.6 %
NITRITE, URINE: NEGATIVE
PDW BLD-RTO: 17.8 % (ref 12.4–15.4)
PERFORMED ON: ABNORMAL
PH UA: 6.5 (ref 5–8)
PLATELET # BLD: 213 K/UL (ref 135–450)
PMV BLD AUTO: 7.3 FL (ref 5–10.5)
POTASSIUM REFLEX MAGNESIUM: 3.4 MMOL/L (ref 3.5–5.1)
PROTEIN UA: 100 MG/DL
RBC # BLD: 3.73 M/UL (ref 4.2–5.9)
RBC UA: 0 /HPF (ref 0–4)
RBC UA: ABNORMAL /HPF (ref 0–4)
SODIUM BLD-SCNC: 140 MMOL/L (ref 136–145)
SPECIFIC GRAVITY UA: 1.02 (ref 1–1.03)
TOTAL PROTEIN: 6.2 G/DL (ref 6.4–8.2)
TROPONIN: 0.07 NG/ML
URINE REFLEX TO CULTURE: YES
URINE TYPE: ABNORMAL
UROBILINOGEN, URINE: 0.2 E.U./DL
WBC # BLD: 4.3 K/UL (ref 4–11)
WBC UA: 124 /HPF (ref 0–5)

## 2022-04-22 PROCEDURE — 70450 CT HEAD/BRAIN W/O DYE: CPT

## 2022-04-22 PROCEDURE — 6360000002 HC RX W HCPCS: Performed by: PHYSICIAN ASSISTANT

## 2022-04-22 PROCEDURE — 84484 ASSAY OF TROPONIN QUANT: CPT

## 2022-04-22 PROCEDURE — 80053 COMPREHEN METABOLIC PANEL: CPT

## 2022-04-22 PROCEDURE — 99285 EMERGENCY DEPT VISIT HI MDM: CPT

## 2022-04-22 PROCEDURE — 81001 URINALYSIS AUTO W/SCOPE: CPT

## 2022-04-22 PROCEDURE — 85025 COMPLETE CBC W/AUTO DIFF WBC: CPT

## 2022-04-22 PROCEDURE — 83735 ASSAY OF MAGNESIUM: CPT

## 2022-04-22 PROCEDURE — 87086 URINE CULTURE/COLONY COUNT: CPT

## 2022-04-22 PROCEDURE — 36415 COLL VENOUS BLD VENIPUNCTURE: CPT

## 2022-04-22 PROCEDURE — 96365 THER/PROPH/DIAG IV INF INIT: CPT

## 2022-04-22 PROCEDURE — 83605 ASSAY OF LACTIC ACID: CPT

## 2022-04-22 PROCEDURE — 93005 ELECTROCARDIOGRAM TRACING: CPT | Performed by: PHYSICIAN ASSISTANT

## 2022-04-22 PROCEDURE — 87077 CULTURE AEROBIC IDENTIFY: CPT

## 2022-04-22 PROCEDURE — 71046 X-RAY EXAM CHEST 2 VIEWS: CPT

## 2022-04-22 PROCEDURE — 87186 SC STD MICRODIL/AGAR DIL: CPT

## 2022-04-22 PROCEDURE — 2580000003 HC RX 258: Performed by: PHYSICIAN ASSISTANT

## 2022-04-22 RX ORDER — 0.9 % SODIUM CHLORIDE 0.9 %
1000 INTRAVENOUS SOLUTION INTRAVENOUS ONCE
Status: COMPLETED | OUTPATIENT
Start: 2022-04-22 | End: 2022-04-22

## 2022-04-22 RX ORDER — CEFDINIR 300 MG/1
300 CAPSULE ORAL DAILY
Qty: 7 CAPSULE | Refills: 0 | Status: SHIPPED | OUTPATIENT
Start: 2022-04-22 | End: 2022-04-29

## 2022-04-22 RX ADMIN — SODIUM CHLORIDE 1000 ML: 9 INJECTION, SOLUTION INTRAVENOUS at 19:52

## 2022-04-22 RX ADMIN — CEFTRIAXONE 1000 MG: 1 INJECTION, POWDER, FOR SOLUTION INTRAMUSCULAR; INTRAVENOUS at 22:28

## 2022-04-22 ASSESSMENT — ENCOUNTER SYMPTOMS
SHORTNESS OF BREATH: 0
DIARRHEA: 0
CONSTIPATION: 0
EYE PAIN: 0
SORE THROAT: 0
BACK PAIN: 0
EYE REDNESS: 0
NAUSEA: 0
VOMITING: 0
ABDOMINAL PAIN: 0
COUGH: 0

## 2022-04-22 ASSESSMENT — PAIN - FUNCTIONAL ASSESSMENT: PAIN_FUNCTIONAL_ASSESSMENT: NONE - DENIES PAIN

## 2022-04-22 NOTE — ED PROVIDER NOTES
[address] [facility]        Pt Name: Mindi Garber  MRN: [id number]  [place] 1945  Date of evaluation: 4/22/2022  Provider: Socorro Nunez PA-C  PCP: Juwan Acuña  Note Started: 6:33 PM EDT       I have seen and evaluated this patient with my supervising physician Reynaldo Romero MD.      Kris UCassandra [address].       Chief Complaint   Patient presents with    Hyperglycemia     per ems, pt states \"I feel fine\"         HISTORY OF PRESENT ILLNESS   (Location/Symptom, Timing/Onset, Context/Setting, Quality, Duration, Modifying Factors, Severity)  Note limiting factors. Chief Complaint: Hyperglycemia, after speaking with family he did have hypotension at the nursing home, altered mental status    Mindi Garber is a 77 y.o. male who presents to the emergency department via EMS for\" hyperglycemia\". However after speaking with patient's wife on the phone she states that their daughter went over to the nursing home to bring him some lunch. When she went to talk to him he was having difficulty with his speech, and did not want to eat anything. She then called EMS. He has had episodes like this in the past, and wife is concerned for UTI. However she does not know if he is developing some dementia as well, and states that he has been struggling with some depression as he is in a nursing home and does not get a lot of visitors because everybody works. She states that this is different from his baseline. At this time patient denies any symptoms, including fever, chills, chest pain, abdominal pain, urinary issues. Nursing Notes were all reviewed and agreed with or any disagreements were addressed in the HPI. REVIEW OF SYSTEMS    (2-9 systems for level 4, 10 or more for level 5)     Review of Systems   Unable to perform ROS: Other (possibly unreliable)   Constitutional: Negative for chills and fever.    HENT: Negative for ear pain and sore throat. Eyes: Negative for pain and redness. Respiratory: Negative for cough and shortness of breath. Cardiovascular: Negative for chest pain and leg swelling. Gastrointestinal: Negative for abdominal pain, constipation, diarrhea, nausea and vomiting. Genitourinary: Negative for dysuria and hematuria. Musculoskeletal: Negative for back pain and neck pain. Skin: Negative for rash and wound. Neurological: Negative for light-headedness and headaches.        PAST MEDICAL HISTORY     Past Medical History:   Diagnosis Date    Anemia 03/2022    Arthritis     CAD (coronary artery disease) 01/2020    [facility]     Colon Cancer diagnosed last month    Cerebral infarction ([facility]     Cognitive communication deficit     COVID-19     Diabetes mellitus ([facility]     Dysphagia     End stage renal disease ([facility]     Fatigue     Gastrointestinal hemorrhage     Hemodialysis patient ([facility] 2019    ckd-goes to dialysis Tuesday, Thurs, Fri    Hx of blood clots     Hyperlipidemia     Hypertension     Hyponatremia     Risk for falls     Splenomegaly 02/10/2021       SURGICAL HISTORY     Past Surgical History:   Procedure Laterality Date    CARDIAC CATHETERIZATION  2019    COLECTOMY N/A 01/26/2022    SIGMOID COLECTOMY, SIGMOIDOSCOPY performed by Aracelis Owens MD at [address] Left 4/29/2022    LEFT BRACHIAL CEPHALIC FISTULA performed by Solo Stubbs MD at [facility]  02/19/2022    IR EMBOLIZATION HEMORRHAGE 2/19/2022 [facility]    IR PERC ARTERIOVENOUS FISTULA CREATION Left     unsure of date    IR TUNNELED CATHETER PLACEMENT GREATER THAN 5 YEARS  2/21/2022    IR TUNNELED CATHETER PLACEMENT GREATER THAN 5 YEARS 2/21/2022 [facility]    SIGMOIDOSCOPY N/A 02/17/2022    SIGMOIDOSCOPY CONTROL HEMORRHAGE performed by Leta López MD at [facility] 02/21/2022    Permacath; [facility] access; 23cm; Dr. Graciela Ramsey       Discharge Medication List as of 4/22/2022 11:30 PM      CONTINUE these medications which have NOT CHANGED    Details   LORazepam (ATIVAN) 0.5 MG tablet Take 0.5 mg by mouth 2 times daily. Historical Med      oxyCODONE (ROXICODONE) 5 MG immediate release tablet Take 5 mg by mouth every 4 hours as needed for Pain. Historical Med      polyethylene glycol (GLYCOLAX) 17 GM/SCOOP powder Take 17 g by mouth dailyHistorical Med      dicyclomine (BENTYL) 10 MG capsule Take 1 capsule by mouth every 6 hours as needed (cramps), Disp-20 capsule, R-1Print      ondansetron (ZOFRAN ODT) 4 MG disintegrating tablet Take 1 tablet by mouth every 8 hours as needed for Nausea, Disp-20 tablet, R-0Print      apixaban (ELIQUIS) 5 MG TABS tablet Take 1 tablet by mouth 2 times daily, Disp-60 tablet, R-0Normal      aspirin 81 MG chewable tablet Take 1 tablet by mouth daily, Disp-30 tablet, R-0NO PRINT      melatonin 3 MG TABS tablet Take 2 tablets by mouth nightly as needed (sleep), Disp-6 tablet, R-0Print      tamsulosin (FLOMAX) 0.4 MG capsule Take 0.4 mg by mouth every morningHistorical Med      gabapentin (NEURONTIN) 100 MG capsule Take 100 mg by mouth 3 times daily. Historical Med      sevelamer (RENVELA) 800 MG tablet Take 1 tablet by mouth 3 times daily (with meals)Historical Med      atenolol (TENORMIN) 25 MG tablet Take 25 mg by mouth every eveningHistorical Med      pantoprazole (PROTONIX) 20 MG tablet Take 20 mg by mouth nightlyHistorical Med      atorvastatin (LIPITOR) 10 MG tablet Take 10 mg by mouth nightlyHistorical Med      calcitRIOL (ROCALTROL) 0.25 MCG capsule Take 0.25 mcg by mouth every eveningHistorical Med             ALLERGIES     Lisinopril    FAMILYHISTORY       Family History   Problem Relation Age of Onset    High Blood Pressure Father         SOCIAL HISTORY       Social History     Socioeconomic History    Marital status:  Spouse name: Not on file    Number of children: Not on file    Years of education: Not on file    Highest education level: Not on file   Occupational History    Not on file   Tobacco Use    Smoking status: Former Smoker    Smokeless tobacco: Never Used   Vaping Use    Vaping Use: Never used   Substance and Sexual Activity    Alcohol use: Not Currently    Drug use: Never    Sexual activity: Not on file   Other Topics Concern    Not on file   Social History Narrative    Not on file     Social Determinants of Health     Financial Resource Strain:     Difficulty of Paying Living Expenses: Not on file   Food Insecurity:     Worried About [address] in the Last Year: Not on file    [address] in the Last Year: Not on file   Transportation Needs:     Lack of Transportation (Medical): Not on file    Lack of Transportation (Non-Medical):  Not on file   Physical Activity:     Days of Exercise per Week: Not on file    Minutes of Exercise per Session: Not on file   Stress:     Feeling of Stress : Not on file   Social Connections:     Frequency of Communication with Friends and Family: Not on file    Frequency of Social Gatherings with Friends and Family: Not on file    Attends Restorationist Services: Not on file    Active Member of [address] [place] [address] [employer] or Organizations: Not on file    Attends Club or Organization Meetings: Not on file    Marital Status: Not on file   Intimate Partner Violence:     Fear of Current or Ex-Partner: Not on file    Emotionally Abused: Not on file    Physically Abused: Not on file    Sexually Abused: Not on file   Housing Stability:     Unable to Pay for Housing in the Last Year: Not on file    Number of [place] in the Last Year: Not on file    Unstable Housing in the Last Year: Not on file       SCREENINGS    Benson Coma Scale  Eye Opening: Spontaneous  Best Verbal Response: Oriented  Best Motor Response: Obeys commands  Benson Coma Scale Score: 15        PHYSICAL EXAM    (up to 7 for level 4, 8 or more for level 5)     ED Triage Vitals [04/22/22 1803]   BP Temp Temp Source Pulse Resp SpO2 Height Weight   96/65 96.6 °F (35.9 °C) Temporal 66 18 99 % -- --       Physical Exam  Constitutional:       General: He is not in acute distress. Appearance: Normal appearance. He is not ill-appearing, toxic-appearing or diaphoretic. HENT:      Head: Normocephalic and atraumatic. Right Ear: External ear normal.      Left Ear: External ear normal.      Nose: Nose normal.      Mouth/Throat:      Mouth: Mucous membranes are moist.      Pharynx: No oropharyngeal exudate. Eyes:      General:         Right eye: No discharge. Left eye: No discharge. Extraocular Movements: Extraocular movements intact. Pupils: Pupils are equal, round, and reactive to light. Cardiovascular:      Rate and Rhythm: Normal rate. Pulmonary:      Effort: Pulmonary effort is normal. No respiratory distress. Abdominal:      General: There is no distension. Palpations: Abdomen is soft. Tenderness: There is no abdominal tenderness. Musculoskeletal:         General: Normal range of motion. Cervical back: Normal range of motion. Skin:     General: Skin is warm and dry. Neurological:      General: No focal deficit present. Mental Status: He is alert and oriented to person, place, and time. Cranial Nerves: No cranial nerve deficit, dysarthria or facial asymmetry. Sensory: No sensory deficit. Motor: No weakness.       Coordination: Heel to Shin Test normal.      Gait: Gait normal.      Comments: Patient with active range of motion of all 4 extremities and strength against resistance  No horizontal or vertical nystagmus   Psychiatric:         Mood and Affect: Mood normal.         Behavior: Behavior normal.         DIAGNOSTIC RESULTS   LABS:    Labs Reviewed   CULTURE, URINE - Abnormal; Notable for the following components:       Result Value    Organism Klebsiella oxytoca (*)     All other components within normal limits    Narrative:     ORDER#: [id number]                          ORDERED BY: Shi Etienne  SOURCE: Urine Clean Catch                  COLLECTED:  04/22/22 21:25  ANTIBIOTICS AT JOSE.:                      RECEIVED :  04/22/22 21:25   CBC WITH AUTO DIFFERENTIAL - Abnormal; Notable for the following components:    RBC 3.73 (*)     Hemoglobin 10.9 (*)     Hematocrit 33.9 (*)     RDW 17.8 (*)     All other components within normal limits   COMPREHENSIVE METABOLIC PANEL W/ REFLEX TO MG FOR LOW K - Abnormal; Notable for the following components:    Potassium reflex Magnesium 3.4 (*)     Glucose 158 (*)     CREATININE 4.0 (*)     GFR Non- 15 (*)     GFR  18 (*)     Total Protein 6.2 (*)     Albumin 3.1 (*)     Albumin/Globulin Ratio 1.0 (*)     Alkaline Phosphatase 166 (*)     AST 13 (*)     All other components within normal limits   TROPONIN - Abnormal; Notable for the following components:    Troponin 0.07 (*)     All other components within normal limits   URINALYSIS WITH REFLEX TO CULTURE - Abnormal; Notable for the following components:    Clarity, UA TURBID (*)     Ketones, Urine TRACE (*)     Blood, Urine MODERATE (*)     Protein,  (*)     Leukocyte Esterase, Urine LARGE (*)     All other components within normal limits   MICROSCOPIC URINALYSIS - Abnormal; Notable for the following components:    RBC, UA see below (*)     Bacteria, UA 2+ (*)     WBC,  (*)     Epithelial Cells, UA >36 (*)     All other components within normal limits   POCT GLUCOSE - Abnormal; Notable for the following components:    POC Glucose 168 (*)     All other components within normal limits   LACTIC ACID   MAGNESIUM       When ordered, only abnormal lab results are displayed. All other labs were within normal range or not returned as of this dictation. EKG:  When ordered, EKG's are interpreted by the Emergency Department Physician in the absence of a cardiologist.  Please see their note for interpretation of EKG. RADIOLOGY:   Non-plain film images such as CT, Ultrasound and MRI are read by the radiologist. Plain radiographic images are visualized andpreliminarily interpreted by the  ED Provider with the below findings:        Interpretation [facility] Radiologist below, if available at the time of this note:    CT HEAD WO CONTRAST   Final Result   No acute intracranial abnormality. XR CHEST (2 VW)   Final Result   No acute process. No results found. PROCEDURES   Unless otherwise noted below, none     Procedures    CRITICAL CARE TIME   N/A    CONSULTS:  None      EMERGENCY DEPARTMENT COURSE and DIFFERENTIAL DIAGNOSIS/MDM:   Vitals:    Vitals:    04/22/22 2155 04/22/22 2200 04/22/22 2230 04/22/22 2343   BP:  98/67  101/67   Pulse: 65  64 64   Resp: 20   18   Temp:   97.4 °F (36.3 °C) 97.4 °F (36.3 °C)   TempSrc:   Temporal Temporal   SpO2: 92%   94%   Weight:           Patient was given thefollowing medications:  Medications   0.9 % sodium chloride bolus (0 mLs IntraVENous Stopped 4/22/22 2052)   cefTRIAXone (ROCEPHIN) 1000 mg IVPB in 50 mL D5W minibag (0 mg IntraVENous Stopped 4/22/22 2344)           This is a 62-year-old male who presents emergency department with no complaints. Vitals upon arrival show soft BP, otherwise within normal limits. I did speak with his wife/daughter and they stated patient does suffer from dementia, however he was worse today. blood work was performed and shows no leukocytosis, no significant hyperglycemia, anion gap, no elevation in lactic acid. Patient is on dialysis, however he still does make urine at times. A urine sample was collected here in the emergency department and is consistent with urinary tract infection. At this time patient has no indication. Patient denies any blood work is essentially within normal limits for patient.   He was given some fluid here in the emergency department as well as IV Rocephin prior to discharge. I discussed with patient and patient's family bring him on the antibiotic for the next 7 days. I also discussed following up with PCP in the next 48 to 72 hours for further evaluation and they were agreeable to this plan. I did evaluate this patient with my attending, Dr. Nanci Curtis and he was in agreement with plan as well. We discussed returning to the emergency department if any new worsening or more concerning symptoms present or symptoms do not improve. Patient and family were agreeable. He was discharged in stable condition. FINAL IMPRESSION      1.  Acute cystitis without hematuria          DISPOSITION/PLAN   DISPOSITION Decision To Discharge 04/22/2022 11:17:42 PM      PATIENT REFERREDTO:  Sam Boogie  [address] [facility] [address]  [phone]    Schedule an appointment as soon as possible for a visit in 2 days  for reevaluation    [facility] Emergency Department  [address] [address]  [phone]  Go to   As needed, If symptoms worsen      DISCHARGE MEDICATIONS:  Discharge Medication List as of 4/22/2022 11:30 PM      START taking these medications    Details   cefdinir (OMNICEF) 300 MG capsule Take 1 capsule by mouth daily for 7 days, Disp-7 capsule, R-0Print             DISCONTINUED MEDICATIONS:  Discharge Medication List as of 4/22/2022 11:30 PM                 (Please note that portions ofthis note were completed with a voice recognition program.  Efforts were made to edit the dictations but occasionally words are mis-transcribed.)    Sean Delgado PA-C (electronically signed)              Sean Delgado PA-C  04/25/22 0522 Asif Ceja PA-C  05/01/22 6193

## 2022-04-22 NOTE — ED PROVIDER NOTES
ED Attending Attestation Note    This patient was seen by the advanced practice provider. I personally saw the patient and performed a substantive portion of the visit including all aspects of the medical decision making. Briefly, 77 y.o. male presents with complaint of hyperglycemia. Seen in NH by daughter, who was concerned for confusion and generalized weakness. Apparently, the pt's BP was also relatively hypotensive at the nursing facility. The confusion and generalized weakness has been going on for several weeks. Pt denies fever, cough, congestion, nausea, vomiting, diarrhea, abdominal pain. Focused exam:   Gen: 77 y.o. male, NAD  HEENT: NCAT. PERRL. EOMI. Mucus membranes tacky. CV: RRR w/o MRG  Lungs: CTAB. No incr WOB. Abdomen: Soft, nontender, nondistended. No rebound/guarding. Neuro: oriented to person, hospital.     EKG interpreted by myself. Rate: 66  Rhythm: NSR  Axis: -29  Intervals:  QRS 78 QTc 452  ST Segments: no acute abnormality  T waves: nonspecific T wave abnormality  Comparison: Compared to 2/22/22, rhythm NSR from atrial fibrillation with rapid ventricular response   Impression: NSR with 1st deg AV block, inferior and anteroseptal infarct seen on or before 2/22/22, nonspecific T wave abnormality        MDM:   This is a 71-year-old male who presents initially with complaint for hyperglycemia but also with some concern for altered mental status. Here, the patient's blood glucose is overall reassuring. He seems to be at his baseline mental status. He has episodes of this that seem to occur when he has urinary tract infections. Screening diagnostics: Renal panel with no significant electrolyte abnormalities. Creatinine is elevated in line with the patient's history of end-stage renal disease on intermittent hemodialysis. Troponin is slightly elevated at 0.07.   This was obtained however the patient is not with any chest pain and EKG shows no acute ischemic abnormalities. The elevated troponin is chronic for the patient in the setting of end-stage renal disease and at around the patient's baseline. CBC shows no leukocytosis. Stable mild anemia. Urinalysis is concerning for urinary tract infection. As the patient is at his baseline mental status and with a nonfocal neurological exam I am not concerned for acute ischemic stroke. The presentation seems inconsistent with TIA. Likely explained by the urinary tract infection. Will treat with antibiotics. Given otherwise reassuring exam, vital signs, and diagnostics believe patient is appropriate for discharge with close follow-up with PCP in several days and usual strict return precautions for new or worsening symptoms communicated. For further details of the patient's emergency department visit, please see the advanced practice provider's documentation. Kamila Tripathi MD     This report has been produced using speech recognition software and may contain errors related to that system including errors in grammar, punctuation, and spelling, as well as words and phrases that may be inappropriate. If there are any questions or concerns please feel free to contact the dictating provider for clarification.       Kamila Tripathi MD  04/23/22 9925

## 2022-04-23 LAB
EKG ATRIAL RATE: 66 BPM
EKG DIAGNOSIS: NORMAL
EKG P AXIS: 33 DEGREES
EKG P-R INTERVAL: 242 MS
EKG Q-T INTERVAL: 432 MS
EKG QRS DURATION: 78 MS
EKG QTC CALCULATION (BAZETT): 452 MS
EKG R AXIS: -29 DEGREES
EKG T AXIS: -1 DEGREES
EKG VENTRICULAR RATE: 66 BPM

## 2022-04-23 PROCEDURE — 93010 ELECTROCARDIOGRAM REPORT: CPT | Performed by: INTERNAL MEDICINE

## 2022-04-23 NOTE — ED NOTES

## 2022-04-25 LAB
ORGANISM: ABNORMAL
URINE CULTURE, ROUTINE: ABNORMAL

## 2022-04-27 ENCOUNTER — TELEPHONE (OUTPATIENT)
Dept: VASCULAR SURGERY | Age: 77
End: 2022-04-27

## 2022-04-27 NOTE — TELEPHONE ENCOUNTER
New surgery time. Surgery is at 7:30 with an arrival time of 6:00. [facility] will call back with confirmation of arranging transport with the new time. Attempted to call spouse. No answer and answering machine is full. Unable to leave a message.

## 2022-04-28 ENCOUNTER — ANESTHESIA EVENT (OUTPATIENT)
Dept: OPERATING ROOM | Age: 77
End: 2022-04-28
Payer: MEDICARE

## 2022-04-29 ENCOUNTER — ANESTHESIA (OUTPATIENT)
Dept: OPERATING ROOM | Age: 77
End: 2022-04-29
Payer: MEDICARE

## 2022-04-29 ENCOUNTER — HOSPITAL ENCOUNTER (OUTPATIENT)
Age: 77
Setting detail: SURGERY ADMIT
Discharge: NURSING, REHAB OR LONG-TERM CARE | End: 2022-04-29
Attending: SURGERY | Admitting: SURGERY
Payer: MEDICARE

## 2022-04-29 VITALS
BODY MASS INDEX: 27.04 KG/M2 | TEMPERATURE: 97.5 F | HEIGHT: 73 IN | WEIGHT: 204 LBS | DIASTOLIC BLOOD PRESSURE: 76 MMHG | OXYGEN SATURATION: 92 % | SYSTOLIC BLOOD PRESSURE: 112 MMHG | HEART RATE: 72 BPM | RESPIRATION RATE: 17 BRPM

## 2022-04-29 VITALS
OXYGEN SATURATION: 100 % | DIASTOLIC BLOOD PRESSURE: 62 MMHG | TEMPERATURE: 96 F | RESPIRATION RATE: 3 BRPM | SYSTOLIC BLOOD PRESSURE: 106 MMHG

## 2022-04-29 DIAGNOSIS — Z01.818 PRE-OP TESTING: ICD-10-CM

## 2022-04-29 LAB
ABO/RH: NORMAL
ANION GAP SERPL CALCULATED.3IONS-SCNC: 7 MMOL/L (ref 3–16)
ANTIBODY SCREEN: NORMAL
BASOPHILS ABSOLUTE: 0 K/UL (ref 0–0.2)
BASOPHILS RELATIVE PERCENT: 0.7 %
BUN BLDV-MCNC: 15 MG/DL (ref 7–20)
CALCIUM SERPL-MCNC: 8.4 MG/DL (ref 8.3–10.6)
CHLORIDE BLD-SCNC: 97 MMOL/L (ref 99–110)
CO2: 32 MMOL/L (ref 21–32)
CREAT SERPL-MCNC: 2.4 MG/DL (ref 0.8–1.3)
EOSINOPHILS ABSOLUTE: 0.1 K/UL (ref 0–0.6)
EOSINOPHILS RELATIVE PERCENT: 3.4 %
GFR AFRICAN AMERICAN: 32
GFR NON-AFRICAN AMERICAN: 26
GLUCOSE BLD-MCNC: 101 MG/DL (ref 70–99)
GLUCOSE BLD-MCNC: 92 MG/DL (ref 70–99)
GLUCOSE BLD-MCNC: 96 MG/DL (ref 70–99)
HCT VFR BLD CALC: 38.1 % (ref 40.5–52.5)
HEMOGLOBIN: 12 G/DL (ref 13.5–17.5)
LYMPHOCYTES ABSOLUTE: 1.3 K/UL (ref 1–5.1)
LYMPHOCYTES RELATIVE PERCENT: 33.9 %
MCH RBC QN AUTO: 28.5 PG (ref 26–34)
MCHC RBC AUTO-ENTMCNC: 31.6 G/DL (ref 31–36)
MCV RBC AUTO: 90.3 FL (ref 80–100)
MONOCYTES ABSOLUTE: 0.3 K/UL (ref 0–1.3)
MONOCYTES RELATIVE PERCENT: 6.9 %
NEUTROPHILS ABSOLUTE: 2.2 K/UL (ref 1.7–7.7)
NEUTROPHILS RELATIVE PERCENT: 55.1 %
PDW BLD-RTO: 17.2 % (ref 12.4–15.4)
PERFORMED ON: NORMAL
PERFORMED ON: NORMAL
PLATELET # BLD: 163 K/UL (ref 135–450)
PMV BLD AUTO: 7.8 FL (ref 5–10.5)
POTASSIUM REFLEX MAGNESIUM: 4.7 MMOL/L (ref 3.5–5.1)
RBC # BLD: 4.21 M/UL (ref 4.2–5.9)
SODIUM BLD-SCNC: 136 MMOL/L (ref 136–145)
WBC # BLD: 3.9 K/UL (ref 4–11)

## 2022-04-29 PROCEDURE — L1830 KO IMMOB CANVAS LONG PRE OTS: HCPCS | Performed by: SURGERY

## 2022-04-29 PROCEDURE — 2580000003 HC RX 258: Performed by: ANESTHESIOLOGY

## 2022-04-29 PROCEDURE — 86901 BLOOD TYPING SEROLOGIC RH(D): CPT

## 2022-04-29 PROCEDURE — 3700000001 HC ADD 15 MINUTES (ANESTHESIA): Performed by: SURGERY

## 2022-04-29 PROCEDURE — 7100000000 HC PACU RECOVERY - FIRST 15 MIN: Performed by: SURGERY

## 2022-04-29 PROCEDURE — 86850 RBC ANTIBODY SCREEN: CPT

## 2022-04-29 PROCEDURE — 2709999900 HC NON-CHARGEABLE SUPPLY: Performed by: SURGERY

## 2022-04-29 PROCEDURE — 6360000002 HC RX W HCPCS: Performed by: SURGERY

## 2022-04-29 PROCEDURE — 7100000010 HC PHASE II RECOVERY - FIRST 15 MIN: Performed by: SURGERY

## 2022-04-29 PROCEDURE — 3700000000 HC ANESTHESIA ATTENDED CARE: Performed by: SURGERY

## 2022-04-29 PROCEDURE — 80048 BASIC METABOLIC PNL TOTAL CA: CPT

## 2022-04-29 PROCEDURE — A4217 STERILE WATER/SALINE, 500 ML: HCPCS | Performed by: SURGERY

## 2022-04-29 PROCEDURE — 3600000004 HC SURGERY LEVEL 4 BASE: Performed by: SURGERY

## 2022-04-29 PROCEDURE — 36825 ARTERY-VEIN AUTOGRAFT: CPT | Performed by: SURGERY

## 2022-04-29 PROCEDURE — 7100000001 HC PACU RECOVERY - ADDTL 15 MIN: Performed by: SURGERY

## 2022-04-29 PROCEDURE — 86900 BLOOD TYPING SEROLOGIC ABO: CPT

## 2022-04-29 PROCEDURE — 6370000000 HC RX 637 (ALT 250 FOR IP): Performed by: ANESTHESIOLOGY

## 2022-04-29 PROCEDURE — 7100000011 HC PHASE II RECOVERY - ADDTL 15 MIN: Performed by: SURGERY

## 2022-04-29 PROCEDURE — 6360000002 HC RX W HCPCS: Performed by: NURSE ANESTHETIST, CERTIFIED REGISTERED

## 2022-04-29 PROCEDURE — 3600000014 HC SURGERY LEVEL 4 ADDTL 15MIN: Performed by: SURGERY

## 2022-04-29 PROCEDURE — 2580000003 HC RX 258: Performed by: SURGERY

## 2022-04-29 PROCEDURE — 2500000003 HC RX 250 WO HCPCS: Performed by: NURSE ANESTHETIST, CERTIFIED REGISTERED

## 2022-04-29 PROCEDURE — 85025 COMPLETE CBC W/AUTO DIFF WBC: CPT

## 2022-04-29 RX ORDER — SODIUM CHLORIDE 0.9 % (FLUSH) 0.9 %
5-40 SYRINGE (ML) INJECTION PRN
Status: DISCONTINUED | OUTPATIENT
Start: 2022-04-29 | End: 2022-04-29 | Stop reason: HOSPADM

## 2022-04-29 RX ORDER — SODIUM CHLORIDE 0.9 % (FLUSH) 0.9 %
5-40 SYRINGE (ML) INJECTION PRN
Status: DISCONTINUED | OUTPATIENT
Start: 2022-04-29 | End: 2022-04-29 | Stop reason: SDUPTHER

## 2022-04-29 RX ORDER — SODIUM CHLORIDE 9 MG/ML
INJECTION, SOLUTION INTRAVENOUS CONTINUOUS
Status: DISCONTINUED | OUTPATIENT
Start: 2022-04-29 | End: 2022-04-29 | Stop reason: HOSPADM

## 2022-04-29 RX ORDER — ONDANSETRON 2 MG/ML
4 INJECTION INTRAMUSCULAR; INTRAVENOUS
Status: DISCONTINUED | OUTPATIENT
Start: 2022-04-29 | End: 2022-04-29 | Stop reason: HOSPADM

## 2022-04-29 RX ORDER — SODIUM CHLORIDE 0.9 % (FLUSH) 0.9 %
5-40 SYRINGE (ML) INJECTION EVERY 12 HOURS SCHEDULED
Status: DISCONTINUED | OUTPATIENT
Start: 2022-04-29 | End: 2022-04-29 | Stop reason: HOSPADM

## 2022-04-29 RX ORDER — OXYCODONE HYDROCHLORIDE 5 MG/1
5 TABLET ORAL ONCE
Status: COMPLETED | OUTPATIENT
Start: 2022-04-29 | End: 2022-04-29

## 2022-04-29 RX ORDER — FENTANYL CITRATE 50 UG/ML
25 INJECTION, SOLUTION INTRAMUSCULAR; INTRAVENOUS EVERY 5 MIN PRN
Status: DISCONTINUED | OUTPATIENT
Start: 2022-04-29 | End: 2022-04-29 | Stop reason: HOSPADM

## 2022-04-29 RX ORDER — SODIUM CHLORIDE 9 MG/ML
INJECTION, SOLUTION INTRAVENOUS PRN
Status: DISCONTINUED | OUTPATIENT
Start: 2022-04-29 | End: 2022-04-29 | Stop reason: HOSPADM

## 2022-04-29 RX ORDER — EPHEDRINE SULFATE/0.9% NACL/PF 50 MG/5 ML
SYRINGE (ML) INTRAVENOUS PRN
Status: DISCONTINUED | OUTPATIENT
Start: 2022-04-29 | End: 2022-04-29 | Stop reason: SDUPTHER

## 2022-04-29 RX ORDER — ROCURONIUM BROMIDE 10 MG/ML
INJECTION, SOLUTION INTRAVENOUS PRN
Status: DISCONTINUED | OUTPATIENT
Start: 2022-04-29 | End: 2022-04-29 | Stop reason: SDUPTHER

## 2022-04-29 RX ORDER — HEPARIN SODIUM 10000 [USP'U]/ML
INJECTION, SOLUTION INTRAVENOUS; SUBCUTANEOUS PRN
Status: DISCONTINUED | OUTPATIENT
Start: 2022-04-29 | End: 2022-04-29 | Stop reason: SDUPTHER

## 2022-04-29 RX ORDER — FENTANYL CITRATE 50 UG/ML
50 INJECTION, SOLUTION INTRAMUSCULAR; INTRAVENOUS EVERY 5 MIN PRN
Status: DISCONTINUED | OUTPATIENT
Start: 2022-04-29 | End: 2022-04-29 | Stop reason: HOSPADM

## 2022-04-29 RX ORDER — LIDOCAINE HYDROCHLORIDE 20 MG/ML
INJECTION, SOLUTION EPIDURAL; INFILTRATION; INTRACAUDAL; PERINEURAL PRN
Status: DISCONTINUED | OUTPATIENT
Start: 2022-04-29 | End: 2022-04-29 | Stop reason: SDUPTHER

## 2022-04-29 RX ORDER — SODIUM CHLORIDE 0.9 % (FLUSH) 0.9 %
5-40 SYRINGE (ML) INJECTION EVERY 12 HOURS SCHEDULED
Status: DISCONTINUED | OUTPATIENT
Start: 2022-04-29 | End: 2022-04-29 | Stop reason: SDUPTHER

## 2022-04-29 RX ORDER — PROPOFOL 10 MG/ML
INJECTION, EMULSION INTRAVENOUS PRN
Status: DISCONTINUED | OUTPATIENT
Start: 2022-04-29 | End: 2022-04-29 | Stop reason: SDUPTHER

## 2022-04-29 RX ORDER — FENTANYL CITRATE 50 UG/ML
INJECTION, SOLUTION INTRAMUSCULAR; INTRAVENOUS PRN
Status: DISCONTINUED | OUTPATIENT
Start: 2022-04-29 | End: 2022-04-29 | Stop reason: SDUPTHER

## 2022-04-29 RX ORDER — ONDANSETRON 2 MG/ML
INJECTION INTRAMUSCULAR; INTRAVENOUS PRN
Status: DISCONTINUED | OUTPATIENT
Start: 2022-04-29 | End: 2022-04-29 | Stop reason: SDUPTHER

## 2022-04-29 RX ORDER — DEXAMETHASONE SODIUM PHOSPHATE 4 MG/ML
INJECTION, SOLUTION INTRA-ARTICULAR; INTRALESIONAL; INTRAMUSCULAR; INTRAVENOUS; SOFT TISSUE PRN
Status: DISCONTINUED | OUTPATIENT
Start: 2022-04-29 | End: 2022-04-29 | Stop reason: SDUPTHER

## 2022-04-29 RX ORDER — SODIUM CHLORIDE 9 MG/ML
25 INJECTION, SOLUTION INTRAVENOUS PRN
Status: DISCONTINUED | OUTPATIENT
Start: 2022-04-29 | End: 2022-04-29 | Stop reason: SDUPTHER

## 2022-04-29 RX ADMIN — FENTANYL CITRATE 50 MCG: 50 INJECTION INTRAMUSCULAR; INTRAVENOUS at 09:31

## 2022-04-29 RX ADMIN — SODIUM CHLORIDE: 9 INJECTION, SOLUTION INTRAVENOUS at 09:05

## 2022-04-29 RX ADMIN — PROPOFOL 130 MG: 10 INJECTION, EMULSION INTRAVENOUS at 09:31

## 2022-04-29 RX ADMIN — SUGAMMADEX 200 MG: 100 INJECTION, SOLUTION INTRAVENOUS at 10:55

## 2022-04-29 RX ADMIN — HEPARIN SODIUM 2000 UNITS: 10000 INJECTION INTRAVENOUS; SUBCUTANEOUS at 10:16

## 2022-04-29 RX ADMIN — ROCURONIUM BROMIDE 50 MG: 10 INJECTION INTRAVENOUS at 09:31

## 2022-04-29 RX ADMIN — ONDANSETRON 4 MG: 2 INJECTION INTRAMUSCULAR; INTRAVENOUS at 10:52

## 2022-04-29 RX ADMIN — CEFAZOLIN 2000 MG: 10 INJECTION, POWDER, FOR SOLUTION INTRAVENOUS at 09:22

## 2022-04-29 RX ADMIN — Medication 5 MG: at 09:46

## 2022-04-29 RX ADMIN — Medication 10 MG: at 09:44

## 2022-04-29 RX ADMIN — LIDOCAINE HYDROCHLORIDE 80 MG: 20 INJECTION, SOLUTION EPIDURAL; INFILTRATION; INTRACAUDAL; PERINEURAL at 09:31

## 2022-04-29 RX ADMIN — Medication 10 MG: at 10:48

## 2022-04-29 RX ADMIN — DEXAMETHASONE SODIUM PHOSPHATE 4 MG: 4 INJECTION, SOLUTION INTRAMUSCULAR; INTRAVENOUS at 09:52

## 2022-04-29 RX ADMIN — OXYCODONE 5 MG: 5 TABLET ORAL at 13:07

## 2022-04-29 ASSESSMENT — PULMONARY FUNCTION TESTS
PIF_VALUE: 13
PIF_VALUE: 14
PIF_VALUE: 15
PIF_VALUE: 14
PIF_VALUE: 14
PIF_VALUE: 15
PIF_VALUE: 1
PIF_VALUE: 14
PIF_VALUE: 13
PIF_VALUE: 15
PIF_VALUE: 13
PIF_VALUE: 13
PIF_VALUE: 14
PIF_VALUE: 13
PIF_VALUE: 14
PIF_VALUE: 13
PIF_VALUE: 14
PIF_VALUE: 15
PIF_VALUE: 0
PIF_VALUE: 15
PIF_VALUE: 14
PIF_VALUE: 13
PIF_VALUE: 14
PIF_VALUE: 15
PIF_VALUE: 13
PIF_VALUE: 14
PIF_VALUE: 14
PIF_VALUE: 13
PIF_VALUE: 13
PIF_VALUE: 15
PIF_VALUE: 0
PIF_VALUE: 14
PIF_VALUE: 15
PIF_VALUE: 13
PIF_VALUE: 24
PIF_VALUE: 4
PIF_VALUE: 3
PIF_VALUE: 13
PIF_VALUE: 1
PIF_VALUE: 27
PIF_VALUE: 15
PIF_VALUE: 14
PIF_VALUE: 15
PIF_VALUE: 31
PIF_VALUE: 13
PIF_VALUE: 14
PIF_VALUE: 0
PIF_VALUE: 15
PIF_VALUE: 13
PIF_VALUE: 15
PIF_VALUE: 13
PIF_VALUE: 20
PIF_VALUE: 26
PIF_VALUE: 15
PIF_VALUE: 15
PIF_VALUE: 14
PIF_VALUE: 14
PIF_VALUE: 16
PIF_VALUE: 13
PIF_VALUE: 15
PIF_VALUE: 21
PIF_VALUE: 21
PIF_VALUE: 5
PIF_VALUE: 1
PIF_VALUE: 14
PIF_VALUE: 13
PIF_VALUE: 14
PIF_VALUE: 15
PIF_VALUE: 15
PIF_VALUE: 13
PIF_VALUE: 14
PIF_VALUE: 13
PIF_VALUE: 13
PIF_VALUE: 14
PIF_VALUE: 2
PIF_VALUE: 14
PIF_VALUE: 15
PIF_VALUE: 13
PIF_VALUE: 14
PIF_VALUE: 14
PIF_VALUE: 13
PIF_VALUE: 1
PIF_VALUE: 14
PIF_VALUE: 13
PIF_VALUE: 15
PIF_VALUE: 15
PIF_VALUE: 13

## 2022-04-29 ASSESSMENT — PAIN DESCRIPTION - ORIENTATION
ORIENTATION: LEFT
ORIENTATION: LEFT

## 2022-04-29 ASSESSMENT — PAIN SCALES - GENERAL
PAINLEVEL_OUTOF10: 0
PAINLEVEL_OUTOF10: 4
PAINLEVEL_OUTOF10: 4
PAINLEVEL_OUTOF10: 2

## 2022-04-29 ASSESSMENT — PAIN DESCRIPTION - PAIN TYPE
TYPE: SURGICAL PAIN
TYPE: SURGICAL PAIN

## 2022-04-29 ASSESSMENT — PAIN DESCRIPTION - DESCRIPTORS
DESCRIPTORS: SORE
DESCRIPTORS: SORE

## 2022-04-29 ASSESSMENT — PAIN DESCRIPTION - LOCATION
LOCATION: ARM
LOCATION: ARM

## 2022-04-29 ASSESSMENT — PAIN DESCRIPTION - ONSET: ONSET: GRADUAL

## 2022-04-29 ASSESSMENT — ENCOUNTER SYMPTOMS: SHORTNESS OF BREATH: 0

## 2022-04-29 ASSESSMENT — PAIN DESCRIPTION - FREQUENCY: FREQUENCY: CONTINUOUS

## 2022-04-29 NOTE — PROGRESS NOTES
[address] picked up Pt to return to [address] [place] [address]. Daughter Lorraine Drake given discharge instructions and copy sent with Pt. All belongings sent with Pt at d/c.

## 2022-04-29 NOTE — PROGRESS NOTES
Patient admitted to PACU from OR. Patient opens eyes to name. Resp easy unlabored on 2LNC with SAO2 100%. Left arm surgical glue dressing with edges well approximated and LILIANA. Left with thrill palpated and bruit auscultated. Left arm elevated on pillow. Abdomen rounded soft. IV patent to right hand. Vascath to right anterior chest noted and capped. VSS.

## 2022-04-29 NOTE — PROGRESS NOTES
Patient more awake and alert. Resp easy unlabored on room air O2 with SaO2 92%. Patient repositioned to high fowlers position. Moving all extremities to command. VSS. Hand off report to [employer].

## 2022-04-29 NOTE — BRIEF OP NOTE
Brief Postoperative Note      Patient: Jd Wyman  YOB: 1945  MRN: [id number]    Date of Procedure: 4/29/2022    Pre-Op Diagnosis: END STAGE RENAL DISEASE    Post-Op Diagnosis: Same       Procedure(s):  LEFT BRACHIAL CEPHALIC FISTULA CREATION    Surgeon(s):  Gonzales Grider MD    Assistant:  Surgical Assistant: Mitali Parker    Anesthesia: General    Estimated Blood Loss (mL): less than 50     Complications: None    Specimens:   * No specimens in log *    Implants:  * No implants in log *      Drains:   [REMOVED] External Urinary Catheter (Removed)       Findings: as above.  Good thrill postop    Electronically signed by Gonzales Grider MD on 4/29/2022 at 10:56 AM

## 2022-04-29 NOTE — H&P
Subjective:       Colt Phillips is a 77 y.o. male S/P L radiocephalic AVF which thrombosed after many years of use. Remains on HD via catheter.     Past Medical History:   Diagnosis Date    Anemia 03/2022    Arthritis     CAD (coronary artery disease) 01/2020    Cancer [facility])     Colon Cancer diagnosed last month    Cerebral infarction ([facility]     Cognitive communication deficit     COVID-19     Diabetes mellitus ([facility]     Dysphagia     End stage renal disease ([facility]     Fatigue     Gastrointestinal hemorrhage     Hemodialysis patient ([facility] 2019    ckd-goes to dialysis Tuesday, Thurs, Fri    Hx of blood clots     Hyperlipidemia     Hypertension     Hyponatremia     Risk for falls     Splenomegaly 02/10/2021     Past Surgical History:   Procedure Laterality Date    CARDIAC CATHETERIZATION  2019    COLECTOMY N/A 01/26/2022    SIGMOID COLECTOMY, SIGMOIDOSCOPY performed by Maykel Flores MD at [facility] IR EMBOLIZATION HEMORRHAGE  02/19/2022    IR EMBOLIZATION HEMORRHAGE 2/19/2022 [facility]    IR PERC ARTERIOVENOUS FISTULA CREATION Left     unsure of date    IR TUNNELED CATHETER PLACEMENT GREATER THAN 5 YEARS  2/21/2022    IR TUNNELED CATHETER PLACEMENT GREATER THAN 5 YEARS 2/21/2022 [facility]    SIGMOIDOSCOPY N/A 02/17/2022    SIGMOIDOSCOPY CONTROL HEMORRHAGE performed by Ingrid Ovalle MD at [address] Right 02/21/2022    Permacath; RIJ access; 23cm; Dr. Terry Weiner     Family History   Problem Relation Age of Onset    High Blood Pressure Father      Social History     Socioeconomic History    Marital status:      Spouse name: None    Number of children: None    Years of education: None    Highest education level: None   Occupational History    None   Tobacco Use    Smoking status: Former Smoker    Smokeless tobacco: Never Used   Vaping Use    Vaping Use: Never used   Substance and Sexual Activity    Alcohol use: Not Currently    Drug use: Never    Sexual activity: None   Other Topics Concern    None   Social History Narrative    None     Social Determinants of Health     Financial Resource Strain:     Difficulty of Paying Living Expenses: Not on file   Food Insecurity:     Worried About Running Out of Food in the Last Year: Not on file    Brook[place]nn of Food in the Last Year: Not on file   Transportation Needs:     Lack of Transportation (Medical): Not on file    Lack of Transportation (Non-Medical):  Not on file   Physical Activity:     Days of Exercise per Week: Not on file    Minutes of Exercise per Session: Not on file   Stress:     Feeling of Stress : Not on file   Social Connections:     Frequency of Communication with Friends and Family: Not on file    Frequency of Social Gatherings with Friends and Family: Not on file    Attends Mandaen Services: Not on file    Active Member of [address] [place] [address] [employer] or Organizations: Not on file    Attends Club or Organization Meetings: Not on file    Marital Status: Not on file   Intimate Partner Violence:     Fear of Current or Ex-Partner: Not on file    Emotionally Abused: Not on file    Physically Abused: Not on file    Sexually Abused: Not on file   Housing Stability:     Unable to Pay for Housing in the Last Year: Not on file    Number of [place] in the Last Year: Not on file    Unstable Housing in the Last Year: Not on file     Current Facility-Administered Medications   Medication Dose Route Frequency Provider Last Rate Last Admin    0.9 % sodium chloride infusion   IntraVENous Continuous Shante Hodge MD        sodium chloride flush 0.9 % injection 5-40 mL  5-40 mL IntraVENous 2 times per day Shante Hodge MD        sodium chloride flush 0.9 % injection 5-40 mL  5-40 mL IntraVENous PRN Shante Hodge MD        0.9 % sodium chloride infusion   IntraVENous PRN Shante Hodge MD        ceFAZolin (ANCEF) 2000 mg in dextrose 5 % 100 mL IVPB  2,000 mg IntraVENous On Call to [address], MD         Allergies   Allergen Reactions    Lisinopril Swelling     Allergy listed on paperwork from [facility], no reaction noted       Review of Systems  Pertinent items are noted in HPI. Objective:      /78   Pulse 65   Resp 15   Ht 6' 0.5\" (1.842 m)   Wt 204 lb (92.5 kg)   SpO2 93%   BMI 27.29 kg/m²     General:  alert, appears stated age and cooperative   Skin:  normal   Eyes: conjunctivae/corneas clear. PERRL, EOM's intact. Fundi benign. Mouth: MMM no lesions   Lymph Nodes:  Cervical, supraclavicular, and axillary nodes normal.   Lungs:  clear to auscultation bilaterally   Heart:  regular rate and rhythm, S1, S2 normal, no murmur, click, rub or gallop   Abdomen: soft, non-tender; bowel sounds normal; no masses,  no organomegaly   CVA:  absent   Genitourinary: defer exam   Extremities: Thrombosed L radiocephalic AVF with normal L brachial pulse   Neurologic:  negative   Psychiatric:  non focal       Assessment:     ESRD  Thrombosed L radiocephalic AVF  UTI - oral antibx       Plan:     Creation of L brachiocephalic AVF  Continue po antibiotics.     Sally Wilcox

## 2022-04-29 NOTE — ANESTHESIA PRE PROCEDURE
nightly    Historical Provider, MD   atorvastatin (LIPITOR) 10 MG tablet Take 10 mg by mouth nightly    Historical Provider, MD   calcitRIOL (ROCALTROL) 0.25 MCG capsule Take 0.25 mcg by mouth every evening    Historical Provider, MD       Current medications:    Current Facility-Administered Medications   Medication Dose Route Frequency Provider Last Rate Last Admin    0.9 % sodium chloride infusion   IntraVENous Continuous Katiana Maynard MD        sodium chloride flush 0.9 % injection 5-40 mL  5-40 mL IntraVENous 2 times per day Katiana Maynard MD        sodium chloride flush 0.9 % injection 5-40 mL  5-40 mL IntraVENous PRN Katiana Maynard MD        0.9 % sodium chloride infusion   IntraVENous PRN Katiana Maynard MD        ceFAZolin (ANCEF) 2000 mg in dextrose 5 % 100 mL IVPB  2,000 mg IntraVENous On Call to [address], MD           Allergies:     Allergies   Allergen Reactions    Lisinopril Swelling     Allergy listed on paperwork from , no reaction noted       Problem List:    Patient Active Problem List   Diagnosis Code    GI bleed K92.2    History of COVID-19 Z86.16    Hyponatremia E87.1    Fatigue R53.83    Acute kidney injury superimposed on CKD ([facility] N17.9, N18.9    Lethargy R53.83    Suspected UTI R39.89    Acute CVA (cerebrovascular accident) ([facility] I63.9    LESLEE (acute kidney injury) ([facility] N17.9    Malignant neoplasm of colon ([facility] C18.9    Acute blood loss anemia D62    COVID-19 U07.1    Shock circulatory (HCC) R57.9    Bilateral pulmonary embolism (HCC) I26.99    Lactic acidosis E87.2    Hemorrhagic shock (HCC) R57.8       Past Medical History:        Diagnosis Date    Anemia 03/2022    Arthritis     CAD (coronary artery disease) 01/2020    Cancer ([facility]     Colon Cancer diagnosed last month    Cerebral infarction ([facility]     Cognitive communication deficit     COVID-19     Diabetes mellitus ([facility]     Dysphagia     End stage renal disease ([facility]     Fatigue     Gastrointestinal hemorrhage     Hemodialysis [facility] ([facility] 2019    ckd-goes to dialysis Tuesday, Thurs, Fri    Hx of blood clots     Hyperlipidemia     Hypertension     Hyponatremia     Risk for falls     Splenomegaly 02/10/2021       Past Surgical History:        Procedure Laterality Date    CARDIAC CATHETERIZATION  2019    COLECTOMY N/A 01/26/2022    SIGMOID COLECTOMY, SIGMOIDOSCOPY performed by Soumya Avery MD at [place] [address]  02/19/2022    IR EMBOLIZATION HEMORRHAGE 2/19/2022 [facility]    IR PERC ARTERIOVENOUS FISTULA CREATION Left     unsure of date    IR TUNNELED CATHETER PLACEMENT GREATER THAN 5 YEARS  2/21/2022    IR TUNNELED CATHETER PLACEMENT GREATER THAN 5 YEARS 2/21/2022 [facility]    SIGMOIDOSCOPY N/A 02/17/2022    SIGMOIDOSCOPY CONTROL HEMORRHAGE performed by Stefanie Martel MD at [address] Right 02/21/2022    Permacath; RIJ access; 23cm; Dr. Lynn Duffy       Social History:    Social History     Tobacco Use    Smoking status: Former Smoker    Smokeless tobacco: Never Used   Substance Use Topics    Alcohol use: Not Currently                                Counseling given: Not Answered      Vital Signs (Current):   Vitals:    04/14/22 1027 04/29/22 0848   BP:  126/78   Pulse:  65   Resp:  15   SpO2:  93%   Weight: 204 lb (92.5 kg) 204 lb (92.5 kg)   Height: 6' 5\" (1.956 m) 6' 0.5\" (1.842 m)                                              BP Readings from Last 3 Encounters:   04/29/22 126/78   04/22/22 101/67   04/06/22 95/65       NPO Status:                                                                                 BMI:   Wt Readings from Last 3 Encounters:   04/29/22 204 lb (92.5 kg)   04/22/22 203 lb 14.8 oz (92.5 kg)   04/06/22 224 lb (101.6 kg)     Body mass index is 27.29 kg/m².     CBC:   Lab Results   Component Value Date    WBC 3.9 04/29/2022 RBC 4.21 04/29/2022    HGB 12.0 04/29/2022    HCT 38.1 04/29/2022    MCV 90.3 04/29/2022    RDW 17.2 04/29/2022     04/29/2022       CMP:   Lab Results   Component Value Date     04/22/2022    K 3.4 04/22/2022     04/22/2022    CO2 31 04/22/2022    BUN 19 04/22/2022    CREATININE 4.0 04/22/2022    GFRAA 18 04/22/2022    GFRAA 35 07/26/2011    AGRATIO 1.0 04/22/2022    LABGLOM 15 04/22/2022    GLUCOSE 158 04/22/2022    PROT 6.2 04/22/2022    PROT 7.5 07/26/2011    CALCIUM 8.4 04/22/2022    BILITOT 0.7 04/22/2022    ALKPHOS 166 04/22/2022    AST 13 04/22/2022    ALT 24 04/22/2022       POC Tests:   Recent Labs     04/29/22  0903   POCGLU 92       Coags:   Lab Results   Component Value Date    PROTIME 21.0 03/08/2022    INR 1.81 03/08/2022    APTT 43.4 03/08/2022       HCG (If Applicable): No results found for: PREGTESTUR, PREGSERUM, HCG, HCGQUANT     ABGs: No results found for: PHART, PO2ART, VTI9KEC, VLO2CLO, BEART, I0NBCBHR     Type & Screen (If Applicable):  No results found for: LABABO, LABRH    Drug/Infectious Status (If Applicable):  No results found for: HIV, HEPCAB    COVID-19 Screening (If Applicable):   Lab Results   Component Value Date    COVID19 DETECTED 02/10/2022           Anesthesia Evaluation  Patient summary reviewed no history of anesthetic complications:   Airway: Mallampati: II  TM distance: >3 FB   Neck ROM: full  Comment: Prior airway:  Direct laryngoscopy; Type Cuffed; Tube size 8 mm; Laryngoscope Mac; Blade size 3; Location Oral; Grade view 1; Insertion attempts 1;  Atraumatic Yes  Mouth opening: > = 3 FB Dental: normal exam         Pulmonary:Negative Pulmonary ROS breath sounds clear to auscultation      (-) COPD, asthma, shortness of breath and recent URI                           Cardiovascular:  Exercise tolerance: poor (<4 METS),   (+) hypertension:, CAD:, dysrhythmias: atrial fibrillation,     (-) valvular problems/murmurs and  CHF      Rhythm: regular  Rate: normal           Beta Blocker:  Dose within 24 Hrs      ROS comment: TTE 1/2022:  Conclusions      Summary   Left ventricular size is normal.   Moderate concentric left ventricular hypertrophy is present. Global left ventricular function is normal with ejection fraction estimated   from 55 % to 60 %. Grade II diastolic dysfunction with elevated LV filling pressures. Normal right ventricular size and function. Neuro/Psych:   (+) CVA:,             GI/Hepatic/Renal:   (+) renal disease (HD on T, Th, F): ESRD,      (-) liver disease       Endo/Other:    (+) Diabetes, .    (-) hypothyroidism               Abdominal:   (+) obese,           Vascular:   + PE (Hx of PE back in 2/2022 on eliquis). Other Findings:           Anesthesia Plan      general     ASA 3     (Patient with failed left radiocephalic fistula, presenting for creation of brachiocephalic fistula. Has been diagnosed with UTI recently and is being treated with antibiotics. Had HD yesterday without issue and appears to be overall euvolemic. I discussed with the patient the risks and benefits to general anesthesia including possible anesthetic complications but not limited to: PONV, damage to the airway and surrounding structures (teeth, lips, gums, tongue, etc.), adverse reactions to medications, cardiac complications (MI, CHF, arrhythmias, etc.), respiratory complications (post-op ventilation, respiratory failure, etc.), neurologic complications (nerve damage, stroke, seizure), the potential for conversion to a general anesthetic, and death. The patient was given the opportunity to ask questions and all questions were answered to the patient's satisfaction.  )  Induction: intravenous. Anesthetic plan and risks discussed with patient. Use of blood products discussed with patient whom. Plan discussed with CRNA.         This pre-anesthesia assessment may be used as a history and physical.    DOS STAFF ADDENDUM:    Pt seen and examined, chart reviewed (including anesthesia, drug and allergy history). No interval changes to history and physical examination. Anesthetic plan, risks, benefits, alternatives, and personnel involved discussed with patient. Patient verbalized an understanding and agrees to proceed.       Abi Wisdom MD  April 29, 2022  9:14 AM

## 2022-04-29 NOTE — PROGRESS NOTES
Call placed to [address] [place] [address] to obtain transport number.  [address] called, this RN was told to hold that they would return call

## 2022-04-29 NOTE — ANESTHESIA POSTPROCEDURE EVALUATION
Department of Anesthesiology  Postprocedure Note    Patient: Jose F Burrell  MRN: [id number]  YOB: 1945  Date of evaluation: 4/29/2022  Time:  5:58 PM     Procedure Summary     Date: 04/29/22 Room / Location: [facility] OR [address] [place] [address]    Anesthesia Start: 6105 Anesthesia Stop: 1110    Procedure: LEFT BRACHIAL CEPHALIC FISTULA (Left ) Diagnosis:       End stage renal disease ([address]      (END STAGE RENAL DISEASE)    Surgeons: Jennifer Ferreira MD Responsible Provider: Kelly Lala MD    Anesthesia Type: general ASA Status: 3          Anesthesia Type: general    Nba Phase I: Nba Score: 10    Nba Phase II: Nba Score: 10    Last vitals: Reviewed and per EMR flowsheets.        Anesthesia Post Evaluation    Patient location during evaluation: PACU  Level of consciousness: awake and alert  Airway patency: patent  Nausea & Vomiting: no nausea and no vomiting  Complications: no  Cardiovascular status: blood pressure returned to baseline  Respiratory status: acceptable  Hydration status: euvolemic  Comments: Postoperative Anesthesia Note    Name:    Jose F Burrell  MRN:      [id number]    Patient Vitals in the past 12 hrs:  04/29/22 1345, BP:112/76, Temp:97.5 °F (36.4 °C), Pulse:72, Resp:17, SpO2:92 %  04/29/22 1307, Resp:18  04/29/22 1242, BP:111/77, Temp:97.3 °F (36.3 °C), Temp src:Temporal, Pulse:70, Resp:17, SpO2:90 %  04/29/22 1228, Temp:97 °F (36.1 °C), Temp src:Temporal  04/29/22 1200, BP:125/79, Pulse:68, Resp:15, SpO2:(!) 88 %  04/29/22 1145, BP:135/79, Pulse:67, Resp:18, SpO2:93 %  04/29/22 1130, BP:132/71, Pulse:67, Resp:20, SpO2:99 %  04/29/22 1126, Pulse:66, Resp:16, SpO2:97 %  04/29/22 1125, BP:125/74, Pulse:66, Resp:19, SpO2:97 %  04/29/22 1124, Pulse:65, Resp:16, SpO2:97 %  04/29/22 1123, Pulse:64, Resp:19, SpO2:96 %  04/29/22 1122, Pulse:65, Resp:17, SpO2:96 %  04/29/22 1121, Pulse:66, Resp:14, SpO2:97 %  04/29/22 1120, BP:131/70, Pulse:65, Resp:15, SpO2:96 %  04/29/22 1119, Pulse:65, Resp:19, SpO2:96 %  04/29/22 1118, Pulse:64, Resp:15, SpO2:97 %  04/29/22 1117, Pulse:65, Resp:19, SpO2:96 %  04/29/22 1116, Pulse:66, Resp:19, SpO2:96 %  04/29/22 1115, BP:131/70, Pulse:64, Resp:17, SpO2:97 %  04/29/22 1114, Pulse:64, Resp:15, SpO2:96 %  04/29/22 1113, Pulse:65, Resp:19, SpO2:96 %  04/29/22 1112, Pulse:66, Resp:17, SpO2:97 %  04/29/22 1111, Pulse:65, Resp:21, SpO2:96 %  04/29/22 1110, BP:(!) 149/70, Temp:98 °F (36.7 °C), Temp src:Temporal, Pulse:68, Resp:18, SpO2:100 %  04/29/22 0848, BP:126/78, Pulse:65, Resp:15, SpO2:93 %, Height:6' 0.5\" (1.842 m), Weight:204 lb (92.5 kg)     LABS:    CBC  Lab Results       Component                Value               Date/Time                  WBC                      3.9 (L)             04/29/2022 09:00 AM        HGB                      12.0 (L)            04/29/2022 09:00 AM        HCT                      38.1 (L)            04/29/2022 09:00 AM        PLT                      163                 04/29/2022 09:00 AM   RENAL  Lab Results       Component                Value               Date/Time                  NA                       136                 04/29/2022 09:00 AM        K                        4.7                 04/29/2022 09:00 AM        CL                       97 (L)              04/29/2022 09:00 AM        CO2                      32                  04/29/2022 09:00 AM        BUN                      15                  04/29/2022 09:00 AM        CREATININE               2.4 (H)             04/29/2022 09:00 AM        GLUCOSE                  101 (H)             04/29/2022 09:00 AM   COAGS  Lab Results       Component                Value               Date/Time                  PROTIME                  21.0 (H)            03/08/2022 05:14 PM        INR                      1.81 (H)            03/08/2022 05:14 PM        APTT                     43.4 (H)            03/08/2022 05:14 PM     Intake & Output:  @23SEZJ@    Nausea & Vomiting:  No    Level of Consciousness:  Awake    Pain Assessment:  Adequate analgesia    Anesthesia Complications:  No apparent anesthetic complications    SUMMARY      Vital signs stable  OK to discharge from Stage I post anesthesia care.   Care transferred from Anesthesiology department on discharge from perioperative area

## 2022-04-30 NOTE — OP NOTE
[address] [place] [address] [facility]                                OPERATIVE REPORT    PATIENT NAME: Viviane Kasper                        :        1945  MED REC NO:   [id number]                          ROOM:  ACCOUNT NO:   [de-identified]                           ADMIT DATE: 2022  PROVIDER:     Surya Welch MD    DATE OF PROCEDURE:  2022    PREOPERATIVE DIAGNOSIS:  End-stage renal disease with thrombosed left  radiocephalic fistula. POSTOPERATIVE DIAGNOSIS:  End-stage renal disease with thrombosed left  radiocephalic fistula. OPERATION PERFORMED:  Creation of left brachiocephalic arteriovenous  fistula. SURGEON:  Surya Welch MD    ANESTHESIA:  General endotracheal.    ESTIMATED BLOOD LOSS:  Less than 50 mL. INDICATIONS:  The patient is a 27-year-old gentleman who had a left  wrist radiocephalic fistula created at least five years ago. It has  functioned well; however, recently, it thrombosed and he was seen in the  office for reevaluation. His upper arm cephalic vein was quite large  and without any signs of fibrotic changes. It was recommended that he  have creation of a left brachiocephalic fistula for continued access. He and his family agreed understanding the risks, benefits, and other  options. OPERATIVE PROCEDURE:  The patient was brought to the operating room and  placed on the operating table in supine position. After adequate  induction of anesthesia, the ultrasound was used to identify the  cephalic vein and brachial artery in the antecubital fossa and they were  marked on the skin. The arm was then prepped and draped in a sterile  fashion. A transverse incision was made in the left antecubital fossa centered on  the artery and vein.   The subcutaneous tissue was divided using  electrocautery and the antecubital vein was identified where it divided  into the cephalic and basilic vein.  The inflow cephalic vein was  mobilized and controlled with a vessel loop, as was the outflow cephalic  vein, and a large perforating vein off the antecubital vein was  identified and controlled with vessel loop, as was the outflow basilic  vein. These were mobilized throughout their lengths to facilitate  mobilization to the brachial artery. A large branch of the lateral aspect of the more proximal cephalic vein  was identified and divided between a 2-0 silk and a clip to further  facilitate mobilization. The antecubital aponeurosis was then incised  over the palpable brachial pulse and the artery was identified. It was  of good caliber and quality and quite soft with an excellent pulse. It  was carefully mobilized for a distance of 1.5 cm length, controlled  proximally and distally with vessel loops. At this point, the patient was given 2000 units of heparin. After  adequate circulation time, a small bulldog clamp was applied to the  outflow cephalic vein. The distal basilic vein as it originated off the  antecubital vein was ligated with a 2-0 silk and the deep perforating  vein was also ligated with a 2-0 silk and the inflow cephalic vein was  divided and suture ligated with a 2-0 silk suture ligature. This left a  small defect in the anticipated pham of the cephalic vein to be repaired  subsequently. A 1-cm long arteriotomy was made in the brachial artery. The vein graft  was then spatulated slightly and debrided and an end-to-side anastomosis  was created using a single running 6-0 Prolene, beginning in the heel  and running around the toe. The anastomosis was then completed. The  small defect in the wall of the cephalic vein where the  had  originated was oversewn with a running 6-0 Prolene. Prior to completion of this repair, the artery and vein were forward and  backbled and flushed with heparinized saline. The repair was completed  and flow was then restored.   There was noted to be good hemostasis at  the anastomosis and the vein had excellent flow with an easily palpable  thrill. Hemostasis was ensured with Surgicel and electrocautery. The  wound was then closed with a running 3-0 Vicryl for the subcutaneous  tissue over the anastomosis and a running 4-0 Monocryl with Dermabond  for the skin incision. The patient was then extubated and transferred to the recovery room in  stable condition, having tolerated the procedure well.         Frederick Marr MD    D: 04/29/2022 11:11:37       T: 04/29/2022 12:25:47     [id number]  Job#: [id number]     Doc#: [id number]    CC:

## 2022-05-11 ENCOUNTER — HOSPITAL ENCOUNTER (INPATIENT)
Age: 77
LOS: 7 days | Discharge: NURSING, REHAB OR LONG-TERM CARE | DRG: 417 | End: 2022-05-19
Attending: FAMILY MEDICINE | Admitting: FAMILY MEDICINE
Payer: MEDICARE

## 2022-05-11 ENCOUNTER — APPOINTMENT (OUTPATIENT)
Dept: CT IMAGING | Age: 77
DRG: 417 | End: 2022-05-11
Payer: MEDICARE

## 2022-05-11 DIAGNOSIS — R10.10 PAIN OF UPPER ABDOMEN: Primary | ICD-10-CM

## 2022-05-11 LAB
A/G RATIO: 1 (ref 1.1–2.2)
ALBUMIN SERPL-MCNC: 3.1 G/DL (ref 3.4–5)
ALP BLD-CCNC: 101 U/L (ref 40–129)
ALT SERPL-CCNC: 7 U/L (ref 10–40)
ANION GAP SERPL CALCULATED.3IONS-SCNC: 13 MMOL/L (ref 3–16)
AST SERPL-CCNC: 15 U/L (ref 15–37)
BASOPHILS ABSOLUTE: 0 K/UL (ref 0–0.2)
BASOPHILS RELATIVE PERCENT: 0.6 %
BILIRUB SERPL-MCNC: 0.7 MG/DL (ref 0–1)
BUN BLDV-MCNC: 21 MG/DL (ref 7–20)
CALCIUM SERPL-MCNC: 8.5 MG/DL (ref 8.3–10.6)
CHLORIDE BLD-SCNC: 97 MMOL/L (ref 99–110)
CO2: 30 MMOL/L (ref 21–32)
CREAT SERPL-MCNC: 3.3 MG/DL (ref 0.8–1.3)
EOSINOPHILS ABSOLUTE: 0.1 K/UL (ref 0–0.6)
EOSINOPHILS RELATIVE PERCENT: 1.8 %
GFR AFRICAN AMERICAN: 22
GFR NON-AFRICAN AMERICAN: 18
GLUCOSE BLD-MCNC: 140 MG/DL (ref 70–99)
HCT VFR BLD CALC: 36.3 % (ref 40.5–52.5)
HEMOGLOBIN: 11.9 G/DL (ref 13.5–17.5)
LIPASE: 25 U/L (ref 13–60)
LYMPHOCYTES ABSOLUTE: 1 K/UL (ref 1–5.1)
LYMPHOCYTES RELATIVE PERCENT: 21.3 %
MCH RBC QN AUTO: 29.5 PG (ref 26–34)
MCHC RBC AUTO-ENTMCNC: 32.9 G/DL (ref 31–36)
MCV RBC AUTO: 89.8 FL (ref 80–100)
MONOCYTES ABSOLUTE: 0.2 K/UL (ref 0–1.3)
MONOCYTES RELATIVE PERCENT: 5.1 %
NEUTROPHILS ABSOLUTE: 3.3 K/UL (ref 1.7–7.7)
NEUTROPHILS RELATIVE PERCENT: 71.2 %
PDW BLD-RTO: 17.8 % (ref 12.4–15.4)
PLATELET # BLD: 150 K/UL (ref 135–450)
PMV BLD AUTO: 7.4 FL (ref 5–10.5)
POTASSIUM REFLEX MAGNESIUM: 3.9 MMOL/L (ref 3.5–5.1)
RBC # BLD: 4.05 M/UL (ref 4.2–5.9)
SODIUM BLD-SCNC: 140 MMOL/L (ref 136–145)
TOTAL PROTEIN: 6.2 G/DL (ref 6.4–8.2)
WBC # BLD: 4.6 K/UL (ref 4–11)

## 2022-05-11 PROCEDURE — 83690 ASSAY OF LIPASE: CPT

## 2022-05-11 PROCEDURE — 6360000004 HC RX CONTRAST MEDICATION: Performed by: PHYSICIAN ASSISTANT

## 2022-05-11 PROCEDURE — 85025 COMPLETE CBC W/AUTO DIFF WBC: CPT

## 2022-05-11 PROCEDURE — 93005 ELECTROCARDIOGRAM TRACING: CPT | Performed by: PHYSICIAN ASSISTANT

## 2022-05-11 PROCEDURE — 80053 COMPREHEN METABOLIC PANEL: CPT

## 2022-05-11 PROCEDURE — 6370000000 HC RX 637 (ALT 250 FOR IP): Performed by: PHYSICIAN ASSISTANT

## 2022-05-11 PROCEDURE — 74177 CT ABD & PELVIS W/CONTRAST: CPT

## 2022-05-11 PROCEDURE — 99285 EMERGENCY DEPT VISIT HI MDM: CPT

## 2022-05-11 RX ORDER — HYDROCODONE BITARTRATE AND ACETAMINOPHEN 5; 325 MG/1; MG/1
1 TABLET ORAL ONCE
Status: COMPLETED | OUTPATIENT
Start: 2022-05-11 | End: 2022-05-11

## 2022-05-11 RX ADMIN — IOPAMIDOL 75 ML: 755 INJECTION, SOLUTION INTRAVENOUS at 23:17

## 2022-05-11 RX ADMIN — HYDROCODONE BITARTRATE AND ACETAMINOPHEN 1 TABLET: 5; 325 TABLET ORAL at 21:41

## 2022-05-11 ASSESSMENT — ENCOUNTER SYMPTOMS
SHORTNESS OF BREATH: 0
NAUSEA: 1
EYE REDNESS: 0
CONSTIPATION: 0
COUGH: 0
ABDOMINAL PAIN: 1
BACK PAIN: 0
EYE PAIN: 0
VOMITING: 1
SORE THROAT: 0
DIARRHEA: 0

## 2022-05-11 ASSESSMENT — PAIN DESCRIPTION - PAIN TYPE: TYPE: ACUTE PAIN

## 2022-05-11 ASSESSMENT — PAIN SCALES - GENERAL
PAINLEVEL_OUTOF10: 9
PAINLEVEL_OUTOF10: 4
PAINLEVEL_OUTOF10: 9

## 2022-05-11 ASSESSMENT — PAIN DESCRIPTION - LOCATION
LOCATION: ABDOMEN
LOCATION: ABDOMEN

## 2022-05-11 ASSESSMENT — PAIN DESCRIPTION - DESCRIPTORS: DESCRIPTORS: DISCOMFORT

## 2022-05-12 PROBLEM — K81.0 ACUTE CHOLECYSTITIS: Status: ACTIVE | Noted: 2022-05-12

## 2022-05-12 LAB
ALBUMIN SERPL-MCNC: 3.3 G/DL (ref 3.4–5)
ANION GAP SERPL CALCULATED.3IONS-SCNC: 15 MMOL/L (ref 3–16)
APTT: 41.2 SEC (ref 26.2–38.6)
APTT: 75.2 SEC (ref 26.2–38.6)
APTT: 83 SEC (ref 26.2–38.6)
BASOPHILS ABSOLUTE: 0 K/UL (ref 0–0.2)
BASOPHILS RELATIVE PERCENT: 0.4 %
BUN BLDV-MCNC: 24 MG/DL (ref 7–20)
CALCIUM SERPL-MCNC: 8.2 MG/DL (ref 8.3–10.6)
CHLORIDE BLD-SCNC: 99 MMOL/L (ref 99–110)
CO2: 22 MMOL/L (ref 21–32)
CREAT SERPL-MCNC: 3.9 MG/DL (ref 0.8–1.3)
EKG ATRIAL RATE: 62 BPM
EKG DIAGNOSIS: NORMAL
EKG P AXIS: 48 DEGREES
EKG P-R INTERVAL: 240 MS
EKG Q-T INTERVAL: 424 MS
EKG QRS DURATION: 80 MS
EKG QTC CALCULATION (BAZETT): 430 MS
EKG R AXIS: -30 DEGREES
EKG T AXIS: -5 DEGREES
EKG VENTRICULAR RATE: 62 BPM
EOSINOPHILS ABSOLUTE: 0 K/UL (ref 0–0.6)
EOSINOPHILS RELATIVE PERCENT: 0.5 %
GFR AFRICAN AMERICAN: 18
GFR NON-AFRICAN AMERICAN: 15
GLUCOSE BLD-MCNC: 107 MG/DL (ref 70–99)
GLUCOSE BLD-MCNC: 119 MG/DL (ref 70–99)
HCT VFR BLD CALC: 41.9 % (ref 40.5–52.5)
HEMOGLOBIN: 12.8 G/DL (ref 13.5–17.5)
LYMPHOCYTES ABSOLUTE: 0.7 K/UL (ref 1–5.1)
LYMPHOCYTES RELATIVE PERCENT: 11.5 %
MCH RBC QN AUTO: 29.1 PG (ref 26–34)
MCHC RBC AUTO-ENTMCNC: 30.6 G/DL (ref 31–36)
MCV RBC AUTO: 94.9 FL (ref 80–100)
MONOCYTES ABSOLUTE: 0.4 K/UL (ref 0–1.3)
MONOCYTES RELATIVE PERCENT: 6.2 %
NEUTROPHILS ABSOLUTE: 4.7 K/UL (ref 1.7–7.7)
NEUTROPHILS RELATIVE PERCENT: 81.4 %
PDW BLD-RTO: 17.8 % (ref 12.4–15.4)
PERFORMED ON: ABNORMAL
PHOSPHORUS: 5.1 MG/DL (ref 2.5–4.9)
PLATELET # BLD: 124 K/UL (ref 135–450)
PMV BLD AUTO: 7.5 FL (ref 5–10.5)
POTASSIUM SERPL-SCNC: 4.3 MMOL/L (ref 3.5–5.1)
PROCALCITONIN: 0.28 NG/ML (ref 0–0.15)
RBC # BLD: 4.41 M/UL (ref 4.2–5.9)
SODIUM BLD-SCNC: 136 MMOL/L (ref 136–145)
WBC # BLD: 5.7 K/UL (ref 4–11)

## 2022-05-12 PROCEDURE — 6360000002 HC RX W HCPCS: Performed by: FAMILY MEDICINE

## 2022-05-12 PROCEDURE — 6370000000 HC RX 637 (ALT 250 FOR IP): Performed by: FAMILY MEDICINE

## 2022-05-12 PROCEDURE — APPNB180 APP NON BILLABLE TIME > 60 MINS: Performed by: NURSE PRACTITIONER

## 2022-05-12 PROCEDURE — 2500000003 HC RX 250 WO HCPCS: Performed by: PHYSICIAN ASSISTANT

## 2022-05-12 PROCEDURE — 36415 COLL VENOUS BLD VENIPUNCTURE: CPT

## 2022-05-12 PROCEDURE — 93010 ELECTROCARDIOGRAM REPORT: CPT | Performed by: INTERNAL MEDICINE

## 2022-05-12 PROCEDURE — APPSS15 APP SPLIT SHARED TIME 0-15 MINUTES: Performed by: NURSE PRACTITIONER

## 2022-05-12 PROCEDURE — 80069 RENAL FUNCTION PANEL: CPT

## 2022-05-12 PROCEDURE — 5A1D70Z PERFORMANCE OF URINARY FILTRATION, INTERMITTENT, LESS THAN 6 HOURS PER DAY: ICD-10-PCS | Performed by: INTERNAL MEDICINE

## 2022-05-12 PROCEDURE — 9990000010 HC NO CHARGE VISIT

## 2022-05-12 PROCEDURE — 1200000000 HC SEMI PRIVATE

## 2022-05-12 PROCEDURE — 2580000003 HC RX 258: Performed by: FAMILY MEDICINE

## 2022-05-12 PROCEDURE — 85730 THROMBOPLASTIN TIME PARTIAL: CPT

## 2022-05-12 PROCEDURE — 2580000003 HC RX 258: Performed by: PHYSICIAN ASSISTANT

## 2022-05-12 PROCEDURE — 2700000000 HC OXYGEN THERAPY PER DAY

## 2022-05-12 PROCEDURE — 2500000003 HC RX 250 WO HCPCS: Performed by: FAMILY MEDICINE

## 2022-05-12 PROCEDURE — 94761 N-INVAS EAR/PLS OXIMETRY MLT: CPT

## 2022-05-12 PROCEDURE — 6360000002 HC RX W HCPCS: Performed by: PHYSICIAN ASSISTANT

## 2022-05-12 PROCEDURE — 90935 HEMODIALYSIS ONE EVALUATION: CPT

## 2022-05-12 PROCEDURE — 84145 PROCALCITONIN (PCT): CPT

## 2022-05-12 PROCEDURE — 85025 COMPLETE CBC W/AUTO DIFF WBC: CPT

## 2022-05-12 RX ORDER — GABAPENTIN 100 MG/1
100 CAPSULE ORAL 3 TIMES DAILY
Status: DISCONTINUED | OUTPATIENT
Start: 2022-05-12 | End: 2022-05-20 | Stop reason: HOSPADM

## 2022-05-12 RX ORDER — ONDANSETRON 4 MG/1
4 TABLET, FILM COATED ORAL EVERY 8 HOURS PRN
COMMUNITY
End: 2022-10-20

## 2022-05-12 RX ORDER — SODIUM CHLORIDE 9 MG/ML
INJECTION, SOLUTION INTRAVENOUS CONTINUOUS
Status: DISCONTINUED | OUTPATIENT
Start: 2022-05-12 | End: 2022-05-12

## 2022-05-12 RX ORDER — POTASSIUM CHLORIDE 7.45 MG/ML
10 INJECTION INTRAVENOUS PRN
Status: DISCONTINUED | OUTPATIENT
Start: 2022-05-12 | End: 2022-05-12

## 2022-05-12 RX ORDER — POTASSIUM CHLORIDE 20 MEQ/1
40 TABLET, EXTENDED RELEASE ORAL PRN
Status: DISCONTINUED | OUTPATIENT
Start: 2022-05-12 | End: 2022-05-12

## 2022-05-12 RX ORDER — 0.9 % SODIUM CHLORIDE 0.9 %
1000 INTRAVENOUS SOLUTION INTRAVENOUS ONCE
Status: DISCONTINUED | OUTPATIENT
Start: 2022-05-12 | End: 2022-05-16

## 2022-05-12 RX ORDER — FAMOTIDINE 10 MG/ML
20 INJECTION, SOLUTION INTRAVENOUS DAILY
Status: DISCONTINUED | OUTPATIENT
Start: 2022-05-12 | End: 2022-05-20 | Stop reason: HOSPADM

## 2022-05-12 RX ORDER — HEPARIN SODIUM 5000 [USP'U]/ML
5000 INJECTION, SOLUTION INTRAVENOUS; SUBCUTANEOUS EVERY 8 HOURS SCHEDULED
Status: DISCONTINUED | OUTPATIENT
Start: 2022-05-12 | End: 2022-05-12

## 2022-05-12 RX ORDER — ASPIRIN 81 MG/1
81 TABLET, CHEWABLE ORAL DAILY
Status: DISCONTINUED | OUTPATIENT
Start: 2022-05-12 | End: 2022-05-20 | Stop reason: HOSPADM

## 2022-05-12 RX ORDER — ACETAMINOPHEN 650 MG/1
650 SUPPOSITORY RECTAL EVERY 6 HOURS PRN
Status: DISCONTINUED | OUTPATIENT
Start: 2022-05-12 | End: 2022-05-20 | Stop reason: HOSPADM

## 2022-05-12 RX ORDER — LANOLIN ALCOHOL/MO/W.PET/CERES
6 CREAM (GRAM) TOPICAL NIGHTLY PRN
Status: DISCONTINUED | OUTPATIENT
Start: 2022-05-12 | End: 2022-05-20 | Stop reason: HOSPADM

## 2022-05-12 RX ORDER — SODIUM CHLORIDE, SODIUM LACTATE, POTASSIUM CHLORIDE, CALCIUM CHLORIDE 600; 310; 30; 20 MG/100ML; MG/100ML; MG/100ML; MG/100ML
INJECTION, SOLUTION INTRAVENOUS CONTINUOUS
Status: DISCONTINUED | OUTPATIENT
Start: 2022-05-12 | End: 2022-05-17

## 2022-05-12 RX ORDER — ATENOLOL 25 MG/1
25 TABLET ORAL NIGHTLY
Status: DISCONTINUED | OUTPATIENT
Start: 2022-05-12 | End: 2022-05-20 | Stop reason: HOSPADM

## 2022-05-12 RX ORDER — TAMSULOSIN HYDROCHLORIDE 0.4 MG/1
0.4 CAPSULE ORAL EVERY MORNING
Status: DISCONTINUED | OUTPATIENT
Start: 2022-05-12 | End: 2022-05-20 | Stop reason: HOSPADM

## 2022-05-12 RX ORDER — PANTOPRAZOLE SODIUM 20 MG/1
20 TABLET, DELAYED RELEASE ORAL NIGHTLY
Status: DISCONTINUED | OUTPATIENT
Start: 2022-05-12 | End: 2022-05-20 | Stop reason: HOSPADM

## 2022-05-12 RX ORDER — METRONIDAZOLE 500 MG/100ML
500 INJECTION, SOLUTION INTRAVENOUS EVERY 8 HOURS
Status: DISCONTINUED | OUTPATIENT
Start: 2022-05-12 | End: 2022-05-17

## 2022-05-12 RX ORDER — SODIUM CHLORIDE 0.9 % (FLUSH) 0.9 %
5-40 SYRINGE (ML) INJECTION EVERY 12 HOURS SCHEDULED
Status: DISCONTINUED | OUTPATIENT
Start: 2022-05-12 | End: 2022-05-20 | Stop reason: HOSPADM

## 2022-05-12 RX ORDER — SODIUM CHLORIDE 0.9 % (FLUSH) 0.9 %
5-40 SYRINGE (ML) INJECTION PRN
Status: DISCONTINUED | OUTPATIENT
Start: 2022-05-12 | End: 2022-05-20 | Stop reason: HOSPADM

## 2022-05-12 RX ORDER — ENOXAPARIN SODIUM 100 MG/ML
1 INJECTION SUBCUTANEOUS 2 TIMES DAILY
Status: DISCONTINUED | OUTPATIENT
Start: 2022-05-12 | End: 2022-05-12

## 2022-05-12 RX ORDER — MORPHINE SULFATE 2 MG/ML
2 INJECTION, SOLUTION INTRAMUSCULAR; INTRAVENOUS EVERY 4 HOURS PRN
Status: DISCONTINUED | OUTPATIENT
Start: 2022-05-12 | End: 2022-05-20 | Stop reason: HOSPADM

## 2022-05-12 RX ORDER — POLYETHYLENE GLYCOL 3350 17 G/17G
17 POWDER, FOR SOLUTION ORAL DAILY PRN
Status: DISCONTINUED | OUTPATIENT
Start: 2022-05-12 | End: 2022-05-20 | Stop reason: HOSPADM

## 2022-05-12 RX ORDER — ATORVASTATIN CALCIUM 10 MG/1
10 TABLET, FILM COATED ORAL NIGHTLY
Status: DISCONTINUED | OUTPATIENT
Start: 2022-05-12 | End: 2022-05-20 | Stop reason: HOSPADM

## 2022-05-12 RX ORDER — SEVELAMER CARBONATE 800 MG/1
800 TABLET, FILM COATED ORAL
Status: DISCONTINUED | OUTPATIENT
Start: 2022-05-12 | End: 2022-05-20 | Stop reason: HOSPADM

## 2022-05-12 RX ORDER — ACETAMINOPHEN 325 MG/1
650 TABLET ORAL EVERY 6 HOURS PRN
Status: DISCONTINUED | OUTPATIENT
Start: 2022-05-12 | End: 2022-05-20 | Stop reason: HOSPADM

## 2022-05-12 RX ORDER — HEPARIN SODIUM 1000 [USP'U]/ML
4000 INJECTION, SOLUTION INTRAVENOUS; SUBCUTANEOUS ONCE
Status: COMPLETED | OUTPATIENT
Start: 2022-05-12 | End: 2022-05-12

## 2022-05-12 RX ORDER — ONDANSETRON 4 MG/1
4 TABLET, ORALLY DISINTEGRATING ORAL EVERY 8 HOURS PRN
Status: DISCONTINUED | OUTPATIENT
Start: 2022-05-12 | End: 2022-05-20 | Stop reason: HOSPADM

## 2022-05-12 RX ORDER — HYDROCODONE BITARTRATE AND ACETAMINOPHEN 5; 325 MG/1; MG/1
1 TABLET ORAL EVERY 6 HOURS PRN
Status: DISCONTINUED | OUTPATIENT
Start: 2022-05-12 | End: 2022-05-12 | Stop reason: ALTCHOICE

## 2022-05-12 RX ORDER — METRONIDAZOLE 500 MG/100ML
500 INJECTION, SOLUTION INTRAVENOUS ONCE
Status: COMPLETED | OUTPATIENT
Start: 2022-05-12 | End: 2022-05-12

## 2022-05-12 RX ORDER — LORAZEPAM 0.5 MG/1
0.5 TABLET ORAL 2 TIMES DAILY
Status: DISCONTINUED | OUTPATIENT
Start: 2022-05-12 | End: 2022-05-20 | Stop reason: HOSPADM

## 2022-05-12 RX ORDER — DICYCLOMINE HYDROCHLORIDE 10 MG/1
10 CAPSULE ORAL EVERY 6 HOURS PRN
Status: DISCONTINUED | OUTPATIENT
Start: 2022-05-12 | End: 2022-05-20 | Stop reason: HOSPADM

## 2022-05-12 RX ORDER — HEPARIN SODIUM 10000 [USP'U]/100ML
0-3000 INJECTION, SOLUTION INTRAVENOUS CONTINUOUS
Status: DISCONTINUED | OUTPATIENT
Start: 2022-05-12 | End: 2022-05-18

## 2022-05-12 RX ORDER — ENOXAPARIN SODIUM 100 MG/ML
1 INJECTION SUBCUTANEOUS DAILY
Status: DISCONTINUED | OUTPATIENT
Start: 2022-05-12 | End: 2022-05-12 | Stop reason: ALTCHOICE

## 2022-05-12 RX ORDER — SODIUM CHLORIDE 9 MG/ML
INJECTION, SOLUTION INTRAVENOUS PRN
Status: DISCONTINUED | OUTPATIENT
Start: 2022-05-12 | End: 2022-05-20 | Stop reason: HOSPADM

## 2022-05-12 RX ORDER — CALCITRIOL 0.25 UG/1
0.25 CAPSULE, LIQUID FILLED ORAL EVERY EVENING
Status: DISCONTINUED | OUTPATIENT
Start: 2022-05-12 | End: 2022-05-20 | Stop reason: HOSPADM

## 2022-05-12 RX ORDER — DOCUSATE SODIUM 100 MG/1
100 CAPSULE, LIQUID FILLED ORAL 2 TIMES DAILY
COMMUNITY

## 2022-05-12 RX ORDER — ONDANSETRON 2 MG/ML
4 INJECTION INTRAMUSCULAR; INTRAVENOUS EVERY 6 HOURS PRN
Status: DISCONTINUED | OUTPATIENT
Start: 2022-05-12 | End: 2022-05-20 | Stop reason: HOSPADM

## 2022-05-12 RX ORDER — OXYCODONE HYDROCHLORIDE 5 MG/1
5 TABLET ORAL EVERY 4 HOURS PRN
Status: DISCONTINUED | OUTPATIENT
Start: 2022-05-12 | End: 2022-05-20 | Stop reason: HOSPADM

## 2022-05-12 RX ADMIN — HEPARIN SODIUM 1000 UNITS/HR: 10000 INJECTION, SOLUTION INTRAVENOUS at 06:06

## 2022-05-12 RX ADMIN — CEFTRIAXONE 1000 MG: 1 INJECTION, POWDER, FOR SOLUTION INTRAMUSCULAR; INTRAVENOUS at 03:12

## 2022-05-12 RX ADMIN — ATORVASTATIN CALCIUM 10 MG: 10 TABLET, FILM COATED ORAL at 20:26

## 2022-05-12 RX ADMIN — SEVELAMER CARBONATE 800 MG: 800 TABLET, FILM COATED ORAL at 18:36

## 2022-05-12 RX ADMIN — ACETAMINOPHEN 650 MG: 325 TABLET ORAL at 20:26

## 2022-05-12 RX ADMIN — LORAZEPAM 0.5 MG: 0.5 TABLET ORAL at 20:26

## 2022-05-12 RX ADMIN — SODIUM CHLORIDE, PRESERVATIVE FREE 10 ML: 5 INJECTION INTRAVENOUS at 20:30

## 2022-05-12 RX ADMIN — FAMOTIDINE 20 MG: 10 INJECTION INTRAVENOUS at 08:44

## 2022-05-12 RX ADMIN — GABAPENTIN 100 MG: 100 CAPSULE ORAL at 20:26

## 2022-05-12 RX ADMIN — METRONIDAZOLE 500 MG: 500 INJECTION, SOLUTION INTRAVENOUS at 20:31

## 2022-05-12 RX ADMIN — Medication 1000 ML: at 03:12

## 2022-05-12 RX ADMIN — SODIUM CHLORIDE, POTASSIUM CHLORIDE, SODIUM LACTATE AND CALCIUM CHLORIDE: 600; 310; 30; 20 INJECTION, SOLUTION INTRAVENOUS at 04:14

## 2022-05-12 RX ADMIN — HEPARIN SODIUM 4000 UNITS: 1000 INJECTION INTRAVENOUS; SUBCUTANEOUS at 06:03

## 2022-05-12 RX ADMIN — MORPHINE SULFATE 2 MG: 2 INJECTION, SOLUTION INTRAMUSCULAR; INTRAVENOUS at 08:49

## 2022-05-12 RX ADMIN — SODIUM CHLORIDE, POTASSIUM CHLORIDE, SODIUM LACTATE AND CALCIUM CHLORIDE: 600; 310; 30; 20 INJECTION, SOLUTION INTRAVENOUS at 18:45

## 2022-05-12 RX ADMIN — ATENOLOL 25 MG: 25 TABLET ORAL at 20:26

## 2022-05-12 RX ADMIN — METRONIDAZOLE 500 MG: 500 INJECTION, SOLUTION INTRAVENOUS at 04:13

## 2022-05-12 RX ADMIN — Medication 6 MG: at 20:27

## 2022-05-12 RX ADMIN — CALCITRIOL 0.25 MCG: 0.25 CAPSULE ORAL at 18:36

## 2022-05-12 RX ADMIN — SODIUM CHLORIDE, POTASSIUM CHLORIDE, SODIUM LACTATE AND CALCIUM CHLORIDE: 600; 310; 30; 20 INJECTION, SOLUTION INTRAVENOUS at 05:24

## 2022-05-12 RX ADMIN — PANTOPRAZOLE SODIUM 20 MG: 20 TABLET, DELAYED RELEASE ORAL at 20:25

## 2022-05-12 ASSESSMENT — PAIN DESCRIPTION - DESCRIPTORS: DESCRIPTORS: DISCOMFORT

## 2022-05-12 ASSESSMENT — PAIN SCALES - GENERAL
PAINLEVEL_OUTOF10: 0
PAINLEVEL_OUTOF10: 0
PAINLEVEL_OUTOF10: 3
PAINLEVEL_OUTOF10: 0
PAINLEVEL_OUTOF10: 0

## 2022-05-12 ASSESSMENT — PAIN DESCRIPTION - ORIENTATION: ORIENTATION: LEFT

## 2022-05-12 ASSESSMENT — PAIN DESCRIPTION - LOCATION
LOCATION: ABDOMEN
LOCATION: ABDOMEN

## 2022-05-12 NOTE — PLAN OF CARE
Problem: Discharge Planning  Goal: Discharge to home or other facility with appropriate resources  Outcome: Progressing  Flowsheets  Taken 5/12/2022 0520 by Nasima Lazo RN  Discharge to home or other facility with appropriate resources:   Identify barriers to discharge with patient and caregiver   Arrange for needed discharge resources and transportation as appropriate   Identify discharge learning needs (meds, wound care, etc)  Taken 5/12/2022 0455 by Nasima Lazo RN  Discharge to home or other facility with appropriate resources:   Identify barriers to discharge with patient and caregiver   Identify discharge learning needs (meds, wound care, etc)   Refer to discharge planning if patient needs post-hospital services based on physician order or complex needs related to functional status, cognitive ability or social support system     Problem: Pain  Goal: Verbalizes/displays adequate comfort level or baseline comfort level  Outcome: Progressing     Problem: Safety - Adult  Goal: Free from fall injury  Outcome: Progressing     Problem: ABCDS Injury Assessment  Goal: Absence of physical injury  Outcome: Progressing

## 2022-05-12 NOTE — PROGRESS NOTES
Physical Therapy  Attempt Note  [facility]  [id number]  PT orders received and pt's chart reviewed. Attempted to see pt this afternoon for PT evaluation, but unable due to pt being out of room for dialysis. Will attempt at a later time as the schedule permits.   Electronically signed by Keyonna Rodriguez PT on 5/12/2022 at 2:57 PM

## 2022-05-12 NOTE — PROGRESS NOTES
Hospitalist Progress Note      PCP: Edmar Vo    Date of Admission: 5/11/2022    Chief Complaint: abdominal pain    Hospital Course: 77 y.o. yo male who  has a past medical history of Anemia, Arthritis, CAD (coronary artery disease), Cancer ([facility] [address] Cerebral infarction ([facility] [address] Cognitive communication deficit, COVID-19, Diabetes mellitus ([facility] [address] Dysphagia, End stage renal disease ([facility] [address] Fatigue, Gastrointestinal hemorrhage, Hemodialysis patient ([facility] [address] Hx of blood clots, Hyperlipidemia, Hypertension, Hyponatremia, Risk for falls, and Splenomegaly. presents to [facility] emergency department complaining of right upper quadrant abdominal pain, sharp/stabbing in character, moderate to severe in intensity associated with nausea. Began the evening of presentation after eating a cheeseburger from a fast food chain. Nonradiating also associated with nonbilious emesis. Does note associated chills but has not taken his temperature to know if he has had a fever not. In the emergency department he is afebrile, his white blood cell count is normal his CMP is consistent with his known renal disease his AST and ALT are also normal as is his alk phosphatase. His lipase is only 25. Imaging is concerning for acute cholecystitis with some small gallstones at the neck of the gallbladder. No dilation of the common bile duct. Surgery was consulted and he was started on antibiotics. Will admit him for surgery to evaluate for possible cholecystectomy     Subjective: Pt seen and examined. He feels better. Abdominal pain improved.        Medications:  Reviewed    Infusion Medications    lactated ringers 100 mL/hr at 05/12/22 0524    sodium chloride      heparin (PORCINE) Infusion 1,000 Units/hr (05/12/22 0606)     Scheduled Medications    sodium chloride  1,000 mL IntraVENous Once    [START ON 5/13/2022] cefTRIAXone (ROCEPHIN) IV  1,000 mg IntraVENous Q24H    metroNIDAZOLE  500 mg IntraVENous Q8H    aspirin  81 mg Oral Daily    atenolol  25 mg Oral Nightly    atorvastatin  10 mg Oral Nightly    calcitRIOL  0.25 mcg Oral QPM    gabapentin  100 mg Oral TID    LORazepam  0.5 mg Oral BID    pantoprazole  20 mg Oral Nightly    sevelamer  800 mg Oral TID WC    tamsulosin  0.4 mg Oral QAM    sodium chloride flush  5-40 mL IntraVENous 2 times per day    famotidine (PEPCID) injection  20 mg IntraVENous Daily     PRN Meds: morphine, dicyclomine, melatonin, oxyCODONE, sodium chloride flush, sodium chloride, ondansetron **OR** ondansetron, polyethylene glycol, acetaminophen **OR** acetaminophen      Intake/Output Summary (Last 24 hours) at 5/12/2022 1532  Last data filed at 5/12/2022 0525  Gross per 24 hour   Intake 142.87 ml   Output --   Net 142.87 ml       Exam:    /69   Pulse 67   Temp 98.2 °F (36.8 °C)   Resp 16   Ht 6' (1.829 m)   Wt 218 lb 4.1 oz (99 kg)   SpO2 95%   BMI 29.60 kg/m²     General appearance: No apparent distress, appears stated age and cooperative. HEENT: Pupils equal, round, and reactive to light. Conjunctivae/corneas clear. Neck: Supple, with full range of motion. No jugular venous distention. Trachea midline. Respiratory:  Normal respiratory effort. Clear to auscultation, bilaterally without Rales/Wheezes/Rhonchi. Cardiovascular: Regular rate and rhythm with normal S1/S2 without murmurs, rubs or gallops. Abdomen: Soft, mild RUQ TTP, non-distended with normal bowel sounds. Musculoskeletal: No clubbing, cyanosis or edema bilaterally. Full range of motion without deformity. Skin: Skin color, texture, turgor normal.  No rashes or lesions. Neurologic:  Neurovascularly intact without any focal sensory/motor deficits.  Cranial nerves: II-XII intact, grossly non-focal.  Psychiatric: Alert and oriented, thought content appropriate, normal insight  Capillary Refill: Brisk,< 3 seconds   Peripheral Pulses: +2 palpable, equal bilaterally       Labs:   Recent Labs 05/11/22 2051 05/12/22  1419   WBC 4.6 5.7   HGB 11.9* 12.8*   HCT 36.3* 41.9    124*     Recent Labs     05/11/22 2051 05/12/22  1419    136   K 3.9 4.3   CL 97* 99   CO2 30 22   BUN 21* 24*   CREATININE 3.3* 3.9*   CALCIUM 8.5 8.2*   PHOS  --  5.1*     Recent Labs     05/11/22 2051   AST 15   ALT 7*   BILITOT 0.7   ALKPHOS 101     No results for input(s): INR in the last 72 hours. No results for input(s): Zettie Binet in the last 72 hours. Urinalysis:      Lab Results   Component Value Date    NITRU Negative 04/22/2022    WBCUA 124 04/22/2022    BACTERIA 2+ 04/22/2022    RBCUA see below 04/22/2022    RBCUA 0 04/22/2022    BLOODU MODERATE 04/22/2022    SPECGRAV 1.025 04/22/2022    GLUCOSEU Negative 04/22/2022       Radiology:  CT ABDOMEN PELVIS W IV CONTRAST Additional Contrast? None   Final Result   1. Dilated gallbladder with edematous wall, mucosal enhancement, and small   amount of pericholecystic fluid. Small gallstones are suggested at the   gallbladder neck to cystic duct region. Features are highly suspicious for   acute cholecystitis. If clinical doubt, then gallbladder ultrasound. 2.  Increased intrahepatic biliary ductal dilatation likely secondary to the   gallbladder pathology. 3.  Multiple cysts and other hypodensities too small to characterize within   both kidneys. No hydronephrosis. 4.  Residual borderline and mild thickening of the rectum and rectosigmoid   junction with previous surgery and anastomosis. No adjacent fluid collection   or general free fluid.                  Assessment/Plan:    Active Hospital Problems    Diagnosis Date Noted    Acute cholecystitis [K81.0] 05/12/2022     Priority: Medium       Acute cholecystitis  -on empiric ceftriaxone and Flagyl  -general surgery consulted  -deciding on timing of surgery given recent Eliquis use  -on heparin gtt in the interim    DVT  -heparin gtt  -Eliquis on hold    CAD  -continue home meds    ESRD on HD  -nephrology consulted, recs appreciated    Essential hypertension  -continue home meds      DVT Prophylaxis: heparin gtt  Diet: ADULT DIET;  Clear Liquid  Code Status: Full Code    PT/OT Eval Status: ordered    Dispo - continue care    Michelle Granda MD

## 2022-05-12 NOTE — DISCHARGE INSTR - COC
Continuity of Care Form    Patient Name: Dougie Barr   :    MRN:  [id number]    Admit date:  2022  Discharge date:  22    Code Status Order: Full Code   Advance Directives:      Admitting Physician:  Jarred Guerra MD  PCP: Melina Short    Discharging Nurse: Lori Miles RN  [facility] Unit/Room#: [id number]-2455/8946-33  Discharging Unit Phone Number: [phone]    Emergency Contact:   Extended Emergency Contact Information  Primary Emergency Contact: Kerline Link  Address: [address] [facility], [address] [place] [address] Phone: [phone]  Mobile Phone: [phone]  Relation: Spouse  Secondary Emergency Contact: Jung Georges Phone: [phone]  Mobile Phone: [phone]  Relation: Child   needed?  No    Past Surgical History:  Past Surgical History:   Procedure Laterality Date    CARDIAC CATHETERIZATION  2019    COLECTOMY N/A 2022    SIGMOID COLECTOMY, SIGMOIDOSCOPY performed by Pari Jean MD at [facility] Left 2022    LEFT BRACHIAL CEPHALIC FISTULA performed by Maria Del Carmen Flores MD at [address] [place] [address]  2022    IR EMBOLIZATION HEMORRHAGE 2022 [facility]    IR PERC ARTERIOVENOUS FISTULA CREATION Left     unsure of date    IR TUNNELED CATHETER PLACEMENT GREATER THAN 5 YEARS  2022    IR TUNNELED CATHETER PLACEMENT GREATER THAN 5 YEARS 2022 [facility]    SIGMOIDOSCOPY N/A 2022    SIGMOIDOSCOPY CONTROL HEMORRHAGE performed by Patricia Rowe MD at [facility] [address] Right 2022    Permacath; RIJ access; 23cm; Dr. Bianca Webber       Immunization History:   Immunization History   Administered Date(s) Administered    COVID-19, Climmie Messier, Primary or Immunocompromised, PF, 100mcg/0.5mL 2021, 2021       Active Problems:  Patient Active Problem List   Diagnosis Open to air 05/12/22 0850   Margins Approximated 05/12/22 0850   Drainage Amount None 05/12/22 0850   Number of days: 13       Incision 01/26/22 Abdomen (Active)   Number of days: 106        Elimination:  Continence: Bowel: No  Bladder: No  Urinary Catheter:      External Catheter  Colostomy/Ileostomy/Ileal Conduit: No       Date of Last BM: 5/18/22    Intake/Output Summary (Last 24 hours) at 5/12/2022 1516  Last data filed at 5/12/2022 0525  Gross per 24 hour   Intake 142.87 ml   Output --   Net 142.87 ml     I/O last 3 completed shifts: In: 142.9 [IV Piggyback:142.9]  Out: -     Safety Concerns:     Sundowners [place]    Impairments/Disabilities:      Hard of Hearing    Nutrition Therapy:  Current Nutrition Therapy:   - Oral Diet:  General    Routes of Feeding: Oral  Liquids: Thin Liquids  Daily Fluid Restriction: no  Last Modified Barium Swallow with Video (Video Swallowing Test): not done    Treatments at the Time of Hospital Discharge:   Respiratory Treatments: None  Oxygen Therapy:  is not on home oxygen therapy.   Ventilator:    - No ventilator support    Rehab Therapies: Physical Therapy and Occupational Therapy  Weight Bearing Status/Restrictions: No weight bearing restrictions  Other Medical Equipment (for information only, NOT a DME order):  None dependent for all mobility  Other Treatments: None    Patient's personal belongings (please select all that are sent with patient):  None    RN SIGNATURE:  Electronically signed by Pamla Kawasaki, RN on 5/19/22 at 11:58 AM EDT    CASE MANAGEMENT/SOCIAL WORK SECTION    Inpatient Status Date: 5/12/22    Readmission Risk Assessment Score:  Readmission Risk              Risk of Unplanned Readmission:  42           Discharging to Facility/ Agency   Name: [facility]na [facility]  Address:  [address]   Phone:  [phone]  Fax:  [phone]     Dialysis Facility (if applicable)   Name: [facility]  [address] [facility] [address]  Dialysis Schedule: TTS at 10:30 AM  Phone: [phone]  Fax: [phone]    / signature: Electronically signed by JORGE LUIS Kline on 5/19/2022 at 10:48 AM      PHYSICIAN SECTION    Prognosis: Fair    Condition at Discharge: Stable    Rehab Potential (if transferring to Rehab): Fair    Recommended Labs or Other Treatments After Discharge: NA    Physician Certification: I certify the above information and transfer of Melissa Claudio  is necessary for the continuing treatment of the diagnosis listed and that he requires [facility] for less 30 days.      Update Admission H&P: No change in H&P    PHYSICIAN SIGNATURE:  Electronically signed by Scooby Dorado MD on 5/18/22 at 5:14 PM EDT

## 2022-05-12 NOTE — ED NOTES
Pt placed on 2L NC due to O2 sats dropping to mid 80's on RA while pt was sleeping. Per pt's daughter he has sleep apnea.       Julio Baeza RN  05/12/22 2215

## 2022-05-12 NOTE — CARE COORDINATION
Patient came from [facility] prior to arrival.  Call to [facility], [phone], at [facility] who confirmed the patient is:  [x] [address], no Precert required for return. (Medicaid Pending)  [] [facility] will be required for return. [] Skilled Nursing Care, no Precert required for return. [] [address] required for return. Dialysis Facility (if applicable)   · Name: [facility]  · Address: [facility] [place] [address]  · Dialysis Schedule: TTS at 10:30 AM  · Phone: [phone]  · Fax: [phone]    [x] Is fully vaccinated for Covid. X 2.   [] Has started Covid vaccination, but is not complete. [] Is not vaccinated for Covid. [x] No Covid Test needed for return.  [] Rapid Covid test needed before return within: [] 48 hours, [] 72 hours, [] 5 days, [] other   [] PCR Covid test needed before return. [x] Confirmed with the patient or family that the plan is to return to this facility at D/C. [] Patient and/or designated decision maker does not plan for the patient to return to this facility at D/C.      Electronically signed by Fabian Figueroa RN on 5/12/2022 at 3:11 PM

## 2022-05-12 NOTE — ED NOTES
Report given to [address] [place] [address] to assume care. All questions answered, SBAR discussed, RN verbalized understanding.       Julio Lemon RN  05/12/22 8648

## 2022-05-12 NOTE — CONSULTS
[address] [place] [address] Nephrology   [facility]  [phone]  Nephrology Consult Note          Patient ID: Reza Carpenter  Referring/ Physician: Gonzalo Nguyen MD      HPI/Summary:   Reza Carpenter is being seen by nephrology for ESRD. This is a 78-year-old man with past medical history significant for GI bleed, colon cancer, hypertension presented to the hospital with right upper quadrant abdominal pain. This is new for him and is being getting worse for the past few days and has been nauseous. His pain currently is well controlled and seen on dialysis. He denies any fevers or chills but feeling very cold right now. Imaging in the ED showed possible acute cholecystitis no dilation of the common bile duct. He is currently on antibiotics. Pre weight 99   TW 94 kilos  /65  UF set for 3 L     Labs reviewed. Sodium 136 potassium 4.3 bicarb 22 BUN 24 creatinine 3.9  Calcium 8.2 and phosphorus 5.1  Albumin 3.3 and hemoglobin 12.8      Plan:   Dialysis per TTS schedule  UF to dry weight      ESRD  Dialyzes TTS  Target weight 94 kg  Has tunneled dialysis cath    Blood pressure  Acceptable  Appears euvolemic    S HPT  Continue calcitriol and Renvela    Anemia  At goal, does not need ELENA right now    Acute cholecystitis  On antibiotics and surgery consulted          [facility] would like to thank you for the opportunity to serve this patient. Please call with any questions or concerns. Magdy Eldridge MD  [facility]  [address]  Fax: [phone]  Office: [phone]         CC/Reason for consult:   Reason for consult: ESRD  Chief Complaint   Patient presents with    Emesis     x 4 hours    Nausea    Abdominal Pain           Review of Systems:   [address] All other remaining systems are negative. Constitutional:  fever, chills, weakness, weight change, fatigue,      Skin:  rash, pruritus, hair loss, bruising, dry skin, petechiae.   Head, Face, Neck   headaches, swelling,  cervical adenopathy. Respiratory: shortness of breath, cough, or wheezing  Cardiovascular: chest pain, palpitations, dizzy, edema  Gastrointestinal: nausea, vomiting, diarrhea, constipation,belly pain    Yellow skin, blood in stool  Musculoskeletal:  back pain, muscle weakness, gait problems,       joint pain or swelling. Genitourinary:  dysuria, poor urine flow, flank pain, blood in urine  Neurologic:  vertigo, TIA'S, syncope, seizures, focal weakness  Psychosocial:  insomnia, anxiety, or depression. Additional positive findings: -     PMH/SH/FH:    Medical Hx: reviewed and updated as appropriate  Past Medical History:   Diagnosis Date    Anemia 03/2022    Arthritis     CAD (coronary artery disease) 01/2020    Cancer ([facility] [address]     Colon Cancer diagnosed last month    Cerebral infarction ([facility] [address]     Cognitive communication deficit     COVID-19     Diabetes mellitus ([facility] [address]     Dysphagia     End stage renal disease ([facility] [address]     Fatigue     Gastrointestinal hemorrhage     Hemodialysis patient ([facility] [address] 2019    ckd-goes to dialysis Tuesday, Thurs, Fri    Hx of blood clots     Hyperlipidemia     Hypertension     Hyponatremia     Risk for falls     Splenomegaly 02/10/2021         Surgical Hx: reviewed and updated as appropriate   has a past surgical history that includes IR PERC ARTERIOVENOUS FISTULA CREATION (Left); colectomy (N/A, 01/26/2022); sigmoidoscopy (N/A, 02/17/2022); IR EMBOLIZATION HEMORRHAGE (02/19/2022); Tunneled venous catheter placement (Right, 02/21/2022); IR TUNNELED CVC PLACE WO SQ PORT/PUMP > 5 YEARS (2/21/2022); Cardiac catheterization (2019); Colonoscopy; and Dialysis fistula creation (Left, 4/29/2022).      Social Hx: reviewed and updated as appropriate  Social History     Tobacco Use    Smoking status: Former Smoker    Smokeless tobacco: Never Used   Substance Use Topics    Alcohol use: Not Currently        Family hx: reviewed and updated as appropriate  family history includes High Blood Pressure in his father. Medications:   sodium chloride, 1,000 mL, Once  [START ON 5/13/2022] cefTRIAXone (ROCEPHIN) IV, 1,000 mg, Q24H  metroNIDAZOLE, 500 mg, Q8H  aspirin, 81 mg, Daily  atenolol, 25 mg, Nightly  atorvastatin, 10 mg, Nightly  calcitRIOL, 0.25 mcg, QPM  gabapentin, 100 mg, TID  LORazepam, 0.5 mg, BID  pantoprazole, 20 mg, Nightly  sevelamer, 800 mg, TID WC  tamsulosin, 0.4 mg, QAM  sodium chloride flush, 5-40 mL, 2 times per day  famotidine (PEPCID) injection, 20 mg, Daily       Lisinopril    Allergies: Allergies   Allergen Reactions    Lisinopril Swelling     Allergy listed on paperwork from [facility], no reaction noted         Physical Exam/Objective:   Vitals:    05/12/22 0850   BP: 123/77   Pulse: 63   Resp: 17   Temp: 97.7 °F (36.5 °C)   SpO2: 99%       Intake/Output Summary (Last 24 hours) at 5/12/2022 6686  Last data filed at 5/12/2022 0525  Gross per 24 hour   Intake 142.87 ml   Output --   Net 142.87 ml         General appearance:  in no acute distress, comfortable, communicative, awake and alert. HEENT: no icterus, EOM intact, trachea midline. Neck : no masses, appears symmetrical and no JVD appreciated. Respiratory: Respiratory effort normal, bilateral equal chest rise. No wheeze, no crackles   Cardiovascular: Ausculation shows RRR and  no edema   Abdomen: abdomen is soft, non distended, no masses, no pain with palpation. Musculoskeletal:  no joint swelling, no deformity, strength grossly normal.   Skin: no rashes, no induration, no tightening, no jaundice   Neuro:   Follows commands, moves all extremities spontaneously       Data:   CBC:   Recent Labs     05/11/22 2051   WBC 4.6   HGB 11.9*   HCT 36.3*        BMP:    Recent Labs     05/11/22 2051      K 3.9   CL 97*   CO2 30   BUN 21*   CREATININE 3.3*   GLUCOSE 140*

## 2022-05-12 NOTE — ED PROVIDER NOTES
[address] [facility]        Pt Name: Margot Reddy  MRN: [id number]  Armstrong[place]adan [birth year]  Date of evaluation: 5/11/2022  Provider: Hardik Horner PA-C  PCP: Silvio Blevins  Note Started: 9:31 PM EDT      JERE. I have evaluated this patient. My supervising physician was available for consultation. Triage CHIEF COMPLAINT       Chief Complaint   Patient presents with    Emesis     x 4 hours    Nausea    Abdominal Pain         HISTORY OF PRESENT ILLNESS   (Location/Symptom, Timing/Onset, Context/Setting, Quality, Duration, Modifying Factors, Severity)  Note limiting factors. Chief Complaint: Abdominal pain, vomiting    Margot Reddy is a 77 y.o. male with PMH of: Cancer who presents to the emergency department with complaint of abdominal pain that has been present since prior to arrival.  Patient states that he was with his children when he ate a [employer] burger. He states that he started feeling sick shortly after. He did have several episodes of emesis. But he is mostly complaining of epigastric, upper abdominal pain. He states that it is worsened with palpation and movement. He describes the pain as a sharp pain. He denies radiation of pain into his back. He states that he has felt chilled, however he is not had any fevers. Nursing Notes were all reviewed and agreed with or any disagreements were addressed in the HPI. REVIEW OF SYSTEMS    (2-9 systems for level 4, 10 or more for level 5)     Review of Systems   Constitutional: Positive for chills. Negative for fever. HENT: Negative for ear pain and sore throat. Eyes: Negative for pain and redness. Respiratory: Negative for cough and shortness of breath. Cardiovascular: Negative for chest pain and leg swelling. Gastrointestinal: Positive for abdominal pain, nausea and vomiting. Negative for constipation and diarrhea.    Genitourinary: Negative for dysuria and hematuria. Musculoskeletal: Negative for back pain and neck pain. Skin: Negative for rash and wound. Neurological: Negative for light-headedness and headaches.        PAST MEDICAL HISTORY     Past Medical History:   Diagnosis Date    Anemia 03/2022    Arthritis     CAD (coronary artery disease) 01/2020    [facility]     Colon Cancer diagnosed last month    Cerebral infarction ([facility]     Cognitive communication deficit     COVID-19     Diabetes mellitus ([facility]     Dysphagia     End stage renal disease ([address]     Fatigue     Gastrointestinal hemorrhage     Hemodialysis patient ([facility] 2019    ckd-goes to dialysis Tuesday, Thurs, Fri    Hx of blood clots     Hyperlipidemia     Hypertension     Hyponatremia     Risk for falls     Splenomegaly 02/10/2021       SURGICAL HISTORY     Past Surgical History:   Procedure Laterality Date    CARDIAC CATHETERIZATION  2019    COLECTOMY N/A 01/26/2022    SIGMOID COLECTOMY, SIGMOIDOSCOPY performed by Soumya Avery MD at [address] Left 4/29/2022    LEFT BRACHIAL CEPHALIC FISTULA performed by Madeline Luque MD at [address] [facility]  02/19/2022    IR EMBOLIZATION HEMORRHAGE 2/19/2022 [facility]    IR PERC ARTERIOVENOUS FISTULA CREATION Left     unsure of date    IR TUNNELED CATHETER PLACEMENT GREATER THAN 5 YEARS  2/21/2022    IR TUNNELED CATHETER PLACEMENT GREATER THAN 5 YEARS 2/21/2022 [facility]    SIGMOIDOSCOPY N/A 02/17/2022    SIGMOIDOSCOPY CONTROL HEMORRHAGE performed by Stefanie Martel MD at [facility] [facility] Right 02/21/2022    Permacath; RIJ access; 23cm; Dr. Brayden Rice       Previous Medications    APIXABAN (ELIQUIS) 5 MG TABS TABLET    Take 1 tablet by mouth 2 times daily    ASPIRIN 81 MG CHEWABLE TABLET    Take 1 tablet by mouth daily    ATENOLOL (TENORMIN) 25 MG TABLET    Take 25 mg by mouth every evening    ATORVASTATIN (LIPITOR) 10 MG TABLET    Take 10 mg by mouth nightly    CALCITRIOL (ROCALTROL) 0.25 MCG CAPSULE    Take 0.25 mcg by mouth every evening    DICYCLOMINE (BENTYL) 10 MG CAPSULE    Take 1 capsule by mouth every 6 hours as needed (cramps)    GABAPENTIN (NEURONTIN) 100 MG CAPSULE    Take 100 mg by mouth 3 times daily. LORAZEPAM (ATIVAN) 0.5 MG TABLET    Take 0.5 mg by mouth 2 times daily. MELATONIN 3 MG TABS TABLET    Take 2 tablets by mouth nightly as needed (sleep)    ONDANSETRON (ZOFRAN ODT) 4 MG DISINTEGRATING TABLET    Take 1 tablet by mouth every 8 hours as needed for Nausea    OXYCODONE (ROXICODONE) 5 MG IMMEDIATE RELEASE TABLET    Take 5 mg by mouth every 4 hours as needed for Pain.     PANTOPRAZOLE (PROTONIX) 20 MG TABLET    Take 20 mg by mouth nightly    POLYETHYLENE GLYCOL (GLYCOLAX) 17 GM/SCOOP POWDER    Take 17 g by mouth daily    SEVELAMER (RENVELA) 800 MG TABLET    Take 1 tablet by mouth 3 times daily (with meals)    TAMSULOSIN (FLOMAX) 0.4 MG CAPSULE    Take 0.4 mg by mouth every morning       ALLERGIES     Lisinopril    FAMILYHISTORY       Family History   Problem Relation Age of Onset    High Blood Pressure Father         SOCIAL HISTORY       Social History     Socioeconomic History    Marital status:      Spouse name: None    Number of children: None    Years of education: None    Highest education level: None   Occupational History    None   Tobacco Use    Smoking status: Former Smoker    Smokeless tobacco: Never Used   Vaping Use    Vaping Use: Never used   Substance and Sexual Activity    Alcohol use: Not Currently    Drug use: Never    Sexual activity: None   Other Topics Concern    None   Social History Narrative    None     Social Determinants of Health     Financial Resource Strain:     Difficulty of Paying Living Expenses: Not on file   Food Insecurity:     Worried About Running Out of Food in the Last Year: Not on file    [place] of Food in the Last Year: Not on file   Transportation Needs:     Lack of Transportation (Medical): Not on file    Lack of Transportation (Non-Medical): Not on file   Physical Activity:     Days of Exercise per Week: Not on file    Minutes of Exercise per Session: Not on file   Stress:     Feeling of Stress : Not on file   Social Connections:     Frequency of Communication with Friends and Family: Not on file    Frequency of Social Gatherings with Friends and Family: Not on file    Attends Christianity Services: Not on file    Active Member of [address] [place] [address] [employer] or Organizations: Not on file    Attends Club or Organization Meetings: Not on file    Marital Status: Not on file   Intimate Partner Violence:     Fear of Current or Ex-Partner: Not on file    Emotionally Abused: Not on file    Physically Abused: Not on file    Sexually Abused: Not on file   Housing Stability:     Unable to Pay for Housing in the Last Year: Not on file    Number of [place] in the Last Year: Not on file    Unstable Housing in the Last Year: Not on file       SCREENINGS    [place] Coma Scale  Eye Opening: Spontaneous  Best Verbal Response: Oriented  Best Motor Response: Obeys commands  Karen Coma Scale Score: 15        PHYSICAL EXAM    (up to 7 for level 4, 8 or more for level 5)     ED Triage Vitals [05/11/22 2041]   BP Temp Temp Source Pulse Resp SpO2 Height Weight   138/75 97.7 °F (36.5 °C) Oral 66 16 98 % 6' (1.829 m) 216 lb 8 oz (98.2 kg)       Physical Exam  Constitutional:       General: He is not in acute distress. Appearance: Normal appearance. He is not ill-appearing, toxic-appearing or diaphoretic. HENT:      Head: Normocephalic and atraumatic. Right Ear: External ear normal.      Left Ear: External ear normal.      Nose: Nose normal.   Eyes:      General:         Right eye: No discharge. Left eye: No discharge. Cardiovascular:      Rate and Rhythm: Normal rate and regular rhythm.    Pulmonary: Effort: Pulmonary effort is normal. No respiratory distress. Abdominal:      Tenderness: There is generalized abdominal tenderness and tenderness in the right upper quadrant, epigastric area and left upper quadrant. There is no right CVA tenderness or left CVA tenderness. Genitourinary:     Testes:         Right: Swelling not present. Left: Swelling not present. Musculoskeletal:         General: Normal range of motion. Cervical back: Normal range of motion. Skin:     General: Skin is warm and dry. Neurological:      General: No focal deficit present. Mental Status: He is alert and oriented to person, place, and time. Psychiatric:         Mood and Affect: Mood normal.         Behavior: Behavior normal.         DIAGNOSTIC RESULTS   LABS:    Labs Reviewed   CBC WITH AUTO DIFFERENTIAL - Abnormal; Notable for the following components:       Result Value    RBC 4.05 (*)     Hemoglobin 11.9 (*)     Hematocrit 36.3 (*)     RDW 17.8 (*)     All other components within normal limits   COMPREHENSIVE METABOLIC PANEL W/ REFLEX TO MG FOR LOW K - Abnormal; Notable for the following components:    Chloride 97 (*)     Glucose 140 (*)     BUN 21 (*)     CREATININE 3.3 (*)     GFR Non- 18 (*)     GFR  22 (*)     Total Protein 6.2 (*)     Albumin 3.1 (*)     Albumin/Globulin Ratio 1.0 (*)     ALT 7 (*)     All other components within normal limits   LIPASE   URINALYSIS WITH REFLEX TO CULTURE   PROCALCITONIN       When ordered, only abnormal lab results are displayed. All other labs were within normal range or not returned as of this dictation. EKG: When ordered, EKG's are interpreted by the Emergency Department Physician in the absence of a cardiologist.  Please see their note for interpretation of EKG.       RADIOLOGY:   Non-plain film images such as CT, Ultrasound and MRI are read by the radiologist. Plain radiographic images are visualized andpreliminarily interpreted by the  ED Provider with the below findings:        Interpretation [facility] Radiologist below, if available at the time of this note:    CT ABDOMEN PELVIS W IV CONTRAST Additional Contrast? None   Final Result   1. Dilated gallbladder with edematous wall, mucosal enhancement, and small   amount of pericholecystic fluid. Small gallstones are suggested at the   gallbladder neck to cystic duct region. Features are highly suspicious for   acute cholecystitis. If clinical doubt, then gallbladder ultrasound. 2.  Increased intrahepatic biliary ductal dilatation likely secondary to the   gallbladder pathology. 3.  Multiple cysts and other hypodensities too small to characterize within   both kidneys. No hydronephrosis. 4.  Residual borderline and mild thickening of the rectum and rectosigmoid   junction with previous surgery and anastomosis. No adjacent fluid collection   or general free fluid. No results found.       PROCEDURES   Unless otherwise noted below, none     Procedures    CRITICAL CARE TIME   N/A    CONSULTS:  IP CONSULT TO GENERAL SURGERY      EMERGENCY DEPARTMENT COURSE and DIFFERENTIAL DIAGNOSIS/MDM:   Vitals:    Vitals:    05/12/22 0145 05/12/22 0215 05/12/22 0245 05/12/22 0315   BP: 107/66 (!) 99/57 99/63 (!) 110/58   Pulse: 74 77 76 76   Resp: 16 14 16 18   Temp: 98.1 °F (36.7 °C)      TempSrc: Oral      SpO2: 98% 96% 93% 95%   Weight:       Height:           Patient was given thefollowing medications:  Medications   metronidazole (FLAGYL) 500 mg in 0.9% NaCl 100 mL IVPB premix (500 mg IntraVENous New Bag 5/12/22 1216)   0.9 % sodium chloride bolus (1,000 mLs IntraVENous Bolus from Bag 5/12/22 7023)   HYDROcodone-acetaminophen (NORCO) 5-325 MG per tablet 1 tablet (has no administration in time range)   morphine (PF) injection 2 mg (has no administration in time range)   cefTRIAXone (ROCEPHIN) 1000 mg IVPB in 50 mL D5W minibag (has no administration in time range)   metronidazole (FLAGYL) 500 mg in 0.9% NaCl 100 mL IVPB premix (has no administration in time range)   lactated ringers infusion ( IntraVENous New Bag 5/12/22 6197)   HYDROcodone-acetaminophen (NORCO) 5-325 MG per tablet 1 tablet (1 tablet Oral Given 5/11/22 3291)   iopamidol (ISOVUE-370) 76 % injection 75 mL (75 mLs IntraVENous Given 5/11/22 2317)   cefTRIAXone (ROCEPHIN) 1000 mg IVPB in 50 mL D5W minibag (1,000 mg IntraVENous New Bag 5/12/22 1095)         Is this patient to be included in the SEP-1 Core Measure due to severe sepsis or septic shock? No   Exclusion criteria - the patient is NOT to be included for SEP-1 Core Measure due to:  2+ SIRS criteria are not met    This is a 67-year-old male who presents emergency department with complaint of right upper quadrant, epigastric abdominal pain that is worsened with palpation. Vitals upon arrival are within normal limits. No leukocytosis was seen. A CT was reviewed and read by radiologist as above, please see showing high suspicion for cholecystitis and suggesting further follow-up with RUQ ultrasound. Because of this I did reach out to Dr. Claudia Lemos, general surgery and he is agreeable with admission to hospitalist team and they will evaluate in the morning. Patient's pain was controlled with 1 Norco tablet. I did start patient on metronidazole and Rocephin here in the ED as well as fluids. Patient was admitted in stable condition. FINAL IMPRESSION      1. Pain of upper abdomen          DISPOSITION/PLAN   DISPOSITION        PATIENT REFERREDTO:  No follow-up provider specified.     DISCHARGE MEDICATIONS:  New Prescriptions    No medications on file       DISCONTINUED MEDICATIONS:  Discontinued Medications    No medications on file              (Please note that portions ofthis note were completed with a voice recognition program.  Efforts were made to edit the dictations but occasionally words are mis-transcribed.)    Dio Randolph PA-C (electronically signed)              Colten Lopez PA-C  05/12/22 1048

## 2022-05-12 NOTE — PROGRESS NOTES
Medication Reconciliation    List of medications patient is currently taking is complete. Source of information: 1. List from [address] [place] [address]                                      2. EPIC records      Allergies  Lisinopril     Notes regarding home medications:   1. Patient last received all of his home medications yesterday morning.   2. Docusate sodium added to list.  3. Dicyclomine removed from list.         Carlos Alberto López, [facility], PharmD, 5/12/2022 2:14 PM

## 2022-05-12 NOTE — PROGRESS NOTES
Clinical Pharmacy Note  Heparin Dosing       Lab Results   Component Value Date    APTT 83.0 05/12/2022     Lab Results   Component Value Date    HGB 11.9 05/11/2022    HCT 36.3 05/11/2022     05/11/2022    INR 1.81 03/08/2022       Current Infusion Rate: 1000 units/hr    Plan:  Rate: 1000 units/hr  Next aPTT: 1800 5/12/22    Pharmacy will continue to monitor and adjust based on aPTT results.

## 2022-05-12 NOTE — CONSULTS
General Surgery Consult          Ash Byrnes   :  MRN: [id number]  Date of Admission: 2022  Admitting [de-identified] Merry Mcfadden MD  Primary Care Physician: Terry Yang  Consulting Physician: Za Price    Reason for Consult: cholecystitis    Chief complaint:  Abdominal pain    Diagnosis Present on Admission:   Acute cholecystitis      History of Present Illness  Ash Byrnes is a 77 y.o. male admitted on 2022 for epigastric and RUQ abdominal pain. Well known to the surgical service from sigmoid colectomy 22 and post op bleeding s/p  friable surgical anastomosis 18 cm from rectal verge  IR embolization of pseudoaneurysm adjacent to anastomosis off superior rectal artery. Hx of DVT/PE on Eliquis, last dose  AM. Currently on a heparin drip.          Past Medical History:   Diagnosis Date    Anemia 2022    Arthritis     CAD (coronary artery disease) 2020    Cancer [facility])     Colon Cancer diagnosed last month    Cerebral infarction ([address]     Cognitive communication deficit     COVID-19     Diabetes mellitus ([address]     Dysphagia     End stage renal disease ([address]     Fatigue     Gastrointestinal hemorrhage     Hemodialysis patient ([address] 2019    ckd-goes to dialysis Tuesday, Thurs, Fri    Hx of blood clots     Hyperlipidemia     Hypertension     Hyponatremia     Risk for falls     Splenomegaly 02/10/2021     Past Surgical History:   Procedure Laterality Date    CARDIAC CATHETERIZATION  2019    COLECTOMY N/A 2022    SIGMOID COLECTOMY, SIGMOIDOSCOPY performed by Bhupendra Tucker MD at  [address] Left 2022    LEFT BRACHIAL CEPHALIC FISTULA performed by Tessa Chakraborty MD at [address] [facility] [employer]  2022    IR EMBOLIZATION HEMORRHAGE 2022 [facility]    IR PERC ARTERIOVENOUS FISTULA CREATION Left     unsure of date    IR TUNNELED CATHETER PLACEMENT GREATER THAN 5 YEARS  2/21/2022    IR TUNNELED CATHETER PLACEMENT GREATER THAN 5 YEARS 2/21/2022 [facility]    SIGMOIDOSCOPY N/A 02/17/2022    SIGMOIDOSCOPY CONTROL HEMORRHAGE performed by Titus Chung MD at [address] 02/21/2022    Permacath; RIJ access; 23cm; Dr. Tiffany Figueroa     Family History   Problem Relation Age of Onset    High Blood Pressure Father      Social History     Socioeconomic History    Marital status:      Spouse name: Not on file    Number of children: Not on file    Years of education: Not on file    Highest education level: Not on file   Occupational History    Not on file   Tobacco Use    Smoking status: Former Smoker    Smokeless tobacco: Never Used   Vaping Use    Vaping Use: Never used   Substance and Sexual Activity    Alcohol use: Not Currently    Drug use: Never    Sexual activity: Not on file   Other Topics Concern    Not on file   Social History Narrative    Not on file     Social Determinants of Health     Financial Resource Strain:     Difficulty of Paying Living Expenses: Not on file   Food Insecurity:     Worried About [address] in the Last Year: Not on file    [address] in the Last Year: Not on file   Transportation Needs:     Lack of Transportation (Medical): Not on file    Lack of Transportation (Non-Medical):  Not on file   Physical Activity:     Days of Exercise per Week: Not on file    Minutes of Exercise per Session: Not on file   Stress:     Feeling of Stress : Not on file   Social Connections:     Frequency of Communication with Friends and Family: Not on file    Frequency of Social Gatherings with Friends and Family: Not on file    Attends Latter day Services: Not on file    Active Member of Clubs or Organizations: Not on file    Attends Club or Organization Meetings: Not on file    Marital Status: Not on file   Intimate Partner Violence:     Fear of Current or Ex-Partner: Not on file    Emotionally Abused: Not on file    Physically Abused: Not on file    Sexually Abused: Not on file   Housing Stability:     Unable to Pay for Housing in the Last Year: Not on file    Number of Places Lived in the Last Year: Not on file    Unstable Housing in the Last Year: Not on file     Allergies   Allergen Reactions    Lisinopril Swelling     Allergy listed on paperwork from [facility], no reaction noted     Prior to Admission medications    Medication Sig Start Date End Date Taking? Authorizing Provider   LORazepam (ATIVAN) 0.5 MG tablet Take 0.5 mg by mouth 2 times daily. Historical Provider, MD   oxyCODONE (ROXICODONE) 5 MG immediate release tablet Take 5 mg by mouth every 4 hours as needed for Pain. Historical Provider, MD   polyethylene glycol (GLYCOLAX) 17 GM/SCOOP powder Take 17 g by mouth daily    Historical Provider, MD   dicyclomine (BENTYL) 10 MG capsule Take 1 capsule by mouth every 6 hours as needed (cramps) 3/1/22   IRINA Spaulding CNP   ondansetron (ZOFRAN ODT) 4 MG disintegrating tablet Take 1 tablet by mouth every 8 hours as needed for Nausea 3/1/22   IRINA Spaulding CNP   apixaban (ELIQUIS) 5 MG TABS tablet Take 1 tablet by mouth 2 times daily 3/2/22   Chip Abbasi MD   aspirin 81 MG chewable tablet Take 1 tablet by mouth daily 2/11/22   Manohar Diaz MD   melatonin 3 MG TABS tablet Take 2 tablets by mouth nightly as needed (sleep) 2/4/22   Kermit Dubin, MD   tamsulosin (FLOMAX) 0.4 MG capsule Take 0.4 mg by mouth every morning    Historical Provider, MD   gabapentin (NEURONTIN) 100 MG capsule Take 100 mg by mouth 3 times daily.     Historical Provider, MD   sevelamer (RENVELA) 800 MG tablet Take 1 tablet by mouth 3 times daily (with meals)    Historical Provider, MD   atenolol (TENORMIN) 25 MG tablet Take 25 mg by mouth every evening    Historical Provider, MD   pantoprazole (PROTONIX) 20 MG tablet Take 20 mg by mouth nightly    Historical Provider, MD   atorvastatin (LIPITOR) 10 MG tablet Take 10 mg by mouth nightly    Historical Provider, MD   calcitRIOL (ROCALTROL) 0.25 MCG capsule Take 0.25 mcg by mouth every evening    Historical Provider, MD       Review of Systems  12 point review of systems was reviewed and negative except for that listed in HPI    Physical Exam  Vitals:    05/12/22 0511 05/12/22 0850 05/12/22 1129 05/12/22 1132   BP: (!) 152/83 123/77  128/71   Pulse: 73 69  70   Resp: 20 17  16   Temp: 97.6 °F (36.4 °C) 97.7 °F (36.5 °C)  97.7 °F (36.5 °C)   TempSrc: Oral   Oral   SpO2: 96% 99% 98% 95%   Weight:       Height:             Intake/Output Summary (Last 24 hours) at 5/12/2022 1310  Last data filed at 5/12/2022 0525  Gross per 24 hour   Intake 142.87 ml   Output --   Net 142.87 ml       Body mass index is 29.36 kg/m². General Appearance: alert, well appearing, and in no distress   HEENT: NCAT, PERRLA, Conjunctiva non injected, no scleral icterus. External ears and nares normal bilaterally. Mucous membranes pink and moist.   Neck: Neck is symmetrical. Trachea appears midline. Lung: Clear to auscultation bilaterally, normal rate and effort   Cardiac: Regular rate and rhythm, S1S2 present, without murmur   Abdomen: Soft, nontender, nondistended. Scars c/w surgical history. Neuro: No gross motor or sensory deficits. Skin: No open wounds or rashes. MSK: normal ROM, no edema  Psych: normal insight, judgment    Labs  Recent Labs     05/11/22 2051 05/12/22  0600 05/12/22  1146   WBC 4.6  --   --    HGB 11.9*  --   --    HCT 36.3*  --   --      --   --    APTT  --  41.2* 83.0*     Recent Labs     05/11/22 2051      K 3.9   CL 97*   CO2 30   BUN 21*   GFRAA 22*     Recent Labs     05/11/22 2051   AST 15   ALT 7*     No results for input(s): UOSM in the last 72 hours.     Invalid input(s): Fito Sarmiento, [facility], [place], [place], [place], [place], [facility]    Imaging  CT ABDOMEN PELVIS W IV CONTRAST Additional Contrast? None   Final Result   1. Dilated gallbladder with edematous wall, mucosal enhancement, and small   amount of pericholecystic fluid. Small gallstones are suggested at the   gallbladder neck to cystic duct region. Features are highly suspicious for   acute cholecystitis. If clinical doubt, then gallbladder ultrasound. 2.  Increased intrahepatic biliary ductal dilatation likely secondary to the   gallbladder pathology. 3.  Multiple cysts and other hypodensities too small to characterize within   both kidneys. No hydronephrosis. 4.  Residual borderline and mild thickening of the rectum and rectosigmoid   junction with previous surgery and anastomosis. No adjacent fluid collection   or general free fluid. Assessment  Samy Ca is a 77 y.o. male admitted for acute cholecystitis    Plan    1. Acute cholecystitis  · Last dose eliquis 5/11 AM.   · Allow eliquis to metabolize, surgery timing to be determined. · Continue heparin drip. Plan to hold 6 hours pre op.        Electronically signed by IRINA Tovar CNP on 5/12/22 at 1:10 PM EDT

## 2022-05-12 NOTE — FLOWSHEET NOTE
Treatment time: 3 hours  Net UF: 3000 ml     Pre weight: 99 kg   Post weight: 96 kg  EDW: 94 kg     Access used: RCW CVC  Access function: Good with BFR 1748 ml/min     Medications or blood products given: None     Regular outpatient schedule: ELIER Crockett     Summary of response to treatment: Tolerated tx well.  No HD related complaints or complications.      Copy of dialysis treatment record placed in chart, to be scanned into EMR.       05/12/22 1430 05/12/22 1750   Treatment   Time On 1435  --    Time Off  --  1743   Vital Signs   /69 124/79   Temp 98.2 °F (36.8 °C) 97.9 °F (36.6 °C)   Pulse 67 64   Resp 16 16   Dialysis Bath   K+ (Potassium) 3  --    Ca+ (Calcium) 2.5  --    Na+ (Sodium) 137  --    HCO3 (Bicarb) 35  --

## 2022-05-12 NOTE — PROGRESS NOTES
Clinical Pharmacy Note  Heparin Dosing Consult    Dudley Mckinney is a 77 y.o. male ordered heparin per low dose nomogram by Dr. Jocelynn Rich. Lab Results   Component Value Date    APTT 41.2 05/12/2022     Lab Results   Component Value Date    HGB 11.9 05/11/2022    HCT 36.3 05/11/2022     05/11/2022    INR 1.81 03/08/2022       Ht Readings from Last 1 Encounters:   05/11/22 6' (1.829 m)        Wt Readings from Last 1 Encounters:   05/11/22 216 lb 8 oz (98.2 kg)        Assessment/Plan:  Initial bolus: 4000 units  Initial infusion rate: 1000 units/hr  Next aPTT: 1200  5/12/22    Pharmacy will continue to monitor adjust heparin based on aPTT results using nomogram below:     LOW DOSE HEPARIN PROTOCOL (ACS/STEMI/A FIB)     Initial Bolus: 60 units/kg Max Bolus: 4,000 units       Initial Rate: 12 units/kg/hr Max Initial Rate: 1,000 units/hr     aPTT < 45   Heparin 60 units/kg bolus Increase infusion by 4 units/kg/hr       (maximum 4,000 units)   aPTT 45-59.9   Heparin 30 units/kg bolus Increase infusion by 2 units/kg/hr       (maximum 2,000 units)   aPTT 60-90   No bolus   No change   aPTT 90.1-97.5 No bolus   Decrease infusion by 1 units/kg/hr   aPTT 97.6-105  No bolus     Decrease infusion by 2 units/kg/hr   aPTT > 105   Hold heparin for 1 hour Decrease infusion by 3 units/kg/hr     Obtain aPTT 6 hours after initial bolus and 6 hours after any dose change until two consecutive therapeutic aPTTs are achieved - then daily.     Delmar Calhoun [facility]  5/12/2022 7:17 AM

## 2022-05-12 NOTE — PROGRESS NOTES
Pt alert to self only at this time. Pt had disconnected tele box. Tele cam placed in room for safety.

## 2022-05-12 NOTE — PROGRESS NOTES
Admission from ED to Room 4134 per [place] bed accompanied by ED RN. Patient alert and oriented x 3, gets up with a walker per patient. Patient transferred in bed, kept comfortable, side rails secured, bed in lowest position, bed locked and bed alarm on. Patient oriented to room and unit setting. Call light instructed and put in reach, verbalized understanding. All needs attended. Safety ensured. Electronically signed by Forest Preciado RN on 5/12/2022 at 5:35 AM

## 2022-05-12 NOTE — ED TRIAGE NOTES
Patient to ED via squad from [facility]. Patient developed nausea/vomiting/ and abdominal pain x 4 hours. Patient is alert and oriented. Patient states he uses a wheel chair.

## 2022-05-12 NOTE — H&P
[facility] History and Physical    Chief Complaint   Patient presents with    Emesis     x 4 hours    Nausea    Abdominal Pain       HPI:  77 y.o. yo male who  has a past medical history of Anemia, Arthritis, CAD (coronary artery disease), Cancer ([address] Cerebral infarction ([address] Cognitive communication deficit, COVID-19, Diabetes mellitus ([address] Dysphagia, End stage renal disease ([address] Fatigue, Gastrointestinal hemorrhage, Hemodialysis patient ([address] Hx of blood clots, Hyperlipidemia, Hypertension, Hyponatremia, Risk for falls, and Splenomegaly. presents to [facility] emergency department complaining of right upper quadrant abdominal pain, sharp/stabbing in character, moderate to severe in intensity associated with nausea. Began the evening of presentation after eating a cheeseburger from a fast food chain. Nonradiating also associated with nonbilious emesis. Does note associated chills but has not taken his temperature to know if he has had a fever not. In the emergency department he is afebrile, his white blood cell count is normal his CMP is consistent with his known renal disease his AST and ALT are also normal as is his alk phosphatase. His lipase is only 25. Imaging is concerning for acute cholecystitis with some small gallstones at the neck of the gallbladder. No dilation of the common bile duct. Surgery was consulted and he was started on antibiotics. Will admit him for surgery to evaluate for possible cholecystectomy    ROS:  A complete 14 point review of systems performed and is negative except as noted above. Past medical, surgical, family hx reviewed.     Past Medical History:  Past Medical History:   Diagnosis Date    Anemia 03/2022    Arthritis     CAD (coronary artery disease) 01/2020    Cancer [facility])     Colon Cancer diagnosed last month    Cerebral infarction [facility])     Cognitive communication deficit     COVID-19     Diabetes mellitus ([address]  Dysphagia     End stage renal disease (HCC)     Fatigue     Gastrointestinal hemorrhage     Hemodialysis patient ([address] 2019    ckd-goes to dialysis Tuesday, Thurs, Fri    Hx of blood clots     Hyperlipidemia     Hypertension     Hyponatremia     Risk for falls     Splenomegaly 02/10/2021       Surgical History:  Social History     Socioeconomic History    Marital status:      Spouse name: Not on file    Number of children: Not on file    Years of education: Not on file    Highest education level: Not on file   Occupational History    Not on file   Tobacco Use    Smoking status: Former Smoker    Smokeless tobacco: Never Used   Vaping Use    Vaping Use: Never used   Substance and Sexual Activity    Alcohol use: Not Currently    Drug use: Never    Sexual activity: Not on file   Other Topics Concern    Not on file   Social History Narrative    Not on file     Social Determinants of Health     Financial Resource Strain:     Difficulty of Paying Living Expenses: Not on file   Food Insecurity:     Worried About [address] in the Last Year: Not on file    [address] in the Last Year: Not on file   Transportation Needs:     Lack of Transportation (Medical): Not on file    Lack of Transportation (Non-Medical):  Not on file   Physical Activity:     Days of Exercise per Week: Not on file    Minutes of Exercise per Session: Not on file   Stress:     Feeling of Stress : Not on file   Social Connections:     Frequency of Communication with Friends and Family: Not on file    Frequency of Social Gatherings with Friends and Family: Not on file    Attends Confucianism Services: Not on file    Active Member of Clubs or Organizations: Not on file    Attends Club or Organization Meetings: Not on file    Marital Status: Not on file   Intimate Partner Violence:     Fear of Current or Ex-Partner: Not on file    Emotionally Abused: Not on file    Physically Abused: Not on file   [facility] Sexually Abused: Not on file   Housing Stability:     Unable to Pay for Housing in the Last Year: Not on file    Number of Places Lived in the Last Year: Not on file    Unstable Housing in the Last Year: Not on file       Family History:  Family History   Problem Relation Age of Onset    High Blood Pressure Father        Home Meds:  No current facility-administered medications on file prior to encounter. Current Outpatient Medications on File Prior to Encounter   Medication Sig Dispense Refill    LORazepam (ATIVAN) 0.5 MG tablet Take 0.5 mg by mouth 2 times daily.  oxyCODONE (ROXICODONE) 5 MG immediate release tablet Take 5 mg by mouth every 4 hours as needed for Pain.  polyethylene glycol (GLYCOLAX) 17 GM/SCOOP powder Take 17 g by mouth daily      dicyclomine (BENTYL) 10 MG capsule Take 1 capsule by mouth every 6 hours as needed (cramps) 20 capsule 1    ondansetron (ZOFRAN ODT) 4 MG disintegrating tablet Take 1 tablet by mouth every 8 hours as needed for Nausea 20 tablet 0    apixaban (ELIQUIS) 5 MG TABS tablet Take 1 tablet by mouth 2 times daily 60 tablet 0    aspirin 81 MG chewable tablet Take 1 tablet by mouth daily 30 tablet 0    melatonin 3 MG TABS tablet Take 2 tablets by mouth nightly as needed (sleep) 6 tablet 0    tamsulosin (FLOMAX) 0.4 MG capsule Take 0.4 mg by mouth every morning      gabapentin (NEURONTIN) 100 MG capsule Take 100 mg by mouth 3 times daily.       sevelamer (RENVELA) 800 MG tablet Take 1 tablet by mouth 3 times daily (with meals)      atenolol (TENORMIN) 25 MG tablet Take 25 mg by mouth every evening      pantoprazole (PROTONIX) 20 MG tablet Take 20 mg by mouth nightly      atorvastatin (LIPITOR) 10 MG tablet Take 10 mg by mouth nightly      calcitRIOL (ROCALTROL) 0.25 MCG capsule Take 0.25 mcg by mouth every evening         Physical Exam:  Vitals:    05/12/22 0315   BP: (!) 110/58   Pulse: 76   Resp: 18   Temp:    SpO2: 95%         Based on new provisions and guidance offered in setting of COVID 23 outbreak and in order to preserve personal protective equipment in accordance with the flexibilities announced by CMS limited examination is performed. General: Well appearing, not diaphoretic, no acute distress  Head: Normocephalic, atraumatic  Eyes: No ocular discharge, no scleral injection, no icterus  Ears: Normal external auricles  Nose: No rhinorrhea, no epistaxis  Throat: Not examined, no difficulty swallowing  Pulm: No apparent respiratory distress  Cardio: Normal rate, regular rhythm, no peripheral edema  GI: non-distended  Neuro: Alert and oriented, cn2-12 grossly intact, strength 5/5 bilaterally. Psych: Cooperative  Skin: no jaundice    Assessment and Plan:     #Acute cholecystitis  #Cholelithiasis  # CAD  # HTN  #DVT  # anticoagulated  # esrd on hemodialysis      -N.p.o., IVF  -IV antibiotics  -Surgery consulted  -Anticoagulated with [address] [place] [address] as outpatient. Will hold as n.p.o., transition to renally dosed LMWH therapeutic dose. Hold 8 hours preop  -As needed analgesics, opiates for severe pain  -As needed antihypertensives  -Nephrology consulted for hemodialysis  - Continue to monitor electrolytes and replete as appropriate  - Pt high risk for vte, ufh for vte ppx.  - Continue medications for chronic problems    I have personally reviewed pertinent laboratory, ekg and imaging results, as well as documentation in the patient's emr. Laboratory and imaging orders per the above plan have been addressed, see orders above.  Patient's case, assessment and plan have been discussed on this date with ED provider

## 2022-05-13 LAB
ANION GAP SERPL CALCULATED.3IONS-SCNC: 13 MMOL/L (ref 3–16)
APTT: 42.4 SEC (ref 26.2–38.6)
BASOPHILS ABSOLUTE: 0 K/UL (ref 0–0.2)
BASOPHILS RELATIVE PERCENT: 0.2 %
BUN BLDV-MCNC: 17 MG/DL (ref 7–20)
CALCIUM SERPL-MCNC: 8.3 MG/DL (ref 8.3–10.6)
CHLORIDE BLD-SCNC: 97 MMOL/L (ref 99–110)
CO2: 25 MMOL/L (ref 21–32)
CREAT SERPL-MCNC: 3.2 MG/DL (ref 0.8–1.3)
EOSINOPHILS ABSOLUTE: 0 K/UL (ref 0–0.6)
EOSINOPHILS RELATIVE PERCENT: 0.6 %
GFR AFRICAN AMERICAN: 23
GFR NON-AFRICAN AMERICAN: 19
GLUCOSE BLD-MCNC: 107 MG/DL (ref 70–99)
HCT VFR BLD CALC: 38.5 % (ref 40.5–52.5)
HEMOGLOBIN: 12.3 G/DL (ref 13.5–17.5)
LYMPHOCYTES ABSOLUTE: 0.3 K/UL (ref 1–5.1)
LYMPHOCYTES RELATIVE PERCENT: 5.5 %
MCH RBC QN AUTO: 29.2 PG (ref 26–34)
MCHC RBC AUTO-ENTMCNC: 32 G/DL (ref 31–36)
MCV RBC AUTO: 91.3 FL (ref 80–100)
MONOCYTES ABSOLUTE: 0.2 K/UL (ref 0–1.3)
MONOCYTES RELATIVE PERCENT: 3.6 %
NEUTROPHILS ABSOLUTE: 5.2 K/UL (ref 1.7–7.7)
NEUTROPHILS RELATIVE PERCENT: 90.1 %
PDW BLD-RTO: 17.4 % (ref 12.4–15.4)
PLATELET # BLD: 130 K/UL (ref 135–450)
PMV BLD AUTO: 7.8 FL (ref 5–10.5)
POTASSIUM REFLEX MAGNESIUM: 4 MMOL/L (ref 3.5–5.1)
RBC # BLD: 4.22 M/UL (ref 4.2–5.9)
SODIUM BLD-SCNC: 135 MMOL/L (ref 136–145)
WBC # BLD: 5.8 K/UL (ref 4–11)

## 2022-05-13 PROCEDURE — 6360000002 HC RX W HCPCS: Performed by: INTERNAL MEDICINE

## 2022-05-13 PROCEDURE — 2580000003 HC RX 258: Performed by: FAMILY MEDICINE

## 2022-05-13 PROCEDURE — 6360000002 HC RX W HCPCS: Performed by: FAMILY MEDICINE

## 2022-05-13 PROCEDURE — 85730 THROMBOPLASTIN TIME PARTIAL: CPT

## 2022-05-13 PROCEDURE — 97162 PT EVAL MOD COMPLEX 30 MIN: CPT

## 2022-05-13 PROCEDURE — 2500000003 HC RX 250 WO HCPCS: Performed by: FAMILY MEDICINE

## 2022-05-13 PROCEDURE — 97530 THERAPEUTIC ACTIVITIES: CPT

## 2022-05-13 PROCEDURE — 80048 BASIC METABOLIC PNL TOTAL CA: CPT

## 2022-05-13 PROCEDURE — 6370000000 HC RX 637 (ALT 250 FOR IP): Performed by: FAMILY MEDICINE

## 2022-05-13 PROCEDURE — 94760 N-INVAS EAR/PLS OXIMETRY 1: CPT

## 2022-05-13 PROCEDURE — 1200000000 HC SEMI PRIVATE

## 2022-05-13 PROCEDURE — 36415 COLL VENOUS BLD VENIPUNCTURE: CPT

## 2022-05-13 PROCEDURE — 85025 COMPLETE CBC W/AUTO DIFF WBC: CPT

## 2022-05-13 PROCEDURE — APPSS15 APP SPLIT SHARED TIME 0-15 MINUTES: Performed by: NURSE PRACTITIONER

## 2022-05-13 PROCEDURE — APPNB60 APP NON BILLABLE TIME 46-60 MINS: Performed by: NURSE PRACTITIONER

## 2022-05-13 RX ORDER — HEPARIN SODIUM 1000 [USP'U]/ML
4000 INJECTION, SOLUTION INTRAVENOUS; SUBCUTANEOUS ONCE
Status: COMPLETED | OUTPATIENT
Start: 2022-05-13 | End: 2022-05-13

## 2022-05-13 RX ORDER — HALOPERIDOL 5 MG/ML
5 INJECTION INTRAMUSCULAR ONCE
Status: DISCONTINUED | OUTPATIENT
Start: 2022-05-13 | End: 2022-05-20 | Stop reason: HOSPADM

## 2022-05-13 RX ADMIN — SODIUM CHLORIDE, POTASSIUM CHLORIDE, SODIUM LACTATE AND CALCIUM CHLORIDE: 600; 310; 30; 20 INJECTION, SOLUTION INTRAVENOUS at 06:47

## 2022-05-13 RX ADMIN — HEPARIN SODIUM 4000 UNITS: 1000 INJECTION INTRAVENOUS; SUBCUTANEOUS at 17:33

## 2022-05-13 RX ADMIN — HEPARIN SODIUM 1000 UNITS/HR: 10000 INJECTION, SOLUTION INTRAVENOUS at 09:46

## 2022-05-13 RX ADMIN — GABAPENTIN 100 MG: 100 CAPSULE ORAL at 20:18

## 2022-05-13 RX ADMIN — ATORVASTATIN CALCIUM 10 MG: 10 TABLET, FILM COATED ORAL at 20:18

## 2022-05-13 RX ADMIN — GABAPENTIN 100 MG: 100 CAPSULE ORAL at 08:13

## 2022-05-13 RX ADMIN — SEVELAMER CARBONATE 800 MG: 800 TABLET, FILM COATED ORAL at 17:27

## 2022-05-13 RX ADMIN — TAMSULOSIN HYDROCHLORIDE 0.4 MG: 0.4 CAPSULE ORAL at 08:13

## 2022-05-13 RX ADMIN — GABAPENTIN 100 MG: 100 CAPSULE ORAL at 14:00

## 2022-05-13 RX ADMIN — CEFTRIAXONE 1000 MG: 1 INJECTION, POWDER, FOR SOLUTION INTRAMUSCULAR; INTRAVENOUS at 06:52

## 2022-05-13 RX ADMIN — SEVELAMER CARBONATE 800 MG: 800 TABLET, FILM COATED ORAL at 08:13

## 2022-05-13 RX ADMIN — CALCITRIOL 0.25 MCG: 0.25 CAPSULE ORAL at 17:27

## 2022-05-13 RX ADMIN — ASPIRIN 81 MG 81 MG: 81 TABLET ORAL at 08:13

## 2022-05-13 RX ADMIN — FAMOTIDINE 20 MG: 10 INJECTION INTRAVENOUS at 08:14

## 2022-05-13 RX ADMIN — SEVELAMER CARBONATE 800 MG: 800 TABLET, FILM COATED ORAL at 12:07

## 2022-05-13 RX ADMIN — HEPARIN SODIUM 1400 UNITS/HR: 10000 INJECTION, SOLUTION INTRAVENOUS at 17:31

## 2022-05-13 RX ADMIN — METRONIDAZOLE 500 MG: 500 INJECTION, SOLUTION INTRAVENOUS at 04:43

## 2022-05-13 RX ADMIN — LORAZEPAM 0.5 MG: 0.5 TABLET ORAL at 08:13

## 2022-05-13 RX ADMIN — SODIUM CHLORIDE, POTASSIUM CHLORIDE, SODIUM LACTATE AND CALCIUM CHLORIDE: 600; 310; 30; 20 INJECTION, SOLUTION INTRAVENOUS at 22:01

## 2022-05-13 RX ADMIN — LORAZEPAM 0.5 MG: 0.5 TABLET ORAL at 20:18

## 2022-05-13 RX ADMIN — HEPARIN SODIUM 1400 UNITS/HR: 10000 INJECTION, SOLUTION INTRAVENOUS at 17:30

## 2022-05-13 RX ADMIN — ATENOLOL 25 MG: 25 TABLET ORAL at 20:18

## 2022-05-13 RX ADMIN — METRONIDAZOLE 500 MG: 500 INJECTION, SOLUTION INTRAVENOUS at 20:26

## 2022-05-13 RX ADMIN — METRONIDAZOLE 500 MG: 500 INJECTION, SOLUTION INTRAVENOUS at 12:07

## 2022-05-13 RX ADMIN — PANTOPRAZOLE SODIUM 20 MG: 20 TABLET, DELAYED RELEASE ORAL at 20:18

## 2022-05-13 ASSESSMENT — PAIN SCALES - GENERAL
PAINLEVEL_OUTOF10: 0
PAINLEVEL_OUTOF10: 0

## 2022-05-13 NOTE — PROGRESS NOTES
[facility] General Surgery                                   Daily Progress Note                                                         Pt Name: Jamie Acevedo Record Number: [id number]  Date of Birth 1945   Today's Date: 5/13/2022  Chief Complaint   Patient presents with    Emesis     x 4 hours    Nausea    Abdominal Pain        ASSESSMENT/PLAN  Acute cholecystitis  -Last dose eliquis 5/11 AM. Continue to hold and allow to metabolize   -continue heparin drip and hold 6 hours prior to surgery, tentatively Monday              SUBJECTIVE  rBeanna Rosario is resting in bed. No c/o. Denies abdominal pain. OBJECTIVE  VITALS:  height is 6' (1.829 m) and weight is 214 lb 1.1 oz (97.1 kg). His oral temperature is 99.5 °F (37.5 °C). His blood pressure is 101/61 and his pulse is 63. His respiration is 16 and oxygen saturation is 92%. INTAKE/OUTPUT:      Intake/Output Summary (Last 24 hours) at 5/13/2022 7932  Last data filed at 5/12/2022 2305  Gross per 24 hour   Intake 1863.95 ml   Output 3400 ml   Net -1536.05 ml     GENERAL: alert, cooperative, no distress  LUNGS: clear to ausculation, without wheezes, rales or rhonci  HEART: normal rate and regular rhythm  ABDOMEN: Soft, non tender, non distended. EXTREMITY: no cyanosis, clubbing or edema    I/O last 3 completed shifts: In: 2006.8 [I.V.:1364; IV Piggyback:242.9]  Out: [facility]     05/11/22 2051 05/11/22 2051 05/12/22  1419 05/13/22  0729   WBC 4.6   < > 5.7 5.8   HGB 11.9*   < > 12.8* 12.3*   HCT 36.3*   < > 41.9 38.5*      < > 124* 130*      < > 136 135*   K 3.9   < > 4.3 4.0   CL 97*   < > 99 97*   CO2 30   < > 22 25   BUN 21*   < > 24* 17   CREATININE 3.3*   < > 3.9* 3.2*   PHOS  --   --  5.1*  --    CALCIUM 8.5   < > 8.2* 8.3   AST 15  --   --   --    ALT 7*  --   --   --    BILITOT 0.7  --   --   --     < > = values in this interval not displayed. EDUCATION  Patient educated about Disease Process, Medications, Smoking Cessation, Oxygenation, Incentive Spirometry and Deep Breath and Cough, Diabetes, Hyperlipidemia, Smoking Cessation, Nutrition, Exercise and Hypertension    Electronically signed by IRINA Pope CNP on 5/13/2022 at 9:28 AM      [facility]   [phone] Office  [phone] Fax     As above  Stable overall  Plan for Laparoscopic Cholecystectomy with Cholangiogram on Monday at 12:00  Continue heparin for now  [facility] [place] [facility]    Electronically signed by Carmina Siegel MD on 5/13/2022 at 11:31 AM

## 2022-05-13 NOTE — PROGRESS NOTES
Clinical Pharmacy Note  Heparin Dosing       Lab Results   Component Value Date    APTT 42.4 05/13/2022     Lab Results   Component Value Date    HGB 12.3 05/13/2022    HCT 38.5 05/13/2022     05/13/2022    INR 1.81 03/08/2022       Current Infusion Rate: 1000 units/hr    Plan:  Bolus 4000 units  Rate: 1400 units/hr  Next aPTT: 2330 5/13    Pharmacy will continue to monitor and adjust based on aPTT results.   Enirque Francisco, 5/13/2022 5:26 PM

## 2022-05-13 NOTE — PLAN OF CARE
Problem: Pain  Goal: Verbalizes/displays adequate comfort level or baseline comfort level  5/12/2022 2310 by Ange Porras RN  Outcome: Progressing  5/12/2022 1157 by Francheska Edouard RN  Outcome: Progressing     Problem: Safety - Adult  Goal: Free from fall injury  5/12/2022 2310 by Ange Porras RN  Outcome: Progressing  5/12/2022 1157 by Frnacheska Edouard RN  Outcome: Progressing     Problem: ABCDS Injury Assessment  Goal: Absence of physical injury  5/12/2022 2310 by Ange Porras RN  Outcome: Progressing  5/12/2022 1157 by Francheska Edouard RN  Outcome: Progressing     Problem: Skin/Tissue Integrity  Goal: Absence of new skin breakdown  Description: 1. Monitor for areas of redness and/or skin breakdown  2. Assess vascular access sites hourly  3. Every 4-6 hours minimum:  Change oxygen saturation probe site  4. Every 4-6 hours:  If on nasal continuous positive airway pressure, respiratory therapy assess nares and determine need for appliance change or resting period.   Outcome: Progressing

## 2022-05-13 NOTE — PROGRESS NOTES
Pt transferred to room 4251. Report called to [address] [place] [address]. All questions answered at this time.

## 2022-05-13 NOTE — PROGRESS NOTES
Clinical Pharmacy Note  Heparin Dosing       Lab Results   Component Value Date    APTT 75.2 05/12/2022     Lab Results   Component Value Date    HGB 12.8 05/12/2022    HCT 41.9 05/12/2022     05/12/2022    INR 1.81 03/08/2022       Current Infusion Rate: 1000 units/hr    Plan:  Rate: 1000 units/hr  Next aPTT: 0600 5/13    Pharmacy will continue to monitor and adjust based on aPTT results.

## 2022-05-13 NOTE — PROGRESS NOTES
[facility]                                               Progress note    Summary:   Gail Zamudio is being seen by nephrology for ESRD. Admitted with acute cholecystitis. Interval History  Patient was seen and examined at bedside  He is awake and alert, no abdominal pain currently  He is having a liquid diet. His daughter is at bedside    Blood pressure 95/58  He satting 93% on room air  Had dialysis yesterday, 3 L removed 3-hour treatment is post weight was 96 kg which is 2 kg above his dry weight of 94. His tunneled dialysis catheter worked fine    There is a plan for laparoscopic cholecystectomy with cholangiogram on Monday at 12 PM    Labs reviewed sodium 135 potassium 4 bicarb 25 BUN 17 and creatinine 3.2  Hemoglobin 12.3      Plan:   -There are no acute indications for dialysis today. -HD tomorrow per TTS schedule, UF to dry weight of 94 kg  -OR planned Monday for cholecystectomy  -Blood pressure acceptable, no changes      Orvel MD Hudson  [facility]  Office: [phone]    Assessment:   ESRD  Dialyzes TTS  Target weight 94 kg  Has tunneled dialysis cath    Blood pressure  Acceptable  Appears euvolemic    S HPT  Continue calcitriol and Renvela    Anemia  At goal, does not need ELENA right now    Acute cholecystitis  On antibiotics and surgery consulted            ROS:   Positives Listed Bold. All other remaining systems are negative. Constitutional:  fever, chills, weakness, weight change, fatigue,      Skin:  rash, pruritus, hair loss, bruising, dry skin, petechiae. Head, Face, Neck   headaches, swelling,  cervical adenopathy. Respiratory: shortness of breath, cough, or wheezing  Cardiovascular: chest pain, palpitations, dizzy, edema  Gastrointestinal: nausea, vomiting, diarrhea, constipation,belly pain    Yellow skin, blood in stool  Musculoskeletal:  back pain, muscle weakness, gait problems,       joint pain or swelling.   Genitourinary:  dysuria, poor urine flow, flank pain, blood in urine  Neurologic:  vertigo, TIA'S, syncope, seizures, focal weakness  Psychosocial:  insomnia, anxiety, or depression. Additional positive findings: -     PMH:   Past medical history, surgical history, social history, family history are reviewed and updated as appropriate. Reviewed current medication list.   Allergies reviewed and updated as needed. PE:   Vitals:    05/13/22 1126   BP: (!) 95/58   Pulse: 56   Resp: 16   Temp: 98.3 °F (36.8 °C)   SpO2: 93%       General appearance:  in NAD, fully alert and oriented. Comfortable. HEENT: EOM intact, no icterus. Trachea is midline. Neck : No masses, appears symmetrical, no JVD  Respiratory: Respiratory effort appears normal, bilateral equal chest rise, no wheeze, no crackles   Cardiovascular: Ausculation shows RRR no edema  Abdomen: No visible mass or tenderness, non distended. Musculoskeletal:  Joints with no swelling or deformity. Skin:no rashes, ulcers, induration, no jaundice. Neuro: face symmetric, no focal deficits. Appropriate responses.     Tunneled dialysis catheter right      Lab Results   Component Value Date    CREATININE 3.2 (H) 05/13/2022    BUN 17 05/13/2022     (L) 05/13/2022    K 4.0 05/13/2022    CL 97 (L) 05/13/2022    CO2 25 05/13/2022      Lab Results   Component Value Date    WBC 5.8 05/13/2022    HGB 12.3 (L) 05/13/2022    HCT 38.5 (L) 05/13/2022    MCV 91.3 05/13/2022     (L) 05/13/2022     Lab Results   Component Value Date    .5 (H) 02/21/2022    CALCIUM 8.3 05/13/2022    PHOS 5.1 (H) 05/12/2022

## 2022-05-13 NOTE — PROGRESS NOTES
Pt arrived in room 4251, pt alert and oriented x 4 at present time. Pt denies any pain or discomfort. Heparin drip infusing at 14ml/hr. Pt resting in bed quietly. Denies other needs. Bed alarm on.  Call light and item need in reach Electronically signed by Michaela Del Castillo RN on 5/13/2022 at 5:58 PM

## 2022-05-13 NOTE — PROGRESS NOTES
Hospitalist Progress Note      PCP: Martinez Phillip    Date of Admission: 5/11/2022    Chief Complaint: abdominal pain    Hospital Course: 77 y.o. yo male who  has a past medical history of Anemia, Arthritis, CAD (coronary artery disease), Cancer ([facility] [address] Cerebral infarction ([facility] [address] Cognitive communication deficit, COVID-19, Diabetes mellitus ([facility] [address] Dysphagia, End stage renal disease ([facility] [address] Fatigue, Gastrointestinal hemorrhage, Hemodialysis patient ([facility] [address] Hx of blood clots, Hyperlipidemia, Hypertension, Hyponatremia, Risk for falls, and Splenomegaly. presents to [facility] emergency department complaining of right upper quadrant abdominal pain, sharp/stabbing in character, moderate to severe in intensity associated with nausea. Began the evening of presentation after eating a cheeseburger from a fast food chain. Nonradiating also associated with nonbilious emesis. Does note associated chills but has not taken his temperature to know if he has had a fever not. In the emergency department he is afebrile, his white blood cell count is normal his CMP is consistent with his known renal disease his AST and ALT are also normal as is his alk phosphatase. His lipase is only 25. Imaging is concerning for acute cholecystitis with some small gallstones at the neck of the gallbladder. No dilation of the common bile duct. Surgery was consulted and he was started on antibiotics. Will admit him for surgery to evaluate for possible cholecystectomy     Subjective: Pt seen and examined. He feels ok, had some delirium overnight.       Medications:  Reviewed    Infusion Medications    lactated ringers 100 mL/hr at 05/13/22 0616    sodium chloride      heparin (PORCINE) Infusion 1,000 Units/hr (05/13/22 9246)     Scheduled Medications    sodium chloride  1,000 mL IntraVENous Once    cefTRIAXone (ROCEPHIN) IV  1,000 mg IntraVENous Q24H    metroNIDAZOLE  500 mg IntraVENous Q8H    aspirin  81 mg Oral Daily    atenolol  25 mg Oral Nightly    atorvastatin  10 mg Oral Nightly    calcitRIOL  0.25 mcg Oral QPM    gabapentin  100 mg Oral TID    LORazepam  0.5 mg Oral BID    pantoprazole  20 mg Oral Nightly    sevelamer  800 mg Oral TID WC    tamsulosin  0.4 mg Oral QAM    sodium chloride flush  5-40 mL IntraVENous 2 times per day    famotidine (PEPCID) injection  20 mg IntraVENous Daily     PRN Meds: morphine, dicyclomine, melatonin, oxyCODONE, sodium chloride flush, sodium chloride, ondansetron **OR** ondansetron, polyethylene glycol, acetaminophen **OR** acetaminophen      Intake/Output Summary (Last 24 hours) at 5/13/2022 1122  Last data filed at 5/12/2022 2305  Gross per 24 hour   Intake 1863.95 ml   Output 3400 ml   Net -1536.05 ml       Exam:    /61   Pulse 63   Temp 99.5 °F (37.5 °C) (Oral)   Resp 16   Ht 6' (1.829 m)   Wt 214 lb 1.1 oz (97.1 kg)   SpO2 92%   BMI 29.03 kg/m²     General appearance: No apparent distress, appears stated age and cooperative. HEENT: Pupils equal, round, and reactive to light. Conjunctivae/corneas clear. Neck: Supple, with full range of motion. No jugular venous distention. Trachea midline. Respiratory:  Normal respiratory effort. Clear to auscultation, bilaterally without Rales/Wheezes/Rhonchi. Cardiovascular: Regular rate and rhythm with normal S1/S2 without murmurs, rubs or gallops. Abdomen: Soft, mild RUQ TTP, non-distended with normal bowel sounds. Musculoskeletal: No clubbing, cyanosis or edema bilaterally. Full range of motion without deformity. Skin: Skin color, texture, turgor normal.  No rashes or lesions. Neurologic:  Neurovascularly intact without any focal sensory/motor deficits.  Cranial nerves: II-XII intact, grossly non-focal.  Psychiatric: Alert and oriented, thought content appropriate, normal insight  Capillary Refill: Brisk,< 3 seconds   Peripheral Pulses: +2 palpable, equal bilaterally       Labs:   Recent Labs     05/11/22 2051 05/12/22  1419 05/13/22  0729   WBC 4.6 5.7 5.8   HGB 11.9* 12.8* 12.3*   HCT 36.3* 41.9 38.5*    124* 130*     Recent Labs     05/11/22 2051 05/12/22  1419 05/13/22  0729    136 135*   K 3.9 4.3 4.0   CL 97* 99 97*   CO2 30 22 25   BUN 21* 24* 17   CREATININE 3.3* 3.9* 3.2*   CALCIUM 8.5 8.2* 8.3   PHOS  --  5.1*  --      Recent Labs     05/11/22 2051   AST 15   ALT 7*   BILITOT 0.7   ALKPHOS 101     No results for input(s): INR in the last 72 hours. No results for input(s): Tania Basques in the last 72 hours. Urinalysis:      Lab Results   Component Value Date    NITRU Negative 04/22/2022    WBCUA 124 04/22/2022    BACTERIA 2+ 04/22/2022    RBCUA see below 04/22/2022    RBCUA 0 04/22/2022    BLOODU MODERATE 04/22/2022    SPECGRAV 1.025 04/22/2022    GLUCOSEU Negative 04/22/2022       Radiology:  CT ABDOMEN PELVIS W IV CONTRAST Additional Contrast? None   Final Result   1. Dilated gallbladder with edematous wall, mucosal enhancement, and small   amount of pericholecystic fluid. Small gallstones are suggested at the   gallbladder neck to cystic duct region. Features are highly suspicious for   acute cholecystitis. If clinical doubt, then gallbladder ultrasound. 2.  Increased intrahepatic biliary ductal dilatation likely secondary to the   gallbladder pathology. 3.  Multiple cysts and other hypodensities too small to characterize within   both kidneys. No hydronephrosis. 4.  Residual borderline and mild thickening of the rectum and rectosigmoid   junction with previous surgery and anastomosis. No adjacent fluid collection   or general free fluid.                  Assessment/Plan:    Active Hospital Problems    Diagnosis Date Noted    Acute cholecystitis [K81.0] 05/12/2022     Priority: Medium       Acute cholecystitis  -on empiric ceftriaxone and Flagyl  -general surgery consulted  -surgery tentatively planned for Monday  -on heparin gtt in the interim, hold 6 hours prior to procedure    DVT  -heparin gtt  -Eliquis on hold    CAD  -continue home meds    ESRD on HD  -nephrology consulted, recs appreciated    Essential hypertension  -continue home meds      DVT Prophylaxis: heparin gtt  Diet: ADULT DIET;  Regular; Low Fat/Low Chol/High Fiber/VIRIDIANA  Code Status: Full Code    PT/OT Eval Status: ordered    Dispo - continue care, surgery tentatively planned for Monday    Jazmyn Triplett MD

## 2022-05-13 NOTE — PROGRESS NOTES
Physical Therapy  Facility/Department: [address] MED SURG  Physical Therapy Initial Assessment  If patient discharges prior to next session this note will serve as a discharge summary. Please see below for the latest assessment towards goals. Name: Gabriel Mcdaniel  :   MRN: [id number]  Date of Service: 2022    Discharge Recommendations:  Patient would benefit from continued therapy after discharge (3-5sessions/week) Gabriel Mcdaniel scored a 9/ on the AM-PAC short mobility form. Current research shows that an AM-PAC score of 17 or less is typically not associated with a discharge to the patient's home setting. Based on the patient's AM-PAC score and their current functional mobility deficits, it is recommended that the patient have 3-5 sessions per week of Physical Therapy at d/c to increase the patient's independence. Please see assessment section for further patient specific details. PT Equipment Recommendations  Equipment Needed: No  Other: defer to next level of care      Patient Diagnosis(es): The encounter diagnosis was Pain of upper abdomen. Past Medical History:  has a past medical history of Anemia, Arthritis, CAD (coronary artery disease), Cancer ([address] Cerebral infarction ([address] Cognitive communication deficit, COVID-19, Diabetes mellitus ([address] Dysphagia, End stage renal disease ([address] Fatigue, Gastrointestinal hemorrhage, Hemodialysis patient ([address] Hx of blood clots, Hyperlipidemia, Hypertension, Hyponatremia, Risk for falls, and Splenomegaly. Past Surgical History:  has a past surgical history that includes IR PERC ARTERIOVENOUS FISTULA CREATION (Left); colectomy (N/A, 2022); sigmoidoscopy (N/A, 2022); IR EMBOLIZATION HEMORRHAGE (2022); Tunneled venous catheter placement (Right, 2022); IR TUNNELED CVC PLACE WO SQ PORT/PUMP > 5 YEARS (2022); Cardiac catheterization (2019); Colonoscopy; and Dialysis fistula creation (Left, 2022).     Assessment Assessment: The pt is a 76 yo male who presented to the ED from his LTC facility with RUQ pain associated with nausea. Imaging is concerning for acute cholecystitis with some small gallstones at the neck of the gallbladder. Surgery has consulted and the pt was started on abx and now waiting on Eliquis to clear prior to sx. The pt is from [facility] and per dgt's report the pt was needing asisst of 2 for a pivot trasnfer to a w/c; he used depends for toileting and was changed and bathed in the bed. The pt is able to feed himself. PMHx: anemia, arth, CAD, colon Ca, cerebral infarct, covid-19, DM, dysphagia, ESRD on HD, HTN, SIGMOID COLECTOMY      Today, the pt demonstrated that he is pleasant and willing to work with therapy although he is delayed at times. The pt is requiring mod A for bed mobility, mod A of 1-2 for sit <> stand to the stedy and the stedy was used today to get the pt safely to the chair. The pt was able to statically  the stedy with min A today and appears to have the potential to use a walker for a pivot transfer. The pt is limited by his cognition, decreased strength in B LE's, bed mobility, transfers and ambulation. Anticipate that he will need to return to his facility at d/c but would benefit from con't skilled PT upon his return to address his deficits and maximize his functional mobility. Will con't to follow while on the acute care floor.   Specific Instructions for Next Treatment: cotx  Therapy Prognosis: Fair;Good  Decision Making: Medium Complexity  Barriers to Learning: cog  Requires PT Follow-Up: Yes  Activity Tolerance  Activity Tolerance: Treatment limited secondary to decreased cognition;Patient limited by fatigue     Plan   Plan  Plan: 3-5 times per week  Specific Instructions for Next Treatment: cotx  Current Treatment Recommendations: Strengthening,Balance training,Functional mobility training,Transfer training,Safety education & training,Patient/Caregiver education & training  Safety Devices  Type of Devices: Call light within reach,Chair alarm in place,Gait belt,Patient at risk for falls,Left in chair,Telesitter in use,Nurse notified  Restraints  Restraints Initially in Place: No     Restrictions  Restrictions/Precautions  Restrictions/Precautions: Fall Risk  Position Activity Restriction  Other position/activity restrictions: Tele-sitter, IV     Subjective   General  Chart Reviewed: Yes  Additional Pertinent Hx: Per Lisa Mccrary MD progress note from 5-: The pt is a 76 yo male who presented to the ED from his LTC facility with RUQ pain associated with nausea. Imaging is concerning for acute cholecystitis with some small gallstones at the neck of the gallbladder. Surgery has consulted and the pt was started on abx and now waiting on Eliquis to clear prior to sx.      PMHx: anemia, arth, CAD, colon Ca, cerebral infarct, covid-19, DM, dysphagia, ESRD on HD, HTN, SIGMOID COLECTOMY,  Response To Previous Treatment: Not applicable  Family / Caregiver Present: Yes (dgt initially in the room and then left for the eval)  Referring Practitioner: Connie Zapata MD  Referral Date : 05/12/22  Diagnosis: Acute cholecystitis  Follows Commands: Within Functional Limits  Subjective  Subjective: the pt was found to be awake in the bed; he is soft spoken but pleasant and agreeable to therapy; he takes increased time to answer questions and is a poor historian; denies pain         Social/Functional History  Social/Functional History  Type of Home: Facility ([address])  Has the patient had two or more falls in the past year or any fall with injury in the past year?: Yes (slid off the bed recently at the [facility])  Receives Help From: Other (comment) (has been getting PT)  ADL Assistance: Needs assistance (dependent for bathing, dressing, toilets in depends and is changed in the bed)  Homemaking Responsibilities: No  Ambulation Assistance: Non-ambulatory (can stand for transfers but not walk a distance)  Transfer Assistance: Needs assistance (assist of 2 for transfers)  Active : No  Additional Comments: above info per the pt's dgt in the room  Vision/Hearing  Hearing: Within functional limits    Cognition   Orientation  Overall Orientation Status: Impaired  Orientation Level: Oriented to person;Oriented to place; Disoriented to time;Disoriented to situation  Cognition  Overall Cognitive Status: Exceptions  Arousal/Alertness: Delayed responses to stimuli  Following Commands: Follows one step commands with increased time; Follows one step commands with repetition  Attention Span: Attends with cues to redirect  Memory: Decreased recall of recent events;Decreased short term memory;Decreased recall of precautions  Safety Judgement: Decreased awareness of need for assistance;Decreased awareness of need for safety  Problem Solving: Decreased awareness of errors;Assistance required to implement solutions;Assistance required to generate solutions  Insights: Decreased awareness of deficits  Initiation: Requires cues for some  Sequencing: Does not require cues     Objective     AROM RLE (degrees)  RLE AROM: WFL  AROM LLE (degrees)  LLE AROM : WFL  Strength RLE  Comment: hip flexion 3+/5; knee extension 4-/5  Strength LLE  Comment: hip flexion 3/5; knee extension 4-/5           Bed mobility  Rolling to Left: Moderate assistance  Rolling to Right: Moderate assistance  Supine to Sit: Moderate assistance  Sit to Supine: Unable to assess  Bed Mobility Comments: rolling for kelly-care and depends change; depends was saturated the pt had a BM  Transfers  Sit to Stand:  Moderate Assistance (of 1-2 to the stedy)  Stand to sit: Minimal Assistance  Bed to Chair: Dependent/Total (with stedy)  Ambulation  Comments: non-ambulatory at baseline     Balance  Sitting - Static: Fair;+ (on the EOB; good in the chair)  Sitting - Dynamic: Fair  Standing - Static: Fair (in the stedy)  Standing - Dynamic: Fair;-  Comments: the pt able to sit EOB with CGA; leans posteriorly with time; static standing in the stedy with CGA and a slight lean to the L; seated upright and midline when in the recliner           AM-PAC Score  AM-PAC Inpatient Mobility Raw Score : 9 (05/13/22 0942)  AM-PAC Inpatient T-Scale Score : 30.55 (05/13/22 0942)  Mobility Inpatient CMS 0-100% Score: 81.38 (05/13/22 0942)  Mobility Inpatient CMS G-Code Modifier : CM (05/13/22 9480)          Goals  Short Term Goals  Time Frame for Short term goals: upon d/c  Short term goal 1: Bed mobility with min A. Short term goal 2: Transfers sit <> stand with min/mod A of 1-2. Short term goal 3: Transfer bed <> chair with a walker with min/mod A of 1-2. Short term goal 4: Static standing at a walker x 1 minute with CGA.   Patient Goals   Patient goals : none stated per pt; per dgt: family would like to get him more therapy at the [facility]       Education  Patient Education  Education Given To: Patient  Education Provided: Role of Therapy;Orientation;Transfer Training  Education Method: Demonstration;Verbal  Barriers to Learning: Cognition  Education Outcome: Continued education needed      Therapy Time   Individual Concurrent Group Co-treatment   Time In [phone]         Time Out 0950         Minutes 54         Timed Code Treatment Minutes: 39 Minutes     Electronically signed by Manohar Marcial, PT [id number] on 5/13/2022 at 9:53 AM

## 2022-05-13 NOTE — PROGRESS NOTES
Pt became very confused and verbally aggressive, yelling \"Get the hell out of my house\". This writer attempted to reorient pt but pt became even more agitated. Spoke to pt's daughter and Claribel will be spending the night.

## 2022-05-14 LAB
APTT: 57.3 SEC (ref 26.2–38.6)
APTT: 73.3 SEC (ref 26.2–38.6)
APTT: 93.6 SEC (ref 26.2–38.6)

## 2022-05-14 PROCEDURE — 2500000003 HC RX 250 WO HCPCS: Performed by: FAMILY MEDICINE

## 2022-05-14 PROCEDURE — 85730 THROMBOPLASTIN TIME PARTIAL: CPT

## 2022-05-14 PROCEDURE — 6360000002 HC RX W HCPCS: Performed by: FAMILY MEDICINE

## 2022-05-14 PROCEDURE — 2580000003 HC RX 258: Performed by: FAMILY MEDICINE

## 2022-05-14 PROCEDURE — 90935 HEMODIALYSIS ONE EVALUATION: CPT

## 2022-05-14 PROCEDURE — 1200000000 HC SEMI PRIVATE

## 2022-05-14 PROCEDURE — 36415 COLL VENOUS BLD VENIPUNCTURE: CPT

## 2022-05-14 PROCEDURE — 99232 SBSQ HOSP IP/OBS MODERATE 35: CPT | Performed by: SURGERY

## 2022-05-14 PROCEDURE — 94760 N-INVAS EAR/PLS OXIMETRY 1: CPT

## 2022-05-14 PROCEDURE — 6370000000 HC RX 637 (ALT 250 FOR IP): Performed by: FAMILY MEDICINE

## 2022-05-14 PROCEDURE — 6360000002 HC RX W HCPCS: Performed by: INTERNAL MEDICINE

## 2022-05-14 RX ORDER — HEPARIN SODIUM 1000 [USP'U]/ML
2000 INJECTION, SOLUTION INTRAVENOUS; SUBCUTANEOUS ONCE
Status: COMPLETED | OUTPATIENT
Start: 2022-05-14 | End: 2022-05-14

## 2022-05-14 RX ORDER — HALOPERIDOL 5 MG/ML
2 INJECTION INTRAMUSCULAR EVERY 12 HOURS PRN
Status: DISCONTINUED | OUTPATIENT
Start: 2022-05-14 | End: 2022-05-20 | Stop reason: HOSPADM

## 2022-05-14 RX ADMIN — HEPARIN SODIUM 2000 UNITS: 1000 INJECTION INTRAVENOUS; SUBCUTANEOUS at 12:09

## 2022-05-14 RX ADMIN — LORAZEPAM 0.5 MG: 0.5 TABLET ORAL at 08:21

## 2022-05-14 RX ADMIN — ATORVASTATIN CALCIUM 10 MG: 10 TABLET, FILM COATED ORAL at 22:02

## 2022-05-14 RX ADMIN — GABAPENTIN 100 MG: 100 CAPSULE ORAL at 08:21

## 2022-05-14 RX ADMIN — SEVELAMER CARBONATE 800 MG: 800 TABLET, FILM COATED ORAL at 12:09

## 2022-05-14 RX ADMIN — ONDANSETRON 4 MG: 2 INJECTION INTRAMUSCULAR; INTRAVENOUS at 14:54

## 2022-05-14 RX ADMIN — HEPARIN SODIUM 1350 UNITS/HR: 10000 INJECTION, SOLUTION INTRAVENOUS at 12:08

## 2022-05-14 RX ADMIN — SEVELAMER CARBONATE 800 MG: 800 TABLET, FILM COATED ORAL at 08:21

## 2022-05-14 RX ADMIN — LORAZEPAM 0.5 MG: 0.5 TABLET ORAL at 22:02

## 2022-05-14 RX ADMIN — METRONIDAZOLE 500 MG: 500 INJECTION, SOLUTION INTRAVENOUS at 12:05

## 2022-05-14 RX ADMIN — SODIUM CHLORIDE, POTASSIUM CHLORIDE, SODIUM LACTATE AND CALCIUM CHLORIDE: 600; 310; 30; 20 INJECTION, SOLUTION INTRAVENOUS at 08:21

## 2022-05-14 RX ADMIN — PANTOPRAZOLE SODIUM 20 MG: 20 TABLET, DELAYED RELEASE ORAL at 22:01

## 2022-05-14 RX ADMIN — FAMOTIDINE 20 MG: 10 INJECTION INTRAVENOUS at 08:21

## 2022-05-14 RX ADMIN — GABAPENTIN 100 MG: 100 CAPSULE ORAL at 22:01

## 2022-05-14 RX ADMIN — METRONIDAZOLE 500 MG: 500 INJECTION, SOLUTION INTRAVENOUS at 22:04

## 2022-05-14 RX ADMIN — ATENOLOL 25 MG: 25 TABLET ORAL at 22:02

## 2022-05-14 RX ADMIN — CEFTRIAXONE 1000 MG: 1 INJECTION, POWDER, FOR SOLUTION INTRAMUSCULAR; INTRAVENOUS at 03:22

## 2022-05-14 RX ADMIN — TAMSULOSIN HYDROCHLORIDE 0.4 MG: 0.4 CAPSULE ORAL at 08:21

## 2022-05-14 RX ADMIN — METRONIDAZOLE 500 MG: 500 INJECTION, SOLUTION INTRAVENOUS at 03:55

## 2022-05-14 RX ADMIN — ASPIRIN 81 MG 81 MG: 81 TABLET ORAL at 08:21

## 2022-05-14 ASSESSMENT — PAIN SCALES - GENERAL
PAINLEVEL_OUTOF10: 0

## 2022-05-14 NOTE — FLOWSHEET NOTE
Pt highly agitated at this time. Yelling and stating he's not in the hospital and that he's going to leave even if he has to walk home because he has to get home. Primary nurse in with pt at this time and was able to get wife on the phone to talk with pt. Pt yelling at wife. Pt states he is going to pull off his tele monitor and IV if he can't leave. Will notify Np.

## 2022-05-14 NOTE — PROGRESS NOTES
Treatment time: 180 minutes    Net UF: 1000 mL    Pre weight: 99.4 kg  Post weight: unable to obtain  EDW: n/a    Access used: CVC right chest  Access function: good    Medications or blood products given: none    Regular outpatient schedule: none    Summary of response to treatment: Patient tolerated treatment treatment well, no signs or symptoms of distress noted during treatment, attempted to call report to floor but was transferred to wrong RN. Copy of dialysis treatment record placed in chart, to be scanned into EMR.

## 2022-05-14 NOTE — PLAN OF CARE
Problem: Discharge Planning  Goal: Discharge to home or other facility with appropriate resources  Recent Flowsheet Documentation  Taken 5/13/2022 2230 by Christiano Mcgraw RN  Discharge to home or other facility with appropriate resources:   Identify barriers to discharge with patient and caregiver   Arrange for needed discharge resources and transportation as appropriate   Identify discharge learning needs (meds, wound care, etc)     Problem: Pain  Goal: Verbalizes/displays adequate comfort level or baseline comfort level  Recent Flowsheet Documentation  Taken 5/14/2022 0459 by Christiano Mcgraw RN  Verbalizes/displays adequate comfort level or baseline comfort level:   Encourage patient to monitor pain and request assistance   Assess pain using appropriate pain scale   Administer analgesics based on type and severity of pain and evaluate response   Implement non-pharmacological measures as appropriate and evaluate response   Consider cultural and social influences on pain and pain management   Notify Licensed Independent Practitioner if interventions unsuccessful or patient reports new pain     Problem: Safety - Adult  Goal: Free from fall injury  Recent Flowsheet Documentation  Taken 5/14/2022 0602 by Christiano Mcgraw RN  Free From Fall Injury:   Instruct family/caregiver on patient safety   Based on caregiver fall risk screen, instruct family/caregiver to ask for assistance with transferring infant if caregiver noted to have fall risk factors

## 2022-05-14 NOTE — PLAN OF CARE
Problem: Discharge Planning  Goal: Discharge to home or other facility with appropriate resources  Outcome: Progressing  Flowsheets  Taken 5/14/2022 1114 by Patricia Downey RN  Discharge to home or other facility with appropriate resources:   Identify barriers to discharge with patient and caregiver   Arrange for needed discharge resources and transportation as appropriate  Taken 5/13/2022 2230 by Lulu Casillas RN  Discharge to home or other facility with appropriate resources:   Identify barriers to discharge with patient and caregiver   Arrange for needed discharge resources and transportation as appropriate   Identify discharge learning needs (meds, wound care, etc)     Problem: Pain  Goal: Verbalizes/displays adequate comfort level or baseline comfort level  Outcome: Progressing  Flowsheets  Taken 5/14/2022 1114 by Patricia Downey RN  Verbalizes/displays adequate comfort level or baseline comfort level:   Encourage patient to monitor pain and request assistance   Assess pain using appropriate pain scale   Administer analgesics based on type and severity of pain and evaluate response  Taken 5/14/2022 0459 by Lulu Casillas RN  Verbalizes/displays adequate comfort level or baseline comfort level:   Encourage patient to monitor pain and request assistance   Assess pain using appropriate pain scale   Administer analgesics based on type and severity of pain and evaluate response   Implement non-pharmacological measures as appropriate and evaluate response   Consider cultural and social influences on pain and pain management   Notify Licensed Independent Practitioner if interventions unsuccessful or patient reports new pain  Note: Pt able to express presence and absence of pain using numerical pain scale. Pt pain is managed by PRN analgesics as ordered by MD. Pain reassess after each interventions. Will continue to monitor as needed.         Problem: Safety - Adult  Goal: Free from fall injury  Outcome: Progressing  Flowsheets (Taken 5/14/2022 0602 by Al Ramsey RN)  Free From Fall Injury:   Instruct family/caregiver on patient safety   Based on caregiver fall risk screen, instruct family/caregiver to ask for assistance with transferring infant if caregiver noted to have fall risk factors  Note: Pt free from falls this shift. Non skid socks provided. Pt educated on use of call light as needed for assistance. Bed alarm on. Tele camera in place. Call light always within reach. Pt able and agreeable to contact for safety appropriately. Problem: Skin/Tissue Integrity  Goal: Absence of new skin breakdown  Description: 1. Monitor for areas of redness and/or skin breakdown  2. Assess vascular access sites hourly  3. Every 4-6 hours minimum:  Change oxygen saturation probe site  4. Every 4-6 hours:  If on nasal continuous positive airway pressure, respiratory therapy assess nares and determine need for appliance change or resting period. Outcome: Progressing  Note: Skin assessment performed each shift per protocol. Pt encouraged to reposition often. Will continue to monitor and assess for skin breakdown.         Problem: ABCDS Injury Assessment  Goal: Absence of physical injury  Outcome: Progressing

## 2022-05-14 NOTE — PROGRESS NOTES
HEMODIALYSIS PRE-TREATMENT NOTE    Patient Identifiers prior to treatment: Name,     Isolation Required: No                      Isolation Type: N/A       (please document if patient is being managed as a PUI/COVID-19 patient)        Hepatitis status:                           Date Drawn                             Result  Hepatitis B Surface Antigen 22     neg                     Hepatitis B Surface Antibody 21 Immune        Hepatitis B Core Antibody n/a n/a          How was Hepatitis Status verified: Epic and outpatient records     Was a copy of the labs you documented provided to facility for the patient's chart: Yes    Hemodialysis orders verified: Yes    Access Within normal limits ( I.e. s/s of infection,...): WNL     Pre-Assessment completed: Yes    Pre-dialysis report received from: Heather Nunez RN                   Time: 16:00

## 2022-05-14 NOTE — PROGRESS NOTES
[facility]                                               Progress note    Summary:   Dudley Mckinney is being seen by nephrology for ESRD. Admitted with acute cholecystitis. Interval History  Sleepy   Wakes up   Blood pressure 95/58-->  Had dialysis 5/12   3 L removed    post weight was 96 kg  his dry weight of 94. Plan:    for dialysis today. -HD per TTS schedule  Ideally  UF to dry weight of 94 kg but BP low   -OR planned Monday for cholecystectomy      Shannon Rhodes MD  [facility]  Office: [phone]      ESRD  Dialyzes TTS  Target weight 94 kg  Has tunneled dialysis cath    Blood pressure  Appears euvolemic    SHPT  Continue calcitriol and Renvela    Anemia  At goal, does not need ELENA right now    Acute cholecystitis  On antibiotics and surgery consulted      ROS:   Unable to obtain   [address]. All other remaining systems are negative. Constitutional:  fever, chills, weakness, weight change, fatigue,      Skin:  rash, pruritus, hair loss, bruising, dry skin, petechiae. Head, Face, Neck   headaches, swelling,  cervical adenopathy. Respiratory: shortness of breath, cough, or wheezing  Cardiovascular: chest pain, palpitations, dizzy, edema  Gastrointestinal: nausea, vomiting, diarrhea, constipation,belly pain    Yellow skin, blood in stool  Musculoskeletal:  back pain, muscle weakness, gait problems,       joint pain or swelling. Genitourinary:  dysuria, poor urine flow, flank pain, blood in urine  Neurologic:  vertigo, TIA'S, syncope, seizures, focal weakness  Psychosocial:  insomnia, anxiety, or depression. Additional positive findings: -     PMH:   Past medical history, surgical history, social history, family history are reviewed and updated as appropriate. Reviewed current medication list.   Allergies reviewed and updated as needed.      PE:   Vitals:    05/14/22 0819   BP: 136/70   Pulse: 71   Resp:    Temp:    SpO2:        General appearance:  in NAD, sleepy  Comfortable. HEENT: EOM intact, no icterus. Trachea is midline. Neck : No masses, appears symmetrical, no JVD  Respiratory: Respiratory effort appears normal, bilateral equal chest rise, no wheeze, no crackles   Cardiovascular: Ausculation shows RRR no edema  Abdomen: No visible mass or tenderness, non distended. Musculoskeletal:  Joints with no swelling or deformity. Skin:no rashes, ulcers, induration, no jaundice. Neuro: face symmetric, no focal deficits. Appropriate responses.     Tunneled dialysis catheter right      Lab Results   Component Value Date    CREATININE 3.2 (H) 05/13/2022    BUN 17 05/13/2022     (L) 05/13/2022    K 4.0 05/13/2022    CL 97 (L) 05/13/2022    CO2 25 05/13/2022      Lab Results   Component Value Date    WBC 5.8 05/13/2022    HGB 12.3 (L) 05/13/2022    HCT 38.5 (L) 05/13/2022    MCV 91.3 05/13/2022     (L) 05/13/2022     Lab Results   Component Value Date    .5 (H) 02/21/2022    CALCIUM 8.3 05/13/2022    PHOS 5.1 (H) 05/12/2022

## 2022-05-14 NOTE — PROGRESS NOTES
[facility] General Surgery                                   Daily Progress Note                                                         Pt Name: Jamie Acevedo Record Number: [id number]  Date of Birth 1945   Today's Date: 5/14/2022  Chief Complaint   Patient presents with    Emesis     x 4 hours    Nausea    Abdominal Pain        ASSESSMENT/PLAN  Acute cholecystitis  -Last dose eliquis 5/11 AM.  -continue heparin drip and hold 6 hours prior to surgery, tentatively Monday at noon  -continue abx      Alton Stain is resting in bed. No c/o. Denies abdominal pain. Tolerating biscuits and gravy      OBJECTIVE  VITALS:  height is 6' (1.829 m) and weight is 219 lb 2.2 oz (99.4 kg). His oral temperature is 98 °F (36.7 °C). His blood pressure is 136/70 and his pulse is 71. His respiration is 18 and oxygen saturation is 91%. INTAKE/OUTPUT:      Intake/Output Summary (Last 24 hours) at 5/14/2022 0917  Last data filed at 5/14/2022 3651  Gross per 24 hour   Intake 2931.53 ml   Output --   Net 2931.53 ml     GENERAL: alert, cooperative, no distress  LUNGS: clear to ausculation, without wheezes, rales or rhonci  HEART: normal rate and regular rhythm  ABDOMEN: Soft, non tender, non distended. EXTREMITY: no cyanosis, clubbing or edema    I/O last 3 completed shifts: In: 4095.5 [P.O.:120; I.V.:3526.9; IV Piggyback:448.6]  Out: -       LABS  Recent Labs     05/11/22 2051 05/11/22 2051 05/12/22  1419 05/13/22  0729   WBC 4.6   < > 5.7 5.8   HGB 11.9*   < > 12.8* 12.3*   HCT 36.3*   < > 41.9 38.5*      < > 124* 130*      < > 136 135*   K 3.9   < > 4.3 4.0   CL 97*   < > 99 97*   CO2 30   < > 22 25   BUN 21*   < > 24* 17   CREATININE 3.3*   < > 3.9* 3.2*   PHOS  --   --  5.1*  --    CALCIUM 8.5   < > 8.2* 8.3   AST 15  --   --   --    ALT 7*  --   --   --    BILITOT 0.7  --   --   --     < > = values in this interval not displayed. EDUCATION  Patient educated about Disease Process, Medications, Smoking Cessation, Oxygenation, Incentive Spirometry and Deep Breath and Cough, Diabetes, Hyperlipidemia, Smoking Cessation, Nutrition, Exercise and Hypertension    Electronically signed by Abdoulaye Piedra MD on 5/14/2022 at 9:17 AM      [facility]   [phone] Office  [phone] Fax

## 2022-05-14 NOTE — PROGRESS NOTES
Clinical Pharmacy Note  Heparin Dosing       Lab Results   Component Value Date    APTT 57.3 05/14/2022     Lab Results   Component Value Date    HGB 12.3 05/13/2022    HCT 38.5 05/13/2022     05/13/2022    INR 1.81 03/08/2022       Current Infusion Rate: 1300 units/hr    Plan:  Bolus: 2000 units  Rate: 1350 units/hr  Next aPTT: 1800 5/14/22    Pharmacy will continue to monitor and adjust based on aPTT results.

## 2022-05-14 NOTE — PROGRESS NOTES
Pt vomited undigested food, yellow color. Pt medicated with prn nausea meds.  Electronically signed by Veronique Rios RN on 5/14/2022 at 2:55 PM

## 2022-05-14 NOTE — PROGRESS NOTES
Progress Note  Admit Date: 5/11/2022      PCP: Mariana Haro     CC: F/U for abd pain    Days in hospital:  2    SUBJECTIVE / Interval History:  Patient is confused present. Oriented x2  Agitated overnight. Denies abdominal pain  Allergies  Lisinopril    Medications    Scheduled Meds:   haloperidol lactate  5 mg IntraMUSCular Once    sodium chloride  1,000 mL IntraVENous Once    cefTRIAXone (ROCEPHIN) IV  1,000 mg IntraVENous Q24H    metroNIDAZOLE  500 mg IntraVENous Q8H    aspirin  81 mg Oral Daily    atenolol  25 mg Oral Nightly    atorvastatin  10 mg Oral Nightly    calcitRIOL  0.25 mcg Oral QPM    gabapentin  100 mg Oral TID    LORazepam  0.5 mg Oral BID    pantoprazole  20 mg Oral Nightly    sevelamer  800 mg Oral TID WC    tamsulosin  0.4 mg Oral QAM    sodium chloride flush  5-40 mL IntraVENous 2 times per day    famotidine (PEPCID) injection  20 mg IntraVENous Daily     Continuous Infusions:   lactated ringers 100 mL/hr at 05/14/22 0821    sodium chloride      heparin (PORCINE) Infusion 1,300 Units/hr (05/14/22 0352)       PRN Meds:  morphine, dicyclomine, melatonin, oxyCODONE, sodium chloride flush, sodium chloride, ondansetron **OR** ondansetron, polyethylene glycol, acetaminophen **OR** acetaminophen    Vitals    /70   Pulse 71   Temp 98 °F (36.7 °C) (Oral)   Resp 18   Ht 6' (1.829 m)   Wt 219 lb 2.2 oz (99.4 kg)   SpO2 91%   BMI 29.72 kg/m²     Exam:    Gen: No distress. Eyes: PERRL. No sclera icterus. No conjunctival injection. ENT: No discharge. Pharynx clear. External appearance of ears and nose normal.  Neck: Trachea midline. No obvious mass. Resp: No accessory muscle use. No crackles. No wheezes. No rhonchi. No dullness on percussion. CV: Regular rate. Regular rhythm. No murmur or rub. No edema. GI: Non-tender. Non-distended. No hernia. Skin: Warm, dry, normal texture and turgor. No nodule on exposed extremities. Lymph: No cervical LAD.  No supraclavicular LAD. M/S: No cyanosis. No clubbing. No joint deformity. Neuro: Moves all four extremities. CN 2-12 tested, no defect noted. Psych: Oriented x . No anxiety. Awake. Alert. Data    LABS  CBC:   Recent Labs     05/11/22 2051 05/12/22 1419 05/13/22  0729   WBC 4.6 5.7 5.8   HGB 11.9* 12.8* 12.3*   HCT 36.3* 41.9 38.5*   MCV 89.8 94.9 91.3    124* 130*     BMP:   Recent Labs     05/11/22 2051 05/12/22  1419 05/13/22  0729    136 135*   K 3.9 4.3 4.0   CL 97* 99 97*   CO2 30 22 25   PHOS  --  5.1*  --    BUN 21* 24* 17   CREATININE 3.3* 3.9* 3.2*   GLUCOSE 140* 119* 107*     POC GLUCOSE:    Recent Labs     05/12/22  0514   POCGLU 107*     LIVER PROFILE:   Recent Labs     05/11/22 2051 05/12/22  1419   AST 15  --    ALT 7*  --    LIPASE 25.0  --    LABALBU 3.1* 3.3*   BILITOT 0.7  --    ALKPHOS 101  --      PT/INR: No results for input(s): PROTIME, INR in the last 72 hours. APTT:   Recent Labs     05/12/22  1809 05/13/22  1538 05/14/22  0231   APTT 75.2* 42.4* 93.6*     UA:No results for input(s): NITRITE, COLORU, PHUR, LABCAST, WBCUA, RBCUA, MUCUS, TRICHOMONAS, YEAST, BACTERIA, CLARITYU, SPECGRAV, LEUKOCYTESUR, UROBILINOGEN, BILIRUBINUR, BLOODU, GLUCOSEU, KETUA, AMORPHOUS in the last 72 hours. Microbiology:  Wound Culture: No results for input(s): WNDABS, ORG in the last 72 hours. Invalid input(s):  LABGRAM  Nasal Culture: No results for input(s): ORG, MRSAPCR in the last 72 hours. Blood Culture: No results for input(s): BC, BLOODCULT2 in the last 72 hours. Fungal Culture:   No results for input(s): FUNGSM in the last 72 hours. No results for input(s): FUNCXBLD in the last 72 hours. CSF Culture:  No results for input(s): COLORCSF, APPEARCSF, CFTUBE, CLOTCSF, WBCCSF, RBCCSF, NEUTCSF, NUMCELLSCSF, LYMPHSCSF, MONOCSF, GLUCCSF, VOLCSF in the last 72 hours. Respiratory Culture:  No results for input(s): Marcos Montesinos in the last 72 hours.   AFB:No results for input(s): AFBSMEAR in the last 72 hours. Urine Culture  No results for input(s): LABURIN in the last 72 hours. RADIOLOGY:    CT ABDOMEN PELVIS W IV CONTRAST Additional Contrast? None   Final Result   1. Dilated gallbladder with edematous wall, mucosal enhancement, and small   amount of pericholecystic fluid. Small gallstones are suggested at the   gallbladder neck to cystic duct region. Features are highly suspicious for   acute cholecystitis. If clinical doubt, then gallbladder ultrasound. 2.  Increased intrahepatic biliary ductal dilatation likely secondary to the   gallbladder pathology. 3.  Multiple cysts and other hypodensities too small to characterize within   both kidneys. No hydronephrosis. 4.  Residual borderline and mild thickening of the rectum and rectosigmoid   junction with previous surgery and anastomosis. No adjacent fluid collection   or general free fluid. CONSULTS:    IP CONSULT TO GENERAL SURGERY  IP CONSULT TO NEPHROLOGY  IP CONSULT TO SOCIAL WORK    ASSESSMENT AND PLAN:      Principal Problem:    Acute cholecystitis  Resolved Problems:    * No resolved hospital problems. *    Patient is a 32-year-old male with a past medical history of coronary artery disease, CVA, anemia, cognitive impairment, diabetes, ESRD, hypertension, hyperlipidemia who presented with right upper quadrant pain. He was admitted with acute cholecystitis. Acute cholecystitis-surgery planned for Monday  -on empiric ceftriaxone and Flagyl  -general surgery consulted  -on heparin gtt in the interim, hold 6 hours prior to procedure(was on DOAC hence delay in  Surgery)     DVT  -heparin gtt  -Eliquis on hold     CAD  -continue home meds     ESRD on HD  -nephrology consulted, recs appreciated     Essential hypertension  -continue home meds    Delirium  -Continue melatonin  -Need as needed Haldol     DVT Prophylaxis: heparin   Diet: ADULT DIET;  Regular; Low Fat/Low Chol/High Fiber/VIRIDIANA  Code Status: Full Code    PT/OT Eval Status:    Discharge plan -continue care    The patient and / or the family were informed of the results of any tests, a time was given to answer questions, a plan was proposed and they agreed with plan. Discussed with consulting physicians, nursing and social work     The note was completed using EMR. Every effort was made to ensure accuracy; however, inadvertent computerized transcription errors may be present.        Mercedes Harris MD

## 2022-05-14 NOTE — PROGRESS NOTES
Clinical Pharmacy Note  Heparin Dosing       Lab Results   Component Value Date    APTT 93.6 05/14/2022     Lab Results   Component Value Date    HGB 12.3 05/13/2022    HCT 38.5 05/13/2022     05/13/2022    INR 1.81 03/08/2022       Current Infusion Rate: 1400 units/hr    Plan:  Rate: Decrease to  1300 units/hr  Next aPTT: 1000  5/14/22    Pharmacy will continue to monitor and adjust based on aPTT results.     Debbie Thornton [facility]  5/14/2022 3:41 AM

## 2022-05-15 LAB
APTT: 103.4 SEC (ref 26.2–38.6)
APTT: 48.7 SEC (ref 26.2–38.6)

## 2022-05-15 PROCEDURE — 99232 SBSQ HOSP IP/OBS MODERATE 35: CPT | Performed by: SURGERY

## 2022-05-15 PROCEDURE — 6370000000 HC RX 637 (ALT 250 FOR IP): Performed by: FAMILY MEDICINE

## 2022-05-15 PROCEDURE — 2580000003 HC RX 258: Performed by: FAMILY MEDICINE

## 2022-05-15 PROCEDURE — 6360000002 HC RX W HCPCS: Performed by: INTERNAL MEDICINE

## 2022-05-15 PROCEDURE — 36415 COLL VENOUS BLD VENIPUNCTURE: CPT

## 2022-05-15 PROCEDURE — 6360000002 HC RX W HCPCS: Performed by: FAMILY MEDICINE

## 2022-05-15 PROCEDURE — 2500000003 HC RX 250 WO HCPCS: Performed by: FAMILY MEDICINE

## 2022-05-15 PROCEDURE — 1200000000 HC SEMI PRIVATE

## 2022-05-15 PROCEDURE — 85730 THROMBOPLASTIN TIME PARTIAL: CPT

## 2022-05-15 RX ORDER — HEPARIN SODIUM 1000 [USP'U]/ML
2000 INJECTION, SOLUTION INTRAVENOUS; SUBCUTANEOUS ONCE
Status: COMPLETED | OUTPATIENT
Start: 2022-05-15 | End: 2022-05-15

## 2022-05-15 RX ADMIN — LORAZEPAM 0.5 MG: 0.5 TABLET ORAL at 08:33

## 2022-05-15 RX ADMIN — Medication 6 MG: at 01:34

## 2022-05-15 RX ADMIN — Medication 6 MG: at 19:30

## 2022-05-15 RX ADMIN — GABAPENTIN 100 MG: 100 CAPSULE ORAL at 13:52

## 2022-05-15 RX ADMIN — HALOPERIDOL LACTATE 2 MG: 5 INJECTION, SOLUTION INTRAMUSCULAR at 19:30

## 2022-05-15 RX ADMIN — SEVELAMER CARBONATE 800 MG: 800 TABLET, FILM COATED ORAL at 08:33

## 2022-05-15 RX ADMIN — GABAPENTIN 100 MG: 100 CAPSULE ORAL at 20:00

## 2022-05-15 RX ADMIN — CALCITRIOL 0.25 MCG: 0.25 CAPSULE ORAL at 17:52

## 2022-05-15 RX ADMIN — CEFTRIAXONE 1000 MG: 1 INJECTION, POWDER, FOR SOLUTION INTRAMUSCULAR; INTRAVENOUS at 02:28

## 2022-05-15 RX ADMIN — ATORVASTATIN CALCIUM 10 MG: 10 TABLET, FILM COATED ORAL at 20:00

## 2022-05-15 RX ADMIN — ATENOLOL 25 MG: 25 TABLET ORAL at 20:00

## 2022-05-15 RX ADMIN — LORAZEPAM 0.5 MG: 0.5 TABLET ORAL at 20:00

## 2022-05-15 RX ADMIN — METRONIDAZOLE 500 MG: 500 INJECTION, SOLUTION INTRAVENOUS at 04:37

## 2022-05-15 RX ADMIN — METRONIDAZOLE 500 MG: 500 INJECTION, SOLUTION INTRAVENOUS at 20:02

## 2022-05-15 RX ADMIN — HEPARIN SODIUM 1550 UNITS/HR: 10000 INJECTION, SOLUTION INTRAVENOUS at 08:39

## 2022-05-15 RX ADMIN — FAMOTIDINE 20 MG: 10 INJECTION INTRAVENOUS at 08:32

## 2022-05-15 RX ADMIN — GABAPENTIN 100 MG: 100 CAPSULE ORAL at 08:33

## 2022-05-15 RX ADMIN — TAMSULOSIN HYDROCHLORIDE 0.4 MG: 0.4 CAPSULE ORAL at 08:33

## 2022-05-15 RX ADMIN — HEPARIN SODIUM 2000 UNITS: 1000 INJECTION INTRAVENOUS; SUBCUTANEOUS at 08:40

## 2022-05-15 RX ADMIN — METRONIDAZOLE 500 MG: 500 INJECTION, SOLUTION INTRAVENOUS at 12:37

## 2022-05-15 RX ADMIN — ASPIRIN 81 MG 81 MG: 81 TABLET ORAL at 08:33

## 2022-05-15 RX ADMIN — PANTOPRAZOLE SODIUM 20 MG: 20 TABLET, DELAYED RELEASE ORAL at 20:00

## 2022-05-15 ASSESSMENT — PAIN SCALES - GENERAL
PAINLEVEL_OUTOF10: 0
PAINLEVEL_OUTOF10: 2

## 2022-05-15 ASSESSMENT — PAIN - FUNCTIONAL ASSESSMENT: PAIN_FUNCTIONAL_ASSESSMENT: PREVENTS OR INTERFERES SOME ACTIVE ACTIVITIES AND ADLS

## 2022-05-15 ASSESSMENT — PAIN DESCRIPTION - ONSET: ONSET: GRADUAL

## 2022-05-15 ASSESSMENT — PAIN DESCRIPTION - ORIENTATION: ORIENTATION: LOWER;MID

## 2022-05-15 ASSESSMENT — PAIN DESCRIPTION - LOCATION: LOCATION: BACK

## 2022-05-15 ASSESSMENT — PAIN DESCRIPTION - PAIN TYPE: TYPE: ACUTE PAIN;CHRONIC PAIN

## 2022-05-15 ASSESSMENT — PAIN DESCRIPTION - FREQUENCY: FREQUENCY: INTERMITTENT

## 2022-05-15 ASSESSMENT — PAIN DESCRIPTION - DESCRIPTORS: DESCRIPTORS: DISCOMFORT

## 2022-05-15 NOTE — PROGRESS NOTES
Clinical Pharmacy Note  Heparin Dosing       Lab Results   Component Value Date    APTT 73.3 05/14/2022     Lab Results   Component Value Date    HGB 12.3 05/13/2022    HCT 38.5 05/13/2022     05/13/2022    INR 1.81 03/08/2022       Current Infusion Rate: 1350 units/hr    Plan:  Rate: Continue 1350 units/hr  Next aPTT: 0400  5/14/22    Pharmacy will continue to monitor and adjust based on aPTT results.     Yoni Franz [facility]  5/14/2022 11:30 PM

## 2022-05-15 NOTE — PROGRESS NOTES
Clinical Pharmacy Note  Heparin Dosing       Lab Results   Component Value Date    APTT 103.4 05/15/2022     Lab Results   Component Value Date    HGB 12.3 05/13/2022    HCT 38.5 05/13/2022     05/13/2022    INR 1.81 03/08/2022       Current Infusion Rate: 1550 units/hr    Plan:  Rate: 1400 units/hr  Next aPTT: 2200 5/15/22    Pharmacy will continue to monitor and adjust based on aPTT results.

## 2022-05-15 NOTE — PROGRESS NOTES
Clinical Pharmacy Note  Heparin Dosing       Lab Results   Component Value Date    APTT 48.7 05/15/2022     Lab Results   Component Value Date    HGB 12.3 05/13/2022    HCT 38.5 05/13/2022     05/13/2022    INR 1.81 03/08/2022       Current Infusion Rate: 1350 units/hr    Plan:  Bolus: 2000 units  Rate: 1550 units/hr  Next aPTT: 1400    Pharmacy will continue to monitor and adjust based on aPTT results.

## 2022-05-15 NOTE — PLAN OF CARE
Problem: Discharge Planning  Goal: Discharge to home or other facility with appropriate resources  5/15/2022 1422 by Haroon Mcfadden RN  Outcome: Progressing     Problem: Pain  Goal: Verbalizes/displays adequate comfort level or baseline comfort level  5/15/2022 1422 by Haroon Mcfadden RN  Outcome: Progressing  Flowsheets (Taken 5/15/2022 1422)  Verbalizes/displays adequate comfort level or baseline comfort level:   Encourage patient to monitor pain and request assistance   Assess pain using appropriate pain scale   Administer analgesics based on type and severity of pain and evaluate response  Note: Pt able to express presence and absence of pain using numerical pain scale. Pt pain is managed by PRN analgesics as ordered by MD. Pain reassess after each interventions. Will continue to monitor as needed. 5/15/2022 0605 by Bob Andre RN  Outcome: Progressing     Problem: Safety - Adult  Goal: Free from fall injury  5/15/2022 1422 by Haroon Mcfadden RN  Outcome: Progressing  Flowsheets (Taken 5/14/2022 2045 by Bob Andre RN)  Free From Fall Injury:   Instruct family/caregiver on patient safety   Based on caregiver fall risk screen, instruct family/caregiver to ask for assistance with transferring infant if caregiver noted to have fall risk factors  5/15/2022 0605 by Bob Andre RN  Outcome: Progressing  [address] (Taken 5/14/2022 2045)  Free From Fall Injury:   Instruct family/caregiver on patient safety   Based on caregiver fall risk screen, instruct family/caregiver to ask for assistance with transferring infant if caregiver noted to have fall risk factors     Problem: ABCDS Injury Assessment  Goal: Absence of physical injury  5/15/2022 1422 by Haroon Mcfadden RN  Outcome: Progressing  5/15/2022 0605 by Bob Andre RN  Outcome: Progressing     Problem: Skin/Tissue Integrity  Goal: Absence of new skin breakdown  Description: 1.   Monitor for areas of redness and/or skin breakdown  2. Assess vascular access sites hourly  3. Every 4-6 hours minimum:  Change oxygen saturation probe site  4. Every 4-6 hours:  If on nasal continuous positive airway pressure, respiratory therapy assess nares and determine need for appliance change or resting period. 5/15/2022 1422 by Lorenzo Youngblood RN  Outcome: Progressing  Note: Skin assessment performed each shift per protocol. Pt encouraged to reposition every two hours and often. Preventive dressing applied. Will continue to monitor and assess for skin breakdown.      5/15/2022 0605 by Orren Mortimer, RN  Outcome: Progressing

## 2022-05-15 NOTE — PROGRESS NOTES
RN called lab staff to draw pt's aPTT that was due @ 4.  Yareli Tong. Electronically signed by Lorenzo Youngblood RN on 5/15/2022 at 7:26 AM

## 2022-05-15 NOTE — PLAN OF CARE
Problem: Discharge Planning  Goal: Discharge to home or other facility with appropriate resources  Outcome: Progressing  Flowsheets (Taken 5/14/2022 2030)  Discharge to home or other facility with appropriate resources:   Identify barriers to discharge with patient and caregiver   Arrange for needed discharge resources and transportation as appropriate   Identify discharge learning needs (meds, wound care, etc)     Problem: Pain  Goal: Verbalizes/displays adequate comfort level or baseline comfort level  Outcome: Progressing     Problem: Safety - Adult  Goal: Free from fall injury  Outcome: Progressing  Flowsheets (Taken 5/14/2022 2045)  Free From Fall Injury:   Instruct family/caregiver on patient safety   Based on caregiver fall risk screen, instruct family/caregiver to ask for assistance with transferring infant if caregiver noted to have fall risk factors     Problem: ABCDS Injury Assessment  Goal: Absence of physical injury  Outcome: Progressing     Problem: Skin/Tissue Integrity  Goal: Absence of new skin breakdown  Description: 1. Monitor for areas of redness and/or skin breakdown  2. Assess vascular access sites hourly  3. Every 4-6 hours minimum:  Change oxygen saturation probe site  4. Every 4-6 hours:  If on nasal continuous positive airway pressure, respiratory therapy assess nares and determine need for appliance change or resting period.   Outcome: Progressing

## 2022-05-15 NOTE — PROGRESS NOTES
In doing oxygen rounds, found patient asleep in chair, on room air, with a SpO2 83%. Awakened patient, had him take deep breaths, went to get nasal cannula and when I returned, his SpO2 was 95% and he was wide awake. Left nasal cannula @ bedside.   Patient states he had sleep study somewhere in [facility] about a year ago but never

## 2022-05-15 NOTE — PROGRESS NOTES
Progress Note  Admit Date: 5/11/2022      PCP: Gisel Pemberton     CC: F/U for abd pain    Days in hospital:  3    SUBJECTIVE / Interval History:  Patient is confused present. Oriented x2. Confused agitated overnight. Family stated the patient and his patient did not need any medications    Denies abdominal pain    Allergies  Lisinopril    Medications    Scheduled Meds:   haloperidol lactate  5 mg IntraMUSCular Once    sodium chloride  1,000 mL IntraVENous Once    cefTRIAXone (ROCEPHIN) IV  1,000 mg IntraVENous Q24H    metroNIDAZOLE  500 mg IntraVENous Q8H    aspirin  81 mg Oral Daily    atenolol  25 mg Oral Nightly    atorvastatin  10 mg Oral Nightly    calcitRIOL  0.25 mcg Oral QPM    gabapentin  100 mg Oral TID    LORazepam  0.5 mg Oral BID    pantoprazole  20 mg Oral Nightly    sevelamer  800 mg Oral TID WC    tamsulosin  0.4 mg Oral QAM    sodium chloride flush  5-40 mL IntraVENous 2 times per day    famotidine (PEPCID) injection  20 mg IntraVENous Daily     Continuous Infusions:   lactated ringers 100 mL/hr at 05/14/22 0821    sodium chloride      heparin (PORCINE) Infusion 1,550 Units/hr (05/15/22 0839)       PRN Meds:  haloperidol lactate, morphine, dicyclomine, melatonin, oxyCODONE, sodium chloride flush, sodium chloride, ondansetron **OR** ondansetron, polyethylene glycol, acetaminophen **OR** acetaminophen    Vitals    /72   Pulse 59   Temp 98.1 °F (36.7 °C) (Oral)   Resp 18   Ht 6' (1.829 m)   Wt 217 lb 13 oz (98.8 kg)   SpO2 96%   BMI 29.54 kg/m²     Exam:    Gen: No distress. Eyes: PERRL. No sclera icterus. No conjunctival injection. ENT: No discharge. Pharynx clear. External appearance of ears and nose normal.  Neck: Trachea midline. No obvious mass. Resp: No accessory muscle use. No crackles. No wheezes. No rhonchi. No dullness on percussion. CV: Regular rate. Regular rhythm. No murmur or rub. No edema. GI: Non-tender. Non-distended. No hernia.    Skin: Warm, dry, normal texture and turgor. No nodule on exposed extremities. Lymph: No cervical LAD. No supraclavicular LAD. M/S: No cyanosis. No clubbing. No joint deformity. Neuro: Moves all four extremities. CN 2-12 tested, no defect noted. Psych: Oriented x 2 . No anxiety. Awake. Alert. Data    LABS  CBC:   Recent Labs     05/12/22  1419 05/13/22  0729   WBC 5.7 5.8   HGB 12.8* 12.3*   HCT 41.9 38.5*   MCV 94.9 91.3   * 130*     BMP:   Recent Labs     05/12/22  1419 05/13/22  0729    135*   K 4.3 4.0   CL 99 97*   CO2 22 25   PHOS 5.1*  --    BUN 24* 17   CREATININE 3.9* 3.2*   GLUCOSE 119* 107*     POC GLUCOSE:    No results for input(s): POCGLU in the last 72 hours. LIVER PROFILE:   Recent Labs     05/12/22  1419   LABALBU 3.3*     PT/INR: No results for input(s): PROTIME, INR in the last 72 hours. APTT:   Recent Labs     05/14/22  1025 05/14/22  2121 05/15/22  0741   APTT 57.3* 73.3* 48.7*     UA:No results for input(s): NITRITE, COLORU, PHUR, LABCAST, WBCUA, RBCUA, MUCUS, TRICHOMONAS, YEAST, BACTERIA, CLARITYU, SPECGRAV, LEUKOCYTESUR, UROBILINOGEN, BILIRUBINUR, BLOODU, GLUCOSEU, KETUA, AMORPHOUS in the last 72 hours. Microbiology:  Wound Culture: No results for input(s): WNDABS, ORG in the last 72 hours. Invalid input(s):  LABGRAM  Nasal Culture: No results for input(s): ORG, MRSAPCR in the last 72 hours. Blood Culture: No results for input(s): BC, BLOODCULT2 in the last 72 hours. Fungal Culture:   No results for input(s): FUNGSM in the last 72 hours. No results for input(s): FUNCXBLD in the last 72 hours. CSF Culture:  No results for input(s): COLORCSF, APPEARCSF, CFTUBE, CLOTCSF, WBCCSF, RBCCSF, NEUTCSF, NUMCELLSCSF, LYMPHSCSF, MONOCSF, GLUCCSF, VOLCSF in the last 72 hours. Respiratory Culture:  No results for input(s): Tammy Martinez in the last 72 hours. AFB:No results for input(s): AFBSMEAR in the last 72 hours.   Urine Culture  No results for input(s): LABURIN in the last 72 hours. RADIOLOGY:    CT ABDOMEN PELVIS W IV CONTRAST Additional Contrast? None   Final Result   1. Dilated gallbladder with edematous wall, mucosal enhancement, and small   amount of pericholecystic fluid. Small gallstones are suggested at the   gallbladder neck to cystic duct region. Features are highly suspicious for   acute cholecystitis. If clinical doubt, then gallbladder ultrasound. 2.  Increased intrahepatic biliary ductal dilatation likely secondary to the   gallbladder pathology. 3.  Multiple cysts and other hypodensities too small to characterize within   both kidneys. No hydronephrosis. 4.  Residual borderline and mild thickening of the rectum and rectosigmoid   junction with previous surgery and anastomosis. No adjacent fluid collection   or general free fluid. CONSULTS:    IP CONSULT TO GENERAL SURGERY  IP CONSULT TO NEPHROLOGY  IP CONSULT TO SOCIAL WORK    ASSESSMENT AND PLAN:      Principal Problem:    Acute cholecystitis  Resolved Problems:    * No resolved hospital problems. *    Patient is a 80-year-old male with a past medical history of coronary artery disease, CVA, anemia, cognitive impairment, diabetes, ESRD, hypertension, hyperlipidemia who presented with right upper quadrant pain. He was admitted with acute cholecystitis. Acute cholecystitis-surgery planned for Monday  -on empiric ceftriaxone and Flagyl, D 4  -general surgery consulted  -on heparin gtt in the interim, hold 6 hours prior to procedure(was on DOAC hence delay in  Surgery)     DVT  -heparin gtt  -Eliquis on hold     CAD  -continue home meds     ESRD on HD  -nephrology consulted, recs appreciated     Essential hypertension  -continue home meds    Delirium-improved, continue supportive care  -Continue melatonin  -Need as needed Haldol     DVT Prophylaxis: heparin   Diet: ADULT DIET;  Regular; Low Fat/Low Chol/High Fiber/VIRIDIANA  Diet NPO Exceptions are: Sips of Water with Meds  Code Status: Full Code    PT/OT Eval Status:    Discharge plan -continue care    The patient and / or the family were informed of the results of any tests, a time was given to answer questions, a plan was proposed and they agreed with plan. Discussed with consulting physicians, nursing and social work     The note was completed using EMR. Every effort was made to ensure accuracy; however, inadvertent computerized transcription errors may be present.        Adrian Ibrahim MD

## 2022-05-15 NOTE — PROGRESS NOTES
[address] [facility] General Surgery                                   Daily Progress Note                                                         Pt Name: Jamie Acevedo Record Number: [id number]  Date of Birth 1945   Today's Date: 5/15/2022  Chief Complaint   Patient presents with    Emesis     x 4 hours    Nausea    Abdominal Pain        ASSESSMENT/PLAN  Acute cholecystitis  -Last dose eliquis 5/11 AM.  -continue abx  -Hold hep gtt at 6 AM.  NPO after MN      [place] is resting in bed. No c/o. Denies abdominal pain. Tolerating PO. Small amount emesis last evening      OBJECTIVE  VITALS:  height is 6' (1.829 m) and weight is 217 lb 13 oz (98.8 kg). His oral temperature is 98.1 °F (36.7 °C). His blood pressure is 120/72 and his pulse is 59. His respiration is 18 and oxygen saturation is 96%. INTAKE/OUTPUT:      Intake/Output Summary (Last 24 hours) at 5/15/2022 0837  Last data filed at 5/14/2022 1440  Gross per 24 hour   Intake 420 ml   Output --   Net 420 ml     GENERAL: alert, cooperative, no distress  LUNGS: clear to ausculation, without wheezes, rales or rhonci  HEART: normal rate and regular rhythm  ABDOMEN: Soft, non tender, non distended. EXTREMITY: no cyanosis, clubbing or edema    I/O last 3 completed shifts: In: 3051.5 [P.O.:540; I.V.:2163; IV Piggyback:348.6]  Out: -       LABS  Recent Labs     05/12/22  1419 05/12/22  1419 05/13/22  0729   WBC 5.7   < > 5.8   HGB 12.8*   < > 12.3*   HCT 41.9   < > 38.5*   *   < > 130*      < > 135*   K 4.3  --  4.0   CL 99   < > 97*   CO2 22   < > 25   BUN 24*   < > 17   CREATININE 3.9*   < > 3.2*   PHOS 5.1*  --   --    CALCIUM 8.2*   < > 8.3    < > = values in this interval not displayed.        EDUCATION  Patient educated about Disease Process, Medications, Smoking Cessation, Oxygenation, Incentive Spirometry and Deep Breath and Cough, Diabetes, Hyperlipidemia, Smoking Cessation, Nutrition, Exercise and Hypertension    Electronically signed by Elena Lopez MD on 5/15/2022 at 8:37 AM      [facility]   [phone] Office  [phone] Fax

## 2022-05-15 NOTE — PROGRESS NOTES
[facility]                                               Progress note    Summary:   Hugo Palomino is being seen by nephrology for ESRD. Admitted with acute cholecystitis. Interval History  Alert  Poor appt  Blood pressure 95/58-->120/72  Had dialysis 5/14   1 L removed   Pre weight: 99.4 kg  Post weight: unable to obtain  EDW: ? 94. Plan:   -HD per TTS schedule  -OR planned Monday for cholecystectomy      Kiley Villarreal MD  [facility]  Office: [phone]      ESRD  Dialyzes TTS  Target weight 94 kg  Has tunneled dialysis cath    Blood pressure  low    SHPT  calcitriol    Renvela    Anemia   does not need ELENA right now    Acute cholecystitis  On antibiotics       ROS:   Unable to obtain   [place]. All other remaining systems are negative. Constitutional:  fever, chills, weakness, weight change, fatigue,      Skin:  rash, pruritus, hair loss, bruising, dry skin, petechiae. Head, Face, Neck   headaches, swelling,  cervical adenopathy. Respiratory: shortness of breath, cough, or wheezing  Cardiovascular: chest pain, palpitations, dizzy, edema  Gastrointestinal: nausea, vomiting, diarrhea, constipation,belly pain    Yellow skin, blood in stool  Musculoskeletal:  back pain, muscle weakness, gait problems,       joint pain or swelling. Genitourinary:  dysuria, poor urine flow, flank pain, blood in urine  Neurologic:  vertigo, TIA'S, syncope, seizures, focal weakness  Psychosocial:  insomnia, anxiety, or depression. Additional positive findings: -     PMH:   Past medical history, surgical history, social history, family history are reviewed and updated as appropriate. Reviewed current medication list.   Allergies reviewed and updated as needed. PE:   Vitals:    05/15/22 0826   BP: 120/72   Pulse: 59   Resp: 18   Temp: 98.1 °F (36.7 °C)   SpO2: 96%       General appearance:  in NAD, sleepy  Comfortable. HEENT: EOM intact, no icterus. Trachea is midline.    Neck : No masses, appears symmetrical, no JVD  Respiratory: Respiratory effort appears normal, bilateral equal chest rise, no wheeze, no crackles   Cardiovascular: Ausculation shows RRR no edema  Abdomen: No visible mass or tenderness, non distended. Musculoskeletal:  Joints with no swelling or deformity. Skin:no rashes, ulcers, induration, no jaundice. Neuro: face symmetric, no focal deficits. Appropriate responses.     Tunneled dialysis catheter right      Lab Results   Component Value Date    CREATININE 3.2 (H) 05/13/2022    BUN 17 05/13/2022     (L) 05/13/2022    K 4.0 05/13/2022    CL 97 (L) 05/13/2022    CO2 25 05/13/2022      Lab Results   Component Value Date    WBC 5.8 05/13/2022    HGB 12.3 (L) 05/13/2022    HCT 38.5 (L) 05/13/2022    MCV 91.3 05/13/2022     (L) 05/13/2022     Lab Results   Component Value Date    .5 (H) 02/21/2022    CALCIUM 8.3 05/13/2022    PHOS 5.1 (H) 05/12/2022

## 2022-05-15 NOTE — PROGRESS NOTES
Pt is agitated . Pt's daughter requesting to give him some meds to help him relax and sleep. Pt medicated with prn haldol and melatonin.  Electronically signed by Dorothy Lozano RN on 5/15/2022 at 7:32 PM

## 2022-05-16 ENCOUNTER — ANESTHESIA (OUTPATIENT)
Dept: OPERATING ROOM | Age: 77
DRG: 417 | End: 2022-05-16
Payer: MEDICARE

## 2022-05-16 ENCOUNTER — ANESTHESIA EVENT (OUTPATIENT)
Dept: OPERATING ROOM | Age: 77
DRG: 417 | End: 2022-05-16
Payer: MEDICARE

## 2022-05-16 ENCOUNTER — ANESTHESIA EVENT (OUTPATIENT)
Dept: ENDOSCOPY | Age: 77
DRG: 417 | End: 2022-05-16
Payer: MEDICARE

## 2022-05-16 ENCOUNTER — ANESTHESIA (OUTPATIENT)
Dept: ENDOSCOPY | Age: 77
DRG: 417 | End: 2022-05-16
Payer: MEDICARE

## 2022-05-16 ENCOUNTER — APPOINTMENT (OUTPATIENT)
Dept: GENERAL RADIOLOGY | Age: 77
DRG: 417 | End: 2022-05-16
Payer: MEDICARE

## 2022-05-16 LAB
ANION GAP SERPL CALCULATED.3IONS-SCNC: 11 MMOL/L (ref 3–16)
APTT: 60 SEC (ref 26.2–38.6)
BUN BLDV-MCNC: 18 MG/DL (ref 7–20)
CALCIUM SERPL-MCNC: 8.1 MG/DL (ref 8.3–10.6)
CHLORIDE BLD-SCNC: 103 MMOL/L (ref 99–110)
CO2: 25 MMOL/L (ref 21–32)
CREAT SERPL-MCNC: 3.5 MG/DL (ref 0.8–1.3)
GFR AFRICAN AMERICAN: 21
GFR NON-AFRICAN AMERICAN: 17
GLUCOSE BLD-MCNC: 113 MG/DL (ref 70–99)
GLUCOSE BLD-MCNC: 118 MG/DL (ref 70–99)
GLUCOSE BLD-MCNC: 72 MG/DL (ref 70–99)
GLUCOSE BLD-MCNC: 88 MG/DL (ref 70–99)
GLUCOSE BLD-MCNC: 96 MG/DL (ref 70–99)
HCT VFR BLD CALC: 33.1 % (ref 40.5–52.5)
HEMOGLOBIN: 10.7 G/DL (ref 13.5–17.5)
MAGNESIUM: 1.8 MG/DL (ref 1.8–2.4)
MCH RBC QN AUTO: 29.7 PG (ref 26–34)
MCHC RBC AUTO-ENTMCNC: 32.4 G/DL (ref 31–36)
MCV RBC AUTO: 91.6 FL (ref 80–100)
PDW BLD-RTO: 17.3 % (ref 12.4–15.4)
PERFORMED ON: ABNORMAL
PERFORMED ON: ABNORMAL
PERFORMED ON: NORMAL
PERFORMED ON: NORMAL
PHOSPHORUS: 3.7 MG/DL (ref 2.5–4.9)
PLATELET # BLD: 130 K/UL (ref 135–450)
PMV BLD AUTO: 7.9 FL (ref 5–10.5)
POTASSIUM SERPL-SCNC: 3.8 MMOL/L (ref 3.5–5.1)
RBC # BLD: 3.62 M/UL (ref 4.2–5.9)
SODIUM BLD-SCNC: 139 MMOL/L (ref 136–145)
WBC # BLD: 3.1 K/UL (ref 4–11)

## 2022-05-16 PROCEDURE — 7100000010 HC PHASE II RECOVERY - FIRST 15 MIN: Performed by: SURGERY

## 2022-05-16 PROCEDURE — 85027 COMPLETE CBC AUTOMATED: CPT

## 2022-05-16 PROCEDURE — 6360000002 HC RX W HCPCS: Performed by: ANESTHESIOLOGY

## 2022-05-16 PROCEDURE — 3600000004 HC SURGERY LEVEL 4 BASE: Performed by: SURGERY

## 2022-05-16 PROCEDURE — BF131ZZ FLUOROSCOPY OF GALLBLADDER AND BILE DUCTS USING LOW OSMOLAR CONTRAST: ICD-10-PCS | Performed by: INTERNAL MEDICINE

## 2022-05-16 PROCEDURE — 2500000003 HC RX 250 WO HCPCS: Performed by: FAMILY MEDICINE

## 2022-05-16 PROCEDURE — 3609013500 HC EGD REMOVAL TUMOR POLYP/OTHER LESION SNARE TECH: Performed by: INTERNAL MEDICINE

## 2022-05-16 PROCEDURE — 0FC98ZZ EXTIRPATION OF MATTER FROM COMMON BILE DUCT, VIA NATURAL OR ARTIFICIAL OPENING ENDOSCOPIC: ICD-10-PCS | Performed by: INTERNAL MEDICINE

## 2022-05-16 PROCEDURE — 2500000003 HC RX 250 WO HCPCS: Performed by: NURSE ANESTHETIST, CERTIFIED REGISTERED

## 2022-05-16 PROCEDURE — A4217 STERILE WATER/SALINE, 500 ML: HCPCS | Performed by: SURGERY

## 2022-05-16 PROCEDURE — 0FT44ZZ RESECTION OF GALLBLADDER, PERCUTANEOUS ENDOSCOPIC APPROACH: ICD-10-PCS | Performed by: SURGERY

## 2022-05-16 PROCEDURE — 0DB98ZZ EXCISION OF DUODENUM, VIA NATURAL OR ARTIFICIAL OPENING ENDOSCOPIC: ICD-10-PCS | Performed by: INTERNAL MEDICINE

## 2022-05-16 PROCEDURE — 2580000003 HC RX 258: Performed by: SURGERY

## 2022-05-16 PROCEDURE — 2709999900 HC NON-CHARGEABLE SUPPLY: Performed by: SURGERY

## 2022-05-16 PROCEDURE — 88305 TISSUE EXAM BY PATHOLOGIST: CPT

## 2022-05-16 PROCEDURE — 6370000000 HC RX 637 (ALT 250 FOR IP): Performed by: SURGERY

## 2022-05-16 PROCEDURE — 36415 COLL VENOUS BLD VENIPUNCTURE: CPT

## 2022-05-16 PROCEDURE — 83735 ASSAY OF MAGNESIUM: CPT

## 2022-05-16 PROCEDURE — 6360000004 HC RX CONTRAST MEDICATION: Performed by: INTERNAL MEDICINE

## 2022-05-16 PROCEDURE — 7100000001 HC PACU RECOVERY - ADDTL 15 MIN: Performed by: INTERNAL MEDICINE

## 2022-05-16 PROCEDURE — 85730 THROMBOPLASTIN TIME PARTIAL: CPT

## 2022-05-16 PROCEDURE — 47563 LAPARO CHOLECYSTECTOMY/GRAPH: CPT | Performed by: SURGERY

## 2022-05-16 PROCEDURE — 1200000000 HC SEMI PRIVATE

## 2022-05-16 PROCEDURE — 2580000003 HC RX 258: Performed by: FAMILY MEDICINE

## 2022-05-16 PROCEDURE — 6360000002 HC RX W HCPCS: Performed by: SURGERY

## 2022-05-16 PROCEDURE — BF131ZZ FLUOROSCOPY OF GALLBLADDER AND BILE DUCTS USING LOW OSMOLAR CONTRAST: ICD-10-PCS | Performed by: SURGERY

## 2022-05-16 PROCEDURE — 3700000000 HC ANESTHESIA ATTENDED CARE: Performed by: SURGERY

## 2022-05-16 PROCEDURE — 88304 TISSUE EXAM BY PATHOLOGIST: CPT

## 2022-05-16 PROCEDURE — 97530 THERAPEUTIC ACTIVITIES: CPT

## 2022-05-16 PROCEDURE — 3700000001 HC ADD 15 MINUTES (ANESTHESIA): Performed by: INTERNAL MEDICINE

## 2022-05-16 PROCEDURE — 2580000003 HC RX 258: Performed by: ANESTHESIOLOGY

## 2022-05-16 PROCEDURE — 74328 X-RAY BILE DUCT ENDOSCOPY: CPT

## 2022-05-16 PROCEDURE — 2500000003 HC RX 250 WO HCPCS: Performed by: SURGERY

## 2022-05-16 PROCEDURE — 6370000000 HC RX 637 (ALT 250 FOR IP): Performed by: FAMILY MEDICINE

## 2022-05-16 PROCEDURE — 7100000000 HC PACU RECOVERY - FIRST 15 MIN: Performed by: SURGERY

## 2022-05-16 PROCEDURE — 7100000001 HC PACU RECOVERY - ADDTL 15 MIN: Performed by: SURGERY

## 2022-05-16 PROCEDURE — 7100000011 HC PHASE II RECOVERY - ADDTL 15 MIN: Performed by: SURGERY

## 2022-05-16 PROCEDURE — 3700000000 HC ANESTHESIA ATTENDED CARE: Performed by: INTERNAL MEDICINE

## 2022-05-16 PROCEDURE — 80048 BASIC METABOLIC PNL TOTAL CA: CPT

## 2022-05-16 PROCEDURE — 2709999900 HC NON-CHARGEABLE SUPPLY: Performed by: INTERNAL MEDICINE

## 2022-05-16 PROCEDURE — 2580000003 HC RX 258: Performed by: NURSE ANESTHETIST, CERTIFIED REGISTERED

## 2022-05-16 PROCEDURE — 6360000002 HC RX W HCPCS: Performed by: NURSE ANESTHETIST, CERTIFIED REGISTERED

## 2022-05-16 PROCEDURE — 94760 N-INVAS EAR/PLS OXIMETRY 1: CPT

## 2022-05-16 PROCEDURE — 2720000010 HC SURG SUPPLY STERILE: Performed by: INTERNAL MEDICINE

## 2022-05-16 PROCEDURE — 3609015200 HC ERCP REMOVE CALCULI/DEBRIS BILIARY/PANCREAS DUCT: Performed by: INTERNAL MEDICINE

## 2022-05-16 PROCEDURE — 74300 X-RAY BILE DUCTS/PANCREAS: CPT

## 2022-05-16 PROCEDURE — 84100 ASSAY OF PHOSPHORUS: CPT

## 2022-05-16 PROCEDURE — 3700000001 HC ADD 15 MINUTES (ANESTHESIA): Performed by: SURGERY

## 2022-05-16 PROCEDURE — 7100000000 HC PACU RECOVERY - FIRST 15 MIN: Performed by: INTERNAL MEDICINE

## 2022-05-16 PROCEDURE — 2720000010 HC SURG SUPPLY STERILE: Performed by: SURGERY

## 2022-05-16 PROCEDURE — 3609014900 HC ERCP W/SPHINCTEROTOMY &/OR PAPILLOTOMY: Performed by: INTERNAL MEDICINE

## 2022-05-16 PROCEDURE — C1769 GUIDE WIRE: HCPCS | Performed by: INTERNAL MEDICINE

## 2022-05-16 PROCEDURE — 6360000002 HC RX W HCPCS: Performed by: FAMILY MEDICINE

## 2022-05-16 PROCEDURE — 3600000014 HC SURGERY LEVEL 4 ADDTL 15MIN: Performed by: SURGERY

## 2022-05-16 PROCEDURE — 2500000003 HC RX 250 WO HCPCS: Performed by: ANESTHESIOLOGY

## 2022-05-16 PROCEDURE — 6360000004 HC RX CONTRAST MEDICATION: Performed by: SURGERY

## 2022-05-16 RX ORDER — MAGNESIUM HYDROXIDE 1200 MG/15ML
LIQUID ORAL CONTINUOUS PRN
Status: DISCONTINUED | OUTPATIENT
Start: 2022-05-16 | End: 2022-05-16 | Stop reason: HOSPADM

## 2022-05-16 RX ORDER — PROPOFOL 10 MG/ML
INJECTION, EMULSION INTRAVENOUS PRN
Status: DISCONTINUED | OUTPATIENT
Start: 2022-05-16 | End: 2022-05-16 | Stop reason: SDUPTHER

## 2022-05-16 RX ORDER — OXYCODONE HYDROCHLORIDE 10 MG/1
10 TABLET ORAL PRN
Status: DISCONTINUED | OUTPATIENT
Start: 2022-05-16 | End: 2022-05-16

## 2022-05-16 RX ORDER — FENTANYL CITRATE 50 UG/ML
50 INJECTION, SOLUTION INTRAMUSCULAR; INTRAVENOUS EVERY 5 MIN PRN
Status: DISCONTINUED | OUTPATIENT
Start: 2022-05-16 | End: 2022-05-16

## 2022-05-16 RX ORDER — SODIUM CHLORIDE 9 MG/ML
INJECTION, SOLUTION INTRAVENOUS PRN
Status: DISCONTINUED | OUTPATIENT
Start: 2022-05-16 | End: 2022-05-16

## 2022-05-16 RX ORDER — ONDANSETRON 2 MG/ML
INJECTION INTRAMUSCULAR; INTRAVENOUS PRN
Status: DISCONTINUED | OUTPATIENT
Start: 2022-05-16 | End: 2022-05-16 | Stop reason: SDUPTHER

## 2022-05-16 RX ORDER — GLYCOPYRROLATE 0.2 MG/ML
INJECTION INTRAMUSCULAR; INTRAVENOUS PRN
Status: DISCONTINUED | OUTPATIENT
Start: 2022-05-16 | End: 2022-05-16 | Stop reason: SDUPTHER

## 2022-05-16 RX ORDER — LIDOCAINE HYDROCHLORIDE 20 MG/ML
INJECTION, SOLUTION EPIDURAL; INFILTRATION; INTRACAUDAL; PERINEURAL PRN
Status: DISCONTINUED | OUTPATIENT
Start: 2022-05-16 | End: 2022-05-16 | Stop reason: SDUPTHER

## 2022-05-16 RX ORDER — DEXAMETHASONE SODIUM PHOSPHATE 4 MG/ML
INJECTION, SOLUTION INTRA-ARTICULAR; INTRALESIONAL; INTRAMUSCULAR; INTRAVENOUS; SOFT TISSUE PRN
Status: DISCONTINUED | OUTPATIENT
Start: 2022-05-16 | End: 2022-05-16 | Stop reason: SDUPTHER

## 2022-05-16 RX ORDER — SODIUM CHLORIDE 0.9 % (FLUSH) 0.9 %
5-40 SYRINGE (ML) INJECTION PRN
Status: DISCONTINUED | OUTPATIENT
Start: 2022-05-16 | End: 2022-05-16

## 2022-05-16 RX ORDER — OXYCODONE HYDROCHLORIDE 5 MG/1
5 TABLET ORAL PRN
Status: DISCONTINUED | OUTPATIENT
Start: 2022-05-16 | End: 2022-05-16

## 2022-05-16 RX ORDER — FENTANYL CITRATE 50 UG/ML
25 INJECTION, SOLUTION INTRAMUSCULAR; INTRAVENOUS EVERY 5 MIN PRN
Status: COMPLETED | OUTPATIENT
Start: 2022-05-16 | End: 2022-05-16

## 2022-05-16 RX ORDER — SUCCINYLCHOLINE/SOD CL,ISO/PF 200MG/10ML
SYRINGE (ML) INTRAVENOUS PRN
Status: DISCONTINUED | OUTPATIENT
Start: 2022-05-16 | End: 2022-05-16 | Stop reason: SDUPTHER

## 2022-05-16 RX ORDER — ROCURONIUM BROMIDE 10 MG/ML
INJECTION, SOLUTION INTRAVENOUS PRN
Status: DISCONTINUED | OUTPATIENT
Start: 2022-05-16 | End: 2022-05-16 | Stop reason: SDUPTHER

## 2022-05-16 RX ORDER — EPHEDRINE SULFATE/0.9% NACL/PF 50 MG/5 ML
SYRINGE (ML) INTRAVENOUS PRN
Status: DISCONTINUED | OUTPATIENT
Start: 2022-05-16 | End: 2022-05-16 | Stop reason: SDUPTHER

## 2022-05-16 RX ORDER — FENTANYL CITRATE 50 UG/ML
INJECTION, SOLUTION INTRAMUSCULAR; INTRAVENOUS PRN
Status: DISCONTINUED | OUTPATIENT
Start: 2022-05-16 | End: 2022-05-16 | Stop reason: SDUPTHER

## 2022-05-16 RX ORDER — DEXTROSE MONOHYDRATE 25 G/50ML
25 INJECTION, SOLUTION INTRAVENOUS PRN
Status: DISCONTINUED | OUTPATIENT
Start: 2022-05-16 | End: 2022-05-20 | Stop reason: HOSPADM

## 2022-05-16 RX ORDER — SODIUM CHLORIDE 9 MG/ML
INJECTION, SOLUTION INTRAVENOUS CONTINUOUS PRN
Status: DISCONTINUED | OUTPATIENT
Start: 2022-05-16 | End: 2022-05-16 | Stop reason: SDUPTHER

## 2022-05-16 RX ORDER — BUPIVACAINE HYDROCHLORIDE 5 MG/ML
INJECTION, SOLUTION EPIDURAL; INTRACAUDAL
Status: COMPLETED | OUTPATIENT
Start: 2022-05-16 | End: 2022-05-16

## 2022-05-16 RX ORDER — ONDANSETRON 2 MG/ML
4 INJECTION INTRAMUSCULAR; INTRAVENOUS
Status: DISCONTINUED | OUTPATIENT
Start: 2022-05-16 | End: 2022-05-16

## 2022-05-16 RX ORDER — SODIUM CHLORIDE 0.9 % (FLUSH) 0.9 %
5-40 SYRINGE (ML) INJECTION EVERY 12 HOURS SCHEDULED
Status: DISCONTINUED | OUTPATIENT
Start: 2022-05-16 | End: 2022-05-16

## 2022-05-16 RX ADMIN — FENTANYL CITRATE 25 MCG: 50 INJECTION, SOLUTION INTRAMUSCULAR; INTRAVENOUS at 14:18

## 2022-05-16 RX ADMIN — HALOPERIDOL LACTATE 2 MG: 5 INJECTION, SOLUTION INTRAMUSCULAR at 21:52

## 2022-05-16 RX ADMIN — LORAZEPAM 0.5 MG: 0.5 TABLET ORAL at 08:35

## 2022-05-16 RX ADMIN — FENTANYL CITRATE 25 MCG: 50 INJECTION, SOLUTION INTRAMUSCULAR; INTRAVENOUS at 14:39

## 2022-05-16 RX ADMIN — GABAPENTIN 100 MG: 100 CAPSULE ORAL at 21:52

## 2022-05-16 RX ADMIN — SODIUM CHLORIDE, PRESERVATIVE FREE 10 ML: 5 INJECTION INTRAVENOUS at 22:06

## 2022-05-16 RX ADMIN — ATORVASTATIN CALCIUM 10 MG: 10 TABLET, FILM COATED ORAL at 21:51

## 2022-05-16 RX ADMIN — Medication 6 MG: at 21:51

## 2022-05-16 RX ADMIN — ONDANSETRON 4 MG: 2 INJECTION INTRAMUSCULAR; INTRAVENOUS at 13:31

## 2022-05-16 RX ADMIN — OXYCODONE 5 MG: 5 TABLET ORAL at 18:28

## 2022-05-16 RX ADMIN — SODIUM CHLORIDE, PRESERVATIVE FREE 10 ML: 5 INJECTION INTRAVENOUS at 08:37

## 2022-05-16 RX ADMIN — SEVELAMER CARBONATE 800 MG: 800 TABLET, FILM COATED ORAL at 18:28

## 2022-05-16 RX ADMIN — LIDOCAINE HYDROCHLORIDE 50 MG: 20 INJECTION, SOLUTION EPIDURAL; INFILTRATION; INTRACAUDAL; PERINEURAL at 12:46

## 2022-05-16 RX ADMIN — FAMOTIDINE 20 MG: 10 INJECTION INTRAVENOUS at 08:35

## 2022-05-16 RX ADMIN — TAMSULOSIN HYDROCHLORIDE 0.4 MG: 0.4 CAPSULE ORAL at 08:35

## 2022-05-16 RX ADMIN — METRONIDAZOLE 500 MG: 500 INJECTION, SOLUTION INTRAVENOUS at 22:05

## 2022-05-16 RX ADMIN — GLYCOPYRROLATE 0.2 MG: 0.2 INJECTION, SOLUTION INTRAMUSCULAR; INTRAVENOUS at 12:40

## 2022-05-16 RX ADMIN — SODIUM CHLORIDE, POTASSIUM CHLORIDE, SODIUM LACTATE AND CALCIUM CHLORIDE: 600; 310; 30; 20 INJECTION, SOLUTION INTRAVENOUS at 18:19

## 2022-05-16 RX ADMIN — LORAZEPAM 0.5 MG: 0.5 TABLET ORAL at 21:51

## 2022-05-16 RX ADMIN — FENTANYL CITRATE 25 MCG: 50 INJECTION, SOLUTION INTRAMUSCULAR; INTRAVENOUS at 15:00

## 2022-05-16 RX ADMIN — FENTANYL CITRATE 50 MCG: 50 INJECTION, SOLUTION INTRAMUSCULAR; INTRAVENOUS at 16:13

## 2022-05-16 RX ADMIN — DEXAMETHASONE SODIUM PHOSPHATE 8 MG: 4 INJECTION, SOLUTION INTRAMUSCULAR; INTRAVENOUS at 13:08

## 2022-05-16 RX ADMIN — Medication 120 MG: at 16:39

## 2022-05-16 RX ADMIN — PROPOFOL 150 MG: 10 INJECTION, EMULSION INTRAVENOUS at 16:38

## 2022-05-16 RX ADMIN — SODIUM CHLORIDE, POTASSIUM CHLORIDE, SODIUM LACTATE AND CALCIUM CHLORIDE: 600; 310; 30; 20 INJECTION, SOLUTION INTRAVENOUS at 04:01

## 2022-05-16 RX ADMIN — FENTANYL CITRATE 50 MCG: 50 INJECTION INTRAMUSCULAR; INTRAVENOUS at 12:55

## 2022-05-16 RX ADMIN — SEVELAMER CARBONATE 800 MG: 800 TABLET, FILM COATED ORAL at 08:35

## 2022-05-16 RX ADMIN — FENTANYL CITRATE 25 MCG: 50 INJECTION, SOLUTION INTRAMUSCULAR; INTRAVENOUS at 15:32

## 2022-05-16 RX ADMIN — DEXTROSE MONOHYDRATE 12.5 G: 25 INJECTION, SOLUTION INTRAVENOUS at 11:31

## 2022-05-16 RX ADMIN — GABAPENTIN 100 MG: 100 CAPSULE ORAL at 08:35

## 2022-05-16 RX ADMIN — SODIUM CHLORIDE, POTASSIUM CHLORIDE, SODIUM LACTATE AND CALCIUM CHLORIDE: 600; 310; 30; 20 INJECTION, SOLUTION INTRAVENOUS at 22:02

## 2022-05-16 RX ADMIN — ATENOLOL 25 MG: 25 TABLET ORAL at 21:51

## 2022-05-16 RX ADMIN — METRONIDAZOLE 500 MG: 500 INJECTION, SOLUTION INTRAVENOUS at 04:03

## 2022-05-16 RX ADMIN — PANTOPRAZOLE SODIUM 20 MG: 20 TABLET, DELAYED RELEASE ORAL at 21:51

## 2022-05-16 RX ADMIN — LIDOCAINE HYDROCHLORIDE 50 MG: 20 INJECTION, SOLUTION EPIDURAL; INFILTRATION; INTRACAUDAL; PERINEURAL at 16:38

## 2022-05-16 RX ADMIN — CEFTRIAXONE 1000 MG: 1 INJECTION, POWDER, FOR SOLUTION INTRAMUSCULAR; INTRAVENOUS at 01:49

## 2022-05-16 RX ADMIN — PROPOFOL 100 MG: 10 INJECTION, EMULSION INTRAVENOUS at 12:46

## 2022-05-16 RX ADMIN — Medication 15 MG: at 16:53

## 2022-05-16 RX ADMIN — SODIUM CHLORIDE: 9 INJECTION, SOLUTION INTRAVENOUS at 16:15

## 2022-05-16 RX ADMIN — FENTANYL CITRATE 50 MCG: 50 INJECTION INTRAMUSCULAR; INTRAVENOUS at 13:12

## 2022-05-16 RX ADMIN — SUGAMMADEX 200 MG: 100 INJECTION, SOLUTION INTRAVENOUS at 13:29

## 2022-05-16 RX ADMIN — ROCURONIUM BROMIDE 50 MG: 10 SOLUTION INTRAVENOUS at 12:47

## 2022-05-16 RX ADMIN — SODIUM CHLORIDE: 9 INJECTION, SOLUTION INTRAVENOUS at 13:21

## 2022-05-16 RX ADMIN — CALCITRIOL 0.25 MCG: 0.25 CAPSULE ORAL at 18:28

## 2022-05-16 RX ADMIN — SODIUM CHLORIDE: 9 INJECTION, SOLUTION INTRAVENOUS at 12:39

## 2022-05-16 ASSESSMENT — PAIN DESCRIPTION - PAIN TYPE
TYPE: SURGICAL PAIN

## 2022-05-16 ASSESSMENT — PAIN DESCRIPTION - LOCATION
LOCATION: ABDOMEN

## 2022-05-16 ASSESSMENT — PAIN DESCRIPTION - DESCRIPTORS
DESCRIPTORS: PATIENT UNABLE TO DESCRIBE
DESCRIPTORS: ACHING;SHARP
DESCRIPTORS: PATIENT UNABLE TO DESCRIBE
DESCRIPTORS: SORE
DESCRIPTORS: PATIENT UNABLE TO DESCRIBE
DESCRIPTORS: SORE
DESCRIPTORS: PATIENT UNABLE TO DESCRIBE
DESCRIPTORS: SORE
DESCRIPTORS: SORE
DESCRIPTORS: PATIENT UNABLE TO DESCRIBE
DESCRIPTORS: SORE

## 2022-05-16 ASSESSMENT — PAIN SCALES - GENERAL
PAINLEVEL_OUTOF10: 7
PAINLEVEL_OUTOF10: 5
PAINLEVEL_OUTOF10: 3
PAINLEVEL_OUTOF10: 6
PAINLEVEL_OUTOF10: 5
PAINLEVEL_OUTOF10: 5
PAINLEVEL_OUTOF10: 4
PAINLEVEL_OUTOF10: 5

## 2022-05-16 ASSESSMENT — ENCOUNTER SYMPTOMS
SHORTNESS OF BREATH: 0
SHORTNESS OF BREATH: 0

## 2022-05-16 NOTE — ANESTHESIA PRE PROCEDURE
Department of Anesthesiology  Preprocedure Note       Name:  Carmina Wilson   Age:  77 y.o.  :  1945                                          MRN:  [id number]         Date:  2022      Surgeon: Ariella Vann):  Soumya Avery MD    Procedure: Procedure(s):  LAPAROSCOPIC CHOLECYSTECTOMY WITH CHOLANGIOGRAM, POSSIBLE OPEN    Medications prior to admission:   Prior to Admission medications    Medication Sig Start Date End Date Taking? Authorizing Provider   docusate sodium (COLACE) 100 MG capsule Take 100 mg by mouth 2 times daily    Historical Provider, MD   ondansetron (ZOFRAN) 4 MG tablet Take 4 mg by mouth every 8 hours as needed for Nausea    Historical Provider, MD   LORazepam (ATIVAN) 0.5 MG tablet Take 0.5 mg by mouth 2 times daily. Historical Provider, MD   oxyCODONE (ROXICODONE) 5 MG immediate release tablet Take 5 mg by mouth every 4 hours as needed for Pain. Historical Provider, MD   polyethylene glycol (GLYCOLAX) 17 GM/SCOOP powder Take 17 g by mouth daily as needed (Constipation)     Historical Provider, MD   apixaban (ELIQUIS) 5 MG TABS tablet Take 1 tablet by mouth 2 times daily 3/2/22   Logan Cuba MD   aspirin 81 MG chewable tablet Take 1 tablet by mouth daily 22   Manohar Baptiste MD   melatonin 3 MG TABS tablet Take 2 tablets by mouth nightly as needed (sleep) 22   Michelle Granda MD   tamsulosin (FLOMAX) 0.4 MG capsule Take 0.4 mg by mouth every morning    Historical Provider, MD   gabapentin (NEURONTIN) 100 MG capsule Take 100 mg by mouth 3 times daily.     Historical Provider, MD   sevelamer (RENVELA) 800 MG tablet Take 800 mg by mouth 3 times daily (with meals)     Historical Provider, MD   atenolol (TENORMIN) 25 MG tablet Take 25 mg by mouth every evening    Historical Provider, MD   pantoprazole (PROTONIX) 20 MG tablet Take 20 mg by mouth nightly    Historical Provider, MD   atorvastatin (LIPITOR) 10 MG tablet Take 10 mg by mouth nightly    Historical Provider, MD   calcitRIOL (ROCALTROL) 0.25 MCG capsule Take 0.25 mcg by mouth every evening    Historical Provider, MD       Current medications:    No current facility-administered medications for this visit. No current outpatient medications on file.      Facility-Administered Medications Ordered in Other Visits   Medication Dose Route Frequency Provider Last Rate Last Admin    dextrose 50 % IV solution  25 g IntraVENous PRN Keiko Olmedo MD   12.5 g at 05/16/22 1131    haloperidol lactate (HALDOL) injection 2 mg  2 mg IntraVENous Q12H PRN Anika Liao MD   2 mg at 05/15/22 1930    haloperidol lactate (HALDOL) injection 5 mg  5 mg IntraMUSCular Once IRINA Russell - CNP        0.9 % sodium chloride bolus  1,000 mL IntraVENous Once Curly Ray MD        morphine (PF) injection 2 mg  2 mg IntraVENous Q4H PRN Curly Ray MD   2 mg at 05/12/22 0849    cefTRIAXone (ROCEPHIN) 1000 mg IVPB in 50 mL D5W minibag  1,000 mg IntraVENous Q24H Curly Ray MD   Stopped at 05/16/22 0337    metronidazole (FLAGYL) 500 mg in 0.9% NaCl 100 mL IVPB premix  500 mg IntraVENous Memo Vanessa MD   Stopped at 05/16/22 0535    lactated ringers infusion   IntraVENous Continuous Curly Ray  mL/hr at 05/16/22 0401 New Bag at 05/16/22 0401    aspirin chewable tablet 81 mg  81 mg Oral Daily Curly Ray MD   81 mg at 05/15/22 7603    atenolol (TENORMIN) tablet 25 mg  25 mg Oral Nightly Curly Ray MD   25 mg at 05/15/22 2000    atorvastatin (LIPITOR) tablet 10 mg  10 mg Oral Nightly Curly Ray MD   10 mg at 05/15/22 2000    calcitRIOL (ROCALTROL) capsule 0.25 mcg  0.25 mcg Oral QPM Curly Ray MD   0.25 mcg at 05/15/22 1752    dicyclomine (BENTYL) capsule 10 mg  10 mg Oral Q6H PRN Curly Ray MD        gabapentin (NEURONTIN) capsule 100 mg  100 mg Oral TID Curly Ray MD   100 mg at 05/16/22 0835    LORazepam (ATIVAN) tablet 0.5 mg  0.5 mg Oral BID Curly Ray MD   0.5 mg at 05/16/22 0835    melatonin tablet 6 mg  6 mg Oral Nightly PRN Julee Pride MD   6 mg at 05/15/22 1930    oxyCODONE (ROXICODONE) immediate release tablet 5 mg  5 mg Oral Q4H PRN Julee Pride MD        pantoprazole (PROTONIX) tablet 20 mg  20 mg Oral Nightly Julee Pride MD   20 mg at 05/15/22 2000    sevelamer (RENVELA) tablet 800 mg  800 mg Oral TID WC Julee Pride MD   800 mg at 05/16/22 0835    tamsulosin (FLOMAX) capsule 0.4 mg  0.4 mg Oral QAM Julee Pride MD   0.4 mg at 05/16/22 0835    sodium chloride flush 0.9 % injection 5-40 mL  5-40 mL IntraVENous 2 times per day Julee Pride MD   10 mL at 05/16/22 0837    sodium chloride flush 0.9 % injection 5-40 mL  5-40 mL IntraVENous PRN Julee Pride MD        0.9 % sodium chloride infusion   IntraVENous PRN Julee Pride MD        ondansetron (ZOFRAN-ODT) disintegrating tablet 4 mg  4 mg Oral Q8H PRN Julee Pride MD        Or    ondansetron [facility]) injection 4 mg  4 mg IntraVENous Q6H PRN Julee Pride MD   4 mg at 05/14/22 1454    polyethylene glycol (GLYCOLAX) packet 17 g  17 g Oral Daily PRN Julee Pride MD        famotidine (PEPCID) injection 20 mg  20 mg IntraVENous Daily Julee Pride MD   20 mg at 05/16/22 0835    acetaminophen (TYLENOL) tablet 650 mg  650 mg Oral Q6H PRN Julee Pride MD   650 mg at 05/12/22 2026    Or    acetaminophen (TYLENOL) suppository 650 mg  650 mg Rectal Q6H PRN Julee Pride MD       [employer] by provider] heparin 25,000 unit in sodium chloride 0.45% 250 mL (premix) infusion  0-3,000 Units/hr IntraVENous Continuous Julee Pride MD   Stopped at 05/16/22 0600       Allergies:     Allergies   Allergen Reactions    Lisinopril Swelling     Allergy listed on paperwork from [facility], no reaction noted       Problem List:    Patient Active Problem List   Diagnosis Code    GI bleed K92.2    History of COVID-19 Z86.16    Hyponatremia E87.1    Fatigue R53.83    Acute kidney injury superimposed on CKD (HCC) N17.9, N18.9    Lethargy R53.83    Suspected UTI R39.89    Acute CVA (cerebrovascular accident) ([address] I63.9    LESLEE (acute kidney injury) ([address] N17.9    Malignant neoplasm of colon (HCC) C18.9    Acute blood loss anemia D62    COVID-19 U07.1    Shock circulatory (HCC) R57.9    Bilateral pulmonary embolism (HCC) I26.99    Lactic acidosis E87.2    Hemorrhagic shock (HCC) R57.8    ESRD (end stage renal disease) on dialysis (HCC) N18.6, Z99.2    Acute cholecystitis K81.0       Past Medical History:        Diagnosis Date    Anemia 03/2022    Arthritis     CAD (coronary artery disease) 01/2020    Cancer ([address]     Colon Cancer diagnosed last month    Cerebral infarction ([address]     Cognitive communication deficit     COVID-19     Diabetes mellitus ([address]     Dysphagia     End stage renal disease ([address]     Fatigue     Gastrointestinal hemorrhage     Hemodialysis patient ([address] 2019    ckd-goes to dialysis Tuesday, Thurs, Fri    Hx of blood clots     Hyperlipidemia     Hypertension     Hyponatremia     Risk for falls     Splenomegaly 02/10/2021       Past Surgical History:        Procedure Laterality Date    CARDIAC CATHETERIZATION  2019    COLECTOMY N/A 01/26/2022    SIGMOID COLECTOMY, SIGMOIDOSCOPY performed by Mimi Hawley MD at [address] Left 4/29/2022    LEFT BRACHIAL CEPHALIC FISTULA performed by Rebeca Pires MD at [address]  02/19/2022    IR EMBOLIZATION HEMORRHAGE 2/19/2022 [facility]    IR PERC ARTERIOVENOUS FISTULA CREATION Left     unsure of date    IR TUNNELED CATHETER PLACEMENT GREATER THAN 5 YEARS  2/21/2022    IR TUNNELED CATHETER PLACEMENT GREATER THAN 5 YEARS 2/21/2022 [facility]    SIGMOIDOSCOPY N/A 02/17/2022    SIGMOIDOSCOPY CONTROL HEMORRHAGE performed by Vonn Phoenix, MD at [address] TUNNELED VENOUS CATHETER PLACEMENT Right 02/21/2022    Permacath; RIJ access; 23cm; Dr. Lynn Duffy       Social History:    Social History     Tobacco Use    Smoking status: Former Smoker    Smokeless tobacco: Never Used   Substance Use Topics    Alcohol use: Not Currently                                Counseling given: Not Answered      Vital Signs (Current): There were no vitals filed for this visit.                                            BP Readings from Last 3 Encounters:   05/16/22 112/69   04/29/22 106/62   04/29/22 112/76       NPO Status:                                                                                 BMI:   Wt Readings from Last 3 Encounters:   05/16/22 223 lb 12.3 oz (101.5 kg)   04/29/22 204 lb (92.5 kg)   04/22/22 203 lb 14.8 oz (92.5 kg)     There is no height or weight on file to calculate BMI.    CBC:   Lab Results   Component Value Date    WBC 3.1 05/16/2022    RBC 3.62 05/16/2022    HGB 10.7 05/16/2022    HCT 33.1 05/16/2022    MCV 91.6 05/16/2022    RDW 17.3 05/16/2022     05/16/2022       CMP:   Lab Results   Component Value Date     05/16/2022    K 3.8 05/16/2022    K 4.0 05/13/2022     05/16/2022    CO2 25 05/16/2022    BUN 18 05/16/2022    CREATININE 3.5 05/16/2022    GFRAA 21 05/16/2022    GFRAA 35 07/26/2011    AGRATIO 1.0 05/11/2022    LABGLOM 17 05/16/2022    GLUCOSE 88 05/16/2022    PROT 6.2 05/11/2022    PROT 7.5 07/26/2011    CALCIUM 8.1 05/16/2022    BILITOT 0.7 05/11/2022    ALKPHOS 101 05/11/2022    AST 15 05/11/2022    ALT 7 05/11/2022       POC Tests:   Recent Labs     05/16/22  1104   POCGLU 72       Coags:   Lab Results   Component Value Date    PROTIME 21.0 03/08/2022    INR 1.81 03/08/2022    APTT 60.0 05/16/2022       HCG (If Applicable): No results found for: PREGTESTUR, PREGSERUM, HCG, HCGQUANT     ABGs: No results found for: PHART, PO2ART, TZD1KXR, DZA5OCP, BEART, F3ALNIAI     Type & Screen (If Applicable):  No results found for: LABABO, [address]    Drug/Infectious Status (If Applicable):  No results found for: HIV, HEPCAB    COVID-19 Screening (If Applicable):   Lab Results   Component Value Date    COVID19 DETECTED 02/10/2022           Anesthesia Evaluation  Patient summary reviewed no history of anesthetic complications:   Airway: Mallampati: II  TM distance: >3 FB   Neck ROM: full  Comment: Prior airway:  Direct laryngoscopy; Type Cuffed; Tube size 8 mm; Laryngoscope Mac; Blade size 3; Location Oral; Grade view 1; Insertion attempts 1; Atraumatic Yes  Mouth opening: > = 3 FB Dental:    (+) edentulous      Pulmonary:Negative Pulmonary ROS breath sounds clear to auscultation      (-) COPD, asthma, shortness of breath and recent URI                           Cardiovascular:  Exercise tolerance: poor (<4 METS),   (+) hypertension:, CAD:, dysrhythmias: atrial fibrillation,     (-) valvular problems/murmurs and  CHF      Rhythm: regular  Rate: normal           Beta Blocker:  Dose within 24 Hrs      ROS comment: TTE 1/2022:  Conclusions      Summary   Left ventricular size is normal.   Moderate concentric left ventricular hypertrophy is present. Global left ventricular function is normal with ejection fraction estimated   from 55 % to 60 %. Grade II diastolic dysfunction with elevated LV filling pressures. Normal right ventricular size and function. Neuro/Psych:   (+) CVA:,             GI/Hepatic/Renal:   (+) renal disease (dialysis): ESRD,      (-) hiatal hernia, GERD, PUD, hepatitis and liver disease       Endo/Other:    (+) Diabetes, blood dyscrasia: anemia and thrombocytopenia:., malignancy/cancer (colon cancer). (-) hypothyroidism               Abdominal:   (+) obese,           Vascular:   + PE (Hx of PE back in 2/2022 on eliquis). Other Findings:             Anesthesia Plan      general     ASA 3     (  )  Induction: intravenous.     MIPS: Postoperative opioids intended, Prophylactic antiemetics administered and Postoperative trial extubation. Anesthetic plan and risks discussed with patient and child/children. Use of blood products discussed with patient whom. Plan discussed with CRNA. This pre-anesthesia assessment may be used as a history and physical.    DOS STAFF ADDENDUM:    Pt seen and examined, chart reviewed (including anesthesia, drug and allergy history). No interval changes to history and physical examination. Anesthetic plan, risks, benefits, alternatives, and personnel involved discussed with patient. Patient verbalized an understanding and agrees to proceed.       Edil Berger MD  May 16, 2022  11:43 AM

## 2022-05-16 NOTE — PROGRESS NOTES
Patient admitted to PACU # 9 from OR at 1351 post [address] [facility] CHOLANGIOGRAM  per Dr. Kelsey Harrison. Attached to PACU monitoring system and report received from anesthesia provider. Patient was reported to be hemodynamically stable during procedure. Patient arrived with oral airway in place. Airway out now. Patient drowsy  and denied pain.

## 2022-05-16 NOTE — PROGRESS NOTES
MD Perez Null at bedside for update to patient and update on pending ERCP. No additional orders at this time.

## 2022-05-16 NOTE — PROGRESS NOTES
This RN called surgery,ok to give morning meds, aspirin held. VSS. Pt resting comfortably in bed. Bed alarm on, call light within reach. Telesitter monitoring for safety. Will continue to monitor.

## 2022-05-16 NOTE — PROGRESS NOTES
Clinical Pharmacy Note  Heparin Dosing       Lab Results   Component Value Date    APTT 60.0 05/16/2022     Lab Results   Component Value Date    HGB 12.3 05/13/2022    HCT 38.5 05/13/2022     05/13/2022    INR 1.81 03/08/2022       Current Infusion Rate: 1400 units/hr    Plan:  Rate: Continue 1400 units/hr  Next aPTT: 0800  5/16/22    Pharmacy will continue to monitor and adjust based on aPTT results.     GAYATHRI [facility]  5/16/2022 3:49 AM

## 2022-05-16 NOTE — PROGRESS NOTES
1 min and 19 sec of fluoro used during ERCP.      Electronically signed by Janice Miller RN on 5/16/2022 at 5:05 PM

## 2022-05-16 NOTE — OP NOTE
Endoscopy Note    Patient: Mindi Garber   :   Acct#:     Procedure: Endoscopic retrograde cholangiopancreatography with biliary sphincterotomy  ERCP with stone extraction  EGD with snare polypectomy    Date: 2022    Surgeon:  Rivera Castro MD, MD    Referring Physician:  Dr. Ki Vicente and Dr. Rush Randle    Anesthesia:  General per Anesthesia. Indications:  77year old with bile duct stones on IOC for ERCP. Consent: Informed consent was obtained from the patient after explanation of indications, benefits, possible risks and complications of the procedure. Specifically the risk of bleeding, infection, perforation, pancreatitis, anesthesia complications, and remote possibility of mortality among others were explained. Monitoring: Patient was monitored with continuous pulse oximetry, telemetry, and intermittent blood pressures. Procedure:   A time-out was performed. The patient and staff were in agreement as to the correct patient and procedure. The above anesthesia was administered by the anesthesia department. The patient was placed in the left lateral prone position. The Olympus therapeutic duodenoscope was placed in the patient's mouth and blindly advanced into the esophagus. The scope was then advanced through the esophagus, stomach and into the second portion of the duodenum where the major papilla was visualized. Esophagus showed no Leiva's or reflux esophagitis. Otherwise limited views of esophagus. Stomach was normal on forward and retroflexed views of the stomach. Duodenum showed a 6mm sessile polyp in the 3rd portion of the duodenum removed with cold snare to a clean base and sent for pathology. Major Papilla: Normal.   Film:  Clips in the RUQ  Cholangiogram:  The biliary orifice was then selectively cannulated with a Jagtome. Contrast was injected and revealed several filling defects in the distal bile duct.   Bile duct and intrahepatics had normal caliber. No bile leak. Next, a 0.025\" Coralee Slider was passed without resistance into the bile duct and using the wire as a guide and using blended cautery, a 1cm biliary sphincterotomy was performed to the 12:00 position without complications. Next, several balloon sweeps were made with an extraction balloon removing multiple green stones. After negative balloon sweeps, completion occlusion cholangiogram was performed which revealed no residual filling defects. Next, several further balloon sweeps were performed to clear the contrast from the duct and the procedure was terminated. Choledocolithiasis s/p biliary sphincterotomy and stone extraction with complete clearance documented by completion occlusion cholangiogram.  Pancreatogram:  The pancreatic duct was not cannulated. The cannulas were then withdrawn. The duodenum and stomach were decompressed and the scope was withdrawn from the patient. The patient tolerated the procedure well and was taken to the post anesthesia care unit in good condition. Radiological Interpretation:  All fluoroscopic images and still x-ray films were carefullly examined and interpreted by the endoscopist on the spot during the procedure in the absence of radiologist.  These interpretations guided the course of the procedure and the interventions mentioned above and below. Estimated blood loss: minimal  Specimens taken: yes      Impression:  1. Choledocolithiasis s/p biliary sphincterotomy and stone extraction with complete clearance documented by completion occlusion cholangiogram.  2.  6mm duodenal polyp in the 3rd portion removed with cold snare polypectomy. Recommendations:  1. Clear diet. If does well overnight can advance diet as tolerated tomorrow. 2.  Consider repeat EGD in 6 months if the polyp is adenomatous to assure no recurrence. 3.  GI consult service to follow. 4.  Hold anti-coagulation for 48 hours after sphincterotomy.     Gina Moran MD  [facility] [place]  5/16/2022

## 2022-05-16 NOTE — ANESTHESIA POSTPROCEDURE EVALUATION
Department of Anesthesiology  Postprocedure Note    Patient: John Tuttle  MRN: [id number]  YOB: 1945  Date of evaluation: 5/16/2022  Time:  7:52 PM     Procedure Summary     Date: 05/16/22 Room / Location: [address] [place] [address]    Anesthesia Start: 9032 Anesthesia Stop: 1716    Procedures:       ERCP SPHINCTER/PAPILLOTOMY (N/A )      ERCP STONE REMOVAL/BALLOON SWEEP (N/A )      ERCP POLYP SNARE Diagnosis: (gallstones)    Surgeons: Abdoulaye Butler MD Responsible Provider: Madhu See MD    Anesthesia Type: Not recorded ASA Status: Not recorded          Anesthesia Type: No value filed. Nba Phase I: Nba Score: 9    Nba Phase II:      Last vitals: Reviewed and per EMR flowsheets.        Anesthesia Post Evaluation    Patient location during evaluation: PACU  Patient participation: complete - patient participated  Level of consciousness: awake and alert  Pain score: 0  Nausea & Vomiting: no nausea  Complications: no  Cardiovascular status: hemodynamically stable  Respiratory status: acceptable  Hydration status: stable

## 2022-05-16 NOTE — PROGRESS NOTES
Physical Therapy  Facility/Department: [address] MED SURG  Physical Therapy Treatment Note    Name: Mary Lou Mohamud  :   MRN: [id number]  Date of Service: 2022    Discharge Recommendations:  Patient would benefit from continued therapy after [address] with PT          Patient Diagnosis(es): The encounter diagnosis was Pain of upper abdomen. Past Medical History:  has a past medical history of Anemia, Arthritis, CAD (coronary artery disease), Cancer ([address] Cerebral infarction ([address] Cognitive communication deficit, COVID-19, Diabetes mellitus ([facility] Dysphagia, End stage renal disease ([facility] [address] Fatigue, Gastrointestinal hemorrhage, Hemodialysis patient ([facility] [address] Hx of blood clots, Hyperlipidemia, Hypertension, Hyponatremia, Risk for falls, and Splenomegaly. Past Surgical History:  has a past surgical history that includes IR PERC ARTERIOVENOUS FISTULA CREATION (Left); colectomy (N/A, 2022); sigmoidoscopy (N/A, 2022); IR EMBOLIZATION HEMORRHAGE (2022); Tunneled venous catheter placement (Right, 2022); IR TUNNELED CVC PLACE WO SQ PORT/PUMP > 5 YEARS (2022); Cardiac catheterization (); Colonoscopy; and Dialysis fistula creation (Left, 2022). Assessment   Body Structures, Functions, Activity Limitations Requiring Skilled Therapeutic Intervention: Decreased functional mobility ; Decreased strength;Decreased endurance  Assessment: The pt is a 76 yo male who presented to the ED from his LTC facility with RUQ pain associated with nausea. Imaging is concerning for acute cholecystitis with some small gallstones at the neck of the gallbladder. Awaiting GI surgery on 22. Prior to admission, pt living at [facility], requiring 2 person assist for stand pivot transfers to a w/c, depedendent for ADLs, non-ambulatory. Pt currently functioning near baseline. Anticipate return to [facility] with PT to maximize independence and safety with functional mobility.   Treatment Diagnosis: impaired mobility  Specific Instructions for Next Treatment: cotx  Therapy Prognosis: Fair  Decision Making: Medium Complexity  History: see above  Exam: see below  Clinical Presentation: evolving  Barriers to Learning: cognition  Activity Tolerance  Activity Tolerance: Treatment limited secondary to decreased cognition;Patient limited by fatigue     Plan   Plan  Plan: 3-5 times per week  Specific Instructions for Next Treatment: cotx  Current Treatment Recommendations: Strengthening,Balance training,Functional mobility training,Transfer training,Safety education & training,Patient/Caregiver education & training  Safety Devices  Type of Devices: All fall risk precautions in place,Call light within reach,Gait belt,Patient at risk for falls,Left in chair,Chair alarm in place  Restraints  Restraints Initially in Place: No     Restrictions  Restrictions/Precautions  Restrictions/Precautions: Fall Risk  Position Activity Restriction  Other position/activity restrictions: Tele-sitter, IV     Subjective   General  Chart Reviewed: Yes  Patient assessed for rehabilitation services?: Yes  Additional Pertinent Hx: Bernard Harvey MD progress note from 5-: The pt is a 76 yo male who presented to the ED from his LTC facility with RUQ pain associated with nausea. Imaging is concerning for acute cholecystitis with some small gallstones at the neck of the gallbladder. Awaiting GI surgery on 5/16/22. Response To Previous Treatment: Patient with no complaints from previous session. Family / Caregiver Present: No  Referring Practitioner: Rafael Najera MD  Referral Date : 05/12/22  Diagnosis: Acute cholecystitis  Follows Commands: Within Functional Limits  Subjective  Subjective: Pt is agreeable to PT - states he sometimes uses a walker during stand pivot transfers.          Social/Functional History  Social/Functional History  Type of Home: Facility ([address])  Has the patient had two or more falls in the past year or any fall with injury in the past year?: Yes (slid off the bed recently at the [facility])  Receives Help From: Other (comment) (has been getting PT)  ADL Assistance: Needs assistance (dependent for bathing, dressing, toilets in depends and is changed in the bed)  Homemaking Responsibilities: No  Ambulation Assistance: Non-ambulatory (can stand for transfers but not walk a distance)  Transfer Assistance: Needs assistance (assist of 2 for transfers)  Active : No  Additional Comments: above info per the pt's dgt in the room on 5/14/22     Vision/Hearing  Hearing: Within functional limits      Cognition   Orientation  Overall Orientation Status: Impaired  Orientation Level: Oriented to person;Oriented to place; Disoriented to time;Disoriented to situation  Cognition  Overall Cognitive Status: Exceptions  Arousal/Alertness: Delayed responses to stimuli  Following Commands: Follows one step commands with increased time; Follows one step commands with repetition  Attention Span: Attends with cues to redirect  Memory: Decreased recall of recent events;Decreased short term memory;Decreased recall of precautions  Safety Judgement: Decreased awareness of need for assistance;Decreased awareness of need for safety  Problem Solving: Decreased awareness of errors;Assistance required to implement solutions;Assistance required to generate solutions  Insights: Decreased awareness of deficits  Initiation: Requires cues for some  Sequencing: Does not require cues     Objective   Bed mobility  Supine to Sit: Stand by assistance  Sit to Supine: Unable to assess (in recliner at end of session)  Scooting: Stand by assistance  Bed Mobility Comments: Use of bed rail  Transfers  Sit to Stand: Maximum Assistance  Stand to sit: Maximum Assistance  Bed to Chair: Maximum assistance  Stand Pivot Transfers: Maximum Assistance  Comment: Posterior lean upon standing; bilateral knee flexion  Ambulation  Comments: non-ambulatory at baseline     Balance  Sitting - Static: Fair;+ (on the EOB; good in the chair)  Sitting - Dynamic: Fair  Standing - Static: Poor  Standing - Dynamic: Poor           AM-PAC Score  AM-PAC Inpatient Mobility Raw Score : 12 (05/16/22 0954)  AM-PAC Inpatient T-Scale Score : 35.33 (05/16/22 0954)  Mobility Inpatient CMS 0-100% Score: 68.66 (05/16/22 0954)  Mobility Inpatient CMS G-Code Modifier : CL (05/16/22 3915)          Goals  Short Term Goals  Time Frame for Short term goals: upon d/c - all goals ongoing as of 5/16/22 unless noted otherwise  Short term goal 1: Bed mobility with min A. - MET 5/16/22  Short term goal 2: Transfers sit <> stand with min/mod A of 1-2. Short term goal 3: Transfer bed <> chair with a walker with min/mod A of 1-2. Short term goal 4: Static standing at a walker x 1 minute with CGA.   Patient Goals   Patient goals : none stated per pt; per dgt: family would like to get him more therapy at the [facility]       Education  Patient Education  Education Given To: Patient  Education Provided: Role of Therapy;Orientation;Transfer Training  Education Method: Demonstration;Verbal  Barriers to Learning: Cognition  Education Outcome: Continued education needed      Therapy Time   Individual Concurrent Group Co-treatment   Time In 0915         Time Out 0940         Minutes 25         Timed Code Treatment Minutes: 25 Minutes       Jeannie Baez, PT

## 2022-05-16 NOTE — BRIEF OP NOTE
Brief Postoperative Note      Patient: Dalton Lozano  YOB: 1945  MRN: [id number]    Date of Procedure: 5/16/2022    Pre-Op Diagnosis: cholecystitis    Post-Op Diagnosis: Same plus choledocholithiasis       Procedure(s):  LAPAROSCOPIC CHOLECYSTECTOMY WITH CHOLANGIOGRAM    Surgeon(s):  Karla Bruce MD    Assistant:  Surgical Assistant: Michael Angel    Anesthesia: General    Estimated Blood Loss (mL): less than 50     Complications: None    Specimens:   ID Type Source Tests Collected by Time Destination   A : A) Gallbladder Tissue Gallbladder SURGICAL PATHOLOGY Karla Bruce MD 5/16/2022 1312        Implants:  * No implants in log *      Drains:   [REMOVED] External Urinary Catheter (Removed)       Findings: inflamed gallbladder.  Filling defects in distal CBD without obstruction    Electronically signed by Gasper Childs MD on 5/16/2022 at 1:47 PM

## 2022-05-16 NOTE — CONSULTS
GASTROENTEROLOGY INPATIENT CONSULTATION        IDENTIFYING DATA/REASON FOR CONSULTATION   PATIENT:  Hugo Palomino  MRN:  [id number]  ADMIT DATE: 5/11/2022  TIME OF EVALUATION: 5/16/2022 2:35 PM  HOSPITAL STAY:   LOS: 4 days     REASON FOR CONSULTATION:  choledocholithiasis    HISTORY OF PRESENT ILLNESS   Hugo Palomino is a 77 y.o. male with a PMH of colon adenocarcinoma s/p sigmoid resection 1/26/22 (developed post op bleeding and underwent Flex Sig 2/17 noting friable surgical anastomosis 18 cm from rectal verge followed by IR embolization of pseudoaneurysm adjacent to anastomosis off superior rectal artery 2/19), DVT/PE on Eliquis, ESRD on HD, DM, HTN, and arthritis. He  presented on 5/11/2022 with RUQ abdominal pain and had a CT scan of abd/pelvis that showed acute cholecystitis with small gallstones at the gallbladder neck and increased intrahepatic biliary ductal dilation. He was placed on antibiotics and his eliquis was held (placed on heparin drip in the interim). He underwent lap cholecystectomy earlier today with Dr. Konstantin Sevilla. Intraoperative cholangiogram noted multiple filling defects in the common duct.   We have been consulted for ERCP         PAST MEDICAL, SURGICAL, FAMILY, and SOCIAL HISTORY     Past Medical History:   Diagnosis Date    Anemia 03/2022    Arthritis     CAD (coronary artery disease) 01/2020    Cancer ([facility]     Colon Cancer diagnosed last month    Cerebral infarction [facility])     Cognitive communication deficit     COVID-19     Diabetes mellitus ([facility] [address]     Dysphagia     End stage renal disease ([facility] [address]     Fatigue     Gastrointestinal hemorrhage     Hemodialysis patient ([facility] [address] 2019    ckd-goes to dialysis Tuesday, Thurs, Fri    Hx of blood clots     Hyperlipidemia     Hypertension     Hyponatremia     Risk for falls     Splenomegaly 02/10/2021     Past Surgical History:   Procedure Laterality Date    CARDIAC CATHETERIZATION  2019    COLECTOMY N/A 01/26/2022    SIGMOID COLECTOMY, SIGMOIDOSCOPY performed by Jostin Jacques MD at [facility] Left 4/29/2022    LEFT BRACHIAL CEPHALIC FISTULA performed by Briseida Flores MD at [address] [place] [address]  02/19/2022    IR EMBOLIZATION HEMORRHAGE 2/19/2022 [facility]    IR PERC ARTERIOVENOUS FISTULA CREATION Left     unsure of date    IR TUNNELED CATHETER PLACEMENT GREATER THAN 5 YEARS  2/21/2022    IR TUNNELED CATHETER PLACEMENT GREATER THAN 5 YEARS 2/21/2022 [facility]    SIGMOIDOSCOPY N/A 02/17/2022    SIGMOIDOSCOPY CONTROL HEMORRHAGE performed by Vale Rudd MD at [facility] [address] Right 02/21/2022    Permacath; RIJ access; 23cm; Dr. Michael Deras     Family History   Problem Relation Age of Onset    High Blood Pressure Father      Social History     Socioeconomic History    Marital status:      Spouse name: None    Number of children: None    Years of education: None    Highest education level: None   Occupational History    None   Tobacco Use    Smoking status: Former Smoker    Smokeless tobacco: Never Used   Vaping Use    Vaping Use: Never used   Substance and Sexual Activity    Alcohol use: Not Currently    Drug use: Never    Sexual activity: None   Other Topics Concern    None   Social History Narrative    None     Social Determinants of Health     Financial Resource Strain:     Difficulty of Paying Living Expenses: Not on file   Food Insecurity:     Worried About [address] in the Last Year: Not on file    [place] of Food in the Last Year: Not on file   Transportation Needs:     Lack of Transportation (Medical): Not on file    Lack of Transportation (Non-Medical):  Not on file   Physical Activity:     Days of Exercise per Week: Not on file    Minutes of Exercise per Session: Not on file   Stress:     Feeling of Stress : Not on file   Social Connections:     Frequency of Communication with Friends and Family: Not on file Frequency of Social Gatherings with Friends and Family: Not on file    Attends Orthodoxy Services: Not on file    Active Member of Clubs or Organizations: Not on file    Attends Club or Organization Meetings: Not on file    Marital Status: Not on file   Intimate Partner Violence:     Fear of Current or Ex-Partner: Not on file    Emotionally Abused: Not on file    Physically Abused: Not on file    Sexually Abused: Not on file   Housing Stability:     Unable to Pay for Housing in the Last Year: Not on file    Number of Places Lived in the Last Year: Not on file    Unstable Housing in the Last Year: Not on file       MEDICATIONS   SCHEDULED:  sodium chloride flush, 5-40 mL, 2 times per day  haloperidol lactate, 5 mg, Once  sodium chloride, 1,000 mL, Once  cefTRIAXone (ROCEPHIN) IV, 1,000 mg, Q24H  metroNIDAZOLE, 500 mg, Q8H  aspirin, 81 mg, Daily  atenolol, 25 mg, Nightly  atorvastatin, 10 mg, Nightly  calcitRIOL, 0.25 mcg, QPM  gabapentin, 100 mg, TID  LORazepam, 0.5 mg, BID  pantoprazole, 20 mg, Nightly  sevelamer, 800 mg, TID WC  tamsulosin, 0.4 mg, QAM  sodium chloride flush, 5-40 mL, 2 times per day  famotidine (PEPCID) injection, 20 mg, Daily      FLUIDS/DRIPS:     sodium chloride      lactated ringers 100 mL/hr at 05/16/22 0401    sodium chloride      [Held by provider] heparin (PORCINE) Infusion Stopped (05/16/22 0600)     PRNs: dextrose, 25 g, PRN  sodium chloride flush, 5-40 mL, PRN  sodium chloride, , PRN  fentanNYL, 25 mcg, Q5 Min PRN  fentanNYL, 50 mcg, Q5 Min PRN  oxyCODONE, 5 mg, PRN   Or  oxyCODONE, 10 mg, PRN  ondansetron, 4 mg, Once PRN  haloperidol lactate, 2 mg, Q12H PRN  morphine, 2 mg, Q4H PRN  dicyclomine, 10 mg, Q6H PRN  melatonin, 6 mg, Nightly PRN  oxyCODONE, 5 mg, Q4H PRN  sodium chloride flush, 5-40 mL, PRN  sodium chloride, , PRN  ondansetron, 4 mg, Q8H PRN   Or  ondansetron, 4 mg, Q6H PRN  polyethylene glycol, 17 g, Daily PRN  acetaminophen, 650 mg, Q6H PRN   Or  acetaminophen, 650 mg, secondary to the   gallbladder pathology. 3.  Multiple cysts and other hypodensities too small to characterize within   both kidneys. No hydronephrosis. 4.  Residual borderline and mild thickening of the rectum and rectosigmoid   junction with previous surgery and anastomosis. No adjacent fluid collection   or general free fluid. ASSESSMENT AND RECOMMENDATIONS   77 y.o. male with a PMH of PMH of colon adenocarcinoma s/p sigmoid resection 1/26/22 (developed post op bleeding and underwent Flex Sig 2/17 noting friable surgical anastomosis 18 cm from rectal verge followed by IR embolization of pseudoaneurysm adjacent to anastomosis off superior rectal artery 2/19), DVT/PE on Eliquis, ESRD on HD, DM, HTN, and arthritis who presented with RUQ US. CT showed acute cholecytitis and gallstones. Underwent cholecystectomy today after Eliquis was held for 5 days. IOC showed filling defects      IMPRESSION:    Suspected choledocholithiasis on intraoperative cholangiogram  Acute cholecystitis with cholelithiasis. S/p lap andres  Hx of DVT/PE. Eliquis held since 5/12. On heparin drip in the interim which has been held since this morning    RECOMMENDATIONS:    ERCP today at 4:30  Continue to hold Eliquis  Keep NPO    If you have any questions or need any further information, please feel free to contact our consult team.  Thank you for allowing us to participate in the care of Jose F Burrell.    The note was completed using Dragon voice recognition transcription. Every effort was made to ensure accuracy; however, inadvertent transcription errors may be present despite my best efforts to edit errors. Jose Noel PA-C    Attending physician addendum:      I have personally seen and examined the patient, reviewed the patient's medical record and pertinent labs and clinical imaging. I have personally staffed the case with CHRISTI Engel.   I agree with her consultation note, exam findings, assessment and plans  as written above. I have made appropriate modifications and edited her assessment and plan where needed to reflect my impression and plans for this patient. Patient with cholelithiasis and went for cholecystectomy today. IOC showed multiple CBD filling defects  Consulted for ERCP. Impression:  1) Cholelithiasis s/p cholecystectomy  2) Choledocholithiasis    Plan;   ERCP today at 4:30     Thank you for allowing me to participate in this patient's care. If there are any questions or concerns regarding this patient, or the plan we have set in place, please feel free to contact me at [phone].      Mirian Officer, DO

## 2022-05-16 NOTE — PLAN OF CARE
Problem: Discharge Planning  Goal: Discharge to home or other facility with appropriate resources  Outcome: Progressing     Problem: Pain  Goal: Verbalizes/displays adequate comfort level or baseline comfort level  Outcome: Progressing     Problem: Safety - Adult  Goal: Free from fall injury  5/16/2022 0954 by Joie Aden RN  Outcome: Progressing  5/16/2022 0216 by Delilah Mahan RN  Outcome: Progressing  Note: D: PT. Alert x4 but has periods of confusion at night  A: tele-camera in room and bed alarm on  R: without falls this shift     Problem: ABCDS Injury Assessment  Goal: Absence of physical injury  Outcome: Progressing  Flowsheets (Taken 5/16/2022 0214 by Delilah Mahan RN)  Absence of Physical Injury: Implement safety measures based on patient assessment     Problem: Skin/Tissue Integrity  Goal: Absence of new skin breakdown  Description: 1. Monitor for areas of redness and/or skin breakdown  2. Assess vascular access sites hourly  3. Every 4-6 hours minimum:  Change oxygen saturation probe site  4. Every 4-6 hours:  If on nasal continuous positive airway pressure, respiratory therapy assess nares and determine need for appliance change or resting period.   Outcome: Progressing

## 2022-05-16 NOTE — PLAN OF CARE
Problem: Safety - Adult  Goal: Free from fall injury  5/16/2022 0216 by Delilah Mahan RN  Outcome: Progressing  Note: D: PT. Alert x4 but has periods of confusion at night  A: tele-camera in room and bed alarm on  R: without falls this shift

## 2022-05-16 NOTE — H&P
Preoperative History and Physical Update    H&P from 5/12/2022 was reviewed    Heparin drip used to bridge to surgery    PE  Alert and oriented  Non tender abdomen today    A/P  Acute cholecystitis  For Laparoscopic Cholecystectomy with Cholangiogram    The risks, benefits and alternatives to the planned procedure were discussed. Patient expressed an understanding and is willing to proceed.     Electronically signed by Zara Frazier MD on 5/16/2022 at 12:05 PM

## 2022-05-16 NOTE — H&P
Pre-operative History and Physical    Patient: Dougie Barr  : [id number]  Acct#:     HISTORY OF PRESENT ILLNESS:    The patient is a 77 y.o. male who presents with bile duct stones on IOC for ERCP. Past Medical History:        Diagnosis Date    Anemia 2022    Arthritis     CAD (coronary artery disease) 2020    Cancer [facility])     Colon Cancer diagnosed last month    [facility]     Cognitive communication deficit     COVID-19     Diabetes mellitus ([facility]     Dysphagia     End stage renal disease ([facility]     Fatigue     Gastrointestinal hemorrhage     Hemodialysis patient ([facility] 2019    ckd-goes to dialysis Tuesday, Thurs, Fri    Hx of blood clots     Hyperlipidemia     Hypertension     Hyponatremia     Risk for falls     Splenomegaly 02/10/2021      Past Surgical History:        Procedure Laterality Date    CARDIAC CATHETERIZATION      COLECTOMY N/A 2022    SIGMOID COLECTOMY, SIGMOIDOSCOPY performed by Pari Jean MD at  [address] Left 2022    LEFT BRACHIAL CEPHALIC FISTULA performed by Maria Del Carmen Flores MD at [facility]  2022    IR EMBOLIZATION HEMORRHAGE 2022 [facility]    IR PERC ARTERIOVENOUS FISTULA CREATION Left     unsure of date    IR TUNNELED CATHETER PLACEMENT GREATER THAN 5 YEARS  2022    IR TUNNELED CATHETER PLACEMENT GREATER THAN 5 YEARS 2022 [facility]    SIGMOIDOSCOPY N/A 2022    SIGMOIDOSCOPY CONTROL HEMORRHAGE performed by Patricia Rowe MD at [facility] Right 2022    Permacath; RIJ access; 23cm; Dr. Bianca Webber      Medications Prior to Admission:   No current facility-administered medications on file prior to encounter.      Current Outpatient Medications on File Prior to Encounter   Medication Sig Dispense Refill    docusate sodium (COLACE) 100 MG capsule Take 100 mg by mouth 2 times daily      ondansetron (ZOFRAN) 4 MG tablet Take 4 mg by mouth every 8 hours as needed for Nausea      LORazepam (ATIVAN) 0.5 MG tablet Take 0.5 mg by mouth 2 times daily.  oxyCODONE (ROXICODONE) 5 MG immediate release tablet Take 5 mg by mouth every 4 hours as needed for Pain.  polyethylene glycol (GLYCOLAX) 17 GM/SCOOP powder Take 17 g by mouth daily as needed (Constipation)       apixaban (ELIQUIS) 5 MG TABS tablet Take 1 tablet by mouth 2 times daily 60 tablet 0    aspirin 81 MG chewable tablet Take 1 tablet by mouth daily 30 tablet 0    melatonin 3 MG TABS tablet Take 2 tablets by mouth nightly as needed (sleep) 6 tablet 0    tamsulosin (FLOMAX) 0.4 MG capsule Take 0.4 mg by mouth every morning      gabapentin (NEURONTIN) 100 MG capsule Take 100 mg by mouth 3 times daily.       sevelamer (RENVELA) 800 MG tablet Take 800 mg by mouth 3 times daily (with meals)       atenolol (TENORMIN) 25 MG tablet Take 25 mg by mouth every evening      pantoprazole (PROTONIX) 20 MG tablet Take 20 mg by mouth nightly      atorvastatin (LIPITOR) 10 MG tablet Take 10 mg by mouth nightly      calcitRIOL (ROCALTROL) 0.25 MCG capsule Take 0.25 mcg by mouth every evening          Allergies:  Lisinopril    Social History:   Social History     Socioeconomic History    Marital status:      Spouse name: Not on file    Number of children: Not on file    Years of education: Not on file    Highest education level: Not on file   Occupational History    Not on file   Tobacco Use    Smoking status: Former Smoker    Smokeless tobacco: Never Used   Vaping Use    Vaping Use: Never used   Substance and Sexual Activity    Alcohol use: Not Currently    Drug use: Never    Sexual activity: Not on file   Other Topics Concern    Not on file   Social History Narrative    Not on file     Social Determinants of Health     Financial Resource Strain:     Difficulty of Paying Living Expenses: Not on file   Food Insecurity:     Worried About [address] [employer] in the Last Year: Not on file    [place] of Food in the Last Year: Not on file   Transportation Needs:     Lack of Transportation (Medical): Not on file    Lack of Transportation (Non-Medical): Not on file   Physical Activity:     Days of Exercise per Week: Not on file    Minutes of Exercise per Session: Not on file   Stress:     Feeling of Stress : Not on file   Social Connections:     Frequency of Communication with Friends and Family: Not on file    Frequency of Social Gatherings with Friends and Family: Not on file    Attends Islam Services: Not on file    Active Member of [address] [place] [address] or Organizations: Not on file    Attends Club or Organization Meetings: Not on file    Marital Status: Not on file   Intimate Partner Violence:     Fear of Current or Ex-Partner: Not on file    Emotionally Abused: Not on file    Physically Abused: Not on file    Sexually Abused: Not on file   Housing Stability:     Unable to Pay for Housing in the Last Year: Not on file    Number of [place] in the Last Year: Not on file    Unstable Housing in the Last Year: Not on file      Family History:       Problem Relation Age of Onset    High Blood Pressure Father         PHYSICAL EXAM:      /68   Pulse 59   Temp 97.4 °F (36.3 °C)   Resp 20   Ht 6' (1.829 m)   Wt 223 lb 12.3 oz (101.5 kg)   SpO2 94%   BMI 30.35 kg/m²  I        Heart:  RRR    Lungs:  CTA b    Abdomen:  S/NT/ND/+BS      ASSESSMENT AND PLAN:  ASA: per anesthesia  Mallampati: per anesthesia  1. Patient is a 77 y.o. male here for ERCP   2. Procedure options, risks and benefits reviewed with the patient. The patient expresses understanding.     Victoriano Hendricks

## 2022-05-16 NOTE — PROGRESS NOTES
[facility]                                               Progress note    Summary:   Dio Hanson is being seen by nephrology for ESRD. Admitted with acute cholecystitis. Interval History  Patient was seen and examined at bedside  Abdominal pain is improved. He has had low appetite. Scared to eat because of it causing pain. Going for cholecystectomy today   NPO    /70  95% on 2 L     Labs reviewdd. Hgb 10.7  K 3.8 bicarb 25        Plan:   -There are no acute indications for dialysis today. - HD per TTS schedule tomorrow. - no signs of volume overload on exam.         Henrietta Graham MD  [facility]  Office: [phone]    Assessment:   ESRD  Dialyzes TTS  Target weight 94 kg  Has tunneled dialysis cath    Blood pressure  Acceptable  Appears euvolemic    S HPT  Continue calcitriol and Renvela    Anemia  At goal, does not need ELENA right now    Acute cholecystitis  On antibiotics and surgery consulted            ROS:   [address]. All other remaining systems are negative. Constitutional:  fever, chills, weakness, weight change, fatigue,      Skin:  rash, pruritus, hair loss, bruising, dry skin, petechiae. Head, Face, Neck   headaches, swelling,  cervical adenopathy. Respiratory: shortness of breath, cough, or wheezing  Cardiovascular: chest pain, palpitations, dizzy, edema  Gastrointestinal: nausea, vomiting, diarrhea, constipation,belly pain    Yellow skin, blood in stool  Musculoskeletal:  back pain, muscle weakness, gait problems,       joint pain or swelling. Genitourinary:  dysuria, poor urine flow, flank pain, blood in urine  Neurologic:  vertigo, TIA'S, syncope, seizures, focal weakness  Psychosocial:  insomnia, anxiety, or depression. Additional positive findings: -     PMH:   Past medical history, surgical history, social history, family history are reviewed and updated as appropriate.     Reviewed current medication list.   Allergies reviewed and updated as needed. PE:   Vitals:    05/16/22 1112   BP: 112/69   Pulse: 58   Resp: 16   Temp: 97.2 °F (36.2 °C)   SpO2: 96%       General appearance:  in NAD, fully alert and oriented. Comfortable. HEENT: EOM intact, no icterus. Trachea is midline. Neck : No masses, appears symmetrical, no JVD  Respiratory: Respiratory effort appears normal, bilateral equal chest rise, no wheeze, no crackles   Cardiovascular: Ausculation shows RRR no edema  Abdomen: No visible mass or tenderness, non distended. Musculoskeletal:  Joints with no swelling or deformity. Skin:no rashes, ulcers, induration, no jaundice. Neuro: face symmetric, no focal deficits. Appropriate responses.     Tunneled dialysis catheter right      Lab Results   Component Value Date    CREATININE 3.5 (H) 05/16/2022    BUN 18 05/16/2022     05/16/2022    K 3.8 05/16/2022     05/16/2022    CO2 25 05/16/2022      Lab Results   Component Value Date    WBC 3.1 (L) 05/16/2022    HGB 10.7 (L) 05/16/2022    HCT 33.1 (L) 05/16/2022    MCV 91.6 05/16/2022     (L) 05/16/2022     Lab Results   Component Value Date    .5 (H) 02/21/2022    CALCIUM 8.1 (L) 05/16/2022    PHOS 3.7 05/16/2022

## 2022-05-16 NOTE — ANESTHESIA POSTPROCEDURE EVALUATION
Department of Anesthesiology  Postprocedure Note    Patient: Shanon Guo  MRN: [id number]  YOB: 1945  Date of evaluation: 5/16/2022  Time:  2:52 PM     Procedure Summary     Date: 05/16/22 Room / Location: [address]    Anesthesia Start: 2496 Anesthesia Stop: 7390    Procedure: LAPAROSCOPIC CHOLECYSTECTOMY WITH CHOLANGIOGRAM (N/A Abdomen) Diagnosis: (cholecystitis)    Surgeons: Dawson Lala MD Responsible Provider: Edil Berger MD    Anesthesia Type: general ASA Status: 3          Anesthesia Type: No value filed. Nba Phase I: Nba Score: 8    Nba Phase II:      Last vitals: Reviewed and per EMR flowsheets.        Anesthesia Post Evaluation    Patient location during evaluation: PACU  Patient participation: complete - patient participated  Level of consciousness: awake  Pain score: 0  Airway patency: patent  Nausea & Vomiting: no nausea and no vomiting  Complications: no  Cardiovascular status: blood pressure returned to baseline  Respiratory status: acceptable  Hydration status: euvolemic

## 2022-05-16 NOTE — PROGRESS NOTES
Pt returned to room 4251. Pt alert and oriented. VSS. Daughter at bedside. Pt c/o abd pain 5/10. LR restarted @ 100 ml/hr per MAR. Pt given ice water and diet order placed. Bed alarm on. Bedside table and call light within reach. Will continue to monitor.

## 2022-05-16 NOTE — ANESTHESIA PRE PROCEDURE
Department of Anesthesiology  Preprocedure Note       Name:  Jim Malhotra   Age:  77 y.o.  :  1945                                          MRN:  [id number]         Date:  2022      Surgeon: Vitaliy Martinez):  Barbara Eldridge MD    Procedure: Procedure(s):  ERCP SPHINCTER/PAPILLOTOMY  ERCP STONE REMOVAL/BALLOON SWEEP  ERCP POLYP SNARE    Medications prior to admission:   Prior to Admission medications    Medication Sig Start Date End Date Taking? Authorizing Provider   docusate sodium (COLACE) 100 MG capsule Take 100 mg by mouth 2 times daily    Historical Provider, MD   ondansetron (ZOFRAN) 4 MG tablet Take 4 mg by mouth every 8 hours as needed for Nausea    Historical Provider, MD   LORazepam (ATIVAN) 0.5 MG tablet Take 0.5 mg by mouth 2 times daily. Historical Provider, MD   oxyCODONE (ROXICODONE) 5 MG immediate release tablet Take 5 mg by mouth every 4 hours as needed for Pain. Historical Provider, MD   polyethylene glycol (GLYCOLAX) 17 GM/SCOOP powder Take 17 g by mouth daily as needed (Constipation)     Historical Provider, MD   apixaban (ELIQUIS) 5 MG TABS tablet Take 1 tablet by mouth 2 times daily 3/2/22   Eva Greene MD   aspirin 81 MG chewable tablet Take 1 tablet by mouth daily 22   Manohar Brooks MD   melatonin 3 MG TABS tablet Take 2 tablets by mouth nightly as needed (sleep) 22   Abi Wisdom MD   tamsulosin (FLOMAX) 0.4 MG capsule Take 0.4 mg by mouth every morning    Historical Provider, MD   gabapentin (NEURONTIN) 100 MG capsule Take 100 mg by mouth 3 times daily.     Historical Provider, MD   sevelamer (RENVELA) 800 MG tablet Take 800 mg by mouth 3 times daily (with meals)     Historical Provider, MD   atenolol (TENORMIN) 25 MG tablet Take 25 mg by mouth every evening    Historical Provider, MD   pantoprazole (PROTONIX) 20 MG tablet Take 20 mg by mouth nightly    Historical Provider, MD   atorvastatin (LIPITOR) 10 MG tablet Take 10 mg by mouth nightly Historical Provider, MD   calcitRIOL (ROCALTROL) 0.25 MCG capsule Take 0.25 mcg by mouth every evening    Historical Provider, MD       Current medications:    No current facility-administered medications for this visit. No current outpatient medications on file.      Facility-Administered Medications Ordered in Other Visits   Medication Dose Route Frequency Provider Last Rate Last Admin    dextrose 50 % IV solution  25 g IntraVENous PRN Mimi Hawley MD   12.5 g at 05/16/22 1131    haloperidol lactate (HALDOL) injection 2 mg  2 mg IntraVENous Q12H PRN Mimi Hawley MD   2 mg at 05/15/22 1930    haloperidol lactate (HALDOL) injection 5 mg  5 mg IntraMUSCular Once Mimi Hawley MD        morphine (PF) injection 2 mg  2 mg IntraVENous Q4H PRN Mimi Hawley MD   2 mg at 05/12/22 0849    cefTRIAXone (ROCEPHIN) 1000 mg IVPB in 50 mL D5W minibag  1,000 mg IntraVENous Q24H Mimi Hawley MD   Stopped at 05/16/22 1578    metronidazole (FLAGYL) 500 mg in 0.9% NaCl 100 mL IVPB premix  500 mg IntraVENous Cristian Godfrey MD   Stopped at 05/16/22 0535    lactated ringers infusion   IntraVENous Continuous Mimi Hawley  mL/hr at 05/16/22 1819 New Bag at 05/16/22 1819    aspirin chewable tablet 81 mg  81 mg Oral Daily Mimi Hawley MD   81 mg at 05/15/22 3597    atenolol (TENORMIN) tablet 25 mg  25 mg Oral Nightly Mimi Hawley MD   25 mg at 05/15/22 2000    atorvastatin (LIPITOR) tablet 10 mg  10 mg Oral Nightly Mimi Hawley MD   10 mg at 05/15/22 2000    calcitRIOL (ROCALTROL) capsule 0.25 mcg  0.25 mcg Oral QPM Mimi Hawley MD   0.25 mcg at 05/16/22 1828    dicyclomine (BENTYL) capsule 10 mg  10 mg Oral Q6H PRN Mimi Hawley MD        gabapentin (NEURONTIN) capsule 100 mg  100 mg Oral TID Mimi Hawley MD   100 mg at 05/16/22 0835    LORazepam (ATIVAN) tablet 0.5 mg  0.5 mg Oral BID Tamar Castro Anjum Aguirre MD   0.5 mg at 05/16/22 0835    melatonin tablet 6 mg  6 mg Oral Nightly PRN Indira Arteaga MD   6 mg at 05/15/22 1930    oxyCODONE (Jaxson Chucky) immediate release tablet 5 mg  5 mg Oral Q4H PRN Indira Arteaga MD   5 mg at 05/16/22 1828    pantoprazole (PROTONIX) tablet 20 mg  20 mg Oral Nightly Indira Arteaga MD   20 mg at 05/15/22 2000    sevelamer (RENVELA) tablet 800 mg  800 mg Oral TID WC Indira Arteaga MD   800 mg at 05/16/22 1828    tamsulosin (FLOMAX) capsule 0.4 mg  0.4 mg Oral QAM Indira Arteaga MD   0.4 mg at 05/16/22 2796    sodium chloride flush 0.9 % injection 5-40 mL  5-40 mL IntraVENous 2 times per day Indira Arteaga MD   10 mL at 05/16/22 0837    sodium chloride flush 0.9 % injection 5-40 mL  5-40 mL IntraVENous PRN Indira Arteaga MD        0.9 % sodium chloride infusion   IntraVENous PRN Indira Arteaga MD        ondansetron (ZOFRAN-ODT) disintegrating tablet 4 mg  4 mg Oral Q8H PRN Indira Arteaga MD        Or    [facility]ans[facility]ron [facility] injection 4 mg  4 mg IntraVENous Q6H PRN Indira Arteaga MD   4 mg at 05/14/22 1454    polyethylene glycol (GLYCOLAX) packet 17 g  17 g Oral Daily PRN Indira Arteaga MD        famotidine (PEPCID) injection 20 mg  20 mg IntraVENous Daily Indira Arteaga MD   20 mg at 05/16/22 0835    acetaminophen (TYLENOL) tablet 650 mg  650 mg Oral Q6H PRN Indira Arteaga MD   650 mg at 05/12/22 2026    Or    acetaminophen (TYLENOL) suppository 650 mg  650 mg Rectal Q6H PRN Indira Arteaga MD       [facility] by provider] heparin 25,000 unit in sodium chloride 0.45% 250 mL (premix) infusion  0-3,000 Units/hr IntraVENous Continuous Indira Arteaga MD   Stopped at 05/16/22 0600       Allergies:     Allergies   Allergen Reactions    Lisinopril Swelling     Allergy listed on paperwork from [facility], no reaction noted       Problem List:    Patient Active Problem List   Diagnosis Code    GI bleed K92.2    History of COVID-19 Z86.16    Hyponatremia E87.1    Fatigue R53.83    Acute kidney injury superimposed on CKD ([address] N17.9, N18.9    Lethargy R53.83    Suspected UTI R39.89    Acute CVA (cerebrovascular accident) ([address] I63.9    LESLEE (acute kidney injury) ([address] N17.9    Malignant neoplasm of colon ([address] C18.9    Acute blood loss anemia D62    COVID-19 U07.1    Shock circulatory (HCC) R57.9    Bilateral pulmonary embolism (HCC) I26.99    Lactic acidosis E87.2    Hemorrhagic shock (HCC) R57.8    ESRD (end stage renal disease) on dialysis (HCC) N18.6, Z99.2    Acute cholecystitis K81.0    Pain of upper abdomen R10.10       Past Medical History:        Diagnosis Date    Anemia 03/2022    Arthritis     CAD (coronary artery disease) 01/2020    Cancer [facility]     Colon Cancer diagnosed last month    Cerebral infarction ([address]     Cognitive communication deficit     COVID-19     Diabetes mellitus ([address]     Dysphagia     End stage renal disease ([address]     Fatigue     Gastrointestinal hemorrhage     Hemodialysis patient ([address] 2019    ckd-goes to dialysis Tuesday, Thurs, Fri    Hx of blood clots     Hyperlipidemia     Hypertension     Hyponatremia     Risk for falls     Splenomegaly 02/10/2021       Past Surgical History:        Procedure Laterality Date    CARDIAC CATHETERIZATION  2019    COLECTOMY N/A 01/26/2022    SIGMOID COLECTOMY, SIGMOIDOSCOPY performed by Mohit Patten MD at [address] Left 4/29/2022    LEFT BRACHIAL CEPHALIC FISTULA performed by Kait Min MD at [address] IR EMBOLIZATION HEMORRHAGE  02/19/2022    IR EMBOLIZATION HEMORRHAGE 2/19/2022 [facility]    IR PERC ARTERIOVENOUS FISTULA CREATION Left     unsure of date    IR TUNNELED CATHETER PLACEMENT GREATER THAN 5 YEARS  2/21/2022    IR TUNNELED CATHETER PLACEMENT GREATER THAN 5 YEARS 2/21/2022 [facility]    SIGMOIDOSCOPY N/A 02/17/2022    SIGMOIDOSCOPY CONTROL HEMORRHAGE performed by Gayle Osborne MD at [address] Right 02/21/2022    Permacath; RIJ access; 23cm; Dr. Akosua Alatorre       Social History:    Social History     Tobacco Use    Smoking status: Former Smoker    Smokeless tobacco: Never Used   Substance Use Topics    Alcohol use: Not Currently                                Counseling given: Not Answered      Vital Signs (Current): There were no vitals filed for this visit.                                            BP Readings from Last 3 Encounters:   05/16/22 138/76   04/29/22 106/62   04/29/22 112/76       NPO Status:                                                                                 BMI:   Wt Readings from Last 3 Encounters:   05/16/22 223 lb 12.3 oz (101.5 kg)   04/29/22 204 lb (92.5 kg)   04/22/22 203 lb 14.8 oz (92.5 kg)     There is no height or weight on file to calculate BMI.    CBC:   Lab Results   Component Value Date    WBC 3.1 05/16/2022    RBC 3.62 05/16/2022    HGB 10.7 05/16/2022    HCT 33.1 05/16/2022    MCV 91.6 05/16/2022    RDW 17.3 05/16/2022     05/16/2022       CMP:   Lab Results   Component Value Date     05/16/2022    K 3.8 05/16/2022    K 4.0 05/13/2022     05/16/2022    CO2 25 05/16/2022    BUN 18 05/16/2022    CREATININE 3.5 05/16/2022    GFRAA 21 05/16/2022    GFRAA 35 07/26/2011    AGRATIO 1.0 05/11/2022    LABGLOM 17 05/16/2022    GLUCOSE 88 05/16/2022    PROT 6.2 05/11/2022    PROT 7.5 07/26/2011    CALCIUM 8.1 05/16/2022    BILITOT 0.7 05/11/2022    ALKPHOS 101 05/11/2022    AST 15 05/11/2022    ALT 7 05/11/2022       POC Tests:   Recent Labs     05/16/22  1715   POCGLU 113*       Coags:   Lab Results   Component Value Date    PROTIME 21.0 03/08/2022    INR 1.81 03/08/2022    APTT 60.0 05/16/2022       HCG (If Applicable): No results found for: PREGTESTUR, PREGSERUM, HCG, HCGQUANT     ABGs: No results found for: PHART, PO2ART, ZKK0TZS, DKJ7MJG, BEART, T0MVTHGH     Type & Screen (If Applicable):  No results found for: LABABO, LABRH    Drug/Infectious Status (If Applicable):  No results found for: HIV, HEPCAB    COVID-19 Screening (If Applicable):   Lab Results   Component Value Date    COVID19 DETECTED 02/10/2022           Anesthesia Evaluation  Patient summary reviewed no history of anesthetic complications:   Airway: Mallampati: II  TM distance: >3 FB   Neck ROM: full  Comment: Prior airway:  Direct laryngoscopy; Type Cuffed; Tube size 8 mm; Laryngoscope Mac; Blade size 3; Location Oral; Grade view 1; Insertion attempts 1; Atraumatic Yes  Mouth opening: > = 3 FB Dental:    (+) edentulous      Pulmonary:Negative Pulmonary ROS breath sounds clear to auscultation      (-) COPD, asthma, shortness of breath and recent URI                           Cardiovascular:  Exercise tolerance: poor (<4 METS),   (+) hypertension:, CAD:, dysrhythmias: atrial fibrillation,     (-) valvular problems/murmurs and  CHF      Rhythm: regular  Rate: normal           Beta Blocker:  Dose within 24 Hrs      ROS comment: TTE 1/2022:  Conclusions      Summary   Left ventricular size is normal.   Moderate concentric left ventricular hypertrophy is present. Global left ventricular function is normal with ejection fraction estimated   from 55 % to 60 %. Grade II diastolic dysfunction with elevated LV filling pressures. Normal right ventricular size and function. Neuro/Psych:   (+) CVA:,             GI/Hepatic/Renal:   (+) renal disease (dialysis): ESRD,      (-) hiatal hernia, GERD, PUD, hepatitis and liver disease       Endo/Other:    (+) Diabetes, blood dyscrasia: anemia and thrombocytopenia:., malignancy/cancer (colon cancer). (-) hypothyroidism               Abdominal:   (+) obese,           Vascular:   + PE (Hx of PE back in 2/2022 on eliquis).        Other Findings: Anesthesia Plan      general     ASA 3     (Retained stone. Now for ERCP. Consented wife over phone.)  Induction: intravenous. MIPS: Postoperative opioids intended, Prophylactic antiemetics administered and Postoperative trial extubation. Anesthetic plan and risks discussed with patient and spouse. Use of blood products discussed with patient whom. Plan discussed with CRNA. This pre-anesthesia assessment may be used as a history and physical.    DOS STAFF ADDENDUM:    Pt seen and examined, chart reviewed (including anesthesia, drug and allergy history). No interval changes to history and physical examination. Anesthetic plan, risks, benefits, alternatives, and personnel involved discussed with patient. Patient verbalized an understanding and agrees to proceed.       Noe Mckenzie MD  May 16, 2022  7:52 PM

## 2022-05-16 NOTE — PROGRESS NOTES
Telephone consent obtained from patient daughter Rajinder Pearl for Endoscopic Retrograde Cholangiopancreatography With Dr. Marco Mar

## 2022-05-16 NOTE — PROGRESS NOTES
Hospitalist Progress Note      PCP: Noy Huynh    Date of Admission: 5/11/2022    Subjective: to OR for andres today    Medications:  Reviewed    Infusion Medications    lactated ringers 100 mL/hr at 05/16/22 1819    sodium chloride      [Held by provider] heparin (PORCINE) Infusion Stopped (05/16/22 0600)     Scheduled Medications    haloperidol lactate  5 mg IntraMUSCular Once    cefTRIAXone (ROCEPHIN) IV  1,000 mg IntraVENous Q24H    metroNIDAZOLE  500 mg IntraVENous Q8H    aspirin  81 mg Oral Daily    atenolol  25 mg Oral Nightly    atorvastatin  10 mg Oral Nightly    calcitRIOL  0.25 mcg Oral QPM    gabapentin  100 mg Oral TID    LORazepam  0.5 mg Oral BID    pantoprazole  20 mg Oral Nightly    sevelamer  800 mg Oral TID WC    tamsulosin  0.4 mg Oral QAM    sodium chloride flush  5-40 mL IntraVENous 2 times per day    famotidine (PEPCID) injection  20 mg IntraVENous Daily     PRN Meds: dextrose, haloperidol lactate, morphine, dicyclomine, melatonin, oxyCODONE, sodium chloride flush, sodium chloride, ondansetron **OR** ondansetron, polyethylene glycol, acetaminophen **OR** acetaminophen      Intake/Output Summary (Last 24 hours) at 5/16/2022 1917  Last data filed at 5/16/2022 1711  Gross per 24 hour   Intake 1200 ml   Output --   Net 1200 ml       Physical Exam Performed:    /76   Pulse 63   Temp 97.1 °F (36.2 °C) (Temporal)   Resp 18   Ht 6' (1.829 m)   Wt 223 lb 12.3 oz (101.5 kg)   SpO2 95%   BMI 30.35 kg/m²        Gen: No distress. Eyes: PERRL. No sclera icterus. No conjunctival injection. ENT: No discharge. Pharynx clear. External appearance of ears and nose normal.  Neck: Trachea midline. No obvious mass. Resp: No accessory muscle use. No crackles. No wheezes. No rhonchi. No dullness on percussion. CV: Regular rate. Regular rhythm. No murmur or rub. No edema. GI: Non-tender. Non-distended. No hernia. Skin: Warm, dry, normal texture and turgor.  No nodule on exposed extremities. Lymph: No cervical LAD. No supraclavicular LAD. M/S: No cyanosis. No clubbing. No joint deformity. Neuro: Moves all four extremities. CN 2-12 tested, no defect noted. Psych: Oriented x 2 . No anxiety. Awake. Alert.        Labs:   Recent Labs     05/16/22  0934   WBC 3.1*   HGB 10.7*   HCT 33.1*   *     Recent Labs     05/16/22  0934      K 3.8      CO2 25   BUN 18   CREATININE 3.5*   CALCIUM 8.1*   PHOS 3.7     No results for input(s): AST, ALT, BILIDIR, BILITOT, ALKPHOS in the last 72 hours. No results for input(s): INR in the last 72 hours. No results for input(s): Stefany Comment in the last 72 hours. Urinalysis:      Lab Results   Component Value Date    NITRU Negative 04/22/2022    WBCUA 124 04/22/2022    BACTERIA 2+ 04/22/2022    RBCUA see below 04/22/2022    RBCUA 0 04/22/2022    BLOODU MODERATE 04/22/2022    SPECGRAV 1.025 04/22/2022    GLUCOSEU Negative 04/22/2022       Radiology:  FL CHOLANGIOGRAM OR   Final Result   Suspected choledocholithiasis. The common duct is demonstrated as at least   partially patent. CT ABDOMEN PELVIS W IV CONTRAST Additional Contrast? None   Final Result   1. Dilated gallbladder with edematous wall, mucosal enhancement, and small   amount of pericholecystic fluid. Small gallstones are suggested at the   gallbladder neck to cystic duct region. Features are highly suspicious for   acute cholecystitis. If clinical doubt, then gallbladder ultrasound. 2.  Increased intrahepatic biliary ductal dilatation likely secondary to the   gallbladder pathology. 3.  Multiple cysts and other hypodensities too small to characterize within   both kidneys. No hydronephrosis. 4.  Residual borderline and mild thickening of the rectum and rectosigmoid   junction with previous surgery and anastomosis. No adjacent fluid collection   or general free fluid.          FL ERCP BILIARY S&I    (Results Pending) Assessment/Plan:    Active Hospital Problems    Diagnosis     Pain of upper abdomen [R10.10]      Priority: Medium    Acute cholecystitis [K81.0]      Priority: Medium           Patient is a 55-year-old male with a past medical history of coronary artery disease, CVA, anemia, cognitive impairment, diabetes, ESRD, hypertension, hyperlipidemia who presented with right upper quadrant pain. He was admitted with acute cholecystitis.     Acute cholecystitis-surgery planned for Monday  -on empiric ceftriaxone and Flagyl, D 5  -general surgery consulted  -on heparin gtt in the interim, hold 6 hours prior to procedure(was on DOAC hence delay in  Surgery)  - to OR for andres today     DVT  -heparin gtt  -Eliquis on hold     CAD  -continue home meds     ESRD on HD  -nephrology consulted, recs appreciated     Essential hypertension  -continue home meds     Delirium-improved, continue supportive care  -Continue melatonin  -Need as needed Haldol       DVT Prophylaxis: heparin   Diet: ADULT DIET;  Clear Liquid  Code Status: Full Code    PT/OT Eval Status: ordered    Keke Garcia MD

## 2022-05-17 LAB
ALBUMIN SERPL-MCNC: 2.5 G/DL (ref 3.4–5)
ANION GAP SERPL CALCULATED.3IONS-SCNC: 13 MMOL/L (ref 3–16)
BASOPHILS ABSOLUTE: 0 K/UL (ref 0–0.2)
BASOPHILS RELATIVE PERCENT: 0.1 %
BUN BLDV-MCNC: 21 MG/DL (ref 7–20)
CALCIUM SERPL-MCNC: 8.3 MG/DL (ref 8.3–10.6)
CHLORIDE BLD-SCNC: 101 MMOL/L (ref 99–110)
CO2: 22 MMOL/L (ref 21–32)
CREAT SERPL-MCNC: 3.8 MG/DL (ref 0.8–1.3)
EOSINOPHILS ABSOLUTE: 0 K/UL (ref 0–0.6)
EOSINOPHILS RELATIVE PERCENT: 0 %
GFR AFRICAN AMERICAN: 19
GFR NON-AFRICAN AMERICAN: 16
GLUCOSE BLD-MCNC: 135 MG/DL (ref 70–99)
HCT VFR BLD CALC: 33.7 % (ref 40.5–52.5)
HEMOGLOBIN: 10.7 G/DL (ref 13.5–17.5)
LYMPHOCYTES ABSOLUTE: 0.6 K/UL (ref 1–5.1)
LYMPHOCYTES RELATIVE PERCENT: 13.3 %
MCH RBC QN AUTO: 29.1 PG (ref 26–34)
MCHC RBC AUTO-ENTMCNC: 31.8 G/DL (ref 31–36)
MCV RBC AUTO: 91.6 FL (ref 80–100)
MONOCYTES ABSOLUTE: 0.2 K/UL (ref 0–1.3)
MONOCYTES RELATIVE PERCENT: 5.4 %
NEUTROPHILS ABSOLUTE: 3.5 K/UL (ref 1.7–7.7)
NEUTROPHILS RELATIVE PERCENT: 81.2 %
PDW BLD-RTO: 17.3 % (ref 12.4–15.4)
PHOSPHORUS: 4.7 MG/DL (ref 2.5–4.9)
PLATELET # BLD: 145 K/UL (ref 135–450)
PMV BLD AUTO: 8.3 FL (ref 5–10.5)
POTASSIUM SERPL-SCNC: 4.2 MMOL/L (ref 3.5–5.1)
RBC # BLD: 3.68 M/UL (ref 4.2–5.9)
SODIUM BLD-SCNC: 136 MMOL/L (ref 136–145)
WBC # BLD: 4.3 K/UL (ref 4–11)

## 2022-05-17 PROCEDURE — 99024 POSTOP FOLLOW-UP VISIT: CPT | Performed by: SURGERY

## 2022-05-17 PROCEDURE — 85025 COMPLETE CBC W/AUTO DIFF WBC: CPT

## 2022-05-17 PROCEDURE — 2500000003 HC RX 250 WO HCPCS: Performed by: SURGERY

## 2022-05-17 PROCEDURE — 36415 COLL VENOUS BLD VENIPUNCTURE: CPT

## 2022-05-17 PROCEDURE — 90935 HEMODIALYSIS ONE EVALUATION: CPT

## 2022-05-17 PROCEDURE — 1200000000 HC SEMI PRIVATE

## 2022-05-17 PROCEDURE — APPNB45 APP NON BILLABLE 31-45 MINUTES: Performed by: PHYSICIAN ASSISTANT

## 2022-05-17 PROCEDURE — 2580000003 HC RX 258: Performed by: SURGERY

## 2022-05-17 PROCEDURE — 99024 POSTOP FOLLOW-UP VISIT: CPT | Performed by: PHYSICIAN ASSISTANT

## 2022-05-17 PROCEDURE — 6370000000 HC RX 637 (ALT 250 FOR IP): Performed by: SURGERY

## 2022-05-17 PROCEDURE — APPSS45 APP SPLIT SHARED TIME 31-45 MINUTES: Performed by: PHYSICIAN ASSISTANT

## 2022-05-17 PROCEDURE — 6360000002 HC RX W HCPCS: Performed by: SURGERY

## 2022-05-17 PROCEDURE — 80069 RENAL FUNCTION PANEL: CPT

## 2022-05-17 RX ORDER — HEPARIN SODIUM 1000 [USP'U]/ML
3600 INJECTION, SOLUTION INTRAVENOUS; SUBCUTANEOUS PRN
Status: DISCONTINUED | OUTPATIENT
Start: 2022-05-17 | End: 2022-05-20 | Stop reason: HOSPADM

## 2022-05-17 RX ADMIN — ATORVASTATIN CALCIUM 10 MG: 10 TABLET, FILM COATED ORAL at 21:25

## 2022-05-17 RX ADMIN — METRONIDAZOLE 500 MG: 500 INJECTION, SOLUTION INTRAVENOUS at 04:42

## 2022-05-17 RX ADMIN — GABAPENTIN 100 MG: 100 CAPSULE ORAL at 14:39

## 2022-05-17 RX ADMIN — ATENOLOL 25 MG: 25 TABLET ORAL at 21:25

## 2022-05-17 RX ADMIN — METRONIDAZOLE 500 MG: 500 INJECTION, SOLUTION INTRAVENOUS at 12:23

## 2022-05-17 RX ADMIN — SEVELAMER CARBONATE 800 MG: 800 TABLET, FILM COATED ORAL at 18:21

## 2022-05-17 RX ADMIN — PANTOPRAZOLE SODIUM 20 MG: 20 TABLET, DELAYED RELEASE ORAL at 21:25

## 2022-05-17 RX ADMIN — Medication 6 MG: at 21:25

## 2022-05-17 RX ADMIN — GABAPENTIN 100 MG: 100 CAPSULE ORAL at 21:25

## 2022-05-17 RX ADMIN — LORAZEPAM 0.5 MG: 0.5 TABLET ORAL at 21:25

## 2022-05-17 RX ADMIN — CALCITRIOL 0.25 MCG: 0.25 CAPSULE ORAL at 18:21

## 2022-05-17 RX ADMIN — CEFTRIAXONE 1000 MG: 1 INJECTION, POWDER, FOR SOLUTION INTRAMUSCULAR; INTRAVENOUS at 04:02

## 2022-05-17 RX ADMIN — SODIUM CHLORIDE, POTASSIUM CHLORIDE, SODIUM LACTATE AND CALCIUM CHLORIDE: 600; 310; 30; 20 INJECTION, SOLUTION INTRAVENOUS at 12:24

## 2022-05-17 RX ADMIN — SEVELAMER CARBONATE 800 MG: 800 TABLET, FILM COATED ORAL at 12:24

## 2022-05-17 NOTE — PLAN OF CARE
Problem: Discharge Planning  Goal: Discharge to home or other facility with appropriate resources  Outcome: Progressing     Problem: Safety - Adult  Goal: Free from fall injury  5/17/2022 1210 by Elizabet Chacon RN  Outcome: Progressing  5/17/2022 0117 by Andrea Shah RN  Outcome: Progressing  Note: D: PT. Alert to self and situation but confused to time and place, bed alarm on and tele-camera in room   A: encouraged to call for assist and not to get out of bed without assist   R: without falls this shift     Problem: ABCDS Injury Assessment  Goal: Absence of physical injury  Outcome: Progressing     Problem: Skin/Tissue Integrity  Goal: Absence of new skin breakdown  Description: 1. Monitor for areas of redness and/or skin breakdown  2. Assess vascular access sites hourly  3. Every 4-6 hours minimum:  Change oxygen saturation probe site  4. Every 4-6 hours:  If on nasal continuous positive airway pressure, respiratory therapy assess nares and determine need for appliance change or resting period.   Outcome: Progressing     Problem: Chronic Conditions and Co-morbidities  Goal: Patient's chronic conditions and co-morbidity symptoms are monitored and maintained or improved  Outcome: Progressing

## 2022-05-17 NOTE — PLAN OF CARE
Problem: Pain  Goal: Verbalizes/displays adequate comfort level or baseline comfort level  Outcome: Progressing  Flowsheets (Taken 5/17/2022 0117)  Verbalizes/displays adequate comfort level or baseline comfort level: Assess pain using appropriate pain scale  Note: D: PT. Denies abdominal pain   A: encouraged to call for assist if is in pain, will continue to monitor     Problem: Safety - Adult  Goal: Free from fall injury  Outcome: Progressing  Note: D: PT. Alert to self and situation but confused to time and place, bed alarm on and tele-camera in room   A: encouraged to call for assist and not to get out of bed without assist   R: without falls this shift

## 2022-05-17 NOTE — PROGRESS NOTES
Treatment time:3 hours  Net UF: 3400 ml    Pre weight: 101.3 kg   Post weight: 98.3 kg  EDW: 94 kg    Access used: CVC  Access function: god with  ml/min    Medications or blood products given: none    Regular outpatient schedule: TTS    Summary of response to treatment: pt tolerated Hd. BP remained stable . Goal attained. Pt VS stable upon leaving Hd unit           Copy of dialysis treatment record placed in chart, to be scanned into EMR.

## 2022-05-17 NOTE — PROGRESS NOTES
Pt was in dialysis when this RN received report from nightshift RN. Morning meds not given. Pt daughter,Janeen called and was given update on pt POC.

## 2022-05-17 NOTE — OP NOTE
[address] [place] [address] Tina Darnell                                 OPERATIVE REPORT    PATIENT NAME: Flaca Kulkarni                        :        1945  MED REC NO:   [id number]                          ROOM:       [id number]  ACCOUNT NO:   [de-identified]                           ADMIT DATE: 2022  PROVIDER:     Jenny Harris MD      DATE OF PROCEDURE:  2022    PREOPERATIVE DIAGNOSIS:  Acute calculous cholecystitis. POSTOPERATIVE DIAGNOSES:  Acute calculous cholecystitis plus  choledocholithiasis. PROCEDURE PERFORMED:  Laparoscopic cholecystectomy with cholangiogram.    SURGEON:  Jenny Harris MD    SPECIMEN:  Gallbladder. ESTIMATED BLOOD LOSS:  Less than 50 mL. COMPLICATIONS:  None. DISPOSITION:  To recovery in stable condition. INDICATION:  The patient is a 43-year-old male who presented with right  upper quadrant pain and on CT scan and ultrasound imaging was found to  have cholecystitis. He was admitted and placed on IV antibiotics. Surgical intervention was delayed due to use of oral anticoagulants. He  was placed on a heparin drip and his oral medications held. He now  presents to the operating room for cholecystectomy. PROCEDURE:  The patient was brought to the operating room, placed  supine, general anesthesia and intubation were performed and the abdomen  prepped and draped in a sterile fashion. A supraumbilical incision was  made and a trocar placed with the Alexander technique. Insufflation was  established and three additional trocars placed across the right upper  quadrant. There was a phlegmon in the right upper quadrant involving  the duodenum, omentum, liver and gallbladder. The omentum was placed on  traction and using electrocautery,  from the liver edge and  then the gallbladder.   The entire surface of the gallbladder was  plastered to the omentum, but with ongoing tedious dissection,  eventually the entire gallbladder was exposed. The gallbladder was  edematous with erythema consistent with cholecystitis. We were able to  retract cephalad and then retract the neck of the gallbladder laterally. Dissection was carried from lateral to medial and the cystic duct and  the cystic artery identified and cleared. A clip was placed on the  gallbladder side of the cystic duct which was opened with the scissors. A cholangiogram catheter was inserted and a cholangiogram performed. This showed normal biliary anatomy and contrast was flowing into the  duodenum, however, the distal common bile duct had multiple filling  defects consistent with small stones and sludge. At this time, the  catheter was withdrawn and the cystic duct divided and secured with a  PDS Endoloop followed by a hemoclip. The cystic artery was clipped and  divided and the gallbladder  from the liver bed using  electrocautery. The gallbladder was placed into an Endopouch and  extracted from the umbilical trocar site. The liver bed was now checked  for bleeding, hemostasis achieved with electrocautery and the abdomen  irrigated and suctioned clear. The trocars were withdrawn and the  umbilicus closed with an 0 Vicryl in the fascia. Skin incisions were  closed with 4-0 Vicryls. Dressings were applied and the patient  transferred to recovery in good condition.         Patsy Ward MD    D: 05/16/2022 15:44:27       T: 05/16/2022 15:47:55     [email or web address]  Job#: [id number]     Doc#: [id number]    CC:

## 2022-05-17 NOTE — CONSULTS
Discharge Planning:  SW received a consult to talk with pts dgtrs re: the desire t change nursing facilities. SW contacted pts dgtr . Melvinjose ramon Henley [phone]. HIPAA compliant message left requesting a return call. SW contacted [place] at [phone]. HIPAA compliant message left. SW contacted pts spouse, Nilda France. [phone]. Spouse stated that her dgtrs have been unhappy with the nursing home. Spouse stated that she lives in [place] and does not get out much d/t being on oxygen but her dgtrs lives close to [facility] and check on pt often. Spouse stated that a camera has been placed in pts room, so they are feeling better about pt returning. Spouse stated that at this point, she would like for this pt to return to [facility] upon d/c and will resume HD. Plan: Return to [address] with resumption of HD upon d/c ([facility] Will need stretcher transport.    HOLLI Glaser  [phone]  Electronically signed by Dewey Espinoza on 5/17/2022 at 1:56 PM      -

## 2022-05-17 NOTE — PROGRESS NOTES
Hospitalist Progress Note      PCP: Stefany Baez    Date of Admission: 5/11/2022    Subjective: more awake and alert today, tolerating clears    Medications:  Reviewed    Infusion Medications    sodium chloride      [Held by provider] heparin (PORCINE) Infusion Stopped (05/16/22 0600)     Scheduled Medications    haloperidol lactate  5 mg IntraMUSCular Once    aspirin  81 mg Oral Daily    atenolol  25 mg Oral Nightly    atorvastatin  10 mg Oral Nightly    calcitRIOL  0.25 mcg Oral QPM    gabapentin  100 mg Oral TID    LORazepam  0.5 mg Oral BID    pantoprazole  20 mg Oral Nightly    sevelamer  800 mg Oral TID WC    tamsulosin  0.4 mg Oral QAM    sodium chloride flush  5-40 mL IntraVENous 2 times per day    famotidine (PEPCID) injection  20 mg IntraVENous Daily     PRN Meds: heparin (porcine), dextrose, haloperidol lactate, morphine, dicyclomine, melatonin, oxyCODONE, sodium chloride flush, sodium chloride, ondansetron **OR** ondansetron, polyethylene glycol, acetaminophen **OR** acetaminophen      Intake/Output Summary (Last 24 hours) at 5/17/2022 1815  Last data filed at 5/17/2022 1047  Gross per 24 hour   Intake 1200 ml   Output 3400 ml   Net -2200 ml       Physical Exam Performed:    /67   Pulse 72   Temp 97.7 °F (36.5 °C) (Axillary)   Resp 18   Ht 6' (1.829 m)   Wt 216 lb 11.4 oz (98.3 kg)   SpO2 95%   BMI 29.39 kg/m²        Gen: No distress. Eyes: PERRL. No sclera icterus. No conjunctival injection. ENT: No discharge. Pharynx clear. External appearance of ears and nose normal.  Neck: Trachea midline. No obvious mass.    Resp: No accessory muscle use. No crackles. No wheezes. No rhonchi. No dullness on percussion. CV: Regular rate. Regular rhythm. No murmur or rub. No edema. GI: Non-tender. Non-distended. No hernia. Skin: Warm, dry, normal texture and turgor. No nodule on exposed extremities. Lymph: No cervical LAD. No supraclavicular LAD. M/S: No cyanosis.  No clubbing. No joint deformity.    Neuro: Moves all four extremities. CN 2-12 tested, no defect noted. Psych: Oriented x 2 . No anxiety.  Awake. Alert.        Labs:   Recent Labs     05/16/22  0934 05/17/22  0745   WBC 3.1* 4.3   HGB 10.7* 10.7*   HCT 33.1* 33.7*   * 145     Recent Labs     05/16/22  0934 05/17/22  0745    136   K 3.8 4.2    101   CO2 25 22   BUN 18 21*   CREATININE 3.5* 3.8*   CALCIUM 8.1* 8.3   PHOS 3.7 4.7     No results for input(s): AST, ALT, BILIDIR, BILITOT, ALKPHOS in the last 72 hours. No results for input(s): INR in the last 72 hours. No results for input(s): Nehemiah Oiler in the last 72 hours. Urinalysis:      Lab Results   Component Value Date    NITRU Negative 04/22/2022    WBCUA 124 04/22/2022    BACTERIA 2+ 04/22/2022    RBCUA see below 04/22/2022    RBCUA 0 04/22/2022    BLOODU MODERATE 04/22/2022    SPECGRAV 1.025 04/22/2022    GLUCOSEU Negative 04/22/2022       Radiology:  FL ERCP BILIARY S&I   Final Result   Unremarkable ERCP images. Please refer to the procedure report for further   details. FL CHOLANGIOGRAM OR   Final Result   Suspected choledocholithiasis. The common duct is demonstrated as at least   partially patent. CT ABDOMEN PELVIS W IV CONTRAST Additional Contrast? None   Final Result   1. Dilated gallbladder with edematous wall, mucosal enhancement, and small   amount of pericholecystic fluid. Small gallstones are suggested at the   gallbladder neck to cystic duct region. Features are highly suspicious for   acute cholecystitis. If clinical doubt, then gallbladder ultrasound. 2.  Increased intrahepatic biliary ductal dilatation likely secondary to the   gallbladder pathology. 3.  Multiple cysts and other hypodensities too small to characterize within   both kidneys. No hydronephrosis.       4.  Residual borderline and mild thickening of the rectum and rectosigmoid   junction with previous surgery and anastomosis. No adjacent fluid collection   or general free fluid. Assessment/Plan:    Active Hospital Problems    Diagnosis     Pain of upper abdomen [R10.10]      Priority: Medium    Acute cholecystitis [K81.0]      Priority: Medium           Patient is a 78-year-old male with a past medical history of coronary artery disease, CVA, anemia, cognitive impairment, diabetes, ESRD, hypertension, hyperlipidemia who presented with right upper quadrant pain.  He was admitted with acute cholecystitis.     Acute cholecystitis-surgery planned for Monday  -on empiric ceftriaxone and Flagyl, D 6  -general surgery consulted  -on heparin gtt in the interim, hold 6 hours prior to procedure(was on DOAC hence delay in  Surgery)  - cholelithiasis/choledocholithiasis - s/p andres 5/16/22 and ERCP s/p sphincterotomy with stone extraction     DVT  -heparin gtt - hold for 48hrs and resume coumadin  -Eliquis on hold     CAD  -continue home meds     ESRD on HD  -nephrology consulted, recs appreciated     Essential hypertension  -continue home meds     Delirium-improved, continue supportive care  -Continue melatonin  -Need as needed Haldol        DVT Prophylaxis: heparin  Diet: ADULT DIET;  Regular; Low Fat/Low Chol/High Fiber/VIRIDIANA  Code Status: Full Code    PT/OT Eval Status: ordered    Yair Arana MD

## 2022-05-17 NOTE — PROGRESS NOTES
INPATIENT PROGRESS NOTE        IDENTIFYING DATA/REASON FOR CONSULTATION   PATIENT:  Melissa Claudio  MRN:  [id number]  ADMIT DATE: 2022  TIME OF EVALUATION: 2022 12:51 PM  HOSPITAL STAY:   LOS: 5 days   CONSULTING PHYSICIAN: Scooby Dorado MD   REASON FOR CONSULTATION:    Subjective:    Patient seen in follow up this morning while in dialysis. He had no complaints, no abdominal pain    MEDICATIONS   SCHEDULED:  haloperidol lactate, 5 mg, Once  cefTRIAXone (ROCEPHIN) IV, 1,000 mg, Q24H  metroNIDAZOLE, 500 mg, Q8H  aspirin, 81 mg, Daily  atenolol, 25 mg, Nightly  atorvastatin, 10 mg, Nightly  calcitRIOL, 0.25 mcg, QPM  gabapentin, 100 mg, TID  LORazepam, 0.5 mg, BID  pantoprazole, 20 mg, Nightly  sevelamer, 800 mg, TID WC  tamsulosin, 0.4 mg, QAM  sodium chloride flush, 5-40 mL, 2 times per day  famotidine (PEPCID) injection, 20 mg, Daily      FLUIDS/DRIPS:     lactated ringers 100 mL/hr at 22 1224    sodium chloride      [Held by provider] heparin (PORCINE) Infusion Stopped (22 0600)     PRNs: heparin (porcine), 3,600 Units, PRN  dextrose, 25 g, PRN  haloperidol lactate, 2 mg, Q12H PRN  morphine, 2 mg, Q4H PRN  dicyclomine, 10 mg, Q6H PRN  melatonin, 6 mg, Nightly PRN  oxyCODONE, 5 mg, Q4H PRN  sodium chloride flush, 5-40 mL, PRN  sodium chloride, , PRN  ondansetron, 4 mg, Q8H PRN   Or  ondansetron, 4 mg, Q6H PRN  polyethylene glycol, 17 g, Daily PRN  acetaminophen, 650 mg, Q6H PRN   Or  acetaminophen, 650 mg, Q6H PRN      ALLERGIES:    Allergies   Allergen Reactions    Lisinopril Swelling     Allergy listed on paperwork from [address] [place] [address], no reaction noted         PHYSICAL EXAM   VITALS:  /71   Pulse 59   Temp 98.4 °F (36.9 °C) (Oral)   Resp 18   Ht 6' (1.829 m)   Wt 216 lb 11.4 oz (98.3 kg)   SpO2 95%   BMI 29.39 kg/m²   TEMPERATURE:  Current - Temp: 98.4 °F (36.9 °C);  Max - Temp  Av.6 °F (36.4 °C)  Min: 96 °F (35.6 °C)  Max: 98.8 °F (37.1 °C)    Physical Exam:  General appearance: alert, cooperative, no distress, appears stated age  Eyes: Anicteric  Head: Normocephalic, without obvious abnormality  Lungs: clear to auscultation bilaterally, Normal Effort  Heart: regular rate and rhythm, normal S1 and S2, no murmurs or rubs  Abdomen: soft, non-distended, non-tender. Bowel sounds normal.   Extremities: atraumatic, no cyanosis or edema  Skin: warm and dry, no jaundice  Neuro: Grossly intact, A&OX3    LABS AND IMAGING   Laboratory   Recent Labs     05/16/22  0934 05/17/22  0745   WBC 3.1* 4.3   HGB 10.7* 10.7*   HCT 33.1* 33.7*   MCV 91.6 91.6   * 145     Recent Labs     05/16/22  0934 05/17/22  0745    136   K 3.8 4.2    101   CO2 25 22   PHOS 3.7 4.7   BUN 18 21*   CREATININE 3.5* 3.8*     No results for input(s): AST, ALT, ALB, BILIDIR, BILITOT, ALKPHOS in the last 72 hours. No results for input(s): LIPASE, AMYLASE in the last 72 hours. No results for input(s): PROTIME, INR in the last 72 hours. Imaging  FL ERCP BILIARY S&I   Final Result   Unremarkable ERCP images. Please refer to the procedure report for further   details. FL CHOLANGIOGRAM OR   Final Result   Suspected choledocholithiasis. The common duct is demonstrated as at least   partially patent. CT ABDOMEN PELVIS W IV CONTRAST Additional Contrast? None   Final Result   1. Dilated gallbladder with edematous wall, mucosal enhancement, and small   amount of pericholecystic fluid. Small gallstones are suggested at the   gallbladder neck to cystic duct region. Features are highly suspicious for   acute cholecystitis. If clinical doubt, then gallbladder ultrasound. 2.  Increased intrahepatic biliary ductal dilatation likely secondary to the   gallbladder pathology. 3.  Multiple cysts and other hypodensities too small to characterize within   both kidneys. No hydronephrosis.       4.  Residual borderline and mild thickening of the rectum and rectosigmoid junction with previous surgery and anastomosis. No adjacent fluid collection   or general free fluid. Endoscopy  ERCP 5/16/22 with Dr. Jose Luis Simms  1. Choledocolithiasis s/p biliary sphincterotomy and stone extraction with complete clearance documented by completion occlusion cholangiogram.  2.  6mm duodenal polyp in the 3rd portion removed with cold snare polypectomy. ASSESSMENT AND RECOMMENDATIONS   Bogdan Thompson is a 77 y.o. 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 CT showed acute cholecytitis and gallstones. Underwent cholecystectomy today after Eliquis was held for 5 days. IOC showed filling defects      1. Choledocholithiasis s/p ERCP with biliary sphincterotomy and stone extraction with complete clearance documented by completion occlusion cholangiogram.  Pt with no post procedure abdominal pain. 2. Duodenal polyp s/p polypectomy. bx pending  3. Acute cholecystitis with cholelithiasis. S/p lap andres  4. Hx of DVT/PE. Eliquis on hold      RECOMMENDATIONS:    Ok from GI standpoint to adv diet as tolerated  Follow up on path report of duodenal polyp, if polyp adenomatous recommend repeat EGD in 6 month to assure no recurrence  Continue to hold Eliquis 48 hrs post sphincterotomy          If you have any questions or need any further information, please feel free to contact us [phone]. Thank you for allowing us to participate in the care of Bogdan Thompson.    The note was completed using Dragon voice recognition transcription.  Every effort was made to ensure accuracy; however, inadvertent transcription errors may be present despite my best efforts to edit errors. Anatoliy BARRAZA    Attending physician addendum:      I have personally seen and examined the patient, reviewed the patient's medical record and pertinent labs and clinical imaging. I have personally staffed the case with CHRISTI Jaime. I agree with her consultation note, exam findings, assessment and plans  as written above. I have made appropriate modifications and edited her assessment and plan where needed to reflect my impression and plans for this patient. The patient is day 1 status post laparoscopic cholecystectomy and ERCP with sphincterotomy and stone extraction as well as cold snare polypectomy of a duodenal polyp. He has had no worsening of pain and denies any melena or hematemesis. He is in dialysis this morning. /71   Pulse 59   Temp 98.4 °F (36.9 °C) (Oral)   Resp 18   Ht 6' (1.829 m)   Wt 216 lb 11.4 oz (98.3 kg)   SpO2 95%   BMI 29.39 kg/m²      Gen; NAD  CV_ bradycardic  Lungs -CTAB  Abd- soft NT/ Lap incisions noted. Labs reviewed    Impression  1) Cholelithiasis  2) Choledocholithiasis s/p stone extraction and biliary sphincterotomy  3) Duodenal polyp s/p polypectomy  4) ESRD    Plan:  Hold Eliquis for 48 hours due to sphincterotomy then ok to resume  Follow up on the duodenal polyp pathology  No other GI workup is planned. Will sign off. Call with ? Thank you for allowing me to participate in this patient's care. If there are any questions or concerns regarding this patient, or the plan we have set in place, please feel free to contact me at [phone].      Ron Charles,

## 2022-05-17 NOTE — PROGRESS NOTES
[facility] NEPHROLOGY                                               Progress note    Summary:   Gail Zamudio is being seen by nephrology for ESRD. Admitted with acute cholecystitis. Interval History  Patient was seen and examined on dialysis. Has no complaints , feeling ok   ERCP 5/16/22 with Dr. Rolando Ornelas  1.  Choledocolithiasis s/p biliary sphincterotomy and stone extraction with complete clearance documented by completion occlusion cholangiogram.  2.  6mm duodenal polyp in the 3rd portion removed with cold snare polypectomy.    No abdominal pain  No edema or SOB. Bed weight showing he is 4+ kilos above dry weight but not likely accurate. /71  95% on room air    Labs reviewed   K 4.2BICARB 22  Bun 21  Cr 3.8   Phos 4.7   Ca 8.3  Albumin 2.5  hgb 10.7        Plan:   - HD per TTS schedule   - no signs of volume overload on exam. UF 2-3 L as tolerated to dry weight. - no acute electrolyte issues. - BP acceptable no changes. - orders reviewed, LR was ordered, I stopped this          Orvel MD Hudson  [facility] Nephrology  Office: [phone]    Assessment:   ESRD  Dialyzes TTS  Target weight 94 kg  Has tunneled dialysis cath    Blood pressure  Acceptable  Appears euvolemic    S HPT  Continue calcitriol and Renvela    Anemia  At goal, does not need ELENA right now    Acute cholecystitis  On antibiotics and surgery consulted  ERCP 5/16/22 with Dr. Rolando Ornelas  1.  Choledocolithiasis s/p biliary sphincterotomy and stone extraction with complete clearance documented by completion occlusion cholangiogram.  2.  6mm duodenal polyp in the 3rd portion removed with cold snare polypectomy.            ROS:   Positives Listed Bold. All other remaining systems are negative. Constitutional:  fever, chills, weakness, weight change, fatigue,      Skin:  rash, pruritus, hair loss, bruising, dry skin, petechiae. Head, Face, Neck   headaches, swelling,  cervical adenopathy.      Respiratory: shortness of breath, cough, or wheezing  Cardiovascular: chest pain, palpitations, dizzy, edema  Gastrointestinal: nausea, vomiting, diarrhea, constipation,belly pain    Yellow skin, blood in stool  Musculoskeletal:  back pain, muscle weakness, gait problems,       joint pain or swelling. Genitourinary:  dysuria, poor urine flow, flank pain, blood in urine  Neurologic:  vertigo, TIA'S, syncope, seizures, focal weakness  Psychosocial:  insomnia, anxiety, or depression. Additional positive findings: -     PMH:   Past medical history, surgical history, social history, family history are reviewed and updated as appropriate. Reviewed current medication list.   Allergies reviewed and updated as needed. PE:   Vitals:    05/17/22 1142   BP: 136/71   Pulse: 59   Resp:    Temp: 98.4 °F (36.9 °C)   SpO2: 95%       General appearance:  in NAD, fully alert and oriented. Comfortable. HEENT: EOM intact, no icterus. Trachea is midline. Neck : No masses, appears symmetrical, no JVD  Respiratory: Respiratory effort appears normal, bilateral equal chest rise, no wheeze, no crackles   Cardiovascular: Ausculation shows RRR no edema  Abdomen: No visible mass or tenderness, non distended. Musculoskeletal:  Joints with no swelling or deformity. Skin:no rashes, ulcers, induration, no jaundice. Neuro: face symmetric, no focal deficits. Appropriate responses.     Tunneled dialysis catheter right      Lab Results   Component Value Date    CREATININE 3.8 (H) 05/17/2022    BUN 21 (H) 05/17/2022     05/17/2022    K 4.2 05/17/2022     05/17/2022    CO2 22 05/17/2022      Lab Results   Component Value Date    WBC 4.3 05/17/2022    HGB 10.7 (L) 05/17/2022    HCT 33.7 (L) 05/17/2022    MCV 91.6 05/17/2022     05/17/2022     Lab Results   Component Value Date    .5 (H) 02/21/2022    CALCIUM 8.3 05/17/2022    PHOS 4.7 05/17/2022

## 2022-05-17 NOTE — PROGRESS NOTES
General and Vascular Surgery                                                           Daily Progress Note                                                             Can Barahona PA-C     Pt Name: [address] Record Number: [id number]  Date of Birth 1945   Today's Date: 5/17/2022    ASSESSMENT/PLAN  S/P (5/16/22) POD#1:  Laparoscopic cholecystectomy with cholangiogram. +Choledocholithiasis found with intraoperative cholangiogram  -ERCP with biliary sphincterotomy with stone extraction and EGD with snare polypectomy performed by GI (5/16/22)    1. Feeling good today  2. Pain controlled  3. Incision sites ok  4. Sitting up in bed  5. Will advance diet to low fat diet as tolerated  6. Had HD this AM  7. Continue to hold Eliquis (per GI hold 48 hrs post sphincterotomy). Hx of DVT/PE. EDUCATION  Patient educated about their illness/diagnosis, stated above, and all questions answered. We discussed the importance of nutrition, medications they are taking, and healthy lifestyle. Ardelle Prom has improved from yesterday. Pain is well controlled. He has no nausea and no vomiting. He is tolerating liquids. Current activity is up with assistance    OBJECTIVE  VITALS:  height is 6' (1.829 m) and weight is 216 lb 11.4 oz (98.3 kg). His oral temperature is 98.4 °F (36.9 °C). His blood pressure is 136/71 and his pulse is 59. His respiration is 18 and oxygen saturation is 95%. VITALS:  /71   Pulse 59   Temp 98.4 °F (36.9 °C) (Oral)   Resp 18   Ht 6' (1.829 m)   Wt 216 lb 11.4 oz (98.3 kg)   SpO2 95%   BMI 29.39 kg/m²   GENERAL: alert, no distress  ABDOMEN: soft, non-tender and non-distended  I/O last 3 completed shifts: In: 2000 [I.V.:2000]  Out: -   I/O this shift:   In: 400   Out: [facility]     05/16/22  0934 05/16/22  0934 05/17/22  0745   WBC 3.1*   < > 4.3   HGB 10.7*   < > 10.7*   HCT 33.1*   < > 33.7*   * < > 145      < > 136   K 3.8   < > 4.2      < > 101   CO2 25   < > 22   BUN 18   < > 21*   CREATININE 3.5*   < > 3.8*   MG 1.80  --   --    PHOS 3.7   < > 4.7   CALCIUM 8.1*   < > 8.3    < > = values in this interval not displayed.      CBC:   Lab Results   Component Value Date    WBC 4.3 05/17/2022    RBC 3.68 05/17/2022    HGB 10.7 05/17/2022    HCT 33.7 05/17/2022    MCV 91.6 05/17/2022    MCH 29.1 05/17/2022    MCHC 31.8 05/17/2022    RDW 17.3 05/17/2022     05/17/2022    MPV 8.3 05/17/2022     CMP:    Lab Results   Component Value Date     05/17/2022    K 4.2 05/17/2022    K 4.0 05/13/2022     05/17/2022    CO2 22 05/17/2022    BUN 21 05/17/2022    CREATININE 3.8 05/17/2022    GFRAA 19 05/17/2022    GFRAA 35 07/26/2011    AGRATIO 1.0 05/11/2022    LABGLOM 16 05/17/2022    GLUCOSE 135 05/17/2022    PROT 6.2 05/11/2022    PROT 7.5 07/26/2011    LABALBU 2.5 05/17/2022    CALCIUM 8.3 05/17/2022    BILITOT 0.7 05/11/2022    ALKPHOS 101 05/11/2022    AST 15 05/11/2022    ALT 7 05/11/2022         Jaret Esteban PA-C  Electronically signed 5/17/2022 at 1:48 PM    As above  Stable POD 1 from Laparoscopic Cholecystectomy with Cholangiogram and ERCP  tolerating clears, advance diet today  Home in AM if stable    Electronically signed by Faviola Yarbrough MD on 5/17/2022 at 2:37 PM

## 2022-05-18 PROCEDURE — APPNB45 APP NON BILLABLE 31-45 MINUTES: Performed by: PHYSICIAN ASSISTANT

## 2022-05-18 PROCEDURE — 2500000003 HC RX 250 WO HCPCS: Performed by: SURGERY

## 2022-05-18 PROCEDURE — 97166 OT EVAL MOD COMPLEX 45 MIN: CPT

## 2022-05-18 PROCEDURE — 1200000000 HC SEMI PRIVATE

## 2022-05-18 PROCEDURE — 99024 POSTOP FOLLOW-UP VISIT: CPT | Performed by: PHYSICIAN ASSISTANT

## 2022-05-18 PROCEDURE — 2580000003 HC RX 258: Performed by: SURGERY

## 2022-05-18 PROCEDURE — APPSS45 APP SPLIT SHARED TIME 31-45 MINUTES: Performed by: PHYSICIAN ASSISTANT

## 2022-05-18 PROCEDURE — 97535 SELF CARE MNGMENT TRAINING: CPT

## 2022-05-18 PROCEDURE — 6370000000 HC RX 637 (ALT 250 FOR IP): Performed by: SURGERY

## 2022-05-18 PROCEDURE — 6370000000 HC RX 637 (ALT 250 FOR IP): Performed by: PHYSICIAN ASSISTANT

## 2022-05-18 RX ORDER — OXYCODONE HYDROCHLORIDE 5 MG/1
5 TABLET ORAL EVERY 4 HOURS PRN
Qty: 20 TABLET | Refills: 0 | Status: SHIPPED | OUTPATIENT
Start: 2022-05-18 | End: 2022-05-21

## 2022-05-18 RX ORDER — LORAZEPAM 0.5 MG/1
0.5 TABLET ORAL EVERY 12 HOURS PRN
Qty: 6 TABLET | Refills: 0 | Status: SHIPPED | OUTPATIENT
Start: 2022-05-18 | End: 2022-05-21

## 2022-05-18 RX ADMIN — APIXABAN 5 MG: 5 TABLET, FILM COATED ORAL at 22:32

## 2022-05-18 RX ADMIN — LORAZEPAM 0.5 MG: 0.5 TABLET ORAL at 22:33

## 2022-05-18 RX ADMIN — LORAZEPAM 0.5 MG: 0.5 TABLET ORAL at 08:36

## 2022-05-18 RX ADMIN — ATENOLOL 25 MG: 25 TABLET ORAL at 22:32

## 2022-05-18 RX ADMIN — PANTOPRAZOLE SODIUM 20 MG: 20 TABLET, DELAYED RELEASE ORAL at 22:28

## 2022-05-18 RX ADMIN — SEVELAMER CARBONATE 800 MG: 800 TABLET, FILM COATED ORAL at 17:55

## 2022-05-18 RX ADMIN — TAMSULOSIN HYDROCHLORIDE 0.4 MG: 0.4 CAPSULE ORAL at 08:36

## 2022-05-18 RX ADMIN — SEVELAMER CARBONATE 800 MG: 800 TABLET, FILM COATED ORAL at 08:36

## 2022-05-18 RX ADMIN — Medication 6 MG: at 22:28

## 2022-05-18 RX ADMIN — SEVELAMER CARBONATE 800 MG: 800 TABLET, FILM COATED ORAL at 12:46

## 2022-05-18 RX ADMIN — FAMOTIDINE 20 MG: 10 INJECTION INTRAVENOUS at 08:36

## 2022-05-18 RX ADMIN — GABAPENTIN 100 MG: 100 CAPSULE ORAL at 22:32

## 2022-05-18 RX ADMIN — APIXABAN 5 MG: 5 TABLET, FILM COATED ORAL at 08:37

## 2022-05-18 RX ADMIN — ATORVASTATIN CALCIUM 10 MG: 10 TABLET, FILM COATED ORAL at 22:32

## 2022-05-18 RX ADMIN — SODIUM CHLORIDE, PRESERVATIVE FREE 10 ML: 5 INJECTION INTRAVENOUS at 08:39

## 2022-05-18 RX ADMIN — CALCITRIOL 0.25 MCG: 0.25 CAPSULE ORAL at 17:55

## 2022-05-18 RX ADMIN — ASPIRIN 81 MG 81 MG: 81 TABLET ORAL at 08:36

## 2022-05-18 RX ADMIN — GABAPENTIN 100 MG: 100 CAPSULE ORAL at 14:34

## 2022-05-18 RX ADMIN — GABAPENTIN 100 MG: 100 CAPSULE ORAL at 08:36

## 2022-05-18 ASSESSMENT — PAIN SCALES - GENERAL
PAINLEVEL_OUTOF10: 0
PAINLEVEL_OUTOF10: 0

## 2022-05-18 NOTE — PROGRESS NOTES
General and Vascular Surgery                                                           Daily Progress Note                                                             Alejandro Dodd PA-C     Pt Name: [address] Record Number: [id number]  Date of Birth 1945   Today's Date: 5/18/2022    ASSESSMENT/PLAN  S/P (5/16/22) POD#2:  Laparoscopic cholecystectomy with cholangiogram. +Choledocholithiasis found with intraoperative cholangiogram  -ERCP with biliary sphincterotomy with stone extraction and EGD with snare polypectomy performed by GI (5/16/22)    1. Feeling good today  2. Pain controlled  3. Incision sites ok  4. Sitting up in bed  5. Tolerating low fat diet  6. OK to D/C from a general surgery standpoint. F/U with Dr. Rush Randle in 1-2 weeks. EDUCATION  Patient educated about their illness/diagnosis, stated above, and all questions answered. We discussed the importance of nutrition, medications they are taking, and healthy lifestyle. Pamela Aviles has improved from yesterday. Pain is well controlled. He has no nausea and no vomiting. He is tolerating liquids. Current activity is up with assistance    OBJECTIVE  VITALS:  height is 6' (1.829 m) and weight is 215 lb 13.3 oz (97.9 kg). His oral temperature is 97.5 °F (36.4 °C). His blood pressure is 133/77 and his pulse is 60. His respiration is 16 and oxygen saturation is 94%. VITALS:  /77   Pulse 60   Temp 97.5 °F (36.4 °C) (Oral)   Resp 16   Ht 6' (1.829 m)   Wt 215 lb 13.3 oz (97.9 kg)   SpO2 94%   BMI 29.27 kg/m²   GENERAL: alert, no distress  ABDOMEN: soft, non-tender and non-distended  I/O last 3 completed shifts: In: 1440 [P.O.:240; I.V.:800]  Out: 3400   No intake/output data recorded.     LABS  Recent Labs     05/16/22  0934 05/16/22  0934 05/17/22  0745   WBC 3.1*   < > 4.3   HGB 10.7*   < > 10.7*   HCT 33.1*   < > 33.7*   *   < > 145      < > 136   K 3.8   < > 4.2      < > 101   CO2 25   < > 22   BUN 18   < > 21*   CREATININE 3.5*   < > 3.8*   MG 1.80  --   --    PHOS 3.7   < > 4.7   CALCIUM 8.1*   < > 8.3    < > = values in this interval not displayed.      CBC:   Lab Results   Component Value Date    WBC 4.3 05/17/2022    RBC 3.68 05/17/2022    HGB 10.7 05/17/2022    HCT 33.7 05/17/2022    MCV 91.6 05/17/2022    MCH 29.1 05/17/2022    MCHC 31.8 05/17/2022    RDW 17.3 05/17/2022     05/17/2022    MPV 8.3 05/17/2022     CMP:    Lab Results   Component Value Date     05/17/2022    K 4.2 05/17/2022    K 4.0 05/13/2022     05/17/2022    CO2 22 05/17/2022    BUN 21 05/17/2022    CREATININE 3.8 05/17/2022    GFRAA 19 05/17/2022    GFRAA 35 07/26/2011    AGRATIO 1.0 05/11/2022    LABGLOM 16 05/17/2022    GLUCOSE 135 05/17/2022    PROT 6.2 05/11/2022    PROT 7.5 07/26/2011    LABALBU 2.5 05/17/2022    CALCIUM 8.3 05/17/2022    BILITOT 0.7 05/11/2022    ALKPHOS 101 05/11/2022    AST 15 05/11/2022    ALT 7 05/11/2022         Can Barahona PA-C  Electronically signed 5/18/2022 at 1:08 PM    As above  States he is feeling good  Ok to discharge from my standpoint    Electronically signed by Lalo Brice MD on 5/18/2022 at 1:52 PM

## 2022-05-18 NOTE — PROGRESS NOTES
Hospitalist Progress Note      PCP: Noy Huynh    Date of Admission: 5/11/2022    Subjective: no acute events overnight, tolerating diet    Medications:  Reviewed    Infusion Medications    sodium chloride       Scheduled Medications    apixaban  5 mg Oral BID    haloperidol lactate  5 mg IntraMUSCular Once    aspirin  81 mg Oral Daily    atenolol  25 mg Oral Nightly    atorvastatin  10 mg Oral Nightly    calcitRIOL  0.25 mcg Oral QPM    gabapentin  100 mg Oral TID    LORazepam  0.5 mg Oral BID    pantoprazole  20 mg Oral Nightly    sevelamer  800 mg Oral TID WC    tamsulosin  0.4 mg Oral QAM    sodium chloride flush  5-40 mL IntraVENous 2 times per day    famotidine (PEPCID) injection  20 mg IntraVENous Daily     PRN Meds: heparin (porcine), dextrose, haloperidol lactate, morphine, dicyclomine, melatonin, oxyCODONE, sodium chloride flush, sodium chloride, ondansetron **OR** ondansetron, polyethylene glycol, acetaminophen **OR** acetaminophen      Intake/Output Summary (Last 24 hours) at 5/18/2022 1722  Last data filed at 5/18/2022 1200  Gross per 24 hour   Intake 600 ml   Output --   Net 600 ml       Physical Exam Performed:    BP (!) 143/79   Pulse 62   Temp 97.6 °F (36.4 °C) (Oral)   Resp 16   Ht 6' (1.829 m)   Wt 215 lb 13.3 oz (97.9 kg)   SpO2 90%   BMI 29.27 kg/m²       Gen: No distress. Eyes: PERRL. No sclera icterus. No conjunctival injection. ENT: No discharge. Pharynx clear. External appearance of ears and nose normal.  Neck: Trachea midline. No obvious mass.    Resp: No accessory muscle use. No crackles. No wheezes. No rhonchi. No dullness on percussion. CV: Regular rate. Regular rhythm. No murmur or rub. No edema. GI: Non-tender. Non-distended. No hernia. Skin: Warm, dry, normal texture and turgor. No nodule on exposed extremities. Lymph: No cervical LAD. No supraclavicular LAD. M/S: No cyanosis. No clubbing.  No joint deformity.    Neuro: Moves all four extremities. CN 2-12 tested, no defect noted. Psych: Oriented x 2 . No anxiety.  Awake. Alert. Labs:   Recent Labs     05/16/22  0934 05/17/22  0745   WBC 3.1* 4.3   HGB 10.7* 10.7*   HCT 33.1* 33.7*   * 145     Recent Labs     05/16/22  0934 05/17/22  0745    136   K 3.8 4.2    101   CO2 25 22   BUN 18 21*   CREATININE 3.5* 3.8*   CALCIUM 8.1* 8.3   PHOS 3.7 4.7     No results for input(s): AST, ALT, BILIDIR, BILITOT, ALKPHOS in the last 72 hours. No results for input(s): INR in the last 72 hours. No results for input(s): Rommel Melisa in the last 72 hours. Urinalysis:      Lab Results   Component Value Date    NITRU Negative 04/22/2022    WBCUA 124 04/22/2022    BACTERIA 2+ 04/22/2022    RBCUA see below 04/22/2022    RBCUA 0 04/22/2022    BLOODU MODERATE 04/22/2022    SPECGRAV 1.025 04/22/2022    GLUCOSEU Negative 04/22/2022       Radiology:  FL ERCP BILIARY S&I   Final Result   Unremarkable ERCP images. Please refer to the procedure report for further   details. FL CHOLANGIOGRAM OR   Final Result   Suspected choledocholithiasis. The common duct is demonstrated as at least   partially patent. CT ABDOMEN PELVIS W IV CONTRAST Additional Contrast? None   Final Result   1. Dilated gallbladder with edematous wall, mucosal enhancement, and small   amount of pericholecystic fluid. Small gallstones are suggested at the   gallbladder neck to cystic duct region. Features are highly suspicious for   acute cholecystitis. If clinical doubt, then gallbladder ultrasound. 2.  Increased intrahepatic biliary ductal dilatation likely secondary to the   gallbladder pathology. 3.  Multiple cysts and other hypodensities too small to characterize within   both kidneys. No hydronephrosis. 4.  Residual borderline and mild thickening of the rectum and rectosigmoid   junction with previous surgery and anastomosis. No adjacent fluid collection   or general free fluid. Assessment/Plan:    Active Hospital Problems    Diagnosis     Pain of upper abdomen [R10.10]      Priority: Medium    Acute cholecystitis [K81.0]      Priority: Medium           Patient is a 28-year-old male with a past medical history of coronary artery disease, CVA, anemia, cognitive impairment, diabetes, ESRD, hypertension, hyperlipidemia who presented with right upper quadrant pain.  He was admitted with acute cholecystitis.     Acute cholecystitis-surgery planned for Monday  -on empiric ceftriaxone and Flagyl  -general surgery consulted  -on heparin gtt in the interim, hold 6 hours prior to procedure(was on DOAC hence delay in  Surgery)  - cholelithiasis/choledocholithiasis - s/p andres 5/16/22 and ERCP s/p sphincterotomy with stone extraction     DVT  -heparin gtt - hold for 48hrs and resume coumadin  -Eliquis resumed     CAD  -continue home meds     ESRD on HD  -nephrology consulted, recs appreciated     Essential hypertension  -continue home meds     Delirium-improved, continue supportive care  -Continue melatonin  -Need as needed Haldol        DVT Prophylaxis: heparin  Diet: ADULT DIET;  Regular; Low Fat/Low Chol/High Fiber/VIRIDIANA  Code Status: Full Code    PT/OT Eval Status: ordered    Dispo - dc to SNF in am    Leif Mills MD

## 2022-05-18 NOTE — PROGRESS NOTES
[facility]                                               Progress note    Summary:   Castillo Blevins is being seen by nephrology for ESRD. Admitted with acute cholecystitis. Interval History  Patient was seen at bedside. Feeling well   no abdominal pain  Tolerated dialysis without issues. ERCP 5/16/22 with Dr. Salvador Presumconstantino  1.  Choledocolithiasis s/p biliary sphincterotomy and stone extraction with complete clearance documented by completion occlusion cholangiogram.  2.  6mm duodenal polyp in the 3rd portion removed with cold snare polypectomy.    No abdominal pain  No edema or SOB. Bed weight showing he is 4+ kilos above dry weight but not likely accurate. Labs and vitals reviewed. Plan:   - ok to discharge from renal aspect  - has dialysis arranged for tomorrow at [facility]   - BP acceptable            Romy Baumann MD  [facility]  Office: [phone]    Assessment:   ESRD  Dialyzes TTS  Target weight 94 kg  Has tunneled dialysis cath    Blood pressure  Acceptable  Appears euvolemic    S HPT  Continue calcitriol and Renvela    Anemia  At goal, does not need ELENA right now    Acute cholecystitis  On antibiotics and surgery consulted  ERCP 5/16/22 with Dr. Salvador Presumconstantino  1.  Choledocolithiasis s/p biliary sphincterotomy and stone extraction with complete clearance documented by completion occlusion cholangiogram.  2.  6mm duodenal polyp in the 3rd portion removed with cold snare polypectomy.            ROS:   Positives Listed Bold. All other remaining systems are negative. Constitutional:  fever, chills, weakness, weight change, fatigue,      Skin:  rash, pruritus, hair loss, bruising, dry skin, petechiae. Head, Face, Neck   headaches, swelling,  cervical adenopathy.      Respiratory: shortness of breath, cough, or wheezing  Cardiovascular: chest pain, palpitations, dizzy, edema  Gastrointestinal: nausea, vomiting, diarrhea, constipation,belly pain    Yellow skin, blood in stool  Musculoskeletal:  back pain, muscle weakness, gait problems,       joint pain or swelling. Genitourinary:  dysuria, poor urine flow, flank pain, blood in urine  Neurologic:  vertigo, TIA'S, syncope, seizures, focal weakness  Psychosocial:  insomnia, anxiety, or depression. Additional positive findings: -     PMH:   Past medical history, surgical history, social history, family history are reviewed and updated as appropriate. Reviewed current medication list.   Allergies reviewed and updated as needed. PE:   Vitals:    05/18/22 1153   BP: 133/77   Pulse: 60   Resp: 16   Temp: 97.5 °F (36.4 °C)   SpO2: 94%       General appearance:  in NAD, fully alert and oriented. Comfortable. HEENT: EOM intact, no icterus. Trachea is midline. Neck : No masses, appears symmetrical, no JVD  Respiratory: Respiratory effort appears normal, bilateral equal chest rise, no wheeze, no crackles   Cardiovascular: Ausculation shows RRR no edema  Abdomen: No visible mass or tenderness, non distended. Musculoskeletal:  Joints with no swelling or deformity. Skin:no rashes, ulcers, induration, no jaundice. Neuro: face symmetric, no focal deficits. Appropriate responses.     Tunneled dialysis catheter right      Lab Results   Component Value Date    CREATININE 3.8 (H) 05/17/2022    BUN 21 (H) 05/17/2022     05/17/2022    K 4.2 05/17/2022     05/17/2022    CO2 22 05/17/2022      Lab Results   Component Value Date    WBC 4.3 05/17/2022    HGB 10.7 (L) 05/17/2022    HCT 33.7 (L) 05/17/2022    MCV 91.6 05/17/2022     05/17/2022     Lab Results   Component Value Date    .5 (H) 02/21/2022    CALCIUM 8.3 05/17/2022    PHOS 4.7 05/17/2022

## 2022-05-18 NOTE — PROGRESS NOTES
Pt awake and alert resting in bed, denies pain. Pt cooperative and took all evening medications without difficulty. Call light and needs in reach. Tele cam present in room.

## 2022-05-18 NOTE — ADT AUTH CERT
Utilization Reviews         Gallbladder or Bile Duct Inflammation or Stone - Care Day 6 (5/17/2022) by Moisés Courtney RN       Review Status Review Entered   Completed 5/18/2022 14:33      Criteria Review      Care Day: 6 Care Date: 5/17/2022 Level of Care: Inpatient Floor    Guideline Day 2    Level Of Care    (X) Floor    5/18/2022 2:33 PM EDT by Sulma Hu      Inpatient    Clinical Status    (X) * Nausea and vomiting absent or improved    5/18/2022 2:33 PM EDT by Sulma Hu      N/V absent    (X) * Pain absent or managed    5/18/2022 2:33 PM EDT by Butch Doran pt. denies abdominal pain    Activity    ( ) Bed rest    5/18/2022 2:33 PM EDT by Butch Doran up as tolerated    (X) Possible ambulation    5/18/2022 2:33 PM EDT by Butch Doran pt can ambulate possibly    Routes    (X) Clear liquid diet    5/18/2022 2:33 PM EDT by Butch Doran tolerating clears    (X) IV fluids    5/18/2022 2:33 PM EDT by Sulma Hu      lactated ringers infusion  Rate: 100 mL/hr Freq: CONTINUOUS Route: IV    (X) IV medications    5/18/2022 2:33 PM EDT by Sulma Hu      Flagyl 500mg iv q8h x2    Interventions    (X) Laboratory tests    5/18/2022 2:33 PM EDT by Butch Doran -Chemistry common group  -Cell count and differentiation group  -GI-Liver profile  -Diabetes GRP  -COAG other    Medications    (X) Possible antibiotics    5/18/2022 2:33 PM EDT by Sulma uH      Rocephin 1000mg iv q24h    * Milestone   Additional Notes   DATE: 5/17/22      Pertinent Updates:   -remains on IV antibiotics and pain medications   -for dialysis today   - Pt. somewhat agitated       Vitals:   BP: 146/75   IL: 58   RR: 18   T: 98.8   SpO2: 90% on ra      Abnl/Pertinent Labs/Radiology/Diagnostic Studies:   BUN: 21   Creatinine: 3.8   GFR Non-: 16   GFR : 19   GLUCOSE, FASTING,GF: 135   Albumin: 2.5   RBC: 3.68   H/H: 10.7/33.7      Physical Exam:   GI: Non-tender. Non-distended. No hernia. MD Consults/Assessments & Plans:   *NEPHROLOGY NOTES*   ESRD   Dialyzes TTS   Target weight 94 kg   Has tunneled dialysis cath   Blood pressure   Acceptable   Appears euvolemic   S HPT   Continue calcitriol and Renvela   Anemia   At goal, does not need ELENA right now   Acute cholecystitis   On antibiotics and surgery consulted   ERCP 5/16/22 with Dr. Franky Wellington   1.  Choledocolithiasis s/p biliary sphincterotomy and stone extraction with complete clearance documented by completion occlusion cholangiogram.   2.  6mm duodenal polyp in the 3rd portion removed with cold snare polypectomy.          - HD per TTS schedule    - no signs of volume overload on exam. UF 2-3 L as tolerated to dry weight. - no acute electrolyte issues. - BP acceptable no changes. - orders reviewed, LR was ordered, I stopped this       *IM MD NOTES*   Pain of upper abdomen [R10.10]     Priority: Medium   Acute cholecystitis [K81.0]     Priority: Medium       Acute cholecystitis-surgery planned for Monday   -on empiric ceftriaxone and Flagyl, D 6   -general surgery consulted   -on heparin gtt in the interim, hold 6 hours prior to procedure(was on DOAC hence delay in  Surgery)   - cholelithiasis/choledocholithiasis - s/p andres 5/16/22 and ERCP s/p sphincterotomy with stone extraction   DVT   -heparin gtt - hold for 48hrs and resume coumadin   -Eliquis on hold   CAD   -continue home meds   ESRD on HD   -nephrology consulted, recs appreciated   Essential hypertension   -continue home meds   Delirium-improved, continue supportive care   -Continue melatonin   -Need as needed Haldol   DVT Prophylaxis: heparin   Diet: ADULT DIET; Regular; Low Fat/Low Chol/High Fiber/VIRIDIANA   Code Status: Full Code   PT/OT Eval Status: ordered      *GASTROENTEROLOGY NOTES*   1. Choledocholithiasis s/p ERCP with biliary sphincterotomy and stone extraction with complete clearance documented by completion occlusion cholangiogram.  Pt with no post procedure abdominal pain.     2.Duodenal polyp s/p polypectomy.  bx pending   3. Acute cholecystitis with cholelithiasis.  S/p lap andres   4. Hx of DVT/PE.  Eliquis on hold       RECOMMENDATIONS:     Ok from GI standpoint to [employer] as tolerated   Follow up on path report of duodenal polyp, if polyp adenomatous recommend repeat EGD in 6 month to assure no recurrence   Continue to hold Eliquis 48 hrs post sphincterotomy         *GENERAL SURGERY NOTES*   Laparoscopic cholecystectomy with cholangiogram. +Choledocholithiasis found with intraoperative cholangiogram   -ERCP with biliary sphincterotomy with stone extraction and EGD with snare polypectomy performed by GI (5/16/22)       1. Feeling good today   2. Pain controlled   3. Incision sites ok   4. Sitting up in bed   5. Will advance diet to low fat diet as tolerated   6. Had HD this AM   7. Continue to hold Eliquis (per GI hold 48 hrs post sphincterotomy). Hx of DVT/PE.       Medications:   atenolol (TENORMIN) tablet 25 mg   Dose: 25 mg   Freq: Nightly Route: PO      atorvastatin (LIPITOR) tablet 10 mg   Dose: 10 mg   Freq: NIGHTLY Route: PO      calcitRIOL (ROCALTROL) capsule 0.25 mcg   Dose: 0.25 mcg   Freq: EVERY EVENING Route: PO      gabapentin (NEURONTIN) capsule 100 mg   Dose: 100 mg   Freq: 3 TIMES DAILY Route: PO x2      LORazepam (ATIVAN) tablet 0.5 mg   Dose: 0.5 mg   Freq: 2 TIMES DAILY Route: PO x2      melatonin tablet 6 mg   Dose: 6 mg   Freq: NIGHTLY PRN Route: PO x1      pantoprazole (PROTONIX) tablet 20 mg   Dose: 20 mg   Freq: NIGHTLY Route: PO      sevelamer (RENVELA) tablet 800 mg   Dose: 800 mg   Freq: 3 TIMES DAILY WITH MEALS Route: PO x2      Orders:   -ADULT DIET;  Regular; Low Fat/Low Chol/High Fiber/VIRIDIANA    -Continue above meds   -EKG Rhythm strip   -CBC with Auto Differential    -Renal Function Panel    -Hemodialysis inpatient    -PT/OT eval and treat      CM Assessments or Notes:   SW received a consult to talk with pts dgtrs re: the desire t change nursing facilities. PATRICK contacted pts dgtr . HIPAA compliant message left requesting a return callHIPAA compliant message left. SW contacted pts spouse. Spouse stated that her dgtrs have been unhappy with the nursing home. Spouse stated that she lives in [place] and does not get out much d/t being on oxygen but her dgtrs lives close to  and check on pt often.  Spouse stated that a camera has been placed in pts room, so they are feeling better about pt returning.     Spouse stated that at this point, she would like for this pt to return to  upon d/c and will resume HD.                  Gallbladder or Bile Duct Inflammation or Stone - Care Day 5 (5/16/2022) by Ebony Fatima RN       Review Status Review Entered   Completed 5/18/2022 14:11      Criteria Review      Care Day: 5 Care Date: 5/16/2022 Level of Care: Inpatient Floor    Guideline Day 2    Level Of Care    (X) Floor    5/18/2022 2:11 PM EDT by Mario Jeffers [address] [place] [address] (for OR today)    Clinical Status    (X) * Nausea and vomiting absent or improved    5/18/2022 2:11 PM EDT by Shila Anthony      N/V absent    ( ) * Pain absent or managed    5/18/2022 2:11 PM EDT by Shila Anthony      pain level: 7/10    Activity    ( ) Bed rest    5/18/2022 2:11 PM EDT by Denise Burns up as tolerated    (X) Possible ambulation    5/18/2022 2:11 PM EDT by Denise Burns pt can ambulate    Routes    ( ) Clear liquid diet    5/18/2022 2:11 PM EDT by Shila Anthony      NPO    (X) IV fluids    5/18/2022 2:11 PM EDT by Shila Anthony      Dextrose 50% 25g iv prn x1  0.9% NaCl infusion iv prn x1  lactated ringers infusion  Rate: 100 mL/hr Freq: CONTINUOUS Route: IV    (X) IV medications    5/18/2022 2:11 PM EDT by Shila Anthony      Pepcid  20mg iv qd  Haldol 2mg iv q12h prn x1  Flagyl 500mg iv q8h x2    Interventions    (X) Laboratory tests    5/18/2022 2:11 PM EDT by Nancy, [address] [facility] common group  -Cell count and differentiation group  -GI-Liver profile  -Diabetes GRP  -COAG other    Medications    (X) Parenteral analgesics    5/18/2022 2:11 PM EDT by Sandra Patten      Sublimaze 25mcg iv q5min prn x4  Sublimaze 50mcg iv q5min prn x1    (X) Possible antibiotics    5/18/2022 2:11 PM EDT by Sandra Patten      Rocephin 1000mg iv q24h    * Milestone   Additional Notes   DATE: 5/16/22      Pertinent Updates:   -Today patient is for Endoscopic retrograde cholangiopancreatography with biliary sphincterotomy   ERCP with stone extraction   EGD with snare polypectomy   -pain level: 7/10   -still on IV pain medications and antibiotics      Vitals:   BP: 105/57   MD: 56   RR:20   T: 97.4   SpO2: 94% on 2L NC      Abnl/Pertinent Labs/Radiology/Diagnostic Studies:   Creatinine: 3.5   GFR Non-: 17   GFR : 21   POC Glucose: 113   Ca: 8.1   WBC: 3.1   RBC: 3.62   H/H: 10.7/33.1   aPTT: 60.0      FL CHOLANGIOGRAM:   Suspected choledocholithiasis.  The common duct is demonstrated as at least partially patent. FL ERCP BILIARY S&I:   Unremarkable ERCP images.  Please refer to the procedure report for further details. Physical Exam:   GI: Non-tender. Non-distended. No hernia      MD Consults/Assessments & Plans:   *NEPHROLOGY NOTES*   ESRD   Dialyzes TTS   Target weight 94 kg   Has tunneled dialysis cath   Blood pressure   Acceptable   Appears euvolemic    S HPT   Continue calcitriol and Renvela   Anemia   At goal, does not need ELENA right now   Acute cholecystitis   On antibiotics and surgery consulted    Plan:    -There are no acute indications for dialysis today. - HD per TTS schedule tomorrow. - no signs of volume overload on exam.      *GEN. SURGERY NOTES*   Date of Procedure: 5/16/2022   Pre-Op Diagnosis: cholecystitis   Post-Op Diagnosis: Same plus choledocholithiasis   Procedure(s):   LAPAROSCOPIC CHOLECYSTECTOMY WITH CHOLANGIOGRAM   Surgeon(s):   Pako Flynn MD   Assistant:   Surgical Assistant: Stephani Ann

## 2022-05-18 NOTE — PLAN OF CARE
Problem: Pain  Goal: Verbalizes/displays adequate comfort level or baseline comfort level  5/17/2022 2334 by Amalia Zavala RN  Outcome: Progressing  5/17/2022 1210 by Enrico Webster RN  Outcome: Progressing     Problem: Safety - Adult  Goal: Free from fall injury  5/17/2022 2334 by Amalia Zavala RN  Outcome: Progressing  5/17/2022 1210 by Enrico Webster RN  Outcome: Progressing     Problem: ABCDS Injury Assessment  Goal: Absence of physical injury  5/17/2022 2334 by Amalia Zavala RN  Outcome: Progressing  5/17/2022 1210 by Enrico Webster RN  Outcome: Progressing     Problem: Skin/Tissue Integrity  Goal: Absence of new skin breakdown  Description: 1. Monitor for areas of redness and/or skin breakdown  2. Assess vascular access sites hourly  3. Every 4-6 hours minimum:  Change oxygen saturation probe site  4. Every 4-6 hours:  If on nasal continuous positive airway pressure, respiratory therapy assess nares and determine need for appliance change or resting period.   5/17/2022 2334 by Amalia Zavala RN  Outcome: Progressing  5/17/2022 1210 by Enrico Webster RN  Outcome: Progressing     Problem: Chronic Conditions and Co-morbidities  Goal: Patient's chronic conditions and co-morbidity symptoms are monitored and maintained or improved  5/17/2022 2334 by Amalia Zavala RN  Outcome: Progressing  5/17/2022 1210 by Enrico Webster RN  Outcome: Progressing

## 2022-05-18 NOTE — PROGRESS NOTES
Occupational Therapy  Facility/Department: [facility] MED SURG  Occupational Therapy Initial Assessment    Name: Kanika Saenz  : [id number]  MRN: [id number]  Date of Service: 2022    Discharge Recommendations:  Continue to assess pending progress,24 hour supervision or assist,Long Term Care with OT  OT Equipment Recommendations  Other: TBD     Kanika Sanez scored a 13/24 on the AM-PAC ADL Inpatient form. Current research shows that an AM-PAC score of 17 or less is typically not associated with a discharge to the patient's home setting. Based on the patient's AM-PAC score and their current ADL deficits, it is recommended that the patient have 3-5 sessions per week of Occupational Therapy at d/c to increase the patient's independence. Please see assessment section for further patient specific details. If patient discharges prior to next session this note will serve as a discharge summary. Please see below for the latest assessment towards goals. Patient Diagnosis(es): The encounter diagnosis was Pain of upper abdomen. Past Medical History:  has a past medical history of Anemia, Arthritis, CAD (coronary artery disease), Cancer ([address] Cerebral infarction ([address] Cognitive communication deficit, COVID-19, Diabetes mellitus ([address] Dysphagia, End stage renal disease ([address] Fatigue, Gastrointestinal hemorrhage, Hemodialysis patient ([address] Hx of blood clots, Hyperlipidemia, Hypertension, Hyponatremia, Risk for falls, and Splenomegaly. Past Surgical History:  has a past surgical history that includes IR PERC ARTERIOVENOUS FISTULA CREATION (Left); colectomy (N/A, 2022); sigmoidoscopy (N/A, 2022); IR EMBOLIZATION HEMORRHAGE (2022); Tunneled venous catheter placement (Right, 2022); IR TUNNELED CVC PLACE WO SQ PORT/PUMP > 5 YEARS (2022); Cardiac catheterization (2019); Colonoscopy; Dialysis fistula creation (Left, 2022);  Cholecystectomy, laparoscopic (N/A, 2022); ERCP (N/A, 5/16/2022); ERCP (N/A, 5/16/2022); and Upper gastrointestinal endoscopy (5/16/2022). Treatment Diagnosis: impaired ADLs      Assessment   Performance deficits / Impairments: Decreased functional mobility ; Decreased safe awareness;Decreased balance;Decreased ADL status; Decreased posture;Decreased endurance;Decreased ROM; Decreased strength  Assessment: pt is a 76 y/o male who was hospitalized for acute cholecystitis underwent exploratory lap. PTA< he lives in [address] [place] [address] unit @ [facility], he required max A w/ LB ADLs, SPT and functioned out of w/c level (was non ambulatory). He needed min A to roll side<>side in order to don his pants and min A w/ supine to sit; able to sit on EOB x 3 minutes w/ min A--displayed posterior LOB. OT offered to use denny stedy to get him to recliner but he wanted to remain in bed. Anticipate he would require at least mod A of 2 using denny stedy based on limited sitting balance & weakness. Pt is functioning below previous occupational performance level & would benefit from OT services 3-5xs a wk to address above deficits.   Treatment Diagnosis: impaired ADLs  Prognosis: Fair  Decision Making: Medium Complexity  REQUIRES OT FOLLOW-UP: Yes  Activity Tolerance  Activity Tolerance: Patient limited by fatigue;Patient Tolerated treatment well  Activity Tolerance Comments: slow motor planning & processing        Plan   Plan  Times per Week: 3-5  Plan Weeks: return to LTC when stable  Specific Instructions for Next Treatment: SPT, standing tolerance, basic ADLs  Current Treatment Recommendations: Strengthening,Balance training,ROM,Functional mobility training,Endurance training,Safety education & training,Wheelchair mobility training,Self-Care / ADL     Restrictions  Restrictions/Precautions  Restrictions/Precautions: Fall Risk  Position Activity Restriction  Other position/activity restrictions: Tele-sitter, IV, low fat/cholesterol diet, HD M-W-F, telemetry    Subjective   General  Chart Reviewed: Yes  Patient assessed for rehabilitation services?: Yes  Additional Pertinent Hx: per Dr Colleen Alatorre H&P note on 5/17/22: Zachary Larson is a 77 y.o. male with  PMH of colon adenocarcinoma s/p sigmoid resection 1/26/22 (developed post op bleeding and underwent Flex Sig 2/17 noting friable surgical anastomosis 18 cm from rectal verge followed by IR embolization of pseudoaneurysm adjacent to anastomosis off superior rectal artery 2/19), DVT/PE on Eliquis, ESRD on HD, DM, HTN, and arthritis who presented with RUQ US. CT showed acute cholecytitis and gallstones. Underwent cholecystectomy today after Eliquis was held for 5 days.   IOC showed filling defects\"  Family / Caregiver Present: No  Referring Practitioner: Dr Tori Taveras  Diagnosis: acute cholecystitis  Subjective  Subjective: met in room, pt semi reclined in bed, asking to don his pants; denies pain  General Comment  Comments: agreeable for OT evaluation     Social/Functional History  Social/Functional History  Type of Home: Facility ([address])  Bathroom Accessibility: Krissy [place] accessible  Has the patient had two or more falls in the past year or any fall with injury in the past year?: Yes  Receives Help From: Other (comment) (has been getting PT)  ADL Assistance: Needs assistance (dependent for bathing, dressing, toilets in depends and is changed in the bed)  Homemaking Responsibilities: No  Ambulation Assistance: Non-ambulatory (can stand for transfers but not walk a distance)  Transfer Assistance: Needs assistance (assist of 2 for transfers)  Active : No  Occupation: Retired  Type of Occupation: worked for [employer]  Additional Comments: above info per the pt's dgt in the room on 5/14/22       Objective   Pulse: 60  Heart Rate Source: Monitor  BP: 133/77  BP Location: Right Arm  MAP (Calculated): 95.67  Resp: 16  SpO2: 94 %  O2 Device: None (Room air)  Hearing: Within functional limits       Observation/Palpation  Posture: Fair  Observation: purewick attached; on teley  Body Mechanics: posterior LOB while seated on EOB, slow processing & motor planning  Safety Devices  Type of Devices: All fall risk precautions in place;Call light within reach; Patient at risk for falls; Left in bed;Bed alarm in place     Wheelchair Bed Transfers  Wheelchair Transfers Comments: anticipate he would require mod A of 2 using denny stedy  AROM: Generally decreased, functional  Strength: Generally decreased, functional  Coordination: Generally decreased, functional  Tone: Normal  ADL  Feeding: Increased time to complete;Setup  Feeding Skilled Clinical Factors: set up, extra time (thickener packets noted on bedside tray)  LE Dressing: Maximum assistance  LE Dressing Skilled Clinical Factors: OT placed feet into pants, he assisted over thighs, needed heavy min A to roll side<>side to manage clothing over hips & buttocks; OT reconnected purewick, wearing brief  Toileting: Dependent/Total  Toileting Skilled Clinical Factors: no urge to use toilet at this time; has purewick + brief  Additional Comments: OT offered to assist w/ sponge bathing however he declined; anticipate he would need mod/max A w/ bathing d/t balance & strength issues        Bed mobility  Rolling to Left: Minimal assistance  Rolling to Right: Minimal assistance  Supine to Sit: Minimal assistance  Sit to Supine: Moderate assistance  Scooting: Maximal assistance  Bed Mobility Comments: almost flat bed, OT guided hands to rails, used sosa pad to assist w/ all movmts; OT lifted B LEs back into bed, max A w/ scoot/bridge  Transfers  Transfer Comments: pt declined to try denny stedy, anticipate he would need at least mod A of 2 d/t slow processing & motor planning; min A due to posterior LOB while seated on EOB     Cognition  Overall Cognitive Status: Exceptions  Arousal/Alertness: Delayed responses to stimuli  Following Commands: Follows one step commands with increased time; Follows one step commands with repetition  Attention Span: Attends with cues to redirect  Memory: Decreased recall of recent events;Decreased short term memory;Decreased recall of precautions  Safety Judgement: Decreased awareness of need for assistance;Decreased awareness of need for safety  Problem Solving: Decreased awareness of errors;Assistance required to implement solutions;Assistance required to generate solutions  Insights: Decreased awareness of deficits  Initiation: Requires cues for some  Sequencing: Does not require cues  Cognition Comment: slow processing & responding, coughing noted                  Education Given To: Patient  Education Provided: Role of Therapy;Plan of Care  Education Provided Comments: educated on purpose of OT services  Education Method: Demonstration  Barriers to Learning: Cognition  Education Outcome: Verbalized understanding;Continued education needed  LUE AROM (degrees)  LUE General AROM: L shld flexion @ 75*, rest WFLs labored movmts  RUE AROM (degrees)  RUE General AROM: shld flexion @ 120* w/ compensation; rest WFLs                          AM-PAC Score        AM-PAC Inpatient Daily Activity Raw Score: 13 (05/18/22 1156)  AM-PAC Inpatient ADL T-Scale Score : 32.03 (05/18/22 1156)  ADL Inpatient CMS 0-100% Score: 63.03 (05/18/22 1156)  ADL Inpatient CMS G-Code Modifier : CL (05/18/22 1156)    Goals  Short Term Goals  Time Frame for Short term goals: by 3-5 sessions pt will complete  Short Term Goal 1: Feeding & grooming tasks IND'ly after set up  Short Term Goal 2: UB dressing w/ CGA while seated on EOB  Short Term Goal 3: LB dressing & toilet tasks w/ mod A using denny stedy if indicated  Short Term Goal 4: household transfers w/ min A of 1 using denny stedy  Short Term Goal 5: tolerate sitting on EOB x 5-10 minutes w/ SBA during basic ADLs  Patient Goals   Patient goals : \"I want to be able to walk again\"--has been a LTC resident of [facility], was not ambulatory PTA       Therapy Time   Individual Concurrent Group Co-treatment   Time In 0706         Time Out 1200         Minutes 45         Timed Code Treatment Minutes: [address], [place] #4776

## 2022-05-19 VITALS
HEIGHT: 72 IN | DIASTOLIC BLOOD PRESSURE: 75 MMHG | BODY MASS INDEX: 28.96 KG/M2 | TEMPERATURE: 97.8 F | RESPIRATION RATE: 16 BRPM | OXYGEN SATURATION: 95 % | HEART RATE: 66 BPM | WEIGHT: 213.85 LBS | SYSTOLIC BLOOD PRESSURE: 121 MMHG

## 2022-05-19 LAB
ALBUMIN SERPL-MCNC: 2.6 G/DL (ref 3.4–5)
ANION GAP SERPL CALCULATED.3IONS-SCNC: 11 MMOL/L (ref 3–16)
BASOPHILS ABSOLUTE: 0 K/UL (ref 0–0.2)
BASOPHILS RELATIVE PERCENT: 0.5 %
BUN BLDV-MCNC: 19 MG/DL (ref 7–20)
CALCIUM SERPL-MCNC: 8.3 MG/DL (ref 8.3–10.6)
CHLORIDE BLD-SCNC: 101 MMOL/L (ref 99–110)
CO2: 21 MMOL/L (ref 21–32)
CREAT SERPL-MCNC: 3.3 MG/DL (ref 0.8–1.3)
EOSINOPHILS ABSOLUTE: 0.1 K/UL (ref 0–0.6)
EOSINOPHILS RELATIVE PERCENT: 3.3 %
GFR AFRICAN AMERICAN: 22
GFR NON-AFRICAN AMERICAN: 18
GLUCOSE BLD-MCNC: 88 MG/DL (ref 70–99)
HCT VFR BLD CALC: 32.4 % (ref 40.5–52.5)
HEMOGLOBIN: 10.4 G/DL (ref 13.5–17.5)
LYMPHOCYTES ABSOLUTE: 1 K/UL (ref 1–5.1)
LYMPHOCYTES RELATIVE PERCENT: 23.9 %
MCH RBC QN AUTO: 29.3 PG (ref 26–34)
MCHC RBC AUTO-ENTMCNC: 32.2 G/DL (ref 31–36)
MCV RBC AUTO: 91 FL (ref 80–100)
MONOCYTES ABSOLUTE: 0.3 K/UL (ref 0–1.3)
MONOCYTES RELATIVE PERCENT: 6.8 %
NEUTROPHILS ABSOLUTE: 2.6 K/UL (ref 1.7–7.7)
NEUTROPHILS RELATIVE PERCENT: 65.5 %
PDW BLD-RTO: 17.1 % (ref 12.4–15.4)
PHOSPHORUS: 2.5 MG/DL (ref 2.5–4.9)
PLATELET # BLD: 160 K/UL (ref 135–450)
PMV BLD AUTO: 8.1 FL (ref 5–10.5)
POTASSIUM SERPL-SCNC: 4.2 MMOL/L (ref 3.5–5.1)
RBC # BLD: 3.56 M/UL (ref 4.2–5.9)
SODIUM BLD-SCNC: 133 MMOL/L (ref 136–145)
WBC # BLD: 4 K/UL (ref 4–11)

## 2022-05-19 PROCEDURE — 97530 THERAPEUTIC ACTIVITIES: CPT

## 2022-05-19 PROCEDURE — APPNB60 APP NON BILLABLE TIME 46-60 MINS: Performed by: NURSE PRACTITIONER

## 2022-05-19 PROCEDURE — 90935 HEMODIALYSIS ONE EVALUATION: CPT

## 2022-05-19 PROCEDURE — 6360000002 HC RX W HCPCS: Performed by: INTERNAL MEDICINE

## 2022-05-19 PROCEDURE — 85025 COMPLETE CBC W/AUTO DIFF WBC: CPT

## 2022-05-19 PROCEDURE — 36415 COLL VENOUS BLD VENIPUNCTURE: CPT

## 2022-05-19 PROCEDURE — 80069 RENAL FUNCTION PANEL: CPT

## 2022-05-19 PROCEDURE — APPSS15 APP SPLIT SHARED TIME 0-15 MINUTES: Performed by: NURSE PRACTITIONER

## 2022-05-19 PROCEDURE — 2580000003 HC RX 258: Performed by: SURGERY

## 2022-05-19 PROCEDURE — 6370000000 HC RX 637 (ALT 250 FOR IP): Performed by: SURGERY

## 2022-05-19 PROCEDURE — 2500000003 HC RX 250 WO HCPCS: Performed by: SURGERY

## 2022-05-19 PROCEDURE — 6370000000 HC RX 637 (ALT 250 FOR IP): Performed by: PHYSICIAN ASSISTANT

## 2022-05-19 RX ADMIN — ASPIRIN 81 MG 81 MG: 81 TABLET ORAL at 13:07

## 2022-05-19 RX ADMIN — ATENOLOL 25 MG: 25 TABLET ORAL at 20:43

## 2022-05-19 RX ADMIN — TAMSULOSIN HYDROCHLORIDE 0.4 MG: 0.4 CAPSULE ORAL at 13:07

## 2022-05-19 RX ADMIN — GABAPENTIN 100 MG: 100 CAPSULE ORAL at 13:07

## 2022-05-19 RX ADMIN — SODIUM CHLORIDE, PRESERVATIVE FREE 10 ML: 5 INJECTION INTRAVENOUS at 00:52

## 2022-05-19 RX ADMIN — GABAPENTIN 100 MG: 100 CAPSULE ORAL at 20:43

## 2022-05-19 RX ADMIN — LORAZEPAM 0.5 MG: 0.5 TABLET ORAL at 20:43

## 2022-05-19 RX ADMIN — Medication 6 MG: at 20:49

## 2022-05-19 RX ADMIN — ATORVASTATIN CALCIUM 10 MG: 10 TABLET, FILM COATED ORAL at 20:43

## 2022-05-19 RX ADMIN — HEPARIN SODIUM 3600 UNITS: 1000 INJECTION INTRAVENOUS; SUBCUTANEOUS at 11:52

## 2022-05-19 RX ADMIN — PANTOPRAZOLE SODIUM 20 MG: 20 TABLET, DELAYED RELEASE ORAL at 20:43

## 2022-05-19 RX ADMIN — CALCITRIOL 0.25 MCG: 0.25 CAPSULE ORAL at 18:02

## 2022-05-19 RX ADMIN — SEVELAMER CARBONATE 800 MG: 800 TABLET, FILM COATED ORAL at 13:07

## 2022-05-19 RX ADMIN — FAMOTIDINE 20 MG: 10 INJECTION INTRAVENOUS at 13:07

## 2022-05-19 RX ADMIN — APIXABAN 5 MG: 5 TABLET, FILM COATED ORAL at 20:43

## 2022-05-19 RX ADMIN — SEVELAMER CARBONATE 800 MG: 800 TABLET, FILM COATED ORAL at 18:02

## 2022-05-19 NOTE — CARE COORDINATION
DISCHARGE SUMMARY     DATE OF DISCHARGE: 5/19/2022    DISCHARGE DESTINATION:     FACILITY   Name: [facility]  Address:  [address], [address]   Report Phone:  [phone]  Fax:  [phone]  Level of Care: long term care  HENS not required    Dialysis Facility (if applicable)   [facility]  [facility] [address] [facility] [address]  Dialysis Schedule: TTS at 10:30 AM  Phone: [phone]  Fax: [phone]    TRANSPORTATION: [address] [place] [address] Name:  [facility]   Time: 4:30pm  Phone Number: [phone]    COMMENTS: SW placed phone call to wife via telephone today. She is in agreement with discharge back to SNF listed above. No further needs noted unless indicated by patient, family and/or medical staff. Respectfully submitted,    JORGE LUIS Cordero  [facility] [address] [facility]   [phone]'    Electronically signed by JORGE LUIS Espinal on 5/19/2022 at 10:42 AM

## 2022-05-19 NOTE — PROGRESS NOTES
[facility] NEPHROLOGY                                               Progress note    Summary:   Mindi Garber is being seen by nephrology for ESRD. Admitted with acute cholecystitis. Interval History  Patient was seen at bedside. Feeling well   no abdominal pain  On dialysis. Access working fine. No edema. ERCP 5/16/22 with Dr. Jacy Peter  1.  Choledocolithiasis s/p biliary sphincterotomy and stone extraction with complete clearance documented by completion occlusion cholangiogram.  2.  6mm duodenal polyp in the 3rd portion removed with cold snare polypectomy.    No abdominal pain  No edema or SOB. Bed weight showing he is 4+ kilos above dry weight but not likely accurate. Labs and vitals reviewed. Plan:   - ok to discharge from renal aspect  - HD prior to discharge  - BP acceptable 133/71            Ag García MD  [facility] Nephrology  Office: [phone]    Assessment:   ESRD  Dialyzes TTS  Target weight 94 kg  Has tunneled dialysis cath    Blood pressure  Acceptable  Appears euvolemic    S HPT  Continue calcitriol and Renvela    Anemia  At goal, does not need ELENA right now    Acute cholecystitis  On antibiotics and surgery consulted  ERCP 5/16/22 with Dr. Jacy Peter  1.  Choledocolithiasis s/p biliary sphincterotomy and stone extraction with complete clearance documented by completion occlusion cholangiogram.  2.  6mm duodenal polyp in the 3rd portion removed with cold snare polypectomy.            ROS:   Positives Listed Bold. All other remaining systems are negative. Constitutional:  fever, chills, weakness, weight change, fatigue,      Skin:  rash, pruritus, hair loss, bruising, dry skin, petechiae. Head, Face, Neck   headaches, swelling,  cervical adenopathy.      Respiratory: shortness of breath, cough, or wheezing  Cardiovascular: chest pain, palpitations, dizzy, edema  Gastrointestinal: nausea, vomiting, diarrhea, constipation,belly pain    Yellow skin, blood in stool  Musculoskeletal:  back pain, muscle weakness, gait problems,       joint pain or swelling. Genitourinary:  dysuria, poor urine flow, flank pain, blood in urine  Neurologic:  vertigo, TIA'S, syncope, seizures, focal weakness  Psychosocial:  insomnia, anxiety, or depression. Additional positive findings: -     PMH:   Past medical history, surgical history, social history, family history are reviewed and updated as appropriate. Reviewed current medication list.   Allergies reviewed and updated as needed. PE:   Vitals:    05/19/22 0901   BP: 133/71   Pulse: 57   Resp: 16   Temp: 97.6 °F (36.4 °C)   SpO2:        General appearance:  in NAD, fully alert and oriented. Comfortable. HEENT: EOM intact, no icterus. Trachea is midline. Neck : No masses, appears symmetrical, no JVD  Respiratory: Respiratory effort appears normal, bilateral equal chest rise, no wheeze, no crackles   Cardiovascular: Ausculation shows RRR no edema  Abdomen: No visible mass or tenderness, non distended. Musculoskeletal:  Joints with no swelling or deformity. Skin:no rashes, ulcers, induration, no jaundice. Neuro: face symmetric, no focal deficits. Appropriate responses.     Tunneled dialysis catheter right      Lab Results   Component Value Date    CREATININE 3.3 (H) 05/19/2022    BUN 19 05/19/2022     (L) 05/19/2022    K 4.2 05/19/2022     05/19/2022    CO2 21 05/19/2022      Lab Results   Component Value Date    WBC 4.0 05/19/2022    HGB 10.4 (L) 05/19/2022    HCT 32.4 (L) 05/19/2022    MCV 91.0 05/19/2022     05/19/2022     Lab Results   Component Value Date    .5 (H) 02/21/2022    CALCIUM 8.3 05/19/2022    PHOS 2.5 05/19/2022

## 2022-05-19 NOTE — PROGRESS NOTES
Physical Therapy  Facility/Department: [address] MED SURG  Physical Therapy Treatment Note    Name: Jd Wyman  :   MRN: [id number]  Date of Service: 2022    Discharge Recommendations:  Patient would benefit from continued therapy after [address] with PT          Patient Diagnosis(es): The encounter diagnosis was Pain of upper abdomen. Past Medical History:  has a past medical history of Anemia, Arthritis, CAD (coronary artery disease), [address] ([facility] Cerebral infarction ([address] Cognitive communication deficit, COVID-19, Diabetes mellitus ([facility] Dysphagia, End stage renal disease ([facility] Fatigue, Gastrointestinal hemorrhage, Hemodialysis patient ([facility] Hx of blood clots, Hyperlipidemia, Hypertension, Hyponatremia, Risk for falls, and Splenomegaly. Past Surgical History:  has a past surgical history that includes IR PERC ARTERIOVENOUS FISTULA CREATION (Left); colectomy (N/A, 2022); sigmoidoscopy (N/A, 2022); IR EMBOLIZATION HEMORRHAGE (2022); Tunneled venous catheter placement (Right, 2022); IR TUNNELED CVC PLACE WO SQ PORT/PUMP > 5 YEARS (2022); Cardiac catheterization (); Colonoscopy; Dialysis fistula creation (Left, 2022); Cholecystectomy, laparoscopic (N/A, 2022); ERCP (N/A, 2022); ERCP (N/A, 2022); and Upper gastrointestinal endoscopy (2022). Assessment   Body Structures, Functions, Activity Limitations Requiring Skilled Therapeutic Intervention: Decreased functional mobility ; Decreased strength;Decreased endurance  Assessment: The pt is a 76 yo male who presented to the ED from his LTC facility with RUQ pain associated with nausea. Imaging is concerning for acute cholecystitis with some small gallstones at the neck of the gallbladder. Awaiting GI surgery on 22. Prior to admission, pt living at [facility], requiring 2 person assist for stand pivot transfers to a /, depedendent for ADLs, non-ambulatory.  Pt currently functioning near baseline. Anticipate return to [facility] with PT to maximize independence and safety with functional mobility. Treatment Diagnosis: impaired mobility  Specific Instructions for Next Treatment: cotx  Therapy Prognosis: Fair  Decision Making: Medium Complexity  History: see above  Exam: see below  Clinical Presentation: evolving  Barriers to Learning: cognition  Activity Tolerance  Activity Tolerance: Patient tolerated treatment well     Plan   Plan  Plan: 3-5 times per week  Specific Instructions for Next Treatment: cotx  Current Treatment Recommendations: Strengthening,Balance training,Functional mobility training,Transfer training,Safety education & training,Patient/Caregiver education & training  Safety Devices  Type of Devices: All fall risk precautions in place,Call light within reach,Gait belt,Patient at risk for falls,Left in chair,Chair alarm in place  Restraints  Restraints Initially in Place: No     Restrictions  Restrictions/Precautions  Restrictions/Precautions: Fall Risk  Position Activity Restriction  Other position/activity restrictions: Tele-sitter, IV, low fat/cholesterol diet, HD M-W-F, telemetry     Subjective   General  Chart Reviewed: Yes  Patient assessed for rehabilitation services?: Yes  Additional Pertinent Hx: Per Mario Bowman MD progress note from 5-: The pt is a 76 yo male who presented to the ED from his LTC facility with RUQ pain associated with nausea. Imaging is concerning for acute cholecystitis with some small gallstones at the neck of the gallbladder. Awaiting GI surgery on 5/16/22. Response To Previous Treatment: Patient with no complaints from previous session. Family / Caregiver Present: No  Referring Practitioner: Suman Roper MD  Referral Date : 05/12/22  Diagnosis: Acute cholecystitis  Follows Commands: Within Functional Limits  Subjective  Subjective: Pt is agreeable to PT - aware that he is leaving today.          Social/Functional History  Social/Functional History  Type of Home: Facility ([address])  Bathroom Accessibility: Anabela hadley  Has the patient had two or more falls in the past year or any fall with injury in the past year?: Yes  Receives Help From: Other (comment) (has been getting PT)  ADL Assistance: Needs assistance (dependent for bathing, dressing, toilets in depends and is changed in the bed)  Homemaking Responsibilities: No  Ambulation Assistance: Non-ambulatory (can stand for transfers but not walk a distance)  Transfer Assistance: Needs assistance (assist of 2 for transfers)  Active : No  Occupation: Retired  Type of Occupation: worked for [employer]  Additional Comments: above info per the pt's dgt in the room on 5/14/22     Vision/Hearing  Hearing: Within functional limits      Cognition   Orientation  Overall Orientation Status: Impaired  Orientation Level: Oriented to person;Oriented to place; Disoriented to time;Disoriented to situation  Cognition  Overall Cognitive Status: Exceptions  Arousal/Alertness: Appropriate responses to stimuli  Following Commands:  Follows one step commands with repetition  Attention Span: Appears intact  Memory: Decreased recall of recent events;Decreased short term memory;Decreased recall of precautions  Safety Judgement: Decreased awareness of need for assistance;Decreased awareness of need for safety  Problem Solving: Decreased awareness of errors;Assistance required to implement solutions;Assistance required to generate solutions  Insights: Decreased awareness of deficits  Initiation: Requires cues for some  Sequencing: Does not require cues     Objective   Observation/Palpation  Posture: Fair  Observation: purewick attached; on tele  Gross Assessment  Strength: Generally decreased, functional     Bed mobility  Rolling to Left: Stand by assistance  Supine to Sit: Stand by assistance  Sit to Supine: Unable to assess (in recliner at end of session)  Scooting: Stand by assistance  Bed Mobility Comments: Use of bed rail; HOB slightly elevated  Transfers  Sit to Stand: Maximum Assistance (within stedy)  Stand to sit: Maximum Assistance  Bed to Chair: Dependent/Total (stedy)  Ambulation  Comments: non-ambulatory at baseline     Balance  Sitting - Static: Fair;+ (on the EOB; good in the chair)  Sitting - Dynamic: Fair  Standing - Static: Fair (within stedy)           AM-PAC Score  AM-PAC Inpatient Mobility Raw Score : 11 (05/19/22 0729)  AM-PAC Inpatient T-Scale Score : 33.86 (05/19/22 0729)  Mobility Inpatient CMS 0-100% Score: 72.57 (05/19/22 0729)  Mobility Inpatient CMS G-Code Modifier : CL (05/19/22 0729)          Goals  Short Term Goals  Time Frame for Short term goals: upon d/c - all goals ongoing as of 5/19/22 unless noted otherwise  Short term goal 1: Bed mobility with min A. - MET 5/16/22  Short term goal 2: Transfers sit <> stand with min/mod A of 1-2. Short term goal 3: Transfer bed <> chair with a walker with min/mod A of 1-2. Short term goal 4: Static standing at a walker x 1 minute with CGA.   Patient Goals   Patient goals : none stated per pt; per dgt: family would like to get him more therapy at the [facility]       Education  Patient Education  Education Given To: Patient  Education Provided: Role of Therapy;Orientation;Transfer Training  Education Method: Demonstration;Verbal  Barriers to Learning: Cognition  Education Outcome: Continued education needed      Therapy Time   Individual Concurrent Group Co-treatment   Time In [phone]         Time Out 0721         Minutes 25         Timed Code Treatment Minutes: 25 Minutes       Amirah Munguia, PT

## 2022-05-19 NOTE — PROGRESS NOTES
Comprehensive Nutrition Assessment    Type and Reason for Visit:  Initial,RD Nutrition Re-Screen/LOS    Nutrition Recommendations/Plan:   1. Continue Low Fat / Low Cholesterol / High Fiber / VIRIDIANA  2. Add Ensure Clear tid to start  3. Attempt to f/u for NFPE another time     Malnutrition Assessment:  Malnutrition Status:  Insufficient data (05/19/22 1045)    Context:  Acute Illness     Findings of the 6 clinical characteristics of malnutrition:  Energy Intake:  50% or less of estimated energy requirements for 5 or more days  Weight Loss:  Unable to assess (fluid shifts)     Body Fat Loss:  Unable to assess     Muscle Mass Loss:  Unable to assess    Fluid Accumulation:  Mild Extremities   Strength:  Not Performed    Nutrition Assessment:    Pt triggered screen for LOS. PMH includes, ESRD (HD), HLD, HTN, DM, Communication deficit, Dysphagia. Pt not available this encounter, so information taken from EMR. Pt adm with acute cholecystitis and is s/p andres / ERCP on 5/16. Diet adv to Low Fat / Low Cholesterol / High Fiber / VIRIDIANA. Pt tolerating diet but current po intake is down to 1-25% at this time. Will offer Ensure Clear on trays tid and mosnitor acceptance. Will continue to follow. Nutrition Related Findings:    labs reviewed. Noted +2 edema to BLE. Noted BM on 5/15. Wound Type: Surgical Incision (noted no issues to surgical sites)       Current Nutrition Intake & Therapies:    Average Meal Intake: 1-25%     ADULT DIET; Regular; Low Fat/Low Chol/High Fiber/VIRIDIANA    Anthropometric Measures:  Height: 6' (182.9 cm)  Ideal Body Weight (IBW): 178 lbs (81 kg)    Admission Body Weight: 217 lb (98.4 kg)  Current Body Weight: 223 lb (101.2 kg), 125.3 % IBW. Weight Source: Bed Scale  Current BMI (kg/m2): 30.2                          BMI Categories: Overweight (BMI 25.0-29. 9)    Estimated Daily Nutrient Needs:        Energy (kcal/day): 4516-8330 (22-25 x  kg (adj for HD & healing)     Protein (g/day): 121-151 (1.2-1.5 x ABW (adj for HD and healing)     Fluid (ml/day): per provider    Nutrition Diagnosis:   · Inadequate protein-energy intake related to altered GI function as evidenced by intake 0-25%      Nutrition Interventions:   Food and/or Nutrient Delivery: Continue Current Diet,Start Oral Nutrition Supplement  Nutrition Education/Counseling: Education not indicated  Coordination of Nutrition Care: Continue to monitor while inpatient       Goals:     Goals: PO intake 50% or greater,by next RD assessment       Nutrition Monitoring and Evaluation:   Behavioral-Environmental Outcomes: None Identified  Food/Nutrient Intake Outcomes: Food and Nutrient Intake,Supplement Intake  Physical Signs/Symptoms Outcomes: Biochemical Data,Constipation,Diarrhea,GI Status,Skin,Weight,Fluid Status or Edema    Discharge Planning:     Too soon to determine     Alexandra Jones RD, LD  Contact: [phone]

## 2022-05-19 NOTE — CARE COORDINATION
PATRICK faxed discharge paperwork and collateral info [facility]. Transport paperwork has been placed on his soft chart. Ride should be arriving at 4:30pm. No further needs noted at this time. Respectfully submitted,    Siria DE LA GARZA, MAINE-S  [facility]   [phone]    Electronically signed by JORGE LUIS Cruz on 5/19/2022 at 3:45 PM

## 2022-05-19 NOTE — PROGRESS NOTES
General and Vascular Surgery                                                           Daily Progress Note                                                                 Pt Name: Jamie Acevedo Record Number: [id number]  Date of Birth 1945   Today's Date: 5/19/2022    ASSESSMENT/PLAN  S/P (5/16/22) POD#3:  Laparoscopic cholecystectomy with cholangiogram. +Choledocholithiasis found with intraoperative cholangiogram  -ERCP with biliary sphincterotomy with stone extraction and EGD with snare polypectomy performed by GI (5/16/22)    1. Seen in HD. Ready to go home  2. Pain controlled  3. Incision sites ok-dressings removed and steri strips present  4. Sitting up in bed  5. Tolerating low fat diet  6. OK to D/C from a general surgery standpoint. F/U with Dr. Vikram Ferguson in 2 weeks. EDUCATION  Patient educated about their illness/diagnosis, stated above, and all questions answered. We discussed the importance of nutrition, medications they are taking, and healthy lifestyle. Alfonso Herrera has improved from yesterday. Pain is well controlled. He has no nausea and no vomiting. He is tolerating low fat diet. Current activity is up with assistance    OBJECTIVE  VITALS:  height is 6' (1.829 m) and weight is 213 lb 13.5 oz (97 kg). His temperature is 97.4 °F (36.3 °C). His blood pressure is 127/77 and his pulse is 60. His respiration is 16 and oxygen saturation is 91%. VITALS:  /77   Pulse 60   Temp 97.4 °F (36.3 °C)   Resp 16   Ht 6' (1.829 m)   Wt 213 lb 13.5 oz (97 kg)   SpO2 91%   BMI 29.00 kg/m²   GENERAL: alert, no distress  ABDOMEN: soft, non-tender and non-distended. Incisions c/d/i, dressings removed and steri strips present. I/O last 3 completed shifts: In: 700 [P.O.:700]  Out: 200 [Urine:200]  I/O this shift:   In: 400   Out: [id number] [facility]     05/17/22  0745 05/19/22  0845 05/19/22  0855   WBC   < >  --  4.0   HGB < >  --  10.4*   HCT   < >  --  32.4*   PLT   < >  --  160   NA  --  133*  --    K  --  4.2  --    CL  --  101  --    CO2  --  21  --    BUN  --  19  --    CREATININE  --  3.3*  --    PHOS  --  2.5  --    CALCIUM  --  8.3  --     < > = values in this interval not displayed.      CBC:   Lab Results   Component Value Date    WBC 4.0 05/19/2022    RBC 3.56 05/19/2022    HGB 10.4 05/19/2022    HCT 32.4 05/19/2022    MCV 91.0 05/19/2022    MCH 29.3 05/19/2022    MCHC 32.2 05/19/2022    RDW 17.1 05/19/2022     05/19/2022    MPV 8.1 05/19/2022     CMP:    Lab Results   Component Value Date     05/19/2022    K 4.2 05/19/2022    K 4.0 05/13/2022     05/19/2022    CO2 21 05/19/2022    BUN 19 05/19/2022    CREATININE 3.3 05/19/2022    GFRAA 22 05/19/2022    GFRAA 35 07/26/2011    AGRATIO 1.0 05/11/2022    LABGLOM 18 05/19/2022    GLUCOSE 88 05/19/2022    PROT 6.2 05/11/2022    PROT 7.5 07/26/2011    LABALBU 2.6 05/19/2022    CALCIUM 8.3 05/19/2022    BILITOT 0.7 05/11/2022    ALKPHOS 101 05/11/2022    AST 15 05/11/2022    ALT 7 05/11/2022         IRINA Calzada CNP  Electronically signed 5/19/2022 at 2:58 PM

## 2022-05-19 NOTE — PLAN OF CARE
Nutrition Problem #1: Inadequate protein-energy intake  Intervention: Food and/or Nutrient Delivery: Continue Current Diet,Start Oral Nutrition Supplement

## 2022-05-19 NOTE — PLAN OF CARE
Problem: Discharge Planning  Goal: Discharge to home or other facility with appropriate resources  5/19/2022 1433 by Baldemar Beaver RN  Outcome: Progressing     Problem: Pain  Goal: Verbalizes/displays adequate comfort level or baseline comfort level  5/19/2022 1433 by Baldemar Beaver RN  Outcome: Progressing     Problem: Safety - Adult  Goal: Free from fall injury  5/19/2022 1433 by Baldemar Beaver RN  Outcome: Progressing     Problem: ABCDS Injury Assessment  Goal: Absence of physical injury  5/19/2022 1433 by Baldemar Beaver RN  Outcome: Progressing     Problem: Skin/Tissue Integrity  Goal: Absence of new skin breakdown  Description: 1. Monitor for areas of redness and/or skin breakdown  2. Assess vascular access sites hourly  3. Every 4-6 hours minimum:  Change oxygen saturation probe site  4. Every 4-6 hours:  If on nasal continuous positive airway pressure, respiratory therapy assess nares and determine need for appliance change or resting period.   5/19/2022 1433 by Baldemar Beaver RN  Outcome: Progressing     Problem: Chronic Conditions and Co-morbidities  Goal: Patient's chronic conditions and co-morbidity symptoms are monitored and maintained or improved  5/19/2022 1433 by Baldemar Beaver RN  Outcome: Progressing     Problem: Nutrition Deficit:  Goal: Optimize nutritional status  5/19/2022 1433 by Baldemar Beaver RN  Outcome: Progressing

## 2022-05-19 NOTE — PROGRESS NOTES
Call placed to [facility] to get an updated  time and did not get a definitive answer.  estimated to be after 7pm. Family informed.

## 2022-05-19 NOTE — PROGRESS NOTES
Report called to [address] [place] [address] at [facility]. R forearm and R hand IV removed without complications. Patient and all belongings will be transported per [facility]. Will continue to monitor.

## 2022-06-01 ENCOUNTER — APPOINTMENT (OUTPATIENT)
Dept: CT IMAGING | Age: 77
DRG: 592 | End: 2022-06-01
Payer: MEDICARE

## 2022-06-01 ENCOUNTER — APPOINTMENT (OUTPATIENT)
Dept: GENERAL RADIOLOGY | Age: 77
DRG: 592 | End: 2022-06-01
Payer: MEDICARE

## 2022-06-01 ENCOUNTER — HOSPITAL ENCOUNTER (INPATIENT)
Age: 77
LOS: 5 days | Discharge: HOME HEALTH | DRG: 592 | End: 2022-06-07
Attending: EMERGENCY MEDICINE | Admitting: INTERNAL MEDICINE
Payer: MEDICARE

## 2022-06-01 DIAGNOSIS — S91.301A OPEN WOUND OF RIGHT FOOT, INITIAL ENCOUNTER: ICD-10-CM

## 2022-06-01 DIAGNOSIS — R41.82 ALTERED MENTAL STATUS, UNSPECIFIED ALTERED MENTAL STATUS TYPE: Primary | ICD-10-CM

## 2022-06-01 DIAGNOSIS — I73.9 PVD (PERIPHERAL VASCULAR DISEASE) (HCC): ICD-10-CM

## 2022-06-01 DIAGNOSIS — F41.9 ANXIETY: ICD-10-CM

## 2022-06-01 LAB
A/G RATIO: 0.9 (ref 1.1–2.2)
ALBUMIN SERPL-MCNC: 3 G/DL (ref 3.4–5)
ALP BLD-CCNC: 185 U/L (ref 40–129)
ALT SERPL-CCNC: 22 U/L (ref 10–40)
ANION GAP SERPL CALCULATED.3IONS-SCNC: 9 MMOL/L (ref 3–16)
AST SERPL-CCNC: 20 U/L (ref 15–37)
BACTERIA: ABNORMAL /HPF
BASE EXCESS ARTERIAL: 9.2 MMOL/L (ref -3–3)
BASOPHILS ABSOLUTE: 0 K/UL (ref 0–0.2)
BASOPHILS RELATIVE PERCENT: 0.5 %
BILIRUB SERPL-MCNC: 0.9 MG/DL (ref 0–1)
BILIRUBIN URINE: NEGATIVE
BLOOD, URINE: NEGATIVE
BUN BLDV-MCNC: 32 MG/DL (ref 7–20)
CALCIUM SERPL-MCNC: 8.8 MG/DL (ref 8.3–10.6)
CARBOXYHEMOGLOBIN ARTERIAL: 1.3 % (ref 0–1.5)
CHLORIDE BLD-SCNC: 94 MMOL/L (ref 99–110)
CLARITY: CLEAR
CO2: 32 MMOL/L (ref 21–32)
COLOR: ABNORMAL
CREAT SERPL-MCNC: 3.5 MG/DL (ref 0.8–1.3)
EOSINOPHILS ABSOLUTE: 0.2 K/UL (ref 0–0.6)
EOSINOPHILS RELATIVE PERCENT: 3.8 %
EPITHELIAL CELLS, UA: 0 /HPF (ref 0–5)
GFR AFRICAN AMERICAN: 21
GFR NON-AFRICAN AMERICAN: 17
GLUCOSE BLD-MCNC: 146 MG/DL (ref 70–99)
GLUCOSE URINE: NEGATIVE MG/DL
HCO3 ARTERIAL: 34.9 MMOL/L (ref 21–29)
HCT VFR BLD CALC: 38.3 % (ref 40.5–52.5)
HEMOGLOBIN, ART, EXTENDED: 12.4 G/DL (ref 13.5–17.5)
HEMOGLOBIN: 12.4 G/DL (ref 13.5–17.5)
HYALINE CASTS: 3 /LPF (ref 0–8)
HYALINE CASTS: PRESENT
KETONES, URINE: ABNORMAL MG/DL
LACTIC ACID: 1.2 MMOL/L (ref 0.4–2)
LEUKOCYTE ESTERASE, URINE: NEGATIVE
LIPASE: 21 U/L (ref 13–60)
LYMPHOCYTES ABSOLUTE: 1.3 K/UL (ref 1–5.1)
LYMPHOCYTES RELATIVE PERCENT: 21.3 %
MCH RBC QN AUTO: 29.6 PG (ref 26–34)
MCHC RBC AUTO-ENTMCNC: 32.3 G/DL (ref 31–36)
MCV RBC AUTO: 91.6 FL (ref 80–100)
METHEMOGLOBIN ARTERIAL: 0.3 %
MICROSCOPIC EXAMINATION: YES
MONOCYTES ABSOLUTE: 0.3 K/UL (ref 0–1.3)
MONOCYTES RELATIVE PERCENT: 5.7 %
NEUTROPHILS ABSOLUTE: 4.1 K/UL (ref 1.7–7.7)
NEUTROPHILS RELATIVE PERCENT: 68.7 %
NITRITE, URINE: NEGATIVE
O2 SAT, ARTERIAL: 86.5 %
O2 THERAPY: ABNORMAL
PCO2 ARTERIAL: 51.3 MMHG (ref 35–45)
PDW BLD-RTO: 17.5 % (ref 12.4–15.4)
PH ARTERIAL: 7.44 (ref 7.35–7.45)
PH UA: 6.5 (ref 5–8)
PLATELET # BLD: 194 K/UL (ref 135–450)
PMV BLD AUTO: 7.5 FL (ref 5–10.5)
PO2 ARTERIAL: 55 MMHG (ref 75–108)
POTASSIUM SERPL-SCNC: 4.4 MMOL/L (ref 3.5–5.1)
PRO-BNP: 8328 PG/ML (ref 0–449)
PROTEIN UA: 100 MG/DL
RBC # BLD: 4.18 M/UL (ref 4.2–5.9)
RBC UA: 2 /HPF (ref 0–4)
SARS-COV-2, NAAT: NOT DETECTED
SODIUM BLD-SCNC: 135 MMOL/L (ref 136–145)
SPECIFIC GRAVITY UA: 1.02 (ref 1–1.03)
TCO2 ARTERIAL: 36.4 MMOL/L
TOTAL PROTEIN: 6.5 G/DL (ref 6.4–8.2)
TROPONIN: 0.07 NG/ML
URINE REFLEX TO CULTURE: ABNORMAL
URINE TYPE: ABNORMAL
UROBILINOGEN, URINE: 1 E.U./DL
WBC # BLD: 5.9 K/UL (ref 4–11)
WBC UA: 1 /HPF (ref 0–5)

## 2022-06-01 PROCEDURE — 99285 EMERGENCY DEPT VISIT HI MDM: CPT

## 2022-06-01 PROCEDURE — 83605 ASSAY OF LACTIC ACID: CPT

## 2022-06-01 PROCEDURE — 70450 CT HEAD/BRAIN W/O DYE: CPT

## 2022-06-01 PROCEDURE — 02HV33Z INSERTION OF INFUSION DEVICE INTO SUPERIOR VENA CAVA, PERCUTANEOUS APPROACH: ICD-10-PCS | Performed by: INTERNAL MEDICINE

## 2022-06-01 PROCEDURE — 96367 TX/PROPH/DG ADDL SEQ IV INF: CPT

## 2022-06-01 PROCEDURE — 94760 N-INVAS EAR/PLS OXIMETRY 1: CPT

## 2022-06-01 PROCEDURE — P9612 CATHETERIZE FOR URINE SPEC: HCPCS

## 2022-06-01 PROCEDURE — 83880 ASSAY OF NATRIURETIC PEPTIDE: CPT

## 2022-06-01 PROCEDURE — 96361 HYDRATE IV INFUSION ADD-ON: CPT

## 2022-06-01 PROCEDURE — 87040 BLOOD CULTURE FOR BACTERIA: CPT

## 2022-06-01 PROCEDURE — 6370000000 HC RX 637 (ALT 250 FOR IP): Performed by: PHYSICIAN ASSISTANT

## 2022-06-01 PROCEDURE — 2580000003 HC RX 258: Performed by: PHYSICIAN ASSISTANT

## 2022-06-01 PROCEDURE — 36415 COLL VENOUS BLD VENIPUNCTURE: CPT

## 2022-06-01 PROCEDURE — 71045 X-RAY EXAM CHEST 1 VIEW: CPT

## 2022-06-01 PROCEDURE — 82803 BLOOD GASES ANY COMBINATION: CPT

## 2022-06-01 PROCEDURE — 93005 ELECTROCARDIOGRAM TRACING: CPT | Performed by: PHYSICIAN ASSISTANT

## 2022-06-01 PROCEDURE — 36600 WITHDRAWAL OF ARTERIAL BLOOD: CPT

## 2022-06-01 PROCEDURE — 83690 ASSAY OF LIPASE: CPT

## 2022-06-01 PROCEDURE — 87635 SARS-COV-2 COVID-19 AMP PRB: CPT

## 2022-06-01 PROCEDURE — 80053 COMPREHEN METABOLIC PANEL: CPT

## 2022-06-01 PROCEDURE — 84484 ASSAY OF TROPONIN QUANT: CPT

## 2022-06-01 PROCEDURE — 85025 COMPLETE CBC W/AUTO DIFF WBC: CPT

## 2022-06-01 PROCEDURE — 96365 THER/PROPH/DIAG IV INF INIT: CPT

## 2022-06-01 PROCEDURE — 81001 URINALYSIS AUTO W/SCOPE: CPT

## 2022-06-01 RX ORDER — ACETAMINOPHEN 650 MG/1
650 SUPPOSITORY RECTAL ONCE
Status: COMPLETED | OUTPATIENT
Start: 2022-06-01 | End: 2022-06-01

## 2022-06-01 RX ORDER — 0.9 % SODIUM CHLORIDE 0.9 %
30 INTRAVENOUS SOLUTION INTRAVENOUS ONCE
Status: COMPLETED | OUTPATIENT
Start: 2022-06-01 | End: 2022-06-02

## 2022-06-01 RX ORDER — OXYCODONE HYDROCHLORIDE 5 MG/1
5 TABLET ORAL ONCE
Status: COMPLETED | OUTPATIENT
Start: 2022-06-01 | End: 2022-06-01

## 2022-06-01 RX ADMIN — ACETAMINOPHEN 650 MG: 650 SUPPOSITORY RECTAL at 22:15

## 2022-06-01 RX ADMIN — OXYCODONE 5 MG: 5 TABLET ORAL at 23:43

## 2022-06-01 RX ADMIN — SODIUM CHLORIDE 2328 ML: 9 INJECTION, SOLUTION INTRAVENOUS at 22:44

## 2022-06-01 ASSESSMENT — PAIN - FUNCTIONAL ASSESSMENT: PAIN_FUNCTIONAL_ASSESSMENT: 0-10

## 2022-06-01 ASSESSMENT — PAIN SCALES - GENERAL
PAINLEVEL_OUTOF10: 0
PAINLEVEL_OUTOF10: 8

## 2022-06-02 ENCOUNTER — APPOINTMENT (OUTPATIENT)
Dept: CT IMAGING | Age: 77
DRG: 592 | End: 2022-06-02
Payer: MEDICARE

## 2022-06-02 PROBLEM — G93.41 ACUTE METABOLIC ENCEPHALOPATHY: Status: ACTIVE | Noted: 2022-06-02

## 2022-06-02 PROBLEM — L03.90 CELLULITIS: Status: ACTIVE | Noted: 2022-06-02

## 2022-06-02 PROBLEM — R41.82 AMS (ALTERED MENTAL STATUS): Status: ACTIVE | Noted: 2022-06-02

## 2022-06-02 PROBLEM — A41.9 SEPSIS (HCC): Status: ACTIVE | Noted: 2022-06-02

## 2022-06-02 LAB
A/G RATIO: 0.9 (ref 1.1–2.2)
ALBUMIN SERPL-MCNC: 2.9 G/DL (ref 3.4–5)
ALP BLD-CCNC: 146 U/L (ref 40–129)
ALT SERPL-CCNC: 17 U/L (ref 10–40)
AMMONIA: 15 UMOL/L (ref 16–60)
ANION GAP SERPL CALCULATED.3IONS-SCNC: 13 MMOL/L (ref 3–16)
AST SERPL-CCNC: 15 U/L (ref 15–37)
BILIRUB SERPL-MCNC: 0.9 MG/DL (ref 0–1)
BUN BLDV-MCNC: 34 MG/DL (ref 7–20)
CALCIUM SERPL-MCNC: 8.5 MG/DL (ref 8.3–10.6)
CHLORIDE BLD-SCNC: 101 MMOL/L (ref 99–110)
CO2: 25 MMOL/L (ref 21–32)
CREAT SERPL-MCNC: 3.4 MG/DL (ref 0.8–1.3)
EKG ATRIAL RATE: 65 BPM
EKG DIAGNOSIS: NORMAL
EKG P AXIS: 20 DEGREES
EKG P-R INTERVAL: 248 MS
EKG Q-T INTERVAL: 410 MS
EKG QRS DURATION: 80 MS
EKG QTC CALCULATION (BAZETT): 426 MS
EKG R AXIS: -34 DEGREES
EKG T AXIS: -5 DEGREES
EKG VENTRICULAR RATE: 65 BPM
GFR AFRICAN AMERICAN: 21
GFR NON-AFRICAN AMERICAN: 18
GLUCOSE BLD-MCNC: 154 MG/DL (ref 70–99)
HCT VFR BLD CALC: 36.2 % (ref 40.5–52.5)
HEMOGLOBIN: 11.7 G/DL (ref 13.5–17.5)
MCH RBC QN AUTO: 29.4 PG (ref 26–34)
MCHC RBC AUTO-ENTMCNC: 32.2 G/DL (ref 31–36)
MCV RBC AUTO: 91.2 FL (ref 80–100)
PDW BLD-RTO: 17.7 % (ref 12.4–15.4)
PLATELET # BLD: 166 K/UL (ref 135–450)
PMV BLD AUTO: 7.6 FL (ref 5–10.5)
POTASSIUM SERPL-SCNC: 4.5 MMOL/L (ref 3.5–5.1)
RBC # BLD: 3.97 M/UL (ref 4.2–5.9)
SODIUM BLD-SCNC: 139 MMOL/L (ref 136–145)
TOTAL PROTEIN: 6 G/DL (ref 6.4–8.2)
WBC # BLD: 3.5 K/UL (ref 4–11)

## 2022-06-02 PROCEDURE — 85027 COMPLETE CBC AUTOMATED: CPT

## 2022-06-02 PROCEDURE — 2500000003 HC RX 250 WO HCPCS: Performed by: STUDENT IN AN ORGANIZED HEALTH CARE EDUCATION/TRAINING PROGRAM

## 2022-06-02 PROCEDURE — 71260 CT THORAX DX C+: CPT

## 2022-06-02 PROCEDURE — 2060000000 HC ICU INTERMEDIATE R&B

## 2022-06-02 PROCEDURE — 2580000003 HC RX 258: Performed by: EMERGENCY MEDICINE

## 2022-06-02 PROCEDURE — 93010 ELECTROCARDIOGRAM REPORT: CPT | Performed by: INTERNAL MEDICINE

## 2022-06-02 PROCEDURE — 6360000002 HC RX W HCPCS: Performed by: EMERGENCY MEDICINE

## 2022-06-02 PROCEDURE — 2580000003 HC RX 258: Performed by: STUDENT IN AN ORGANIZED HEALTH CARE EDUCATION/TRAINING PROGRAM

## 2022-06-02 PROCEDURE — 6370000000 HC RX 637 (ALT 250 FOR IP): Performed by: INTERNAL MEDICINE

## 2022-06-02 PROCEDURE — 99222 1ST HOSP IP/OBS MODERATE 55: CPT | Performed by: INTERNAL MEDICINE

## 2022-06-02 PROCEDURE — 94760 N-INVAS EAR/PLS OXIMETRY 1: CPT

## 2022-06-02 PROCEDURE — 82140 ASSAY OF AMMONIA: CPT

## 2022-06-02 PROCEDURE — 74177 CT ABD & PELVIS W/CONTRAST: CPT

## 2022-06-02 PROCEDURE — 87040 BLOOD CULTURE FOR BACTERIA: CPT

## 2022-06-02 PROCEDURE — 5A1D70Z PERFORMANCE OF URINARY FILTRATION, INTERMITTENT, LESS THAN 6 HOURS PER DAY: ICD-10-PCS | Performed by: INTERNAL MEDICINE

## 2022-06-02 PROCEDURE — 80053 COMPREHEN METABOLIC PANEL: CPT

## 2022-06-02 PROCEDURE — 6360000004 HC RX CONTRAST MEDICATION: Performed by: EMERGENCY MEDICINE

## 2022-06-02 PROCEDURE — 90935 HEMODIALYSIS ONE EVALUATION: CPT

## 2022-06-02 PROCEDURE — 36415 COLL VENOUS BLD VENIPUNCTURE: CPT

## 2022-06-02 PROCEDURE — 92610 EVALUATE SWALLOWING FUNCTION: CPT

## 2022-06-02 PROCEDURE — 6360000002 HC RX W HCPCS: Performed by: STUDENT IN AN ORGANIZED HEALTH CARE EDUCATION/TRAINING PROGRAM

## 2022-06-02 RX ORDER — ATORVASTATIN CALCIUM 10 MG/1
10 TABLET, FILM COATED ORAL NIGHTLY
Status: DISCONTINUED | OUTPATIENT
Start: 2022-06-02 | End: 2022-06-07 | Stop reason: HOSPADM

## 2022-06-02 RX ORDER — SODIUM CHLORIDE 9 MG/ML
INJECTION, SOLUTION INTRAVENOUS PRN
Status: DISCONTINUED | OUTPATIENT
Start: 2022-06-02 | End: 2022-06-07 | Stop reason: HOSPADM

## 2022-06-02 RX ORDER — ACETAMINOPHEN 650 MG/1
650 SUPPOSITORY RECTAL EVERY 6 HOURS PRN
Status: DISCONTINUED | OUTPATIENT
Start: 2022-06-02 | End: 2022-06-07 | Stop reason: HOSPADM

## 2022-06-02 RX ORDER — OXYCODONE HYDROCHLORIDE 5 MG/1
5 TABLET ORAL EVERY 4 HOURS PRN
Status: DISCONTINUED | OUTPATIENT
Start: 2022-06-02 | End: 2022-06-07 | Stop reason: HOSPADM

## 2022-06-02 RX ORDER — SEVELAMER CARBONATE 800 MG/1
800 TABLET, FILM COATED ORAL
Status: DISCONTINUED | OUTPATIENT
Start: 2022-06-02 | End: 2022-06-07 | Stop reason: HOSPADM

## 2022-06-02 RX ORDER — POLYETHYLENE GLYCOL 3350 17 G/17G
17 POWDER, FOR SOLUTION ORAL DAILY PRN
Status: DISCONTINUED | OUTPATIENT
Start: 2022-06-02 | End: 2022-06-02

## 2022-06-02 RX ORDER — HEPARIN SODIUM 1000 [USP'U]/ML
3800 INJECTION, SOLUTION INTRAVENOUS; SUBCUTANEOUS PRN
Status: DISCONTINUED | OUTPATIENT
Start: 2022-06-02 | End: 2022-06-07 | Stop reason: HOSPADM

## 2022-06-02 RX ORDER — HEPARIN SODIUM 5000 [USP'U]/ML
5000 INJECTION, SOLUTION INTRAVENOUS; SUBCUTANEOUS 3 TIMES DAILY
Status: DISCONTINUED | OUTPATIENT
Start: 2022-06-02 | End: 2022-06-02

## 2022-06-02 RX ORDER — TAMSULOSIN HYDROCHLORIDE 0.4 MG/1
0.4 CAPSULE ORAL EVERY MORNING
Status: DISCONTINUED | OUTPATIENT
Start: 2022-06-02 | End: 2022-06-07 | Stop reason: HOSPADM

## 2022-06-02 RX ORDER — SODIUM CHLORIDE 0.9 % (FLUSH) 0.9 %
5-40 SYRINGE (ML) INJECTION PRN
Status: DISCONTINUED | OUTPATIENT
Start: 2022-06-02 | End: 2022-06-07 | Stop reason: HOSPADM

## 2022-06-02 RX ORDER — METRONIDAZOLE 500 MG/100ML
500 INJECTION, SOLUTION INTRAVENOUS ONCE
Status: COMPLETED | OUTPATIENT
Start: 2022-06-02 | End: 2022-06-02

## 2022-06-02 RX ORDER — CALCITRIOL 0.25 UG/1
0.25 CAPSULE, LIQUID FILLED ORAL NIGHTLY
Status: DISCONTINUED | OUTPATIENT
Start: 2022-06-02 | End: 2022-06-07 | Stop reason: HOSPADM

## 2022-06-02 RX ORDER — ACETAMINOPHEN 325 MG/1
650 TABLET ORAL EVERY 6 HOURS PRN
Status: DISCONTINUED | OUTPATIENT
Start: 2022-06-02 | End: 2022-06-07 | Stop reason: HOSPADM

## 2022-06-02 RX ORDER — ASPIRIN 81 MG/1
81 TABLET, CHEWABLE ORAL DAILY
Status: DISCONTINUED | OUTPATIENT
Start: 2022-06-02 | End: 2022-06-07 | Stop reason: HOSPADM

## 2022-06-02 RX ORDER — GABAPENTIN 100 MG/1
100 CAPSULE ORAL 3 TIMES DAILY
Status: DISCONTINUED | OUTPATIENT
Start: 2022-06-02 | End: 2022-06-07 | Stop reason: HOSPADM

## 2022-06-02 RX ORDER — SODIUM CHLORIDE 0.9 % (FLUSH) 0.9 %
5-40 SYRINGE (ML) INJECTION EVERY 12 HOURS SCHEDULED
Status: DISCONTINUED | OUTPATIENT
Start: 2022-06-02 | End: 2022-06-07 | Stop reason: HOSPADM

## 2022-06-02 RX ORDER — POLYETHYLENE GLYCOL 3350 17 G/17G
17 POWDER, FOR SOLUTION ORAL DAILY PRN
Status: DISCONTINUED | OUTPATIENT
Start: 2022-06-02 | End: 2022-06-07 | Stop reason: HOSPADM

## 2022-06-02 RX ORDER — PANTOPRAZOLE SODIUM 20 MG/1
20 TABLET, DELAYED RELEASE ORAL NIGHTLY
Status: DISCONTINUED | OUTPATIENT
Start: 2022-06-02 | End: 2022-06-07 | Stop reason: HOSPADM

## 2022-06-02 RX ORDER — LORAZEPAM 0.5 MG/1
0.5 TABLET ORAL 2 TIMES DAILY
Status: DISCONTINUED | OUTPATIENT
Start: 2022-06-02 | End: 2022-06-07 | Stop reason: HOSPADM

## 2022-06-02 RX ORDER — ATENOLOL 25 MG/1
25 TABLET ORAL NIGHTLY
Status: DISCONTINUED | OUTPATIENT
Start: 2022-06-02 | End: 2022-06-07 | Stop reason: HOSPADM

## 2022-06-02 RX ADMIN — APIXABAN 5 MG: 5 TABLET, FILM COATED ORAL at 11:26

## 2022-06-02 RX ADMIN — LORAZEPAM 0.5 MG: 0.5 TABLET ORAL at 11:26

## 2022-06-02 RX ADMIN — GABAPENTIN 100 MG: 100 CAPSULE ORAL at 22:34

## 2022-06-02 RX ADMIN — ATENOLOL 25 MG: 25 TABLET ORAL at 22:34

## 2022-06-02 RX ADMIN — IOPAMIDOL 75 ML: 755 INJECTION, SOLUTION INTRAVENOUS at 02:17

## 2022-06-02 RX ADMIN — APIXABAN 5 MG: 5 TABLET, FILM COATED ORAL at 22:34

## 2022-06-02 RX ADMIN — METRONIDAZOLE 500 MG: 500 INJECTION, SOLUTION INTRAVENOUS at 05:04

## 2022-06-02 RX ADMIN — CEFEPIME HYDROCHLORIDE 1000 MG: 1 INJECTION, POWDER, FOR SOLUTION INTRAMUSCULAR; INTRAVENOUS at 02:59

## 2022-06-02 RX ADMIN — LORAZEPAM 0.5 MG: 0.5 TABLET ORAL at 22:34

## 2022-06-02 RX ADMIN — VANCOMYCIN HYDROCHLORIDE 1000 MG: 1 INJECTION, POWDER, LYOPHILIZED, FOR SOLUTION INTRAVENOUS at 03:01

## 2022-06-02 RX ADMIN — GABAPENTIN 100 MG: 100 CAPSULE ORAL at 14:00

## 2022-06-02 RX ADMIN — SODIUM CHLORIDE, PRESERVATIVE FREE 10 ML: 5 INJECTION INTRAVENOUS at 07:52

## 2022-06-02 RX ADMIN — HEPARIN SODIUM 5000 UNITS: 5000 INJECTION INTRAVENOUS; SUBCUTANEOUS at 08:04

## 2022-06-02 RX ADMIN — SEVELAMER CARBONATE 800 MG: 800 TABLET, FILM COATED ORAL at 19:02

## 2022-06-02 RX ADMIN — CALCITRIOL 0.25 MCG: 0.25 CAPSULE ORAL at 22:34

## 2022-06-02 RX ADMIN — ASPIRIN 81 MG: 81 TABLET, CHEWABLE ORAL at 11:26

## 2022-06-02 RX ADMIN — TAMSULOSIN HYDROCHLORIDE 0.4 MG: 0.4 CAPSULE ORAL at 11:26

## 2022-06-02 RX ADMIN — SODIUM CHLORIDE, PRESERVATIVE FREE 40 ML: 5 INJECTION INTRAVENOUS at 22:37

## 2022-06-02 RX ADMIN — SEVELAMER CARBONATE 800 MG: 800 TABLET, FILM COATED ORAL at 11:26

## 2022-06-02 RX ADMIN — ATORVASTATIN CALCIUM 10 MG: 10 TABLET, FILM COATED ORAL at 22:34

## 2022-06-02 RX ADMIN — PANTOPRAZOLE SODIUM 20 MG: 20 TABLET, DELAYED RELEASE ORAL at 22:34

## 2022-06-02 ASSESSMENT — ENCOUNTER SYMPTOMS
VOMITING: 0
COUGH: 0
DIARRHEA: 1
SHORTNESS OF BREATH: 1

## 2022-06-02 ASSESSMENT — PAIN SCALES - GENERAL
PAINLEVEL_OUTOF10: 8
PAINLEVEL_OUTOF10: 0

## 2022-06-02 NOTE — CARE COORDINATION
[facility] Continence Nurse  Consult Note       NAME:  [facility] [place] RECORD NUMBER:  [id number]  AGE: 77 y.o.    GENDER: male  : 1945  TODAY'S DATE:  2022    Subjective   Reason for WOCN Evaluation and Assessment: right ankle and buttock wounds      Ash Byrnes is a 77 y.o. male referred by:   [] Physician  [x] Nursing  [] Other:     Wound Identification:  Wound Type: pressure and friction  Contributing Factors: chronic pressure and incontinence of stool    Wound History: noted on admit  Current Wound Care Treatment:  LILIANA    Patient Goal of Care:  [x] Wound Healing  [] Odor Control  [] Palliative Care  [] Pain Control   [] Other:         PAST MEDICAL HISTORY        Diagnosis Date    Anemia 2022    Arthritis     CAD (coronary artery disease) 2020    Cancer ([address]     Colon Cancer diagnosed last month    [facility]erebral infarction [facility]     Cognitive communication deficit     COVID-19     Diabetes mellitus ([address]     Dysphagia     End stage renal disease ([address]     Fatigue     Gastrointestinal hemorrhage     Hemodialysis patient ([facility] [facility] 2019    ckd-goes to dialysis Tuesday, Thurs, Fri    Hx of blood clots     Hyperlipidemia     Hypertension     Hyponatremia     Risk for falls     Splenomegaly 02/10/2021       PAST SURGICAL HISTORY    Past Surgical History:   Procedure Laterality Date    CARDIAC CATHETERIZATION      CHOLECYSTECTOMY, LAPAROSCOPIC N/A 2022    LAPAROSCOPIC CHOLECYSTECTOMY WITH CHOLANGIOGRAM performed by Bhupendra Tucker MD at [address] 2022    SIGMOID COLECTOMY, SIGMOIDOSCOPY performed by Bhupendra Tucker MD at  [address] Left 2022    LEFT BRACHIAL CEPHALIC FISTULA performed by Tessa Chakraborty MD at [address] ERCP N/A 2022    ERCP SPHINCTER/PAPILLOTOMY performed by Marco Mar MD at [address] ERCP N/A 2022    ERCP STONE REMOVAL/BALLOON SWEEP performed by Antwan Boyle MD at [address] [facility] [address]  02/19/2022    IR EMBOLIZATION HEMORRHAGE 2/19/2022 [facility]    IR PERC ARTERIOVENOUS FISTULA CREATION Left     unsure of date    IR TUNNELED CATHETER PLACEMENT GREATER THAN 5 YEARS  2/21/2022    IR TUNNELED CATHETER PLACEMENT GREATER THAN 5 YEARS 2/21/2022 [facility]    SIGMOIDOSCOPY N/A 02/17/2022    SIGMOIDOSCOPY CONTROL HEMORRHAGE performed by Patricia Rowe MD at [address] Right 02/21/2022    Permacath; RIJ access; 23cm; Dr. Vimal Elise  5/16/2022    ERCP POLYP SNARE performed by Antwan Boyle MD at [facility]    Family History   Problem Relation Age of Onset    High Blood Pressure Father        SOCIAL HISTORY    Social History     Tobacco Use    Smoking status: Former Smoker    Smokeless tobacco: Never Used   Vaping Use    Vaping Use: Never used   Substance Use Topics    Alcohol use: Not Currently    Drug use: Never       ALLERGIES    Allergies   Allergen Reactions    Lisinopril Swelling     Allergy listed on paperwork from [facility], no reaction noted       MEDICATIONS    No current facility-administered medications on file prior to encounter. Current Outpatient Medications on File Prior to Encounter   Medication Sig Dispense Refill    docusate sodium (COLACE) 100 MG capsule Take 100 mg by mouth 2 times daily      ondansetron (ZOFRAN) 4 MG tablet Take 4 mg by mouth every 8 hours as needed for Nausea      LORazepam (ATIVAN) 0.5 MG tablet Take 0.5 mg by mouth 2 times daily.  oxyCODONE (ROXICODONE) 5 MG immediate release tablet Take 5 mg by mouth every 4 hours as needed for Pain.       polyethylene glycol (GLYCOLAX) 17 GM/SCOOP powder Take 17 g by mouth daily as needed (Constipation)       apixaban (ELIQUIS) 5 MG TABS tablet Take 1 tablet by mouth 2 times daily 60 tablet 0    aspirin 81 MG chewable tablet Take 1 tablet by mouth daily 30 tablet 0    melatonin 3 MG TABS tablet Take 2 tablets by mouth nightly as needed (sleep) 6 tablet 0    tamsulosin (FLOMAX) 0.4 MG capsule Take 0.4 mg by mouth every morning      gabapentin (NEURONTIN) 100 MG capsule Take 100 mg by mouth 3 times daily.       sevelamer (RENVELA) 800 MG tablet Take 800 mg by mouth 3 times daily (with meals)       atenolol (TENORMIN) 25 MG tablet Take 25 mg by mouth every evening      pantoprazole (PROTONIX) 20 MG tablet Take 20 mg by mouth nightly      atorvastatin (LIPITOR) 10 MG tablet Take 10 mg by mouth nightly      calcitRIOL (ROCALTROL) 0.25 MCG capsule Take 0.25 mcg by mouth every evening         Objective    /69   Pulse 66   Temp 98.3 °F (36.8 °C) (Oral)   Resp 24   Ht 6' 0.5\" (1.842 m)   Wt 205 lb 7.5 oz (93.2 kg)   SpO2 94%   BMI 27.48 kg/m²     LABS:  WBC:    Lab Results   Component Value Date    WBC 5.9 06/01/2022     H/H:    Lab Results   Component Value Date    HGB 12.4 06/01/2022    HCT 38.3 06/01/2022     PTT:    Lab Results   Component Value Date    APTT 60.0 05/16/2022   [APTT}  PT/INR:    Lab Results   Component Value Date    PROTIME 21.0 03/08/2022    INR 1.81 03/08/2022     HgBA1c:    Lab Results   Component Value Date    LABA1C 6.1 01/15/2022       Assessment   Cordell Risk Score: Cordell Scale Score: 14    Patient Active Problem List   Diagnosis Code    GI bleed K92.2    History of COVID-19 Z86.16    Hyponatremia E87.1    Fatigue R53.83    Acute kidney injury superimposed on CKD ([facility] N17.9, N18.9    Lethargy R53.83    Suspected UTI R39.89    Acute CVA (cerebrovascular accident) ([facility] I63.9    LESLEE (acute kidney injury) ([facility] N17.9    Malignant neoplasm of colon ([facility] C18.9    Acute blood loss anemia D62    COVID-19 U07.1    Shock circulatory (HCC) R57.9    Bilateral pulmonary embolism (HCC) I26.99    Lactic acidosis E87.2    Hemorrhagic shock ([facility] R57.8    ESRD (end stage renal disease) on dialysis ([facility]) N18.6, Z99.2    Acute cholecystitis K81.0    Pain of upper abdomen R10.10    AMS (altered mental status) R41.82    Sepsis ([facility]) A41.9    Acute metabolic encephalopathy B10.55    Cellulitis L03.90       Measurements:          Wound 06/02/22 Ankle Anterior;Right (Active)   Wound Image   06/02/22 1215   Wound Etiology Pressure Unstageable 06/02/22 1215   Dressing/Treatment Open to air 06/02/22 1215   Wound Length (cm) 1.5 cm 06/02/22 1215   Wound Width (cm) 1.5 cm 06/02/22 1215   Wound Surface Area (cm^2) 2.25 cm^2 06/02/22 1215   Wound Assessment Eschar dry;Pink/red 06/02/22 1215   Drainage Amount None 06/02/22 1215   Odor None 06/02/22 1215   Pattie-wound Assessment Non-blanchable erythema 06/02/22 1215   Number of days: 0           Wound 06/02/22 Buttocks (Active)   Wound Image   06/02/22 1215   Wound Etiology Other 06/02/22 1215   Wound Length (cm) 1 cm 06/02/22 1215   Wound Width (cm) 0.4 cm 06/02/22 1215   Wound Depth (cm) 0.1 cm 06/02/22 1215   Wound Surface Area (cm^2) 0.4 cm^2 06/02/22 1215   Wound Volume (cm^3) 0.04 cm^3 06/02/22 1215   Wound Assessment Pink/red 06/02/22 1215   Drainage Amount None 06/02/22 1215   Odor None 06/02/22 1215   Pattie-wound Assessment Blanchable erythema 06/02/22 1215   Margins Defined edges 06/02/22 1215   Number of days: 0     Pt seen. Has unstageable wound to right ankle, suspect may have some vascular component. Has friction injury to buttock, surrounding blanchable erythema. Response to treatment:  Well tolerated by patient.      Pain Assessment:  Severity:  0 / 10      Plan   Plan of Care: Wound 06/02/22 Ankle Anterior;Right-Dressing/Treatment: Open to air  -would benefit form podiatry consult; perfect serve message sent to Dr. Nils France  -blue wipes to buttocks   Specialty Bed Required : Yes   [x] Low Air Loss   [] Pressure Redistribution  [] Fluid Immersion  [] Bariatric  [] Total Pressure Relief  [] Other: Current Diet: ADULT DIET;  Regular; Mildly Thick (Nectar)  Dietician consult:  Yes    Discharge Plan:  Placement for patient upon discharge: skilled nursing    Patient appropriate for Outpatient [address]: Yes    Referrals:  []   [] [facility]  [] Supplies  [] Other    Patient/Caregiver Teaching:  Level of patient/caregiver understanding able to:   [] Indicates understanding       [] Needs reinforcement  [] Unsuccessful      [] Verbal Understanding  [] Demonstrated understanding       [] No evidence of learning  [] Refused teaching         [] N/A       Electronically signed by Yayo Sotelo on 6/2/2022 at 1:23 PM

## 2022-06-02 NOTE — H&P
by Armond Miranda MD at [facility]  ERCP N/A 5/16/2022    ERCP STONE REMOVAL/BALLOON SWEEP performed by Armond Miranda MD at [address]  02/19/2022    IR EMBOLIZATION HEMORRHAGE 2/19/2022 [facility] PROCED[facility]    IR PERC ARTERIOVENOUS FISTULA CREATION Left     unsure of date    IR TUNNELED CATHETER PLACEMENT GREATER THAN 5 YEARS  2/21/2022    IR TUNNELED CATHETER PLACEMENT GREATER THAN 5 YEARS 2/21/2022 [facility] PROCED[facility]    SIGMOIDOSCOPY N/A 02/17/2022    SIGMOIDOSCOPY CONTROL HEMORRHAGE performed by Rosina De La O MD at [address] Right 02/21/2022    Permacath; RIJ access; 23cm; Dr. Sai Woods  5/16/2022    ERCP POLYP SNARE performed by Armond Miranda MD at [facility]       Medications Prior to Admission:      Prior to Admission medications    Medication Sig Start Date End Date Taking? Authorizing Provider   docusate sodium (COLACE) 100 MG capsule Take 100 mg by mouth 2 times daily    Historical Provider, MD   ondansetron (ZOFRAN) 4 MG tablet Take 4 mg by mouth every 8 hours as needed for Nausea    Historical Provider, MD   LORazepam (ATIVAN) 0.5 MG tablet Take 0.5 mg by mouth 2 times daily. Historical Provider, MD   oxyCODONE (ROXICODONE) 5 MG immediate release tablet Take 5 mg by mouth every 4 hours as needed for Pain.     Historical Provider, MD   polyethylene glycol (GLYCOLAX) 17 GM/SCOOP powder Take 17 g by mouth daily as needed (Constipation)     Historical Provider, MD   apixaban (ELIQUIS) 5 MG TABS tablet Take 1 tablet by mouth 2 times daily 3/2/22   Yvonne Badillo MD   aspirin 81 MG chewable tablet Take 1 tablet by mouth daily 2/11/22   Manohar Alarcon MD   melatonin 3 MG TABS tablet Take 2 tablets by mouth nightly as needed (sleep) 2/4/22   Dewey Ang MD   tamsulosin (FLOMAX) 0.4 MG capsule Take 0.4 mg by mouth every morning    Historical Provider, MD gabapentin (NEURONTIN) 100 MG capsule Take 100 mg by mouth 3 times daily. Historical Provider, MD   sevelamer (RENVELA) 800 MG tablet Take 800 mg by mouth 3 times daily (with meals)     Historical Provider, MD   atenolol (TENORMIN) 25 MG tablet Take 25 mg by mouth every evening    Historical Provider, MD   pantoprazole (PROTONIX) 20 MG tablet Take 20 mg by mouth nightly    Historical Provider, MD   atorvastatin (LIPITOR) 10 MG tablet Take 10 mg by mouth nightly    Historical Provider, MD   calcitRIOL (ROCALTROL) 0.25 MCG capsule Take 0.25 mcg by mouth every evening    Historical Provider, MD       Allergies:  Lisinopril    Social History:      The patient currently lives at nursing home at this time    TOBACCO:   reports that he has quit smoking. He has never used smokeless tobacco.  ETOH:   reports previous alcohol use. E-Cigarettes/Vaping Use     Questions Responses    E-Cigarette/Vaping Use Never User    Start Date     Passive Exposure     Quit Date     Counseling Given     Comments             Family History:      Reviewed in detail and positive as follows:        Problem Relation Age of Onset    High Blood Pressure Father        REVIEW OF SYSTEMS COMPLETED:   Pertinent positives as noted in the HPI. All other systems reviewed and negative. PHYSICAL EXAM PERFORMED:    /62   Pulse 61   Temp 98.2 °F (36.8 °C) (Oral)   Resp 19   Ht 6' 0.5\" (1.842 m)   Wt 205 lb 7.5 oz (93.2 kg)   SpO2 93%   BMI 27.48 kg/m²     General appearance:  No apparent distress  HEENT:  Normal cephalic  Neck: Supple  Respiratory:  Normal respiratory effort. Clear to auscultation, bilaterally without Rales/Wheezes/Rhonchi. Cardiovascular:  Regular rate and rhythm with normal S1/S2 without murmurs, rubs or gallops. Abdomen: Soft, non-tender, non-distended   Musculoskeletal:  No clubbing, cyanosis.   Ulcer with surrounding erythema on right heel  Skin: As above  Neurologic: Generalized weakness  Psychiatric:  Alert and oriented to self, partially to time, partially to place  Capillary Refill: Brisk,3 seconds, normal  Peripheral Pulses: +2 palpable, equal bilaterally       Labs:     Recent Labs     06/01/22 2229   WBC 5.9   HGB 12.4*   HCT 38.3*        Recent Labs     06/01/22 2229   *   K 4.4   CL 94*   CO2 32   BUN 32*   CREATININE 3.5*   CALCIUM 8.8     Recent Labs     06/01/22 2229   AST 20   ALT 22   BILITOT 0.9   ALKPHOS 185*     No results for input(s): INR in the last 72 hours. Recent Labs     06/01/22 2229   TROPONINI 0.07*       Urinalysis:      Lab Results   Component Value Date    NITRU Negative 06/01/2022    WBCUA 1 06/01/2022    BACTERIA None Seen 06/01/2022    RBCUA 2 06/01/2022    BLOODU Negative 06/01/2022    SPECGRAV 1.019 06/01/2022    GLUCOSEU Negative 06/01/2022       Radiology:         CT ABDOMEN PELVIS W IV CONTRAST Additional Contrast? None   Preliminary Result   1. No central, lobar, or segmental pulmonary embolus. 2. Trace bilateral pleural effusions and mild bibasilar atelectasis. 3. Findings of moderate proctocolitis involving the rectum and sigmoid colon. Associated wall thickening and edema. No abscess. No mesenteric gas. 4. Colorectal anastomosis with focal narrowing at this region. A stricture   is not excluded. CT CHEST PULMONARY EMBOLISM W CONTRAST   Preliminary Result   1. No central, lobar, or segmental pulmonary embolus. 2. Trace bilateral pleural effusions and mild bibasilar atelectasis. 3. Findings of moderate proctocolitis involving the rectum and sigmoid colon. Associated wall thickening and edema. No abscess. No mesenteric gas. 4. Colorectal anastomosis with focal narrowing at this region. A stricture   is not excluded. CT HEAD WO CONTRAST   Final Result   No acute intracranial abnormality. XR CHEST PORTABLE   Final Result   No acute abnormality.              ASSESSMENT:    Active Hospital Problems    Diagnosis Date Noted    AMS (altered mental status) [R41.82] 06/02/2022     Priority: Medium    Acute metabolic encephalopathy [C42.93] 06/02/2022     Priority: Medium    Cellulitis [L03.90] 06/02/2022     Priority: Medium    ESRD (end stage renal disease) on dialysis ([facility] [N18.6, Z99.2]      Priority: Medium         PLAN:    1. Possible cellulitis, patient end-stage renal disease, admitted to the hospital, given dose of vancomycin, started on cefepime, will consult infectious disease for further recommendations, at this time patient is afebrile  2. Acute metabolic encephalopathy, could be triggered by infection, appears improving and near baseline, daughter at bedside and supportive, continue to monitor to note that patient on pain medication, benzodiazepines and pain modulator, all of this can contribute or be the culprit of his acute metabolic encephalopathy. 3.  End-stage renal disease nephrology consulted patient had recent fistula not using yet he still using applying which does not look infected at this time. 4.  Possible colitis, cefepime patient denies abdominal pain though ID consulted  5. History of colon cancer follow-up as outpatient  6. Essential hypertension, controlled at this time  7. History of VTE continue oral anticoagulation    Diet: Diet NPO  Code Status: Full Code    PT/OT Eval Status: Ordered    Dispo -inpatient 2 to 3 days       Manohar Kenney MD    Thank you Silvio Blevins for the opportunity to be involved in this patient's care. If you have any questions or concerns please feel free to contact me at [phone].

## 2022-06-02 NOTE — CONSULTS
Infectious Diseases Inpatient Consult Note      Reason for Consult:  Fevers change in mentation and Rt ankle wound     Requesting Physician:   Ace Cruz     Primary Care Physician:  Gisel Pemberton    History Obtained From:  Epic and Patient     CHIEF COMPLAINT:     Chief Complaint   Patient presents with    Fatigue     per nursing home the last 8 hours         HISTORY OF PRESENT ILLNESS:  77 y.o. man admitted from NH due to change in mentation and fevers and on going Rt ankle wound infection with local pain, redness, he recently had a complicated course required Lap cholecystectomy and ECRP for stone in CBD on 5/16/22. He has ESRD on HD via chest line. Lactic acid  1.2 , WBC  3.5, hB 11.7. bLOOD Cx in process and was started on IV abx we are consulted for recommendations.            Past Medical History:    Past Medical History:   Diagnosis Date    Anemia 03/2022    Arthritis     CAD (coronary artery disease) 01/2020    Cancer [facility])     Colon Cancer diagnosed last month    Cerebral infarction ([address]     Cognitive communication deficit     COVID-19     Diabetes mellitus ([address]     Dysphagia     End stage renal disease ([address]     Fatigue     Gastrointestinal hemorrhage     Hemodialysis patient ([address] 2019    ckd-goes to dialysis Tuesday, Thurs, Fri    Hx of blood clots     Hyperlipidemia     Hypertension     Hyponatremia     Risk for falls     Splenomegaly 02/10/2021       Past Surgical History:    Past Surgical History:   Procedure Laterality Date    CARDIAC CATHETERIZATION  2019    CHOLECYSTECTOMY, LAPAROSCOPIC N/A 5/16/2022    LAPAROSCOPIC CHOLECYSTECTOMY WITH CHOLANGIOGRAM performed by Kira Jason MD at [facility] 01/26/2022    SIGMOID COLECTOMY, SIGMOIDOSCOPY performed by Kira Jason MD at [address] Left 4/29/2022    LEFT BRACHIAL CEPHALIC FISTULA performed by Jed Hoyt MD at [address] [facility]  ERCP N/A pain, palpitations, syncope  Gastrointestinal:  negative for nausea, vomiting, diarrhea, constipation, abdominal pain  Genitourinary:  negative for frequency, dysuria, urinary incontinence, hematuria  Hematologic/Lymphatic:  negative for easy bruising, bleeding and lymphadenopathy  Allergic/Immunologic:  negative for recurrent infections, angioedema, anaphylaxis   Endocrine:  negative for weight changes, polyuria, polydipsia and polyphagia  Musculoskeletal:  Rt ankle pain + local redness+  pain, swelling, decreased range of motion  Integumentary: No rashes, skin lesions  Neurological:  negative for headaches, slurred speech, unilateral weakness  Psychiatric: negative for hallucinations,confusion,agitation.      PHYSICAL EXAM:      Vitals:  T max 100  /69   Pulse 66   Temp 98.3 °F (36.8 °C) (Oral)   Resp 24   Ht 6' 0.5\" (1.842 m)   Wt 205 lb 7.5 oz (93.2 kg)   SpO2 94%   BMI 27.48 kg/m²     General Appearance: alert,in some  acute distress, ++  pallor, no icterus   Skin: warm and dry, no rash or erythema  Head: normocephalic and atraumatic  Eyes: pupils equal, round, and reactive to light, conjunctivae normal  ENT: tympanic membrane, external ear and ear canal normal bilaterally, nose without deformity, nasal mucosa and turbinates normal without polyps  Neck: supple and non-tender without mass, no thyromegaly  no cervical lymphadenopathy  Pulmonary/Chest: clear to auscultation bilaterally- no wheezes, rales or rhonchi, normal air movement, no respiratory distress  Cardiovascular: normal rate, regular rhythm, normal S1 and S2, no murmurs, rubs, clicks, or gallops, no carotid bruits  Abdomen: soft, non-tender, non-distended, normal bowel sounds, no masses or organomegaly  Extremities: no cyanosis, clubbing or edema  Musculoskeletal: normal range of motion, no joint swelling, deformity or tenderness  Integumentary: No rashes, no abnormal skin lesions, no petechiae  Neurologic: reflexes normal and symmetric, no cranial nerve deficit   Lines: IV  Chest line +  Rt ankle lateral open wound pressure area with local redness+     DATA:    CBC:   Lab Results   Component Value Date    WBC 3.5 (L) 06/02/2022    HGB 11.7 (L) 06/02/2022    HCT 36.2 (L) 06/02/2022    MCV 91.2 06/02/2022     06/02/2022     RENAL:   Lab Results   Component Value Date    CREATININE 3.4 (H) 06/02/2022    BUN 34 (H) 06/02/2022     06/02/2022    K 4.5 06/02/2022     06/02/2022    CO2 25 06/02/2022     SED RATE:   Lab Results   Component Value Date    SEDRATE 24 07/26/2011     CK: No results found for: CKTOTAL  CRP:   Lab Results   Component Value Date    .5 02/24/2022     Hepatic Function Panel:   Lab Results   Component Value Date    ALKPHOS 146 06/02/2022    ALT 17 06/02/2022    AST 15 06/02/2022    PROT 6.0 06/02/2022    PROT 7.5 07/26/2011    BILITOT 0.9 06/02/2022    BILIDIR <0.2 02/12/2022    IBILI see below 02/12/2022    LABALBU 2.9 06/02/2022     UA:  Lab Results   Component Value Date    COLORU DARK YELLOW 06/01/2022    CLARITYU Clear 06/01/2022    GLUCOSEU Negative 06/01/2022    BILIRUBINUR Negative 06/01/2022    KETUA TRACE 06/01/2022    SPECGRAV 1.019 06/01/2022    BLOODU Negative 06/01/2022    PHUR 6.5 06/01/2022    PROTEINU 100 06/01/2022    UROBILINOGEN 1.0 06/01/2022    NITRU Negative 06/01/2022    LEUKOCYTESUR Negative 06/01/2022    LABMICR YES 06/01/2022    URINETYPE NotGiven 06/01/2022      Urine Microscopic:   Lab Results   Component Value Date    BACTERIA None Seen 06/01/2022    COMU see below 04/22/2022    HYALCAST 3 06/01/2022    HYALCAST Present 06/01/2022    WBCUA 1 06/01/2022    RBCUA 2 06/01/2022    EPIU 0 06/01/2022     Urine Reflex to Culture:   Lab Results   Component Value Date    URRFLXCULT Not Indicated 06/01/2022         MICRO: cultures reviewed and updated by me     Procedure Component Value Units Date/Time   Culture, Blood 2 [[id number]] Collected: 06/02/22 0743   Order Status: Sent Specimen: Blood Updated: 06/02/22 0812   Culture, Blood 1 [[id number]] Collected: 06/01/22 2229   Order Status: Sent Specimen: Blood Updated: 06/01/22 2343   COVID-19, Rapid [[id number]] Collected: 06/01/22 2257   Order Status: Completed Specimen: Nasopharyngeal Swab Updated: 06/01/22 2342    SARS-CoV-2, NAAT Not Detected    Comment: Rapid NAAT:   Negative results should be treated as presumptive and,   if inconsistent with clinical signs and symptoms or necessary for   patient management, should be tested with an alternative molecular   assay. Negative results do not preclude SARS-CoV-2 infection and   should not be used as the sole basis for patient management decisions. This test has been authorized by the FDA under an Emergency Use   Authorization (EUA) for use by authorized laboratories. Fact sheet for Healthcare Providers:   Monisha.[email or web address]   Fact sheet for Patients: Monisha.[email or web address]     METHODOLOGY: Isothermal Nucleic Acid Amplification       Clostridium Difficile Toxin/Antigen [[id number]]    Order Status: Canceled Specimen: Stool          Blood Culture: No results found for: BC, BLOODCULT2    Viral Culture:    Lab Results   Component Value Date    COVID19 Not Detected 06/01/2022     Urine Culture: No results for input(s): Katherine Kitchen in the last 72 hours.     Scheduled Meds:   sodium chloride flush  5-40 mL IntraVENous 2 times per day    [START ON 6/3/2022] cefepime  1,000 mg IntraVENous Q24H    apixaban  5 mg Oral BID    aspirin  81 mg Oral Daily    atenolol  25 mg Oral Nightly    atorvastatin  10 mg Oral Nightly    calcitRIOL  0.25 mcg Oral Nightly    gabapentin  100 mg Oral TID    LORazepam  0.5 mg Oral BID    pantoprazole  20 mg Oral Nightly    sevelamer  800 mg Oral TID WC    tamsulosin  0.4 mg Oral QAM       Continuous Infusions:   sodium chloride         PRN Meds:  sodium chloride flush, sodium chloride, acetaminophen **OR** acetaminophen, oxyCODONE, polyethylene glycol, heparin (porcine)    Imaging:   CT ABDOMEN PELVIS W IV CONTRAST Additional Contrast? None   Preliminary Result   1. No central, lobar, or segmental pulmonary embolus. 2. Trace bilateral pleural effusions and mild bibasilar atelectasis. 3. Findings of moderate proctocolitis involving the rectum and sigmoid colon. Associated wall thickening and edema. No abscess. No mesenteric gas. 4. Colorectal anastomosis with focal narrowing at this region. A stricture   is not excluded. CT CHEST PULMONARY EMBOLISM W CONTRAST   Preliminary Result   1. No central, lobar, or segmental pulmonary embolus. 2. Trace bilateral pleural effusions and mild bibasilar atelectasis. 3. Findings of moderate proctocolitis involving the rectum and sigmoid colon. Associated wall thickening and edema. No abscess. No mesenteric gas. 4. Colorectal anastomosis with focal narrowing at this region. A stricture   is not excluded. CT HEAD WO CONTRAST   Final Result   No acute intracranial abnormality. XR CHEST PORTABLE   Final Result   No acute abnormality. All pertinent images and reports for the current Hospitalization were reviewed by me.     IMPRESSION:    Patient Active Problem List   Diagnosis    GI bleed    History of COVID-19    Hyponatremia    Fatigue    Acute kidney injury superimposed on CKD ([address]    Lethargy    Suspected UTI    Acute CVA (cerebrovascular accident) ([address]    LESLEE (acute kidney injury) ([address]    Malignant neoplasm of colon ([address]    Acute blood loss anemia    COVID-19    Shock circulatory ([address]    Bilateral pulmonary embolism (HCC)    Lactic acidosis    Hemorrhagic shock ([address]    ESRD (end stage renal disease) on dialysis ([address]    Acute cholecystitis    Pain of upper abdomen    AMS (altered mental status)    Sepsis ([address]    Acute metabolic encephalopathy    Cellulitis     Fevers on admit  ESRD on HD  Admitted from NH with change in mentation   Rt ankle pressure wound   WBC normal   Chest HD line in place  Recent ERCP and Cholecystectomy on 5/16/222  CXR -ve  CT abd/pelvis -ve    Await Blood cx to guide therapy given ESRD and chest line in place risk for Bacteremia - Rt ankle wound pressure area will need local care and off load and Podiatry consulted by primary team              Labs, Microbiology, Radiology and pertinent results from current hospitalization and care every where were reviewed by me as a part of the consultation. PLAN :  1. Cont IV Cefepime x 1 gm  Q 24 h  2. Cont IV Vancomycin by level  3. Cont local care Rt ankle   4. Off load the ankle   5. Blood cx in process     Discussed with patient/Family and Nursing d/w daughter at bed side  Risk of Complications/Morbidity: High      · Illness(es)/ Infection present that pose threat to bodily function. · There is potential for severe exacerbation of infection/side effects of treatment. · Therapy requires intensive monitoring for antimicrobial agent toxicity. Thanks for allowing me to participate in your patient's care please call me with any questions or concerns.     Dr. Janeen Sutton MD  [address] [place] [address] Physician  Phone: [phone]   Fax : [phone]

## 2022-06-02 NOTE — PROGRESS NOTES
Treatment time: 3.5 hours  Net UF: 1000 ml     Pre weight: 93.2 kg  Post weight:92.1 kg  EDW: 94 kg (challenging)    Access used: R TDC    Access function: Well with  ml/min     Medications or blood products given: n/a     Regular outpatient schedule: [facility]     Summary of response to treatment: Patient tolerated treatment well and without any complications. Patient remained stable throughout entire treatment and upon exiting the dialysis suite via transport. Report given to Vlad Wilde RN and copy of dialysis treatment record placed in chart, to be scanned into EMR.

## 2022-06-02 NOTE — ED NOTES
IV to right ac infiltrated. Fluid bolus was stopped. About 100 cc had been administered. Ashleigh BARRAZA made aware. Unable to use left arm due to fistula. Decision to place central line per Jurgen BARRAZA. Verbal consent to procedure given via patient with daughter at bedside. Daughter signed informed consent with this RN present. PT and family verbalized understanding.       Scott Duffy RN  06/01/22 [id number]

## 2022-06-02 NOTE — PLAN OF CARE
Problem: Discharge Planning  Goal: Discharge to home or other facility with appropriate resources  Outcome: Progressing     Problem: Pain  Goal: Verbalizes/displays adequate comfort level or baseline comfort level  Outcome: Progressing     Problem: Skin/Tissue Integrity  Goal: Absence of new skin breakdown  Description: 1. Monitor for areas of redness and/or skin breakdown  2. Assess vascular access sites hourly  3. Every 4-6 hours minimum:  Change oxygen saturation probe site  4. Every 4-6 hours:  If on nasal continuous positive airway pressure, respiratory therapy assess nares and determine need for appliance change or resting period.   6/2/2022 1544 by Kareem Castrejon RN  Outcome: Progressing  6/2/2022 1544 by Kareem Castrejon RN  Outcome: Progressing     Problem: Chronic Conditions and Co-morbidities  Goal: Patient's chronic conditions and co-morbidity symptoms are monitored and maintained or improved  Outcome: Progressing

## 2022-06-02 NOTE — ED PROVIDER NOTES
patent. Neck:  Soft and supple. Nontender. No JVD. Heart:  Regular rate and rhythm. No murmur. Lungs:  Clear to auscultation. Crackles in the bases noted on exam.  No tachypnea. No conversational dyspnea or accessory muscle use. Right IJ dual-lumen dialysis catheter present in the upper chest.  Site is clean and dry. No erythema or induration. Abdomen:  Soft, non-distended. Nontender. No guarding rigidity or rebound. Midline abdominal incision was clean and dry. Well approximated. No open wounds. No abdominal tenderness. Dark brown stool present in the pull-up. Scrotum normal in appearance. Testicles normal size and nontender. Penis is circumcised. No hernia. Perineum is normal in appearance. Musculoskeletal: Extremities non-tender with full range of motion. Limited range of motion secondary to immobility. Radial and dorsalis pedis pulses were equal bilaterally. Slight 1+ pretibial edema in lower extremities. Neurological: Alert and oriented to person and place. He does have some dementia and short-term memory loss according to family. Family reports that he does seem slower to respond than usual.  No facial droop.  strength equal bilaterally but generally weak throughout. He is able to lift both arms off the bed without difficulty and slightly lift both legs off the bed without difficulty. Speech clear. No acute focal motor or sensory deficits. Skin: Skin is warm and dry. No rash. Psychiatric: Normal mood and affect. Behavior is normal.     DIAGNOSTIC RESULTS     EKG: All EKG's are interpreted by the Emergency Department Physician who either signs or Co-signs this chart in the absence of a cardiologist.    Initial EKG obtained at 9:46 PM.  Normal sinus rhythm. First-degree AV block. Rate 67. OH interval 248 ms. QRS duration 80 ms. QTc 435 ms. R axis -29 degrees. There was significant baseline artifact. Repeat EKG was ordered.   It appears that initial lead placement was     Repeat EKG at 10:07 PM: Normal sinus rhythm. First-degree AV block. Frequent PACs. No PVCs. No ST elevation. IN interval 248 ms. QRS duration 80 ms. QTc 426 ms.  R axis -34 degrees. There is no ST elevation. Q waves noted in the inferior and anterior septal leads. RADIOLOGY:   Non-plain film images such as CT, Ultrasound and MRI are read by the radiologist. Plain radiographic images are visualized and preliminarily interpreted by the emergency physician with the below findings:        Interpretation per the Radiologist below, if available at the time of this note:    VL DUP LOWER EXTREMITY ARTERIES BILATERAL         CT ABDOMEN PELVIS W IV CONTRAST Additional Contrast? None   Final Result   1. No central, lobar, or segmental pulmonary embolus. 2. Trace bilateral pleural effusions and mild bibasilar atelectasis. 3. Findings of moderate proctocolitis involving the rectum and sigmoid colon. Associated wall thickening and edema. No abscess. No mesenteric gas. 4. Colorectal anastomosis with focal narrowing at this region. A stricture   is not excluded. CT CHEST PULMONARY EMBOLISM W CONTRAST   Final Result   1. No central, lobar, or segmental pulmonary embolus. 2. Trace bilateral pleural effusions and mild bibasilar atelectasis. 3. Findings of moderate proctocolitis involving the rectum and sigmoid colon. Associated wall thickening and edema. No abscess. No mesenteric gas. 4. Colorectal anastomosis with focal narrowing at this region. A stricture   is not excluded. CT HEAD WO CONTRAST   Final Result   No acute intracranial abnormality. XR CHEST PORTABLE   Final Result   No acute abnormality.                ED BEDSIDE ULTRASOUND:   Performed by ED Physician - none    LABS:  Labs Reviewed   CBC WITH AUTO DIFFERENTIAL - Abnormal; Notable for the following components:       Result Value    RBC 4.18 (*)     Hemoglobin 12.4 (*)     Hematocrit 38.3 (*)     RDW 17.5 (*)     All other components within normal limits   COMPREHENSIVE METABOLIC PANEL - Abnormal; Notable for the following components:    Sodium 135 (*)     Chloride 94 (*)     Glucose 146 (*)     BUN 32 (*)     CREATININE 3.5 (*)     GFR Non- 17 (*)     GFR  21 (*)     Albumin 3.0 (*)     Albumin/Globulin Ratio 0.9 (*)     Alkaline Phosphatase 185 (*)     All other components within normal limits   URINALYSIS WITH REFLEX TO CULTURE - Abnormal; Notable for the following components:    Color, UA DARK YELLOW (*)     Ketones, Urine TRACE (*)     Protein,  (*)     All other components within normal limits   TROPONIN - Abnormal; Notable for the following components:    Troponin 0.07 (*)     All other components within normal limits   BLOOD GAS, ARTERIAL - Abnormal; Notable for the following components:    pCO2, Arterial 51.3 (*)     pO2, Arterial 55.0 (*)     HCO3, Arterial 34.9 (*)     Base Excess, Arterial 9.2 (*)     Hemoglobin, Art, Extended 12.4 (*)     O2 Sat, Arterial 86.5 (*)     All other components within normal limits   BRAIN NATRIURETIC PEPTIDE - Abnormal; Notable for the following components:    Pro-BNP 8,328 (*)     All other components within normal limits   MICROSCOPIC URINALYSIS - Abnormal; Notable for the following components:    Hyaline Casts, UA Present (*)     All other components within normal limits   AMMONIA - Abnormal; Notable for the following components:    Ammonia 15 (*)     All other components within normal limits   CBC - Abnormal; Notable for the following components:    WBC 3.5 (*)     RBC 3.97 (*)     Hemoglobin 11.7 (*)     Hematocrit 36.2 (*)     RDW 17.7 (*)     All other components within normal limits   COMPREHENSIVE METABOLIC PANEL - Abnormal; Notable for the following components:    Glucose 154 (*)     BUN 34 (*)     CREATININE 3.4 (*)     GFR Non- 18 (*)     GFR  21 (*)     Total Protein 6.0 (*)     Albumin 2.9 (*)     Albumin/Globulin Ratio 0.9 (*)     Alkaline Phosphatase 146 (*)     All other components within normal limits   CBC WITH AUTO DIFFERENTIAL - Abnormal; Notable for the following components:    WBC 2.8 (*)     RBC 3.78 (*)     Hemoglobin 11.2 (*)     Hematocrit 34.3 (*)     RDW 17.4 (*)     Neutrophils Absolute 1.4 (*)     All other components within normal limits   COMPREHENSIVE METABOLIC PANEL - Abnormal; Notable for the following components:    Sodium 135 (*)     Chloride 97 (*)     Glucose 110 (*)     CREATININE 2.5 (*)     GFR Non- 25 (*)     GFR  31 (*)     Total Protein 5.9 (*)     Albumin 2.8 (*)     Albumin/Globulin Ratio 0.9 (*)     Alkaline Phosphatase 130 (*)     All other components within normal limits   CULTURE, BLOOD 1    Narrative:     ORDER#: [id number]                          ORDERED BY: ANN HINTON  SOURCE: Blood                              COLLECTED:  06/01/22 22:29  ANTIBIOTICS AT JOSE.:                      RECEIVED :  06/01/22 22:40  If child <=2 yrs old please draw pediatric bottle. ~Blood Culture 1   CULTURE, BLOOD 2    Narrative:     ORDER#: [id number]                          ORDERED BY: ANN HINTON  SOURCE: Blood                              COLLECTED:  06/02/22 07:43  ANTIBIOTICS AT JOSE.:                      RECEIVED :  06/02/22 08:11  If child <=2 yrs old please draw pediatric bottle. ~Blood Culture #2   COVID-19, RAPID   C DIFF TOXIN/ANTIGEN   LACTIC ACID   LIPASE       All other labs were within normal range or not returned as of this dictation.     EMERGENCY DEPARTMENT COURSE and DIFFERENTIAL DIAGNOSIS/MDM:   Vitals:    Vitals:    06/03/22 0611 06/03/22 0915 06/03/22 1006 06/03/22 1102   BP: (!) 115/46 137/76  135/72   Pulse: 58 60  63   Resp: 23 19  16   Temp: 98.5 °F (36.9 °C) 98.3 °F (36.8 °C)  98.1 °F (36.7 °C)   TempSrc: Oral Oral  Oral   SpO2: 91% 92%  93%   Weight:       Height:   6' 0.5\" (1.842 m)        I personally saw and performed a substantive portion of the visit including all aspects of the medical decision making. The patient presents with fatigue, lethargy, and shortness of breath since yesterday. Temp is 100.0. Oxygen saturation is 92%. He was sent for CT head without contrast.  No focal motor or sensory deficits but less interactive and less responsive than usual according to family. He is alert and oriented to person and place. Given his recent surgery, CT chest PE protocol was obtained. He does have a history of pulmonary emboli and has recently been off anticoagulation for surgery. In addition, CT abdomen and pelvis with IV contrast was obtained. Right forearm revealed some mild soft tissue swelling at the antecubital fossa but no evidence of compartment syndrome. No warmth or erythema. Strong radial pulse was noted. Radial median and ulnar nerve are fully intact. After returning from CT, it was discovered that the patient had an infiltration of his IV site in the right arm. We are unable to use the left arm because of a prior fistula. Therefore, central line was placed. Right femoral triple-lumen catheter was placed without difficulty. He was sent back for CT chest abdomen and pelvis. pH is 7.44. Urinalysis is negative. COVID is negative. Urinalysis is unremarkable. Blood cell count is 5.9. Hemoglobin 12.4. Potassium is 4.4. Creatinine 3.5. Glucose 146. Lactate 1.2. CT head without contrast is negative. At the time of change of shift, CT chest abodmen and pelvis are pending. MDM      CRITICAL CARE TIME     I personally saw the patient and independently provided 35 minutes of non-concurrent critical care out of the total shared critical care time provided. This excludes separately reportable procedures. There was a high probability of clinically significant/life threatening deterioration in the patient's condition which required my urgent intervention.       CONSULTS:  IP CONSULT TO HOSPITALIST  IP CONSULT TO NEPHROLOGY  IP CONSULT TO INFECTIOUS DISEASES  IP CONSULT TO PODIATRY  IP CONSULT TO SPIRITUAL SERVICES  IP CONSULT TO VASCULAR SURGERY    PROCEDURES:  Unless otherwise noted below, none     Procedures    Right femoral triple-lumen central venous catheter insertion:    Procedure was discussed with the patient and he greeted proceed. The patient has dementia and informed consent was given by his daughter who was at the bedside. Risk and benefits were discussed including but not limited the possibility of bruising bleeding infection or nerve injury. She agrees to proceed. Informed consent was given. Procedure was completed using maximum sterile technique including cap, mask, shield, sterile gown, sterile gloves. Right groin was sterilely prepped and draped with ChloraPrep. Local anesthesia was given with 1% plain lidocaine subcutaneously, 3 mL. The right femoral vein was identified on the first attempt with a Cook needle. Using the Seldinger technique a triple-lumen catheter was inserted on the first attempt without difficulty. There was excellent venous return at all 3 ports. Ports were flushed with sterile saline. Biopatch was applied. Catheter was sutured in place with 2-0 silk suture. Transparent sterile dressing was applied. FINAL IMPRESSION      1. Altered mental status, unspecified altered mental status type    2. Open wound of right foot, initial encounter          DISPOSITION/PLAN   DISPOSITION        PATIENT REFERRED TO:  No follow-up provider specified. DISCHARGE MEDICATIONS:  Current Discharge Medication List        Controlled Substances Monitoring:     No flowsheet data found. (Please note that portions of this note were completed with a voice recognition program.  Efforts were made to edit the dictations but occasionally words are mis-transcribed. )    [id number] Alexandro Ceja, DO (electronically signed)  Attending Emergency Physician      Mae Cameron, DO  06/03/22 1512

## 2022-06-02 NOTE — CARE COORDINATION
INITIAL CASE MANAGEMENT ASSESSMENT    Reviewed chart, patient confused, tried to call wife voicemail full, chart reviewed to assess possible discharge needs. Explained Case Management role/services. Living Situation: Confirmed patient is a long term care (LTC) resident at [facility]    ADLs: Patient gets assistance with all ADLs     DME: Provided by facility    PT/OT Recs: Not ordered     Active Services: LTC at [facility]: Arranged for patient     Medications: Managed by facility      [facility]   [address] [facility]  TTS 10:30am  (p) [phone]    PLAN/COMMENTS: Spoke to [place] at [facility], confirms patient is a long term care resident, patient is able to return, no pre cert required (unless has new skilled needs). Covid test not needed. Facility will need dialysis flowsheets at discharge. Need to confirm return to facility with spouse, was unable to reach wife or leave a message as mailbox was full. SW/CM provided contact information for patient or family to call with any questions. SW/CM will follow and assist as needed.   Electronically signed by Quirino Miranda RN Case Management [phone] on 6/2/2022 at 10:20 AM

## 2022-06-02 NOTE — CONSULTS
[address] [place] [address] Nephrology   [facility]  [phone]  Nephrology Consult Note          Patient ID: Dio Hanson  Referring/ Physician: Mckenna Putnam MD      HPI/Summary:   Dio Hanson is being seen by nephrology for ESRD management. This is a 55-year-old man with past medical history significant for GI bleed, colon cancer, hypertension presented to the hospital with right foot ulcer. Apparently more confused at [facility] and sent to ED. He denies any complaints. Not having any pain where the ulcer is. He denies abdominal pain, no NVD. Recently admitted with cholecystitis and completed antibiotics. He is awake and alert, has no complaints. Wound care attending to his right lateral ankle wound. CTPA was done on admission that showed no PE, had some proctocolitis. /69  94% on room air. No edema. Labs reviewed. Na 135 K 4.4 bicarb 32  Cr 3.5   BNP 8328  Albumin 3  Wbc 3.5 Hgb 11.7       Plan:   - HD today per TTS schedule  - BP acceptable. ESRD  Dialyzes TTS  Target weight 94 kg  Has tunneled dialysis cath    Blood pressure  Acceptable  Appears euvolemic    S HPT  Continue calcitriol and Renvela    Anemia  ELENA per outpatient orders    Possible colitis:   Seen on CT  On empiric ABX              [facility] would like to thank you for the opportunity to serve this patient. Please call with any questions or concerns. Henrietta Graham MD  [facility]  [address]  [place], [address]  Fax: [phone]  Office: [phone]         CC/Reason for consult:   Reason for consult: ESRD  Chief Complaint   Patient presents with    Fatigue     per nursing home the last 8 hours           Review of Systems:   [address]. All other remaining systems are negative. Constitutional:  fever, chills, weakness, weight change, fatigue,      Skin:  rash, pruritus, hair loss, bruising, dry skin, petechiae. Head, Face, Neck   headaches, swelling,  cervical adenopathy. Respiratory: shortness of breath, cough, or wheezing  Cardiovascular: chest pain, palpitations, dizzy, edema  Gastrointestinal: nausea, vomiting, diarrhea, constipation,belly pain    Yellow skin, blood in stool  Musculoskeletal:  back pain, muscle weakness, gait problems,       joint pain or swelling. Genitourinary:  dysuria, poor urine flow, flank pain, blood in urine  Neurologic:  vertigo, TIA'S, syncope, seizures, focal weakness  Psychosocial:  insomnia, anxiety, or depression. Additional positive findings: -     PMH/SH/FH:    Medical Hx: reviewed and updated as appropriate  Past Medical History:   Diagnosis Date    Anemia 03/2022    Arthritis     CAD (coronary artery disease) 01/2020    [facility] [address]     Colon Cancer diagnosed last month    Cerebral infarction ([facility] [address]     Cognitive communication deficit     COVID-19     Diabetes mellitus ([address]     Dysphagia     End stage renal disease ([address]     Fatigue     Gastrointestinal hemorrhage     Hemodialysis patient ([facility] [address] 2019    ckd-goes to dialysis Tuesday, Thurs, Fri    Hx of blood clots     Hyperlipidemia     Hypertension     Hyponatremia     Risk for falls     Splenomegaly 02/10/2021         Surgical Hx: reviewed and updated as appropriate   has a past surgical history that includes IR PERC ARTERIOVENOUS FISTULA CREATION (Left); colectomy (N/A, 01/26/2022); sigmoidoscopy (N/A, 02/17/2022); IR EMBOLIZATION HEMORRHAGE (02/19/2022); Tunneled venous catheter placement (Right, 02/21/2022); IR TUNNELED CVC PLACE WO SQ PORT/PUMP > 5 YEARS (2/21/2022); Cardiac catheterization (2019); Colonoscopy; Dialysis fistula creation (Left, 4/29/2022); Cholecystectomy, laparoscopic (N/A, 5/16/2022); ERCP (N/A, 5/16/2022); ERCP (N/A, 5/16/2022); and Upper gastrointestinal endoscopy (5/16/2022).      Social Hx: reviewed and updated as appropriate  Social History     Tobacco Use    Smoking status: Former Smoker    Smokeless tobacco: Never Used   Substance Use Topics  Alcohol use: Not Currently        Family hx: reviewed and updated as appropriate  family history includes High Blood Pressure in his father. Medications:   sodium chloride flush, 5-40 mL, 2 times per day  [START ON 6/3/2022] cefepime, 1,000 mg, Q24H  apixaban, 5 mg, BID  aspirin, 81 mg, Daily  atenolol, 25 mg, Nightly  atorvastatin, 10 mg, Nightly  calcitRIOL, 0.25 mcg, Nightly  gabapentin, 100 mg, TID  LORazepam, 0.5 mg, BID  pantoprazole, 20 mg, Nightly  sevelamer, 800 mg, TID WC  tamsulosin, 0.4 mg, QAM       Lisinopril    Allergies: Allergies   Allergen Reactions    Lisinopril Swelling     Allergy listed on paperwork from [facility], no reaction noted         Physical Exam/Objective:   Vitals:    06/02/22 1115   BP: 134/69   Pulse: 66   Resp: 24   Temp: 98.3 °F (36.8 °C)   SpO2: 94%       Intake/Output Summary (Last 24 hours) at 6/2/2022 1357  Last data filed at 6/2/2022 1353  Gross per 24 hour   Intake 50 ml   Output 300 ml   Net -250 ml         General appearance:  in no acute distress, comfortable, communicative, awake and alert. HEENT: no icterus, EOM intact, trachea midline. Neck : no masses, appears symmetrical and no JVD appreciated. Respiratory: Respiratory effort normal, bilateral equal chest rise. No wheeze, no crackles   Cardiovascular: Ausculation shows RRR and  no edema   Abdomen: abdomen is soft, non distended, no masses, no pain with palpation. Musculoskeletal:  no joint swelling  Skin: lateral right ankle wound  Neuro:   Follows commands, moves all extremities spontaneously       Data:   CBC:   Recent Labs     06/01/22 2229 06/02/22  1317   WBC 5.9 3.5*   HGB 12.4* 11.7*   HCT 38.3* 36.2*    166     BMP:    Recent Labs     06/01/22 2229   *   K 4.4   CL 94*   CO2 32   BUN 32*   CREATININE 3.5*   GLUCOSE 146*

## 2022-06-02 NOTE — CONSULTS
Department of Podiatric Surgery  History and Physical        CHIEF COMPLAINT:    Chief Complaint   Patient presents with    Fatigue     per nursing home the last 8 hours        Reason for Consultation:  Bilateral leg weakness and right ankle wound    History Obtained From:  patient, family member - daughter, electronic medical record    HISTORY OF PRESENT ILLNESS:      The patient is a 77 y.o. male who presents with AMS,DM with ESRD on HD, h/o CVA and CA. The patient has been in and out of hospital a lot lately. He is alert to person and let his daughter talk mostly. The patinet has no recollection of the wound staring on the lateral nke. She  Noticed it couple of weeks ago.      Past Medical History:        Diagnosis Date    Anemia 03/2022    Arthritis     CAD (coronary artery disease) 01/2020    Cancer [facility])     Colon Cancer diagnosed last month    Cerebral infarction ([address]     Cognitive communication deficit     COVID-19     Diabetes mellitus ([address]     Dysphagia     End stage renal disease ([address]     Fatigue     Gastrointestinal hemorrhage     Hemodialysis patient ([address] 2019    ckd-goes to dialysis Tuesday, Thurs, Fri    Hx of blood clots     Hyperlipidemia     Hypertension     Hyponatremia     Risk for falls     Splenomegaly 02/10/2021     Past Surgical History:        Procedure Laterality Date    CARDIAC CATHETERIZATION  2019    CHOLECYSTECTOMY, LAPAROSCOPIC N/A 5/16/2022    LAPAROSCOPIC CHOLECYSTECTOMY WITH CHOLANGIOGRAM performed by Jamie Puentes MD at [address] [facility] 01/26/2022    SIGMOID COLECTOMY, SIGMOIDOSCOPY performed by Jamie Puentes MD at [address] Left 4/29/2022    LEFT BRACHIAL CEPHALIC FISTULA performed by Jennifer Ferreira MD at [address] [facility] ERCP N/A 5/16/2022    ERCP SPHINCTER/PAPILLOTOMY performed by Jaxson Mera MD at [facility]  ERCP N/A 5/16/2022    ERCP STONE REMOVAL/BALLOON SWEEP performed 60 tablet 0    aspirin 81 MG chewable tablet Take 1 tablet by mouth daily 30 tablet 0    melatonin 3 MG TABS tablet Take 2 tablets by mouth nightly as needed (sleep) 6 tablet 0    tamsulosin (FLOMAX) 0.4 MG capsule Take 0.4 mg by mouth every morning      gabapentin (NEURONTIN) 100 MG capsule Take 100 mg by mouth 3 times daily.  sevelamer (RENVELA) 800 MG tablet Take 800 mg by mouth 3 times daily (with meals)       atenolol (TENORMIN) 25 MG tablet Take 25 mg by mouth every evening      pantoprazole (PROTONIX) 20 MG tablet Take 20 mg by mouth nightly      atorvastatin (LIPITOR) 10 MG tablet Take 10 mg by mouth nightly      calcitRIOL (ROCALTROL) 0.25 MCG capsule Take 0.25 mcg by mouth every evening         Allergies:  Lisinopril    REVIEW OF SYSTEMS:  Review of systems not obtained due to patient factors - mental status    PHYSICAL EXAM:  VITALS:  /82   Pulse 64   Temp 97.6 °F (36.4 °C) (Oral)   Resp 22   Ht 6' 0.5\" (1.842 m)   Wt 205 lb 7.5 oz (93.2 kg)   SpO2 94%   BMI 27.48 kg/m²   CONSTITUTIONAL:  awake, alert, cooperative, no apparent distress, and appears stated age  EYES:  pupils equal, round and reactive to light, extra ocular muscles intact, sclera clear, conjunctiva normal      LOWER EXTREMITY:  VASCULAR: Pedal Pulses absent. positive signs of ischemia. MUSCULOSKELETAL:  negative gross deformity. NEUROLOGIC:  Epicritic sensation, light touch, joint position sensediminished  SKIN:  The left leg and foot have no abnormal lesions. Right lateral ankle has what appears to be an unstageable decubitus ulceration. This was present on admission. The kelly-wound tissue is mildly erythematous. No edema or fluctuance.          I/O:    Intake/Output Summary (Last 24 hours) at 6/2/2022 1940  Last data filed at 6/2/2022 1353  Gross per 24 hour   Intake 50 ml   Output 300 ml   Net -250 ml              Wt Readings from Last 3 Encounters:   06/02/22 205 lb 7.5 oz (93.2 kg)   05/19/22 213 lb 13.5 oz (97 kg)   04/29/22 204 lb (92.5 kg)       LABS:    Recent Labs     06/01/22  2229 06/02/22  1317   WBC 5.9 3.5*   HGB 12.4* 11.7*   HCT 38.3* 36.2*    166        Recent Labs     06/02/22  1317      K 4.5      CO2 25   BUN 34*   CREATININE 3.4*        Recent Labs     06/01/22  2229 06/02/22  1317   PROT 6.5 6.0*       IMAGING:  Arterial dopplers ordered for a baseline study of the arterial inflow. MICRO:   No drainage to culture    ASSESSMENT   Decubitus ulceration lateral right ankle  DM with peripheral neuropathy  Possible PAD    PLAN:  Evaluation and Management x 30 minutes with greater than 50% of the time spent with the patient discussing the etiology and treatment options of the chief complaint. 1. Right ankle wound  Non-invasives ordered  Mepilex border dressing applied this evening  Will follow. If no PAD then we will start santyl. DISPO: As above. Thanks for the opportunity to participate in this patient's care.      Sushma Jordan DPM DPM  [facility]  Pager: [phone]  Office: [phone]  Fax: [phone]

## 2022-06-02 NOTE — PROGRESS NOTES
Facility/Department: [address] PROGRESSIVE CARE  Initial Assessment  DYSPHAGIA BEDSIDE SWALLOW EVALUATION     Patient: Shanon Guo   : 1945   MRN: [id number]      Evaluation Date: 2022   Admitting Diagnosis: Open wound of right foot, initial encounter [S91.301A]  Altered mental status, unspecified altered mental status type [R41.82]  AMS (altered mental status) [R41.82]  Pain: denies                                                       Chart Review:  Patient admitted 22 with C/O confusion  H&P: 77 y.o. male who presented to [facility] after he was found having more confusion than usual, Dr. Joseph Geiger to emergency department, initial work-up positive for low-grade fever, possible cellulitis, admitted to the hospital for further management and treatment. Patient at this time denies fever chills nausea vomiting or abdominal pain, he feels weak, denies cough shortness of breath. No diarrhea. Daughter at bedside and helped with history taking. Current Diet Level (prior to evaluation): NPO  NPO   Respiratory Status:   [x]Room Air   []O2 via nasal cannula   []Other:    Imaging:  CHEST X-RAY 22  Impression   No acute abnormality.      CT HEAD 22  Impression   No acute intracranial abnormality.         Reason for referral: SLP evaluation orders received due to failed swallow screen, patient's daughter reports pt was coughing on thin liquids yesterday    History/Prior Level of Function:   Living Status: Patient is in LTC at [address] [place] [address],[address]: Regular/thin liquids  Prior Dysphagia History: Patient has been seen here 22-22 with recommendation for regular/thin, able to tolerate regular textures despite edentulism       Dysphagia Impressions/Diagnosis: Oropharyngeal Dysphagia   OROPHARYNGEAL DYSPHAGIA DIAGNOSIS: Mild oral dysphagia, mild-moderate pharyngeal dysphagia   Accepted and tolerated eval at bedside, daughter present  Pt pleasant, cooperative and confused, verbally responsive, follows simple dx, oriented to self and year only  Mild oral dysphagia characterized by prolonged disorganized formation of bolus and reduced lingual coordination, but adequate mastication and clearance of bolus with regular textures despite edentulism  Mild-mod pharyngeal dysphagia characterized by delayed swallow initiation, decreased laryngeal elevation and immediate cough with thin liquids  ST to recommend regular/mildly (nectar) thick liquids  ST to continue to follow during acute admission     2. MEDICAL OR COGNITIVE/BEHAVIORAL FACTORS WHICH CAN EXACERBATE: confusion       Recommended Diet and Intervention 6/2/2022:  Diet Solids Recommendation:  Regular texture diet  Liquid Consistency Recommendation:  Mildly (nectar) thick liquids  Recommended form of Meds: PO       Compensatory Swallowing Strategies: Alternate solids/liquids , Upright as possible with all PO intake , Small bites/sips , Eat/feed slowly, Remain upright 30-45 min     SHORT TERM DYSPHAGIA GOALS/PLAN OF CARE: Speech therapy for dysphagia tx 3-5 times per week during acute care stay. Pt will functionally tolerate recommended diet with no overt clinical s/s of aspiration   Pt will functionally tolerate ongoing assessment of swallow function with diet to be determined as indicated   Pt will advance to least restrictive diet as indicated      Dysphagia Therapeutic Intervention:  Diet Tolerance Monitoring , Patient/Family Education , Therapeutic Trials with SLP     Dysphagia Prognosis: [] good [x]fair  []guarded []poor      Due to confusion      Discharge Recommendations: Recommend ongoing SLP for dysphagia therapy upon discharge from hospital       TEST DATA  Vision: [facility]; Impaired; Hemianopsia; Legally Blind; Wears glasses  Hearing: WFL; hearing impaired ; has hearing aids;no hearing aids    Consistencies Presented:   Ice chips; Thin liquid;   Mildly / nectar thick liquid ;    Moderately / honey thick liquid  Puree food  Minced and moist food   Soft/bite size food  Regular solid food    Cognitive/behavioral:   []orientation [x] to self [] place [] date (only year) [] reason for admit [] purpose of evaluation  []distractible  [x]verbally responsive  []follows one step commands  []agitated  []impulsive  [] other:  confused    Patient Positioning: Upright in bed     Dentition:  []Adequate  []Dentures   []Missing Many Teeth  [x]Edentulous  []Other:    Baseline Vocal Quality:  [x]Normal  []Dysphonic   []Aphonic   []Hoarse  []Wet  []Weak  []Other:    Volitional Cough:  [x]Strong  []Weak  []Wet  []Absent  []Congested  []Other:    Volitional Swallow:   []Absent   []Delayed     [x]Adequate     []Required use of drink     Oral Mechanism Exam:  []WFL [x]Mild   [] Moderate  []Severe  []To be assessed  Impaired:   []Left side      []Right side    []Labial ROM/Coordination    []Labial Symmetry   [x]Lingual ROM/Coordination   []Lingual Symmetry  []Gag  []Other:     Oral Phase: []WFL [x]Mild   [] Moderate  []Severe  []To be assessed   [x]Impaired/Prolonged Mastication: prolonged and disorganized formation of  bolus, but adequate and adequate clearance of bolus with regular textures  despite edentulism   []Spillage Left:   []Spillage Right:  []Pocketing Left:   []Pocketing Right:   []Decreased Anterior to Posterior Transit:   [x]Suspected Premature Bolus Loss:   []Lingual/Palatal Residue:   []Other:     Pharyngeal Phase: []WFL [x]Mild   [] Moderate  []Severe  []To be assessed   [x]Delayed Swallow:   []Suspected Pharyngeal Pooling:   [x]Decreased Laryngeal Elevation:   []Absent Swallow:  []Wet Vocal Quality:   []Throat Clearing-Immediate:   []Throat Clearing-Delayed:   [x]Cough-Immediate: thin liquids   []Cough-Delayed:  []Change in Vital Signs:  []Suspected Delayed Pharyngeal Clearing:  []Other:      Eating Assistance:  []Independent  [x]Setup or clean-up assistance   [x] Supervision or touching assistance   [] Partial or moderate assistance   [] Substantial or maximal assistance  [] Dependent       EDUCATION:   Provided education regarding role of SLP, results of assessment, recommendations and general speech pathology plan of care. [] Pt verbalized understanding and agreement   [x] Pt requires ongoing learning   [x] No evidence of comprehension     If patient discharges prior to next visit, this note will serve as discharge.      Timed Code Minutes: 0  Total Treatment Minutes: Elias [address], [facility], [address] [place] [address]  Speech-Language Pathologist  On 06/02/22 at 10:29 AM

## 2022-06-02 NOTE — ED PROVIDER NOTES
[address] [facility]      Pt Name: Haritha Cintron  MRN: [id number]  [place] [birth year]  Date of evaluation: 6/1/2022  Provider: CHRISTI Wang    This patient was seen and evaluated by the attending physician Oskar Reddy, [address] [place] [address]       Chief Complaint   Patient presents with    Fatigue     per nursing home the last 8 hours       CRITICAL CARE TIME   I performed a total Critical Care time of 31 minutes, excluding separately reportable procedures. There was a high probability of clinically significant/life threatening deterioration in the patient's condition which required my urgent intervention. Not limited to multiple reexaminations, discussions with attending physician and consultants. HISTORY OF PRESENT ILLNESS  (Location/Symptom, Timing/Onset, Context/Setting, Quality, Duration, Modifying Factors, Severity.)   Haritha Cintron is a 77 y.o. male who presents to the emergency department via EMS from the nursing home. Patient's daughter is at the bedside. Per the daughter he was not himself yesterday and decreased appetite and increased sleepiness. Worse today. She states has been sleeping all day he tells me that he feels like his breathing is \"bad. \"  He is end-stage renal on dialysis Tuesday, Thursday Saturday. He had a left fistula placed at the end of April. He is getting dialysis through a right chest catheter. She tells me that she noticed a wound to the outside of his right ankle couple days ago. He is a diabetic. Has had a cough. Denies abdominal pain. Recently had cholecystectomy. Nursing Notes were reviewed and I agree. REVIEW OF SYSTEMS    (2-9 systems for level 4, 10 or more for level 5)     Review of Systems   Constitutional: Positive for fatigue. Respiratory: Positive for shortness of breath. Negative for cough. Gastrointestinal: Positive for diarrhea. Negative for vomiting.    Skin: Positive for wound.   Neurological: Positive for weakness. Psychiatric/Behavioral: Positive for confusion. Except as noted above the remainder of the review of systems was reviewed and negative.        PAST MEDICAL HISTORY         Diagnosis Date    Anemia 03/2022    Arthritis     CAD (coronary artery disease) 01/2020    Cancer [facility])     Colon Cancer diagnosed last month    C[facility]     Cognitive communication deficit     COVID-19     Diabetes mellitus ([facility]     Dysphagia     End stage renal disease ([facility]     Fatigue     Gastrointestinal hemorrhage     Hemodialysis patient ([facility] 2019    ckd-goes to dialysis Tuesday, Thurs, Fri    Hx of blood clots     Hyperlipidemia     Hypertension     Hyponatremia     Risk for falls     Splenomegaly 02/10/2021       SURGICAL HISTORY           Procedure Laterality Date    CARDIAC CATHETERIZATION  2019    CHOLECYSTECTOMY, LAPAROSCOPIC N/A 5/16/2022    LAPAROSCOPIC CHOLECYSTECTOMY WITH CHOLANGIOGRAM performed by Dawson Lala MD at [address] 01/26/2022    SIGMOID COLECTOMY, SIGMOIDOSCOPY performed by Dawson Lala MD at [address] Left 4/29/2022    LEFT BRACHIAL CEPHALIC FISTULA performed by Awilda Petty MD at [address] [place] [address] ERCP N/A 5/16/2022    ERCP SPHINCTER/PAPILLOTOMY performed by Lucy Colindres MD at [facility]  ERCP N/A 5/16/2022    ERCP STONE REMOVAL/BALLOON SWEEP performed by Lucy Colindres MD at [address],[facility] [address]  02/19/2022    IR EMBOLIZATION HEMORRHAGE 2/19/2022 [facility]    IR PERC ARTERIOVENOUS FISTULA CREATION Left     unsure of date    IR TUNNELED CATHETER PLACEMENT GREATER THAN 5 YEARS  2/21/2022    IR TUNNELED CATHETER PLACEMENT GREATER THAN 5 YEARS 2/21/2022 [facility]    SIGMOIDOSCOPY N/A 02/17/2022    SIGMOIDOSCOPY CONTROL HEMORRHAGE performed by Gabbie Merino MD at [facility]  TUNNELED VENOUS CATHETER PLACEMENT Right 2022    Permacath; RIJ access; 23cm; Dr. Chung [facility]  2022    ERCP POLYP SNARE performed by Nomi Araya MD at [address] [facility]       Previous Medications    APIXABAN (ELIQUIS) 5 MG TABS TABLET    Take 1 tablet by mouth 2 times daily    ASPIRIN 81 MG CHEWABLE TABLET    Take 1 tablet by mouth daily    ATENOLOL (TENORMIN) 25 MG TABLET    Take 25 mg by mouth every evening    ATORVASTATIN (LIPITOR) 10 MG TABLET    Take 10 mg by mouth nightly    CALCITRIOL (ROCALTROL) 0.25 MCG CAPSULE    Take 0.25 mcg by mouth every evening    DOCUSATE SODIUM (COLACE) 100 MG CAPSULE    Take 100 mg by mouth 2 times daily    GABAPENTIN (NEURONTIN) 100 MG CAPSULE    Take 100 mg by mouth 3 times daily. LORAZEPAM (ATIVAN) 0.5 MG TABLET    Take 0.5 mg by mouth 2 times daily. MELATONIN 3 MG TABS TABLET    Take 2 tablets by mouth nightly as needed (sleep)    ONDANSETRON (ZOFRAN) 4 MG TABLET    Take 4 mg by mouth every 8 hours as needed for Nausea    OXYCODONE (ROXICODONE) 5 MG IMMEDIATE RELEASE TABLET    Take 5 mg by mouth every 4 hours as needed for Pain. PANTOPRAZOLE (PROTONIX) 20 MG TABLET    Take 20 mg by mouth nightly    POLYETHYLENE GLYCOL (GLYCOLAX) 17 GM/SCOOP POWDER    Take 17 g by mouth daily as needed (Constipation)     SEVELAMER (RENVELA) 800 MG TABLET    Take 800 mg by mouth 3 times daily (with meals)     TAMSULOSIN (FLOMAX) 0.4 MG CAPSULE    Take 0.4 mg by mouth every morning       ALLERGIES     Lisinopril    FAMILY HISTORY           Problem Relation Age of Onset    High Blood Pressure Father      Family Status   Relation Name Status    Mother      Father          SOCIAL HISTORY      reports that he has quit smoking. He has never used smokeless tobacco. He reports previous alcohol use. He reports that he does not use drugs.     PHYSICAL EXAM    (up to 7 for level 4, 8 or more for level 5) ED Triage Vitals [06/01/22 2120]   BP Temp Temp Source Heart Rate Resp SpO2 Height Weight   121/66 99.2 °F (37.3 °C) Oral 66 21 92 % 6' (1.829 m) 200 lb 6.4 oz (90.9 kg)       Physical Exam  Vitals and nursing note reviewed. Constitutional:       General: He is not in acute distress. Appearance: He is ill-appearing. HENT:      Head: Normocephalic and atraumatic. Mouth/Throat:      Mouth: Mucous membranes are dry. Eyes:      Pupils: Pupils are equal, round, and reactive to light. Cardiovascular:      Rate and Rhythm: Normal rate. Pulmonary:      Effort: No respiratory distress. Breath sounds: Wheezing present. Abdominal:      Tenderness: There is no abdominal tenderness. There is no guarding. Skin:     General: Skin is warm. Neurological:      Mental Status: He is alert. GCS: GCS eye subscore is 4. GCS verbal subscore is 4. GCS motor subscore is 6. Psychiatric:         Behavior: Behavior is slowed. Cognition and Memory: Memory is impaired. DIAGNOSTIC RESULTS     EKG: All EKG's are interpreted by CHRISTI Villanueva in the absence of a cardiologist.    EKG interpreted by myself - please refer to attending physician's note for complete EKG interpretation:    No evidence of acute ischemia or injury. RADIOLOGY:   Non-plain film images such as CT, Ultrasound and MRI are read by the radiologist. Plain radiographic images are visualized and preliminarily interpreted by CHRISTI Villanueva with the below findings:    Reviewed radiologist's interpretation. Interpretation per the Radiologist below, if available at the time of this note:    CT ABDOMEN PELVIS W IV CONTRAST Additional Contrast? None   Preliminary Result   1. No central, lobar, or segmental pulmonary embolus. 2. Trace bilateral pleural effusions and mild bibasilar atelectasis. 3. Findings of moderate proctocolitis involving the rectum and sigmoid colon. Associated wall thickening and edema.   No abscess. No mesenteric gas. 4. Colorectal anastomosis with focal narrowing at this region. A stricture   is not excluded. CT CHEST PULMONARY EMBOLISM W CONTRAST   Preliminary Result   1. No central, lobar, or segmental pulmonary embolus. 2. Trace bilateral pleural effusions and mild bibasilar atelectasis. 3. Findings of moderate proctocolitis involving the rectum and sigmoid colon. Associated wall thickening and edema. No abscess. No mesenteric gas. 4. Colorectal anastomosis with focal narrowing at this region. A stricture   is not excluded. CT HEAD WO CONTRAST   Final Result   No acute intracranial abnormality. XR CHEST PORTABLE   Final Result   No acute abnormality.                LABS:  Labs Reviewed   CBC WITH AUTO DIFFERENTIAL - Abnormal; Notable for the following components:       Result Value    RBC 4.18 (*)     Hemoglobin 12.4 (*)     Hematocrit 38.3 (*)     RDW 17.5 (*)     All other components within normal limits   COMPREHENSIVE METABOLIC PANEL - Abnormal; Notable for the following components:    Sodium 135 (*)     Chloride 94 (*)     Glucose 146 (*)     BUN 32 (*)     CREATININE 3.5 (*)     GFR Non- 17 (*)     GFR  21 (*)     Albumin 3.0 (*)     Albumin/Globulin Ratio 0.9 (*)     Alkaline Phosphatase 185 (*)     All other components within normal limits   URINALYSIS WITH REFLEX TO CULTURE - Abnormal; Notable for the following components:    Color, UA DARK YELLOW (*)     Ketones, Urine TRACE (*)     Protein,  (*)     All other components within normal limits   TROPONIN - Abnormal; Notable for the following components:    Troponin 0.07 (*)     All other components within normal limits   BLOOD GAS, ARTERIAL - Abnormal; Notable for the following components:    pCO2, Arterial 51.3 (*)     pO2, Arterial 55.0 (*)     HCO3, Arterial 34.9 (*)     Base Excess, Arterial 9.2 (*)     Hemoglobin, Art, Extended 12.4 (*)     O2 Sat, Arterial 86.5 (*)     All other components within normal limits   BRAIN NATRIURETIC PEPTIDE - Abnormal; Notable for the following components:    Pro-BNP 8,328 (*)     All other components within normal limits   MICROSCOPIC URINALYSIS - Abnormal; Notable for the following components:    Hyaline Casts, UA Present (*)     All other components within normal limits   COVID-19, RAPID   CULTURE, BLOOD 1   CULTURE, BLOOD 2   LACTIC ACID   LIPASE   AMMONIA       All other labs were within normal range or not returned as of this dictation. EMERGENCY DEPARTMENT COURSE and DIFFERENTIAL DIAGNOSIS/MDM:   Vitals:    Vitals:    06/01/22 2120 06/01/22 2203 06/01/22 2323   BP: 121/66     Pulse: 66  66   Resp: 21  21   Temp: 99.2 °F (37.3 °C) 100 °F (37.8 °C)    TempSrc: Oral Rectal    SpO2: 92%  93%   Weight: 200 lb 6.4 oz (90.9 kg)     Height: 6' (1.829 m)       Patient presents with a rectal temperature of 100 °F, not tachycardic saturating around 92% on room air. Has a scabbed wound with some surrounding redness to the right lateral malleolus. No abdominal tenderness on exam.  He has some snoring respirations but arouses to voice answers questions. Given broad-spectrum antibiotics. CT scan okayed by the attending physician as he is due for dialysis tomorrow. Will admit to the hospitalist.    CONSULTS:  IP CONSULT TO HOSPITALIST    PROCEDURES:  Procedures      FINAL IMPRESSION      1. Altered mental status, unspecified altered mental status type          DISPOSITION/PLAN   DISPOSITION Decision To Admit 06/02/2022 03:31:18 AM      PATIENT REFERRED TO:  No follow-up provider specified.     DISCHARGE MEDICATIONS:  New Prescriptions    No medications on file       (Please note that portions of this note were completed with a voice recognition program.  Efforts were made to edit the dictations but occasionally words are mis-transcribed.)    Syd [place]  06/02/22 0563

## 2022-06-02 NOTE — PROGRESS NOTES
Pharmacy Medication Reconciliation Note     List of medications patient is currently taking is complete. Source of information:   1.  F MAR      Notes regarding home medications:   1. none      Denies taking any other OTC or herbal medications    Demond Shahid, [address] [place] [address] 6/2/2022 9:21 AM

## 2022-06-02 NOTE — PROGRESS NOTES
Pt arrived around 0600. Report called by ER RN prior to pt xfr. Oni Feng RN (charge) and Marilyn Castanon assisted pt with getting transferred from stretcher to the bed and placed on cardiac monitor. Pt noted to be incontinent of urine upon arrival and incontinence care provided. Pt asking if he can have something to drink. RN reminded pt that he was NPO because he chocked/coughed during his bedside swallow evaluation. RN explained that he would likely need to be evaluated by SLP before his diet could be advanced. Pt agreeable to plan. RN explained to pt and daughter that he arrived at shift change and that they day shift RN would be in to see them shortly. Pt's daughter explained that pt had a wound on his R ankle and another wound on his sacrum. RN placed orders under the [facility] order set, ordered a specialty bed, and ordered an wound RN eval.     Report given to Skylar Burgos.

## 2022-06-02 NOTE — ED NOTES
Patient presents to ED from nursing home via ambulance for complaints of increased altered mental status. Report states that patient is more fatigue and getting out of bed less in the past day. Pt is alert to person and situation. Pt answers questions appropriately. Pt incontinent of stool upon arrival. Stool brown and soft. PEricare provided. Pt has redness and excoriation noted to buttocks. Pt also has a wound noted to right foot. Rectal temp 100. Pt on telemetry monitor. Call light within reach. Fall precautions in place.       Gabrielle Tang RN  06/01/22 [address], RN  06/01/22 6404

## 2022-06-03 LAB
A/G RATIO: 0.9 (ref 1.1–2.2)
ALBUMIN SERPL-MCNC: 2.8 G/DL (ref 3.4–5)
ALP BLD-CCNC: 130 U/L (ref 40–129)
ALT SERPL-CCNC: 14 U/L (ref 10–40)
ANION GAP SERPL CALCULATED.3IONS-SCNC: 11 MMOL/L (ref 3–16)
AST SERPL-CCNC: 16 U/L (ref 15–37)
BASOPHILS ABSOLUTE: 0 K/UL (ref 0–0.2)
BASOPHILS RELATIVE PERCENT: 0.6 %
BILIRUB SERPL-MCNC: 0.8 MG/DL (ref 0–1)
BUN BLDV-MCNC: 16 MG/DL (ref 7–20)
CALCIUM SERPL-MCNC: 8.5 MG/DL (ref 8.3–10.6)
CHLORIDE BLD-SCNC: 97 MMOL/L (ref 99–110)
CO2: 27 MMOL/L (ref 21–32)
CREAT SERPL-MCNC: 2.5 MG/DL (ref 0.8–1.3)
EOSINOPHILS ABSOLUTE: 0.2 K/UL (ref 0–0.6)
EOSINOPHILS RELATIVE PERCENT: 5.9 %
GFR AFRICAN AMERICAN: 31
GFR NON-AFRICAN AMERICAN: 25
GLUCOSE BLD-MCNC: 110 MG/DL (ref 70–99)
HCT VFR BLD CALC: 34.3 % (ref 40.5–52.5)
HEMOGLOBIN: 11.2 G/DL (ref 13.5–17.5)
LYMPHOCYTES ABSOLUTE: 1 K/UL (ref 1–5.1)
LYMPHOCYTES RELATIVE PERCENT: 34.5 %
MCH RBC QN AUTO: 29.6 PG (ref 26–34)
MCHC RBC AUTO-ENTMCNC: 32.6 G/DL (ref 31–36)
MCV RBC AUTO: 90.8 FL (ref 80–100)
MONOCYTES ABSOLUTE: 0.2 K/UL (ref 0–1.3)
MONOCYTES RELATIVE PERCENT: 8 %
NEUTROPHILS ABSOLUTE: 1.4 K/UL (ref 1.7–7.7)
NEUTROPHILS RELATIVE PERCENT: 51 %
PDW BLD-RTO: 17.4 % (ref 12.4–15.4)
PLATELET # BLD: 143 K/UL (ref 135–450)
PMV BLD AUTO: 7.7 FL (ref 5–10.5)
POTASSIUM SERPL-SCNC: 4.2 MMOL/L (ref 3.5–5.1)
RBC # BLD: 3.78 M/UL (ref 4.2–5.9)
SODIUM BLD-SCNC: 135 MMOL/L (ref 136–145)
TOTAL PROTEIN: 5.9 G/DL (ref 6.4–8.2)
WBC # BLD: 2.8 K/UL (ref 4–11)

## 2022-06-03 PROCEDURE — 6360000002 HC RX W HCPCS: Performed by: INTERNAL MEDICINE

## 2022-06-03 PROCEDURE — 6370000000 HC RX 637 (ALT 250 FOR IP): Performed by: INTERNAL MEDICINE

## 2022-06-03 PROCEDURE — 99222 1ST HOSP IP/OBS MODERATE 55: CPT | Performed by: INTERNAL MEDICINE

## 2022-06-03 PROCEDURE — 92526 ORAL FUNCTION THERAPY: CPT

## 2022-06-03 PROCEDURE — 80053 COMPREHEN METABOLIC PANEL: CPT

## 2022-06-03 PROCEDURE — 2580000003 HC RX 258: Performed by: INTERNAL MEDICINE

## 2022-06-03 PROCEDURE — 2580000003 HC RX 258: Performed by: STUDENT IN AN ORGANIZED HEALTH CARE EDUCATION/TRAINING PROGRAM

## 2022-06-03 PROCEDURE — 93925 LOWER EXTREMITY STUDY: CPT

## 2022-06-03 PROCEDURE — 6360000002 HC RX W HCPCS: Performed by: STUDENT IN AN ORGANIZED HEALTH CARE EDUCATION/TRAINING PROGRAM

## 2022-06-03 PROCEDURE — 2060000000 HC ICU INTERMEDIATE R&B

## 2022-06-03 PROCEDURE — 85025 COMPLETE CBC W/AUTO DIFF WBC: CPT

## 2022-06-03 PROCEDURE — 99232 SBSQ HOSP IP/OBS MODERATE 35: CPT | Performed by: INTERNAL MEDICINE

## 2022-06-03 RX ADMIN — ASPIRIN 81 MG: 81 TABLET, CHEWABLE ORAL at 09:22

## 2022-06-03 RX ADMIN — CEFEPIME HYDROCHLORIDE 1000 MG: 1 INJECTION, POWDER, FOR SOLUTION INTRAMUSCULAR; INTRAVENOUS at 06:14

## 2022-06-03 RX ADMIN — APIXABAN 5 MG: 5 TABLET, FILM COATED ORAL at 22:37

## 2022-06-03 RX ADMIN — SEVELAMER CARBONATE 800 MG: 800 TABLET, FILM COATED ORAL at 12:30

## 2022-06-03 RX ADMIN — GABAPENTIN 100 MG: 100 CAPSULE ORAL at 22:37

## 2022-06-03 RX ADMIN — LORAZEPAM 0.5 MG: 0.5 TABLET ORAL at 22:36

## 2022-06-03 RX ADMIN — ATENOLOL 25 MG: 25 TABLET ORAL at 22:37

## 2022-06-03 RX ADMIN — SODIUM CHLORIDE 250 ML: 9 INJECTION, SOLUTION INTRAVENOUS at 06:13

## 2022-06-03 RX ADMIN — LORAZEPAM 0.5 MG: 0.5 TABLET ORAL at 09:22

## 2022-06-03 RX ADMIN — TAMSULOSIN HYDROCHLORIDE 0.4 MG: 0.4 CAPSULE ORAL at 09:22

## 2022-06-03 RX ADMIN — PANTOPRAZOLE SODIUM 20 MG: 20 TABLET, DELAYED RELEASE ORAL at 22:37

## 2022-06-03 RX ADMIN — GABAPENTIN 100 MG: 100 CAPSULE ORAL at 09:22

## 2022-06-03 RX ADMIN — SODIUM CHLORIDE, PRESERVATIVE FREE 10 ML: 5 INJECTION INTRAVENOUS at 22:05

## 2022-06-03 RX ADMIN — CEFEPIME HYDROCHLORIDE 1000 MG: 1 INJECTION, POWDER, FOR SOLUTION INTRAMUSCULAR; INTRAVENOUS at 17:13

## 2022-06-03 RX ADMIN — SEVELAMER CARBONATE 800 MG: 800 TABLET, FILM COATED ORAL at 16:00

## 2022-06-03 RX ADMIN — GABAPENTIN 100 MG: 100 CAPSULE ORAL at 13:55

## 2022-06-03 RX ADMIN — SEVELAMER CARBONATE 800 MG: 800 TABLET, FILM COATED ORAL at 09:21

## 2022-06-03 RX ADMIN — SODIUM CHLORIDE, PRESERVATIVE FREE 10 ML: 5 INJECTION INTRAVENOUS at 10:11

## 2022-06-03 RX ADMIN — ATORVASTATIN CALCIUM 10 MG: 10 TABLET, FILM COATED ORAL at 22:37

## 2022-06-03 RX ADMIN — CALCITRIOL 0.25 MCG: 0.25 CAPSULE ORAL at 22:37

## 2022-06-03 RX ADMIN — APIXABAN 5 MG: 5 TABLET, FILM COATED ORAL at 09:22

## 2022-06-03 NOTE — PROGRESS NOTES
[facility]                                               Progress note    Summary:   Van Villarreal is being seen by nephrology for ESRD management. This is a 80-year-old man with past medical history significant for GI bleed, colon cancer, hypertension presented to the hospital with right foot ulcer. Apparently more confused at [facility] and sent to ED. He denies any complaints. Not having any pain where the ulcer is. He denies abdominal pain, no NVD. Recently admitted with cholecystitis and completed antibiotics. He is awake and alert, has no complaints. Wound care attending to his right lateral ankle wound. CTPA was done on admission that showed no PE, had some proctocolitis. Interval History  Seen at bedside  Has no complaints. No chest pain no SOB. No NVD  Had dialysis yesterday 3.5 hours, 1 L UF post weight 92.1 estimated dry weight 94 kg  He is on empiric cefepime and vancomycin, receiving local care for his right ankle wound, blood cultures in process. Infectious disease following. Blood pressure 137/76  Satting 92% on room air  Afebrile and heart rate 60    Labs reviewed  Sodium 135 potassium 4.2 bicarb 27 BUN 16 creatinine 2.5 calcium 8.5 albumin 2.8  Hemoglobin 11.2        Plan:   -There are no acute indications for dialysis today.  -Labs and vitals reviewed, no acute electrolyte abnormalities. -Medications reviewed, no issues.  -We will plan for dialysis tomorrow per TTS schedule, last post weight 92.1 kg which is below his outpatient dry weight of 94 kg. Looks euvolemic at this weight.       Jazmín Sherman MD  [facility]  Office: [phone]    ESRD  Dialyzes TTS  Target weight 94 kg  Has tunneled dialysis cath    Blood pressure  Acceptable  Appears euvolemic    S HPT  Continue calcitriol and Renvela    Anemia  ELENA per outpatient orders    Possible colitis:   Seen on CT  On empiric ABX        CC/Reason for consult:   Reason for consult: ESRD  Chief Complaint Patient presents with    Fatigue     per nursing home the last 8 hours       Review of Systems:   [address] All other remaining systems are negative. Constitutional:  fever, chills, weakness, weight change, fatigue,      Skin:  rash, pruritus, hair loss, bruising, dry skin, petechiae. Head, Face, Neck   headaches, swelling,  cervical adenopathy. Respiratory: shortness of breath, cough, or wheezing  Cardiovascular: chest pain, palpitations, dizzy, edema  Gastrointestinal: nausea, vomiting, diarrhea, constipation,belly pain    Yellow skin, blood in stool  Musculoskeletal:  back pain, muscle weakness, gait problems,       joint pain or swelling. Genitourinary:  dysuria, poor urine flow, flank pain, blood in urine  Neurologic:  vertigo, TIA'S, syncope, seizures, focal weakness  Psychosocial:  insomnia, anxiety, or depression. Additional positive findings: -     PMH/SH/FH:    Medical Hx: reviewed and updated as appropriate  Past Medical History:   Diagnosis Date    Anemia 03/2022    Arthritis     CAD (coronary artery disease) 01/2020    [facility]     Colon Cancer diagnosed last month    [facility]erebral infarction [facility]     Cognitive communication deficit     COVID-19     Diabetes mellitus ([address]     Dysphagia     End stage renal disease ([address]     Fatigue     Gastrointestinal hemorrhage     Hemodialysis patient ([facility] 2019    ckd-goes to dialysis Tuesday, Thurs, Fri    Hx of blood clots     Hyperlipidemia     Hypertension     Hyponatremia     Risk for falls     Splenomegaly 02/10/2021         Surgical Hx: reviewed and updated as appropriate   has a past surgical history that includes IR PERC ARTERIOVENOUS FISTULA CREATION (Left); colectomy (N/A, 01/26/2022); sigmoidoscopy (N/A, 02/17/2022); IR EMBOLIZATION HEMORRHAGE (02/19/2022); Tunneled venous catheter placement (Right, 02/21/2022); IR TUNNELED CVC PLACE WO SQ PORT/PUMP > 5 YEARS (2/21/2022);  Cardiac catheterization (2019); Colonoscopy; Dialysis fistula creation (Left, 4/29/2022); Cholecystectomy, laparoscopic (N/A, 5/16/2022); ERCP (N/A, 5/16/2022); ERCP (N/A, 5/16/2022); and Upper gastrointestinal endoscopy (5/16/2022). Social Hx: reviewed and updated as appropriate  Social History     Tobacco Use    Smoking status: Former Smoker    Smokeless tobacco: Never Used   Substance Use Topics    Alcohol use: Not Currently        Family hx: reviewed and updated as appropriate  family history includes High Blood Pressure in his father. Medications:   sodium chloride flush, 5-40 mL, 2 times per day  [START ON 6/3/2022] cefepime, 1,000 mg, Q24H  apixaban, 5 mg, BID  aspirin, 81 mg, Daily  atenolol, 25 mg, Nightly  atorvastatin, 10 mg, Nightly  calcitRIOL, 0.25 mcg, Nightly  gabapentin, 100 mg, TID  LORazepam, 0.5 mg, BID  pantoprazole, 20 mg, Nightly  sevelamer, 800 mg, TID WC  tamsulosin, 0.4 mg, QAM       Lisinopril    Allergies: Allergies   Allergen Reactions    Lisinopril Swelling     Allergy listed on paperwork from [facility], no reaction noted         Physical Exam/Objective:   Height: 6' 0.5\" (1.842 m), Weight: 220 lb 14.4 oz (100.2 kg), BP: 137/76      General appearance:  in no acute distress, comfortable, communicative, awake and alert. HEENT: no icterus, EOM intact, trachea midline. Neck : no masses, appears symmetrical and no JVD appreciated. Respiratory: Respiratory effort normal, bilateral equal chest rise. No wheeze, no crackles   Cardiovascular: Ausculation shows RRR and  no edema   Abdomen: abdomen is soft, non distended, no masses, no pain with palpation. Musculoskeletal:  no joint swelling  Skin: lateral right ankle wound  Neuro:   Follows commands, moves all extremities spontaneously       Data:   Lab Results   Component Value Date    WBC 2.8 (L) 06/03/2022    HGB 11.2 (L) 06/03/2022    HCT 34.3 (L) 06/03/2022    MCV 90.8 06/03/2022     06/03/2022     Lab Results   Component Value Date CREATININE 2.5 (H) 06/03/2022    BUN 16 06/03/2022     (L) 06/03/2022    K 4.2 06/03/2022    CL 97 (L) 06/03/2022    CO2 27 06/03/2022     Lab Results   Component Value Date    .5 (H) 02/21/2022    CALCIUM 8.5 06/03/2022    PHOS 2.5 05/19/2022

## 2022-06-03 NOTE — CARE COORDINATION
Spoke to wife Latanya Wright, confirmed patient will return to [facility] at discharge.   Electronically signed by Kelsey Robbins RN Case Management [phone] on 6/3/2022 at 11:50 AM

## 2022-06-03 NOTE — CONSULTS
Cardiology Consultation     Tosha Benny  1945    PCP: Noy Huynh  Referring Physician: DR. Jaxson Sarah  Reason for Referral: CLI   Chief Complaint:   Chief Complaint   Patient presents with    Fatigue     per nursing home the last 8 hours       Subjective:     History of Present Illness: The patient is 77 y.o. male with a past medical history significant for ESRD on HD, DM, HTN, HL, DVT, GI bleed, colonCA, who presents from a nursing home with the above complaint. History obtained from the daugther who is at the bedside. She admits to a right lateral foot wound that has been getting worse over the past several weeks. He became confused at the [address] [facility] [facility] and thus transferred to the [facility] ER for further w/u. Seen by podiatry and a arterial doppler suggestive of severe tibial disease and interventional cardiology has been consulted.            Past Medical History:   Diagnosis Date    Anemia 03/2022    Arthritis     CAD (coronary artery disease) 01/2020    Cancer [facility])     Colon Cancer diagnosed last month    [facility]bral [facility] ([facility]     Cognitive communication deficit     COVID-19     Diabetes mellitus ([address]     Dysphagia     End stage renal disease ([address]     Fatigue     Gastrointestinal hemorrhage     Hemodialysis patient ([facility] 2019    ckd-goes to dialysis Tuesday, Thurs, Fri    Hx of blood clots     Hyperlipidemia     Hypertension     Hyponatremia     Risk for falls     Splenomegaly 02/10/2021     Past Surgical History:   Procedure Laterality Date    CARDIAC CATHETERIZATION  2019    CHOLECYSTECTOMY, LAPAROSCOPIC N/A 5/16/2022    LAPAROSCOPIC CHOLECYSTECTOMY WITH CHOLANGIOGRAM performed by Mohit Patten MD at [facility] 01/26/2022    SIGMOID COLECTOMY, SIGMOIDOSCOPY performed by Mohit Patten MD at [address] Left 4/29/2022    LEFT BRACHIAL CEPHALIC FISTULA performed by Kait Min MD at [place] [address]  ERCP N/A 5/16/2022    ERCP SPHINCTER/PAPILLOTOMY performed by Fausto Ramirez MD at [address] ERCP N/A 5/16/2022    ERCP STONE REMOVAL/BALLOON SWEEP performed by Fausto Ramirez MD at [address]  02/19/2022    IR EMBOLIZATION HEMORRHAGE 2/19/2022 [facility]    IR PERC ARTERIOVENOUS FISTULA CREATION Left     unsure of date    IR TUNNELED CATHETER PLACEMENT GREATER THAN 5 YEARS  2/21/2022    IR TUNNELED CATHETER PLACEMENT GREATER THAN 5 YEARS 2/21/2022 [facility]    SIGMOIDOSCOPY N/A 02/17/2022    SIGMOIDOSCOPY CONTROL HEMORRHAGE performed by Aparna Sarmiento MD at [address] Right 02/21/2022    Permacath; RIJ access; 23cm; Dr. Brasher Dural  5/16/2022    ERCP POLYP SNARE performed by Fausto Ramirez MD at [facility] History   Problem Relation Age of Onset    High Blood Pressure Father      Social History     Tobacco Use    Smoking status: Former Smoker    Smokeless tobacco: Never Used   Vaping Use    Vaping Use: Never used   Substance Use Topics    Alcohol use: Not Currently    Drug use: Never       Allergies   Allergen Reactions    Lisinopril Swelling     Allergy listed on paperwork from [facility], no reaction noted     Current Facility-Administered Medications   Medication Dose Route Frequency Provider Last Rate Last Admin    cefepime (MAXIPIME) 1000 mg IVPB minibag  1,000 mg IntraVENous Q12H Manohar Diaz MD        sodium chloride flush 0.9 % injection 5-40 mL  5-40 mL IntraVENous 2 times per day Raulito DULCE Jo, DO   10 mL at 06/03/22 1011    sodium chloride flush 0.9 % injection 5-40 mL  5-40 mL IntraVENous PRN Raulito DULCE Beltranni, DO        0.9 % sodium chloride infusion   IntraVENous PRN Raulito DULCE Beltranni, DO 5 mL/hr at 06/03/22 0613 250 mL at 06/03/22 0613    acetaminophen (TYLENOL) tablet 650 mg  650 mg Oral Q6H PRN Raulitoreal Mckee DO        Or    acetaminophen (TYLENOL) suppository 650 mg  650 mg Rectal Q6H PRN Raulito Jo DO        apixaban (ELIQUIS) tablet 5 mg  5 mg Oral BID Manohar Diaz MD   5 mg at 06/03/22 3928    aspirin chewable tablet 81 mg  81 mg Oral Daily Manohar Diaz MD   81 mg at 06/03/22 2817    atenolol (TENORMIN) tablet 25 mg  25 mg Oral Nightly Manohar Diaz MD   25 mg at 06/02/22 2234    atorvastatin (LIPITOR) tablet 10 mg  10 mg Oral Nightly Manohar Diaz MD   10 mg at 06/02/22 2234    calcitRIOL (ROCALTROL) capsule 0.25 mcg  0.25 mcg Oral Nightly Manohar Diaz MD   0.25 mcg at 06/02/22 2234    gabapentin (NEURONTIN) capsule 100 mg  100 mg Oral TID Manohar Diaz MD   100 mg at 06/03/22 1355    LORazepam (ATIVAN) tablet 0.5 mg  0.5 mg Oral BID Manohar Diaz MD   0.5 mg at 06/03/22 9972    oxyCODONE (ROXICODONE) immediate release tablet 5 mg  5 mg Oral Q4H PRN Manohar Diaz MD        pantoprazole (PROTONIX) tablet 20 mg  20 mg Oral Nightly Manohar Diaz MD   20 mg at 06/02/22 2234    sevelamer (RENVELA) tablet 800 mg  800 mg Oral TID WC Manohar Diaz MD   800 mg at 06/03/22 1600    tamsulosin (FLOMAX) capsule 0.4 mg  0.4 mg Oral QAM Manohar Diaz MD   0.4 mg at 06/03/22 7735    polyethylene glycol (GLYCOLAX) packet 17 g  17 g Oral Daily PRN Manohar Gillespie MD        heparin (porcine) injection 3,800 Units  3,800 Units IntraCATHeter PRN Hallie Hernandez MD           Review of Systems:  · Constitutional: No unanticipated weight loss. There's been no change in energy level, sleep pattern, or activity level. No fevers, chills. · Eyes: No visual changes or diplopia. No scleral icterus. · ENT: No Headaches, hearing loss or vertigo. No mouth sores or sore throat. · Cardiovascular: as reviewed in HPI  · Respiratory: No cough or wheezing, no sputum production. No hemoptysis.      · Gastrointestinal: No abdominal pain, appetite loss, blood in stools. No change in bowel or bladder habits. · Genitourinary: No dysuria, trouble voiding, or hematuria. · Musculoskeletal:  No gait disturbance, no joint complaints. · Integumentary: No rash or pruritis. · Neurological: No headache, diplopia, change in muscle strength, numbness or tingling. · Psychiatric: No anxiety or depression. · Endocrine: No temperature intolerance. No excessive thirst, fluid intake, or urination. No tremor. · Hematologic/Lymphatic: No abnormal bruising or bleeding, blood clots or swollen lymph nodes. · Allergic/Immunologic: No nasal congestion or hives. Physical Exam:   /72   Pulse 63   Temp 98.1 °F (36.7 °C) (Oral)   Resp 16   Ht 6' 0.5\" (1.842 m)   Wt 220 lb 14.4 oz (100.2 kg)   SpO2 93%   BMI 29.55 kg/m²   Wt Readings from Last 3 Encounters:   06/03/22 220 lb 14.4 oz (100.2 kg)   05/19/22 213 lb 13.5 oz (97 kg)   04/29/22 204 lb (92.5 kg)     Constitutional: He is oriented to person, place, and time. He appears well-developed and well-nourished. In no acute distress. Head: Normocephalic and atraumatic. Pupils equal and round. Neck: Neck supple. No JVP or carotid bruit appreciated. No mass and no thyromegaly present. No lymphadenopathy present. Cardiovascular: Normal rate. Normal heart sounds. Exam reveals no gallop and no friction rub. No murmur heard. Pulmonary/Chest: Effort normal and breath sounds normal. No respiratory distress. He has no wheezes, rhonchi or rales. Abdominal: Soft, non-tender. Bowel sounds are normal. He exhibits no organomegaly, mass or bruit. Extremities: 1+edema, right leg. Anterior and lateral foot wound, no DP/PT palpable. No cyanosis or clubbing. Pulses are 2+ radial/carotid bilaterally. Neurological: No gross cranial nerve deficit. Coordination normal.   Skin: Skin is warm and dry  Psychiatric: He has a normal mood and affect.  His speech is normal and behavior is normal.     Lab Review:   FLP:    Lab Results Component Value Date    TRIG 54 01/15/2022    HDL 24 01/15/2022    HDL 27 07/26/2011    LDLCALC 25 01/15/2022    LABVLDL 11 01/15/2022     BUN/Creatinine:    Lab Results   Component Value Date    BUN 16 06/03/2022    CREATININE 2.5 06/03/2022     PT/INR, TNI, HGB A1C:   Lab Results   Component Value Date/Time    TROPONINI 0.07 (H) 06/01/2022 10:29 PM    LABA1C 6.1 01/15/2022 05:31 AM      No results found for: CBCAUTODIF        Echo:     Stress Test:     Cath:     CT:    Doppler:  Right   Right SHANTA was not available due to inadequate pulses and noncompressible   tibial vessels. (DP inaudible, PT non compressible)   Right common femoral and profunda femoral vein were not visualized due to   arterial IV line in place. The majority of the waveforms are multiphasic throughout the right lower   extremity. Ultrasound images of the right lower extremity reveal moderate to severe   calcification throughout. Possible occlusion of the right proximal anterior tibial artery with flow   reversal in the mid anterior tibial artery. Left   Right SHANTA was not available due to inadequate pulses and noncompressible   tibial vessels. (DP inaudible, PT non compressible)   The majority of the waveforms are multiphasic throughout the left lower   extremity. Ultrasound images of the left lower extremity reveal moderate to severe   calcification throughout. Possible occlusion of the mid anterior tibial artery with flow reversal in the   distal anterior tibial artery.            All above diagnostic testing and laboratory data was independently visualized and reviewed by me (not simply review of report)       Assessment and Plan   1) critical limb ischemia  - mouna cat5   - distribution in the AT/Peroneal angiosome  - recommend peripheral angiogram ; tentatively scheduled for Tuesday   - discussed at length with family, who is in agreement  - hold eliquis starting Sunday evening   - will need tissue doppler ~ 2 weeks after revascularization      Overall, the problems requiring hospitalization are high in severity     Thank you very much for allowing me to participate in the care of your patient. Please do not hesitate to contact me if you have any questions.       Annmarie Muhammad MD [address] [facility], Interventional Cardiology, and Peripheral Vascular Disease   [facility]   Ph: [phone]  Fax: [phone]

## 2022-06-03 NOTE — PROGRESS NOTES
Department of Podiatric Surgery  Progress Note        CHIEF COMPLAINT:    Chief Complaint   Patient presents with    Fatigue     per nursing home the last 8 hours          HISTORY OF PRESENT ILLNESS:      The patient is a 77 y.o. male who presents with AMS,DM with ESRD on HD, h/o CVA and CA. The patient has been in and out of hospital a lot lately. Past Medical History:        Diagnosis Date    Anemia 03/2022    Arthritis     CAD (coronary artery disease) 01/2020    Cancer ([facility]     Colon Cancer diagnosed last month    Cerebral infarction ([facility]     Cognitive communication deficit     COVID-19     Diabetes mellitus ([address]     Dysphagia     End stage renal disease ([address]     Fatigue     Gastrointestinal hemorrhage     Hemodialysis patient ([facility] 2019    ckd-goes to dialysis Tuesday, Thurs, Fri    Hx of blood clots     Hyperlipidemia     Hypertension     Hyponatremia     Risk for falls     Splenomegaly 02/10/2021       Allergies   Allergen Reactions    Lisinopril Swelling     Allergy listed on paperwork from [facility], no reaction noted       REVIEW OF SYSTEMS:  Review of systems not obtained due to patient factors - mental status    PHYSICAL EXAM:  VITALS:  /72   Pulse 63   Temp 98.1 °F (36.7 °C) (Oral)   Resp 16   Ht 6' 0.5\" (1.842 m)   Wt 220 lb 14.4 oz (100.2 kg)   SpO2 93%   BMI 29.55 kg/m²   CONSTITUTIONAL:  awake, alert, cooperative, no apparent distress, and appears stated age  EYES:  pupils equal, round and reactive to light, extra ocular muscles intact, sclera clear, conjunctiva normal      LOWER EXTREMITY:  VASCULAR: Pedal Pulses absent. positive signs of ischemia. MUSCULOSKELETAL:  negative gross deformity. NEUROLOGIC:  Epicritic sensation, light touch, joint position sensediminished  SKIN:  The left leg and foot have no abnormal lesions. Right lateral ankle decubitus ulceration. This was present on admission.   The kelly-wound tissue is mildly spent with the patient discussing the etiology and treatment options of the chief complaint. 1. Right ankle wound  Non-invasives indicate moderate to severe disease to RIGHT  Lower extremity  Mepilex border dressing applied this evening  Will follow for possible Vascular Intervention    DISPO: As above. Thanks for the opportunity to participate in this patient's care.      Nikolay Rios DPM   [facility]  Cell [phone]  Office: [phone]  Fax: [phone]

## 2022-06-03 NOTE — PROGRESS NOTES
Speech Language Pathology  [facility]   Speech Therapy  Daily Dysphagia Treatment Note    Navin Gaitan  AGE: 77 y.o. GENDER: male  : 1945  [id number]  EPISODE DATE:  2022     Patient Active Problem List   Diagnosis    GI bleed    History of COVID-19    Hyponatremia    Fatigue    Acute kidney injury superimposed on CKD ([address]    Lethargy    Suspected UTI    Acute CVA (cerebrovascular accident) ([address]    LESLEE (acute kidney injury) ([address]    Malignant neoplasm of colon ([address]    Acute blood loss anemia    COVID-19    Shock circulatory ([address]    Bilateral pulmonary embolism (HCC)    Lactic acidosis    Hemorrhagic shock ([address]    ESRD (end stage renal disease) on dialysis (HCC)    Acute cholecystitis    Pain of upper abdomen    AMS (altered mental status)    Sepsis (HCC)    Acute metabolic encephalopathy    Cellulitis     Allergies   Allergen Reactions    Lisinopril Swelling     Allergy listed on paperwork from [facility], no reaction noted     Treatment Diagnosis: Dysphagia     Chart review:   Patient admitted 22 with C/O confusion  H&P: 77 y. o. male who presented to [facility] he was found having more confusion than usual, Dr. Bruno Barker to emergency department, initial work-up positive for low-grade fever, possible cellulitis, admitted to the hospital for further management and treatment.  Patient at this time denies fever chills nausea vomiting or abdominal pain, he feels weak, denies cough shortness of breath.  No diarrhea.  Daughter at bedside and helped with history taking.     CHEST X-RAY 22  Impression   No acute abnormality.      CT HEAD 22  Impression   No acute intracranial abnormality.            Subjective:     Current Diet Level: Regular texture diet ; Mildly (nectar) thick liquids    Comments regarding tolerating Current Diet: Good tolerance reported    Objective:     Pain: Denies               Vision: [x]WFL []Impaired:  Hearing: []WFL [x]Impaired: [place]    Cognitive/behavioral/communication:   Oriented to [x] self [] place [] date [] situation  [x]alert []lethargic  [x]cooperative []self-limiting   []confused   []distractible []agitated []impulsive  [x]verbally responsive []nonverbal []limited verbal responses  [x]follows one step commands []does not follow dx []follows complex commands  []aphasic []dysarthric  [] other:     Respiratory Status: [x]Room Air []O2 via nasal cannula []Other:  Dentition: []Adequate []Dentures []Missing Many Teeth [x]Edentulous []Other:  Vocal Quality: [x]Normal []Dysphonic  []Aphonic  []Hoarse []Wet []Weak []Other:  Volitional Cough: [x]Strong []Weak []Wet []Absent []Congested []Other:  Volitional Swallow:   []Absent  [x]Delayed []Adequate []Required use of drink     Patient Positioning: Upright in bed     PO Trials:  · Thin Liquids: suspect premature bolus loss to the pharynx; delayed swallow; decreased laryngeal elevation; immediate cough  · Nectar thick liquids: suspect premature bolus loss to the pharynx; delayed swallow; decreased laryngeal elevation; NO overt sign/symptoms of penetration/aspiration  · Honey Thick liquids: DNT  · Puree: DNT   · Soft food: DNT  · Regular food: prolonged but adequate bolus formation and movement; suspect premature bolus loss to the pharynx; delayed swallow; decreased laryngeal elevation; NO overt sign/symptoms of penetration/aspiration    Dysphagia Tx:    Direct Dysphagia tx: PO trials as described above; continues with declined swallow function from baseline   Dysphagia ex: to be initiated if dysphagia persists   Training in compensatory strategies:dependent d/t poor recall   Pt response to ex/training: patient agreeable and pleasant; decreased recall and insight are barriers    Goals:   Pt will functionally tolerate recommended diet with no overt clinical s/s of aspiration continue  Pt will functionally tolerate ongoing assessment of swallow function with diet to be determined as indicated continue  Pt will advance to least restrictive diet as indicated continue    Assessment:   Impressions:   Mild oral dysphagia, mild-moderate pharyngeal dysphagia   · Pt alert, pleasant, cooperative and confused, verbally responsive, follows simple dx, oriented to self and year only  · Mild oral dysphagia characterized by prolonged but adequate bolus formation and movement with regular solids. Suspect premature bolus loss to the pharynx with all. · Mild-mod pharyngeal dysphagia characterized by delayed swallow initiation, decreased laryngeal elevation. Immediate cough with thin liquids  · Continue regular/mildly (nectar) thick liquids  · ST to continue to follow during acute admission     Recommended Diet and Intervention 6/3/2022:  Diet Solids Recommendation:  Regular texture diet  Liquid Consistency Recommendation:  Mildly (nectar) thick liquids  Recommended form of Meds:   PO     EDUCATION:   Provided education regarding role of SLP, results of assessment, recommendations and general speech pathology plan of care. [] Pt verbalized understanding and agreement   [] Pt requires ongoing learning   [x] No evidence of recall     Dysphagia Prognosis: [] good []fair []poor [x]guarded       Plan:     Continue Dysphagia Therapy: YES    Interventions: Laryngeal Exercises , Pharyngeal Exercise, Diet Tolerance Monitoring , Patient/Family Education , Therapeutic Trials with SLP   Duration/Frequency of therapy while on unit: Speech therapy for dysphagia tx 3-5 times per week during acute care stay. Discharge Instructions:   Recommend ongoing SLP for dysphagia therapy upon discharge from hospital     This note serves as a D/C Summary in the event that this patient is discharged prior to the next therapy session.     Coded treatment time: 0  Total treatment time: 20    Electronically signed by LEIDY Newman on 6/3/2022 at 3:48 PM

## 2022-06-03 NOTE — ACP (ADVANCE CARE PLANNING)
Advance Care Planning     Advance Care Planning Activator (Inpatient)  Conversation Note      Date of ACP Conversation: 6/3/2022     Conversation Conducted with:  Healthcare Decision Maker: Next of Kin by law (only applies in absence of above) (name) wife Sanchez Suarez Activator: [address], [id number] [id number] Maker:     Current Designated Health Care Decision Maker:     Primary Decision MakerEbert Skys - [phone]    Care Preferences    Ventilation: \"If you were in your present state of health and suddenly became very ill and were unable to breathe on your own, what would your preference be about the use of a ventilator (breathing machine) if it were available to you? \"      Would the patient desire the use of ventilator (breathing machine)?: yes    \"If your health worsens and it becomes clear that your chance of recovery is unlikely, what would your preference be about the use of a ventilator (breathing machine) if it were available to you? \"     Would the patient desire the use of ventilator (breathing machine)?: No      Resuscitation  \"CPR works best to restart the heart when there is a sudden event, like a heart attack, in someone who is otherwise healthy. Unfortunately, CPR does not typically restart the heart for people who have serious health conditions or who are very sick. \"    \"In the event your heart stopped as a result of an underlying serious health condition, would you want attempts to be made to restart your heart (answer \"yes\" for attempt to resuscitate) or would you prefer a natural death (answer \"no\" for do not attempt to resuscitate)? \" yes       [] Yes   [x] No   Educated Patient / Salvador Vega regarding differences between Advance Directives and portable DNR orders.     Length of ACP Conversation in minutes:  5 minutes    Conversation Outcomes:  [x] ACP discussion completed - prior ACP reviewed with wife, confirmed no changes  [] Existing advance directive reviewed with patient; no changes to patient's previously recorded wishes  [] New Advance Directive completed  [] Portable Do Not Rescitate prepared for Provider review and signature  [] POLST/POST/MOLST/MOST prepared for Provider review and signature  Electronically signed by Luis Manuel Wolf RN Case Management [phone] on 6/3/2022 at 11:48 AM

## 2022-06-03 NOTE — ADT AUTH CERT
Utilization Reviews         PA RECOMMENDATION FOR IP by Demarcus Penaloza RN       Review Status Review Entered   In Primary 6/3/2022 15:47      Criteria Review   We recommend that the following pt's current hospitalization under Inpatient status Is appropriate   If you agree, please place a new ADMIT order in [facility] as recommended. Name: Brennan Schrader   : 1945   CSN: [id number]     Clinical summary fatigue  Vitals VSS  Labs and Imaging Ct abd proctocolitis, cr 3.5  MCG criteria applies yes, Pg GS  Comments Management of comorbidities: PVD, ESRD, colitis, cellulitis, ANS      This chart was reviewed at 2:50 PM 6/3/2022    [address] [facility]   CELL : [phone]        Cellulitis - Care Day 2 (6/3/2022) by Demarcus Penaloza RN       Review Status Review Entered   Completed 6/3/2022 15:39      Criteria Review      Care Day: 2 Care Date: 6/3/2022 Level of Care: Intermediate Care    Guideline Day 2    Clinical Status    (X) * Dehydration absent    (X) * Mental status at baseline    (X) * Hemodynamic stability    6/3/2022 3:39 PM EDT by Madeleine Herron      22 0611 98.5 (36.9) 23 58 115/46 -- -- -- -- 91 None (Room air) --    (X) * Afebrile or fever improved    Routes    (X) * Oral hydration    ( ) * Oral medications    6/3/2022 3:39 PM EDT by Madeleine Herron      maxipime iv    Medications    (X) IV antibiotics    * Milestone   Additional Notes   DATE: 6/3      Pertinent Updates: Had dialysis yesterday 3.5 hours, 1 L UF   Continued AMS   Maxipime IV frequency increased to q12 hrs.       Vitals:    22 0611 98.5 (36.9) 23 58 115/46 - - - - 91 None (Room air) -       Abnl/Pertinent Labs/Radiology/Diagnostic Studies:   WBC 2.8 K/uL    Hemoglobin 11.2 g/dL    Hematocrit 34.3 %    Sodium 135    CREATININE 2.5    Alkaline Phosphatase 130       Physical Exam:   Oriented to person only   Delayed responses   Poor judment, safety, attention   ++  pallor   LOWER EXTREMITY: VASCULAR: Pedal Pulses absent.  positive signs of ischemia. NEUROLOGIC:  Epicritic sensation, light touch, joint position sensediminished   Right lateral ankle decubitus ulceration. This was present on admission.  The kelly-wound tissue is mildly erythematous. wound of 2.4 x 2.1 cm      MD Consults/Assessments & Plans:   MEDICINE:   1.  Possible cellulitis, patient end-stage renal disease, currently on vancomycin and cefepime, ID following. 2.  Acute metabolic encephalopathy, improving   3.  End-stage renal disease nephrology consulted patient had recent fistula not using yet he still using applying which still does not look infected at this time. 4.  Possible colitis, cefepime patient denies abdominal pain though ID consulted   5.  History of colon cancer follow-up as outpatient   6.  Essential hypertension, controlled at this time   7.  History of VTE continue oral anticoagulation   8.  Peripheral vascular disease, vascular surgery consulted for recommendations. POD:   ASSESSMENT    Decubitus ulceration lateral right ankle   DM with peripheral neuropathy   Moderate to severe PAD       PLAN:   Evaluation and Management x 30 minutes with greater than 50% of the time spent with the patient discussing the etiology and treatment options of the chief complaint.       1. Right ankle wound   Non-invasives indicate moderate to severe disease to RIGHT  Lower extremity   Mepilex border dressing applied this evening   Will follow for possible Vascular Intervention      NEPHRO:   We will plan for dialysis tomorrow per TTS schedule, last post weight 92.1 kg which is below his outpatient dry weight of 94 kg.  Looks euvolemic at this weight.       ID:   Fevers on admit   ESRD on HD   Admitted from NH with change in mentation    Rt ankle pressure wound    Await Blood cx to guide therapy given ESRD and chest line in place risk for Bacteremia - Rt ankle wound pressure area will need local care and off load and Podiatry consulted by primary team     PLAN :   1. Cont IV Cefepime x 1 gm  Q 24 h   2. Cont IV Vancomycin by level   3. Cont local care Rt ankle    4. Off load the ankle    5.  Blood cx in process       Medications:      cefepime (MAXIPIME) 1000 mg IVPB minibag   Dose: 1,000 mg   Freq: EVERY 24 HOURS Route: IV   Last Dose: Stopped (06/03/22 0649)   Start: 06/03/22 0600 End: 06/03/22 0947      cefepime (MAXIPIME) 1000 mg IVPB minibag   Dose: 1,000 mg   Freq: EVERY 12 HOURS Route: IV   Start: 06/03/22 1800 End: 06/09/22 6361

## 2022-06-03 NOTE — PROGRESS NOTES
Asked to evaluate by Dr. Sigrid Kohli for PAD  Seen and examined   CLI RC5  No AT pulse   Recommend peripheral angiogram   Tentatively scheduled for Tuesday AM   Full consult to follow    Mary Arreola MD [address] [facility], Interventional Cardiology, and Peripheral Vascular [address] [facility]   (O): [phone]  (F): [phone]

## 2022-06-03 NOTE — PROGRESS NOTES
Infectious Disease Follow up Notes  Admit Date: 6/1/2022  Hospital Day: 3    Antibiotics :   IV Cefepime     CHIEF COMPLAINT:       Change in mentation  Fever  ESRD  On hD  Rt ankle wound     Subjective interval History :  77 y.o. man admitted from NH due to change in mentation and fevers and on going Rt ankle wound infection with local pain, redness, he recently had a complicated course required Lap cholecystectomy and ECRP for stone in CBD on 5/16/22. He has ESRD on HD via chest line. Lactic acid  1.2 , WBC  3.5, hB 11.7.  bLOOD Cx in process and was started on IV abx we are consulted for recommendations    Interval History : No fever no chills tolerating antibiotic therapy okay, needs to be very tired and sleepy today, blood cultures remain negative      Past Medical History:    Past Medical History:   Diagnosis Date    Anemia 03/2022    Arthritis     CAD (coronary artery disease) 01/2020    Cancer ([address]     Colon Cancer diagnosed last month    Cerebral infarction ([address]     Cognitive communication deficit     COVID-19     Diabetes mellitus ([address]     Dysphagia     End stage renal disease ([address]     Fatigue     Gastrointestinal hemorrhage     Hemodialysis patient ([address] 2019    ckd-goes to dialysis Tuesday, Thurs, Fri    Hx of blood clots     Hyperlipidemia     Hypertension     Hyponatremia     Risk for falls     Splenomegaly 02/10/2021       Past Surgical History:    Past Surgical History:   Procedure Laterality Date    CARDIAC CATHETERIZATION  2019    CHOLECYSTECTOMY, LAPAROSCOPIC N/A 5/16/2022    LAPAROSCOPIC CHOLECYSTECTOMY WITH CHOLANGIOGRAM performed by Jamie Puentes MD at [address] [facility] [address] 01/26/2022    SIGMOID COLECTOMY, SIGMOIDOSCOPY performed by Jamie Puentes MD at [address] 4/29/2022    LEFT BRACHIAL CEPHALIC FISTULA performed by Kayce Hays MD at [address] ERCP N/A 5/16/2022    ERCP SPHINCTER/PAPILLOTOMY performed by Shari Ca MD at [address] [facility]  ERCP N/A 5/16/2022    ERCP STONE REMOVAL/BALLOON SWEEP performed by Shari Ca MD at [address] [facility] [address]  02/19/2022    IR EMBOLIZATION HEMORRHAGE 2/19/2022 [facility]    IR PERC ARTERIOVENOUS FISTULA CREATION Left     unsure of date    IR TUNNELED CATHETER PLACEMENT GREATER THAN 5 YEARS  2/21/2022    IR TUNNELED CATHETER PLACEMENT GREATER THAN 5 YEARS 2/21/2022 [facility]    SIGMOIDOSCOPY N/A 02/17/2022    SIGMOIDOSCOPY CONTROL HEMORRHAGE performed by Rayne Barbosa MD at [address] [facility] [address] 02/21/2022    Permacath; RIJ access; 23cm; Dr. Brett Hutson  5/16/2022    ERCP POLYP SNARE performed by Shari Ca MD at [address] [facility]       Current Medications:    No outpatient medications have been marked as taking for the 6/1/22 encounter [facility] Encounter).        Allergies:  Lisinopril    Immunizations :   Immunization History   Administered Date(s) Administered    COVID-19, Rhona Ok, Primary or Immunocompromised, PF, 100mcg/0.5mL 03/04/2021, 04/02/2021       Social History:    Social History     Tobacco Use    Smoking status: Former Smoker    Smokeless tobacco: Never Used   Vaping Use    Vaping Use: Never used   Substance Use Topics    Alcohol use: Not Currently    Drug use: Never     Social History     Tobacco Use   Smoking Status Former Smoker   Smokeless Tobacco Never Used      Family History   Problem Relation Age of Onset    High Blood Pressure Father          REVIEW OF SYSTEMS:     Constitutional:  negative for fevers, chills, night sweats  Eyes:  negative for blurred vision, eye discharge, visual disturbance   HEENT:  negative for hearing loss, ear drainage,nasal congestion  Respiratory:  negative for cough, shortness of breath or hemoptysis   Cardiovascular:  negative for chest pain, palpitations, syncope  Gastrointestinal:  negative for nausea, vomiting, diarrhea, constipation, abdominal pain  Genitourinary:  negative for frequency, dysuria, urinary incontinence, hematuria  Hematologic/Lymphatic:  negative for easy bruising, bleeding and lymphadenopathy  Allergic/Immunologic:  negative for recurrent infections, angioedema, anaphylaxis   Endocrine:  negative for weight changes, polyuria, polydipsia and polyphagia  Musculoskeletal:  Rt ankle wound ++n, swelling, decreased range of motion  Integumentary: No rashes, skin lesions  Neurological:  negative for headaches, slurred speech, unilateral weakness  Psychiatric: negative for hallucinations,confusion,agitation.                 PHYSICAL EXAM:      Vitals:    /72   Pulse 63   Temp 98.1 °F (36.7 °C) (Oral)   Resp 16   Ht 6' 0.5\" (1.842 m)   Wt 220 lb 14.4 oz (100.2 kg)   SpO2 93%   BMI 29.55 kg/m²     General Appearance: alert,in some  acute distress, ++  pallor, no icterus   Skin: warm and dry, no rash or erythema  Head: normocephalic and atraumatic  Eyes: pupils equal, round, and reactive to light, conjunctivae normal  ENT: tympanic membrane, external ear and ear canal normal bilaterally, nose without deformity, nasal mucosa and turbinates normal without polyps  Neck: supple and non-tender without mass, no thyromegaly  no cervical lymphadenopathy  Pulmonary/Chest: clear to auscultation bilaterally- no wheezes, rales or rhonchi, normal air movement, no respiratory distress  Cardiovascular: normal rate, regular rhythm, normal S1 and S2, no murmurs, rubs, clicks, or gallops, no carotid bruits  Abdomen: soft, non-tender, non-distended, normal bowel sounds, no masses or organomegaly  Extremities: no cyanosis, clubbing or edema  Musculoskeletal: normal range of motion, no joint swelling, deformity or tenderness  Integumentary: No rashes, no abnormal skin lesions, no petechiae  Neurologic: reflexes normal and symmetric, no cranial nerve deficit            Lines: IV  Chest line +  Rt ankle lateral open wound pressure area with local redness+     Data Review:    CBC:   Lab Results   Component Value Date    WBC 2.8 (L) 06/03/2022    HGB 11.2 (L) 06/03/2022    HCT 34.3 (L) 06/03/2022    MCV 90.8 06/03/2022     06/03/2022     RENAL:   Lab Results   Component Value Date    CREATININE 2.5 (H) 06/03/2022    BUN 16 06/03/2022     (L) 06/03/2022    K 4.2 06/03/2022    CL 97 (L) 06/03/2022    CO2 27 06/03/2022     SED RATE:   Lab Results   Component Value Date    SEDRATE 24 07/26/2011     CK: No results found for: CKTOTAL  CRP:   Lab Results   Component Value Date    .5 02/24/2022     Hepatic Function Panel:   Lab Results   Component Value Date    ALKPHOS 130 06/03/2022    ALT 14 06/03/2022    AST 16 06/03/2022    PROT 5.9 06/03/2022    PROT 7.5 07/26/2011    BILITOT 0.8 06/03/2022    BILIDIR <0.2 02/12/2022    IBILI see below 02/12/2022    LABALBU 2.8 06/03/2022     UA:  Lab Results   Component Value Date    COLORU DARK YELLOW 06/01/2022    CLARITYU Clear 06/01/2022    GLUCOSEU Negative 06/01/2022    BILIRUBINUR Negative 06/01/2022    KETUA TRACE 06/01/2022    SPECGRAV 1.019 06/01/2022    BLOODU Negative 06/01/2022    PHUR 6.5 06/01/2022    PROTEINU 100 06/01/2022    UROBILINOGEN 1.0 06/01/2022    NITRU Negative 06/01/2022    LEUKOCYTESUR Negative 06/01/2022    LABMICR YES 06/01/2022    URINETYPE NotGiven 06/01/2022      Urine Microscopic:   Lab Results   Component Value Date    BACTERIA None Seen 06/01/2022    COMU see below 04/22/2022    HYALCAST 3 06/01/2022    HYALCAST Present 06/01/2022    WBCUA 1 06/01/2022    RBCUA 2 06/01/2022    EPIU 0 06/01/2022     Urine Reflex to Culture:   Lab Results   Component Value Date    URRFLXCULT Not Indicated 06/01/2022         MICRO: cultures reviewed and updated by me   Blood Culture:   Lab Results   Component Value Date    [facility] 06/01/2022     No Growth to date. Any change in status will be called. BLOODCULT2  06/02/2022     No Growth to date. Any change in status will be called. Respiratory Culture:  No results found for: Tammy Martinez  AFB:No results found for: AFBSMEAR  Viral Culture:  Lab Results   Component Value Date    COVID19 Not Detected 06/01/2022     Urine Culture: No results for input(s): Sari Scripture in the last 72 hours. IMAGING:    VL DUP LOWER EXTREMITY ARTERIES BILATERAL         CT ABDOMEN PELVIS W IV CONTRAST Additional Contrast? None   Final Result   1. No central, lobar, or segmental pulmonary embolus. 2. Trace bilateral pleural effusions and mild bibasilar atelectasis. 3. Findings of moderate proctocolitis involving the rectum and sigmoid colon. Associated wall thickening and edema. No abscess. No mesenteric gas. 4. Colorectal anastomosis with focal narrowing at this region. A stricture   is not excluded. CT CHEST PULMONARY EMBOLISM W CONTRAST   Final Result   1. No central, lobar, or segmental pulmonary embolus. 2. Trace bilateral pleural effusions and mild bibasilar atelectasis. 3. Findings of moderate proctocolitis involving the rectum and sigmoid colon. Associated wall thickening and edema. No abscess. No mesenteric gas. 4. Colorectal anastomosis with focal narrowing at this region. A stricture   is not excluded. CT HEAD WO CONTRAST   Final Result   No acute intracranial abnormality. XR CHEST PORTABLE   Final Result   No acute abnormality.                All the pertinent images and reports for the current Hospitalization were reviewed by me     Scheduled Meds:   cefepime  1,000 mg IntraVENous Q12H    sodium chloride flush  5-40 mL IntraVENous 2 times per day    apixaban  5 mg Oral BID    aspirin  81 mg Oral Daily    atenolol  25 mg Oral Nightly    atorvastatin  10 mg Oral Nightly    calcitRIOL  0.25 mcg Oral Nightly    gabapentin  100 mg Oral TID  LORazepam  0.5 mg Oral BID    pantoprazole  20 mg Oral Nightly    sevelamer  800 mg Oral TID WC    tamsulosin  0.4 mg Oral QAM       Continuous Infusions:   sodium chloride 250 mL (06/03/22 0613)       PRN Meds:  sodium chloride flush, sodium chloride, acetaminophen **OR** acetaminophen, oxyCODONE, polyethylene glycol, heparin (porcine)      Assessment:     Patient Active Problem List   Diagnosis    GI bleed    History of COVID-19    Hyponatremia    Fatigue    Acute kidney injury superimposed on CKD ([address]    Lethargy    Suspected UTI    Acute CVA (cerebrovascular accident) ([address]    LESLEE (acute kidney injury) ([address]    Malignant neoplasm of colon ([address]    Acute blood loss anemia    COVID-19    Shock circulatory ([address]    Bilateral pulmonary embolism (HCC)    Lactic acidosis    Hemorrhagic shock ([address]    ESRD (end stage renal disease) on dialysis ([address]    Acute cholecystitis    Pain of upper abdomen    AMS (altered mental status)    Sepsis ([address]    Acute metabolic encephalopathy    Cellulitis     Fevers on admit now resolved   ESRD on HD  Admitted from NH with change in mentation   Rt ankle pressure wound   WBC normal   Chest HD line in place  Recent ERCP and Cholecystectomy on 5/16/222  CXR -ve  CT abd/pelvis -ve     Await Blood cx to guide therapy given ESRD and chest line in place risk for Bacteremia - Rt ankle wound pressure area will need local care and off load and Podiatry consulted by primary team      Blood cx negative and seen by Podiatry plan for local care noted and no new focus of infection on the scans      PVD eval in progress given ESRD and Rt ankle wound may go for angio ?           Labs, Microbiology, Radiology and all the pertinent results from current hospitalization and  care every where were reviewed  by me as a part of the evaluation   Plan:   1. D/C IV Cefepime x 1 gm  Q 24 h  2. PVD eval in progress    3. Cont local care Rt ankle   4. Off load the Rt   ankle   5. Blood cx Negative    Will sign off         Discussed with patient/Family and Nursing staff     Thanks for allowing me to participate in your patient's care and please call me with any questions or concerns.     Joao Zuluaga MD  Infectious Disease  [facility]) Physician  Phone: [phone]   Fax : [phone]

## 2022-06-03 NOTE — PLAN OF CARE
Problem: Pain  Goal: Verbalizes/displays adequate comfort level or baseline comfort level  6/3/2022 0407 by Juan José Lara RN  Outcome: Adequate for Discharge  Flowsheets (Taken 6/3/2022 0407)  Verbalizes/displays adequate comfort level or baseline comfort level:   Encourage patient to monitor pain and request assistance   Assess pain using appropriate pain scale  Note: Pt denies pain. Problem: Skin/Tissue Integrity  Goal: Absence of new skin breakdown  Description: 1. Monitor for areas of redness and/or skin breakdown  6/3/2022 0407 by Juan José Lara RN  Outcome: Progressing  Note: Pt is on a specialty mattress. He has a pressure ulcer to his lateral R ankle and a friction wound to his L buttock. Pt has healing surgical incisions for a L FA graft placement and laparoscopic gallbladder surgery. B heels floated using a pillows. RN repositioned pt using positioning wedge while awake. External male catheter in place and has not leaked so far in shift. Problem: ABCDS Injury Assessment  Goal: Absence of physical injury  Outcome: Progressing  Flowsheets (Taken 6/3/2022 0311)  Absence of Physical Injury: Implement safety measures based on patient assessment  Note: Pt is a high fall risk. Bed in locked, low position with side rails up X 3 and an active bed alarm. Fall risk bracelet placed. Fall risk sign and socks already in place. Pt throwing blankets and feet out of bed. When pt asked what he is doing, he said he is \"going to the bathroom. \" RN tried to redirect pt but he was difficult to redirect. RN called pt's daughter, [address], to come stay with pt overnight. Per Crystal, the pt did not tolerate the use of a tele-sitter during his last admission. She states the voice over the intercom tended to [place] him. \"    Problem: Confusion  Goal: Confusion, delirium, dementia, or psychosis is improved or at baseline  Description: INTERVENTIONS:  1.  Assess for possible contributors to thought disturbance, including medications, impaired vision or hearing, underlying metabolic abnormalities, dehydration, psychiatric diagnoses, and notify attending LIP  2. [place] high risk fall precautions, as indicated  3. Provide frequent short contacts to provide reality reorientation, refocusing and direction  4. Decrease environmental stimuli, including noise as appropriate  5. Monitor and intervene to maintain adequate nutrition, hydration, elimination, sleep and activity  6. If unable to ensure safety without constant attention obtain sitter and review sitter guidelines with assigned personnel  7. Initiate Psychosocial CNS and Spiritual Care consult, as indicated  Outcome: Not Progressing  Flowsheets (Taken 6/2/2022 2234)  Effect of thought disturbance (confusion, delirium, dementia, or psychosis) are managed with adequate functional status: [place] high risk fall precautions, as indicated  Note: Since pt's daughter has been at the bedside, pt has been calmer. Pt's daughter able to redirect him. Problem: Infection - Adult  Goal: Absence of infection during hospitalization  Outcome: Progressing  Flowsheets (Taken 6/3/2022 0407)  Absence of infection during hospitalization:   Assess and monitor for signs and symptoms of infection   Monitor lab/diagnostic results   Identify and instruct in appropriate isolation precautions for identified infection/condition  Note: Per daughter's report, pt had an episode of diarrhea in the ER and had been having diarrhea PTA. RN placed pt in C.diff precautions. Contact Plus sign placed outside pt's room. Pt has yet to have a BM. Problem: Neurosensory - Adult  Goal: Achieves stable or improved neurological status  Outcome: Not Progressing  Flowsheets (Taken 6/3/2022 0407)  Achieves stable or improved neurological status: Assess for and report changes in neurological status  Note: Pt continues to have AMS per daughter.

## 2022-06-03 NOTE — PROGRESS NOTES
Hospitalist Progress Note      PCP: Terry Yang    Date of Admission: 6/1/2022        Subjective: Feels better, more energetic, denies abdominal pain no nausea vomiting no fever or chills today, denies blurry or double vision. No family member at bedside      Medications:  Reviewed    Infusion Medications    sodium chloride 250 mL (06/03/22 1341)     Scheduled Medications    cefepime  1,000 mg IntraVENous Q12H    sodium chloride flush  5-40 mL IntraVENous 2 times per day    apixaban  5 mg Oral BID    aspirin  81 mg Oral Daily    atenolol  25 mg Oral Nightly    atorvastatin  10 mg Oral Nightly    calcitRIOL  0.25 mcg Oral Nightly    gabapentin  100 mg Oral TID    LORazepam  0.5 mg Oral BID    pantoprazole  20 mg Oral Nightly    sevelamer  800 mg Oral TID WC    tamsulosin  0.4 mg Oral QAM     PRN Meds: sodium chloride flush, sodium chloride, acetaminophen **OR** acetaminophen, oxyCODONE, polyethylene glycol, heparin (porcine)      Intake/Output Summary (Last 24 hours) at 6/3/2022 1322  Last data filed at 6/3/2022 3992  Gross per 24 hour   Intake 460 ml   Output 250 ml   Net 210 ml       Physical Exam Performed:    /72   Pulse 63   Temp 98.1 °F (36.7 °C) (Oral)   Resp 16   Ht 6' 0.5\" (1.842 m)   Wt 220 lb 14.4 oz (100.2 kg)   SpO2 93%   BMI 29.55 kg/m²     General appearance: No apparent distress  Neck: Supple  Respiratory:  Normal respiratory effort. Clear to auscultation, bilaterally without Rales/Wheezes/Rhonchi. Cardiovascular: Regular rate and rhythm with normal S1/S2 without murmurs, rubs or gallops. Abdomen: Soft, non-tender, non-distended  Musculoskeletal: No clubbing, cyanosis  Skin: dressed wound on lateral right heel  Neurologic: No drifting.   Psychiatric: More alert today  Capillary Refill: Brisk,3 seconds, normal   Peripheral Pulses: +2 palpable, equal bilaterally       Labs:   Recent Labs     06/01/22  2229 06/02/22  1317 06/03/22  0619   WBC 5.9 3.5* 2.8*   HGB 12.4* 11.7* 11.2*   HCT 38.3* 36.2* 34.3*    166 143     Recent Labs     06/01/22 2229 06/02/22  1317 06/03/22  0619   * 139 135*   K 4.4 4.5 4.2   CL 94* 101 97*   CO2 32 25 27   BUN 32* 34* 16   CREATININE 3.5* 3.4* 2.5*   CALCIUM 8.8 8.5 8.5     Recent Labs     06/01/22 2229 06/02/22  1317 06/03/22  0619   AST 20 15 16   ALT 22 17 14   BILITOT 0.9 0.9 0.8   ALKPHOS 185* 146* 130*     No results for input(s): INR in the last 72 hours. Recent Labs     06/01/22 2229   TROPONINI 0.07*       Urinalysis:      Lab Results   Component Value Date    NITRU Negative 06/01/2022    WBCUA 1 06/01/2022    BACTERIA None Seen 06/01/2022    RBCUA 2 06/01/2022    BLOODU Negative 06/01/2022    SPECGRAV 1.019 06/01/2022    GLUCOSEU Negative 06/01/2022       Radiology:  VL DUP LOWER EXTREMITY ARTERIES BILATERAL         CT ABDOMEN PELVIS W IV CONTRAST Additional Contrast? None   Final Result   1. No central, lobar, or segmental pulmonary embolus. 2. Trace bilateral pleural effusions and mild bibasilar atelectasis. 3. Findings of moderate proctocolitis involving the rectum and sigmoid colon. Associated wall thickening and edema. No abscess. No mesenteric gas. 4. Colorectal anastomosis with focal narrowing at this region. A stricture   is not excluded. CT CHEST PULMONARY EMBOLISM W CONTRAST   Final Result   1. No central, lobar, or segmental pulmonary embolus. 2. Trace bilateral pleural effusions and mild bibasilar atelectasis. 3. Findings of moderate proctocolitis involving the rectum and sigmoid colon. Associated wall thickening and edema. No abscess. No mesenteric gas. 4. Colorectal anastomosis with focal narrowing at this region. A stricture   is not excluded. CT HEAD WO CONTRAST   Final Result   No acute intracranial abnormality. XR CHEST PORTABLE   Final Result   No acute abnormality.                  Assessment/Plan:    Active Hospital Problems    Diagnosis     AMS (altered mental status) [R41.82]      Priority: Medium    Acute metabolic encephalopathy [G59.60]      Priority: Medium    Cellulitis [L03.90]      Priority: Medium    ESRD (end stage renal disease) on dialysis (HCC) [N18.6, Z99.2]      Priority: Medium     1. Possible cellulitis, patient end-stage renal disease, currently on vancomycin and cefepime, ID following. 2.  Acute metabolic encephalopathy, improving  3. End-stage renal disease nephrology consulted patient had recent fistula not using yet he still using applying which still does not look infected at this time. 4.  Possible colitis, cefepime patient denies abdominal pain though ID consulted  5. History of colon cancer follow-up as outpatient  6. Essential hypertension, controlled at this time  7. History of VTE continue oral anticoagulation  8. Peripheral vascular disease, vascular surgery consulted for recommendations. Diet: ADULT DIET;  Regular; Mildly Thick (Nectar)  ADULT ORAL NUTRITION SUPPLEMENT; Lunch, Dinner; Frozen Oral Supplement  Code Status: Full Code      Fred Jauregui MD

## 2022-06-03 NOTE — PROGRESS NOTES
Pt confused and not easily redirected. Pt found picking at his monitor wires and vascath dressing. RN disguised devices underneath gown and tied it up around his neck. RN called pt's daughter, Esperanza Schultz, to come to bedside to stay overnight with the pt. Pt's daughter stated she would be her in 30 minutes. During pt's last admission, she stated that the tele-sitter tended to scare when he was redirected over the intercom. She suggested not using it.

## 2022-06-04 LAB
HBV SURFACE AB TITR SER: 7.73 MIU/ML
HEPATITIS B SURFACE ANTIGEN INTERPRETATION: NORMAL

## 2022-06-04 PROCEDURE — 6370000000 HC RX 637 (ALT 250 FOR IP): Performed by: NURSE PRACTITIONER

## 2022-06-04 PROCEDURE — 6370000000 HC RX 637 (ALT 250 FOR IP): Performed by: INTERNAL MEDICINE

## 2022-06-04 PROCEDURE — 99232 SBSQ HOSP IP/OBS MODERATE 35: CPT | Performed by: NURSE PRACTITIONER

## 2022-06-04 PROCEDURE — 86706 HEP B SURFACE ANTIBODY: CPT

## 2022-06-04 PROCEDURE — 2060000000 HC ICU INTERMEDIATE R&B

## 2022-06-04 PROCEDURE — 86704 HEP B CORE ANTIBODY TOTAL: CPT

## 2022-06-04 PROCEDURE — 94760 N-INVAS EAR/PLS OXIMETRY 1: CPT

## 2022-06-04 PROCEDURE — 87340 HEPATITIS B SURFACE AG IA: CPT

## 2022-06-04 PROCEDURE — 90935 HEMODIALYSIS ONE EVALUATION: CPT

## 2022-06-04 PROCEDURE — 87350 HEPATITIS BE AG IA: CPT

## 2022-06-04 PROCEDURE — 36415 COLL VENOUS BLD VENIPUNCTURE: CPT

## 2022-06-04 RX ORDER — LANOLIN ALCOHOL/MO/W.PET/CERES
6 CREAM (GRAM) TOPICAL NIGHTLY PRN
Status: DISCONTINUED | OUTPATIENT
Start: 2022-06-04 | End: 2022-06-07 | Stop reason: HOSPADM

## 2022-06-04 RX ADMIN — APIXABAN 5 MG: 5 TABLET, FILM COATED ORAL at 09:38

## 2022-06-04 RX ADMIN — LORAZEPAM 0.5 MG: 0.5 TABLET ORAL at 22:01

## 2022-06-04 RX ADMIN — Medication 6 MG: at 22:02

## 2022-06-04 RX ADMIN — APIXABAN 5 MG: 5 TABLET, FILM COATED ORAL at 22:03

## 2022-06-04 RX ADMIN — GABAPENTIN 100 MG: 100 CAPSULE ORAL at 22:02

## 2022-06-04 RX ADMIN — PANTOPRAZOLE SODIUM 20 MG: 20 TABLET, DELAYED RELEASE ORAL at 22:02

## 2022-06-04 RX ADMIN — SEVELAMER CARBONATE 800 MG: 800 TABLET, FILM COATED ORAL at 09:38

## 2022-06-04 RX ADMIN — ASPIRIN 81 MG: 81 TABLET, CHEWABLE ORAL at 09:38

## 2022-06-04 RX ADMIN — ATENOLOL 25 MG: 25 TABLET ORAL at 22:02

## 2022-06-04 RX ADMIN — ATORVASTATIN CALCIUM 10 MG: 10 TABLET, FILM COATED ORAL at 22:02

## 2022-06-04 RX ADMIN — SEVELAMER CARBONATE 800 MG: 800 TABLET, FILM COATED ORAL at 16:15

## 2022-06-04 RX ADMIN — TAMSULOSIN HYDROCHLORIDE 0.4 MG: 0.4 CAPSULE ORAL at 09:38

## 2022-06-04 RX ADMIN — LORAZEPAM 0.5 MG: 0.5 TABLET ORAL at 09:38

## 2022-06-04 RX ADMIN — GABAPENTIN 100 MG: 100 CAPSULE ORAL at 09:38

## 2022-06-04 RX ADMIN — CALCITRIOL 0.25 MCG: 0.25 CAPSULE ORAL at 22:02

## 2022-06-04 NOTE — PROGRESS NOTES
Department of Podiatric Surgery  Progress Note        CHIEF COMPLAINT:    Chief Complaint   Patient presents with    Fatigue     per nursing home the last 8 hours          HISTORY OF PRESENT ILLNESS:      The patient is a 77 y.o. male who presents with AMS,DM with ESRD on HD, h/o CVA and CA. Dressings intact    Past Medical History:        Diagnosis Date    Anemia 03/2022    Arthritis     CAD (coronary artery disease) 01/2020    [facility] ([facility]     Colon Cancer diagnosed last month    Cerebral infarction ([facility]     Cognitive communication deficit     COVID-19     Diabetes mellitus ([facility]     Dysphagia     End stage renal disease ([address]     Fatigue     Gastrointestinal hemorrhage     Hemodialysis patient ([facility] 2019    ckd-goes to dialysis Tuesday, Thurs, Fri    Hx of blood clots     Hyperlipidemia     Hypertension     Hyponatremia     Risk for falls     Splenomegaly 02/10/2021       Allergies   Allergen Reactions    Lisinopril Swelling     Allergy listed on paperwork from [employer] [facility], no reaction noted       REVIEW OF SYSTEMS:  Review of systems not obtained due to patient factors - mental status    PHYSICAL EXAM:  VITALS:  BP (!) 110/55   Pulse 62   Temp 98 °F (36.7 °C) (Oral)   Resp 23   Ht 6' 0.5\" (1.842 m)   Wt 213 lb 10 oz (96.9 kg)   SpO2 91%   BMI 28.57 kg/m²   CONSTITUTIONAL:  awake, alert, cooperative, no apparent distress, and appears stated age  EYES:  pupils equal, round and reactive to light, extra ocular muscles intact, sclera clear, conjunctiva normal      LOWER EXTREMITY:  VASCULAR: Pedal Pulses absent. positive signs of ischemia. MUSCULOSKELETAL:  negative gross deformity. NEUROLOGIC:  Epicritic sensation, light touch, joint position sensediminished  SKIN:  The left leg and foot have no abnormal lesions. Right lateral ankle decubitus ulceration. This was present on admission. The kelly-wound tissue is mildly erythematous. No edema or fluctuance.         MUGS 2 wound of 2.4 x 2.1 cm     I/O:    Intake/Output Summary (Last 24 hours) at 6/4/2022 1005  Last data filed at 6/3/2022 2205  Gross per 24 hour   Intake 70 ml   Output --   Net 70 ml              Wt Readings from Last 3 Encounters:   06/04/22 213 lb 10 oz (96.9 kg)   05/19/22 213 lb 13.5 oz (97 kg)   04/29/22 204 lb (92.5 kg)       LABS:    Recent Labs     06/02/22  1317 06/03/22  0619   WBC 3.5* 2.8*   HGB 11.7* 11.2*   HCT 36.2* 34.3*    143        Recent Labs     06/03/22  0619   *   K 4.2   CL 97*   CO2 27   BUN 16   CREATININE 2.5*        Recent Labs     06/02/22  1317 06/03/22  0619   PROT 6.0* 5.9*       IMAGING:  Arterial dopplers     Right   Right SHANTA was not available due to inadequate pulses and noncompressible   tibial vessels. (DP inaudible, PT non compressible)   Right common femoral and profunda femoral vein were not visualized due to   arterial IV line in place. The majority of the waveforms are multiphasic throughout the right lower   extremity. Ultrasound images of the right lower extremity reveal moderate to severe   calcification throughout. Possible occlusion of the right proximal anterior tibial artery with flow   reversal in the mid anterior tibial artery. Left   Right SHANTA was not available due to inadequate pulses and noncompressible   tibial vessels. (DP inaudible, PT non compressible)   The majority of the waveforms are multiphasic throughout the left lower   extremity. Ultrasound images of the left lower extremity reveal moderate to severe   calcification throughout.    Possible occlusion of the mid anterior tibial artery with flow reversal in the   distal anterior tibial artery         MICRO:   No drainage to culture    ASSESSMENT   Decubitus ulceration lateral right ankle  DM with peripheral neuropathy  Moderate to severe PAD  Diabetic Ulcer right ankle    PLAN:  Evaluation and Management x 30 minutes with greater than 50% of the time spent with the patient discussing the etiology and treatment options of the chief complaint. 1. Right ankle wound    2. Non-invasives indicate moderate to severe disease to RIGHT  Lower extremity    3. OK for Nursing team to change dressings daily   - betaine paint to he area, then Mepilex border apply    4. Following    DISPO: As above. Thanks for the opportunity to participate in this patient's care.      Delfino Valdes DPM   [facility]  Pager - [phone]  Office: [phone]  Fax: [phone]

## 2022-06-04 NOTE — PROGRESS NOTES
Pt confused, agitated, and paranoid during HS med pass/assessment. RN able to obtain pt's VS. When RN went to administer pt's oral medications, he refused to take them. RN tried multiple times to administer medications without success. Pt tried to get OOB and when RN redirected him, he became verbally aggressive. RN called pt's daughter Chato Ballard) and she came to bedside to stay with the pt/assist with care. RN and pt's daughter tried to administer medications. Pt continued to refuse until he spoke to his wife. Pt's daughter called his wife. He still would not take the medications for RN or daughter. Pt continuing to act paranoid. RN left the room to allow pt's daughter to administer the med/applesauce mixture. Per daughter, pt eventually took the medications in applesauce. Pt's daughter deferred MN VS because pt resting in bed with eyes closed. She stated she is okay with staff coming in around 0330 to check/change pt. Door closed, lights off, and curtain pulled. Pt's daughter at bedside. Bed in locked, low position with side rails up X 3 and an active bed alarm.

## 2022-06-04 NOTE — PROGRESS NOTES
RN removed pt's R fem TLC (per order) and changed the dressing on his R CW vascath. R fem TLC site covered with vaseline and occlusive dressing. RN instructed pt and his daughter to leave the dressing in place for 24 hours. Pt tolerated well and daughter VU. Pt has not had a BM since arriving on 5[facility] but per family had diarrhea PTA. Contact Plus isolation in effect for r/o C.diff.

## 2022-06-04 NOTE — PROGRESS NOTES
Hospitalist Progress Note      PCP: Aaron Hollis    Date of Admission: 6/1/2022      Subjective: Is okay, still mildly confused, denies lower extremity pain no abdominal pain nausea vomiting fever or chills. Medications:  Reviewed    Infusion Medications    sodium chloride 250 mL (06/03/22 6166)     Scheduled Medications    sodium chloride flush  5-40 mL IntraVENous 2 times per day    apixaban  5 mg Oral BID    aspirin  81 mg Oral Daily    atenolol  25 mg Oral Nightly    atorvastatin  10 mg Oral Nightly    calcitRIOL  0.25 mcg Oral Nightly    gabapentin  100 mg Oral TID    LORazepam  0.5 mg Oral BID    pantoprazole  20 mg Oral Nightly    sevelamer  800 mg Oral TID WC    tamsulosin  0.4 mg Oral QAM     PRN Meds: sodium chloride flush, sodium chloride, acetaminophen **OR** acetaminophen, oxyCODONE, polyethylene glycol, heparin (porcine)      Intake/Output Summary (Last 24 hours) at 6/4/2022 1015  Last data filed at 6/3/2022 2205  Gross per 24 hour   Intake 70 ml   Output --   Net 70 ml       Physical Exam Performed:    BP (!) 110/55   Pulse 62   Temp 98 °F (36.7 °C) (Oral)   Resp 23   Ht 6' 0.5\" (1.842 m)   Wt 213 lb 10 oz (96.9 kg)   SpO2 91%   BMI 28.57 kg/m²     General appearance: No apparent distress  Neck: Supple  Respiratory:  Normal respiratory effort. Clear to auscultation, bilaterally without Rales/Wheezes/Rhonchi. Cardiovascular: Regular rate and rhythm with normal S1/S2 without murmurs, rubs or gallops. Abdomen: Soft, non-tender  Musculoskeletal: No clubbing, cyanosis, wound on lateral right  Skin: Skin color, texture, turgor normal.  No rashes or lesions.   Neurologic:  No focal weakness   Psychiatric: Alert   Capillary Refill: Brisk,3 seconds, normal   Peripheral Pulses: +2 palpable, equal bilaterally       Labs:   Recent Labs     06/01/22  2229 06/02/22  1317 06/03/22  0619   WBC 5.9 3.5* 2.8*   HGB 12.4* 11.7* 11.2*   HCT 38.3* 36.2* 34.3*    166 143 Recent Labs     06/01/22 2229 06/02/22  1317 06/03/22  0619   * 139 135*   K 4.4 4.5 4.2   CL 94* 101 97*   CO2 32 25 27   BUN 32* 34* 16   CREATININE 3.5* 3.4* 2.5*   CALCIUM 8.8 8.5 8.5     Recent Labs     06/01/22 2229 06/02/22  1317 06/03/22  0619   AST 20 15 16   ALT 22 17 14   BILITOT 0.9 0.9 0.8   ALKPHOS 185* 146* 130*     No results for input(s): INR in the last 72 hours. Recent Labs     06/01/22 2229   TROPONINI 0.07*       Urinalysis:      Lab Results   Component Value Date    NITRU Negative 06/01/2022    WBCUA 1 06/01/2022    BACTERIA None Seen 06/01/2022    RBCUA 2 06/01/2022    BLOODU Negative 06/01/2022    SPECGRAV 1.019 06/01/2022    GLUCOSEU Negative 06/01/2022       Radiology:  VL DUP LOWER EXTREMITY ARTERIES BILATERAL   Final Result      CT ABDOMEN PELVIS W IV CONTRAST Additional Contrast? None   Final Result   1. No central, lobar, or segmental pulmonary embolus. 2. Trace bilateral pleural effusions and mild bibasilar atelectasis. 3. Findings of moderate proctocolitis involving the rectum and sigmoid colon. Associated wall thickening and edema. No abscess. No mesenteric gas. 4. Colorectal anastomosis with focal narrowing at this region. A stricture   is not excluded. CT CHEST PULMONARY EMBOLISM W CONTRAST   Final Result   1. No central, lobar, or segmental pulmonary embolus. 2. Trace bilateral pleural effusions and mild bibasilar atelectasis. 3. Findings of moderate proctocolitis involving the rectum and sigmoid colon. Associated wall thickening and edema. No abscess. No mesenteric gas. 4. Colorectal anastomosis with focal narrowing at this region. A stricture   is not excluded. CT HEAD WO CONTRAST   Final Result   No acute intracranial abnormality. XR CHEST PORTABLE   Final Result   No acute abnormality.                  Assessment/Plan:    Active Hospital Problems    Diagnosis     Fever and chills [R50.9]      Priority: Medium    PVD (peripheral vascular disease) ([facility] [I73.9]      Priority: Medium    AMS (altered mental status) [R41.82]      Priority: Medium    Acute metabolic encephalopathy [Q66.96]      Priority: Medium    Cellulitis [L03.90]      Priority: Medium    ESRD (end stage renal disease) on dialysis ([facility] [N18.6, Z99.2]      Priority: Medium       1.   thoght to have cellulitis, patient end-stage renal disease,was on vancomycin and cefepime, ID following, IV antibiotics discontinued by ID. Podiatry following and vascular surgery consulted  2.  Acute metabolic encephalopathy, improving  3.  End-stage renal disease nephrology consulted patient had recent fistula not using yet he still using applying which still does not look infected at this time. 4.  Possible colitis, no abdominal pain. 5.  History of colon cancer follow-up as outpatient  6.  Essential hypertension, controlled at this time  7.  History of VTE continue oral anticoagulation  8. Peripheral artery disease, cardiovascular consulted and likely to have angiogram on Tuesday. Diet: ADULT DIET;  Regular; Mildly Thick (Nectar)  ADULT ORAL NUTRITION SUPPLEMENT; Lunch, Dinner; Frozen Oral Supplement  Code Status: Full Code      Rocío Ho MD

## 2022-06-04 NOTE — CARE COORDINATION
Call received from Oliva Esquivel from [facility] this worker that patient's percert for [facility] requires a Peer to Peer. Spoke with admissions from [facility] who states that patient [place] is pending however patient is able to return as long term care with out precert. If skilled care is needed at discharge a physician will need to complete peer to peer. peer to peer can be completed by calling Zeus Hamilton at [phone], option 5 for medical director. You will need to verify patient with his name, , address or [facility] ID [id number]. Peer to peer must be completed by  at 12:00 (noon).        Electronically signed by Janny Matamoros on 2022 at 11:15 AM  [phone]

## 2022-06-04 NOTE — PROGRESS NOTES
[facility]                                               Progress note    Summary:   Deb Velázquez is being seen by nephrology for ESRD management. This is a 78-year-old man with past medical history significant for GI bleed, colon cancer, hypertension presented to the hospital with right foot ulcer. Apparently more confused at [facility] and sent to ED. He denies any complaints. Not having any pain where the ulcer is. He denies abdominal pain, no NVD. Recently admitted with cholecystitis and completed antibiotics. He is awake and alert, has no complaints. Wound care attending to his right lateral ankle wound. CTPA was done on admission that showed no PE, had some proctocolitis. Interval History  Seen on dialysis  Feels ok. No complaints  Planned for intervention on the leg on Tuesday  Labs reviewed. Pre-HD weight today 99.1 kg        Plan:   -HD today per schedule.  -aiming for 3 L UF today. Going off of bed weights, not very accurate. Benita Gunderson MD  [facility]  Office: [phone]    ESRD  Dialyzes TTS  Target weight 94 kg  Has tunneled dialysis cath    Blood pressure  Acceptable  Appears euvolemic    S HPT  Continue calcitriol and Renvela    Anemia  ELENA per outpatient orders    Possible colitis:   Seen on CT  On empiric ABX        CC/Reason for consult:   Reason for consult: ESRD  Chief Complaint   Patient presents with    Fatigue     per nursing home the last 8 hours       Review of Systems:   [address] All other remaining systems are negative. Constitutional:  fever, chills, weakness, weight change, fatigue,      Skin:  rash, pruritus, hair loss, bruising, dry skin, petechiae. Head, Face, Neck   headaches, swelling,  cervical adenopathy.      Respiratory: shortness of breath, cough, or wheezing  Cardiovascular: chest pain, palpitations, dizzy, edema  Gastrointestinal: nausea, vomiting, diarrhea, constipation,belly pain    Yellow skin, blood in stool  Musculoskeletal:  back pain, muscle weakness, gait problems,       joint pain or swelling. Genitourinary:  dysuria, poor urine flow, flank pain, blood in urine  Neurologic:  vertigo, TIA'S, syncope, seizures, focal weakness  Psychosocial:  insomnia, anxiety, or depression. Additional positive findings: -     PMH/SH/FH:    Medical Hx: reviewed and updated as appropriate  Past Medical History:   Diagnosis Date    Anemia 03/2022    Arthritis     CAD (coronary artery disease) 01/2020    Cancer ([facility]     Colon Cancer diagnosed last month    Cerebral infarction ([facility]     Cognitive communication deficit     COVID-19     Diabetes mellitus ([address]     Dysphagia     End stage renal disease ([address]     Fatigue     Gastrointestinal hemorrhage     Hemodialysis patient ([facility] 2019    ckd-goes to dialysis Tuesday, Thurs, Fri    Hx of blood clots     Hyperlipidemia     Hypertension     Hyponatremia     Risk for falls     Splenomegaly 02/10/2021         Surgical Hx: reviewed and updated as appropriate   has a past surgical history that includes IR PERC ARTERIOVENOUS FISTULA CREATION (Left); colectomy (N/A, 01/26/2022); sigmoidoscopy (N/A, 02/17/2022); IR EMBOLIZATION HEMORRHAGE (02/19/2022); Tunneled venous catheter placement (Right, 02/21/2022); IR TUNNELED CVC PLACE WO SQ PORT/PUMP > 5 YEARS (2/21/2022); Cardiac catheterization (2019); Colonoscopy; Dialysis fistula creation (Left, 4/29/2022); Cholecystectomy, laparoscopic (N/A, 5/16/2022); ERCP (N/A, 5/16/2022); ERCP (N/A, 5/16/2022); and Upper gastrointestinal endoscopy (5/16/2022). Social Hx: reviewed and updated as appropriate  Social History     Tobacco Use    Smoking status: Former Smoker    Smokeless tobacco: Never Used   Substance Use Topics    Alcohol use: Not Currently        Family hx: reviewed and updated as appropriate  family history includes High Blood Pressure in his father.     Medications:   sodium chloride flush, 5-40 mL, 2 times per day  [START ON 6/3/2022] cefepime, 1,000 mg, Q24H  apixaban, 5 mg, BID  aspirin, 81 mg, Daily  atenolol, 25 mg, Nightly  atorvastatin, 10 mg, Nightly  calcitRIOL, 0.25 mcg, Nightly  gabapentin, 100 mg, TID  LORazepam, 0.5 mg, BID  pantoprazole, 20 mg, Nightly  sevelamer, 800 mg, TID WC  tamsulosin, 0.4 mg, QAM       Lisinopril    Allergies: Allergies   Allergen Reactions    Lisinopril Swelling     Allergy listed on paperwork from , no reaction noted         Physical Exam/Objective:   Height: 6' 0.5\" (1.842 m), Weight: 213 lb 10 oz (96.9 kg), BP: 109/69      General appearance:  in no acute distress, comfortable, communicative, awake and alert. HEENT: no icterus, EOM intact, trachea midline. Neck : no masses, appears symmetrical and no JVD appreciated. Respiratory: Respiratory effort normal, bilateral equal chest rise. No wheeze, no crackles   Cardiovascular: Ausculation shows RRR and  no edema   Abdomen: abdomen is soft, non distended, no masses, no pain with palpation. Musculoskeletal:  no joint swelling  Skin: lateral right ankle wound  Neuro:   Follows commands, moves all extremities spontaneously       Data:   Lab Results   Component Value Date    WBC 2.8 (L) 06/03/2022    HGB 11.2 (L) 06/03/2022    HCT 34.3 (L) 06/03/2022    MCV 90.8 06/03/2022     06/03/2022     Lab Results   Component Value Date    CREATININE 2.5 (H) 06/03/2022    BUN 16 06/03/2022     (L) 06/03/2022    K 4.2 06/03/2022    CL 97 (L) 06/03/2022    CO2 27 06/03/2022     Lab Results   Component Value Date    .5 (H) 02/21/2022    CALCIUM 8.5 06/03/2022    PHOS 2.5 05/19/2022

## 2022-06-05 LAB
GLUCOSE BLD-MCNC: 160 MG/DL (ref 70–99)
GLUCOSE BLD-MCNC: 173 MG/DL (ref 70–99)
PERFORMED ON: ABNORMAL
PERFORMED ON: ABNORMAL

## 2022-06-05 PROCEDURE — 99232 SBSQ HOSP IP/OBS MODERATE 35: CPT | Performed by: NURSE PRACTITIONER

## 2022-06-05 PROCEDURE — 6370000000 HC RX 637 (ALT 250 FOR IP): Performed by: NURSE PRACTITIONER

## 2022-06-05 PROCEDURE — 2060000000 HC ICU INTERMEDIATE R&B

## 2022-06-05 PROCEDURE — 6370000000 HC RX 637 (ALT 250 FOR IP): Performed by: INTERNAL MEDICINE

## 2022-06-05 PROCEDURE — 94760 N-INVAS EAR/PLS OXIMETRY 1: CPT

## 2022-06-05 RX ADMIN — SEVELAMER CARBONATE 800 MG: 800 TABLET, FILM COATED ORAL at 10:02

## 2022-06-05 RX ADMIN — Medication 6 MG: at 21:27

## 2022-06-05 RX ADMIN — GABAPENTIN 100 MG: 100 CAPSULE ORAL at 14:00

## 2022-06-05 RX ADMIN — GABAPENTIN 100 MG: 100 CAPSULE ORAL at 09:57

## 2022-06-05 RX ADMIN — ATORVASTATIN CALCIUM 10 MG: 10 TABLET, FILM COATED ORAL at 21:27

## 2022-06-05 RX ADMIN — LORAZEPAM 0.5 MG: 0.5 TABLET ORAL at 09:57

## 2022-06-05 RX ADMIN — OXYCODONE 5 MG: 5 TABLET ORAL at 05:37

## 2022-06-05 RX ADMIN — TAMSULOSIN HYDROCHLORIDE 0.4 MG: 0.4 CAPSULE ORAL at 09:57

## 2022-06-05 RX ADMIN — ATENOLOL 25 MG: 25 TABLET ORAL at 21:27

## 2022-06-05 RX ADMIN — PANTOPRAZOLE SODIUM 20 MG: 20 TABLET, DELAYED RELEASE ORAL at 21:27

## 2022-06-05 RX ADMIN — SEVELAMER CARBONATE 800 MG: 800 TABLET, FILM COATED ORAL at 17:42

## 2022-06-05 RX ADMIN — GABAPENTIN 100 MG: 100 CAPSULE ORAL at 21:27

## 2022-06-05 RX ADMIN — ASPIRIN 81 MG: 81 TABLET, CHEWABLE ORAL at 09:57

## 2022-06-05 RX ADMIN — LORAZEPAM 0.5 MG: 0.5 TABLET ORAL at 21:27

## 2022-06-05 RX ADMIN — CALCITRIOL 0.25 MCG: 0.25 CAPSULE ORAL at 21:27

## 2022-06-05 RX ADMIN — SEVELAMER CARBONATE 800 MG: 800 TABLET, FILM COATED ORAL at 12:30

## 2022-06-05 ASSESSMENT — PAIN DESCRIPTION - LOCATION: LOCATION: ANKLE

## 2022-06-05 ASSESSMENT — PAIN DESCRIPTION - ORIENTATION: ORIENTATION: RIGHT

## 2022-06-05 ASSESSMENT — PAIN SCALES - WONG BAKER: WONGBAKER_NUMERICALRESPONSE: 0

## 2022-06-05 ASSESSMENT — PAIN SCALES - GENERAL: PAINLEVEL_OUTOF10: 5

## 2022-06-05 ASSESSMENT — PAIN - FUNCTIONAL ASSESSMENT: PAIN_FUNCTIONAL_ASSESSMENT: PREVENTS OR INTERFERES SOME ACTIVE ACTIVITIES AND ADLS

## 2022-06-05 ASSESSMENT — PAIN DESCRIPTION - DESCRIPTORS: DESCRIPTORS: THROBBING

## 2022-06-05 NOTE — PROGRESS NOTES
Arturo Call   Daily Progress Note      Admit Date:  6/1/2022    Reason for follow up visit: Critical limb ischemia    CC: \"I feel okay today. My leg is not hurting. \"    78 y/o male with PMH significant for ESRD on HD, diabetes mellitus, PAF, HTN, HLP, DVT/PE, GI bleed and colon CA admitted to [facility] from the nursing home with fatigue and R lateral foot wound that has been worsening. Podiatry has been following and arterial dopplers LE suggestive of severe tibial disease. Plan is for peripheral angiogram on 6/7/2022    Interval History:  Pt. seen and examined; records reviewed  S/P dialysis yesterday  BP stable. Remains on room air  Plan for peripheral angiogram on 6/7/22    Subjective:  Pt with no acute overnight cardiac events. Denies chest pain, SOB, cough, palpitations or dizziness    Review of Systems:   · Constitutional: no unanticipated weight loss. There's been no change in energy level, sleep pattern, or activity level. No fevers, chills. · Eyes: No visual changes or diplopia. No scleral icterus. · ENT: No Headaches, hearing loss or vertigo. No mouth sores or sore throat. · Cardiovascular: as reviewed in HPI  · Respiratory: No cough or wheezing, no sputum production. No hematemesis. · Gastrointestinal: No abdominal pain, appetite loss, blood in stools. No change in bowel or bladder habits. · Genitourinary: No dysuria, trouble voiding, or hematuria. · Musculoskeletal:  No gait disturbance, no joint complaints. · Integumentary: No rash or pruritis. · Neurological: No headache, diplopia, change in muscle strength, numbness or tingling. · Psychiatric: No anxiety or depression. · Endocrine: No temperature intolerance. No excessive thirst, fluid intake, or urination. No tremor. · Hematologic/Lymphatic: No abnormal bruising or bleeding, blood clots or swollen lymph nodes. · Allergic/Immunologic: No nasal congestion or hives.     Objective:   /69   Pulse 61   Temp 97.6 °F (36.4 °C) (Oral)   Resp 16   Ht 6' 0.5\" (1.842 m)   Wt 215 lb 13.3 oz (97.9 kg)   SpO2 93%   BMI 28.87 kg/m²   No intake or output data in the 24 hours ending 06/05/22 0752  Wt Readings from Last 3 Encounters:   06/05/22 215 lb 13.3 oz (97.9 kg)   05/19/22 213 lb 13.5 oz (97 kg)   04/29/22 204 lb (92.5 kg)       Physical Exam:  General: In no acute distress. Awake, alert, and oriented X4. Resting in bed  Skin:  Warm and dry. Neck:  Supple. No JVD appreciated. Chest: Lungs clear to auscultation. No wheezes/rhonchi/rales  Cardiovascular:  RRR. Normal S1 and S2. Soft systolic murmur. Abdomen:  soft, nontender, nondistended, +bowel sounds. Extremities:  1+ R pedal and lower pretibial edema. 2+ bilateral radial pulses. No palpable DP/PT pulses    Medications:    sodium chloride flush  5-40 mL IntraVENous 2 times per day    apixaban  5 mg Oral BID    aspirin  81 mg Oral Daily    atenolol  25 mg Oral Nightly    atorvastatin  10 mg Oral Nightly    calcitRIOL  0.25 mcg Oral Nightly    gabapentin  100 mg Oral TID    LORazepam  0.5 mg Oral BID    pantoprazole  20 mg Oral Nightly    sevelamer  800 mg Oral TID WC    tamsulosin  0.4 mg Oral QAM      sodium chloride 250 mL (06/03/22 0613)     melatonin, sodium chloride flush, sodium chloride, acetaminophen **OR** acetaminophen, oxyCODONE, polyethylene glycol, heparin (porcine)    Lab Data:  CBC:   Recent Labs     06/02/22  1317 06/03/22  0619   WBC 3.5* 2.8*   HGB 11.7* 11.2*    143     BMP:    Recent Labs     06/02/22  1317 06/03/22  0619    135*   K 4.5 4.2   CO2 25 27   BUN 34* 16   CREATININE 3.4* 2.5*     LIVR:   Recent Labs     06/02/22  1317 06/03/22  0619   AST 15 16   ALT 17 14     6/3/2022 LE dopplers:  Right Lower Extremity    Calcific atherosclerosis throughout.  Reduced visualization of the right    common and deep femoral arteries due to groin arterial line    Normal blood flow through the femoral, popliteal, and tibial arteries (PT &  Peroneal arteries) Occluded anterior tibial artery        Left Lower Extremity    Calcific atherosclerosis throughout.    Normal blood flow through the femoral, popliteal, and PT/Peroneal arteries    Occluded anterior tibial artery        Irregular heart rhythm is noted throughout this examination      Echo 2/19/2022:  Left ventricular cavity size is normal.  Ejection fraction is visually estimated to be 55-60%. There is moderate concentric left ventricular hypertrophy. No regional wall motion abnormalities are noted. Left atrium is of normal size. Tricuspid aortic valve. Thickened aortic valve leaflets noted. Normal right ventricular size and function. TAPSE= 1.7cm  Right Heart Strain= -10.2%  The right atrium is normal in size. There is a trivial pericardial effusion noted.     Echo 1/14/2022:  Left ventricular size is normal.  Moderate concentric left ventricular hypertrophy is present. Global left ventricular function is normal with ejection fraction estimated from 55 % to 60 %. Grade II diastolic dysfunction with elevated LV filling pressures. Normal right ventricular size and function. Telemetry: Sinus rhythm with PAC/PVC    Assessment/Plan:    1. Critical limb ischemia/PAD  -mouna category 5; AT/peroneal angiosome  -hold Eliquis starting this evening and plan for angiogram on 6/7/2022  -continue statin     2. ESRD  -dialysis yesterday  -nephrology following     3. H/O VTE  -hence the Eliquis  -hold Eliquis for now until procedure completed    4.  PAF  -maintaining SR  -continue BB  -on Eliquis        Electronically signed by IRINA Espinoza - CNP on 6/5/2022 at 7:52 AM

## 2022-06-05 NOTE — PROGRESS NOTES
Department of Podiatric Surgery  Progress Note        CHIEF COMPLAINT:    Chief Complaint   Patient presents with    Fatigue     per nursing home the last 8 hours          HISTORY OF PRESENT ILLNESS:      The patient is a 77 y.o. male who presents with AMS,DM with ESRD on HD, h/o CVA and CA. Dressings intact    Past Medical History:        Diagnosis Date    Anemia 03/2022    Arthritis     CAD (coronary artery disease) 01/2020    [facility]     Colon Cancer diagnosed last month    Cerebral infarction [facility]     Cognitive communication deficit     COVID-19     Diabetes mellitus ([facility]     Dysphagia     End stage renal disease ([facility]     Fatigue     Gastrointestinal hemorrhage     Hemodialysis patient ([facility] 2019    ckd-goes to dialysis Tuesday, Thurs, Fri    Hx of blood clots     Hyperlipidemia     Hypertension     Hyponatremia     Risk for falls     Splenomegaly 02/10/2021       Allergies   Allergen Reactions    Lisinopril Swelling     Allergy listed on paperwork from [employer] [facility], no reaction noted       REVIEW OF SYSTEMS:  Review of systems not obtained due to patient factors - mental status    PHYSICAL EXAM:  VITALS:  /69   Pulse 61   Temp 97.9 °F (36.6 °C) (Axillary)   Resp 16   Ht 6' 0.5\" (1.842 m)   Wt 215 lb 13.3 oz (97.9 kg)   SpO2 90%   BMI 28.87 kg/m²   CONSTITUTIONAL:  awake, alert, cooperative, no apparent distress, and appears stated age  EYES:  pupils equal, round and reactive to light, extra ocular muscles intact, sclera clear, conjunctiva normal      LOWER EXTREMITY:  VASCULAR: Pedal Pulses absent. positive signs of ischemia. MUSCULOSKELETAL:  negative gross deformity. NEUROLOGIC:  Epicritic sensation, light touch, joint position sensediminished  SKIN:  The left leg and foot have no abnormal lesions. Right lateral ankle decubitus ulceration. This was present on admission. The kelly-wound tissue is mildly erythematous. No edema or fluctuance.         MUGS 2 wound of 2.4 x 2.1 cm     I/O:  No intake or output data in the 24 hours ending 06/05/22 0954           Wt Readings from Last 3 Encounters:   06/05/22 215 lb 13.3 oz (97.9 kg)   05/19/22 213 lb 13.5 oz (97 kg)   04/29/22 204 lb (92.5 kg)       LABS:    Recent Labs     06/02/22  1317 06/03/22  0619   WBC 3.5* 2.8*   HGB 11.7* 11.2*   HCT 36.2* 34.3*    143        Recent Labs     06/03/22  0619   *   K 4.2   CL 97*   CO2 27   BUN 16   CREATININE 2.5*        Recent Labs     06/02/22  1317 06/03/22  0619   PROT 6.0* 5.9*       IMAGING:  Arterial dopplers     Right   Right SHANTA was not available due to inadequate pulses and noncompressible   tibial vessels. (DP inaudible, PT non compressible)   Right common femoral and profunda femoral vein were not visualized due to   arterial IV line in place. The majority of the waveforms are multiphasic throughout the right lower   extremity. Ultrasound images of the right lower extremity reveal moderate to severe   calcification throughout. Possible occlusion of the right proximal anterior tibial artery with flow   reversal in the mid anterior tibial artery. Left   Right SHANTA was not available due to inadequate pulses and noncompressible   tibial vessels. (DP inaudible, PT non compressible)   The majority of the waveforms are multiphasic throughout the left lower   extremity. Ultrasound images of the left lower extremity reveal moderate to severe   calcification throughout. Possible occlusion of the mid anterior tibial artery with flow reversal in the   distal anterior tibial artery         MICRO:   No drainage to culture    ASSESSMENT   Decubitus ulceration lateral right ankle  DM with peripheral neuropathy  Moderate to severe PAD  Diabetic Ulcer right ankle    PLAN:  Evaluation and Management x 30 minutes with greater than 50% of the time spent with the patient discussing the etiology and treatment options of the chief complaint.     1. Right ankle wound    2. Non-invasives indicate moderate to severe disease to RIGHT  Lower extremity    3. OK for Nursing team to change dressings daily   - betaine paint to he area, then Mepilex border apply    4. Following    DISPO: As above. Thanks for the opportunity to participate in this patient's care.      Fili Smith DPM   [facility]  Pager - [phone]  Office: [phone]  Fax: [phone]

## 2022-06-05 NOTE — PLAN OF CARE
Problem: Skin/Tissue Integrity  Goal: Absence of new skin breakdown  Description: 1. Monitor for areas of redness and/or skin breakdown  Outcome: Progressing  Note: Pt is on a specialty bed. B heels floated using a pillow. Pt is able to reposition himself in the bed with encouragement. He has pillows/wedge to help keep him on his side. RN assisted pt with boosting up in the bed as need t/o shift. Pure wick in place for urinary incontinence. Problem: Confusion  Goal: Confusion, delirium, dementia, or psychosis is improved or at baseline  Description: INTERVENTIONS:  1. Assess for possible contributors to thought disturbance, including medications, impaired vision or hearing, underlying metabolic abnormalities, dehydration, psychiatric diagnoses, and notify attending LIP  2. [place] high risk fall precautions, as indicated  3. Provide frequent short contacts to provide reality reorientation, refocusing and direction  4. Decrease environmental stimuli, including noise as appropriate  5. Monitor and intervene to maintain adequate nutrition, hydration, elimination, sleep and activity  6. If unable to ensure safety without constant attention obtain sitter and review sitter guidelines with assigned personnel  7. Initiate Psychosocial CNS and Spiritual Care consult, as indicated  Outcome: Progressing  Flowsheets (Taken 6/4/2022 2210)  Effect of thought disturbance (confusion, delirium, dementia, or psychosis) are managed with adequate functional status: [place] high risk fall precautions, as indicated  Note: Pt was more oriented this evening. He was able to select hospital and [place] when given options to select from for location and situation. Pt thought it was May 2022. He allowed the RN to administer his medications and do an assessment w/o any issues. Pt's daughter Dorina Artis) is at bedside and agrees that pt seems to more cooperative and calmer than previous nights.    Pt's daughters have been asking for melatonin 6 mg PO QHS PRN since pt admitted. RN has written a reminder on the white board and reminded family to follow up with day shift provider regarding order placement. Family asked for melatonin again but still no order. RN messaged Ashlee LYONS and she stated it was okay to order melatonin per pt's PTA med list. RN entered order and updated family. Pt's daughter also requested that pt's MN VS be deferred again to allow the pt to get uninterrupted rest. RN notified PCA to not disturb the pt unless pt or daughter calls out for assistance. RN completing rounds from window. Door closed, lights/TV off. Pt repositioned for comfort and blankets provided. Problem: Safety - Adult  Goal: Free from fall injury  Outcome: Progressing  Flowsheets (Taken 6/4/2022 0049)  Free From Fall Injury:   Instruct family/caregiver on patient safety   Based on caregiver fall risk screen, instruct family/caregiver to ask for assistance with transferring infant if caregiver noted to have fall risk factors  Note: Pt is a high fall risk. Bed in locked, low position with side rails up X 3 and an active bed alarm. Fall risk sign, socks, and bracelet in place. Pt may benefit from PT/OT consults. He was wanting to get OOB to the chair shortly after shift change. Per pt's daughters he is a maxi lift for transfers. RN obtained green maxi sling and set up pt's chair with alarm/pad. When staff able to get to bedside to get pt up to chair, he requested to stay in bed because he was tired. Family would like pt to get OOB tomorrow.       Problem: Infection - Adult  Goal: Absence of infection during hospitalization  Outcome: Progressing  Flowsheets (Taken 6/4/2022 2210)  Absence of infection during hospitalization:   Assess and monitor for signs and symptoms of infection   Monitor lab/diagnostic results   Monitor all insertion sites i.e., indwelling lines, tubes and drains   Administer medications as ordered   Instruct and encourage patient and family to use good hand hygiene technique  Note: Pt has not had a BM since . C.diff stool sample order . RN reached out to Infection Prevention RN. This RN discontinued the Contact Plus isolation order and the Infection Prevention RN removed the flag for isolation in the pt's chart. Pt's daughter and PCA updated on changes to plan of care. No questions at this time.

## 2022-06-05 NOTE — PROGRESS NOTES
Hospitalist Progress Note      PCP: Liam Head    Date of Admission: 6/1/2022        Subjective: Fever chills, denies lower extremity pain, no abdominal pain. Medications:  Reviewed    Infusion Medications    sodium chloride 250 mL (06/03/22 6039)     Scheduled Medications    sodium chloride flush  5-40 mL IntraVENous 2 times per day    apixaban  5 mg Oral BID    aspirin  81 mg Oral Daily    atenolol  25 mg Oral Nightly    atorvastatin  10 mg Oral Nightly    calcitRIOL  0.25 mcg Oral Nightly    gabapentin  100 mg Oral TID    LORazepam  0.5 mg Oral BID    pantoprazole  20 mg Oral Nightly    sevelamer  800 mg Oral TID WC    tamsulosin  0.4 mg Oral QAM     PRN Meds: melatonin, sodium chloride flush, sodium chloride, acetaminophen **OR** acetaminophen, oxyCODONE, polyethylene glycol, heparin (porcine)    No intake or output data in the 24 hours ending 06/05/22 0838    Physical Exam Performed:    /69   Pulse 61   Temp 97.9 °F (36.6 °C) (Axillary)   Resp 16   Ht 6' 0.5\" (1.842 m)   Wt 215 lb 13.3 oz (97.9 kg)   SpO2 90%   BMI 28.87 kg/m²     General appearance: No apparent distress  Neck: Supple  Respiratory:  Normal respiratory effort. Clear to auscultation, bilaterally without Rales/Wheezes/Rhonchi. Cardiovascular: Regular rate and rhythm with normal S1/S2 without murmurs, rubs or gallops. Abdomen: Soft, non-tender  Musculoskeletal: No clubbing, cyanosis.   Ulcer on lateral aspects right ankle  Skin: As above  Neurologic: All extremities  Psychiatric: Alert and oriented  Capillary Refill: Brisk,3 seconds, normal   Peripheral Pulses: +2 palpable, equal bilaterally       Labs:   Recent Labs     06/02/22 1317 06/03/22 0619   WBC 3.5* 2.8*   HGB 11.7* 11.2*   HCT 36.2* 34.3*    143     Recent Labs     06/02/22  1317 06/03/22 0619    135*   K 4.5 4.2    97*   CO2 25 27   BUN 34* 16   CREATININE 3.4* 2.5*   CALCIUM 8.5 8.5     Recent Labs     06/02/22 1317 06/03/22  0619   AST 15 16   ALT 17 14   BILITOT 0.9 0.8   ALKPHOS 146* 130*     No results for input(s): INR in the last 72 hours. No results for input(s): Lorenso Favre in the last 72 hours. Urinalysis:      Lab Results   Component Value Date    NITRU Negative 06/01/2022    WBCUA 1 06/01/2022    BACTERIA None Seen 06/01/2022    RBCUA 2 06/01/2022    BLOODU Negative 06/01/2022    SPECGRAV 1.019 06/01/2022    GLUCOSEU Negative 06/01/2022       Radiology:  VL DUP LOWER EXTREMITY ARTERIES BILATERAL   Final Result      CT ABDOMEN PELVIS W IV CONTRAST Additional Contrast? None   Final Result   1. No central, lobar, or segmental pulmonary embolus. 2. Trace bilateral pleural effusions and mild bibasilar atelectasis. 3. Findings of moderate proctocolitis involving the rectum and sigmoid colon. Associated wall thickening and edema. No abscess. No mesenteric gas. 4. Colorectal anastomosis with focal narrowing at this region. A stricture   is not excluded. CT CHEST PULMONARY EMBOLISM W CONTRAST   Final Result   1. No central, lobar, or segmental pulmonary embolus. 2. Trace bilateral pleural effusions and mild bibasilar atelectasis. 3. Findings of moderate proctocolitis involving the rectum and sigmoid colon. Associated wall thickening and edema. No abscess. No mesenteric gas. 4. Colorectal anastomosis with focal narrowing at this region. A stricture   is not excluded. CT HEAD WO CONTRAST   Final Result   No acute intracranial abnormality. XR CHEST PORTABLE   Final Result   No acute abnormality.                  Assessment/Plan:    Active Hospital Problems    Diagnosis     Fever and chills [R50.9]      Priority: Medium    PVD (peripheral vascular disease) ([facility] [I73.9]      Priority: Medium    AMS (altered mental status) [R41.82]      Priority: Medium    Acute metabolic encephalopathy [U95.03]      Priority: Medium    Cellulitis [L03.90]      Priority: Medium    ESRD (end stage renal disease) on dialysis ([facility] [N18.6, Z99.2]      Priority: Medium     1.   Thoght to have cellulitis, patient end-stage renal disease,was on vancomycin and cefepime, ID were following, IV antibiotics discontinued by ID. Podiatry following and vascular surgery consulted  2.  Acute metabolic encephalopathy, improving  3.  End-stage renal disease nephrology consulted patient had recent fistula not using yet he still using applying which still does not look infected at this time. 4.  Possible colitis, no abdominal pain. 5.  History of colon cancer follow-up as outpatient  6.  Essential hypertension, controlled at this time  7.  History of VTE continue oral anticoagulation  8.  Peripheral artery disease, cardiovascular consulted and likely to have angiogram on Tuesday. Diet: ADULT DIET;  Regular; Mildly Thick (Nectar)  ADULT ORAL NUTRITION SUPPLEMENT; Lunch, Dinner; Frozen Oral Supplement  Code Status: Full Code      Jp Salgado MD

## 2022-06-06 LAB
A/G RATIO: 0.9 (ref 1.1–2.2)
ALBUMIN SERPL-MCNC: 2.7 G/DL (ref 3.4–5)
ALP BLD-CCNC: 116 U/L (ref 40–129)
ALT SERPL-CCNC: 11 U/L (ref 10–40)
ANION GAP SERPL CALCULATED.3IONS-SCNC: 11 MMOL/L (ref 3–16)
AST SERPL-CCNC: 11 U/L (ref 15–37)
BASOPHILS ABSOLUTE: 0 K/UL (ref 0–0.2)
BASOPHILS RELATIVE PERCENT: 0.6 %
BILIRUB SERPL-MCNC: 0.6 MG/DL (ref 0–1)
BLOOD CULTURE, ROUTINE: NORMAL
BUN BLDV-MCNC: 23 MG/DL (ref 7–20)
CALCIUM SERPL-MCNC: 8.7 MG/DL (ref 8.3–10.6)
CHLORIDE BLD-SCNC: 103 MMOL/L (ref 99–110)
CO2: 27 MMOL/L (ref 21–32)
CREAT SERPL-MCNC: 3.4 MG/DL (ref 0.8–1.3)
CULTURE, BLOOD 2: NORMAL
EOSINOPHILS ABSOLUTE: 0.2 K/UL (ref 0–0.6)
EOSINOPHILS RELATIVE PERCENT: 5.1 %
GFR AFRICAN AMERICAN: 21
GFR NON-AFRICAN AMERICAN: 18
GLUCOSE BLD-MCNC: 108 MG/DL (ref 70–99)
HCT VFR BLD CALC: 36.1 % (ref 40.5–52.5)
HEMOGLOBIN: 11.8 G/DL (ref 13.5–17.5)
HEPATITIS B CORE TOTAL ANTIBODY: NEGATIVE
HEPATITIS BE ANTIGEN: NEGATIVE
LYMPHOCYTES ABSOLUTE: 1.2 K/UL (ref 1–5.1)
LYMPHOCYTES RELATIVE PERCENT: 30.1 %
MCH RBC QN AUTO: 30.2 PG (ref 26–34)
MCHC RBC AUTO-ENTMCNC: 32.7 G/DL (ref 31–36)
MCV RBC AUTO: 92.6 FL (ref 80–100)
MONOCYTES ABSOLUTE: 0.2 K/UL (ref 0–1.3)
MONOCYTES RELATIVE PERCENT: 6 %
NEUTROPHILS ABSOLUTE: 2.3 K/UL (ref 1.7–7.7)
NEUTROPHILS RELATIVE PERCENT: 58.2 %
PDW BLD-RTO: 17.7 % (ref 12.4–15.4)
PLATELET # BLD: 160 K/UL (ref 135–450)
PMV BLD AUTO: 7.7 FL (ref 5–10.5)
POTASSIUM SERPL-SCNC: 4 MMOL/L (ref 3.5–5.1)
RBC # BLD: 3.9 M/UL (ref 4.2–5.9)
SODIUM BLD-SCNC: 141 MMOL/L (ref 136–145)
TOTAL PROTEIN: 5.8 G/DL (ref 6.4–8.2)
WBC # BLD: 4 K/UL (ref 4–11)

## 2022-06-06 PROCEDURE — 6370000000 HC RX 637 (ALT 250 FOR IP): Performed by: NURSE PRACTITIONER

## 2022-06-06 PROCEDURE — 2060000000 HC ICU INTERMEDIATE R&B

## 2022-06-06 PROCEDURE — 99222 1ST HOSP IP/OBS MODERATE 55: CPT | Performed by: SURGERY

## 2022-06-06 PROCEDURE — 85025 COMPLETE CBC W/AUTO DIFF WBC: CPT

## 2022-06-06 PROCEDURE — 80053 COMPREHEN METABOLIC PANEL: CPT

## 2022-06-06 PROCEDURE — 92526 ORAL FUNCTION THERAPY: CPT

## 2022-06-06 PROCEDURE — 36415 COLL VENOUS BLD VENIPUNCTURE: CPT

## 2022-06-06 PROCEDURE — 94760 N-INVAS EAR/PLS OXIMETRY 1: CPT

## 2022-06-06 PROCEDURE — 6370000000 HC RX 637 (ALT 250 FOR IP): Performed by: INTERNAL MEDICINE

## 2022-06-06 PROCEDURE — 6370000000 HC RX 637 (ALT 250 FOR IP): Performed by: PODIATRIST

## 2022-06-06 RX ADMIN — ATORVASTATIN CALCIUM 10 MG: 10 TABLET, FILM COATED ORAL at 22:04

## 2022-06-06 RX ADMIN — LORAZEPAM 0.5 MG: 0.5 TABLET ORAL at 22:04

## 2022-06-06 RX ADMIN — Medication 6 MG: at 22:05

## 2022-06-06 RX ADMIN — CALCITRIOL 0.25 MCG: 0.25 CAPSULE ORAL at 22:05

## 2022-06-06 RX ADMIN — SEVELAMER CARBONATE 800 MG: 800 TABLET, FILM COATED ORAL at 09:12

## 2022-06-06 RX ADMIN — SEVELAMER CARBONATE 800 MG: 800 TABLET, FILM COATED ORAL at 17:25

## 2022-06-06 RX ADMIN — GABAPENTIN 100 MG: 100 CAPSULE ORAL at 09:12

## 2022-06-06 RX ADMIN — GABAPENTIN 100 MG: 100 CAPSULE ORAL at 22:05

## 2022-06-06 RX ADMIN — ATENOLOL 25 MG: 25 TABLET ORAL at 22:05

## 2022-06-06 RX ADMIN — PANTOPRAZOLE SODIUM 20 MG: 20 TABLET, DELAYED RELEASE ORAL at 22:04

## 2022-06-06 RX ADMIN — GABAPENTIN 100 MG: 100 CAPSULE ORAL at 14:48

## 2022-06-06 RX ADMIN — SEVELAMER CARBONATE 800 MG: 800 TABLET, FILM COATED ORAL at 12:09

## 2022-06-06 RX ADMIN — Medication: at 22:05

## 2022-06-06 RX ADMIN — TAMSULOSIN HYDROCHLORIDE 0.4 MG: 0.4 CAPSULE ORAL at 09:12

## 2022-06-06 RX ADMIN — ASPIRIN 81 MG: 81 TABLET, CHEWABLE ORAL at 09:12

## 2022-06-06 RX ADMIN — LORAZEPAM 0.5 MG: 0.5 TABLET ORAL at 09:12

## 2022-06-06 NOTE — CARE COORDINATION
Case management follow up & chart review. Patient scheduled for angiogram tomorrow per Cardiology notes. At discharge patient will return to [facility]. Confirmed return w/ patient's wife & Carola at [facility]. No covid test needed.   Electronically signed by Tia Herrera RN Case Management [phone] on 6/6/2022 at 3:30 PM

## 2022-06-06 NOTE — PROGRESS NOTES
Physician Progress Note      Homer Perales  CSN #:                  [id number]  :                       1945  ADMIT DATE:       2022 9:15 PM  [address] [place] DATE:  RESPONDING  PROVIDER #:        Socorro Curtis MD          QUERY TEXT:    Pt admitted with  RLE cellulitis. Pt noted to have DM 2. If possible, please   document in progress notes and discharge summary the relationship, if any,   between cellulitis and DM. The medical record reflects the following:  Risk Factors: Current admission for possible cellulitis and wound to RLE. HX-   DM, ESRD  Clinical Indicators: T 100,  . Lorean Snare Gluc 146, 154, 110, 173. .  Treatment: -IVF, Cefipime IV, Flagyl IV, Vanco IV,    Thank You,  Valery Lord RN BSN CDS CRCR  [email or web address]  Options provided:  -- RLE cellulitis associated with Diabetes  -- RLE cellulitis unrelated to Diabetes  -- Other - I will add my own diagnosis  -- Disagree - Not applicable / Not valid  -- Disagree - Clinically unable to determine / Unknown  -- Refer to Clinical Documentation Reviewer    PROVIDER RESPONSE TEXT:    Cellulitis likely ruled out    Query created by: Raffi Saldaña on 2022 9:03 AM      Electronically signed by:  Socorro Curtis MD 2022 3:16 PM

## 2022-06-06 NOTE — PROGRESS NOTES
Speech Language Pathology  [facility]   Speech Therapy  Daily Dysphagia Treatment Note    Magaly Rosario  AGE: 77 y.o. GENDER: male  : 1945  [id number]  EPISODE DATE:  2022     Patient Active Problem List   Diagnosis    GI bleed    History of COVID-19    Hyponatremia    Fatigue    Acute kidney injury superimposed on CKD ([address]    Lethargy    Suspected UTI    Acute CVA (cerebrovascular accident) ([address]    LESLEE (acute kidney injury) ([address]    Malignant neoplasm of colon ([address]    Acute blood loss anemia    COVID-19    Shock circulatory ([address]    Bilateral pulmonary embolism (HCC)    Lactic acidosis    Hemorrhagic shock ([address]    ESRD (end stage renal disease) on dialysis (HCC)    Acute cholecystitis    Pain of upper abdomen    AMS (altered mental status)    Sepsis (HCC)    Acute metabolic encephalopathy    Cellulitis    Fever and chills    PVD [facility]peripheral vascular disease) ([facility]     Allergies   Allergen Reactions    Lisinopril Swelling     Allergy listed on paperwork from [facility], no reaction noted     Treatment Diagnosis: Dysphagia     Chart review:   Patient admitted 22 with C/O confusion  H&P: 77 y. o. male who [facility] to [facility] he was found having more confusion than usual, Dr. Carly Paula to emergency department, initial work-up positive for low-grade fever, possible cellulitis, admitted to the hospital for further management and treatment.  Patient at this time denies fever chills nausea vomiting or abdominal pain, he feels weak, denies cough shortness of breath.  No diarrhea.  Daughter at bedside and helped with history taking.     CHEST X-RAY 22  Impression   No acute abnormality.      CT HEAD 22  Impression   No acute intracranial abnormality.            Subjective:     Current Diet Level: Regular texture diet ; Mildly (nectar) thick liquids    Comments regarding tolerating Current Diet: Good tolerance s/s of aspiration continue  Pt will functionally tolerate ongoing assessment of swallow function with diet to be determined as indicated continue  Pt will advance to least restrictive diet as indicated continue    Assessment:   Impressions:   Mild oral dysphagia, mild pharyngeal dysphagia   · Pt alert, pleasant, cooperative and confused, verbally responsive, follows simple dx, oriented to self and year only  · Mild oral dysphagia characterized by prolonged but adequate bolus formation and movement with regular solids. Suspect premature bolus loss to the pharynx with all. · Mild pharyngeal dysphagia characterized by delayed swallow initiation, decreased laryngeal elevation. No overt signs/symptoms of penetration/aspiration. · Recommend upgrade to thin liquids. · ST to continue to follow during acute admission     Recommended Diet and Intervention 6/6/2022:  Diet Solids Recommendation:  Regular texture diet  Liquid Consistency Recommendation: Thin liquids  Recommended form of Meds:   PO     EDUCATION:   Provided education regarding role of SLP, results of assessment, recommendations and general speech pathology plan of care. [] Pt verbalized understanding and agreement   [] Pt requires ongoing learning   [x] No evidence of recall     Dysphagia Prognosis: [x] good []fair []poor []guarded       Plan:     Continue Dysphagia Therapy: YES    Interventions: Laryngeal Exercises , Pharyngeal Exercise, Diet Tolerance Monitoring , Patient/Family Education , Therapeutic Trials with SLP   Duration/Frequency of therapy while on unit: Speech therapy for dysphagia tx 3-5 times per week during acute care stay. Discharge Instructions:   Recommend ongoing SLP for dysphagia therapy upon discharge from hospital     This note serves as a D/C Summary in the event that this patient is discharged prior to the next therapy session.     Coded treatment time: 0  Total treatment time: 20    Electronically signed by LEIDY Dixon on 6/6/2022 at 3:50 PM

## 2022-06-06 NOTE — PROGRESS NOTES
[facility]                                               Progress note    Summary:   Neymar Moreno is being seen by nephrology for ESRD management. This is a 67-year-old man with past medical history significant for GI bleed, colon cancer, hypertension presented to the hospital with right foot ulcer. Apparently more confused at [facility] and sent to ED. He denies any complaints. Not having any pain where the ulcer is. He denies abdominal pain, no NVD. Recently admitted with cholecystitis and completed antibiotics. He is awake and alert, has no complaints. Wound care attending to his right lateral ankle wound. CTPA was done on admission that showed no PE, had some proctocolitis. Interval History  Alert  No fluid overload    No complaints  Planned for intervention on the leg in AM  Labs reviewed stable  No need for HD today . Pre-HD weight today 97.81 kg  /63    Plan:   -HD in AM   Will coordinate with surgery     Grady Castillo MD  [facility]  Office: [phone]    ESRD  Dialyzes TTS  Target weight 94 kg  Has tunneled dialysis cath    creation L brachiocephalic AVF 6 weeks ago (4/29/2022)  By Dr Meredith Salvador    Blood pressure  Acceptable  Appears euvolemic    SHPT  Continue calcitriol and Renvela    Anemia  ELENA per outpatient orders     R lateral ankle wound?cellulitis  was on vancomycin and cefepime   ID were following   IV antibiotics discontinued by ID.    Podiatry following    vascular surgery     Possible colitis:   Seen on CT  Off empiric ABX    Acute cholecystitis 5/2022. CC/Reason for consult:   Reason for consult: ESRD  Chief Complaint   Patient presents with    Fatigue     per nursing home the last 8 hours       Review of Systems:   [address] All other remaining systems are negative. Constitutional:  fever, chills, weakness, weight change, fatigue,      Skin:  rash, pruritus, hair loss, bruising, dry skin, petechiae.   Head, Face, Neck   headaches, Never Used   Substance Use Topics    Alcohol use: Not Currently        Family hx: reviewed and updated as appropriate  family history includes High Blood Pressure in his father. Medications:   sodium chloride flush, 5-40 mL, 2 times per day  [START ON 6/3/2022] cefepime, 1,000 mg, Q24H  apixaban, 5 mg, BID  aspirin, 81 mg, Daily  atenolol, 25 mg, Nightly  atorvastatin, 10 mg, Nightly  calcitRIOL, 0.25 mcg, Nightly  gabapentin, 100 mg, TID  LORazepam, 0.5 mg, BID  pantoprazole, 20 mg, Nightly  sevelamer, 800 mg, TID WC  tamsulosin, 0.4 mg, QAM       Lisinopril    Allergies: Allergies   Allergen Reactions    Lisinopril Swelling     Allergy listed on paperwork from [facility], no reaction noted         Physical Exam/Objective:   Weight: 215 lb 9.8 oz (97.8 kg), BP: 136/63      General appearance:  in no acute distress, comfortable, communicative, awake and alert. HEENT: no icterus, EOM intact, trachea midline. Neck : no masses, appears symmetrical and no JVD appreciated. Respiratory: Respiratory effort normal, bilateral equal chest rise. No wheeze, no crackles   Cardiovascular: Ausculation shows RRR and  no edema   Abdomen: abdomen is soft, non distended, no masses, no pain with palpation. Musculoskeletal:  no joint swelling  Skin: lateral right ankle wound  Neuro:   Follows commands, moves all extremities spontaneously       Data:   Lab Results   Component Value Date    WBC 4.0 06/06/2022    HGB 11.8 (L) 06/06/2022    HCT 36.1 (L) 06/06/2022    MCV 92.6 06/06/2022     06/06/2022     Lab Results   Component Value Date    CREATININE 3.4 (H) 06/06/2022    BUN 23 (H) 06/06/2022     06/06/2022    K 4.0 06/06/2022     06/06/2022    CO2 27 06/06/2022     Lab Results   Component Value Date    .5 (H) 02/21/2022    CALCIUM 8.7 06/06/2022    PHOS 2.5 05/19/2022

## 2022-06-06 NOTE — CONSULTS
VASCULAR SURGERY CONSULTATION    Jose F Burrell is a 77 y.o. male previous seen by me with creation L brachiocephalic AVF 6 weeks ago (4/29/2022) now admitted with MS changes and R lateral ankle wound. Pt is unable to clearly describe the history of this wound as far as how it occurred or hoew long it had been present. Denies any pain at this time. Unclear as to his functional status currently at his ECF. Originally asked by admitting hospitalist for consultation re: results of vascular study and management recommendations. Recently seen by Dr. Sukhwinder Tomlinson - discussed case with him.     Past Medical History:   Diagnosis Date    Anemia 03/2022    Arthritis     CAD (coronary artery disease) 01/2020    Cancer [facility])     Colon Cancer diagnosed last month    Cerebral infarction ([address]     Cognitive communication deficit     COVID-19     Diabetes mellitus ([address]     Dysphagia     End stage renal disease ([address]     Fatigue     Gastrointestinal hemorrhage     Hemodialysis patient ([address] 2019    ckd-goes to dialysis Tuesday, Thurs, Fri    Hx of blood clots     Hyperlipidemia     Hypertension     Hyponatremia     Risk for falls     Splenomegaly 02/10/2021     Past Surgical History:   Procedure Laterality Date    CARDIAC CATHETERIZATION  2019    CHOLECYSTECTOMY, LAPAROSCOPIC N/A 5/16/2022    LAPAROSCOPIC CHOLECYSTECTOMY WITH CHOLANGIOGRAM performed by Jamie Puentes MD at [address] [facility] 01/26/2022    SIGMOID COLECTOMY, SIGMOIDOSCOPY performed by Jamie Puentes MD at [address] Left 4/29/2022    LEFT BRACHIAL CEPHALIC FISTULA performed by Jennifer Ferreira MD at [address] [facility] ERCP N/A 5/16/2022    ERCP SPHINCTER/PAPILLOTOMY performed by Jaxson Mera MD at [facility]  ERCP N/A 5/16/2022    ERCP STONE REMOVAL/BALLOON SWEEP performed by Jaxson Mera MD at [address] [address]  02/19/2022    IR EMBOLIZATION HEMORRHAGE 2/19/2022 [facility]    IR PERC ARTERIOVENOUS FISTULA CREATION Left     unsure of date    IR TUNNELED CATHETER PLACEMENT GREATER THAN 5 YEARS  2/21/2022    IR TUNNELED CATHETER PLACEMENT GREATER THAN 5 YEARS 2/21/2022 [facility]    SIGMOIDOSCOPY N/A 02/17/2022    SIGMOIDOSCOPY CONTROL HEMORRHAGE performed by Shaka Maynard MD at [address] [facility] [address] TUNNELED VENOUS CATHETER PLACEMENT Right 02/21/2022    Permacath; RIJ access; 23cm; Dr. Dye Sulaiman  5/16/2022    ERCP POLYP SNARE performed by Riaz Arriaza MD at [address] History   Problem Relation Age of Onset    High Blood Pressure Father      Social History     Socioeconomic History    Marital status:      Spouse name: None    Number of children: None    Years of education: None    Highest education level: None   Occupational History    None   Tobacco Use    Smoking status: Former Smoker    Smokeless tobacco: Never Used   Vaping Use    Vaping Use: Never used   Substance and Sexual Activity    Alcohol use: Not Currently    Drug use: Never    Sexual activity: None   Other Topics Concern    None   Social History Narrative    None     Social Determinants of Health     Financial Resource Strain:     Difficulty of Paying Living Expenses: Not on file   Food Insecurity:     Worried About Running Out of Food in the Last Year: Not on file    [place] of Food in the Last Year: Not on file   Transportation Needs:     Lack of Transportation (Medical): Not on file    Lack of Transportation (Non-Medical):  Not on file   Physical Activity:     Days of Exercise per Week: Not on file    Minutes of Exercise per Session: Not on file   Stress:     Feeling of Stress : Not on file   Social Connections:     Frequency of Communication with Friends and Family: Not on file    Frequency of Social Gatherings with Friends and Family: Not on file    Attends Episcopal Services: Not on file    Active Member of Clubs or Organizations: Not on file    Attends Club or Organization Meetings: Not on file    Marital Status: Not on file   Intimate Partner Violence:     Fear of Current or Ex-Partner: Not on file    Emotionally Abused: Not on file    Physically Abused: Not on file    Sexually Abused: Not on file   Housing Stability:     Unable to Pay for Housing in the Last Year: Not on file    Number of [place] in the Last Year: Not on file    Unstable Housing in the Last Year: Not on file     Current Facility-Administered Medications   Medication Dose Route Frequency Provider Last Rate Last Admin    melatonin tablet 6 mg  6 mg Oral Nightly PRN Stephanie Chin, APRN - CNP   6 mg at 06/05/22 2127    sodium chloride flush 0.9 % injection 5-40 mL  5-40 mL IntraVENous 2 times per day Raulito DULCE Jo, DO   10 mL at 06/03/22 2205    sodium chloride flush 0.9 % injection 5-40 mL  5-40 mL IntraVENous PRN Raulito DULCE Jo, DO        0.9 % sodium chloride infusion   IntraVENous PRN Raulito Jo DO 5 mL/hr at 06/03/22 0613 250 mL at 06/03/22 0613    acetaminophen (TYLENOL) tablet 650 mg  650 mg Oral Q6H PRN Raulito DULCE Jo DO        Or    acetaminophen (TYLENOL) suppository 650 mg  650 mg Rectal Q6H PRN Raulito DULCE Jo, DO        [Held by provider] apixaban (ELIQUIS) tablet 5 mg  5 mg Oral BID Manohar Diaz MD   5 mg at 06/04/22 2203    aspirin chewable tablet 81 mg  81 mg Oral Daily Manohar Diaz MD   81 mg at 06/05/22 0957    atenolol (TENORMIN) tablet 25 mg  25 mg Oral Nightly Manohar Diaz MD   25 mg at 06/05/22 2127    atorvastatin (LIPITOR) tablet 10 mg  10 mg Oral Nightly Manohar Diaz MD   10 mg at 06/05/22 2127    calcitRIOL (ROCALTROL) capsule 0.25 mcg  0.25 mcg Oral Nightly Manohar Diaz MD   0.25 mcg at 06/05/22 2127    gabapentin (NEURONTIN) capsule 100 mg  100 mg Oral TID Manohar Diaz MD   100 mg at 06/05/22 2125    LORazepam (ATIVAN) tablet 0.5 mg  0.5 mg Oral BID Manohar Diaz MD   0.5 mg at 06/05/22 2127    oxyCODONE (ROXICODONE) immediate release tablet 5 mg  5 mg Oral Q4H PRN Manohar Diaz MD   5 mg at 06/05/22 0537    pantoprazole (PROTONIX) tablet 20 mg  20 mg Oral Nightly Manohar Diaz MD   20 mg at 06/05/22 2127    sevelamer (RENVELA) tablet 800 mg  800 mg Oral TID WC Manohar Diaz MD   800 mg at 06/05/22 1742    tamsulosin (FLOMAX) capsule 0.4 mg  0.4 mg Oral QAM Manohar Diaz MD   0.4 mg at 06/05/22 0957    polyethylene glycol (GLYCOLAX) packet 17 g  17 g Oral Daily PRN Manohar Diaz MD        heparin (porcine) injection 3,800 Units  3,800 Units IntraCATHeter PRN Marlene Treviño MD         No current facility-administered medications on file prior to encounter. Current Outpatient Medications on File Prior to Encounter   Medication Sig Dispense Refill    docusate sodium (COLACE) 100 MG capsule Take 100 mg by mouth 2 times daily      ondansetron (ZOFRAN) 4 MG tablet Take 4 mg by mouth every 8 hours as needed for Nausea      LORazepam (ATIVAN) 0.5 MG tablet Take 0.5 mg by mouth 2 times daily.  oxyCODONE (ROXICODONE) 5 MG immediate release tablet Take 5 mg by mouth every 4 hours as needed for Pain.  polyethylene glycol (GLYCOLAX) 17 GM/SCOOP powder Take 17 g by mouth daily as needed (Constipation)       apixaban (ELIQUIS) 5 MG TABS tablet Take 1 tablet by mouth 2 times daily 60 tablet 0    aspirin 81 MG chewable tablet Take 1 tablet by mouth daily 30 tablet 0    melatonin 3 MG TABS tablet Take 2 tablets by mouth nightly as needed (sleep) 6 tablet 0    tamsulosin (FLOMAX) 0.4 MG capsule Take 0.4 mg by mouth every morning      gabapentin (NEURONTIN) 100 MG capsule Take 100 mg by mouth 3 times daily.       sevelamer (RENVELA) 800 MG tablet Take 800 mg by mouth 3 times daily (with meals)       atenolol (TENORMIN) 25 MG tablet Take 25 mg by mouth every evening      pantoprazole (PROTONIX) 20 MG tablet Take 20 mg by mouth nightly      atorvastatin (LIPITOR) 10 MG tablet Take 10 mg by mouth nightly      calcitRIOL (ROCALTROL) 0.25 MCG capsule Take 0.25 mcg by mouth every evening       Allergies   Allergen Reactions    Lisinopril Swelling     Allergy listed on paperwork from [facility], no reaction noted       Review of Systems  Pertinent items are noted in HPI. Objective:     /63   Pulse 65   Temp 97.8 °F (36.6 °C) (Oral)   Resp 21   Ht 6' 0.5\" (1.842 m)   Wt 215 lb 9.8 oz (97.8 kg)   SpO2 91%   BMI 28.84 kg/m²     General:  alert, appears stated age and cooperative but sleepy. Answers with encouragement but no great detail. Skin:  normal   Eyes: conjunctivae/corneas clear. PERRL, EOM's intact. Fundi benign. Mouth: MMM no lesions   Lymph Nodes:  Cervical, supraclavicular, and axillary nodes normal.   Lungs:  clear to auscultation bilaterally   Heart:  IRR without murmur   Abdomen: soft, non-tender; bowel sounds normal; no masses,  no organomegaly   CVA:  absent   Genitourinary: defer exam   Extremities:  R lateral malleollus superficial ulcer - triangular measuring approx 1 cm greater diameter with ring or redness and central scab - no pus or crepitance; damaso mid-shin scabbed wound. No other foot wounds. No elevation pallor with good cap refill.   Palpable L AVF with thrill and bruit    Pulses:   R bruit  L bruit   2   carotid 2    2   brachial AVF    2   radial 0    2   femoral 2    2   popliteal 2    2   posterior tibial 2    0   dorsalis pedis 0    na   bypass graft na      Strong biphasic R GIO doppler with strong R PT doppler    R SHANTA L   125/130 0.96 DP    128/130 0.98 PT    125/130 0.96 GIO         Neurologic:  negative   Psychiatric:  non focal       Labs reviewed    Arterial duplex/SHANTA 6/3/2022 - personally reviewed  SHANTA    Indeterminate due to inability to compress the calcific arteries at the   [facility] level        DUPLEX SCAN    Right Lower Extremity    Calcific atherosclerosis throughout. Reduced visualization of the right    common and deep femoral arteries due to groin arterial line    Normal blood flow through the femoral, popliteal, and tibial arteries (PT &    Peroneal arteries) Occluded anterior tibial artery        Left Lower Extremity    Calcific atherosclerosis throughout.    Normal blood flow through the femoral, popliteal, and PT/Peroneal arteries    Occluded anterior tibial artery        Irregular heart rhythm is noted throughout this examination        Signature        ------------------------------------------------------------------    Electronically signed by Kala Hernandez MD    (Interpreting physician) on 06/03/2022 at 10:51 PM    ------------------------------------------------------------------       Blood Pressure:Right arm 115/ mmHg. Patient Status:Routine. Study [place] [id number] - Vascular Lab. Technical Quality:Limited visualization due to calcific shadowing.       Risk Factors         - The patient's risk factor(s) include: diabetes mellitus, dyslipidemia and       arterial hypertension.         - The patient has a former tobacco history.         - The patient has cancer.       Velocities are measured in cm/s ; Diameters are measured in mm       LE Duplex Measurements       +-------------------++-----+-----+-----------++----+-----+-----------+   !                   ! ! Right!     ! Left       !!    !     !           !   +-------------------++-----+-----+-----------++----+-----+-----------+   ! Location           !!PSV  !Ratio! Wave Desc. !!PSV ! Ratio! Wave Desc. !   +-------------------++-----+-----+-----------++----+-----+-----------+   ! Prox Common Femoral!!     !     !           !!83.6!     !Multiphasic!   +-------------------++-----+-----+-----------++----+-----+-----------+   ! Dist Common Femoral!!     !     !           !!63.5!     !Multiphasic! +-------------------++-----+-----+-----------++----+-----+-----------+   ! PFA                !!     !     !           !!53.4!     !Multiphasic!   +-------------------++-----+-----+-----------++----+-----+-----------+   ! Prox SFA           !!94.3 !     !Multiphasic! !58.5!0.92 ! Multiphasic!   +-------------------++-----+-----+-----------++----+-----+-----------+   ! Mid SFA            !!105  !1.11 ! Multiphasic! !115 !1.97 ! Multiphasic!   +-------------------++-----+-----+-----------++----+-----+-----------+   ! Dist SFA           !!67.3 !0.64 ! Multiphasic! !58.5!0.51 ! Multiphasic!   +-------------------++-----+-----+-----------++----+-----+-----------+   ! Prox Popliteal     !!53.4 !0.79 ! Multiphasic! !56.1!0.96 ! Multiphasic!   +-------------------++-----+-----+-----------++----+-----+-----------+   ! Dist Popliteal     !!45.7 !0.86 ! Multiphasic!!65.4!1.17 ! Multiphasic!   +-------------------++-----+-----+-----------++----+-----+-----------+   ! Mid PTA            !!101  !2.21 ! Multiphasic!!67.3!1.03 ! Multiphasic!   +-------------------++-----+-----+-----------++----+-----+-----------+   ! Prox ITALO           !!11.3 !0.25 !           !!52.4!0.8  !Multiphasic!   +-------------------++-----+-----+-----------++----+-----+-----------+   ! Mid ITALO            !!27   !2.39 ! Monophasic ! !11.9!0.23 ! Monophasic !   +-------------------++-----+-----+-----------++----+-----+-----------+   ! Dist ITALO           !!     !     !           !!23.6!1.98 ! Monophasic !   +-------------------++-----+-----+-----------++----+-----+-----------+   ! Mid Peroneal       !!64.1 !1.4  !Multiphasic!!29.3!0.45 ! Multiphasic!   +-------------------++-----+-----+-----------++----+-----+-----------+           Assessment:     1) Traumatic R lateral malleolar wound - no signs of severe infection that could be responsible for MS changes  2)  Intact R foot perfusion - based on ABIs and PE.   3) DM  4) ESRD - patent AVF  5) HTN  6) Hyperlipidemia  7) CAD     Rec: Observation with good local wound care. With current perfusion it is my opinion that this ankle wound will heal in time. Would not proceed with angiography at this time but observe and follow. Will discuss with Dr. Sophia Sanders, hospitalist and daughter.     Viktoriya Fowler

## 2022-06-06 NOTE — PROGRESS NOTES
Department of Podiatric Surgery  Progress Note        CHIEF COMPLAINT:    Chief Complaint   Patient presents with    Fatigue     per nursing home the last 8 hours          HISTORY OF PRESENT ILLNESS:      The patient is a 77 y.o. male who presents with AMS,DM with ESRD on HD, h/o CVA and CA. Dressings intact    Past Medical History:        Diagnosis Date    Anemia 03/2022    Arthritis     CAD (coronary artery disease) 01/2020    Cancer ([facility]     Colon Cancer diagnosed last month    Cerebral infarction ([facility]     Cognitive communication deficit     COVID-19     Diabetes mellitus ([facility]     Dysphagia     End stage renal disease ([address]     Fatigue     Gastrointestinal hemorrhage     Hemodialysis patient ([facility] 2019    ckd-goes to dialysis Tuesday, Thurs, Fri    Hx of blood clots     Hyperlipidemia     Hypertension     Hyponatremia     Risk for falls     Splenomegaly 02/10/2021       Allergies   Allergen Reactions    Lisinopril Swelling     Allergy listed on paperwork from [facility], no reaction noted       REVIEW OF SYSTEMS:  Review of systems not obtained due to patient factors - mental status    PHYSICAL EXAM:  VITALS:  /63   Pulse 56   Temp 97.9 °F (36.6 °C) (Oral)   Resp 11   Ht 6' 0.5\" (1.842 m)   Wt 215 lb 9.8 oz (97.8 kg)   SpO2 96%   BMI 28.84 kg/m²   CONSTITUTIONAL:  awake, alert, cooperative, no apparent distress, and appears stated age  EYES:  pupils equal, round and reactive to light, extra ocular muscles intact, sclera clear, conjunctiva normal      LOWER EXTREMITY:  VASCULAR: Pedal Pulses absent. positive signs of ischemia. MUSCULOSKELETAL:  negative gross deformity. NEUROLOGIC:  Epicritic sensation, light touch, joint position sensediminished  SKIN:  The left leg and foot have no abnormal lesions. Right lateral ankle decubitus ulceration. This was present on admission. The kelly-wound tissue is mildly erythematous. No edema or fluctuance.         MUGS 2 wound of 2.4 x 2.1 cm     I/O:    Intake/Output Summary (Last 24 hours) at 6/6/2022 1829  Last data filed at 6/6/2022 1732  Gross per 24 hour   Intake 820 ml   Output 400 ml   Net 420 ml              Wt Readings from Last 3 Encounters:   06/06/22 215 lb 9.8 oz (97.8 kg)   05/19/22 213 lb 13.5 oz (97 kg)   04/29/22 204 lb (92.5 kg)       LABS:    Recent Labs     06/06/22  0744   WBC 4.0   HGB 11.8*   HCT 36.1*           Recent Labs     06/06/22  0744      K 4.0      CO2 27   BUN 23*   CREATININE 3.4*        Recent Labs     06/06/22  0744   PROT 5.8*       IMAGING:  Arterial dopplers     Right   Right SHANTA was not available due to inadequate pulses and noncompressible   tibial vessels. (DP inaudible, PT non compressible)   Right common femoral and profunda femoral vein were not visualized due to   arterial IV line in place. The majority of the waveforms are multiphasic throughout the right lower   extremity. Ultrasound images of the right lower extremity reveal moderate to severe   calcification throughout. Possible occlusion of the right proximal anterior tibial artery with flow   reversal in the mid anterior tibial artery. Left   Right SHANTA was not available due to inadequate pulses and noncompressible   tibial vessels. (DP inaudible, PT non compressible)   The majority of the waveforms are multiphasic throughout the left lower   extremity. Ultrasound images of the left lower extremity reveal moderate to severe   calcification throughout. Possible occlusion of the mid anterior tibial artery with flow reversal in the   distal anterior tibial artery         MICRO:   No drainage to culture    ASSESSMENT   Decubitus ulceration lateral right ankle  DM with peripheral neuropathy  Diabetic Ulcer right ankle    PLAN:  Evaluation and Management x 30 minutes with greater than 50% of the time spent with the patient discussing the etiology and treatment options of the chief complaint.     1. Right ankle wound    2. Dr. Imer Deras relates Vascular status is adequate for RIGHT ankle wound healing, continued wound care    3. OK for Nursing team to change dressings daily   - Conversion to [facility] as further promotion of granulation, Mepilex border       DISPO: Will continue to follow for wound care needs. Thanks for the opportunity to participate in this patient's care.      Deisi Mariee DPM   [facility]  Cell [phone]  Office: [phone]  Fax: [phone]

## 2022-06-06 NOTE — PROGRESS NOTES
Hospitalist Progress Note      PCP: Marlene Robledo    Date of Admission: 6/1/2022      Subjective: Feels okay, no chest pain shortness of breath no fever no chills      Medications:  Reviewed    Infusion Medications    sodium chloride 250 mL (06/03/22 0021)     Scheduled Medications    sodium chloride flush  5-40 mL IntraVENous 2 times per day    [Held by provider] apixaban  5 mg Oral BID    aspirin  81 mg Oral Daily    atenolol  25 mg Oral Nightly    atorvastatin  10 mg Oral Nightly    calcitRIOL  0.25 mcg Oral Nightly    gabapentin  100 mg Oral TID    LORazepam  0.5 mg Oral BID    pantoprazole  20 mg Oral Nightly    sevelamer  800 mg Oral TID WC    tamsulosin  0.4 mg Oral QAM     PRN Meds: melatonin, sodium chloride flush, sodium chloride, acetaminophen **OR** acetaminophen, oxyCODONE, polyethylene glycol, heparin (porcine)      Intake/Output Summary (Last 24 hours) at 6/6/2022 0703  Last data filed at 6/6/2022 0601  Gross per 24 hour   Intake 960 ml   Output 600 ml   Net 360 ml       Physical Exam Performed:    /63   Pulse 65   Temp 97.8 °F (36.6 °C) (Oral)   Resp 21   Ht 6' 0.5\" (1.842 m)   Wt 215 lb 9.8 oz (97.8 kg)   SpO2 91%   BMI 28.84 kg/m²     General appearance: No apparent distress  Neck: Supple  Respiratory:  Normal respiratory effort. Clear to auscultation, bilaterally without Rales/Wheezes/Rhonchi. Cardiovascular: Regular rate and rhythm with normal S1/S2 without murmurs, rubs or gallops. Abdomen: Soft, non-tender, non-distended   Musculoskeletal: No clubbing, cyanosis  Skin: Ulcer with surrounding erythema on lateral aspect of right ankle  Neurologic: General weakness  Psychiatric: Alert   Capillary Refill: Brisk,3 seconds, normal   Peripheral Pulses: +2 palpable, equal bilaterally       Labs:   No results for input(s): WBC, HGB, HCT, PLT in the last 72 hours. No results for input(s): NA, K, CL, CO2, BUN, CREATININE, CALCIUM, PHOS in the last 72 hours.     Invalid input(s): BRYSONES  No results for input(s): AST, ALT, BILIDIR, BILITOT, ALKPHOS in the last 72 hours. No results for input(s): INR in the last 72 hours. No results for input(s): Connee Matar in the last 72 hours. Urinalysis:      Lab Results   Component Value Date    NITRU Negative 06/01/2022    WBCUA 1 06/01/2022    BACTERIA None Seen 06/01/2022    RBCUA 2 06/01/2022    BLOODU Negative 06/01/2022    SPECGRAV 1.019 06/01/2022    GLUCOSEU Negative 06/01/2022       Radiology:  VL DUP LOWER EXTREMITY ARTERIES BILATERAL   Final Result      CT ABDOMEN PELVIS W IV CONTRAST Additional Contrast? None   Final Result   1. No central, lobar, or segmental pulmonary embolus. 2. Trace bilateral pleural effusions and mild bibasilar atelectasis. 3. Findings of moderate proctocolitis involving the rectum and sigmoid colon. Associated wall thickening and edema. No abscess. No mesenteric gas. 4. Colorectal anastomosis with focal narrowing at this region. A stricture   is not excluded. CT CHEST PULMONARY EMBOLISM W CONTRAST   Final Result   1. No central, lobar, or segmental pulmonary embolus. 2. Trace bilateral pleural effusions and mild bibasilar atelectasis. 3. Findings of moderate proctocolitis involving the rectum and sigmoid colon. Associated wall thickening and edema. No abscess. No mesenteric gas. 4. Colorectal anastomosis with focal narrowing at this region. A stricture   is not excluded. CT HEAD WO CONTRAST   Final Result   No acute intracranial abnormality. XR CHEST PORTABLE   Final Result   No acute abnormality.                  Assessment/Plan:    Active Hospital Problems    Diagnosis     Fever and chills [R50.9]      Priority: Medium    PVD (peripheral vascular disease) ([address] [I73.9]      Priority: Medium    AMS (altered mental status) [R41.82]      Priority: Medium    Acute metabolic encephalopathy [U28.14]      Priority: Medium    Cellulitis [L03.90] Priority: Medium    ESRD (end stage renal disease) on dialysis ([facility] [N18.6, Z99.2]      Priority: Medium     1.   Thoght to have cellulitis, patient end-stage renal disease,was on vancomycin and cefepime, ID were following, IV antibiotics discontinued by ID.  Podiatry following and vascular surgery consulted  2.  Acute metabolic encephalopathy, improving  3.  End-stage renal disease nephrology consulted patient had recent fistula not using yet he still using applying which still does not look infected at this time. 4.  Possible colitis, no abdominal pain. 5.  History of colon cancer follow-up as outpatient  6.  Essential hypertension, controlled at this time  7.  History of VTE continue oral anticoagulation  8.  Peripheral artery disease, cardiovascular consulted. Diet: ADULT DIET;  Regular; Mildly Thick (Nectar)  ADULT ORAL NUTRITION SUPPLEMENT; Lunch, Dinner; Frozen Oral Supplement  Code Status: Full Code        Raeann Luis MD

## 2022-06-06 NOTE — PLAN OF CARE
Problem: Confusion  Goal: Confusion, delirium, dementia, or psychosis is improved or at baseline  Description: INTERVENTIONS:  1. Assess for possible contributors to thought disturbance, including medications, impaired vision or hearing, underlying metabolic abnormalities, dehydration, psychiatric diagnoses, and notify attending LIP  2. [place] high risk fall precautions, as indicated  3. Provide frequent short contacts to provide reality reorientation, refocusing and direction  4. Decrease environmental stimuli, including noise as appropriate  5. Monitor and intervene to maintain adequate nutrition, hydration, elimination, sleep and activity  6. If unable to ensure safety without constant attention obtain sitter and review sitter guidelines with assigned personnel  7. Initiate Psychosocial CNS and Spiritual Care consult, as indicated  Outcome: Progressing  Flowsheets (Taken 6/5/2022 0755 by Shana Sibley RN)  Effect of thought disturbance (confusion, delirium, dementia, or psychosis) are managed with adequate functional status: [place] high risk fall precautions, as indicated  Note: Pt knows he is at [facility] and that it is June 2022. He couldn't think of the President's name but could pick the correct name when given options. Pt is cooperative and compliant with medical care. Pt's daughter did not stay overnight with the pt and he did well. He took his HS medications and then RN got him ready for bed. RN turned lights out and shut the door. Pt rounded on from window. He was noted to be up during one of the rounds in the middle of the night but appeared to be sleeping during all other checks. His daughters called twice overnight (beginning and end) to check on pt. Updates given. Janeen stated she would be in to see her father at 1500. RN made pt aware. He VU. RN took VS around 2300 and again at 0600 to allow pt to have an uninterrupted rest period. Pt states he slept well.       Problem: Safety - Adult  Goal: Free from fall injury  Outcome: Progressing  Flowsheets (Taken 6/6/2022 0342)  Free From Fall Injury:   Instruct family/caregiver on patient safety   Based on caregiver fall risk screen, instruct family/caregiver to ask for assistance with transferring infant if caregiver noted to have fall risk factors  Note: Pt is a high fall risk. Bed in locked, low position with side rails up X 3 and an active bed alarm. Fall risk socks, sign, and bracelet in place.

## 2022-06-07 VITALS
DIASTOLIC BLOOD PRESSURE: 64 MMHG | WEIGHT: 210.54 LBS | HEIGHT: 72 IN | SYSTOLIC BLOOD PRESSURE: 105 MMHG | TEMPERATURE: 98.1 F | RESPIRATION RATE: 16 BRPM | HEART RATE: 66 BPM | BODY MASS INDEX: 28.52 KG/M2 | OXYGEN SATURATION: 95 %

## 2022-06-07 PROBLEM — F41.9 ANXIETY: Status: ACTIVE | Noted: 2022-06-07

## 2022-06-07 PROCEDURE — 99231 SBSQ HOSP IP/OBS SF/LOW 25: CPT | Performed by: SURGERY

## 2022-06-07 PROCEDURE — 92526 ORAL FUNCTION THERAPY: CPT

## 2022-06-07 PROCEDURE — 6370000000 HC RX 637 (ALT 250 FOR IP): Performed by: INTERNAL MEDICINE

## 2022-06-07 PROCEDURE — 90935 HEMODIALYSIS ONE EVALUATION: CPT

## 2022-06-07 RX ORDER — LORAZEPAM 0.5 MG/1
0.5 TABLET ORAL 2 TIMES DAILY
Qty: 6 TABLET | Refills: 0 | Status: SHIPPED | OUTPATIENT
Start: 2022-06-07 | End: 2022-06-10

## 2022-06-07 RX ORDER — OXYCODONE HYDROCHLORIDE 5 MG/1
5 TABLET ORAL EVERY 4 HOURS PRN
Qty: 9 TABLET | Refills: 0 | Status: SHIPPED | OUTPATIENT
Start: 2022-06-07 | End: 2022-06-10

## 2022-06-07 RX ADMIN — Medication: at 08:25

## 2022-06-07 RX ADMIN — GABAPENTIN 100 MG: 100 CAPSULE ORAL at 08:25

## 2022-06-07 RX ADMIN — SEVELAMER CARBONATE 800 MG: 800 TABLET, FILM COATED ORAL at 17:28

## 2022-06-07 RX ADMIN — SEVELAMER CARBONATE 800 MG: 800 TABLET, FILM COATED ORAL at 12:21

## 2022-06-07 RX ADMIN — LORAZEPAM 0.5 MG: 0.5 TABLET ORAL at 08:25

## 2022-06-07 RX ADMIN — ASPIRIN 81 MG: 81 TABLET, CHEWABLE ORAL at 08:24

## 2022-06-07 RX ADMIN — TAMSULOSIN HYDROCHLORIDE 0.4 MG: 0.4 CAPSULE ORAL at 08:25

## 2022-06-07 RX ADMIN — SEVELAMER CARBONATE 800 MG: 800 TABLET, FILM COATED ORAL at 08:24

## 2022-06-07 NOTE — DISCHARGE INSTR - COC
Continuity of Care Form    Patient Name: Dio Hanson   :    MRN:  [id number]    Admit date:  2022  Discharge date:  2022    Code Status Order: Full Code   Advance Directives:      Admitting Physician:  Elizabeth Neri DO  PCP: Martinez Phillip    Discharging Nurse: Rigoberto Whelan Unit/Room#: E1R-4768/3289-08  Discharging Unit Phone Number: [phone]    Emergency Contact:   Extended Emergency Contact Information  Primary Emergency Contact: Kerline Link  Address: [facility] [address], [address] [place] [address] Phone: [phone]  Mobile Phone: [phone]  Relation: Spouse  Secondary Emergency Contact: Jung Georges Phone: [phone]  Mobile Phone: [phone]  Relation: Child   needed?  No    Past Surgical History:  Past Surgical History:   Procedure Laterality Date    CARDIAC CATHETERIZATION  2019    CHOLECYSTECTOMY, LAPAROSCOPIC N/A 2022    LAPAROSCOPIC CHOLECYSTECTOMY WITH CHOLANGIOGRAM performed by Gaby Kelly MD at [address] [place] [address] 2022    SIGMOID COLECTOMY, SIGMOIDOSCOPY performed by Gaby Kelly MD at [place] [address] Left 2022    LEFT BRACHIAL CEPHALIC FISTULA performed by Ruthy Neri MD at [place] [address]    ERCP N/A 2022    ERCP SPHINCTER/PAPILLOTOMY performed by Milena Martinez MD at [address] [place] [address]    ERCP N/A 2022    ERCP STONE REMOVAL/BALLOON SWEEP performed by Milena Martinez MD at [facility]  2022    IR EMBOLIZATION HEMORRHAGE 2022 [facility]     [address] Dr Left     unsure of date    IR TUNNELED CATHETER PLACEMENT GREATER THAN 5 YEARS  2022    IR TUNNELED CATHETER PLACEMENT GREATER THAN 5 YEARS 2022 [facility]    SIGMOIDOSCOPY N/A 2022    SIGMOIDOSCOPY CONTROL HEMORRHAGE performed by Elisa Wolf MD at [facility]  [address] Right 2022    Permacath; RIJ access; 23cm; Dr. Anthony Alexander  2022    ERCP POLYP SNARE performed by Fredrick Angelucci, MD at [facility] ENDOSCOPY       Immunization History:   Immunization History   Administered Date(s) Administered    COVID-19, Elan Morrison, Primary or Immunocompromised, PF, 100mcg/0.5mL 2021, 2021       Active Problems:  Patient Active Problem List   Diagnosis Code    GI bleed K92.2    History of COVID-19 Z86.16    Hyponatremia E87.1    Fatigue R53.83    Acute kidney injury superimposed on CKD ([address] N17.9, N18.9    Lethargy R53.83    Suspected UTI R39.89    Acute CVA (cerebrovascular accident) ([address] I63.9    LESLEE (acute kidney injury) ([address] N17.9    Malignant neoplasm of colon ([address] C18.9    Acute blood loss anemia D62    COVID-19 U07.1    Shock circulatory ([address] R57.9    Bilateral pulmonary embolism (HCC) I26.99    Lactic acidosis E87.2    Hemorrhagic shock ([address] R57.8    ESRD (end stage renal disease) on dialysis ([address] N18.6, Z99.2    Acute cholecystitis K81.0    Pain of upper abdomen R10.10    AMS (altered mental status) R41.82    Sepsis ([address] G25.0    Acute metabolic encephalopathy G25.56    Cellulitis L03.90    Fever and chills R50.9    PVD (peripheral vascular disease) (HCC) I73.9    Anxiety F41.9       Isolation/Infection:   Isolation            No Isolation          Patient Infection Status       Infection Onset Added Last Indicated Last Indicated By Review Planned Expiration Resolved Resolved By    None active    Resolved    C-diff Rule Out 22 Clostridium Difficile Toxin/Antigen (Ordered)   22 Todd Rangel RN    Order     COVID-19 (Rule Out) 22 COVID-19, Rapid (Ordered)   22 Rule-Out Test Resulted    COVID-19 02/10/22 02/10/22 02/10/22 COVID-19, Rapid   03/10/22     COVID-19 (Rule Out) 22 01/13/22 01/14/22 COVID-19, Rapid (Ordered)   01/14/22 Rule-Out Test Resulted            Nurse Assessment:  Last Vital Signs: /67   Pulse 62   Temp 97.4 °F (36.3 °C) (Oral)   Resp 18   Ht 6' 0.05\" (1.83 m)   Wt 216 lb 0.8 oz (98 kg)   SpO2 91%   BMI 29.26 kg/m²     Last documented pain score (0-10 scale): Pain Level: 5  Last Weight:   Wt Readings from Last 1 Encounters:   06/07/22 216 lb 0.8 oz (98 kg)     Mental Status:  {IP PT MENTAL STATUS:20030}    IV Access:  { SHAMIKA IV ACCESS:095824962}    Nursing Mobility/ADLs:  Walking   {CHP DME RTKF:277136474}  Transfer  {CHP DME FLUH:931326467}  Bathing  {CHP DME NYQW:975478892}  Dressing  {CHP DME VPRP:655870782}  Toileting  {CHP DME XOFC:967570267}  Feeding  {CHP DME JPMX:868015429}  Med Admin  {CHP DME KDXL:012084554}  Med Delivery   { SHAMIKA MED Delivery:554402086}    Wound Care Documentation and Therapy:  Wound 06/02/22 Ankle Anterior;Right (Active)   Wound Image   06/02/22 1215   Wound Etiology Pressure Unstageable 06/06/22 2125   Dressing Status New dressing applied;Clean;Dry; Intact 06/06/22 2125   Wound Cleansed Not Cleansed 06/06/22 2125   Dressing/Treatment Silicone border;Honey gel/honey paste 06/06/22 2125   Offloading for Diabetic Foot Ulcers Other (comment) 06/06/22 2125   Dressing Change Due 06/07/22 06/06/22 2125   Wound Length (cm) 1.5 cm 06/02/22 1215   Wound Width (cm) 1.5 cm 06/02/22 1215   Wound Surface Area (cm^2) 2.25 cm^2 06/02/22 1215   Wound Assessment Dry;Pink/red;Eschar dry 06/06/22 2125   Drainage Amount None 06/06/22 2125   Odor None 06/06/22 2125   Pattie-wound Assessment Blanchable erythema 06/06/22 2125   Margins Defined edges 06/06/22 2125   Number of days: 5       Wound 06/02/22 Buttocks (Active)   Wound Image   06/02/22 1215   Wound Etiology Other 06/06/22 2125   Wound Cleansed Not Cleansed 06/06/22 2125   Dressing/Treatment Open to air; Other (comment) 06/06/22 2125   Offloading for Diabetic Foot Ulcers Other (comment) 22   Wound Length (cm) 1 cm 22   Wound Width (cm) 0.4 cm 22   Wound Depth (cm) 0.1 cm 22   Wound Surface Area (cm^2) 0.4 cm^2 22   Wound Volume (cm^3) 0.04 cm^3 22   Wound Assessment Pink/red 22   Drainage Amount None 22   Odor None 22   Pattie-wound Assessment Blanchable erythema 22   Margins Defined edges 22   Number of days: 5       Incision 22 Abdomen (Active)   Number of days: 131        Elimination:  Continence: Bowel: {YES / DW:04128}  Bladder: {YES / RE:18517}  Urinary Catheter: {Urinary Catheter:720582568}   Colostomy/Ileostomy/Ileal Conduit: {YES / BB:78500}       Date of Last BM: 22    Intake/Output Summary (Last 24 hours) at 2022 1209  Last data filed at 2022 0838  Gross per 24 hour   Intake 580 ml   Output 0 ml   Net 580 ml     I/O last 3 completed shifts: In: 56 [P.O.:940]  Out: 400 [Urine:400]    Safety Concerns:     Fall risk    Impairments/Disabilities:      [facility] Impairments/Disabilities:023116603}    Nutrition Therapy:  Current Nutrition Therapy:   [facility] Diet List:524219512}    Routes of Feeding: {CHP DME Other Feedings:279151741}  Liquids: {Slp liquid thickness:24975}  Daily Fluid Restriction: {CHP DME Yes amt example:458372470}  Last Modified Barium Swallow with Video (Video Swallowing Test): {Done Not Done WAGB:198722030}    Treatments at the Time of Hospital Discharge:   Respiratory Treatments: n/a  Oxygen Therapy:  {Therapy; copd oxygen:94753}  Ventilator:    {[facility] Vent IIIP:208135683}    Rehab Therapies: {THERAPEUTIC INTERVENTION:1695757761}  Weight Bearing Status/Restrictions: [facility] Weight Bearin}  Other Medical Equipment (for information only, NOT a DME order):  {EQUIPMENT:150771761}  Other Treatments: ***    Patient's personal belongings (please select all that are sent with patient):  Pants;  Shirt; Socks    RN SIGNATURE: {Esignature:096933150}    CASE MANAGEMENT/SOCIAL WORK SECTION    Inpatient Status Date: 6/2/2022    Readmission Risk Assessment Score:  Readmission Risk              Risk of Unplanned Readmission:  47           Discharging to Facility/ Agency   Name: [facility]  Address:  [address], [address] [place] [address]   Phone:  [phone]  Fax:  [phone]      Dialysis Facility (if applicable)   [facility]  [address] [address]  Dialysis Schedule: TTS at 10:30 AM  Phone: [phone]  Fax: [phone]    / signature: Electronically signed by Silvio Bone RN Case Management on 6/7/2022 at 1:11 PM      PHYSICIAN SECTION    Prognosis: Fair    Condition at Discharge: Stable    Rehab Potential (if transferring to Rehab): Fair    Recommended Labs or Other Treatments After Discharge: pt, ot, wound care with medi honey and daily dressing krish    Physician Certification: I certify the above information and transfer of Bogdan [address]  is necessary for the continuing treatment of the diagnosis listed and that he requires [facility] for greater 30 days.      Update Admission H&P: No change in H&P    PHYSICIAN SIGNATURE:  Electronically signed by Cody Lane DO on 6/7/22 at 12:10 PM EDT

## 2022-06-07 NOTE — PROGRESS NOTES
Patient off unit at time of rounds. Will see tomorrow evening for evaluation of the ankle wound. Ok to d/c prior to next encounter if ready. Can follow up outpatient.      Author JATINDER Bennett  [facility]  Pager: [phone]  Office: [phone]  Fax: [phone]

## 2022-06-07 NOTE — PROGRESS NOTES
Speech Language Pathology  [facility]   Speech Therapy  Daily Dysphagia Treatment Note    Shanon Guo  AGE: 77 y.o. GENDER: male  : 1945  [id number]  EPISODE DATE:  2022     Patient Active Problem List   Diagnosis    GI bleed    History of COVID-19    Hyponatremia    Fatigue    Acute kidney injury superimposed on CKD ([address]    Lethargy    Suspected UTI    Acute CVA (cerebrovascular accident) ([address]    LESLEE (acute kidney injury) ([address]    Malignant neoplasm of colon ([address]    Acute blood loss anemia    COVID-19    Shock circulatory ([address]    Bilateral pulmonary embolism (HCC)    Lactic acidosis    Hemorrhagic shock ([address]    ESRD (end stage renal disease) on dialysis (HCC)    Acute cholecystitis    Pain of upper abdomen    AMS (altered mental status)    Sepsis (HCC)    Acute metabolic encephalopathy    Cellulitis    Fever and chills    PVD (peripheral vascular disease) (HCC)     Allergies   Allergen Reactions    Lisinopril Swelling     Allergy listed on paperwork from , no reaction noted     Treatment Diagnosis: Dysphagia     Chart review:   Patient admitted 22 with C/O confusion  H&P: 77 y. o. male who [facility] he was found having more confusion than usual, Dr. Joseph Geiger to emergency department, initial work-up positive for low-grade fever, possible cellulitis, admitted to the hospital for further management and treatment.  Patient at this time denies fever chills nausea vomiting or abdominal pain, he feels weak, denies cough shortness of breath.  No diarrhea.  Daughter at bedside and helped with history taking.     CHEST X-RAY 22  Impression   No acute abnormality.      CT HEAD 22  Impression   No acute intracranial abnormality.          Subjective:     Current Diet Level: Regular texture diet ;  Thin liquids    Comments regarding tolerating Current Diet: RN reports increased coughing with thin liquids since upgraded    Objective:   Pain: Denies               Vision: [x]WFL []Impaired:  Hearing: []WFL [x]Impaired: [place]    Cognitive/behavioral/communication:   Oriented to [x] self [x] place [x] date (month, day, year) [] situation  [x]alert []lethargic  [x]cooperative []self-limiting   []confused   []distractible []agitated []impulsive  [x]verbally responsive []nonverbal []limited verbal responses  [x]follows one step commands []does not follow dx []follows complex commands  []aphasic []dysarthric  [] other:     Respiratory Status: [x]Room Air []O2 via nasal cannula []Other:  Dentition: []Adequate []Dentures []Missing Many Teeth [x]Edentulous []Other:  Vocal Quality: [x]Normal []Dysphonic  []Aphonic  []Hoarse []Wet []Weak []Other:  Volitional Cough: [x]Strong []Weak []Wet []Absent []Congested []Other:  Volitional Swallow:   []Absent  [x]Delayed []Adequate []Required use of drink     Patient Positioning: Upright in bed     PO Trials: seen with meal-sausage, scrambled eggs, Danish toast  · Thin Liquids: suspect premature bolus loss to the pharynx; delayed swallow; decreased laryngeal elevation; immediate coughing episodes with thin liquids as trials progressed  · Nectar thick liquids: suspect premature bolus loss to the pharynx; delayed swallow; decreased laryngeal elevation; NO overt sign/symptoms of penetration/aspiration  · Honey Thick liquids: DNT  · Puree: DNT   · Soft food: DNT  · Regular food: prolonged but adequate bolus formation and movement; suspect premature bolus loss to the pharynx; delayed swallow; decreased laryngeal elevation; no overt s/s of aspiration or penetration     Dysphagia Tx:    Direct Dysphagia tx: PO trials as described above; initially no overt s/s of aspiration or penetration with thin liquids, but as trials progressed through meals patient demonstrated increased coughing episodes with thin liquids.  No overt s/s of aspiration or penetration with mildly thick liquids or regular textures   Dysphagia ex: NA   Training in compensatory strategies: dependent d/t poor recall, required tactile cues to take 1 bite at a time and swallow before taking additional bites   Pt response to ex/training: patient agreeable and pleasant; decreased recall and insight are barriers    Goals:   Pt will functionally tolerate recommended diet with no overt clinical s/s of aspiration continue  Pt will functionally tolerate ongoing assessment of swallow function with diet to be determined as indicated continue  Pt will advance to least restrictive diet as indicated continue    Assessment:   Impressions:   Mild oral dysphagia, mild pharyngeal dysphagia   · Pt alert, pleasant, cooperative and confused, verbally responsive, follows simple dx, oriented to self, place, month, year and day independently  · Mild oral dysphagia characterized by prolonged but adequate bolus formation and movement with regular solids. Suspect premature bolus loss to the pharynx with all. · Mild pharyngeal dysphagia characterized by delayed swallow initiation, decreased laryngeal elevation. Immediate coughing episodes as trials progressed with thin liquids via cup's edge despite small single sips  · Recommend downgrade back to mildly thick liquids d/t increased coughing with thins as trials progressed. · ST to continue to follow during acute admission     Recommended Diet and Intervention 6/7/2022:  Diet Solids Recommendation:  Regular texture diet  Liquid Consistency Recommendation:  Mildly (nectar) thick liquids  Recommended form of Meds:   PO     EDUCATION:   Provided education regarding role of SLP, results of assessment, recommendations and general speech pathology plan of care.    [] Pt verbalized understanding and agreement   [] Pt requires ongoing learning   [x] No evidence of recall     Dysphagia Prognosis: [x] good []fair []poor []guarded     Plan:   Continue Dysphagia Therapy: YES    Interventions: Laryngeal Exercises , Pharyngeal Exercise, Diet Tolerance Monitoring , Patient/Family Education , Therapeutic Trials with SLP   Duration/Frequency of therapy while on unit: Speech therapy for dysphagia tx 3-5 times per week during acute care stay. Discharge Instructions:   Recommend ongoing SLP for dysphagia therapy upon discharge from hospital     This note serves as a D/C Summary in the event that this patient is discharged prior to the next therapy session.     Coded treatment time: 0  Total treatment time: [address] [place] [address], [facility]  Speech-Language Pathologist  On 06/07/22 at 09:41 AM

## 2022-06-07 NOTE — PROGRESS NOTES
[facility]                                               Progress note    Summary:   Ayse Cheema is being seen by nephrology for ESRD management. This is a 67-year-old man with past medical history significant for GI bleed, colon cancer, hypertension presented to the hospital with right foot ulcer. Apparently more confused at [facility] and sent to ED. He denies any complaints. Not having any pain where the ulcer is. He denies abdominal pain, no NVD. Recently admitted with cholecystitis and completed antibiotics. He is awake and alert, has no complaints. Wound care attending to his right lateral ankle wound. CTPA was done on admission that showed no PE, had some proctocolitis. Interval History  Alert  No fluid overload   For HD later today   No complaints  Labs reviewed stable  For surgery/debridement today     /67    Plan:   -HD later today    Efren Upton MD  [facility]  Office: [phone]    ESRD  Dialyzes TTS  Target weight 94 kg  Has tunneled dialysis cath    creation L brachiocephalic AVF 6 weeks ago (4/29/2022)  By Dr Owen Ramos    Blood pressure  Acceptable  Appears euvolemic    SHPT  Continue calcitriol and Renvela    Anemia  ELENA per outpatient orders     R lateral ankle wound?cellulitis  was on vancomycin and cefepime   ID were following   IV antibiotics discontinued by ID.    Podiatry following    vascular surgery     Possible colitis:   Seen on CT  Off empiric ABX    Acute cholecystitis 5/2022. CC/Reason for consult:   Reason for consult: ESRD  Chief Complaint   Patient presents with    Fatigue     per nursing home the last 8 hours       Review of Systems:   [address]. All other remaining systems are negative. Constitutional:  fever, chills, weakness, weight change, fatigue,      Skin:  rash, pruritus, hair loss, bruising, dry skin, petechiae. Head, Face, Neck   headaches, swelling,  cervical adenopathy.      Respiratory: shortness of breath, cough, or wheezing  Cardiovascular: chest pain, palpitations, dizzy, edema  Gastrointestinal: nausea, vomiting, diarrhea, constipation,belly pain    Yellow skin, blood in stool  Musculoskeletal:  back pain, muscle weakness, gait problems,       joint pain or swelling. Genitourinary:  dysuria, poor urine flow, flank pain, blood in urine  Neurologic:  vertigo, TIA'S, syncope, seizures, focal weakness  Psychosocial:  insomnia, anxiety, or depression. Additional positive findings: -     PMH/SH/FH:    Medical Hx: reviewed and updated as appropriate  Past Medical History:   Diagnosis Date    Anemia 03/2022    Arthritis     CAD (coronary artery disease) 01/2020    [facility] [address] [facility]     Colon Cancer diagnosed last month    Cerebral infarction ([facility] [facility]     Cognitive communication deficit     COVID-19     Diabetes mellitus ([facility] [facility]     Dysphagia     End stage renal disease ([facility] [address] [facility]     Fatigue     Gastrointestinal hemorrhage     Hemodialysis patient ([facility] [address] 2019    ckd-goes to dialysis Tuesday, Thurs, Fri    Hx of blood clots     Hyperlipidemia     Hypertension     Hyponatremia     Risk for falls     Splenomegaly 02/10/2021         Surgical Hx: reviewed and updated as appropriate   has a past surgical history that includes IR PERC ARTERIOVENOUS FISTULA CREATION (Left); colectomy (N/A, 01/26/2022); sigmoidoscopy (N/A, 02/17/2022); IR EMBOLIZATION HEMORRHAGE (02/19/2022); Tunneled venous catheter placement (Right, 02/21/2022); IR TUNNELED CVC PLACE WO SQ PORT/PUMP > 5 YEARS (2/21/2022); Cardiac catheterization (2019); Colonoscopy; Dialysis fistula creation (Left, 4/29/2022); Cholecystectomy, laparoscopic (N/A, 5/16/2022); ERCP (N/A, 5/16/2022); ERCP (N/A, 5/16/2022); and Upper gastrointestinal endoscopy (5/16/2022).      Social Hx: reviewed and updated as appropriate  Social History     Tobacco Use    Smoking status: Former Smoker    Smokeless tobacco: Never Used   Substance Use Topics    Alcohol use: Not Currently Family hx: reviewed and updated as appropriate  family history includes High Blood Pressure in his father. Medications:   sodium chloride flush, 5-40 mL, 2 times per day  [START ON 6/3/2022] cefepime, 1,000 mg, Q24H  apixaban, 5 mg, BID  aspirin, 81 mg, Daily  atenolol, 25 mg, Nightly  atorvastatin, 10 mg, Nightly  calcitRIOL, 0.25 mcg, Nightly  gabapentin, 100 mg, TID  LORazepam, 0.5 mg, BID  pantoprazole, 20 mg, Nightly  sevelamer, 800 mg, TID WC  tamsulosin, 0.4 mg, QAM       Lisinopril    Allergies: Allergies   Allergen Reactions    Lisinopril Swelling     Allergy listed on paperwork from [facility], no reaction noted         Physical Exam/Objective:   Height: 6' 0.05\" (1.83 m), Weight: 216 lb 0.8 oz (98 kg), BP: 123/67      General appearance:  in no acute distress, comfortable, communicative, awake and alert. HEENT: no icterus, EOM intact, trachea midline. Neck : no masses, appears symmetrical and no JVD appreciated. Respiratory: Respiratory effort normal, bilateral equal chest rise. No wheeze, no crackles   Cardiovascular: Ausculation shows RRR and  no edema   Abdomen: abdomen is soft, non distended, no masses, no pain with palpation. Musculoskeletal:  no joint swelling  Skin: lateral right ankle wound  Neuro:   Follows commands, moves all extremities spontaneously       Data:   Lab Results   Component Value Date    WBC 4.0 06/06/2022    HGB 11.8 (L) 06/06/2022    HCT 36.1 (L) 06/06/2022    MCV 92.6 06/06/2022     06/06/2022     Lab Results   Component Value Date    CREATININE 3.4 (H) 06/06/2022    BUN 23 (H) 06/06/2022     06/06/2022    K 4.0 06/06/2022     06/06/2022    CO2 27 06/06/2022     Lab Results   Component Value Date    .5 (H) 02/21/2022    CALCIUM 8.7 06/06/2022    PHOS 2.5 05/19/2022

## 2022-06-07 NOTE — PROGRESS NOTES
VASCULAR    Seen for R lat ankle ulcer. No new complaints. VSS afeb  Ankle wound unchanged    A/P: R lateral malleolar traumatic ulcer   Adequate perfusion for healing clinically. Spoke with all share holders yesterday - Raquel Martinez as well as daughter Margaret Azevedo. Continue to believe that this wound should heal with local care. All understand my opinion and recommendations. If this fails to heal would then consider further w/u including angio. Will let Dr. Jennifer Cai know it is ok to access L BC AVF at their convenience. Will see as needed. Please call for any further questions or problems.      Bettina Olszewski

## 2022-06-07 NOTE — PROGRESS NOTES
Comprehensive Nutrition Assessment    Type and Reason for Visit:  Reassess    Nutrition Recommendations/Plan:   Continue Regular diet. Change Magic Cup to Ensure HP BID. Malnutrition Assessment:  Malnutrition Status: At risk for malnutrition (Comment) (06/03/22 1006)    Context:  Acute Illness     Findings of the 6 clinical characteristics of malnutrition:  Energy Intake:  75% or less of estimated energy requirements for 7 or more days  Weight Loss:  No significant weight loss     Body Fat Loss:  No significant body fat loss     Muscle Mass Loss:  No significant muscle mass loss    Fluid Accumulation:  No significant fluid accumulation     Strength:  Not Performed    Nutrition Assessment:    Follow-up. Pt remains on HD. Wound to ankle, vascular reporting no angiogram necessary at this time. Noted PO intakes have improved slightly since last RD assessment with intakes >25%. ONS on board but intakes poor. SLP following and advanced to thin liquids. Will trial different ONS. Nutrition Related Findings:    non pitting BLE edema; BM 6/2; +1 L fluid Wound Type: Unstageable,Pressure Injury       Current Nutrition Intake & Therapies:    Average Meal Intake: 1-25%,51-75%,%  Average Supplements Intake: 0%,1-25%  ADULT ORAL NUTRITION SUPPLEMENT; Lunch, Dinner; Frozen Oral Supplement  ADULT DIET; Regular    Anthropometric Measures:  Height: 6' 0.05\" (183 cm)  Ideal Body Weight (IBW): 178 lbs (81 kg)    Admission Body Weight: 200 lb (90.7 kg)  Current Body Weight: 216 lb (98 kg), 121.3 % IBW. Weight Source: Bed Scale  Current BMI (kg/m2): 29.3        Weight Adjustment For: No Adjustment                 BMI Categories: Overweight (BMI 25.0-29. 9)    Estimated Daily Nutrient Needs:  Weight Used for Energy Requirements: Admission  Energy (kcal/day): 5720-6868 (20-25kcal/91kg)  Weight Used for Protein Requirements: Ideal  Protein (g/day):  (1.2-1.4g/82kg)  Fluid (ml/day): 1 ml/kcal    Nutrition Diagnosis: · Increased nutrient needs related to increase demand for energy/nutrients as evidenced by wounds      Nutrition Interventions:   Food and/or Nutrient Delivery: Continue Current Diet,Modify Oral Nutrition Supplement  Nutrition Education/Counseling: Education not indicated  Coordination of Nutrition Care: Continue to monitor while inpatient       Goals:  Previous Goal Met: Progressing toward Goal(s)  Goals: PO intake 50% or greater,prior to discharge       Nutrition Monitoring and Evaluation:   Behavioral-Environmental Outcomes: None Identified  Food/Nutrient Intake Outcomes: Food and Nutrient Intake,Supplement Intake  Physical Signs/Symptoms Outcomes: Biochemical Data,Chewing or Swallowing,Fluid Status or Edema,Skin,Weight    Discharge Planning:     Too soon to determine     Jennifer LETICIA Kay, LD  Contact: [phone]

## 2022-06-07 NOTE — PLAN OF CARE
Problem: Discharge Planning  Goal: Discharge to home or other facility with appropriate resources  6/7/2022 1611 by Carlos Wei RN  Outcome: Adequate for Discharge  Flowsheets (Taken 6/7/2022 9718)  Discharge to home or other facility with appropriate resources: Identify barriers to discharge with patient and caregiver  6/7/2022 0401 by Caroline Alberto RN  Outcome: Progressing  Flowsheets (Taken 6/6/2022 2125)  Discharge to home or other facility with appropriate resources: Identify barriers to discharge with patient and caregiver     Problem: Skin/Tissue Integrity  Goal: Absence of new skin breakdown  Description: 1. Monitor for areas of redness and/or skin breakdown  2. Assess vascular access sites hourly  3. Every 4-6 hours minimum:  Change oxygen saturation probe site  4. Every 4-6 hours:  If on nasal continuous positive airway pressure, respiratory therapy assess nares and determine need for appliance change or resting period. 6/7/2022 1611 by Carlos Wei RN  Outcome: Adequate for Discharge  6/7/2022 0401 by Caroline Alberto RN  Outcome: Progressing  Note: Pt has been assisted with turning t/o shift using a positioning wedge. He is on a specialty bed. RN has floated pt's heels using pillows t/o shift however pt moves his BLE in the bed and the pillows get moved out from underneath his calves. Wound care to pt's R ankle completed. Per podiatry's note, pt is to have medihoney + mepilex and it is changed daily. RN updated wound care order to reflect change. Pt has a external catheter in place for urinary incontinence. It has been noted to leak once during shift. Incontinence care provided and diaper/ext. cath changed.       Problem: Chronic Conditions and Co-morbidities  Goal: Patient's chronic conditions and co-morbidity symptoms are monitored and maintained or improved  6/7/2022 1611 by Carlos Wei RN  Outcome: Adequate for Discharge  Flowsheets (Taken 6/7/2022 4873)  Care Plan - Patient's Chronic Conditions and Co-Morbidity Symptoms are Monitored and Maintained or Improved: Monitor and assess patient's chronic conditions and comorbid symptoms for stability, deterioration, or improvement  6/7/2022 0401 by Bryce Fung RN  Outcome: Progressing  Flowsheets (Taken 6/6/2022 2125)  Care Plan - Patient's Chronic Conditions and Co-Morbidity Symptoms are Monitored and Maintained or Improved: Monitor and assess patient's chronic conditions and comorbid symptoms for stability, deterioration, or improvement     Problem: ABCDS Injury Assessment  Goal: Absence of physical injury  6/7/2022 1611 by Osiris Alamo RN  Outcome: Adequate for Discharge  Flowsheets  Taken 6/7/2022 0752 by Osiris Alamo RN  Absence of Physical Injury: Implement safety measures based on patient assessment  Taken 6/7/2022 0600 by Bryce Fung RN  Absence of Physical Injury: Implement safety measures based on patient assessment  6/7/2022 0401 by Bryce Fung RN  Outcome: Progressing  Flowsheets (Taken 6/7/2022 0401)  Absence of Physical Injury: Implement safety measures based on patient assessment     Problem: Confusion  Goal: Confusion, delirium, dementia, or psychosis is improved or at baseline  Description: INTERVENTIONS:  1. Assess for possible contributors to thought disturbance, including medications, impaired vision or hearing, underlying metabolic abnormalities, dehydration, psychiatric diagnoses, and notify attending LIP  2. [place] high risk fall precautions, as indicated  3. Provide frequent short contacts to provide reality reorientation, refocusing and direction  4. Decrease environmental stimuli, including noise as appropriate  5. Monitor and intervene to maintain adequate nutrition, hydration, elimination, sleep and activity  6. If unable to ensure safety without constant attention obtain sitter and review sitter guidelines with assigned personnel  7.  Initiate Psychosocial CNS and Spiritual Care consult, as indicated  6/7/2022 1611 by Nicole Carmona RN  Outcome: Adequate for Discharge  Flowsheets (Taken 6/7/2022 2646)  Effect of thought disturbance (confusion, delirium, dementia, or psychosis) are managed with adequate functional status: Provide frequent short contacts to provide reality reorientation, refocusing and direction  6/7/2022 0401 by Hunter Fung RN  Outcome: Progressing  Flowsheets (Taken 6/7/2022 0401)  Effect of thought disturbance (confusion, delirium, dementia, or psychosis) are managed with adequate functional status:   [place] high risk fall precautions, as indicated   Decrease environmental stimuli, including noise as appropriate   Provide frequent short contacts to provide reality reorientation, refocusing and direction  Note: Pt took his medications and allowed RN to provide care. Overall pt's behavior has improved since RN began taking care of pt. Pt's daughter, Tatianna Butler, called and RN put her on speaker phone while at the bedside with the pt. She stated if the pt appears to be sleeping, do not disturb him. During initial VS, pt was watching TV and wasn't ready for bed. Around MN VS, pt ready for bed so RN turned off his TV, dimmed the lights, and shut his door.  Pt currently resting in bed with eyes closed so RN deferring 04 VS.      Problem: Safety - Adult  Goal: Free from fall injury  6/7/2022 1611 by Nicole Carmona RN  Outcome: Adequate for Discharge  Flowsheets  Taken 6/7/2022 0752 by Nicole Carmona RN  Free From Fall Injury: Instruct family/caregiver on patient safety  Taken 6/7/2022 0600 by Hunter Fung RN  Free From Fall Injury:   Instruct family/caregiver on patient safety   Based on caregiver fall risk screen, instruct family/caregiver to ask for assistance with transferring infant if caregiver noted to have fall risk factors  6/7/2022 0401 by Hunter Fung RN  Outcome: Progressing  Flowsheets (Taken 6/6/2022 0909 by Nicole Carmona RN)  Free From Fall Injury: Based on caregiver fall risk screen, instruct family/caregiver to ask for assistance with transferring infant if caregiver noted to have fall risk factors  Note: Pt is a high fall risk. Bed in locked, low position with side rails up X 3 and an active bed alarm. Fall risk sign, socks, and bracelet in place. Pt has not attempted to get OOB unassisted and appears to be oriented to his abilities. Per report, pt is up with a Stedy to the chair.       Problem: Infection - Adult  Goal: Absence of infection during hospitalization  6/7/2022 1611 by Lizbet Paris RN  Outcome: Adequate for Discharge  Flowsheets (Taken 6/7/2022 0753)  Absence of infection during hospitalization: Assess and monitor for signs and symptoms of infection  6/7/2022 0401 by Devorah Zuluaga RN  Outcome: Progressing  Flowsheets (Taken 6/6/2022 2125)  Absence of infection during hospitalization:   Assess and monitor for signs and symptoms of infection   Monitor lab/diagnostic results   Monitor all insertion sites i.e., indwelling lines, tubes and drains   [place] appropriate cooling/warming therapies per order   Administer medications as ordered   Instruct and encourage patient and family to use good hand hygiene technique     Problem: Metabolic/Fluid and Electrolytes - Adult  Goal: Electrolytes maintained within normal limits  6/7/2022 1611 by Lizbet Paris RN  Outcome: Adequate for Discharge  Flowsheets (Taken 6/7/2022 0753)  Electrolytes maintained within normal limits: Monitor labs and assess patient for signs and symptoms of electrolyte imbalances  6/7/2022 0401 by Devorah Zuluaga RN  Outcome: Progressing  Flowsheets (Taken 6/6/2022 2125)  Electrolytes maintained within normal limits:   Monitor labs and assess patient for signs and symptoms of electrolyte imbalances   Administer electrolyte replacement as ordered   Monitor response to electrolyte replacements, including repeat lab results as appropriate  Goal: Hemodynamic stability and optimal renal function maintained  6/7/2022 1611 by Ozie Aschoff, RN  Outcome: Adequate for Discharge  Flowsheets (Taken 6/7/2022 8212)  Hemodynamic stability and optimal renal function maintained: Monitor intake, output and patient weight  6/7/2022 0401 by Mumtaz Noble RN  Outcome: Progressing  Flowsheets (Taken 6/6/2022 2125)  Hemodynamic stability and optimal renal function maintained:   Monitor labs and assess for signs and symptoms of volume excess or deficit   Monitor intake, output and patient weight   Monitor urine specific gravity, serum osmolarity and serum sodium as indicated or ordered   Encourage oral intake as appropriate  Note: Pt has HD today. Janeen asking if pt will be going to an AM or PM HD session. RN explained that it would not be known until the morning. Janeen requested that RN call to update on time of HD. RN stated would call if HD calls before end of shift. Janeen agreeable to plan.       Problem: Neurosensory - Adult  Goal: Achieves stable or improved neurological status  6/7/2022 1611 by Ozie Aschoff, RN  Outcome: Adequate for Discharge  Flowsheets (Taken 6/7/2022 0753)  Achieves stable or improved neurological status: Assess for and report changes in neurological status  6/7/2022 0401 by Mumtaz Noble RN  Outcome: Progressing  Flowsheets (Taken 6/6/2022 2125)  Achieves stable or improved neurological status: Assess for and report changes in neurological status  Goal: Achieves maximal functionality and self care  6/7/2022 1611 by Ozie Aschoff, RN  Outcome: Adequate for Discharge  Flowsheets (Taken 6/7/2022 0753)  Achieves maximal functionality and self care: Monitor swallowing and airway patency with patient fatigue and changes in neurological status  6/7/2022 0401 by Mumtaz Noble RN  Outcome: Progressing  Flowsheets (Taken 6/6/2022 2125)  Achieves maximal functionality and self care: Monitor swallowing and airway patency with patient fatigue and changes in neurological status  Note: Pt advanced to a thin liquid diet. He tolerated his medications whole (3 at a time) with thin liquids @ HS.       Problem: Skin/Tissue Integrity - Adult  Goal: Incisions, wounds, or drain sites healing without S/S of infection  6/7/2022 1611 by Ozie Aschoff, RN  Outcome: Adequate for Discharge  Flowsheets  Taken 6/7/2022 0753 by Ozie Aschoff, RN  Incisions, Wounds, or Drain Sites Healing Without Sign and Symptoms of Infection: Implement wound care per orders  Taken 6/7/2022 0752 by Ozie Aschoff, RN  Incisions, Wounds, or Drain Sites Healing Without Sign and Symptoms of Infection: TWICE DAILY: Assess and document dressing/incision, wound bed, drain sites and surrounding tissue  Taken 6/7/2022 0600 by Mumtaz Noble RN  Incisions, Wounds, or Drain Sites Healing Without Sign and Symptoms of Infection:   ADMISSION and DAILY: Assess and document risk factors for pressure ulcer development   TWICE DAILY: Assess and document skin integrity   TWICE DAILY: Assess and document dressing/incision, wound bed, drain sites and surrounding tissue   Implement wound care per orders   Initiate pressure ulcer prevention bundle as indicated  6/7/2022 0401 by Mumtaz Noble RN  Outcome: Progressing  Flowsheets (Taken 6/6/2022 2125)  Incisions, Wounds, or Drain Sites Healing Without Sign and Symptoms of Infection:   ADMISSION and DAILY: Assess and document risk factors for pressure ulcer development   TWICE DAILY: Assess and document skin integrity   TWICE DAILY: Assess and document dressing/incision, wound bed, drain sites and surrounding tissue   Implement wound care per orders   Initiate pressure ulcer prevention bundle as indicated     Problem: Musculoskeletal - Adult  Goal: Return mobility to safest level of function  6/7/2022 1611 by Ozie Aschoff, RN  Outcome: Adequate for Discharge  Flowsheets (Taken 6/7/2022 0753)  Return Mobility to Safest Level of Function: Assess patient stability and activity tolerance for standing, transferring and ambulating with or without assistive devices  6/7/2022 0401 by Tristan Lomax RN  Outcome: Progressing  Flowsheets (Taken 6/6/2022 0909 by Jolanta Blank RN)  Return Mobility to Safest Level of Function: Assess patient stability and activity tolerance for standing, transferring and ambulating with or without assistive devices     Problem: Respiratory - Adult  Goal: Achieves optimal ventilation and oxygenation  6/7/2022 1611 by Jolanta Blank RN  Outcome: Adequate for Discharge  Flowsheets (Taken 6/7/2022 0753)  Achieves optimal ventilation and oxygenation: Assess for changes in respiratory status  6/7/2022 0401 by Tristan Lomax RN  Outcome: Progressing  Flowsheets (Taken 6/6/2022 2125)  Achieves optimal ventilation and oxygenation: Assess for changes in respiratory status     Problem: Cardiovascular - Adult  Goal: Maintains optimal cardiac output and hemodynamic stability  6/7/2022 1611 by Jolanta Blank RN  Outcome: Adequate for Discharge  Flowsheets (Taken 6/7/2022 0753)  Maintains optimal cardiac output and hemodynamic stability: Monitor blood pressure and heart rate  6/7/2022 0401 by Tristan Lomax RN  Outcome: Progressing  Flowsheets (Taken 6/6/2022 2125)  Maintains optimal cardiac output and hemodynamic stability:   Monitor blood pressure and heart rate   Monitor urine output and notify Licensed Independent Practitioner for values outside of normal range   Assess for signs of decreased cardiac output     Problem: Gastrointestinal - Adult  Goal: Maintains or returns to baseline bowel function  6/7/2022 1611 by Jolanta Blank RN  Outcome: Adequate for Discharge  6/7/2022 0401 by Tristan Lomax RN  Outcome: Progressing  Flowsheets (Taken 6/3/2022 2203)  Maintains or returns to baseline bowel function:   Assess bowel function   Encourage oral fluids to ensure adequate hydration     Problem: Nutrition Deficit:  Goal: Optimize nutritional status  6/7/2022 1611 by Jolanta Blank

## 2022-06-07 NOTE — PLAN OF CARE
Problem: Skin/Tissue Integrity  Goal: Absence of new skin breakdown  Description: 1. Monitor for areas of redness and/or skin breakdown  Outcome: Progressing  Note: Pt has been assisted with turning t/o shift using a positioning wedge. He is on a specialty bed. RN has floated pt's heels using pillows t/o shift however pt moves his BLE in the bed and the pillows get moved out from underneath his calves. Wound care to pt's R ankle completed. Per podiatry's note, pt is to have medihoney + mepilex and it is changed daily. RN updated wound care order to reflect change. Pt has a external catheter in place for urinary incontinence. It has been noted to leak once during shift. Incontinence care provided and diaper/ext. cath changed. Problem: Confusion  Goal: Confusion, delirium, dementia, or psychosis is improved or at baseline  Description: INTERVENTIONS:  1. Assess for possible contributors to thought disturbance, including medications, impaired vision or hearing, underlying metabolic abnormalities, dehydration, psychiatric diagnoses, and notify attending LIP  2. [place] high risk fall precautions, as indicated  3. Provide frequent short contacts to provide reality reorientation, refocusing and direction  4. Decrease environmental stimuli, including noise as appropriate  5. Monitor and intervene to maintain adequate nutrition, hydration, elimination, sleep and activity  6. If unable to ensure safety without constant attention obtain sitter and review sitter guidelines with assigned personnel  7.  Initiate Psychosocial CNS and Spiritual Care consult, as indicated  Outcome: Progressing  Flowsheets (Taken 6/7/2022 0401)  Effect of thought disturbance (confusion, delirium, dementia, or psychosis) are managed with adequate functional status:   [place] high risk fall precautions, as indicated   Decrease environmental stimuli, including noise as appropriate   Provide frequent short contacts to provide reality reorientation, refocusing and direction  Note: Pt took his medications and allowed RN to provide care. Overall pt's behavior has improved since RN began taking care of pt. Pt's daughter, Vladimir Mosher, called and RN put her on speaker phone while at the bedside with the pt. She stated if the pt appears to be sleeping, do not disturb him. During initial VS, pt was watching TV and wasn't ready for bed. Around MN VS, pt ready for bed so RN turned off his TV, dimmed the lights, and shut his door. Pt currently resting in bed with eyes closed so RN deferring 04 VS.      Problem: Safety - Adult  Goal: Free from fall injury  Outcome: Progressing  Flowsheets (Taken 6/6/2022 0909 by Teresa Craig RN)  Free From Fall Injury: Based on caregiver fall risk screen, instruct family/caregiver to ask for assistance with transferring infant if caregiver noted to have fall risk factors  Note: Pt is a high fall risk. Bed in locked, low position with side rails up X 3 and an active bed alarm. Fall risk sign, socks, and bracelet in place. Pt has not attempted to get OOB unassisted and appears to be oriented to his abilities. Per report, pt is up with a Stedy to the chair. Problem: Metabolic/Fluid and Electrolytes - Adult  Goal: Hemodynamic stability and optimal renal function maintained  Outcome: Progressing  Flowsheets (Taken 6/6/2022 2125)  Hemodynamic stability and optimal renal function maintained:   Monitor labs and assess for signs and symptoms of volume excess or deficit   Monitor intake, output and patient weight   Monitor urine specific gravity, serum osmolarity and serum sodium as indicated or ordered   Encourage oral intake as appropriate  Note: Pt has HD today. Janeen asking if pt will be going to an AM or PM HD session. RN explained that it would not be known until the morning. Janeen requested that RN call to update on time of HD. RN stated would call if HD calls before end of shift. Janeen agreeable to plan. Problem: Neurosensory - Adult  Goal: Achieves maximal functionality and self care  Outcome: Progressing  Flowsheets (Taken 6/6/2022 2125)  Achieves maximal functionality and self care: Monitor swallowing and airway patency with patient fatigue and changes in neurological status  Note: Pt advanced to a thin liquid diet. He tolerated his medications whole (3 at a time) with thin liquids @ HS. Problem: Nutrition Deficit:  Goal: Optimize nutritional status  Outcome: Not Progressing  Flowsheets (Taken 6/4/2022 9530)  Nutrient intake appropriate for improving, restoring, or maintaining nutritional needs:   Assess nutritional status and recommend course of action   Monitor oral intake, labs, and treatment plans  Note: Pt continues to have poor PO intake. Janeen states she brought a meal from [employer] for the pt and he ate ~ 1/3 of it. Pt did not want any of his DN tray. Both SCOUT and Janeen asked pt about eating multiple times while on the phone together with the pt and he said he didn't want to eat anything.

## 2022-06-07 NOTE — CARE COORDINATION
CASE MANAGEMENT DISCHARGE SUMMARY:  Met with patient & spoke to patient's spouse Swapna Sotomayor over the phone. Agree to discharge back to [facility] today. Spoke to Shoaib Delarosa from facility, patient able to return, no covid test needed, AVS faxed. RN aware of discharge plan. DISCHARGE DATE: 6/7/2022    DISCHARGED TO:   · Name: Gen[facility] Wallace  · Address:  [address] [place] [address], [address] [place] [address]             REPORT NUMBER: [phone]               FAX NUMBER: [phone]    HEMODIALYSIS: resume HD   Name: [facility] [address], [place] [address]  Dialysis Schedule: TTS at 10:30 AM  Phone: [phone]  Fax: [phone]    TRANSPORTATION: [address]: 7pm   **Form completed and on chart    INSURANCE PRECERT OBTAINED: returning LTC    HENS/PASAAR COMPLETED: n/a, return LTC    SHAMIKA Updated  Destination updated   Whiteboard Note Updated with above    Electronically signed by Gabriella Joyce RN Case Management [phone] on 6/7/2022 at 12:15 PM     HD just completed, fax last run flowsheets to [facility].   Electronically signed by Gabriella Joyce RN on 6/7/2022 at 4:08 PM

## 2022-06-07 NOTE — DISCHARGE SUMMARY
Hospital Medicine Discharge Summary    Patient: Haritha Cintron     Gender: male  : 1945   Age: 77 y.o. MRN: [id number]    Code Status: Full Code    Primary Care Provider: Lisa Hyatt    Admit Date: 2022   Discharge Date:   2022    Admitting Physician: Caity Ricci DO  Discharge Physician: Suresh Joaquin DO     Discharge Diagnoses: Active Hospital Problems    Diagnosis Date Noted    Anxiety [F41.9] 2022     Priority: Medium    Fever and chills [R50.9]      Priority: Medium    PVD (peripheral vascular disease) ([facility] [I73.9]      Priority: Medium    AMS (altered mental status) [R41.82] 2022     Priority: Medium    Acute metabolic encephalopathy [A73.92] 2022     Priority: Medium    Cellulitis [L03.90] 2022     Priority: Medium    ESRD (end stage renal disease) on dialysis ([facility] [N18.6, Z99.2]      Priority: Medium       Hospital Course: A 78 yo male with esrd on hd, dm, cad was admitted with ams. initial work-up positive for low-grade fever, possible crt ankle ellulitis, admitted to the hospital for further management and treatment. He was seen by podiatry, and ID, diagnosed with a decubitus ulcer of the right ankle, and abx were stopped. Work up completed, and improved with treatment as below. was discharged today in stable condition. RLE decubitus wound   hba1c is well controlled, 6.1, this is not likely a diabetic complication  - had already completed vancomycin and cefepime, ID were following, IV antibiotics discontinued by ID.    - Podiatry following and vascular surgery consulted - good vascular status in right ankle to allow for wound healing. He can follow up with Dr Kathryn Osborn in the out pt setting for a 2nd opinion if he and family prefer this.  No acute intervention is warranted here.     Acute metabolic encephalopathy, improving  - avoid sedating meds     Possible proctocolitis, no abdominal pain or diarrhea  - ct abdomen with moderate proctocolitis, no abscesses     Chronic conditions - continue home medications unless otherwise stated     End-stage renal disease   - nephrology consulted, fistula placed 4/29, does not look infected at this time. History of colon cancer, s/p sigmoid collectomy 1/2022, follow-up as outpatient  Essential hypertension, controlled at this time  History of VTE continue oral anticoagulation  Peripheral artery disease, cardiovascular consulted. Dm    Disposition: To a non-[facility] facility    Exam:     /67   Pulse 62   Temp 97.4 °F (36.3 °C) (Oral)   Resp 18   Ht 6' 0.05\" (1.83 m)   Wt 216 lb 0.8 oz (98 kg)   SpO2 91%   BMI 29.26 kg/m²     General appearance:  No apparent distress, appears comfortable   HEENT:  Normal cephalic, atraumatic without obvious deformity. Pupils equal, round, and reactive to light. Extra ocular muscles intact. Conjunctivae/corneas clear. Neck: Supple, with full range of motion. No jugular venous distention. Trachea midline. Respiratory:  Normal respiratory effort. Clear to auscultation, bilaterally without Rales/Wheezes/Rhonchi. Cardiovascular:  Regular rate and rhythm with normal S1/S2 without murmurs, rubs or gallops. Abdomen: Soft, non-tender, non-distended with normal bowel sounds. Musculoskeletal:  No clubbing, cyanosis or edema bilaterally. Right ankle with a dry healing wound. Warm and dry skin  Skin: Skin color, texture, turgor normal.  No rashes or lesions. Neurologic:  Neurovascularly intact without any focal sensory/motor deficits. Cranial nerves: II-XII intact, grossly non-focal.  Psychiatric:  Alert and oriented to name and place, thought content appropriate    Consults:     IP CONSULT TO HOSPITALIST  IP CONSULT TO NEPHROLOGY  IP CONSULT TO INFECTIOUS DISEASES  IP CONSULT TO PODIATRY  IP CONSULT TO [address] [place] [address] Street:  For convenience and continuity at follow-up the following most recent labs are provided:    Lab Results   Component Value Date    WBC 4.0 06/06/2022    HGB 11.8 06/06/2022    HCT 36.1 06/06/2022    MCV 92.6 06/06/2022     06/06/2022     06/06/2022    K 4.0 06/06/2022    K 4.0 05/13/2022     06/06/2022    CO2 27 06/06/2022    BUN 23 06/06/2022    CREATININE 3.4 06/06/2022    CALCIUM 8.7 06/06/2022    PHOS 2.5 05/19/2022    ALKPHOS 116 06/06/2022    ALT 11 06/06/2022    AST 11 06/06/2022    BILITOT 0.6 06/06/2022    BILIDIR <0.2 02/12/2022    LABALBU 2.7 06/06/2022    LDLCALC 25 01/15/2022    TRIG 54 01/15/2022     Lab Results   Component Value Date    INR 1.81 (H) 03/08/2022    INR 2.11 (H) 02/28/2022    INR 1.12 02/19/2022       Radiology:  VL DUP LOWER EXTREMITY ARTERIES BILATERAL   Final Result      CT ABDOMEN PELVIS W IV CONTRAST Additional Contrast? None   Final Result   1. No central, lobar, or segmental pulmonary embolus. 2. Trace bilateral pleural effusions and mild bibasilar atelectasis. 3. Findings of moderate proctocolitis involving the rectum and sigmoid colon. Associated wall thickening and edema. No abscess. No mesenteric gas. 4. Colorectal anastomosis with focal narrowing at this region. A stricture   is not excluded. CT CHEST PULMONARY EMBOLISM W CONTRAST   Final Result   1. No central, lobar, or segmental pulmonary embolus. 2. Trace bilateral pleural effusions and mild bibasilar atelectasis. 3. Findings of moderate proctocolitis involving the rectum and sigmoid colon. Associated wall thickening and edema. No abscess. No mesenteric gas. 4. Colorectal anastomosis with focal narrowing at this region. A stricture   is not excluded. CT HEAD WO CONTRAST   Final Result   No acute intracranial abnormality. XR CHEST PORTABLE   Final Result   No acute abnormality.              Discharge Medications:   Current Discharge Medication List      START taking these medications    Details   Wound Dressings ([facility] WOUND/BURN DRESSING) GEL gel Apply 1 each topically daily Apply to RIGHT Follow-up:    pcp    Condition at Discharge:  Stable    The patient was seen and examined on day of discharge and this discharge summary is in conjunction with any daily progress note from day of discharge. Time Spent on discharge is 60 minutes in the examination, evaluation, counseling and review of medications and discharge plan. Signed:    Flonnie Buerger, DO   6/7/2022      Thank you Stefany Baez for the opportunity to be involved in this patient's care. If you have any questions or concerns please feel free to contact me at [phone].

## 2022-06-07 NOTE — PROGRESS NOTES
Treatment time: 3 Hours    Net UF: 2000kg    Pre weight: 98kg  Post weight: 95.5kg  EDW: 92kg    Access used: Right chest TDC  Access function: Well; tolerated 350 BFR    Medications or blood products given: N/A    Regular outpatient schedule: TTS    Summary of response to treatment: Tolerated well, B/p soft but asymptomatic. Wt may be inaccurate, pt unable to stand for wt.      Copy of dialysis treatment record placed in chart, to be scanned into EMR.         06/07/22 1235   Vital Signs   /88   Temp 97 °F (36.1 °C)   Heart Rate 51   Resp 16   Weight 216 lb 0.8 oz (98 kg)      06/07/22 1544   Vital Signs   /64   Temp (!) 96.6 °F (35.9 °C)   Heart Rate 66   Resp 16   Weight 210 lb 8.6 oz (95.5 kg)   Percent Weight Change -2.55

## 2022-06-10 NOTE — DISCHARGE SUMMARY
Hospital Medicine Discharge Summary    Patient ID: Gabriel Mcdaniel      Patient's PCP: Liam Head    Admit Date: 5/11/2022     Discharge Date: 5/19/2022      Admitting Provider: Ashely Walker MD     Discharge Provider: Brenda Crane MD     Discharge Diagnoses: Active Hospital Problems    Diagnosis     Pain of upper abdomen [R10.10]      Priority: Medium    Acute cholecystitis [K81.0]      Priority: Medium       The patient was seen and examined on day of discharge and this discharge summary is in conjunction with any daily progress note from day of discharge. Hospital Course:   Patient is a 42-year-old male with a past medical history of coronary artery disease, CVA, anemia, cognitive impairment, diabetes, ESRD, hypertension, hyperlipidemia who presented with right upper quadrant pain.  He was admitted with acute cholecystitis.     Acute cholecystitis  -on empiric ceftriaxone and Flagyl  -general surgery consulted  -on heparin gtt in the interim, hold 6 hours prior to procedure(was on DOAC hence delay in  Surgery)  - cholelithiasis/choledocholithiasis - s/p andres 5/16/22 and ERCP s/p sphincterotomy with stone extraction  - ok to dc per gen surgery     DVT  -heparin gtt - hold for 48hrs and resumed  -Eliquis resumed     CAD  -continue home meds     ESRD on HD  -nephrology consulted, recs appreciated     Essential hypertension  -continue home meds     Delirium-improved, continue supportive care  -Continue melatonin  -Need as needed Haldol           Physical Exam Performed:     /75   Pulse 66   Temp 97.8 °F (36.6 °C) (Oral)   Resp 16   Ht 6' (1.829 m)   Wt 213 lb 13.5 oz (97 kg)   SpO2 95%   BMI 29.00 kg/m²        Gen: No distress. Eyes: PERRL. No sclera icterus. No conjunctival injection. ENT: No discharge. Pharynx clear. External appearance of ears and nose normal.  Neck: Trachea midline. No obvious mass.    Resp: No accessory muscle use. No crackles. No wheezes.  No rhonchi. No dullness on percussion. CV: Regular rate. Regular rhythm. No murmur or rub. No edema. GI: Non-tender. Non-distended. No hernia. Skin: Warm, dry, normal texture and turgor. No nodule on exposed extremities. Lymph: No cervical LAD. No supraclavicular LAD. M/S: No cyanosis. No clubbing. No joint deformity.    Neuro: Moves all four extremities. CN 2-12 tested, no defect noted. Psych: Oriented x 2 . No anxiety.  Awake. Alert.        Labs: For convenience and continuity at follow-up the following most recent labs are provided:      CBC:    Lab Results   Component Value Date    WBC 4.0 06/06/2022    HGB 11.8 06/06/2022    HCT 36.1 06/06/2022     06/06/2022       Renal:    Lab Results   Component Value Date     06/06/2022    K 4.0 06/06/2022    K 4.0 05/13/2022     06/06/2022    CO2 27 06/06/2022    BUN 23 06/06/2022    CREATININE 3.4 06/06/2022    CALCIUM 8.7 06/06/2022    PHOS 2.5 05/19/2022         Significant Diagnostic Studies    Radiology:   FL ERCP BILIARY S&I   Final Result   Unremarkable ERCP images. Please refer to the procedure report for further   details. FL CHOLANGIOGRAM OR   Final Result   Suspected choledocholithiasis. The common duct is demonstrated as at least   partially patent. CT ABDOMEN PELVIS W IV CONTRAST Additional Contrast? None   Final Result   1. Dilated gallbladder with edematous wall, mucosal enhancement, and small   amount of pericholecystic fluid. Small gallstones are suggested at the   gallbladder neck to cystic duct region. Features are highly suspicious for   acute cholecystitis. If clinical doubt, then gallbladder ultrasound. 2.  Increased intrahepatic biliary ductal dilatation likely secondary to the   gallbladder pathology. 3.  Multiple cysts and other hypodensities too small to characterize within   both kidneys. No hydronephrosis.       4.  Residual borderline and mild thickening of the rectum and rectosigmoid junction with previous surgery and anastomosis. No adjacent fluid collection   or general free fluid. Consults:     IP CONSULT TO GENERAL SURGERY  IP CONSULT TO NEPHROLOGY  IP CONSULT TO SOCIAL WORK    Disposition:  SNF     Condition at Discharge: Stable    Discharge Instructions/Follow-up:  pcp in 1 week    Code Status:  Prior     Activity: activity as tolerated    Diet: regular diet      Discharge Medications:     Discharge Medication List as of 5/19/2022  2:21 PM           Details   !! oxyCODONE (ROXICODONE) 5 MG immediate release tablet Take 1 tablet by mouth every 4 hours as needed for Pain for up to 3 days. Intended supply: 5 days. Take lowest dose possible to manage pain, Disp-20 tablet, R-0Print      !! LORazepam (ATIVAN) 0.5 MG tablet Take 1 tablet by mouth every 12 hours as needed for Anxiety for up to 3 days. , Disp-6 tablet, R-0Print       !! - Potential duplicate medications found. Please discuss with provider. Details   docusate sodium (COLACE) 100 MG capsule Take 100 mg by mouth 2 times dailyHistorical Med      ondansetron (ZOFRAN) 4 MG tablet Take 4 mg by mouth every 8 hours as needed for NauseaHistorical Med      polyethylene glycol (GLYCOLAX) 17 GM/SCOOP powder Take 17 g by mouth daily as needed (Constipation) Historical Med      !! LORazepam (ATIVAN) 0.5 MG tablet Take 0.5 mg by mouth 2 times daily. Historical Med      !! oxyCODONE (ROXICODONE) 5 MG immediate release tablet Take 5 mg by mouth every 4 hours as needed for Pain. Historical Med      apixaban (ELIQUIS) 5 MG TABS tablet Take 1 tablet by mouth 2 times daily, Disp-60 tablet, R-0Normal      aspirin 81 MG chewable tablet Take 1 tablet by mouth daily, Disp-30 tablet, R-0NO PRINT      melatonin 3 MG TABS tablet Take 2 tablets by mouth nightly as needed (sleep), Disp-6 tablet, R-0Print      tamsulosin (FLOMAX) 0.4 MG capsule Take 0.4 mg by mouth every morningHistorical Med      gabapentin (NEURONTIN) 100 MG capsule Take 100 mg by mouth 3 times daily. Historical Med      sevelamer (RENVELA) 800 MG tablet Take 800 mg by mouth 3 times daily (with meals) Historical Med      atenolol (TENORMIN) 25 MG tablet Take 25 mg by mouth every eveningHistorical Med      pantoprazole (PROTONIX) 20 MG tablet Take 20 mg by mouth nightlyHistorical Med      atorvastatin (LIPITOR) 10 MG tablet Take 10 mg by mouth nightlyHistorical Med      calcitRIOL (ROCALTROL) 0.25 MCG capsule Take 0.25 mcg by mouth every eveningHistorical Med       !! - Potential duplicate medications found. Please discuss with provider. Time Spent on discharge is more than 30 minutes in the examination, evaluation, counseling and review of medications and discharge plan. Signed:    Arlys Opitz, MD   6/10/2022      Thank you Mindi Knox for the opportunity to be involved in this patient's care. If you have any questions or concerns, please feel free to contact me at [phone].

## 2022-06-10 NOTE — PROGRESS NOTES
Cardiology Consultation     Ash Byrnes  1945    PCP: Terry Yang  Reason for Referral: Second opinion   Chief Complaint:   Chief Complaint   Patient presents with    Follow-Up from Hospital     no/cc       Subjective:   History of Present Illness: The patient is 77 y.o. male with a past medical history significant for ESRD on dialysis, type 2 diabetes, hypertension, hyperlipidemia, prior DVT, colon cancer and history of GI bleed. He presented to [facility] with worsening right lateral foot wound. He presents today as second opinion per family request regarding further management of wound accompanied by his daughter. He has been residing in a nursing facility and they are completing dressing changes daily for him.       Past Medical History:   Diagnosis Date    Anemia 03/2022    Arthritis     CAD (coronary artery disease) 01/2020    Cancer [facility])     Colon Cancer diagnosed last month    [facility]     Cognitive communication deficit     COVID-19     Diabetes mellitus [facility]     Dysphagia     End stage renal disease ([facility]     Fatigue     Gastrointestinal hemorrhage     Hemodialysis patient ([facility] 2019    ckd-goes to dialysis Tuesday, Thurs, Fri    Hx of blood clots     Hyperlipidemia     Hypertension     Hyponatremia     Risk for falls     Splenomegaly 02/10/2021     Past Surgical History:   Procedure Laterality Date    CARDIAC CATHETERIZATION  2019    CHOLECYSTECTOMY, LAPAROSCOPIC N/A 5/16/2022    LAPAROSCOPIC CHOLECYSTECTOMY WITH CHOLANGIOGRAM performed by Bhupendra Tukcer MD at [address] [facility] 01/26/2022    SIGMOID COLECTOMY, SIGMOIDOSCOPY performed by Bhupendra Tucker MD at [address] Left 4/29/2022    LEFT BRACHIAL CEPHALIC FISTULA performed by Tessa Chakraborty MD at [address] [place] [address] [address] ERCP N/A 5/16/2022    ERCP SPHINCTER/PAPILLOTOMY performed by Marco Mar MD at [address] [facility]  ERCP N/A 5/16/2022    ERCP STONE REMOVAL/BALLOON SWEEP performed by Antwan Boyle MD at [address] [address]  02/19/2022    IR EMBOLIZATION HEMORRHAGE 2/19/2022 [facility]    IR PERC ARTERIOVENOUS FISTULA CREATION Left     unsure of date    IR TUNNELED CATHETER PLACEMENT GREATER THAN 5 YEARS  2/21/2022    IR TUNNELED CATHETER PLACEMENT GREATER THAN 5 YEARS 2/21/2022 [facility]    SIGMOIDOSCOPY N/A 02/17/2022    SIGMOIDOSCOPY CONTROL HEMORRHAGE performed by Patricia Rowe MD at [address] Right 02/21/2022    Permacath; RIJ access; 23cm; Dr. Vimal Elise  5/16/2022    ERCP POLYP SNARE performed by Antwan Boyle MD at [address] [facility] History   Problem Relation Age of Onset    High Blood Pressure Father      Social History     Tobacco Use    Smoking status: Former Smoker    Smokeless tobacco: Never Used   Vaping Use    Vaping Use: Never used   Substance Use Topics    Alcohol use: Not Currently    Drug use: Never       Allergies   Allergen Reactions    Lisinopril Swelling     Allergy listed on paperwork from [facility], no reaction noted     Current Outpatient Medications   Medication Sig Dispense Refill    Wound Dressings ([facility] WOUND/BURN DRESSING) GEL gel Apply 1 each topically daily Apply to RIGHT ankle wound daily 44 mL 2    docusate sodium (COLACE) 100 MG capsule Take 100 mg by mouth 2 times daily      ondansetron (ZOFRAN) 4 MG tablet Take 4 mg by mouth every 8 hours as needed for Nausea      polyethylene glycol (GLYCOLAX) 17 GM/SCOOP powder Take 17 g by mouth daily as needed (Constipation)       apixaban (ELIQUIS) 5 MG TABS tablet Take 1 tablet by mouth 2 times daily 60 tablet 0    aspirin 81 MG chewable tablet Take 1 tablet by mouth daily 30 tablet 0    melatonin 3 MG TABS tablet Take 2 tablets by mouth nightly as needed (sleep) 6 tablet 0    tamsulosin (FLOMAX) 0.4 MG capsule Take 0.4 mg by mouth every morning      gabapentin (NEURONTIN) 100 MG capsule Take 100 mg by mouth 3 times daily.  sevelamer (RENVELA) 800 MG tablet Take 800 mg by mouth 3 times daily (with meals)       atenolol (TENORMIN) 25 MG tablet Take 25 mg by mouth every evening      pantoprazole (PROTONIX) 20 MG tablet Take 20 mg by mouth nightly      atorvastatin (LIPITOR) 10 MG tablet Take 10 mg by mouth nightly      calcitRIOL (ROCALTROL) 0.25 MCG capsule Take 0.25 mcg by mouth every evening       No current facility-administered medications for this visit. Review of Systems:  · Constitutional: No unanticipated weight loss. There's been no change in energy level, sleep pattern, or activity level. No fevers, chills. · Eyes: No visual changes or diplopia. No scleral icterus. · ENT: No Headaches, hearing loss or vertigo. No mouth sores or sore throat. · Cardiovascular: as reviewed in HPI  · Respiratory: No cough or wheezing, no sputum production. No hemoptysis. · Gastrointestinal: No abdominal pain, appetite loss, blood in stools. No change in bowel or bladder habits. · Genitourinary: No dysuria, trouble voiding, or hematuria. · Musculoskeletal:  No gait disturbance, no joint complaints. · Integumentary: No rash or pruritis. · Neurological: No headache, diplopia, change in muscle strength, numbness or tingling. · Psychiatric: No anxiety or depression. · Endocrine: No temperature intolerance. No excessive thirst, fluid intake, or urination. No tremor. · Hematologic/Lymphatic: No abnormal bruising or bleeding, blood clots or swollen lymph nodes. · Allergic/Immunologic: No nasal congestion or hives.     Physical Exam:   /78   Pulse 58   Ht 6' 5\" (1.956 m)   Wt 210 lb (95.3 kg)   SpO2 96%   BMI 24.90 kg/m²   Wt Readings from Last 3 Encounters:   06/13/22 210 lb (95.3 kg)   06/07/22 210 lb 8.6 oz (95.5 kg)   05/19/22 213 lb 13.5 oz (97 kg) Constitutional: He is oriented to person, place, and time. He appears well-developed and well-nourished. In no acute distress. Head: Normocephalic and atraumatic. Pupils equal and round. Neck: Neck supple. No JVP or carotid bruit appreciated. No mass and no thyromegaly present. No lymphadenopathy present. Cardiovascular: Normal rate. Normal heart sounds. Exam reveals no gallop and no friction rub. No murmur heard. Pulmonary/Chest: Effort normal and breath sounds normal. No respiratory distress. He has no wheezes, rhonchi or rales. Abdominal: Soft, non-tender. Bowel sounds are normal. He exhibits no organomegaly, mass or bruit. Extremities: No edema. No cyanosis or clubbing. Pulses are 2+ radial/carotid bilaterally. Neurological: No gross cranial nerve deficit. Coordination normal.   Skin: Skin is warm and dry. There is no rash or diaphoresis. Psychiatric: He has a normal mood and affect. His speech is normal and behavior is normal.     Lab Review:   FLP:    Lab Results   Component Value Date    TRIG 54 01/15/2022    HDL 24 01/15/2022    HDL 27 07/26/2011    LDLCALC 25 01/15/2022    LABVLDL 11 01/15/2022     BUN/Creatinine:    Lab Results   Component Value Date    BUN 23 06/06/2022    CREATININE 3.4 06/06/2022     PT/INR, TNI, HGB A1C:   Lab Results   Component Value Date/Time    TROPONINI 0.07 (H) 06/01/2022 10:29 PM    LABA1C 6.1 01/15/2022 05:31 AM      No results found for: CBCAUTODIF        Echo 1/14/22  Left ventricular size is normal.  Moderate concentric left ventricular hypertrophy is present. Global left ventricular function is normal with ejection fraction estimated  from 55 % to 60 %. Grade II diastolic dysfunction with elevated LV filling pressures. Normal right ventricular size and function. Echo 2/19/22  Left ventricular cavity size is normal.  Ejection fraction is visually estimated to be 55-60%. There is moderate concentric left ventricular hypertrophy.   No regional wall motion abnormalities are noted. Left atrium is of normal size. Tricuspid aortic valve. Thickened aortic valve leaflets noted. Normal right ventricular size and function. TAPSE= 1.7cm  Right Heart Strain= -10.2%  The right atrium is normal in size. There is a trivial pericardial effusion noted. Lower extremity arterial duplex 6/3/22  Right Impression   Right SHANTA was not available due to inadequate pulses and noncompressible   tibial vessels. (DP inaudible, PT non compressible)   Right common femoral and profunda femoral vein were not visualized due to   arterial IV line in place. The majority of the waveforms are multiphasic throughout the right lower   extremity. Ultrasound images of the right lower extremity reveal moderate to severe   calcification throughout. Possible occlusion of the right proximal anterior tibial artery with flow   reversal in the mid anterior tibial artery. Left Impression   Right SHANTA was not available due to inadequate pulses and noncompressible   tibial vessels. (DP inaudible, PT non compressible)   The majority of the waveforms are multiphasic throughout the left lower   extremity. Ultrasound images of the left lower extremity reveal moderate to severe   calcification throughout. Possible occlusion of the mid anterior tibial artery with flow reversal in the   distal anterior tibial artery. All above diagnostic testing and laboratory data was independently visualized and reviewed by me (not simply review of report)       Assessment and Plan   1) PAD   CLI RC5  Continues with right lateral foot wound   Doppler with occluded tibial vessels  Complete laser tissue doppler to determine wound healing capability; if low recommend peripheral angiogram   Podiatry referral to Dr. Les Junior for wound care     2)mixed hyperlipidemia  - statin     Follow up in 4 weeks      Thank you very much for allowing me to participate in the care of your patient.  Please do not hesitate to

## 2022-06-13 ENCOUNTER — OFFICE VISIT (OUTPATIENT)
Dept: CARDIOLOGY CLINIC | Age: 77
End: 2022-06-13
Payer: MEDICARE

## 2022-06-13 VITALS
HEIGHT: 77 IN | WEIGHT: 210 LBS | SYSTOLIC BLOOD PRESSURE: 110 MMHG | OXYGEN SATURATION: 96 % | DIASTOLIC BLOOD PRESSURE: 78 MMHG | HEART RATE: 58 BPM | BODY MASS INDEX: 24.79 KG/M2

## 2022-06-13 DIAGNOSIS — I73.9 PAD (PERIPHERAL ARTERY DISEASE) (HCC): ICD-10-CM

## 2022-06-13 DIAGNOSIS — I73.9 PVD (PERIPHERAL VASCULAR DISEASE) (HCC): Primary | ICD-10-CM

## 2022-06-13 DIAGNOSIS — S91.301D OPEN WOUND OF RIGHT FOOT, SUBSEQUENT ENCOUNTER: ICD-10-CM

## 2022-06-13 PROCEDURE — G8420 CALC BMI NORM PARAMETERS: HCPCS | Performed by: INTERNAL MEDICINE

## 2022-06-13 PROCEDURE — 3017F COLORECTAL CA SCREEN DOC REV: CPT | Performed by: INTERNAL MEDICINE

## 2022-06-13 PROCEDURE — 1123F ACP DISCUSS/DSCN MKR DOCD: CPT | Performed by: INTERNAL MEDICINE

## 2022-06-13 PROCEDURE — 93000 ELECTROCARDIOGRAM COMPLETE: CPT | Performed by: INTERNAL MEDICINE

## 2022-06-13 PROCEDURE — G8427 DOCREV CUR MEDS BY ELIG CLIN: HCPCS | Performed by: INTERNAL MEDICINE

## 2022-06-13 PROCEDURE — 99214 OFFICE O/P EST MOD 30 MIN: CPT | Performed by: INTERNAL MEDICINE

## 2022-06-13 PROCEDURE — 1036F TOBACCO NON-USER: CPT | Performed by: INTERNAL MEDICINE

## 2022-06-13 PROCEDURE — 1111F DSCHRG MED/CURRENT MED MERGE: CPT | Performed by: INTERNAL MEDICINE

## 2022-06-14 ENCOUNTER — TELEPHONE (OUTPATIENT)
Dept: CARDIOLOGY CLINIC | Age: 77
End: 2022-06-14

## 2022-06-14 NOTE — TELEPHONE ENCOUNTER
Received a call from the pharmacy, medihoney dressing is cost prohibitive per patient and delisa at the pharmacy. States it is not covered by his insurance and will cost $19.95. She states Silvadene solution 1% is covered 100%. Instructed will review with Dr. Justin London and send new prescription if necessary. She stated she will notify the patient.

## 2022-06-17 NOTE — TELEPHONE ENCOUNTER
Could not reach patient. Spoke to wife Mckenna Deion Alarcon per HIPAA. She said OK to switch. Pending RX to Dr. Tia Castillo to sign.

## 2022-06-17 NOTE — TELEPHONE ENCOUNTER
Okay to send for silvadene if is is more affordable. Please call to notify and send prescription is he is willing.

## 2022-06-20 ENCOUNTER — HOSPITAL ENCOUNTER (OUTPATIENT)
Dept: VASCULAR LAB | Age: 77
Discharge: HOME | End: 2022-06-20
Payer: MEDICARE

## 2022-06-20 DIAGNOSIS — S91.301D OPEN WOUND OF RIGHT FOOT, SUBSEQUENT ENCOUNTER: ICD-10-CM

## 2022-06-20 DIAGNOSIS — I73.9 PAD (PERIPHERAL ARTERY DISEASE) (HCC): ICD-10-CM

## 2022-06-20 PROCEDURE — 93923 UPR/LXTR ART STDY 3+ LVLS: CPT

## 2022-07-11 NOTE — PROGRESS NOTES
Cardiology Progress Note    Carissa Franklin  1945    PCP: Denver Mendiola  Reason for Referral: Second opinion   Chief Complaint:    Chief Complaint   Patient presents with    Follow-up     4 week f/u       Subjective:   History of Present Illness: The patient is 69 y.o. male with a past medical history significant for ESRD on dialysis, type 2 diabetes, hypertension, hyperlipidemia, prior DVT, colon cancer and history of GI bleed. He presented to [facility] with worsening right lateral foot wound. He presented 6/13/22 as second opinion per family request regarding further management of wound accompanied by his daughter. He has been residing in a nursing facility and they are completing dressing changes daily for him. Laser skin perfusion study 6/20/22 showed good probability for wound healing. Today, he presents per stretcher and his daughter. He is currently at [facility], . His wife is on the phone. He currently reports that his wound is healing. Wound care continues daily. The patient currently denies exertional chest pain/pressure, dyspnea at rest, VILCHIS, PND, orthopnea, palpitations, lightheadedness, weight changes, changes in LE edema, and syncope. They also reports medical therapy compliance and tolerating.        Past Medical History:   Diagnosis Date    Anemia 03/2022    Arthritis     CAD (coronary artery disease) 01/2020    Cancer [facility])     Colon Cancer diagnosed last month    Cerebral infarction [facility])     Cognitive communication deficit     COVID-19     Diabetes mellitus ([facility]     Dysphagia     End stage renal disease ([facility]     Fatigue     Gastrointestinal hemorrhage     Hemodialysis patient ([facility] 2019    ckd-goes to dialysis Tuesday, Thurs, Fri    Hx of blood clots     Hyperlipidemia     Hypertension     Hyponatremia     Risk for falls     Splenomegaly 02/10/2021     Past Surgical History:   Procedure Laterality Date    CARDIAC CATHETERIZATION  2019    CHOLECYSTECTOMY, LAPAROSCOPIC N/A 5/16/2022    LAPAROSCOPIC CHOLECYSTECTOMY WITH CHOLANGIOGRAM performed by Gabriel France MD at [address] N/A 01/26/2022    SIGMOID COLECTOMY, SIGMOIDOSCOPY performed by Gabriel France MD at [facility] Left 4/29/2022    LEFT BRACHIAL CEPHALIC FISTULA performed by Juany Perea MD at [place] [address]    ERCP N/A 5/16/2022    ERCP SPHINCTER/PAPILLOTOMY performed by Vidya Pretty MD at [facility]    ERCP N/A 5/16/2022    ERCP STONE REMOVAL/BALLOON SWEEP performed by Vidya Pretty MD at [facility]  02/19/2022    IR EMBOLIZATION HEMORRHAGE 2/19/2022 [facility]     [address] [facility]     unsure of date    IR TUNNELED CATHETER PLACEMENT GREATER THAN 5 YEARS  2/21/2022    IR TUNNELED CATHETER PLACEMENT GREATER THAN 5 YEARS 2/21/2022 [facility]    SIGMOIDOSCOPY N/A 02/17/2022    SIGMOIDOSCOPY CONTROL HEMORRHAGE performed by Mino West MD at [facility] [address] Right 02/21/2022    Permacath; RIJ access; 23cm; Dr. Mariam George  5/16/2022    ERCP POLYP SNARE performed by Vidya Pretty MD at [facility] History   Problem Relation Age of Onset    High Blood Pressure Father      Social History     Tobacco Use    Smoking status: Former    Smokeless tobacco: Never   Vaping Use    Vaping Use: Never used   Substance Use Topics    Alcohol use: Not Currently    Drug use: Never       Allergies   Allergen Reactions    Lisinopril Swelling     Allergy listed on paperwork from [facility], no reaction noted     Current Outpatient Medications   Medication Sig Dispense Refill    silver sulfADIAZINE (SILVADENE) 1 % cream Apply topically daily.  25 g 1    Wound [facility] ([facility]) GEL gel Apply 1 each topically daily Apply to RIGHT ankle wound daily 44 mL 2    docusate sodium (COLACE) 100 MG capsule Take 100 mg by mouth 2 times daily      ondansetron (ZOFRAN) 4 MG tablet Take 4 mg by mouth every 8 hours as needed for Nausea      polyethylene glycol (GLYCOLAX) 17 GM/SCOOP powder Take 17 g by mouth daily as needed (Constipation)       apixaban (ELIQUIS) 5 MG TABS tablet Take 1 tablet by mouth 2 times daily 60 tablet 0    aspirin 81 MG chewable tablet Take 1 tablet by mouth daily 30 tablet 0    melatonin 3 MG TABS tablet Take 2 tablets by mouth nightly as needed (sleep) 6 tablet 0    tamsulosin (FLOMAX) 0.4 MG capsule Take 0.4 mg by mouth every morning      gabapentin (NEURONTIN) 100 MG capsule Take 100 mg by mouth 3 times daily. sevelamer (RENVELA) 800 MG tablet Take 800 mg by mouth 3 times daily (with meals)       atenolol (TENORMIN) 25 MG tablet Take 25 mg by mouth every evening      pantoprazole (PROTONIX) 20 MG tablet Take 20 mg by mouth nightly      atorvastatin (LIPITOR) 10 MG tablet Take 10 mg by mouth nightly      calcitRIOL (ROCALTROL) 0.25 MCG capsule Take 0.25 mcg by mouth every evening       No current facility-administered medications for this visit. Review of Systems:  Constitutional: No unanticipated weight loss. There's been no change in energy level, sleep pattern, or activity level. No fevers, chills. Eyes: No visual changes or diplopia. No scleral icterus. ENT: No Headaches, hearing loss or vertigo. No mouth sores or sore throat. Cardiovascular: as reviewed in HPI  Respiratory: No cough or wheezing, no sputum production. No hemoptysis. Gastrointestinal: No abdominal pain, appetite loss, blood in stools. No change in bowel or bladder habits. Genitourinary: No dysuria, trouble voiding, or hematuria. Musculoskeletal:  No gait disturbance, no joint complaints. Integumentary: No rash or pruritis. Neurological: No headache, diplopia, change in muscle strength, numbness or tingling. Psychiatric: No anxiety or depression.   Endocrine: No temperature intolerance. No excessive thirst, fluid intake, or urination. No tremor. Hematologic/Lymphatic: No abnormal bruising or bleeding, blood clots or swollen lymph nodes. Allergic/Immunologic: No nasal congestion or hives. Physical Exam:   /70   Pulse 62   Ht 6' 5\" (1.956 m)   SpO2 93%   BMI 24.90 kg/m²   Wt Readings from Last 3 Encounters:   22 210 lb (95.3 kg)   22 210 lb 8.6 oz (95.5 kg)   22 213 lb 13.5 oz (97 kg)     Constitutional: He is oriented to person, place, and time. He appears well-developed and well-nourished. In no acute distress. Head: Normocephalic and atraumatic. Pupils equal and round. Neck: Neck supple. No JVP or carotid bruit appreciated. No mass and no thyromegaly present. No lymphadenopathy present. Cardiovascular: Normal rate. Normal heart sounds. Exam reveals no gallop and no friction rub. No murmur heard. Pulmonary/Chest: Effort normal and breath sounds normal. No respiratory distress. He has no wheezes, rhonchi or rales. Abdominal: Soft, non-tender. Bowel sounds are normal. He exhibits no organomegaly, mass or bruit. Extremities: No edema. No cyanosis or clubbing. Pulses are 2+ radial/carotid bilaterally. Neurological: No gross cranial nerve deficit. Coordination normal.   Skin: Skin is warm and dry. There is no rash or diaphoresis. Psychiatric: He has a normal mood and affect.  His speech is normal and behavior is normal.     Lab Review:   Lab Results   Component Value Date/Time    TRIG 54 01/15/2022 05:31 AM    HDL 24 01/15/2022 05:31 AM    HDL 27 2011 05:00 AM    LDLCALC 25 01/15/2022 05:31 AM    LABVLDL 11 01/15/2022 05:31 AM     Lab Results   Component Value Date/Time    BUN 23 2022 07:44 AM    CREATININE 3.4 2022 07:44 AM     Lab Results   Component Value Date/Time    TROPONINI 0.07 (H) 2022 10:29 PM    LABA1C 6.1 01/15/2022 05:31 AM      No results found for: CBCAUTODIF    EK22 not completed Echo 1/14/22  Left ventricular size is normal.  Moderate concentric left ventricular hypertrophy is present. Global left ventricular function is normal with ejection fraction estimated  from 55 % to 60 %. Grade II diastolic dysfunction with elevated LV filling pressures. Normal right ventricular size and function. Echo 2/19/22  Left ventricular cavity size is normal.  Ejection fraction is visually estimated to be 55-60%. There is moderate concentric left ventricular hypertrophy. No regional wall motion abnormalities are noted. Left atrium is of normal size. Tricuspid aortic valve. Thickened aortic valve leaflets noted. Normal right ventricular size and function. TAPSE= 1.7cm  Right Heart Strain= -10.2%  The right atrium is normal in size. There is a trivial pericardial effusion noted. Lower extremity arterial duplex 6/3/22  Right Impression   Right SHANTA was not available due to inadequate pulses and noncompressible   tibial vessels. (DP inaudible, PT non compressible)   Right common femoral and profunda femoral vein were not visualized due to   arterial IV line in place. The majority of the waveforms are multiphasic throughout the right lower   extremity. Ultrasound images of the right lower extremity reveal moderate to severe   calcification throughout. Possible occlusion of the right proximal anterior tibial artery with flow   reversal in the mid anterior tibial artery. Left Impression   Right SHANTA was not available due to inadequate pulses and noncompressible   tibial vessels. (DP inaudible, PT non compressible)   The majority of the waveforms are multiphasic throughout the left lower   extremity. Ultrasound images of the left lower extremity reveal moderate to severe   calcification throughout. Possible occlusion of the mid anterior tibial artery with flow reversal in the   distal anterior tibial artery.      Laser tissue doppler: 6/13/22  Impressions   Right Impression   Pulse Volume Recording at the right transmetatarsal, ankle, and calf reveals   normal waveforms. Laser skin perfusion pressure study at the right dorsum of the foot is 52mmHg,   right lateral ankle is 71mmHg, and right lateral calf is 56 mmHg; wound   healing likely. This is considered to have a good probability for healing. Left Impression   Pulse Volume Recording at the left transmetatarsal reveals normal waveforms. Laser skin perfusion pressure study at the left dorsum of the foot is 64mmHg;   wound healing likely. This is considered to have a good probability for   healing. No previous laser perfusion studies for comparison. All above diagnostic testing and laboratory data was independently visualized and reviewed by me (not simply review of report)       Assessment and Plan   1) PAD   -CLI RC5  -Continues with right lateral foot wound   -Doppler revealed occluded tibial vessels  -Podiatry referral to Dr. Amrit Mcgowan for wound care   -6/13/22 laser tissue doppler shows good probability for wound healing.    -per patient and daughter, wound care daily, continue healing  -continue medical therapy  -call with changes in status     2)mixed hyperlipidemia  - statin with no myalgias     Follow up in 5 months     Thank you very much for allowing me to participate in the care of your patient. Please do not hesitate to contact me if you have any questions. Gillian Matos MD [address] [facility], Interventional Cardiology, and Peripheral Vascular Disease   [facility]   Ph: [phone]  Fax: [phone]    This note was scribed in the presence of Dr. Sammy Hutson MD by Estrada Woodward RN. Physician Attestation:  The scribes documentation has been prepared under my direction and personally reviewed by me in its entirety. I confirm the note above accurately reflects all work, treatment, procedures, and medical decision making performed by me.     Electronically signed by Antoine Atkins MD on 8/13/2022 at 10:15 PM         Electronically signed by Nelson Mitchell RN on 7/18/2022 at 10:07 AM

## 2022-07-18 ENCOUNTER — OFFICE VISIT (OUTPATIENT)
Dept: CARDIOLOGY CLINIC | Age: 77
End: 2022-07-18
Payer: MEDICARE

## 2022-07-18 VITALS
BODY MASS INDEX: 24.9 KG/M2 | HEART RATE: 62 BPM | OXYGEN SATURATION: 93 % | DIASTOLIC BLOOD PRESSURE: 70 MMHG | SYSTOLIC BLOOD PRESSURE: 118 MMHG | HEIGHT: 77 IN

## 2022-07-18 DIAGNOSIS — E78.2 MIXED HYPERLIPIDEMIA: ICD-10-CM

## 2022-07-18 DIAGNOSIS — I73.9 PAD (PERIPHERAL ARTERY DISEASE) (HCC): Primary | ICD-10-CM

## 2022-07-18 PROCEDURE — 99214 OFFICE O/P EST MOD 30 MIN: CPT | Performed by: INTERNAL MEDICINE

## 2022-07-18 PROCEDURE — 1123F ACP DISCUSS/DSCN MKR DOCD: CPT | Performed by: INTERNAL MEDICINE

## 2022-09-15 ENCOUNTER — HOSPITAL ENCOUNTER (EMERGENCY)
Age: 77
Discharge: HOME | End: 2022-09-16
Attending: EMERGENCY MEDICINE
Payer: MEDICARE

## 2022-09-15 ENCOUNTER — APPOINTMENT (OUTPATIENT)
Dept: GENERAL RADIOLOGY | Age: 77
End: 2022-09-15
Payer: MEDICARE

## 2022-09-15 VITALS
RESPIRATION RATE: 23 BRPM | SYSTOLIC BLOOD PRESSURE: 106 MMHG | HEART RATE: 66 BPM | WEIGHT: 197.09 LBS | OXYGEN SATURATION: 94 % | TEMPERATURE: 98.1 F | BODY MASS INDEX: 23.37 KG/M2 | DIASTOLIC BLOOD PRESSURE: 58 MMHG

## 2022-09-15 DIAGNOSIS — Z86.79 HISTORY OF HYPOTENSION: Primary | ICD-10-CM

## 2022-09-15 LAB
ANION GAP SERPL CALCULATED.3IONS-SCNC: 6 MMOL/L (ref 3–16)
BASOPHILS ABSOLUTE: 0 K/UL (ref 0–0.2)
BASOPHILS RELATIVE PERCENT: 0.4 %
BUN BLDV-MCNC: 12 MG/DL (ref 7–20)
CALCIUM SERPL-MCNC: 9 MG/DL (ref 8.3–10.6)
CHLORIDE BLD-SCNC: 98 MMOL/L (ref 99–110)
CO2: 35 MMOL/L (ref 21–32)
CREAT SERPL-MCNC: 2.4 MG/DL (ref 0.8–1.3)
EOSINOPHILS ABSOLUTE: 0.1 K/UL (ref 0–0.6)
EOSINOPHILS RELATIVE PERCENT: 1.6 %
GFR AFRICAN AMERICAN: 32
GFR NON-AFRICAN AMERICAN: 26
GLUCOSE BLD-MCNC: 170 MG/DL (ref 70–99)
HCT VFR BLD CALC: 34.7 % (ref 40.5–52.5)
HEMOGLOBIN: 11.2 G/DL (ref 13.5–17.5)
LACTIC ACID: 2.3 MMOL/L (ref 0.4–2)
LYMPHOCYTES ABSOLUTE: 1.1 K/UL (ref 1–5.1)
LYMPHOCYTES RELATIVE PERCENT: 22.6 %
MCH RBC QN AUTO: 31.7 PG (ref 26–34)
MCHC RBC AUTO-ENTMCNC: 32.4 G/DL (ref 31–36)
MCV RBC AUTO: 97.8 FL (ref 80–100)
MONOCYTES ABSOLUTE: 0.2 K/UL (ref 0–1.3)
MONOCYTES RELATIVE PERCENT: 4.9 %
NEUTROPHILS ABSOLUTE: 3.4 K/UL (ref 1.7–7.7)
NEUTROPHILS RELATIVE PERCENT: 70.5 %
PDW BLD-RTO: 17.3 % (ref 12.4–15.4)
PLATELET # BLD: 186 K/UL (ref 135–450)
PMV BLD AUTO: 7.2 FL (ref 5–10.5)
POTASSIUM REFLEX MAGNESIUM: 4.2 MMOL/L (ref 3.5–5.1)
RBC # BLD: 3.54 M/UL (ref 4.2–5.9)
SARS-COV-2, NAAT: NOT DETECTED
SODIUM BLD-SCNC: 139 MMOL/L (ref 136–145)
TROPONIN: 0.05 NG/ML
TROPONIN: 0.06 NG/ML
WBC # BLD: 4.8 K/UL (ref 4–11)

## 2022-09-15 PROCEDURE — 83605 ASSAY OF LACTIC ACID: CPT

## 2022-09-15 PROCEDURE — 80048 BASIC METABOLIC PNL TOTAL CA: CPT

## 2022-09-15 PROCEDURE — 84484 ASSAY OF TROPONIN QUANT: CPT

## 2022-09-15 PROCEDURE — 85025 COMPLETE CBC W/AUTO DIFF WBC: CPT

## 2022-09-15 PROCEDURE — 87635 SARS-COV-2 COVID-19 AMP PRB: CPT

## 2022-09-15 PROCEDURE — 99284 EMERGENCY DEPT VISIT MOD MDM: CPT

## 2022-09-15 PROCEDURE — 71045 X-RAY EXAM CHEST 1 VIEW: CPT

## 2022-09-15 ASSESSMENT — PAIN - FUNCTIONAL ASSESSMENT
PAIN_FUNCTIONAL_ASSESSMENT: 0-10
PAIN_FUNCTIONAL_ASSESSMENT: NONE - DENIES PAIN

## 2022-09-15 ASSESSMENT — ENCOUNTER SYMPTOMS
RHINORRHEA: 0
ABDOMINAL PAIN: 0
SHORTNESS OF BREATH: 0
SORE THROAT: 0
EYE REDNESS: 0

## 2022-09-15 ASSESSMENT — PAIN DESCRIPTION - PAIN TYPE: TYPE: CHRONIC PAIN

## 2022-09-15 ASSESSMENT — PAIN DESCRIPTION - ONSET: ONSET: PROGRESSIVE

## 2022-09-15 ASSESSMENT — PAIN DESCRIPTION - FREQUENCY: FREQUENCY: CONTINUOUS

## 2022-09-15 ASSESSMENT — PAIN SCALES - GENERAL: PAINLEVEL_OUTOF10: 3

## 2022-09-15 ASSESSMENT — PAIN DESCRIPTION - LOCATION: LOCATION: BUTTOCKS

## 2022-09-15 ASSESSMENT — PAIN DESCRIPTION - DESCRIPTORS: DESCRIPTORS: ACHING

## 2022-09-15 NOTE — ED PROVIDER NOTES
[facility]  eMERGENCY dEPARTMENT eNCOUnter      Pt Name: Diony Rodarte  MRN: [id number]  [place] [birth year]  Date of evaluation: 9/15/2022  Provider: Estefania Liang MD    CHIEF COMPLAINT       Chief Complaint   Patient presents with    Hypotension     Sent from NH with reports of BP being 70/50. Pt denied s/s at time of arrival         HISTORY OF PRESENT ILLNESS   (Location/Symptom, Timing/Onset,Context/Setting, Quality, Duration, Modifying Factors, Severity)  Note limiting factors. Diony Rodarte is a 69 y.o. male who presents to the emergency department from the care facility. History from the care facility is that the patient returned from dialysis and was hypotensive, tachycardic and hypoxic. On arrival the patient's blood pressure is soft with a systolic of 98 and a diastolic of 58. His heart rate is 72 his room air pulse ox was 95 to 97%. Patient is without complaints at this time. He states he gets dialysis on Tuesday Thursday Saturday. He had dialysis today which is his normal day. He states he rarely makes urine. HPI    NursingNotes were reviewed. REVIEW OF SYSTEMS    (2-9 systems for level 4, 10 or more for level 5)     Review of Systems   Constitutional:  Negative for fever. HENT:  Negative for rhinorrhea and sore throat. Eyes:  Negative for redness. Respiratory:  Negative for shortness of breath. Cardiovascular:  Negative for chest pain. Gastrointestinal:  Negative for abdominal pain. Genitourinary:  Negative for flank pain. Neurological:  Negative for headaches. Hematological:  Negative for adenopathy. Psychiatric/Behavioral:  Negative for confusion. Except as noted above the remainder of the review of systems was reviewed and negative.        PAST MEDICAL HISTORY     Past Medical History:   Diagnosis Date    Anemia 03/2022    Arthritis     CAD (coronary artery disease) 01/2020    Cancer [facility])     Colon Cancer diagnosed last month    Cerebral infarction [facility])     Cognitive communication deficit     COVID-19     Diabetes mellitus ([facility]     Dysphagia     End stage renal disease ([facility]     Fatigue     Gastrointestinal hemorrhage     Hemodialysis patient ([facility] 2019    ckd-goes to dialysis Tuesday, Thurs, Fri    Hx of blood clots     Hyperlipidemia     Hypertension     Hyponatremia     Risk for falls     Splenomegaly 02/10/2021         SURGICALHISTORY       Past Surgical History:   Procedure Laterality Date    CARDIAC CATHETERIZATION  2019    CHOLECYSTECTOMY, LAPAROSCOPIC N/A 5/16/2022    LAPAROSCOPIC CHOLECYSTECTOMY WITH CHOLANGIOGRAM performed by Monae Rogers MD at [address] 01/26/2022    SIGMOID COLECTOMY, SIGMOIDOSCOPY performed by Monae Rogers MD at [facility] Left 4/29/2022    LEFT BRACHIAL CEPHALIC FISTULA performed by Katia Mckenna MD at [place] [address]    ERCP N/A 5/16/2022    ERCP SPHINCTER/PAPILLOTOMY performed by Russell Warren MD at [address] [place] [address]    ERCP N/A 5/16/2022    ERCP STONE REMOVAL/BALLOON SWEEP performed by Russell Warren MD at [facility]  02/19/2022    IR EMBOLIZATION HEMORRHAGE 2/19/2022 [facility]    IR PERC ARTERIOVENOUS FISTULA CREATION Left     unsure of date    IR TUNNELED CATHETER PLACEMENT GREATER THAN 5 YEARS  2/21/2022    IR TUNNELED CATHETER PLACEMENT GREATER THAN 5 YEARS 2/21/2022 [facility]    SIGMOIDOSCOPY N/A 02/17/2022    SIGMOIDOSCOPY CONTROL HEMORRHAGE performed by Ivy Mcnair MD at [facility] [address] Right 02/21/2022    Permacath; RIJ access; 23cm; Dr. Dahlia Hargrove  5/16/2022    ERCP POLYP SNARE performed by Russell Warren MD at [facility]       Previous Medications    APIXABAN (ELIQUIS) 5 MG TABS TABLET    Take 1 tablet by mouth 2 times daily    ASPIRIN 81 MG CHEWABLE TABLET    Take 1 tablet by mouth daily    ATENOLOL (TENORMIN) 25 MG TABLET    Take 25 mg by mouth every evening    ATORVASTATIN (LIPITOR) 10 MG TABLET    Take 10 mg by mouth nightly    CALCITRIOL (ROCALTROL) 0.25 MCG CAPSULE    Take 0.25 mcg by mouth every evening    DOCUSATE SODIUM (COLACE) 100 MG CAPSULE    Take 100 mg by mouth 2 times daily    GABAPENTIN (NEURONTIN) 100 MG CAPSULE    Take 100 mg by mouth 3 times daily. MELATONIN 3 MG TABS TABLET    Take 2 tablets by mouth nightly as needed (sleep)    ONDANSETRON (ZOFRAN) 4 MG TABLET    Take 4 mg by mouth every 8 hours as needed for Nausea    PANTOPRAZOLE (PROTONIX) 20 MG TABLET    Take 20 mg by mouth nightly    POLYETHYLENE GLYCOL (GLYCOLAX) 17 GM/SCOOP POWDER    Take 17 g by mouth daily as needed (Constipation)     SEVELAMER (RENVELA) 800 MG TABLET    Take 800 mg by mouth 3 times daily (with meals)     SILVER SULFADIAZINE (SILVADENE) 1 % CREAM    Apply topically daily.     TAMSULOSIN (FLOMAX) 0.4 MG CAPSULE    Take 0.4 mg by mouth every morning    WOUND DRESSINGS (MEDIHONEY WOUND/BURN DRESSING) GEL GEL    Apply 1 each topically daily Apply to RIGHT ankle wound daily       ALLERGIES     Lisinopril    FAMILY HISTORY       Family History   Problem Relation Age of Onset    High Blood Pressure Father           SOCIAL HISTORY       Social History     Socioeconomic History    Marital status:      Spouse name: None    Number of children: None    Years of education: None    Highest education level: None   Tobacco Use    Smoking status: Former    Smokeless tobacco: Never   Vaping Use    Vaping Use: Never used   Substance and Sexual Activity    Alcohol use: Not Currently    Drug use: Never       SCREENINGS             PHYSICAL EXAM    (up to 7 for level 4, 8 or more for level 5)     ED Triage Vitals [09/15/22 1838]   BP Temp Temp Source Heart Rate Resp SpO2 Height Weight   (!) 98/58 98.1 °F (36.7 °C) Oral 72 18 97 % -- 197 lb 1.5 oz (89.4 kg) Physical Exam  Vitals and nursing note reviewed. Constitutional:       Appearance: He is well-developed. He is not diaphoretic. HENT:      Head: Normocephalic and atraumatic. Mouth/Throat:      Mouth: Mucous membranes are moist.   Eyes:      General:         Right eye: No discharge. Left eye: No discharge. Conjunctiva/sclera: Conjunctivae normal.   Cardiovascular:      Rate and Rhythm: Normal rate. Comments: Patient has dialysis port in his right superior anterior chest.  His dressing is clean dry and intact. Pulmonary:      Effort: Pulmonary effort is normal. No respiratory distress. Abdominal:      Palpations: Abdomen is soft. Tenderness: There is no abdominal tenderness. There is no guarding or rebound. Musculoskeletal:         General: Normal range of motion. Cervical back: Neck supple. Skin:     General: Skin is warm and dry. Capillary Refill: Capillary refill takes less than 2 seconds. Neurological:      Mental Status: He is alert. Comments: Patient can tell me his name and that he is at the hospital   Psychiatric:         Behavior: Behavior normal.       DIAGNOSTIC RESULTS     EKG: All EKG's are interpreted by the Emergency Department Physician who either signs or Co-signsthis chart in the absence of a cardiologist.        RADIOLOGY:   James Allis such as CT, Ultrasound and MRI are read by the radiologist. Doris Rai radiographic images are visualized and preliminarily interpreted by the emergency physician with the below findings:        Interpretation per the Radiologist below, if available at the time ofthis note:    XR CHEST PORTABLE   Preliminary Result   No acute cardiopulmonary abnormality is identified. Similar mild cardiomegaly.                ED BEDSIDE ULTRASOUND:   Performed by ED Physician - none    LABS:  Labs Reviewed   CBC WITH AUTO DIFFERENTIAL - Abnormal; Notable for the following components:       Result Value    RBC 3.54 patient. He will be discharged to the nursing home. Is this patient to be included in the SEP-1 Core Measure? No   Exclusion criteria - the patient is NOT to be included for SEP-1 Core Measure due to:  2+ SIRS criteria are not met     CRITICAL CARE TIME   I personally saw the patient and independently provided 15 minutes of non-concurrent critical care out of the total shared critical care time provided. There was a high probability of clinically significant/life threatening deterioration in the patient's condition which required my urgent intervention. CONSULTS:  None    PROCEDURES:  Unless otherwise noted below, none     Procedures    FINAL IMPRESSION      1.  History of hypotension          DISPOSITION/PLAN   DISPOSITION Decision To Discharge 09/15/2022 10:43:29 PM      PATIENT REFERRED TO:  Willi Colby  [address] [place] [id number]  [phone]    Schedule an appointment as soon as possible for a visit   As needed    DISCHARGE MEDICATIONS:  New Prescriptions    No medications on file          (Please note that portions of this note were completed with a voice recognition program.Efforts were made to edit the dictations but occasionally words are mis-transcribed.)    Lilliam Garcia MD (electronically signed)  Attending Emergency Physician          Lilliam Garcia MD  09/15/22 2017

## 2022-09-16 NOTE — DISCHARGE INSTRUCTIONS
The patient did not have any tachycardia while in the emergency department. He had no hypotension. His laboratory work-up was unremarkable and he remained asymptomatic.

## 2022-09-21 ENCOUNTER — OFFICE VISIT (OUTPATIENT)
Dept: VASCULAR SURGERY | Age: 77
End: 2022-09-21
Payer: MEDICARE

## 2022-09-21 VITALS
HEIGHT: 77 IN | WEIGHT: 197 LBS | SYSTOLIC BLOOD PRESSURE: 100 MMHG | BODY MASS INDEX: 23.26 KG/M2 | DIASTOLIC BLOOD PRESSURE: 50 MMHG

## 2022-09-21 DIAGNOSIS — Z99.2 ESRD (END STAGE RENAL DISEASE) ON DIALYSIS (HCC): Primary | ICD-10-CM

## 2022-09-21 DIAGNOSIS — M75.82 TENDINITIS OF LEFT ROTATOR CUFF: ICD-10-CM

## 2022-09-21 DIAGNOSIS — N18.6 ESRD (END STAGE RENAL DISEASE) ON DIALYSIS (HCC): Primary | ICD-10-CM

## 2022-09-21 PROCEDURE — 99212 OFFICE O/P EST SF 10 MIN: CPT | Performed by: SURGERY

## 2022-09-21 PROCEDURE — 1123F ACP DISCUSS/DSCN MKR DOCD: CPT | Performed by: SURGERY

## 2022-09-21 PROCEDURE — G8420 CALC BMI NORM PARAMETERS: HCPCS | Performed by: SURGERY

## 2022-09-21 PROCEDURE — 1036F TOBACCO NON-USER: CPT | Performed by: SURGERY

## 2022-09-21 PROCEDURE — G8428 CUR MEDS NOT DOCUMENT: HCPCS | Performed by: SURGERY

## 2022-09-21 NOTE — PROGRESS NOTES
Seen for L arm pain and limited ROM. S/P L brachiocephalic AVF 0/9892 - has not been used as of today. EXAM:  Incision L AC well healed. Palpable thrill and strong bruit    Evaluated with U/S - cehalic vein large and measures up to 8 mm in size without tourniquet. Vein 5 mm of less from skin throughout    Near complete loss of ROM L shoulder with painful passive ROM. Tender on superior aspect of shoulder. A/P: Widely patent L brachiocephalic AV - begin exercises for maturation. Will discuss with Dr. Eliel Martínez. Refer to ortho for evaluation of likely frozen L shoulder/rotator cuff pathology. Will see as needed in the future.

## 2022-10-03 ENCOUNTER — OFFICE VISIT (OUTPATIENT)
Dept: ORTHOPEDIC SURGERY | Age: 77
End: 2022-10-03
Payer: MEDICARE

## 2022-10-03 VITALS — WEIGHT: 197 LBS | RESPIRATION RATE: 16 BRPM | BODY MASS INDEX: 23.26 KG/M2 | HEIGHT: 77 IN

## 2022-10-03 DIAGNOSIS — M75.02 ADHESIVE CAPSULITIS OF LEFT SHOULDER: Primary | ICD-10-CM

## 2022-10-03 PROCEDURE — G8427 DOCREV CUR MEDS BY ELIG CLIN: HCPCS | Performed by: ORTHOPAEDIC SURGERY

## 2022-10-03 PROCEDURE — 99203 OFFICE O/P NEW LOW 30 MIN: CPT | Performed by: ORTHOPAEDIC SURGERY

## 2022-10-03 PROCEDURE — 1123F ACP DISCUSS/DSCN MKR DOCD: CPT | Performed by: ORTHOPAEDIC SURGERY

## 2022-10-03 PROCEDURE — 1036F TOBACCO NON-USER: CPT | Performed by: ORTHOPAEDIC SURGERY

## 2022-10-03 PROCEDURE — 20610 DRAIN/INJ JOINT/BURSA W/O US: CPT | Performed by: ORTHOPAEDIC SURGERY

## 2022-10-03 PROCEDURE — G8484 FLU IMMUNIZE NO ADMIN: HCPCS | Performed by: ORTHOPAEDIC SURGERY

## 2022-10-03 PROCEDURE — G8420 CALC BMI NORM PARAMETERS: HCPCS | Performed by: ORTHOPAEDIC SURGERY

## 2022-10-03 RX ORDER — TRIAMCINOLONE ACETONIDE 40 MG/ML
40 INJECTION, SUSPENSION INTRA-ARTICULAR; INTRAMUSCULAR ONCE
Status: COMPLETED | OUTPATIENT
Start: 2022-10-03 | End: 2022-10-03

## 2022-10-03 RX ORDER — BUPIVACAINE HYDROCHLORIDE 2.5 MG/ML
2 INJECTION, SOLUTION INFILTRATION; PERINEURAL ONCE
Status: COMPLETED | OUTPATIENT
Start: 2022-10-03 | End: 2022-10-03

## 2022-10-03 RX ADMIN — TRIAMCINOLONE ACETONIDE 40 MG: 40 INJECTION, SUSPENSION INTRA-ARTICULAR; INTRAMUSCULAR at 16:05

## 2022-10-03 RX ADMIN — BUPIVACAINE HYDROCHLORIDE 5 MG: 2.5 INJECTION, SOLUTION INFILTRATION; PERINEURAL at 16:04

## 2022-10-04 NOTE — PROGRESS NOTES
[facility]  [facility]    Chief Complaint: Left shoulder pain and stiffness    HPI:  Sinan Washington is a 69 y.o. who is here for initial assessment of left shoulder pain and stiffness. He is here in a stretcher with his doctor today. He is a long-term resident of a skilled nursing facility. He is right-hand dominant. He denies a history of injury or surgery to the left upper extremity. He does not recall a specific injury. He has been getting more pain and stiffness over the past 2 months. He does have end-stage renal disease for which she is on hemodialysis. He has a new fistula in his left upper extremity. He rates the pain as 4/10. His medical history is significant for end-stage renal disease, history of stroke, diabetes, history of DVT, colon cancer, GI bleed, peripheral arterial disease. Patient Active Problem List   Diagnosis    GI bleed    History of COVID-19    Hyponatremia    Fatigue    Acute kidney injury superimposed on CKD ([address]    Lethargy    Suspected UTI    Acute CVA (cerebrovascular accident) ([address]    LESLEE (acute kidney injury) ([address]    Malignant neoplasm of colon ([address]    Acute blood loss anemia    COVID-19    Shock circulatory ([address]    Bilateral pulmonary embolism (HCC)    Lactic acidosis    Hemorrhagic shock (HCC)    ESRD (end stage renal disease) on dialysis ([address]    Acute cholecystitis    Pain of upper abdomen    AMS (altered mental status)    Sepsis ([address]    Acute metabolic encephalopathy    Cellulitis    Fever and chills    PVD (peripheral vascular disease) (HCC)    Anxiety       ROS:  Constitutional: denies fever, chills, weight loss  MSK: denies pain in other joints, muscle aches  Neurological: denies numbness, tingling, weakness    Exam:  Resp. rate 16, height 6' 5\" (1.956 m), weight 197 lb (89.4 kg).     Appearance: sitting in exam room chair, appears to be in no acute distress, awake and alert  Resp: unlabored breathing on room air  Skin: warm, dry and intact with out erythema or significant increased temperature  LUE: Examination of the shoulder shows no gross defects. Active and passive shoulder forward flexion to 70 degrees, external rotation 5 degrees, internal rotation to side pocket. Mild pain with impingement testing. Sensation is intact light touch. Brisk capillary refill. 2+ radial pulses. Strength is 5/5 in all muscle groups. Imaging:  3 views of the left shoulder were performed and interpreted today. These are nonstandard views that are hard to interpret. There are no fractures or dislocations. The joint space is not well visualized. There are degenerative changes of the greater tuberosity suggesting chronic rotator cuff tearing. Assessment:  Left shoulder adhesive capsulitis    Plan:  We discussed the diagnosis and treatment options. He has a frozen shoulder likely due to an underlying injury. He likely has rotator cuff tearing to some extent and has now developed stiffness. I recommended and performed a glenohumeral steroid injection today. We will then pursue physical therapy to help increase his range of motion. He will follow-up here as needed. PROCEDURE NOTE:  After verbal consent was obtained, the patient's left shoulder was prepped with alcohol and anesthetized with ethyl chloride. The glenohumeral joint was then injected under sterile technique with 2mL of 0.25% marcaine and 1 mL of 40 mg/mL Kenalog. A bandage was applied. The patient tolerated procedure well and there were no complications. Total time spent on today's encounter was at least 32 minutes. This time included reviewing prior notes, radiographs, and lab results when available, reviewing history obtained by medical assistant, performing history and physical exam, reviewing tests/radiographs with the patient, counseling the patient, ordering medications or tests, documentation in the electronic health record, and coordination of care.     This dictation was done with Dragon dictation and may contain mechanical errors related to translation.

## 2022-10-20 ENCOUNTER — HOSPITAL ENCOUNTER (INPATIENT)
Age: 77
LOS: 3 days | Discharge: HOME | DRG: 371 | End: 2022-10-23
Attending: EMERGENCY MEDICINE | Admitting: HOSPITALIST
Payer: MEDICARE

## 2022-10-20 ENCOUNTER — APPOINTMENT (OUTPATIENT)
Dept: CT IMAGING | Age: 77
DRG: 371 | End: 2022-10-20
Payer: MEDICARE

## 2022-10-20 DIAGNOSIS — K92.2 LOWER GI BLEED: Primary | ICD-10-CM

## 2022-10-20 DIAGNOSIS — N39.0 ACUTE UTI: ICD-10-CM

## 2022-10-20 DIAGNOSIS — G89.4 CHRONIC PAIN SYNDROME: ICD-10-CM

## 2022-10-20 DIAGNOSIS — L89.152 PRESSURE INJURY OF SACRAL REGION, STAGE 2 (HCC): ICD-10-CM

## 2022-10-20 PROBLEM — K62.5 RECTAL BLEEDING: Status: ACTIVE | Noted: 2022-10-20

## 2022-10-20 LAB
A/G RATIO: 1.1 (ref 1.1–2.2)
ALBUMIN SERPL-MCNC: 3.4 G/DL (ref 3.4–5)
ALP BLD-CCNC: 141 U/L (ref 40–129)
ALT SERPL-CCNC: 21 U/L (ref 10–40)
ANION GAP SERPL CALCULATED.3IONS-SCNC: 8 MMOL/L (ref 3–16)
AST SERPL-CCNC: 16 U/L (ref 15–37)
BACTERIA: ABNORMAL /HPF
BASOPHILS ABSOLUTE: 0 K/UL (ref 0–0.2)
BASOPHILS RELATIVE PERCENT: 0.6 %
BILIRUB SERPL-MCNC: 0.5 MG/DL (ref 0–1)
BILIRUBIN URINE: NEGATIVE
BLOOD, URINE: ABNORMAL
BUN BLDV-MCNC: 36 MG/DL (ref 7–20)
CALCIUM SERPL-MCNC: 8.9 MG/DL (ref 8.3–10.6)
CHLORIDE BLD-SCNC: 95 MMOL/L (ref 99–110)
CLARITY: ABNORMAL
CO2: 33 MMOL/L (ref 21–32)
COLOR: YELLOW
CREAT SERPL-MCNC: 4 MG/DL (ref 0.8–1.3)
EOSINOPHILS ABSOLUTE: 0.1 K/UL (ref 0–0.6)
EOSINOPHILS RELATIVE PERCENT: 2.4 %
EPITHELIAL CELLS, UA: 1 /HPF (ref 0–5)
GFR SERPL CREATININE-BSD FRML MDRD: 15 ML/MIN/{1.73_M2}
GLUCOSE BLD-MCNC: 100 MG/DL (ref 70–99)
GLUCOSE BLD-MCNC: 150 MG/DL (ref 70–99)
GLUCOSE URINE: NEGATIVE MG/DL
HCT VFR BLD CALC: 33.1 % (ref 40.5–52.5)
HCT VFR BLD CALC: 35.2 % (ref 40.5–52.5)
HEMOGLOBIN: 11.1 G/DL (ref 13.5–17.5)
HEMOGLOBIN: 11.9 G/DL (ref 13.5–17.5)
HYALINE CASTS: 12 /LPF (ref 0–8)
KETONES, URINE: NEGATIVE MG/DL
LEUKOCYTE ESTERASE, URINE: ABNORMAL
LYMPHOCYTES ABSOLUTE: 1.3 K/UL (ref 1–5.1)
LYMPHOCYTES RELATIVE PERCENT: 28.6 %
MAGNESIUM: 2.1 MG/DL (ref 1.8–2.4)
MCH RBC QN AUTO: 31.2 PG (ref 26–34)
MCHC RBC AUTO-ENTMCNC: 33.4 G/DL (ref 31–36)
MCV RBC AUTO: 93.3 FL (ref 80–100)
MICROSCOPIC EXAMINATION: YES
MONOCYTES ABSOLUTE: 0.2 K/UL (ref 0–1.3)
MONOCYTES RELATIVE PERCENT: 5.3 %
NEUTROPHILS ABSOLUTE: 2.8 K/UL (ref 1.7–7.7)
NEUTROPHILS RELATIVE PERCENT: 63.1 %
NITRITE, URINE: NEGATIVE
OCCULT BLOOD DIAGNOSTIC: ABNORMAL
PDW BLD-RTO: 16.8 % (ref 12.4–15.4)
PERFORMED ON: ABNORMAL
PH UA: 8 (ref 5–8)
PLATELET # BLD: 140 K/UL (ref 135–450)
PMV BLD AUTO: 7.9 FL (ref 5–10.5)
POTASSIUM SERPL-SCNC: 4.5 MMOL/L (ref 3.5–5.1)
PROTEIN UA: 100 MG/DL
RBC # BLD: 3.55 M/UL (ref 4.2–5.9)
RBC UA: 262 /HPF (ref 0–4)
SODIUM BLD-SCNC: 136 MMOL/L (ref 136–145)
SPECIFIC GRAVITY UA: 1.01 (ref 1–1.03)
TOTAL PROTEIN: 6.5 G/DL (ref 6.4–8.2)
URINE REFLEX TO CULTURE: YES
URINE TYPE: ABNORMAL
UROBILINOGEN, URINE: 0.2 E.U./DL
WBC # BLD: 4.5 K/UL (ref 4–11)
WBC UA: 5240 /HPF (ref 0–5)

## 2022-10-20 PROCEDURE — 6370000000 HC RX 637 (ALT 250 FOR IP): Performed by: HOSPITALIST

## 2022-10-20 PROCEDURE — 85018 HEMOGLOBIN: CPT

## 2022-10-20 PROCEDURE — 85014 HEMATOCRIT: CPT

## 2022-10-20 PROCEDURE — 87086 URINE CULTURE/COLONY COUNT: CPT

## 2022-10-20 PROCEDURE — 94760 N-INVAS EAR/PLS OXIMETRY 1: CPT

## 2022-10-20 PROCEDURE — 83735 ASSAY OF MAGNESIUM: CPT

## 2022-10-20 PROCEDURE — 81001 URINALYSIS AUTO W/SCOPE: CPT

## 2022-10-20 PROCEDURE — 87040 BLOOD CULTURE FOR BACTERIA: CPT

## 2022-10-20 PROCEDURE — 74174 CTA ABD&PLVS W/CONTRAST: CPT

## 2022-10-20 PROCEDURE — 85025 COMPLETE CBC W/AUTO DIFF WBC: CPT

## 2022-10-20 PROCEDURE — 82270 OCCULT BLOOD FECES: CPT

## 2022-10-20 PROCEDURE — 6360000004 HC RX CONTRAST MEDICATION: Performed by: EMERGENCY MEDICINE

## 2022-10-20 PROCEDURE — 80053 COMPREHEN METABOLIC PANEL: CPT

## 2022-10-20 PROCEDURE — 99285 EMERGENCY DEPT VISIT HI MDM: CPT

## 2022-10-20 PROCEDURE — 2580000003 HC RX 258: Performed by: EMERGENCY MEDICINE

## 2022-10-20 PROCEDURE — 36415 COLL VENOUS BLD VENIPUNCTURE: CPT

## 2022-10-20 PROCEDURE — 6360000002 HC RX W HCPCS: Performed by: EMERGENCY MEDICINE

## 2022-10-20 PROCEDURE — 2500000003 HC RX 250 WO HCPCS: Performed by: HOSPITALIST

## 2022-10-20 PROCEDURE — 6370000000 HC RX 637 (ALT 250 FOR IP): Performed by: EMERGENCY MEDICINE

## 2022-10-20 PROCEDURE — 1200000000 HC SEMI PRIVATE

## 2022-10-20 RX ORDER — SODIUM CHLORIDE 0.9 % (FLUSH) 0.9 %
5-40 SYRINGE (ML) INJECTION EVERY 12 HOURS SCHEDULED
Status: DISCONTINUED | OUTPATIENT
Start: 2022-10-20 | End: 2022-10-23 | Stop reason: HOSPADM

## 2022-10-20 RX ORDER — SODIUM CHLORIDE 9 MG/ML
INJECTION, SOLUTION INTRAVENOUS PRN
Status: DISCONTINUED | OUTPATIENT
Start: 2022-10-20 | End: 2022-10-23 | Stop reason: HOSPADM

## 2022-10-20 RX ORDER — ATORVASTATIN CALCIUM 10 MG/1
10 TABLET, FILM COATED ORAL NIGHTLY
Status: DISCONTINUED | OUTPATIENT
Start: 2022-10-20 | End: 2022-10-23 | Stop reason: HOSPADM

## 2022-10-20 RX ORDER — CALCITRIOL 0.25 UG/1
0.25 CAPSULE, LIQUID FILLED ORAL NIGHTLY
Status: DISCONTINUED | OUTPATIENT
Start: 2022-10-20 | End: 2022-10-23 | Stop reason: HOSPADM

## 2022-10-20 RX ORDER — OXYCODONE HYDROCHLORIDE 5 MG/1
5 TABLET ORAL EVERY 4 HOURS PRN
Status: DISCONTINUED | OUTPATIENT
Start: 2022-10-20 | End: 2022-10-23 | Stop reason: HOSPADM

## 2022-10-20 RX ORDER — PANTOPRAZOLE SODIUM 20 MG/1
20 TABLET, DELAYED RELEASE ORAL NIGHTLY
Status: DISCONTINUED | OUTPATIENT
Start: 2022-10-20 | End: 2022-10-23 | Stop reason: HOSPADM

## 2022-10-20 RX ORDER — ACETAMINOPHEN 325 MG/1
650 TABLET ORAL EVERY 6 HOURS PRN
Status: DISCONTINUED | OUTPATIENT
Start: 2022-10-20 | End: 2022-10-23 | Stop reason: HOSPADM

## 2022-10-20 RX ORDER — LORAZEPAM 0.5 MG/1
0.5 TABLET ORAL 2 TIMES DAILY
COMMUNITY

## 2022-10-20 RX ORDER — ONDANSETRON 2 MG/ML
4 INJECTION INTRAMUSCULAR; INTRAVENOUS EVERY 6 HOURS PRN
Status: DISCONTINUED | OUTPATIENT
Start: 2022-10-20 | End: 2022-10-23 | Stop reason: HOSPADM

## 2022-10-20 RX ORDER — LANOLIN ALCOHOL/MO/W.PET/CERES
6 CREAM (GRAM) TOPICAL NIGHTLY PRN
Status: DISCONTINUED | OUTPATIENT
Start: 2022-10-20 | End: 2022-10-23 | Stop reason: HOSPADM

## 2022-10-20 RX ORDER — ONDANSETRON 4 MG/1
4 TABLET, ORALLY DISINTEGRATING ORAL EVERY 8 HOURS PRN
Status: DISCONTINUED | OUTPATIENT
Start: 2022-10-20 | End: 2022-10-23 | Stop reason: HOSPADM

## 2022-10-20 RX ORDER — DOCUSATE SODIUM 100 MG/1
100 CAPSULE, LIQUID FILLED ORAL 2 TIMES DAILY
Status: DISCONTINUED | OUTPATIENT
Start: 2022-10-20 | End: 2022-10-23 | Stop reason: HOSPADM

## 2022-10-20 RX ORDER — SEVELAMER CARBONATE 800 MG/1
800 TABLET, FILM COATED ORAL
Status: DISCONTINUED | OUTPATIENT
Start: 2022-10-20 | End: 2022-10-23 | Stop reason: HOSPADM

## 2022-10-20 RX ORDER — METRONIDAZOLE 500 MG/100ML
500 INJECTION, SOLUTION INTRAVENOUS EVERY 8 HOURS
Status: DISCONTINUED | OUTPATIENT
Start: 2022-10-20 | End: 2022-10-23

## 2022-10-20 RX ORDER — TAMSULOSIN HYDROCHLORIDE 0.4 MG/1
0.4 CAPSULE ORAL EVERY MORNING
Status: DISCONTINUED | OUTPATIENT
Start: 2022-10-21 | End: 2022-10-23 | Stop reason: HOSPADM

## 2022-10-20 RX ORDER — LORAZEPAM 0.5 MG/1
0.5 TABLET ORAL 2 TIMES DAILY
Status: DISCONTINUED | OUTPATIENT
Start: 2022-10-20 | End: 2022-10-23 | Stop reason: HOSPADM

## 2022-10-20 RX ORDER — SODIUM CHLORIDE 0.9 % (FLUSH) 0.9 %
5-40 SYRINGE (ML) INJECTION PRN
Status: DISCONTINUED | OUTPATIENT
Start: 2022-10-20 | End: 2022-10-23 | Stop reason: HOSPADM

## 2022-10-20 RX ORDER — METRONIDAZOLE 500 MG/1
500 TABLET ORAL ONCE
Status: COMPLETED | OUTPATIENT
Start: 2022-10-20 | End: 2022-10-20

## 2022-10-20 RX ORDER — OXYCODONE HYDROCHLORIDE 5 MG/1
5 TABLET ORAL EVERY 4 HOURS PRN
Status: ON HOLD | COMMUNITY
End: 2022-10-23 | Stop reason: SDUPTHER

## 2022-10-20 RX ORDER — GABAPENTIN 100 MG/1
100 CAPSULE ORAL 3 TIMES DAILY
Status: DISCONTINUED | OUTPATIENT
Start: 2022-10-20 | End: 2022-10-23 | Stop reason: HOSPADM

## 2022-10-20 RX ORDER — ACETAMINOPHEN 650 MG/1
650 SUPPOSITORY RECTAL EVERY 6 HOURS PRN
Status: DISCONTINUED | OUTPATIENT
Start: 2022-10-20 | End: 2022-10-23 | Stop reason: HOSPADM

## 2022-10-20 RX ORDER — ATENOLOL 25 MG/1
25 TABLET ORAL EVERY EVENING
Status: DISCONTINUED | OUTPATIENT
Start: 2022-10-20 | End: 2022-10-23 | Stop reason: HOSPADM

## 2022-10-20 RX ADMIN — GABAPENTIN 100 MG: 100 CAPSULE ORAL at 20:41

## 2022-10-20 RX ADMIN — METRONIDAZOLE 500 MG: 500 INJECTION, SOLUTION INTRAVENOUS at 20:40

## 2022-10-20 RX ADMIN — CEFTRIAXONE 1000 MG: 1 INJECTION, POWDER, FOR SOLUTION INTRAMUSCULAR; INTRAVENOUS at 13:20

## 2022-10-20 RX ADMIN — PANTOPRAZOLE SODIUM 20 MG: 20 TABLET, DELAYED RELEASE ORAL at 20:41

## 2022-10-20 RX ADMIN — SEVELAMER CARBONATE 800 MG: 800 TABLET, FILM COATED ORAL at 16:39

## 2022-10-20 RX ADMIN — LORAZEPAM 0.5 MG: 0.5 TABLET ORAL at 20:41

## 2022-10-20 RX ADMIN — IOPAMIDOL 75 ML: 755 INJECTION, SOLUTION INTRAVENOUS at 11:17

## 2022-10-20 RX ADMIN — ATORVASTATIN CALCIUM 10 MG: 10 TABLET, FILM COATED ORAL at 20:41

## 2022-10-20 RX ADMIN — CALCITRIOL 0.25 MCG: 0.25 CAPSULE ORAL at 20:41

## 2022-10-20 RX ADMIN — GABAPENTIN 100 MG: 100 CAPSULE ORAL at 16:39

## 2022-10-20 RX ADMIN — METRONIDAZOLE 500 MG: 500 TABLET ORAL at 13:20

## 2022-10-20 RX ADMIN — ATENOLOL 25 MG: 25 TABLET ORAL at 16:39

## 2022-10-20 RX ADMIN — DOCUSATE SODIUM 100 MG: 100 CAPSULE, LIQUID FILLED ORAL at 20:41

## 2022-10-20 ASSESSMENT — PAIN - FUNCTIONAL ASSESSMENT: PAIN_FUNCTIONAL_ASSESSMENT: NONE - DENIES PAIN

## 2022-10-20 NOTE — PROGRESS NOTES
Pt sent up to floor. VSS. Pt is AO however could not tell me the month and states he is a little confused. Pts HD line was not covered by a dressing upon arrival. Charge informed of situation and site has since been covered. Tele started, Clear liquid diet started. Personal belongings within reach. Call light within reach. WCTM.

## 2022-10-20 NOTE — PROGRESS NOTES
Medication Reconciliation    List of medications patient is currently taking is complete. Source of information: 1. [facility] records      Allergies  Lisinopril     Notes regarding home medications:   1. Patient received all of his home medications prior to arrival to the emergency department.

## 2022-10-20 NOTE — ED TRIAGE NOTES
Pt in by jamil from [facility]. States was on his way to dialysis, but noticed blood in urine. Denies fever, N/V, diarrhea. Denies abd pain.

## 2022-10-20 NOTE — H&P
Hospital Medicine History & Physical      PCP: Ryanne Vicki    Date of Admission: 10/20/2022    Date of Service: Pt seen/examined on 10/20/22  and Admitted to Inpatient with expected LOS greater than two midnights due to medical therapy. Chief Complaint: Rectal bleeding      History Of Present Illness:    69 y.o. male with ESRD on hemodialysis, hypertension, stage III , PE/DVT  2/22 on Eliquis ,sigmoid colon cancer s/p colectomy 0/31/38, complicated by GI bleed -flex sig 2/17 friable surgical anastomosis 18 cm from rectal verge, two oozing areas with hemoclips placed s/p IR embolization of pseudoaneurysm adjacent to anastomosis off of superior rectal artery 2/19 . [place] Le[place] He was subsequently admitted in May/22 with acute cholecystitis s/p cholecystectomy  He presented to emergency room from the [facility] nursing facility with rectal bleeding x 1 episode. .    Patient is a poor historian baseline cognitive deficit and increased confusion from baseline per daughter. History was obtained from chart review and daughter at bedside     Patient was scheduled to have his hemodialysis this morning. .  Prior to transportation he had a bowel movement that had bright red blood in it. .  He had apparently not had a bowel movement in 2 days prior and was given laxatives. He denied any abdominal pain, vomiting or hematemesis. No fevers or chills. .. In the emergency room, BP was 134/87, pulse 67, respirations 12, temperature 98.9, oxygen saturation 94% on room air  FOBT was positive. .  Hemoglobin was 11.11, at baseline  Urinalysis showed significant pyuria, microscopic hematuria    CT abdomen showed  Mild wall thickening at the sigmoid anastomosis either due to post treatment   change or secondary to distal colitis. No active contrast extravasation   noted. Narrowing at the bowel anastomosis is again seen     Nonspecific bladder wall thickening.   Recommend correlation with urinalysis   to exclude cystitis Splenomegaly     Past Medical History:          Diagnosis Date    Anemia 03/2022    Arthritis     CAD (coronary artery disease) 01/2020    Cancer [facility])     Colon Cancer diagnosed last month    Cerebral infarction [facility])     Cognitive communication deficit     COVID-19     Diabetes mellitus ([facility] [address]     Dysphagia     End stage renal disease ([facility] [address]     Fatigue     Gastrointestinal hemorrhage     Hemodialysis patient ([facility] [address] 2019    ckd-goes to dialysis Tuesday, Thurs, Fri    Hx of blood clots     Hyperlipidemia     Hypertension     Hyponatremia     Risk for falls     Splenomegaly 02/10/2021       Past Surgical History:          Procedure Laterality Date    CARDIAC CATHETERIZATION  2019    CHOLECYSTECTOMY, LAPAROSCOPIC N/A 5/16/2022    LAPAROSCOPIC CHOLECYSTECTOMY WITH CHOLANGIOGRAM performed by Sharita Campbell MD at [address] 01/26/2022    SIGMOID COLECTOMY, SIGMOIDOSCOPY performed by Sharita Campbell MD at [place] [address] Left 4/29/2022    LEFT BRACHIAL CEPHALIC FISTULA performed by Kaykay Zelaya MD at [place] [address]    ERCP N/A 5/16/2022    ERCP SPHINCTER/PAPILLOTOMY performed by Logan Evangelista MD at [address] [place] [address]    ERCP N/A 5/16/2022    ERCP STONE REMOVAL/BALLOON SWEEP performed by Logan Evangelista MD at [facility]  02/19/2022    IR EMBOLIZATION HEMORRHAGE 2/19/2022 [facility]    IR PERC ARTERIOVENOUS FISTULA CREATION Left     unsure of date    IR TUNNELED CATHETER PLACEMENT GREATER THAN 5 YEARS  2/21/2022    IR TUNNELED CATHETER PLACEMENT GREATER THAN 5 YEARS 2/21/2022 [facility]    SIGMOIDOSCOPY N/A 02/17/2022    SIGMOIDOSCOPY CONTROL HEMORRHAGE performed by Shivani Begum MD at [facility] [address] Right 02/21/2022    Permacath; RIJ access; 23cm; Dr. Virginia Marcum  5/16/2022    ERCP POLYP SNARE performed by Logan Evangelista MD at [facility]       Medications Prior to Admission:      Prior to Admission medications    Medication Sig Start Date End Date Taking? Authorizing Provider   silver sulfADIAZINE (SILVADENE) 1 % cream Apply topically daily. 6/20/22   Barry Cardoza MD   Wound Dressings ([facility] WOUND/BURN DRESSING) GEL gel Apply 1 each topically daily Apply to RIGHT ankle wound daily 6/13/22   Barry Cardoza MD   docusate sodium (COLACE) 100 MG capsule Take 100 mg by mouth 2 times daily    Historical Provider, MD   ondansetron (ZOFRAN) 4 MG tablet Take 4 mg by mouth every 8 hours as needed for Nausea    Historical Provider, MD   polyethylene glycol (GLYCOLAX) 17 GM/SCOOP powder Take 17 g by mouth daily as needed (Constipation)     Historical Provider, MD   apixaban (ELIQUIS) 5 MG TABS tablet Take 1 tablet by mouth 2 times daily 3/2/22   Shawnee Cassidy MD   aspirin 81 MG chewable tablet Take 1 tablet by mouth daily 2/11/22   Manohar Bell MD   melatonin 3 MG TABS tablet Take 2 tablets by mouth nightly as needed (sleep) 2/4/22   Noman Shipman MD   tamsulosin (FLOMAX) 0.4 MG capsule Take 0.4 mg by mouth every morning    Historical Provider, MD   gabapentin (NEURONTIN) 100 MG capsule Take 100 mg by mouth 3 times daily. Historical Provider, MD   sevelamer (RENVELA) 800 MG tablet Take 800 mg by mouth 3 times daily (with meals)     Historical Provider, MD   atenolol (TENORMIN) 25 MG tablet Take 25 mg by mouth every evening    Historical Provider, MD   pantoprazole (PROTONIX) 20 MG tablet Take 20 mg by mouth nightly    Historical Provider, MD   atorvastatin (LIPITOR) 10 MG tablet Take 10 mg by mouth nightly    Historical Provider, MD   calcitRIOL (ROCALTROL) 0.25 MCG capsule Take 0.25 mcg by mouth every evening    Historical Provider, MD       Allergies:  Lisinopril    Social History:      The patient currently lives     TOBACCO:   reports that he has quit smoking.  He has never used smokeless tobacco.  ETOH:   reports that he does not currently use alcohol. Family History:      Reviewed in detail and negative for DM, CAD, Cancer, CVA. Positive as follows:        Problem Relation Age of Onset    High Blood Pressure Father        REVIEW OF SYSTEMS:   Pertinent positives as noted in the HPI. All other systems reviewed and negative. PHYSICAL EXAM:    BP (!) 146/73   Pulse 68   Temp 97.9 °F (36.6 °C) (Oral)   Resp 16   Wt 195 lb 15.8 oz (88.9 kg)   SpO2 94%   BMI 23.24 kg/m²     General appearance:  No apparent distress, appears stated age and cooperative. HEENT:  Normal cephalic, atraumatic without obvious deformity. Pupils equal, round, and reactive to light. Extra ocular muscles intact. Conjunctivae/corneas clear. Neck: Supple, with full range of motion. No jugular venous distention. Trachea midline. Respiratory:  Normal respiratory effort. Clear to auscultation, bilaterally without Rales/Wheezes/Rhonchi. Cardiovascular:  Regular rate and rhythm with normal S1/S2 without murmurs, rubs or gallops. Abdomen: Soft, non-tender, non-distended with normal bowel sounds. Musculoskeletal:  No clubbing, cyanosis or edema bilaterally. Full range of motion without deformity. Skin: Skin color, texture, turgor normal.  No rashes or lesions. Neurologic:  Neurovascularly intact without any focal sensory/motor deficits.  Cranial nerves: II-XII intact, grossly non-focal.  Psychiatric:  Alert and oriented to self  Capillary Refill: Brisk,< 3 seconds   Peripheral Pulses: +2 palpable, equal bilaterally       CXR:  I have reviewed the CXR with the following interpretation:   EKG:  I have reviewed the EKG with the following interpretation:     Labs:     Recent Labs     10/20/22  1033   WBC 4.5   HGB 11.1*   HCT 33.1*        Recent Labs     10/20/22  1033      K 4.5   CL 95*   CO2 33*   BUN 36*   CREATININE 4.0*   CALCIUM 8.9     Recent Labs     10/20/22  1033   AST 16   ALT 21   BILITOT 0.5   ALKPHOS 141*     No results for input(s): INR in the last 72 hours. No results for input(s): Roel Bates in the last 72 hours. Urinalysis:      Lab Results   Component Value Date/Time    NITRU Negative 10/20/2022 10:52 AM    WBCUA 5240 10/20/2022 10:52 AM    BACTERIA Rare 10/20/2022 10:52 AM    RBCUA 262 10/20/2022 10:52 AM    BLOODU LARGE 10/20/2022 10:52 AM    SPECGRAV 1.013 10/20/2022 10:52 AM    GLUCOSEU Negative 10/20/2022 10:52 AM         ASSESSMENT:      -Rectal bleeding associated with anticoagulation. .  Patient had 1 episode of bright red blood per rectum today. .  CT shows mild thickening of the sigmoid anastomosis which is likely due to posttreatment change versus distal colitis    Hx GI bleed -flex sig 2/17 friable surgical anastomosis 18 cm from rectal verge, two oozing areas with hemoclips placed s/p IR embolization of pseudoaneurysm adjacent to anastomosis off of superior rectal artery 2/19 . Tanda Bun Continue Rocephin and Flagyl empirically. .  Consult GI, monitor H&H. Tanda Bun Hold Eliquis and aspirin    -History of stage III colon cancer s/p sigmoid colectomy 1/26/22--patient apparently did not receive adjuvant chemotherapy due to poor performance status      -History of DVT/PE 2/22--- hold Eliquis due to GI bleed    -acute Cystitis. .  Patient with significant pyuria. .  CT abdomen shows nonspecific bladder wall thickening. ..  Continue Rocephin, follow-up on culture data    -ESRD--- on hemodialysis Tuesday, Thursday, Saturday--consult nephrology    -Chronic anemia due to CKD--H&H is stable-continue to monitor      -Acute metabolic encephalopathy--increased confusion from baseline likely due to infection as above--treat UTI and monitor    -Cognitive impairment--continue supportive care    -Left frozen shoulder--s/p recent steroid injection--follows with orthopedics-on narcotics    -Chronically bedbound with decubitus ulcer--continue PT and OT    -Anxiety disorder--on scheduled Xanax    -Peripheral vascular disease--hold antiplatelets due to rectal bleeding    -Splenomegaly--unchanged from prior imaging      DVT Prophylaxis: SCDs  Diet: Clear liquid diet  Code Status: Full code    PT/OT Eval Status:     Dispo -SNF once stable       Kailyn Conway MD    Thank you Taryn Mcclain for the opportunity to be involved in this patient's care. If you have any questions or concerns please feel free to contact me at [phone].

## 2022-10-20 NOTE — ED PROVIDER NOTES
CHIEF COMPLAINT  Hematuria (Pt in by jamil from [facility]. States was on his way to dialysis, but noticed blood in urine. Denies fever, N/V, diarrhea. Denies abd pain.)      HISTORY OF PRESENT ILLNESS  Reinier Raines is a 69 y.o. male with a history of end-stage renal disease on hemodialysis, CVA, colon cancer, PE, on Eliquis who presents to the ED complaining of possible hematuria. Patient has an extremely poor historian. States he was scheduled for hemodialysis this morning however he was noted to have blood in his diaper so the decision was made to send him to the ER instead. He is uncertain where the blood is from. Does not know if it is hematuria or hematochezia. He has no specific complaints at this time. Denies abdominal pain, chest pain, dyspnea, fever, nausea, vomiting. No other complaints, modifying factors or associated symptoms. I have reviewed the following from the nursing documentation.     Past Medical History:   Diagnosis Date    Anemia 03/2022    Arthritis     CAD (coronary artery disease) 01/2020    Cancer [facility])     Colon Cancer diagnosed last month    Cerebral infarction [facility])     Cognitive communication deficit     COVID-19     Diabetes mellitus ([facility] [address]     Dysphagia     End stage renal disease ([facility] [address]     Fatigue     Gastrointestinal hemorrhage     Hemodialysis patient ([facility] [address] 2019    ckd-goes to dialysis Tuesday, Thurs, Fri    Hx of blood clots     Hyperlipidemia     Hypertension     Hyponatremia     Risk for falls     Splenomegaly 02/10/2021     Past Surgical History:   Procedure Laterality Date    CARDIAC CATHETERIZATION  2019    CHOLECYSTECTOMY, LAPAROSCOPIC N/A 5/16/2022    LAPAROSCOPIC CHOLECYSTECTOMY WITH CHOLANGIOGRAM performed by Daniel Wilson MD at [address] [place] [address] 01/26/2022    SIGMOID COLECTOMY, SIGMOIDOSCOPY performed by Daniel Wilson MD at [facility] Left 4/29/2022    LEFT BRACHIAL CEPHALIC [facility]ISTULA [facility]    ERCP N/A 5/16/2022    ERCP SPHINCTER/PAPILLOTOMY performed by Tamra Cruz MD at [facility]    ERCP N/A 5/16/2022    ERCP STONE REMOVAL/BALLOON SWEEP performed by Tamra Cruz MD at [facility]  02/19/2022    IR EMBOLIZATION HEMORRHAGE 2/19/2022 [facility]    IR PERC ARTERIOVENOUS FISTULA CREATION Left     unsure of date    IR TUNNELED CATHETER PLACEMENT GREATER THAN 5 YEARS  2/21/2022    IR TUNNELED CATHETER PLACEMENT GREATER THAN 5 YEARS 2/21/2022 [facility]    SIGMOIDOSCOPY N/A 02/17/2022    SIGMOIDOSCOPY CONTROL HEMORRHAGE performed by Dominique Benavidez MD at [facility] [address] Right 02/21/2022    Permacath; RIJ access; 23cm; Dr. Lili Weaver  5/16/2022    ERCP POLYP SNARE performed by Tamra Cruz MD at [address] History   Problem Relation Age of Onset    High Blood Pressure Father      Social History     Socioeconomic History    Marital status:      Spouse name: Not on file    Number of children: Not on file    Years of education: Not on file    Highest education level: Not on file   Occupational History    Not on file   Tobacco Use    Smoking status: Former    Smokeless tobacco: Never   Vaping Use    Vaping Use: Never used   Substance and Sexual Activity    Alcohol use: Not Currently    Drug use: Never    Sexual activity: Not on file   Other Topics Concern    Not on file   Social History Narrative    Not on file     Social Determinants of Health     Financial Resource Strain: Not on file   Food Insecurity: Not on file   Transportation Needs: Not on file   Physical Activity: Not on file   Stress: Not on file   Social Connections: Not on file   Intimate Partner Violence: Not on file   Housing Stability: Not on file     Current Facility-Administered Medications   Medication Dose Route Frequency Provider Last Rate Last Admin    cefTRIAXone (ROCEPHIN) 1,000 mg in dextrose 5 % 50 mL IVPB mini-bag  1,000 mg IntraVENous Once Clara Multani MD        metroNIDAZOLE (FLAGYL) tablet 500 mg  500 mg Oral Once Clara Multani MD         Current Outpatient Medications   Medication Sig Dispense Refill    silver sulfADIAZINE (SILVADENE) 1 % cream Apply topically daily. 25 g 1    Wound Dressings ([facility] WOUND/BURN DRESSING) GEL gel Apply 1 each topically daily Apply to RIGHT ankle wound daily 44 mL 2    docusate sodium (COLACE) 100 MG capsule Take 100 mg by mouth 2 times daily      ondansetron (ZOFRAN) 4 MG tablet Take 4 mg by mouth every 8 hours as needed for Nausea      polyethylene glycol (GLYCOLAX) 17 GM/SCOOP powder Take 17 g by mouth daily as needed (Constipation)       apixaban (ELIQUIS) 5 MG TABS tablet Take 1 tablet by mouth 2 times daily 60 tablet 0    aspirin 81 MG chewable tablet Take 1 tablet by mouth daily 30 tablet 0    melatonin 3 MG TABS tablet Take 2 tablets by mouth nightly as needed (sleep) 6 tablet 0    tamsulosin (FLOMAX) 0.4 MG capsule Take 0.4 mg by mouth every morning      gabapentin (NEURONTIN) 100 MG capsule Take 100 mg by mouth 3 times daily. sevelamer (RENVELA) 800 MG tablet Take 800 mg by mouth 3 times daily (with meals)       atenolol (TENORMIN) 25 MG tablet Take 25 mg by mouth every evening      pantoprazole (PROTONIX) 20 MG tablet Take 20 mg by mouth nightly      atorvastatin (LIPITOR) 10 MG tablet Take 10 mg by mouth nightly      calcitRIOL (ROCALTROL) 0.25 MCG capsule Take 0.25 mcg by mouth every evening       Allergies   Allergen Reactions    Lisinopril Swelling     Allergy listed on paperwork from [facility], no reaction noted       REVIEW OF SYSTEMS  10 systems reviewed, pertinent positives per HPI otherwise noted to be negative.     PHYSICAL EXAM  BP (!) 146/73   Pulse 68   Temp 97.9 °F (36.6 °C) (Oral)   Resp 16   Wt 195 lb 15.8 oz (88.9 kg)   SpO2 94% BMI 23.24 kg/m²   GENERAL APPEARANCE: Awake and alert. Cooperative. No acute distress. Appears elderly, frail, chronically ill but not acutely toxic. HEAD: Normocephalic. Atraumatic. EYES: PERRL. EOM's grossly intact. ENT: Mucous membranes are moist.  Hard of hearing. NECK: Supple, trachea midline. HEART: RRR. Normal S1, S2. No murmurs, rubs or gallops. LUNGS: Respirations unlabored. CTAB. Good air exchange. No wheezes, rales, or rhonchi. Speaking comfortably in full sentences. ABDOMEN: Soft. Non-distended. Non-tender. No guarding or rebound. Normal Bowel sounds. Rectal exam grossly positive for red blood. EXTREMITIES: No peripheral edema. MAEE. No acute deformities. SKIN: Warm and dry. No acute rashes. Hemodialysis catheter right anterior chest wall. Small early stage II pressure wounds of the gluteal region with some seepage of blood. NEUROLOGICAL: Alert and interactive. CN II-XII intact. No gross facial drooping. Strength symmetrical  PSYCHIATRIC: Normal mood and affect. LABS  I have reviewed all labs for this visit.    Results for orders placed or performed during the hospital encounter of 10/20/22   CBC with Auto Differential   Result Value Ref Range    WBC 4.5 4.0 - 11.0 K/uL    RBC 3.55 (L) 4.20 - 5.90 M/uL    Hemoglobin 11.1 (L) 13.5 - 17.5 g/dL    Hematocrit 33.1 (L) 40.5 - 52.5 %    MCV 93.3 80.0 - 100.0 fL    MCH 31.2 26.0 - 34.0 pg    MCHC 33.4 31.0 - 36.0 g/dL    RDW 16.8 (H) 12.4 - 15.4 %    Platelets 966 624 - 842 K/uL    MPV 7.9 5.0 - 10.5 fL    Neutrophils % 63.1 %    Lymphocytes % 28.6 %    Monocytes % 5.3 %    Eosinophils % 2.4 %    Basophils % 0.6 %    Neutrophils Absolute 2.8 1.7 - 7.7 K/uL    Lymphocytes Absolute 1.3 1.0 - 5.1 K/uL    Monocytes Absolute 0.2 0.0 - 1.3 K/uL    Eosinophils Absolute 0.1 0.0 - 0.6 K/uL    Basophils Absolute 0.0 0.0 - 0.2 K/uL   Comprehensive Metabolic Panel   Result Value Ref Range    Sodium 136 136 - 145 mmol/L    Potassium 4.5 3.5 - 5.1 mmol/L    Chloride 95 (L) 99 - 110 mmol/L    CO2 33 (H) 21 - 32 mmol/L    Anion Gap 8 3 - 16    Glucose 150 (H) 70 - 99 mg/dL    BUN 36 (H) 7 - 20 mg/dL    Creatinine 4.0 (H) 0.8 - 1.3 mg/dL    Est, Glom Filt Rate 15 (A) >60    Calcium 8.9 8.3 - 10.6 mg/dL    Total Protein 6.5 6.4 - 8.2 g/dL    Albumin 3.4 3.4 - 5.0 g/dL    Albumin/Globulin Ratio 1.1 1.1 - 2.2    Total Bilirubin 0.5 0.0 - 1.0 mg/dL    Alkaline Phosphatase 141 (H) 40 - 129 U/L    ALT 21 10 - 40 U/L    AST 16 15 - 37 U/L   Magnesium   Result Value Ref Range    Magnesium 2.10 1.80 - 2.40 mg/dL   Urinalysis with Reflex to Culture    Specimen: Urine, straight catheter   Result Value Ref Range    Color, UA Yellow Straw/Yellow    Clarity, UA TURBID (A) Clear    Glucose, Ur Negative Negative mg/dL    Bilirubin Urine Negative Negative    Ketones, Urine Negative Negative mg/dL    Specific Gravity, UA 1.013 1.005 - 1.030    Blood, Urine LARGE (A) Negative    pH, UA 8.0 5.0 - 8.0    Protein,  (A) Negative mg/dL    Urobilinogen, Urine 0.2 <2.0 E.U./dL    Nitrite, Urine Negative Negative    Leukocyte Esterase, Urine LARGE (A) Negative    Microscopic Examination YES     Urine Type NotGiven     Urine Reflex to Culture Yes    Blood Occult Stool #1   Result Value Ref Range    Occult Blood Diagnostic Result: POSITIVE  Normal range: Negative   (A)    Microscopic Urinalysis   Result Value Ref Range    Bacteria, UA Rare (A) None Seen /HPF    Hyaline Casts, UA 12 (H) 0 - 8 /LPF    WBC, UA 5240 (H) 0 - 5 /HPF    RBC,  (H) 0 - 4 /HPF    Epithelial Cells, UA 1 0 - 5 /HPF           RADIOLOGY  X-RAYS:  I have reviewed radiologic plain film image(s). ALL OTHER NON-PLAIN FILM IMAGES SUCH AS CT, ULTRASOUND AND MRI HAVE BEEN READ BY THE RADIOLOGIST. CTA ABDOMEN PELVIS W WO CONTRAST   Final Result   Mild wall thickening at the sigmoid anastomosis either due to post treatment   change or secondary to distal colitis. No active contrast extravasation   noted.   Narrowing at the bowel anastomosis is again seen      Nonspecific bladder wall thickening. Recommend correlation with urinalysis   to exclude cystitis      Splenomegaly                    Rechecks: Physical assessment performed. Resting comfortably    ED COURSE/MDM  Patient seen and evaluated. Old records reviewed. Labs and imaging reviewed and results discussed with patient. Patient here for evaluation and possible hematuria. Very poor historian. He has no idea where the blood is coming from. We will schedule for hemodialysis this morning. On physical exam he had obvious hematochezia on rectal exam.  There is also some blood in his diaper from sacral pressure wounds. Lastly, urinalysis demonstrates UTI. CT with questionable colitis. Patient covered with Rocephin and Flagyl for both the urine and the colon. Admitted to the hospitalist service. He takes Eliquis but is hemodynamically stable. New Prescriptions    No medications on file       CLINICAL IMPRESSION  1. Lower GI bleed    2. Acute UTI    3. Pressure injury of sacral region, stage 2 (HCC)        Blood pressure (!) 146/73, pulse 68, temperature 97.9 °F (36.6 °C), temperature source Oral, resp. rate 16, weight 195 lb 15.8 oz (88.9 kg), SpO2 94 %. DISPOSITION  [facility] was admitted in stable condition.         Clara Multani MD  10/20/22 [id number]

## 2022-10-20 NOTE — PROGRESS NOTES
4 Eyes Skin Assessment     The patient is being assess for  Admission    I agree that 2 RN's have performed a thorough Head to Toe Skin Assessment on the patient. ALL assessment sites listed below have been assessed. Areas assessed by both nurses: yes  [x]   Head, Face, and Ears   [x]   Shoulders, Back, and Chest  [x]   Arms, Elbows, and Hands   [x]   Coccyx, Sacrum, and Ischum  [x]   Legs, Feet, and Heels        Does the Patient have Skin Breakdown?   Yes a wound was noted on the Admission Assessment and an WOUND LDA was Initiated documentation include the Pattie-wound, Wound Assessment, Measurements, Dressing Treatment, Drainage, and Color\",   Stg 2 and stg 1 on buttocks        Cordell Prevention initiated:  Yes   Wound Care Orders initiated:  Yes      [address]  nurse consulted for Pressure Injury (Stage 3,4, Unstageable, DTI, NWPT, and Complex wounds):  Yes      Nurse 1 eSignature: Electronically signed by Simon Lindquist RN on 10/20/22 at 3:23 PM EDT    **SHARE this note so that the co-signing nurse is able to place an eSignature**    Nurse 2 eSignature: Electronically signed by Suki Kiser RN on 10/20/22 at 3:26 PM EDT

## 2022-10-21 PROBLEM — E43 SEVERE MALNUTRITION (HCC): Chronic | Status: ACTIVE | Noted: 2022-10-21

## 2022-10-21 LAB
APTT: 39.8 SEC (ref 23–34.3)
HCT VFR BLD CALC: 31.4 % (ref 40.5–52.5)
HCT VFR BLD CALC: 31.8 % (ref 40.5–52.5)
HCT VFR BLD CALC: 34.1 % (ref 40.5–52.5)
HCT VFR BLD CALC: 35.2 % (ref 40.5–52.5)
HEMOGLOBIN: 10.7 G/DL (ref 13.5–17.5)
HEMOGLOBIN: 10.7 G/DL (ref 13.5–17.5)
HEMOGLOBIN: 11 G/DL (ref 13.5–17.5)
HEMOGLOBIN: 11.5 G/DL (ref 13.5–17.5)
INR BLD: 1.38 (ref 0.87–1.14)
IRON SATURATION: 56 % (ref 20–50)
IRON: 84 UG/DL (ref 59–158)
PROTHROMBIN TIME: 16.9 SEC (ref 11.7–14.5)
TOTAL IRON BINDING CAPACITY: 150 UG/DL (ref 260–445)
URINE CULTURE, ROUTINE: NORMAL

## 2022-10-21 PROCEDURE — 85610 PROTHROMBIN TIME: CPT

## 2022-10-21 PROCEDURE — 85018 HEMOGLOBIN: CPT

## 2022-10-21 PROCEDURE — 85014 HEMATOCRIT: CPT

## 2022-10-21 PROCEDURE — 94760 N-INVAS EAR/PLS OXIMETRY 1: CPT

## 2022-10-21 PROCEDURE — 36415 COLL VENOUS BLD VENIPUNCTURE: CPT

## 2022-10-21 PROCEDURE — 85730 THROMBOPLASTIN TIME PARTIAL: CPT

## 2022-10-21 PROCEDURE — 83550 IRON BINDING TEST: CPT

## 2022-10-21 PROCEDURE — 2500000003 HC RX 250 WO HCPCS: Performed by: HOSPITALIST

## 2022-10-21 PROCEDURE — 1200000000 HC SEMI PRIVATE

## 2022-10-21 PROCEDURE — 83540 ASSAY OF IRON: CPT

## 2022-10-21 PROCEDURE — 6360000002 HC RX W HCPCS: Performed by: HOSPITALIST

## 2022-10-21 PROCEDURE — 51798 US URINE CAPACITY MEASURE: CPT

## 2022-10-21 PROCEDURE — 6370000000 HC RX 637 (ALT 250 FOR IP): Performed by: HOSPITALIST

## 2022-10-21 PROCEDURE — 2580000003 HC RX 258: Performed by: HOSPITALIST

## 2022-10-21 RX ORDER — POLYETHYLENE GLYCOL 3350 17 G/17G
17 POWDER, FOR SOLUTION ORAL DAILY
Status: DISCONTINUED | OUTPATIENT
Start: 2022-10-21 | End: 2022-10-23 | Stop reason: HOSPADM

## 2022-10-21 RX ADMIN — METRONIDAZOLE 500 MG: 500 INJECTION, SOLUTION INTRAVENOUS at 20:44

## 2022-10-21 RX ADMIN — ATORVASTATIN CALCIUM 10 MG: 10 TABLET, FILM COATED ORAL at 20:41

## 2022-10-21 RX ADMIN — METRONIDAZOLE 500 MG: 500 INJECTION, SOLUTION INTRAVENOUS at 12:30

## 2022-10-21 RX ADMIN — DOCUSATE SODIUM 100 MG: 100 CAPSULE, LIQUID FILLED ORAL at 20:43

## 2022-10-21 RX ADMIN — METRONIDAZOLE 500 MG: 500 INJECTION, SOLUTION INTRAVENOUS at 04:26

## 2022-10-21 RX ADMIN — CALCITRIOL 0.25 MCG: 0.25 CAPSULE ORAL at 20:41

## 2022-10-21 RX ADMIN — Medication 10 ML: at 10:04

## 2022-10-21 RX ADMIN — PANTOPRAZOLE SODIUM 20 MG: 20 TABLET, DELAYED RELEASE ORAL at 20:41

## 2022-10-21 RX ADMIN — LORAZEPAM 0.5 MG: 0.5 TABLET ORAL at 20:41

## 2022-10-21 RX ADMIN — GABAPENTIN 100 MG: 100 CAPSULE ORAL at 20:41

## 2022-10-21 RX ADMIN — GABAPENTIN 100 MG: 100 CAPSULE ORAL at 13:56

## 2022-10-21 RX ADMIN — CEFTRIAXONE 1000 MG: 1 INJECTION, POWDER, FOR SOLUTION INTRAMUSCULAR; INTRAVENOUS at 13:48

## 2022-10-21 NOTE — ACP (ADVANCE CARE PLANNING)
Spoke with carlton Vernon to confirm ACP.   She reports its the same as the past, no changes  Electronically signed by LIANA Cruz on 10/21/2022 at 2:10 PM

## 2022-10-21 NOTE — CARE COORDINATION
[facility] Wound Ostomy Continence Nurse  Consult Note       NAME:  Kp Carmichael [place] RECORD NUMBER:  [id number]  AGE: 69 y.o. GENDER: male  : 1945  TODAY'S DATE:  10/21/2022    Subjective   Reason for WOCN Evaluation and Assessment: buttock wounds. Pt has stage 3 to buttock and unstageable to right ankle.  All ERIK Renee is a 69 y.o. male referred by:   [] Physician  [x] Nursing  [] Other:     Wound Identification:  Wound Type: pressure  Contributing Factors: chronic pressure and decreased mobility    Wound History: wounds noted on prior admit  Current Wound Care Treatment:  zinc and foam to buttock    Patient Goal of Care:  [x] Wound Healing  [] Odor Control  [] Palliative Care  [] Pain Control   [] Other:         PAST MEDICAL HISTORY        Diagnosis Date    Anemia 2022    Arthritis     CAD (coronary artery disease) 2020    Cancer ([facility] [address]     Colon Cancer diagnosed last month    Cerebral infarction [facility])     Cognitive communication deficit     COVID-19     Diabetes mellitus ([facility] [address]     Dysphagia     End stage renal disease ([facility] [address]     Fatigue     Gastrointestinal hemorrhage     Hemodialysis patient ([facility] [address] 2019    ckd-goes to dialysis Tuesday, Thurs, Fri    Hx of blood clots     Hyperlipidemia     Hypertension     Hyponatremia     Risk for falls     Splenomegaly 02/10/2021       PAST SURGICAL HISTORY    Past Surgical History:   Procedure Laterality Date    CARDIAC CATHETERIZATION      CHOLECYSTECTOMY, LAPAROSCOPIC N/A 2022    LAPAROSCOPIC CHOLECYSTECTOMY WITH CHOLANGIOGRAM performed by Khadra Gamez MD at [address] 2022    SIGMOID COLECTOMY, SIGMOIDOSCOPY performed by Khadra Gamez MD at [facility] 18 Left 2022    LEFT BRACHIAL CEPHALIC FISTULA performed by Mily Hooper MD at [place] [address]    ERCP N/A 2022    ERCP SPHINCTER/PAPILLOTOMY performed by Jass Patricio MD at [address] [place] [address] polyethylene glycol (GLYCOLAX) 17 GM/SCOOP powder Take 17 g by mouth daily as needed (Constipation)       apixaban (ELIQUIS) 5 MG TABS tablet Take 1 tablet by mouth 2 times daily 60 tablet 0    aspirin 81 MG chewable tablet Take 1 tablet by mouth daily 30 tablet 0    melatonin 3 MG TABS tablet Take 2 tablets by mouth nightly as needed (sleep) 6 tablet 0    tamsulosin (FLOMAX) 0.4 MG capsule Take 0.4 mg by mouth every morning      gabapentin (NEURONTIN) 100 MG capsule Take 100 mg by mouth 3 times daily.       sevelamer (RENVELA) 800 MG tablet Take 800 mg by mouth 3 times daily (with meals)       atenolol (TENORMIN) 25 MG tablet Take 25 mg by mouth every evening      pantoprazole (PROTONIX) 20 MG tablet Take 20 mg by mouth nightly      atorvastatin (LIPITOR) 10 MG tablet Take 10 mg by mouth nightly      calcitRIOL (ROCALTROL) 0.25 MCG capsule Take 0.25 mcg by mouth every evening         Objective    /70   Pulse 57   Temp 97.9 °F (36.6 °C) (Oral)   Resp 18   Ht 6' 5\" (1.956 m)   Wt 186 lb 4.6 oz (84.5 kg)   SpO2 94%   BMI 22.09 kg/m²     LABS:  WBC:    Lab Results   Component Value Date/Time    WBC 4.5 10/20/2022 10:33 AM     H/H:    Lab Results   Component Value Date/Time    HGB 10.7 10/21/2022 04:41 AM    HCT 31.8 10/21/2022 04:41 AM     PTT:    Lab Results   Component Value Date/Time    APTT 39.8 10/21/2022 04:41 AM   [APTT}  PT/INR:    Lab Results   Component Value Date/Time    PROTIME 16.9 10/21/2022 04:41 AM    INR 1.38 10/21/2022 04:41 AM     HgBA1c:    Lab Results   Component Value Date/Time    LABA1C 6.1 01/15/2022 05:31 AM       Assessment   Cordell Risk Score: Cordell Scale Score: 17    Patient Active Problem List   Diagnosis Code    GI bleed K92.2    History of COVID-19 Z86.16    Hyponatremia E87.1    Fatigue R53.83    Acute kidney injury superimposed on CKD (HCC) N17.9, N18.9    Lethargy R53.83    Suspected UTI R39.89    Acute CVA (cerebrovascular accident) ([facility] [address] I63.9    LESLEE (acute kidney injury) ([facility] [address] N17.9    Malignant neoplasm of colon ([facility] [address] C18.9    Acute blood loss anemia D62    COVID-19 U07.1    Shock circulatory (HCC) R57.9    Bilateral pulmonary embolism (HCC) I26.99    Lactic acidosis E87.20    Hemorrhagic shock (HCC) R57.8    ESRD (end stage renal disease) on dialysis ([facility] [address] N18.6, Z99.2    Acute cholecystitis K81.0    Pain of upper abdomen R10.10    AMS (altered mental status) R41.82    Sepsis (HCC) P23.0    Acute metabolic encephalopathy F43.29    Cellulitis L03.90    Fever and chills R50.9    PVD (peripheral vascular disease) ([facility]) I73.9    Anxiety F41.9    Rectal bleeding K62.5       Measurements:      Wound 06/02/22 Ankle Anterior;Right (Active)   Wound Image   10/21/22 1210   Wound Etiology Pressure Unstageable 10/21/22 1210   Dressing/Treatment Barrier film 10/21/22 1210   Wound Length (cm) 0.5 cm 10/21/22 1210   Wound Width (cm) 0.5 cm 10/21/22 1210   Wound Surface Area (cm^2) 0.25 cm^2 10/21/22 1210   Change in Wound Size % (l*w) 88.89 10/21/22 1210   Wound Assessment Eschar dry 10/21/22 1210   Drainage Amount None 10/21/22 1210   Odor None 10/21/22 1210   Pattie-wound Assessment Intact 10/21/22 1210   Margins Defined edges 10/21/22 1210   Number of days: 141         Wound 06/02/22 Buttocks (Active)   Wound Image   10/21/22 1210   Wound Etiology Pressure Stage 3 10/21/22 1210   Wound Cleansed Other (Comment) 10/21/22 1210   Dressing/Treatment Zinc paste 10/21/22 1210   Wound Length (cm) 1 cm 10/21/22 1210   Wound Width (cm) 1.5 cm 10/21/22 1210   Wound Depth (cm) 0.2 cm 10/21/22 1210   Wound Surface Area (cm^2) 1.5 cm^2 10/21/22 1210   Change in Wound Size % (l*w) -275 10/21/22 1210   Wound Volume (cm^3) 0.3 cm^3 10/21/22 1210   Wound Healing % -650 10/21/22 1210   Wound Assessment Pink/red 10/21/22 1210   Drainage Amount None 10/21/22 1210   Odor None 10/21/22 1210   Pattie-wound Assessment Fragile 10/21/22 1210   Margins Defined edges 10/21/22 1210   Number of days: 141     Pt seen. Has stage 3 to right buttock, measures 1x1.5x0.2cm; left buttock more friction injury, 1x0.3x0.1cm and coccyx has 0.2cm open area, stage 2. Wounds are clean. Zinc applied. Right ankle has unstageable pressure injury that had been followed by vascular and podiatry. Wound has decreased in size. Response to treatment:  Well tolerated by patient. Pain Assessment:  Severity:  0 / 10    Plan   Plan of Care: Wound 06/02/22 Buttocks-Dressing/Treatment: Zinc paste  Wound 06/02/22 Ankle Anterior;Right-Dressing/Treatment: Barrier film  -zinc barrier to buttocks  -blue barrier wipes for incontinent care, NO briefs  -turn and reposition every 2 hours  -elevate heels in bilat boots  -barrier film to right ankle  -blue silicone lotion to BLE  -may want to consider podiatry consult    Specialty Bed Required : Yes   [x] Low Air Loss   [] Pressure Redistribution  [] Fluid Immersion  [] Bariatric  [] Total Pressure Relief  [] Other:     Current Diet: ADULT DIET;  Clear Liquid  Dietician consult:  Yes    Discharge Plan:  Placement for patient upon discharge: skilled nursing    Patient appropriate for Outpatient [address]: No    Referrals:  []   [] [employer]  [] Supplies  [] Other    Patient/Caregiver Teaching:  Level of patient/caregiver understanding able to:   [] Indicates understanding       [] Needs reinforcement  [] Unsuccessful      [x] Verbal Understanding  [] Demonstrated understanding       [] No evidence of learning  [] Refused teaching         [] N/A       Electronically signed by Stacie Harborton on 10/21/2022 at 12:58 PM

## 2022-10-21 NOTE — PROGRESS NOTES
Pt a/ox4 resting in bed. Pt reports feeling more tired than usual. Pt remains on a clear liquid diet and assisted to eat r/t hand shaking. Poor input reported; pt denies hunger. Will continue checking on pt q2 hours to help reposition and assess pt needs. Specialty bed ordered; skin wounds treated by wound care. Daughter at bedside at this time. Will continue to monitor and assess.  Electronically signed by Jessica Higgins RN on 10/21/2022 at 4:39 PM

## 2022-10-21 NOTE — PROGRESS NOTES
[address] [place] [address] Nephrology   [facility]  [phone]  Nephrology Consult Note          Patient ID: Ace Gonzales  Referring/ Physician: Mitzy Bravo MD      HPI/Summary:   Ace Gonzales is being seen by nephrology for ESRD. This is a 69-year-old male with past medical history significant for hypertension ESRD colon cancer who was in the hospital with rectal bleeding. He had been straining to have a bowel movement. He missed dialysis on Thursday. He denies chest pain or shortness of breath no nausea no vomiting or diarrhea. No abdominal pain. He is on anticoagulation, on empiric antibiotics. His Eliquis and aspirin were held. Plan:   -There are no acute indications for dialysis today  -HD tomorrow per TTS schedule, UF to dry weight  -Blood pressure acceptable      ESRD  TTS at [facility]  Has a tunneled dialysis catheter    Hypertension  Blood pressure acceptable on atenolol    Secondary parathyroidism  On Renvela 800 mg 3 times daily, calcitriol 0.25 mg daily    Anemia  Multifactorial in the setting of malignancy, rectal bleeding and chronic kidney disease. No ELENA indicated, Hgb > [address] [facility] would like to thank you for the opportunity to serve this patient. Please call with any questions or concerns. Cristopher Carvajal MD  [facility]  [address], [address] [place] [address]  Fax: [phone]  Office: [phone]         CC/Reason for consult:   Reason for consult: ESRD  Chief Complaint   Patient presents with    Hematuria     Pt in by squad from [facility]. States was on his way to dialysis, but noticed blood in urine. Denies fever, N/V, diarrhea. Denies abd pain. Review of Systems:   [address] All other remaining systems are negative. Constitutional:  fever, chills, weakness, weight change, fatigue,      Skin:  rash, pruritus, hair loss, bruising, dry skin, petechiae.   Head, Face, Neck   headaches, swelling,  cervical adenopathy. Respiratory: shortness of breath, cough, or wheezing  Cardiovascular: chest pain, palpitations, dizzy, edema  Gastrointestinal: nausea, vomiting, diarrhea, constipation,belly pain    Yellow skin, blood in stool  Musculoskeletal:  back pain, muscle weakness, gait problems,       joint pain or swelling. Genitourinary:  dysuria, poor urine flow, flank pain, blood in urine  Neurologic:  vertigo, TIA'S, syncope, seizures, focal weakness  Psychosocial:  insomnia, anxiety, or depression. Additional positive findings: -     PMH/SH/FH:    Medical Hx: reviewed and updated as appropriate  Past Medical History:   Diagnosis Date    Anemia 03/2022    Arthritis     CAD (coronary artery disease) 01/2020    Cancer ([facility] [address]     Colon Cancer diagnosed last month    Cerebral infarction [facility])     Cognitive communication deficit     COVID-19     Diabetes mellitus ([facility] [address]     Dysphagia     End stage renal disease ([facility] [address]     Fatigue     Gastrointestinal hemorrhage     Hemodialysis patient ([facility] [address] 2019    ckd-goes to dialysis Tuesday, Thurs, Fri    Hx of blood clots     Hyperlipidemia     Hypertension     Hyponatremia     Risk for falls     Splenomegaly 02/10/2021         Surgical Hx: reviewed and updated as appropriate   has a past surgical history that includes IR PERC ARTERIOVENOUS FISTULA CREATION (Left); colectomy (N/A, 01/26/2022); sigmoidoscopy (N/A, 02/17/2022); IR EMBOLIZATION HEMORRHAGE (02/19/2022); Tunneled venous catheter placement (Right, 02/21/2022); IR TUNNELED CVC PLACE WO SQ PORT/PUMP > 5 YEARS (2/21/2022); Cardiac catheterization (2019); Colonoscopy; Dialysis fistula creation (Left, 4/29/2022); Cholecystectomy, laparoscopic (N/A, 5/16/2022); ERCP (N/A, 5/16/2022); ERCP (N/A, 5/16/2022); and Upper gastrointestinal endoscopy (5/16/2022).      Social Hx: reviewed and updated as appropriate  Social History     Tobacco Use    Smoking status: Former    Smokeless tobacco: Never   Substance Use Topics    Alcohol use: Not Currently        Family hx: reviewed and updated as appropriate  family history includes High Blood Pressure in his father. Medications:   polyethylene glycol, 17 g, Daily  sevelamer, 800 mg, TID WC  tamsulosin, 0.4 mg, QAM  pantoprazole, 20 mg, Nightly  gabapentin, 100 mg, TID  calcitRIOL, 0.25 mcg, Nightly  atorvastatin, 10 mg, Nightly  atenolol, 25 mg, QPM  docusate sodium, 100 mg, BID  sodium chloride flush, 5-40 mL, 2 times per day  LORazepam, 0.5 mg, BID  cefTRIAXone (ROCEPHIN) IV, 1,000 mg, Q24H  metroNIDAZOLE, 500 mg, Q8H       Lisinopril    Allergies: Allergies   Allergen Reactions    Lisinopril Swelling     Allergy listed on paperwork from [facility], no reaction noted         Physical Exam/Objective:   Vitals:    10/21/22 1225   BP: 134/70   Pulse: 57   Resp: 18   Temp: 97.9 °F (36.6 °C)   SpO2: 94%       Intake/Output Summary (Last 24 hours) at 10/21/2022 1329  Last data filed at 10/21/2022 1225  Gross per 24 hour   Intake 574 ml   Output --   Net 574 ml         General appearance:  in no acute distress, comfortable, communicative, awake and alert. HEENT: no icterus, EOM intact, trachea midline. Neck : no masses, appears symmetrical and no JVD appreciated. Respiratory: Respiratory effort normal, bilateral equal chest rise. No wheeze, no crackles   Cardiovascular: Ausculation shows RRR and  no edema   Abdomen: abdomen is soft, non distended, no masses, no pain with palpation. Musculoskeletal:  no joint swelling, no deformity, strength grossly normal.   Skin: no rashes, no induration, no tightening, no jaundice   Neuro: Follows commands, moves all extremities spontaneously       Data:   CBC:   Recent Labs     10/20/22  1033 10/20/22  1829 10/21/22  0035 10/21/22  0441 10/21/22  1253   WBC 4.5  --   --   --   --    HGB 11.1*   < > 10.7* 10.7* 11.5*   HCT 33.1*   < > 31.4* 31.8* 35.2*     --   --   --   --     < > = values in this interval not displayed.      BMP:    Recent

## 2022-10-21 NOTE — PROGRESS NOTES
Ativan scheduled BID. Morning medication returned to [facility] with [facility] RN as witness. Pt remains resting quietly in bed with eyes closed at this time. No apparent signs of distress noted. Bed locked and in lowest position, alarm on, side rails up x2. Call light and all belongings within reach. Will continue to check on pt. every 2 hours to monitor pt's safety and assess pt needs.  Electronically signed by Costa Cardona RN on 10/21/2022 at 4:43 PM

## 2022-10-21 NOTE — CARE COORDINATION
Patient came from [place] [address] home prior to arrival.  Call to Mali Knight, at  who confirmed the patient is: [place] [address] facility  [x] [address], no Precert required for return.  [] [facility] will be required for return. [] Skilled Nursing Care, no Precert required for return. [] [address] required for return. [x] Confirmed with the patient or family that the plan is to return to this facility at D/C. Spoke with dtr [facility] who reports plan is for pt to return to [place] at [facility]. Pt goes to [facility] T,TH, S and is transported via stretcher. Stretcher transport is needed at [facility]. Spoke with Mali Knight at [facility] who confirms pt is able to return long term care unless skilled care is needed, then precert would be needed.       Electronically signed by [id number] LIANA Fagan on 10/21/2022 at 2:10 PM

## 2022-10-21 NOTE — PROGRESS NOTES
Pt. Tommiryam Huh but awakens when spoken to; resting in bed with eyes closed. MD at bedside at this time requesting the pt gets dialysis today. Pt regularly gets dialysis T,TH, and S but missed yesterday. Perfect serve sent to Dr. Jaxson Millan asking for nephrology recommendation. No new orders at this time. Medication held this AM; [facility]. Bed locked and in lowest position, alarm on, side rails up x2. Call light and all belongings within reach. Will continue to check on pt. every 2 hours to monitor pt's safety and assess pt needs. Electronically signed by Henok Menezes RN on 10/21/2022 at 11:07 AM    Perfect serve from Dr. Jaxson Millan received. MD to see pt and decided on dialysis.  Electronically signed by Henok Menezes RN on 10/21/2022 at 11:22 AM

## 2022-10-21 NOTE — DISCHARGE INSTR - COC
Continuity of Care Form    Patient Name: Sal Melvin   :    MRN:  [id number]    Admit date:  10/20/2022  Discharge date:  10/23/22    Code Status Order: Full Code   Advance Directives:     Admitting Physician:  Zach Patino MD  PCP: Sharonda Kim    Discharging Nurse: SUMEET MICHELE Unit/Room#: B8R-2746/3115-01  Discharging Unit Phone Number: [phone]    Emergency Contact:   Extended Emergency Contact Information  Primary Emergency Contact: Kerline Link  Address: Rebeca Zapien. [address] [facility], [address] [place] [address] Phone: [phone]  Mobile Phone: [phone]  Relation: Spouse  Secondary Emergency Contact: Jung Georges Phone: [phone]  Mobile Phone: [phone]  Relation: Child   needed?  No    Past Surgical History:  Past Surgical History:   Procedure Laterality Date    CARDIAC CATHETERIZATION      CHOLECYSTECTOMY, LAPAROSCOPIC N/A 2022    LAPAROSCOPIC CHOLECYSTECTOMY WITH CHOLANGIOGRAM performed by Megan Esteves MD at [address] [place] [address] 2022    SIGMOID COLECTOMY, SIGMOIDOSCOPY performed by Megan Esteves MD at [place] [address] Left 2022    LEFT BRACHIAL CEPHALIC FISTULA performed by Amira Mcwilliams MD at [place] [address]    ERCP N/A 2022    ERCP SPHINCTER/PAPILLOTOMY performed by Hanh Mcdonald MD at [address] [place] [address]    ERCP N/A 2022    ERCP STONE REMOVAL/BALLOON SWEEP performed by Hanh Mcdonald MD at [facility]  2022    IR EMBOLIZATION HEMORRHAGE 2022 [facility]     Corporate Dr Left     unsure of date    IR TUNNELED CATHETER PLACEMENT GREATER THAN 5 YEARS  2022    IR TUNNELED CATHETER PLACEMENT GREATER THAN 5 YEARS 2022 [facility]    SIGMOIDOSCOPY N/A 2022    SIGMOIDOSCOPY CONTROL HEMORRHAGE performed by Christoph Yan MD at [facility]    TUNNELED VENOUS CATHETER PLACEMENT Right 2022    Permacath; RIJ access; 23cm; Dr. Santy Higgins  2022    ERCP POLYP SNARE performed by Kassy Redd MD at [facility]       Immunization History:   Immunization History   Administered Date(s) Administered    COVID-19, MODERNA BLUE border, Primary or Immunocompromised, (age 12y+), IM, 100 mcg/0.5mL 2021, 2021       Active Problems:  Patient Active Problem List   Diagnosis Code    GI bleed K92.2    History of COVID-19 Z86.16    Hyponatremia E87.1    Fatigue R53.83    Acute kidney injury superimposed on CKD ([address] N17.9, N18.9    Lethargy R53.83    Suspected UTI R39.89    Acute CVA (cerebrovascular accident) ([address] I63.9    LESLEE (acute kidney injury) ([address] N17.9    Malignant neoplasm of colon ([address] C18.9    Acute blood loss anemia D62    COVID-19 U07.1    Shock circulatory (HCC) R57.9    Bilateral pulmonary embolism (HCC) I26.99    Lactic acidosis E87.20    Hemorrhagic shock (HCC) R57.8    ESRD (end stage renal disease) on dialysis ([address] N18.6, Z99.2    Acute cholecystitis K81.0    Pain of upper abdomen R10.10    AMS (altered mental status) R41.82    Sepsis (HCC) U46.4    Acute metabolic encephalopathy P58.79    Cellulitis L03.90    Fever and chills R50.9    PVD (peripheral vascular disease) ([facility]) I73.9    Anxiety F41.9    Rectal bleeding K62.5    Severe malnutrition ([address] E43       Isolation/Infection:   Isolation            No Isolation          Patient Infection Status       Infection Onset Added Last Indicated Last Indicated By Review Planned Expiration Resolved Resolved By    None active    Resolved    COVID-19 (Rule Out) 09/15/22 09/15/22 09/15/22 COVID-19, Rapid (Ordered)   09/15/22 Rule-Out Test Resulted    C-diff Rule Out 22 Clostridium Difficile Toxin/Antigen (Ordered)   22 Juaquin Lau RN    Order     COVID-19 (Rule Out) 22 COVID-19, Rapid (Ordered)   22 Rule-Out Test Resulted    COVID-19 02/10/22 02/10/22 02/10/22 COVID-19, Rapid   03/10/22     COVID-19 (Rule Out) 22 COVID-19, Rapid (Ordered)   22 Rule-Out Test Resulted            Nurse Assessment:  Last Vital Signs: /70   Pulse 57   Temp 97.9 °F (36.6 °C) (Oral)   Resp 18   Ht 6' 5\" (1.956 m)   Wt 186 lb 4.6 oz (84.5 kg)   SpO2 94%   BMI 22.09 kg/m²     Last documented pain score (0-10 scale):    Last Weight:   Wt Readings from Last 1 Encounters:   10/21/22 186 lb 4.6 oz (84.5 kg)     Mental Status:  oriented, alert, and forgetful at times    IV Access:  - Dialysis Catheter  - site  right and chest, insertion date: unknown    Nursing Mobility/ADLs:  Walking   Dependent  Transfer  Dependent  Bathing  Dependent  Dressing  Dependent  Toileting  Dependent  Feeding  Assisted  Med Admin  Assisted  Med Delivery   whole    Wound Care Documentation and Therapy:  Wound 22 Ankle Anterior;Right (Active)   Wound Image   10/21/22 1210   Wound Etiology Pressure Unstageable 10/21/22 1210   Dressing/Treatment Barrier film 10/21/22 1210   Wound Length (cm) 0.5 cm 10/21/22 1210   Wound Width (cm) 0.5 cm 10/21/22 1210   Wound Surface Area (cm^2) 0.25 cm^2 10/21/22 1210   Change in Wound Size % (l*w) 88.89 10/21/22 1210   Wound Assessment Eschar dry 10/21/22 1210   Drainage Amount None 10/21/22 1210   Odor None 10/21/22 1210   Pattie-wound Assessment Intact 10/21/22 1210   Margins Defined edges 10/21/22 1210   Number of days: 141       Wound 22 Buttocks (Active)   Wound Image   10/21/22 1210   Wound Etiology Pressure Stage 3 10/21/22 1210   Wound Cleansed Other (Comment) 10/21/22 1210   Dressing/Treatment Zinc paste 10/21/22 1210   Wound Length (cm) 1 cm 10/21/22 1210   Wound Width (cm) 1.5 cm 10/21/22 1210   Wound Depth (cm) 0.2 cm 10/21/22 1210   Wound Surface Area (cm^2) 1.5 cm^2 10/21/22 1210   Change in Wound Size % (l*w) -275 10/21/22 1210   Wound Volume (cm^3) 0.3 cm^3 10/21/22 1210   Wound Healing % -650 10/21/22 1210   Wound Assessment Pink/red 10/21/22 1210   Drainage Amount None 10/21/22 1210   Odor None 10/21/22 1210   Pattie-wound Assessment Fragile 10/21/22 1210   Margins Defined edges 10/21/22 1210   Number of days: 141       Incision 22 Abdomen (Active)   Number of days: 268        Elimination:  Continence: Bowel: No  Bladder: No  Urinary Catheter: None   Colostomy/Ileostomy/Ileal Conduit: No       Date of Last BM: 10/22/22    Intake/Output Summary (Last 24 hours) at 10/21/2022 1552  Last data filed at 10/21/2022 1225  Gross per 24 hour   Intake 574 ml   Output --   Net 574 ml     I/O last 3 completed shifts: In: 234 [P.O.:100; I.V.:134]  Out: -     Safety Concerns: At Risk for Falls    Impairments/Disabilities:      None    Nutrition Therapy:  Current Nutrition Therapy:   - Oral Diet:  General and Low Fat    Routes of Feeding: Oral  Liquids: Thin Liquids  Daily Fluid Restriction: no  Last Modified Barium Swallow with Video (Video Swallowing Test): not done    Treatments at the Time of Hospital Discharge:   Respiratory Treatments: ***  Oxygen Therapy:  is not on home oxygen therapy.   Ventilator:    - No ventilator support    Rehab Therapies: {THERAPEUTIC INTERVENTION:0092674305}  Weight Bearing Status/Restrictions: { CC Weight Bearin}  Other Medical Equipment (for information only, NOT a DME order):  {EQUIPMENT:930406748}  Other Treatments: ***    Patient's personal belongings (please select all that are sent with patient):  None    RN SIGNATURE:  Electronically signed by Jane Longo RN on 10/23/22 at 10:46 AM EDT    CASE MANAGEMENT/SOCIAL WORK SECTION    Inpatient Status Date: ***    Readmission Risk Assessment Score:  Readmission Risk              Risk of Unplanned Readmission:  40         Discharging to Facility/ Agency   Name: [facility]  Address:  [address] [place] [address] [address] Jerry, [place]th    Phone:  [phone]  Fax:  [phone]      Dialysis Facility (if applicable)   Name: [facility]   Address:  Dialysis Schedule:T,TH, S  Phone:  Fax:    / signature: Electronically signed by Stacie Valderrama on 10/23/22 at 10:25 AM EDT    PHYSICIAN SECTION    Prognosis: Good    Condition at Discharge: Stable    Rehab Potential (if transferring to Rehab): Good    Recommended Labs or Other Treatments After Discharge: pt ot rn aide    Physician Certification: I certify the above information and transfer of Eulalia Mckeon  is necessary for the continuing treatment of the diagnosis listed and that he requires [facility] for greater 30 days.      Update Admission H&P: No change in H&P    PHYSICIAN SIGNATURE:  Electronically signed by IRINA Lama CNP on 10/23/22 at 9:56 AM EDT

## 2022-10-21 NOTE — PROGRESS NOTES
Hospitalist Progress Note      PCP: Jemma Peres    Date of Admission: 10/20/2022        Subjective:     No acute events reported overnight. No rectal bleeding      Medications:  Reviewed    Infusion Medications    sodium chloride       Scheduled Medications    sevelamer  800 mg Oral TID WC    tamsulosin  0.4 mg Oral QAM    pantoprazole  20 mg Oral Nightly    gabapentin  100 mg Oral TID    calcitRIOL  0.25 mcg Oral Nightly    atorvastatin  10 mg Oral Nightly    atenolol  25 mg Oral QPM    docusate sodium  100 mg Oral BID    sodium chloride flush  5-40 mL IntraVENous 2 times per day    LORazepam  0.5 mg Oral BID    cefTRIAXone (ROCEPHIN) IV  1,000 mg IntraVENous Q24H    metroNIDAZOLE  500 mg IntraVENous Q8H     PRN Meds: melatonin, sodium chloride flush, sodium chloride, ondansetron **OR** ondansetron, acetaminophen **OR** acetaminophen, oxyCODONE      Intake/Output Summary (Last 24 hours) at 10/21/2022 0902  Last data filed at 10/21/2022 0426  Gross per 24 hour   Intake 234 ml   Output --   Net 234 ml       Physical Exam Performed:    BP (!) 141/70   Pulse 61   Temp 97.7 °F (36.5 °C) (Oral)   Resp 16   Ht 6' 5\" (1.956 m)   Wt 186 lb 4.6 oz (84.5 kg)   SpO2 93%   BMI 22.09 kg/m²     General appearance: No apparent distress, appears stated age and cooperative. HEENT: Pupils equal, round, and reactive to light. Conjunctivae/corneas clear. Neck: Supple, with full range of motion. No jugular venous distention. Trachea midline. Respiratory:  Normal respiratory effort. Clear to auscultation, bilaterally without Rales/Wheezes/Rhonchi. Cardiovascular: Regular rate and rhythm with normal S1/S2 without murmurs, rubs or gallops. Abdomen: Soft, non-tender, non-distended with normal bowel sounds. Musculoskeletal: No clubbing, cyanosis or edema bilaterally. Full range of motion without deformity. Skin: Skin color, texture, turgor normal.  No rashes or lesions.   Neurologic:  Neurovascularly intact without any focal sensory/motor deficits. Cranial nerves: II-XII intact, grossly non-focal.  Psychiatric: Alert and oriented, thought content appropriate, normal insight  Capillary Refill: Brisk, 3 seconds, normal   Peripheral Pulses: +2 palpable, equal bilaterally       Labs:   Recent Labs     10/20/22  1033 10/20/22  1829 10/21/22  0035 10/21/22  0441   WBC 4.5  --   --   --    HGB 11.1* 11.9* 10.7* 10.7*   HCT 33.1* 35.2* 31.4* 31.8*     --   --   --      Recent Labs     10/20/22  1033      K 4.5   CL 95*   CO2 33*   BUN 36*   CREATININE 4.0*   CALCIUM 8.9     Recent Labs     10/20/22  1033   AST 16   ALT 21   BILITOT 0.5   ALKPHOS 141*     Recent Labs     10/21/22  0441   INR 1.38*     No results for input(s): Amira Chapmanang in the last 72 hours. Urinalysis:      Lab Results   Component Value Date/Time    NITRU Negative 10/20/2022 10:52 AM    WBCUA 5240 10/20/2022 10:52 AM    BACTERIA Rare 10/20/2022 10:52 AM    RBCUA 262 10/20/2022 10:52 AM    BLOODU LARGE 10/20/2022 10:52 AM    SPECGRAV 1.013 10/20/2022 10:52 AM    GLUCOSEU Negative 10/20/2022 10:52 AM       Radiology:  CTA ABDOMEN PELVIS W WO CONTRAST   Final Result   Mild wall thickening at the sigmoid anastomosis either due to post treatment   change or secondary to distal colitis. No active contrast extravasation   noted. Narrowing at the bowel anastomosis is again seen      Nonspecific bladder wall thickening. Recommend correlation with urinalysis   to exclude cystitis      Splenomegaly                 Assessment/Plan:    -Rectal bleeding associated with anticoagulation. .  Patient had 1 episode of bright red blood per rectum today. .  CT shows mild thickening of the sigmoid anastomosis which is likely due to posttreatment change versus distal colitis    Hx GI bleed -flex sig 2/17 friable surgical anastomosis 18 cm from rectal verge, two oozing areas with hemoclips placed s/p IR embolization of pseudoaneurysm adjacent to anastomosis off of superior rectal artery 2/19 . Raya Lizzeth Continue Rocephin and Flagyl empirically for now. .  GI consulted, monitor H&H. [employer] Eliquis and aspirin until cleared by GI     -History of stage III colon cancer s/p sigmoid colectomy 1/26/22--patient apparently did not receive adjuvant chemotherapy due to poor performance status        -History of DVT/PE 2/22--- holding Eliquis due to GI bleed until cleared by GI     -acute Cystitis. .  Patient with significant pyuria. .  CT abdomen showed nonspecific bladder wall thickening. .. Continue Rocephin, follow-up on culture data     -ESRD--- on hemodialysis Tuesday, Thursday, Saturday--consulted nephrology     -Chronic anemia due to CKD--H&H is stable-continue to monitor        -Acute metabolic encephalopathy--increased confusion from baseline likely due to infection as above--treat UTI and monitor     -Cognitive impairment at baseline--continue supportive care     -Left frozen shoulder--s/p recent steroid injection--follows with orthopedics-on narcotics     -Chronically bedbound with decubitus ulcer--     -Anxiety disorder--on scheduled Xanax     -Peripheral vascular disease--holding antiplatelets due to rectal bleeding until cleared by GI     -Splenomegaly--unchanged from prior imaging           DVT Prophylaxis: SCD  Diet: ADULT DIET;  Clear Liquid  Code Status: Full Code      Dispo -anticipate discharge back to SNF over the weekend if no further episodes of rectal bleeding and if no work-up planned by GI-await GI eval-  Consult palliative care          Armani Shetty MD

## 2022-10-21 NOTE — PROGRESS NOTES
Comprehensive Nutrition Assessment    Type and Reason for Visit:  Initial, Positive Nutrition Screen    Nutrition Recommendations/Plan:   Clear Liquids per provider   Clear Liquid ONS TID, will switch to Nepro when diet advances   Landon BID     Malnutrition Assessment:  Malnutrition Status:  Severe malnutrition (10/21/22 1423)    Context:  Chronic Illness     Findings of the 6 clinical characteristics of malnutrition:  Energy Intake:  75% or less estimated energy requirements for 1 month or longer  Weight Loss:  Greater than 7.5% over 3 months     Body Fat Loss:  Mild body fat loss Orbital, Triceps   Muscle Mass Loss:  Mild muscle mass loss Temples (temporalis)  Fluid Accumulation:  No significant fluid accumulation     Strength:  Not Performed    Nutrition Assessment:    + Screen for MST 3. Pt with ESRD on HD, HTN, colon ca. Admitted with rectal bleeding (plan for observation). Pt also with stage III wound to buttocks and unstageable to ankle. Pt poor historian during visit today and no family at bedside. Did report poor appetite PTA however. Per our records, pt with significant wt loss. Will trial ONS. Nutrition Related Findings:    Reviewed labs Wound Type: Stage III, Unstageable       Current Nutrition Intake & Therapies:    Average Meal Intake: %  Average Supplements Intake: None Ordered  ADULT DIET; Regular; Low Fat (less than or equal to 50 gm/day)    Anthropometric Measures:  Height: 6' 5\" (195.6 cm)  Ideal Body Weight (IBW): 208 lbs (95 kg)       Current Body Weight: 186 lb (84.4 kg), 89.4 % IBW.     Current BMI (kg/m2): 22.1  Usual Body Weight: 210 lb (95.3 kg) (4 months ago)  % Weight Change (Calculated): -11.4                    BMI Categories: Normal Weight (BMI 22.0 to 24.9) age over 72    Estimated Daily Nutrient Needs:  Energy Requirements Based On: Kcal/kg  Weight Used for Energy Requirements: Current  Energy (kcal/day): 4057-0653 kcal (25-30 kcal/kg ABW)  Weight Used for Protein Requirements: Current  Protein (g/day): 102-119 gm (1.2-1.4 gm/kg ABW)  Method Used for Fluid Requirements: 1 ml/kcal  Fluid (ml/day):      Nutrition Diagnosis:   Severe malnutrition related to inadequate protein-energy intake as evidenced by Criteria as identified in malnutrition assessment  Increased nutrient needs related to increase demand for energy/nutrients as evidenced by wounds    Nutrition Interventions:   Food and/or Nutrient Delivery: Continue Current Diet, Start Oral Nutrition Supplement  Nutrition Education/Counseling: No recommendation at this time  Coordination of Nutrition Care: Continue to monitor while inpatient       Goals:     Goals: by next RD assessment, Meet at least 75% of estimated needs       Nutrition Monitoring and Evaluation:   Behavioral-Environmental Outcomes: None Identified  Food/Nutrient Intake Outcomes: Diet Advancement/Tolerance, Food and Nutrient Intake, Supplement Intake  Physical Signs/Symptoms Outcomes: Biochemical Data, GI Status, Nutrition Focused Physical Findings, Skin, Weight    Discharge Planning:    No discharge needs at this time     Tavon Wall, [address] [place] [address],   Contact: [phone]

## 2022-10-21 NOTE — PROGRESS NOTES
Specialty bed ordered but not delivered. Called placed to agility. Bed in transport per at this time per agility. Pt rotated in bed every 2 hours and as needed throughout the shift. Pt on clear liquid diet. Low input noted. Found french fries half eaten from [employer] at bedside. Pt told this RN that daughter brought the fries. Pt educated appropriately at this time.

## 2022-10-21 NOTE — CONSULTS
GASTROENTEROLOGY INPATIENT CONSULTATION        IDENTIFYING DATA/REASON FOR CONSULTATION   PATIENT:  Caty Webb  MRN:  [id number]  ADMIT DATE: 10/20/2022  TIME OF EVALUATION: 10/21/2022 6:50 AM  HOSPITAL STAY:   LOS: 1 day     REASON FOR CONSULTATION:  rectal bleeding    HISTORY OF PRESENT ILLNESS   Caty Webb is a 69 y.o. male with a PMH of colon cancer s/p sigmoid resection 1/26/22 (with post op bleeding s/p Flex Sig 2/17/22 noting friable surgical anastomosis 18 cm from rectal verge followed by IR embolization of pseudoaneurysm adjacent to anastomosis off superior rectal artery 2/19/22), DVT/PE on Eliquis, ESRD on HD, DM, HTN, arthritis and cholecystectomy 5/2022 who presented on 10/20/2022 with rectal bleeding. Pt provides very limited history. He is unable to comment on how much or how long he has been bleeding per rectum. He denies abdominal pain, nausea, vomiting. I spoke with daughter, John Gutierres, via telephone. Pt resides at [facility]. She was told he had 1 episodes of passing blood per rectum yesterday morning. She does not believe he has had any other recent bleeding episodes. He occasionally take pain medication and complaints of constipation. Per nightshift RN pt has not had any further bleeding episodes since admission. CTA a/p showed mild wall thickening at the sigmoid anastomosis either due to post treatment change or secondary to distal colitis, no active contrast extravasation noted. Also noted narrowing at the bowel anastomosis and nonspecific bladder wall thickening. Hgb stable at baseline (11.1->11.9->10.7->10.7)            Prior Endoscopic Evaluations:  ERCP 5/16/22 with Dr. Yasir Perez  1. Choledocolithiasis s/p biliary sphincterotomy and stone extraction with complete clearance documented by completion occlusion cholangiogram.  2.  6mm duodenal polyp in the 3rd portion removed with cold snare polypectomy. Flex Sig 2/17/22 with Dr. Andreas Peters  1.  Friable surgical anastomosis 18 cm from anorectal verge, with visible suture. No bleeding or signs of bleeding on initial evaluation, but with lavage, two areas demonstrated oozing. Two hemoclips were placed to achieve hemostasis. 2. Descending colon diverticulosis  3.  Multiple polyps in the transverse colon, 3-10 mm in size, not resected      PAST MEDICAL, SURGICAL, FAMILY, and SOCIAL HISTORY     Past Medical History:   Diagnosis Date    Anemia 03/2022    Arthritis     CAD (coronary artery disease) 01/2020    Cancer ([facility] [address]     Colon Cancer diagnosed last month    Cerebral infarction [facility])     Cognitive communication deficit     COVID-19     Diabetes mellitus ([facility] [address]     Dysphagia     End stage renal disease ([facility] [address]     Fatigue     Gastrointestinal hemorrhage     Hemodialysis patient ([facility] [address] 2019    ckd-goes to dialysis Tuesday, Thurs, Fri    Hx of blood clots     Hyperlipidemia     Hypertension     Hyponatremia     Risk for falls     Splenomegaly 02/10/2021     Past Surgical History:   Procedure Laterality Date    CARDIAC CATHETERIZATION  2019    CHOLECYSTECTOMY, LAPAROSCOPIC N/A 5/16/2022    LAPAROSCOPIC CHOLECYSTECTOMY WITH CHOLANGIOGRAM performed by Eliceo Pope MD at [address] [place] [address] 01/26/2022    SIGMOID COLECTOMY, SIGMOIDOSCOPY performed by Eliceo Pope MD at [facility] 18 Left 4/29/2022    LEFT BRACHIAL CEPHALIC FISTULA performed by Doug Jones MD at [place] [address]    ERCP N/A 5/16/2022    ERCP SPHINCTER/PAPILLOTOMY performed by Devyn Garcia MD at [address] [place] [address]    ERCP N/A 5/16/2022    ERCP STONE REMOVAL/BALLOON SWEEP performed by Devyn Garcia MD at [facility]  02/19/2022    IR EMBOLIZATION HEMORRHAGE 2/19/2022 [facility]     [facility] Dr Perla     unsure of date    IR TUNNELED CATHETER PLACEMENT GREATER THAN 5 YEARS  2/21/2022    IR TUNNELED CATHETER PLACEMENT GREATER THAN 5 YEARS 2/21/2022 [facility] SIGMOIDOSCOPY N/A 02/17/2022    SIGMOIDOSCOPY CONTROL HEMORRHAGE performed by Marva Guillermo MD at [facility]  [address] Right 02/21/2022    Permacath; RIJ access; 23cm; Dr. Bren Lopez  5/16/2022    ERCP POLYP SNARE performed by Zoltan Wilson MD at [address] History   Problem Relation Age of Onset    High Blood Pressure Father      Social History     Socioeconomic History    Marital status:      Spouse name: None    Number of children: None    Years of education: None    Highest education level: None   Tobacco Use    Smoking status: Former    Smokeless tobacco: Never   Vaping Use    Vaping Use: Never used   Substance and Sexual Activity    Alcohol use: Not Currently    Drug use: Never       MEDICATIONS   SCHEDULED:  sevelamer, 800 mg, TID WC  tamsulosin, 0.4 mg, QAM  pantoprazole, 20 mg, Nightly  gabapentin, 100 mg, TID  calcitRIOL, 0.25 mcg, Nightly  atorvastatin, 10 mg, Nightly  atenolol, 25 mg, QPM  docusate sodium, 100 mg, BID  sodium chloride flush, 5-40 mL, 2 times per day  LORazepam, 0.5 mg, BID  cefTRIAXone (ROCEPHIN) IV, 1,000 mg, Q24H  metroNIDAZOLE, 500 mg, Q8H      FLUIDS/DRIPS:     sodium chloride       PRNs: melatonin, 6 mg, Nightly PRN  sodium chloride flush, 5-40 mL, PRN  sodium chloride, , PRN  ondansetron, 4 mg, Q8H PRN   Or  ondansetron, 4 mg, Q6H PRN  acetaminophen, 650 mg, Q6H PRN   Or  acetaminophen, 650 mg, Q6H PRN  oxyCODONE, 5 mg, Q4H PRN      ALLERGIES:  He   Allergies   Allergen Reactions    Lisinopril Swelling     Allergy listed on paperwork from [address] [place] [address], no reaction noted       REVIEW OF SYSTEMS   Pertinent ROS noted in HPI    PHYSICAL EXAM     Vitals:    10/20/22 1611 10/20/22 1915 10/20/22 2332 10/21/22 0413   BP:  123/63 136/67 131/74   Pulse:  60 61 59   Resp: 20 20 20 18   Temp:  97.8 °F (36.6 °C) 97.9 °F (36.6 °C) 98.4 °F (36.9 °C)   TempSrc:  Oral Oral Oral   SpO2: 95% 97% 92% 91%   Weight:       Height:           No intake/output data recorded. Physical Exam:  General appearance: alert, cooperative, no distress, appears stated age  Eyes: Anicteric  Head: Normocephalic, without obvious abnormality  Lungs: clear to auscultation bilaterally, Normal Effort  Heart: regular rate and rhythm, normal S1 and S2, no murmurs or rubs  Abdomen: soft, non-distended, non-tender. Bowel sounds normal. No masses,  no organomegaly. Extremities: atraumatic, no cyanosis or edema  Skin: warm and dry, no jaundice  Neuro: Grossly intact, A&OX3      LABS AND IMAGING   Laboratory   Recent Labs     10/20/22  1033 10/20/22  1829 10/21/22  0035 10/21/22  0441   WBC 4.5  --   --   --    HGB 11.1* 11.9* 10.7* 10.7*   HCT 33.1* 35.2* 31.4* 31.8*   MCV 93.3  --   --   --      --   --   --      Recent Labs     10/20/22  1033      K 4.5   CL 95*   CO2 33*   BUN 36*   CREATININE 4.0*     Recent Labs     10/20/22  1033   AST 16   ALT 21   BILITOT 0.5   ALKPHOS 141*     No results for input(s): LIPASE, AMYLASE in the last 72 hours. Recent Labs     10/21/22  0441   PROTIME 16.9*   INR 1.38*       Imaging  CTA ABDOMEN PELVIS W WO CONTRAST   Final Result   Mild wall thickening at the sigmoid anastomosis either due to post treatment   change or secondary to distal colitis. No active contrast extravasation   noted. Narrowing at the bowel anastomosis is again seen      Nonspecific bladder wall thickening.   Recommend correlation with urinalysis   to exclude cystitis      Splenomegaly               ASSESSMENT AND RECOMMENDATIONS   69 y.o. male with a PMH of colon cancer s/p sigmoid resection 1/26/22 (with post op bleeding s/p Flex Sig 2/17/22 noting friable surgical anastomosis 18 cm from rectal verge followed by IR embolization of pseudoaneurysm adjacent to anastomosis off superior rectal artery 2/19/22), DVT/PE on Eliquis, ESRD on HD, DM, HTN, arthritis and cholecystectomy 5/2022 who presented on 10/20/2022 with rectal bleeding. CTA a/p showed mild wall thickening at the sigmoid anastomosis either due to post treatment change or secondary to distal colitis, no active contrast extravasation noted. Also noted narrowing at the bowel anastomosis and nonspecific bladder wall thickening. IMPRESSION:  Rectal bleeding x1. Suspect outlet bleeding verse anastomotic ulcer verse less likely colitis. Pt with no associated pain. CTA neg for active bleeding and hgb stable at baseline. Pt does not want to consume a bowel prep and undergo colonoscopy at this time and family is in agreement to observe for now. Acute cystitis  Hx of DVT/PE. Eliquis on hold 2/2 #1  ESRD on HD    RECOMMENDATIONS:    - Continue to monitor h/h and stool output for now   -will order mirlax daily to promote soft, easy passable stools  - Will reserve endoscopy for clinically significant bleeding or hemodynamic instability and drop in H&H. If you have any questions or need any further information, please feel free to contact our consult team.  Thank you for allowing us to participate in the care of Ammy Cullen.    The note was completed using Dragon voice recognition transcription. Every effort was made to ensure accuracy; however, inadvertent transcription errors may be present despite my best efforts to edit errors.       Fred Fung PA-C

## 2022-10-22 LAB
ALBUMIN SERPL-MCNC: 2.9 G/DL (ref 3.4–5)
ANION GAP SERPL CALCULATED.3IONS-SCNC: 13 MMOL/L (ref 3–16)
BUN BLDV-MCNC: 52 MG/DL (ref 7–20)
CALCIUM SERPL-MCNC: 8.5 MG/DL (ref 8.3–10.6)
CHLORIDE BLD-SCNC: 99 MMOL/L (ref 99–110)
CO2: 26 MMOL/L (ref 21–32)
CREAT SERPL-MCNC: 5 MG/DL (ref 0.8–1.3)
GFR SERPL CREATININE-BSD FRML MDRD: 11 ML/MIN/{1.73_M2}
GLUCOSE BLD-MCNC: 97 MG/DL (ref 70–99)
HCT VFR BLD CALC: 32.2 % (ref 40.5–52.5)
HCT VFR BLD CALC: 32.4 % (ref 40.5–52.5)
HCT VFR BLD CALC: 34.4 % (ref 40.5–52.5)
HEMOGLOBIN: 10.6 G/DL (ref 13.5–17.5)
HEMOGLOBIN: 10.7 G/DL (ref 13.5–17.5)
HEMOGLOBIN: 11.5 G/DL (ref 13.5–17.5)
PHOSPHORUS: 4.8 MG/DL (ref 2.5–4.9)
PLATELET # BLD: 140 K/UL (ref 135–450)
POTASSIUM SERPL-SCNC: 4.5 MMOL/L (ref 3.5–5.1)
SODIUM BLD-SCNC: 138 MMOL/L (ref 136–145)

## 2022-10-22 PROCEDURE — 85014 HEMATOCRIT: CPT

## 2022-10-22 PROCEDURE — 85049 AUTOMATED PLATELET COUNT: CPT

## 2022-10-22 PROCEDURE — 6370000000 HC RX 637 (ALT 250 FOR IP): Performed by: NURSE PRACTITIONER

## 2022-10-22 PROCEDURE — 36415 COLL VENOUS BLD VENIPUNCTURE: CPT

## 2022-10-22 PROCEDURE — 2500000003 HC RX 250 WO HCPCS: Performed by: HOSPITALIST

## 2022-10-22 PROCEDURE — 80069 RENAL FUNCTION PANEL: CPT

## 2022-10-22 PROCEDURE — 5A1D70Z PERFORMANCE OF URINARY FILTRATION, INTERMITTENT, LESS THAN 6 HOURS PER DAY: ICD-10-PCS | Performed by: INTERNAL MEDICINE

## 2022-10-22 PROCEDURE — 90935 HEMODIALYSIS ONE EVALUATION: CPT

## 2022-10-22 PROCEDURE — 1200000000 HC SEMI PRIVATE

## 2022-10-22 PROCEDURE — 94760 N-INVAS EAR/PLS OXIMETRY 1: CPT

## 2022-10-22 PROCEDURE — 6370000000 HC RX 637 (ALT 250 FOR IP): Performed by: HOSPITALIST

## 2022-10-22 PROCEDURE — 85018 HEMOGLOBIN: CPT

## 2022-10-22 PROCEDURE — 6360000002 HC RX W HCPCS: Performed by: HOSPITALIST

## 2022-10-22 PROCEDURE — 2580000003 HC RX 258: Performed by: HOSPITALIST

## 2022-10-22 RX ORDER — TROLAMINE SALICYLATE 10 G/100G
CREAM TOPICAL 2 TIMES DAILY PRN
Status: DISCONTINUED | OUTPATIENT
Start: 2022-10-22 | End: 2022-10-23 | Stop reason: HOSPADM

## 2022-10-22 RX ORDER — HEPARIN SODIUM 1000 [USP'U]/ML
3600 INJECTION, SOLUTION INTRAVENOUS; SUBCUTANEOUS PRN
Status: DISCONTINUED | OUTPATIENT
Start: 2022-10-22 | End: 2022-10-23 | Stop reason: HOSPADM

## 2022-10-22 RX ORDER — ASPIRIN 81 MG/1
81 TABLET, CHEWABLE ORAL DAILY
Status: DISCONTINUED | OUTPATIENT
Start: 2022-10-22 | End: 2022-10-23 | Stop reason: HOSPADM

## 2022-10-22 RX ADMIN — DOCUSATE SODIUM 100 MG: 100 CAPSULE, LIQUID FILLED ORAL at 21:18

## 2022-10-22 RX ADMIN — GABAPENTIN 100 MG: 100 CAPSULE ORAL at 14:22

## 2022-10-22 RX ADMIN — METRONIDAZOLE 500 MG: 500 INJECTION, SOLUTION INTRAVENOUS at 12:45

## 2022-10-22 RX ADMIN — SODIUM CHLORIDE: 9 INJECTION, SOLUTION INTRAVENOUS at 12:41

## 2022-10-22 RX ADMIN — Medication 10 ML: at 08:58

## 2022-10-22 RX ADMIN — LORAZEPAM 0.5 MG: 0.5 TABLET ORAL at 08:57

## 2022-10-22 RX ADMIN — APIXABAN 5 MG: 5 TABLET, FILM COATED ORAL at 21:18

## 2022-10-22 RX ADMIN — TAMSULOSIN HYDROCHLORIDE 0.4 MG: 0.4 CAPSULE ORAL at 08:56

## 2022-10-22 RX ADMIN — LORAZEPAM 0.5 MG: 0.5 TABLET ORAL at 21:18

## 2022-10-22 RX ADMIN — GABAPENTIN 100 MG: 100 CAPSULE ORAL at 08:57

## 2022-10-22 RX ADMIN — APIXABAN 5 MG: 5 TABLET, FILM COATED ORAL at 14:22

## 2022-10-22 RX ADMIN — METRONIDAZOLE 500 MG: 500 INJECTION, SOLUTION INTRAVENOUS at 04:51

## 2022-10-22 RX ADMIN — DOCUSATE SODIUM 100 MG: 100 CAPSULE, LIQUID FILLED ORAL at 08:56

## 2022-10-22 RX ADMIN — CEFTRIAXONE 1000 MG: 1 INJECTION, POWDER, FOR SOLUTION INTRAMUSCULAR; INTRAVENOUS at 14:26

## 2022-10-22 RX ADMIN — ACETAMINOPHEN 650 MG: 325 TABLET ORAL at 08:56

## 2022-10-22 RX ADMIN — GABAPENTIN 100 MG: 100 CAPSULE ORAL at 21:18

## 2022-10-22 RX ADMIN — METRONIDAZOLE 500 MG: 500 INJECTION, SOLUTION INTRAVENOUS at 21:18

## 2022-10-22 RX ADMIN — CALCITRIOL 0.25 MCG: 0.25 CAPSULE ORAL at 21:18

## 2022-10-22 RX ADMIN — ATORVASTATIN CALCIUM 10 MG: 10 TABLET, FILM COATED ORAL at 21:18

## 2022-10-22 RX ADMIN — SEVELAMER CARBONATE 800 MG: 800 TABLET, FILM COATED ORAL at 08:56

## 2022-10-22 RX ADMIN — PANTOPRAZOLE SODIUM 20 MG: 20 TABLET, DELAYED RELEASE ORAL at 21:18

## 2022-10-22 RX ADMIN — SEVELAMER CARBONATE 800 MG: 800 TABLET, FILM COATED ORAL at 12:40

## 2022-10-22 RX ADMIN — ASPIRIN 81 MG 81 MG: 81 TABLET ORAL at 14:22

## 2022-10-22 ASSESSMENT — PAIN SCALES - GENERAL
PAINLEVEL_OUTOF10: 0
PAINLEVEL_OUTOF10: 3
PAINLEVEL_OUTOF10: 0

## 2022-10-22 ASSESSMENT — PAIN - FUNCTIONAL ASSESSMENT: PAIN_FUNCTIONAL_ASSESSMENT: PREVENTS OR INTERFERES SOME ACTIVE ACTIVITIES AND ADLS

## 2022-10-22 ASSESSMENT — PAIN DESCRIPTION - ONSET: ONSET: ON-GOING

## 2022-10-22 ASSESSMENT — PAIN DESCRIPTION - LOCATION: LOCATION: KNEE

## 2022-10-22 ASSESSMENT — PAIN DESCRIPTION - PAIN TYPE: TYPE: ACUTE PAIN

## 2022-10-22 ASSESSMENT — PAIN DESCRIPTION - FREQUENCY: FREQUENCY: CONTINUOUS

## 2022-10-22 ASSESSMENT — PAIN DESCRIPTION - DESCRIPTORS: DESCRIPTORS: DISCOMFORT

## 2022-10-22 ASSESSMENT — PAIN DESCRIPTION - ORIENTATION: ORIENTATION: RIGHT;LEFT

## 2022-10-22 NOTE — PROGRESS NOTES
Patient on schedule to receive dialysis today. Dialysis RN unable to give time. RN coming from [facility].

## 2022-10-22 NOTE — PLAN OF CARE
Problem: Discharge Planning  Goal: Discharge to home or other facility with appropriate resources  Outcome: Progressing  Flowsheets  Taken 10/22/2022 0824 by Rosa Esposito RN  Discharge to home or other facility with appropriate resources:   Identify barriers to discharge with patient and caregiver   Identify discharge learning needs (meds, wound care, etc)   Arrange for needed discharge resources and transportation as appropriate  Taken 10/21/2022 2149 by Yung Van RN  Discharge to home or other facility with appropriate resources:   Identify barriers to discharge with patient and caregiver   Arrange for needed discharge resources and transportation as appropriate   Identify discharge learning needs (meds, wound care, etc)     Problem: Safety - Adult  Goal: Free from fall injury  10/22/2022 0824 by Rosa Esposito RN  Outcome: Progressing  Flowsheets (Taken 10/22/2022 0824)  Free From Fall Injury: Instruct family/caregiver on patient safety     Problem: Skin/Tissue Integrity  Goal: Absence of new skin breakdown  Description: 1. Monitor for areas of redness and/or skin breakdown  2. Assess vascular access sites hourly  3. Every 4-6 hours minimum:  Change oxygen saturation probe site  4. Every 4-6 hours:  If on nasal continuous positive airway pressure, respiratory therapy assess nares and determine need for appliance change or resting period.   Outcome: Progressing     Problem: Chronic Conditions and Co-morbidities  Goal: Patient's chronic conditions and co-morbidity symptoms are monitored and maintained or improved  Outcome: Progressing  Flowsheets  Taken 10/22/2022 0824 by Rosa Esposito RN  Care Plan - Patient's Chronic Conditions and Co-Morbidity Symptoms are Monitored and Maintained or Improved:   Collaborate with multidisciplinary team to address chronic and comorbid conditions and prevent exacerbation or deterioration   Monitor and assess patient's chronic conditions and comorbid symptoms for stability, deterioration, or improvement  Taken 10/21/2022 2149 by Ajit Mcdonald RN  Care Plan - Patient's Chronic Conditions and Co-Morbidity Symptoms are Monitored and Maintained or Improved: Monitor and assess patient's chronic conditions and comorbid symptoms for stability, deterioration, or improvement     Problem: Nutrition Deficit:  Goal: Optimize nutritional status  Outcome: Progressing  Flowsheets (Taken 10/22/2022 0824)  Nutrient intake appropriate for improving, restoring, or maintaining nutritional needs:   Assess nutritional status and recommend course of action   Recommend appropriate diets, oral nutritional supplements, and vitamin/mineral supplements   Monitor oral intake, labs, and treatment plans

## 2022-10-22 NOTE — PROGRESS NOTES
Patient to dialysis via bed. Specialty bed placed in room for patient to be transferred to when returning to room.

## 2022-10-22 NOTE — PROGRESS NOTES
Pt voided twice overnight, pt remains incontinent, no blood noticed per rectum or urine. Pt got confused overnight to time and situation, but he was re directable.

## 2022-10-22 NOTE — PROGRESS NOTES
Treatment time: 3 hours  Net UF: 400 ml     Pre weight: 85.0 kg  Post weight:85.0 kg  EDW: 86.5 kg    Access used: RTDC    Access function: well with  ml/min     Medications or blood products given: heparin dwells     Regular outpatient schedule: TTS     Summary of response to treatment: Patient tolerated treatment well and without any complications. Patient remained stable throughout entire treatment     10/22/22 1740 10/22/22 2045   Treatment   Time On 1742  --    Time Off  --  2042   Treatment Goal 500  --    Vital Signs   BP (!) 144/70 99/73   Temp 97.8 °F (36.6 °C) 98 °F (36.7 °C)   Heart Rate 71 73   Resp 18 18   Weight 187 lb 6.3 oz (85 kg) 187 lb 6.3 oz (85 kg)   Technical Checks   Machine Alarm Self Test Completed; Passed  --    Dialysis Bath   K+ (Potassium) 2  --    Ca+ (Calcium) 2.5  --    Na+ (Sodium) 137  --    HCO3 (Bicarb) 35  --

## 2022-10-22 NOTE — PROGRESS NOTES
Patient alert and oriented but forgetful at times. Tylenol given for bilateral knee pain. Set up to eat breakfast. Morning medications given without difficulty. VSS. Assessment completed. Bed alarm on. Call light and belongings within reach. Will continue to monitor.      Electronically signed by Navjot Lozada RN on 10/22/2022 at 1:38 PM

## 2022-10-22 NOTE — PROGRESS NOTES
Hospitalist Progress Note      PCP: Adonis Mcdonnell    Date of Admission: 10/20/2022        Subjective:     No acute events reported overnight. No rectal bleeding. Complained of right knee pain this morning. Would like to try Tylenol and Aspercreme. States he feels better. Going for dialysis today. If platelets are stable, will resume Eliquis and aspirin. Denies chest pain shortness breath palpitation abdominal pain headache. Reviewed plan of care, night further needs or questions. No family at bedside. Assessment/Plan:    -Rectal bleeding associated with anticoagulation. .  Patient had 1 episode of bright red blood per rectum prior to admission. .  CT shows mild thickening of the sigmoid anastomosis which is likely due to posttreatment change versus distal colitis    Hx GI bleed -flex sig 2/17 friable surgical anastomosis 18 cm from rectal verge, two oozing areas with hemoclips placed s/p IR embolization of pseudoaneurysm adjacent to anastomosis off of superior rectal artery 2/19 . Lolyri Dyayad Continue Rocephin and Flagyl empirically for now. .  GI consulted, monitor H&H. .  -Patient refused colonoscopy. GI said to monitor H&H and signed off.  -H&H is stable, will check platelets. Platelets within normal limits we will resume Eliquis and aspirin today, monitor overnight and recheck labs in a.m.  -Hemoglobin 10.7, iron 84     -History of stage III colon cancer s/p sigmoid colectomy 1/26/22--patient apparently did not receive adjuvant chemotherapy due to poor performance status        -History of DVT/PE 2/22--likely resume Eliquis and aspirin today. Check platelet count.     -acute Cystitis. .  Patient with significant pyuria. .  CT abdomen showed nonspecific bladder wall thickening. .. Urine culture had less than 10,000 colonies of mixed nakia. Has been on IV Rocephin, will continue for 3 doses total, given patient's symptoms and his mentation improved with treatment.      -ESRD--- on hemodialysis Tuesday, Thursday, Saturday--consulted nephrology     -Chronic anemia due to CKD--H&H is stable-continue to monitor        -Acute metabolic encephalopathy--increased confusion from baseline likely due to infection as above--treat UTI and monitor. Appears to be back at baseline.     -Cognitive impairment at baseline--continue supportive care. Appears to have sundowning, alert and oriented this morning.     -Left frozen shoulder--s/p recent steroid injection--follows with orthopedics-on narcotics     -Chronically bedbound with decubitus ulcer--specialty bed on order     -Anxiety disorder--on scheduled Xanax     -Peripheral vascular disease--holding antiplatelets due to rectal bleeding until cleared by GI     -Splenomegaly--unchanged from prior imaging         Medications:  Reviewed    Infusion Medications    sodium chloride       Scheduled Medications    polyethylene glycol  17 g Oral Daily    sevelamer  800 mg Oral TID WC    tamsulosin  0.4 mg Oral QAM    pantoprazole  20 mg Oral Nightly    gabapentin  100 mg Oral TID    calcitRIOL  0.25 mcg Oral Nightly    atorvastatin  10 mg Oral Nightly    atenolol  25 mg Oral QPM    docusate sodium  100 mg Oral BID    sodium chloride flush  5-40 mL IntraVENous 2 times per day    LORazepam  0.5 mg Oral BID    cefTRIAXone (ROCEPHIN) IV  1,000 mg IntraVENous Q24H    metroNIDAZOLE  500 mg IntraVENous Q8H     PRN Meds: melatonin, sodium chloride flush, sodium chloride, ondansetron **OR** ondansetron, acetaminophen **OR** acetaminophen, oxyCODONE      Intake/Output Summary (Last 24 hours) at 10/22/2022 0850  Last data filed at 10/21/2022 1924  Gross per 24 hour   Intake 340 ml   Output --   Net 340 ml       Physical Exam Performed:    /70   Pulse 62   Temp 97.8 °F (36.6 °C) (Oral)   Resp 16   Ht 6' 5\" (1.956 m)   Wt 188 lb 15 oz (85.7 kg)   SpO2 96%   BMI 22.40 kg/m²     General appearance: No apparent distress, appears stated age and cooperative.   HEENT: Pupils equal, round, and reactive to light. Conjunctivae/corneas clear. Neck: Supple, with full range of motion. No jugular venous distention. Trachea midline. Respiratory:  Normal respiratory effort. Clear to auscultation, bilaterally without Rales/Wheezes/Rhonchi. Cardiovascular: Regular rate and rhythm with normal S1/S2 without murmurs, rubs or gallops. Abdomen: Soft, non-tender, non-distended with normal bowel sounds. Musculoskeletal: No clubbing, cyanosis or edema bilaterally. Full range of motion without deformity. Skin: Skin color, texture, turgor normal.  No rashes or lesions. Neurologic:  Neurovascularly intact without any focal sensory/motor deficits. Cranial nerves: II-XII intact, grossly non-focal.  Psychiatric: Alert and oriented, thought content appropriate, normal insight  Capillary Refill: Brisk, 3 seconds, normal   Peripheral Pulses: +2 palpable, equal bilaterally       Labs:   Recent Labs     10/20/22  1033 10/20/22  1829 10/21/22  1253 10/21/22  1858 10/22/22  0418   WBC 4.5  --   --   --   --    HGB 11.1*   < > 11.5* 11.0* 10.7*  10.6*   HCT 33.1*   < > 35.2* 34.1* 32.4*  32.2*     --   --   --  140    < > = values in this interval not displayed. Recent Labs     10/20/22  1033 10/22/22  0418    138   K 4.5 4.5   CL 95* 99   CO2 33* 26   BUN 36* 52*   CREATININE 4.0* 5.0*   CALCIUM 8.9 8.5   PHOS  --  4.8     Recent Labs     10/20/22  1033   AST 16   ALT 21   BILITOT 0.5   ALKPHOS 141*     Recent Labs     10/21/22  0441   INR 1.38*     No results for input(s): Shana Baldwin in the last 72 hours.     Urinalysis:      Lab Results   Component Value Date/Time    NITRU Negative 10/20/2022 10:52 AM    WBCUA 5240 10/20/2022 10:52 AM    BACTERIA Rare 10/20/2022 10:52 AM    RBCUA 262 10/20/2022 10:52 AM    BLOODU LARGE 10/20/2022 10:52 AM    SPECGRAV 1.013 10/20/2022 10:52 AM    GLUCOSEU Negative 10/20/2022 10:52 AM       Radiology:  CTA ABDOMEN PELVIS W WO CONTRAST   Final Result   Mild wall thickening at the sigmoid anastomosis either due to post treatment   change or secondary to distal colitis. No active contrast extravasation   noted. Narrowing at the bowel anastomosis is again seen      Nonspecific bladder wall thickening. Recommend correlation with urinalysis   to exclude cystitis      Splenomegaly                       DVT Prophylaxis: SCD  Diet: ADULT DIET; Regular; Low Fat (less than or equal to 50 gm/day)  ADULT ORAL NUTRITION SUPPLEMENT; Breakfast, Dinner; Renal Oral Supplement  ADULT ORAL NUTRITION SUPPLEMENT; Breakfast, Lunch;  Wound Healing Oral Supplement  Code Status: Full Code      Dispo -anticipate discharge back to SNF over the weekend if no further episodes of rectal bleeding and if no work-up planned by GI-await GI eval-  Consult palliative care          IRINA Madison - CNP

## 2022-10-22 NOTE — PROGRESS NOTES
[facility]. Bed should be delivered within the next hour per [employer] rep.

## 2022-10-23 VITALS
TEMPERATURE: 97.6 F | OXYGEN SATURATION: 98 % | WEIGHT: 189.6 LBS | RESPIRATION RATE: 17 BRPM | DIASTOLIC BLOOD PRESSURE: 68 MMHG | SYSTOLIC BLOOD PRESSURE: 132 MMHG | BODY MASS INDEX: 22.39 KG/M2 | HEIGHT: 77 IN | HEART RATE: 68 BPM

## 2022-10-23 LAB
ANION GAP SERPL CALCULATED.3IONS-SCNC: 13 MMOL/L (ref 3–16)
BASOPHILS ABSOLUTE: 0 K/UL (ref 0–0.2)
BASOPHILS RELATIVE PERCENT: 0.5 %
BUN BLDV-MCNC: 23 MG/DL (ref 7–20)
CALCIUM SERPL-MCNC: 8.5 MG/DL (ref 8.3–10.6)
CHLORIDE BLD-SCNC: 99 MMOL/L (ref 99–110)
CO2: 27 MMOL/L (ref 21–32)
CREAT SERPL-MCNC: 3.2 MG/DL (ref 0.8–1.3)
EOSINOPHILS ABSOLUTE: 0.1 K/UL (ref 0–0.6)
EOSINOPHILS RELATIVE PERCENT: 3.6 %
GFR SERPL CREATININE-BSD FRML MDRD: 19 ML/MIN/{1.73_M2}
GLUCOSE BLD-MCNC: 104 MG/DL (ref 70–99)
HCT VFR BLD CALC: 33.9 % (ref 40.5–52.5)
HEMOGLOBIN: 11.6 G/DL (ref 13.5–17.5)
LYMPHOCYTES ABSOLUTE: 1.4 K/UL (ref 1–5.1)
LYMPHOCYTES RELATIVE PERCENT: 32.9 %
MCH RBC QN AUTO: 31.6 PG (ref 26–34)
MCHC RBC AUTO-ENTMCNC: 34.3 G/DL (ref 31–36)
MCV RBC AUTO: 92.1 FL (ref 80–100)
MONOCYTES ABSOLUTE: 0.2 K/UL (ref 0–1.3)
MONOCYTES RELATIVE PERCENT: 5.6 %
NEUTROPHILS ABSOLUTE: 2.4 K/UL (ref 1.7–7.7)
NEUTROPHILS RELATIVE PERCENT: 57.4 %
PDW BLD-RTO: 16.3 % (ref 12.4–15.4)
PLATELET # BLD: 141 K/UL (ref 135–450)
PMV BLD AUTO: 7.9 FL (ref 5–10.5)
POTASSIUM REFLEX MAGNESIUM: 3.7 MMOL/L (ref 3.5–5.1)
RBC # BLD: 3.67 M/UL (ref 4.2–5.9)
SARS-COV-2, NAAT: NOT DETECTED
SODIUM BLD-SCNC: 139 MMOL/L (ref 136–145)
WBC # BLD: 4.1 K/UL (ref 4–11)

## 2022-10-23 PROCEDURE — 85025 COMPLETE CBC W/AUTO DIFF WBC: CPT

## 2022-10-23 PROCEDURE — 2580000003 HC RX 258: Performed by: HOSPITALIST

## 2022-10-23 PROCEDURE — 2500000003 HC RX 250 WO HCPCS: Performed by: HOSPITALIST

## 2022-10-23 PROCEDURE — 80048 BASIC METABOLIC PNL TOTAL CA: CPT

## 2022-10-23 PROCEDURE — 87635 SARS-COV-2 COVID-19 AMP PRB: CPT

## 2022-10-23 PROCEDURE — 6370000000 HC RX 637 (ALT 250 FOR IP): Performed by: PHYSICIAN ASSISTANT

## 2022-10-23 PROCEDURE — 6370000000 HC RX 637 (ALT 250 FOR IP): Performed by: NURSE PRACTITIONER

## 2022-10-23 PROCEDURE — 94760 N-INVAS EAR/PLS OXIMETRY 1: CPT

## 2022-10-23 PROCEDURE — 36415 COLL VENOUS BLD VENIPUNCTURE: CPT

## 2022-10-23 PROCEDURE — 6370000000 HC RX 637 (ALT 250 FOR IP): Performed by: HOSPITALIST

## 2022-10-23 RX ORDER — CIPROFLOXACIN 500 MG/1
250 TABLET, FILM COATED ORAL EVERY 12 HOURS SCHEDULED
Status: DISCONTINUED | OUTPATIENT
Start: 2022-10-23 | End: 2022-10-23 | Stop reason: HOSPADM

## 2022-10-23 RX ORDER — METRONIDAZOLE 500 MG/1
500 TABLET ORAL EVERY 8 HOURS SCHEDULED
Qty: 6 TABLET | Refills: 0
Start: 2022-10-23 | End: 2022-10-25

## 2022-10-23 RX ORDER — METRONIDAZOLE 500 MG/1
500 TABLET ORAL EVERY 8 HOURS SCHEDULED
Status: DISCONTINUED | OUTPATIENT
Start: 2022-10-23 | End: 2022-10-23 | Stop reason: HOSPADM

## 2022-10-23 RX ORDER — CIPROFLOXACIN 250 MG/1
250 TABLET, FILM COATED ORAL EVERY 12 HOURS SCHEDULED
Qty: 3 TABLET | Refills: 0
Start: 2022-10-23 | End: 2022-10-25

## 2022-10-23 RX ORDER — OXYCODONE HYDROCHLORIDE 5 MG/1
5 TABLET ORAL EVERY 6 HOURS PRN
Qty: 4 TABLET | Refills: 0 | Status: SHIPPED | OUTPATIENT
Start: 2022-10-23 | End: 2022-10-24

## 2022-10-23 RX ADMIN — CIPROFLOXACIN 250 MG: 500 TABLET, FILM COATED ORAL at 09:46

## 2022-10-23 RX ADMIN — SEVELAMER CARBONATE 800 MG: 800 TABLET, FILM COATED ORAL at 09:46

## 2022-10-23 RX ADMIN — Medication 10 ML: at 09:47

## 2022-10-23 RX ADMIN — DOCUSATE SODIUM 100 MG: 100 CAPSULE, LIQUID FILLED ORAL at 09:46

## 2022-10-23 RX ADMIN — APIXABAN 5 MG: 5 TABLET, FILM COATED ORAL at 09:46

## 2022-10-23 RX ADMIN — LORAZEPAM 0.5 MG: 0.5 TABLET ORAL at 09:46

## 2022-10-23 RX ADMIN — ASPIRIN 81 MG 81 MG: 81 TABLET ORAL at 09:46

## 2022-10-23 RX ADMIN — GABAPENTIN 100 MG: 100 CAPSULE ORAL at 09:46

## 2022-10-23 RX ADMIN — METRONIDAZOLE 500 MG: 500 TABLET ORAL at 09:45

## 2022-10-23 RX ADMIN — TAMSULOSIN HYDROCHLORIDE 0.4 MG: 0.4 CAPSULE ORAL at 09:46

## 2022-10-23 RX ADMIN — POLYETHYLENE GLYCOL 3350 17 G: 17 POWDER, FOR SOLUTION ORAL at 09:46

## 2022-10-23 RX ADMIN — METRONIDAZOLE 500 MG: 500 INJECTION, SOLUTION INTRAVENOUS at 03:43

## 2022-10-23 ASSESSMENT — PAIN SCALES - GENERAL
PAINLEVEL_OUTOF10: 0
PAINLEVEL_OUTOF10: 0

## 2022-10-23 NOTE — PLAN OF CARE
Problem: Discharge Planning  Goal: Discharge to home or other facility with appropriate resources  Outcome: Progressing  Flowsheets (Taken 10/23/2022 1100)  Discharge to home or other facility with appropriate resources:   Arrange for needed discharge resources and transportation as appropriate   Identify barriers to discharge with patient and caregiver     Problem: Safety - Adult  Goal: Free from fall injury  Outcome: Progressing  Flowsheets (Taken 10/23/2022 1100)  Free From Fall Injury: Instruct family/caregiver on patient safety     Problem: Skin/Tissue Integrity  Goal: Absence of new skin breakdown  Description: 1. Monitor for areas of redness and/or skin breakdown  2. Assess vascular access sites hourly  3. Every 4-6 hours minimum:  Change oxygen saturation probe site  4. Every 4-6 hours:  If on nasal continuous positive airway pressure, respiratory therapy assess nares and determine need for appliance change or resting period.   Outcome: Progressing     Problem: Chronic Conditions and Co-morbidities  Goal: Patient's chronic conditions and co-morbidity symptoms are monitored and maintained or improved  Outcome: Progressing     Problem: Pain  Goal: Verbalizes/displays adequate comfort level or baseline comfort level  Outcome: Progressing  Flowsheets (Taken 10/23/2022 1100)  Verbalizes/displays adequate comfort level or baseline comfort level:   Assess pain using appropriate pain scale   Encourage patient to monitor pain and request assistance   Administer analgesics based on type and severity of pain and evaluate response     Problem: ABCDS Injury Assessment  Goal: Absence of physical injury  Outcome: Progressing

## 2022-10-23 NOTE — PROGRESS NOTES
Report called to [place], spoke with Han. All questions answered at this time. Aware of pt pickup time. COVID negative.  Electronically signed by Brady Prado RN on 10/23/2022 at 11:07 AM

## 2022-10-23 NOTE — PROGRESS NOTES
Pt A&Ox4, forgetful at times. Pt resting in bed. IV normal saline locked. Pt tolerating regular, low fat diet and PO medications whole with thin liquids. No tele, room air. No c/o pain. Pt incontinent. Vascath to right chest. Left arm fistula. Pt on a specialty bed. All needs met at this time. Call light within reach. Fall precautions in place.  Electronically signed by Long Quiles RN on 10/23/2022 at 11:00 AM

## 2022-10-23 NOTE — DISCHARGE SUMMARY
Hospital Medicine Discharge Summary    Patient ID: Sara Vincent      Patient's PCP: Kp Mora Date: 10/20/2022     Discharge Date: 10/23/2022      Admitting Physician: Dat Lozano MD     Discharge Physician: IRINA Garrett - CNP     Discharge Diagnoses  Rectal bleeding  History of stage III colon cancer status post sigmoid colectomy  Possible colitis  UTI  History of DVT/PE  End-stage renal disease  Chronic anemia due to CKD  Acute metabolic encephalopathy  Cognitive impairment at baseline  History of left frozen shoulder, chronically bedbound, anxiety, PVD, splenomegaly      Hospital Course: 69 y.o. male with ESRD on hemodialysis, hypertension, stage III , PE/DVT  2/22 on Eliquis ,sigmoid colon cancer s/p colectomy 6/91/46, complicated by GI bleed -flex sig 2/17 friable surgical anastomosis 18 cm from rectal verge, two oozing areas with hemoclips placed s/p IR embolization of pseudoaneurysm adjacent to anastomosis off of superior rectal artery 2/19 . Dimitris Garcia He was subsequently admitted in May/22 with acute cholecystitis s/p cholecystectomy  He presented to emergency room from the [facility] nursing facility with rectal bleeding x 1 episode. .    Patient is a poor historian baseline cognitive deficit and increased confusion from baseline per daughter. History was obtained from chart review and daughter at bedside     Patient was scheduled to have his hemodialysis this morning. .  Prior to transportation he had a bowel movement that had bright red blood in it. .  He had apparently not had a bowel movement in 2 days prior and was given laxatives. He denied any abdominal pain, vomiting or hematemesis. No fevers or chills. .. In the emergency room, BP was 134/87, pulse 67, respirations 12, temperature 98.9, oxygen saturation 94% on room air  FOBT was positive. .  Hemoglobin was 11.11, at baseline  Urinalysis showed significant pyuria, microscopic hematuria    Rectal bleeding associated with anticoagulation. .  Patient had 1 episode of bright red blood per rectum prior to admission. .  CT shows mild thickening of the sigmoid anastomosis which is likely due to posttreatment change versus distal colitis    Hx GI bleed -flex sig 2/17 friable surgical anastomosis 18 cm from rectal verge, two oozing areas with hemoclips placed s/p IR embolization of pseudoaneurysm adjacent to anastomosis off of superior rectal artery 2/19 . .  -Treated with Rocephin and Flagyl empirically for possible colitis. Discharged on Flagyl and Cipro to complete 5-day course. GI consulted, monitor H&H. .  -Patient refused colonoscopy. GI said to monitor H&H and signed off.   -H&H is stable,   -Patient was resumed on Eliquis and aspirin. H&H remained stable. No further bleeding.     -History of stage III colon cancer s/p sigmoid colectomy 1/26/22--patient apparently did not receive adjuvant chemotherapy due to poor performance status        -History of DVT/PE 2/22-continue Eliquis    -acute Cystitis. .  Patient with significant pyuria. .  CT abdomen showed nonspecific bladder wall thickening. .. Urine culture had less than 10,000 colonies of mixed nakia. Has been on IV Rocephin, needed for 3 doses total, given patient's symptoms and his mentation improved with treatment. -ESRD--- on hemodialysis Tuesday, Thursday, Saturday--consulted nephrology. Right chest tunneled dialysis catheter left in place.     -Chronic anemia due to CKD--H&H is stable-continue to monitor        -Acute metabolic encephalopathy--increased confusion from baseline likely due to infection as above--treat UTI and monitor. Appears to be back at baseline.     -Cognitive impairment at baseline--continue supportive care.   Appears to have sundowning, alert and oriented this morning.     -Left frozen shoulder--s/p recent steroid injection--follows with orthopedics-on narcotics     -Chronically bedbound with decubitus ulcer--specialty bed on order     -Anxiety disorder--on scheduled Xanax     -Peripheral vascular disease--resumed aspirin    -Splenomegaly--unchanged from prior imaging      Patient stated they felt improved and agreed to go back to [facility]. Patient denied chest pain, shortness of breath, palpitations, abdominal pain, nausea vomiting, diarrhea, dysuria, headache lightheadedness or dizziness. Appetite good. Voiding without difficulty. Reviewed plan of care with patient, verbalized understanding and agreement. Denied further questions or needs. Physical Exam Performed:     /68   Pulse 68   Temp 97.6 °F (36.4 °C) (Oral)   Resp 17   Ht 6' 5\" (1.956 m)   Wt 189 lb 9.5 oz (86 kg)   SpO2 98%   BMI 22.48 kg/m²       General appearance:  No apparent distress, appears stated age and cooperative. HEENT:  Normal cephalic, atraumatic without obvious deformity. Pupils equal, round, and reactive to light. Extra ocular muscles intact. Conjunctivae/corneas clear. Neck: Supple, with full range of motion. No jugular venous distention. Trachea midline. Respiratory:  Normal respiratory effort. Clear to auscultation, bilaterally without Rales/Wheezes/Rhonchi. Cardiovascular:  Regular rate and rhythm with normal S1/S2 without murmurs, rubs or gallops. Abdomen: Soft, non-tender, non-distended with normal bowel sounds. Musculoskeletal:  No clubbing, cyanosis or edema bilaterally. Full range of motion without deformity. Skin: Skin color, texture, turgor normal.  No rashes or lesions. Neurologic:  Neurovascularly intact without any focal sensory/motor deficits. Cranial nerves: II-XII intact, grossly non-focal.  Psychiatric:  Alert and oriented, thought content appropriate, normal insight  Capillary Refill: Brisk,< 3 seconds   Peripheral Pulses: +2 palpable, equal bilaterally       Labs:  For convenience and continuity at follow-up the following most recent labs are provided:      CBC:    Lab Results   Component Value Date/Time    WBC 4.1 10/23/2022 05:02 AM HGB 11.6 10/23/2022 05:02 AM    HCT 33.9 10/23/2022 05:02 AM     10/23/2022 05:02 AM       Renal:    Lab Results   Component Value Date/Time     10/23/2022 05:02 AM    K 3.7 10/23/2022 05:02 AM    CL 99 10/23/2022 05:02 AM    CO2 27 10/23/2022 05:02 AM    BUN 23 10/23/2022 05:02 AM    CREATININE 3.2 10/23/2022 05:02 AM    CALCIUM 8.5 10/23/2022 05:02 AM    PHOS 4.8 10/22/2022 04:18 AM         Significant Diagnostic Studies    Radiology:   CTA ABDOMEN PELVIS W WO CONTRAST   Final Result   Mild wall thickening at the sigmoid anastomosis either due to post treatment   change or secondary to distal colitis. No active contrast extravasation   noted. Narrowing at the bowel anastomosis is again seen      Nonspecific bladder wall thickening. Recommend correlation with urinalysis   to exclude cystitis      Splenomegaly                Consults:     IP CONSULT TO HOSPITALIST  IP CONSULT TO NEPHROLOGY  IP CONSULT TO GI  IP CONSULT TO CASE MANAGEMENT  IP CONSULT TO PALLIATIVE CARE    Disposition: ECF    Condition at Discharge: Stable    Discharge Instructions/Follow-up:      Follow up with pcp in 1 week    Code Status:  Prior     Activity: activity as tolerated    Diet: regular diet      Discharge Medications:     Discharge Medication List as of 10/23/2022 11:08 AM             Details   ciprofloxacin (CIPRO) 250 MG tablet Take 1 tablet by mouth every 12 hours for 3 doses, Disp-3 tablet, R-0NO PRINT      metroNIDAZOLE (FLAGYL) 500 MG tablet Take 1 tablet by mouth every 8 hours for 2 days, Disp-6 tablet, R-0NO PRINT                Details   oxyCODONE (ROXICODONE) 5 MG immediate release tablet Take 1 tablet by mouth every 6 hours as needed for Pain for up to 1 day., Disp-4 tablet, R-0Print                Details   LORazepam (ATIVAN) 0.5 MG tablet Take 0.5 mg by mouth in the morning and at bedtime. Historical Med      Wound Dressings (MEDIHONEY WOUND/BURN DRESSING) GEL gel Apply 1 each topically daily Apply to RIGHT ankle wound daily, Disp-44 mL, R-2Normal      docusate sodium (COLACE) 100 MG capsule Take 100 mg by mouth 2 times dailyHistorical Med      polyethylene glycol (GLYCOLAX) 17 GM/SCOOP powder Take 17 g by mouth daily as needed (Constipation) Historical Med      apixaban (ELIQUIS) 5 MG TABS tablet Take 1 tablet by mouth 2 times daily, Disp-60 tablet, R-0Normal      aspirin 81 MG chewable tablet Take 1 tablet by mouth daily, Disp-30 tablet, R-0NO PRINT      melatonin 3 MG TABS tablet Take 2 tablets by mouth nightly as needed (sleep), Disp-6 tablet, R-0Print      tamsulosin (FLOMAX) 0.4 MG capsule Take 0.4 mg by mouth every morningHistorical Med      gabapentin (NEURONTIN) 100 MG capsule Take 100 mg by mouth 3 times daily. Historical Med      sevelamer (RENVELA) 800 MG tablet Take 800 mg by mouth 3 times daily (with meals) Historical Med      atenolol (TENORMIN) 25 MG tablet Take 25 mg by mouth every eveningHistorical Med      pantoprazole (PROTONIX) 20 MG tablet Take 20 mg by mouth nightlyHistorical Med      atorvastatin (LIPITOR) 10 MG tablet Take 10 mg by mouth nightlyHistorical Med      calcitRIOL (ROCALTROL) 0.25 MCG capsule Take 0.25 mcg by mouth every eveningHistorical Med             Time Spent on discharge is more than 30 minutes in the examination, evaluation, counseling and review of medications and discharge plan. Signed: IRINA Baeza - CNP   10/23/2022    The patient was seen and examined on day of discharge and this discharge summary is in conjunction with any daily progress note from day of discharge. Thank you Aracelis Dwyer for the opportunity to be involved in this patient's care. If you have any questions or concerns please feel free to contact me at [phone]. NOTE:  This report was transcribed using voice recognition software. Every effort was made to ensure accuracy; however, inadvertent computerized transcription errors may be present.

## 2022-10-23 NOTE — CARE COORDINATION
DISCHARGE SUMMARY     DATE OF DISCHARGE: 10/23/2022    DISCHARGE DESTINATION: return to nursing facility       FACILITY:   Discharging to Facility/ Agency   Name: Genna Wallace  Address:  [address], [address] [place] [address]   Phone:  [phone]  Fax:  [phone]    INSURANCE [address] [place] [address] Avenue: n/a    FRED/JULIEN COMPLETED: n/a    COVID RESULT: pending      TRANSPORTATION:     Company Name:  [address] [place] [address] up Time: 12 noon    Phone Number: [phone]      COMMENTS: Informed patient's daughter, Flaco Ballard, of discharge and transport time. Left message for Uriel Vaca at [facility] regarding patient's discharge/transport time. Informed staff at [facility] regarding transport time. Left message for Miles Duvall regarding patient's discharged and faxed patient's HD run and last nephrology note to [phone]. Rn aware Report number [phone].     Electronically signed by HOLLI Buck, MAINE, Case Management on 10/23/2022 at 10:47 AM  [address] [facility] [phone]

## 2022-10-23 NOTE — PROGRESS NOTES
[address] [place] [address] Nephrology   [facility] [place]  [phone]  Nephrology Consult Note          Patient ID: Cyn Prabhakar  Referring/ Physician: Rafaela Ramos MD      HPI/Summary:   Cyn Prabhakar is being seen by nephrology for ESRD. This is a 67-year-old male with past medical history significant for hypertension ESRD colon cancer who was in the hospital with rectal bleeding. He had been straining to have a bowel movement. He missed dialysis on Thursday. He denies chest pain or shortness of breath no nausea no vomiting or diarrhea. No abdominal pain. He is on anticoagulation, on empiric antibiotics. His Eliquis and aspirin were held. Interval history:  The patient was seen and examined on hemodialysis treatment  The patient was awake and conversant  The patient looked pale and frail  The patient though was under no acute distress  The patient has no new complaints  The patient's most recent set of vital signs showed temperature 98, heart rate 74 and blood pressure 100/72    Lab work from this morning showed sodium 138, potassium 4.5, bicarb 26, BUN 52, creatinine of 5        Plan:   Hemodialysis is in progress  The patient's weight is below his dry weight, he is predialysis weight was 85, the patient's dry weight was 86.5  The patient no peripheral edema  So we will run him even today  The patient blood pressure was 100/72    Potassium 4.5, bicarb 26, BUN 52, creatinine 5 and a hemoglobin 11.5      ESRD  TTS at [facility]  Has a tunneled dialysis catheter    Hypertension  Blood pressure acceptable on atenolol    Secondary parathyroidism  On Renvela 800 mg 3 times daily, calcitriol 0.25 mg daily    Anemia  Multifactorial in the setting of malignancy, rectal bleeding and chronic kidney disease. No ELENA indicated, Hgb > [address] [facility] would like to thank you for the opportunity to serve this patient. Please call with any questions or concerns.     Silva Raygoza MD  [address]  [facility] (Left); colectomy (N/A, 01/26/2022); sigmoidoscopy (N/A, 02/17/2022); IR EMBOLIZATION HEMORRHAGE (02/19/2022); Tunneled venous catheter placement (Right, 02/21/2022); IR TUNNELED CVC PLACE WO SQ PORT/PUMP > 5 YEARS (2/21/2022); Cardiac catheterization (2019); Colonoscopy; Dialysis fistula creation (Left, 4/29/2022); Cholecystectomy, laparoscopic (N/A, 5/16/2022); ERCP (N/A, 5/16/2022); ERCP (N/A, 5/16/2022); and Upper gastrointestinal endoscopy (5/16/2022). Social Hx: reviewed and updated as appropriate  Social History     Tobacco Use    Smoking status: Former    Smokeless tobacco: Never   Substance Use Topics    Alcohol use: Not Currently        Family hx: reviewed and updated as appropriate  family history includes High Blood Pressure in his father. Medications:   apixaban, 5 mg, BID  aspirin, 81 mg, Daily  [START ON 10/23/2022] cefTRIAXone (ROCEPHIN) IV, 1,000 mg, Q24H  polyethylene glycol, 17 g, Daily  sevelamer, 800 mg, TID WC  tamsulosin, 0.4 mg, QAM  pantoprazole, 20 mg, Nightly  gabapentin, 100 mg, TID  calcitRIOL, 0.25 mcg, Nightly  atorvastatin, 10 mg, Nightly  atenolol, 25 mg, QPM  docusate sodium, 100 mg, BID  sodium chloride flush, 5-40 mL, 2 times per day  LORazepam, 0.5 mg, BID  metroNIDAZOLE, 500 mg, Q8H     Lisinopril    Allergies: Allergies   Allergen Reactions    Lisinopril Swelling     Allergy listed on paperwork from [facility], no reaction noted         Physical Exam/Objective:   Vitals:    10/22/22 2111   BP: 100/72   Pulse: 74   Resp: 20   Temp: 98 °F (36.7 °C)   SpO2:        Intake/Output Summary (Last 24 hours) at 10/22/2022 2214  Last data filed at 10/22/2022 1459  Gross per 24 hour   Intake 350 ml   Output --   Net 350 ml           General appearance:  in no acute distress, comfortable, communicative, awake and alert. HEENT: no icterus, EOM intact, trachea midline. Neck : no masses, appears symmetrical and no JVD appreciated.    Respiratory: Respiratory effort normal, bilateral equal chest rise. No wheeze, no crackles   Cardiovascular: Ausculation shows RRR and  no edema   Abdomen: abdomen is soft, non distended, no masses, no pain with palpation. Musculoskeletal:  no joint swelling, no deformity, strength grossly normal.   Skin: no rashes, no induration, no tightening, no jaundice   Neuro: Follows commands, moves all extremities spontaneously       Data:   CBC:   Recent Labs     10/20/22  1033 10/20/22  1829 10/21/22  1858 10/22/22  0418 10/22/22  1238   WBC 4.5  --   --   --   --    HGB 11.1*   < > 11.0* 10.7*  10.6* 11.5*   HCT 33.1*   < > 34.1* 32.4*  32.2* 34.4*     --   --  140  --     < > = values in this interval not displayed.        BMP:    Recent Labs     10/20/22  1033 10/22/22  0418    138   K 4.5 4.5   CL 95* 99   CO2 33* 26   BUN 36* 52*   CREATININE 4.0* 5.0*   GLUCOSE 150* 97   MG 2.10  --    PHOS  --  4.8

## 2022-10-23 NOTE — PROGRESS NOTES
Pt returning to [facility]. Transported by [employer]. Packet given to transporters. IV removed. Pt sent with belongings.  Electronically signed by Jane Longo RN on 10/23/2022 at 12:07 PM ambulatory

## 2022-10-24 LAB
BLOOD CULTURE, ROUTINE: NORMAL
CULTURE, BLOOD 2: NORMAL

## 2022-10-30 NOTE — PROGRESS NOTES
Physician Progress Note      Bob Hillman  CSN #:                  [id number]  :                       1945  ADMIT DATE:       10/20/2022 9:27 AM  DISCH DATE:        10/23/2022 12:00 PM  RESPONDING  PROVIDER #:        Anand Mcdonald          QUERY TEXT:    Patient admitted with GI bleeding, noted to have bacterial colitis and on   anticoagulant. If possible, please document in progress notes and discharge   summary the cause of the GI bleeding: The medical record reflects the following:  Risk Factors:  bacterial colitis and on anticoagulant  Clinical Indicators: per GI Consult \"Imp: hematochezia on eliquis with prior   rectal pseudoaneurysm requiring embolization. Has thickening at the   anastamosis. Plan: Declines colonoscopy. \"  Treatment: GI Bleed, hold Eliquis, ASA and monitoring labs  Options provided:  -- GI bleeding due to bacterial colitis and  anticoagulant  -- GI bleeding due to bacterial colitis  -- GI bleeding due to  anticoagulant  -- Other - I will add my own diagnosis  -- Disagree - Not applicable / Not valid  -- Disagree - Clinically unable to determine / Unknown  -- Refer to Clinical Documentation Reviewer    PROVIDER RESPONSE TEXT:    This patient has GI bleeding due to bacterial colitis .     Query created by: Alexx Briceno on 10/27/2022 8:28 AM      Electronically signed by:  Anand Mcdonald 10/30/2022 9:49 AM

## 2022-12-12 ENCOUNTER — OFFICE VISIT (OUTPATIENT)
Dept: CARDIOLOGY CLINIC | Age: 77
End: 2022-12-12
Payer: MEDICARE

## 2022-12-12 VITALS
OXYGEN SATURATION: 92 % | HEART RATE: 56 BPM | BODY MASS INDEX: 22.48 KG/M2 | DIASTOLIC BLOOD PRESSURE: 68 MMHG | HEIGHT: 77 IN | SYSTOLIC BLOOD PRESSURE: 108 MMHG

## 2022-12-12 DIAGNOSIS — I73.9 PAD (PERIPHERAL ARTERY DISEASE) (HCC): Primary | ICD-10-CM

## 2022-12-12 DIAGNOSIS — E78.2 MIXED HYPERLIPIDEMIA: ICD-10-CM

## 2022-12-12 DIAGNOSIS — I73.9 PVD (PERIPHERAL VASCULAR DISEASE) (HCC): ICD-10-CM

## 2022-12-12 PROCEDURE — 1123F ACP DISCUSS/DSCN MKR DOCD: CPT | Performed by: INTERNAL MEDICINE

## 2022-12-12 PROCEDURE — G8427 DOCREV CUR MEDS BY ELIG CLIN: HCPCS | Performed by: INTERNAL MEDICINE

## 2022-12-12 PROCEDURE — 99213 OFFICE O/P EST LOW 20 MIN: CPT | Performed by: INTERNAL MEDICINE

## 2022-12-12 PROCEDURE — G8420 CALC BMI NORM PARAMETERS: HCPCS | Performed by: INTERNAL MEDICINE

## 2022-12-12 PROCEDURE — 1036F TOBACCO NON-USER: CPT | Performed by: INTERNAL MEDICINE

## 2022-12-12 PROCEDURE — G8484 FLU IMMUNIZE NO ADMIN: HCPCS | Performed by: INTERNAL MEDICINE

## 2022-12-12 NOTE — PROGRESS NOTES
Cardiology Progress Note    Angelina Garland  1945    PCP: Adonis Mcdonnell  Reason for Referral: Second opinion   Chief Complaint:    Chief Complaint   Patient presents with    Follow-up     Peripheral artery disease. Subjective:   History of Present Illness: The patient is 69 y.o. male with a past medical history significant for ESRD on dialysis, type 2 diabetes, hypertension, hyperlipidemia, prior PE/DVT, colon cancer and history of GI bleed. He presented to [facility] with worsening right lateral foot wound. He presented 6/13/22 as second opinion per family request regarding further management of wound accompanied by his daughter. He has been residing in a nursing facility and they are completing dressing changes daily for him. Laser skin perfusion study 6/20/22 showed good probability for wound healing. Today, he states overall he is doing well and presents on a stretcher. He is accompanied by his daughter. He denies any new cardiac complaints, open wounds or ulcerations. He denies any chest pains or worsening shortness of breath. He reports compliance with his medications and tolerating. He denies any abnormal bleeding or bruising. Patient denies exertional chest pain/pressure, dyspnea at rest, VILCHIS, PND, orthopnea, palpitations, lightheadedness, weight changes, changes in LE edema, and syncope.     Past Medical History:   Diagnosis Date    Anemia 03/2022    Arthritis     CAD (coronary artery disease) 01/2020    Cancer [facility])     Colon Cancer diagnosed last month    Cerebral infarction [facility])     Cognitive communication deficit     COVID-19     Diabetes mellitus ([facility]     Dysphagia     End stage renal disease ([facility]     Fatigue     Gastrointestinal hemorrhage     Hemodialysis patient ([facility] 2019    ckd-goes to dialysis Tuesday, Thurs, Fri    Hx of blood clots     Hyperlipidemia     Hypertension     Hyponatremia     Risk for falls     Splenomegaly 02/10/2021     Past Surgical History: Procedure Laterality Date    CARDIAC CATHETERIZATION  2019    CHOLECYSTECTOMY, LAPAROSCOPIC N/A 5/16/2022    LAPAROSCOPIC CHOLECYSTECTOMY WITH CHOLANGIOGRAM performed by Melissa Wilde MD at [address] 01/26/2022    SIGMOID COLECTOMY, SIGMOIDOSCOPY performed by Melissa Wilde MD at [facility] Left 4/29/2022    LEFT BRACHIAL CEPHALIC FISTULA performed by Jw Suárez MD at [place] [address]    ERCP N/A 5/16/2022    ERCP SPHINCTER/PAPILLOTOMY performed by Star Earl MD at [address] [place] [address]    ERCP N/A 5/16/2022    ERCP STONE REMOVAL/BALLOON SWEEP performed by Star Earl MD at [facility]  02/19/2022    IR EMBOLIZATION HEMORRHAGE 2/19/2022 [address]     unsure of date    IR TUNNELED CATHETER PLACEMENT GREATER THAN 5 YEARS  2/21/2022    IR TUNNELED CATHETER PLACEMENT GREATER THAN 5 YEARS 2/21/2022 [facility]    SIGMOIDOSCOPY N/A 02/17/2022    SIGMOIDOSCOPY CONTROL HEMORRHAGE performed by Vikram Sutherland MD at [facility] [address] Right 02/21/2022    Permacath; RIJ access; 23cm; Dr. Stephy Gomez  5/16/2022    ERCP POLYP SNARE performed by Star Earl MD at [facility] History   Problem Relation Age of Onset    High Blood Pressure Father      Social History     Tobacco Use    Smoking status: Former    Smokeless tobacco: Never   Vaping Use    Vaping Use: Never used   Substance Use Topics    Alcohol use: Not Currently    Drug use: Never       Allergies   Allergen Reactions    Lisinopril Swelling     Allergy listed on paperwork from [facility], no reaction noted     Current Outpatient Medications   Medication Sig Dispense Refill    LORazepam (ATIVAN) 0.5 MG tablet Take 0.5 mg by mouth in the morning and at bedtime.       Wound Dressings (Blinda Nova WOUND/BURN DRESSING) GEL gel Apply 1 each topically daily Apply to RIGHT ankle wound daily 44 mL 2    docusate sodium (COLACE) 100 MG capsule Take 100 mg by mouth 2 times daily      polyethylene glycol (GLYCOLAX) 17 GM/SCOOP powder Take 17 g by mouth daily as needed (Constipation)       apixaban (ELIQUIS) 5 MG TABS tablet Take 1 tablet by mouth 2 times daily 60 tablet 0    aspirin 81 MG chewable tablet Take 1 tablet by mouth daily 30 tablet 0    melatonin 3 MG TABS tablet Take 2 tablets by mouth nightly as needed (sleep) 6 tablet 0    tamsulosin (FLOMAX) 0.4 MG capsule Take 0.4 mg by mouth every morning      gabapentin (NEURONTIN) 100 MG capsule Take 100 mg by mouth 3 times daily. sevelamer (RENVELA) 800 MG tablet Take 800 mg by mouth 3 times daily (with meals)       atenolol (TENORMIN) 25 MG tablet Take 25 mg by mouth every evening      pantoprazole (PROTONIX) 20 MG tablet Take 20 mg by mouth nightly      atorvastatin (LIPITOR) 10 MG tablet Take 10 mg by mouth nightly      calcitRIOL (ROCALTROL) 0.25 MCG capsule Take 0.25 mcg by mouth every evening       No current facility-administered medications for this visit. Review of Systems:  Constitutional: No unanticipated weight loss. There's been no change in energy level, sleep pattern, or activity level. No fevers, chills. Eyes: No visual changes or diplopia. No scleral icterus. ENT: No Headaches, hearing loss or vertigo. No mouth sores or sore throat. Cardiovascular: as reviewed in HPI  Respiratory: No cough or wheezing, no sputum production. No hemoptysis. Gastrointestinal: No abdominal pain, appetite loss, blood in stools. No change in bowel or bladder habits. Genitourinary: No dysuria, trouble voiding, or hematuria. Musculoskeletal:  No gait disturbance, no joint complaints. Integumentary: No rash or pruritis. Neurological: No headache, diplopia, change in muscle strength, numbness or tingling.    Psychiatric: No anxiety or depression. Endocrine: No temperature intolerance. No excessive thirst, fluid intake, or urination. No tremor. Hematologic/Lymphatic: No abnormal bruising or bleeding, blood clots or swollen lymph nodes. Allergic/Immunologic: No nasal congestion or hives. Physical Exam:   /68 (Site: Right Lower Arm, Position: Sitting)   Pulse 56   Ht 6' 5\" (1.956 m)   SpO2 92%   BMI 22.48 kg/m²   Wt Readings from Last 3 Encounters:   10/23/22 189 lb 9.5 oz (86 kg)   10/03/22 197 lb (89.4 kg)   09/21/22 197 lb (89.4 kg)     Constitutional: He is oriented to person, place, and time. He appears well-developed and well-nourished. In no acute distress. Head: Normocephalic and atraumatic. Pupils equal and round. Neck: Neck supple. No JVP or carotid bruit appreciated. No mass and no thyromegaly present. No lymphadenopathy present. Cardiovascular: Normal rate. Normal heart sounds. Exam reveals no gallop and no friction rub. No murmur heard. Pulmonary/Chest: Effort normal and breath sounds normal. No respiratory distress. He has no wheezes, rhonchi or rales. Abdominal: Soft, non-tender. Bowel sounds are normal. He exhibits no organomegaly, mass or bruit. Extremities: No edema. No cyanosis or clubbing. Pulses are 2+ radial/carotid bilaterally. Neurological: No gross cranial nerve deficit. Coordination normal.   Skin: Skin is warm and dry. There is no rash or diaphoresis. Psychiatric: He has a normal mood and affect.  His speech is normal and behavior is normal.     Lab Review:   Lab Results   Component Value Date/Time    TRIG 54 01/15/2022 05:31 AM    HDL 24 01/15/2022 05:31 AM    HDL 27 07/26/2011 05:00 AM    LDLCALC 25 01/15/2022 05:31 AM    LABVLDL 11 01/15/2022 05:31 AM     Lab Results   Component Value Date/Time    BUN 23 10/23/2022 05:02 AM    CREATININE 3.2 10/23/2022 05:02 AM     Lab Results   Component Value Date/Time    TROPONINI 0.05 (H) 09/15/2022 09:55 PM    LABA1C 6.1 01/15/2022 05:31 AM      No results found for: CBCAUTODIF    EK22 not completed     Echo 22  Left ventricular size is normal.  Moderate concentric left ventricular hypertrophy is present. Global left ventricular function is normal with ejection fraction estimated  from 55 % to 60 %. Grade II diastolic dysfunction with elevated LV filling pressures. Normal right ventricular size and function. Echo 22  Left ventricular cavity size is normal.  Ejection fraction is visually estimated to be 55-60%. There is moderate concentric left ventricular hypertrophy. No regional wall motion abnormalities are noted. Left atrium is of normal size. Tricuspid aortic valve. Thickened aortic valve leaflets noted. Normal right ventricular size and function. TAPSE= 1.7cm  Right Heart Strain= -10.2%  The right atrium is normal in size. There is a trivial pericardial effusion noted. Lower extremity arterial duplex 6/3/22  Right Impression   Right SHANTA was not available due to inadequate pulses and noncompressible   tibial vessels. (DP inaudible, PT non compressible)   Right common femoral and profunda femoral vein were not visualized due to   arterial IV line in place. The majority of the waveforms are multiphasic throughout the right lower   extremity. Ultrasound images of the right lower extremity reveal moderate to severe   calcification throughout. Possible occlusion of the right proximal anterior tibial artery with flow   reversal in the mid anterior tibial artery. Left Impression   Right SHANTA was not available due to inadequate pulses and noncompressible   tibial vessels. (DP inaudible, PT non compressible)   The majority of the waveforms are multiphasic throughout the left lower   extremity. Ultrasound images of the left lower extremity reveal moderate to severe   calcification throughout. Possible occlusion of the mid anterior tibial artery with flow reversal in the   distal anterior tibial artery.      Laser tissue doppler: 6/13/22  Impressions   Right Impression   Pulse Volume Recording at the right transmetatarsal, ankle, and calf reveals   normal waveforms. Laser skin perfusion pressure study at the right dorsum of the foot is 52mmHg,   right lateral ankle is 71mmHg, and right lateral calf is 56 mmHg; wound   healing likely. This is considered to have a good probability for healing. Left Impression   Pulse Volume Recording at the left transmetatarsal reveals normal waveforms. Laser skin perfusion pressure study at the left dorsum of the foot is 64mmHg;   wound healing likely. This is considered to have a good probability for   healing. No previous laser perfusion studies for comparison. All above diagnostic testing and laboratory data was independently visualized and reviewed by me (not simply review of report)       Assessment and Plan   1) PAD   -CLI RC5  -wound has healed  -6/13/22 laser tissue doppler shows good probability for wound healing.    -per patient and daughter, wound care daily, continue healing  -continue medical therapy  -call with changes in status     2)mixed hyperlipidemia  - continue ASA and statin     3) History PE/DVT  -on Eliquis   -management per ECF    4) ESRD  -on hemodialysis     Follow up as needed     Thank you very much for allowing me to participate in the care of your patient. Please do not hesitate to contact me if you have any questions. Leeanna Garcia MD [address] [place] [address], Interventional Cardiology, and Peripheral Vascular Disease   [facility]   Ph: [phone]  Fax: [phone]    This note was scribed in the presence of Dr Tara Kelly MD by Katia Naylor RN. Physician Attestation:  The scribes documentation has been prepared under my direction and personally reviewed by me in its entirety. I confirm the note above accurately reflects all work, treatment, procedures, and medical decision making performed by me.     Electronically signed by Ofe Nassar MD on 12/12/2022 at 4:40 PM

## 2022-12-20 ENCOUNTER — HOSPITAL ENCOUNTER (INPATIENT)
Age: 77
LOS: 9 days | Discharge: NURSING, REHAB OR LONG-TERM CARE | DRG: 314 | End: 2022-12-29
Attending: EMERGENCY MEDICINE | Admitting: INTERNAL MEDICINE
Payer: COMMERCIAL

## 2022-12-20 ENCOUNTER — APPOINTMENT (OUTPATIENT)
Dept: CT IMAGING | Age: 77
DRG: 314 | End: 2022-12-20
Payer: COMMERCIAL

## 2022-12-20 ENCOUNTER — APPOINTMENT (OUTPATIENT)
Dept: GENERAL RADIOLOGY | Age: 77
DRG: 314 | End: 2022-12-20
Payer: COMMERCIAL

## 2022-12-20 DIAGNOSIS — N30.00 ACUTE CYSTITIS WITHOUT HEMATURIA: ICD-10-CM

## 2022-12-20 DIAGNOSIS — R41.82 ALTERED MENTAL STATUS, UNSPECIFIED ALTERED MENTAL STATUS TYPE: Primary | ICD-10-CM

## 2022-12-20 DIAGNOSIS — E87.20 LACTIC ACIDOSIS: ICD-10-CM

## 2022-12-20 PROBLEM — G93.40 ACUTE ENCEPHALOPATHY: Status: ACTIVE | Noted: 2022-12-20

## 2022-12-20 LAB
A/G RATIO: 1.1 (ref 1.1–2.2)
ALBUMIN SERPL-MCNC: 3.3 G/DL (ref 3.4–5)
ALP BLD-CCNC: 101 U/L (ref 40–129)
ALT SERPL-CCNC: 11 U/L (ref 10–40)
AMORPHOUS: ABNORMAL /HPF
AMPHETAMINE SCREEN, URINE: ABNORMAL
ANION GAP SERPL CALCULATED.3IONS-SCNC: 11 MMOL/L (ref 3–16)
AST SERPL-CCNC: 13 U/L (ref 15–37)
BACTERIA: ABNORMAL /HPF
BARBITURATE SCREEN URINE: ABNORMAL
BASE EXCESS VENOUS: 5.3 MMOL/L
BASOPHILS ABSOLUTE: 0 K/UL (ref 0–0.2)
BASOPHILS RELATIVE PERCENT: 0.1 %
BENZODIAZEPINE SCREEN, URINE: ABNORMAL
BILIRUB SERPL-MCNC: 0.8 MG/DL (ref 0–1)
BILIRUBIN URINE: NEGATIVE
BLOOD, URINE: ABNORMAL
BUN BLDV-MCNC: 25 MG/DL (ref 7–20)
CALCIUM SERPL-MCNC: 9 MG/DL (ref 8.3–10.6)
CANNABINOID SCREEN URINE: ABNORMAL
CARBOXYHEMOGLOBIN: 1.7 %
CHLORIDE BLD-SCNC: 95 MMOL/L (ref 99–110)
CLARITY: ABNORMAL
CO2: 29 MMOL/L (ref 21–32)
COCAINE METABOLITE SCREEN URINE: ABNORMAL
COLOR: ABNORMAL
COMMENT UA: ABNORMAL
CREAT SERPL-MCNC: 3.6 MG/DL (ref 0.8–1.3)
EOSINOPHILS ABSOLUTE: 0 K/UL (ref 0–0.6)
EOSINOPHILS RELATIVE PERCENT: 0.1 %
EPITHELIAL CELLS, UA: ABNORMAL /HPF (ref 0–5)
FENTANYL SCREEN, URINE: ABNORMAL
GFR SERPL CREATININE-BSD FRML MDRD: 17 ML/MIN/{1.73_M2}
GLUCOSE BLD-MCNC: 214 MG/DL (ref 70–99)
GLUCOSE URINE: NEGATIVE MG/DL
HCO3 VENOUS: 32 MMOL/L (ref 23–29)
HCT VFR BLD CALC: 33.5 % (ref 40.5–52.5)
HEMOGLOBIN: 10.9 G/DL (ref 13.5–17.5)
KETONES, URINE: NEGATIVE MG/DL
LACTIC ACID, SEPSIS: 2.2 MMOL/L (ref 0.4–1.9)
LACTIC ACID, SEPSIS: 3.3 MMOL/L (ref 0.4–1.9)
LEUKOCYTE ESTERASE, URINE: ABNORMAL
LYMPHOCYTES ABSOLUTE: 0.5 K/UL (ref 1–5.1)
LYMPHOCYTES RELATIVE PERCENT: 6.6 %
Lab: ABNORMAL
MCH RBC QN AUTO: 32.4 PG (ref 26–34)
MCHC RBC AUTO-ENTMCNC: 32.5 G/DL (ref 31–36)
MCV RBC AUTO: 99.7 FL (ref 80–100)
METHADONE SCREEN, URINE: ABNORMAL
METHEMOGLOBIN VENOUS: 0.8 %
MICROSCOPIC EXAMINATION: YES
MONOCYTES ABSOLUTE: 0.4 K/UL (ref 0–1.3)
MONOCYTES RELATIVE PERCENT: 4.9 %
MUCUS: ABNORMAL /LPF
NEUTROPHILS ABSOLUTE: 7.3 K/UL (ref 1.7–7.7)
NEUTROPHILS RELATIVE PERCENT: 88.3 %
NITRITE, URINE: NEGATIVE
O2 SAT, VEN: 42 %
O2 THERAPY: ABNORMAL
OPIATE SCREEN URINE: ABNORMAL
OXYCODONE URINE: POSITIVE
PCO2, VEN: 53.6 MMHG (ref 40–50)
PDW BLD-RTO: 15 % (ref 12.4–15.4)
PH UA: 7
PH UA: 7 (ref 5–8)
PH VENOUS: 7.38 (ref 7.35–7.45)
PHENCYCLIDINE SCREEN URINE: ABNORMAL
PLATELET # BLD: 222 K/UL (ref 135–450)
PMV BLD AUTO: 7.8 FL (ref 5–10.5)
PO2, VEN: <30 MMHG
POTASSIUM REFLEX MAGNESIUM: 4.2 MMOL/L (ref 3.5–5.1)
PROTEIN UA: 100 MG/DL
RAPID INFLUENZA  B AGN: NEGATIVE
RAPID INFLUENZA A AGN: NEGATIVE
RBC # BLD: 3.36 M/UL (ref 4.2–5.9)
RBC UA: ABNORMAL /HPF (ref 0–4)
SARS-COV-2, NAAT: NOT DETECTED
SODIUM BLD-SCNC: 135 MMOL/L (ref 136–145)
SPECIFIC GRAVITY UA: 1.02 (ref 1–1.03)
TCO2 CALC VENOUS: 33 MMOL/L
TOTAL PROTEIN: 6.3 G/DL (ref 6.4–8.2)
TROPONIN: 0.07 NG/ML
TSH REFLEX: 1.43 UIU/ML (ref 0.27–4.2)
URINE TYPE: ABNORMAL
UROBILINOGEN, URINE: 0.2 E.U./DL
WBC # BLD: 8.3 K/UL (ref 4–11)
WBC UA: ABNORMAL /HPF (ref 0–5)

## 2022-12-20 PROCEDURE — P9612 CATHETERIZE FOR URINE SPEC: HCPCS

## 2022-12-20 PROCEDURE — 6360000002 HC RX W HCPCS: Performed by: PHYSICIAN ASSISTANT

## 2022-12-20 PROCEDURE — 99285 EMERGENCY DEPT VISIT HI MDM: CPT

## 2022-12-20 PROCEDURE — 82803 BLOOD GASES ANY COMBINATION: CPT

## 2022-12-20 PROCEDURE — 81001 URINALYSIS AUTO W/SCOPE: CPT

## 2022-12-20 PROCEDURE — 96375 TX/PRO/DX INJ NEW DRUG ADDON: CPT

## 2022-12-20 PROCEDURE — 2500000003 HC RX 250 WO HCPCS: Performed by: PHYSICIAN ASSISTANT

## 2022-12-20 PROCEDURE — 87635 SARS-COV-2 COVID-19 AMP PRB: CPT

## 2022-12-20 PROCEDURE — 87086 URINE CULTURE/COLONY COUNT: CPT

## 2022-12-20 PROCEDURE — 80053 COMPREHEN METABOLIC PANEL: CPT

## 2022-12-20 PROCEDURE — A4216 STERILE WATER/SALINE, 10 ML: HCPCS | Performed by: PHYSICIAN ASSISTANT

## 2022-12-20 PROCEDURE — 87804 INFLUENZA ASSAY W/OPTIC: CPT

## 2022-12-20 PROCEDURE — 83605 ASSAY OF LACTIC ACID: CPT

## 2022-12-20 PROCEDURE — 80307 DRUG TEST PRSMV CHEM ANLYZR: CPT

## 2022-12-20 PROCEDURE — 1200000000 HC SEMI PRIVATE

## 2022-12-20 PROCEDURE — 71045 X-RAY EXAM CHEST 1 VIEW: CPT

## 2022-12-20 PROCEDURE — 85025 COMPLETE CBC W/AUTO DIFF WBC: CPT

## 2022-12-20 PROCEDURE — 87040 BLOOD CULTURE FOR BACTERIA: CPT

## 2022-12-20 PROCEDURE — 96361 HYDRATE IV INFUSION ADD-ON: CPT

## 2022-12-20 PROCEDURE — 84443 ASSAY THYROID STIM HORMONE: CPT

## 2022-12-20 PROCEDURE — 70450 CT HEAD/BRAIN W/O DYE: CPT

## 2022-12-20 PROCEDURE — 96374 THER/PROPH/DIAG INJ IV PUSH: CPT

## 2022-12-20 PROCEDURE — 6370000000 HC RX 637 (ALT 250 FOR IP): Performed by: INTERNAL MEDICINE

## 2022-12-20 PROCEDURE — 84484 ASSAY OF TROPONIN QUANT: CPT

## 2022-12-20 PROCEDURE — 93005 ELECTROCARDIOGRAM TRACING: CPT | Performed by: PHYSICIAN ASSISTANT

## 2022-12-20 PROCEDURE — 74176 CT ABD & PELVIS W/O CONTRAST: CPT

## 2022-12-20 PROCEDURE — 2580000003 HC RX 258: Performed by: PHYSICIAN ASSISTANT

## 2022-12-20 PROCEDURE — 2500000003 HC RX 250 WO HCPCS: Performed by: INTERNAL MEDICINE

## 2022-12-20 RX ORDER — SODIUM CHLORIDE 9 MG/ML
INJECTION, SOLUTION INTRAVENOUS PRN
Status: DISCONTINUED | OUTPATIENT
Start: 2022-12-20 | End: 2022-12-29 | Stop reason: HOSPADM

## 2022-12-20 RX ORDER — GABAPENTIN 100 MG/1
100 CAPSULE ORAL 3 TIMES DAILY
Status: DISCONTINUED | OUTPATIENT
Start: 2022-12-21 | End: 2022-12-29 | Stop reason: HOSPADM

## 2022-12-20 RX ORDER — DOCUSATE SODIUM 100 MG/1
200 CAPSULE, LIQUID FILLED ORAL 2 TIMES DAILY
Status: DISCONTINUED | OUTPATIENT
Start: 2022-12-20 | End: 2022-12-29 | Stop reason: HOSPADM

## 2022-12-20 RX ORDER — TAMSULOSIN HYDROCHLORIDE 0.4 MG/1
0.4 CAPSULE ORAL EVERY MORNING
Status: DISCONTINUED | OUTPATIENT
Start: 2022-12-21 | End: 2022-12-29 | Stop reason: HOSPADM

## 2022-12-20 RX ORDER — SODIUM CHLORIDE 0.9 % (FLUSH) 0.9 %
10 SYRINGE (ML) INJECTION EVERY 12 HOURS SCHEDULED
Status: DISCONTINUED | OUTPATIENT
Start: 2022-12-20 | End: 2022-12-29 | Stop reason: HOSPADM

## 2022-12-20 RX ORDER — 0.9 % SODIUM CHLORIDE 0.9 %
500 INTRAVENOUS SOLUTION INTRAVENOUS ONCE
Status: COMPLETED | OUTPATIENT
Start: 2022-12-20 | End: 2022-12-20

## 2022-12-20 RX ORDER — MAGNESIUM SULFATE IN WATER 40 MG/ML
2000 INJECTION, SOLUTION INTRAVENOUS PRN
Status: DISCONTINUED | OUTPATIENT
Start: 2022-12-20 | End: 2022-12-20

## 2022-12-20 RX ORDER — ACETAMINOPHEN 650 MG/1
650 SUPPOSITORY RECTAL EVERY 6 HOURS PRN
Status: DISCONTINUED | OUTPATIENT
Start: 2022-12-20 | End: 2022-12-29 | Stop reason: HOSPADM

## 2022-12-20 RX ORDER — ONDANSETRON 2 MG/ML
4 INJECTION INTRAMUSCULAR; INTRAVENOUS EVERY 6 HOURS PRN
Status: DISCONTINUED | OUTPATIENT
Start: 2022-12-20 | End: 2022-12-29 | Stop reason: HOSPADM

## 2022-12-20 RX ORDER — METHYLPREDNISOLONE SODIUM SUCCINATE 125 MG/2ML
125 INJECTION, POWDER, LYOPHILIZED, FOR SOLUTION INTRAMUSCULAR; INTRAVENOUS ONCE
Status: COMPLETED | OUTPATIENT
Start: 2022-12-20 | End: 2022-12-20

## 2022-12-20 RX ORDER — ACETAMINOPHEN 325 MG/1
650 TABLET ORAL EVERY 6 HOURS PRN
Status: DISCONTINUED | OUTPATIENT
Start: 2022-12-20 | End: 2022-12-29 | Stop reason: HOSPADM

## 2022-12-20 RX ORDER — LANOLIN ALCOHOL/MO/W.PET/CERES
6 CREAM (GRAM) TOPICAL NIGHTLY PRN
Status: DISCONTINUED | OUTPATIENT
Start: 2022-12-20 | End: 2022-12-29 | Stop reason: HOSPADM

## 2022-12-20 RX ORDER — PROMETHAZINE HYDROCHLORIDE 25 MG/1
12.5 TABLET ORAL EVERY 6 HOURS PRN
Status: DISCONTINUED | OUTPATIENT
Start: 2022-12-20 | End: 2022-12-29 | Stop reason: HOSPADM

## 2022-12-20 RX ORDER — SODIUM CHLORIDE 0.9 % (FLUSH) 0.9 %
10 SYRINGE (ML) INJECTION PRN
Status: DISCONTINUED | OUTPATIENT
Start: 2022-12-20 | End: 2022-12-29 | Stop reason: HOSPADM

## 2022-12-20 RX ORDER — METRONIDAZOLE 500 MG/100ML
500 INJECTION, SOLUTION INTRAVENOUS EVERY 8 HOURS
Status: DISCONTINUED | OUTPATIENT
Start: 2022-12-20 | End: 2022-12-25

## 2022-12-20 RX ORDER — ATORVASTATIN CALCIUM 10 MG/1
10 TABLET, FILM COATED ORAL NIGHTLY
Status: DISCONTINUED | OUTPATIENT
Start: 2022-12-20 | End: 2022-12-29 | Stop reason: HOSPADM

## 2022-12-20 RX ORDER — ATENOLOL 25 MG/1
25 TABLET ORAL EVERY EVENING
Status: DISCONTINUED | OUTPATIENT
Start: 2022-12-20 | End: 2022-12-29 | Stop reason: HOSPADM

## 2022-12-20 RX ORDER — ONDANSETRON 2 MG/ML
4 INJECTION INTRAMUSCULAR; INTRAVENOUS EVERY 6 HOURS PRN
COMMUNITY

## 2022-12-20 RX ORDER — LORAZEPAM 0.5 MG/1
0.5 TABLET ORAL 2 TIMES DAILY
Status: DISCONTINUED | OUTPATIENT
Start: 2022-12-21 | End: 2022-12-29 | Stop reason: HOSPADM

## 2022-12-20 RX ORDER — POTASSIUM CHLORIDE 7.45 MG/ML
10 INJECTION INTRAVENOUS PRN
Status: DISCONTINUED | OUTPATIENT
Start: 2022-12-20 | End: 2022-12-20

## 2022-12-20 RX ORDER — SEVELAMER CARBONATE 800 MG/1
800 TABLET, FILM COATED ORAL
Status: DISCONTINUED | OUTPATIENT
Start: 2022-12-21 | End: 2022-12-29 | Stop reason: HOSPADM

## 2022-12-20 RX ORDER — OXYCODONE HYDROCHLORIDE 5 MG/1
5 TABLET ORAL EVERY 4 HOURS PRN
COMMUNITY

## 2022-12-20 RX ORDER — ASPIRIN 81 MG/1
81 TABLET, CHEWABLE ORAL DAILY
Status: DISCONTINUED | OUTPATIENT
Start: 2022-12-21 | End: 2022-12-29 | Stop reason: HOSPADM

## 2022-12-20 RX ORDER — PANTOPRAZOLE SODIUM 20 MG/1
20 TABLET, DELAYED RELEASE ORAL NIGHTLY
Status: DISCONTINUED | OUTPATIENT
Start: 2022-12-20 | End: 2022-12-29 | Stop reason: HOSPADM

## 2022-12-20 RX ADMIN — SODIUM CHLORIDE 500 ML: 9 INJECTION, SOLUTION INTRAVENOUS at 18:07

## 2022-12-20 RX ADMIN — METRONIDAZOLE 500 MG: 500 INJECTION, SOLUTION INTRAVENOUS at 23:57

## 2022-12-20 RX ADMIN — SODIUM CHLORIDE 500 ML: 9 INJECTION, SOLUTION INTRAVENOUS at 20:25

## 2022-12-20 RX ADMIN — CEFTRIAXONE 1000 MG: 1 INJECTION, POWDER, FOR SOLUTION INTRAMUSCULAR; INTRAVENOUS at 21:22

## 2022-12-20 RX ADMIN — Medication 6 MG: at 23:59

## 2022-12-20 RX ADMIN — APIXABAN 2.5 MG: 2.5 TABLET, FILM COATED ORAL at 23:59

## 2022-12-20 RX ADMIN — FAMOTIDINE 20 MG: 10 INJECTION, SOLUTION INTRAVENOUS at 18:29

## 2022-12-20 RX ADMIN — METHYLPREDNISOLONE SODIUM SUCCINATE 125 MG: 125 INJECTION, POWDER, FOR SOLUTION INTRAMUSCULAR; INTRAVENOUS at 18:27

## 2022-12-20 RX ADMIN — ATORVASTATIN CALCIUM 10 MG: 10 TABLET, FILM COATED ORAL at 23:59

## 2022-12-20 ASSESSMENT — PAIN - FUNCTIONAL ASSESSMENT: PAIN_FUNCTIONAL_ASSESSMENT: NONE - DENIES PAIN

## 2022-12-20 NOTE — ED TRIAGE NOTES
Pt arrived via ems from [facility] for report of altered mental status all day today, patient is more lethargic than normal baseline. Pt has dementia but normal baseline is able to have conversation with staff. Pt very sleepy at triage. Has wet cough.

## 2022-12-21 ENCOUNTER — APPOINTMENT (OUTPATIENT)
Dept: MRI IMAGING | Age: 77
DRG: 314 | End: 2022-12-21
Payer: COMMERCIAL

## 2022-12-21 LAB
A/G RATIO: 1.1 (ref 1.1–2.2)
ALBUMIN SERPL-MCNC: 3.2 G/DL (ref 3.4–5)
ALP BLD-CCNC: 91 U/L (ref 40–129)
ALT SERPL-CCNC: 10 U/L (ref 10–40)
AMMONIA: 19 UMOL/L (ref 16–60)
ANION GAP SERPL CALCULATED.3IONS-SCNC: 13 MMOL/L (ref 3–16)
ANION GAP SERPL CALCULATED.3IONS-SCNC: 18 MMOL/L (ref 3–16)
AST SERPL-CCNC: 10 U/L (ref 15–37)
BASOPHILS ABSOLUTE: 0 K/UL (ref 0–0.2)
BASOPHILS ABSOLUTE: 0 K/UL (ref 0–0.2)
BASOPHILS RELATIVE PERCENT: 0.1 %
BASOPHILS RELATIVE PERCENT: 0.2 %
BILIRUB SERPL-MCNC: 0.5 MG/DL (ref 0–1)
BUN BLDV-MCNC: 32 MG/DL (ref 7–20)
BUN BLDV-MCNC: 43 MG/DL (ref 7–20)
C-REACTIVE PROTEIN: 132.4 MG/L (ref 0–5.1)
CALCIUM SERPL-MCNC: 8.9 MG/DL (ref 8.3–10.6)
CALCIUM SERPL-MCNC: 9 MG/DL (ref 8.3–10.6)
CHLORIDE BLD-SCNC: 98 MMOL/L (ref 99–110)
CHLORIDE BLD-SCNC: 99 MMOL/L (ref 99–110)
CO2: 23 MMOL/L (ref 21–32)
CO2: 27 MMOL/L (ref 21–32)
CREAT SERPL-MCNC: 3.9 MG/DL (ref 0.8–1.3)
CREAT SERPL-MCNC: 4.4 MG/DL (ref 0.8–1.3)
EOSINOPHILS ABSOLUTE: 0 K/UL (ref 0–0.6)
EOSINOPHILS ABSOLUTE: 0 K/UL (ref 0–0.6)
EOSINOPHILS RELATIVE PERCENT: 0 %
EOSINOPHILS RELATIVE PERCENT: 0.3 %
GFR SERPL CREATININE-BSD FRML MDRD: 13 ML/MIN/{1.73_M2}
GFR SERPL CREATININE-BSD FRML MDRD: 15 ML/MIN/{1.73_M2}
GLUCOSE BLD-MCNC: 136 MG/DL (ref 70–99)
GLUCOSE BLD-MCNC: 162 MG/DL (ref 70–99)
GLUCOSE BLD-MCNC: 174 MG/DL (ref 70–99)
HCT VFR BLD CALC: 28.7 % (ref 40.5–52.5)
HCT VFR BLD CALC: 34.1 % (ref 40.5–52.5)
HEMOGLOBIN: 10.9 G/DL (ref 13.5–17.5)
HEMOGLOBIN: 9 G/DL (ref 13.5–17.5)
LACTIC ACID: 1.9 MMOL/L (ref 0.4–2)
LYMPHOCYTES ABSOLUTE: 0.8 K/UL (ref 1–5.1)
LYMPHOCYTES ABSOLUTE: 0.9 K/UL (ref 1–5.1)
LYMPHOCYTES RELATIVE PERCENT: 11.9 %
LYMPHOCYTES RELATIVE PERCENT: 13.7 %
MCH RBC QN AUTO: 32.1 PG (ref 26–34)
MCH RBC QN AUTO: 32.5 PG (ref 26–34)
MCHC RBC AUTO-ENTMCNC: 31.3 G/DL (ref 31–36)
MCHC RBC AUTO-ENTMCNC: 32 G/DL (ref 31–36)
MCV RBC AUTO: 100.4 FL (ref 80–100)
MCV RBC AUTO: 103.9 FL (ref 80–100)
MONOCYTES ABSOLUTE: 0.2 K/UL (ref 0–1.3)
MONOCYTES ABSOLUTE: 0.4 K/UL (ref 0–1.3)
MONOCYTES RELATIVE PERCENT: 3.2 %
MONOCYTES RELATIVE PERCENT: 6.7 %
NEUTROPHILS ABSOLUTE: 4.6 K/UL (ref 1.7–7.7)
NEUTROPHILS ABSOLUTE: 6.4 K/UL (ref 1.7–7.7)
NEUTROPHILS RELATIVE PERCENT: 79.1 %
NEUTROPHILS RELATIVE PERCENT: 84.8 %
PDW BLD-RTO: 15.1 % (ref 12.4–15.4)
PDW BLD-RTO: 15.7 % (ref 12.4–15.4)
PERFORMED ON: ABNORMAL
PLATELET # BLD: 163 K/UL (ref 135–450)
PLATELET # BLD: 192 K/UL (ref 135–450)
PMV BLD AUTO: 7.5 FL (ref 5–10.5)
PMV BLD AUTO: 8.1 FL (ref 5–10.5)
POTASSIUM REFLEX MAGNESIUM: 4.7 MMOL/L (ref 3.5–5.1)
POTASSIUM REFLEX MAGNESIUM: 4.7 MMOL/L (ref 3.5–5.1)
PROCALCITONIN: 5.41 NG/ML (ref 0–0.15)
RBC # BLD: 2.77 M/UL (ref 4.2–5.9)
RBC # BLD: 3.39 M/UL (ref 4.2–5.9)
SODIUM BLD-SCNC: 139 MMOL/L (ref 136–145)
SODIUM BLD-SCNC: 139 MMOL/L (ref 136–145)
TOTAL CK: 14 U/L (ref 39–308)
TOTAL PROTEIN: 6.1 G/DL (ref 6.4–8.2)
TROPONIN: 0.06 NG/ML
WBC # BLD: 5.8 K/UL (ref 4–11)
WBC # BLD: 7.5 K/UL (ref 4–11)

## 2022-12-21 PROCEDURE — 2580000003 HC RX 258: Performed by: NURSE PRACTITIONER

## 2022-12-21 PROCEDURE — 36415 COLL VENOUS BLD VENIPUNCTURE: CPT

## 2022-12-21 PROCEDURE — 84484 ASSAY OF TROPONIN QUANT: CPT

## 2022-12-21 PROCEDURE — 6360000002 HC RX W HCPCS: Performed by: INTERNAL MEDICINE

## 2022-12-21 PROCEDURE — P9047 ALBUMIN (HUMAN), 25%, 50ML: HCPCS | Performed by: NURSE PRACTITIONER

## 2022-12-21 PROCEDURE — 6370000000 HC RX 637 (ALT 250 FOR IP): Performed by: NURSE PRACTITIONER

## 2022-12-21 PROCEDURE — 2700000000 HC OXYGEN THERAPY PER DAY

## 2022-12-21 PROCEDURE — 6370000000 HC RX 637 (ALT 250 FOR IP): Performed by: INTERNAL MEDICINE

## 2022-12-21 PROCEDURE — 83605 ASSAY OF LACTIC ACID: CPT

## 2022-12-21 PROCEDURE — 86140 C-REACTIVE PROTEIN: CPT

## 2022-12-21 PROCEDURE — 82140 ASSAY OF AMMONIA: CPT

## 2022-12-21 PROCEDURE — 82550 ASSAY OF CK (CPK): CPT

## 2022-12-21 PROCEDURE — 84145 PROCALCITONIN (PCT): CPT

## 2022-12-21 PROCEDURE — 93880 EXTRACRANIAL BILAT STUDY: CPT

## 2022-12-21 PROCEDURE — 1200000000 HC SEMI PRIVATE

## 2022-12-21 PROCEDURE — 2500000003 HC RX 250 WO HCPCS: Performed by: INTERNAL MEDICINE

## 2022-12-21 PROCEDURE — 70551 MRI BRAIN STEM W/O DYE: CPT

## 2022-12-21 PROCEDURE — 6360000002 HC RX W HCPCS: Performed by: NURSE PRACTITIONER

## 2022-12-21 PROCEDURE — 80053 COMPREHEN METABOLIC PANEL: CPT

## 2022-12-21 PROCEDURE — 93010 ELECTROCARDIOGRAM REPORT: CPT | Performed by: INTERNAL MEDICINE

## 2022-12-21 PROCEDURE — 94760 N-INVAS EAR/PLS OXIMETRY 1: CPT

## 2022-12-21 PROCEDURE — 2580000003 HC RX 258: Performed by: INTERNAL MEDICINE

## 2022-12-21 PROCEDURE — 85025 COMPLETE CBC W/AUTO DIFF WBC: CPT

## 2022-12-21 RX ORDER — 0.9 % SODIUM CHLORIDE 0.9 %
500 INTRAVENOUS SOLUTION INTRAVENOUS ONCE
Status: COMPLETED | OUTPATIENT
Start: 2022-12-21 | End: 2022-12-21

## 2022-12-21 RX ORDER — MIDODRINE HYDROCHLORIDE 10 MG/1
10 TABLET ORAL ONCE
Status: COMPLETED | OUTPATIENT
Start: 2022-12-21 | End: 2022-12-21

## 2022-12-21 RX ORDER — ALBUMIN (HUMAN) 12.5 G/50ML
25 SOLUTION INTRAVENOUS ONCE
Status: COMPLETED | OUTPATIENT
Start: 2022-12-21 | End: 2022-12-21

## 2022-12-21 RX ADMIN — CEFTRIAXONE 2000 MG: 2 INJECTION, POWDER, FOR SOLUTION INTRAMUSCULAR; INTRAVENOUS at 11:12

## 2022-12-21 RX ADMIN — DOCUSATE SODIUM 200 MG: 100 CAPSULE, LIQUID FILLED ORAL at 11:06

## 2022-12-21 RX ADMIN — SODIUM CHLORIDE, PRESERVATIVE FREE 10 ML: 5 INJECTION INTRAVENOUS at 20:45

## 2022-12-21 RX ADMIN — TAMSULOSIN HYDROCHLORIDE 0.4 MG: 0.4 CAPSULE ORAL at 11:06

## 2022-12-21 RX ADMIN — APIXABAN 2.5 MG: 2.5 TABLET, FILM COATED ORAL at 20:45

## 2022-12-21 RX ADMIN — ATORVASTATIN CALCIUM 10 MG: 10 TABLET, FILM COATED ORAL at 20:45

## 2022-12-21 RX ADMIN — DOCUSATE SODIUM 200 MG: 100 CAPSULE, LIQUID FILLED ORAL at 00:00

## 2022-12-21 RX ADMIN — METRONIDAZOLE 500 MG: 500 INJECTION, SOLUTION INTRAVENOUS at 16:18

## 2022-12-21 RX ADMIN — SODIUM CHLORIDE 500 ML: 9 INJECTION, SOLUTION INTRAVENOUS at 21:06

## 2022-12-21 RX ADMIN — ASPIRIN 81 MG 81 MG: 81 TABLET ORAL at 11:06

## 2022-12-21 RX ADMIN — METRONIDAZOLE 500 MG: 500 INJECTION, SOLUTION INTRAVENOUS at 08:32

## 2022-12-21 RX ADMIN — PANTOPRAZOLE SODIUM 20 MG: 20 TABLET, DELAYED RELEASE ORAL at 00:00

## 2022-12-21 RX ADMIN — LORAZEPAM 0.5 MG: 0.5 TABLET ORAL at 11:06

## 2022-12-21 RX ADMIN — SEVELAMER CARBONATE 800 MG: 800 TABLET, FILM COATED ORAL at 18:13

## 2022-12-21 RX ADMIN — GABAPENTIN 100 MG: 100 CAPSULE ORAL at 11:06

## 2022-12-21 RX ADMIN — ALBUMIN (HUMAN) 25 G: 0.25 INJECTION, SOLUTION INTRAVENOUS at 21:06

## 2022-12-21 RX ADMIN — PANTOPRAZOLE SODIUM 20 MG: 20 TABLET, DELAYED RELEASE ORAL at 20:45

## 2022-12-21 RX ADMIN — SEVELAMER CARBONATE 800 MG: 800 TABLET, FILM COATED ORAL at 12:52

## 2022-12-21 RX ADMIN — MIDODRINE HYDROCHLORIDE 10 MG: 10 TABLET ORAL at 20:45

## 2022-12-21 RX ADMIN — SODIUM CHLORIDE, PRESERVATIVE FREE 10 ML: 5 INJECTION INTRAVENOUS at 00:01

## 2022-12-21 RX ADMIN — APIXABAN 2.5 MG: 2.5 TABLET, FILM COATED ORAL at 11:06

## 2022-12-21 RX ADMIN — DOCUSATE SODIUM 200 MG: 100 CAPSULE, LIQUID FILLED ORAL at 20:45

## 2022-12-21 NOTE — ED NOTES
Introduce myself to the patient, identification band inplace, stretcher in the lowest position for safety, and the call light is in reach. Updated patient on Emergency Department plan of care, no additional needs at this time, will continue to monitor patient.         Lachelle Cisse RN  12/20/22 2124

## 2022-12-21 NOTE — ED PROVIDER NOTES
ED Attending Attestation Note    This patient was seen by the advanced practice provider. I personally saw the patient and performed a substantive portion of the visit including all aspects of the medical decision making. Briefly, 69 y.o. male presents from his nursing facility with concern for altered mental status and increased lethargy. History of dementia however normally patient is conversant. Symptoms reportedly started after he returned home from dialysis. He was noted to have a low-grade temperature at his nursing facility. Patient is unable to contribute meaningfully to the history given his current mental status and his history of dementia    Focused exam:   Gen: 69 y.o. male, NAD  HEENT: NCAT. PERRL. EOMI. CV: RRR w/o MRG  Lungs: CTAB. No incr WOB. Abdomen: Soft, nontender, nondistended. No rebound/guarding. CT ABDOMEN PELVIS WO CONTRAST Additional Contrast? None   Final Result   Mild wall thickening involving the distal descending colon through the rectum   with adjacent fat stranding suggestive of proctocolitis. Mild bladder wall thickening with perivesicular fat stranding suggestive of   cystitis. Correlation with urinalysis recommended. Large stool ball within the rectum with adjacent inflammatory changes, as   above. Mild splenomegaly. Nonobstructive right nephrolithiasis. CT HEAD WO CONTRAST   Final Result   No acute intracranial abnormality. XR CHEST PORTABLE   Final Result   Stable cardiac enlargement with no acute findings. MDM:   This is a 78-year-old male with history of dementia presenting with altered mental status. Found to have urinary tract infection. Patient had been administered ceftriaxone. Previous urine cultures were reviewed and do show previous infection with Enterococcus. Therefore Zosyn was administered. Patient does not meet SIRS criteria.   Lactate is elevated and is actually uptrending during his emergency department course. He was administered several 500 cc boluses of IV fluids. He is hemodynamically stable. On account of the patient providing limited history CT chest /abdomen / pelvis obtained which reveals a large stool ball in the rectum and some adjacent inflammatory changes. JERE to perform manual disimpaction. Some mild wall thickening of the bladder consistent with cystitis. Antibiotics as above. Patient to be admitted to hospital medicine for further evaluation and treatment. For further details of the patient's emergency department visit, please see the advanced practice provider's documentation. Adair Mayo MD     This report has been produced using speech recognition software and may contain errors related to that system including errors in grammar, punctuation, and spelling, as well as words and phrases that may be inappropriate. If there are any questions or concerns please feel free to contact the dictating provider for clarification.        Adair Mayo MD  12/20/22 1500

## 2022-12-21 NOTE — CARE COORDINATION
Patient came from [facility] prior to arrival.  Call to CAESAR, [phone], at [facility] who confirmed the patient is:  [x] [address], no Precert required for return. [x] Is fully vaccinated for Covid. [x] No Covid Test needed for return. As long as pt is not showing any signs or symptoms    [x] Confirmed with the patients spouse that the plan is to return to this facility at D/C.       Electronically signed by Lamberto Dominguez on 12/21/2022 at 11:49 AM

## 2022-12-21 NOTE — PROGRESS NOTES
Pharmacy Medication Reconciliation Note     List of medications Reji Luciano is currently taking is complete. Source of information:   1. [facility]    Notes regarding home medications:   1. Facility reports administering all AM meds PTA in the ED  2.  Pt anticoagulated with Eliquis 5 mg BID ( 1 of 2 taken PTA) for unspecified hx--has had CVA in past    Facility denies administering any additional OTC or herbal medications    Theador Risk, Pharmacy Intern  12/20/2022  10:07 PM

## 2022-12-21 NOTE — PLAN OF CARE
Problem: Discharge Planning  Goal: Discharge to home or other facility with appropriate resources  Outcome: Progressing  Flowsheets (Taken 12/21/2022 1100)  Discharge to home or other facility with appropriate resources: Identify barriers to discharge with patient and caregiver     Problem: Safety - Adult  Goal: Free from fall injury  Outcome: Progressing     Problem: ABCDS Injury Assessment  Goal: Absence of physical injury  Outcome: Progressing     Problem: Skin/Tissue Integrity  Goal: Absence of new skin breakdown  Description: 1. Monitor for areas of redness and/or skin breakdown  2. Assess vascular access sites hourly  3. Every 4-6 hours minimum:  Change oxygen saturation probe site  4. Every 4-6 hours:  If on nasal continuous positive airway pressure, respiratory therapy assess nares and determine need for appliance change or resting period.   Outcome: Progressing

## 2022-12-21 NOTE — CONSULTS
[address] [place] [address] Nephrology   [facility]  [phone]  Nephrology Consult Note          Patient ID: Zeferino Baca  Referring/ Physician: Ernst Graf MD      HPI/Summary:   Zeferino Baca is being seen by nephrology for ESRD. This is a 78-year-old male with past medical history significant for colon cancer, ESRD, hypertension, stroke, COVID infection in the past, diabetes who presented to the hospital with fatigue. He has been more lethargic. He denies any cough shortness of breath chest pain. He is unable to provide any more history at this time. MRI head showed a cerebral atrophy, severe chronic small vessel ischemic changes. No acute stroke. CT abdomen showed distal descending colon thickening some fat stranding suggestive of proctocolitis, mild bladder wall thickening possible cystitis, large stool ball in the rectum. Mild splenomegaly and a nonobstructive right renal stone. Blood pressure 128/68  Heart rate 59, afebrile  On 2 L nasal cannula satting 97%    Sodium 139 potassium 4.7 bicarb 27 BUN 32 creatinine 3.9 calcium 9 ammonia 19 CK14 troponin 0.06 albumin 3.2 glucose 174 hemoglobin 10.9 no leukocytosis no thrombocytopenia      Plan:   There are no acute indications for dialysis today  We will plan for dialysis tomorrow and UF to dry weight  ELENA per outpatient records with HD.      ESRD  TTS at [facility]  Tunneled dialysis catheter    Electrolytes  No acute issues    Secondary parathyroidism  On Renvela 800 mg 3 times daily    Hypertension  Blood pressure acceptable on atenolol 25 mg daily    Possible UTI and proctocolitis  On ceftriaxone Flagyl        [facility] would like to thank you for the opportunity to serve this patient. Please call with any questions or concerns.     Brenton King MD  [facility]  [address], [address]  Fax: [phone]  Office: [phone]         CC/Reason for consult:   Reason for consult: ESRD  Chief Complaint   Patient presents with    Fatigue     Pt arrived via ems from [facility] for report of altered mental status all day today, patient is more lethargic than normal baseline. Pt has dementia but normal baseline is able to have conversation with staff. Pt very sleepy at triage. Has wet cough. Review of Systems:   [address] All other remaining systems are negative. Constitutional:  fever, chills, weakness, weight change, fatigue,      Skin:  rash, pruritus, hair loss, bruising, dry skin, petechiae. Head, Face, Neck   headaches, swelling,  cervical adenopathy. Respiratory: shortness of breath, cough, or wheezing  Cardiovascular: chest pain, palpitations, dizzy, edema  Gastrointestinal: nausea, vomiting, diarrhea, constipation,belly pain    Yellow skin, blood in stool  Musculoskeletal:  back pain, muscle weakness, gait problems,       joint pain or swelling. Genitourinary:  dysuria, poor urine flow, flank pain, blood in urine  Neurologic:  vertigo, TIA'S, syncope, seizures, focal weakness  Psychosocial:  insomnia, anxiety, or depression. Additional positive findings: -     PMH/SH/FH:    Medical Hx: reviewed and updated as appropriate  Past Medical History:   Diagnosis Date    Anemia 03/2022    Arthritis     CAD (coronary artery disease) 01/2020    Cancer ([facility]     Colon Cancer diagnosed last month    Cerebral infarction [facility])     Cognitive communication deficit     COVID-19     Diabetes mellitus ([facility] [address]     Dysphagia     End stage renal disease ([facility] [address]     Fatigue     Gastrointestinal hemorrhage     Hemodialysis patient ([facility] [address] 2019    ckd-goes to dialysis Tuesday, Thurs, Fri    Hx of blood clots     Hyperlipidemia     Hypertension     Hyponatremia     Risk for falls     Splenomegaly 02/10/2021         Surgical Hx: reviewed and updated as appropriate   has a past surgical history that includes IR PERC ARTERIOVENOUS FISTULA CREATION (Left); colectomy (N/A, 01/26/2022); sigmoidoscopy (N/A, 02/17/2022);  IR EMBOLIZATION HEMORRHAGE (02/19/2022); Tunneled venous catheter placement (Right, 02/21/2022); IR TUNNELED CVC PLACE WO SQ PORT/PUMP > 5 YEARS (2/21/2022); Cardiac catheterization (2019); Colonoscopy; Dialysis fistula creation (Left, 4/29/2022); Cholecystectomy, laparoscopic (N/A, 5/16/2022); ERCP (N/A, 5/16/2022); ERCP (N/A, 5/16/2022); and Upper gastrointestinal endoscopy (5/16/2022). Social Hx: reviewed and updated as appropriate  Social History     Tobacco Use    Smoking status: Former    Smokeless tobacco: Never   Substance Use Topics    Alcohol use: Not Currently        Family hx: reviewed and updated as appropriate  family history includes High Blood Pressure in his father. Medications:   sodium chloride flush, 10 mL, 2 times per day  apixaban, 2.5 mg, BID  aspirin, 81 mg, Daily  atenolol, 25 mg, QPM  atorvastatin, 10 mg, Nightly  gabapentin, 100 mg, TID  LORazepam, 0.5 mg, BID  pantoprazole, 20 mg, Nightly  sevelamer, 800 mg, TID WC  tamsulosin, 0.4 mg, QAM  metroNIDAZOLE, 500 mg, Q8H  docusate sodium, 200 mg, BID  cefTRIAXone (ROCEPHIN) IV, 2,000 mg, Q24H       Lisinopril    Allergies: Allergies   Allergen Reactions    Lisinopril Swelling     Allergy listed on paperwork from [facility], no reaction noted         Physical Exam/Objective:   Vitals:    12/21/22 1156   BP:    Pulse:    Resp:    Temp:    SpO2: 97%     No intake or output data in the 24 hours ending 12/21/22 1552      General appearance:  in no acute distress, comfortable,lethargic  HEENT: no icterus, EOM intact, trachea midline. Neck : no masses, appears symmetrical and no JVD appreciated. Respiratory: Respiratory effort normal, bilateral equal chest rise. No wheeze, no crackles   Cardiovascular: Ausculation shows RRR and  NO edema   Abdomen: abdomen is soft, non distended, no masses, no pain with palpation.   Musculoskeletal:  no joint swelling, no deformity  Skin: no rashes, no induration, no tightening, no jaundice Neuro:   Follows commands, moves all extremities spontaneously       Data:   CBC:   Recent Labs     12/20/22  1739 12/21/22  0704   WBC 8.3 7.5   HGB 10.9* 10.9*   HCT 33.5* 34.1*    192     BMP:    Recent Labs     12/20/22  1739 12/21/22  0704   * 139   K 4.2 4.7   CL 95* 99   CO2 29 27   BUN 25* 32*   CREATININE 3.6* 3.9*   GLUCOSE 214* 174*

## 2022-12-21 NOTE — ED PROVIDER NOTES
[address]        Pt Name: Maribell Fried  MRN: [id number]  [place] 1945  Date of evaluation: 12/20/2022  Provider: Steve Duvall PA-C  PCP: Robinson Garcia  Note Started: 8:19 PM EST12/21/2022        I have seen and evaluated this patient with my supervising physician Stacie Fleming MD.      Jayson Estr[address]       Chief Complaint   Patient presents with    Fatigue     Pt arrived via ems from [facility] for report of altered mental status all day today, patient is more lethargic than normal baseline. Pt has dementia but normal baseline is able to have conversation with staff. Pt very sleepy at triage. Has wet cough. HISTORY OF PRESENT ILLNESS   (Location/Symptom, Timing/Onset, Context/Setting, Quality, Duration, Modifying Factors, Severity)  Note limiting factors. Chief Complaint: fatigue, lethargy    Maribell Fried is a 69 y.o. male who presents to the ED with complaint of fatigue, lethargy that worsened today after he got home from dialysis. Nursing home states that he is normally very responsive to their questions, however today he could only give yes or no answers. He was very lethargic. They state that they took his temperature and he had a temperature of 100 so they sent him here to the emergency department. Patient denies pain at this time. He does have a cough. Nursing Notes were all reviewed and agreed with or any disagreements were addressed in the HPI.     REVIEW OF SYSTEMS    (2-9 systems for level 4, 10 or more for level 5)     Review of Systems   Unable to perform ROS: Mental status change     PAST MEDICAL HISTORY     Past Medical History:   Diagnosis Date    Anemia 03/2022    Arthritis     CAD (coronary artery disease) 01/2020    Cancer [facility])     Colon Cancer diagnosed last month    Cerebral infarction [facility])     Cognitive communication deficit     COVID-19     Diabetes mellitus ([facility] [address]     Dysphagia     End stage renal disease ([facility] [address]     Fatigue     Gastrointestinal hemorrhage     Hemodialysis patient ([facility] [address] 2019    ckd-goes to dialysis Tuesday, Thurs, Fri    Hx of blood clots     Hyperlipidemia     Hypertension     Hyponatremia     Risk for falls     Splenomegaly 02/10/2021       SURGICAL HISTORY     Past Surgical History:   Procedure Laterality Date    CARDIAC CATHETERIZATION  2019    CHOLECYSTECTOMY, LAPAROSCOPIC N/A 5/16/2022    LAPAROSCOPIC CHOLECYSTECTOMY WITH CHOLANGIOGRAM performed by Lotus Morales MD at [address] 01/26/2022    SIGMOID COLECTOMY, SIGMOIDOSCOPY performed by Lotus Morales MD at [facility] Left 4/29/2022    LEFT BRACHIAL CEPHALIC FISTULA performed by Suellen Gottron, MD at [place] [address]    ERCP N/A 5/16/2022    ERCP SPHINCTER/PAPILLOTOMY performed by Cristiano Perez MD at [address] [place] [address]    ERCP N/A 5/16/2022    ERCP STONE REMOVAL/BALLOON SWEEP performed by Cristiano Perez MD at [facility]  02/19/2022    IR EMBOLIZATION HEMORRHAGE 2/19/2022 [facility]    IR PERC ARTERIOVENOUS FISTULA CREATION Left     unsure of date    IR TUNNELED CATHETER PLACEMENT GREATER THAN 5 YEARS  2/21/2022    IR TUNNELED CATHETER PLACEMENT GREATER THAN 5 YEARS 2/21/2022 [facility]    SIGMOIDOSCOPY N/A 02/17/2022    SIGMOIDOSCOPY CONTROL HEMORRHAGE performed by Alberto Yuen MD at [facility] [address] Right 02/21/2022    Permacath; RIJ access; 23cm; Dr. Divya Rosado  5/16/2022    ERCP POLYP SNARE performed by Cristiano Perez MD at [facility] [address]       Current Discharge Medication List        CONTINUE these medications which have NOT CHANGED    Details   oxyCODONE (ROXICODONE) 5 MG immediate release tablet Take 5 mg by mouth every 4 hours as needed for Pain.       ondansetron (ZOFRAN) 4 MG/2ML injection Infuse 4 mg intravenously every 6 hours as needed for Nausea or Vomiting      LORazepam (ATIVAN) 0.5 MG tablet Take 0.5 mg by mouth in the morning and at bedtime. Wound Dressings ([facility] WOUND/BURN DRESSING) GEL gel Apply 1 each topically daily Apply to RIGHT ankle wound daily  Qty: 44 mL, Refills: 2      docusate sodium (COLACE) 100 MG capsule Take 100 mg by mouth 2 times daily      polyethylene glycol (GLYCOLAX) 17 GM/SCOOP powder Take 17 g by mouth daily as needed (Constipation)       apixaban (ELIQUIS) 5 MG TABS tablet Take 1 tablet by mouth 2 times daily  Qty: 60 tablet, Refills: 0      aspirin 81 MG chewable tablet Take 1 tablet by mouth daily  Qty: 30 tablet, Refills: 0      melatonin 3 MG TABS tablet Take 2 tablets by mouth nightly as needed (sleep)  Qty: 6 tablet, Refills: 0      tamsulosin (FLOMAX) 0.4 MG capsule Take 0.4 mg by mouth every morning      gabapentin (NEURONTIN) 100 MG capsule Take 100 mg by mouth 3 times daily.       sevelamer (RENVELA) 800 MG tablet Take 800 mg by mouth 3 times daily (with meals)       atenolol (TENORMIN) 25 MG tablet Take 25 mg by mouth every evening      pantoprazole (PROTONIX) 20 MG tablet Take 20 mg by mouth nightly      atorvastatin (LIPITOR) 10 MG tablet Take 10 mg by mouth nightly      calcitRIOL (ROCALTROL) 0.25 MCG capsule Take 0.25 mcg by mouth every evening             ALLERGIES     Lisinopril    FAMILYHISTORY       Family History   Problem Relation Age of Onset    High Blood Pressure Father         SOCIAL HISTORY       Social History     Socioeconomic History    Marital status:      Spouse name: None    Number of children: None    Years of education: None    Highest education level: None   Tobacco Use    Smoking status: Former    Smokeless tobacco: Never   Vaping Use    Vaping Use: Never used   Substance and Sexual Activity    Alcohol use: Not Currently    Drug use: Never       SCREENINGS        Colman Coma Scale  Eye Opening: Spontaneous  Best Verbal Response: Confused  Best Motor Response: Obeys commands  [place] Coma Scale Score: 14                CIWA Assessment  BP: 123/63  Heart Rate: 68             PHYSICAL EXAM    (up to 7 for level 4, 8 or more for level 5)     ED Triage Vitals   BP Temp Temp Source Heart Rate Resp SpO2 Height Weight   12/20/22 1725 12/20/22 1725 12/20/22 1725 12/20/22 1725 12/20/22 1725 12/20/22 1726 12/20/22 1726 12/20/22 1726   (!) 95/49 98.5 °F (36.9 °C) Oral 63 16 94 % 6' (1.829 m) 198 lb 10.2 oz (90.1 kg)       Physical Exam  Constitutional:       Appearance: Normal appearance. He is ill-appearing (chronically). He is not toxic-appearing or diaphoretic. HENT:      Head: Normocephalic and atraumatic. Right Ear: External ear normal.      Left Ear: External ear normal.      Nose: Nose normal.      Mouth/Throat:      Mouth: Mucous membranes are dry. Pharynx: Oropharynx is clear. No oropharyngeal exudate. Eyes:      General:         Right eye: No discharge. Left eye: No discharge. Extraocular Movements: Extraocular movements intact. Pupils: Pupils are equal, round, and reactive to light. Cardiovascular:      Rate and Rhythm: Normal rate and regular rhythm. Pulmonary:      Effort: Pulmonary effort is normal. No respiratory distress. Abdominal:      General: Abdomen is flat. Palpations: Abdomen is soft. Tenderness: There is no abdominal tenderness. Comments: Urticarial rash   Musculoskeletal:         General: Normal range of motion. Cervical back: Normal range of motion. Right lower leg: No edema. Left lower leg: No edema. Comments: Well healing wound on his right lateral mallelous     Skin:     General: Skin is warm and dry. Neurological:      General: No focal deficit present. Mental Status: He is alert and oriented to person, place, and time.    Psychiatric:         Mood and Affect: Mood normal.         Behavior: Behavior normal.   U    DIAGNOSTIC RESULTS LABS:    Labs Reviewed   CBC WITH AUTO DIFFERENTIAL - Abnormal; Notable for the following components:       Result Value    RBC 3.36 (*)     Hemoglobin 10.9 (*)     Hematocrit 33.5 (*)     Lymphocytes Absolute 0.5 (*)     All other components within normal limits   COMPREHENSIVE METABOLIC PANEL W/ REFLEX TO MG FOR LOW K - Abnormal; Notable for the following components:    Sodium 135 (*)     Chloride 95 (*)     Glucose 214 (*)     BUN 25 (*)     Creatinine 3.6 (*)     Est, Glom Filt Rate 17 (*)     Total Protein 6.3 (*)     Albumin 3.3 (*)     AST 13 (*)     All other components within normal limits   TROPONIN - Abnormal; Notable for the following components:    Troponin 0.07 (*)     All other components within normal limits   URINALYSIS WITH MICROSCOPIC - Abnormal; Notable for the following components:    Color, UA DARK YELLOW (*)     Clarity, UA CLOUDY (*)     Blood, Urine LARGE (*)     Protein,  (*)     Leukocyte Esterase, Urine LARGE (*)     Mucus, UA Rare (*)     WBC, UA  (*)     RBC, UA 21-50 (*)     Bacteria, UA 1+ (*)     All other components within normal limits   LACTATE, SEPSIS - Abnormal; Notable for the following components:    Lactic Acid, Sepsis 2.2 (*)     All other components within normal limits   LACTATE, SEPSIS - Abnormal; Notable for the following components:    Lactic Acid, Sepsis 3.3 (*)     All other components within normal limits   URINE DRUG SCREEN - Abnormal; Notable for the following components:    Oxycodone Urine POSITIVE (*)     All other components within normal limits   BLOOD GAS, VENOUS - Abnormal; Notable for the following components:    pCO2, Rashi 53.6 (*)     HCO3, Venous 32 (*)     All other components within normal limits   COVID-19, RAPID   RAPID INFLUENZA A/B ANTIGENS   CULTURE, BLOOD 1   CULTURE, BLOOD 2   TSH WITH REFLEX   AMMONIA   CK   COMPREHENSIVE METABOLIC PANEL W/ REFLEX TO MG FOR LOW K   CBC WITH AUTO DIFFERENTIAL   TROPONIN       When ordered, only abnormal lab results are displayed. All other labs were within normal range or not returned as of this dictation. EKG: When ordered, EKG's are interpreted by the Emergency Department Physician in the absence of a cardiologist.  Please see their note for interpretation of EKG. RADIOLOGY:   Non-plain film images such as CT, Ultrasound and MRI are read by the radiologist. Plain radiographic images are visualized and preliminarily interpreted by the  ED Provider with the below findings:        Interpretation perthe Radiologist below, if available at the time of this note:    CT ABDOMEN PELVIS WO CONTRAST Additional Contrast? None   Final Result   Mild wall thickening involving the distal descending colon through the rectum   with adjacent fat stranding suggestive of proctocolitis. Mild bladder wall thickening with perivesicular fat stranding suggestive of   cystitis. Correlation with urinalysis recommended. Large stool ball within the rectum with adjacent inflammatory changes, as   above. Mild splenomegaly. Nonobstructive right nephrolithiasis. CT HEAD WO CONTRAST   Final Result   No acute intracranial abnormality. XR CHEST PORTABLE   Final Result   Stable cardiac enlargement with no acute findings. MRI BRAIN WO CONTRAST    (Results Pending)   VL DUP CAROTID BILATERAL    (Results Pending)     CT HEAD WO CONTRAST    Result Date: 12/20/2022  EXAMINATION: CT OF THE HEAD WITHOUT CONTRAST  12/20/2022 7:11 pm TECHNIQUE: CT of the head was performed without the administration of intravenous contrast. Automated exposure control, iterative reconstruction, and/or weight based adjustment of the mA/kV was utilized to reduce the radiation dose to as low as reasonably achievable. COMPARISON: 07/01/2022 HISTORY: ORDERING SYSTEM PROVIDED HISTORY: AMS TECHNOLOGIST PROVIDED HISTORY: Has a \"code stroke\" or \"stroke alert\" been called? ->No Reason for exam:->AMS Decision Support Exception - unselect if not a suspected or confirmed emergency medical condition->Emergency Medical Condition (MA) Reason for Exam: ams FINDINGS: BRAIN/VENTRICLES: There is no acute intracranial hemorrhage, mass effect or midline shift. No abnormal extra-axial fluid collection. The gray-white differentiation is maintained without evidence of an acute infarct. There is no evidence of hydrocephalus. There are chronic atrophic and ischemic white matter changes which are similar to the prior exam. ORBITS: The visualized portion of the orbits demonstrate no acute abnormality. SINUSES: The visualized paranasal sinuses and mastoid air cells demonstrate no acute abnormality. SOFT TISSUES/SKULL:  No acute abnormality of the visualized skull or soft tissues. No acute intracranial abnormality. XR CHEST PORTABLE    Result Date: 12/20/2022  EXAMINATION: ONE XRAY VIEW OF THE CHEST 12/20/2022 5:34 pm COMPARISON: September 15, 2022 HISTORY: ORDERING SYSTEM PROVIDED HISTORY: AMS, cough TECHNOLOGIST PROVIDED HISTORY: Reason for exam:->AMS, cough Reason for Exam: AMS, cough, weak FINDINGS: The cardiomediastinal silhouette is enlarged. No pleural effusion or pneumothorax. No focal consolidation. Right hemodialysis catheter over the SVC. Stable cardiac enlargement with no acute findings. No results found.     PROCEDURES   Unless otherwise noted below, none     Procedures    CRITICAL CARE TIME   N/A    CONSULTS:  IP CONSULT TO NEPHROLOGY      EMERGENCY DEPARTMENT COURSE and DIFFERENTIAL DIAGNOSIS/MDM:   Vitals:    Vitals:    12/20/22 2145 12/20/22 2200 12/20/22 2230 12/21/22 0000   BP: 102/64 (!) 106/55 123/63 123/63   Pulse: 79 73 73 68   Resp: 18 16 18    Temp:  98.6 °F (37 °C) 97.9 °F (36.6 °C)    TempSrc:   Oral    SpO2:  100% 97%    Weight:       Height:           Patient was given the following medications:  Medications   sodium chloride flush 0.9 % injection 10 mL (10 mLs IntraVENous Given 12/21/22 0001)   sodium chloride flush 0.9 % injection 10 mL (has no administration in time range)   0.9 % sodium chloride infusion (has no administration in time range)   promethazine (PHENERGAN) tablet 12.5 mg (has no administration in time range)     Or   ondansetron (ZOFRAN) injection 4 mg (has no administration in time range)   acetaminophen (TYLENOL) tablet 650 mg (has no administration in time range)     Or   acetaminophen (TYLENOL) suppository 650 mg (has no administration in time range)   apixaban (ELIQUIS) tablet 2.5 mg (2.5 mg Oral Given 12/20/22 2359)   aspirin chewable tablet 81 mg (has no administration in time range)   atenolol (TENORMIN) tablet 25 mg (25 mg Oral Not Given 12/21/22 0000)   atorvastatin (LIPITOR) tablet 10 mg (10 mg Oral Given 12/20/22 2359)   gabapentin (NEURONTIN) capsule 100 mg (has no administration in time range)   LORazepam (ATIVAN) tablet 0.5 mg (has no administration in time range)   melatonin tablet 6 mg (6 mg Oral Given 12/20/22 2359)   pantoprazole (PROTONIX) tablet 20 mg (20 mg Oral Given 12/21/22 0000)   sevelamer (RENVELA) tablet 800 mg (has no administration in time range)   tamsulosin (FLOMAX) capsule 0.4 mg (has no administration in time range)   metronidazole (FLAGYL) 500 mg in 0.9% NaCl 100 mL IVPB premix (500 mg IntraVENous New Bag 12/20/22 2357)   docusate sodium (COLACE) capsule 200 mg (200 mg Oral Given 12/21/22 0000)   cefTRIAXone (ROCEPHIN) 2,000 mg in dextrose 5 % 50 mL IVPB mini-bag (has no administration in time range)   0.9 % sodium chloride bolus (0 mLs IntraVENous Stopped 12/20/22 1932)   famotidine (PEPCID) 20 mg in sodium chloride (PF) 0.9 % 10 mL injection (20 mg IntraVENous Given 12/20/22 1829)   methylPREDNISolone sodium (SOLU-MEDROL) injection 125 mg (125 mg IntraVENous Given 12/20/22 1827)   0.9 % sodium chloride bolus (0 mLs IntraVENous Stopped 12/20/22 2122)   cefTRIAXone (ROCEPHIN) 1,000 mg in dextrose 5 % 50 mL IVPB mini-bag (0 mg IntraVENous Stopped 12/20/22 7634) Is this patient to be included in the SEP-1 Core Measure due to severe sepsis or septic shock? No   Exclusion criteria - the patient is NOT to be included for SEP-1 Core Measure due to:  2+ SIRS criteria are not met    This is a 69y.o. year old, chronically ill male with  has a past medical history of Anemia, Arthritis, CAD (coronary artery disease), Cancer ([facility] [address] Cerebral infarction ([facility] [address] Cognitive communication deficit, COVID-19, Diabetes mellitus ([facility] [address] Dysphagia, End stage renal disease ([facility] [address] Fatigue, Gastrointestinal hemorrhage, Hemodialysis patient ([facility] [address] Hx of blood clots, Hyperlipidemia, Hypertension, Hyponatremia, Risk for falls, and Splenomegaly. who presents to the ED with complaint of AMS, lethargy that began earlier today. Vitals upon arrival show mildly hypotensive, saturating in high 80s, placed on 2 L oxygen. Physical Exam shows as above. Urticarial rash resolved with pepcid and solumedrol  Blood work was done and shows:   -elevated lactic acid at 2.2, increased to 3.3  -CKD  -elevated troponin of 0.07 seems to be his baseline  Covid/Flu: negative  Urinalysis: indicative of infection  EKG: interpreted by my attending, please see note     Imaging was reviewed and interpreted by radiologist as above showing:   -possible cystitis, proctitis, possible fecal impaction    Fecal impaction performed by myself in ED    Consults:  -hospitalist      Management Given:  -fluids, solumedrol, pepcid, zosyn, rocehpin, symptoms unchanged    Disposition: admission   Patient was admitted in stable condition to hospitalist team.     All questions were answered. I am the Primary Clinician of Record. FINAL IMPRESSION      1. Altered mental status, unspecified altered mental status type    2. Acute cystitis without hematuria    3. Lactic acidosis          DISPOSITION/PLAN   DISPOSITION Admitted 12/20/2022 09:28:34 PM      PATIENT REFERREDTO:  No follow-up provider specified.     DISCHARGE MEDICATIONS:  Current Discharge Medication List          DISCONTINUED MEDICATIONS:  Current Discharge Medication List                 (Please note that portions ofthis note were completed with a voice recognition program.  Efforts were made to edit the dictations but occasionally words are mis-transcribed.)    Perry Ahn PA-C (electronically signed)             ePrry Ahn PA-C  12/21/22 0032

## 2022-12-21 NOTE — PROGRESS NOTES
Pt has a healing but slightly open DTI on his bottom that was present on admission. Applied triad cream and ordered a speciality bed for him. Will continue to monitor.

## 2022-12-21 NOTE — PROGRESS NOTES
Physician Progress Note      Annie Aquino  CSN #:                  [id number]  :                       1945  ADMIT DATE:       2022 5:15 PM  [address] DATE:  Alok Kern  PROVIDER #:        Teresa Barrera MD          QUERY TEXT:    Dear Dr. Jaiden Tyler,  Pt admitted with Proctocolitis, Cystitis, anemia, ESRD and has acute   encephalopathy documented. If possible, please document in progress notes and   discharge summary further specificity regarding the type of encephalopathy:    The medical record reflects the following:  Risk Factors: Hx CVA, DM, ESRD on HD, HTN, on gabapentin, lorazepam, drug   screen + for Oxycodone ( all on home med list), Current noted Proctocolitis   and Cystitis  Clinical Indicators:  ED \" ED with complaint of fatigue, lethargy that   worsened today after he got home from dialysis. Nursing home states that he   is normally very responsive to their questions, however today he could only   give yes or no answers. \" UA+, \"upon arrival show mildly hypotensive,   saturating in high 80s, placed on 2 L oxygen\" Admitted for \"Acute   encephalopathy in setting of dementia:  Due to right leg weaker than left with limited history we will obtain an MRI   to rule out CVA, Noted patient is on gabapentin, lorazepam in the setting of   vascular disease and this I suspect this is contributing\" MRI-no acute  Treatment: Zosyn, Rocephin, Flagyl, MRI, Neph consult  Thank you,  Nathalie Romero RN, [email or web address]  Options provided:  -- Metabolic encephalopathy related to proctocolitis and cystitis  -- Drug-induced encephalopathy due to gabapentin, lorazepam  -- Drug-induced encephalopathy due to, Please specify substance.   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    Query created by:  Mirtha Kramer on 12/21/2022 2:26 PM      Electronically signed by:  Mary Aviles MD 12/21/2022 3:28 PM

## 2022-12-21 NOTE — ACP (ADVANCE CARE PLANNING)
Advance Care Planning     Advance Care Planning Activator (Inpatient)  Conversation Note      Date of ACP Conversation: 12/21/2022     Conversation Conducted with:  Healthcare Decision Maker: Next of Kin by law (only applies in absence of above) (name) Zack Diego (Spouse)    ACP Activator: [address] Decision Maker:     Current Designated Health Care Decision Maker:     Primary Decision Maker: Bc Pastrana - [phone]    Secondary Decision Maker: Abyreyna Resendiz - Child - [phone]    Secondary Decision Maker: Pat Grady Child - [phone]    Care Preferences    Ventilation: \"If you were in your present state of health and suddenly became very ill and were unable to breathe on your own, what would your preference be about the use of a ventilator (breathing machine) if it were available to you? \"      Would the patient desire the use of ventilator (breathing machine)?: yes    \"If your health worsens and it becomes clear that your chance of recovery is unlikely, what would your preference be about the use of a ventilator (breathing machine) if it were available to you? \"     Would the patient desire the use of ventilator (breathing machine)?: No      Resuscitation  \"CPR works best to restart the heart when there is a sudden event, like a heart attack, in someone who is otherwise healthy. Unfortunately, CPR does not typically restart the heart for people who have serious health conditions or who are very sick. \"    \"In the event your heart stopped as a result of an underlying serious health condition, would you want attempts to be made to restart your heart (answer \"yes\" for attempt to resuscitate) or would you prefer a natural death (answer \"no\" for do not attempt to resuscitate)? \" yes       [] Yes   [x] No   Educated Patient / Joseph Lewis regarding differences between Advance Directives and portable DNR orders.     Length of ACP Conversation in minutes: 10     Conversation Outcomes:  [x] ACP discussion completed  [] Existing advance directive reviewed with patient; no changes to patient's previously recorded wishes  [] New Advance Directive completed  [] Portable Do Not Rescitate prepared for Provider review and signature  [] POLST/POST/MOLST/MOST prepared for Provider review and signature      Follow-up plan:    [] Schedule follow-up conversation to continue planning  [] Referred individual to Provider for additional questions/concerns   [] Advised patient/agent/surrogate to review completed ACP document and update if needed with changes in condition, patient preferences or care setting    [x] This note routed to one or more involved healthcare providers  Electronically signed by Mara Sofia on 12/21/2022 at 11:51 AM

## 2022-12-21 NOTE — H&P
Hospital Medicine History & Physical      PCP: Cody Perez    Date of Admission: 12/20/2022    Date of Service: Pt seen/examined on 12/20/2022    Pt seen/examined face to face on and admitted as inpatient with expected LOS greater than two midnights due to medical therapy  Chief Complaint:    Chief Complaint   Patient presents with    Fatigue     Pt arrived via ems from [facility] for report of altered mental status all day today, patient is more lethargic than normal baseline. Pt has dementia but normal baseline is able to have conversation with staff. Pt very sleepy at triage. Has wet cough. History Of Present Illness:      69 y.o. male who presented to [facility] with past medical history of CAD, type 2 diabetes, hypertension, hyperlipidemia, history of seizures presented as a transfer from SNF due to acute encephalopathy. Next  Nursing home did not yield any information except the patient is now at baseline. Patient is wheelchair-bound. He wanted to make yes POA reported that the patient does have pre-existing dementia however has been getting more confused the past 3 days. No known alleviating worsening factor no association with no change to medications seizures activities.       Past Medical History:          Diagnosis Date    Anemia 03/2022    Arthritis     CAD (coronary artery disease) 01/2020    Cancer [facility])     Colon Cancer diagnosed last month    Cerebral infarction [facility])     Cognitive communication deficit     COVID-19     Diabetes mellitus ([address]     Dysphagia     End stage renal disease ([facility]     Fatigue     Gastrointestinal hemorrhage     Hemodialysis patient ([facility] 2019    ckd-goes to dialysis Tuesday, Thurs, Fri    Hx of blood clots     Hyperlipidemia     Hypertension     Hyponatremia     Risk for falls     Splenomegaly 02/10/2021       Past Surgical History:          Procedure Laterality Date    CARDIAC CATHETERIZATION  2019    CHOLECYSTECTOMY, LAPAROSCOPIC N/A 5/16/2022    LAPAROSCOPIC CHOLECYSTECTOMY WITH CHOLANGIOGRAM performed by Margoth Allen MD at [address] 01/26/2022    SIGMOID COLECTOMY, SIGMOIDOSCOPY performed by Margoth Allen MD at [facility] Left 4/29/2022    LEFT BRACHIAL CEPHALIC FISTULA performed by Neyda Casiano MD at [place] [address]    ERCP N/A 5/16/2022    ERCP SPHINCTER/PAPILLOTOMY performed by Kamila Thorpe MD at [address] [place] [address]    ERCP N/A 5/16/2022    ERCP STONE REMOVAL/BALLOON SWEEP performed by Kamila Thorpe MD at [facility]  02/19/2022    IR EMBOLIZATION HEMORRHAGE 2/19/2022 [facility]     [facility] Dr Duncan     unsure of date    IR TUNNELED CATHETER PLACEMENT GREATER THAN 5 YEARS  2/21/2022    IR TUNNELED CATHETER PLACEMENT GREATER THAN 5 YEARS 2/21/2022 [facility]    SIGMOIDOSCOPY N/A 02/17/2022    SIGMOIDOSCOPY CONTROL HEMORRHAGE performed by Tho Lepe MD at [facility] [address] Right 02/21/2022    Permacath; RIJ access; 23cm; Dr. Merari Hirsch  5/16/2022    ERCP POLYP SNARE performed by Kamila Thorpe MD at [address] [place] [address]       Medications Prior to Admission:      Prior to Admission medications    Medication Sig Start Date End Date Taking? Authorizing Provider   oxyCODONE (ROXICODONE) 5 MG immediate release tablet Take 5 mg by mouth every 4 hours as needed for Pain. Yes Historical Provider, MD   ondansetron (ZOFRAN) 4 MG/2ML injection Infuse 4 mg intravenously every 6 hours as needed for Nausea or Vomiting   Yes Historical Provider, MD   LORazepam (ATIVAN) 0.5 MG tablet Take 0.5 mg by mouth in the morning and at bedtime.     Historical Provider, MD   Wound Dressings ([facility] WOUND/BURN DRESSING) GEL gel Apply 1 each topically daily Apply to RIGHT ankle wound daily 6/13/22   Michele Andrade MD   docusate sodium (COLACE) 100 MG capsule Take 100 mg by mouth 2 times daily    Historical Provider, MD   polyethylene glycol (GLYCOLAX) 17 GM/SCOOP powder Take 17 g by mouth daily as needed (Constipation)     Historical Provider, MD   apixaban (ELIQUIS) 5 MG TABS tablet Take 1 tablet by mouth 2 times daily 3/2/22   Tyrel Camara MD   aspirin 81 MG chewable tablet Take 1 tablet by mouth daily 2/11/22   Manohar Ray MD   melatonin 3 MG TABS tablet Take 2 tablets by mouth nightly as needed (sleep) 2/4/22   Jason Alberto MD   tamsulosin (FLOMAX) 0.4 MG capsule Take 0.4 mg by mouth every morning    Historical Provider, MD   gabapentin (NEURONTIN) 100 MG capsule Take 100 mg by mouth 3 times daily. Historical Provider, MD   sevelamer (RENVELA) 800 MG tablet Take 800 mg by mouth 3 times daily (with meals)     Historical Provider, MD   atenolol (TENORMIN) 25 MG tablet Take 25 mg by mouth every evening    Historical Provider, MD   pantoprazole (PROTONIX) 20 MG tablet Take 20 mg by mouth nightly    Historical Provider, MD   atorvastatin (LIPITOR) 10 MG tablet Take 10 mg by mouth nightly    Historical Provider, MD   calcitRIOL (ROCALTROL) 0.25 MCG capsule Take 0.25 mcg by mouth every evening    Historical Provider, MD       Allergies:  Lisinopril    Social History:          TOBACCO:   reports that he has quit smoking. He has never used smokeless tobacco.  ETOH:   reports that he does not currently use alcohol.   E-cigarette/Vaping       Questions Responses    E-cigarette/Vaping Use Never User    Start Date     Passive Exposure     Quit Date     Counseling Given     Comments               Family History:      Family History reviewed with patient, and does not pertain and non-contributory to the current illness        Problem Relation Age of Onset    High Blood Pressure Father        REVIEW OF SYSTEMS:   Limited accuracy as patient is encephalopathic      Constitutional:  No Fever, No Chills, No Night Sweats  ENT/Mouth:  No Nasal Congestion,  No Hoarseness, No new mouth lesion  Eyes:  No Eye Pain, No Redness, No Discharge  Cardiovascular:  No Chest Pain, No Orthopnea, No Palpitations  Respiratory:  No Cough, No Sputum, No Dyspnea  Gastrointestinal: No Vomiting, No Diarrhea, No abdominal pain  Genitourinary: No Urinary Frequency, No Hematuria, No Urinary pain  Musculoskeletal:  No worsening Arthralgias, No worsening Myalgias  Skin:  No new Skin Lesions, No new skin rash  Neuro:  No new weakness, No new numbness. Psych:  No suicial ideation, No Violence ideation    PHYSICAL EXAM PERFORMED:    /63   Pulse 73   Temp 97.9 °F (36.6 °C) (Oral)   Resp 18   Ht 6' (1.829 m)   Wt 198 lb 10.2 oz (90.1 kg)   SpO2 97%   BMI 26.94 kg/m²     General appearance:  mild acute distress, appears older than stated age  HEENT:   atraumatic, sclera anicteric, Conjunctivae clear. Neck: Supple,Trachea midline, no goiter  Respiratory:minimal accessory muscle usage, Normal respiratory effort. Clear to auscultation, bilaterally without wheezing  Cardiovascular:  Regular rate and rhythm, capillary refill 2 seconds  Abdomen: Soft, non-tender, non-distended with normal bowel sounds. Musculoskeletal:  No clubbing, cyanosis. trace edema LE bilaterally. Skin: turgor normal.  No new rashes or lesions. Neurologic: Alert and oriented x2, does not participate in exam, able to move upper extremity and lower extremity except the right leg is a little weaker than the left    Labs:     Recent Labs     12/20/22  1739   WBC 8.3   HGB 10.9*   HCT 33.5*        Recent Labs     12/20/22  1739   *   K 4.2   CL 95*   CO2 29   BUN 25*   CREATININE 3.6*   CALCIUM 9.0     Recent Labs     12/20/22  1739   AST 13*   ALT 11   BILITOT 0.8   ALKPHOS 101     No results for input(s): INR in the last 72 hours.   Recent Labs     12/20/22  1739   TROPONINI 0.07*       Urinalysis:      Lab Results   Component Value Date/Time    NITRU Negative 12/20/2022 05:39 PM    [address]  12/20/2022 05:39 PM    BACTERIA 1+ 12/20/2022 05:39 PM    RBCUA 21-50 12/20/2022 05:39 PM    BLOODU LARGE 12/20/2022 05:39 PM    SPECGRAV 1.020 12/20/2022 05:39 PM    GLUCOSEU Negative 12/20/2022 05:39 PM       Radiology:     CXR: I have reviewed the CXR with the following interpretation:   Cardiomegaly  EKG:  I have reviewed the EKG with the following interpretation:   Pending  CT ABDOMEN PELVIS WO CONTRAST Additional Contrast? None   Final Result   Mild wall thickening involving the distal descending colon through the rectum   with adjacent fat stranding suggestive of proctocolitis. Mild bladder wall thickening with perivesicular fat stranding suggestive of   cystitis. Correlation with urinalysis recommended. Large stool ball within the rectum with adjacent inflammatory changes, as   above. Mild splenomegaly. Nonobstructive right nephrolithiasis. CT HEAD WO CONTRAST   Final Result   No acute intracranial abnormality. XR CHEST PORTABLE   Final Result   Stable cardiac enlargement with no acute findings.              ASSESSMENT AND PLAN:    Active Hospital Problems    Diagnosis Date Noted    Acute encephalopathy [G93.40] 12/20/2022     Priority: Medium     Acute encephalopathy in setting of dementia:  Due to right leg weaker than left with limited history we will obtain an MRI to rule out CVA  Noted patient is on gabapentin, lorazepam in the setting of ESRDisease and this I suspect this is contributing  Neurochecks    Proctocolitis:  Ceftriaxone and Flagyl ordered    Cystitis:  Antibiotic as above    Large stool ball within the rectum:  Enema ordered    NSTEMI: Type II in the setting of end-stage renal disease, troponin continues to be at baseline  Continue to monitor    Normocytic anemia: Patient ferritin level above 500 iron saturation is above 45 thus is adequately replaced with iron  Nephrology on board, appreciated    Nonobstructive right nephrolithiasis: Outpatient follow-up  End-stage renal disease: Nephrology consulted, appreciated  History of DVT: On Eliquis    Face-to-face discussion with the primary ER physician in regards to symptoms, history, physical exam, diagnosis and treatment, collaborative decision was to admit the patient.     Diet: NPO except meds ordered    DVT Prophylaxis: eliquis      Dispo:   Expected LOS greater than two Jenn 1153, DO

## 2022-12-21 NOTE — FLOWSHEET NOTE
Pt refused to allow RN to perform skin assessment. Per E.D handoff RN stated pt has redness to coccyx. Will continue to monitor pt. Pt is resting in bed with call light in reach, bedside table in reach, bed in lowest position, and bed alarm on.

## 2022-12-21 NOTE — PLAN OF CARE
Problem: Safety - Adult  Goal: Free from fall injury  Outcome: Not Progressing     Problem: ABCDS Injury Assessment  Goal: Absence of physical injury  Outcome: Not Progressing     Problem: Safety - Adult  Goal: Free from fall injury  Outcome: Not Progressing     Problem: ABCDS Injury Assessment  Goal: Absence of physical injury  Outcome: Not Progressing

## 2022-12-21 NOTE — PROGRESS NOTES
Pt returned to room from having his MRI. VSS. Pt family in room helping pt eat breakfast. Bed in lowest position with wheels locked and call light within reach. Will continue to monitor.

## 2022-12-21 NOTE — PROGRESS NOTES
Hospitalist Progress Note      PCP: Ellaree Bloch    Date of Admission: 12/20/2022    Chief Complaint: Confusion    Hospital Course: 60-year-old male admitted to the hospital with altered mental status and confusion and lethargy    Subjective: Patient comfortable less confused right now. Denies fevers or chills      Medications:  Reviewed    Infusion Medications    sodium chloride       Scheduled Medications    sodium chloride flush  10 mL IntraVENous 2 times per day    apixaban  2.5 mg Oral BID    aspirin  81 mg Oral Daily    atenolol  25 mg Oral QPM    atorvastatin  10 mg Oral Nightly    gabapentin  100 mg Oral TID    LORazepam  0.5 mg Oral BID    pantoprazole  20 mg Oral Nightly    sevelamer  800 mg Oral TID WC    tamsulosin  0.4 mg Oral QAM    metroNIDAZOLE  500 mg IntraVENous Q8H    docusate sodium  200 mg Oral BID    cefTRIAXone (ROCEPHIN) IV  2,000 mg IntraVENous Q24H     PRN Meds: sodium chloride flush, sodium chloride, promethazine **OR** ondansetron, acetaminophen **OR** acetaminophen, melatonin    No intake or output data in the 24 hours ending 12/21/22 1524    Physical Exam Performed:    /68   Pulse 59   Temp 97.4 °F (36.3 °C) (Oral)   Resp 18   Ht 6' (1.829 m)   Wt 192 lb 0.3 oz (87.1 kg)   SpO2 97%   BMI 26.04 kg/m²     General appearance: No apparent distress, appears stated age and cooperative. Alert knows that he is at the hospital  HEENT: Pupils equal, round, and reactive to light. Conjunctivae/corneas clear. Neck: Supple, with full range of motion. No jugular venous distention. Trachea midline. Respiratory: Scattered rhonchi  Cardiovascular: Regular rate and rhythm with normal S1/S2 without murmurs, rubs or gallops. Abdomen: Soft, non-tender, non-distended with normal bowel sounds. Musculoskeletal: No clubbing, cyanosis or edema bilaterally. Full range of motion without deformity. Skin: Skin color, texture, turgor normal.  No rashes or lesions.   Neurologic: Neurovascularly intact without any focal sensory/motor deficits. Cranial nerves: II-XII intact, grossly non-focal.  Psychiatric: Alert and oriented, thought content appropriate, normal insight  Capillary Refill: Brisk, 3 seconds, normal   Peripheral Pulses: +2 palpable, equal bilaterally       Labs:   Recent Labs     12/20/22 1739 12/21/22  0704   WBC 8.3 7.5   HGB 10.9* 10.9*   HCT 33.5* 34.1*    192     Recent Labs     12/20/22 1739 12/21/22  0704   * 139   K 4.2 4.7   CL 95* 99   CO2 29 27   BUN 25* 32*   CREATININE 3.6* 3.9*   CALCIUM 9.0 9.0     Recent Labs     12/20/22 1739 12/21/22  0704   AST 13* 10*   ALT 11 10   BILITOT 0.8 0.5   ALKPHOS 101 91     No results for input(s): INR in the last 72 hours. Recent Labs     12/20/22 1739 12/21/22  0704   CKTOTAL  --  14*   TROPONINI 0.07* 0.06*       Urinalysis:      Lab Results   Component Value Date/Time    NITRU Negative 12/20/2022 05:39 PM    WBCUA  12/20/2022 05:39 PM    BACTERIA 1+ 12/20/2022 05:39 PM    RBCUA 21-50 12/20/2022 05:39 PM    BLOODU LARGE 12/20/2022 05:39 PM    SPECGRAV 1.020 12/20/2022 05:39 PM    GLUCOSEU Negative 12/20/2022 05:39 PM       Radiology:  MRI BRAIN WO CONTRAST   Preliminary Result   Cerebral atrophy. Severe chronic small vessel ischemic changes. No areas of   restricted diffusion to suggest an acute ischemic event. VL DUP CAROTID BILATERAL   Final Result      CT ABDOMEN PELVIS WO CONTRAST Additional Contrast? None   Final Result   Mild wall thickening involving the distal descending colon through the rectum   with adjacent fat stranding suggestive of proctocolitis. Mild bladder wall thickening with perivesicular fat stranding suggestive of   cystitis. Correlation with urinalysis recommended. Large stool ball within the rectum with adjacent inflammatory changes, as   above. Mild splenomegaly. Nonobstructive right nephrolithiasis.          CT HEAD WO CONTRAST   Final Result   No acute intracranial abnormality. XR CHEST PORTABLE   Final Result   Stable cardiac enlargement with no acute findings. IP CONSULT TO NEPHROLOGY    Assessment/Plan:    Acute metabolic encephalopathy  This is most likely medication related. I think that the benzodiazepines that he is received at the nursing home is contributing  He is mental status is improved likely can be discharged after dialysis  Stroke ruled out. Neurovascular work-up is negative so far  End-stage renal disease on hemodialysis  Have consulted nephrology. Unclear when he last got hemodialysis    Constipation  Treat with MiraLAX  As patient is a dialysis patient would avoid fleets enemas or milk of magnesia out of concern for toxicity    Proctocolitis  Continue Rocephin and Flagyl        Type II MI  Likely demand ischemia  Patient has no evidence of injury pattern on EKG  Conservative management for now  DVT Prophylaxis: Heparin subcu  Diet: ADULT DIET; Regular; 3 carb choices (45 gm/meal); Low Fat/Low Chol/High Fiber/2 gm Na;  Low Sodium (2 gm)  Code Status: Full Code  PT/OT Eval Status: Ordered    Dispo -discharge to skilled facility tomorrow      Mehdi Driscoll MD

## 2022-12-21 NOTE — ED NOTES
Handoff report given to SCOUT FERGUSON to assume care. No further questions at this time. I will place transport to room 4259. If any questions arise, please call [phone].      Jessie Begum RN  12/20/22 0785

## 2022-12-22 PROBLEM — Z79.01 CHRONIC ANTICOAGULATION: Status: ACTIVE | Noted: 2022-12-22

## 2022-12-22 PROBLEM — N30.00 ACUTE CYSTITIS WITHOUT HEMATURIA: Status: ACTIVE | Noted: 2022-12-22

## 2022-12-22 PROBLEM — R79.89 ELEVATED PROCALCITONIN: Status: ACTIVE | Noted: 2022-12-22

## 2022-12-22 PROBLEM — K52.9 COLITIS: Status: ACTIVE | Noted: 2022-12-22

## 2022-12-22 LAB
A/G RATIO: 1.1 (ref 1.1–2.2)
ALBUMIN SERPL-MCNC: 3.1 G/DL (ref 3.4–5)
ALP BLD-CCNC: 67 U/L (ref 40–129)
ALT SERPL-CCNC: 6 U/L (ref 10–40)
ANION GAP SERPL CALCULATED.3IONS-SCNC: 13 MMOL/L (ref 3–16)
AST SERPL-CCNC: 7 U/L (ref 15–37)
BASOPHILS ABSOLUTE: 0 K/UL (ref 0–0.2)
BASOPHILS RELATIVE PERCENT: 0.8 %
BILIRUB SERPL-MCNC: 0.3 MG/DL (ref 0–1)
BUN BLDV-MCNC: 49 MG/DL (ref 7–20)
CALCIUM SERPL-MCNC: 8.6 MG/DL (ref 8.3–10.6)
CHLORIDE BLD-SCNC: 97 MMOL/L (ref 99–110)
CO2: 26 MMOL/L (ref 21–32)
CREAT SERPL-MCNC: 4.4 MG/DL (ref 0.8–1.3)
EOSINOPHILS ABSOLUTE: 0 K/UL (ref 0–0.6)
EOSINOPHILS RELATIVE PERCENT: 0.7 %
GFR SERPL CREATININE-BSD FRML MDRD: 13 ML/MIN/{1.73_M2}
GLUCOSE BLD-MCNC: 114 MG/DL (ref 70–99)
GLUCOSE BLD-MCNC: 117 MG/DL (ref 70–99)
GLUCOSE BLD-MCNC: 149 MG/DL (ref 70–99)
GLUCOSE BLD-MCNC: 78 MG/DL (ref 70–99)
HCT VFR BLD CALC: 26.2 % (ref 40.5–52.5)
HEMOGLOBIN: 8.6 G/DL (ref 13.5–17.5)
LYMPHOCYTES ABSOLUTE: 0.7 K/UL (ref 1–5.1)
LYMPHOCYTES RELATIVE PERCENT: 11.3 %
MCH RBC QN AUTO: 32.4 PG (ref 26–34)
MCHC RBC AUTO-ENTMCNC: 32.7 G/DL (ref 31–36)
MCV RBC AUTO: 99.3 FL (ref 80–100)
MONOCYTES ABSOLUTE: 0.3 K/UL (ref 0–1.3)
MONOCYTES RELATIVE PERCENT: 5.1 %
NEUTROPHILS ABSOLUTE: 4.7 K/UL (ref 1.7–7.7)
NEUTROPHILS RELATIVE PERCENT: 82.1 %
PDW BLD-RTO: 14.9 % (ref 12.4–15.4)
PERFORMED ON: ABNORMAL
PERFORMED ON: ABNORMAL
PERFORMED ON: NORMAL
PLATELET # BLD: 176 K/UL (ref 135–450)
PMV BLD AUTO: 7.8 FL (ref 5–10.5)
POTASSIUM REFLEX MAGNESIUM: 3.7 MMOL/L (ref 3.5–5.1)
RBC # BLD: 2.64 M/UL (ref 4.2–5.9)
SODIUM BLD-SCNC: 136 MMOL/L (ref 136–145)
TOTAL PROTEIN: 5.8 G/DL (ref 6.4–8.2)
WBC # BLD: 5.8 K/UL (ref 4–11)

## 2022-12-22 PROCEDURE — 1200000000 HC SEMI PRIVATE

## 2022-12-22 PROCEDURE — 6370000000 HC RX 637 (ALT 250 FOR IP): Performed by: INTERNAL MEDICINE

## 2022-12-22 PROCEDURE — 2580000003 HC RX 258: Performed by: INTERNAL MEDICINE

## 2022-12-22 PROCEDURE — 36415 COLL VENOUS BLD VENIPUNCTURE: CPT

## 2022-12-22 PROCEDURE — 6360000002 HC RX W HCPCS: Performed by: INTERNAL MEDICINE

## 2022-12-22 PROCEDURE — 2500000003 HC RX 250 WO HCPCS: Performed by: INTERNAL MEDICINE

## 2022-12-22 PROCEDURE — 87075 CULTR BACTERIA EXCEPT BLOOD: CPT

## 2022-12-22 PROCEDURE — 87205 SMEAR GRAM STAIN: CPT

## 2022-12-22 PROCEDURE — 97530 THERAPEUTIC ACTIVITIES: CPT

## 2022-12-22 PROCEDURE — 97535 SELF CARE MNGMENT TRAINING: CPT

## 2022-12-22 PROCEDURE — 5A1D70Z PERFORMANCE OF URINARY FILTRATION, INTERMITTENT, LESS THAN 6 HOURS PER DAY: ICD-10-PCS | Performed by: INTERNAL MEDICINE

## 2022-12-22 PROCEDURE — 97166 OT EVAL MOD COMPLEX 45 MIN: CPT

## 2022-12-22 PROCEDURE — 94760 N-INVAS EAR/PLS OXIMETRY 1: CPT

## 2022-12-22 PROCEDURE — 87070 CULTURE OTHR SPECIMN AEROBIC: CPT

## 2022-12-22 PROCEDURE — 87040 BLOOD CULTURE FOR BACTERIA: CPT

## 2022-12-22 PROCEDURE — 80053 COMPREHEN METABOLIC PANEL: CPT

## 2022-12-22 PROCEDURE — 85025 COMPLETE CBC W/AUTO DIFF WBC: CPT

## 2022-12-22 PROCEDURE — 99223 1ST HOSP IP/OBS HIGH 75: CPT | Performed by: INTERNAL MEDICINE

## 2022-12-22 PROCEDURE — 87186 SC STD MICRODIL/AGAR DIL: CPT

## 2022-12-22 PROCEDURE — 97162 PT EVAL MOD COMPLEX 30 MIN: CPT

## 2022-12-22 PROCEDURE — 86403 PARTICLE AGGLUT ANTBDY SCRN: CPT

## 2022-12-22 PROCEDURE — 2700000000 HC OXYGEN THERAPY PER DAY

## 2022-12-22 PROCEDURE — 2580000003 HC RX 258: Performed by: NURSE PRACTITIONER

## 2022-12-22 PROCEDURE — 90935 HEMODIALYSIS ONE EVALUATION: CPT

## 2022-12-22 PROCEDURE — 87077 CULTURE AEROBIC IDENTIFY: CPT

## 2022-12-22 RX ORDER — SODIUM CHLORIDE 9 MG/ML
INJECTION, SOLUTION INTRAVENOUS CONTINUOUS
Status: DISCONTINUED | OUTPATIENT
Start: 2022-12-22 | End: 2022-12-22

## 2022-12-22 RX ADMIN — SODIUM CHLORIDE: 9 INJECTION, SOLUTION INTRAVENOUS at 00:26

## 2022-12-22 RX ADMIN — VANCOMYCIN HYDROCHLORIDE 1000 MG: 1 INJECTION, POWDER, LYOPHILIZED, FOR SOLUTION INTRAVENOUS at 13:00

## 2022-12-22 RX ADMIN — PANTOPRAZOLE SODIUM 20 MG: 20 TABLET, DELAYED RELEASE ORAL at 20:05

## 2022-12-22 RX ADMIN — METRONIDAZOLE 500 MG: 500 INJECTION, SOLUTION INTRAVENOUS at 22:21

## 2022-12-22 RX ADMIN — SEVELAMER CARBONATE 800 MG: 800 TABLET, FILM COATED ORAL at 08:50

## 2022-12-22 RX ADMIN — LORAZEPAM 0.5 MG: 0.5 TABLET ORAL at 08:50

## 2022-12-22 RX ADMIN — DOCUSATE SODIUM 200 MG: 100 CAPSULE, LIQUID FILLED ORAL at 20:05

## 2022-12-22 RX ADMIN — APIXABAN 2.5 MG: 2.5 TABLET, FILM COATED ORAL at 08:50

## 2022-12-22 RX ADMIN — SODIUM CHLORIDE, PRESERVATIVE FREE 10 ML: 5 INJECTION INTRAVENOUS at 08:51

## 2022-12-22 RX ADMIN — METRONIDAZOLE 500 MG: 500 INJECTION, SOLUTION INTRAVENOUS at 16:01

## 2022-12-22 RX ADMIN — SODIUM CHLORIDE, PRESERVATIVE FREE 10 ML: 5 INJECTION INTRAVENOUS at 20:05

## 2022-12-22 RX ADMIN — CEFEPIME 1000 MG: 1 INJECTION, POWDER, FOR SOLUTION INTRAMUSCULAR; INTRAVENOUS at 21:23

## 2022-12-22 RX ADMIN — EPOETIN ALFA-EPBX 10000 UNITS: 10000 INJECTION, SOLUTION INTRAVENOUS; SUBCUTANEOUS at 14:09

## 2022-12-22 RX ADMIN — DOCUSATE SODIUM 200 MG: 100 CAPSULE, LIQUID FILLED ORAL at 08:50

## 2022-12-22 RX ADMIN — METRONIDAZOLE 500 MG: 500 INJECTION, SOLUTION INTRAVENOUS at 00:27

## 2022-12-22 RX ADMIN — LORAZEPAM 0.5 MG: 0.5 TABLET ORAL at 20:05

## 2022-12-22 RX ADMIN — TAMSULOSIN HYDROCHLORIDE 0.4 MG: 0.4 CAPSULE ORAL at 08:50

## 2022-12-22 RX ADMIN — CEFTRIAXONE 2000 MG: 2 INJECTION, POWDER, FOR SOLUTION INTRAMUSCULAR; INTRAVENOUS at 08:45

## 2022-12-22 RX ADMIN — ASPIRIN 81 MG 81 MG: 81 TABLET ORAL at 16:00

## 2022-12-22 RX ADMIN — ATORVASTATIN CALCIUM 10 MG: 10 TABLET, FILM COATED ORAL at 20:05

## 2022-12-22 RX ADMIN — APIXABAN 2.5 MG: 2.5 TABLET, FILM COATED ORAL at 20:04

## 2022-12-22 RX ADMIN — METRONIDAZOLE 500 MG: 500 INJECTION, SOLUTION INTRAVENOUS at 07:08

## 2022-12-22 RX ADMIN — SEVELAMER CARBONATE 800 MG: 800 TABLET, FILM COATED ORAL at 16:00

## 2022-12-22 RX ADMIN — Medication 6 MG: at 20:05

## 2022-12-22 NOTE — PROGRESS NOTES
Hospitalist Progress Note      PCP: Mary Pedraza    Date of Admission: 12/20/2022    Chief Complaint: Confusion    Hospital Course: 71-year-old male admitted to the hospital with altered mental status and confusion and lethargy. Pt hx o ESRD     Subjective: hypotension o/n  R tunneled catheter appears infected  Seen during HD session today  Less confused than yesterday      Medications:  Reviewed    Infusion Medications    sodium chloride       Scheduled Medications    sodium chloride flush  10 mL IntraVENous 2 times per day    apixaban  2.5 mg Oral BID    aspirin  81 mg Oral Daily    [Held by provider] atenolol  25 mg Oral QPM    atorvastatin  10 mg Oral Nightly    [Held by provider] gabapentin  100 mg Oral TID    LORazepam  0.5 mg Oral BID    pantoprazole  20 mg Oral Nightly    sevelamer  800 mg Oral TID WC    tamsulosin  0.4 mg Oral QAM    metroNIDAZOLE  500 mg IntraVENous Q8H    docusate sodium  200 mg Oral BID    cefTRIAXone (ROCEPHIN) IV  2,000 mg IntraVENous Q24H     PRN Meds: sodium chloride flush, sodium chloride, promethazine **OR** ondansetron, acetaminophen **OR** acetaminophen, melatonin    No intake or output data in the 24 hours ending 12/22/22 1417    Physical Exam Performed:    /68   Pulse 54   Temp 98.2 °F (36.8 °C)   Resp 16   Ht 6' (1.829 m)   Wt 189 lb 9.5 oz (86 kg)   SpO2 95%   BMI 25.71 kg/m²     General appearance: No apparent distress, appears stated age and cooperative. Alert knows that he is at the hospital  HEENT: Pupils equal, round, and reactive to light. Conjunctivae/corneas clear. Neck: Supple, with full range of motion. No jugular venous distention. Trachea midline. R tunneled HD catheter erythematous  Respiratory: clear  Cardiovascular: Regular rate and rhythm with normal S1/S2 without murmurs, rubs or gallops. Abdomen: Soft, non-tender, non-distended with normal bowel sounds. Musculoskeletal: No clubbing, cyanosis or edema bilaterally.   Full range of motion without deformity. Skin: Skin color, texture, turgor normal.  No rashes or lesions. Neurologic:  Neurovascularly intact without any focal sensory/motor deficits. Cranial nerves: II-XII intact, grossly non-focal.  Psychiatric: Alert and oriented, thought content appropriate, normal insight  Capillary Refill: Brisk, 3 seconds, normal   Peripheral Pulses: +2 palpable, equal bilaterally       Labs:   Recent Labs     12/21/22  0704 12/21/22  2240 12/22/22  1020   WBC 7.5 5.8 5.8   HGB 10.9* 9.0* 8.6*   HCT 34.1* 28.7* 26.2*    163 176       Recent Labs     12/21/22  0704 12/21/22  2240 12/22/22  1020    139 136   K 4.7 4.7 3.7   CL 99 98* 97*   CO2 27 23 26   BUN 32* 43* 49*   CREATININE 3.9* 4.4* 4.4*   CALCIUM 9.0 8.9 8.6       Recent Labs     12/20/22  1739 12/21/22  0704 12/22/22  1020   AST 13* 10* 7*   ALT 11 10 6*   BILITOT 0.8 0.5 0.3   ALKPHOS 101 91 67       No results for input(s): INR in the last 72 hours. Recent Labs     12/20/22 1739 12/21/22  0704   CKTOTAL  --  14*   TROPONINI 0.07* 0.06*         Urinalysis:      Lab Results   Component Value Date/Time    NITRU Negative 12/20/2022 05:39 PM    WBCUA  12/20/2022 05:39 PM    BACTERIA 1+ 12/20/2022 05:39 PM    RBCUA 21-50 12/20/2022 05:39 PM    BLOODU LARGE 12/20/2022 05:39 PM    SPECGRAV 1.020 12/20/2022 05:39 PM    GLUCOSEU Negative 12/20/2022 05:39 PM       Radiology:  MRI BRAIN WO CONTRAST   Final Result   Cerebral atrophy. Severe chronic small vessel ischemic changes. No areas of   restricted diffusion to suggest an acute ischemic event. VL DUP CAROTID BILATERAL   Final Result      CT ABDOMEN PELVIS WO CONTRAST Additional Contrast? None   Final Result   Mild wall thickening involving the distal descending colon through the rectum   with adjacent fat stranding suggestive of proctocolitis. Mild bladder wall thickening with perivesicular fat stranding suggestive of   cystitis.   Correlation with urinalysis recommended. Large stool ball within the rectum with adjacent inflammatory changes, as   above. Mild splenomegaly. Nonobstructive right nephrolithiasis. CT HEAD WO CONTRAST   Final Result   No acute intracranial abnormality. XR CHEST PORTABLE   Final Result   Stable cardiac enlargement with no acute findings. IR REMOVE TUNNELED CVAD WO SQ PORT/PUMP    (Results Pending)       IP CONSULT TO NEPHROLOGY  IP CONSULT TO INFECTIOUS DISEASES    Assessment/Plan:    Acute encephalopathy  Likely septic and metabolic combined  No further benzos at dc  Treat for line infection    End-stage renal disease on hemodialysis  Receiving HD now  Dr Alison Read catheter infection  Needs removal antibiotics and replacement  D/w Dr Chris Feng from nephrology  Will consult ID Dr Yan Chávez as well  Blood cx ordered  Give vancomycin after cultures    Constipation  Treat with MiraLAX  As patient is a dialysis patient would avoid fleets enemas or milk of magnesia out of concern for toxicity    Proctocolitis  Continue Rocephin and Flagyl  Add vancomycin        Type II MI  Likely demand ischemia  Patient has no evidence of injury pattern on EKG  Conservative management for now  DVT Prophylaxis: Heparin subcu  Diet: ADULT DIET; Regular; 3 carb choices (45 gm/meal); Low Fat/Low Chol/High Fiber/2 gm Na;  Low Sodium (2 gm)  Code Status: Full Code  PT/OT Eval Status: Ordered    Dispo -discharge to skilled facility tomorrow      Bettie Duty, MD

## 2022-12-22 NOTE — PROGRESS NOTES
PCA notified this nurse that pt needed a dressing change. Came into room and vas-cath dressing removed and it looked like the vas-cath was further out than from my initial assessment. Site is also red and hard. When I touched the site to feel if it was hard, redish/whitish drainage oozed out. Dressing was changed, ice pack applied, tele-cam initiated. Message sent to Dr. Marge Soto.     Electronically signed by Ortiz Anand RN on 12/22/2022 at 4:38 AM

## 2022-12-22 NOTE — PROGRESS NOTES
Occupational Therapy  Facility/Department: [facility]  Occupational Therapy Initial Assessment    Name: Armin Self  :   MRN: [id number]  Date of Service: 2022    Discharge Recommendations:  [address] without OT, [address] with OT (at discretion of facility)     Armin Self scored a 10/24 on the AM-PAC ADL Inpatient form. Patient Diagnosis(es): The primary encounter diagnosis was Altered mental status, unspecified altered mental status type. Diagnoses of Acute cystitis without hematuria and Lactic acidosis were also pertinent to this visit. Past Medical History:  has a past medical history of Anemia, Arthritis, CAD (coronary artery disease), Cancer ([facility] [address] Cerebral infarction ([address] Cognitive communication deficit, COVID-19, Diabetes mellitus ([facility] [address] Dysphagia, End stage renal disease ([facility] [address] Fatigue, Gastrointestinal hemorrhage, Hemodialysis patient ([facility] [address] Hx of blood clots, Hyperlipidemia, Hypertension, Hyponatremia, Risk for falls, and Splenomegaly. Past Surgical History:  has a past surgical history that includes IR PERC ARTERIOVENOUS FISTULA CREATION (Left); colectomy (N/A, 2022); sigmoidoscopy (N/A, 2022); IR EMBOLIZATION HEMORRHAGE (2022); Tunneled venous catheter placement (Right, 2022); IR TUNNELED CVC PLACE WO SQ PORT/PUMP > 5 YEARS (2022); Cardiac catheterization (); Colonoscopy; Dialysis fistula creation (Left, 2022); Cholecystectomy, laparoscopic (N/A, 2022); ERCP (N/A, 2022); ERCP (N/A, 2022); and Upper gastrointestinal endoscopy (2022). Treatment Diagnosis: Decreased: ADLs, functional transfers      Assessment   Performance deficits / Impairments: Decreased functional mobility ; Decreased ADL status; Decreased cognition;Decreased balance;Decreased strength;Decreased endurance;Decreased ROM  Assessment: Pt is a 68 yo M admitted with AMS and increased lethargy.  PTA, pt is a resident of [place] [address] where he requires assist for most ADLs and assist x2 staff members to pivot transfer to w/c. Today, pt required max A for bed mobility and max Ax2 for partial sit<> denny jenkins. He required CGA/min A for sitting balance while he ate breakfast, and required total A for LB dressing. Will continue to see on acute in order to address the above deficits and maximize pt's functional performance. Anticipate safe return to [facility] w/ ongoing OT at discretion of the facility at d/c. Treatment Diagnosis: Decreased: ADLs, functional transfers  Prognosis: Fair  Decision Making: Medium Complexity  REQUIRES OT FOLLOW-UP: Yes  Activity Tolerance  Activity Tolerance: Patient limited by fatigue;Treatment limited secondary to decreased cognition        Plan   Occupational Therapy Plan  Times Per Week: 2-3  Times Per Day: Once a day  Current Treatment Recommendations: Strengthening, ROM, Balance training, Functional mobility training, Endurance training, Self-Care / ADL, Safety education & training     Restrictions  Restrictions/Precautions  Restrictions/Precautions: Fall Risk    Subjective   General  Chart Reviewed: Yes  Patient assessed for rehabilitation services?: Yes  Additional Pertinent Hx: per Dr Bill Stroud note: \"73 y.o. male presents from his nursing facility with concern for altered mental status and increased lethargy. History of dementia however normally patient is conversant. Symptoms reportedly started after he returned home from dialysis. He was noted to have a low-grade temperature at his nursing facility. \"  Family / Caregiver Present: Yes (son)  Referring Practitioner: Watson Christie  Diagnosis: Acute encephalopathy  Subjective  Subjective: Pt met b/s for OT eval/tx w/ PT. Pt in bed, agreeable to therapy.  Denied pain  General Comment  Comments: Per RN ok to see     Social/Functional History  Social/Functional History  Type of Home: Facility ([address]  ADL Assistance: Needs assistance (needs assist with all except feeding)  Ambulation Assistance: Non-ambulatory (uses w/c, but needs help propelling further than room distance)  Transfer Assistance: Needs assistance (assist of 2 for transfers)       Objective      Safety Devices  Type of Devices: All fall risk precautions in place;Call light within reach;Gait belt;Patient at risk for falls; Left in bed;Bed alarm in place;Nurse notified        AROM: Generally decreased, functional  PROM: Generally decreased, functional  Strength: Generally decreased, functional  Coordination: Within functional limits  ADL  Feeding: Setup  Feeding Skilled Clinical Factors: Pt sat EOB x15 mins w/ min A/CGA for sitting balance while he ate his breakfast  UE Dressing: Other (Comment)  UE Dressing Skilled Clinical Factors: Assist to change hospital gown  LE Dressing: Dependent/Total  LE Dressing Skilled Clinical Factors: to don hospital pants - brief  denny jenkins to pull up pants  Additional Comments: Anticipate pt would require min A for grooming, mod/max A UB bathing, max A LB bathing, max A toileting based on ROM, strength, endurance this session     Activity Tolerance  Activity Tolerance: Patient limited by endurance  Bed mobility  Supine to Sit: Moderate assistance  Sit to Supine: Maximum assistance  Scooting: Moderate assistance  Bed Mobility Comments: Pt sat EOB x 15 minutes to eat breakfast with intermittent assist for static sitting balance  Transfers  Sit to stand: Maximum assistance;2 Person assistance  Stand to sit: 2 Person assistance;Maximum assistance  Transfer Comments: To/from dennypauline jenkins - briefly stood so OT could pull pt's pants up, pt unable to stand upright enough to place seat of [employer]  Hearing  Hearing: Within functional limits  Cognition  Overall Cognitive Status: Exceptions  Arousal/Alertness: Appropriate responses to stimuli  Following Commands:  Follows one step commands consistently  Attention Span: Attends with cues to redirect  Memory: Decreased recall of recent events  Safety Judgement: Decreased awareness of need for safety;Decreased awareness of need for assistance  Problem Solving: Decreased awareness of errors  Insights: Decreased awareness of deficits  Initiation: Requires cues for some  Sequencing: Requires cues for some  Orientation  Overall Orientation Status: Impaired  Orientation Level: Oriented to place;Oriented to person;Disoriented to time;Disoriented to situation                  Education Given To: Patient; Family  Education Provided: Role of Therapy; ADL Adaptive Strategies;Transfer Training  Education Method: Verbal;Demonstration  Barriers to Learning: Cognition  Education Outcome: Continued education needed                 AM-PAC Score  AM-[facility] Inpatient Daily Activity Raw Score: 10 (12/22/22 1002)  AM-PAC Inpatient ADL T-Scale Score : 27.31 (12/22/22 1002)  ADL Inpatient CMS 0-100% Score: 74.7 (12/22/22 1002)  ADL Inpatient CMS G-Code Modifier : CL (12/22/22 1002)      Goals  Short Term Goals  Time Frame for Short Term Goals: Prior to d/c  Short Term Goal 1: Pt will complete bed mobility w/ min A  Short Term Goal 2: Pt will sit EOB x10 mins w/ SBA during ADL task  Short Term Goal 3: Pt will complete ADL transfer w/ mod Ax2 using LRAD  Short Term Goal 4: Pt will groom w/ setup  Long Term Goals  Time Frame for Long Term Goals : LTG=STG  Patient Goals   Patient goals : did not state       Therapy Time   Individual Concurrent Group Co-treatment   Time In 0900         Time Out 0955         Minutes 55         Timed Code Treatment Minutes: [address], OTR/L [id number]

## 2022-12-22 NOTE — PROGRESS NOTES
[address] [place] [address] Nephrology   [facility]  [phone]  Nephrology Note          Patient ID: Carlos Levy  Referring/ Physician: Lucian Ivey MD      HPI/Summary:   Carlos Levy is being seen by nephrology for ESRD. This is a 52-year-old male with past medical history significant for colon cancer, ESRD, hypertension, stroke, COVID infection in the past, diabetes who presented to the hospital with fatigue. He has been more lethargic. He denies any cough shortness of breath chest pain. He is unable to provide any more history at this time. MRI head showed a cerebral atrophy, severe chronic small vessel ischemic changes. No acute stroke. CT abdomen showed distal descending colon thickening some fat stranding suggestive of proctocolitis, mild bladder wall thickening possible cystitis, large stool ball in the rectum. Mild splenomegaly and a nonobstructive right renal stone. Interval Hx   Patient was seen and examined at bedside  His tunneled dialysis catheter appears to be infected, there was purulence coming from it. Sending cultures and giving a dose of vancomycin on HD. Has no complaints of chest pain or shortness of breath    Blood pressure 125/68  Heart rate 54  Afebrile and satting 95% on room air    Labs reviewed      Plan:   HD today  UF to dry weight  ELENA ordered  Tunneled line infected, there is a fluctuate pocket of pus and when palpated , purulent drainage coming out of the exit site. giving a dose of vancomycin after HD and sending cultures  Order placed to have a tunneled line removed and will give a line holiday. also called IR to explain my findings      ESRD  TTS at [address] dialysis catheter    Electrolytes  No acute issues    Secondary parathyroidism  On Renvela 800 mg 3 times daily    Hypertension  Blood pressure acceptable on atenolol 25 mg daily    Possible UTI and proctocolitis  On ceftriaxone Flagyl        [facility] would like to thank you for the opportunity to serve this patient. Please call with any questions or concerns. Mustapha Bateman MD  [facility]ph[facility]  [address], [address]  Fax: [phone]  Office: [phone]         CC/Reason for consult:   Reason for consult: ESRD  Chief Complaint   Patient presents with    Fatigue     Pt arrived via ems from [facility] for report of altered mental status all day today, patient is more lethargic than normal baseline. Pt has dementia but normal baseline is able to have conversation with staff. Pt very sleepy at triage. Has wet cough. Review of Systems:   [address] All other remaining systems are negative. Constitutional:  fever, chills, weakness, weight change, fatigue,      Skin:  rash, pruritus, hair loss, bruising, dry skin, petechiae. Head, Face, Neck   headaches, swelling,  cervical adenopathy. Respiratory: shortness of breath, cough, or wheezing  Cardiovascular: chest pain, palpitations, dizzy, edema  Gastrointestinal: nausea, vomiting, diarrhea, constipation,belly pain    Yellow skin, blood in stool  Musculoskeletal:  back pain, muscle weakness, gait problems,       joint pain or swelling. Genitourinary:  dysuria, poor urine flow, flank pain, blood in urine  Neurologic:  vertigo, TIA'S, syncope, seizures, focal weakness  Psychosocial:  insomnia, anxiety, or depression.   Additional positive findings: -     PMH/SH/FH:    Medical Hx: reviewed and updated as appropriate  Past Medical History:   Diagnosis Date    Anemia 03/2022    Arthritis     CAD (coronary artery disease) 01/2020    Cancer ([facility] [address]     Colon Cancer diagnosed last month    Cerebral infarction [facility])     Cognitive communication deficit     COVID-19     Diabetes mellitus ([facility] [address]     Dysphagia     End stage renal disease ([facility] [address]     Fatigue     Gastrointestinal hemorrhage     Hemodialysis patient ([facility] [address] 2019    ckd-goes to dialysis Tuesday, Thurs, Fri    Hx of blood clots     Hyperlipidemia Hypertension     Hyponatremia     Risk for falls     Splenomegaly 02/10/2021         Surgical Hx: reviewed and updated as appropriate   has a past surgical history that includes IR PERC ARTERIOVENOUS FISTULA CREATION (Left); colectomy (N/A, 01/26/2022); sigmoidoscopy (N/A, 02/17/2022); IR EMBOLIZATION HEMORRHAGE (02/19/2022); Tunneled venous catheter placement (Right, 02/21/2022); IR TUNNELED CVC PLACE WO SQ PORT/PUMP > 5 YEARS (2/21/2022); Cardiac catheterization (2019); Colonoscopy; Dialysis fistula creation (Left, 4/29/2022); Cholecystectomy, laparoscopic (N/A, 5/16/2022); ERCP (N/A, 5/16/2022); ERCP (N/A, 5/16/2022); and Upper gastrointestinal endoscopy (5/16/2022). Social Hx: reviewed and updated as appropriate  Social History     Tobacco Use    Smoking status: Former    Smokeless tobacco: Never   Substance Use Topics    Alcohol use: Not Currently        Family hx: reviewed and updated as appropriate  family history includes High Blood Pressure in his father. Medications:   epoetin davis-epbx, 10,000 Units, Once  vancomycin, 1,000 mg, Once  sodium chloride flush, 10 mL, 2 times per day  apixaban, 2.5 mg, BID  aspirin, 81 mg, Daily  [Held by provider] atenolol, 25 mg, QPM  atorvastatin, 10 mg, Nightly  [Held by provider] gabapentin, 100 mg, TID  LORazepam, 0.5 mg, BID  pantoprazole, 20 mg, Nightly  sevelamer, 800 mg, TID WC  tamsulosin, 0.4 mg, QAM  metroNIDAZOLE, 500 mg, Q8H  docusate sodium, 200 mg, BID  cefTRIAXone (ROCEPHIN) IV, 2,000 mg, Q24H     Lisinopril    Allergies:    Allergies   Allergen Reactions    Lisinopril Swelling     Allergy listed on paperwork from [facility], no reaction noted         Physical Exam/Objective:   Vitals:    12/22/22 1023   BP: 125/68   Pulse: 54   Resp: 16   Temp: 98.2 °F (36.8 °C)   SpO2:      No intake or output data in the 24 hours ending 12/22/22 1249      General appearance:  in no acute distress, comfortable,lethargic  HEENT: no icterus, EOM intact, trachea midline. Neck : no masses, appears symmetrical and no JVD appreciated. Respiratory: Respiratory effort normal, bilateral equal chest rise. No wheeze, no crackles   Cardiovascular: Ausculation shows RRR and  NO edema   Abdomen: abdomen is soft, non distended, no masses, no pain with palpation. Musculoskeletal:  no joint swelling, no deformity  Skin: no rashes, no induration, no tightening, no jaundice   Neuro:   Follows commands, moves all extremities spontaneously       Data:   CBC:   Recent Labs     12/21/22  0704 12/21/22  2240 12/22/22  1020   WBC 7.5 5.8 5.8   HGB 10.9* 9.0* 8.6*   HCT 34.1* 28.7* 26.2*    163 176     BMP:    Recent Labs     12/21/22  0704 12/21/22  2240 12/22/22  1020    139 136   K 4.7 4.7 3.7   CL 99 98* 97*   CO2 27 23 26   BUN 32* 43* 49*   CREATININE 3.9* 4.4* 4.4*   GLUCOSE 174* 136* 149*

## 2022-12-22 NOTE — PLAN OF CARE
Problem: Discharge Planning  Goal: Discharge to home or other facility with appropriate resources  12/22/2022 1123 by Angelina Moscoso RN  Outcome: Progressing  12/21/2022 2157 by Javier Shaver RN  Outcome: Progressing     Problem: Safety - Adult  Goal: Free from fall injury  12/22/2022 1123 by Angelina Moscoso RN  Outcome: Progressing  12/21/2022 2157 by Javier Shaver RN  Outcome: Progressing     Problem: ABCDS Injury Assessment  Goal: Absence of physical injury  12/22/2022 1123 by Angelina Moscoso RN  Outcome: Progressing  12/21/2022 2157 by Javier Shaver RN  Outcome: Progressing     Problem: Skin/Tissue Integrity  Goal: Absence of new skin breakdown  Description: 1. Monitor for areas of redness and/or skin breakdown  2. Assess vascular access sites hourly  3. Every 4-6 hours minimum:  Change oxygen saturation probe site  4. Every 4-6 hours:  If on nasal continuous positive airway pressure, respiratory therapy assess nares and determine need for appliance change or resting period.   12/22/2022 1123 by Angelina Moscoso RN  Outcome: Progressing  12/21/2022 2157 by Javier Shaver RN  Outcome: Progressing     Problem: Chronic Conditions and Co-morbidities  Goal: Patient's chronic conditions and co-morbidity symptoms are monitored and maintained or improved  Outcome: Progressing

## 2022-12-22 NOTE — PROGRESS NOTES
Physical Therapy  Facility/Department: [facility] SURG  Physical Therapy Initial Assessment    Name: Florine Scheuermann  :   MRN: [id number]  Date of Service: 2022    Discharge Recommendations:  [address] with PT, Continue to assess pending progress          Patient Diagnosis(es): The primary encounter diagnosis was Altered mental status, unspecified altered mental status type. Diagnoses of Acute cystitis without hematuria and Lactic acidosis were also pertinent to this visit. Past Medical History:  has a past medical history of Anemia, Arthritis, CAD (coronary artery disease), Cancer ([facility] [address] Cerebral infarction ([facility] [address] Cognitive communication deficit, COVID-19, Diabetes mellitus ([facility] [address] Dysphagia, End stage renal disease ([facility] [address] Fatigue, Gastrointestinal hemorrhage, Hemodialysis patient ([facility] [address] Hx of blood clots, Hyperlipidemia, Hypertension, Hyponatremia, Risk for falls, and Splenomegaly. Past Surgical History:  has a past surgical history that includes IR PERC ARTERIOVENOUS FISTULA CREATION (Left); colectomy (N/A, 2022); sigmoidoscopy (N/A, 2022); IR EMBOLIZATION HEMORRHAGE (2022); Tunneled venous catheter placement (Right, 2022); IR TUNNELED CVC PLACE WO SQ PORT/PUMP > 5 YEARS (2022); Cardiac catheterization (2019); Colonoscopy; Dialysis fistula creation (Left, 2022); Cholecystectomy, laparoscopic (N/A, 2022); ERCP (N/A, 2022); ERCP (N/A, 2022); and Upper gastrointestinal endoscopy (2022). Assessment   Body Structures, Functions, Activity Limitations Requiring Skilled Therapeutic Intervention: Decreased functional mobility ; Decreased ADL status; Decreased strength;Decreased cognition;Decreased balance;Decreased endurance  Assessment: Pt is a 69 y.o. male who presented to [facility] on 22 with AMS. Prior to admission, [place] [facility] assist with all ADLs outside of feeding, A of 2 person for stand pivot transfers to /. Pt currently functioning below baseline . Anticipate return to [facility] with PT. Will continue to follow at low frequency while in acute setting. Treatment Diagnosis: impaired mobility  Therapy Prognosis: Guarded  Decision Making: Medium Complexity  History: see above  Exam: see below  Clinical Presentation: evolving  Requires PT Follow-Up: Yes  Activity Tolerance  Activity Tolerance: Patient limited by endurance     Plan   Physcial Therapy Plan  General Plan: 2-3 times per week  Current Treatment Recommendations: Strengthening, Balance training, Functional mobility training, Transfer training, Endurance training, Neuromuscular re-education, Cognitive reorientation, Safety education & training, Equipment evaluation, education, & procurement, Patient/Caregiver education & training, Therapeutic activities  Safety Devices  Type of Devices: All fall risk precautions in place, Call light within reach, Gait belt, Patient at risk for falls, Left in bed, Bed alarm in place, Nurse notified     Restrictions  Restrictions/Precautions  Restrictions/Precautions: Fall Risk     Subjective   General  Chart Reviewed: Yes  Patient assessed for rehabilitation services?: Yes  Additional Pertinent Hx: Pt is a 69 y.o. male who presented to [facility] on 12/20/22 with AMS.   Response To Previous Treatment: Not applicable  Family / Caregiver Present: Yes (son)  Referring Practitioner: Ernst Graf MD  Referral Date : 12/21/22  Diagnosis: acute encephalopathy  Follows Commands: Within Functional Limits  Subjective  Subjective: Pt is agreeable to PT         Social/Functional History  Social/Functional History  Type of Home: Facility ([address]  ADL Assistance: Needs assistance (needs assist with all except feeding)  Ambulation Assistance: Non-ambulatory (uses w/c, but needs help propelling further than room distance)  Transfer Assistance: Needs assistance (assist of 2 for transfers)    Vision/Hearing  Hearing  Hearing: Within functional limits      Cognition   Orientation  Overall Orientation Status: Impaired  Orientation Level: Oriented to place;Oriented to person;Disoriented to time;Disoriented to situation  Cognition  Overall Cognitive Status: Exceptions  Arousal/Alertness: Appropriate responses to stimuli  Following Commands: Follows one step commands consistently  Attention Span: Attends with cues to redirect  Memory: Decreased recall of recent events  Safety Judgement: Decreased awareness of need for safety;Decreased awareness of need for assistance  Problem Solving: Decreased awareness of errors  Insights: Decreased awareness of deficits  Initiation: Requires cues for some  Sequencing: Requires cues for some     Objective   Gross Assessment  Strength: Generally decreased, functional     Bed mobility  Supine to Sit: Moderate assistance  Sit to Supine: Maximum assistance  Scooting: Moderate assistance  Bed Mobility Comments: Pt sat EOB x 15 minutes to eat breakfast with intermittent assist for static sitting balance  Transfers  Sit to Stand: Moderate Assistance;Maximum Assistance;2 Person Assistance  Stand to Sit: Moderate Assistance;Maximum Assistance;2 Person Assistance  Comment: within stedy. Pt kept in bed due to upcoming dialysis appt.         Balance  Posture: Fair  Sitting - Static: Poor;+  Sitting - Dynamic: Poor  Standing - Static: Poor             AM-PAC Score  AM-PAC Inpatient Mobility Raw Score : 8 (12/22/22 0956)  AM-PAC Inpatient T-Scale Score : 28.52 (12/22/22 0956)  Mobility Inpatient CMS 0-100% Score: 86.62 (12/22/22 0956)  Mobility Inpatient CMS G-Code Modifier : CM (12/22/22 5530)          Goals  Short Term Goals  Time Frame for Short Term Goals: by acute discharge  Short Term Goal 1: bed mobility mod A  Short Term Goal 2: sit<>stand within stedy with max A  Short Term Goal 3: stand pivot with A of 2 person  Patient Goals   Patient Goals : none stated       Education  Patient Education  Education Given To: Patient; Family  Education Provided: Role of Therapy;Plan of Care  Education Method: Verbal  Barriers to Learning: Cognition  Education Outcome: Verbalized understanding;Continued education needed      Therapy Time   Individual Concurrent Group Co-treatment   Time In 0900         Time Out 0950         Minutes 50         Timed Code Treatment Minutes: 40 Minutes       Jacquie Piña, PT

## 2022-12-22 NOTE — CONSULTS
Infectious Diseases Inpatient Consult Note      Reason for Consult:  Concern for HD line infection and Colitis on the CT abd/pelvis -    Requesting Physician:   Keturah Bean      Primary Care Physician:  Abigail Hall    History Obtained From:  Epic     CHIEF COMPLAINT:     Chief Complaint   Patient presents with    Fatigue     Pt arrived via ems from [facility] for report of altered mental status all day today, patient is more lethargic than normal baseline. Pt has dementia but normal baseline is able to have conversation with staff. Pt very sleepy at triage. Has wet cough. HISTORY OF PRESENT ILLNESS:  69 y.o. man with a significant history of potential renal disease on hemodialysis, colon cancer, bowel surgery, hypertension, stroke, diabetes admitted to the hospital secondary to change in mental status and possibly some shortness of breath. Patient is unable to provide any detailed history. Currently seen in dialysis. HD catheter site looks okay dressing intact. Noted to have lactic acidosis on admission with elevated procalcitonin. Blood culture in process. CRP elevated 132.4, MRI of the brain indicated cerebral atrophy, CT abdomen repeated colon and rectum with acute colitis, also large stool ball in the rectum concerning for constipation. Urinalysis abnormal urine culture will be requested.   We are consulted for recommendations          Past Medical History:    Past Medical History:   Diagnosis Date    Anemia 03/2022    Arthritis     CAD (coronary artery disease) 01/2020    Cancer [facility])     Colon Cancer diagnosed last month    Cerebral infarction [facility])     Cognitive communication deficit     COVID-19     Diabetes mellitus ([address]     Dysphagia     End stage renal disease ([address]     Fatigue     Gastrointestinal hemorrhage     Hemodialysis patient ([facility] 2019    ckd-goes to dialysis Tuesday, Thurs, Fri    Hx of blood clots     Hyperlipidemia     Hypertension     Hyponatremia     Risk for falls     Splenomegaly 02/10/2021       Past Surgical History:    Past Surgical History:   Procedure Laterality Date    CARDIAC CATHETERIZATION  2019    CHOLECYSTECTOMY, LAPAROSCOPIC N/A 5/16/2022    LAPAROSCOPIC CHOLECYSTECTOMY WITH CHOLANGIOGRAM performed by Anita Vergara MD at [address] 01/26/2022    SIGMOID COLECTOMY, SIGMOIDOSCOPY performed by Anita Vergara MD at [facility] ([place], [place]) 18 Left 4/29/2022    LEFT BRACHIAL CEPHALIC FISTULA performed by Albin Wells MD at [place] [address]    ERCP N/A 5/16/2022    ERCP SPHINCTER/PAPILLOTOMY performed by Asuncion De La Torre MD at [address] [place] [address]    ERCP N/A 5/16/2022    ERCP STONE REMOVAL/BALLOON SWEEP performed by Asuncion De La Torre MD at [facility]  02/19/2022    IR EMBOLIZATION HEMORRHAGE 2/19/2022 [facility]     [address]porate Dr Perla     unsure of date    IR TUNNELED CATHETER PLACEMENT GREATER THAN 5 YEARS  2/21/2022    IR TUNNELED CATHETER PLACEMENT GREATER THAN 5 YEARS 2/21/2022 [facility]    SIGMOIDOSCOPY N/A 02/17/2022    SIGMOIDOSCOPY CONTROL HEMORRHAGE performed by Nick Vuong MD at [facility] [address] Right 02/21/2022    Permacath; RIJ access; 23cm; Dr. Lj Martin  5/16/2022    ERCP POLYP SNARE performed by Asuncion De La Torre MD at [address] [place] [address]       Current Medications:    Outpatient Medications Marked as Taking for the 12/20/22 encounter [facility] Encounter)   Medication Sig Dispense Refill    oxyCODONE (ROXICODONE) 5 MG immediate release tablet Take 5 mg by mouth every 4 hours as needed for Pain.       ondansetron (ZOFRAN) 4 MG/2ML injection Infuse 4 mg intravenously every 6 hours as needed for Nausea or Vomiting         Allergies:  Lisinopril    Immunizations :   Immunization History   Administered Date(s) Administered    COVID-19, MODERNA BLUE border, Primary or Immunocompromised, (age 12y+), IM, 100 mcg/0.5mL 03/04/2021, 04/02/2021         Social History:    Social History     Tobacco Use    Smoking status: Former    Smokeless tobacco: Never   Vaping Use    Vaping Use: Never used   Substance Use Topics    Alcohol use: Not Currently    Drug use: Never     Social History     Tobacco Use   Smoking Status Former   Smokeless Tobacco Never      Family History   Problem Relation Age of Onset    High Blood Pressure Father          REVIEW OF SYSTEMS:     NOT POSSIBLE DUE TO CHANGE IN MENTATION     PHYSICAL EXAM:      Vitals:    /68   Pulse 54   Temp 98.2 °F (36.8 °C)   Resp 16   Ht 6' (1.829 m)   Wt 189 lb 9.5 oz (86 kg)   SpO2 95%   BMI 25.71 kg/m²     General Appearance: alert,in some  acute distress, ++ pallor, no icterus   Skin: warm and dry, no rash or erythema  Head: normocephalic and atraumatic  Eyes: pupils equal, round, and reactive to light, conjunctivae normal  ENT: tympanic membrane, external ear and ear canal normal bilaterally, nose without deformity, nasal mucosa and turbinates normal without polyps  Neck: supple and non-tender without mass, no thyromegaly  no cervical lymphadenopathy  Pulmonary/Chest: clear to auscultation bilaterally- no wheezes, rales or rhonchi, normal air movement, no respiratory distress  Cardiovascular: normal rate, regular rhythm, normal S1 and S2, no murmurs, rubs, clicks, or gallops, no carotid bruits  Abdomen: soft, + -tender, non-distended, normal bowel sounds, no masses or organomegaly  Extremities: no cyanosis, clubbing or edema  Musculoskeletal: normal range of motion, no joint swelling, deformity or tenderness  Integumentary: No rashes, no abnormal skin lesions, no petechiae  Neurologic: reflexes normal and symmetric, no cranial nerve defici   Lines: HD line +     DATA:    CBC:   Lab Results   Component Value Date    WBC 5.8 12/22/2022    HGB 8.6 (L) 12/22/2022    HCT 26.2 (L) 12/22/2022    MCV 99.3 12/22/2022  12/22/2022     RENAL:   Lab Results   Component Value Date    CREATININE 4.4 (H) 12/22/2022    BUN 49 (H) 12/22/2022     12/22/2022    K 3.7 12/22/2022    CL 97 (L) 12/22/2022    CO2 26 12/22/2022     SED RATE:   Lab Results   Component Value Date/Time    SEDRATE 24 07/26/2011 05:00 AM     CK:   Lab Results   Component Value Date/Time    CKTOTAL 14 12/21/2022 07:04 AM     CRP:   Lab Results   Component Value Date/Time    .4 12/21/2022 10:40 PM     Hepatic Function Panel:   Lab Results   Component Value Date/Time    ALKPHOS 67 12/22/2022 10:20 AM    ALT 6 12/22/2022 10:20 AM    AST 7 12/22/2022 10:20 AM    PROT 5.8 12/22/2022 10:20 AM    PROT 7.5 07/26/2011 05:00 AM    BILITOT 0.3 12/22/2022 10:20 AM    BILIDIR <0.2 02/12/2022 12:24 AM    IBILI see below 02/12/2022 12:24 AM    LABALBU 3.1 12/22/2022 10:20 AM     UA:  Lab Results   Component Value Date/Time    COLORU DARK YELLOW 12/20/2022 05:39 PM    CLARITYU CLOUDY 12/20/2022 05:39 PM    GLUCOSEU Negative 12/20/2022 05:39 PM    BILIRUBINUR Negative 12/20/2022 05:39 PM    KETUA Negative 12/20/2022 05:39 PM    SPECGRAV 1.020 12/20/2022 05:39 PM    BLOODU LARGE 12/20/2022 05:39 PM    PHUR 7.0 12/20/2022 05:39 PM    PHUR 7.0 12/20/2022 05:39 PM    PROTEINU 100 12/20/2022 05:39 PM    UROBILINOGEN 0.2 12/20/2022 05:39 PM    NITRU Negative 12/20/2022 05:39 PM    LEUKOCYTESUR LARGE 12/20/2022 05:39 PM    LABMICR YES 12/20/2022 05:39 PM    URINETYPE Cleancatch 12/20/2022 05:39 PM      Urine Microscopic:   Lab Results   Component Value Date/Time    BACTERIA 1+ 12/20/2022 05:39 PM    COMU see below 12/20/2022 05:39 PM    HYALCAST 12 10/20/2022 10:52 AM    WBCUA  12/20/2022 05:39 PM    RBCUA 21-50 12/20/2022 05:39 PM    EPIU 0-1 12/20/2022 05:39 PM     Urine Reflex to Culture:   Lab Results   Component Value Date/Time    URRFLXCULT Yes 10/20/2022 10:52 AM       Lactic acid  3.3       Procal  5.41       CRP  132.4     MICRO: cultures reviewed and updated by me     Procedure Component Value Units Date/Time   Culture, Blood 1 [[id number]] Collected: 12/22/22 1430   Order Status: No result    Culture, Anaerobic and Aerobic [[id number]] Collected: 12/22/22 1215   Order Status: No result Updated: 12/22/22 1353   Culture, Blood 2 [[id number]] Collected: 12/22/22 1314   Order Status: Sent Specimen: Blood Updated: 12/22/22 1349   Culture, Anaerobic and Aerobic [[id number]]    Order Status: No result Specimen: catheter site    Culture, Blood 1 [[id number]] Collected: 12/22/22 0000   Order Status: Canceled Specimen: Blood    Culture, Blood 1 [[id number]] Collected: 12/20/22 2054   Order Status: Completed Specimen: Blood Updated: 12/21/22 2315    Blood Culture, Routine No Growth to date. Any change in status will be called. Narrative:     ORDER#: [id number]                          ORDERED BY: Sadie Sweet   SOURCE: Blood                              COLLECTED:  12/20/22 20:54   ANTIBIOTICS AT JOSE.:                      RECEIVED :  12/20/22 20:59   If child <=2 yrs old please draw pediatric bottle. ~Blood Culture 1   Culture, Blood 2 [[id number]] Collected: 12/20/22 2054   Order Status: Completed Specimen: Blood Updated: 12/21/22 2315    Culture, Blood 2 No Growth to date. Any change in status will be called. Narrative:     ORDER#: [id number]                          ORDERED BY: Sadie Sweet   SOURCE: Blood                              COLLECTED:  12/20/22 20:54   ANTIBIOTICS AT JOSE.:                      RECEIVED :  12/20/22 21:01   If child <=2 yrs old please draw pediatric bottle. ~Blood Culture #2   LHBCX-12, Rapid [[id number]] Collected: 12/20/22 1739   Order Status: Completed Specimen: Nasopharyngeal Swab Updated: 12/20/22 1841    SARS-CoV-2, NAAT Not Detected    Comment: Rapid NAAT:   Negative results should be treated as presumptive and,   if inconsistent with clinical signs and symptoms or necessary for   patient management, should be tested with an alternative molecular   assay. Negative results do not preclude SARS-CoV-2 infection and   should not be used as the sole basis for patient management decisions. This test has been authorized by the FDA under an Emergency Use   Authorization (EUA) for use by authorized laboratories. Fact sheet for Healthcare Providers:   http://www.divina.sheridan/   Fact sheet for Patients: http://www.divina.sheridan/     METHODOLOGY: Isothermal Nucleic Acid Amplification       Rapid influenza A/B antigens [[id number]] Collected: 12/20/22 1739   Order Status: Completed Specimen: Nasopharyngeal Updated: 12/20/22 1840    Rapid Influenza A Ag Negative    Rapid Influenza B Ag Negative     Blood Culture:   Lab Results   Component Value Date/Time    [facility]  12/20/2022 08:54 PM     No Growth to date. Any change in status will be called. Oksana Sommers  12/20/2022 08:54 PM     No Growth to date. Any change in status will be called. Viral Culture:    Lab Results   Component Value Date/Time    COVID19 Not Detected 12/20/2022 05:39 PM     Urine Culture: No results for input(s): [place] in the last 72 hours. Scheduled Meds:   sodium chloride flush  10 mL IntraVENous 2 times per day    apixaban  2.5 mg Oral BID    aspirin  81 mg Oral Daily    [Held by provider] atenolol  25 mg Oral QPM    atorvastatin  10 mg Oral Nightly    [Held by provider] gabapentin  100 mg Oral TID    LORazepam  0.5 mg Oral BID    pantoprazole  20 mg Oral Nightly    sevelamer  800 mg Oral TID WC    tamsulosin  0.4 mg Oral QAM    metroNIDAZOLE  500 mg IntraVENous Q8H    docusate sodium  200 mg Oral BID    cefTRIAXone (ROCEPHIN) IV  2,000 mg IntraVENous Q24H       Continuous Infusions:   sodium chloride         PRN Meds:  sodium chloride flush, sodium chloride, promethazine **OR** ondansetron, acetaminophen **OR** acetaminophen, melatonin    Imaging:   MRI BRAIN WO CONTRAST   Final Result   Cerebral atrophy.   Severe chronic small vessel ischemic changes. No areas of   restricted diffusion to suggest an acute ischemic event. VL DUP CAROTID BILATERAL   Final Result      CT ABDOMEN PELVIS WO CONTRAST Additional Contrast? None   Final Result   Mild wall thickening involving the distal descending colon through the rectum   with adjacent fat stranding suggestive of proctocolitis. Mild bladder wall thickening with perivesicular fat stranding suggestive of   cystitis. Correlation with urinalysis recommended. Large stool ball within the rectum with adjacent inflammatory changes, as   above. Mild splenomegaly. Nonobstructive right nephrolithiasis. CT HEAD WO CONTRAST   Final Result   No acute intracranial abnormality. XR CHEST PORTABLE   Final Result   Stable cardiac enlargement with no acute findings. IR REMOVE TUNNELED CVAD WO SQ PORT/PUMP    (Results Pending)       All pertinent images and reports for the current Hospitalization were reviewed by me.     IMPRESSION:    Patient Active Problem List   Diagnosis    GI bleed    History of COVID-19    Hyponatremia    Fatigue    Acute kidney injury superimposed on CKD (HCC)    Lethargy    Suspected UTI    Acute CVA (cerebrovascular accident) ([address]    LESLEE (acute kidney injury) ([address]    Malignant neoplasm of colon ([address]    Acute blood loss anemia    COVID-19    Shock circulatory ([address]    Bilateral pulmonary embolism (HCC)    Lactic acidosis    Hemorrhagic shock (HCC)    ESRD (end stage renal disease) on dialysis ([address]    Acute cholecystitis    Pain of upper abdomen    AMS (altered mental status)    Sepsis ([address]    Acute metabolic encephalopathy    Cellulitis    Fever and chills    PVD (peripheral vascular disease) (HCC)    Anxiety    Rectal bleeding    Severe malnutrition (HCC)    Acute encephalopathy     Change in mentation   ESRD   HD line in place  Lactic acidosis  Procal elevation  CT ABD/PELVIS with distal colitis  Constipation   PVD  Cerebral atrophy on MRI  Crp elevation   UA abnormal   Chronic anticoagulation        Blood cx in process on exam HD site looks ok and will add urine cx given the CT findings and abnormal UA - although ESRD some times urine may not be that helpful  -    Would wait for HD line removal unless actively Bacteremic - Blood cx in process      Labs, Microbiology, Radiology and pertinent results from current hospitalization and care every where were reviewed by me as a part of the consultation. PLAN :  Change to IV Cefepime x 1 gm Q24 hrs  Cont Flagyl IV  bLOOD CX IN PROCess  Add Urine cx  CAN hold off on HD line removal unless positive blood cx   Trend Procal     Discussed with patient/Family and Nursing   Risk of Complications/Morbidity: High      Illness(es)/ Infection present that pose threat to bodily function. There is potential for severe exacerbation of infection/side effects of treatment. Therapy requires intensive monitoring for antimicrobial agent toxicity. Thanks for allowing me to participate in your patient's care please call me with any questions or concerns.     Dr. Segundo Ochoa MD  [facility] Physician  Phone: [phone]   Fax : [phone]

## 2022-12-22 NOTE — PROGRESS NOTES
PCA notified this nurse that BP was low. Rechecked both arms manually, R arm-62/42, L arm- 80/32. Pt is not c/o dizziness, feeling lightheaded, falls asleep easily but arouses to voice easily. Message sent to Bettye Mcfadden NP.     Electronically signed by Angela Fountain RN on 12/21/2022 at 8:05 PM

## 2022-12-22 NOTE — PROGRESS NOTES
Brief IR Note    Pt is a 68 y/o M with ESRD on HD who has a right IJ tunneled dialysis catheter. Pt has a skin infection at right chest wall dialysis site. IR was consulted for line removal.     Chart review of pt shows that he has not spiked a fever and does not have leukocytosis. Seeing as pt is not bacteremic at this time, there is no need for line removal. As of this time, best option is to treat his superficial skin infection with the line still in place. Should pt become bacteremic (pending BC results) then line removal can be done at that time. Addendum at 3:20 PM - review of articles shows that exit site infections is an indication for line removal. Plan is for pt to get dialysis in the AM after which he will have his tunneled line removed for a line holiday. Pt will be tentatively scheduled on Tuesday 12/27 for new tunneled dialysis catheter placement unless otherwise contraindicated. Please place order for tunneled line placement for Tuesday and have pt NPO midnight on Tuesday.     Thanks,  Lennette Gilford MD  Interventional Radiology

## 2022-12-22 NOTE — PROGRESS NOTES
Right IJ tunneled dialysis catheter insertion site red and swollen along tunnel. Large amount of purulent drainage expressed easily from site. Culture sent. Dr Thomasine Nyhan notified. 40

## 2022-12-22 NOTE — PLAN OF CARE
Problem: Discharge Planning  Goal: Discharge to home or other facility with appropriate resources  12/21/2022 2157 by Ama Garduno RN  Outcome: Progressing     Problem: Safety - Adult  Goal: Free from fall injury  12/21/2022 2157 by Ama Garduno RN  Outcome: Progressing     Problem: ABCDS Injury Assessment  Goal: Absence of physical injury  12/21/2022 2157 by Ama Garduno RN  Outcome: Progressing     Problem: Skin/Tissue Integrity  Goal: Absence of new skin breakdown  Description: 1. Monitor for areas of redness and/or skin breakdown  2. Assess vascular access sites hourly  3. Every 4-6 hours minimum:  Change oxygen saturation probe site  4. Every 4-6 hours:  If on nasal continuous positive airway pressure, respiratory therapy assess nares and determine need for appliance change or resting period.   12/21/2022 2157 by Ama Garduno RN  Outcome: Progressing

## 2022-12-22 NOTE — PROGRESS NOTES
BEDSIDE eval: notified of hypotension:   Skin clammy, pt awake and alert to self,  confused. Resp easy and regular, HR 60's.     Pt admitted: 12/20/2022 for UTI and encephalopathy    PLAN: midodrine, IV NS bolus and albumin, obtain BG-> 162  Hold pain meds, gabapentin, 9pm dose of Ativan, and scheduled atenolol  STAT labs: cbc, cmp, lactic, procal, crp  Discussed with primary RN

## 2022-12-22 NOTE — FLOWSHEET NOTE
Treatment time: 3.5 hours  Net UF: 2000 ml     Pre weight: 86 kg   Post weight: 84 kg     Access used: RIJ TDC  Access function: Good with  ml/min     Medications or blood products given: Vancomycin, retacrit     Regular outpatient schedule: TTS     Summary of response to treatment:    12/22/22 1023 12/22/22 1358   Vital Signs   Temp 98.2 °F (36.8 °C) 97 °F (36.1 °C)   Heart Rate 54 61   Resp 16 16   Weight 189 lb 9.5 oz (86 kg) 185 lb 3 oz (84 kg)   Percent Weight Change 0.23 -2.33   Pain Assessment   Pain Assessment None - Denies Pain None - Denies Pain   Copy of dialysis treatment record placed in chart, to be scanned into EMR. Report provided to Hermelinda Ballesteros RN.

## 2022-12-22 NOTE — PROGRESS NOTES
Pt returned to room 4259 from dialysis and placed in specialty bed per order. Bed in lowest position, locked and bed alarm on. Call light and bedside table within reach.

## 2022-12-22 NOTE — PROGRESS NOTES
Physician Progress Note      Angeles Bentley  [facility] #:                  [id number]  :                       1945  ADMIT DATE:       2022 5:15 PM  [address] [place] DATE:  Arun Riley  PROVIDER #:        Jo-Ann Arriaga MD          QUERY TEXT:    Dear Dr. Marquita Alarcon,  Patient admitted with Acute encephalopathy, proctocolitis and Cystitis. Noted   documentation of NSTEMI type II MI in H and P. Pt did not have c/o CP. In   order to support the diagnosis of type II MI, please refer to 4th universal   definition of MI below and include additional clinical indicators in your   documentation. ? Or please document if the diagnosis of type II MI has been   ruled out after study. ? The medical record reflects the following:  ?? Risk Factors: Hx CAD, CVA, ESRD on HD, DM, Current Cystitis, Proctocolitis  ? ? Clinical Indicators:  ED for fatigue and lethargy, Trop=0.07 and 0.06,   H and P notes \"NSTEMI: Type II in the setting of end-stage renal disease,   troponin continues to be at baseline\" On  PCP notes \"Type II MI-Likely   demand ischemia-Patient has no evidence of injury pattern on EKG-Conservative   management for now\"  ? ? Treatment: on abx, monitor, labs, telemetry  Thank you,  Richerd Scheuermann RN, CDS  [employer]    Fourth Universal Definition of Myocardial Infarction:  Clearly separates MI   from myocardial injury. Patients with elevated blood troponin levels but   without clinical evidence of ischemia are said to have had a myocardial   injury. ? To have a myocardial infarction requires both an elevated troponin   blood test along with at least one of the following:  - Symptoms of acute myocardial ischemia (Types 1 - 5 MI)  - Clinical evidence of ischemia, as evidenced in an electrocardiogram (EKG)   showing new ischemic changes (Type 1, Type 2, Type 3, or Type 4a MI)  - Development of pathological Q waves (Types 1 - 5 MI)  - Imaging evidence of new loss of viable myocardium or new regional wall   motion abnormality in a pattern consistent with an ischemic etiology (Types 1   - 5 MI)  Options provided:  -- MI type II ruled out after study and demand ischemia confirmed  -- MI type II due to infection present as evidenced by, Please document   indicators of myocardial infarction. -- MI type II ruled out after study and non-ischemic myocardial injury   confirmed  -- Other - I will add my own diagnosis  -- Disagree - Not applicable / Not valid  -- Disagree - Clinically unable to determine / Unknown  -- Refer to Clinical Documentation Reviewer    PROVIDER RESPONSE TEXT:    Type II MI was ruled out after study and demand ischemia confirmed. Query created by:  Otilio Ceja on 12/21/2022 2:35 PM      Electronically signed by:  Teresa Barrera MD 12/22/2022 2:16 PM

## 2022-12-23 ENCOUNTER — APPOINTMENT (OUTPATIENT)
Dept: INTERVENTIONAL RADIOLOGY/VASCULAR | Age: 77
DRG: 314 | End: 2022-12-23
Payer: COMMERCIAL

## 2022-12-23 LAB
GLUCOSE BLD-MCNC: 101 MG/DL (ref 70–99)
GLUCOSE BLD-MCNC: 77 MG/DL (ref 70–99)
GLUCOSE BLD-MCNC: 86 MG/DL (ref 70–99)
GLUCOSE BLD-MCNC: 90 MG/DL (ref 70–99)
LACTIC ACID: 0.7 MMOL/L (ref 0.4–2)
PERFORMED ON: ABNORMAL
PERFORMED ON: NORMAL
PROCALCITONIN: 4.65 NG/ML (ref 0–0.15)

## 2022-12-23 PROCEDURE — 2500000003 HC RX 250 WO HCPCS: Performed by: INTERNAL MEDICINE

## 2022-12-23 PROCEDURE — 36556 INSERT NON-TUNNEL CV CATH: CPT

## 2022-12-23 PROCEDURE — 83605 ASSAY OF LACTIC ACID: CPT

## 2022-12-23 PROCEDURE — 2709999900 IR REMOVE TUNNELED CVAD WO SQ PORT/PUMP

## 2022-12-23 PROCEDURE — 02H633Z INSERTION OF INFUSION DEVICE INTO RIGHT ATRIUM, PERCUTANEOUS APPROACH: ICD-10-PCS | Performed by: INTERNAL MEDICINE

## 2022-12-23 PROCEDURE — 2580000003 HC RX 258: Performed by: INTERNAL MEDICINE

## 2022-12-23 PROCEDURE — 97535 SELF CARE MNGMENT TRAINING: CPT

## 2022-12-23 PROCEDURE — 6360000002 HC RX W HCPCS: Performed by: STUDENT IN AN ORGANIZED HEALTH CARE EDUCATION/TRAINING PROGRAM

## 2022-12-23 PROCEDURE — 1200000000 HC SEMI PRIVATE

## 2022-12-23 PROCEDURE — 94761 N-INVAS EAR/PLS OXIMETRY MLT: CPT

## 2022-12-23 PROCEDURE — 87070 CULTURE OTHR SPECIMN AEROBIC: CPT

## 2022-12-23 PROCEDURE — 36415 COLL VENOUS BLD VENIPUNCTURE: CPT

## 2022-12-23 PROCEDURE — 36589 REMOVAL TUNNELED CV CATH: CPT

## 2022-12-23 PROCEDURE — 97530 THERAPEUTIC ACTIVITIES: CPT

## 2022-12-23 PROCEDURE — 77001 FLUOROGUIDE FOR VEIN DEVICE: CPT

## 2022-12-23 PROCEDURE — 02PYX3Z REMOVAL OF INFUSION DEVICE FROM GREAT VESSEL, EXTERNAL APPROACH: ICD-10-PCS | Performed by: INTERNAL MEDICINE

## 2022-12-23 PROCEDURE — 6360000002 HC RX W HCPCS: Performed by: INTERNAL MEDICINE

## 2022-12-23 PROCEDURE — 84145 PROCALCITONIN (PCT): CPT

## 2022-12-23 PROCEDURE — 0JPT3XZ REMOVAL OF TUNNELED VASCULAR ACCESS DEVICE FROM TRUNK SUBCUTANEOUS TISSUE AND FASCIA, PERCUTANEOUS APPROACH: ICD-10-PCS | Performed by: INTERNAL MEDICINE

## 2022-12-23 PROCEDURE — 6370000000 HC RX 637 (ALT 250 FOR IP): Performed by: INTERNAL MEDICINE

## 2022-12-23 RX ORDER — FENTANYL CITRATE 50 UG/ML
INJECTION, SOLUTION INTRAMUSCULAR; INTRAVENOUS DAILY PRN
Status: COMPLETED | OUTPATIENT
Start: 2022-12-23 | End: 2022-12-23

## 2022-12-23 RX ADMIN — LORAZEPAM 0.5 MG: 0.5 TABLET ORAL at 08:25

## 2022-12-23 RX ADMIN — SEVELAMER CARBONATE 800 MG: 800 TABLET, FILM COATED ORAL at 08:25

## 2022-12-23 RX ADMIN — CEFEPIME 1000 MG: 1 INJECTION, POWDER, FOR SOLUTION INTRAMUSCULAR; INTRAVENOUS at 21:08

## 2022-12-23 RX ADMIN — METRONIDAZOLE 500 MG: 500 INJECTION, SOLUTION INTRAVENOUS at 22:59

## 2022-12-23 RX ADMIN — Medication 6 MG: at 21:04

## 2022-12-23 RX ADMIN — APIXABAN 2.5 MG: 2.5 TABLET, FILM COATED ORAL at 21:04

## 2022-12-23 RX ADMIN — METRONIDAZOLE 500 MG: 500 INJECTION, SOLUTION INTRAVENOUS at 15:17

## 2022-12-23 RX ADMIN — DOCUSATE SODIUM 200 MG: 100 CAPSULE, LIQUID FILLED ORAL at 21:04

## 2022-12-23 RX ADMIN — TAMSULOSIN HYDROCHLORIDE 0.4 MG: 0.4 CAPSULE ORAL at 08:25

## 2022-12-23 RX ADMIN — APIXABAN 2.5 MG: 2.5 TABLET, FILM COATED ORAL at 08:25

## 2022-12-23 RX ADMIN — LORAZEPAM 0.5 MG: 0.5 TABLET ORAL at 21:04

## 2022-12-23 RX ADMIN — PANTOPRAZOLE SODIUM 20 MG: 20 TABLET, DELAYED RELEASE ORAL at 21:04

## 2022-12-23 RX ADMIN — FENTANYL CITRATE 25 MCG: 50 INJECTION INTRAMUSCULAR; INTRAVENOUS at 11:06

## 2022-12-23 RX ADMIN — SODIUM CHLORIDE, PRESERVATIVE FREE 10 ML: 5 INJECTION INTRAVENOUS at 08:25

## 2022-12-23 RX ADMIN — ATORVASTATIN CALCIUM 10 MG: 10 TABLET, FILM COATED ORAL at 21:04

## 2022-12-23 RX ADMIN — METRONIDAZOLE 500 MG: 500 INJECTION, SOLUTION INTRAVENOUS at 06:55

## 2022-12-23 RX ADMIN — DOCUSATE SODIUM 200 MG: 100 CAPSULE, LIQUID FILLED ORAL at 08:25

## 2022-12-23 RX ADMIN — ASPIRIN 81 MG 81 MG: 81 TABLET ORAL at 08:25

## 2022-12-23 RX ADMIN — SODIUM CHLORIDE, PRESERVATIVE FREE 10 ML: 5 INJECTION INTRAVENOUS at 21:04

## 2022-12-23 NOTE — PROGRESS NOTES
[address] [place] [address] Nephrology   [facility]. [facility]  [phone]  Nephrology Note          Patient ID: Devaughn Zhou  Referring/ Physician: Zeferino Swift MD      HPI/Summary:   Devaughn Zhou is being seen by nephrology for ESRD. This is a 80-year-old male with past medical history significant for colon cancer, ESRD, hypertension, stroke, COVID infection in the past, diabetes who presented to the hospital with fatigue. He has been more lethargic. He denies any cough shortness of breath chest pain. He is unable to provide any more history at this time. MRI head showed a cerebral atrophy, severe chronic small vessel ischemic changes. No acute stroke. CT abdomen showed distal descending colon thickening some fat stranding suggestive of proctocolitis, mild bladder wall thickening possible cystitis, large stool ball in the rectum. Mild splenomegaly and a nonobstructive right renal stone. Interval Hx   Patient was seen and examined at bedside  Awake and alert   Has no complaints   Tunneled line removed and temp line placed. /75   On room air   Staph aureus from purulent discharge  Blood cxs NGTD      Plan:   HD tomorrow   Temp line now  ID following regarding ABX  BP acceptable      ESRD  TTS at [facility]  Tunneled dialysis catheter    Electrolytes  No acute issues    Secondary parathyroidism  On Renvela 800 mg 3 times daily    Hypertension  Blood pressure acceptable on atenolol 25 mg daily    Possible UTI and proctocolitis  On ceftriaxone Flagyl        [facility] Nephrology would like to thank you for the opportunity to serve this patient. Please call with any questions or concerns.     Dahlia Queen MD  [facility] Nephrology  Jose Professor [address], [address]  Fax: [phone]  Office: [phone]         CC/Reason for consult:   Reason for consult: ESRD  Chief Complaint   Patient presents with    Fatigue     Pt arrived via ems from [facility] for report of altered mental status all day today, patient is more lethargic than normal baseline. Pt has dementia but normal baseline is able to have conversation with staff. Pt very sleepy at triage. Has wet cough. Review of Systems:   [address] All other remaining systems are negative. Constitutional:  fever, chills, weakness, weight change, fatigue,      Skin:  rash, pruritus, hair loss, bruising, dry skin, petechiae. Head, Face, Neck   headaches, swelling,  cervical adenopathy. Respiratory: shortness of breath, cough, or wheezing  Cardiovascular: chest pain, palpitations, dizzy, edema  Gastrointestinal: nausea, vomiting, diarrhea, constipation,belly pain    Yellow skin, blood in stool  Musculoskeletal:  back pain, muscle weakness, gait problems,       joint pain or swelling. Genitourinary:  dysuria, poor urine flow, flank pain, blood in urine  Neurologic:  vertigo, TIA'S, syncope, seizures, focal weakness  Psychosocial:  insomnia, anxiety, or depression. Additional positive findings: -     PMH/SH/FH:    Medical Hx: reviewed and updated as appropriate  Past Medical History:   Diagnosis Date    Anemia 03/2022    Arthritis     CAD (coronary artery disease) 01/2020    Cancer ([address]     Colon Cancer diagnosed last month    Cerebral infarction [facility])     Cognitive communication deficit     COVID-19     Diabetes mellitus ([address]     Dysphagia     End stage renal disease ([address]     Fatigue     Gastrointestinal hemorrhage     Hemodialysis patient ([address] [facility] 2019    ckd-goes to dialysis Tuesday, Thurs, Fri    Hx of blood clots     Hyperlipidemia     Hypertension     Hyponatremia     Risk for falls     Splenomegaly 02/10/2021         Surgical Hx: reviewed and updated as appropriate   has a past surgical history that includes IR PERC ARTERIOVENOUS FISTULA CREATION (Left); colectomy (N/A, 01/26/2022); sigmoidoscopy (N/A, 02/17/2022); IR EMBOLIZATION HEMORRHAGE (02/19/2022); Tunneled venous catheter placement (Right, 02/21/2022);  IR TUNNELED CVC PLACE WO SQ PORT/PUMP > 5 YEARS (2/21/2022); Cardiac catheterization (2019); Colonoscopy; Dialysis fistula creation (Left, 4/29/2022); Cholecystectomy, laparoscopic (N/A, 5/16/2022); ERCP (N/A, 5/16/2022); ERCP (N/A, 5/16/2022); and Upper gastrointestinal endoscopy (5/16/2022). Social Hx: reviewed and updated as appropriate  Social History     Tobacco Use    Smoking status: Former    Smokeless tobacco: Never   Substance Use Topics    Alcohol use: Not Currently        Family hx: reviewed and updated as appropriate  family history includes High Blood Pressure in his father. Medications:   cefepime, 1,000 mg, Q24H  sodium chloride flush, 10 mL, 2 times per day  apixaban, 2.5 mg, BID  aspirin, 81 mg, Daily  [Held by provider] atenolol, 25 mg, QPM  atorvastatin, 10 mg, Nightly  [Held by provider] gabapentin, 100 mg, TID  LORazepam, 0.5 mg, BID  pantoprazole, 20 mg, Nightly  sevelamer, 800 mg, TID WC  tamsulosin, 0.4 mg, QAM  metroNIDAZOLE, 500 mg, Q8H  docusate sodium, 200 mg, BID     Lisinopril    Allergies: Allergies   Allergen Reactions    Lisinopril Swelling     Allergy listed on paperwork from [facility], no reaction noted         Physical Exam/Objective:   Vitals:    12/23/22 1158   BP: (!) 142/75   Pulse: 61   Resp: 18   Temp: 97.9 °F (36.6 °C)   SpO2: 90%       Intake/Output Summary (Last 24 hours) at 12/23/2022 1228  Last data filed at 12/22/2022 2016  Gross per 24 hour   Intake 100 ml   Output --   Net 100 ml         General appearance:  in no acute distress, comfortable,lethargic  HEENT: no icterus, EOM intact, trachea midline. Neck : no masses, appears symmetrical and no JVD appreciated. Respiratory: Respiratory effort normal, bilateral equal chest rise. No wheeze, no crackles   Cardiovascular: Ausculation shows RRR and  NO edema   Abdomen: abdomen is soft, non distended, no masses, no pain with palpation.   Musculoskeletal:  no joint swelling, no deformity  Skin: no rashes, no induration, no tightening, no jaundice   Neuro:  Follows commands, moves all extremities spontaneously       Data:   CBC:   Recent Labs     12/21/22  0704 12/21/22  2240 12/22/22  1020   WBC 7.5 5.8 5.8   HGB 10.9* 9.0* 8.6*   HCT 34.1* 28.7* 26.2*    163 176     BMP:    Recent Labs     12/21/22  0704 12/21/22  2240 12/22/22  1020    139 136   K 4.7 4.7 3.7   CL 99 98* 97*   CO2 27 23 26   BUN 32* 43* 49*   CREATININE 3.9* 4.4* 4.4*   GLUCOSE 174* 136* 149*

## 2022-12-23 NOTE — PROGRESS NOTES
Hospitalist Progress Note  12/23/2022 9:24 AM    PCP: Martina Morales    [id number]     Date of Admission: 12/20/2022                                                                                                                     HOSPITAL COURSE    Patient demographics:  The patient  Leslie Guillen is a 69 y.o. male     Significant past medical history:   Patient Active Problem List   Diagnosis    GI bleed    History of COVID-19    Hyponatremia    Fatigue    Acute kidney injury superimposed on CKD ([facility]    Lethargy    Suspected UTI    Acute CVA (cerebrovascular accident) ([facility]    LESLEE (acute kidney injury) ([address]    Malignant neoplasm of colon ([facility]    Acute blood loss anemia    COVID-19    Shock circulatory (HCC)    Bilateral pulmonary embolism (HCC)    Lactic acidosis    Hemorrhagic shock (HCC)    ESRD (end stage renal disease) on dialysis ([facility]    Acute cholecystitis    Pain of upper abdomen    AMS (altered mental status)    Sepsis ([facility]    Acute metabolic encephalopathy    Cellulitis    Fever and chills    PVD (peripheral vascular disease) ([facility])    Anxiety    Rectal bleeding    Severe malnutrition ([facility])    Acute encephalopathy    Acute cystitis without hematuria    Colitis    Elevated procalcitonin    Chronic anticoagulation         Presenting symptoms:      Diagnostic workup:      CONSULTS DURING ADMISSION :   IP CONSULT TO NEPHROLOGY  IP CONSULT TO INFECTIOUS DISEASES      Patient was diagnosed with:        Treatment while inpatient:                                                                                         ----------------------------------------------------------      SUBJECTIVE COMPLAINTS-     Diet: ADULT DIET; Regular; 3 carb choices (45 gm/meal); Low Fat/Low Chol/High Fiber/2 gm Na;  Low Sodium (2 gm)      OBJECTIVE:   Patient Active Problem List   Diagnosis    GI bleed    History of COVID-19    Hyponatremia    Fatigue    Acute kidney injury superimposed on CKD (HCC)    Lethargy Suspected UTI    Acute CVA (cerebrovascular accident) ([facility]    [facility] ([facility]) [facility]    Malignant neoplasm of colon ([facility]    Acute blood loss anemia    COVID-19    Shock circulatory ([facility]    Bilateral pulmonary embolism (HCC)    Lactic acidosis    Hemorrhagic shock (HCC)    ESRD (end stage renal disease) on dialysis ([facility]    Acute cholecystitis    Pain of upper abdomen    AMS (altered mental status)    Sepsis (HCC)    Acute metabolic encephalopathy    Cellulitis    Fever and chills    PVD (peripheral vascular disease) (HCC)    Anxiety    Rectal bleeding    Severe malnutrition (HCC)    Acute encephalopathy    Acute cystitis without hematuria    Colitis    Elevated procalcitonin    Chronic anticoagulation       Allergies  Lisinopril    Medications    Scheduled Meds:   cefepime  1,000 mg IntraVENous Q24H    sodium chloride flush  10 mL IntraVENous 2 times per day    apixaban  2.5 mg Oral BID    aspirin  81 mg Oral Daily    [Held by provider] atenolol  25 mg Oral QPM    atorvastatin  10 mg Oral Nightly    [Held by provider] gabapentin  100 mg Oral TID    LORazepam  0.5 mg Oral BID    pantoprazole  20 mg Oral Nightly    sevelamer  800 mg Oral TID WC    tamsulosin  0.4 mg Oral QAM    metroNIDAZOLE  500 mg IntraVENous Q8H    docusate sodium  200 mg Oral BID     Continuous Infusions:   sodium chloride       PRN Meds:  sodium chloride flush, sodium chloride, promethazine **OR** ondansetron, acetaminophen **OR** acetaminophen, melatonin    Vitals   Vitals /wt Patient Vitals for the past 8 hrs:   BP Temp Temp src Pulse Resp SpO2   12/23/22 0822 131/73 97.5 °F (36.4 °C) Axillary 62 18 94 %   12/23/22 0615 (!) 144/70 97.9 °F (36.6 °C) Oral 66 18 98 %        72HR INTAKE/OUTPUT:    Intake/Output Summary (Last 24 hours) at 12/23/2022 0924  Last data filed at 12/22/2022 2016  Gross per 24 hour   Intake 100 ml   Output --   Net 100 ml       Exam:    Gen:   Alert and oriented ×3    Eyes: PERRL. No sclera icterus.  No conjunctival injection. ENT: No discharge. Pharynx clear. External appearance of ears and nose normal.  Neck: Trachea midline. No obvious mass. Resp: No accessory muscle use. No crackles. No wheezes. No rhonchi. CV: Regular rate. Regular rhythm. No murmur or rub. No edema. GI: Non-tender. Non-distended. No hernia. Skin: Warm, dry, normal texture and turgor. Lymph: No cervical LAD. No supraclavicular LAD. M/S: / Ext. No cyanosis. No clubbing. No joint deformity. Neuro: CN 2-12 are intact,  no neurologic deficits noted. PT/INR: No results for input(s): PROTIME, INR in the last 72 hours. APTT: No results for input(s): APTT in the last 72 hours. CBC:   Recent Labs     12/20/22 1739 12/21/22  0704 12/21/22 2240 12/22/22  1020   WBC 8.3 7.5 5.8 5.8   HGB 10.9* 10.9* 9.0* 8.6*   HCT 33.5* 34.1* 28.7* 26.2*   MCV 99.7 100.4* 103.9* 99.3    192 163 176       BMP:   Recent Labs     12/20/22 1739 12/21/22  0704 12/21/22 2240 12/22/22  1020   * 139 139 136   K 4.2 4.7 4.7 3.7   CL 95* 99 98* 97*   CO2 29 27 23 26   BUN 25* 32* 43* 49*   CREATININE 3.6* 3.9* 4.4* 4.4*       LIVER PROFILE:   Recent Labs     12/20/22 1739 12/21/22  0704 12/22/22  1020   ALKPHOS 101 91 67   AST 13* 10* 7*   ALT 11 10 6*   BILITOT 0.8 0.5 0.3     No results for input(s): AMYLASE in the last 72 hours. No results for input(s): LIPASE in the last 72 hours. UA:  Recent Labs     12/20/22 1739   WBCUA *   RBCUA 21-50*   MUCUS Rare*       TROPONIN:   Recent Labs     12/20/22 1739 12/21/22  0704   CKTOTAL  --  14*   TROPONINI 0.07* 0.06*       Lab Results   Component Value Date/Time    URRFLXCULT Yes 10/20/2022 10:52 AM       Recent Labs     12/20/22  1739   TSHREFLEX 1.43       No components found for: [id number]  POC GLUCOSE:    Recent Labs     12/21/22 2014 12/22/22  0819 12/22/22  1635 12/22/22 2015 12/23/22  0813   POCGLU 162* 114* 117* 78 86     No results for input(s): LABA1C in the last 72 hours. Lab Results   Component Value Date/Time    LABA1C 6.1 01/15/2022 05:31 AM         ASSESSMENT AND PLAN     Acute encephalopathy  Likely septic and metabolic combined  No further benzos at dc       End-stage renal disease on hemodialysis  Received in a.m. Nephrology is following     Tunneled catheter infection  Tunneled catheter has been removed  Temporary catheter is in place  Blood cx ordered  Give vancomycin after cultures       Constipation  Treat with MiraLAX  As patient is a dialysis patient would avoid fleets enemas or milk of magnesia out of concern for toxicity     Proctocolitis  Continue Rocephin and Flagyl  Add vancomycin           Type II MI  Likely demand ischemia  Patient has no evidence of injury pattern on EKG  Conservative management for now        Code Status: Full Code        Dispo - cc        The patient and / or the family were informed of the results of any tests, a time was given to answer questions, a plan was proposed and they agreed with plan. Mayda Kathleen MD    This note was transcribed using [id number] [employer]. Please disregard any translational errors.

## 2022-12-23 NOTE — PROGRESS NOTES
Physical Therapy  Facility/Department: [address] MED SURG  Physical Therapy treatment session- [address] with OT    Name: Ino Deshpande  : [id number]  MRN: [id number]  Date of Service: 2022    Discharge Recommendations:  [address] with PT   PT Equipment Recommendations  Equipment Needed: No  Other: defer to facility      Patient Diagnosis(es): The primary encounter diagnosis was Altered mental status, unspecified altered mental status type. Diagnoses of Acute cystitis without hematuria and Lactic acidosis were also pertinent to this visit. Past Medical History:  has a past medical history of Anemia, Arthritis, CAD (coronary artery disease), Cancer ([facility] Cerebral infarction ([facility] [address] Cognitive communication deficit, COVID-19, Diabetes mellitus ([facility] [address] [facility] Dysphagia, End stage renal disease ([facility] [address] [facility] Fatigue, Gastrointestinal hemorrhage, Hemodialysis patient ([facility] Hx of blood clots, Hyperlipidemia, Hypertension, Hyponatremia, Risk for falls, and Splenomegaly. Past Surgical History:  has a past surgical history that includes IR PERC ARTERIOVENOUS FISTULA CREATION (Left); colectomy (N/A, 2022); sigmoidoscopy (N/A, 2022); IR EMBOLIZATION HEMORRHAGE (2022); Tunneled venous catheter placement (Right, 2022); IR TUNNELED CVC PLACE WO SQ PORT/PUMP > 5 YEARS (2022); Cardiac catheterization (2019); Colonoscopy; Dialysis fistula creation (Left, 2022); Cholecystectomy, laparoscopic (N/A, 2022); ERCP (N/A, 2022); ERCP (N/A, 2022); and Upper gastrointestinal endoscopy (2022). Assessment   Body Structures, Functions, Activity Limitations Requiring Skilled Therapeutic Intervention: Decreased functional mobility ; Decreased ADL status; Decreased strength;Decreased cognition;Decreased balance;Decreased endurance  Assessment: Pt is a 69 y.o. male who [facility] to [facility] on 22 with AMS.  Prior to admission, pt living at [facility], needing assist with all ADLs outside of feeding, A of 2 person for stand pivot transfers to w/c. Pt currently functioning below baseline requiring maxAx2 for transfer with denny jenkins from elevated bed. Anticipate return to [facility] with PT. Will continue to follow at low frequency while in acute setting. Treatment Diagnosis: impaired mobility  Therapy Prognosis: Guarded  Requires PT Follow-Up: Yes  Activity Tolerance  Activity Tolerance: Treatment limited secondary to decreased cognition;Patient limited by endurance     Plan   Physcial Therapy Plan  General Plan: 2-3 times per week  Current Treatment Recommendations: Strengthening, Balance training, Functional mobility training, Transfer training, Endurance training, Neuromuscular re-education, Cognitive reorientation, Safety education & training, Equipment evaluation, education, & procurement, Patient/Caregiver education & training, Therapeutic activities  Safety Devices  Type of Devices: All fall risk precautions in place, Call light within reach, Gait belt, Patient at risk for falls, Left in bed, Bed alarm in place, Nurse notified  Restraints  Restraints Initially in Place: No     Restrictions  Restrictions/Precautions  Restrictions/Precautions: Fall Risk   Subjective   General  Chart Reviewed: Yes  Patient assessed for rehabilitation services?: Yes  Additional Pertinent Hx: Pt is a 69 y.o. male who presented to [facility] on 12/20/22 with AMS. Response To Previous Treatment: Patient unable to report, no changes reported from family or staff  Family / Caregiver Present: No  Referring Practitioner: Hawa Fuentes MD  Referral Date : 12/21/22  Diagnosis: acute encephalopathy  Follows Commands: Within Functional Limits  Subjective  Subjective: Pt in bed upon arrival.  Agreeable to PT treatment.   Covers over his head upon arrival.         Social/Functional History  Social/Functional History  Type of Home: Facility ([address])  ADL Assistance: Needs assistance (needs assist with all except feeding)  Ambulation Assistance: Non-ambulatory (uses w/c, but needs help propelling further than room distance)  Transfer Assistance: Needs assistance (assist of 2 for transfers)             Objective      Bed mobility  Supine to Sit: Maximum assistance;2 Person assistance (HOB elevated)  Sit to Supine: Maximum assistance;2 Person assistance (to stretcher to leave for procedure)    Transfers  Sit to Stand: Maximum Assistance;2 Person Assistance (to [address] stedy from elevated bed)  Stand to Sit: Maximum Assistance;2 Person Assistance  Comment: could not use LUE on stedy (painful when therapist attempted to put it on stedy); cues for sequencing    Ambulation  Comments: non-ambulatory at baseline     Balance  Posture: Fair  Sitting - Static: Poor;+  Sitting - Dynamic: Poor  Standing - Static: Poor           AM-PAC Score  AM-PAC Inpatient Mobility Raw Score : 9 (12/23/22 1045)  AM-PAC Inpatient T-Scale Score : 30.55 (12/23/22 1045)  Mobility Inpatient CMS 0-100% Score: 81.38 (12/23/22 1045)  Mobility Inpatient CMS G-Code Modifier : CM (12/23/22 1045)             Goals  Short Term Goals  Time Frame for Short Term Goals: by acute discharge (goals ongoing as of 12/23)  Short Term Goal 1: bed mobility mod A  Short Term Goal 2: sit<>stand within stedy with max A  Short Term Goal 3: stand pivot with A of 2 person  Patient Goals   Patient Goals : none stated       Education  Patient Education  Education Given To: Patient  Education Provided: Role of Therapy;Transfer Training  Education Method: Verbal  Barriers to Learning: Cognition  Education Outcome: Continued education needed      Therapy Time   Individual Concurrent Group Co-treatment   Time In 1020         Time Out 1045         Minutes 25         Timed Code Treatment Minutes: 25 Minutes       Electronically signed by Valerie Camacho, PT [id number] on 12/23/2022 at 10:49 AM

## 2022-12-23 NOTE — BRIEF OP NOTE
Brief Postoperative Note    Evita Stalling  YOB: 1945  [id number]    Pre-operative Diagnosis: ESRD on HD, exit site infection    Post-operative Diagnosis: Same    Procedure: image guided removal of tunneled dialysis catheter and placement of nontunneled dialysis catheter    Anesthesia: Local    Surgeons/Assistants: Garland Wu MD    Estimated Blood Loss: less than 5    Complications: None    Specimens: Was Obtained: tunneled catheter tip sent for testing    Findings: successful image guided removal of tunneled dialysis catheter and placement of nontunneled dialysis catheter in right IJ.     Electronically signed by Garland Wu MD on 12/23/2022 at 11:31 AM

## 2022-12-23 NOTE — CARE COORDINATION
Pending blood culture results, may need HD line pulled. DC planning: return to [place] [id number] with HD at [address] [place] [address]. CM following. Harini Wyatt RN, BSN,   [phone]  Electronically signed by Harini Wyatt RN on 12/23/2022 at 9:17 AM     Temp HD line placed today.     Harini Wyatt RN, BSN,   [phone]  Electronically signed by Harini Wyatt RN on 12/23/2022 at 3:24 PM

## 2022-12-23 NOTE — PROGRESS NOTES
Occupational Therapy  Facility/Department: [address] MED SURG  Occupational Therapy Daily Treatment    Name: Carissa Franklin  :   MRN: [id number]  Date of Service: 2022    Discharge Recommendations:  [address] with OT     Carissa Franklin scored a  on the [facility] ADL Inpatient form. Patient Diagnosis(es): The primary encounter diagnosis was Altered mental status, unspecified altered mental status type. Diagnoses of Acute cystitis without hematuria and Lactic acidosis were also pertinent to this visit. Past Medical History:  has a past medical history of Anemia, Arthritis, CAD (coronary artery disease), Cancer ([facility] Cerebral infarction ([facility] Cognitive communication deficit, COVID-19, Diabetes mellitus ([facility] Dysphagia, End stage renal disease ([facility] Fatigue, Gastrointestinal hemorrhage, Hemodialysis patient ([facility] Hx of blood clots, Hyperlipidemia, Hypertension, Hyponatremia, Risk for falls, and Splenomegaly. Past Surgical History:  has a past surgical history that includes IR PERC ARTERIOVENOUS FISTULA CREATION (Left); colectomy (N/A, 2022); sigmoidoscopy (N/A, 2022); IR EMBOLIZATION HEMORRHAGE (2022); Tunneled venous catheter placement (Right, 2022); IR TUNNELED CVC PLACE WO SQ PORT/PUMP > 5 YEARS (2022); Cardiac catheterization (2019); Colonoscopy; Dialysis fistula creation (Left, 2022); Cholecystectomy, laparoscopic (N/A, 2022); ERCP (N/A, 2022); ERCP (N/A, 2022); and Upper gastrointestinal endoscopy (2022). Treatment Diagnosis: Decreased: ADLs, functional transfers      Assessment   Performance deficits / Impairments: Decreased functional mobility ; Decreased ADL status; Decreased cognition;Decreased balance;Decreased strength;Decreased endurance;Decreased ROM  Assessment: Pt is a 68 yo M admitted with AMS and increased lethargy.  PTA, pt is a resident of [place] [address] where he requires assist for most ADLs and assist x2 staff members to pivot transfer to w/c. Pt remains slightly below baseline, but continues to require max Ax2 for bed mobility and functional transfers via denny stedy lift. ADLs not assessed d/t pt leaving for procedure at end of session. Anticipate safe return to [facility] w/ ongoing OT at discretion of the facility at d/c. Treatment Diagnosis: Decreased: ADLs, functional transfers  Prognosis: Fair  REQUIRES OT FOLLOW-UP: Yes  Activity Tolerance  Activity Tolerance: Patient limited by fatigue;Treatment limited secondary to decreased cognition        Plan   Occupational Therapy Plan  Times Per Week: 2-3  Times Per Day: Once a day  Current Treatment Recommendations: Strengthening, ROM, Balance training, Functional mobility training, Endurance training, Self-Care / ADL, Safety education & training     Restrictions  Restrictions/Precautions  Restrictions/Precautions: Fall Risk (Simultaneous filing. User may not have seen previous data.)    Subjective   General  Chart Reviewed: Yes  Patient assessed for rehabilitation services?: Yes  Additional Pertinent Hx: per Dr Sofiya Arceo note: \"73 y.o. male presents from his nursing facility with concern for altered mental status and increased lethargy. History of dementia however normally patient is conversant. Symptoms reportedly started after he returned home from dialysis. He was noted to have a low-grade temperature at his nursing facility. \"  Family / Caregiver Present: No  Referring Practitioner: Kim Mohamud  Diagnosis: Acute encephalopathy  Subjective  Subjective: Pt met b/s for OT cotx w/ PT. Pt in bed w/ covers pulled over his head, but awake and agreeable to therapy.  Denied pain  General Comment  Comments: Per RN ok to see     Social/Functional History  Social/Functional History  Type of Home: Facility ([address])  ADL Assistance: Needs assistance (needs assist with all except feeding)  Ambulation Assistance: Non-ambulatory (uses w/c, but needs help propelling further than room distance)  Transfer Assistance: Needs assistance (assist of 2 for transfers)       Objective   Safety Devices  Type of Devices: All fall risk precautions in place;Call light within reach;Gait belt;Patient at risk for falls; Left in bed;Bed alarm in place;Nurse notified  Restraints  Restraints Initially in Place: No           ADL  Additional Comments: ADLs not assessed d/t pt leaving for procedure     Activity Tolerance  Activity Tolerance: Treatment limited secondary to decreased cognition;Patient limited by endurance  Bed mobility  Supine to Sit: Maximum assistance;2 Person assistance (HOB elevated)  Sit to Supine: Maximum assistance;2 Person assistance (to stretcher to leave for procedure)  Transfers  Sit to stand: Maximum assistance;2 Person assistance  Stand to sit: 2 Person assistance;Maximum assistance  Transfer Comments: to/from denny jenkins - significant effort to bring pt's hips forward enough to place seat of denny jenkisn     Cognition  Overall Cognitive Status: Exceptions  Arousal/Alertness: Appropriate responses to stimuli  Following Commands: Follows one step commands consistently  Attention Span: Attends with cues to redirect  Memory: Decreased recall of recent events  Safety Judgement: Decreased awareness of need for safety;Decreased awareness of need for assistance  Problem Solving: Decreased awareness of errors  Insights: Decreased awareness of deficits  Initiation: Requires cues for some  Sequencing: Requires cues for some  Orientation  Overall Orientation Status: Impaired  Orientation Level: Oriented to place;Oriented to person;Disoriented to time;Disoriented to situation                  Education Given To: Patient; Family  Education Provided: Role of Therapy; ADL Adaptive Strategies;Transfer Training;Equipment;Orientation  Education Method: Verbal;Demonstration  Barriers to Learning: Cognition  Education Outcome: Continued education needed           AM-PAC Score  AM-PAC Inpatient Daily Activity Raw Score: 11 (12/23/22 1048)  AM-PAC Inpatient ADL T-Scale Score : 29.04 (12/23/22 1048)  ADL Inpatient CMS 0-100% Score: 70.42 (12/23/22 1048)  ADL Inpatient CMS G-Code Modifier : CL (12/23/22 1048)    Goals  Short Term Goals  Time Frame for Short Term Goals: Prior to d/c; goals ongoing  Short Term Goal 1: Pt will complete bed mobility w/ min A  Short Term Goal 2: Pt will sit EOB x10 mins w/ SBA during ADL task  Short Term Goal 3: Pt will complete ADL transfer w/ mod Ax2 using LRAD  Short Term Goal 4: Pt will groom w/ setup  Long Term Goals  Time Frame for Long Term Goals : LTG=STG  Patient Goals   Patient goals : did not state       Therapy Time   Individual Concurrent Group Co-treatment   Time In 1020         Time Out 1045         Minutes [address] [place] [address] , OTR/L [id number]

## 2022-12-23 NOTE — PLAN OF CARE
Problem: Safety - Adult  Goal: Free from fall injury  12/23/2022 1617 by Brenda Armstrong RN  Outcome: Progressing  12/23/2022 0342 by Rj Grant RN  Outcome: Progressing     Problem: ABCDS Injury Assessment  Goal: Absence of physical injury  12/23/2022 1617 by Brenda Armstrong RN  Outcome: Progressing  12/23/2022 0342 by Rj Grant RN  Outcome: Progressing     Problem: Skin/Tissue Integrity  Goal: Absence of new skin breakdown  Description: 1. Monitor for areas of redness and/or skin breakdown  2. Assess vascular access sites hourly  3. Every 4-6 hours minimum:  Change oxygen saturation probe site  4. Every 4-6 hours:  If on nasal continuous positive airway pressure, respiratory therapy assess nares and determine need for appliance change or resting period.   12/23/2022 1617 by Brenda Armstrong RN  Outcome: Progressing  12/23/2022 0342 by Rj Grant RN  Outcome: Progressing     Problem: Chronic Conditions and Co-morbidities  Goal: Patient's chronic conditions and co-morbidity symptoms are monitored and maintained or improved  12/23/2022 1617 by Brenda Armstrong RN  Outcome: Progressing  Flowsheets (Taken 12/23/2022 0800)  Care Plan - Patient's Chronic Conditions and Co-Morbidity Symptoms are Monitored and Maintained or Improved: Monitor and assess patient's chronic conditions and comorbid symptoms for stability, deterioration, or improvement  12/23/2022 0342 by Rj Grant RN  Outcome: Progressing

## 2022-12-23 NOTE — PROGRESS NOTES
Message left for Dr. Darrius Rodriguez attempting to confirm if permacath should be pulled or not pulled after HD.

## 2022-12-24 LAB
ALBUMIN SERPL-MCNC: 2.9 G/DL (ref 3.4–5)
ANION GAP SERPL CALCULATED.3IONS-SCNC: 15 MMOL/L (ref 3–16)
BASOPHILS ABSOLUTE: 0 K/UL (ref 0–0.2)
BASOPHILS RELATIVE PERCENT: 0.7 %
BLOOD CULTURE, ROUTINE: NORMAL
BUN BLDV-MCNC: 37 MG/DL (ref 7–20)
CALCIUM SERPL-MCNC: 8.2 MG/DL (ref 8.3–10.6)
CHLORIDE BLD-SCNC: 97 MMOL/L (ref 99–110)
CO2: 23 MMOL/L (ref 21–32)
CREAT SERPL-MCNC: 4.3 MG/DL (ref 0.8–1.3)
CULTURE, BLOOD 2: NORMAL
EOSINOPHILS ABSOLUTE: 0 K/UL (ref 0–0.6)
EOSINOPHILS RELATIVE PERCENT: 1.7 %
GFR SERPL CREATININE-BSD FRML MDRD: 14 ML/MIN/{1.73_M2}
GLUCOSE BLD-MCNC: 115 MG/DL (ref 70–99)
GLUCOSE BLD-MCNC: 63 MG/DL (ref 70–99)
GLUCOSE BLD-MCNC: 73 MG/DL (ref 70–99)
GLUCOSE BLD-MCNC: 82 MG/DL (ref 70–99)
GLUCOSE BLD-MCNC: 88 MG/DL (ref 70–99)
GLUCOSE BLD-MCNC: 90 MG/DL (ref 70–99)
HCT VFR BLD CALC: 26.7 % (ref 40.5–52.5)
HEMOGLOBIN: 8.6 G/DL (ref 13.5–17.5)
LYMPHOCYTES ABSOLUTE: 0.7 K/UL (ref 1–5.1)
LYMPHOCYTES RELATIVE PERCENT: 28 %
MCH RBC QN AUTO: 32 PG (ref 26–34)
MCHC RBC AUTO-ENTMCNC: 32.1 G/DL (ref 31–36)
MCV RBC AUTO: 99.7 FL (ref 80–100)
MONOCYTES ABSOLUTE: 0.1 K/UL (ref 0–1.3)
MONOCYTES RELATIVE PERCENT: 5.2 %
NEUTROPHILS ABSOLUTE: 1.7 K/UL (ref 1.7–7.7)
NEUTROPHILS RELATIVE PERCENT: 64.4 %
PDW BLD-RTO: 14.3 % (ref 12.4–15.4)
PERFORMED ON: ABNORMAL
PERFORMED ON: NORMAL
PHOSPHORUS: 3.5 MG/DL (ref 2.5–4.9)
PLATELET # BLD: 190 K/UL (ref 135–450)
PMV BLD AUTO: 7.9 FL (ref 5–10.5)
POTASSIUM SERPL-SCNC: 3.8 MMOL/L (ref 3.5–5.1)
RBC # BLD: 2.68 M/UL (ref 4.2–5.9)
SODIUM BLD-SCNC: 135 MMOL/L (ref 136–145)
URINE CULTURE, ROUTINE: NORMAL
WBC # BLD: 2.7 K/UL (ref 4–11)

## 2022-12-24 PROCEDURE — 6360000002 HC RX W HCPCS: Performed by: INTERNAL MEDICINE

## 2022-12-24 PROCEDURE — 6370000000 HC RX 637 (ALT 250 FOR IP): Performed by: INTERNAL MEDICINE

## 2022-12-24 PROCEDURE — 85025 COMPLETE CBC W/AUTO DIFF WBC: CPT

## 2022-12-24 PROCEDURE — 2580000003 HC RX 258: Performed by: INTERNAL MEDICINE

## 2022-12-24 PROCEDURE — 2500000003 HC RX 250 WO HCPCS: Performed by: INTERNAL MEDICINE

## 2022-12-24 PROCEDURE — 36415 COLL VENOUS BLD VENIPUNCTURE: CPT

## 2022-12-24 PROCEDURE — 80069 RENAL FUNCTION PANEL: CPT

## 2022-12-24 PROCEDURE — 90935 HEMODIALYSIS ONE EVALUATION: CPT

## 2022-12-24 PROCEDURE — 94760 N-INVAS EAR/PLS OXIMETRY 1: CPT

## 2022-12-24 PROCEDURE — 1200000000 HC SEMI PRIVATE

## 2022-12-24 RX ADMIN — METRONIDAZOLE 500 MG: 500 INJECTION, SOLUTION INTRAVENOUS at 07:07

## 2022-12-24 RX ADMIN — METRONIDAZOLE 500 MG: 500 INJECTION, SOLUTION INTRAVENOUS at 15:50

## 2022-12-24 RX ADMIN — SEVELAMER CARBONATE 800 MG: 800 TABLET, FILM COATED ORAL at 18:09

## 2022-12-24 RX ADMIN — METRONIDAZOLE 500 MG: 500 INJECTION, SOLUTION INTRAVENOUS at 23:08

## 2022-12-24 RX ADMIN — LORAZEPAM 0.5 MG: 0.5 TABLET ORAL at 13:38

## 2022-12-24 RX ADMIN — ASPIRIN 81 MG 81 MG: 81 TABLET ORAL at 13:38

## 2022-12-24 RX ADMIN — PANTOPRAZOLE SODIUM 20 MG: 20 TABLET, DELAYED RELEASE ORAL at 21:51

## 2022-12-24 RX ADMIN — CEFEPIME 1000 MG: 1 INJECTION, POWDER, FOR SOLUTION INTRAMUSCULAR; INTRAVENOUS at 21:56

## 2022-12-24 RX ADMIN — ATORVASTATIN CALCIUM 10 MG: 10 TABLET, FILM COATED ORAL at 21:51

## 2022-12-24 RX ADMIN — DOCUSATE SODIUM 200 MG: 100 CAPSULE, LIQUID FILLED ORAL at 13:38

## 2022-12-24 RX ADMIN — LORAZEPAM 0.5 MG: 0.5 TABLET ORAL at 21:51

## 2022-12-24 RX ADMIN — TAMSULOSIN HYDROCHLORIDE 0.4 MG: 0.4 CAPSULE ORAL at 13:38

## 2022-12-24 RX ADMIN — SODIUM CHLORIDE, PRESERVATIVE FREE 10 ML: 5 INJECTION INTRAVENOUS at 21:51

## 2022-12-24 RX ADMIN — APIXABAN 2.5 MG: 2.5 TABLET, FILM COATED ORAL at 21:51

## 2022-12-24 RX ADMIN — APIXABAN 2.5 MG: 2.5 TABLET, FILM COATED ORAL at 13:38

## 2022-12-24 RX ADMIN — SEVELAMER CARBONATE 800 MG: 800 TABLET, FILM COATED ORAL at 13:37

## 2022-12-24 RX ADMIN — DOCUSATE SODIUM 200 MG: 100 CAPSULE, LIQUID FILLED ORAL at 21:51

## 2022-12-24 RX ADMIN — SODIUM CHLORIDE, PRESERVATIVE FREE 10 ML: 5 INJECTION INTRAVENOUS at 13:37

## 2022-12-24 ASSESSMENT — PAIN SCALES - GENERAL: PAINLEVEL_OUTOF10: 0

## 2022-12-24 NOTE — PLAN OF CARE
Problem: Safety - Adult  Goal: Free from fall injury  12/23/2022 2155 by Vitaliy Fuentes RN  Outcome: Progressing  12/23/2022 1617 by Junaid Aden RN  Outcome: Progressing     Problem: ABCDS Injury Assessment  Goal: Absence of physical injury  12/23/2022 2155 by Vitaliy Fuentes RN  Outcome: Progressing  12/23/2022 1617 by Junaid Aden RN  Outcome: Progressing     Problem: Chronic Conditions and Co-morbidities  Goal: Patient's chronic conditions and co-morbidity symptoms are monitored and maintained or improved  12/23/2022 2155 by Vitaliy Fuentes RN  Outcome: Progressing  12/23/2022 1617 by Junaid Aden RN  Outcome: Progressing  Flowsheets (Taken 12/23/2022 0800)  Care Plan - Patient's Chronic Conditions and Co-Morbidity Symptoms are Monitored and Maintained or Improved: Monitor and assess patient's chronic conditions and comorbid symptoms for stability, deterioration, or improvement

## 2022-12-24 NOTE — PROGRESS NOTES
Patient returned to room after dialysis. No c/o pain or discomfort. Patient resting in bed. Call light with reach.

## 2022-12-24 NOTE — PROGRESS NOTES
[address] [place] [address] Nephrology   [facility]  [phone]  Nephrology Note          Patient ID: Felicity Nichole  Referring/ Physician: Anum Tariq MD      HPI/Summary:   Felicity Nichole is being seen by nephrology for ESRD. This is a 73-year-old male with past medical history significant for colon cancer, ESRD, hypertension, stroke, COVID infection in the past, diabetes who presented to the hospital with fatigue. He has been more lethargic. He denies any cough shortness of breath chest pain. He is unable to provide any more history at this time. MRI head showed a cerebral atrophy, severe chronic small vessel ischemic changes. No acute stroke. CT abdomen showed distal descending colon thickening some fat stranding suggestive of proctocolitis, mild bladder wall thickening possible cystitis, large stool ball in the rectum. Mild splenomegaly and a nonobstructive right renal stone. Interval Hx   Seen at bedside  [facility] removed 11/23/2022 for S. Aureus from [facility] site purulent drainage  Temp HD line in place  Repeat cx 12/22/2022 show NGTD  On cefepime. Plan:   HD done today per schedule. Post HD weight  85 kg, achieved EDW  Plan for [facility] placement on Tuesday if blood cx remain negative. Assessment:  ESRD  TTS at [facility]  Tunneled dialysis catheter  EDW 85 kg    Line infection:  - TDC infection s/p removal.  - temp HD line in place     Electrolytes  No acute issues    Secondary parathyroidism  On Renvela 800 mg 3 times daily    Hypertension  Blood pressure acceptable on atenolol 25 mg daily    Possible UTI and proctocolitis  On ceftriaxone Flagyl        [facility] would like to thank you for the opportunity to serve this patient. Please call with any questions or concerns.     Ramos Birmingham MD  [facility]  [address], [address]  Fax: [phone]  Office: [phone]         CC/Reason for consult:   Reason for consult: ESRD  Chief Complaint   Patient presents with    Fatigue     Pt arrived via ems from [facility] for report of altered mental status all day today, patient is more lethargic than normal baseline. Pt has dementia but normal baseline is able to have conversation with staff. Pt very sleepy at triage. Has wet cough. Review of Systems:   [address] All other remaining systems are negative. Constitutional:  fever, chills, weakness, weight change, fatigue,      Skin:  rash, pruritus, hair loss, bruising, dry skin, petechiae. Head, Face, Neck   headaches, swelling,  cervical adenopathy. Respiratory: shortness of breath, cough, or wheezing  Cardiovascular: chest pain, palpitations, dizzy, edema  Gastrointestinal: nausea, vomiting, diarrhea, constipation,belly pain    Yellow skin, blood in stool  Musculoskeletal:  back pain, muscle weakness, gait problems,       joint pain or swelling. Genitourinary:  dysuria, poor urine flow, flank pain, blood in urine  Neurologic:  vertigo, TIA'S, syncope, seizures, focal weakness  Psychosocial:  insomnia, anxiety, or depression. Additional positive findings: -     PMH/SH/FH:    Medical Hx: reviewed and updated as appropriate  Past Medical History:   Diagnosis Date    Anemia 03/2022    Arthritis     CAD (coronary artery disease) 01/2020    Cancer ([facility]     Colon Cancer diagnosed last month    Cerebral infarction [facility])     Cognitive communication deficit     COVID-19     Diabetes mellitus ([facility] [facility]     Dysphagia     End stage renal disease ([facility] [facility]     Fatigue     Gastrointestinal hemorrhage     Hemodialysis patient ([facility] [facility] 2019    ckd-goes to dialysis Tuesday, Thurs, Fri    Hx of blood clots     Hyperlipidemia     Hypertension     Hyponatremia     Risk for falls     Splenomegaly 02/10/2021         Surgical Hx: reviewed and updated as appropriate   has a past surgical history that includes IR PERC ARTERIOVENOUS FISTULA CREATION (Left); colectomy (N/A, 01/26/2022); sigmoidoscopy (N/A, 02/17/2022);  IR EMBOLIZATION HEMORRHAGE (02/19/2022); Tunneled venous catheter placement (Right, 02/21/2022); IR TUNNELED CVC PLACE WO SQ PORT/PUMP > 5 YEARS (2/21/2022); Cardiac catheterization (2019); Colonoscopy; Dialysis fistula creation (Left, 4/29/2022); Cholecystectomy, laparoscopic (N/A, 5/16/2022); ERCP (N/A, 5/16/2022); ERCP (N/A, 5/16/2022); and Upper gastrointestinal endoscopy (5/16/2022). Social Hx: reviewed and updated as appropriate  Social History     Tobacco Use    Smoking status: Former    Smokeless tobacco: Never   Substance Use Topics    Alcohol use: Not Currently        Family hx: reviewed and updated as appropriate  family history includes High Blood Pressure in his father. Medications:   cefepime, 1,000 mg, Q24H  sodium chloride flush, 10 mL, 2 times per day  apixaban, 2.5 mg, BID  aspirin, 81 mg, Daily  [Held by provider] atenolol, 25 mg, QPM  atorvastatin, 10 mg, Nightly  [Held by provider] gabapentin, 100 mg, TID  LORazepam, 0.5 mg, BID  pantoprazole, 20 mg, Nightly  sevelamer, 800 mg, TID WC  tamsulosin, 0.4 mg, QAM  metroNIDAZOLE, 500 mg, Q8H  docusate sodium, 200 mg, BID     Lisinopril    Allergies: Allergies   Allergen Reactions    Lisinopril Swelling     Allergy listed on paperwork from [facility], no reaction noted         Physical Exam/Objective:   Vitals:    12/24/22 1335   BP: (!) 142/70   Pulse: 75   Resp: 18   Temp:    SpO2: 97%       Intake/Output Summary (Last 24 hours) at 12/24/2022 1502  Last data filed at 12/23/2022 2138  Gross per 24 hour   Intake 340 ml   Output --   Net 340 ml           General appearance:  in no acute distress, comfortable,lethargic  HEENT: no icterus, EOM intact, trachea midline. Neck : no masses, appears symmetrical and no JVD appreciated. Respiratory: Respiratory effort normal, bilateral equal chest rise. No wheeze, no crackles   Cardiovascular:  Ausculation shows RRR and  NO edema   Abdomen: abdomen is soft, non distended, no masses, no pain with palpation. Musculoskeletal:  no joint swelling, no deformity  Skin: no rashes, no induration, no tightening, no jaundice   Neuro:   Follows commands, moves all extremities spontaneously       Data:   CBC:   Recent Labs     12/21/22 2240 12/22/22  1020 12/24/22  0936   WBC 5.8 5.8 2.7*   HGB 9.0* 8.6* 8.6*   HCT 28.7* 26.2* 26.7*    176 190       BMP:    Recent Labs     12/21/22 2240 12/22/22  1020 12/24/22  0936    136 135*   K 4.7 3.7 3.8   CL 98* 97* 97*   CO2 23 26 23   BUN 43* 49* 37*   CREATININE 4.4* 4.4* 4.3*   GLUCOSE 136* 149* 63*   PHOS  --   --  3.5

## 2022-12-24 NOTE — PROGRESS NOTES
Patients glucose drawn with the lab was 63 this morning @ 0936. Shiloh Wiley His POC @ 3885 was 82, then @ 1123 was 73 and @ 6359 98. MD, notified. New order to Check cortisol in AM and encourage oral intake. Poor oral intake thus far this shift.

## 2022-12-24 NOTE — PROGRESS NOTES
Hospitalist Progress Note  12/24/2022 10:11 AM    PCP: Stefano Duran    [id number]     Date of Admission: 12/20/2022                                                                                                                     HOSPITAL COURSE    Patient demographics:  The patient  Luis Bermudez is a 69 y.o. male     Significant past medical history:   Patient Active Problem List   Diagnosis    GI bleed    History of COVID-19    Hyponatremia    Fatigue    Acute kidney injury superimposed on CKD ([address]    Lethargy    Suspected UTI    Acute CVA (cerebrovascular accident) ([address]    LESLEE (acute kidney injury) ([address]    Malignant neoplasm of colon ([address]    Acute blood loss anemia    COVID-19    Shock circulatory (HCC)    Bilateral pulmonary embolism (HCC)    Lactic acidosis    Hemorrhagic shock (HCC)    ESRD (end stage renal disease) on dialysis ([address]    Acute cholecystitis    Pain of upper abdomen    AMS (altered mental status)    Sepsis ([address]    Acute metabolic encephalopathy    Cellulitis    Fever and chills    PVD (peripheral vascular disease) (HCC)    Anxiety    Rectal bleeding    Severe malnutrition (HCC)    Acute encephalopathy    Acute cystitis without hematuria    Colitis    Elevated procalcitonin    Chronic anticoagulation         Presenting symptoms:  Fatigue     Diagnostic workup:      CONSULTS DURING ADMISSION :   IP CONSULT TO NEPHROLOGY  IP CONSULT TO INFECTIOUS DISEASES      Patient was diagnosed with:  Acute encephalopathy  End-stage renal disease on hemodialysis  Tunneled catheter infection  Constipation  Proctocolitis  Type II MI    Treatment while inpatient:  Pt presented as a transfer from [facility] due to acute encephalopathy                                                                                       ----------------------------------------------------------      SUBJECTIVE COMPLAINTS- Fatigue     Diet: ADULT DIET; Regular; 3 carb choices (45 gm/meal);  Low Fat/Low Chol/High Fiber/2 gm Na; Low Sodium (2 gm)      OBJECTIVE:   Patient Active Problem List   Diagnosis    GI bleed    History of COVID-19    Hyponatremia    Fatigue    Acute kidney injury superimposed on CKD ([facility]    Lethargy    Suspected UTI    Acute CVA (cerebrovascular accident) ([facility]    LESLEE (acute kidney injury) ([facility]    Malignant neoplasm of colon ([facility]    Acute blood loss anemia    COVID-19    Shock circulatory (HCC)    Bilateral pulmonary embolism (HCC)    Lactic acidosis    Hemorrhagic shock (HCC)    ESRD (end stage renal disease) on dialysis ([facility]    Acute cholecystitis    Pain of upper abdomen    AMS (altered mental status)    Sepsis (HCC)    Acute metabolic encephalopathy    Cellulitis    Fever and chills    PVD (peripheral vascular disease) (HCC)    Anxiety    Rectal bleeding    Severe malnutrition (HCC)    Acute encephalopathy    Acute cystitis without hematuria    Colitis    Elevated procalcitonin    Chronic anticoagulation       Allergies  Lisinopril    Medications    Scheduled Meds:   cefepime  1,000 mg IntraVENous Q24H    sodium chloride flush  10 mL IntraVENous 2 times per day    apixaban  2.5 mg Oral BID    aspirin  81 mg Oral Daily    [Held by provider] atenolol  25 mg Oral QPM    atorvastatin  10 mg Oral Nightly    [Held by provider] gabapentin  100 mg Oral TID    LORazepam  0.5 mg Oral BID    pantoprazole  20 mg Oral Nightly    sevelamer  800 mg Oral TID WC    tamsulosin  0.4 mg Oral QAM    metroNIDAZOLE  500 mg IntraVENous Q8H    docusate sodium  200 mg Oral BID     Continuous Infusions:   sodium chloride       PRN Meds:  sodium chloride flush, sodium chloride, promethazine **OR** ondansetron, acetaminophen **OR** acetaminophen, melatonin    Vitals   Vitals /wt Patient Vitals for the past 8 hrs:   BP Temp Temp src Pulse Resp SpO2 Weight   12/24/22 0936 (!) 142/77 97.8 °F (36.6 °C) -- 66 18 -- 192 lb 3.9 oz (87.2 kg)   12/24/22 0740 (!) 97/58 97.4 °F (36.3 °C) Oral 65 18 96 % --   12/24/22 0501 -- -- -- -- -- -- 192 lb 3.9 oz (87.2 kg)   12/24/22 0309 (!) 154/83 97.2 °F (36.2 °C) Oral 73 16 97 % --        72HR INTAKE/OUTPUT:    Intake/Output Summary (Last 24 hours) at 12/24/2022 1011  Last data filed at 12/23/2022 2138  Gross per 24 hour   Intake 340 ml   Output --   Net 340 ml       Exam:    Gen:   Alert and oriented ×3    Eyes: PERRL. No sclera icterus. No conjunctival injection. ENT: No discharge. Pharynx clear. External appearance of ears and nose normal.  Neck: Trachea midline. No obvious mass. Resp: No accessory muscle use. No crackles. No wheezes. No rhonchi. CV: Regular rate. Regular rhythm. No murmur or rub. No edema. GI: Non-tender. Non-distended. No hernia. Skin: Warm, dry, normal texture and turgor. Lymph: No cervical LAD. No supraclavicular LAD. M/S: / Ext. No cyanosis. No clubbing. No joint deformity. Neuro: CN 2-12 are intact,  no neurologic deficits noted. PT/INR: No results for input(s): PROTIME, INR in the last 72 hours. APTT: No results for input(s): APTT in the last 72 hours. CBC:   Recent Labs     12/21/22 2240 12/22/22  1020   WBC 5.8 5.8   HGB 9.0* 8.6*   HCT 28.7* 26.2*   .9* 99.3    176       BMP:   Recent Labs     12/21/22 2240 12/22/22  1020    136   K 4.7 3.7   CL 98* 97*   CO2 23 26   BUN 43* 49*   CREATININE 4.4* 4.4*       LIVER PROFILE:   Recent Labs     12/22/22  1020   ALKPHOS 67   AST 7*   ALT 6*   BILITOT 0.3     No results for input(s): AMYLASE in the last 72 hours. No results for input(s): LIPASE in the last 72 hours. UA:  No results for input(s): NITRITE, LABCAST, WBCUA, RBCUA, MUCUS in the last 72 hours. TROPONIN:   No results for input(s): Tim Shorts in the last 72 hours. Lab Results   Component Value Date/Time    URRFLXCULT Yes 10/20/2022 10:52 AM       No results for input(s): TSHREFLEX in the last 72 hours.       No components found for: [id number]  POC GLUCOSE:    Recent Labs     12/23/22  0813 12/23/22  6674 12/23/22  1658 12/23/22 1945 12/24/22  0735   POCGLU 86 90 77 101* 82     No results for input(s): LABA1C in the last 72 hours. Lab Results   Component Value Date/Time    LABA1C 6.1 01/15/2022 05:31 AM      Ejection fraction is visually estimated to be 55-60%(2/12/2022)    ASSESSMENT AND PLAN     Acute encephalopathy  Likely septic and metabolic combined  Mental status improved to baseline       End-stage renal disease on hemodialysis  Hemodialysis done through temporary dialysis catheter     Tunneled catheter infection  Tunneled catheter has been removed  Temporary catheter is in place  Continue on cefepime  Check procalcitonin       Constipation  Treat with MiraLAX  As patient is a dialysis patient would avoid fleets enemas or milk of magnesia out of concern for toxicity     Proctocolitis  Continue Rocephin and Flagyl  Add vancomycin           Type II MI  Likely demand ischemia  Patient has no evidence of injury pattern on EKG  Conservative management for now        Code Status: Full Code        Dispo -patient to be placed in long-term care        The patient and / or the family were informed of the results of any tests, a time was given to answer questions, a plan was proposed and they agreed with plan. Deena Silvestre MD    This note was transcribed using [id number] [employer]. Please disregard any translational errors.

## 2022-12-24 NOTE — FLOWSHEET NOTE
Treatment time: 3 hours  Net UF: 2200 ml     Pre weight: 87.2 kg  Post weight:85 kg  EDW: 85 kg    Access used: R Vas Cath    Access function: good with  ml/min     Medications or blood products given: N/A     Regular outpatient schedule:MWF     Summary of response to treatment: Patient tolerated treatment well and without any complications. Patient remained stable throughout entire treatment and upon the exiting the suite via transport.      copy of dialysis treatment record placed in chart, to be scanned into EMR.        12/24/22 0936 12/24/22 1238   Vital Signs   BP (!) 142/77 131/82   Temp 97.8 °F (36.6 °C) 97.8 °F (36.6 °C)   Heart Rate 66 74   Resp 18 18   Weight 192 lb 3.9 oz (87.2 kg) 187 lb 6.3 oz (85 kg)   Percent Weight Change 0 -2.52

## 2022-12-25 PROBLEM — T82.7XXA HEMODIALYSIS CATHETER INFECTION (HCC): Status: ACTIVE | Noted: 2022-12-25

## 2022-12-25 LAB
CULTURE CATHETER TIP: ABNORMAL
GLUCOSE BLD-MCNC: 103 MG/DL (ref 70–99)
GLUCOSE BLD-MCNC: 114 MG/DL (ref 70–99)
GLUCOSE BLD-MCNC: 188 MG/DL (ref 70–99)
GLUCOSE BLD-MCNC: 84 MG/DL (ref 70–99)
ORGANISM: ABNORMAL
PERFORMED ON: ABNORMAL
PERFORMED ON: NORMAL

## 2022-12-25 PROCEDURE — 2580000003 HC RX 258: Performed by: INTERNAL MEDICINE

## 2022-12-25 PROCEDURE — 6360000002 HC RX W HCPCS: Performed by: INTERNAL MEDICINE

## 2022-12-25 PROCEDURE — 90935 HEMODIALYSIS ONE EVALUATION: CPT

## 2022-12-25 PROCEDURE — 2500000003 HC RX 250 WO HCPCS: Performed by: INTERNAL MEDICINE

## 2022-12-25 PROCEDURE — 94760 N-INVAS EAR/PLS OXIMETRY 1: CPT

## 2022-12-25 PROCEDURE — 6370000000 HC RX 637 (ALT 250 FOR IP): Performed by: INTERNAL MEDICINE

## 2022-12-25 PROCEDURE — 99233 SBSQ HOSP IP/OBS HIGH 50: CPT | Performed by: INTERNAL MEDICINE

## 2022-12-25 PROCEDURE — 1200000000 HC SEMI PRIVATE

## 2022-12-25 RX ORDER — METRONIDAZOLE 500 MG/1
500 TABLET ORAL EVERY 8 HOURS SCHEDULED
Status: DISCONTINUED | OUTPATIENT
Start: 2022-12-25 | End: 2022-12-28

## 2022-12-25 RX ADMIN — LORAZEPAM 0.5 MG: 0.5 TABLET ORAL at 20:27

## 2022-12-25 RX ADMIN — APIXABAN 2.5 MG: 2.5 TABLET, FILM COATED ORAL at 20:27

## 2022-12-25 RX ADMIN — DOCUSATE SODIUM 200 MG: 100 CAPSULE, LIQUID FILLED ORAL at 20:27

## 2022-12-25 RX ADMIN — PANTOPRAZOLE SODIUM 20 MG: 20 TABLET, DELAYED RELEASE ORAL at 20:27

## 2022-12-25 RX ADMIN — SODIUM CHLORIDE, PRESERVATIVE FREE 10 ML: 5 INJECTION INTRAVENOUS at 20:27

## 2022-12-25 RX ADMIN — METRONIDAZOLE 500 MG: 500 TABLET ORAL at 15:14

## 2022-12-25 RX ADMIN — SODIUM CHLORIDE, PRESERVATIVE FREE 10 ML: 5 INJECTION INTRAVENOUS at 08:04

## 2022-12-25 RX ADMIN — SEVELAMER CARBONATE 800 MG: 800 TABLET, FILM COATED ORAL at 08:03

## 2022-12-25 RX ADMIN — SEVELAMER CARBONATE 800 MG: 800 TABLET, FILM COATED ORAL at 17:04

## 2022-12-25 RX ADMIN — ASPIRIN 81 MG 81 MG: 81 TABLET ORAL at 08:03

## 2022-12-25 RX ADMIN — DOCUSATE SODIUM 200 MG: 100 CAPSULE, LIQUID FILLED ORAL at 08:03

## 2022-12-25 RX ADMIN — TAMSULOSIN HYDROCHLORIDE 0.4 MG: 0.4 CAPSULE ORAL at 08:04

## 2022-12-25 RX ADMIN — APIXABAN 2.5 MG: 2.5 TABLET, FILM COATED ORAL at 08:03

## 2022-12-25 RX ADMIN — SEVELAMER CARBONATE 800 MG: 800 TABLET, FILM COATED ORAL at 12:30

## 2022-12-25 RX ADMIN — LORAZEPAM 0.5 MG: 0.5 TABLET ORAL at 08:04

## 2022-12-25 RX ADMIN — METRONIDAZOLE 500 MG: 500 INJECTION, SOLUTION INTRAVENOUS at 08:00

## 2022-12-25 RX ADMIN — METRONIDAZOLE 500 MG: 500 TABLET ORAL at 20:27

## 2022-12-25 RX ADMIN — ATORVASTATIN CALCIUM 10 MG: 10 TABLET, FILM COATED ORAL at 20:27

## 2022-12-25 RX ADMIN — CEFEPIME 1000 MG: 1 INJECTION, POWDER, FOR SOLUTION INTRAMUSCULAR; INTRAVENOUS at 20:32

## 2022-12-25 ASSESSMENT — PAIN SCALES - GENERAL
PAINLEVEL_OUTOF10: 0
PAINLEVEL_OUTOF10: 0

## 2022-12-25 NOTE — PROGRESS NOTES
Patient alert and oriented to self. Patient tolerated nightly medications well. Patient verbalizes understanding of education. Patient vital signs recorded. Fall precautions in place. Bed in lowest position, side rails X2. Bed locks on. Bed alarm on. Telecam present in room. Non slip socks on. Needed items within reach. Call light within reach.  Electronically signed by Anthony Maya RN on 12/24/2022 at 10:15 PM

## 2022-12-25 NOTE — PROGRESS NOTES
Infectious Diseases Inpatient Progress  Note          CHIEF COMPLAINT:     HD line catheter exit site infection  Staph aureus on the cx  Tip cx Staph aureus  ESRD on HD  Colitis        HISTORY OF PRESENT ILLNESS:  69 y.o. man with a significant history of potential renal disease on hemodialysis, colon cancer, bowel surgery, hypertension, stroke, diabetes admitted to the hospital secondary to change in mental status and possibly some shortness of breath. Patient is unable to provide any detailed history. Currently seen in dialysis. HD catheter site looks okay dressing intact. Noted to have lactic acidosis on admission with elevated procalcitonin. Blood culture in process. CRP elevated 132.4, MRI of the brain indicated cerebral atrophy, CT abdomen repeated colon and rectum with acute colitis, also large stool ball in the rectum concerning for constipation. Urinalysis abnormal urine culture will be requested.   We are consulted for recommendations      Interval  History: Mentation a bit better no abd pain and HD line tip cx +ve and Blood cx -v e thus far and WBC trend down Procal still elevated but trend down      Past Medical History:    Past Medical History:   Diagnosis Date    Anemia 03/2022    Arthritis     CAD (coronary artery disease) 01/2020    Cancer ([facility]     Colon Cancer diagnosed last month    Cerebral infarction [facility])     Cognitive communication deficit     COVID-19     Diabetes mellitus ([facility]     Dysphagia     End stage renal disease ([address]     Fatigue     Gastrointestinal hemorrhage     Hemodialysis patient ([facility] 2019    ckd-goes to dialysis Tuesday, Thurs, Fri    Hx of blood clots     Hyperlipidemia     Hypertension     Hyponatremia     Risk for falls     Splenomegaly 02/10/2021       Past Surgical History:    Past Surgical History:   Procedure Laterality Date    CARDIAC CATHETERIZATION  2019    CHOLECYSTECTOMY, LAPAROSCOPIC N/A 5/16/2022    LAPAROSCOPIC CHOLECYSTECTOMY WITH CHOLANGIOGRAM performed by Yadira Balderrama MD at [address] [facility] N/A 01/26/2022    SIGMOID COLECTOMY, SIGMOIDOSCOPY performed by Yadira Balderrama MD at [facility] Left 4/29/2022    LEFT BRACHIAL CEPHALIC FISTULA performed by Rosa Lunsford MD at [place] [address]    ERCP N/A 5/16/2022    ERCP SPHINCTER/PAPILLOTOMY performed by Mode Dick MD at [address] [place] [address]    ERCP N/A 5/16/2022    ERCP STONE REMOVAL/BALLOON SWEEP performed by Mode Dick MD at [facility]  02/19/2022    IR EMBOLIZATION HEMORRHAGE 2/19/2022 [facility]     Corporate Dr Duncan     unsure of date    IR TUNNELED CATHETER PLACEMENT GREATER THAN 5 YEARS  2/21/2022    IR TUNNELED CATHETER PLACEMENT GREATER THAN 5 YEARS 2/21/2022 [facility]    SIGMOIDOSCOPY N/A 02/17/2022    SIGMOIDOSCOPY CONTROL HEMORRHAGE performed by Sherrie Dueñas MD at [facility] [address] Right 02/21/2022    Permacath; RIJ access; 23cm; Dr. Nanci Nathan  5/16/2022    ERCP POLYP SNARE performed by Mode Dick MD at [address] [place] [address]       Current Medications:    Outpatient Medications Marked as Taking for the 12/20/22 encounter [facility] Encounter)   Medication Sig Dispense Refill    oxyCODONE (ROXICODONE) 5 MG immediate release tablet Take 5 mg by mouth every 4 hours as needed for Pain.       ondansetron (ZOFRAN) 4 MG/2ML injection Infuse 4 mg intravenously every 6 hours as needed for Nausea or Vomiting         Allergies:  Lisinopril    Immunizations :   Immunization History   Administered Date(s) Administered    COVID-19, MODERNA BLUE border, Primary or Immunocompromised, (age 12y+), IM, 100 mcg/0.5mL 03/04/2021, 04/02/2021         Social History:    Social History     Tobacco Use    Smoking status: Former    Smokeless tobacco: Never   Vaping Use    Vaping Use: Never used   Substance Use Topics    Alcohol use: Not Currently    Drug use: Never     Social History     Tobacco Use   Smoking Status Former   Smokeless Tobacco Never      Family History   Problem Relation Age of Onset    High Blood Pressure Father          REVIEW OF SYSTEMS:     NOT POSSIBLE DUE TO CHANGE IN MENTATION     PHYSICAL EXAM:      Vitals:    BP (!) 154/79   Pulse 71   Temp 97.1 °F (36.2 °C) (Oral)   Resp 18   Ht 6' (1.829 m)   Wt 186 lb 15.2 oz (84.8 kg)   SpO2 93%   BMI 25.35 kg/m²     General Appearance: alert,in some  acute distress, ++ pallor, no icterus   Skin: warm and dry, no rash or erythema  Head: normocephalic and atraumatic  Eyes: pupils equal, round, and reactive to light, conjunctivae normal  ENT: tympanic membrane, external ear and ear canal normal bilaterally, nose without deformity, nasal mucosa and turbinates normal without polyps  Neck: supple and non-tender without mass, no thyromegaly  no cervical lymphadenopathy  Pulmonary/Chest: clear to auscultation bilaterally- no wheezes, rales or rhonchi, normal air movement, no respiratory distress  Cardiovascular: normal rate, regular rhythm, normal S1 and S2, no murmurs, rubs, clicks, or gallops, no carotid bruits  Abdomen: soft, + -tender, non-distended, normal bowel sounds, no masses or organomegaly  Extremities: no cyanosis, clubbing or edema  Musculoskeletal: normal range of motion, no joint swelling, deformity or tenderness  Integumentary: No rashes, no abnormal skin lesions, no petechiae  Neurologic: reflexes normal and symmetric, no cranial nerve defici   Lines: HD line + temp line placed by IR      DATA:    CBC:   Lab Results   Component Value Date    WBC 2.7 (L) 12/24/2022    HGB 8.6 (L) 12/24/2022    HCT 26.7 (L) 12/24/2022    MCV 99.7 12/24/2022     12/24/2022     RENAL:   Lab Results   Component Value Date    CREATININE 4.3 (H) 12/24/2022    BUN 37 (H) 12/24/2022     (L) 12/24/2022    K 3.8 12/24/2022    CL 97 (L) 12/24/2022    CO2 23 12/24/2022     SED RATE:   Lab Results   Component Value Date/Time    SEDRATE 24 07/26/2011 05:00 AM     CK:   Lab Results   Component Value Date/Time    CKTOTAL 14 12/21/2022 07:04 AM     CRP:   Lab Results   Component Value Date/Time    .4 12/21/2022 10:40 PM     Hepatic Function Panel:   Lab Results   Component Value Date/Time    ALKPHOS 67 12/22/2022 10:20 AM    ALT 6 12/22/2022 10:20 AM    AST 7 12/22/2022 10:20 AM    PROT 5.8 12/22/2022 10:20 AM    PROT 7.5 07/26/2011 05:00 AM    BILITOT 0.3 12/22/2022 10:20 AM    BILIDIR <0.2 02/12/2022 12:24 AM    IBILI see below 02/12/2022 12:24 AM    LABALBU 2.9 12/24/2022 09:36 AM     UA:  Lab Results   Component Value Date/Time    COLORU DARK YELLOW 12/20/2022 05:39 PM    CLARITYU CLOUDY 12/20/2022 05:39 PM    GLUCOSEU Negative 12/20/2022 05:39 PM    BILIRUBINUR Negative 12/20/2022 05:39 PM    KETUA Negative 12/20/2022 05:39 PM    SPECGRAV 1.020 12/20/2022 05:39 PM    BLOODU LARGE 12/20/2022 05:39 PM    PHUR 7.0 12/20/2022 05:39 PM    PHUR 7.0 12/20/2022 05:39 PM    PROTEINU 100 12/20/2022 05:39 PM    UROBILINOGEN 0.2 12/20/2022 05:39 PM    NITRU Negative 12/20/2022 05:39 PM    LEUKOCYTESUR LARGE 12/20/2022 05:39 PM    LABMICR YES 12/20/2022 05:39 PM    URINETYPE Cleancatch 12/20/2022 05:39 PM      Urine Microscopic:   Lab Results   Component Value Date/Time    BACTERIA 1+ 12/20/2022 05:39 PM    COMU see below 12/20/2022 05:39 PM    HYALCAST 12 10/20/2022 10:52 AM    WBCUA  12/20/2022 05:39 PM    RBCUA 21-50 12/20/2022 05:39 PM    EPIU 0-1 12/20/2022 05:39 PM     Urine Reflex to Culture:   Lab Results   Component Value Date/Time    URRFLXCULT Yes 10/20/2022 10:52 AM       Lactic acid  3.3       Procal  5.41       CRP  132.4     MICRO: cultures reviewed and updated by me     Procedure Component Value Units Date/Time   Culture, Blood 1 [[id number]] Collected: 12/22/22 1430   Order Status: No result    Culture, Anaerobic and Aerobic [[id number]] Collected: 12/22/22 1215   Order Status: No result Updated: 12/22/22 1353   Culture, Blood 2 [[id number]] Collected: 12/22/22 1314   Order Status: Sent Specimen: Blood Updated: 12/22/22 1349   Culture, Anaerobic and Aerobic [[id number]]    Order Status: No result Specimen: catheter site    Culture, Blood 1 [[id number]] Collected: 12/22/22 0000   Order Status: Canceled Specimen: Blood    Culture, Blood 1 [[id number]] Collected: 12/20/22 2054   Order Status: Completed Specimen: Blood Updated: 12/21/22 2315    Blood Culture, Routine No Growth to date. Any change in status will be called. Narrative:     ORDER#: [id number]                          ORDERED BY: Marcia Donnelly   SOURCE: Blood                              COLLECTED:  12/20/22 20:54   ANTIBIOTICS AT JOSE.:                      RECEIVED :  12/20/22 20:59   If child <=2 yrs old please draw pediatric bottle. ~Blood Culture 1   Culture, Blood 2 [[id number]] Collected: 12/20/22 2054   Order Status: Completed Specimen: Blood Updated: 12/21/22 2315    Culture, Blood 2 No Growth to date. Any change in status will be called. Narrative:     ORDER#: [id number]                          ORDERED BY: Marcia Donnelly   SOURCE: Blood                              COLLECTED:  12/20/22 20:54   ANTIBIOTICS AT JOSE.:                      RECEIVED :  12/20/22 21:01   If child <=2 yrs old please draw pediatric bottle. ~Blood Culture #2   GTTPX-37, Rapid [[id number]] Collected: 12/20/22 1739   Order Status: Completed Specimen: Nasopharyngeal Swab Updated: 12/20/22 1841    SARS-CoV-2, NAAT Not Detected    Comment: Rapid NAAT:   Negative results should be treated as presumptive and,   if inconsistent with clinical signs and symptoms or necessary for   patient management, should be tested with an alternative molecular   assay. Negative results do not preclude SARS-CoV-2 infection and   should not be used as the sole basis for patient management decisions.    This test has been authorized by the FDA under an Emergency Use   Authorization (EUA) for use by authorized laboratories. Fact sheet for Healthcare Providers:   http://www.divina.sheridan/   Fact sheet for Patients: http://www.divina.sheridan/     METHODOLOGY: Isothermal Nucleic Acid Amplification       Rapid influenza A/B antigens [[id number]] Collected: 12/20/22 1739   Order Status: Completed Specimen: Nasopharyngeal Updated: 12/20/22 1840    Rapid Influenza A Ag Negative    Rapid Influenza B Ag Negative     Procedure Component Value Units Date/Time   Culture, Tip [[id number]] (Abnormal) Collected: 12/23/22 1110   Order Status: Completed Specimen: Catheter Tip Updated: 12/25/22 0720    Organism Staphylococcus aureus Abnormal     Culture Catheter Tip --    >15 colonies   No further workup   Refer to culture [id number] for sensitivity results    Narrative:     ORDER#: [id number]                          ORDERED BY: Dina Duff   SOURCE: Catheter Tip                       COLLECTED:  12/23/22 11:10   ANTIBIOTICS AT JOSE.:                      RECEIVED :  12/23/22 11:40   Culture, Blood 1 [[id number]] Collected: 12/20/22 2054   Order Status: Completed Specimen: Blood Updated: 12/24/22 2315    Blood Culture, Routine No Growth after 4 days of incubation. Narrative:     ORDER#: [id number]                          ORDERED BY: Mary Garcia   SOURCE: Blood                              COLLECTED:  12/20/22 20:54   ANTIBIOTICS AT JOSE.:                      RECEIVED :  12/20/22 20:59   If child <=2 yrs old please draw pediatric bottle. ~Blood Culture 1   Culture, Blood 2 [[id number]] Collected: 12/20/22 2054   Order Status: Completed Specimen: Blood Updated: 12/24/22 2315    Culture, Blood 2 No Growth after 4 days of incubation.    Narrative:     ORDER#: [id number]                          ORDERED BY: Mary Garcia   SOURCE: Blood                              COLLECTED:  12/20/22 20:54   ANTIBIOTICS AT JOSE.: RECEIVED :  12/20/22 21:01   If child <=2 yrs old please draw pediatric bottle. ~Blood Culture #2   Culture, Urine [[id number]] Collected: 12/20/22 1739   Order Status: Completed Specimen: Urine, clean catch Updated: 12/24/22 0720    Urine Culture, Routine <50,000 CFU/ml mixed skin/urogenital nakia. No further workup   Narrative:     ORDER#: [id number]                          ORDERED BY: Isa Alcaraz   SOURCE: Urine Clean Catch                  COLLECTED:  12/20/22 17:39   ANTIBIOTICS AT JOSE.:                      RECEIVED :  12/23/22 05:06   Culture, Anaerobic and Aerobic [[id number]] (Abnormal)  Collected: 12/22/22 1215   Order Status: Completed Specimen: Cath Site Updated: 12/24/22 0516    Gram Stain Result 3+ Gram positive cocci   3+ WBC's (Polymorphonuclear) present   3+ WBC's (Mononuclear) present    Abnormal     Anaerobic Culture --    Anaerobic culture further report to follow   No anaerobes isolated so far, Further report to follow     Organism Staphylococcus aureus Abnormal     WOUND/ABSCESS --    Light growth   PBP2= Negative    Narrative:     ORDER#: [id number]                          ORDERED BY: Parmjit Louie   SOURCE: Catheter site dialysis right IJ tunCOLLECTED:  12/22/22 12:15   ANTIBIOTICS AT JOSE.:                      RECEIVED :  12/22/22 13:53   delayed results as dialysis rn didnt release order from epic 13:52     12/22/2022     dialysis right IJ tunneled catheter site   Culture, Blood 1 [[id number]] Collected: 12/22/22 1454   Order Status: Completed Specimen: Blood Updated: 12/23/22 1815    Blood Culture, Routine No Growth to date. Any change in status will be called. Narrative:     ORDER#: [id number]                          ORDERED BY: Dina Duff   SOURCE: Blood                              COLLECTED:  12/22/22 14:54   ANTIBIOTICS AT JOSE.:                      RECEIVED :  12/22/22 14:59   If child <=2 yrs old please draw pediatric bottle. ~Blood Culture 1   Culture, Blood 2 [[id number]] Collected: 12/22/22 1314   Order Status: Completed Specimen: Blood Updated: 12/23/22 1415    Culture, Blood 2 No Growth to date. Any change in status will be called. Narrative:     ORDER#: [id number]                          ORDERED BY: Nohemy Hernandez   SOURCE: Blood                              COLLECTED:  12/22/22 13:14   ANTIBIOTICS AT JOSE.:                      RECEIVED :  12/22/22 13:48   If child <=2 yrs old please draw pediatric bottle. ~Blood Culture #2   Culture, Anaerobic and Aerobic [[id number]] Collected: 12/23/22 1110   Order Status: Canceled Specimen: Cath Tip from Catheter Tip    Culture, Blood 1 [[id number]] Collected: 12/22/22 0000   Order Status: Canceled Specimen: Blood    COVID-19, Rapid [[id number]] Collected: 12/20/22 1739   Order Status: Completed Specimen: Nasopharyngeal Swab Updated: 12/20/22 1841    SARS-CoV-2, NAAT Not Detected    Comment: Rapid NAAT:   Negative results should be treated as presumptive and,   if inconsistent with clinical signs and symptoms or necessary for   patient management, should be tested with an alternative molecular   assay. Negative results do not preclude SARS-CoV-2 infection and   should not be used as the sole basis for patient management decisions. This test has been authorized by the FDA under an Emergency Use   Authorization (EUA) for use by authorized laboratories. Fact sheet for Healthcare Providers:   http://www.divina.sheridan/   Fact sheet for Patients: http://www.diana-sarbjit.sheridan/     METHODOLOGY: Isothermal Nucleic Acid Amplification       Rapid influenza A/B antigens [[id number]] Collected: 12/20/22 1739   Order Status: Completed Specimen: Nasopharyngeal Updated: 12/20/22 1840    Rapid Influenza A Ag Negative    Rapid Influenza B Ag Negative     Blood Culture:   Lab Results   Component Value Date/Time    [facility]  12/22/2022 02:54 PM     No Growth to date. Any change in status will be called. Runell Clutter  12/22/2022 01:14 PM     No Growth to date. Any change in status will be called. Viral Culture:    Lab Results   Component Value Date/Time    COVID19 Not Detected 12/20/2022 05:39 PM     Urine Culture: No results for input(s): Nella Rodrgiuez in the last 72 hours. Scheduled Meds:   cefepime  1,000 mg IntraVENous Q24H    sodium chloride flush  10 mL IntraVENous 2 times per day    apixaban  2.5 mg Oral BID    aspirin  81 mg Oral Daily    [Held by provider] atenolol  25 mg Oral QPM    atorvastatin  10 mg Oral Nightly    [Held by provider] gabapentin  100 mg Oral TID    LORazepam  0.5 mg Oral BID    pantoprazole  20 mg Oral Nightly    sevelamer  800 mg Oral TID WC    tamsulosin  0.4 mg Oral QAM    metroNIDAZOLE  500 mg IntraVENous Q8H    docusate sodium  200 mg Oral BID       Continuous Infusions:   sodium chloride         PRN Meds:  sodium chloride flush, sodium chloride, promethazine **OR** ondansetron, acetaminophen **OR** acetaminophen, melatonin    Imaging:   IR REMOVE TUNNELED CVAD WO SQ PORT/PUMP   Final Result   Successful fluoroscopic guided non tunneled dialysis catheter placement. Successful removal of tunneled dialysis catheter. IR NON TUNNELED CATH WO PORT REPLACEMENT   Final Result   Successful fluoroscopic guided non tunneled dialysis catheter placement. Successful removal of tunneled dialysis catheter. MRI BRAIN WO CONTRAST   Final Result   Cerebral atrophy. Severe chronic small vessel ischemic changes. No areas of   restricted diffusion to suggest an acute ischemic event. VL DUP CAROTID BILATERAL   Final Result      CT ABDOMEN PELVIS WO CONTRAST Additional Contrast? None   Final Result   Mild wall thickening involving the distal descending colon through the rectum   with adjacent fat stranding suggestive of proctocolitis. Mild bladder wall thickening with perivesicular fat stranding suggestive of   cystitis. Correlation with urinalysis recommended. Large stool ball within the rectum with adjacent inflammatory changes, as   above. Mild splenomegaly. Nonobstructive right nephrolithiasis. CT HEAD WO CONTRAST   Final Result   No acute intracranial abnormality. XR CHEST PORTABLE   Final Result   Stable cardiac enlargement with no acute findings. All pertinent images and reports for the current Hospitalization were reviewed by me.     IMPRESSION:    Patient Active Problem List   Diagnosis    GI bleed    History of COVID-19    Hyponatremia    Fatigue    Acute kidney injury superimposed on CKD ([address]    Lethargy    Suspected UTI    Acute CVA (cerebrovascular accident) ([address]    LESLEE (acute kidney injury) ([address]    Malignant neoplasm of colon ([address]    Acute blood loss anemia    COVID-19    Shock circulatory ([address]    Bilateral pulmonary embolism (HCC)    Lactic acidosis    Hemorrhagic shock (HCC)    ESRD (end stage renal disease) on dialysis ([address]    Acute cholecystitis    Pain of upper abdomen    AMS (altered mental status)    Sepsis ([address]    Acute metabolic encephalopathy    Cellulitis    Fever and chills    PVD (peripheral vascular disease) (HCC)    Anxiety    Rectal bleeding    Severe malnutrition (HCC)    Acute encephalopathy    Acute cystitis without hematuria    Colitis    Elevated procalcitonin    Chronic anticoagulation     Change in mentation   ESRD   HD line in place  Lactic acidosis  Procal elevation  CT ABD/PELVIS with distal colitis  Constipation   PVD  Cerebral atrophy on MRI  Crp elevation   UA abnormal   Chronic anticoagulation    HD catheter exit site infection with staph aureus on the cx    Staph aureus on the Tip cx but Blood cx negative      Blood cx in process on exam HD site cx with Staph aureus  - MSSA noted and like grimes Tip cx with MSSA but Blood cx remain negative and this could have led to Pedro Kayleigh bacteremia due to HD catheter colonization and biofilm formation    HD line removed  promptly and has temp line in place    Current abx regimen will cover HD line infection and Colitis     Would be ok for HD line conversion on Monday if no new issues       Labs, Microbiology, Radiology and pertinent results from current hospitalization and care every where were reviewed by me as a part of the consultation. PLAN :  Cont  IV Cefepime x 1 gm Q24 hrs for now will cover MSSA and GI process   Change to oral flagyl   bLOOD CX IN PROCess NGTD  Urine cx mixed nakia  HD line tip cx +Ve   May be able to have new line placed on Monday if no new issues     Discussed with patient/Family and Nursing   Risk of Complications/Morbidity: High      Illness(es)/ Infection present that pose threat to bodily function. There is potential for severe exacerbation of infection/side effects of treatment. Therapy requires intensive monitoring for antimicrobial agent toxicity. Thanks for allowing me to participate in your patient's care please call me with any questions or concerns.     Dr. Dmitriy Almeida MD  [facility] Physician  Phone: [phone]   Fax : [phone]
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
## 2022-12-25 NOTE — PLAN OF CARE
Problem: Discharge Planning  Goal: Discharge to home or other facility with appropriate resources  12/25/2022 1205 by Reno Segovia RN  Outcome: Progressing     Problem: Safety - Adult  Goal: Free from fall injury  12/25/2022 1205 by Reno Segovia RN  Outcome: Progressing     Problem: ABCDS Injury Assessment  Goal: Absence of physical injury  12/25/2022 1205 by Reno Segovia RN  Outcome: Progressing     Problem: Skin/Tissue Integrity  Goal: Absence of new skin breakdown  Description: 1. Monitor for areas of redness and/or skin breakdown  2. Assess vascular access sites hourly  3. Every 4-6 hours minimum:  Change oxygen saturation probe site  4. Every 4-6 hours:  If on nasal continuous positive airway pressure, respiratory therapy assess nares and determine need for appliance change or resting period.   12/25/2022 1205 by Reno Segovia RN  Outcome: Progressing     Problem: Chronic Conditions and Co-morbidities  Goal: Patient's chronic conditions and co-morbidity symptoms are monitored and maintained or improved  12/25/2022 1205 by Reno Segovia RN  Outcome: Progressing     Problem: Pain  Goal: Verbalizes/displays adequate comfort level or baseline comfort level  12/25/2022 1205 by Reno Segovia RN  Outcome: Progressing

## 2022-12-25 NOTE — PROGRESS NOTES
Hospitalist Progress Note  12/25/2022 8:39 AM    PCP: Irasema Medrano    [id number]     Date of Admission: 12/20/2022                                                                                                                     HOSPITAL COURSE    Patient demographics:  The patient  Alen Woodall is a 69 y.o. male     Significant past medical history:   Patient Active Problem List   Diagnosis    GI bleed    History of COVID-19    Hyponatremia    Fatigue    Acute kidney injury superimposed on CKD (HCC)    Lethargy    Suspected UTI    Acute CVA (cerebrovascular accident) ([facility]    LESLEE (acute kidney injury) ([address]    Malignant neoplasm of colon ([facility]    Acute blood loss anemia    COVID-19    Shock circulatory ([facility])    Bilateral pulmonary embolism (HCC)    Lactic acidosis    Hemorrhagic shock (HCC)    ESRD (end stage renal disease) on dialysis ([facility]    Acute cholecystitis    Pain of upper abdomen    AMS (altered mental status)    Sepsis ([facility]    Acute metabolic encephalopathy    Cellulitis    Fever and chills    PVD (peripheral vascular disease) ([facility])    Anxiety    Rectal bleeding    Severe malnutrition ([facility])    Acute encephalopathy    Acute cystitis without hematuria    Colitis    Elevated procalcitonin    Chronic anticoagulation         Presenting symptoms:  Fatigue     Diagnostic workup:      CONSULTS DURING ADMISSION :   IP CONSULT TO NEPHROLOGY  IP CONSULT TO INFECTIOUS DISEASES      Patient was diagnosed with:  Acute encephalopathy  End-stage renal disease on hemodialysis  Tunneled catheter infection  Constipation  Proctocolitis  Type II MI    Treatment while inpatient:  Pt presented as a transfer from  due to acute encephalopathy                                                                                       ----------------------------------------------------------      SUBJECTIVE COMPLAINTS- Fatigue     Diet: ADULT DIET; Regular; 3 carb choices (45 gm/meal);  Low Fat/Low Chol/High Fiber/2 gm Na; Low Sodium (2 gm)      OBJECTIVE:   Patient Active Problem List   Diagnosis    GI bleed    History of COVID-19    Hyponatremia    Fatigue    Acute kidney injury superimposed on CKD ([facility]    Lethargy    Suspected UTI    Acute CVA (cerebrovascular accident) ([facility]    LESLEE (acute kidney injury) ([facility]    Malignant neoplasm of colon ([facility]    Acute blood loss anemia    COVID-19    Shock circulatory ([facility]    Bilateral pulmonary embolism (HCC)    Lactic acidosis    Hemorrhagic shock (HCC)    ESRD (end stage renal disease) on dialysis ([facility]    Acute cholecystitis    Pain of upper abdomen    AMS (altered mental status)    Sepsis (HCC)    Acute metabolic encephalopathy    Cellulitis    Fever and chills    PVD (peripheral vascular disease) (HCC)    Anxiety    Rectal bleeding    Severe malnutrition (HCC)    Acute encephalopathy    Acute cystitis without hematuria    Colitis    Elevated procalcitonin    Chronic anticoagulation       Allergies  Lisinopril    Medications    Scheduled Meds:   cefepime  1,000 mg IntraVENous Q24H    sodium chloride flush  10 mL IntraVENous 2 times per day    apixaban  2.5 mg Oral BID    aspirin  81 mg Oral Daily    [Held by provider] atenolol  25 mg Oral QPM    atorvastatin  10 mg Oral Nightly    [Held by provider] gabapentin  100 mg Oral TID    LORazepam  0.5 mg Oral BID    pantoprazole  20 mg Oral Nightly    sevelamer  800 mg Oral TID WC    tamsulosin  0.4 mg Oral QAM    metroNIDAZOLE  500 mg IntraVENous Q8H    docusate sodium  200 mg Oral BID     Continuous Infusions:   sodium chloride       PRN Meds:  sodium chloride flush, sodium chloride, promethazine **OR** ondansetron, acetaminophen **OR** acetaminophen, melatonin    Vitals   Vitals /wt Patient Vitals for the past 8 hrs:   BP Temp Temp src Pulse Resp SpO2 Weight   12/25/22 0835 -- -- -- -- -- 93 % --   12/25/22 0803 (!) 154/79 97.1 °F (36.2 °C) Oral 71 18 96 % --   12/25/22 0603 111/61 97.7 °F (36.5 °C) Oral 75 18 95 % 186 lb 15.2 oz (84.8 kg) 72HR INTAKE/OUTPUT:    Intake/Output Summary (Last 24 hours) at 12/25/2022 0839  Last data filed at 12/24/2022 1350  Gross per 24 hour   Intake 220 ml   Output --   Net 220 ml       Exam:    Gen:   Alert and oriented ×3    Eyes: PERRL. No sclera icterus. No conjunctival injection. ENT: No discharge. Pharynx clear. External appearance of ears and nose normal.  Neck: Trachea midline. No obvious mass. Resp: No accessory muscle use. No crackles. No wheezes. No rhonchi. CV: Regular rate. Regular rhythm. No murmur or rub. No edema. GI: Non-tender. Non-distended. No hernia. Skin: Warm, dry, normal texture and turgor. Lymph: No cervical LAD. No supraclavicular LAD. M/S: / Ext. No cyanosis. No clubbing. No joint deformity. Neuro: CN 2-12 are intact,  no neurologic deficits noted. PT/INR: No results for input(s): PROTIME, INR in the last 72 hours. APTT: No results for input(s): APTT in the last 72 hours. CBC:   Recent Labs     12/22/22  1020 12/24/22  0936   WBC 5.8 2.7*   HGB 8.6* 8.6*   HCT 26.2* 26.7*   MCV 99.3 99.7    190       BMP:   Recent Labs     12/22/22  1020 12/24/22  0936    135*   K 3.7 3.8   CL 97* 97*   CO2 26 23   PHOS  --  3.5   BUN 49* 37*   CREATININE 4.4* 4.3*       LIVER PROFILE:   Recent Labs     12/22/22  1020   ALKPHOS 67   AST 7*   ALT 6*   BILITOT 0.3     No results for input(s): AMYLASE in the last 72 hours. No results for input(s): LIPASE in the last 72 hours. UA:  No results for input(s): NITRITE, LABCAST, WBCUA, RBCUA, MUCUS in the last 72 hours. TROPONIN:   No results for input(s): Liz James in the last 72 hours. Lab Results   Component Value Date/Time    URRFLXCULT Yes 10/20/2022 10:52 AM       No results for input(s): TSHREFLEX in the last 72 hours.       No components found for: [id number]  POC GLUCOSE:    Recent Labs     12/24/22  1123 12/24/22  1522 12/24/22  1630 12/24/22  1958 12/25/22  0805   POCGLU 73 90 88 115* 84     No results for input(s): LABA1C in the last 72 hours. Lab Results   Component Value Date/Time    LABA1C 6.1 01/15/2022 05:31 AM      Ejection fraction is visually estimated to be 55-60%(2/12/2022)    ASSESSMENT AND PLAN     Acute encephalopathy  Likely septic and metabolic combined  Mental status improved to baseline       End-stage renal disease on hemodialysis  Hemodialysis done through temporary dialysis catheter     Tunneled catheter infection  Tunneled catheter has been removed  Temporary catheter is in place  Continue on cefepime  Infectious diseases following  Plan is to get line On Monday     Constipation  Treat with MiraLAX  As patient is a dialysis patient would avoid fleets enemas or milk of magnesia out of concern for toxicity     Proctocolitis  Continue Rocephin and Flagyl  Add vancomycin           Type II MI  Likely demand ischemia  Patient has no evidence of injury pattern on EKG  Conservative management for now        Code Status: Full Code        Dispo -patient to be placed in long-term care        The patient and / or the family were informed of the results of any tests, a time was given to answer questions, a plan was proposed and they agreed with plan. Corey Desai MD    This note was transcribed using [id number] [employer]. Please disregard any translational errors.

## 2022-12-25 NOTE — PLAN OF CARE
Problem: Discharge Planning  Goal: Discharge to home or other facility with appropriate resources  Outcome: Progressing     Problem: Safety - Adult  Goal: Free from fall injury  Outcome: Progressing  Flowsheets (Taken 12/24/2022 2252)  Free From Fall Injury: Instruct family/caregiver on patient safety     Problem: ABCDS Injury Assessment  Goal: Absence of physical injury  Outcome: Progressing  Flowsheets (Taken 12/24/2022 2252)  Absence of Physical Injury: Implement safety measures based on patient assessment     Problem: Skin/Tissue Integrity  Goal: Absence of new skin breakdown  Description: 1. Monitor for areas of redness and/or skin breakdown  2. Assess vascular access sites hourly  3. Every 4-6 hours minimum:  Change oxygen saturation probe site  4. Every 4-6 hours:  If on nasal continuous positive airway pressure, respiratory therapy assess nares and determine need for appliance change or resting period.   Outcome: Progressing     Problem: Chronic Conditions and Co-morbidities  Goal: Patient's chronic conditions and co-morbidity symptoms are monitored and maintained or improved  Outcome: Progressing  Flowsheets (Taken 12/24/2022 2115)  Care Plan - Patient's Chronic Conditions and Co-Morbidity Symptoms are Monitored and Maintained or Improved: Monitor and assess patient's chronic conditions and comorbid symptoms for stability, deterioration, or improvement     Problem: Pain  Goal: Verbalizes/displays adequate comfort level or baseline comfort level  Outcome: Progressing

## 2022-12-26 LAB
ALBUMIN SERPL-MCNC: 3 G/DL (ref 3.4–5)
ANION GAP SERPL CALCULATED.3IONS-SCNC: 13 MMOL/L (ref 3–16)
BASOPHILS ABSOLUTE: 0 K/UL (ref 0–0.2)
BASOPHILS RELATIVE PERCENT: 0.6 %
BLOOD CULTURE, ROUTINE: NORMAL
BUN BLDV-MCNC: 30 MG/DL (ref 7–20)
CALCIUM SERPL-MCNC: 8.4 MG/DL (ref 8.3–10.6)
CHLORIDE BLD-SCNC: 103 MMOL/L (ref 99–110)
CO2: 25 MMOL/L (ref 21–32)
CREAT SERPL-MCNC: 4.3 MG/DL (ref 0.8–1.3)
CULTURE, BLOOD 2: NORMAL
EOSINOPHILS ABSOLUTE: 0.1 K/UL (ref 0–0.6)
EOSINOPHILS RELATIVE PERCENT: 2.2 %
GFR SERPL CREATININE-BSD FRML MDRD: 14 ML/MIN/{1.73_M2}
GLUCOSE BLD-MCNC: 145 MG/DL (ref 70–99)
GLUCOSE BLD-MCNC: 147 MG/DL (ref 70–99)
GLUCOSE BLD-MCNC: 174 MG/DL (ref 70–99)
GLUCOSE BLD-MCNC: 95 MG/DL (ref 70–99)
GLUCOSE BLD-MCNC: 97 MG/DL (ref 70–99)
HCT VFR BLD CALC: 27.5 % (ref 40.5–52.5)
HEMOGLOBIN: 9.1 G/DL (ref 13.5–17.5)
LYMPHOCYTES ABSOLUTE: 0.9 K/UL (ref 1–5.1)
LYMPHOCYTES RELATIVE PERCENT: 28.8 %
MCH RBC QN AUTO: 32.7 PG (ref 26–34)
MCHC RBC AUTO-ENTMCNC: 33.2 G/DL (ref 31–36)
MCV RBC AUTO: 98.5 FL (ref 80–100)
MONOCYTES ABSOLUTE: 0.2 K/UL (ref 0–1.3)
MONOCYTES RELATIVE PERCENT: 6.7 %
NEUTROPHILS ABSOLUTE: 1.9 K/UL (ref 1.7–7.7)
NEUTROPHILS RELATIVE PERCENT: 61.7 %
PDW BLD-RTO: 14.4 % (ref 12.4–15.4)
PERFORMED ON: ABNORMAL
PERFORMED ON: NORMAL
PHOSPHORUS: 3.5 MG/DL (ref 2.5–4.9)
PLATELET # BLD: 188 K/UL (ref 135–450)
PMV BLD AUTO: 7.2 FL (ref 5–10.5)
POTASSIUM SERPL-SCNC: 3.8 MMOL/L (ref 3.5–5.1)
RBC # BLD: 2.79 M/UL (ref 4.2–5.9)
SODIUM BLD-SCNC: 141 MMOL/L (ref 136–145)
WBC # BLD: 3.1 K/UL (ref 4–11)

## 2022-12-26 PROCEDURE — 36415 COLL VENOUS BLD VENIPUNCTURE: CPT

## 2022-12-26 PROCEDURE — 2580000003 HC RX 258: Performed by: INTERNAL MEDICINE

## 2022-12-26 PROCEDURE — 6370000000 HC RX 637 (ALT 250 FOR IP): Performed by: INTERNAL MEDICINE

## 2022-12-26 PROCEDURE — 85025 COMPLETE CBC W/AUTO DIFF WBC: CPT

## 2022-12-26 PROCEDURE — 6360000002 HC RX W HCPCS: Performed by: INTERNAL MEDICINE

## 2022-12-26 PROCEDURE — 99232 SBSQ HOSP IP/OBS MODERATE 35: CPT | Performed by: INTERNAL MEDICINE

## 2022-12-26 PROCEDURE — 80069 RENAL FUNCTION PANEL: CPT

## 2022-12-26 PROCEDURE — 97530 THERAPEUTIC ACTIVITIES: CPT

## 2022-12-26 PROCEDURE — 1200000000 HC SEMI PRIVATE

## 2022-12-26 PROCEDURE — 94760 N-INVAS EAR/PLS OXIMETRY 1: CPT

## 2022-12-26 RX ADMIN — LORAZEPAM 0.5 MG: 0.5 TABLET ORAL at 20:25

## 2022-12-26 RX ADMIN — ATORVASTATIN CALCIUM 10 MG: 10 TABLET, FILM COATED ORAL at 20:25

## 2022-12-26 RX ADMIN — METRONIDAZOLE 500 MG: 500 TABLET ORAL at 15:54

## 2022-12-26 RX ADMIN — METRONIDAZOLE 500 MG: 500 TABLET ORAL at 20:30

## 2022-12-26 RX ADMIN — PANTOPRAZOLE SODIUM 20 MG: 20 TABLET, DELAYED RELEASE ORAL at 20:25

## 2022-12-26 RX ADMIN — SODIUM CHLORIDE, PRESERVATIVE FREE 10 ML: 5 INJECTION INTRAVENOUS at 10:33

## 2022-12-26 RX ADMIN — DOCUSATE SODIUM 200 MG: 100 CAPSULE, LIQUID FILLED ORAL at 10:33

## 2022-12-26 RX ADMIN — CEFEPIME 1000 MG: 1 INJECTION, POWDER, FOR SOLUTION INTRAMUSCULAR; INTRAVENOUS at 20:29

## 2022-12-26 RX ADMIN — LORAZEPAM 0.5 MG: 0.5 TABLET ORAL at 10:35

## 2022-12-26 RX ADMIN — METRONIDAZOLE 500 MG: 500 TABLET ORAL at 10:33

## 2022-12-26 RX ADMIN — TAMSULOSIN HYDROCHLORIDE 0.4 MG: 0.4 CAPSULE ORAL at 10:35

## 2022-12-26 RX ADMIN — ASPIRIN 81 MG 81 MG: 81 TABLET ORAL at 10:33

## 2022-12-26 RX ADMIN — DOCUSATE SODIUM 200 MG: 100 CAPSULE, LIQUID FILLED ORAL at 20:24

## 2022-12-26 RX ADMIN — SEVELAMER CARBONATE 800 MG: 800 TABLET, FILM COATED ORAL at 10:35

## 2022-12-26 RX ADMIN — SEVELAMER CARBONATE 800 MG: 800 TABLET, FILM COATED ORAL at 13:07

## 2022-12-26 RX ADMIN — SEVELAMER CARBONATE 800 MG: 800 TABLET, FILM COATED ORAL at 17:25

## 2022-12-26 ASSESSMENT — PAIN SCALES - GENERAL
PAINLEVEL_OUTOF10: 0
PAINLEVEL_OUTOF10: 0

## 2022-12-26 NOTE — PROGRESS NOTES
Physical Therapy  Facility/Department: [address] MED SURG  Physical Therapy Daily Progress Note  Name: Emily Crowley  :   MRN: [id number]  Date of Service: 2022    Discharge Recommendations:  [address] with PT   PT Equipment Recommendations  Equipment Needed: No  Other: defer to facility    Emily Crowley scored a 8/24 on the AM-PAC short mobility form. Current research shows that an AM-PAC score of 17 or less is typically not associated with a discharge to the patient's home setting. Based on the patient's AM-PAC score and their current functional mobility deficits, it is recommended that the patient have 3-5 sessions per week of Physical Therapy at d/c to increase the patient's independence. Please see assessment section for further patient specific details. If patient discharges prior to next session this note will serve as a discharge summary. Please see below for the latest assessment towards goals. Patient Diagnosis(es): The primary encounter diagnosis was Altered mental status, unspecified altered mental status type. Diagnoses of Acute cystitis without hematuria and Lactic acidosis were also pertinent to this visit. Past Medical History:  has a past medical history of Anemia, Arthritis, CAD (coronary artery disease), Cancer ([address] Cerebral infarction ([address] Cognitive communication deficit, COVID-19, Diabetes mellitus ([address] Dysphagia, End stage renal disease ([address] Fatigue, Gastrointestinal hemorrhage, Hemodialysis patient ([address] Hx of blood clots, Hyperlipidemia, Hypertension, Hyponatremia, Risk for falls, and Splenomegaly. Past Surgical History:  has a past surgical history that includes IR PERC ARTERIOVENOUS FISTULA CREATION (Left); colectomy (N/A, 2022); sigmoidoscopy (N/A, 2022); IR EMBOLIZATION HEMORRHAGE (2022); Tunneled venous catheter placement (Right, 2022); IR TUNNELED CVC PLACE WO SQ PORT/PUMP > 5 YEARS (2022);  Cardiac catheterization (2019); Colonoscopy; Dialysis fistula creation (Left, 4/29/2022); Cholecystectomy, laparoscopic (N/A, 5/16/2022); ERCP (N/A, 5/16/2022); ERCP (N/A, 5/16/2022); and Upper gastrointestinal endoscopy (5/16/2022). Assessment   Body Structures, Functions, Activity Limitations Requiring Skilled Therapeutic Intervention: Decreased functional mobility ; Decreased ADL status; Decreased strength;Decreased cognition;Decreased balance;Decreased endurance  Assessment: Pt is a 69 y.o. male who presented to [facility] on 12/20/22 with AMS. Prior to admission, pt living at [facility], needing assist with all ADLs outside of feeding, A of 2 person for stand pivot transfers to w/c. Pt currently functioning below baseline and previously required maxAx2 for transfer with denny jenkins from elevated bed. Today pt was combative earlier, is now confused andimpulsive with difficulty following directions but no longer combative. However this PT and RN feelit would be unsafe to leave him sitting up in the recliner even with the telesitter and a tray table in front of him. So pt sat EOB with max A sup to sit, variable sitting balance occasionally requiring up to mod A for sitting. Anticipate return to [facility] with PT. Will continue to follow at low frequency while in acute setting. Treatment Diagnosis: impaired mobility  Requires PT Follow-Up: Yes  Activity Tolerance  Activity Tolerance: Treatment limited secondary to decreased cognition;Patient limited by endurance     Plan   Physcial Therapy Plan  General Plan: 2-3 times per week  Current Treatment Recommendations: Strengthening, Balance training, Functional mobility training, Transfer training, Endurance training, Neuromuscular re-education, Cognitive reorientation, Safety education & training, Equipment evaluation, education, & procurement, Patient/Caregiver education & training, Therapeutic activities  Safety Devices  Type of Devices:  All fall risk precautions in place, Call light within reach, Gait belt, Patient at risk for falls, Left in bed, Bed alarm in place, Nurse notified  Restraints  Restraints Initially in Place: No     Restrictions  Restrictions/Precautions  Restrictions/Precautions: Fall Risk  Position Activity Restriction  Other position/activity restrictions: pt with confusion, occasional combativeness, telesitter     Subjective    Pt confused but no c/o, trying to climb out of bed over bedrail, wanted help to call family. After therapy session, PT dialed wife for pt who did not answer,then called daughter and pt was able to talk to her. Social/Functional History  Social/Functional History  Type of Home: Facility ([address])  ADL Assistance: Needs assistance (needs assist with all except feeding)  Ambulation Assistance: Non-ambulatory (uses w/c, but needs help propelling further than room distance)  Transfer Assistance: Needs assistance (assist of 2 for transfers)  Vision/Hearing       Cognition   Orientation  Overall Orientation Status: Impaired  Orientation Level: Oriented to place;Oriented to person;Disoriented to time;Disoriented to situation  Cognition  Overall Cognitive Status: Exceptions  Arousal/Alertness: Appropriate responses to stimuli  Following Commands: Follows one step commands consistently  Attention Span: Attends with cues to redirect  Memory: Decreased recall of recent events  Safety Judgement: Decreased awareness of need for safety;Decreased awareness of need for assistance  Problem Solving: Decreased awareness of errors  Insights: Decreased awareness of deficits  Initiation: Requires cues for some  Sequencing: Requires cues for some     Objective      Bed mobility  Supine to Sit: Moderate assistance;Maximum assistance  Sit to Supine: Moderate assistance;Maximum assistance  Scooting:  Moderate assistance (pulling up on bedrails)  Bed Mobility Comments: Pt sat EOB for a few minutes with decreased balance and leaning backward and to the L, required min to mod A when leaning but could maintain sitting for  about 1 minute at a  time with CGA to SBA once in position.   Transfers  Comment: unable to stand fully upright, able to push up from bed and scoot toward head of bed with max A  Ambulation  Comments: non-ambulatory at baseline, unable to stand upright today, sat EOB and returned to bed due to decreased safety and PT and RN question safety of leaving pt up in recliner     Balance  Posture: Fair  Sitting - Static: Fair  Sitting - Dynamic: Poor  Standing - Static: Poor     AM-PAC Score  AM-PAC Inpatient Mobility Raw Score : 8 (12/26/22 1041)  AM-PAC Inpatient T-Scale Score : 28.52 (12/26/22 1041)  Mobility Inpatient CMS 0-100% Score: 86.62 (12/26/22 1041)  Mobility Inpatient CMS G-Code Modifier : CM (12/26/22 1041)     Goals  Short Term Goals  Time Frame for Short Term Goals: by acute discharge (goals ongoing as of 12/23)  Short Term Goal 1: bed mobility mod A  Short Term Goal 2: sit<>stand within stedy with max A  Short Term Goal 3: stand pivot with A of 2 person  Patient Goals   Patient Goals : none stated   Therapy Time   Individual Concurrent Group Co-treatment   Time In 1028         Time Out 1057         Minutes 29         Timed Code Treatment Minutes: 29 Minutes   Charges = 209 minutes judithact  Gilberto Bobby, [address]

## 2022-12-26 NOTE — PROGRESS NOTES
Hospitalist Progress Note  12/26/2022 11:24 AM    PCP: Gwendolyn Mayo    [id number]     Date of Admission: 12/20/2022                                                                                                                     HOSPITAL COURSE    Patient demographics:  The patient  Emily Crowley is a 69 y.o. male     Significant past medical history:   Patient Active Problem List   Diagnosis    GI bleed    History of COVID-19    Hyponatremia    Fatigue    Acute kidney injury superimposed on CKD (HCC)    Lethargy    Suspected UTI    Acute CVA (cerebrovascular accident) ([address]    LESLEE (acute kidney injury) ([address]    Malignant neoplasm of colon ([address]    Acute blood loss anemia    COVID-19    Shock circulatory (HCC)    Bilateral pulmonary embolism (HCC)    Lactic acidosis    Hemorrhagic shock (HCC)    ESRD (end stage renal disease) on dialysis ([address]    Acute cholecystitis    Pain of upper abdomen    AMS (altered mental status)    Sepsis ([address]    Acute metabolic encephalopathy    Cellulitis    Fever and chills    PVD (peripheral vascular disease) ([facility])    Anxiety    Rectal bleeding    Severe malnutrition ([facility])    Acute encephalopathy    Acute cystitis without hematuria    Colitis    Elevated procalcitonin    Chronic anticoagulation    Hemodialysis catheter infection ([address]         Presenting symptoms:  Fatigue     Diagnostic workup:      CONSULTS DURING ADMISSION :   IP CONSULT TO NEPHROLOGY  IP CONSULT TO INFECTIOUS DISEASES      Patient was diagnosed with:  Acute encephalopathy  End-stage renal disease on hemodialysis  Tunneled catheter infection  Constipation  Proctocolitis  Type II MI    Treatment while inpatient:  Pt presented as a transfer from [facility] due to acute encephalopathy                                                                                       ----------------------------------------------------------      SUBJECTIVE COMPLAINTS- Fatigue     Diet: ADULT DIET;  Regular; 3 carb choices (45 gm/meal); Low Fat/Low Chol/High Fiber/2 gm Na;  Low Sodium (2 gm)      OBJECTIVE:   Patient Active Problem List   Diagnosis    GI bleed    History of COVID-19    Hyponatremia    Fatigue    Acute kidney injury superimposed on CKD ([facility]    Lethargy    Suspected UTI    Acute [facility]ce[facility]brovascular [facility]) [email or web address]    LESLEE (acute kidney injury) ([email or web address]    Malignant neoplasm of colon ([email or web address]    Acute blood loss anemia    COVID-19    Shock circulatory ([facility]    Bilateral pulmonary embolism (HCC)    Lactic acidosis    Hemorrhagic shock (HCC)    ESRD (end stage renal disease) on dialysis ([facility]    Acute cholecystitis    Pain of upper abdomen    AMS (altered mental status)    Sepsis ([facility]    Acute metabolic encephalopathy    Cellulitis    Fever and chills    PVD (peripheral vascular disease) (HCC)    Anxiety    Rectal bleeding    Severe malnutrition (HCC)    Acute encephalopathy    Acute cystitis without hematuria    Colitis    Elevated procalcitonin    Chronic anticoagulation    Hemodialysis catheter infection (HCC)       Allergies  Lisinopril    Medications    Scheduled Meds:   metroNIDAZOLE  500 mg Oral 3 times per day    cefepime  1,000 mg IntraVENous Q24H    sodium chloride flush  10 mL IntraVENous 2 times per day    apixaban  2.5 mg Oral BID    aspirin  81 mg Oral Daily    [Held by provider] atenolol  25 mg Oral QPM    atorvastatin  10 mg Oral Nightly    [Held by provider] gabapentin  100 mg Oral TID    LORazepam  0.5 mg Oral BID    pantoprazole  20 mg Oral Nightly    sevelamer  800 mg Oral TID WC    tamsulosin  0.4 mg Oral QAM    docusate sodium  200 mg Oral BID     Continuous Infusions:   sodium chloride       PRN Meds:  sodium chloride flush, sodium chloride, promethazine **OR** ondansetron, acetaminophen **OR** acetaminophen, melatonin    Vitals   Vitals /wt Patient Vitals for the past 8 hrs:   BP Temp Temp src Pulse Resp SpO2 Weight   12/26/22 0841 -- -- -- -- 20 98 % --   12/26/22 0734 (!) 146/69 98.1 °F (36.7 °C) Axillary 67 18 95 % --   12/26/22 0508 -- -- -- -- -- -- 144 lb 13.5 oz (65.7 kg)   12/26/22 0412 (!) 160/61 97.9 °F (36.6 °C) Oral 71 17 100 % --        72HR INTAKE/OUTPUT:    Intake/Output Summary (Last 24 hours) at 12/26/2022 1124  Last data filed at 12/26/2022 0959  Gross per 24 hour   Intake 500 ml   Output --   Net 500 ml       Exam:    Gen:   Alert and oriented ×3    Eyes: PERRL. No sclera icterus. No conjunctival injection. ENT: No discharge. Pharynx clear. External appearance of ears and nose normal.  Neck: Trachea midline. No obvious mass. Resp: No accessory muscle use. No crackles. No wheezes. No rhonchi. CV: Regular rate. Regular rhythm. No murmur or rub. No edema. GI: Non-tender. Non-distended. No hernia. Skin: Warm, dry, normal texture and turgor. Lymph: No cervical LAD. No supraclavicular LAD. M/S: / Ext. No cyanosis. No clubbing. No joint deformity. Neuro: CN 2-12 are intact,  no neurologic deficits noted. PT/INR: No results for input(s): PROTIME, INR in the last 72 hours. APTT: No results for input(s): APTT in the last 72 hours. CBC:   Recent Labs     12/24/22  0936 12/26/22  0759   WBC 2.7* 3.1*   HGB 8.6* 9.1*   HCT 26.7* 27.5*   MCV 99.7 98.5    188       BMP:   Recent Labs     12/24/22  0936 12/26/22  0759   * 141   K 3.8 3.8   CL 97* 103   CO2 23 25   PHOS 3.5 3.5   BUN 37* 30*   CREATININE 4.3* 4.3*       LIVER PROFILE:   No results for input(s): ALKPHOS, AST, ALT, ALB, BILIDIR, BILITOT, ALKPHOS in the last 72 hours. No results for input(s): AMYLASE in the last 72 hours. No results for input(s): LIPASE in the last 72 hours. UA:  No results for input(s): NITRITE, LABCAST, WBCUA, RBCUA, MUCUS in the last 72 hours. TROPONIN:   No results for input(s): Wayna Khat in the last 72 hours. Lab Results   Component Value Date/Time    URRFLXCULT Yes 10/20/2022 10:52 AM       No results for input(s): TSHREFLEX in the last 72 hours.       No components found for: [id number]  POC GLUCOSE:    Recent Labs     12/25/22  0805 12/25/22  1205 12/25/22  1622 12/25/22  1950 12/26/22  0732   POCGLU 84 103* 114* 188* 97     No results for input(s): LABA1C in the last 72 hours. Lab Results   Component Value Date/Time    LABA1C 6.1 01/15/2022 05:31 AM      Ejection fraction is visually estimated to be 55-60%(2/12/2022)    ASSESSMENT AND PLAN     Acute encephalopathy  Likely septic and metabolic combined  Mental status improved to baseline       End-stage renal disease on hemodialysis  Hemodialysis done through temporary dialysis catheter     Tunneled catheter infection  Tunneled catheter has been removed  Temporary catheter is in place  Continue on cefepime  Infectious diseases following  Placement in a.m. Constipation  Treat with MiraLAX  Avoid fleets enema and milk of magnesia since patient is on hemodialysis     Proctocolitis  Continue patient on Flagyl           Type II MI  Likely demand ischemia  Patient has no evidence of injury pattern on EKG  Conservative management for now        Code Status: Full Code        Dispo -patient to be placed in long-term care        The patient and / or the family were informed of the results of any tests, a time was given to answer questions, a plan was proposed and they agreed with plan. Mayda Kathleen MD    This note was transcribed using [id number] [employer]. Please disregard any translational errors.

## 2022-12-26 NOTE — PROGRESS NOTES
Infectious Diseases Inpatient Progress  Note          CHIEF COMPLAINT:     HD line catheter exit site infection  Staph aureus on the cx  Tip cx Staph aureus  ESRD on HD  Colitis        HISTORY OF PRESENT ILLNESS:  69 y.o. man with a significant history of potential renal disease on hemodialysis, colon cancer, bowel surgery, hypertension, stroke, diabetes admitted to the hospital secondary to change in mental status and possibly some shortness of breath. Patient is unable to provide any detailed history. Currently seen in dialysis. HD catheter site looks okay dressing intact. Noted to have lactic acidosis on admission with elevated procalcitonin. Blood culture in process. CRP elevated 132.4, MRI of the brain indicated cerebral atrophy, CT abdomen repeated colon and rectum with acute colitis, also large stool ball in the rectum concerning for constipation. Urinalysis abnormal urine culture will be requested.   We are consulted for recommendations      Interval  History: Mentation improving and Blood cx -ve NO Fever HD line Temp in place - tolerating IV abx ok     Past Medical History:    Past Medical History:   Diagnosis Date    Anemia 03/2022    Arthritis     CAD (coronary artery disease) 01/2020    Cancer ([facility]     Colon Cancer diagnosed last month    Cerebral infarction [facility])     Cognitive communication deficit     COVID-19     Diabetes mellitus ([facility] [facility]     Dysphagia     End stage renal disease ([facility] [facility]     Fatigue     Gastrointestinal hemorrhage     Hemodialysis patient ([facility] [facility] 2019    ckd-goes to dialysis Tuesday, Thurs, Fri    Hx of blood clots     Hyperlipidemia     Hypertension     Hyponatremia     Risk for falls     Splenomegaly 02/10/2021       Past Surgical History:    Past Surgical History:   Procedure Laterality Date    CARDIAC CATHETERIZATION  2019    CHOLECYSTECTOMY, LAPAROSCOPIC N/A 5/16/2022    LAPAROSCOPIC CHOLECYSTECTOMY WITH CHOLANGIOGRAM performed by Cameron Nascimento MD at [place] [address] COLECTOMY N/A 01/26/2022    SIGMOID COLECTOMY, SIGMOIDOSCOPY performed by Fito Mcdowell MD at [facility] 18 Left 4/29/2022    LEFT BRACHIAL CEPHALIC FISTULA performed by Ayan Reno MD at [facility] 91    ERCP N/A 5/16/2022    ERCP SPHINCTER/PAPILLOTOMY performed by Letty Rehman MD at [address] [place] [address]    ERCP N/A 5/16/2022    ERCP STONE REMOVAL/BALLOON SWEEP performed by Letty Rehman MD at [facility]  02/19/2022    IR EMBOLIZATION HEMORRHAGE 2/19/2022 [facility]     Corporate Dr Left     unsure of date    IR TUNNELED CATHETER PLACEMENT GREATER THAN 5 YEARS  2/21/2022    IR TUNNELED CATHETER PLACEMENT GREATER THAN 5 YEARS 2/21/2022 [facility]    SIGMOIDOSCOPY N/A 02/17/2022    SIGMOIDOSCOPY CONTROL HEMORRHAGE performed by Bryce Felipe MD at [facility] [address] Right 02/21/2022    Permacath; RIJ access; 23cm; Dr. Maryan Marcano  5/16/2022    ERCP POLYP SNARE performed by Letty Rehman MD at [address] [place] [address]       Current Medications:    Outpatient Medications Marked as Taking for the 12/20/22 encounter [facility] HOSPITAL Encounter)   Medication Sig Dispense Refill    oxyCODONE (ROXICODONE) 5 MG immediate release tablet Take 5 mg by mouth every 4 hours as needed for Pain.       ondansetron (ZOFRAN) 4 MG/2ML injection Infuse 4 mg intravenously every 6 hours as needed for Nausea or Vomiting         Allergies:  Lisinopril    Immunizations :   Immunization History   Administered Date(s) Administered    COVID-19, MODERNA BLUE border, Primary or Immunocompromised, (age 12y+), IM, 100 mcg/0.5mL 03/04/2021, 04/02/2021         Social History:    Social History     Tobacco Use    Smoking status: Former    Smokeless tobacco: Never   Vaping Use    Vaping Use: Never used   Substance Use Topics    Alcohol use: Not Currently    Drug use: Never     Social History     Tobacco Use   Smoking Status Former   Smokeless Tobacco Never      Family History   Problem Relation Age of Onset    High Blood Pressure Father          REVIEW OF SYSTEMS:     NOT POSSIBLE DUE TO CHANGE IN MENTATION     PHYSICAL EXAM:      Vitals:    BP (!) 146/69   Pulse 67   Temp 98.1 °F (36.7 °C) (Axillary)   Resp 20   Ht 6' (1.829 m)   Wt 144 lb 13.5 oz (65.7 kg)   SpO2 98%   BMI 19.64 kg/m²     General Appearance: alert,in some  acute distress, ++ pallor, no icterus   Skin: warm and dry, no rash or erythema  Head: normocephalic and atraumatic  Eyes: pupils equal, round, and reactive to light, conjunctivae normal  ENT: tympanic membrane, external ear and ear canal normal bilaterally, nose without deformity, nasal mucosa and turbinates normal without polyps  Neck: supple and non-tender without mass, no thyromegaly  no cervical lymphadenopathy  Pulmonary/Chest: clear to auscultation bilaterally- no wheezes, rales or rhonchi, normal air movement, no respiratory distress  Cardiovascular: normal rate, regular rhythm, normal S1 and S2, no murmurs, rubs, clicks, or gallops, no carotid bruits  Abdomen: soft, + -tender, non-distended, normal bowel sounds, no masses or organomegaly  Extremities: no cyanosis, clubbing or edema  Musculoskeletal: normal range of motion, no joint swelling, deformity or tenderness  Integumentary: No rashes, no abnormal skin lesions, no petechiae  Neurologic: reflexes normal and symmetric, no cranial nerve defici   Lines: HD line + temp line placed by IR      DATA:    CBC:   Lab Results   Component Value Date    WBC 3.1 (L) 12/26/2022    HGB 9.1 (L) 12/26/2022    HCT 27.5 (L) 12/26/2022    MCV 98.5 12/26/2022     12/26/2022     RENAL:   Lab Results   Component Value Date    CREATININE 4.3 (H) 12/26/2022    BUN 30 (H) 12/26/2022     12/26/2022    K 3.8 12/26/2022     12/26/2022    CO2 25 12/26/2022     SED RATE:   Lab Results   Component Value Date/Time    SEDRATE 24 07/26/2011 05:00 AM     CK:   Lab Results   Component Value Date/Time    CKTOTAL 14 12/21/2022 07:04 AM     CRP:   Lab Results   Component Value Date/Time    .4 12/21/2022 10:40 PM     Hepatic Function Panel:   Lab Results   Component Value Date/Time    ALKPHOS 67 12/22/2022 10:20 AM    ALT 6 12/22/2022 10:20 AM    AST 7 12/22/2022 10:20 AM    PROT 5.8 12/22/2022 10:20 AM    PROT 7.5 07/26/2011 05:00 AM    BILITOT 0.3 12/22/2022 10:20 AM    BILIDIR <0.2 02/12/2022 12:24 AM    IBILI see below 02/12/2022 12:24 AM    LABALBU 3.0 12/26/2022 07:59 AM     UA:  Lab Results   Component Value Date/Time    COLORU DARK YELLOW 12/20/2022 05:39 PM    CLARITYU CLOUDY 12/20/2022 05:39 PM    GLUCOSEU Negative 12/20/2022 05:39 PM    BILIRUBINUR Negative 12/20/2022 05:39 PM    KETUA Negative 12/20/2022 05:39 PM    SPECGRAV 1.020 12/20/2022 05:39 PM    BLOODU LARGE 12/20/2022 05:39 PM    PHUR 7.0 12/20/2022 05:39 PM    PHUR 7.0 12/20/2022 05:39 PM    PROTEINU 100 12/20/2022 05:39 PM    UROBILINOGEN 0.2 12/20/2022 05:39 PM    NITRU Negative 12/20/2022 05:39 PM    LEUKOCYTESUR LARGE 12/20/2022 05:39 PM    LABMICR YES 12/20/2022 05:39 PM    URINETYPE Cleancatch 12/20/2022 05:39 PM      Urine Microscopic:   Lab Results   Component Value Date/Time    BACTERIA 1+ 12/20/2022 05:39 PM    COMU see below 12/20/2022 05:39 PM    HYALCAST 12 10/20/2022 10:52 AM    WBCUA  12/20/2022 05:39 PM    RBCUA 21-50 12/20/2022 05:39 PM    EPIU 0-1 12/20/2022 05:39 PM     Urine Reflex to Culture:   Lab Results   Component Value Date/Time    URRFLXCULT Yes 10/20/2022 10:52 AM       Lactic acid  3.3       Procal  5.41       CRP  132.4     MICRO: cultures reviewed and updated by me     Procedure Component Value Units Date/Time   Culture, Blood 1 [[id number]] Collected: 12/22/22 1430   Order Status: No result    Culture, Anaerobic and Aerobic [[id number]] Collected: 12/22/22 1215   Order Status: No result Updated: 12/22/22 1353   Culture, Blood 2 [[id number]] Collected: 12/22/22 1314   Order Status: Sent Specimen: Blood Updated: 12/22/22 1349   Culture, Anaerobic and Aerobic [[id number]]    Order Status: No result Specimen: catheter site    Culture, Blood 1 [[id number]] Collected: 12/22/22 0000   Order Status: Canceled Specimen: Blood    Culture, Blood 1 [[id number]] Collected: 12/20/22 2054   Order Status: Completed Specimen: Blood Updated: 12/21/22 2315    Blood Culture, Routine No Growth to date. Any change in status will be called. Narrative:     ORDER#: [id number]                          ORDERED BY: Carito Turner   SOURCE: Blood                              COLLECTED:  12/20/22 20:54   ANTIBIOTICS AT JOSE.:                      RECEIVED :  12/20/22 20:59   If child <=2 yrs old please draw pediatric bottle. ~Blood Culture 1   Culture, Blood 2 [[id number]] Collected: 12/20/22 2054   Order Status: Completed Specimen: Blood Updated: 12/21/22 2315    Culture, Blood 2 No Growth to date. Any change in status will be called. Narrative:     ORDER#: [id number]                          ORDERED BY: Carito Turner   SOURCE: Blood                              COLLECTED:  12/20/22 20:54   ANTIBIOTICS AT JOSE.:                      RECEIVED :  12/20/22 21:01   If child <=2 yrs old please draw pediatric bottle. ~Blood Culture #2   GSMBQ-01, Rapid [[id number]] Collected: 12/20/22 1739   Order Status: Completed Specimen: Nasopharyngeal Swab Updated: 12/20/22 1841    SARS-CoV-2, NAAT Not Detected    Comment: Rapid NAAT:   Negative results should be treated as presumptive and,   if inconsistent with clinical signs and symptoms or necessary for   patient management, should be tested with an alternative molecular   assay. Negative results do not preclude SARS-CoV-2 infection and   should not be used as the sole basis for patient management decisions.    This test has been authorized by the FDA under an Emergency Use   Authorization (EUA) for use by authorized laboratories. Fact sheet for Healthcare Providers:   http://www.divina.sheridan/   Fact sheet for Patients: http://www.divina.sheridan/     METHODOLOGY: Isothermal Nucleic Acid Amplification       Rapid influenza A/B antigens [[id number]] Collected: 12/20/22 1739   Order Status: Completed Specimen: Nasopharyngeal Updated: 12/20/22 1840    Rapid Influenza A Ag Negative    Rapid Influenza B Ag Negative     Procedure Component Value Units Date/Time   Culture, Tip [[id number]] (Abnormal) Collected: 12/23/22 1110   Order Status: Completed Specimen: Catheter Tip Updated: 12/25/22 0720    Organism Staphylococcus aureus Abnormal     Culture Catheter Tip --    >15 colonies   No further workup   Refer to culture [id number] for sensitivity results    Narrative:     ORDER#: [id number]                          ORDERED BY: Riccardo Padilla   SOURCE: Catheter Tip                       COLLECTED:  12/23/22 11:10   ANTIBIOTICS AT JOSE.:                      RECEIVED :  12/23/22 11:40   Culture, Blood 1 [[id number]] Collected: 12/20/22 2054   Order Status: Completed Specimen: Blood Updated: 12/24/22 2315    Blood Culture, Routine No Growth after 4 days of incubation. Narrative:     ORDER#: [id number]                          ORDERED BY: Zuly Scott   SOURCE: Blood                              COLLECTED:  12/20/22 20:54   ANTIBIOTICS AT JOSE.:                      RECEIVED :  12/20/22 20:59   If child <=2 yrs old please draw pediatric bottle. ~Blood Culture 1   Culture, Blood 2 [[id number]] Collected: 12/20/22 2054   Order Status: Completed Specimen: Blood Updated: 12/24/22 2315    Culture, Blood 2 No Growth after 4 days of incubation.    Narrative:     ORDER#: [id number]                          ORDERED BY: Zuly Scott   SOURCE: Blood                              COLLECTED:  12/20/22 20:54   ANTIBIOTICS AT JOSE.:                      RECEIVED :  12/20/22 21:01   If child <=2 yrs old please draw pediatric bottle. ~Blood Culture #2   Culture, Urine [[id number]] Collected: 12/20/22 1739   Order Status: Completed Specimen: Urine, clean catch Updated: 12/24/22 0720    Urine Culture, Routine <50,000 CFU/ml mixed skin/urogenital nakia. No further workup   Narrative:     ORDER#: [id number]                          ORDERED BY: Ellen    SOURCE: Urine Clean Catch                  COLLECTED:  12/20/22 17:39   ANTIBIOTICS AT JOSE.:                      RECEIVED :  12/23/22 05:06   Culture, Anaerobic and Aerobic [[id number]] (Abnormal)  Collected: 12/22/22 1215   Order Status: Completed Specimen: Cath Site Updated: 12/24/22 0516    Gram Stain Result 3+ Gram positive cocci   3+ WBC's (Polymorphonuclear) present   3+ WBC's (Mononuclear) present    Abnormal     Anaerobic Culture --    Anaerobic culture further report to follow   No anaerobes isolated so far, Further report to follow     Organism Staphylococcus aureus Abnormal     WOUND/ABSCESS --    Light growth   PBP2= Negative    Narrative:     ORDER#: [id number]                          ORDERED BY: Zeina Abdullahi   SOURCE: Catheter site dialysis right IJ tunCOLLECTED:  12/22/22 12:15   ANTIBIOTICS AT JOSE.:                      RECEIVED :  12/22/22 13:53   delayed results as dialysis rn didnt release order from epic 13:52     12/22/2022     dialysis right IJ tunneled catheter site   Culture, Blood 1 [[id number]] Collected: 12/22/22 1454   Order Status: Completed Specimen: Blood Updated: 12/23/22 1815    Blood Culture, Routine No Growth to date. Any change in status will be called. Narrative:     ORDER#: [id number]                          ORDERED BY: Macario Cartwright   SOURCE: Blood                              COLLECTED:  12/22/22 14:54   ANTIBIOTICS AT JOSE.:                      RECEIVED :  12/22/22 14:59   If child <=2 yrs old please draw pediatric bottle. ~Blood Culture 1   Culture, Blood 2 [[id number]] Collected: 12/22/22 1314 Order Status: Completed Specimen: Blood Updated: 12/23/22 1415    Culture, Blood 2 No Growth to date. Any change in status will be called. Narrative:     ORDER#: [id number]                          ORDERED BY: Hank Baeza   SOURCE: Blood                              COLLECTED:  12/22/22 13:14   ANTIBIOTICS AT JOSE.:                      RECEIVED :  12/22/22 13:48   If child <=2 yrs old please draw pediatric bottle. ~Blood Culture #2   Culture, Anaerobic and Aerobic [[id number]] Collected: 12/23/22 1110   Order Status: Canceled Specimen: Cath Tip from Catheter Tip    Culture, Blood 1 [[id number]] Collected: 12/22/22 0000   Order Status: Canceled Specimen: Blood    COVID-19, Rapid [[id number]] Collected: 12/20/22 1739   Order Status: Completed Specimen: Nasopharyngeal Swab Updated: 12/20/22 1841    SARS-CoV-2, NAAT Not Detected    Comment: Rapid NAAT:   Negative results should be treated as presumptive and,   if inconsistent with clinical signs and symptoms or necessary for   patient management, should be tested with an alternative molecular   assay. Negative results do not preclude SARS-CoV-2 infection and   should not be used as the sole basis for patient management decisions. This test has been authorized by the FDA under an Emergency Use   Authorization (EUA) for use by authorized laboratories. Fact sheet for Healthcare Providers:   http://www.divina.sheridan/   Fact sheet for Patients: http://www.diana-sarbjit.biz/     METHODOLOGY: Isothermal Nucleic Acid Amplification       Rapid influenza A/B antigens [[id number]] Collected: 12/20/22 1739   Order Status: Completed Specimen: Nasopharyngeal Updated: 12/20/22 1840    Rapid Influenza A Ag Negative    Rapid Influenza B Ag Negative     Blood Culture:   Lab Results   Component Value Date/Time    [facility]  12/22/2022 02:54 PM     No Growth to date. Any change in status will be called.     BLOODCULT2  12/22/2022 01:14 PM     No Growth to date.  Any change in status will be called. Viral Culture:    Lab Results   Component Value Date/Time    COVID19 Not Detected 12/20/2022 05:39 PM     Urine Culture: No results for input(s): Darcie Kim in the last 72 hours. Scheduled Meds:   metroNIDAZOLE  500 mg Oral 3 times per day    cefepime  1,000 mg IntraVENous Q24H    sodium chloride flush  10 mL IntraVENous 2 times per day    apixaban  2.5 mg Oral BID    aspirin  81 mg Oral Daily    [Held by provider] atenolol  25 mg Oral QPM    atorvastatin  10 mg Oral Nightly    [Held by provider] gabapentin  100 mg Oral TID    LORazepam  0.5 mg Oral BID    pantoprazole  20 mg Oral Nightly    sevelamer  800 mg Oral TID WC    tamsulosin  0.4 mg Oral QAM    docusate sodium  200 mg Oral BID       Continuous Infusions:   sodium chloride         PRN Meds:  sodium chloride flush, sodium chloride, promethazine **OR** ondansetron, acetaminophen **OR** acetaminophen, melatonin    Imaging:   IR REMOVE TUNNELED CVAD WO SQ PORT/PUMP   Final Result   Successful fluoroscopic guided non tunneled dialysis catheter placement. Successful removal of tunneled dialysis catheter. IR NON TUNNELED CATH WO PORT REPLACEMENT   Final Result   Successful fluoroscopic guided non tunneled dialysis catheter placement. Successful removal of tunneled dialysis catheter. MRI BRAIN WO CONTRAST   Final Result   Cerebral atrophy. Severe chronic small vessel ischemic changes. No areas of   restricted diffusion to suggest an acute ischemic event. VL DUP CAROTID BILATERAL   Final Result      CT ABDOMEN PELVIS WO CONTRAST Additional Contrast? None   Final Result   Mild wall thickening involving the distal descending colon through the rectum   with adjacent fat stranding suggestive of proctocolitis. Mild bladder wall thickening with perivesicular fat stranding suggestive of   cystitis. Correlation with urinalysis recommended.       Large stool ball within the rectum with adjacent inflammatory changes, as   above. Mild splenomegaly. Nonobstructive right nephrolithiasis. CT HEAD WO CONTRAST   Final Result   No acute intracranial abnormality. XR CHEST PORTABLE   Final Result   Stable cardiac enlargement with no acute findings. IR TUNNELED CVC PLACE WO SQ PORT/PUMP > 5 YEARS    (Results Pending)       All pertinent images and reports for the current Hospitalization were reviewed by me.     IMPRESSION:    Patient Active Problem List   Diagnosis    GI bleed    History of COVID-19    Hyponatremia    Fatigue    Acute kidney injury superimposed on CKD ([address]    Lethargy    Suspected UTI    Acute CVA (cerebrovascular accident) ([address]    LESLEE (acute kidney injury) ([address]    Malignant neoplasm of colon ([address]    Acute blood loss anemia    COVID-19    Shock circulatory ([address]    Bilateral pulmonary embolism (HCC)    Lactic acidosis    Hemorrhagic shock (HCC)    ESRD (end stage renal disease) on dialysis ([address]    Acute cholecystitis    Pain of upper abdomen    AMS (altered mental status)    Sepsis ([address]    Acute metabolic encephalopathy    Cellulitis    Fever and chills    PVD (peripheral vascular disease) (HCC)    Anxiety    Rectal bleeding    Severe malnutrition (HCC)    Acute encephalopathy    Acute cystitis without hematuria    Colitis    Elevated procalcitonin    Chronic anticoagulation    Hemodialysis catheter infection ([address]     Change in mentation   ESRD   HD line in place  Lactic acidosis  Procal elevation  CT ABD/PELVIS with distal colitis  Constipation   PVD  Cerebral atrophy on MRI  Crp elevation   UA abnormal   Chronic anticoagulation    HD catheter exit site infection with staph aureus on the cx    Staph aureus on the Tip cx but Blood cx negative      Blood cx in process on exam HD site cx with Staph aureus  - MSSA noted and like grimes Tip cx with MSSA but Blood cx remain negative and this could have led to [place] bacteremia due to HD catheter colonization and biofilm formation    HD line removed  promptly and has temp line in place    Current abx regimen will cover HD line infection and Colitis     Would be ok for HD line conversion tomorrow as Blood cx thus far negative        Labs, Microbiology, Radiology and pertinent results from current hospitalization and care every where were reviewed by me as a part of the consultation. PLAN :  Cont  IV Cefepime x 1 gm Q24 hrs for now will cover MSSA and GI process   Cont   oral flagyl   bLOOD CX IN PROCess NGTD  Urine cx mixed nakia  HD line tip cx +Ve   May be able to have new line placed tomorrow if no new issues     Discussed with patient/Family and Nursing   Risk of Complications/Morbidity: High      Illness(es)/ Infection present that pose threat to bodily function. There is potential for severe exacerbation of infection/side effects of treatment. Therapy requires intensive monitoring for antimicrobial agent toxicity. Thanks for allowing me to participate in your patient's care please call me with any questions or concerns.     Dr. Mary Kay Bishop MD  [address] [place] [address] Physician  Phone: [phone]   Fax : [phone]

## 2022-12-27 ENCOUNTER — APPOINTMENT (OUTPATIENT)
Dept: INTERVENTIONAL RADIOLOGY/VASCULAR | Age: 77
DRG: 314 | End: 2022-12-27
Payer: COMMERCIAL

## 2022-12-27 LAB
ANAEROBIC CULTURE: ABNORMAL
EKG ATRIAL RATE: 83 BPM
EKG DIAGNOSIS: NORMAL
EKG P AXIS: 54 DEGREES
EKG P-R INTERVAL: 214 MS
EKG Q-T INTERVAL: 368 MS
EKG QRS DURATION: 74 MS
EKG QTC CALCULATION (BAZETT): 432 MS
EKG R AXIS: -6 DEGREES
EKG T AXIS: 15 DEGREES
EKG VENTRICULAR RATE: 83 BPM
GLUCOSE BLD-MCNC: 102 MG/DL (ref 70–99)
GLUCOSE BLD-MCNC: 110 MG/DL (ref 70–99)
GLUCOSE BLD-MCNC: 131 MG/DL (ref 70–99)
GRAM STAIN RESULT: ABNORMAL
INR BLD: 1.44 (ref 0.87–1.14)
ORGANISM: ABNORMAL
PERFORMED ON: ABNORMAL
PROTHROMBIN TIME: 17.5 SEC (ref 11.7–14.5)
WOUND/ABSCESS: ABNORMAL

## 2022-12-27 PROCEDURE — 94760 N-INVAS EAR/PLS OXIMETRY 1: CPT

## 2022-12-27 PROCEDURE — 6360000002 HC RX W HCPCS: Performed by: INTERNAL MEDICINE

## 2022-12-27 PROCEDURE — 2580000003 HC RX 258: Performed by: INTERNAL MEDICINE

## 2022-12-27 PROCEDURE — 85610 PROTHROMBIN TIME: CPT

## 2022-12-27 PROCEDURE — 36415 COLL VENOUS BLD VENIPUNCTURE: CPT

## 2022-12-27 PROCEDURE — 1200000000 HC SEMI PRIVATE

## 2022-12-27 PROCEDURE — 99232 SBSQ HOSP IP/OBS MODERATE 35: CPT | Performed by: INTERNAL MEDICINE

## 2022-12-27 PROCEDURE — 6370000000 HC RX 637 (ALT 250 FOR IP): Performed by: INTERNAL MEDICINE

## 2022-12-27 RX ADMIN — METRONIDAZOLE 500 MG: 500 TABLET ORAL at 14:49

## 2022-12-27 RX ADMIN — ASPIRIN 81 MG 81 MG: 81 TABLET ORAL at 14:49

## 2022-12-27 RX ADMIN — SEVELAMER CARBONATE 800 MG: 800 TABLET, FILM COATED ORAL at 17:10

## 2022-12-27 RX ADMIN — SEVELAMER CARBONATE 800 MG: 800 TABLET, FILM COATED ORAL at 07:59

## 2022-12-27 RX ADMIN — EPOETIN ALFA-EPBX 10000 UNITS: 10000 INJECTION, SOLUTION INTRAVENOUS; SUBCUTANEOUS at 13:52

## 2022-12-27 RX ADMIN — DOCUSATE SODIUM 200 MG: 100 CAPSULE, LIQUID FILLED ORAL at 21:32

## 2022-12-27 RX ADMIN — DOCUSATE SODIUM 200 MG: 100 CAPSULE, LIQUID FILLED ORAL at 07:59

## 2022-12-27 RX ADMIN — CEFEPIME 1000 MG: 1 INJECTION, POWDER, FOR SOLUTION INTRAMUSCULAR; INTRAVENOUS at 21:41

## 2022-12-27 RX ADMIN — LORAZEPAM 0.5 MG: 0.5 TABLET ORAL at 07:59

## 2022-12-27 RX ADMIN — SODIUM CHLORIDE, PRESERVATIVE FREE 10 ML: 5 INJECTION INTRAVENOUS at 08:00

## 2022-12-27 RX ADMIN — SODIUM CHLORIDE, PRESERVATIVE FREE 10 ML: 5 INJECTION INTRAVENOUS at 21:41

## 2022-12-27 RX ADMIN — SEVELAMER CARBONATE 800 MG: 800 TABLET, FILM COATED ORAL at 14:49

## 2022-12-27 RX ADMIN — Medication 6 MG: at 21:32

## 2022-12-27 RX ADMIN — METRONIDAZOLE 500 MG: 500 TABLET ORAL at 21:32

## 2022-12-27 RX ADMIN — ATORVASTATIN CALCIUM 10 MG: 10 TABLET, FILM COATED ORAL at 21:32

## 2022-12-27 RX ADMIN — TAMSULOSIN HYDROCHLORIDE 0.4 MG: 0.4 CAPSULE ORAL at 07:59

## 2022-12-27 RX ADMIN — METRONIDAZOLE 500 MG: 500 TABLET ORAL at 05:13

## 2022-12-27 RX ADMIN — LORAZEPAM 0.5 MG: 0.5 TABLET ORAL at 21:32

## 2022-12-27 RX ADMIN — PANTOPRAZOLE SODIUM 20 MG: 20 TABLET, DELAYED RELEASE ORAL at 21:32

## 2022-12-27 ASSESSMENT — PAIN SCALES - GENERAL: PAINLEVEL_OUTOF10: 0

## 2022-12-27 NOTE — PLAN OF CARE
Problem: Discharge Planning  Goal: Discharge to home or other facility with appropriate resources  Outcome: Progressing  Flowsheets (Taken 12/27/2022 0800)  Discharge to home or other facility with appropriate resources:   Identify barriers to discharge with patient and caregiver   Arrange for needed discharge resources and transportation as appropriate   Identify discharge learning needs (meds, wound care, etc)     Problem: Safety - Adult  Goal: Free from fall injury  Outcome: Progressing  Flowsheets (Taken 12/27/2022 0800)  Free From Fall Injury: Instruct family/caregiver on patient safety     Problem: ABCDS Injury Assessment  Goal: Absence of physical injury  Outcome: Progressing  Flowsheets (Taken 12/27/2022 0800)  Absence of Physical Injury: Implement safety measures based on patient assessment     Problem: Skin/Tissue Integrity  Goal: Absence of new skin breakdown  Description: 1. Monitor for areas of redness and/or skin breakdown  2. Assess vascular access sites hourly  3. Every 4-6 hours minimum:  Change oxygen saturation probe site  4. Every 4-6 hours:  If on nasal continuous positive airway pressure, respiratory therapy assess nares and determine need for appliance change or resting period.   Outcome: Progressing     Problem: Chronic Conditions and Co-morbidities  Goal: Patient's chronic conditions and co-morbidity symptoms are monitored and maintained or improved  Outcome: Progressing  Flowsheets (Taken 12/27/2022 0800)  Care Plan - Patient's Chronic Conditions and Co-Morbidity Symptoms are Monitored and Maintained or Improved:   Monitor and assess patient's chronic conditions and comorbid symptoms for stability, deterioration, or improvement   Collaborate with multidisciplinary team to address chronic and comorbid conditions and prevent exacerbation or deterioration   Update acute care plan with appropriate goals if chronic or comorbid symptoms are exacerbated and prevent overall improvement and discharge     Problem: Pain  Goal: Verbalizes/displays adequate comfort level or baseline comfort level  Outcome: Progressing

## 2022-12-27 NOTE — PROGRESS NOTES
Hospitalist Progress Note  12/27/2022 9:21 AM    PCP: Sarina Damon    [id number]     Date of Admission: 12/20/2022                                                                                                                     HOSPITAL COURSE    Patient demographics:  The patient  Wade Esposito is a 69 y.o. male     Significant past medical history:   Patient Active Problem List   Diagnosis    GI bleed    History of COVID-19    Hyponatremia    Fatigue    Acute kidney injury superimposed on CKD (HCC)    Lethargy    Suspected UTI    Acute CVA (cerebrovascular accident) ([address]    LESLEE (acute kidney injury) ([address]    Malignant neoplasm of colon ([address]    Acute blood loss anemia    COVID-19    Shock circulatory (HCC)    Bilateral pulmonary embolism (HCC)    Lactic acidosis    Hemorrhagic shock (HCC)    ESRD (end stage renal disease) on dialysis ([address]    Acute cholecystitis    Pain of upper abdomen    AMS (altered mental status)    Sepsis ([address]    Acute metabolic encephalopathy    Cellulitis    Fever and chills    PVD (peripheral vascular disease) ([facility])    Anxiety    Rectal bleeding    Severe malnutrition ([facility])    Acute encephalopathy    Acute cystitis without hematuria    Colitis    Elevated procalcitonin    Chronic anticoagulation    Hemodialysis catheter infection ([address]         Presenting symptoms:  Fatigue     Diagnostic workup:      CONSULTS DURING ADMISSION :   IP CONSULT TO NEPHROLOGY  IP CONSULT TO INFECTIOUS DISEASES      Patient was diagnosed with:  Acute encephalopathy  End-stage renal disease on hemodialysis  Tunneled catheter infection  Constipation  Proctocolitis  Type II MI    Treatment while inpatient:  Pt presented as a transfer from [facility] due to acute encephalopathy                                                                                       ----------------------------------------------------------      SUBJECTIVE COMPLAINTS- Fatigue     Diet: ADULT DIET;  Regular; 3 carb choices (45 gm/meal); Low Fat/Low Chol/High Fiber/2 gm Na;  Low Sodium (2 gm)      OBJECTIVE:   Patient Active Problem List   Diagnosis    GI bleed    History of COVID-19    Hyponatremia    Fatigue    Acute kidney injury superimposed on CKD ([facility]    Lethargy    Suspected UTI    Acute CVA (cerebrovascular accident) ([facility]    LESLEE (acute kidney injury) ([facility]    Malignant neoplasm of colon ([facility]    Acute blood loss anemia    COVID-19    Shock circulatory ([facility]    Bilateral pulmonary embolism (HCC)    Lactic acidosis    Hemorrhagic shock (HCC)    ESRD (end stage renal disease) on dialysis ([facility]    Acute cholecystitis    Pain of upper abdomen    AMS (altered mental status)    Sepsis ([facility]    Acute metabolic encephalopathy    Cellulitis    Fever and chills    PVD (peripheral vascular disease) (HCC)    Anxiety    Rectal bleeding    Severe malnutrition (HCC)    Acute encephalopathy    Acute cystitis without hematuria    Colitis    Elevated procalcitonin    Chronic anticoagulation    Hemodialysis catheter infection (HCC)       Allergies  Lisinopril    Medications    Scheduled Meds:   epoetin davis-epbx  10,000 Units IntraVENous Once per day on Tue Thu Sat    metroNIDAZOLE  500 mg Oral 3 times per day    cefepime  1,000 mg IntraVENous Q24H    sodium chloride flush  10 mL IntraVENous 2 times per day    apixaban  2.5 mg Oral BID    aspirin  81 mg Oral Daily    [Held by provider] atenolol  25 mg Oral QPM    atorvastatin  10 mg Oral Nightly    [Held by provider] gabapentin  100 mg Oral TID    LORazepam  0.5 mg Oral BID    pantoprazole  20 mg Oral Nightly    sevelamer  800 mg Oral TID WC    tamsulosin  0.4 mg Oral QAM    docusate sodium  200 mg Oral BID     Continuous Infusions:   sodium chloride       PRN Meds:  sodium chloride flush, sodium chloride, promethazine **OR** ondansetron, acetaminophen **OR** acetaminophen, melatonin    Vitals   Vitals /wt Patient Vitals for the past 8 hrs:   BP Temp Temp src Pulse Resp SpO2 Weight   12/27/22 0735 127/63 97.4 °F (36.3 °C) Oral 68 -- 96 % --   12/27/22 0539 (!) 157/69 97.7 °F (36.5 °C) Oral 75 16 97 % 220 lb 14.4 oz (100.2 kg)        72HR INTAKE/OUTPUT:    Intake/Output Summary (Last 24 hours) at 12/27/2022 0289  Last data filed at 12/26/2022 1957  Gross per 24 hour   Intake 675.87 ml   Output --   Net 675.87 ml       Exam:    Gen:   Alert and oriented ×3    Eyes: PERRL. No sclera icterus. No conjunctival injection. ENT: No discharge. Pharynx clear. External appearance of ears and nose normal.  Neck: Trachea midline. No obvious mass. Resp: No accessory muscle use. No crackles. No wheezes. No rhonchi. CV: Regular rate. Regular rhythm. No murmur or rub. No edema. GI: Non-tender. Non-distended. No hernia. Skin: Warm, dry, normal texture and turgor. Lymph: No cervical LAD. No supraclavicular LAD. M/S: / Ext. No cyanosis. No clubbing. No joint deformity. Neuro: CN 2-12 are intact,  no neurologic deficits noted. PT/INR: No results for input(s): PROTIME, INR in the last 72 hours. APTT: No results for input(s): APTT in the last 72 hours. CBC:   Recent Labs     12/24/22  0936 12/26/22  0759   WBC 2.7* 3.1*   HGB 8.6* 9.1*   HCT 26.7* 27.5*   MCV 99.7 98.5    188       BMP:   Recent Labs     12/24/22  0936 12/26/22  0759   * 141   K 3.8 3.8   CL 97* 103   CO2 23 25   PHOS 3.5 3.5   BUN 37* 30*   CREATININE 4.3* 4.3*       LIVER PROFILE:   No results for input(s): ALKPHOS, AST, ALT, ALB, BILIDIR, BILITOT, ALKPHOS in the last 72 hours. No results for input(s): AMYLASE in the last 72 hours. No results for input(s): LIPASE in the last 72 hours. UA:  No results for input(s): NITRITE, LABCAST, WBCUA, RBCUA, MUCUS in the last 72 hours. TROPONIN:   No results for input(s): Brittni Gazella in the last 72 hours. Lab Results   Component Value Date/Time    URRFLXCULT Yes 10/20/2022 10:52 AM       No results for input(s): TSHREFLEX in the last 72 hours.       No components found for: [id number]  POC GLUCOSE:    Recent Labs     12/26/22  0732 12/26/22  1149 12/26/22  1624 12/26/22 2001 12/27/22  0737   POCGLU 97 145* 147* 174* 131*     No results for input(s): LABA1C in the last 72 hours. Lab Results   Component Value Date/Time    LABA1C 6.1 01/15/2022 05:31 AM      Ejection fraction is visually estimated to be 55-60%(2/12/2022)    ASSESSMENT AND PLAN     Acute encephalopathy  Likely septic and metabolic combined  Mental status improved to baseline       End-stage renal disease on hemodialysis  Hemodialysis done through temporary dialysis catheter  Next HD in am     Tunneled catheter infection  Tunneled catheter has been removed  Temporary catheter is in place  Continue on cefepime  Line placement on wednesday     Constipation  Treat with MiraLAX  Avoid fleets enema and milk of magnesia since patient is on hemodialysis     Proctocolitis  Continue patient on Flagyl           Type II MI  Likely demand ischemia  Patient has no evidence of injury pattern on EKG  Conservative management for now        Code Status: Full Code        Dispo -patient to be placed in long-term care        The patient and / or the family were informed of the results of any tests, a time was given to answer questions, a plan was proposed and they agreed with plan. Tal Schuler MD    This note was transcribed using [id number] [employer]. Please disregard any translational errors.

## 2022-12-27 NOTE — PROGRESS NOTES
Patient recently returned from Dialysis. Unable to add [facility] to this afternoon, rescheduled for tomorrow at 0830. NPO after midnight.      Electronically signed by Yvette Mcnair RN on 12/27/2022 at 3:03 PM

## 2022-12-27 NOTE — DIALYSIS
Treatment time: 3.5 hours  Net UF: 2300 ml     Pre weight: Unable to obtain kg  Post weight: Floor to weigh once bed is zeroed kg  EDW: 85 kg    Access used: R IJ Vascath    Access function: Well with  ml/min     Medications or blood products given: Retacrit 10,000 units     Regular outpatient schedule: TTS [address]     Summary of response to treatment: Patient tolerated treatment well and without any complications. Patient remained stable throughout entire treatment and upon exiting the dialysis suite via transport. Report given to Amira Daigle RN and copy of dialysis treatment record placed in chart, to be scanned into EMR.

## 2022-12-27 NOTE — PLAN OF CARE
Problem: Discharge Planning  Goal: Discharge to home or other facility with appropriate resources  Outcome: Progressing  Flowsheets (Taken 12/26/2022 0900)  Discharge to home or other facility with appropriate resources:   Identify barriers to discharge with patient and caregiver   Arrange for needed discharge resources and transportation as appropriate   Identify discharge learning needs (meds, wound care, etc)     Problem: Safety - Adult  Goal: Free from fall injury  Outcome: Progressing  Flowsheets (Taken 12/26/2022 0900)  Free From Fall Injury: Instruct family/caregiver on patient safety     Problem: ABCDS Injury Assessment  Goal: Absence of physical injury  Outcome: Progressing  Flowsheets (Taken 12/26/2022 0900)  Absence of Physical Injury: Implement safety measures based on patient assessment     Problem: Skin/Tissue Integrity  Goal: Absence of new skin breakdown  Description: 1. Monitor for areas of redness and/or skin breakdown  2. Assess vascular access sites hourly  3. Every 4-6 hours minimum:  Change oxygen saturation probe site  4. Every 4-6 hours:  If on nasal continuous positive airway pressure, respiratory therapy assess nares and determine need for appliance change or resting period.   Outcome: Progressing     Problem: Chronic Conditions and Co-morbidities  Goal: Patient's chronic conditions and co-morbidity symptoms are monitored and maintained or improved  Outcome: Progressing  Flowsheets (Taken 12/26/2022 0900)  Care Plan - Patient's Chronic Conditions and Co-Morbidity Symptoms are Monitored and Maintained or Improved:   Monitor and assess patient's chronic conditions and comorbid symptoms for stability, deterioration, or improvement   Update acute care plan with appropriate goals if chronic or comorbid symptoms are exacerbated and prevent overall improvement and discharge   Collaborate with multidisciplinary team to address chronic and comorbid conditions and prevent exacerbation or deterioration     Problem: Pain  Goal: Verbalizes/displays adequate comfort level or baseline comfort level  Outcome: Progressing  Flowsheets (Taken 12/26/2022 3021)  Verbalizes/displays adequate comfort level or baseline comfort level:   Encourage patient to monitor pain and request assistance   Assess pain using appropriate pain scale   Administer analgesics based on type and severity of pain and evaluate response   Implement non-pharmacological measures as appropriate and evaluate response   Consider cultural and social influences on pain and pain management   Notify Licensed Independent Practitioner if interventions unsuccessful or patient reports new pain

## 2022-12-27 NOTE — PROGRESS NOTES
Infectious Diseases Inpatient Progress  Note          CHIEF COMPLAINT:     HD line catheter exit site infection  Staph aureus on the cx  Tip cx Staph aureus  ESRD on HD  Colitis        HISTORY OF PRESENT ILLNESS:  69 y.o. man with a significant history of potential renal disease on hemodialysis, colon cancer, bowel surgery, hypertension, stroke, diabetes admitted to the hospital secondary to change in mental status and possibly some shortness of breath. Patient is unable to provide any detailed history. Currently seen in dialysis. HD catheter site looks okay dressing intact. Noted to have lactic acidosis on admission with elevated procalcitonin. Blood culture in process. CRP elevated 132.4, MRI of the brain indicated cerebral atrophy, CT abdomen repeated colon and rectum with acute colitis, also large stool ball in the rectum concerning for constipation. Urinalysis abnormal urine culture will be requested.   We are consulted for recommendations      Interval  History:  Blood cx  -ve and previous TDC site wound cx MSSA noted and now chest site looks ok no diarrhea no vomiting  -      Past Medical History:    Past Medical History:   Diagnosis Date    Anemia 03/2022    Arthritis     CAD (coronary artery disease) 01/2020    Cancer ([facility]     Colon Cancer diagnosed last month    Cerebral infarction [facility])     Cognitive communication deficit     COVID-19     Diabetes mellitus ([facility]     Dysphagia     End stage renal disease ([facility]     Fatigue     Gastrointestinal hemorrhage     Hemodialysis patient ([facility] 2019    ckd-goes to dialysis Tuesday, Thurs, Fri    Hx of blood clots     Hyperlipidemia     Hypertension     Hyponatremia     Risk for falls     Splenomegaly 02/10/2021       Past Surgical History:    Past Surgical History:   Procedure Laterality Date    CARDIAC CATHETERIZATION  2019    CHOLECYSTECTOMY, LAPAROSCOPIC N/A 5/16/2022    LAPAROSCOPIC CHOLECYSTECTOMY WITH CHOLANGIOGRAM performed by Brendan Morgan Ruchi Concepcion MD at [address] [facility] N/A 01/26/2022    SIGMOID COLECTOMY, SIGMOIDOSCOPY performed by Radha Greene MD at [facility] 18 Left 4/29/2022    LEFT BRACHIAL CEPHALIC FISTULA performed by Aldo Luis MD at [facility] 91    ERCP N/A 5/16/2022    ERCP SPHINCTER/PAPILLOTOMY performed by Rishabh Hutchinson MD at [address] [place] [address]    ERCP N/A 5/16/2022    ERCP STONE REMOVAL/BALLOON SWEEP performed by Rishabh Hutchinson MD at [facility]  02/19/2022    IR EMBOLIZATION HEMORRHAGE 2/19/2022 [facility]     [facility]por[facility] Dr Duncan     unsure of date    IR TUNNELED CATHETER PLACEMENT GREATER THAN 5 YEARS  2/21/2022    IR TUNNELED CATHETER PLACEMENT GREATER THAN 5 YEARS 2/21/2022 [facility]    SIGMOIDOSCOPY N/A 02/17/2022    SIGMOIDOSCOPY CONTROL HEMORRHAGE performed by Marshall Arciniega MD at [facility] [address] Right 02/21/2022    Permacath; RIJ access; 23cm; Dr. Bran Goodwin  5/16/2022    ERCP POLYP SNARE performed by Rishabh Hutchinson MD at [address] [place] [address]       Current Medications:    Outpatient Medications Marked as Taking for the 12/20/22 encounter [facility] Encounter)   Medication Sig Dispense Refill    oxyCODONE (ROXICODONE) 5 MG immediate release tablet Take 5 mg by mouth every 4 hours as needed for Pain.       ondansetron (ZOFRAN) 4 MG/2ML injection Infuse 4 mg intravenously every 6 hours as needed for Nausea or Vomiting         Allergies:  Lisinopril    Immunizations :   Immunization History   Administered Date(s) Administered    COVID-19, MODERNA BLUE border, Primary or Immunocompromised, (age 12y+), IM, 100 mcg/0.5mL 03/04/2021, 04/02/2021         Social History:    Social History     Tobacco Use    Smoking status: Former    Smokeless tobacco: Never   Vaping Use    Vaping Use: Never used   Substance Use Topics    Alcohol use: Not Currently    Drug use: Never     Social History     Tobacco Use   Smoking Status Former   Smokeless Tobacco Never      Family History   Problem Relation Age of Onset    High Blood Pressure Father          REVIEW OF SYSTEMS:     NOT POSSIBLE DUE TO CHANGE IN MENTATION     PHYSICAL EXAM:      Vitals:    /63   Pulse 68   Temp 97.4 °F (36.3 °C) (Oral)   Resp 16   Ht 6' (1.829 m)   Wt 220 lb 14.4 oz (100.2 kg)   SpO2 96%   BMI 29.96 kg/m²     General Appearance: alert,in some  acute distress, ++ pallor, no icterus   Skin: warm and dry, no rash or erythema  Head: normocephalic and atraumatic  Eyes: pupils equal, round, and reactive to light, conjunctivae normal  ENT: tympanic membrane, external ear and ear canal normal bilaterally, nose without deformity, nasal mucosa and turbinates normal without polyps  Neck: supple and non-tender without mass, no thyromegaly  no cervical lymphadenopathy  Pulmonary/Chest: clear to auscultation bilaterally- no wheezes, rales or rhonchi, normal air movement, no respiratory distress  Cardiovascular: normal rate, regular rhythm, normal S1 and S2, no murmurs, rubs, clicks, or gallops, no carotid bruits  Abdomen: soft, + -tender, non-distended, normal bowel sounds, no masses or organomegaly  Extremities: no cyanosis, clubbing or edema  Musculoskeletal: normal range of motion, no joint swelling, deformity or tenderness  Integumentary: No rashes, no abnormal skin lesions, no petechiae  Neurologic: reflexes normal and symmetric, no cranial nerve defici   Lines: HD line + temp line placed by IR      DATA:    CBC:   Lab Results   Component Value Date    WBC 3.1 (L) 12/26/2022    HGB 9.1 (L) 12/26/2022    HCT 27.5 (L) 12/26/2022    MCV 98.5 12/26/2022     12/26/2022     RENAL:   Lab Results   Component Value Date    CREATININE 4.3 (H) 12/26/2022    BUN 30 (H) 12/26/2022     12/26/2022    K 3.8 12/26/2022     12/26/2022    CO2 25 12/26/2022     SED RATE:   Lab Results Component Value Date/Time    SEDRATE 24 07/26/2011 05:00 AM     CK:   Lab Results   Component Value Date/Time    CKTOTAL 14 12/21/2022 07:04 AM     CRP:   Lab Results   Component Value Date/Time    .4 12/21/2022 10:40 PM     Hepatic Function Panel:   Lab Results   Component Value Date/Time    ALKPHOS 67 12/22/2022 10:20 AM    ALT 6 12/22/2022 10:20 AM    AST 7 12/22/2022 10:20 AM    PROT 5.8 12/22/2022 10:20 AM    PROT 7.5 07/26/2011 05:00 AM    BILITOT 0.3 12/22/2022 10:20 AM    BILIDIR <0.2 02/12/2022 12:24 AM    IBILI see below 02/12/2022 12:24 AM    LABALBU 3.0 12/26/2022 07:59 AM     UA:  Lab Results   Component Value Date/Time    COLORU DARK YELLOW 12/20/2022 05:39 PM    CLARITYU CLOUDY 12/20/2022 05:39 PM    GLUCOSEU Negative 12/20/2022 05:39 PM    BILIRUBINUR Negative 12/20/2022 05:39 PM    KETUA Negative 12/20/2022 05:39 PM    SPECGRAV 1.020 12/20/2022 05:39 PM    BLOODU LARGE 12/20/2022 05:39 PM    PHUR 7.0 12/20/2022 05:39 PM    PHUR 7.0 12/20/2022 05:39 PM    PROTEINU 100 12/20/2022 05:39 PM    UROBILINOGEN 0.2 12/20/2022 05:39 PM    NITRU Negative 12/20/2022 05:39 PM    LEUKOCYTESUR LARGE 12/20/2022 05:39 PM    LABMICR YES 12/20/2022 05:39 PM    URINETYPE Cleancatch 12/20/2022 05:39 PM      Urine Microscopic:   Lab Results   Component Value Date/Time    BACTERIA 1+ 12/20/2022 05:39 PM    COMU see below 12/20/2022 05:39 PM    HYALCAST 12 10/20/2022 10:52 AM    WBCUA  12/20/2022 05:39 PM    RBCUA 21-50 12/20/2022 05:39 PM    EPIU 0-1 12/20/2022 05:39 PM     Urine Reflex to Culture:   Lab Results   Component Value Date/Time    URRFLXCULT Yes 10/20/2022 10:52 AM       Lactic acid  3.3       Procal  5.41       CRP  132.4     MICRO: cultures reviewed and updated by me     Procedure Component Value Units Date/Time   Culture, Blood 1 [[id number]] Collected: 12/22/22 1430   Order Status: No result    Culture, Anaerobic and Aerobic [[id number]] Collected: 12/22/22 1215   Order Status: No result Updated: 12/22/22 1353   Culture, Blood 2 [[id number]] Collected: 12/22/22 1314   Order Status: Sent Specimen: Blood Updated: 12/22/22 1349   Culture, Anaerobic and Aerobic [[id number]]    Order Status: No result Specimen: catheter site    Culture, Blood 1 [[id number]] Collected: 12/22/22 0000   Order Status: Canceled Specimen: Blood    Culture, Blood 1 [[id number]] Collected: 12/20/22 2054   Order Status: Completed Specimen: Blood Updated: 12/21/22 2315    Blood Culture, Routine No Growth to date. Any change in status will be called. Narrative:     ORDER#: [id number]                          ORDERED BY: Bob Tejada   SOURCE: Blood                              COLLECTED:  12/20/22 20:54   ANTIBIOTICS AT JOSE.:                      RECEIVED :  12/20/22 20:59   If child <=2 yrs old please draw pediatric bottle. ~Blood Culture 1   Culture, Blood 2 [[id number]] Collected: 12/20/22 2054   Order Status: Completed Specimen: Blood Updated: 12/21/22 2315    Culture, Blood 2 No Growth to date. Any change in status will be called. Narrative:     ORDER#: [id number]                          ORDERED BY: Bob Tejada   SOURCE: Blood                              COLLECTED:  12/20/22 20:54   ANTIBIOTICS AT JOSE.:                      RECEIVED :  12/20/22 21:01   If child <=2 yrs old please draw pediatric bottle. ~Blood Culture #2   CTVIE-66, Rapid [[id number]] Collected: 12/20/22 1739   Order Status: Completed Specimen: Nasopharyngeal Swab Updated: 12/20/22 1841    SARS-CoV-2, NAAT Not Detected    Comment: Rapid NAAT:   Negative results should be treated as presumptive and,   if inconsistent with clinical signs and symptoms or necessary for   patient management, should be tested with an alternative molecular   assay. Negative results do not preclude SARS-CoV-2 infection and   should not be used as the sole basis for patient management decisions.    This test has been authorized by the FDA under an Emergency Use Authorization (EUA) for use by authorized laboratories. Fact sheet for Healthcare Providers:   http://www.divina.sheridan/   Fact sheet for Patients: http://www.divina.sheridan/     METHODOLOGY: Isothermal Nucleic Acid Amplification       Rapid influenza A/B antigens [[id number]] Collected: 12/20/22 1739   Order Status: Completed Specimen: Nasopharyngeal Updated: 12/20/22 1840    Rapid Influenza A Ag Negative    Rapid Influenza B Ag Negative     Procedure Component Value Units Date/Time   Culture, Tip [[id number]] (Abnormal) Collected: 12/23/22 1110   Order Status: Completed Specimen: Catheter Tip Updated: 12/25/22 0720    Organism Staphylococcus aureus Abnormal     Culture Catheter Tip --    >15 colonies   No further workup   Refer to culture [id number] for sensitivity results    Narrative:     ORDER#: [id number]                          ORDERED BY: Yovany Ignacio   SOURCE: Catheter Tip                       COLLECTED:  12/23/22 11:10   ANTIBIOTICS AT JOSE.:                      RECEIVED :  12/23/22 11:40   Culture, Blood 1 [[id number]] Collected: 12/20/22 2054   Order Status: Completed Specimen: Blood Updated: 12/24/22 2315    Blood Culture, Routine No Growth after 4 days of incubation. Narrative:     ORDER#: [id number]                          ORDERED BY: Luis Fernando Martinez   SOURCE: Blood                              COLLECTED:  12/20/22 20:54   ANTIBIOTICS AT JOSE.:                      RECEIVED :  12/20/22 20:59   If child <=2 yrs old please draw pediatric bottle. ~Blood Culture 1   Culture, Blood 2 [[id number]] Collected: 12/20/22 2054   Order Status: Completed Specimen: Blood Updated: 12/24/22 2315    Culture, Blood 2 No Growth after 4 days of incubation.    Narrative:     ORDER#: [id number]                          ORDERED BY: Luis Fernando Martinez   SOURCE: Blood                              COLLECTED:  12/20/22 20:54   ANTIBIOTICS AT JOSE.:                      RECEIVED : 12/20/22 21:01   If child <=2 yrs old please draw pediatric bottle. ~Blood Culture #2   Culture, Urine [[id number]] Collected: 12/20/22 1739   Order Status: Completed Specimen: Urine, clean catch Updated: 12/24/22 0720    Urine Culture, Routine <50,000 CFU/ml mixed skin/urogenital nakia. No further workup   Narrative:     ORDER#: [id number]                          ORDERED BY: Georgia Cordoba   SOURCE: Urine Clean Catch                  COLLECTED:  12/20/22 17:39   ANTIBIOTICS AT JOSE.:                      RECEIVED :  12/23/22 05:06   Culture, Anaerobic and Aerobic [[id number]] (Abnormal)  Collected: 12/22/22 1215   Order Status: Completed Specimen: Cath Site Updated: 12/24/22 0516    Gram Stain Result 3+ Gram positive cocci   3+ WBC's (Polymorphonuclear) present   3+ WBC's (Mononuclear) present    Abnormal     Anaerobic Culture --    Anaerobic culture further report to follow   No anaerobes isolated so far, Further report to follow     Organism Staphylococcus aureus Abnormal     WOUND/ABSCESS --    Light growth   PBP2= Negative    Narrative:     ORDER#: [id number]                          ORDERED BY: Joycelyn Bee   SOURCE: Catheter site dialysis right IJ tunCOLLECTED:  12/22/22 12:15   ANTIBIOTICS AT JOSE.:                      RECEIVED :  12/22/22 13:53   delayed results as dialysis rn didnt release order from epic 13:52     12/22/2022     dialysis right IJ tunneled catheter site   Culture, Blood 1 [[id number]] Collected: 12/22/22 1454   Order Status: Completed Specimen: Blood Updated: 12/23/22 1815    Blood Culture, Routine No Growth to date. Any change in status will be called. Narrative:     ORDER#: [id number]                          ORDERED BY: Rosina Mora   SOURCE: Blood                              COLLECTED:  12/22/22 14:54   ANTIBIOTICS AT JOSE.:                      RECEIVED :  12/22/22 14:59   If child <=2 yrs old please draw pediatric bottle. ~Blood Culture 1   Culture, Blood 2 [[id number]] Collected: 12/22/22 1314   Order Status: Completed Specimen: Blood Updated: 12/23/22 1415    Culture, Blood 2 No Growth to date. Any change in status will be called. Narrative:     ORDER#: [id number]                          ORDERED BY: Umesh Patel   SOURCE: Blood                              COLLECTED:  12/22/22 13:14   ANTIBIOTICS AT JOSE.:                      RECEIVED :  12/22/22 13:48   If child <=2 yrs old please draw pediatric bottle. ~Blood Culture #2   Culture, Anaerobic and Aerobic [[id number]] Collected: 12/23/22 1110   Order Status: Canceled Specimen: Cath Tip from Catheter Tip    Culture, Blood 1 [[id number]] Collected: 12/22/22 0000   Order Status: Canceled Specimen: Blood    COVID-19, Rapid [[id number]] Collected: 12/20/22 1739   Order Status: Completed Specimen: Nasopharyngeal Swab Updated: 12/20/22 1841    SARS-CoV-2, NAAT Not Detected    Comment: Rapid NAAT:   Negative results should be treated as presumptive and,   if inconsistent with clinical signs and symptoms or necessary for   patient management, should be tested with an alternative molecular   assay. Negative results do not preclude SARS-CoV-2 infection and   should not be used as the sole basis for patient management decisions. This test has been authorized by the FDA under an Emergency Use   Authorization (EUA) for use by authorized laboratories. Fact sheet for Healthcare Providers:   http://www.divina.sheridan/   Fact sheet for Patients: http://www.diana-sarbjit.biz/     METHODOLOGY: Isothermal Nucleic Acid Amplification       Rapid influenza A/B antigens [[id number]] Collected: 12/20/22 1739   Order Status: Completed Specimen: Nasopharyngeal Updated: 12/20/22 1840    Rapid Influenza A Ag Negative    Rapid Influenza B Ag Negative     Blood Culture:   Lab Results   Component Value Date/Time    BC No Growth after 4 days of incubation. 12/22/2022 02:54 PM    BLOODCULT2 No Growth after 4 days of incubation. 12/22/2022 01:14 PM       Viral Culture:    Lab Results   Component Value Date/Time    COVID19 Not Detected 12/20/2022 05:39 PM     Urine Culture: No results for input(s): LABURIN in the last 72 hours. Scheduled Meds:   epoetin davis-epbx  10,000 Units IntraVENous Once per day on Tue Thu Sat    metroNIDAZOLE  500 mg Oral 3 times per day    cefepime  1,000 mg IntraVENous Q24H    sodium chloride flush  10 mL IntraVENous 2 times per day    apixaban  2.5 mg Oral BID    aspirin  81 mg Oral Daily    [Held by provider] atenolol  25 mg Oral QPM    atorvastatin  10 mg Oral Nightly    [Held by provider] gabapentin  100 mg Oral TID    LORazepam  0.5 mg Oral BID    pantoprazole  20 mg Oral Nightly    sevelamer  800 mg Oral TID WC    tamsulosin  0.4 mg Oral QAM    docusate sodium  200 mg Oral BID       Continuous Infusions:   sodium chloride         PRN Meds:  sodium chloride flush, sodium chloride, promethazine **OR** ondansetron, acetaminophen **OR** acetaminophen, melatonin    Imaging:   IR REMOVE TUNNELED CVAD WO SQ PORT/PUMP   Final Result   Successful fluoroscopic guided non tunneled dialysis catheter placement. Successful removal of tunneled dialysis catheter. IR NON TUNNELED CATH WO PORT REPLACEMENT   Final Result   Successful fluoroscopic guided non tunneled dialysis catheter placement. Successful removal of tunneled dialysis catheter. MRI BRAIN WO CONTRAST   Final Result   Cerebral atrophy. Severe chronic small vessel ischemic changes. No areas of   restricted diffusion to suggest an acute ischemic event. VL DUP CAROTID BILATERAL   Final Result      CT ABDOMEN PELVIS WO CONTRAST Additional Contrast? None   Final Result   Mild wall thickening involving the distal descending colon through the rectum   with adjacent fat stranding suggestive of proctocolitis. Mild bladder wall thickening with perivesicular fat stranding suggestive of   cystitis.   Correlation with urinalysis recommended. Large stool ball within the rectum with adjacent inflammatory changes, as   above. Mild splenomegaly. Nonobstructive right nephrolithiasis. CT HEAD WO CONTRAST   Final Result   No acute intracranial abnormality. XR CHEST PORTABLE   Final Result   Stable cardiac enlargement with no acute findings. IR TUNNELED CVC PLACE WO SQ PORT/PUMP > 5 YEARS    (Results Pending)       All pertinent images and reports for the current Hospitalization were reviewed by me.     IMPRESSION:    Patient Active Problem List   Diagnosis    GI bleed    History of COVID-19    Hyponatremia    Fatigue    Acute kidney injury superimposed on CKD ([address]    Lethargy    Suspected UTI    Acute CVA (cerebrovascular accident) ([address]    LESLEE (acute kidney injury) ([address]    Malignant neoplasm of colon ([address]    Acute blood loss anemia    COVID-19    Shock circulatory ([address]    Bilateral pulmonary embolism (HCC)    Lactic acidosis    Hemorrhagic shock (HCC)    ESRD (end stage renal disease) on dialysis ([address]    Acute cholecystitis    Pain of upper abdomen    AMS (altered mental status)    Sepsis ([address]    Acute metabolic encephalopathy    Cellulitis    Fever and chills    PVD (peripheral vascular disease) (HCC)    Anxiety    Rectal bleeding    Severe malnutrition (HCC)    Acute encephalopathy    Acute cystitis without hematuria    Colitis    Elevated procalcitonin    Chronic anticoagulation    Hemodialysis catheter infection ([address]     Change in mentation   ESRD   HD line in place  Lactic acidosis  Procal elevation  CT ABD/PELVIS with distal colitis  Constipation   PVD  Cerebral atrophy on MRI  Crp elevation   UA abnormal   Chronic anticoagulation    HD catheter exit site infection with staph aureus on the cx    Staph aureus on the Tip cx but Blood cx negative      Blood cx  -ve  HD site cx with Staph aureus  - MSSA noted and like grimes Tip cx with MSSA but Blood cx remain negative and this could have led to [facility] bacteremia due to HD catheter colonization and biofilm formation    HD line removed  promptly and has temp line in place    Current abx regimen will cover HD line infection and Colitis     Would be ok for HD line conversion  as Blood cx thus far negative      Will plan to cont IV Cefazolin x 2 gm with HD sessions  for x 3 doses at d/c given that new line will be placed tomorrow     Labs, Microbiology, Radiology and pertinent results from current hospitalization and care every where were reviewed by me as a part of the consultation. PLAN :  Change to IV Cefazolin x 2 gm WITH HD sessions x  3 times a week stop date x 1/4  D/c  flagyl  today  bLOOD CX IN PROCess NGTD  Urine cx mixed nakia  HD line tip cx +Ve MSSA  oK FOR  new line placed tomorrow     Will sign off      Discussed with patient/Family and Nursing  d/w  about IV abx at d/c    Risk of Complications/Morbidity: High      Illness(es)/ Infection present that pose threat to bodily function. There is potential for severe exacerbation of infection/side effects of treatment. Therapy requires intensive monitoring for antimicrobial agent toxicity. Thanks for allowing me to participate in your patient's care please call me with any questions or concerns.     Dr. Jackie Allison MD  [facility] Physician  Phone: [phone]   Fax : [phone]

## 2022-12-27 NOTE — PROGRESS NOTES
[address] [place] [address] Nephrology   [facility]  [phone]  Nephrology Note          Patient ID: Anne Chopra  Referring/ Physician: Ciara Bugrer MD      HPI/Summary:   Anne Chopra is being seen by nephrology for ESRD. This is a 79-year-old male with past medical history significant for colon cancer, ESRD, hypertension, stroke, COVID infection in the past, diabetes who presented to the hospital with fatigue. He has been more lethargic. He denies any cough shortness of breath chest pain. He is unable to provide any more history at this time. MRI head showed a cerebral atrophy, severe chronic small vessel ischemic changes. No acute stroke. CT abdomen showed distal descending colon thickening some fat stranding suggestive of proctocolitis, mild bladder wall thickening possible cystitis, large stool ball in the rectum. Mild splenomegaly and a nonobstructive right renal stone. Interval Hx   Seen at bedside. Denies any complaints   Has temp line  No chest pain no SOB. On cefepime for MSSA, HD line cx+  So far Blood cxs negative     Bp 127/63  HR 68  96% on room air   No edema     Labs reviewed. Na 141, K 3.8 bicarb 25 BUN 30 Cr 4.3  Albumin 3  Hgb 9.1        Plan:   - HD today per TTS schedule. - BP is acceptable. - Add retacrit 10 k units q HD for anemia. - cefepime for MSSA, blood cxs negative so far. TDC ordered. Assessment:  ESRD  TTS at [facility]  Tunneled dialysis catheter  EDW 85 kg    Line infection:  - TDC infection s/p removal.  - temp HD line in place   MSSA tip cx +   Blood cxs NGTD so far    Electrolytes  No acute issues    Secondary parathyroidism  On Renvela 800 mg 3 times daily    Hypertension  Blood pressure acceptable on atenolol 25 mg daily    Possible UTI and proctocolitis  On cefepime         [facility] would like to thank you for the opportunity to serve this patient. Please call with any questions or concerns.     Dottie Ortiz MD  [facility]  [address], [address]  Fax: [phone]  Office: [phone]         CC/Reason for consult:   Reason for consult: ESRD  Chief Complaint   Patient presents with    Fatigue     Pt arrived via ems from [facility] for report of altered mental status all day today, patient is more lethargic than normal baseline. Pt has dementia but normal baseline is able to have conversation with staff. Pt very sleepy at triage. Has wet cough. Review of Systems:   [address] All other remaining systems are negative. Constitutional:  fever, chills, weakness, weight change, fatigue,      Skin:  rash, pruritus, hair loss, bruising, dry skin, petechiae. Head, Face, Neck   headaches, swelling,  cervical adenopathy. Respiratory: shortness of breath, cough, or wheezing  Cardiovascular: chest pain, palpitations, dizzy, edema  Gastrointestinal: nausea, vomiting, diarrhea, constipation,belly pain    Yellow skin, blood in stool  Musculoskeletal:  back pain, muscle weakness, gait problems,       joint pain or swelling. Genitourinary:  dysuria, poor urine flow, flank pain, blood in urine  Neurologic:  vertigo, TIA'S, syncope, seizures, focal weakness  Psychosocial:  insomnia, anxiety, or depression.   Additional positive findings: -     PMH/SH/FH:    Medical Hx: reviewed and updated as appropriate  Past Medical History:   Diagnosis Date    Anemia 03/2022    Arthritis     CAD (coronary artery disease) 01/2020    Cancer ([facility]     Colon Cancer diagnosed last month    Cerebral infarction [facility])     Cognitive communication deficit     COVID-19     Diabetes mellitus ([facility]     Dysphagia     End stage renal disease ([address]     Fatigue     Gastrointestinal hemorrhage     Hemodialysis patient ([facility] 2019    ckd-goes to dialysis Tuesday, Thurs, Fri    Hx of blood clots     Hyperlipidemia     Hypertension     Hyponatremia     Risk for falls     Splenomegaly 02/10/2021         Surgical Hx: reviewed and updated as appropriate   has a past surgical history that includes IR PERC ARTERIOVENOUS FISTULA CREATION (Left); colectomy (N/A, 01/26/2022); sigmoidoscopy (N/A, 02/17/2022); IR EMBOLIZATION HEMORRHAGE (02/19/2022); Tunneled venous catheter placement (Right, 02/21/2022); IR TUNNELED CVC PLACE WO SQ PORT/PUMP > 5 YEARS (2/21/2022); Cardiac catheterization (2019); Colonoscopy; Dialysis fistula creation (Left, 4/29/2022); Cholecystectomy, laparoscopic (N/A, 5/16/2022); ERCP (N/A, 5/16/2022); ERCP (N/A, 5/16/2022); and Upper gastrointestinal endoscopy (5/16/2022). Social Hx: reviewed and updated as appropriate  Social History     Tobacco Use    Smoking status: Former    Smokeless tobacco: Never   Substance Use Topics    Alcohol use: Not Currently        Family hx: reviewed and updated as appropriate  family history includes High Blood Pressure in his father. Medications:   metroNIDAZOLE, 500 mg, 3 times per day  cefepime, 1,000 mg, Q24H  sodium chloride flush, 10 mL, 2 times per day  apixaban, 2.5 mg, BID  aspirin, 81 mg, Daily  [Held by provider] atenolol, 25 mg, QPM  atorvastatin, 10 mg, Nightly  [Held by provider] gabapentin, 100 mg, TID  LORazepam, 0.5 mg, BID  pantoprazole, 20 mg, Nightly  sevelamer, 800 mg, TID WC  tamsulosin, 0.4 mg, QAM  docusate sodium, 200 mg, BID     Lisinopril    Allergies: Allergies   Allergen Reactions    Lisinopril Swelling     Allergy listed on paperwork from [facility], no reaction noted         Physical Exam/Objective:   Vitals:    12/27/22 0735   BP: 127/63   Pulse: 68   Resp:    Temp: 97.4 °F (36.3 °C)   SpO2: 96%       Intake/Output Summary (Last 24 hours) at 12/27/2022 0851  Last data filed at 12/26/2022 1957  Gross per 24 hour   Intake 675.87 ml   Output --   Net 675.87 ml         General appearance:  in no acute distress, comfortable  HEENT: no icterus, EOM intact, trachea midline.    Neck : no masses, appears symmetrical   Respiratory: Respiratory effort normal, bilateral equal chest rise. No wheeze, no crackles   Cardiovascular: Ausculation shows RRR and  No edema   Abdomen: abdomen is soft, non distended  Musculoskeletal:  no joint swelling, no deformity  Skin: no rashes, no induration, no tightening, no jaundice   Neuro:   Follows commands, moves all extremities spontaneously       Data:   CBC:   Recent Labs     12/24/22  0936 12/26/22  0759   WBC 2.7* 3.1*   HGB 8.6* 9.1*   HCT 26.7* 27.5*    188     BMP:    Recent Labs     12/24/22  0936 12/26/22  0759   * 141   K 3.8 3.8   CL 97* 103   CO2 23 25   BUN 37* 30*   CREATININE 4.3* 4.3*   GLUCOSE 63* 95   PHOS 3.5 3.5

## 2022-12-27 NOTE — PROCEDURES
Cardioversion    Indication: AF    Anesthesia: Versed/Fentanyl 2/50    Single synchronized biphasic cardioversion. Successful. 330 J    Converted to SR.     Agueda Cole M.D.

## 2022-12-27 NOTE — PRE SEDATION
Sedation Pre-Procedure Note    Patient Name: Kailyn Tilley   YOB: 1945  Room/Bed: O8B-7238/3368-55  Medical Record Number: [id number]  Date: 12/27/2022   Time: 11:13 AM       Indication:  AF    Consent: I have discussed with the patient and/or the patient representative the indication, alternatives, and the possible risks and/or complications of the planned procedure and the anesthesia methods. The patient and/or patient representative appear to understand and agree to proceed. Vital Signs:   Vitals:    12/27/22 0735   BP: 127/63   Pulse: 68   Resp:    Temp: 97.4 °F (36.3 °C)   SpO2: 96%       Past Medical History:   has a past medical history of Anemia, Arthritis, CAD (coronary artery disease), Cancer ([facility] Cerebral infarction ([facility] [address] Cognitive communication deficit, COVID-19, Diabetes mellitus ([facility] [address] Dysphagia, End stage renal disease ([facility] [address] Fatigue, Gastrointestinal hemorrhage, Hemodialysis patient ([facility] [address] Hx of blood clots, Hyperlipidemia, Hypertension, Hyponatremia, Risk for falls, and Splenomegaly. Past Surgical History:   has a past surgical history that includes IR PERC ARTERIOVENOUS FISTULA CREATION (Left); colectomy (N/A, 01/26/2022); sigmoidoscopy (N/A, 02/17/2022); IR EMBOLIZATION HEMORRHAGE (02/19/2022); Tunneled venous catheter placement (Right, 02/21/2022); IR TUNNELED CVC PLACE WO SQ PORT/PUMP > 5 YEARS (2/21/2022); Cardiac catheterization (2019); Colonoscopy; Dialysis fistula creation (Left, 4/29/2022); Cholecystectomy, laparoscopic (N/A, 5/16/2022); ERCP (N/A, 5/16/2022); ERCP (N/A, 5/16/2022); and Upper gastrointestinal endoscopy (5/16/2022).     Medications:   Scheduled Meds:    epoetin davis-epbx  10,000 Units IntraVENous Once per day on Tue Thu Sat    metroNIDAZOLE  500 mg Oral 3 times per day    cefepime  1,000 mg IntraVENous Q24H    sodium chloride flush  10 mL IntraVENous 2 times per day    apixaban  2.5 mg Oral BID    aspirin  81 mg Oral Daily    [Held by provider] atenolol 25 mg Oral QPM    atorvastatin  10 mg Oral Nightly    [Held by provider] gabapentin  100 mg Oral TID    LORazepam  0.5 mg Oral BID    pantoprazole  20 mg Oral Nightly    sevelamer  800 mg Oral TID WC    tamsulosin  0.4 mg Oral QAM    docusate sodium  200 mg Oral BID     Continuous Infusions:    sodium chloride       PRN Meds: sodium chloride flush, sodium chloride, promethazine **OR** ondansetron, acetaminophen **OR** acetaminophen, melatonin  Home Meds:   Prior to Admission medications    Medication Sig Start Date End Date Taking? Authorizing Provider   oxyCODONE (ROXICODONE) 5 MG immediate release tablet Take 5 mg by mouth every 4 hours as needed for Pain. Yes Historical Provider, MD   ondansetron (ZOFRAN) 4 MG/2ML injection Infuse 4 mg intravenously every 6 hours as needed for Nausea or Vomiting   Yes Historical Provider, MD   LORazepam (ATIVAN) 0.5 MG tablet Take 0.5 mg by mouth in the morning and at bedtime. Historical Provider, MD   Wound Dressings ([facility] WOUND/BURN DRESSING) GEL gel Apply 1 each topically daily Apply to RIGHT ankle wound daily 6/13/22   Yung Hernandez MD   docusate sodium (COLACE) 100 MG capsule Take 100 mg by mouth 2 times daily    Historical Provider, MD   polyethylene glycol (GLYCOLAX) 17 GM/SCOOP powder Take 17 g by mouth daily as needed (Constipation)     Historical Provider, MD   apixaban (ELIQUIS) 5 MG TABS tablet Take 1 tablet by mouth 2 times daily 3/2/22   Gae Libman, MD   aspirin 81 MG chewable tablet Take 1 tablet by mouth daily 2/11/22   Manohar Ramirez MD   melatonin 3 MG TABS tablet Take 2 tablets by mouth nightly as needed (sleep) 2/4/22   Marina Sprague MD   tamsulosin (FLOMAX) 0.4 MG capsule Take 0.4 mg by mouth every morning    Historical Provider, MD   gabapentin (NEURONTIN) 100 MG capsule Take 100 mg by mouth 3 times daily.     Historical Provider, MD   sevelamer (RENVELA) 800 MG tablet Take 800 mg by mouth 3 times daily (with meals)     Historical Provider, MD   atenolol (TENORMIN) 25 MG tablet Take 25 mg by mouth every evening    Historical Provider, MD   pantoprazole (PROTONIX) 20 MG tablet Take 20 mg by mouth nightly    Historical Provider, MD   atorvastatin (LIPITOR) 10 MG tablet Take 10 mg by mouth nightly    Historical Provider, MD   calcitRIOL (ROCALTROL) 0.25 MCG capsule Take 0.25 mcg by mouth every evening    Historical Provider, MD     Coumadin Use Last 7 Days:  No-   Antiplatelet drug therapy use last 7 days: no  Other anticoagulant use last 7 days: yes - Eliquis  Additional Medication Information:        Pre-Sedation Documentation and Exam:   I have personally completed a history, physical exam & review of systems for this patient (see notes). Vital signs have been reviewed (see flow sheet for vitals). I have reviewed the patient's history and review of systems.     Mallampati Airway Assessment:  normal, dentition not prohibitive, Mallampati Class I - (soft palate, fauces, uvula & anterior/posterior tonsillar pillars are visible)    Prior History of Anesthesia Complications:   none    ASA Classification:  Class 4 - A patient with an incapacitating systemic disease that is a constant threat to life    Sedation/ Anesthesia Plan:   intravenous sedation    Medications Planned:   midazolam (Versed) intravenously and fentanyl intravenously    Patient is an appropriate candidate for plan of sedation: yes    Electronically signed by Del Moreno MD on 12/27/2022 at 11:13 AM

## 2022-12-28 ENCOUNTER — APPOINTMENT (OUTPATIENT)
Dept: INTERVENTIONAL RADIOLOGY/VASCULAR | Age: 77
DRG: 314 | End: 2022-12-28
Payer: COMMERCIAL

## 2022-12-28 PROBLEM — E44.0 MODERATE MALNUTRITION (HCC): Status: ACTIVE | Noted: 2022-12-28

## 2022-12-28 LAB
GLUCOSE BLD-MCNC: 127 MG/DL (ref 70–99)
GLUCOSE BLD-MCNC: 134 MG/DL (ref 70–99)
GLUCOSE BLD-MCNC: 90 MG/DL (ref 70–99)
GLUCOSE BLD-MCNC: 97 MG/DL (ref 70–99)
PERFORMED ON: ABNORMAL
PERFORMED ON: ABNORMAL
PERFORMED ON: NORMAL
PERFORMED ON: NORMAL

## 2022-12-28 PROCEDURE — 2709999900 IR TUNNELED CVC PLACE WO SQ PORT/PUMP > 5 YEARS

## 2022-12-28 PROCEDURE — 2580000003 HC RX 258: Performed by: INTERNAL MEDICINE

## 2022-12-28 PROCEDURE — 36558 INSERT TUNNELED CV CATH: CPT

## 2022-12-28 PROCEDURE — 2500000003 HC RX 250 WO HCPCS

## 2022-12-28 PROCEDURE — 02PA33Z REMOVAL OF INFUSION DEVICE FROM HEART, PERCUTANEOUS APPROACH: ICD-10-PCS | Performed by: STUDENT IN AN ORGANIZED HEALTH CARE EDUCATION/TRAINING PROGRAM

## 2022-12-28 PROCEDURE — 6360000002 HC RX W HCPCS: Performed by: INTERNAL MEDICINE

## 2022-12-28 PROCEDURE — 02H633Z INSERTION OF INFUSION DEVICE INTO RIGHT ATRIUM, PERCUTANEOUS APPROACH: ICD-10-PCS | Performed by: STUDENT IN AN ORGANIZED HEALTH CARE EDUCATION/TRAINING PROGRAM

## 2022-12-28 PROCEDURE — 1200000000 HC SEMI PRIVATE

## 2022-12-28 PROCEDURE — 0JH63XZ INSERTION OF TUNNELED VASCULAR ACCESS DEVICE INTO CHEST SUBCUTANEOUS TISSUE AND FASCIA, PERCUTANEOUS APPROACH: ICD-10-PCS | Performed by: STUDENT IN AN ORGANIZED HEALTH CARE EDUCATION/TRAINING PROGRAM

## 2022-12-28 PROCEDURE — 87070 CULTURE OTHR SPECIMN AEROBIC: CPT

## 2022-12-28 PROCEDURE — 6370000000 HC RX 637 (ALT 250 FOR IP): Performed by: INTERNAL MEDICINE

## 2022-12-28 PROCEDURE — 99152 MOD SED SAME PHYS/QHP 5/>YRS: CPT

## 2022-12-28 PROCEDURE — 77001 FLUOROGUIDE FOR VEIN DEVICE: CPT

## 2022-12-28 PROCEDURE — 90935 HEMODIALYSIS ONE EVALUATION: CPT

## 2022-12-28 PROCEDURE — 97530 THERAPEUTIC ACTIVITIES: CPT

## 2022-12-28 PROCEDURE — 6360000002 HC RX W HCPCS: Performed by: RADIOLOGY

## 2022-12-28 PROCEDURE — 94760 N-INVAS EAR/PLS OXIMETRY 1: CPT

## 2022-12-28 RX ORDER — MIDAZOLAM HYDROCHLORIDE 1 MG/ML
INJECTION INTRAMUSCULAR; INTRAVENOUS DAILY PRN
Status: COMPLETED | OUTPATIENT
Start: 2022-12-28 | End: 2022-12-28

## 2022-12-28 RX ORDER — FENTANYL CITRATE 50 UG/ML
INJECTION, SOLUTION INTRAMUSCULAR; INTRAVENOUS DAILY PRN
Status: COMPLETED | OUTPATIENT
Start: 2022-12-28 | End: 2022-12-28

## 2022-12-28 RX ADMIN — APIXABAN 2.5 MG: 2.5 TABLET, FILM COATED ORAL at 20:37

## 2022-12-28 RX ADMIN — SEVELAMER CARBONATE 800 MG: 800 TABLET, FILM COATED ORAL at 17:25

## 2022-12-28 RX ADMIN — Medication 6 MG: at 20:36

## 2022-12-28 RX ADMIN — MIDAZOLAM 0.5 MG: 1 INJECTION INTRAMUSCULAR; INTRAVENOUS at 09:03

## 2022-12-28 RX ADMIN — FENTANYL CITRATE 25 MCG: 50 INJECTION INTRAMUSCULAR; INTRAVENOUS at 09:03

## 2022-12-28 RX ADMIN — DOCUSATE SODIUM 200 MG: 100 CAPSULE, LIQUID FILLED ORAL at 20:36

## 2022-12-28 RX ADMIN — LORAZEPAM 0.5 MG: 0.5 TABLET ORAL at 11:58

## 2022-12-28 RX ADMIN — SODIUM CHLORIDE, PRESERVATIVE FREE 10 ML: 5 INJECTION INTRAVENOUS at 12:01

## 2022-12-28 RX ADMIN — DOCUSATE SODIUM 200 MG: 100 CAPSULE, LIQUID FILLED ORAL at 11:58

## 2022-12-28 RX ADMIN — ONDANSETRON 4 MG: 2 INJECTION INTRAMUSCULAR; INTRAVENOUS at 06:02

## 2022-12-28 RX ADMIN — CEFAZOLIN 1000 MG: 1 INJECTION, POWDER, FOR SOLUTION INTRAMUSCULAR; INTRAVENOUS at 17:27

## 2022-12-28 RX ADMIN — LORAZEPAM 0.5 MG: 0.5 TABLET ORAL at 20:36

## 2022-12-28 RX ADMIN — ATORVASTATIN CALCIUM 10 MG: 10 TABLET, FILM COATED ORAL at 20:36

## 2022-12-28 RX ADMIN — PANTOPRAZOLE SODIUM 20 MG: 20 TABLET, DELAYED RELEASE ORAL at 20:37

## 2022-12-28 RX ADMIN — TAMSULOSIN HYDROCHLORIDE 0.4 MG: 0.4 CAPSULE ORAL at 11:58

## 2022-12-28 RX ADMIN — SODIUM CHLORIDE, PRESERVATIVE FREE 10 ML: 5 INJECTION INTRAVENOUS at 20:37

## 2022-12-28 RX ADMIN — SEVELAMER CARBONATE 800 MG: 800 TABLET, FILM COATED ORAL at 11:58

## 2022-12-28 ASSESSMENT — PAIN SCALES - GENERAL: PAINLEVEL_OUTOF10: 0

## 2022-12-28 NOTE — PROGRESS NOTES
Hospitalist Progress Note  12/28/2022 10:20 AM    PCP: Stefano Duran    [id number]     Date of Admission: 12/20/2022                                                                                                                     HOSPITAL COURSE    Patient demographics:  The patient  Luis Bermudez is a 69 y.o. male     Significant past medical history:   Patient Active Problem List   Diagnosis    GI bleed    History of COVID-19    Hyponatremia    Fatigue    Acute kidney injury superimposed on CKD (HCC)    Lethargy    Suspected UTI    Acute CVA (cerebrovascular accident) ([address]    LESLEE (acute kidney injury) ([address]    Malignant neoplasm of colon ([address]    Acute blood loss anemia    COVID-19    Shock circulatory (HCC)    Bilateral pulmonary embolism (HCC)    Lactic acidosis    Hemorrhagic shock (HCC)    ESRD (end stage renal disease) on dialysis ([address]    Acute cholecystitis    Pain of upper abdomen    AMS (altered mental status)    Sepsis ([address]    Acute metabolic encephalopathy    Cellulitis    Fever and chills    PVD (peripheral vascular disease) ([facility])    Anxiety    Rectal bleeding    Severe malnutrition ([facility])    Acute encephalopathy    Acute cystitis without hematuria    Colitis    Elevated procalcitonin    Chronic anticoagulation    Hemodialysis catheter infection ([address]         Presenting symptoms:  Fatigue     Diagnostic workup:      CONSULTS DURING ADMISSION :   IP CONSULT TO NEPHROLOGY  IP CONSULT TO INFECTIOUS DISEASES      Patient was diagnosed with:  Acute encephalopathy  End-stage renal disease on hemodialysis  Tunneled catheter infection  Constipation  Proctocolitis  Type II MI    Treatment while inpatient:  Pt presented as a transfer from [facility] due to acute encephalopathy                                                                                       ----------------------------------------------------------      SUBJECTIVE COMPLAINTS- Fatigue     Diet: Diet NPO      OBJECTIVE:   Patient Active Problem List   Diagnosis    GI bleed    History of COVID-19    Hyponatremia    Fatigue    Acute kidney injury superimposed on CKD ([facility]    Lethargy    Suspected UTI    Acute CV[facility]cerebrovascular accident) [facility]    LESLEE (acute kidney injury) ([facility]    Malignant neoplasm of colon ([facility]    Acute blood loss anemia    COVID-19    Shock circulatory ([facility]    Bilateral pulmonary embolism (HCC)    Lactic acidosis    Hemorrhagic shock (HCC)    ESRD (end stage renal disease) on dialysis ([facility]    Acute cholecystitis    Pain of upper abdomen    AMS (altered mental status)    Sepsis ([facility]    Acute metabolic encephalopathy    Cellulitis    Fever and chills    PVD (peripheral vascular disease) (HCC)    Anxiety    Rectal bleeding    Severe malnutrition (HCC)    Acute encephalopathy    Acute cystitis without hematuria    Colitis    Elevated procalcitonin    Chronic anticoagulation    Hemodialysis catheter infection (HCC)       Allergies  Lisinopril    Medications    Scheduled Meds:   ceFAZolin  1,000 mg IntraVENous Q24H    epoetin davis-epbx  10,000 Units IntraVENous Once per day on Tue Thu Sat    sodium chloride flush  10 mL IntraVENous 2 times per day    apixaban  2.5 mg Oral BID    aspirin  81 mg Oral Daily    [Held by provider] atenolol  25 mg Oral QPM    atorvastatin  10 mg Oral Nightly    [Held by provider] gabapentin  100 mg Oral TID    LORazepam  0.5 mg Oral BID    pantoprazole  20 mg Oral Nightly    sevelamer  800 mg Oral TID WC    tamsulosin  0.4 mg Oral QAM    docusate sodium  200 mg Oral BID     Continuous Infusions:   sodium chloride       PRN Meds:  sodium chloride flush, sodium chloride, promethazine **OR** ondansetron, acetaminophen **OR** acetaminophen, melatonin    Vitals   Vitals /wt Patient Vitals for the past 8 hrs:   BP Temp Temp src Pulse Resp SpO2 Weight   12/28/22 0915 124/64 -- -- 66 17 100 % --   12/28/22 0910 128/64 -- -- 62 16 100 % --   12/28/22 0905 116/63 -- -- 61 21 100 % --   12/28/22 0900 (!) 117/59 -- -- 59 20 90 % --   12/28/22 0855 106/61 -- -- 74 14 100 % --   12/28/22 0838 -- -- -- -- -- 94 % --   12/28/22 0829 136/68 98 °F (36.7 °C) Oral 72 18 91 % --   12/28/22 0554 (!) 154/75 98.4 °F (36.9 °C) Oral 74 18 98 % 219 lb 12.8 oz (99.7 kg)        72HR INTAKE/OUTPUT:  No intake or output data in the 24 hours ending 12/28/22 1020      Exam:    Gen:   Alert and oriented ×3    Eyes: PERRL. No sclera icterus. No conjunctival injection. ENT: No discharge. Pharynx clear. External appearance of ears and nose normal.  Neck: Trachea midline. No obvious mass. Resp: No accessory muscle use. No crackles. No wheezes. No rhonchi. CV: Regular rate. Regular rhythm. No murmur or rub. No edema. GI: Non-tender. Non-distended. No hernia. Skin: Warm, dry, normal texture and turgor. Lymph: No cervical LAD. No supraclavicular LAD. M/S: / Ext. No cyanosis. No clubbing. No joint deformity. Neuro: CN 2-12 are intact,  no neurologic deficits noted. PT/INR:   Recent Labs     12/27/22  0857   PROTIME 17.5*   INR 1.44*     APTT: No results for input(s): APTT in the last 72 hours. CBC:   Recent Labs     12/26/22  0759   WBC 3.1*   HGB 9.1*   HCT 27.5*   MCV 98.5          BMP:   Recent Labs     12/26/22  0759      K 3.8      CO2 25   PHOS 3.5   BUN 30*   CREATININE 4.3*       LIVER PROFILE:   No results for input(s): ALKPHOS, AST, ALT, ALB, BILIDIR, BILITOT, ALKPHOS in the last 72 hours. No results for input(s): AMYLASE in the last 72 hours. No results for input(s): LIPASE in the last 72 hours. UA:  No results for input(s): NITRITE, LABCAST, WBCUA, RBCUA, MUCUS in the last 72 hours. TROPONIN:   No results for input(s): Wayna Khat in the last 72 hours. Lab Results   Component Value Date/Time    URRFLXCULT Yes 10/20/2022 10:52 AM       No results for input(s): TSHREFLEX in the last 72 hours.       No components found for: [id number]  POC GLUCOSE:    Recent Labs     12/26/22 2001 12/27/22  0737 12/27/22  1717 12/27/22  1943 12/28/22  0832   POCGLU 174* 131* 102* 110* 97     No results for input(s): LABA1C in the last 72 hours. Lab Results   Component Value Date/Time    LABA1C 6.1 01/15/2022 05:31 AM      Ejection fraction is visually estimated to be 55-60%(2/12/2022)    ASSESSMENT AND PLAN     Acute encephalopathy  Likely septic and metabolic combined  Mental status improved to baseline       End-stage renal disease on hemodialysis  Hemodialysis done through temporary dialysis catheter  Hemodialysis today     Tunneled catheter infection  Tunneled catheter has been removed  Temporary catheter is in place  Continue on cefepime  Status post tunneled catheter placement. Constipation  Treat with MiraLAX  Avoid fleets enema and milk of magnesia since patient is on hemodialysis     Proctocolitis  Continue patient on Flagyl           Troponin high  Likely demand ischemia  Patient has no evidence of injury pattern on EKG  Conservative management for now    PTOT    Code Status: Full Code        Dispo -patient to be placed in long-term care        The patient and / or the family were informed of the results of any tests, a time was given to answer questions, a plan was proposed and they agreed with plan. Darryle Pickle, MD    This note was transcribed using [id number] [employer]. Please disregard any translational errors.

## 2022-12-28 NOTE — PROGRESS NOTES
Comprehensive Nutrition Assessment    Type and Reason for Visit:  Initial, RD Nutrition Re-Screen/LOS    Nutrition Recommendations/Plan:   Continue CCC (3) / Low Fat / Low Chol / High Fiber / 2 gm Na diet     Malnutrition Assessment:  Malnutrition Status: Moderate malnutrition (12/28/22 1300)    Context:  Acute Illness     Findings of the 6 clinical characteristics of malnutrition:  Energy Intake:  Unable to assess  Weight Loss:  Unable to assess (fluid shifts with HD)     Body Fat Loss:  Mild body fat loss Orbital   Muscle Mass Loss:  Mild muscle mass loss Temples (temporalis)  Fluid Accumulation:  No significant fluid accumulation     Strength:  Not Performed    Nutrition Assessment:    Pt, who resides in a facility, screened for LOS. PMH includes; dementia, CVA, colon cancer, DM, ESRD (HD), HLD, hx of severe malnutrition. Pt adm r/t fatigue. Found to have tunneled cath infection and Proctocolitis. Pt is s/p procedure this am, with diet resuming as CCC (3) / 2 gm Na / Low Fat / Low Chol / High Fiber. Pt endorsed a \"pretty good appetite. \" Did note complaints of some nausea this am. Pt also noted with poor po intake when last here in the fall. Agreed to adding Nepro, bid to start. Will continue to follow. Nutrition Related Findings:    Labs reviewed. Noted BM on 12/26. Noted no edema. Wound Type: None       Current Nutrition Intake & Therapies:    Average Meal Intake: Unable to assess     ADULT DIET; Regular; 3 carb choices (45 gm/meal); Low Fat/Low Chol/High Fiber/2 gm Na    Anthropometric Measures:  Height: 6' 5\" (195.6 cm)  Ideal Body Weight (IBW): 208 lbs (95 kg)    Admission Body Weight: 199 lb (90.3 kg)  Current Body Weight: 220 lb (99.8 kg), 105.8 % IBW. Weight Source: Bed Scale  Current BMI (kg/m2): 26.1                          BMI Categories: Overweight (BMI 25.0-29. 9)    Estimated Daily Nutrient Needs:        Energy (kcal/day): 3538-7997 (30-35 x EDW 85 kg (adj for HD)     Protein (g/day): 102-119 (1.2-1.4 x EDW 85 kg adj for HD)  Method Used for Fluid Requirements: Standard Renal  Fluid (ml/day):      Nutrition Diagnosis:   Increased nutrient needs related to renal dysfunction as evidenced by dialysis    Nutrition Interventions:   Food and/or Nutrient Delivery: Continue Current Diet, Start Oral Nutrition Supplement  Nutrition Education/Counseling: Education not indicated  Coordination of Nutrition Care: Continue to monitor while inpatient       Goals:     Goals: PO intake 50% or greater       Nutrition Monitoring and Evaluation:   Behavioral-Environmental Outcomes: None Identified  Food/Nutrient Intake Outcomes: Food and Nutrient Intake, Supplement Intake  Physical Signs/Symptoms Outcomes: Biochemical Data, Constipation, Diarrhea, Fluid Status or Edema, Skin, Weight, Nausea or Vomiting    Discharge Planning:     Too soon to determine     Sahara Alexis, [address], [place]  Contact: [phone]

## 2022-12-28 NOTE — PROGRESS NOTES
[address] [place] [address] Nephrology   [facility]. [facility]  [phone]  Nephrology Note          Patient ID: Evita Du  Referring/ Physician: Sulma Leary MD      HPI/Summary:   Evita Du is being seen by nephrology for ESRD. This is a 45-year-old male with past medical history significant for colon cancer, ESRD, hypertension, stroke, COVID infection in the past, diabetes who presented to the hospital with fatigue. He has been more lethargic. He denies any cough shortness of breath chest pain. He is unable to provide any more history at this time. MRI head showed a cerebral atrophy, severe chronic small vessel ischemic changes. No acute stroke. CT abdomen showed distal descending colon thickening some fat stranding suggestive of proctocolitis, mild bladder wall thickening possible cystitis, large stool ball in the rectum. Mild splenomegaly and a nonobstructive right renal stone. Interval Hx   Patient was seen and examined at bedside  He had dialysis yesterday  He has no chest pain or shortness of breath, appetite is good  Is no edema  He has a new tunneled dialysis catheter right chest    Post weight not obtained but had 2.3 L off yesterday  Received Retacrit 10,000 units with HD  Blood pressure 120/64  Afebrile heart rate 66 and on room air satting 100%      Plan:   -HD tomorrow per TTS schedule  - BP is acceptable. - retacrit 10 k units q HD for anemia.   -We will plan to continue cefazolin 2 g q. Hd until 1/5/22, discussed with Dr She Watkins.  He is at [address] shift       Assessment:  ESRD  TTS at [facility] 51 dialysis catheter  EDW 85 kg    Line infection:  - TDC infection s/p removal.  - temp HD line in place   MSSA tip cx +   Blood cxs NGTD so far    Electrolytes  No acute issues    Secondary parathyroidism  On Renvela 800 mg 3 times daily    Hypertension  Blood pressure acceptable on atenolol 25 mg daily    Possible UTI and proctocolitis  On cefazolin        [facility] Nephrology would like to thank you for the opportunity to serve this patient. Please call with any questions or concerns. Damion Durán MD  [facility] Nephrology  Jose Professor [address], [address]  Fax: [phone]  Office: [phone]         CC/Reason for consult:   Reason for consult: ESRD  Chief Complaint   Patient presents with    Fatigue     Pt arrived via ems from [facility] for report of altered mental status all day today, patient is more lethargic than normal baseline. Pt has dementia but normal baseline is able to have conversation with staff. Pt very sleepy at triage. Has wet cough. Review of Systems:   [address] All other remaining systems are negative. Constitutional:  fever, chills, weakness, weight change, fatigue,      Skin:  rash, pruritus, hair loss, bruising, dry skin, petechiae. Head, Face, Neck   headaches, swelling,  cervical adenopathy. Respiratory: shortness of breath, cough, or wheezing  Cardiovascular: chest pain, palpitations, dizzy, edema  Gastrointestinal: nausea, vomiting, diarrhea, constipation,belly pain    Yellow skin, blood in stool  Musculoskeletal:  back pain, muscle weakness, gait problems,       joint pain or swelling. Genitourinary:  dysuria, poor urine flow, flank pain, blood in urine  Neurologic:  vertigo, TIA'S, syncope, seizures, focal weakness  Psychosocial:  insomnia, anxiety, or depression.   Additional positive findings: -     PMH/SH/FH:    Medical Hx: reviewed and updated as appropriate  Past Medical History:   Diagnosis Date    Anemia 03/2022    Arthritis     CAD (coronary artery disease) 01/2020    Cancer ([facility]     Colon Cancer diagnosed last month    Cerebral infarction [facility])     Cognitive communication deficit     COVID-19     Diabetes mellitus ([facility] [facility]     Dysphagia     End stage renal disease ([facility]     Fatigue     Gastrointestinal hemorrhage     Hemodialysis patient ([facility] 2019    ckd-goes to dialysis Tuesday, Thurs, Fri    Hx of blood clots     Hyperlipidemia     Hypertension     Hyponatremia     Risk for falls     Splenomegaly 02/10/2021         Surgical Hx: reviewed and updated as appropriate   has a past surgical history that includes IR PERC ARTERIOVENOUS FISTULA CREATION (Left); colectomy (N/A, 01/26/2022); sigmoidoscopy (N/A, 02/17/2022); IR EMBOLIZATION HEMORRHAGE (02/19/2022); Tunneled venous catheter placement (Right, 02/21/2022); IR TUNNELED CVC PLACE WO SQ PORT/PUMP > 5 YEARS (02/21/2022); Cardiac catheterization (2019); Colonoscopy; Dialysis fistula creation (Left, 04/29/2022); Cholecystectomy, laparoscopic (N/A, 05/16/2022); ERCP (N/A, 05/16/2022); ERCP (N/A, 05/16/2022); Upper gastrointestinal endoscopy (05/16/2022); IR TUNNELED CVC PLACE WO SQ PORT/PUMP > 5 YEARS (Right, 12/28/2022); and IR TUNNELED CVC PLACE WO SQ PORT/PUMP > 5 YEARS (12/28/2022). Social Hx: reviewed and updated as appropriate  Social History     Tobacco Use    Smoking status: Former    Smokeless tobacco: Never   Substance Use Topics    Alcohol use: Not Currently        Family hx: reviewed and updated as appropriate  family history includes High Blood Pressure in his father. Medications:   ceFAZolin, 1,000 mg, Q24H  epoetin davis-epbx, 10,000 Units, Once per day on Tue Thu Sat  sodium chloride flush, 10 mL, 2 times per day  apixaban, 2.5 mg, BID  aspirin, 81 mg, Daily  [Held by provider] atenolol, 25 mg, QPM  atorvastatin, 10 mg, Nightly  [Held by provider] gabapentin, 100 mg, TID  LORazepam, 0.5 mg, BID  pantoprazole, 20 mg, Nightly  sevelamer, 800 mg, TID WC  tamsulosin, 0.4 mg, QAM  docusate sodium, 200 mg, BID     Lisinopril    Allergies:    Allergies   Allergen Reactions    Lisinopril Swelling     Allergy listed on paperwork from [facility], no reaction noted         Physical Exam/Objective:   Vitals:    12/28/22 0915   BP: 124/64   Pulse: 66   Resp: 17   Temp:    SpO2: 100%     No intake or output data in the 24 hours ending 12/28/22 1530        General appearance:  in no acute distress, comfortable  HEENT: no icterus, EOM intact, trachea midline. Neck : no masses, appears symmetrical   Respiratory: Respiratory effort normal, bilateral equal chest rise. No wheeze, no crackles   Cardiovascular: Ausculation shows RRR and  No edema   Abdomen: abdomen is soft, non distended  Musculoskeletal:  no joint swelling, no deformity  Skin: no rashes, no induration, no tightening, no jaundice   Neuro:   Follows commands, moves all extremities spontaneously       Data:   CBC:   Recent Labs     12/26/22  0759   WBC 3.1*   HGB 9.1*   HCT 27.5*        BMP:    Recent Labs     12/26/22  0759      K 3.8      CO2 25   BUN 30*   CREATININE 4.3*   GLUCOSE 95   PHOS 3.5

## 2022-12-28 NOTE — CARE COORDINATION
Discharge Planning:  PATRICK reviewed chart. Pt had a conversion of a right [place] non-tunneled hemodialysis catheter to a tunneled HD catheter today. PATRICK contacted Christos Posada at [facility] to give her an update. [phone]. HIPAA compliant message left requesting a return call. Plan: Pt is a long term resident at [facility]. Plan is to return to [facility] upon d/c. No COVID test is needed. Pt goes to [facility] via stretcher TTa for HD. HOLLI Luna \[facility] [place] [facility]\""  [phone]  Electronically signed by Patric Wood on 12/28/2022 at 1:45 PM    Addendum:  PATRICK spoke with Christos Poasda at [facility]. Christos Posada confirmed pts bed is held. Update given.    HOLLI Luna \[facility] [place] [facility]\""  [phone]  Electronically signed by Patric Wood on 12/28/2022 at 1:50 PM

## 2022-12-28 NOTE — PROGRESS NOTES
Occupational Therapy  Facility/Department: [address] MED SURG  Occupational Therapy Daily Treatment    Name: Carlos Levy  : [birth year]  MRN: [id number]  Date of Service: 2022    Discharge Recommendations:  [address] with OT     Carlos Levy scored a  on the AM-[facility] ADL Inpatient form. Patient Diagnosis(es): The primary encounter diagnosis was Altered mental status, unspecified altered mental status type. Diagnoses of Acute cystitis without hematuria and Lactic acidosis were also pertinent to this visit. Past Medical History:  has a past medical history of Anemia, Arthritis, CAD (coronary artery disease), Cancer ([address] Cerebral infarction ([address] Cognitive communication deficit, COVID-19, Diabetes mellitus ([address] Dysphagia, End stage renal disease ([address] Fatigue, Gastrointestinal hemorrhage, Hemodialysis patient ([address] Hx of blood clots, Hyperlipidemia, Hypertension, Hyponatremia, Risk for falls, and Splenomegaly. Past Surgical History:  has a past surgical history that includes IR PERC ARTERIOVENOUS FISTULA CREATION (Left); colectomy (N/A, 2022); sigmoidoscopy (N/A, 2022); IR EMBOLIZATION HEMORRHAGE (2022); Tunneled venous catheter placement (Right, 2022); IR TUNNELED CVC PLACE WO SQ PORT/PUMP > 5 YEARS (2022); Cardiac catheterization (2019); Colonoscopy; Dialysis fistula creation (Left, 2022); Cholecystectomy, laparoscopic (N/A, 2022); ERCP (N/A, 2022); ERCP (N/A, 2022); Upper gastrointestinal endoscopy (2022); IR TUNNELED CVC PLACE WO SQ PORT/PUMP > 5 YEARS (Right, 2022); and IR TUNNELED CVC PLACE WO SQ PORT/PUMP > 5 YEARS (2022). Treatment Diagnosis: Decreased: ADLs, functional transfers      Assessment   Performance deficits / Impairments: Decreased functional mobility ; Decreased ADL status; Decreased cognition;Decreased balance;Decreased strength;Decreased endurance;Decreased ROM  Assessment: Pt is a 68 yo M admitted with AMS and increased lethargy. PTA, pt is a resident of [place] [address] where he requires assist for most ADLs and assist x2 staff members to pivot transfer to w/c. Today - pt fatigued from procedure and limited to in bed activity only. He completed simple grooming w/ setup and participated in B UE therex and tolerated well. Will plan for OOB next session. Continue to anticipate safe return to [facility] w/ ongoing skilled OT to increase pt's safety, independence, and decrease burden of care. Treatment Diagnosis: Decreased: ADLs, functional transfers  Prognosis: Fair  REQUIRES OT FOLLOW-UP: Yes  Activity Tolerance  Activity Tolerance: Patient limited by fatigue;Treatment limited secondary to decreased cognition        Plan   Occupational Therapy Plan  Times Per Week: 2-3  Times Per Day: Once a day  Current Treatment Recommendations: Strengthening, ROM, Balance training, Functional mobility training, Endurance training, Self-Care / ADL, Safety education & training     Restrictions  Restrictions/Precautions  Restrictions/Precautions: Fall Risk  Position Activity Restriction  Other position/activity restrictions: pt with confusion, occasional combativeness, telesitter    Subjective   General  Chart Reviewed: Yes  Patient assessed for rehabilitation services?: Yes  Additional Pertinent Hx: per Dr Lety Adair note: \"73 y.o. male presents from his nursing facility with concern for altered mental status and increased lethargy. History of dementia however normally patient is conversant. Symptoms reportedly started after he returned home from dialysis. He was noted to have a low-grade temperature at his nursing facility. \"  Family / Caregiver Present: No  Referring Practitioner: Radha Duke  Diagnosis: Acute encephalopathy  Subjective  Subjective: Pt met b/s for OT. Pt in bed, agreeable to activity in bed only - had procedure this AM and is tired.  Denied pain  General Comment  Comments: Per RN ok to see     Social/Functional History  Social/Functional History  Type of Home: Facility ([address])  ADL Assistance: Needs assistance (needs assist with all except feeding)  Ambulation Assistance: Non-ambulatory (uses w/c, but needs help propelling further than room distance)  Transfer Assistance: Needs assistance (assist of 2 for transfers)       Objective   Safety Devices  Type of Devices: All fall risk precautions in place;Call light within reach;Gait belt;Patient at risk for falls; Left in bed;Bed alarm in place;Nurse notified           ADL  Grooming: Setup; Other (Comment)  Grooming Skilled Clinical Factors: Pt washed his face and brushed his hair in bed. OT assisted to wash hair w/ a shower cap                 Cognition  Overall Cognitive Status: Exceptions  Arousal/Alertness: Appropriate responses to stimuli  Following Commands: Follows one step commands consistently  Attention Span: Attends with cues to redirect  Memory: Decreased recall of recent events  Safety Judgement: Decreased awareness of need for safety;Decreased awareness of need for assistance  Problem Solving: Decreased awareness of errors  Insights: Decreased awareness of deficits  Initiation: Requires cues for some  Sequencing: Requires cues for some  Orientation  Overall Orientation Status: Impaired  Orientation Level: Oriented to place;Oriented to person;Disoriented to time;Disoriented to situation               A/AROM Exercises: 10 reps B UE shoulder flexion, elbow flexion/extension, chest press, supination/pronation, hand grasp/release - very limited R shoulder ROM - PROM limited to 90* shoulder flexion  Education Given To: Patient; Family  Education Provided: Role of Therapy; ADL Adaptive Strategies; Home Exercise Program  Education Method: Verbal;Demonstration  Barriers to Learning: Cognition  Education Outcome: Continued education needed     AM-PAC Score  [facility] Inpatient Daily Activity Raw Score: 11 (12/28/22 1600)  AM-PAC Inpatient ADL T-Scale Score : 29.04 (12/28/22 1600)  ADL Inpatient CMS 0-100% Score: 70.42 (12/28/22 1600)  ADL Inpatient CMS G-Code Modifier : CL (12/28/22 1600)      Goals  Short Term Goals  Time Frame for Short Term Goals: Prior to d/c; goals ongoing  Short Term Goal 1: Pt will complete bed mobility w/ min A  Short Term Goal 2: Pt will sit EOB x10 mins w/ SBA during ADL task  Short Term Goal 3: Pt will complete ADL transfer w/ mod Ax2 using LRAD  Short Term Goal 4: Pt will groom w/ setup-met  Long Term Goals  Time Frame for Long Term Goals : LTG=STG  Patient Goals   Patient goals : did not state       Therapy Time   Individual Concurrent Group Co-treatment   Time In [place] [address]         Time Out 1555         Minutes [address], [place] [address]

## 2022-12-28 NOTE — PLAN OF CARE
Problem: Skin/Tissue Integrity  Goal: Absence of new skin breakdown  Description: 1. Monitor for areas of redness and/or skin breakdown  2. Assess vascular access sites hourly  3. Every 4-6 hours minimum:  Change oxygen saturation probe site  4. Every 4-6 hours:  If on nasal continuous positive airway pressure, respiratory therapy assess nares and determine need for appliance change or resting period. Outcome: Progressing     Patient turned every two hours. Heels elevated off bed. Skin assessed qshift. Oksana Jordan

## 2022-12-28 NOTE — PROGRESS NOTES
Pt awake with complaint of nausea after re postioning. Per Patient, \" My stomach doesn;t feel good. \" PRN Zofran administered per Mar.

## 2022-12-28 NOTE — PRE SEDATION
Sedation Pre-Procedure Note    Patient Name: Felicity Nichole   YOB: 1945  Room/Bed: C8R-1959/1812-28  Medical Record Number: [id number]  Date: 12/28/2022   Time: 9:18 AM       Indication:  Tunneled HD catheter placement. Consent: I have discussed with the patient and/or the patient representative the indication, alternatives, and the possible risks and/or complications of the planned procedure and the anesthesia methods. The patient and/or patient representative appear to understand and agree to proceed. Vital Signs:   Vitals:    12/28/22 0915   BP: 124/64   Pulse: 66   Resp: 17   Temp:    SpO2: 100%       Past Medical History:   has a past medical history of Anemia, Arthritis, CAD (coronary artery disease), Cancer ([address] Cerebral infarction ([address] Cognitive communication deficit, COVID-19, Diabetes mellitus ([address] Dysphagia, End stage renal disease ([address] Fatigue, Gastrointestinal hemorrhage, Hemodialysis patient ([address] Hx of blood clots, Hyperlipidemia, Hypertension, Hyponatremia, Risk for falls, and Splenomegaly. Past Surgical History:   has a past surgical history that includes IR PERC ARTERIOVENOUS FISTULA CREATION (Left); colectomy (N/A, 01/26/2022); sigmoidoscopy (N/A, 02/17/2022); IR EMBOLIZATION HEMORRHAGE (02/19/2022); Tunneled venous catheter placement (Right, 02/21/2022); IR TUNNELED CVC PLACE WO SQ PORT/PUMP > 5 YEARS (02/21/2022); Cardiac catheterization (2019); Colonoscopy; Dialysis fistula creation (Left, 04/29/2022); Cholecystectomy, laparoscopic (N/A, 05/16/2022); ERCP (N/A, 05/16/2022); ERCP (N/A, 05/16/2022); Upper gastrointestinal endoscopy (05/16/2022); and IR TUNNELED CVC PLACE WO SQ PORT/PUMP > 5 YEARS (Right, 12/28/2022).     Medications:   Scheduled Meds:    ceFAZolin  1,000 mg IntraVENous Q24H    epoetin davis-epbx  10,000 Units IntraVENous Once per day on Tue Thu Sat    sodium chloride flush  10 mL IntraVENous 2 times per day    apixaban  2.5 mg Oral BID    aspirin  81 mg Oral Daily    [Held by provider] atenolol  25 mg Oral QPM    atorvastatin  10 mg Oral Nightly    [Held by provider] gabapentin  100 mg Oral TID    LORazepam  0.5 mg Oral BID    pantoprazole  20 mg Oral Nightly    sevelamer  800 mg Oral TID WC    tamsulosin  0.4 mg Oral QAM    docusate sodium  200 mg Oral BID     Continuous Infusions:    sodium chloride       PRN Meds: sodium chloride flush, sodium chloride, promethazine **OR** ondansetron, acetaminophen **OR** acetaminophen, melatonin  Home Meds:   Prior to Admission medications    Medication Sig Start Date End Date Taking? Authorizing Provider   oxyCODONE (ROXICODONE) 5 MG immediate release tablet Take 5 mg by mouth every 4 hours as needed for Pain. Yes Historical Provider, MD   ondansetron (ZOFRAN) 4 MG/2ML injection Infuse 4 mg intravenously every 6 hours as needed for Nausea or Vomiting   Yes Historical Provider, MD   LORazepam (ATIVAN) 0.5 MG tablet Take 0.5 mg by mouth in the morning and at bedtime. Historical Provider, MD   Wound Dressings ([facility] WOUND/BURN DRESSING) GEL gel Apply 1 each topically daily Apply to RIGHT ankle wound daily 6/13/22   Patria Oliva MD   docusate sodium (COLACE) 100 MG capsule Take 100 mg by mouth 2 times daily    Historical Provider, MD   polyethylene glycol (GLYCOLAX) 17 GM/SCOOP powder Take 17 g by mouth daily as needed (Constipation)     Historical Provider, MD   apixaban (ELIQUIS) 5 MG TABS tablet Take 1 tablet by mouth 2 times daily 3/2/22   Jeanine Matamoros MD   aspirin 81 MG chewable tablet Take 1 tablet by mouth daily 2/11/22   Manohar Coronel Cap, MD   melatonin 3 MG TABS tablet Take 2 tablets by mouth nightly as needed (sleep) 2/4/22   Elva Cranker, MD   tamsulosin (FLOMAX) 0.4 MG capsule Take 0.4 mg by mouth every morning    Historical Provider, MD   gabapentin (NEURONTIN) 100 MG capsule Take 100 mg by mouth 3 times daily.     Historical Provider, MD   sevelamer (RENVELA) 800 MG tablet Take 800 mg by mouth 3 times daily (with meals)     Historical Provider, MD   atenolol (TENORMIN) 25 MG tablet Take 25 mg by mouth every evening    Historical Provider, MD   pantoprazole (PROTONIX) 20 MG tablet Take 20 mg by mouth nightly    Historical Provider, MD   atorvastatin (LIPITOR) 10 MG tablet Take 10 mg by mouth nightly    Historical Provider, MD   calcitRIOL (ROCALTROL) 0.25 MCG capsule Take 0.25 mcg by mouth every evening    Historical Provider, MD     Coumadin Use Last 7 Days:  no  Antiplatelet drug therapy use last 7 days: no  Other anticoagulant use last 7 days: no  Additional Medication Information:  n/a      Pre-Sedation Documentation and Exam:   I have reviewed the patient's history and review of systems.     Mallampati Airway Assessment:  Mallampati Class II - (soft palate, fauces & uvula are visible)    Prior History of Anesthesia Complications:   none    ASA Classification:  Class 2 - A normal healthy patient with mild systemic disease    Sedation/ Anesthesia Plan:   intravenous sedation    Medications Planned:   midazolam (Versed) intravenously and fentanyl intravenously    Patient is an appropriate candidate for plan of sedation: yes    Electronically signed by Aguila Waterman MD on 12/28/2022 at 9:18 AM

## 2022-12-28 NOTE — DISCHARGE INSTR - COC
Continuity of Care Form    Patient Name: Beny Soto   :    MRN:  [id number]    Admit date:  2022  Discharge date:      Code Status Order: Full Code   Advance Directives:     Admitting Physician:  Roro Ovalle DO  PCP: Gaila Prader    Discharging Nurse: [facility] Unit/Room#: X3I-5513/8042-18  Discharging Unit Phone Number: [phone]    Emergency Contact:   Extended Emergency Contact Information  Primary Emergency Contact: NikolayKerline  Address: Dulce Nix. [address] [facility], [address] [place] [address] Phone: [phone]  Mobile Phone: [phone]  Relation: Spouse  Secondary Emergency Contact: Jung Georges Phone: [phone]  Mobile Phone: [phone]  Relation: Child   needed?  No    Past Surgical History:  Past Surgical History:   Procedure Laterality Date    CARDIAC CATHETERIZATION      CHOLECYSTECTOMY, LAPAROSCOPIC N/A 2022    LAPAROSCOPIC CHOLECYSTECTOMY WITH CHOLANGIOGRAM performed by Dwight Cruz MD at [address] [place] [address] 2022    SIGMOID COLECTOMY, SIGMOIDOSCOPY performed by Dwight Cruz MD at [address] 2022    LEFT BRACHIAL CEPHALIC FISTULA performed by Dwain Fraser MD at [place] [address]    ERCP N/A 2022    ERCP SPHINCTER/PAPILLOTOMY performed by Lexus De La Cruz MD at [address] [place] [address]    ERCP N/A 2022    ERCP STONE REMOVAL/BALLOON SWEEP performed by Lexus De La Cruz MD at [facility]  2022    IR EMBOLIZATION HEMORRHAGE 2022 [facility]     Corporate Dr Left     unsure of date    IR TUNNELED CATHETER PLACEMENT GREATER THAN 5 YEARS  2022    IR TUNNELED CATHETER PLACEMENT GREATER THAN 5 YEARS 2022 [facility]    SIGMOIDOSCOPY N/A 2022    SIGMOIDOSCOPY CONTROL HEMORRHAGE performed by Michel Jones MD at [facility] ENDOSCOPY    TUNNELED VENOUS CATHETER PLACEMENT Right 02/21/2022    Permacath; RIJ access; 23cm; Dr. Jose Ramon Acharya  5/16/2022    ERCP POLYP SNARE performed by Madina Nathan MD at [facility] ENDOSCOPY       Immunization History:   Immunization History   Administered Date(s) Administered    COVID-19, MODERNA BLUE border, Primary or Immunocompromised, (age 12y+), IM, 100 mcg/0.5mL 03/04/2021, 04/02/2021       Active Problems:  Patient Active Problem List   Diagnosis Code    GI bleed K92.2    History of COVID-19 Z86.16    Hyponatremia E87.1    Fatigue R53.83    Acute kidney injury superimposed on CKD (HCC) N17.9, N18.9    Lethargy R53.83    Suspected UTI R39.89    Acute CVA (cerebrovascular accident) ([facility] I63.9    LESLEE (acute kidney injury) ([address] N17.9    Malignant neoplasm of colon ([address] C18.9    Acute blood loss anemia D62    COVID-19 U07.1    Shock circulatory (HCC) R57.9    Bilateral pulmonary embolism (HCC) I26.99    Lactic acidosis E87.20    Hemorrhagic shock (HCC) R57.8    ESRD (end stage renal disease) on dialysis ([address] N18.6, Z99.2    Acute cholecystitis K81.0    Pain of upper abdomen R10.10    AMS (altered mental status) R41.82    Sepsis (HCC) K80.1    Acute metabolic encephalopathy L50.95    Cellulitis L03.90    Fever and chills R50.9    PVD (peripheral vascular disease) (HCC) I73.9    Anxiety F41.9    Rectal bleeding K62.5    Severe malnutrition (HCC) E43    Acute encephalopathy G93.40    Acute cystitis without hematuria N30.00    Colitis K52.9    Elevated procalcitonin R79.89    Chronic anticoagulation Z79.01    Hemodialysis catheter infection ([facility] T82. 7XXA       Isolation/Infection:   Isolation            No Isolation          Patient Infection Status       Infection Onset Added Last Indicated Last Indicated By Review Planned Expiration Resolved Resolved By    None active    Resolved    COVID-19 (Rule Out) 12/20/22 12/20/22 12/20/22 COVID-19, Rapid (Ordered)   12/20/22 Rule-Out Test Resulted    COVID-19 (Rule Out) 09/15/22 09/15/22 09/15/22 COVID-19, Rapid (Ordered)   09/15/22 Rule-Out Test Resulted    C-diff Rule Out 22 Clostridium Difficile Toxin/Antigen (Ordered)   22 Pablo Barraza RN    Order     COVID-19 (Rule Out) 22 COVID-19, Rapid (Ordered)   22 Rule-Out Test Resulted    COVID-19 02/10/22 02/10/22 02/10/22 COVID-19, Rapid   03/10/22     COVID-19 (Rule Out) 22 COVID-19, Rapid (Ordered)   22 Rule-Out Test Resulted            Nurse Assessment:  Last Vital Signs: /63   Pulse 72   Temp 98.8 °F (37.1 °C) (Oral)   Resp 18   Ht 6' 5\" (1.956 m)   Wt 220 lb 14.4 oz (100.2 kg)   SpO2 95%   BMI 26.19 kg/m²     Last documented pain score (0-10 scale): Pain Level: 0  Last Weight:   Wt Readings from Last 1 Encounters:   22 220 lb 14.4 oz (100.2 kg)     Mental Status:  disoriented and alert    IV Access:  - None    Nursing Mobility/ADLs:  Walking   Assisted  Transfer  Assisted  Bathing  Assisted  Dressing  Assisted  Toileting  Assisted  Feeding  Assisted  Med Admin  Assisted  Med Delivery   whole    Wound Care Documentation and Therapy:  Wound 22 Ankle Anterior;Right (Active)   Number of days: 209       Wound 22 Buttocks (Active)   Wound Etiology Deep tissue/Injury 22   Dressing Status New dressing applied 22   Wound Cleansed Soap and water 22   Dressing/Treatment Triad hydro/zinc oxide-based hydrophilic paste 89/40/45 6023   Wound Assessment Pink/red 22   Drainage Amount None 22   Drainage Description Thin;Brown 22 0900   Odor None 22   Pattie-wound Assessment Blanchable erythema 22   Number of days: 209       Incision 22 Abdomen (Active)   Number of days: 335        Elimination:  Continence:    Bowel: No  Bladder: No  Urinary Catheter: None   Colostomy/Ileostomy/Ileal Conduit: No       Date of Last BM: 12/26  No intake or output data in the 24 hours ending 12/28/22 0010  I/O last 3 completed shifts: In: 675.9 [P.O.:500; IV Piggyback:175.9]  Out: -     Safety Concerns: At Risk for Falls    Impairments/Disabilities:      None    Nutrition Therapy:  Current Nutrition Therapy:   - Oral Diet:  General    Routes of Feeding: Oral  Liquids: Thin Liquids  Daily Fluid Restriction: no  Last Modified Barium Swallow with Video (Video Swallowing Test): not done    Treatments at the Time of Hospital Discharge:   Respiratory Treatments:   Oxygen Therapy:  is not on home oxygen therapy.   Ventilator:    - No ventilator support    Rehab Therapies: Physical Therapy and Occupational Therapy  Weight Bearing Status/Restrictions: No weight bearing restrictions  Other Medical Equipment (for information only, NOT a DME order):  hospital bed  Other Treatments:     Patient's personal belongings (please select all that are sent with patient):  None    RN SIGNATURE:  Electronically signed by Nathaniel Sandhu RN on 12/29/22 at 3:00 PM EST    CASE MANAGEMENT/SOCIAL WORK SECTION    Inpatient Status Date: 12/20/2022    Readmission Risk Assessment Score:  Readmission Risk              Risk of Unplanned Readmission:  46           Discharging to Facility/ Agency   Name: Genna Dyer  Address:  [address] [place] [address]   Phone:  [phone]  Fax:  [phone]     Dialysis Facility (if applicable)   Name: Lorena Pastrana  Address:  Dialysis Schedule: [place] ,   Phone:   [phone](p); [phone](S)      / signature: {Esignature:[id number]}    PHYSICIAN SECTION    Prognosis: Good    Condition at Discharge: Stable    Rehab Potential (if transferring to Rehab): Good    Recommended Labs or Other Treatments After Discharge:   IV Cefazolin x 2 gm with HD sessions x  3  times a week stop date  x 1/4/23  NO ID Follow up   NO LABS due to short course of IV abx Montserrat Tate MD  Infectious Disease  [facility] ([facility]) Physician  Phone: [phone]   Fax : [phone]         Physician Certification: I certify the above information and transfer of Mely Garrido  is necessary for the continuing treatment of the diagnosis listed and that he requires Home Care for less 30 days.      Update Admission H&P: No change in H&P    PHYSICIAN SIGNATURE:  Electronically signed by Eric Cobb MD on 12/29/22 at 12:48 PM EST

## 2022-12-28 NOTE — BRIEF OP NOTE
Brief Postoperative Note    Anuradha Sheikh  YOB: 1945  [id number]    Pre-operative Diagnosis: LESLEE on CKD    Post-operative Diagnosis: Same    Procedure: Conversion of a Non-tunneled Hemodialysis Catheter to a Tunneled Hemodialysis Catheter    Anesthesia: Moderate Sedation    Surgeons: Lois Sanchez MD    Estimated Blood Loss: Less than 5 mL    Complications: None    Specimens: Was Not Obtained    Findings: Successful conversion of a right IJ non-tunneled hemodialysis catheter to a tunneled hemodialysis catheter.     Electronically signed by Lois Sanchez MD on 12/28/2022 at 9:20 AM

## 2022-12-29 VITALS
HEIGHT: 77 IN | RESPIRATION RATE: 16 BRPM | SYSTOLIC BLOOD PRESSURE: 114 MMHG | HEART RATE: 73 BPM | DIASTOLIC BLOOD PRESSURE: 48 MMHG | WEIGHT: 196.87 LBS | TEMPERATURE: 98 F | OXYGEN SATURATION: 97 % | BODY MASS INDEX: 23.25 KG/M2

## 2022-12-29 LAB
GLUCOSE BLD-MCNC: 121 MG/DL (ref 70–99)
GLUCOSE BLD-MCNC: 96 MG/DL (ref 70–99)
PERFORMED ON: ABNORMAL
PERFORMED ON: NORMAL

## 2022-12-29 PROCEDURE — 94760 N-INVAS EAR/PLS OXIMETRY 1: CPT

## 2022-12-29 PROCEDURE — 90935 HEMODIALYSIS ONE EVALUATION: CPT

## 2022-12-29 PROCEDURE — 6360000002 HC RX W HCPCS: Performed by: INTERNAL MEDICINE

## 2022-12-29 PROCEDURE — 6370000000 HC RX 637 (ALT 250 FOR IP): Performed by: INTERNAL MEDICINE

## 2022-12-29 RX ORDER — HEPARIN SODIUM 1000 [USP'U]/ML
3600 INJECTION, SOLUTION INTRAVENOUS; SUBCUTANEOUS PRN
Status: DISCONTINUED | OUTPATIENT
Start: 2022-12-29 | End: 2022-12-29 | Stop reason: HOSPADM

## 2022-12-29 RX ADMIN — EPOETIN ALFA-EPBX 10000 UNITS: 10000 INJECTION, SOLUTION INTRAVENOUS; SUBCUTANEOUS at 12:51

## 2022-12-29 RX ADMIN — HEPARIN SODIUM 3600 UNITS: 1000 INJECTION INTRAVENOUS; SUBCUTANEOUS at 13:24

## 2022-12-29 RX ADMIN — DOCUSATE SODIUM 200 MG: 100 CAPSULE, LIQUID FILLED ORAL at 14:18

## 2022-12-29 RX ADMIN — APIXABAN 2.5 MG: 2.5 TABLET, FILM COATED ORAL at 14:18

## 2022-12-29 RX ADMIN — ASPIRIN 81 MG 81 MG: 81 TABLET ORAL at 14:18

## 2022-12-29 RX ADMIN — TAMSULOSIN HYDROCHLORIDE 0.4 MG: 0.4 CAPSULE ORAL at 14:18

## 2022-12-29 RX ADMIN — LORAZEPAM 0.5 MG: 0.5 TABLET ORAL at 14:18

## 2022-12-29 NOTE — PROGRESS NOTES
[address] [place] [address] Nephrology   [facility]  [phone]  Nephrology Note          Patient ID: Anne Chopra  Referring/ Physician: Ciara Burger MD      HPI/Summary:   Anne Chopra is being seen by nephrology for ESRD. This is a 30-year-old male with past medical history significant for colon cancer, ESRD, hypertension, stroke, COVID infection in the past, diabetes who presented to the hospital with fatigue. He has been more lethargic. He denies any cough shortness of breath chest pain. He is unable to provide any more history at this time. MRI head showed a cerebral atrophy, severe chronic small vessel ischemic changes. No acute stroke. CT abdomen showed distal descending colon thickening some fat stranding suggestive of proctocolitis, mild bladder wall thickening possible cystitis, large stool ball in the rectum. Mild splenomegaly and a nonobstructive right renal stone. Interval Hx   Seen on dialysis. No complaints   UF 2 L     BP acceptable  On room air       Plan:   -HD per TTS schedule  - BP is acceptable. - retacrit 10 k units q HD for anemia.   -We will plan to continue cefazolin 2 g q. Hd until 1/5/22, discussed with Dr Melita Almonte. He is at [address] shift       Assessment:  ESRD  [facility]  EDW 85 kg    Line infection:  - TDC infection s/p removal.  - temp HD line in place   MSSA tip cx +   Blood cxs NGTD so far    Electrolytes  No acute issues    Secondary parathyroidism  On Renvela 800 mg 3 times daily    Hypertension  Blood pressure acceptable on atenolol 25 mg daily    Possible UTI and proctocolitis  On cefazolin        [facility]ph[facility] would like to thank you for the opportunity to serve this patient. Please call with any questions or concerns.     Dottie Ortiz MD  [facility] Nephrology  [address] [facility], [address]  Fax: [phone]  Office: [phone]         CC/Reason for consult:   Reason for consult: ESRD  Chief Complaint   Patient presents with    Fatigue     Pt arrived via ems from [facility] for report of altered mental status all day today, patient is more lethargic than normal baseline. Pt has dementia but normal baseline is able to have conversation with staff. Pt very sleepy at triage. Has wet cough. Review of Systems:   [address] All other remaining systems are negative. Constitutional:  fever, chills, weakness, weight change, fatigue,      Skin:  rash, pruritus, hair loss, bruising, dry skin, petechiae. Head, Face, Neck   headaches, swelling,  cervical adenopathy. Respiratory: shortness of breath, cough, or wheezing  Cardiovascular: chest pain, palpitations, dizzy, edema  Gastrointestinal: nausea, vomiting, diarrhea, constipation,belly pain    Yellow skin, blood in stool  Musculoskeletal:  back pain, muscle weakness, gait problems,       joint pain or swelling. Genitourinary:  dysuria, poor urine flow, flank pain, blood in urine  Neurologic:  vertigo, TIA'S, syncope, seizures, focal weakness  Psychosocial:  insomnia, anxiety, or depression.   Additional positive findings: -     PMH/SH/FH:    Medical Hx: reviewed and updated as appropriate  Past Medical History:   Diagnosis Date    Anemia 03/2022    Arthritis     CAD (coronary artery disease) 01/2020    Cancer ([facility]     Colon Cancer diagnosed last month    Cerebral infarction [facility])     Cognitive communication deficit     COVID-19     Diabetes mellitus ([facility]     Dysphagia     End stage renal disease ([facility]     Fatigue     Gastrointestinal hemorrhage     Hemodialysis patient ([facility] 2019    ckd-goes to dialysis Tuesday, Thurs, Fri    Hx of blood clots     Hyperlipidemia     Hypertension     Hyponatremia     Risk for falls     Splenomegaly 02/10/2021         Surgical Hx: reviewed and updated as appropriate   has a past surgical history that includes IR PERC ARTERIOVENOUS FISTULA CREATION (Left); colectomy (N/A, 01/26/2022); sigmoidoscopy (N/A, 02/17/2022); IR EMBOLIZATION HEMORRHAGE (02/19/2022); Tunneled venous catheter placement (Right, 02/21/2022); IR TUNNELED CVC PLACE WO SQ PORT/PUMP > 5 YEARS (02/21/2022); Cardiac catheterization (2019); Colonoscopy; Dialysis fistula creation (Left, 04/29/2022); Cholecystectomy, laparoscopic (N/A, 05/16/2022); ERCP (N/A, 05/16/2022); ERCP (N/A, 05/16/2022); Upper gastrointestinal endoscopy (05/16/2022); IR TUNNELED CVC PLACE WO SQ PORT/PUMP > 5 YEARS (Right, 12/28/2022); and IR TUNNELED CVC PLACE WO SQ PORT/PUMP > 5 YEARS (12/28/2022). Social Hx: reviewed and updated as appropriate  Social History     Tobacco Use    Smoking status: Former    Smokeless tobacco: Never   Substance Use Topics    Alcohol use: Not Currently        Family hx: reviewed and updated as appropriate  family history includes High Blood Pressure in his father. Medications:   ceFAZolin, 1,000 mg, Q24H  epoetin davis-epbx, 10,000 Units, Once per day on Tue Thu Sat  sodium chloride flush, 10 mL, 2 times per day  apixaban, 2.5 mg, BID  aspirin, 81 mg, Daily  [Held by provider] atenolol, 25 mg, QPM  atorvastatin, 10 mg, Nightly  [Held by provider] gabapentin, 100 mg, TID  LORazepam, 0.5 mg, BID  pantoprazole, 20 mg, Nightly  sevelamer, 800 mg, TID WC  tamsulosin, 0.4 mg, QAM  docusate sodium, 200 mg, BID     Lisinopril    Allergies: Allergies   Allergen Reactions    Lisinopril Swelling     Allergy listed on paperwork from [facility], no reaction noted         Physical Exam/Objective:   Vitals:    12/29/22 1345   BP: (!) 114/48   Pulse: 73   Resp: 16   Temp: 98 °F (36.7 °C)   SpO2: 97%       Intake/Output Summary (Last 24 hours) at 12/29/2022 1518  Last data filed at 12/29/2022 1309  Gross per 24 hour   Intake 400 ml   Output 2000 ml   Net -1600 ml           General appearance:  in no acute distress, comfortable  HEENT: no icterus, EOM intact, trachea midline.    Neck : no masses, appears symmetrical Respiratory: Respiratory effort normal, bilateral equal chest rise. No wheeze, no crackles   Cardiovascular: Ausculation shows RRR and  No edema   Abdomen: abdomen is soft, non distended  Musculoskeletal:  no joint swelling, no deformity  Skin: no rashes, no induration, no tightening, no jaundice   Neuro: Follows commands, moves all extremities spontaneously       Data:   CBC:   No results for input(s): WBC, HGB, HCT, PLT in the last 72 hours. BMP:    No results for input(s): NA, K, CL, CO2, BUN, CREATININE, GLUCOSE, MG, PHOS in the last 72 hours.

## 2022-12-29 NOTE — PROGRESS NOTES
Patient discharged back to [address] [place] [address]. RN attempted to call report. No answer. Patient was taken via medical transport in stable condition. Tunneled cath remains in place to R chest. All other lines removed.

## 2022-12-29 NOTE — CARE COORDINATION
DISCHARGE SUMMARY     DATE OF DISCHARGE: 12/29/2022    DISCHARGE DESTINATION: [address]    Level of Care: Intermediate    Discharging to Facility/ Agency   Name: Genna Wallace  Address:  [address], [address]   Phone:  [phone]  Fax:  [phone] [address]: [place] [address] Name:  [facility] up Time: 1600    Phone Number: [phone]    COMMENTS: PATRICK spoke with Piedad Lane at [facility] who is agreeable to accept this pt back today. PATRICK spoke with pts dgtr, Ita Knight explained that pts spouse is currently hospitalized and will also be going to [facility] upon d/c. Janeen is agreeable to the d/c today. PATRICK contacted [facility] and left a message informing this clinic of pts return.    HOLLI Duarte  [phone]  Electronically signed by Yolanda Cabrera on 12/29/2022 at 11:04 AM

## 2022-12-29 NOTE — PROGRESS NOTES
Treatment time: 3 hours     Net UF: 1.6 L     Pre weight: 90.9kg bed weight, maybe inaccurate   Post weight: 89.3kg  EDW: 85kg    Access used: Right chest TDC   Access function: well, tolerated 400 BFR     Medications or blood products given: Retacrit 10,000u     Regular outpatient schedule: TTS    Summary of response to treatment: well, no issues. CVC dressing changed. Copy of dialysis treatment record placed in chart, to be scanned into EMR.

## 2022-12-29 NOTE — DISCHARGE INSTR - DIET

## 2022-12-29 NOTE — CARE COORDINATION
12/29/22 1105   IMM Letter   IMM Letter given to Patient/Family/Significant other/Guardian/POA/by: Provided to patients dgtr at bedside by HOLLI Agee, LIANA. Education provided to dgtr, dgtr reported no questions and verbalized understanding.  Dgtr aware of 4 hours allotted time to determine if they choose to pursue Medicare appeal process   IMM Letter date given: 12/29/22   IMM Letter time given: HOLLI Verdugo  [phone]  Electronically signed by Reilly Rodriguez on 12/29/2022 at 11:06 AM

## 2022-12-31 NOTE — DISCHARGE SUMMARY
Hospital Medicine Discharge Summary      Patient ID: Varun Hu [id number]     Patient's PCP: Jayson Leyva Date: 12/20/2022     Discharge Date: 12/29/2022      Admitting Physician: Aldo Hubbard DO    Discharge Physician: Kelly Quiñones MD     Discharge Diagnoses: Active Hospital Problems    Diagnosis Date Noted    Moderate malnutrition ([address] [E44.0] 12/28/2022     Priority: Medium    Hemodialysis catheter infection ([address] [T82. 7XXA] 12/25/2022     Priority: Medium    Acute cystitis without hematuria [N30.00] 12/22/2022     Priority: Medium    Colitis [K52.9] 12/22/2022     Priority: Medium    Elevated procalcitonin [R79.89] 12/22/2022     Priority: Medium    Chronic anticoagulation [Z79.01] 12/22/2022     Priority: Medium    Acute encephalopathy [G93.40] 12/20/2022     Priority: Medium         The patient was seen and examined on the day of discharge and this discharge summary is in conjunction with any daily progress note from day of discharge.     HOSPITAL COURSE    Patient demographics:  The patient  Padmini Beltran is a 69 y.o. male      Significant past medical history:       Patient Active Problem List   Diagnosis    GI bleed    History of COVID-19    Hyponatremia    Fatigue    Acute kidney injury superimposed on CKD (HCC)    Lethargy    Suspected UTI    Acute CVA (cerebrovascular accident) ([address]    LESLEE (acute kidney injury) ([address]    Malignant neoplasm of colon ([address]    Acute blood loss anemia    COVID-19    Shock circulatory ([address]    Bilateral pulmonary embolism (HCC)    Lactic acidosis    Hemorrhagic shock (HCC)    ESRD (end stage renal disease) on dialysis ([address]    Acute cholecystitis    Pain of upper abdomen    AMS (altered mental status)    Sepsis ([address]    Acute metabolic encephalopathy    Cellulitis    Fever and chills    PVD (peripheral vascular disease) ([address]    Anxiety    Rectal bleeding    Severe malnutrition (HCC)    Acute encephalopathy    Acute cystitis without hematuria    Colitis    Elevated procalcitonin    Chronic anticoagulation    Hemodialysis catheter infection (HCC)    Moderate malnutrition (HCC)            Presenting symptoms:  Fatigue      Diagnostic workup:   MRI BRAIN WO CONTRAST  CT HEAD WO CONTRAST  CT ABDOMEN PELVIS WO CONTRAST       CONSULTS DURING ADMISSION :   IP CONSULT TO NEPHROLOGY  IP CONSULT TO INFECTIOUS DISEASES        Patient was diagnosed with:  Acute encephalopathy  End-stage renal disease on hemodialysis  Tunneled catheter infection  Constipation  Proctocolitis  Type II MI     Treatment while inpatient:  69years old male with medical history significant for end-stage renal disease coronary artery disease diabetes mellitus hypertension hyperlipidemia and seizure disorder. Patient was brought to the emergency room for mental status change. Patient was diagnosed with acute metabolic encephalopathy due to hemodialysis catheter infection. Catheter tip culture was positive for MSSA. Patient's tunneled catheter was removed and patient was treated with IV antibiotics. Patient's hemodialysis was done with temporary catheter. Patient's tunneled catheter was again placed before discharge  Patient will complete antibiotic treatment with IV cefazolin 2 g with hemodialysis sessions for 3 more doses. Discharge Condition:  stable:    Discharged to:  skilled nursing  facility    Activity:   as tolerated: Follow Up: Follow-up with the alf NATE Lindquist:  For convenience and continuity at follow-up the following most recent labs are provided:      CBC:   Lab Results   Component Value Date/Time    WBC 3.1 12/26/2022 07:59 AM    HGB 9.1 12/26/2022 07:59 AM    HCT 27.5 12/26/2022 07:59 AM     12/26/2022 07:59 AM       RENAL:   Lab Results   Component Value Date/Time     12/26/2022 07:59 AM    K 3.8 12/26/2022 07:59 AM    K 3.7 12/22/2022 10:20 AM     12/26/2022 07:59 AM    CO2 25 12/26/2022 07:59 AM    BUN 30 12/26/2022 07:59 AM    CREATININE 4.3 12/26/2022 07:59 AM           Discharge Medications:      Medication List        CONTINUE taking these medications      apixaban 5 MG Tabs tablet  Commonly known as: ELIQUIS  Take 1 tablet by mouth 2 times daily     aspirin 81 MG chewable tablet  Take 1 tablet by mouth daily     atenolol 25 MG tablet  Commonly known as: TENORMIN     atorvastatin 10 MG tablet  Commonly known as: LIPITOR     calcitRIOL 0.25 MCG capsule  Commonly known as: ROCALTROL     docusate sodium 100 MG capsule  Commonly known as: COLACE     gabapentin 100 MG capsule  Commonly known as: NEURONTIN     LORazepam 0.5 MG tablet  Commonly known as: ATIVAN     medihoney wound/burn dressing Gel gel  Apply 1 each topically daily Apply to RIGHT ankle wound daily  Notes to patient: Wound is scabbed     melatonin 3 MG Tabs tablet  Take 2 tablets by mouth nightly as needed (sleep)     ondansetron 4 MG/2ML injection  Commonly known as: ZOFRAN     oxyCODONE 5 MG immediate release tablet  Commonly known as: ROXICODONE     pantoprazole 20 MG tablet  Commonly known as: PROTONIX     polyethylene glycol 17 GM/SCOOP powder  Commonly known as: GLYCOLAX     sevelamer 800 MG tablet  Commonly known as: RENVELA     tamsulosin 0.4 MG capsule  Commonly known as: FLOMAX                 Time Spent on discharge is more than 30 min in the examination, evaluation, counseling and review of medications and discharge plan. Signed:  Francisca Mckeon MD   12/30/2022      Thank you Raul Cortés for the opportunity to be involved in this patient's care. If you have any questions or concerns please feel free to contact me at [phone]. This note was transcribed using [id number] [employer]. Please disregard any translational errors.

## 2023-01-01 LAB — CULTURE CATHETER TIP: NORMAL

## 2023-01-07 ENCOUNTER — HOSPITAL ENCOUNTER (EMERGENCY)
Age: 78
Discharge: HOME | End: 2023-01-07
Attending: EMERGENCY MEDICINE
Payer: COMMERCIAL

## 2023-01-07 ENCOUNTER — APPOINTMENT (OUTPATIENT)
Dept: GENERAL RADIOLOGY | Age: 78
End: 2023-01-07
Payer: COMMERCIAL

## 2023-01-07 VITALS
DIASTOLIC BLOOD PRESSURE: 61 MMHG | WEIGHT: 192.68 LBS | RESPIRATION RATE: 17 BRPM | BODY MASS INDEX: 22.85 KG/M2 | OXYGEN SATURATION: 98 % | SYSTOLIC BLOOD PRESSURE: 110 MMHG | HEART RATE: 60 BPM | TEMPERATURE: 98.1 F

## 2023-01-07 DIAGNOSIS — T82.9XXA COMPLICATION ASSOCIATED WITH DIALYSIS CATHETER: Primary | ICD-10-CM

## 2023-01-07 LAB
A/G RATIO: 0.9 (ref 1.1–2.2)
ALBUMIN SERPL-MCNC: 2.9 G/DL (ref 3.4–5)
ALP BLD-CCNC: 94 U/L (ref 40–129)
ALT SERPL-CCNC: <5 U/L (ref 10–40)
ANION GAP SERPL CALCULATED.3IONS-SCNC: 13 MMOL/L (ref 3–16)
AST SERPL-CCNC: 7 U/L (ref 15–37)
BASOPHILS ABSOLUTE: 0 K/UL (ref 0–0.2)
BASOPHILS RELATIVE PERCENT: 0.5 %
BILIRUB SERPL-MCNC: 0.3 MG/DL (ref 0–1)
BUN BLDV-MCNC: 41 MG/DL (ref 7–20)
CALCIUM SERPL-MCNC: 8.6 MG/DL (ref 8.3–10.6)
CHLORIDE BLD-SCNC: 98 MMOL/L (ref 99–110)
CO2: 28 MMOL/L (ref 21–32)
CREAT SERPL-MCNC: 5.9 MG/DL (ref 0.8–1.3)
EOSINOPHILS ABSOLUTE: 0.1 K/UL (ref 0–0.6)
EOSINOPHILS RELATIVE PERCENT: 2 %
GFR SERPL CREATININE-BSD FRML MDRD: 9 ML/MIN/{1.73_M2}
GLUCOSE BLD-MCNC: 129 MG/DL (ref 70–99)
HCT VFR BLD CALC: 30 % (ref 40.5–52.5)
HEMOGLOBIN: 9.4 G/DL (ref 13.5–17.5)
LACTIC ACID, SEPSIS: 1.7 MMOL/L (ref 0.4–1.9)
LYMPHOCYTES ABSOLUTE: 1 K/UL (ref 1–5.1)
LYMPHOCYTES RELATIVE PERCENT: 20.8 %
MCH RBC QN AUTO: 31.6 PG (ref 26–34)
MCHC RBC AUTO-ENTMCNC: 31.4 G/DL (ref 31–36)
MCV RBC AUTO: 100.5 FL (ref 80–100)
MONOCYTES ABSOLUTE: 0.3 K/UL (ref 0–1.3)
MONOCYTES RELATIVE PERCENT: 6.7 %
NEUTROPHILS ABSOLUTE: 3.3 K/UL (ref 1.7–7.7)
NEUTROPHILS RELATIVE PERCENT: 70 %
PDW BLD-RTO: 15.1 % (ref 12.4–15.4)
PLATELET # BLD: 214 K/UL (ref 135–450)
PMV BLD AUTO: 7.8 FL (ref 5–10.5)
POTASSIUM SERPL-SCNC: 3.8 MMOL/L (ref 3.5–5.1)
PRO-BNP: ABNORMAL PG/ML (ref 0–449)
PROCALCITONIN: 0.66 NG/ML (ref 0–0.15)
RAPID INFLUENZA  B AGN: NEGATIVE
RAPID INFLUENZA A AGN: NEGATIVE
RBC # BLD: 2.99 M/UL (ref 4.2–5.9)
SARS-COV-2, NAAT: NOT DETECTED
SODIUM BLD-SCNC: 139 MMOL/L (ref 136–145)
TOTAL PROTEIN: 6 G/DL (ref 6.4–8.2)
TROPONIN: 0.06 NG/ML
WBC # BLD: 4.8 K/UL (ref 4–11)

## 2023-01-07 PROCEDURE — 87635 SARS-COV-2 COVID-19 AMP PRB: CPT

## 2023-01-07 PROCEDURE — 80053 COMPREHEN METABOLIC PANEL: CPT

## 2023-01-07 PROCEDURE — 99285 EMERGENCY DEPT VISIT HI MDM: CPT

## 2023-01-07 PROCEDURE — 71045 X-RAY EXAM CHEST 1 VIEW: CPT

## 2023-01-07 PROCEDURE — 36415 COLL VENOUS BLD VENIPUNCTURE: CPT

## 2023-01-07 PROCEDURE — 84145 PROCALCITONIN (PCT): CPT

## 2023-01-07 PROCEDURE — 87804 INFLUENZA ASSAY W/OPTIC: CPT

## 2023-01-07 PROCEDURE — 83880 ASSAY OF NATRIURETIC PEPTIDE: CPT

## 2023-01-07 PROCEDURE — 85025 COMPLETE CBC W/AUTO DIFF WBC: CPT

## 2023-01-07 PROCEDURE — 93005 ELECTROCARDIOGRAM TRACING: CPT | Performed by: PHYSICIAN ASSISTANT

## 2023-01-07 PROCEDURE — 83605 ASSAY OF LACTIC ACID: CPT

## 2023-01-07 PROCEDURE — 84484 ASSAY OF TROPONIN QUANT: CPT

## 2023-01-07 NOTE — ED PROVIDER NOTES
ED Attending Attestation Note    This patient was seen by the advanced practice provider. I personally saw the patient and performed a substantive portion of the visit including all aspects of the medical decision making. Briefly, 69 y.o. male who  has a past medical history of Anemia, Arthritis, CAD (coronary artery disease), Cancer ([facility] [address] Cerebral infarction ([facility] [address] Cognitive communication deficit, COVID-19, Diabetes mellitus ([facility] [address] Dysphagia, End stage renal disease ([facility] [address] Fatigue, Gastrointestinal hemorrhage, Hemodialysis patient ([facility] [address] Hx of blood clots, Hyperlipidemia, Hypertension, Hyponatremia, Risk for falls, and Splenomegaly. presents from [address] [place] [address] for evaluation of his Vas-Cath being removed by accident today. Patient is unsure of how the Vas-Cath got removed. Vas-Cath was in place in the right upper chest.  No bleeding. Patient receives dialysis Tuesday Thursday Saturday. Focused exam:   Gen: 69 y.o. male, NAD  HEENT: NCAT. PERRL. EOMI. CV: RRR w/o MRG  Lungs: CTAB. No incr WOB. Abdomen: Soft, nontender, nondistended. No rebound/guarding. MSK: Insertion site where the Vas-Cath was present in the right upper chest with no active bleeding, no surrounding erythema, Vas-Cath no longer present in this area    The Ekg interpreted by me shows  Sinus rhythm with first-degree AV block and a rate of 60, normal axis, , QRS 82, QTc 394, no ST elevations, nonspecific ST changes, poor R wave progression which is new when compared EKG from 12/20/2022 otherwise no significant changes      MDM:   Patient seen and evaluated. History physical as above. Nontoxic and afebrile. Patient with accidental removal of Vas-Cath outpatient. Nephrology was consulted and recommends follow-up in the office on Monday for replacement of the Vas-Cath. No requirement for urgent dialysis at this time with patient's potassium 3.8, sodium 139, creatinine 5.9 and BUN of 41.   Troponin at baseline at 0.06.  BNP mildly elevated at 12,646 but patient is tolerating room air. COVID and flu negative. Lactic acid normal 1.7. Hemoglobin stable 9.4. WBC 4.8. Plan for discharge with outpatient follow-up to nephrology. For further details of the patient's emergency department visit, please see the advanced practice provider's documentation. Leo Guerra MD     This report has been produced using speech recognition software and may contain errors related to that system including errors in grammar, punctuation, and spelling, as well as words and phrases that may be inappropriate. If there are any questions or concerns please feel free to contact the dictating provider for clarification.        Leo Guerra MD  01/07/23 6721

## 2023-01-07 NOTE — ED PROVIDER NOTES
[address] [facility]        Pt Name: Bart Gabriel  MRN: [id number]  [place] [birth year]  Date of evaluation: 1/7/2023  Provider: CHRISTI Espino  PCP: Arletta Cockayne  Note Started: 12:49 PM EST 1/7/23       I have seen and evaluated this patient with my supervising physician Dr. Melissa Adamson   Patient presents with    Illness     Pt fatigued. Pt was on his way to HD but had vascath accidentally removed prior to or during transportation. No bleeding noted. Vascath at bedside. HISTORY OF PRESENT ILLNESS: 1 or more Elements     History From: daughter and patient    Bart Gabriel is a 69 y.o. male who presents for Vas cath being pulled out. Daughter gives history. Reports patient has mild confusion at times at baseline. She reports she took [employer] breakfast to patient this morning and he seemed normal, like his usual self. It was then noticed that patient's Haris Gather had come out sometime overnight or this morning. Daughter reports he is due for dialysis today. He goes Tuesday, Thursday, and Saturday. Unsure name of dialysis center but reports it is [place]. Nephrologist is Dr. Marilyn Bautista. Reports patient does not seem confused. Seems normal.  No recent illness. No fever cough or vomiting. Does report he was admitted around [place] for dialysis catheter infection. Was removed, he was given antibiotics, and had a temporary catheter. Then, sounds like tunneled cathter was placed prior to dc. Nursing Notes were reviewed and agreed with or any disagreements were addressed in the HPI. REVIEW OF SYSTEMS :      Review of Systems    Positives and Pertinent negatives as per HPI.      SURGICAL HISTORY     Past Surgical History:   Procedure Laterality Date    CARDIAC CATHETERIZATION  2019    CHOLECYSTECTOMY, LAPAROSCOPIC N/A 05/16/2022    LAPAROSCOPIC CHOLECYSTECTOMY WITH CHOLANGIOGRAM performed by Maru Pastrana MD at [address] 01/26/2022    SIGMOID COLECTOMY, SIGMOIDOSCOPY performed by Maru Pastrana MD at [facility] [address] Left 04/29/2022    LEFT BRACHIAL CEPHALIC FISTULA performed by Renetta Lemos MD at [place] [address]    ERCP N/A 05/16/2022    ERCP SPHINCTER/PAPILLOTOMY performed by Marshall Ang MD at [facility]    ERCP N/A 05/16/2022    ERCP STONE REMOVAL/BALLOON SWEEP performed by Marshall Ang MD at [facility]  02/19/2022    IR EMBOLIZATION HEMORRHAGE 2/19/2022 [facility]     Corporate  Left     unsure of date    IR TUNNELED CATHETER PLACEMENT GREATER THAN 5 YEARS  02/21/2022    IR TUNNELED CATHETER PLACEMENT GREATER THAN 5 YEARS 2/21/2022 [facility]    IR TUNNELED CATHETER PLACEMENT GREATER THAN 5 YEARS Right 12/28/2022    Permacath; RIJ access; 19cm; Dr. Judie Gomez    IR TUNNELED [address] 5 YEARS  12/28/2022    IR TUNNELED CATHETER PLACEMENT GREATER THAN 5 YEARS 12/28/2022 [facility]    SIGMOIDOSCOPY N/A 02/17/2022    SIGMOIDOSCOPY CONTROL HEMORRHAGE performed by Olesya Branch MD at [facility] [address] Right 02/21/2022    Permacath; RIJ access; 23cm; Dr. Emily Mcclain  05/16/2022    ERCP POLYP SNARE performed by Marshall Ang MD at [address] [place] [address]       Discharge Medication List as of 1/7/2023  6:08 PM        CONTINUE these medications which have NOT CHANGED    Details   oxyCODONE (ROXICODONE) 5 MG immediate release tablet Take 1 tablet by mouth every 4 hours as needed for Pain for up to 60 days. Max Daily Amount: 30 mg, Disp-200 tablet, R-0Normal      LORazepam (ATIVAN) 0.5 MG tablet Take 1 tablet by mouth 2 times daily for 30 days.  Max Daily Amount: 1 mg, Disp-60 tablet, R-5Normal      ondansetron (ZOFRAN) 4 MG/2ML injection Infuse 4 mg intravenously every 6 hours as needed for Nausea or VomitingHistorical Med      Wound Dressings ([facility] WOUND/BURN DRESSING) GEL gel Apply 1 each topically daily Apply to RIGHT ankle wound daily, Disp-44 mL, R-2Normal      docusate sodium (COLACE) 100 MG capsule Take 100 mg by mouth 2 times dailyHistorical Med      polyethylene glycol (GLYCOLAX) 17 GM/SCOOP powder Take 17 g by mouth daily as needed (Constipation) Historical Med      apixaban (ELIQUIS) 5 MG TABS tablet Take 1 tablet by mouth 2 times daily, Disp-60 tablet, R-0Normal      aspirin 81 MG chewable tablet Take 1 tablet by mouth daily, Disp-30 tablet, R-0NO PRINT      melatonin 3 MG TABS tablet Take 2 tablets by mouth nightly as needed (sleep), Disp-6 tablet, R-0Print      tamsulosin (FLOMAX) 0.4 MG capsule Take 0.4 mg by mouth every morningHistorical Med      gabapentin (NEURONTIN) 100 MG capsule Take 100 mg by mouth 3 times daily. Historical Med      sevelamer (RENVELA) 800 MG tablet Take 800 mg by mouth 3 times daily (with meals) Historical Med      atenolol (TENORMIN) 25 MG tablet Take 25 mg by mouth every eveningHistorical Med      pantoprazole (PROTONIX) 20 MG tablet Take 20 mg by mouth nightlyHistorical Med      atorvastatin (LIPITOR) 10 MG tablet Take 10 mg by mouth nightlyHistorical Med      calcitRIOL (ROCALTROL) 0.25 MCG capsule Take 0.25 mcg by mouth every eveningHistorical Med             ALLERGIES     Lisinopril    FAMILYHISTORY       Family History   Problem Relation Age of Onset    High Blood Pressure Father         SOCIAL HISTORY       Social History     Tobacco Use    Smoking status: Former    Smokeless tobacco: Never   Vaping Use    Vaping Use: Never used   Substance Use Topics    Alcohol use: Not Currently    Drug use: Never       SCREENINGS        Karen Coma Scale  Eye Opening: Spontaneous  Best Verbal Response: Oriented  Best Motor Response: Obeys commands  Karen Coma Scale Score: 15 CIWA Assessment  BP: 110/61  Heart Rate: 60           PHYSICAL EXAM  1 or more Elements     ED Triage Vitals [01/07/23 1052]   BP Temp Temp Source Heart Rate Resp SpO2 Height Weight   (!) 100/58 98.2 °F (36.8 °C) Oral 60 17 96 % -- 192 lb 10.9 oz (87.4 kg)       Physical Exam  Constitutional:       General: He is not in acute distress. Appearance: Normal appearance. He is well-developed. He is ill-appearing (chronically). He is not toxic-appearing or diaphoretic. HENT:      Head: Normocephalic and atraumatic. Cardiovascular:      Rate and Rhythm: Normal rate and regular rhythm. Heart sounds: Normal heart sounds. Pulmonary:      Effort: Pulmonary effort is normal. No respiratory distress. Breath sounds: Rhonchi (mild rhonchi on the left) present. Abdominal:      General: There is no distension. Palpations: Abdomen is soft. There is no mass. Tenderness: There is no abdominal tenderness. There is no guarding or rebound. Hernia: No hernia is present. Musculoskeletal:         General: Normal range of motion. Cervical back: Normal range of motion and neck supple. Skin:     General: Skin is warm. Neurological:      Mental Status: He is alert.       Comments: Oriented to person and place but not time           DIAGNOSTIC RESULTS   LABS:    Labs Reviewed   BRAIN NATRIURETIC PEPTIDE - Abnormal; Notable for the following components:       Result Value    Pro-BNP 12,646 (*)     All other components within normal limits   CBC WITH AUTO DIFFERENTIAL - Abnormal; Notable for the following components:    RBC 2.99 (*)     Hemoglobin 9.4 (*)     Hematocrit 30.0 (*)     .5 (*)     All other components within normal limits   COMPREHENSIVE METABOLIC PANEL - Abnormal; Notable for the following components:    Chloride 98 (*)     Glucose 129 (*)     BUN 41 (*)     Creatinine 5.9 (*)     Est, Glom Filt Rate 9 (*)     Total Protein 6.0 (*)     Albumin 2.9 (*)     Albumin/Globulin Ratio 0.9 (*)     ALT <5 (*)     AST 7 (*)     All other components within normal limits    Narrative:     Ivet Summers tel. [phone],  Chemistry results called to and read back by Art Ulysses Shiley, 01/07/2023 13:30,  by [facility]   TROPONIN - Abnormal; Notable for the following components:    Troponin 0.06 (*)     All other components within normal limits   PROCALCITONIN - Abnormal; Notable for the following components:    Procalcitonin 0.66 (*)     All other components within normal limits   COVID-19, RAPID   RAPID INFLUENZA A/B ANTIGENS   LACTATE, SEPSIS   URINALYSIS WITH REFLEX TO CULTURE   LACTATE, SEPSIS       When ordered only abnormal lab results are displayed. All other labs were within normal range or not returned as of this dictation. EKG: When ordered, EKG's are interpreted by the Emergency Department Physician in the absence of a cardiologist.  Please see their note for interpretation of EKG. EKg obtained. See Dr. Benjamin Sensor note for interpretation,. RADIOLOGY:   Non-plain film images such as CT, Ultrasound and MRI are read by the radiologist. Plain radiographic images are visualized and preliminarily interpreted by the ED Provider with the below findings:    Chest Xray personally reviewed by myself and shows no opacity- formally interpreted by radiology    Interpretation per the Radiologist below, if available at the time of this note:    XR CHEST PORTABLE   Final Result   No focal lung opacity. No results found. No results found. PAST MEDICAL HISTORY      has a past medical history of Anemia (03/2022), Arthritis, CAD (coronary artery disease) (01/2020), Cancer [facility]), Cerebral infarction [facility]), Cognitive communication deficit, COVID-19, Diabetes mellitus ([facility] [address] Dysphagia, End stage renal disease ([facility] [address] Fatigue, Gastrointestinal hemorrhage, Hemodialysis patient ([facility] [address] (2019), blood clots, Hyperlipidemia, Hypertension, Hyponatremia, Risk for falls, and Splenomegaly (02/10/2021). EMERGENCY DEPARTMENT COURSE and DIFFERENTIAL DIAGNOSIS/MDM:   Vitals:    Vitals:    01/07/23 1545 01/07/23 1600 01/07/23 1615 01/07/23 1800   BP: 119/62 111/64 118/60 110/61   Pulse: 62 63 65 60   Resp: 16 23 21 17   Temp:    98.1 °F (36.7 °C)   TempSrc:    Oral   SpO2:    98%   Weight:           Patient was given the following medications:  Medications - No data to display            Patient was nontoxic, well appearing, with normal vital signs. Saturating well on room air. Daughter reports patient is in his usual state of health. Presents because his Vas-Cath came out today. He was due for dialysis. On exam, Vas-Cath site in the right chest wall has no surrounding erythema or edema. Will check labs. Work-up initially ordered based on entered complaint of fatigue. However the daughter reported to me upon my evaluation that the patient was in his usual state of health and was normal.    Records reviewed:  -[address] [facility] Medicine discharge summary from admission 12/20/22-12/29/22 for acute metabolic encephalopathy due to hemodialysis catheter infection. Catheter tip culture positive for MSSA. Tunneled catheter was removed and treated with IV antibiotics. Hemodialysis done with temporary catheter. Tunneled catheter was again placed before discharge. Was discharged with 3 more doses of IV cefazolin with hemodialysis. Differential diagnosis includes vascular access issue, hyperkalemia, other electrolyte abnormality, other    Labs today show hemoglobin 9.4 which is chronic. Metabolic panel shows a potassium of 3.8, BUN 41, creatinine 5.9. Troponin 0.06 which is chronic. Flu COVID-negative. Lactic acid normal. Procal 0.66. BNP 12,600. CXR negative. I I consulted nephrology office and spoke with Dr. Renee Harvey about patient's loss of access and labs. He reports that patient does not need dialysis emergently and does not need to be admitted. Reports can be discharge.   He will call his office and leave message for them to arrange Vascath insertion on Monday. Upon reeval, patient lying in stretcher in no disterss. Updated on POC. He also gave me permission to update daughter Guillermina Dukes on [address]. I spoke with Janeen, updated on POC and also recommended she call the nephrologist office on Monday to FU on the plan. She understood. Chronic Conditions: end-stage renal disease, coronary artery disease, diabetes mellitus ,hypertension, hyperlipidemia, and seizure disorder. I am the Primary Clinician of Record. Is this patient to be included in the SEP-1 Core Measure due to severe sepsis or septic shock? No   Exclusion criteria - the patient is NOT to be included for SEP-1 Core Measure due to: Infection is not suspected    PROCEDURES   Unless otherwise noted below, none     Procedures    FINAL IMPRESSION      1.  Complication associated with dialysis catheter          DISPOSITION/PLAN       DISPOSITION Decision To Discharge 01/07/2023 06:16:19 PM      PATIENT REFERRED TO:  Dr. Kath Pedro office is going to try to arrange for new dialysis catheter insertion on Monday    Call in 2 days  for reevaluation    [facility] Emergency Department  [address]  [phone]    As needed, If symptoms worsen    DISCHARGE MEDICATIONS:  Discharge Medication List as of 1/7/2023  6:08 PM          DISCONTINUED MEDICATIONS:  Discharge Medication List as of 1/7/2023  6:08 PM                 (Please note that portions of this note were completed with a voice recognition program.  Efforts were made to edit the dictations but occasionally words are mis-transcribed.)    CHRISTI Pelletier (electronically signed)            CHRISTI Pelletier  01/07/23 1911

## 2023-01-07 NOTE — DISCHARGE INSTRUCTIONS
-You do not need dialysis emergently  -Dr. Tho South office is going to try to arrange for new dialysis catheter insertion on Monday.   Call their office on Monday morning to follow up on this

## 2023-01-08 LAB
EKG ATRIAL RATE: 60 BPM
EKG DIAGNOSIS: NORMAL
EKG P AXIS: 59 DEGREES
EKG P-R INTERVAL: 250 MS
EKG Q-T INTERVAL: 394 MS
EKG QRS DURATION: 82 MS
EKG QTC CALCULATION (BAZETT): 394 MS
EKG R AXIS: -3 DEGREES
EKG T AXIS: 22 DEGREES
EKG VENTRICULAR RATE: 60 BPM

## 2023-01-08 PROCEDURE — 93010 ELECTROCARDIOGRAM REPORT: CPT | Performed by: INTERNAL MEDICINE

## 2023-03-04 ENCOUNTER — HOSPITAL ENCOUNTER (EMERGENCY)
Age: 78
Discharge: HOME | End: 2023-03-04
Payer: COMMERCIAL

## 2023-03-04 VITALS
DIASTOLIC BLOOD PRESSURE: 68 MMHG | HEIGHT: 72 IN | WEIGHT: 182.76 LBS | BODY MASS INDEX: 24.75 KG/M2 | HEART RATE: 72 BPM | RESPIRATION RATE: 16 BRPM | TEMPERATURE: 97.8 F | SYSTOLIC BLOOD PRESSURE: 141 MMHG | OXYGEN SATURATION: 95 %

## 2023-03-04 DIAGNOSIS — Z51.89 VISIT FOR WOUND CHECK: Primary | ICD-10-CM

## 2023-03-04 PROCEDURE — 99283 EMERGENCY DEPT VISIT LOW MDM: CPT

## 2023-03-04 ASSESSMENT — LIFESTYLE VARIABLES
HOW OFTEN DO YOU HAVE A DRINK CONTAINING ALCOHOL: NEVER
HOW MANY STANDARD DRINKS CONTAINING ALCOHOL DO YOU HAVE ON A TYPICAL DAY: PATIENT DOES NOT DRINK

## 2023-03-04 ASSESSMENT — PAIN - FUNCTIONAL ASSESSMENT: PAIN_FUNCTIONAL_ASSESSMENT: NONE - DENIES PAIN

## 2023-03-05 NOTE — ED NOTES
Pt had dialysis today. He is wearing a diaper, and it is dry.   Patient reports he doesn't make urine     Raj Malik RN  03/04/23 8950

## 2023-03-05 NOTE — ED NOTES
Pt refused urine cath. He says he doesn't make urine, and that \"it hurts\" to be cathed for urine.      Jason Cherry RN  03/04/23 6662

## 2023-03-05 NOTE — ED NOTES
Patient has no complaint, and doesn't understand why he's in the ER     Dorothy Perez, SCOUT  03/04/23 2913

## 2023-03-05 NOTE — ED PROVIDER NOTES
[address]  [address] [address]  Dept: [phone]  Loc: [address] ENCOUNTER        This patient was not seen or evaluated by the attending physician. I evaluated this patient, the attending physician was available for consultation. CHIEF COMPLAINT    Chief Complaint   Patient presents with    Vascular Access Problem     Pt slipped off bed at nursing home. No injuries. Shunt was torn and bleeding. Pt has no complaints       HPI    Mary Davila is a 69 y.o. male who presents for wound check. The patient has a left upper extremity fistula, slipped out of the chair with that access. It had some bleeding at the site. Was taped with gauze and sent to the ED for check of the site. The patient of no pain. He is angry that he is here. He wants to go home. .  The quality is sharp. No aggravating factors.       REVIEW OF SYSTEMS    Skin: see HPI  ENT: No throat swelling or tongue swelling  General: No fevers or syncope    PAST MEDICAL & SURGICAL HISTORY    Past Medical History:   Diagnosis Date    Anemia 03/2022    Arthritis     CAD (coronary artery disease) 01/2020    Cancer ([facility] [address]     Colon Cancer diagnosed last month    Cerebral infarction [facility])     Cognitive communication deficit     COVID-19     Diabetes mellitus ([facility] [address]     Dysphagia     End stage renal disease ([facility] [address]     Fatigue     Gastrointestinal hemorrhage     Hemodialysis patient ([facility] [address] 2019    ckd-goes to dialysis Tuesday, Thurs, Fri    Hx of blood clots     Hyperlipidemia     Hypertension     Hyponatremia     Risk for falls     Splenomegaly 02/10/2021     Past Surgical History:   Procedure Laterality Date    CARDIAC CATHETERIZATION  2019    CHOLECYSTECTOMY, LAPAROSCOPIC N/A 05/16/2022    LAPAROSCOPIC CHOLECYSTECTOMY WITH CHOLANGIOGRAM performed by Angelita Zaldivar MD at [address] 01/26/2022    SIGMOID COLECTOMY, SIGMOIDOSCOPY performed by Leatha Wang MD at [facility] Left 04/29/2022    LEFT BRACHIAL CEPHALIC FISTULA performed by Kavitha Tapia MD at [place] [address]    ERCP N/A 05/16/2022    ERCP SPHINCTER/PAPILLOTOMY performed by Marybeth Mahmood MD at [address] [place] [address]    ERCP N/A 05/16/2022    ERCP STONE REMOVAL/BALLOON SWEEP performed by Marybeth Mahmood MD at [facility]  02/19/2022    IR EMBOLIZATION HEMORRHAGE 2/19/2022 [facility]    IR PERC ARTERIOVENOUS FISTULA CREATION Left     unsure of date    IR TUNNELED CATHETER PLACEMENT GREATER THAN 5 YEARS  02/21/2022    IR TUNNELED CATHETER PLACEMENT GREATER THAN 5 YEARS 2/21/2022 [facility]    IR TUNNELED CATHETER PLACEMENT GREATER THAN 5 YEARS Right 12/28/2022    Permacath; RIJ access; 19cm; Dr. Odilon Birch    IR TUNNELED [address] 5 YEARS  12/28/2022    IR TUNNELED CATHETER PLACEMENT GREATER THAN 5 YEARS 12/28/2022 [facility]    SIGMOIDOSCOPY N/A 02/17/2022    SIGMOIDOSCOPY CONTROL HEMORRHAGE performed by Nikki Pastor MD at [facility] [address] Right 02/21/2022    Permacath; RIJ access; 23cm; Dr. Radha Avilez  05/16/2022    ERCP POLYP SNARE performed by Marybeth Mahmood MD at [address] [place] [address]  (may include discharge medications prescribed in the ED)  Current Outpatient Rx   Medication Sig Dispense Refill    oxyCODONE (ROXICODONE) 5 MG immediate release tablet Take 1 tablet by mouth every 4 hours as needed for Pain for up to 60 days.  Max Daily Amount: 30 mg 200 tablet 0    ondansetron (ZOFRAN) 4 MG/2ML injection Infuse 4 mg intravenously every 6 hours as needed for Nausea or Vomiting      Wound Dressings ([facility] WOUND/BURN DRESSING) GEL gel Apply 1 each topically daily Apply to RIGHT ankle wound daily 44 mL 2    docusate sodium (COLACE) 100 MG capsule Take 100 mg by mouth 2 times daily      polyethylene glycol (GLYCOLAX) 17 GM/SCOOP powder Take 17 g by mouth daily as needed (Constipation)       apixaban (ELIQUIS) 5 MG TABS tablet Take 1 tablet by mouth 2 times daily 60 tablet 0    aspirin 81 MG chewable tablet Take 1 tablet by mouth daily 30 tablet 0    melatonin 3 MG TABS tablet Take 2 tablets by mouth nightly as needed (sleep) 6 tablet 0    tamsulosin (FLOMAX) 0.4 MG capsule Take 0.4 mg by mouth every morning      gabapentin (NEURONTIN) 100 MG capsule Take 100 mg by mouth 3 times daily.       sevelamer (RENVELA) 800 MG tablet Take 800 mg by mouth 3 times daily (with meals)       atenolol (TENORMIN) 25 MG tablet Take 25 mg by mouth every evening      pantoprazole (PROTONIX) 20 MG tablet Take 20 mg by mouth nightly      atorvastatin (LIPITOR) 10 MG tablet Take 10 mg by mouth nightly      calcitRIOL (ROCALTROL) 0.25 MCG capsule Take 0.25 mcg by mouth every evening         ALLERGIES    Allergies   Allergen Reactions    Lisinopril Swelling     Allergy listed on paperwork from [address] [place] [address], no reaction noted       SOCIAL & FAMILY HISTORY    Social History     Socioeconomic History    Marital status:    Tobacco Use    Smoking status: Former    Smokeless tobacco: Never   Vaping Use    Vaping Use: Never used   Substance and Sexual Activity    Alcohol use: Not Currently    Drug use: Never     Family History   Problem Relation Age of Onset    High Blood Pressure Father        PHYSICAL EXAM    VITAL SIGNS: BP (!) 141/68   Pulse 72   Temp 97.8 °F (36.6 °C) (Oral)   Resp 16   Ht 6' (1.829 m)   Wt 182 lb 12.2 oz (82.9 kg)   SpO2 95%   BMI 24.79 kg/m²   Constitutional:  Well developed, well nourished, no acute distress  HENT:  Atraumatic, no facial or lip swelling  Oral:  No tongue swelling, airway patent  Neck: Supple, no swelling  Respiratory:  No respiratory distress, breathing comfortably  Cardiovascular:  No JVD   Musculoskeletal:  No edema, no acute deformities  Integument: Fistula has no bruising. There is no swelling at the site. Positive thrill and bruit. He is neurovascularly intact distally with 2+ radial pulse and capillary refill less than 3 seconds. Extremity is warm, dry. No signs of compartment syndrome. Sensation to light touch intact. Is moving left upper extremity and full active and passive range of motion. There is no fluctuance or induration, no surrounding cellulitis      ED COURSE & MEDICAL DECISION MAKING    See chart for details of medications prescribed. Vitals:    03/04/23 1921   BP: (!) 141/68   Pulse: 72   Resp: 16   Temp: 97.8 °F (36.6 °C)   TempSrc: Oral   SpO2: 95%   Weight: 182 lb 12.2 oz (82.9 kg)   Height: 6' (1.829 m)         History from : Patient and EMS    Limitations to history : None    Chronic Conditions:   Past Medical History:   Diagnosis Date    Anemia 03/2022    Arthritis     CAD (coronary artery disease) 01/2020    Cancer ([facility] [address]     Colon Cancer diagnosed last month    Cerebral infarction [facility])     Cognitive communication deficit     COVID-19     Diabetes mellitus ([facility] [address]     Dysphagia     End stage renal disease ([facility] [address]     Fatigue     Gastrointestinal hemorrhage     Hemodialysis patient ([facility] [address] 2019    ckd-goes to dialysis Tuesday, Thurs, Fri    Hx of blood clots     Hyperlipidemia     Hypertension     Hyponatremia     Risk for falls     Splenomegaly 02/10/2021       CONSULTS: (Who and What was discussed)  None    Discussion with Other Profesionals : None    Social Determinants : None    Records Reviewed : None    CC/HPI Summary, DDx, ED Course, and Reassessment: See HPI and above for full presentation and physical exam.    Patient is a very pleasant 67-year-old male that presents to the ED today with checking of his left upper extremity fistula. He was at dialysis, slipped out of the chair. Did not hit his head or lose consciousness. Not having any pain.   States that the dialysis catheter/needle was slightly torn from the fistula. There was some bleeding. They taped it up. They sent him to the ED for vascular access check. He has no complaints on arrival.  He states that he wants to go home immediately. On arrival he is afebrile and hemodynamically stable. Nontoxic in appearance . fistula is taped up, this was removed. No bruising at the site. Positive thrill and bruit. No swelling at the site. No erythema rashes or lesions overlying the site. Sensation to light touch intact. He is left radial pulse 2+ with capillary refill less than 3 seconds. Is moving his left arm and full active and passive range of motion. Head is normocephalic atraumatic. No castillo signs or raccoon eyes. He is answering all questions appropriately. Pupils 3 mm and PERRL bilaterally. EOMs intact. No posterior neck pain. He denies hitting his head. He states that he feels fine and wants to go home. I do believe this is reasonable. Differential diagnosis: necrotizing fasciitis, abscess, cellulitis, other    Patient is afebrile and nontoxic in appearance. No evidence of cellulitis or recurrent abscess on exam.  No signs of compartment syndrome. Patient is neurovascularly intact. Site is not bleeding. Will have him follow-up with nephrology. Can return immediately for any new or worsening symptoms but otherwise he is stable for discharge back to the F. Has remained afebrile and hemodynamically stable throughout his entire ED course. I instructed the patient to follow up as an outpatient in 2 days with nephrology for next wound check. I instructed the patient to return to the ED immediately for any new or worsening symptoms. The patient verbalizes understanding. Disposition Considerations (Tests not ordered but considered, Shared Decision Making, Pt Expectation of Test or Tx.): n/a      I am the Primary Clinician of Record. FINAL IMPRESSION    1.  Visit for wound check        PLAN  Discharge with outpatient follow-up      (Please note that this note was completed with a voice recognition program.  Every attempt was made to edit the dictations, but inevitably there remain words that are mis-transcribed.)           IRINA Rizvi - TONEY  03/04/23 2022

## 2023-04-18 ENCOUNTER — APPOINTMENT (OUTPATIENT)
Dept: GENERAL RADIOLOGY | Age: 78
End: 2023-04-18
Payer: COMMERCIAL

## 2023-04-18 ENCOUNTER — HOSPITAL ENCOUNTER (EMERGENCY)
Age: 78
Discharge: HOME | End: 2023-04-18
Attending: STUDENT IN AN ORGANIZED HEALTH CARE EDUCATION/TRAINING PROGRAM
Payer: COMMERCIAL

## 2023-04-18 VITALS
WEIGHT: 202.7 LBS | RESPIRATION RATE: 16 BRPM | BODY MASS INDEX: 27.45 KG/M2 | DIASTOLIC BLOOD PRESSURE: 63 MMHG | HEART RATE: 57 BPM | HEIGHT: 72 IN | OXYGEN SATURATION: 96 % | SYSTOLIC BLOOD PRESSURE: 127 MMHG | TEMPERATURE: 98 F

## 2023-04-18 DIAGNOSIS — Z99.2 ESRD ON DIALYSIS (HCC): ICD-10-CM

## 2023-04-18 DIAGNOSIS — D64.9 CHRONIC ANEMIA: ICD-10-CM

## 2023-04-18 DIAGNOSIS — R68.89 FEELING UNWELL: Primary | ICD-10-CM

## 2023-04-18 DIAGNOSIS — N18.6 ESRD ON DIALYSIS (HCC): ICD-10-CM

## 2023-04-18 DIAGNOSIS — Z79.01 ANTICOAGULATED: ICD-10-CM

## 2023-04-18 LAB
ANION GAP SERPL CALCULATED.3IONS-SCNC: 10 MMOL/L (ref 3–16)
APTT BLD: 36.7 SEC (ref 22.7–35.9)
BASOPHILS # BLD: 0 K/UL (ref 0–0.2)
BASOPHILS NFR BLD: 0.3 %
BUN SERPL-MCNC: 50 MG/DL (ref 7–20)
CALCIUM SERPL-MCNC: 8.8 MG/DL (ref 8.3–10.6)
CHLORIDE SERPL-SCNC: 102 MMOL/L (ref 99–110)
CO2 SERPL-SCNC: 31 MMOL/L (ref 21–32)
CREAT SERPL-MCNC: 5.6 MG/DL (ref 0.8–1.3)
DEPRECATED RDW RBC AUTO: 15 % (ref 12.4–15.4)
EKG ATRIAL RATE: 59 BPM
EKG DIAGNOSIS: NORMAL
EKG P AXIS: 27 DEGREES
EKG P-R INTERVAL: 264 MS
EKG Q-T INTERVAL: 396 MS
EKG QRS DURATION: 78 MS
EKG QTC CALCULATION (BAZETT): 392 MS
EKG R AXIS: -27 DEGREES
EKG T AXIS: 4 DEGREES
EKG VENTRICULAR RATE: 59 BPM
EOSINOPHIL # BLD: 0.2 K/UL (ref 0–0.6)
EOSINOPHIL NFR BLD: 5 %
FLUAV RNA UPPER RESP QL NAA+PROBE: NEGATIVE
FLUBV AG NPH QL: NEGATIVE
GFR SERPLBLD CREATININE-BSD FMLA CKD-EPI: 10 ML/MIN/{1.73_M2}
GLUCOSE SERPL-MCNC: 135 MG/DL (ref 70–99)
HCT VFR BLD AUTO: 32 % (ref 40.5–52.5)
HGB BLD-MCNC: 10.3 G/DL (ref 13.5–17.5)
INR PPP: 1.24 (ref 0.84–1.16)
LYMPHOCYTES # BLD: 1.2 K/UL (ref 1–5.1)
LYMPHOCYTES NFR BLD: 28.3 %
MCH RBC QN AUTO: 30.3 PG (ref 26–34)
MCHC RBC AUTO-ENTMCNC: 32.2 G/DL (ref 31–36)
MCV RBC AUTO: 94.1 FL (ref 80–100)
MONOCYTES # BLD: 0.2 K/UL (ref 0–1.3)
MONOCYTES NFR BLD: 5.7 %
NEUTROPHILS # BLD: 2.6 K/UL (ref 1.7–7.7)
NEUTROPHILS NFR BLD: 60.7 %
PLATELET # BLD AUTO: 144 K/UL (ref 135–450)
PMV BLD AUTO: 8.5 FL (ref 5–10.5)
POTASSIUM SERPL-SCNC: 4.8 MMOL/L (ref 3.5–5.1)
PROTHROMBIN TIME: 15.6 SEC (ref 11.5–14.8)
RBC # BLD AUTO: 3.41 M/UL (ref 4.2–5.9)
SARS-COV-2 RDRP RESP QL NAA+PROBE: NOT DETECTED
SODIUM SERPL-SCNC: 143 MMOL/L (ref 136–145)
TROPONIN, HIGH SENSITIVITY: 80 NG/L (ref 0–22)
TROPONIN, HIGH SENSITIVITY: 81 NG/L (ref 0–22)
WBC # BLD AUTO: 4.3 K/UL (ref 4–11)

## 2023-04-18 PROCEDURE — 99285 EMERGENCY DEPT VISIT HI MDM: CPT

## 2023-04-18 PROCEDURE — 36415 COLL VENOUS BLD VENIPUNCTURE: CPT

## 2023-04-18 PROCEDURE — 80048 BASIC METABOLIC PNL TOTAL CA: CPT

## 2023-04-18 PROCEDURE — 85610 PROTHROMBIN TIME: CPT

## 2023-04-18 PROCEDURE — 87804 INFLUENZA ASSAY W/OPTIC: CPT

## 2023-04-18 PROCEDURE — 85730 THROMBOPLASTIN TIME PARTIAL: CPT

## 2023-04-18 PROCEDURE — 84484 ASSAY OF TROPONIN QUANT: CPT

## 2023-04-18 PROCEDURE — 85025 COMPLETE CBC W/AUTO DIFF WBC: CPT

## 2023-04-18 PROCEDURE — 87635 SARS-COV-2 COVID-19 AMP PRB: CPT

## 2023-04-18 PROCEDURE — 93005 ELECTROCARDIOGRAM TRACING: CPT | Performed by: PHYSICIAN ASSISTANT

## 2023-04-18 PROCEDURE — 71045 X-RAY EXAM CHEST 1 VIEW: CPT

## 2023-04-18 RX ORDER — ONDANSETRON 4 MG/1
1 TABLET, FILM COATED ORAL EVERY 6 HOURS PRN
COMMUNITY
Start: 2023-04-17

## 2023-04-18 RX ORDER — OXYCODONE HYDROCHLORIDE 5 MG/1
5 TABLET ORAL EVERY 4 HOURS PRN
COMMUNITY

## 2023-04-18 RX ORDER — SUCROFERRIC OXYHYDROXIDE 500 MG/1
1 TABLET, CHEWABLE ORAL 3 TIMES DAILY
COMMUNITY
Start: 2023-03-23

## 2023-04-18 RX ORDER — LORAZEPAM 0.5 MG/1
1 TABLET ORAL 2 TIMES DAILY
COMMUNITY
Start: 2023-04-07

## 2023-04-18 ASSESSMENT — ENCOUNTER SYMPTOMS
COLOR CHANGE: 0
VOMITING: 1
RHINORRHEA: 0
COUGH: 0
NAUSEA: 1
ABDOMINAL PAIN: 0
SORE THROAT: 0
DIARRHEA: 1
SHORTNESS OF BREATH: 0

## 2023-04-18 ASSESSMENT — PAIN - FUNCTIONAL ASSESSMENT: PAIN_FUNCTIONAL_ASSESSMENT: NONE - DENIES PAIN

## 2023-04-18 NOTE — ED NOTES
Nursing report called to [address] at [facility]. Staff assisted pt out to car in wheelchair. Daughter to bring pt back to nursing home.       [facility]  04/18/23 1900

## 2023-04-18 NOTE — ED PROVIDER NOTES
ED Attending Attestation Note     Date of evaluation: 4/18/2023    This patient was seen by the advance practice provider. I have seen and examined the patient, agree with the workup, evaluation, management and diagnosis. The care plan has been discussed. I have reviewed the ECG and concur with the JERE's interpretation. My assessment reveals 80-year-old male with a history of end-stage renal disease on dialysis who is presenting from the dialysis unit for not feeling well. According to EMS the patient did not get dialysis today and refused to do so because he did not feel well. His vital signs were stable for them in route. He has not felt well since his last dialysis session 3 days ago. He denies any nausea or vomiting currently. He currently Tuesday/Thursday/Saturday. Examination patient is alert and orient x3. He has a fistula to the left upper extremity. His abdomen is soft nontender nondistended. Patient noted to have a bump in his cardiac enzymes with a high-sensitivity troponin of 81. This was repeated here in the department.      Tevin Brown MD  04/18/23 0472
Tenderness: There is no abdominal tenderness. Musculoskeletal:      Cervical back: Normal range of motion and neck supple. Skin:     General: Skin is warm and dry. Findings: No petechiae or rash. Neurological:      Mental Status: He is alert and oriented to person, place, and time. Cranial Nerves: Cranial nerves 2-12 are intact. Motor: Tremor (L>RUE with intentional movement) present. Coordination: Coordination is intact.    Psychiatric:         Mood and Affect: Mood and affect normal.         Behavior: Behavior normal.         Torie Craig, [address]  04/18/23 7291

## 2023-05-05 NOTE — PROGRESS NOTES
A 67-year-old male presents for a follow-up of multiple medical conditions.  Patient has given consent to record the visit for documentation in their clinical record.     HPI  Historian: Self, accompanied by his wife.    Discussed regarding blood pressure, weight, cholesterol and impaired fasting glucose at his last visit. He was prediabetic for a while. and was advised to monitor his sugar levels.    1. Type 2 diabetes mellitus with hyperglycemia, proteinuria without long-term current use of insulin (CMD): His fasting blood glucose was above 200 mg/dL in his recent labs and his A1c is 9.2 %. Has high protein in his urine. Has polyuria. Denies blurring of vision, numbness, tingling, polyphagia and polydipsia. Has pain in his foot. Has room for improving his diet. Seen an eye doctor last December 2022. Has a family history of diabetes. Drinks 4-5 beers a day.  Eats a very unhealthy diet.    2. Primary hypertension: His blood pressure is normal today. Denies headache, chest pain, shortness of breath, nausea, vomiting, abdominal pain. Denies depression or anxiety.    3. Hyperlipidemia, unspecified hyperlipidemia type/Hypertriglyceridemia: His HDL and triglycerides were abnormal.  Triglycerides significantly elevated.  LDL was normal. On Zocor.    4. JULIETA (obstructive sleep apnea): Has obstructive sleep apnea.    ROS:  Review of systems is as above.    Past Medical History:   Diagnosis Date   • Colon polyps    • Esophageal reflux    • GERD (gastroesophageal reflux disease)    • HTN (hypertension)    • Hyperlipidemia    • Impaired fasting glucose    • JULIETA (obstructive sleep apnea)     CPAP machine will bring machine   • Personal history of traumatic fracture    • Prostate cancer (CMD)    • Unspecified sinusitis (chronic)        Past Surgical History:   Procedure Laterality Date   • Colonoscopy diagnostic  12/18/2009    polypectomy large intestine   • Esophagogastroduodenoscopy  04/21/2017   • Esophagogastroduodenoscopy  Call from patient's daughter stating the patient told her he's feeling SOB. RN went to patient's room asked if he was feeling SOB and patient replied \"I just feel bad but ice water would go a long way\". transoral flex w/bx single or mult  2009    EGD with Bx   • Hernia repair     • Prostatectomy  10/2017   • Repair umbilical ray,5+y/o,reduc  2011    Hernia, umbilical, >4 yrs of age   • Service to gastroenterology     • Spermatocelectomy     • Tonsillectomy and adenoidectomy         Social History     Socioeconomic History   • Marital status: /Civil Union     Spouse name: Not on file   • Number of children: Not on file   • Years of education: Not on file   • Highest education level: Not on file   Occupational History   • Not on file   Tobacco Use   • Smoking status: Former     Current packs/day: 0.00     Types: Cigars     Quit date: 2010     Years since quittin.0   • Smokeless tobacco: Never   Vaping Use   • Vaping status: never used   Substance and Sexual Activity   • Alcohol use: Yes     Alcohol/week: 10.0 standard drinks of alcohol     Types: 12 Standard drinks or equivalent per week   • Drug use: No   • Sexual activity: Not on file   Other Topics Concern   • Not on file   Social History Narrative   • Not on file     Social Determinants of Health     Financial Resource Strain: Not on file   Food Insecurity: Not on file   Transportation Needs: Not on file   Physical Activity: Not on file   Stress: Not on file   Social Connections: Not on file   Intimate Partner Violence: Not At Risk (2021)    Intimate Partner Violence    • Social Determinants: Intimate Partner Violence Past Fear: Not on file    • Social Determinants: Intimate Partner Violence Current Fear: Not on file       Family History   Problem Relation Age of Onset   • Diabetes Mother    • Heart disease Mother    • Hypertension Mother    • Kidney disease Mother    • Cancer Sister          Current Outpatient Medications   Medication Sig Dispense Refill   • glipizide-metformin (METAGLIP) 5-500 MG per tablet Take 1 tablet by mouth in the morning and 1 tablet in the evening. Take before meals. 60 tablet 1   • blood glucose meter  Test blood sugar 1 times daily. Diagnosis: E11.9 Meter: Per pt insurance. 1 kit 0   • blood glucose test strip Test blood sugar 1 times daily. Diagnosis: e11.9. Meter: Per pt insurance. 100 each 3   • blood glucose lancets Test blood sugar 1 times daily. Diagnosis: e11.9. Meter: Per pt insurance 100 each 3   • electrolyte/PEG 3350 (GaviLyte-G) 236 g solution Take 4000 ml by mouth as directed for colonoscopy prep 4000 mL 0   • metoPROLOL succinate (TOPROL-XL) 100 MG 24 hr tablet Take 1 tablet by mouth daily. 90 tablet 1   • amLODIPine-Olmesartan 10-40 MG Tab Take 1 tablet by mouth daily. 90 tablet 3   • tadalafil (CIALIS) 5 MG tablet Take 1 tablet by mouth daily as needed for Erectile Dysfunction. 30 tablet 11   • furosemide (Lasix) 20 MG tablet Take 1 tablet by mouth daily. 90 tablet 1   • simvastatin (ZOCOR) 40 MG tablet Take 1 tablet by mouth nightly. 90 tablet 1   • omeprazole (PrilOSEC) 20 MG capsule Take 1 capsule by mouth daily (30 minutes before breakfast). 30 capsule 11   • aspirin 81 MG tablet Take 1 tablet by mouth daily. Do not start before August 1, 2019.     • Coenzyme Q10 (CO Q 10 PO)      • DISPENSE Please dispense cpap supplies  DX:  327.23  Obstructive sleep apnea 1 each 0   • fish oil-omega-3 fatty acids 1000 MG CAPS Take  by mouth.       No current facility-administered medications for this visit.       ALLERGIES:   Allergen Reactions   • Atorvastatin SHORTNESS OF BREATH and MYALGIA   • Niacin SHORTNESS OF BREATH     Difficulty breathing went to er   • Lipitor [Atorvastatin Calcium] MYALGIA         PHYSICAL EXAM    VITAL SIGNS:    Visit Vitals  /80   Pulse 86   Temp 97.5 °F (36.4 °C)   Resp 18   Ht 6'   Wt 128.2 kg (282 lb 9.6 oz)   SpO2 96%   BMI 38.33 kg/m²        General:  Morbidly obese. Alert, in no acute distress and current vital signs reviewed.  HEENT:  Normocephalic. Atraumatic. Pupils equal and reactive to light. Extra ocular movements intact. Normal conjunctiva. Sclera normal.  Bilateral external canals normal. Tympanic membranes visualized, normal. Nose normal. Oropharynx moist. No oral exudates. No tonsillar enlargement, or uvular edema.   Neck: Normal range of motion. No tenderness. Supple. No thyromegaly noted. No lymphadenopathy.   Cardiovascular:  Normal rate. Normal rhythm. No murmurs, gallops, or rubs.    Respiratory:  No respiratory distress. Normal breath sounds. No crackles. No wheezing.    Abdomen:   Soft, obese, non distended. No tenderness. No masses. No hepatosplenomegaly. Normal bowel sounds. No rebound, no guarding.    Skin:  Warm. Dry. No erythema. No rash, no bruising.    Neurologic:  Alert and oriented x 3. Cranial nerves intact. Normal motor function. Strength is 5/5.  Deep tendon reflexes normal.  Normal sensory function. No focal deficits noted.    Extremitites: Trace pedal edema.    ASSESSMENT AND PLAN    1. Type 2 diabetes mellitus with hyperglycemia, proteinuria without long-term current use of insulin (CMD):  Reviewed his most recent labs.  Ordered Glucometer and its supplies.  Prescribed Glipizide- Metformin. Take BID before meals.  Discussed the risks associated with diabetes.  Do not skip meals.  Recommended lifestyle changes, reducing processed carbohydrates in diet, regular exercise and good sleep.    Importance of annual eye exams and good foot care reviewed.     Referred SERVICE TO DIABETES EDUCATION and SERVICE TO OPHTHALMOLOGY.      2. Primary hypertension:  Currently, blood pressures are well controlled.  Continue current management.    3. Hyperlipidemia, unspecified hyperlipidemia type/Hypertriglyceridemia:  Reviewed most recent labs.  Continue statin.    Limit alcohol intake.  Recommended low-cholesterol diet.  Discussed the risks associated with high triglycerides levels including pancreatitis.    4. JULIETA (obstructive sleep apnea):  Referred SERVICE TO HOME CARE RESPIRATORY THERAPY.    Return in about 1 month (around 6/4/2023) for DM.     Dr. FIORELLA  Cheyenne Welch, have created a visit summary document based on the audio recording between Dr. Ludwin Malave MD and this patient for the physician to review, edit as needed, and authenticate.  Creation Date: 5/5/2023 Time: 2:30 AM    I have reviewed and edited the visit summary above and attest that it is accurate.

## 2023-05-06 ENCOUNTER — APPOINTMENT (OUTPATIENT)
Dept: GENERAL RADIOLOGY | Age: 78
DRG: 640 | End: 2023-05-06
Payer: COMMERCIAL

## 2023-05-06 ENCOUNTER — HOSPITAL ENCOUNTER (INPATIENT)
Age: 78
LOS: 3 days | Discharge: NURSING, REHAB OR LONG-TERM CARE | DRG: 640 | End: 2023-05-09
Attending: EMERGENCY MEDICINE | Admitting: STUDENT IN AN ORGANIZED HEALTH CARE EDUCATION/TRAINING PROGRAM
Payer: COMMERCIAL

## 2023-05-06 DIAGNOSIS — R00.1 JUNCTIONAL BRADYCARDIA: Primary | ICD-10-CM

## 2023-05-06 DIAGNOSIS — I95.9 HYPOTENSION, UNSPECIFIED HYPOTENSION TYPE: ICD-10-CM

## 2023-05-06 DIAGNOSIS — G89.29 OTHER CHRONIC PAIN: ICD-10-CM

## 2023-05-06 DIAGNOSIS — F41.9 ANXIETY DISORDER, UNSPECIFIED TYPE: ICD-10-CM

## 2023-05-06 DIAGNOSIS — Z79.01 CHRONIC ANTICOAGULATION: ICD-10-CM

## 2023-05-06 LAB
ALBUMIN SERPL-MCNC: 3.7 G/DL (ref 3.4–5)
ALP SERPL-CCNC: 124 U/L (ref 40–129)
ALT SERPL-CCNC: 13 U/L (ref 10–40)
ANION GAP SERPL CALCULATED.3IONS-SCNC: 12 MMOL/L (ref 3–16)
ANION GAP SERPL CALCULATED.3IONS-SCNC: 12 MMOL/L (ref 3–16)
AST SERPL-CCNC: 14 U/L (ref 15–37)
BASE EXCESS BLDV CALC-SCNC: 7.3 MMOL/L (ref -2–3)
BASOPHILS # BLD: 0 K/UL (ref 0–0.2)
BASOPHILS # BLD: 0 K/UL (ref 0–0.2)
BASOPHILS NFR BLD: 0.6 %
BASOPHILS NFR BLD: 0.7 %
BILIRUB DIRECT SERPL-MCNC: <0.2 MG/DL (ref 0–0.3)
BILIRUB INDIRECT SERPL-MCNC: ABNORMAL MG/DL (ref 0–1)
BILIRUB SERPL-MCNC: 0.5 MG/DL (ref 0–1)
BUN SERPL-MCNC: 38 MG/DL (ref 7–20)
BUN SERPL-MCNC: 43 MG/DL (ref 7–20)
CALCIUM SERPL-MCNC: 8.8 MG/DL (ref 8.3–10.6)
CALCIUM SERPL-MCNC: 8.9 MG/DL (ref 8.3–10.6)
CHLORIDE SERPL-SCNC: 96 MMOL/L (ref 99–110)
CHLORIDE SERPL-SCNC: 96 MMOL/L (ref 99–110)
CO2 BLDV-SCNC: 38 MMOL/L
CO2 SERPL-SCNC: 28 MMOL/L (ref 21–32)
CO2 SERPL-SCNC: 31 MMOL/L (ref 21–32)
COHGB MFR BLDV: 1.2 % (ref 0–1.5)
CREAT SERPL-MCNC: 5.6 MG/DL (ref 0.8–1.3)
CREAT SERPL-MCNC: 5.7 MG/DL (ref 0.8–1.3)
DEPRECATED RDW RBC AUTO: 15.5 % (ref 12.4–15.4)
DEPRECATED RDW RBC AUTO: 15.7 % (ref 12.4–15.4)
DO-HGB MFR BLDV: 84.9 %
EOSINOPHIL # BLD: 0.1 K/UL (ref 0–0.6)
EOSINOPHIL # BLD: 0.2 K/UL (ref 0–0.6)
EOSINOPHIL NFR BLD: 1.4 %
EOSINOPHIL NFR BLD: 4 %
GFR SERPLBLD CREATININE-BSD FMLA CKD-EPI: 10 ML/MIN/{1.73_M2}
GFR SERPLBLD CREATININE-BSD FMLA CKD-EPI: 10 ML/MIN/{1.73_M2}
GLUCOSE BLD-MCNC: 213 MG/DL (ref 70–99)
GLUCOSE SERPL-MCNC: 138 MG/DL (ref 70–99)
GLUCOSE SERPL-MCNC: 222 MG/DL (ref 70–99)
HCO3 BLDV-SCNC: 35.5 MMOL/L (ref 24–28)
HCT VFR BLD AUTO: 34.3 % (ref 40.5–52.5)
HCT VFR BLD AUTO: 35 % (ref 40.5–52.5)
HGB BLD-MCNC: 11.3 G/DL (ref 13.5–17.5)
HGB BLD-MCNC: 11.3 G/DL (ref 13.5–17.5)
LACTATE BLDV-SCNC: 1.5 MMOL/L (ref 0.4–1.9)
LYMPHOCYTES # BLD: 1.3 K/UL (ref 1–5.1)
LYMPHOCYTES # BLD: 1.8 K/UL (ref 1–5.1)
LYMPHOCYTES NFR BLD: 20.8 %
LYMPHOCYTES NFR BLD: 33.2 %
MCH RBC QN AUTO: 30.5 PG (ref 26–34)
MCH RBC QN AUTO: 30.9 PG (ref 26–34)
MCHC RBC AUTO-ENTMCNC: 32.4 G/DL (ref 31–36)
MCHC RBC AUTO-ENTMCNC: 32.8 G/DL (ref 31–36)
MCV RBC AUTO: 94 FL (ref 80–100)
MCV RBC AUTO: 94.2 FL (ref 80–100)
METHGB MFR BLDV: 0.8 % (ref 0–1.5)
MONOCYTES # BLD: 0.3 K/UL (ref 0–1.3)
MONOCYTES # BLD: 0.4 K/UL (ref 0–1.3)
MONOCYTES NFR BLD: 5.1 %
MONOCYTES NFR BLD: 6.5 %
NEUTROPHILS # BLD: 3 K/UL (ref 1.7–7.7)
NEUTROPHILS # BLD: 4.6 K/UL (ref 1.7–7.7)
NEUTROPHILS NFR BLD: 55.7 %
NEUTROPHILS NFR BLD: 72 %
NT-PROBNP SERPL-MCNC: 5644 PG/ML (ref 0–449)
PCO2 BLDV: 69.3 MMHG (ref 41–51)
PERFORMED ON: ABNORMAL
PH BLDV: 7.32 [PH] (ref 7.35–7.45)
PLATELET # BLD AUTO: 177 K/UL (ref 135–450)
PLATELET # BLD AUTO: 196 K/UL (ref 135–450)
PMV BLD AUTO: 8.5 FL (ref 5–10.5)
PMV BLD AUTO: 8.8 FL (ref 5–10.5)
PO2 BLDV: <30 MMHG (ref 25–40)
POTASSIUM SERPL-SCNC: 5.2 MMOL/L (ref 3.5–5.1)
POTASSIUM SERPL-SCNC: 5.4 MMOL/L (ref 3.5–5.1)
POTASSIUM SERPL-SCNC: 6.6 MMOL/L (ref 3.5–5.1)
PROT SERPL-MCNC: 6.9 G/DL (ref 6.4–8.2)
RBC # BLD AUTO: 3.65 M/UL (ref 4.2–5.9)
RBC # BLD AUTO: 3.72 M/UL (ref 4.2–5.9)
SAO2 % BLDV: 13 %
SODIUM SERPL-SCNC: 136 MMOL/L (ref 136–145)
SODIUM SERPL-SCNC: 139 MMOL/L (ref 136–145)
TROPONIN, HIGH SENSITIVITY: 101 NG/L (ref 0–22)
TROPONIN, HIGH SENSITIVITY: 76 NG/L (ref 0–22)
TROPONIN, HIGH SENSITIVITY: 87 NG/L (ref 0–22)
WBC # BLD AUTO: 5.4 K/UL (ref 4–11)
WBC # BLD AUTO: 6.4 K/UL (ref 4–11)

## 2023-05-06 PROCEDURE — 93005 ELECTROCARDIOGRAM TRACING: CPT | Performed by: PHYSICIAN ASSISTANT

## 2023-05-06 PROCEDURE — 6370000000 HC RX 637 (ALT 250 FOR IP)

## 2023-05-06 PROCEDURE — 84484 ASSAY OF TROPONIN QUANT: CPT

## 2023-05-06 PROCEDURE — 71045 X-RAY EXAM CHEST 1 VIEW: CPT

## 2023-05-06 PROCEDURE — 2580000003 HC RX 258

## 2023-05-06 PROCEDURE — 96365 THER/PROPH/DIAG IV INF INIT: CPT

## 2023-05-06 PROCEDURE — 83880 ASSAY OF NATRIURETIC PEPTIDE: CPT

## 2023-05-06 PROCEDURE — 96375 TX/PRO/DX INJ NEW DRUG ADDON: CPT

## 2023-05-06 PROCEDURE — 6360000002 HC RX W HCPCS: Performed by: PHYSICIAN ASSISTANT

## 2023-05-06 PROCEDURE — 2580000003 HC RX 258: Performed by: PHYSICIAN ASSISTANT

## 2023-05-06 PROCEDURE — 2000000000 HC ICU R&B

## 2023-05-06 PROCEDURE — 80048 BASIC METABOLIC PNL TOTAL CA: CPT

## 2023-05-06 PROCEDURE — 99223 1ST HOSP IP/OBS HIGH 75: CPT | Performed by: INTERNAL MEDICINE

## 2023-05-06 PROCEDURE — 83605 ASSAY OF LACTIC ACID: CPT

## 2023-05-06 PROCEDURE — 99285 EMERGENCY DEPT VISIT HI MDM: CPT

## 2023-05-06 PROCEDURE — 6360000002 HC RX W HCPCS

## 2023-05-06 PROCEDURE — 82803 BLOOD GASES ANY COMBINATION: CPT

## 2023-05-06 PROCEDURE — 93005 ELECTROCARDIOGRAM TRACING: CPT

## 2023-05-06 PROCEDURE — 36415 COLL VENOUS BLD VENIPUNCTURE: CPT

## 2023-05-06 PROCEDURE — 84132 ASSAY OF SERUM POTASSIUM: CPT

## 2023-05-06 PROCEDURE — 85025 COMPLETE CBC W/AUTO DIFF WBC: CPT

## 2023-05-06 PROCEDURE — 80076 HEPATIC FUNCTION PANEL: CPT

## 2023-05-06 RX ORDER — ONDANSETRON 2 MG/ML
4 INJECTION INTRAMUSCULAR; INTRAVENOUS EVERY 6 HOURS PRN
Status: DISCONTINUED | OUTPATIENT
Start: 2023-05-06 | End: 2023-05-09 | Stop reason: HOSPADM

## 2023-05-06 RX ORDER — INSULIN LISPRO 100 [IU]/ML
0-4 INJECTION, SOLUTION INTRAVENOUS; SUBCUTANEOUS
Status: DISCONTINUED | OUTPATIENT
Start: 2023-05-07 | End: 2023-05-09 | Stop reason: HOSPADM

## 2023-05-06 RX ORDER — POLYETHYLENE GLYCOL 3350 17 G/17G
17 POWDER, FOR SOLUTION ORAL DAILY PRN
Status: DISCONTINUED | OUTPATIENT
Start: 2023-05-06 | End: 2023-05-09 | Stop reason: HOSPADM

## 2023-05-06 RX ORDER — ONDANSETRON 4 MG/1
4 TABLET, ORALLY DISINTEGRATING ORAL EVERY 8 HOURS PRN
Status: DISCONTINUED | OUTPATIENT
Start: 2023-05-06 | End: 2023-05-09 | Stop reason: HOSPADM

## 2023-05-06 RX ORDER — INSULIN LISPRO 100 [IU]/ML
0-4 INJECTION, SOLUTION INTRAVENOUS; SUBCUTANEOUS NIGHTLY
Status: DISCONTINUED | OUTPATIENT
Start: 2023-05-06 | End: 2023-05-09 | Stop reason: HOSPADM

## 2023-05-06 RX ORDER — 0.9 % SODIUM CHLORIDE 0.9 %
250 INTRAVENOUS SOLUTION INTRAVENOUS ONCE
Status: COMPLETED | OUTPATIENT
Start: 2023-05-06 | End: 2023-05-06

## 2023-05-06 RX ORDER — SODIUM CHLORIDE 9 MG/ML
INJECTION, SOLUTION INTRAVENOUS CONTINUOUS
Status: DISCONTINUED | OUTPATIENT
Start: 2023-05-06 | End: 2023-05-07

## 2023-05-06 RX ORDER — GLUCAGON 1 MG/ML
1 KIT INJECTION PRN
Status: DISCONTINUED | OUTPATIENT
Start: 2023-05-06 | End: 2023-05-09 | Stop reason: HOSPADM

## 2023-05-06 RX ORDER — ACETAMINOPHEN 650 MG/1
650 SUPPOSITORY RECTAL EVERY 6 HOURS PRN
Status: DISCONTINUED | OUTPATIENT
Start: 2023-05-06 | End: 2023-05-09 | Stop reason: HOSPADM

## 2023-05-06 RX ORDER — EPINEPHRINE 0.1 MG/ML
0.1 SYRINGE (ML) INJECTION ONCE
Status: DISCONTINUED | OUTPATIENT
Start: 2023-05-06 | End: 2023-05-06

## 2023-05-06 RX ORDER — DEXTROSE MONOHYDRATE 100 MG/ML
INJECTION, SOLUTION INTRAVENOUS CONTINUOUS PRN
Status: DISCONTINUED | OUTPATIENT
Start: 2023-05-06 | End: 2023-05-09 | Stop reason: HOSPADM

## 2023-05-06 RX ORDER — SODIUM CHLORIDE 9 MG/ML
INJECTION, SOLUTION INTRAVENOUS PRN
Status: DISCONTINUED | OUTPATIENT
Start: 2023-05-06 | End: 2023-05-09 | Stop reason: HOSPADM

## 2023-05-06 RX ORDER — GABAPENTIN 100 MG/1
100 CAPSULE ORAL 3 TIMES DAILY
Status: DISCONTINUED | OUTPATIENT
Start: 2023-05-06 | End: 2023-05-09 | Stop reason: HOSPADM

## 2023-05-06 RX ORDER — SODIUM CHLORIDE 0.9 % (FLUSH) 0.9 %
5-40 SYRINGE (ML) INJECTION EVERY 12 HOURS SCHEDULED
Status: DISCONTINUED | OUTPATIENT
Start: 2023-05-06 | End: 2023-05-09 | Stop reason: HOSPADM

## 2023-05-06 RX ORDER — DOPAMINE HYDROCHLORIDE 160 MG/100ML
1-20 INJECTION, SOLUTION INTRAVENOUS CONTINUOUS
Status: DISCONTINUED | OUTPATIENT
Start: 2023-05-06 | End: 2023-05-09 | Stop reason: HOSPADM

## 2023-05-06 RX ORDER — ATROPINE SULFATE 0.1 MG/ML
0.5 INJECTION INTRAVENOUS ONCE
Status: COMPLETED | OUTPATIENT
Start: 2023-05-06 | End: 2023-05-06

## 2023-05-06 RX ORDER — SODIUM CHLORIDE 0.9 % (FLUSH) 0.9 %
5-40 SYRINGE (ML) INJECTION PRN
Status: DISCONTINUED | OUTPATIENT
Start: 2023-05-06 | End: 2023-05-09 | Stop reason: HOSPADM

## 2023-05-06 RX ORDER — PANTOPRAZOLE SODIUM 20 MG/1
20 TABLET, DELAYED RELEASE ORAL NIGHTLY
Status: DISCONTINUED | OUTPATIENT
Start: 2023-05-06 | End: 2023-05-09 | Stop reason: HOSPADM

## 2023-05-06 RX ORDER — DOPAMINE HYDROCHLORIDE 160 MG/100ML
INJECTION, SOLUTION INTRAVENOUS
Status: COMPLETED
Start: 2023-05-06 | End: 2023-05-06

## 2023-05-06 RX ORDER — LORAZEPAM 0.5 MG/1
0.5 TABLET ORAL 2 TIMES DAILY
Status: CANCELLED | OUTPATIENT
Start: 2023-05-06

## 2023-05-06 RX ORDER — ACETAMINOPHEN 325 MG/1
650 TABLET ORAL EVERY 6 HOURS PRN
Status: DISCONTINUED | OUTPATIENT
Start: 2023-05-06 | End: 2023-05-09 | Stop reason: HOSPADM

## 2023-05-06 RX ADMIN — SODIUM CHLORIDE 250 ML: 9 INJECTION, SOLUTION INTRAVENOUS at 13:04

## 2023-05-06 RX ADMIN — DOPAMINE HYDROCHLORIDE IN DEXTROSE 5 MCG/KG/MIN: 1.6 INJECTION, SOLUTION INTRAVENOUS at 13:23

## 2023-05-06 RX ADMIN — DOPAMINE HYDROCHLORIDE 5 MCG/KG/MIN: 160 INJECTION, SOLUTION INTRAVENOUS at 13:23

## 2023-05-06 RX ADMIN — CALCIUM GLUCONATE 1000 MG: 98 INJECTION, SOLUTION INTRAVENOUS at 12:11

## 2023-05-06 RX ADMIN — ONDANSETRON 4 MG: 2 INJECTION INTRAMUSCULAR; INTRAVENOUS at 16:25

## 2023-05-06 RX ADMIN — ATROPINE SULFATE 0.5 MG: 0.1 INJECTION INTRAVENOUS at 12:00

## 2023-05-06 RX ADMIN — SODIUM CHLORIDE: 9 INJECTION, SOLUTION INTRAVENOUS at 21:12

## 2023-05-06 RX ADMIN — DOPAMINE HYDROCHLORIDE IN DEXTROSE 10 MCG/KG/MIN: 1.6 INJECTION, SOLUTION INTRAVENOUS at 23:58

## 2023-05-06 RX ADMIN — GABAPENTIN 100 MG: 100 CAPSULE ORAL at 16:27

## 2023-05-06 RX ADMIN — ONDANSETRON 4 MG: 2 INJECTION INTRAMUSCULAR; INTRAVENOUS at 23:44

## 2023-05-06 RX ADMIN — SODIUM CHLORIDE 250 ML: 9 INJECTION, SOLUTION INTRAVENOUS at 12:03

## 2023-05-06 RX ADMIN — SODIUM ZIRCONIUM CYCLOSILICATE 10 G: 10 POWDER, FOR SUSPENSION ORAL at 17:25

## 2023-05-06 ASSESSMENT — ENCOUNTER SYMPTOMS
NAUSEA: 0
DIARRHEA: 0
TROUBLE SWALLOWING: 0
FACIAL SWELLING: 0
BACK PAIN: 0
CONSTIPATION: 0
SORE THROAT: 0
CHEST TIGHTNESS: 0
VOMITING: 0
ABDOMINAL PAIN: 0
COUGH: 0
SHORTNESS OF BREATH: 0
PHOTOPHOBIA: 0

## 2023-05-06 ASSESSMENT — PAIN - FUNCTIONAL ASSESSMENT: PAIN_FUNCTIONAL_ASSESSMENT: NONE - DENIES PAIN

## 2023-05-07 PROBLEM — I95.9 ARTERIAL HYPOTENSION: Status: ACTIVE | Noted: 2023-05-07

## 2023-05-07 LAB
ANION GAP SERPL CALCULATED.3IONS-SCNC: 12 MMOL/L (ref 3–16)
BASOPHILS # BLD: 0 K/UL (ref 0–0.2)
BASOPHILS NFR BLD: 0.7 %
BUN SERPL-MCNC: 48 MG/DL (ref 7–20)
CALCIUM SERPL-MCNC: 8.8 MG/DL (ref 8.3–10.6)
CHLORIDE SERPL-SCNC: 97 MMOL/L (ref 99–110)
CO2 SERPL-SCNC: 27 MMOL/L (ref 21–32)
CREAT SERPL-MCNC: 6.3 MG/DL (ref 0.8–1.3)
DEPRECATED RDW RBC AUTO: 15.4 % (ref 12.4–15.4)
EKG ATRIAL RATE: 93 BPM
EKG DIAGNOSIS: NORMAL
EKG DIAGNOSIS: NORMAL
EKG Q-T INTERVAL: 400 MS
EKG Q-T INTERVAL: 454 MS
EKG QRS DURATION: 74 MS
EKG QRS DURATION: 90 MS
EKG QTC CALCULATION (BAZETT): 351 MS
EKG QTC CALCULATION (BAZETT): 422 MS
EKG R AXIS: -23 DEGREES
EKG R AXIS: 12 DEGREES
EKG T AXIS: 27 DEGREES
EKG T AXIS: 5 DEGREES
EKG VENTRICULAR RATE: 36 BPM
EKG VENTRICULAR RATE: 67 BPM
EOSINOPHIL # BLD: 0 K/UL (ref 0–0.6)
EOSINOPHIL NFR BLD: 0.1 %
GFR SERPLBLD CREATININE-BSD FMLA CKD-EPI: 9 ML/MIN/{1.73_M2}
GLUCOSE BLD-MCNC: 124 MG/DL (ref 70–99)
GLUCOSE BLD-MCNC: 132 MG/DL (ref 70–99)
GLUCOSE BLD-MCNC: 153 MG/DL (ref 70–99)
GLUCOSE BLD-MCNC: 161 MG/DL (ref 70–99)
GLUCOSE BLD-MCNC: 181 MG/DL (ref 70–99)
GLUCOSE BLD-MCNC: 185 MG/DL (ref 70–99)
GLUCOSE BLD-MCNC: 193 MG/DL (ref 70–99)
GLUCOSE BLD-MCNC: 196 MG/DL (ref 70–99)
GLUCOSE SERPL-MCNC: 176 MG/DL (ref 70–99)
HCT VFR BLD AUTO: 34.5 % (ref 40.5–52.5)
HGB BLD-MCNC: 11.4 G/DL (ref 13.5–17.5)
LYMPHOCYTES # BLD: 1.4 K/UL (ref 1–5.1)
LYMPHOCYTES NFR BLD: 20.3 %
MCH RBC QN AUTO: 31 PG (ref 26–34)
MCHC RBC AUTO-ENTMCNC: 33 G/DL (ref 31–36)
MCV RBC AUTO: 93.9 FL (ref 80–100)
MONOCYTES # BLD: 0.3 K/UL (ref 0–1.3)
MONOCYTES NFR BLD: 4.4 %
NEUTROPHILS # BLD: 5 K/UL (ref 1.7–7.7)
NEUTROPHILS NFR BLD: 74.5 %
PERFORMED ON: ABNORMAL
PLATELET # BLD AUTO: 185 K/UL (ref 135–450)
PMV BLD AUTO: 8 FL (ref 5–10.5)
POTASSIUM SERPL-SCNC: 6.6 MMOL/L (ref 3.5–5.1)
RBC # BLD AUTO: 3.68 M/UL (ref 4.2–5.9)
REASON FOR REJECTION: NORMAL
REASON FOR REJECTION: NORMAL
REJECTED TEST: NORMAL
REJECTED TEST: NORMAL
SODIUM SERPL-SCNC: 136 MMOL/L (ref 136–145)
TROPONIN, HIGH SENSITIVITY: 40 NG/L (ref 0–22)
TROPONIN, HIGH SENSITIVITY: 78 NG/L (ref 0–22)
TROPONIN, HIGH SENSITIVITY: 88 NG/L (ref 0–22)
WBC # BLD AUTO: 6.7 K/UL (ref 4–11)

## 2023-05-07 PROCEDURE — 6370000000 HC RX 637 (ALT 250 FOR IP)

## 2023-05-07 PROCEDURE — 80048 BASIC METABOLIC PNL TOTAL CA: CPT

## 2023-05-07 PROCEDURE — 2700000000 HC OXYGEN THERAPY PER DAY

## 2023-05-07 PROCEDURE — 6370000000 HC RX 637 (ALT 250 FOR IP): Performed by: HOSPITALIST

## 2023-05-07 PROCEDURE — 99223 1ST HOSP IP/OBS HIGH 75: CPT | Performed by: INTERNAL MEDICINE

## 2023-05-07 PROCEDURE — 2580000003 HC RX 258

## 2023-05-07 PROCEDURE — 94761 N-INVAS EAR/PLS OXIMETRY MLT: CPT

## 2023-05-07 PROCEDURE — 2000000000 HC ICU R&B

## 2023-05-07 PROCEDURE — 90935 HEMODIALYSIS ONE EVALUATION: CPT

## 2023-05-07 PROCEDURE — 6360000002 HC RX W HCPCS

## 2023-05-07 PROCEDURE — 93010 ELECTROCARDIOGRAM REPORT: CPT | Performed by: INTERNAL MEDICINE

## 2023-05-07 PROCEDURE — 36415 COLL VENOUS BLD VENIPUNCTURE: CPT

## 2023-05-07 PROCEDURE — 84484 ASSAY OF TROPONIN QUANT: CPT

## 2023-05-07 PROCEDURE — 6370000000 HC RX 637 (ALT 250 FOR IP): Performed by: INTERNAL MEDICINE

## 2023-05-07 PROCEDURE — 5A1D70Z PERFORMANCE OF URINARY FILTRATION, INTERMITTENT, LESS THAN 6 HOURS PER DAY: ICD-10-PCS | Performed by: HOSPITALIST

## 2023-05-07 PROCEDURE — 99232 SBSQ HOSP IP/OBS MODERATE 35: CPT | Performed by: INTERNAL MEDICINE

## 2023-05-07 PROCEDURE — 85025 COMPLETE CBC W/AUTO DIFF WBC: CPT

## 2023-05-07 RX ORDER — DEXTROSE MONOHYDRATE 100 MG/ML
INJECTION, SOLUTION INTRAVENOUS CONTINUOUS PRN
Status: DISCONTINUED | OUTPATIENT
Start: 2023-05-07 | End: 2023-05-09 | Stop reason: HOSPADM

## 2023-05-07 RX ORDER — CALCIUM GLUCONATE 94 MG/ML
1000 INJECTION, SOLUTION INTRAVENOUS ONCE
Status: COMPLETED | OUTPATIENT
Start: 2023-05-07 | End: 2023-05-07

## 2023-05-07 RX ORDER — GLUCAGON 1 MG/ML
1 KIT INJECTION PRN
Status: DISCONTINUED | OUTPATIENT
Start: 2023-05-07 | End: 2023-05-09 | Stop reason: HOSPADM

## 2023-05-07 RX ORDER — TAMSULOSIN HYDROCHLORIDE 0.4 MG/1
0.4 CAPSULE ORAL EVERY MORNING
Status: DISCONTINUED | OUTPATIENT
Start: 2023-05-07 | End: 2023-05-09 | Stop reason: HOSPADM

## 2023-05-07 RX ADMIN — DEXTROSE MONOHYDRATE 125 ML: 100 INJECTION, SOLUTION INTRAVENOUS at 09:37

## 2023-05-07 RX ADMIN — INSULIN HUMAN 10 UNITS: 100 INJECTION, SOLUTION PARENTERAL at 09:47

## 2023-05-07 RX ADMIN — SODIUM ZIRCONIUM CYCLOSILICATE 10 G: 10 POWDER, FOR SUSPENSION ORAL at 13:12

## 2023-05-07 RX ADMIN — DOPAMINE HYDROCHLORIDE IN DEXTROSE 8 MCG/KG/MIN: 1.6 INJECTION, SOLUTION INTRAVENOUS at 11:06

## 2023-05-07 RX ADMIN — PANTOPRAZOLE SODIUM 20 MG: 20 TABLET, DELAYED RELEASE ORAL at 20:42

## 2023-05-07 RX ADMIN — DEXTROSE MONOHYDRATE 250 ML: 100 INJECTION, SOLUTION INTRAVENOUS at 09:48

## 2023-05-07 RX ADMIN — CALCIUM GLUCONATE 1000 MG: 98 INJECTION, SOLUTION INTRAVENOUS at 09:48

## 2023-05-07 RX ADMIN — TAMSULOSIN HYDROCHLORIDE 0.4 MG: 0.4 CAPSULE ORAL at 12:15

## 2023-05-07 RX ADMIN — SODIUM CHLORIDE, PRESERVATIVE FREE 10 ML: 5 INJECTION INTRAVENOUS at 20:42

## 2023-05-07 RX ADMIN — DOPAMINE HYDROCHLORIDE IN DEXTROSE 7.5 MCG/KG/MIN: 1.6 INJECTION, SOLUTION INTRAVENOUS at 22:30

## 2023-05-07 ASSESSMENT — PAIN SCALES - GENERAL
PAINLEVEL_OUTOF10: 0

## 2023-05-08 LAB
ALBUMIN SERPL-MCNC: 3.4 G/DL (ref 3.4–5)
ANION GAP SERPL CALCULATED.3IONS-SCNC: 13 MMOL/L (ref 3–16)
ANION GAP SERPL CALCULATED.3IONS-SCNC: 13 MMOL/L (ref 3–16)
BASOPHILS # BLD: 0 K/UL (ref 0–0.2)
BASOPHILS NFR BLD: 0.5 %
BUN SERPL-MCNC: 31 MG/DL (ref 7–20)
BUN SERPL-MCNC: 32 MG/DL (ref 7–20)
CALCIUM SERPL-MCNC: 8.5 MG/DL (ref 8.3–10.6)
CALCIUM SERPL-MCNC: 8.5 MG/DL (ref 8.3–10.6)
CHLORIDE SERPL-SCNC: 95 MMOL/L (ref 99–110)
CHLORIDE SERPL-SCNC: 98 MMOL/L (ref 99–110)
CO2 SERPL-SCNC: 26 MMOL/L (ref 21–32)
CO2 SERPL-SCNC: 26 MMOL/L (ref 21–32)
CREAT SERPL-MCNC: 4.7 MG/DL (ref 0.8–1.3)
CREAT SERPL-MCNC: 4.7 MG/DL (ref 0.8–1.3)
DEPRECATED RDW RBC AUTO: 15.2 % (ref 12.4–15.4)
EOSINOPHIL # BLD: 0.1 K/UL (ref 0–0.6)
EOSINOPHIL NFR BLD: 1.7 %
FERRITIN SERPL IA-MCNC: 1396 NG/ML (ref 30–400)
FOLATE SERPL-MCNC: 8.72 NG/ML (ref 4.78–24.2)
GFR SERPLBLD CREATININE-BSD FMLA CKD-EPI: 12 ML/MIN/{1.73_M2}
GFR SERPLBLD CREATININE-BSD FMLA CKD-EPI: 12 ML/MIN/{1.73_M2}
GLUCOSE BLD-MCNC: 104 MG/DL (ref 70–99)
GLUCOSE BLD-MCNC: 167 MG/DL (ref 70–99)
GLUCOSE BLD-MCNC: 70 MG/DL (ref 70–99)
GLUCOSE BLD-MCNC: 89 MG/DL (ref 70–99)
GLUCOSE BLD-MCNC: 91 MG/DL (ref 70–99)
GLUCOSE SERPL-MCNC: 107 MG/DL (ref 70–99)
GLUCOSE SERPL-MCNC: 125 MG/DL (ref 70–99)
HCT VFR BLD AUTO: 30.7 % (ref 40.5–52.5)
HGB BLD-MCNC: 10.1 G/DL (ref 13.5–17.5)
IRON SATN MFR SERPL: 56 % (ref 20–50)
IRON SERPL-MCNC: 73 UG/DL (ref 59–158)
LV EF: 68 %
LVEF MODALITY: NORMAL
LYMPHOCYTES # BLD: 1.3 K/UL (ref 1–5.1)
LYMPHOCYTES NFR BLD: 31.1 %
MCH RBC QN AUTO: 30.9 PG (ref 26–34)
MCHC RBC AUTO-ENTMCNC: 32.9 G/DL (ref 31–36)
MCV RBC AUTO: 94 FL (ref 80–100)
MONOCYTES # BLD: 0.3 K/UL (ref 0–1.3)
MONOCYTES NFR BLD: 6 %
NEUTROPHILS # BLD: 2.6 K/UL (ref 1.7–7.7)
NEUTROPHILS NFR BLD: 60.7 %
PERFORMED ON: ABNORMAL
PERFORMED ON: ABNORMAL
PERFORMED ON: NORMAL
PHOSPHATE SERPL-MCNC: 4.7 MG/DL (ref 2.5–4.9)
PLATELET # BLD AUTO: 141 K/UL (ref 135–450)
PMV BLD AUTO: 7.9 FL (ref 5–10.5)
POTASSIUM SERPL-SCNC: 4.3 MMOL/L (ref 3.5–5.1)
POTASSIUM SERPL-SCNC: 4.5 MMOL/L (ref 3.5–5.1)
PTH-INTACT SERPL-MCNC: 190.6 PG/ML (ref 14–72)
RBC # BLD AUTO: 3.26 M/UL (ref 4.2–5.9)
SODIUM SERPL-SCNC: 134 MMOL/L (ref 136–145)
SODIUM SERPL-SCNC: 137 MMOL/L (ref 136–145)
TIBC SERPL-MCNC: 131 UG/DL (ref 260–445)
TROPONIN, HIGH SENSITIVITY: 102 NG/L (ref 0–22)
TROPONIN, HIGH SENSITIVITY: 81 NG/L (ref 0–22)
TROPONIN, HIGH SENSITIVITY: 92 NG/L (ref 0–22)
TROPONIN, HIGH SENSITIVITY: 93 NG/L (ref 0–22)
VIT B12 SERPL-MCNC: 221 PG/ML (ref 211–911)
WBC # BLD AUTO: 4.2 K/UL (ref 4–11)

## 2023-05-08 PROCEDURE — 85025 COMPLETE CBC W/AUTO DIFF WBC: CPT

## 2023-05-08 PROCEDURE — 99233 SBSQ HOSP IP/OBS HIGH 50: CPT | Performed by: INTERNAL MEDICINE

## 2023-05-08 PROCEDURE — 36415 COLL VENOUS BLD VENIPUNCTURE: CPT

## 2023-05-08 PROCEDURE — 6360000002 HC RX W HCPCS

## 2023-05-08 PROCEDURE — 80069 RENAL FUNCTION PANEL: CPT

## 2023-05-08 PROCEDURE — 2580000003 HC RX 258

## 2023-05-08 PROCEDURE — 6360000004 HC RX CONTRAST MEDICATION: Performed by: INTERNAL MEDICINE

## 2023-05-08 PROCEDURE — 83970 ASSAY OF PARATHORMONE: CPT

## 2023-05-08 PROCEDURE — 83550 IRON BINDING TEST: CPT

## 2023-05-08 PROCEDURE — 82746 ASSAY OF FOLIC ACID SERUM: CPT

## 2023-05-08 PROCEDURE — 99223 1ST HOSP IP/OBS HIGH 75: CPT | Performed by: INTERNAL MEDICINE

## 2023-05-08 PROCEDURE — 82607 VITAMIN B-12: CPT

## 2023-05-08 PROCEDURE — 83540 ASSAY OF IRON: CPT

## 2023-05-08 PROCEDURE — C8929 TTE W OR WO FOL WCON,DOPPLER: HCPCS

## 2023-05-08 PROCEDURE — 84484 ASSAY OF TROPONIN QUANT: CPT

## 2023-05-08 PROCEDURE — 2000000000 HC ICU R&B

## 2023-05-08 PROCEDURE — 6370000000 HC RX 637 (ALT 250 FOR IP)

## 2023-05-08 PROCEDURE — 82728 ASSAY OF FERRITIN: CPT

## 2023-05-08 RX ORDER — HEPARIN SODIUM 5000 [USP'U]/ML
5000 INJECTION, SOLUTION INTRAVENOUS; SUBCUTANEOUS EVERY 8 HOURS SCHEDULED
Status: DISCONTINUED | OUTPATIENT
Start: 2023-05-08 | End: 2023-05-09 | Stop reason: HOSPADM

## 2023-05-08 RX ORDER — LORAZEPAM 0.5 MG/1
0.5 TABLET ORAL 2 TIMES DAILY
Status: DISCONTINUED | OUTPATIENT
Start: 2023-05-08 | End: 2023-05-09 | Stop reason: HOSPADM

## 2023-05-08 RX ADMIN — SODIUM CHLORIDE, PRESERVATIVE FREE 10 ML: 5 INJECTION INTRAVENOUS at 09:04

## 2023-05-08 RX ADMIN — PERFLUTREN 1.5 ML: 6.52 INJECTION, SUSPENSION INTRAVENOUS at 09:35

## 2023-05-08 RX ADMIN — SODIUM CHLORIDE, PRESERVATIVE FREE 10 ML: 5 INJECTION INTRAVENOUS at 21:05

## 2023-05-08 RX ADMIN — PANTOPRAZOLE SODIUM 20 MG: 20 TABLET, DELAYED RELEASE ORAL at 20:41

## 2023-05-08 RX ADMIN — HEPARIN SODIUM 5000 UNITS: 5000 INJECTION INTRAVENOUS; SUBCUTANEOUS at 14:20

## 2023-05-08 RX ADMIN — LORAZEPAM 0.5 MG: 0.5 TABLET ORAL at 14:20

## 2023-05-08 RX ADMIN — LORAZEPAM 0.5 MG: 0.5 TABLET ORAL at 20:41

## 2023-05-08 ASSESSMENT — ENCOUNTER SYMPTOMS
NAUSEA: 0
ABDOMINAL PAIN: 0
APNEA: 0
STRIDOR: 0
SHORTNESS OF BREATH: 0
WHEEZING: 0
CONSTIPATION: 0
DIARRHEA: 0
COUGH: 0
ABDOMINAL DISTENTION: 0

## 2023-05-08 ASSESSMENT — PAIN SCALES - GENERAL
PAINLEVEL_OUTOF10: 0

## 2023-05-09 ENCOUNTER — NURSE ONLY (OUTPATIENT)
Dept: CARDIOLOGY | Age: 78
End: 2023-05-09

## 2023-05-09 VITALS
WEIGHT: 195.11 LBS | SYSTOLIC BLOOD PRESSURE: 129 MMHG | RESPIRATION RATE: 16 BRPM | OXYGEN SATURATION: 97 % | BODY MASS INDEX: 25.86 KG/M2 | HEART RATE: 77 BPM | TEMPERATURE: 97.8 F | DIASTOLIC BLOOD PRESSURE: 75 MMHG | HEIGHT: 73 IN

## 2023-05-09 PROBLEM — I48.92 PAROXYSMAL ATRIAL FLUTTER (HCC): Status: ACTIVE | Noted: 2023-05-09

## 2023-05-09 LAB
ANION GAP SERPL CALCULATED.3IONS-SCNC: 14 MMOL/L (ref 3–16)
BASOPHILS # BLD: 0 K/UL (ref 0–0.2)
BASOPHILS NFR BLD: 0.3 %
BUN SERPL-MCNC: 42 MG/DL (ref 7–20)
CALCIUM SERPL-MCNC: 8.2 MG/DL (ref 8.3–10.6)
CHLORIDE SERPL-SCNC: 98 MMOL/L (ref 99–110)
CO2 SERPL-SCNC: 24 MMOL/L (ref 21–32)
CREAT SERPL-MCNC: 5.8 MG/DL (ref 0.8–1.3)
DEPRECATED RDW RBC AUTO: 15.7 % (ref 12.4–15.4)
EOSINOPHIL # BLD: 0.1 K/UL (ref 0–0.6)
EOSINOPHIL NFR BLD: 1.1 %
GFR SERPLBLD CREATININE-BSD FMLA CKD-EPI: 9 ML/MIN/{1.73_M2}
GLUCOSE BLD-MCNC: 109 MG/DL (ref 70–99)
GLUCOSE BLD-MCNC: 81 MG/DL (ref 70–99)
GLUCOSE BLD-MCNC: 84 MG/DL (ref 70–99)
GLUCOSE SERPL-MCNC: 81 MG/DL (ref 70–99)
HCT VFR BLD AUTO: 29.3 % (ref 40.5–52.5)
HGB BLD-MCNC: 9.6 G/DL (ref 13.5–17.5)
LYMPHOCYTES # BLD: 1.3 K/UL (ref 1–5.1)
LYMPHOCYTES NFR BLD: 27.1 %
MCH RBC QN AUTO: 30.7 PG (ref 26–34)
MCHC RBC AUTO-ENTMCNC: 32.9 G/DL (ref 31–36)
MCV RBC AUTO: 93.4 FL (ref 80–100)
MONOCYTES # BLD: 0.3 K/UL (ref 0–1.3)
MONOCYTES NFR BLD: 6 %
NEUTROPHILS # BLD: 3.1 K/UL (ref 1.7–7.7)
NEUTROPHILS NFR BLD: 65.5 %
PERFORMED ON: ABNORMAL
PERFORMED ON: NORMAL
PERFORMED ON: NORMAL
PLATELET # BLD AUTO: 145 K/UL (ref 135–450)
PMV BLD AUTO: 8.3 FL (ref 5–10.5)
POTASSIUM SERPL-SCNC: 4.3 MMOL/L (ref 3.5–5.1)
RBC # BLD AUTO: 3.14 M/UL (ref 4.2–5.9)
SODIUM SERPL-SCNC: 136 MMOL/L (ref 136–145)
TROPONIN, HIGH SENSITIVITY: 98 NG/L (ref 0–22)
WBC # BLD AUTO: 4.7 K/UL (ref 4–11)

## 2023-05-09 PROCEDURE — 99232 SBSQ HOSP IP/OBS MODERATE 35: CPT | Performed by: INTERNAL MEDICINE

## 2023-05-09 PROCEDURE — 85025 COMPLETE CBC W/AUTO DIFF WBC: CPT

## 2023-05-09 PROCEDURE — 36415 COLL VENOUS BLD VENIPUNCTURE: CPT

## 2023-05-09 PROCEDURE — 84484 ASSAY OF TROPONIN QUANT: CPT

## 2023-05-09 PROCEDURE — 80048 BASIC METABOLIC PNL TOTAL CA: CPT

## 2023-05-09 PROCEDURE — 99232 SBSQ HOSP IP/OBS MODERATE 35: CPT | Performed by: NURSE PRACTITIONER

## 2023-05-09 PROCEDURE — 6360000002 HC RX W HCPCS

## 2023-05-09 PROCEDURE — 2580000003 HC RX 258

## 2023-05-09 PROCEDURE — 6370000000 HC RX 637 (ALT 250 FOR IP): Performed by: HOSPITALIST

## 2023-05-09 PROCEDURE — 93246 EXT ECG>7D<15D RECORDING: CPT | Performed by: INTERNAL MEDICINE

## 2023-05-09 PROCEDURE — 6370000000 HC RX 637 (ALT 250 FOR IP)

## 2023-05-09 RX ORDER — LORAZEPAM 0.5 MG/1
0.5 TABLET ORAL 2 TIMES DAILY
Qty: 6 TABLET | Refills: 0 | Status: SHIPPED | OUTPATIENT
Start: 2023-05-09 | End: 2023-05-12

## 2023-05-09 RX ORDER — OXYCODONE HYDROCHLORIDE 5 MG/1
5 TABLET ORAL EVERY 4 HOURS PRN
Qty: 12 TABLET | Refills: 0 | Status: SHIPPED | OUTPATIENT
Start: 2023-05-09 | End: 2023-05-12

## 2023-05-09 RX ADMIN — HEPARIN SODIUM 5000 UNITS: 5000 INJECTION INTRAVENOUS; SUBCUTANEOUS at 06:29

## 2023-05-09 RX ADMIN — LORAZEPAM 0.5 MG: 0.5 TABLET ORAL at 09:41

## 2023-05-09 RX ADMIN — TAMSULOSIN HYDROCHLORIDE 0.4 MG: 0.4 CAPSULE ORAL at 09:41

## 2023-05-09 RX ADMIN — HEPARIN SODIUM 5000 UNITS: 5000 INJECTION INTRAVENOUS; SUBCUTANEOUS at 14:25

## 2023-05-09 RX ADMIN — SODIUM CHLORIDE, PRESERVATIVE FREE 10 ML: 5 INJECTION INTRAVENOUS at 09:41

## 2023-05-09 NOTE — DISCHARGE INSTR - COC
Continuity of Care Form    Patient Name: Ranjana Reyna   :    MRN:  [id number]    Admit date:  2023  Discharge date:  23    Code Status Order: Full Code   Advance Directives:     Admitting Physician:  Mitesh Trevino MD  PCP: Kameron Mar    Discharging Nurse: [facility] AT [facility] Unit/Room#: 9083/7099-02  Discharging Unit Phone Number: [phone]    Emergency Contact:   Extended Emergency Contact Information  Primary Emergency Contact: Kerline Link  Address: Adrianne Romero. [address] [facility], [address] [place] [address] Phone: [phone]  Mobile Phone: [phone]  Relation: Spouse  Secondary Emergency Contact: Jung Mcneill37 Phone: [phone]  Mobile Phone: [phone]  Relation: Child   needed?  No    Past Surgical History:  Past Surgical History:   Procedure Laterality Date    CARDIAC CATHETERIZATION  2019    CHOLECYSTECTOMY, LAPAROSCOPIC N/A 2022    LAPAROSCOPIC CHOLECYSTECTOMY WITH CHOLANGIOGRAM performed by Carmen Bass MD at [address] [place] [address] 2022    SIGMOID COLECTOMY, SIGMOIDOSCOPY performed by Carmen Bass MD at [place] [address] Left 2022    LEFT BRACHIAL CEPHALIC FISTULA performed by Perlita Lopez MD at [place] [address]    ERCP N/A 2022    ERCP SPHINCTER/PAPILLOTOMY performed by Maru Painting MD at [address] [place] [address]    ERCP N/A 2022    ERCP STONE REMOVAL/BALLOON SWEEP performed by Maru Painting MD at [facility]  2022    IR EMBOLIZATION HEMORRHAGE 2022 [facility]    IR PERC ARTERIOVENOUS FISTULA CREATION Left     unsure of date    IR TUNNELED CATHETER PLACEMENT GREATER THAN 5 YEARS  2022    IR TUNNELED CATHETER PLACEMENT GREATER THAN 5 YEARS 2022 [facility]    IR TUNNELED CATHETER PLACEMENT GREATER THAN 5 YEARS Right 2022    Permacath; RIJ access; 19cm;

## 2023-05-09 NOTE — CARE COORDINATION
Case Management Assessment  Initial Evaluation    Date/Time of Evaluation: 5/8/2023 8:49 AM  Assessment Completed by: Joceline Ellis RN    If patient is discharged prior to next notation, then this note serves as note for discharge by case management. Patient Name: Dominick Moulton                   YOB: 1945  Diagnosis: Bradycardia [R00.1]  Junctional bradycardia [R00.1]  Hypotension, unspecified hypotension type [I95.9]                   Date / Time: 5/6/2023 11:31 AM    Patient Admission Status: Inpatient   Readmission Risk (Low < 19, Mod (19-27), High > 27): Readmission Risk Score: 24.5    Current PCP: Yumiko Murray  PCP verified by CM? Yes    Chart Reviewed: Yes      History Provided by: Medical Record, Spouse, Patient  Patient Orientation: Alert and Oriented    Patient Cognition: Alert    Hospitalization in the last 30 days (Readmission):  No    If yes, Readmission Assessment in CM Navigator will be completed. Advance Directives:      Code Status: Full Code   Patient's Primary Decision Maker is: Patient Declined (Legal Next of Kin Remains as Decision Maker)    Primary Decision MakerReugenio Quicka - [phone]    Secondary Decision Maker: Anila Moseley - Child - [phone]    Secondary Decision Maker: Kanika Chawla Child - [phone]    Discharge Planning:    Patient lives with: Other (Comment) (care facility)    Name: Genna Wallace  Address:  [address] [place] [address]   Phone:  [phone]  Fax:  [phone]    Type of Home: Long-Term Care  Primary Care Giver:  Other (Comment) (long term care resident at [facility])  Patient Support Systems include: Family Members, Other (Comment) (care facility staff)     Current Financial resources: Medicare, Medicaid    Current community resources: ECF/Home Care, Other (Comment) (outpatient HD)    Current services prior to admission: Extended Care Placement, Other (Comment) (HD)
Case management is following for discharge planning. The chart was reviewed. Mr. Toshia Tavares remains in the ICU. He is off the dopamine gtt today and will have HD. Per his usual schedule. Beta Blockers are on hold. Advancing diet. When ready for discharge, he will return to his [address] and continue HD @ [facility] TTS.     Ana Angel RN  Case Management  [phone]
paper chart at the nurses station. IMM was reviewed with Beti Marr 4 hours prior to discharge     Transportation:  Transportation PLAN for discharge: EMS transportation   Mode of Transport: Ambulance [address]: [facility] [phone]  Time of Transport: 645-7p    Transport form completed: Yes    Dialysis:  Dialysis patient: Yes    Dialysis Center:  [facility]  Address: [address] [place] [address]  Phone: [phone]    Additional CM Notes: Updated the pt's spouse, Beti Marr, of the discharge date/time. She remains in agreement with the plan of care. COVID Result:    Lab Results   Component Value Date/Time    COVID19 Not Detected 04/18/2023 12:49 PM       The Plan for Transition of Care is related to the following treatment goals of Bradycardia [R00.1]  Junctional bradycardia [R00.1]  Hypotension, unspecified hypotension type [I95.9]    The Patient and/or patient representative Joseph Shaw and his family were provided with a choice of provider and agrees with the discharge plan Yes    Freedom of choice list was provided with basic dialogue that supports the patient's individualized plan of care/goals and shares the quality data associated with the providers.  Yes      Robert Finney RN  [facility]  Case Management Department  [phone]

## 2023-05-09 NOTE — PLAN OF CARE
Problem: Discharge Planning  Goal: Discharge to home or other facility with appropriate resources  5/8/2023 0742 by Severo Righter, RN  Outcome: Progressing     Problem: Skin/Tissue Integrity  Goal: Absence of new skin breakdown  Description: 1. Monitor for areas of redness and/or skin breakdown  2. Assess vascular access sites hourly  3. Every 4-6 hours minimum:  Change oxygen saturation probe site  4. Every 4-6 hours:  If on nasal continuous positive airway pressure, respiratory therapy assess nares and determine need for appliance change or resting period.   5/8/2023 1551 by Oziel Delgado RN  Outcome: Progressing  5/8/2023 0742 by Severo Righter, RN  Outcome: Progressing     Problem: Safety - Adult  Goal: Free from fall injury  5/8/2023 1551 by Oziel Delgado RN  Outcome: Progressing  5/8/2023 0742 by Severo Righter, RN  Outcome: Progressing  Flowsheets (Taken 5/7/2023 2253)  Free From Fall Injury:   Cynthia Everettp family/caregiver on patient safety   Based on caregiver fall risk screen, instruct family/caregiver to ask for assistance with transferring infant if caregiver noted to have fall risk factors     Problem: Cardiovascular - Adult  Goal: Maintains optimal cardiac output and hemodynamic stability  Outcome: Progressing  Goal: Absence of cardiac dysrhythmias or at baseline  Outcome: Progressing     Problem: Metabolic/Fluid and Electrolytes - Adult  Goal: Glucose maintained within prescribed range  Outcome: Progressing     Problem: ABCDS Injury Assessment  Goal: Absence of physical injury  Outcome: Progressing
Problem: Discharge Planning  Goal: Discharge to home or other facility with appropriate resources  Outcome: Progressing     Problem: Skin/Tissue Integrity  Goal: Absence of new skin breakdown  Description: 1. Monitor for areas of redness and/or skin breakdown  2. Assess vascular access sites hourly  3. Every 4-6 hours minimum:  Change oxygen saturation probe site  4. Every 4-6 hours:  If on nasal continuous positive airway pressure, respiratory therapy assess nares and determine need for appliance change or resting period.   Outcome: Progressing    Problem: Safety - Adult  Goal: Free from fall injury  Outcome: Progressing  Flowsheets (Taken 5/7/2023 2253)  Free From Fall Injury:   Instruct family/caregiver on patient safety   Based on caregiver fall risk screen, instruct family/caregiver to ask for assistance with transferring infant if caregiver noted to have fall risk factors     Problem: Cardiovascular - Adult  Goal: Maintains optimal cardiac output and hemodynamic stability  Recent Flowsheet Documentation  Taken 5/7/2023 2000 by Clovis Mcnally RN  Maintains optimal cardiac output and hemodynamic stability:   Monitor blood pressure and heart rate   Assess for signs of decreased cardiac output     Problem: ABCDS Injury Assessment  Goal: Absence of physical injury  Recent Flowsheet Documentation  Taken 5/7/2023 2253 by Clovis Mcnally RN  Absence of Physical Injury: Implement safety measures based on patient assessment
Problem: Skin/Tissue Integrity  Goal: Absence of new skin breakdown  Description: 1. Monitor for areas of redness and/or skin breakdown  2. Assess vascular access sites hourly  3. Every 4-6 hours minimum:  Change oxygen saturation probe site  4. Every 4-6 hours:  If on nasal continuous positive airway pressure, respiratory therapy assess nares and determine need for appliance change or resting period.   5/9/2023 0948 by Marlon Ortega RN  Outcome: Progressing  5/9/2023 0519 by Yenny Harkins RN  Outcome: Progressing     Problem: Safety - Adult  Goal: Free from fall injury  5/9/2023 0948 by Marlon Ortega RN  Outcome: Progressing  5/9/2023 0519 by Yenny Harkins RN  Outcome: Progressing     Problem: Cardiovascular - Adult  Goal: Maintains optimal cardiac output and hemodynamic stability  5/9/2023 0948 by Marlon Ortega RN  Outcome: Progressing  5/9/2023 0519 by Yenny Harkins RN  Outcome: Progressing  Flowsheets (Taken 5/8/2023 2000)  Maintains optimal cardiac output and hemodynamic stability:   Monitor blood pressure and heart rate   Assess for signs of decreased cardiac output  Goal: Absence of cardiac dysrhythmias or at baseline  5/9/2023 0948 by Marlon Ortega RN  Outcome: Progressing  5/9/2023 0519 by Yenny Harkins RN  Outcome: Progressing  Flowsheets (Taken 5/8/2023 2000)  Absence of cardiac dysrhythmias or at baseline:   Monitor cardiac rate and rhythm   Assess for signs of decreased cardiac output     Problem: ABCDS Injury Assessment  Goal: Absence of physical injury  5/9/2023 0948 by Marlon Ortega RN  Outcome: Progressing  5/9/2023 0519 by Yenny Harkins RN  Outcome: Progressing
and discharge

## 2023-05-09 NOTE — DISCHARGE INSTR - DIET

## 2023-05-09 NOTE — DISCHARGE SUMMARY
V2.0  Discharge Summary    Name:  Soo Swift /Age/Sex:  (86 y.o. male)   Admit Date: 2023  Discharge Date: 23    MRN & CSN:  [id number] & [id number] Encounter Date and Time 23 11:45 AM EDT    Attending:  John Elkins MD Discharging Provider: John Elkins MD       Hospital Course:     Brief HPI:  Soo Swift is a 75yo M w PMH of ESRD(TTS), CAD, HTN, HLD,  colon cancer, who p/w bradycardia. This patient had presented from dialysis center as he was about to start his dialysis session the dialysis nurse noted that his blood pressure was in the systolic 48F and diastolic in the 10L and the heart was in the 30s. The patient was at his baseline health earlier this morning. The patient's daughter verified that she has a sister who works at the facility and they also have a camera to see their father at the facility to see how he is doing. He did not complain of any fever, chills, chest pain, loss of consciousness, seizure or any recent changes of medications. The nurse at his facility responsible for giving him his medication and he has been regular with all of his medications at home and has never overdosed on his atenolol which he takes regularly at home. He has no drinking history or smoking history or any illicit drug use. In ED patient's vitals were significant for blood pressure of 82/49, respiratory rate was 36, rest of the vitals were within normal limits. Labs were significant for potassium of 5.4, chloride 96, BUN 38, creatinine 5.6, proBNP was 5644, troponin was 101, pulse 76, hemoglobin is 11.3, white blood cell was 5.4, VBG was significant for pH of 7.318, PCO2 was 69.3. He was given atropine, dopamine, calcium gluconate and 500 cc bolus while in the ED. Brief Problem Based Course:   Junctional bradycardia likely due to beta-blocker/hyperK  Hypotension     Improved. required low dose dopamine ,BB dc;d, echo wnl,  event monitor on dc per carsd     ESRD. Kolby Meredith

## 2023-05-09 NOTE — CONSULTS
[facility]   Cardiac Electrophysiology Consultation   Date: 5/8/2023  Admit Date:  5/6/2023  Reason for Consultation: Bradycardia  Consult Requesting Physician: Dheeraj Patrick MD     Attestation  I have seen,interviewed and examined the patient with the resident. Pertinent medical data and imaging studies reviewed. Refer to the residents note for details of clinical findings  I agree with his assessment and plan with following addendum:     Patient presented to the hospital from hemodialysis center in the setting of hypotension and junctional rate of 30 bpm.  He was noted to be hyperkalemic to 6.5  He was on atenolol, renally excreted  Was on dopamine and currently, in sinus rhythm    Recommendations:  Wean dopamine  Continue to monitor in telemetry for 1 more day  Cardiac monitor for 2 weeks at the time of discharge  No beta-blockers    Hopefully, his bradycardia is secondary to reversible causes including hyperkalemia and renally eliminated beta-blockers. Hence, I am hopeful that we can avoid pacemaker implantation and is gentleman with ESRD and requiring dialysis through his left AV fistula. Deborah Wiley MD   Cardiac Electrophysiology  [address] [place] [address]  Office [phone]    Chief Complaint   Patient presents with    Hypotension     [facility] Pt sent here from dialysis for low heart rate and hypotension. Pt was not able to get any of his dialysis done. HPI: Miladys Muniz is a 69 y.o. male presenting from his dialysis center for hypotension and bradycardia. Reportedly BP was 70s/40s and HR in the 30s. Patient relatively asymptomatic. The nurse at his facility reports that she has been administrating his medications and he has been compliant. On presentation, BP was 82/49 with HR 36. EKG appeared to show a junctional bradycardia. Cardiology was consulted and recommended starting dopamine gtt with improvement of HR and BP.   Patient remains on
PRN  dextrose bolus 10% 125 mL, 125 mL, IntraVENous, PRN **OR** dextrose bolus 10% 250 mL, 250 mL, IntraVENous, PRN  glucagon injection 1 mg, 1 mg, SubCUTAneous, PRN  dextrose 10 % infusion, , IntraVENous, Continuous PRN  tamsulosin (FLOMAX) capsule 0.4 mg, 0.4 mg, Oral, QAM  DOPamine (INTROPIN) 1600 mg/mL in dextrose 5% infusion, 1-20 mcg/kg/min, IntraVENous, Continuous  [Held by provider] gabapentin (NEURONTIN) capsule 100 mg, 100 mg, Oral, TID  pantoprazole (PROTONIX) tablet 20 mg, 20 mg, Oral, Nightly  sodium chloride flush 0.9 % injection 5-40 mL, 5-40 mL, IntraVENous, 2 times per day  sodium chloride flush 0.9 % injection 5-40 mL, 5-40 mL, IntraVENous, PRN  0.9 % sodium chloride infusion, , IntraVENous, PRN  ondansetron (ZOFRAN-ODT) disintegrating tablet 4 mg, 4 mg, Oral, Q8H PRN **OR** ondansetron (ZOFRAN) injection 4 mg, 4 mg, IntraVENous, Q6H PRN  polyethylene glycol (GLYCOLAX) packet 17 g, 17 g, Oral, Daily PRN  acetaminophen (TYLENOL) tablet 650 mg, 650 mg, Oral, Q6H PRN **OR** acetaminophen (TYLENOL) suppository 650 mg, 650 mg, Rectal, Q6H PRN  glucose chewable tablet 16 g, 4 tablet, Oral, PRN  dextrose bolus 10% 125 mL, 125 mL, IntraVENous, PRN **OR** dextrose bolus 10% 250 mL, 250 mL, IntraVENous, PRN  glucagon injection 1 mg, 1 mg, SubCUTAneous, PRN  dextrose 10 % infusion, , IntraVENous, Continuous PRN  insulin lispro (HUMALOG) injection vial 0-4 Units, 0-4 Units, SubCUTAneous, TID WC  insulin lispro (HUMALOG) injection vial 0-4 Units, 0-4 Units, SubCUTAneous, Nightly       Vitals :     Vitals:    05/09/23 0800   BP: (!) 163/77   Pulse: 66   Resp: 20   Temp: 98 °F (36.7 °C)   SpO2: 97%       I & O :       Intake/Output Summary (Last 24 hours) at 5/9/2023 0824  Last data filed at 5/8/2023 1800  Gross per 24 hour   Intake 709.68 ml   Output 0 ml   Net 709.68 ml          Physical Examination :     General appearance: Alert, orientated  Respiratory: no distress  Cardiovascular: no raised JVD, Edema none
infusion, , IntraVENous, Continuous PRN  tamsulosin (FLOMAX) capsule 0.4 mg, 0.4 mg, Oral, QAM  DOPamine (INTROPIN) 1600 mg/mL in dextrose 5% infusion, 1-20 mcg/kg/min, IntraVENous, Continuous  [Held by provider] gabapentin (NEURONTIN) capsule 100 mg, 100 mg, Oral, TID  pantoprazole (PROTONIX) tablet 20 mg, 20 mg, Oral, Nightly  sodium chloride flush 0.9 % injection 5-40 mL, 5-40 mL, IntraVENous, 2 times per day  sodium chloride flush 0.9 % injection 5-40 mL, 5-40 mL, IntraVENous, PRN  0.9 % sodium chloride infusion, , IntraVENous, PRN  ondansetron (ZOFRAN-ODT) disintegrating tablet 4 mg, 4 mg, Oral, Q8H PRN **OR** ondansetron (ZOFRAN) injection 4 mg, 4 mg, IntraVENous, Q6H PRN  polyethylene glycol (GLYCOLAX) packet 17 g, 17 g, Oral, Daily PRN  acetaminophen (TYLENOL) tablet 650 mg, 650 mg, Oral, Q6H PRN **OR** acetaminophen (TYLENOL) suppository 650 mg, 650 mg, Rectal, Q6H PRN  glucose chewable tablet 16 g, 4 tablet, Oral, PRN  dextrose bolus 10% 125 mL, 125 mL, IntraVENous, PRN **OR** dextrose bolus 10% 250 mL, 250 mL, IntraVENous, PRN  glucagon injection 1 mg, 1 mg, SubCUTAneous, PRN  dextrose 10 % infusion, , IntraVENous, Continuous PRN  insulin lispro (HUMALOG) injection vial 0-4 Units, 0-4 Units, SubCUTAneous, TID WC  insulin lispro (HUMALOG) injection vial 0-4 Units, 0-4 Units, SubCUTAneous, Nightly       Vitals :     Vitals:    05/08/23 0900   BP:    Pulse: 64   Resp: 17   Temp:    SpO2:        I & O :       Intake/Output Summary (Last 24 hours) at 5/8/2023 0906  Last data filed at 5/8/2023 0600  Gross per 24 hour   Intake 3615.05 ml   Output 900 ml   Net 2715.05 ml        Physical Examination :     General appearance: Alert, orientated  Respiratory: no distress  Cardiovascular: no raised JVD, Edema none   Abdomen: -  soft  Other relevant findings: mucous membrane is dry        LABS:     Recent Labs     05/06/23  1945 05/07/23  0545 05/08/23  0541   WBC 6.4 6.7 4.2   HGB 11.3* 11.4* 10.1*   HCT 34.3* 34.5*

## 2023-05-09 NOTE — PROGRESS NOTES
2 week CAM monitor # [id number] applied per order Scott Smalls CNP for junctional rhythm & a flutter. Instructions given and questions answered. Bedside nurse aware.

## 2023-05-09 NOTE — DISCHARGE INSTRUCTIONS
CAM MONITOR INSTRUCTIONS    Patient instructions for CAM monitor: You will need to wear monitor for 2 weeks. Mail monitor back or return to office on following date. Remove date:  5-23      [facility]HE[facility] [address]  [address] [facility], [address]  [phone]         Make sure to save box as you will place monitor back in postage paid box to return to company. After removing monitor stick it to template provided, place both your log and monitor in box and place in mailbox. If monitor comes off but has been in place at least 7 days place in box and mail back. If it comes off sooner than 7 days you will have to call office and return to have it replaced. Avoid excess sweating to maximize wear time. You are able to shower after 24 hours, however have majority of water hitting back and not directly on monitor. Do not submerge in bath. If you experience any symptoms while wearing monitor push button and record in booklet.       For questions about monitor call: Customer Service [phone]
no

## 2023-05-10 NOTE — PROGRESS NOTES
Attempt to call report x2 to [facility] long-term [facility] with no answer
[address] Daily Progress Note      Admit Date:  5/6/2023    Subjective:  Mr. Susan Knight was seen and examined. F/U pre op/ C spine fx. No complaints. Some agitation overnight. No cp/sob.      Objective:   /61   Pulse 64   Temp 97.6 °F (36.4 °C) (Oral)   Resp 17   Ht 6' 0.5\" (1.842 m)   Wt 197 lb 1.5 oz (89.4 kg)   SpO2 94%   BMI 26.36 kg/m²     Intake/Output Summary (Last 24 hours) at 5/8/2023 1008  Last data filed at 5/8/2023 0600  Gross per 24 hour   Intake 3615.05 ml   Output 900 ml   Net 2715.05 ml       Rhythm:  Sinus      Physical Exam:  General:  Awake, alert, NAD  Skin:  Warm and dry  Neck:  JVP not elevated  Chest:  Clear to auscultation, respiration normal  Cardiovascular:  RRR S1S2  Abdomen:  Soft nontender  Extremities:  no edema    Medications:    [START ON 5/9/2023] epoetin davis-epbx  3,000 Units IntraVENous Once per day on Tue Thu Sat    tamsulosin  0.4 mg Oral QAM    [Held by provider] gabapentin  100 mg Oral TID    pantoprazole  20 mg Oral Nightly    sodium chloride flush  5-40 mL IntraVENous 2 times per day    insulin lispro  0-4 Units SubCUTAneous TID WC    insulin lispro  0-4 Units SubCUTAneous Nightly      dextrose      DOPamine 5 mcg/kg/min (05/08/23 0600)    sodium chloride      dextrose       glucose, dextrose bolus **OR** dextrose bolus, glucagon (rDNA), dextrose, sodium chloride flush, sodium chloride, ondansetron **OR** ondansetron, polyethylene glycol, acetaminophen **OR** acetaminophen, glucose, dextrose bolus **OR** dextrose bolus, glucagon (rDNA), dextrose    Lab Data:  CBC:   Recent Labs     05/06/23 1945 05/07/23 0545 05/08/23  0541   WBC 6.4 6.7 4.2   HGB 11.3* 11.4* 10.1*   HCT 34.3* 34.5* 30.7*   MCV 94.0 93.9 94.0    185 141     BMP:   Recent Labs     05/06/23 1945 05/06/23 2130 05/07/23 0545 05/08/23  0541     --  136 137   K 6.6* 5.2* 6.6* 4.5   CL 96*  --  97* 98*   CO2 28  --  27 26   BUN 43*  --  48* 31*   CREATININE 5.7*  --  6.3*
BG 70, apple juice given with night meds. Will recheck.
ICU Progress Note    Admit Date: 5/6/2023  Day: 3  Vent Day: None  IV Access:Peripheral  IV Fluids:None  Vasopressors:None                Antibiotics: None  Diet: Diet NPO    CC: Bradycardia     Interval history: No acute events overnight. Patient's heart rate has been maintaining around his baseline. This a.m. patient is on 5 of dopamine. Remaining vital signs are within normal limits. HPI: Archana Solano is a 77yo M w PMH of ESRD(TTS), CAD, COPD, HTN, HLD,  colon cancer, who p/w bradycardia. This patient had presented from dialysis center as he was about to start his dialysis session the dialysis nurse noted that his blood pressure was in the systolic 78S and diastolic in the 54F and the heart was in the 30s. The patient was at his baseline health earlier this morning. The patient's daughter verified that she has a sister who works at the facility and they also have a camera to see their father at the facility to see how he is doing. He did not complain of any fever, chills, chest pain, loss of consciousness, seizure or any recent changes of medications. The nurse at his facility responsible for giving him his medication and he has been regular with all of his medications at home and has never overdosed on his atenolol which he takes regularly at home. He has no drinking history or smoking history or any illicit drug use. In ED patient's vitals were significant for blood pressure of 82/49, respiratory rate was 36, rest of the vitals were within normal limits. Labs were significant for potassium of 5.4, chloride 96, BUN 38, creatinine 5.6, proBNP was 5644, troponin was 101, pulse 76, hemoglobin is 11.3, white blood cell was 5.4, VBG was significant for pH of 7.318, PCO2 was 69.3. He was given atropine, dopamine, calcium gluconate and 500 cc bolus while in the ED.     Medications:     Scheduled Meds:   tamsulosin  0.4 mg Oral QAM    [Held by provider] gabapentin  100 mg Oral TID    pantoprazole  20
ICU Progress Note    Admit Date: 5/6/2023  Day: 4  Vent Day: None  IV Access:Peripheral  IV Fluids:None  Vasopressors:None                Antibiotics: None  Diet: ADULT DIET; Regular; 3 carb choices (45 gm/meal); Low Potassium (Less than 3000 mg/day); Low Phosphorus (Less than 1000 mg)    CC: Bradycardia    Interval history: No acute events overnight. Pt is off Dopamine gtt this am and is in sinus rhythm. HPI:  Manjit Stafford is a 77yo M w PMH of ESRD(TTS), CAD, COPD, HTN, HLD,  colon cancer, who p/w bradycardia. This patient had presented from dialysis center as he was about to start his dialysis session the dialysis nurse noted that his blood pressure was in the systolic 03D and diastolic in the 53D and the heart was in the 30s. The patient was at his baseline health earlier this morning. The patient's daughter verified that she has a sister who works at the facility and they also have a camera to see their father at the facility to see how he is doing. He did not complain of any fever, chills, chest pain, loss of consciousness, seizure or any recent changes of medications. The nurse at his facility responsible for giving him his medication and he has been regular with all of his medications at home and has never overdosed on his atenolol which he takes regularly at home. He has no drinking history or smoking history or any illicit drug use. In ED patient's vitals were significant for blood pressure of 82/49, respiratory rate was 36, rest of the vitals were within normal limits. Labs were significant for potassium of 5.4, chloride 96, BUN 38, creatinine 5.6, proBNP was 5644, troponin was 101, pulse 76, hemoglobin is 11.3, white blood cell was 5.4, VBG was significant for pH of 7.318, PCO2 was 69.3. He was given atropine, dopamine, calcium gluconate and 500 cc bolus while in the ED.        Medications:     Scheduled Meds:   epoetin davis-epbx  3,000 Units IntraVENous Once per day on Tue Thu Sat
Nursing bedside swallow eval completed. The patient remained free of coughing or distress while swallowing. Diet ordered. Nutrition called for lunch order.
Patient NPO at midnight, food removed from bedside table.
Patient discharged to [facility]; IVF removed. EKG CAM in place. Pt alert; disoriented;with episodes of restlessness. Vitally stable. Personal belongings; documents handed over to transport personnel.
Patient refusing dialysis. Dr. Jannet Woods notified via Corrinne Rosales, dialysis RN. Dr. Jemma Badillo notified via [employer]. This RN called daughter to talk to patient. He continuously refuses and will not let RN access fistula. This RN Dania Woods asking what he would like us to do. Dr. Jannet Woods responded with \"discharge him\". Corrinne Rosales, dialysis RN notified.
Patient with very little sleep throughout the night, becoming increasingly agitated and confused. Still able to answer orientation questions but getting frustrated with nursing staff. Safety precautions in place, will continue to monitor.
Physician Progress Note      Mariposa Seymour  CSN #:                  [id number]  :                       1945  ADMIT DATE:       2023 11:31 AM  DISCH DATE:        2023 8:00 PM  RESPONDING  PROVIDER #:        Marshall Cantu MD          QUERY TEXT:    Patient admitted with Bradycardia, noted to have atrial flutter with   [id number] 7. If possible, please document in progress notes and discharge   summary if you are evaluating and/or treating any of the following: The medical record reflects the following:  Risk Factors: Atrial flutter  Clinical Indicators: Cardiology PN: s, noted to have AFL with HR controlled on   tele. [id number] 7. Treatment: Eliquis, EP consult  Options provided:  -- Secondary hypercoagulable state in a patient with atrial fibrillation  -- Other - I will add my own diagnosis  -- Disagree - Not applicable / Not valid  -- Disagree - Clinically unable to determine / Unknown  -- Refer to Clinical Documentation Reviewer    PROVIDER RESPONSE TEXT:    This patient has secondary hypercoagulable state in a patient with atrial   fibrillation. Query created by: Yee Lemon on 2023 5:44 PM      Electronically signed by:   Marshall Cantu MD 5/10/2023 11:11 AM
Pt with increased agitation and confusion. Attempting to get out of bed, refusing nursing care and blood draws, remains able to answer orientation questions. Safety precautions in place, video monitor in use. Will continue to monitor.
Spoke with wife and daughter on phone. Family states he is more confused than normal and is requesting urine testing for UTI. Daughter told this RN yesterday that pt  has baseline confusion/sundowners. Upon RN assessment immediately after phone call with family, patient is alert and oreinted x4. Dr. Kristin Andino notified.
V2.0    [facility] Progress Note      Name:  Bishop Carbone /Age/Sex:   (78 y.o. male)   MRN & CSN:  [id number] & [id number] Encounter Date/Time: 2023 8:47 AM EDT   Location:  [id number] PCP: River Watkins     Attending:Gera Zuniga MD       Hospital Day: 3    Assessment and Recommendations   Bishop Carbone is a 69 y.o. male with pmh of ESRD who presents with Junctional bradycardia      Junctional bradycardia likely due to beta-blocker  Hypotension     Improved. Remains on low-dose dopamine this morning, wean off. .  EP consult appreciated. .    ESRD. Britt Casper Continue hemodialysis, Tuesday, Thursday, Saturday     Hyperkalemia-resolved with hemodialysis    Anemia due to CKD--EPO per nephrology, monitor H&H    History of DVT on Eliquis    DM type 2--continue sliding scale insulin      Diet ADULT DIET; Regular; 3 carb choices (45 gm/meal); Low Potassium (Less than 3000 mg/day); Low Phosphorus (Less than 1000 mg)   DVT Prophylaxis [] Lovenox, []  Heparin, [] SCDs, [] Ambulation,  [] Eliquis, [] Xarelto  [] Coumadin   Code Status Full Code   Disposition   Estimated Date of Discharge:    Surrogate Decision Maker/ POA             Subjective:     Chief Complaint:     Bishop Carbone is a 69 y.o. male with ESRD admitted with junctional bradycardia and hypotension from hemodialysis    Subjective  Patient is doing well with no new complaints. No new home cellulitis. Review of Systems:      Pertinent positives and negatives discussed in HPI    Objective:      Intake/Output Summary (Last 24 hours) at 2023 1506  Last data filed at 2023 1033  Gross per 24 hour   Intake 1597.97 ml   Output 900 ml   Net 697.97 ml      Vitals:   Vitals:    23 0815 23 0830 23 0845 23 0900   BP: (!) 136/122  133/61    Pulse: 64 64 65 64   Resp: 17 15 15 17   Temp:       TempSrc:       SpO2:       Weight:       Height:             Physical Exam:      General: NAD  Eyes: EOMI  ENT: neck
treatments and plan of care.

## 2023-05-29 NOTE — PROGRESS NOTES
.POST INTRAVITREAL INJECTION PATIENT INSTRUCTIONS   Below are some guidelines for what to expect after your treatment and additional care instructions.   * you may experience mild discomfort in your eye after the treatment. Your eye usually feels better within 24 to 48 hours after the procedure.   * you have been given drops that numb the surface of your eye.   DO NOT rub or touch your eye, DO NOT wear contacts until numbness goes away.   * you may experience small spots that appear in your field of vision, these are usually caused by an air bubble or from the medication. It taked a few hours or days for these to go away.   * use of sunglasses will help reduce light sensitivity and glare.   * DO NOT swim   for at least 3 hours after the injection   * If you have a gritty, burning, irritating or stinging feeling in the injected eye. This may be a result of the antiseptic used. Use artifical tears or eye lubricant ( from over- the- counter from your local pharmacy ). If you have some at home already please check the expiration date, so not to use anything contaminated in your eye. A cool pack over your eye brow above the injected eye may also relieve discomfort.   Call us right away or go to the Emergency Department if you have a dramatic decrease in vision or extreme pain in the eye.   [facility] [phone]     RD did not conduct direct, in-person nutrition evaluation in efforts to reduce exposure and use of PPE for high risk persons, PUI persons, patients who have tested positive for Covid-19 or those awaiting respiratory panel results. EMR was screened for nutrition risk factors, as defined per nutrition standards of care. Comprehensive Nutrition Assessment    Type and Reason for Visit:  Reassess    Nutrition Recommendations/Plan:   - Continue full liquid diet and advance as appropriate  - Continue Nepro BID    Nutrition Assessment:  Follow-up. Pt continues in droplet plus isolation for COVID. Hx diabetes and ESRD w/ HD. Diet advanced to full liquid yesterday and Nepro supplement was added this AM. Intakes variable. Will continue ONS and monitor. Malnutrition Assessment:  Malnutrition Status:  No malnutrition    Context:  Chronic Illness       Estimated Daily Nutrient Needs:  Energy (kcal):  5394-1652 kcal (22-25 kcal/kg ABW); Weight Used for Energy Requirements:  Current     Protein (g):  116-146 gm (1.2-1.5 gm/kg ABW); Weight Used for Protein Requirements:  Current        Fluid (ml/day): Standard Renal      Nutrition Related Findings:  Labs reviewed. +BM 2/12. No edema. Wounds:  None       Current Nutrition Therapies:    ADULT DIET; Full Liquid  ADULT ORAL NUTRITION SUPPLEMENT; Lunch, Dinner; Renal Oral Supplement    Anthropometric Measures:  · Height: 6' (182.9 cm)  · Current Body Weight: 217 lb (98.4 kg)   · Admission Body Weight: 213 lb (96.6 kg)    · Usual Body Weight:  (FROYLAN)     · Ideal Body Weight: 178 lbs; % Ideal Body Weight 121.9 %   · BMI: 29.4  · BMI Categories: Overweight (BMI 25.0-29. 9)       Nutrition Diagnosis:   · Inadequate oral intake related to altered GI function as evidenced by intake 26-50%    Nutrition Interventions:   Food and/or Nutrient Delivery:  Continue Current Diet,Continue Oral Nutrition Supplement (Adv diet as appropriate)  Nutrition Education/Counseling:  No recommendation at this time   Coordination of Nutrition Care:  Continue to monitor while inpatient    Goals:  Consume greater than 50% of meals and supplements this admission       Nutrition Monitoring and Evaluation:   Behavioral-Environmental Outcomes:  None Identified   Food/Nutrient Intake Outcomes:  Diet Advancement/Tolerance,Food and Nutrient Intake,Supplement Intake  Physical Signs/Symptoms Outcomes:  Biochemical Data,GI Status,Nutrition Focused Physical Findings,Skin,Weight,Hemodynamic Status     Discharge Planning:     Too soon to determine     Electronically signed by Mary Zuñiga RD, CHANELLE on 2/15/22 at 10:45 AM EST    Contact: [phone]

## 2023-06-02 NOTE — PROGRESS NOTES
Patient is stable  and will continue present plan of care and reassess at next routine visit  All questions about this problem from patient were answered today  RN completed AM assessment. AM medications were brought into pt's room and educated on with pt. Pt did complain of pain, which in turn RN brought PRN pain medication. See MAR. Pt was able to answer orientation questions correctly, but unable to process the medication administration. Pt repeated multiple times, \"If I give you $11, how much gas will get me a gallon? \" RN again educated on each medication, orientation of being in the hospital, and this RN's role,  but pt was refusing at this time. RN stated she will enter room at later time and attempt to give medications. Electronically signed by Katya Warren RN on 1/31/2022 at 8:47 AM     Surgery PA in room educating pt on medications. Pt agreeable to take medications. Pt is sitting up in bed eating breakfast at this time. No complications.  Electronically signed by Katya Warren RN on 1/31/2022 at 9:06 AM

## 2023-06-23 ENCOUNTER — TELEPHONE (OUTPATIENT)
Dept: CARDIOLOGY CLINIC | Age: 78
End: 2023-06-23

## 2023-06-23 NOTE — TELEPHONE ENCOUNTER
SM called [facility]   Nurse for Moe Leet was unavailable,  I spoke with Tammy Grigsby who gave me the fax number for the nurse and faxed over results from the CAM monitor,  Also requested for a reschedule with FW for appointment missed. This information was faxed to Tammy Grigsby.   (No AF or AFL)

## 2023-06-29 PROCEDURE — 93248 EXT ECG>7D<15D REV&INTERPJ: CPT | Performed by: INTERNAL MEDICINE

## 2023-07-05 NOTE — PROGRESS NOTES
[facility]   Electrophysiology  Office Visit  Date: 7/31/2023    Chief Complaint   Patient presents with    Atrial Flutter    Bradycardia    Hypertension     Cardiac HX: Timothy Carter is a 69 y.o. man with a h/o HTN, HLD, DM, ESRD on HD, GIB, DVT, colon CA, CAD, who p/w hypotension and bradycardia in the setting of hyperkalemia, had been on atenolol now off, placed on dopamine, EKG had shown junctional escape, no s/s, noted to have AFL with HR controlled, placed on Eliquis. Interval History/HPI: Patient is here for follow-up for bradycardia and atrial flutter. Patient had presented with hypotension and bradycardia in the setting of hyperkalemia on 5/6/2023. He was on atenolol and this was discontinued. He was then placed on dopamine. His EKG showed a junctional escape rhythm. He was not having symptoms of dizziness or lightheadedness. He was then noted to have atrial flutter with his heart rate controlled on telemetry. Patient wore an outpatient monitor that showed an average heart rate of 68 bpm  (49-93). His rhythm was normal sinus rhythm with some occasional first-degree AV block. He had a 5% PAC burden and 1 atrial tachycardia. There was no flutter or junctional rhythm noted on the monitor. Today he presents in NSR - HR 69. He is off BB. On Eliquis with no issues with bleeding or dark tarry stools. Presented via stretcher. He denies any episodes of heart racing, palpitations or irregular heartbeats. He denies any chest pain, shortness of breath, PND, orthopnea or lower extremity edema. Home medications:   Current Outpatient Medications on File Prior to Visit   Medication Sig Dispense Refill    LORazepam (ATIVAN) 0.5 MG tablet Take 1 tablet by mouth in the morning and at bedtime. Max Daily Amount: 1 mg      oxyCODONE-acetaminophen (PERCOCET) 5-325 MG per tablet Take 1 tablet by mouth every 4 hours as needed for Pain.  Max Daily Amount: 6 tablets      ondansetron (ZOFRAN) 4 MG tablet

## 2023-07-13 DIAGNOSIS — I49.8 OTHER CARDIAC ARRHYTHMIA: Primary | ICD-10-CM

## 2023-07-31 ENCOUNTER — OFFICE VISIT (OUTPATIENT)
Dept: CARDIOLOGY CLINIC | Age: 78
End: 2023-07-31
Payer: COMMERCIAL

## 2023-07-31 VITALS
DIASTOLIC BLOOD PRESSURE: 60 MMHG | HEART RATE: 70 BPM | SYSTOLIC BLOOD PRESSURE: 130 MMHG | BODY MASS INDEX: 25.84 KG/M2 | OXYGEN SATURATION: 96 % | HEIGHT: 73 IN | WEIGHT: 195 LBS

## 2023-07-31 DIAGNOSIS — Z79.01 ON CONTINUOUS ORAL ANTICOAGULATION: ICD-10-CM

## 2023-07-31 DIAGNOSIS — R00.1 SINUS BRADYCARDIA: ICD-10-CM

## 2023-07-31 DIAGNOSIS — I10 BENIGN ESSENTIAL HTN: ICD-10-CM

## 2023-07-31 DIAGNOSIS — I48.92 PAROXYSMAL ATRIAL FLUTTER (HCC): Primary | ICD-10-CM

## 2023-07-31 PROCEDURE — 99214 OFFICE O/P EST MOD 30 MIN: CPT | Performed by: NURSE PRACTITIONER

## 2023-07-31 PROCEDURE — 3075F SYST BP GE 130 - 139MM HG: CPT | Performed by: NURSE PRACTITIONER

## 2023-07-31 PROCEDURE — G8427 DOCREV CUR MEDS BY ELIG CLIN: HCPCS | Performed by: NURSE PRACTITIONER

## 2023-07-31 PROCEDURE — 93000 ELECTROCARDIOGRAM COMPLETE: CPT | Performed by: NURSE PRACTITIONER

## 2023-07-31 PROCEDURE — G8419 CALC BMI OUT NRM PARAM NOF/U: HCPCS | Performed by: NURSE PRACTITIONER

## 2023-07-31 PROCEDURE — 1036F TOBACCO NON-USER: CPT | Performed by: NURSE PRACTITIONER

## 2023-07-31 PROCEDURE — 3078F DIAST BP <80 MM HG: CPT | Performed by: NURSE PRACTITIONER

## 2023-07-31 PROCEDURE — 1124F ACP DISCUSS-NO DSCNMKR DOCD: CPT | Performed by: NURSE PRACTITIONER

## 2023-07-31 RX ORDER — LORAZEPAM 0.5 MG/1
0.5 TABLET ORAL 2 TIMES DAILY
COMMUNITY

## 2023-07-31 RX ORDER — OXYCODONE HYDROCHLORIDE AND ACETAMINOPHEN 5; 325 MG/1; MG/1
1 TABLET ORAL EVERY 4 HOURS PRN
COMMUNITY

## 2023-08-19 ENCOUNTER — HOSPITAL ENCOUNTER (EMERGENCY)
Age: 78
Discharge: HOME | End: 2023-08-19
Attending: EMERGENCY MEDICINE
Payer: COMMERCIAL

## 2023-08-19 ENCOUNTER — APPOINTMENT (OUTPATIENT)
Dept: GENERAL RADIOLOGY | Age: 78
End: 2023-08-19
Payer: COMMERCIAL

## 2023-08-19 VITALS
OXYGEN SATURATION: 100 % | BODY MASS INDEX: 26.08 KG/M2 | RESPIRATION RATE: 16 BRPM | TEMPERATURE: 98.4 F | SYSTOLIC BLOOD PRESSURE: 133 MMHG | DIASTOLIC BLOOD PRESSURE: 79 MMHG | HEIGHT: 73 IN | HEART RATE: 76 BPM

## 2023-08-19 DIAGNOSIS — R53.83 OTHER FATIGUE: Primary | ICD-10-CM

## 2023-08-19 DIAGNOSIS — N18.6 ESRD (END STAGE RENAL DISEASE) (HCC): ICD-10-CM

## 2023-08-19 LAB
ANION GAP SERPL CALCULATED.3IONS-SCNC: 12 MMOL/L (ref 3–16)
BASOPHILS # BLD: 0 K/UL (ref 0–0.2)
BASOPHILS NFR BLD: 0.5 %
BUN SERPL-MCNC: 46 MG/DL (ref 7–20)
CALCIUM SERPL-MCNC: 8.7 MG/DL (ref 8.3–10.6)
CHLORIDE SERPL-SCNC: 100 MMOL/L (ref 99–110)
CO2 SERPL-SCNC: 29 MMOL/L (ref 21–32)
CREAT SERPL-MCNC: 5.4 MG/DL (ref 0.8–1.3)
DEPRECATED RDW RBC AUTO: 15 % (ref 12.4–15.4)
EOSINOPHIL # BLD: 0.2 K/UL (ref 0–0.6)
EOSINOPHIL NFR BLD: 3.9 %
GFR SERPLBLD CREATININE-BSD FMLA CKD-EPI: 10 ML/MIN/{1.73_M2}
GLUCOSE SERPL-MCNC: 103 MG/DL (ref 70–99)
HCT VFR BLD AUTO: 33.7 % (ref 40.5–52.5)
HGB BLD-MCNC: 11.3 G/DL (ref 13.5–17.5)
LACTATE BLDV-SCNC: 1.2 MMOL/L (ref 0.4–2)
LYMPHOCYTES # BLD: 1.4 K/UL (ref 1–5.1)
LYMPHOCYTES NFR BLD: 34.5 %
MCH RBC QN AUTO: 31.9 PG (ref 26–34)
MCHC RBC AUTO-ENTMCNC: 33.7 G/DL (ref 31–36)
MCV RBC AUTO: 94.8 FL (ref 80–100)
MONOCYTES # BLD: 0.3 K/UL (ref 0–1.3)
MONOCYTES NFR BLD: 7 %
NEUTROPHILS # BLD: 2.3 K/UL (ref 1.7–7.7)
NEUTROPHILS NFR BLD: 54.1 %
NT-PROBNP SERPL-MCNC: 4089 PG/ML (ref 0–449)
PLATELET # BLD AUTO: 166 K/UL (ref 135–450)
PMV BLD AUTO: 7.6 FL (ref 5–10.5)
POTASSIUM SERPL-SCNC: 4.3 MMOL/L (ref 3.5–5.1)
RBC # BLD AUTO: 3.55 M/UL (ref 4.2–5.9)
SODIUM SERPL-SCNC: 141 MMOL/L (ref 136–145)
TROPONIN, HIGH SENSITIVITY: 90 NG/L (ref 0–22)
WBC # BLD AUTO: 4.2 K/UL (ref 4–11)

## 2023-08-19 PROCEDURE — 80048 BASIC METABOLIC PNL TOTAL CA: CPT

## 2023-08-19 PROCEDURE — 36415 COLL VENOUS BLD VENIPUNCTURE: CPT

## 2023-08-19 PROCEDURE — 83880 ASSAY OF NATRIURETIC PEPTIDE: CPT

## 2023-08-19 PROCEDURE — 93005 ELECTROCARDIOGRAM TRACING: CPT | Performed by: EMERGENCY MEDICINE

## 2023-08-19 PROCEDURE — 84484 ASSAY OF TROPONIN QUANT: CPT

## 2023-08-19 PROCEDURE — 83605 ASSAY OF LACTIC ACID: CPT

## 2023-08-19 PROCEDURE — 85025 COMPLETE CBC W/AUTO DIFF WBC: CPT

## 2023-08-19 PROCEDURE — 71045 X-RAY EXAM CHEST 1 VIEW: CPT

## 2023-08-19 PROCEDURE — 99285 EMERGENCY DEPT VISIT HI MDM: CPT

## 2023-08-19 ASSESSMENT — PAIN - FUNCTIONAL ASSESSMENT: PAIN_FUNCTIONAL_ASSESSMENT: NONE - DENIES PAIN

## 2023-08-19 ASSESSMENT — ENCOUNTER SYMPTOMS
ABDOMINAL PAIN: 0
VOMITING: 0
SHORTNESS OF BREATH: 0
NAUSEA: 0

## 2023-08-19 NOTE — ED TRIAGE NOTES
Patient brought to the ER via EMS from [facility] for generalized weakness. Patient refused dialysis today due to feeling weak.  Recently diagnosed with UTI

## 2023-08-19 NOTE — ED NOTES
Pt using call light to inform this nurse that he would like to be disconnected from monitor d/t transportation being scheduled. Transport ETA of [id number], but have not arrived at this time. Pt informed that he remain connected to monitors unil arrival. Pt screaming at nurse, attempted to calm down pt w/o success.       Katherine Tong RN  08/19/23 1951

## 2023-08-19 NOTE — DISCHARGE INSTRUCTIONS
1 Continue current medications. 2.  Dr. Jillian Pedroza will arrange for dialysis on Monday a day early. 3.  Follow-up with her primary care physician in the next couple of days call for an appointment. 4.  Return emerged department for any signs of infection.

## 2023-08-20 LAB
EKG ATRIAL RATE: 65 BPM
EKG DIAGNOSIS: NORMAL
EKG P AXIS: 27 DEGREES
EKG P-R INTERVAL: 280 MS
EKG Q-T INTERVAL: 390 MS
EKG QRS DURATION: 76 MS
EKG QTC CALCULATION (BAZETT): 405 MS
EKG R AXIS: -25 DEGREES
EKG T AXIS: 4 DEGREES
EKG VENTRICULAR RATE: 65 BPM

## 2023-08-20 NOTE — ED NOTES
Pt picked up at ED by [place] transport, report given. Attempted to call [facility] for a second time w/ out being reached.  Pt safely transported to [address], RN  08/19/23 2007

## 2023-08-21 PROCEDURE — 93010 ELECTROCARDIOGRAM REPORT: CPT | Performed by: INTERNAL MEDICINE

## 2023-11-13 ENCOUNTER — APPOINTMENT (OUTPATIENT)
Dept: GENERAL RADIOLOGY | Age: 78
DRG: 356 | End: 2023-11-13
Payer: COMMERCIAL

## 2023-11-13 ENCOUNTER — HOSPITAL ENCOUNTER (INPATIENT)
Age: 78
LOS: 3 days | Discharge: NURSING, REHAB OR LONG-TERM CARE | DRG: 356 | End: 2023-11-17
Attending: INTERNAL MEDICINE | Admitting: INTERNAL MEDICINE
Payer: COMMERCIAL

## 2023-11-13 ENCOUNTER — APPOINTMENT (OUTPATIENT)
Dept: CT IMAGING | Age: 78
DRG: 356 | End: 2023-11-13
Payer: COMMERCIAL

## 2023-11-13 DIAGNOSIS — E87.5 HYPERKALEMIA: ICD-10-CM

## 2023-11-13 DIAGNOSIS — Z99.2 ESRD ON HEMODIALYSIS (HCC): Primary | ICD-10-CM

## 2023-11-13 DIAGNOSIS — K52.9 COLITIS: ICD-10-CM

## 2023-11-13 DIAGNOSIS — N18.6 ESRD ON HEMODIALYSIS (HCC): Primary | ICD-10-CM

## 2023-11-13 LAB
ALBUMIN SERPL-MCNC: 3.4 G/DL (ref 3.4–5)
ALP SERPL-CCNC: 112 U/L (ref 40–129)
ALT SERPL-CCNC: 8 U/L (ref 10–40)
ANION GAP SERPL CALCULATED.3IONS-SCNC: 13 MMOL/L (ref 3–16)
AST SERPL-CCNC: 11 U/L (ref 15–37)
BASE EXCESS BLDV CALC-SCNC: 0.6 MMOL/L
BASOPHILS # BLD: 0 K/UL (ref 0–0.2)
BASOPHILS NFR BLD: 0.4 %
BETA-HYDROXYBUTYRATE: 0.1 MMOL/L (ref 0–0.27)
BILIRUB DIRECT SERPL-MCNC: <0.2 MG/DL (ref 0–0.3)
BILIRUB INDIRECT SERPL-MCNC: ABNORMAL MG/DL (ref 0–1)
BILIRUB SERPL-MCNC: 0.4 MG/DL (ref 0–1)
BUN SERPL-MCNC: 65 MG/DL (ref 7–20)
CALCIUM SERPL-MCNC: 8.4 MG/DL (ref 8.3–10.6)
CHLORIDE SERPL-SCNC: 96 MMOL/L (ref 99–110)
CO2 BLDV-SCNC: 30 MMOL/L
CO2 SERPL-SCNC: 26 MMOL/L (ref 21–32)
COHGB MFR BLDV: 1.9 %
CREAT SERPL-MCNC: 8.2 MG/DL (ref 0.8–1.3)
DEPRECATED RDW RBC AUTO: 14.6 % (ref 12.4–15.4)
EOSINOPHIL # BLD: 0.2 K/UL (ref 0–0.6)
EOSINOPHIL NFR BLD: 2.7 %
FLUAV RNA UPPER RESP QL NAA+PROBE: NEGATIVE
FLUBV AG NPH QL: NEGATIVE
GFR SERPLBLD CREATININE-BSD FMLA CKD-EPI: 6 ML/MIN/{1.73_M2}
GLUCOSE BLD-MCNC: 196 MG/DL (ref 70–99)
GLUCOSE SERPL-MCNC: 179 MG/DL (ref 70–99)
HCO3 BLDV-SCNC: 28 MMOL/L (ref 23–29)
HCT VFR BLD AUTO: 29.9 % (ref 40.5–52.5)
HGB BLD-MCNC: 10 G/DL (ref 13.5–17.5)
LACTATE BLDV-SCNC: 1.9 MMOL/L (ref 0.4–2)
LIPASE SERPL-CCNC: 57 U/L (ref 13–60)
LYMPHOCYTES # BLD: 1.2 K/UL (ref 1–5.1)
LYMPHOCYTES NFR BLD: 18.1 %
MCH RBC QN AUTO: 32.1 PG (ref 26–34)
MCHC RBC AUTO-ENTMCNC: 33.4 G/DL (ref 31–36)
MCV RBC AUTO: 96.3 FL (ref 80–100)
METHGB MFR BLDV: 0.4 %
MONOCYTES # BLD: 0.5 K/UL (ref 0–1.3)
MONOCYTES NFR BLD: 7.2 %
NEUTROPHILS # BLD: 4.8 K/UL (ref 1.7–7.7)
NEUTROPHILS NFR BLD: 71.6 %
O2 CT VFR BLDV CALC: <30 ML/DL
O2 THERAPY: ABNORMAL
PCO2 BLDV: 60.9 MMHG (ref 40–50)
PERFORMED ON: ABNORMAL
PH BLDV: 7.27 [PH] (ref 7.35–7.45)
PLATELET # BLD AUTO: 200 K/UL (ref 135–450)
PMV BLD AUTO: 7.7 FL (ref 5–10.5)
PO2 BLDV: <30 MMHG
POTASSIUM SERPL-SCNC: 5.2 MMOL/L (ref 3.5–5.1)
POTASSIUM SERPL-SCNC: 5.5 MMOL/L (ref 3.5–5.1)
PROT SERPL-MCNC: 6.5 G/DL (ref 6.4–8.2)
RBC # BLD AUTO: 3.11 M/UL (ref 4.2–5.9)
SAO2 % BLDV: 41 %
SARS-COV-2 RDRP RESP QL NAA+PROBE: NOT DETECTED
SODIUM SERPL-SCNC: 135 MMOL/L (ref 136–145)
WBC # BLD AUTO: 6.7 K/UL (ref 4–11)

## 2023-11-13 PROCEDURE — 84132 ASSAY OF SERUM POTASSIUM: CPT

## 2023-11-13 PROCEDURE — 80076 HEPATIC FUNCTION PANEL: CPT

## 2023-11-13 PROCEDURE — 99285 EMERGENCY DEPT VISIT HI MDM: CPT

## 2023-11-13 PROCEDURE — 82803 BLOOD GASES ANY COMBINATION: CPT

## 2023-11-13 PROCEDURE — 96365 THER/PROPH/DIAG IV INF INIT: CPT

## 2023-11-13 PROCEDURE — 6360000004 HC RX CONTRAST MEDICATION: Performed by: PHYSICIAN ASSISTANT

## 2023-11-13 PROCEDURE — 80048 BASIC METABOLIC PNL TOTAL CA: CPT

## 2023-11-13 PROCEDURE — 82010 KETONE BODYS QUAN: CPT

## 2023-11-13 PROCEDURE — 83690 ASSAY OF LIPASE: CPT

## 2023-11-13 PROCEDURE — 74177 CT ABD & PELVIS W/CONTRAST: CPT

## 2023-11-13 PROCEDURE — 93005 ELECTROCARDIOGRAM TRACING: CPT | Performed by: PHYSICIAN ASSISTANT

## 2023-11-13 PROCEDURE — 36415 COLL VENOUS BLD VENIPUNCTURE: CPT

## 2023-11-13 PROCEDURE — 87804 INFLUENZA ASSAY W/OPTIC: CPT

## 2023-11-13 PROCEDURE — 71045 X-RAY EXAM CHEST 1 VIEW: CPT

## 2023-11-13 PROCEDURE — 96366 THER/PROPH/DIAG IV INF ADDON: CPT

## 2023-11-13 PROCEDURE — 87635 SARS-COV-2 COVID-19 AMP PRB: CPT

## 2023-11-13 PROCEDURE — 83605 ASSAY OF LACTIC ACID: CPT

## 2023-11-13 PROCEDURE — 85025 COMPLETE CBC W/AUTO DIFF WBC: CPT

## 2023-11-13 RX ADMIN — IOPAMIDOL 75 ML: 755 INJECTION, SOLUTION INTRAVENOUS at 22:43

## 2023-11-13 ASSESSMENT — PAIN - FUNCTIONAL ASSESSMENT: PAIN_FUNCTIONAL_ASSESSMENT: NONE - DENIES PAIN

## 2023-11-14 PROBLEM — R11.2 INTRACTABLE NAUSEA AND VOMITING: Status: ACTIVE | Noted: 2023-11-14

## 2023-11-14 LAB
ANION GAP SERPL CALCULATED.3IONS-SCNC: 14 MMOL/L (ref 3–16)
BUN SERPL-MCNC: 69 MG/DL (ref 7–20)
CALCIUM SERPL-MCNC: 8.5 MG/DL (ref 8.3–10.6)
CHLORIDE SERPL-SCNC: 96 MMOL/L (ref 99–110)
CO2 SERPL-SCNC: 25 MMOL/L (ref 21–32)
CREAT SERPL-MCNC: 8.3 MG/DL (ref 0.8–1.3)
EKG ATRIAL RATE: 68 BPM
EKG DIAGNOSIS: NORMAL
EKG P AXIS: 28 DEGREES
EKG P-R INTERVAL: 266 MS
EKG Q-T INTERVAL: 360 MS
EKG QRS DURATION: 68 MS
EKG QTC CALCULATION (BAZETT): 382 MS
EKG R AXIS: -30 DEGREES
EKG T AXIS: -10 DEGREES
EKG VENTRICULAR RATE: 68 BPM
GFR SERPLBLD CREATININE-BSD FMLA CKD-EPI: 6 ML/MIN/{1.73_M2}
GLUCOSE BLD-MCNC: 101 MG/DL (ref 70–99)
GLUCOSE BLD-MCNC: 127 MG/DL (ref 70–99)
GLUCOSE BLD-MCNC: 128 MG/DL (ref 70–99)
GLUCOSE BLD-MCNC: 185 MG/DL (ref 70–99)
GLUCOSE BLD-MCNC: 191 MG/DL (ref 70–99)
GLUCOSE SERPL-MCNC: 109 MG/DL (ref 70–99)
PERFORMED ON: ABNORMAL
PHOSPHATE SERPL-MCNC: 6.5 MG/DL (ref 2.5–4.9)
POTASSIUM SERPL-SCNC: 5.1 MMOL/L (ref 3.5–5.1)
SODIUM SERPL-SCNC: 135 MMOL/L (ref 136–145)

## 2023-11-14 PROCEDURE — 84100 ASSAY OF PHOSPHORUS: CPT

## 2023-11-14 PROCEDURE — 6370000000 HC RX 637 (ALT 250 FOR IP): Performed by: PHYSICIAN ASSISTANT

## 2023-11-14 PROCEDURE — 6370000000 HC RX 637 (ALT 250 FOR IP): Performed by: INTERNAL MEDICINE

## 2023-11-14 PROCEDURE — 80053 COMPREHEN METABOLIC PANEL: CPT

## 2023-11-14 PROCEDURE — 85025 COMPLETE CBC W/AUTO DIFF WBC: CPT

## 2023-11-14 PROCEDURE — 36415 COLL VENOUS BLD VENIPUNCTURE: CPT

## 2023-11-14 PROCEDURE — 6360000002 HC RX W HCPCS: Performed by: INTERNAL MEDICINE

## 2023-11-14 PROCEDURE — 2580000003 HC RX 258: Performed by: PHYSICIAN ASSISTANT

## 2023-11-14 PROCEDURE — 2060000000 HC ICU INTERMEDIATE R&B

## 2023-11-14 PROCEDURE — 83735 ASSAY OF MAGNESIUM: CPT

## 2023-11-14 PROCEDURE — 2580000003 HC RX 258: Performed by: INTERNAL MEDICINE

## 2023-11-14 PROCEDURE — 6360000002 HC RX W HCPCS: Performed by: PHYSICIAN ASSISTANT

## 2023-11-14 PROCEDURE — 94760 N-INVAS EAR/PLS OXIMETRY 1: CPT

## 2023-11-14 PROCEDURE — 93010 ELECTROCARDIOGRAM REPORT: CPT | Performed by: INTERNAL MEDICINE

## 2023-11-14 RX ORDER — OXYCODONE HYDROCHLORIDE AND ACETAMINOPHEN 5; 325 MG/1; MG/1
1 TABLET ORAL EVERY 6 HOURS PRN
Status: DISCONTINUED | OUTPATIENT
Start: 2023-11-14 | End: 2023-11-18 | Stop reason: HOSPADM

## 2023-11-14 RX ORDER — HEPARIN SODIUM 5000 [USP'U]/ML
5000 INJECTION, SOLUTION INTRAVENOUS; SUBCUTANEOUS EVERY 8 HOURS SCHEDULED
Status: DISCONTINUED | OUTPATIENT
Start: 2023-11-14 | End: 2023-11-14

## 2023-11-14 RX ORDER — CALCITRIOL 0.25 UG/1
0.25 CAPSULE, LIQUID FILLED ORAL EVERY EVENING
Status: DISCONTINUED | OUTPATIENT
Start: 2023-11-14 | End: 2023-11-14

## 2023-11-14 RX ORDER — ONDANSETRON 2 MG/ML
4 INJECTION INTRAMUSCULAR; INTRAVENOUS EVERY 6 HOURS PRN
Status: DISCONTINUED | OUTPATIENT
Start: 2023-11-14 | End: 2023-11-18 | Stop reason: HOSPADM

## 2023-11-14 RX ORDER — TAMSULOSIN HYDROCHLORIDE 0.4 MG/1
0.4 CAPSULE ORAL EVERY MORNING
Status: DISCONTINUED | OUTPATIENT
Start: 2023-11-14 | End: 2023-11-18 | Stop reason: HOSPADM

## 2023-11-14 RX ORDER — DOCUSATE SODIUM 100 MG/1
100 CAPSULE, LIQUID FILLED ORAL 2 TIMES DAILY
Status: DISCONTINUED | OUTPATIENT
Start: 2023-11-14 | End: 2023-11-18 | Stop reason: HOSPADM

## 2023-11-14 RX ORDER — ONDANSETRON 4 MG/1
4 TABLET, ORALLY DISINTEGRATING ORAL EVERY 8 HOURS PRN
Status: DISCONTINUED | OUTPATIENT
Start: 2023-11-14 | End: 2023-11-18 | Stop reason: HOSPADM

## 2023-11-14 RX ORDER — POLYETHYLENE GLYCOL 3350 17 G/17G
17 POWDER, FOR SOLUTION ORAL DAILY PRN
Status: DISCONTINUED | OUTPATIENT
Start: 2023-11-14 | End: 2023-11-14

## 2023-11-14 RX ORDER — POLYETHYLENE GLYCOL 3350 17 G/17G
17 POWDER, FOR SOLUTION ORAL DAILY PRN
Status: DISCONTINUED | OUTPATIENT
Start: 2023-11-14 | End: 2023-11-18 | Stop reason: HOSPADM

## 2023-11-14 RX ORDER — KETOCONAZOLE 20 MG/ML
SHAMPOO TOPICAL DAILY PRN
COMMUNITY

## 2023-11-14 RX ORDER — ACETAMINOPHEN 325 MG/1
650 TABLET ORAL EVERY 6 HOURS PRN
Status: DISCONTINUED | OUTPATIENT
Start: 2023-11-14 | End: 2023-11-18 | Stop reason: HOSPADM

## 2023-11-14 RX ORDER — GABAPENTIN 100 MG/1
100 CAPSULE ORAL 3 TIMES DAILY
Status: DISCONTINUED | OUTPATIENT
Start: 2023-11-14 | End: 2023-11-18 | Stop reason: HOSPADM

## 2023-11-14 RX ORDER — ATORVASTATIN CALCIUM 10 MG/1
10 TABLET, FILM COATED ORAL NIGHTLY
Status: DISCONTINUED | OUTPATIENT
Start: 2023-11-14 | End: 2023-11-18 | Stop reason: HOSPADM

## 2023-11-14 RX ORDER — SODIUM CHLORIDE 0.9 % (FLUSH) 0.9 %
5-40 SYRINGE (ML) INJECTION PRN
Status: DISCONTINUED | OUTPATIENT
Start: 2023-11-14 | End: 2023-11-18 | Stop reason: HOSPADM

## 2023-11-14 RX ORDER — OXYCODONE HYDROCHLORIDE 5 MG/1
5 TABLET ORAL EVERY 4 HOURS PRN
COMMUNITY

## 2023-11-14 RX ORDER — ACETAMINOPHEN 650 MG/1
650 SUPPOSITORY RECTAL EVERY 6 HOURS PRN
Status: DISCONTINUED | OUTPATIENT
Start: 2023-11-14 | End: 2023-11-18 | Stop reason: HOSPADM

## 2023-11-14 RX ORDER — SEVELAMER CARBONATE 800 MG/1
800 TABLET, FILM COATED ORAL
Status: DISCONTINUED | OUTPATIENT
Start: 2023-11-14 | End: 2023-11-18 | Stop reason: HOSPADM

## 2023-11-14 RX ORDER — SODIUM CHLORIDE 0.9 % (FLUSH) 0.9 %
5-40 SYRINGE (ML) INJECTION EVERY 12 HOURS SCHEDULED
Status: DISCONTINUED | OUTPATIENT
Start: 2023-11-14 | End: 2023-11-18 | Stop reason: HOSPADM

## 2023-11-14 RX ORDER — ASPIRIN 81 MG/1
81 TABLET, CHEWABLE ORAL DAILY
Status: DISCONTINUED | OUTPATIENT
Start: 2023-11-14 | End: 2023-11-18 | Stop reason: HOSPADM

## 2023-11-14 RX ORDER — SODIUM CHLORIDE 9 MG/ML
INJECTION, SOLUTION INTRAVENOUS PRN
Status: DISCONTINUED | OUTPATIENT
Start: 2023-11-14 | End: 2023-11-18 | Stop reason: HOSPADM

## 2023-11-14 RX ORDER — PANTOPRAZOLE SODIUM 20 MG/1
20 TABLET, DELAYED RELEASE ORAL NIGHTLY
Status: DISCONTINUED | OUTPATIENT
Start: 2023-11-14 | End: 2023-11-18 | Stop reason: HOSPADM

## 2023-11-14 RX ADMIN — APIXABAN 5 MG: 5 TABLET, FILM COATED ORAL at 10:11

## 2023-11-14 RX ADMIN — SODIUM ZIRCONIUM CYCLOSILICATE 5 G: 5 POWDER, FOR SUSPENSION ORAL at 00:10

## 2023-11-14 RX ADMIN — SEVELAMER CARBONATE 800 MG: 800 TABLET, FILM COATED ORAL at 10:10

## 2023-11-14 RX ADMIN — GABAPENTIN 100 MG: 100 CAPSULE ORAL at 10:11

## 2023-11-14 RX ADMIN — GABAPENTIN 100 MG: 100 CAPSULE ORAL at 15:09

## 2023-11-14 RX ADMIN — DOCUSATE SODIUM 100 MG: 100 CAPSULE, LIQUID FILLED ORAL at 21:54

## 2023-11-14 RX ADMIN — DOCUSATE SODIUM 100 MG: 100 CAPSULE, LIQUID FILLED ORAL at 10:11

## 2023-11-14 RX ADMIN — TAMSULOSIN HYDROCHLORIDE 0.4 MG: 0.4 CAPSULE ORAL at 10:12

## 2023-11-14 RX ADMIN — APIXABAN 5 MG: 5 TABLET, FILM COATED ORAL at 21:54

## 2023-11-14 RX ADMIN — SODIUM CHLORIDE, PRESERVATIVE FREE 10 ML: 5 INJECTION INTRAVENOUS at 22:11

## 2023-11-14 RX ADMIN — ATORVASTATIN CALCIUM 10 MG: 10 TABLET, FILM COATED ORAL at 21:54

## 2023-11-14 RX ADMIN — SEVELAMER CARBONATE 800 MG: 800 TABLET, FILM COATED ORAL at 15:09

## 2023-11-14 RX ADMIN — CEFTRIAXONE 1000 MG: 1 INJECTION, POWDER, FOR SOLUTION INTRAMUSCULAR; INTRAVENOUS at 01:13

## 2023-11-14 RX ADMIN — PANTOPRAZOLE SODIUM 20 MG: 20 TABLET, DELAYED RELEASE ORAL at 10:11

## 2023-11-14 RX ADMIN — GABAPENTIN 100 MG: 100 CAPSULE ORAL at 21:54

## 2023-11-14 RX ADMIN — SEVELAMER CARBONATE 800 MG: 800 TABLET, FILM COATED ORAL at 17:26

## 2023-11-14 RX ADMIN — ASPIRIN 81 MG: 81 TABLET, CHEWABLE ORAL at 10:12

## 2023-11-14 RX ADMIN — SODIUM CHLORIDE, PRESERVATIVE FREE 10 ML: 5 INJECTION INTRAVENOUS at 10:15

## 2023-11-14 RX ADMIN — CEFTRIAXONE 1000 MG: 1 INJECTION, POWDER, FOR SOLUTION INTRAMUSCULAR; INTRAVENOUS at 22:08

## 2023-11-14 NOTE — CONSULTS
[address] [facility]     Nephrology Note         Patient ID: Miguel Mendieta  Referring/ Physician: Soha Smith MD      Summary:   Miguel Mendieta is being seen by nephrology for ESRD. Reason for admission: nausea       Interval Hx:   See HPI    Assessment/Plan:   -Attempted dialysis today but his AV fistula was not functional.  Has a thrill and bruit but apparently clot was pulled and was unable to cannulated. -Consult vascular surgery  -Consult IR for temporary dialysis catheter  -HD tomorrow      ESRD  HD 3 times weekly  Has AV fistula which did not work today  Temporary line and vascular consult    Second hyperparathyroidism  Hyperphosphatemia phosphorus 6.5 at time of consult  On Renvela 800 mg 3 times daily    Hypertension  Blood pressure acceptable, no fluid overload on exam on room air    Anemia  Hemoglobin 10 at time of consult  ELENA as indicated per outpatient records. [facility] would like to thank you for the opportunity to serve this patient. Please call with any questions or concerns. Aldo Leblanc MD  [facility]  [facility], [facility] [address]  Fax: [phone]  Office: [phone]    HPI  Miguel Mendieta is a 69 y.o. male  with  has a past medical history of Anemia, Anxiety, Arthritis, CAD (coronary artery disease), Cerebral infarction [facility]), Cognitive communication deficit, COVID-19, Diabetes mellitus ([address]), Dysphagia, oropharyngeal, End stage renal disease ([address]), Fatigue, Gastrointestinal hemorrhage, Hemodialysis patient ([address]), History of colon cancer, Hx of blood clots, Hyperlipidemia, Hypertension, Hyponatremia, Risk for falls, and Splenomegaly. who presented with abdominal pain and nausea. He apparently went to his dialysis unit on Saturday and could not get dialysis because his fistula was not able to be accessed. It was attempted again today at day of consult but again not able to be cannulated so vascular was consulted.   CT abdomen showed moderate mucosal thickening of the sigmoid colon and rectum consistent with acute colitis so started antibiotics. He has no shortness of breath or chest pain. He said he has not been having any issues with his AV fistula up until this Saturday. His last dialysis was on Thursday of last week. Review of Systems:   As above. PMH/SH/FH:    Medical Hx: reviewed and updated as appropriate  Past Medical History:   Diagnosis Date    Anemia 03/2022    Anxiety     Arthritis     CAD (coronary artery disease) 01/2020    Cerebral infarction ([address])     Cognitive communication deficit     COVID-19     Diabetes mellitus ([address])     Dysphagia, oropharyngeal     End stage renal disease ([address])     Fatigue     Gastrointestinal hemorrhage     Hemodialysis patient ([address]) 2019    ckd-goes to dialysis Tuesday, Thurs, Sat [facility] [place] 2[facility] Dialysis [place])    History of colon cancer     Hx of blood clots     Hyperlipidemia     Hypertension     Hyponatremia     Risk for falls     Splenomegaly 02/10/2021         Surgical Hx: reviewed and updated as appropriate   has a past surgical history that includes IR PERC ARTERIOVENOUS FISTULA CREATION (Left); colectomy (N/A, 01/26/2022); sigmoidoscopy (N/A, 02/17/2022); IR EMBOLIZATION HEMORRHAGE (02/19/2022); Tunneled venous catheter placement (Right, 02/21/2022); IR TUNNELED CVC PLACE WO SQ PORT/PUMP > 5 YEARS (02/21/2022); Cardiac catheterization (2019); Colonoscopy; Dialysis fistula creation (Left, 04/29/2022); Cholecystectomy, laparoscopic (N/A, 05/16/2022); ERCP (N/A, 05/16/2022); ERCP (N/A, 05/16/2022); Upper gastrointestinal endoscopy (05/16/2022); IR TUNNELED CVC PLACE WO SQ PORT/PUMP > 5 YEARS (Right, 12/28/2022); and IR TUNNELED CVC PLACE WO SQ PORT/PUMP > 5 YEARS (12/28/2022).      Social Hx: reviewed and updated as appropriate  Social History     Tobacco Use    Smoking status: Former    Smokeless tobacco: Never   Substance Use Topics    Alcohol use: Not Currently        Family hx: reviewed and updated as

## 2023-11-14 NOTE — PROGRESS NOTES
Patient experiencing increased jitteriness throughout day. Patient is visibly shaking. . Patient stated that this can often happen but does not last as long as it has today. RN Kaveh Aguilar MD via [facility] regarding patient's jitteriness. MD aware, no new orders.

## 2023-11-14 NOTE — ED NOTES
Pt had a bowel movement. Cleaned pt and applied new brief. Tolerated well. Attempted to straight cath pt for urine. Was not able to get catheter past pt prostate and pt was in a good amount of discomfort during procedure. CHRISTI Squires aware and stated to hold off on catheterization at this time.       Jo Clark, RN  11/14/23 1679

## 2023-11-14 NOTE — H&P
[address]:  HOSPITALIST' ADMISSION HISTORY & PHYSICAL NOTE:                                                                                                                               11/14/2023 2:47 AM  Patient: Sara Leroy 1945  PCP: Finn Quiñonez    Chief Complaint On Presentation:  Nausea and vomiting today with missed dialysis Saturday. Assessment:  Acute colitis with nausea and vomiting as per CT of the abdomen with mild hyperkalemia at 5.5. Mild hyperkalemia at 5.5 with missed dialysis last Saturday secondary to access issues with the fistula. End-stage renal disease; on hemodialysis. History of hypertension. Dyslipidemia. Non-insulin-dependent Diabetes mellitus. History of GI bleed. History of blood clots. Colon cancer. Vascular dementia. History of CVA. Chronic anemia with end-stage renal disease. Plans:  Patient is being admitted to the floor after consultation with nephrology by the ED physician, [facility] and to be admitted for dialysis in the morning. The patient n.p.o. continue with IV Rocephin as initiated in the emergency room, start as needed antiemetics. Already given Lokelma per nephrology, will follow BMP in the morning. Resume patient home medication including aspirin, Lipitor, calcitriol, Neurontin, Protonix, Renvela and tamsulosin. Continue home dose of pain medication.   Nephrology already consulted by the ED, awaiting

## 2023-11-14 NOTE — PROGRESS NOTES
The patient has a GADIEL AVF  +thrill and bruit. Cannulated with 15g needles. Arterial needle flushed and aspirated. Venous needle was sluggish. The LPN manipulated the needle and it infiltrated. I tried cannulating below and above the infiltrated without success. Dr. Harjinder Durán notified.   He will be scheduled for a catheter for tomorrow and placed on the dialysis schedule for tomorrow

## 2023-11-14 NOTE — PROGRESS NOTES
4 Eyes Skin Assessment     NAME:  Eulalia Holder OF BIRTH:  1945  MEDICAL RECORD NUMBER:  [id number]    The patient is being assessed for  Admission    I agree that at least one RN has performed a thorough Head to Toe Skin Assessment on the patient. ALL assessment sites listed below have been assessed. Areas assessed by both nurses:    Head, Face, Ears, Shoulders, Back, Chest, Arms, Elbows, Hands, Sacrum. Buttock, Coccyx, Ischium, Legs. Feet and Heels, and Under Medical Devices         Does the Patient have a Wound? Yes wound(s) were present on assessment.  LDA wound assessment was Initiated and completed by RN       Cordell Prevention initiated by RN: Yes  Wound Care Orders initiated by RN: Yes    Pressure Injury (Stage 3,4, Unstageable, DTI, NWPT, and Complex wounds) if present, place Wound referral order by RN under : No    New Ostomies, if present place, Ostomy referral order under : No     Nurse 1 eSignature: Electronically signed by Ginny Shukla RN on 11/14/23 at 4:24 AM EST    **SHARE this note so that the co-signing nurse can place an eSignature**    Nurse 2 eSignature: Electronically signed by Margarette Aguilar RN on 11/14/23 at 5:15 AM EST

## 2023-11-14 NOTE — PLAN OF CARE
Problem: Discharge Planning  Goal: Discharge to home or other facility with appropriate resources  Outcome: Progressing  Flowsheets (Taken 11/14/2023 1125)  Discharge to home or other facility with appropriate resources:   Identify barriers to discharge with patient and caregiver   Arrange for needed discharge resources and transportation as appropriate   Identify discharge learning needs (meds, wound care, etc)     Problem: Skin/Tissue Integrity  Goal: Absence of new skin breakdown  Description: 1. Monitor for areas of redness and/or skin breakdown  2. Assess vascular access sites hourly  3. Every 4-6 hours minimum:  Change oxygen saturation probe site  4. Every 4-6 hours:  If on nasal continuous positive airway pressure, respiratory therapy assess nares and determine need for appliance change or resting period.   Outcome: Progressing     Problem: Safety - Adult  Goal: Free from fall injury  Outcome: Progressing  Flowsheets (Taken 11/14/2023 1808)  Free From Fall Injury: Instruct family/caregiver on patient safety     Problem: ABCDS Injury Assessment  Goal: Absence of physical injury  Outcome: Progressing  Flowsheets (Taken 11/14/2023 1808)  Absence of Physical Injury: Implement safety measures based on patient assessment     Problem: Neurosensory - Adult  Goal: Achieves stable or improved neurological status  Outcome: Progressing  Flowsheets (Taken 11/14/2023 1808)  Achieves stable or improved neurological status: Assess for and report changes in neurological status     Problem: Respiratory - Adult  Goal: Achieves optimal ventilation and oxygenation  Outcome: Progressing  Flowsheets (Taken 11/14/2023 1808)  Achieves optimal ventilation and oxygenation:   Assess for changes in respiratory status   Assess for changes in mentation and behavior   Position to facilitate oxygenation and minimize respiratory effort   Oxygen supplementation based on oxygen saturation or arterial blood gases     Problem:

## 2023-11-14 NOTE — ED PROVIDER NOTES
EKG Interpretation    Interpreted by emergency department physician  Time performed: 2052  Time read: 2114    Rhythm: Sinus  Ventricular Rate: 68  QRS Axis: -30  Ectopy: None  Conduction: Sinus rhythm with first-degree AV block with DE interval 266 ms with low voltage QRS. There is a remote inferior infarction as noted by Q waves in the inferior leads. There is poor R wave progression through the anterior chest leads indicating a remote anterior septal infarction. ST Segments: normal  T Waves: normal  Q Waves: None    Other findings: Motion artifact but EKG is readable    Compared to EKG on: 8/19/23 and appears unchanged    Clinical Impression: Sinus rhythm with first-degree AV block with DE interval 266 ms with low voltage QRS. There is remote inferior infarction as noted by Q waves in inferior leads. There is poor R wave progression to the anterior chest leads indicating a remote anterior septal infarction. There is motion artifact but EKG is readable. This is compared to an EKG on 8/19/2023 and appears unchanged.     [address] [place] [address],       Mina Panchal  11/13/23 2123

## 2023-11-14 NOTE — PROGRESS NOTES
Pharmacy Medication Reconciliation Note     List of medications patient is currently taking is complete. Source of information:   1. Medication list from [address] [place] [address]    Notes regarding home medications:   1. Medication list updated. 2. Changed from oxycodone-APAP to oxycodone 5mg PO Q4H PRN  3.  Removed calcitriol    Deedee Munoz, PharmD, BCPS  11/14/2023 4:57 AM

## 2023-11-14 NOTE — PROGRESS NOTES
HD RN, Kami Bazan spoke with this RN regarding inability to access fistula. Kami Bazan RN spoke with Loralee Baumgarten, MD - plan is for catheter placement and HD tomorrow. This RN updated patient on plan of care. Patient verbalized understanding. RN to also call and notify Ingrid Grant, patient's daughter.

## 2023-11-14 NOTE — ED PROVIDER NOTES
[address] [facility] [address] [address]      Pt Name: Karli Beverly  MRN: [id number]  [facility] [birth year]  Date of evaluation: 11/13/2023  Provider: CHRISTI Guy  PCP: Sriram Del Castillo  Note Started: 8:18 PM EST     The ED Attending Physician was available for consultation but did not see or evaluate this patient. CHIEF COMPLAINT       Chief Complaint   Patient presents with    Emesis     PT presented to ED C/O Emesis several time today,  pt stated felling shake, pt stated have some abdominal pain. Pt brought by EMS his blood sugar was 252 , pt stated he didn't take his insulin from several day . HISTORY OF PRESENT ILLNESS   (Location, Timing/Onset, Context/Setting, Quality, Duration, Modifying Factors, Severity, Associated Signs and Symptoms)  Note limiting factors. Karli Beverly is a 69 y.o. male who presents with complaint of nausea and vomiting. Says symptoms began about 3 hours prior to my assessment in the ED, and at the time of my assessment the patient says it feels like it has passed, as he is no longer nauseous. He denies any significant abdominal pain associated with this. He gets hemodialysis 3 times per week, but the last time he went he says they were unable to get access, but they told him he did not seem to need much fluid taken off, and to return for dialysis tomorrow. Presently his last completed dialysis was 4 days ago. Patient denies any difficulty breathing, says it does not feel like he has a lot of fluid on him. He denies chest pain. Denies cough or cold symptoms. Says he had the shakes when he was vomiting and he still feels a little bit shaky but otherwise no complaints at the moment. Nursing Notes were all reviewed and agreed with or any disagreements were addressed in the HPI. REVIEW OF SYSTEMS    (2-9 systems for level 4, 10 or more for level 5)     Positives and pertinent negatives as per HPI.      PAST

## 2023-11-14 NOTE — PROGRESS NOTES
Patient arrived to room 5254 from ED via stretcher. Patient connected to bedside telemetry and confirmed with CMU. VSS. Patient denies any pain or nausea. Patient oriented to room and call light. Call light and bedside table within reach.

## 2023-11-15 ENCOUNTER — APPOINTMENT (OUTPATIENT)
Dept: INTERVENTIONAL RADIOLOGY/VASCULAR | Age: 78
DRG: 356 | End: 2023-11-15
Payer: COMMERCIAL

## 2023-11-15 LAB
ALBUMIN SERPL-MCNC: 3.2 G/DL (ref 3.4–5)
ALBUMIN/GLOB SERPL: 1.1 {RATIO} (ref 1.1–2.2)
ALP SERPL-CCNC: 88 U/L (ref 40–129)
ALT SERPL-CCNC: 7 U/L (ref 10–40)
ANION GAP SERPL CALCULATED.3IONS-SCNC: 16 MMOL/L (ref 3–16)
AST SERPL-CCNC: 9 U/L (ref 15–37)
BASOPHILS # BLD: 0 K/UL (ref 0–0.2)
BASOPHILS NFR BLD: 0.4 %
BILIRUB SERPL-MCNC: 0.4 MG/DL (ref 0–1)
BUN SERPL-MCNC: 74 MG/DL (ref 7–20)
CALCIUM SERPL-MCNC: 8.2 MG/DL (ref 8.3–10.6)
CHLORIDE SERPL-SCNC: 96 MMOL/L (ref 99–110)
CO2 SERPL-SCNC: 22 MMOL/L (ref 21–32)
CREAT SERPL-MCNC: 8.7 MG/DL (ref 0.8–1.3)
DEPRECATED RDW RBC AUTO: 14.3 % (ref 12.4–15.4)
EOSINOPHIL # BLD: 0 K/UL (ref 0–0.6)
EOSINOPHIL NFR BLD: 1.1 %
GFR SERPLBLD CREATININE-BSD FMLA CKD-EPI: 6 ML/MIN/{1.73_M2}
GLUCOSE BLD-MCNC: 117 MG/DL (ref 70–99)
GLUCOSE BLD-MCNC: 118 MG/DL (ref 70–99)
GLUCOSE BLD-MCNC: 132 MG/DL (ref 70–99)
GLUCOSE BLD-MCNC: 93 MG/DL (ref 70–99)
GLUCOSE SERPL-MCNC: 136 MG/DL (ref 70–99)
HCT VFR BLD AUTO: 27.8 % (ref 40.5–52.5)
HGB BLD-MCNC: 9.6 G/DL (ref 13.5–17.5)
INR PPP: 1.57 (ref 0.84–1.16)
LYMPHOCYTES # BLD: 0.4 K/UL (ref 1–5.1)
LYMPHOCYTES NFR BLD: 12.8 %
MAGNESIUM SERPL-MCNC: 2 MG/DL (ref 1.8–2.4)
MCH RBC QN AUTO: 32.2 PG (ref 26–34)
MCHC RBC AUTO-ENTMCNC: 34.4 G/DL (ref 31–36)
MCV RBC AUTO: 93.4 FL (ref 80–100)
MONOCYTES # BLD: 0.2 K/UL (ref 0–1.3)
MONOCYTES NFR BLD: 6.7 %
NEUTROPHILS # BLD: 2.7 K/UL (ref 1.7–7.7)
NEUTROPHILS NFR BLD: 79 %
PERFORMED ON: ABNORMAL
PERFORMED ON: NORMAL
PHOSPHATE SERPL-MCNC: 6.8 MG/DL (ref 2.5–4.9)
PLATELET # BLD AUTO: 163 K/UL (ref 135–450)
PMV BLD AUTO: 7.2 FL (ref 5–10.5)
POTASSIUM SERPL-SCNC: 5.3 MMOL/L (ref 3.5–5.1)
PROT SERPL-MCNC: 6.1 G/DL (ref 6.4–8.2)
PROTHROMBIN TIME: 18.8 SEC (ref 11.5–14.8)
RBC # BLD AUTO: 2.98 M/UL (ref 4.2–5.9)
SODIUM SERPL-SCNC: 134 MMOL/L (ref 136–145)
WBC # BLD AUTO: 3.4 K/UL (ref 4–11)

## 2023-11-15 PROCEDURE — 6360000004 HC RX CONTRAST MEDICATION: Performed by: FAMILY MEDICINE

## 2023-11-15 PROCEDURE — 92610 EVALUATE SWALLOWING FUNCTION: CPT

## 2023-11-15 PROCEDURE — 05HM33Z INSERTION OF INFUSION DEVICE INTO RIGHT INTERNAL JUGULAR VEIN, PERCUTANEOUS APPROACH: ICD-10-PCS | Performed by: PSYCHIATRY & NEUROLOGY

## 2023-11-15 PROCEDURE — APPNB30 APP NON BILLABLE TIME 0-30 MINS: Performed by: NURSE PRACTITIONER

## 2023-11-15 PROCEDURE — 6360000002 HC RX W HCPCS: Performed by: INTERNAL MEDICINE

## 2023-11-15 PROCEDURE — 85610 PROTHROMBIN TIME: CPT

## 2023-11-15 PROCEDURE — 2580000003 HC RX 258: Performed by: INTERNAL MEDICINE

## 2023-11-15 PROCEDURE — 2709999900 IR NONTUNNELED VASCULAR CATHETER > 5 YEARS

## 2023-11-15 PROCEDURE — 5A1D70Z PERFORMANCE OF URINARY FILTRATION, INTERMITTENT, LESS THAN 6 HOURS PER DAY: ICD-10-PCS | Performed by: INTERNAL MEDICINE

## 2023-11-15 PROCEDURE — B5131ZZ FLUOROSCOPY OF RIGHT JUGULAR VEINS USING LOW OSMOLAR CONTRAST: ICD-10-PCS | Performed by: PSYCHIATRY & NEUROLOGY

## 2023-11-15 PROCEDURE — 2060000000 HC ICU INTERMEDIATE R&B

## 2023-11-15 PROCEDURE — 6370000000 HC RX 637 (ALT 250 FOR IP): Performed by: INTERNAL MEDICINE

## 2023-11-15 PROCEDURE — 36415 COLL VENOUS BLD VENIPUNCTURE: CPT

## 2023-11-15 PROCEDURE — 76937 US GUIDE VASCULAR ACCESS: CPT

## 2023-11-15 PROCEDURE — 94760 N-INVAS EAR/PLS OXIMETRY 1: CPT

## 2023-11-15 PROCEDURE — 90935 HEMODIALYSIS ONE EVALUATION: CPT

## 2023-11-15 PROCEDURE — 77001 FLUOROGUIDE FOR VEIN DEVICE: CPT

## 2023-11-15 PROCEDURE — 36556 INSERT NON-TUNNEL CV CATH: CPT

## 2023-11-15 PROCEDURE — C1769 GUIDE WIRE: HCPCS

## 2023-11-15 RX ADMIN — TAMSULOSIN HYDROCHLORIDE 0.4 MG: 0.4 CAPSULE ORAL at 09:35

## 2023-11-15 RX ADMIN — SODIUM CHLORIDE, PRESERVATIVE FREE 10 ML: 5 INJECTION INTRAVENOUS at 20:02

## 2023-11-15 RX ADMIN — ASPIRIN 81 MG: 81 TABLET, CHEWABLE ORAL at 09:35

## 2023-11-15 RX ADMIN — GABAPENTIN 100 MG: 100 CAPSULE ORAL at 18:45

## 2023-11-15 RX ADMIN — SEVELAMER CARBONATE 800 MG: 800 TABLET, FILM COATED ORAL at 09:35

## 2023-11-15 RX ADMIN — IOPAMIDOL 20 ML: 510 INJECTION, SOLUTION INTRAVASCULAR at 15:53

## 2023-11-15 RX ADMIN — DOCUSATE SODIUM 100 MG: 100 CAPSULE, LIQUID FILLED ORAL at 20:02

## 2023-11-15 RX ADMIN — DOCUSATE SODIUM 100 MG: 100 CAPSULE, LIQUID FILLED ORAL at 09:35

## 2023-11-15 RX ADMIN — PANTOPRAZOLE SODIUM 20 MG: 20 TABLET, DELAYED RELEASE ORAL at 09:35

## 2023-11-15 RX ADMIN — APIXABAN 5 MG: 5 TABLET, FILM COATED ORAL at 20:03

## 2023-11-15 RX ADMIN — CEFTRIAXONE 1000 MG: 1 INJECTION, POWDER, FOR SOLUTION INTRAMUSCULAR; INTRAVENOUS at 20:09

## 2023-11-15 RX ADMIN — SEVELAMER CARBONATE 800 MG: 800 TABLET, FILM COATED ORAL at 18:45

## 2023-11-15 RX ADMIN — ATORVASTATIN CALCIUM 10 MG: 10 TABLET, FILM COATED ORAL at 20:02

## 2023-11-15 RX ADMIN — GABAPENTIN 100 MG: 100 CAPSULE ORAL at 20:02

## 2023-11-15 RX ADMIN — SODIUM CHLORIDE, PRESERVATIVE FREE 10 ML: 5 INJECTION INTRAVENOUS at 09:37

## 2023-11-15 RX ADMIN — GABAPENTIN 100 MG: 100 CAPSULE ORAL at 09:35

## 2023-11-15 ASSESSMENT — ENCOUNTER SYMPTOMS
RESPIRATORY NEGATIVE: 1
GASTROINTESTINAL NEGATIVE: 1

## 2023-11-15 ASSESSMENT — PAIN SCALES - GENERAL: PAINLEVEL_OUTOF10: 0

## 2023-11-15 NOTE — CARE COORDINATION
Case Management Assessment  Initial Evaluation    Date/Time of Evaluation: 11/15/2023 9:46 AM  Assessment Completed by: Yolanda Garg RN    If patient is discharged prior to next notation, then this note serves as note for discharge by case management. Patient Name: Db Trejo                   YOB: 1945  Diagnosis: Hyperkalemia [E87.5]  Colitis [K52.9]  ESRD on hemodialysis ([address]) [N18.6, Z99.2]  Intractable nausea and vomiting [R11.2]                   Date / Time: 11/13/2023  8:01 PM    Patient Admission Status: Inpatient   Readmission Risk (Low < 19, Mod (19-27), High > 27): Readmission Risk Score: 23.1    Current PCP: Abelardo Farias  PCP verified by CM? No (PCP at [facility])    Chart Reviewed: Yes      History Provided by: Patient, Child/Family  Patient Orientation: Alert and Oriented (intermittent confusion)    Patient Cognition: Other (see comment) (confusion)    Hospitalization in the last 30 days (Readmission):  No    If yes, Readmission Assessment in CM Navigator will be completed. Advance Directives:      Code Status: Full Code   Patient's Primary Decision Maker is: Legal Next of Kin    Primary Decision MakerRbrain Dhaliwal - [phone]    Secondary Decision Maker: Vandana Velasquez - Child - [phone]    Secondary Decision Maker: Alee Pollock Child - [phone]    Discharge Planning:    Patient lives with: Other (Comment) (resident at [facility]) Type of Home: Long-Term Care  Primary Care Giver:  Other (Comment) (staff at [facility])  Patient Support Systems include: Spouse/Significant Other, Children, Other (Comment) (staff at [facility])   Current Financial resources: Medicaid, Medicare  Current community resources: ECF/Home Care  Current services prior to admission: Extended Care Placement, Other (Comment) (hemodialysis at [address])            Current DME:              Type of Home Care services:  None    ADLS  Prior functional level: Assistance with the following:, Bathing, Dressing, Toileting, Cooking, Housework, Shopping, Mobility  Current functional level: Assistance with the following:, Bathing, Dressing, Toileting, Mobility      Current Nursing Home Information:  Patient admitted from:  Discharging to Facility/ Agency   Name: Jessica Her  Address:  [address]   Phone:  [phone]  Fax:  [phone]      Call to [facility] at facility, who confirmed the patient is: [address] [place] [address], no Precert required for return. Covid Test requirement for return: No Rapid/PCR Covid test needed before return      Potential Assistance needed at discharge: [facility], Transportation            Potential DME:    Patient expects to discharge to: Long-term care  Plan for transportation at discharge: Other (see comment) (BLS)    Financial    Payor: Deja Pérez / Plan: Mar Tay / Product Type: *No Product type* /     Does insurance require precert for SNF: No    Potential assistance Purchasing Medications: No  Meds-to-Beds request: No      [facility] [address] [facility], [address] [facility] [address]  Phone: [phone] Fax: [phone]    [employer] [facility] [address] [place] [address]  [address] [address]  Phone: [phone] Fax: [phone]      Notes:    Factors facilitating achievement of predicted outcomes: Family support and Caregiver support    Barriers to discharge: Long standing deficits    Additional Case Management Notes: Confirmed with pt & daughter [address] plan to return to [facility] at [employer].      The Plan for Transition of Care is related to the following treatment goals of Hyperkalemia [E87.5]  Colitis [K52.9]  ESRD on hemodialysis ([address]) [N18.6, Z99.2]  Intractable nausea and vomiting [H11.8]    IF APPLICABLE: The Patient and/or patient

## 2023-11-15 NOTE — CONSULTS
[facility]  Consultation Note    11/15/2023 9:09 AM    Chief Complaint: Tremors     Reason for Consultation: Left AVF Malfunction    History of Present Illness:  Patient is a 69 y.o. male who presented to the ED on 11/13/2023 with complaints of Tremors and Emesis. He has a significant PMH of CAD, CVA, DM II, ESRD (HD TTS), HLD, HTN, Blood Clots, Colectomy, and Left Brachiocephalic AVF with Dr. Deborah Persaud on 4/29/2022. The patient tells me he started having uncontrolled tremors two days ago prior to presenting to the ED. He believes the tremors have improved slightly since admission. He tells me his AVF as worked without issue up until last Saturday they had difficulty with cannulating. There was again difficulty with cannulation yesterday and there were apparently clots aspirated and he was unable to receive HD. The patient denies any issues with his AVF since it was placed by Dr. Deborah Persaud in April 2022. We have been consulted to evaluate the patients Left Arm AVF. At present, he is seen resting in bed, he is alert and oriented and appears to be in stable condition. Review of Systems  Review of Systems   Constitutional: Negative. HENT: Negative. Respiratory: Negative. Cardiovascular: Negative. Gastrointestinal: Negative. Musculoskeletal:  Positive for gait problem (Does not ambulate d/t chronic numbness in feet). Skin: Negative. Neurological:  Positive for tremors. Psychiatric/Behavioral: Negative.           Past Medical History:   Diagnosis Date    Anemia 03/2022    Anxiety     Arthritis     CAD (coronary artery disease) 01/2020    Cerebral infarction [facility])     Cognitive communication deficit     COVID-19     Diabetes mellitus ([address])     Dysphagia, oropharyngeal     End stage renal disease ([address])     Fatigue     Gastrointestinal hemorrhage     Hemodialysis patient ([address]) 2019    ckd-goes to dialysis Tuesday, Thurs, Sat [facility] [place])    History of colon

## 2023-11-15 NOTE — PROGRESS NOTES
RN notified Edwige Barth MD and Juvenal Malcolm MD of patient's labs this AM. Plan is for patient to obtain catheter at 1300 with HD to follow. Night shift RN informed this RN at handoff that patient experienced difficulty swallowing pills. Night shift RN also stated that patient had flipped into Atrial Fibrillation, soon converting back to Sinus Arrhythmia/Sinus Rhythm. No EKG was obtained to confirm this. RN notified Juvenal Malcolm MD of patient's difficulty with PO medications with night shift RN and apparent brief conversion to Atrial Fibrillation. MD aware, Speech Evaluation ordered. RN spoke with SERENA Montemayor with Vascular regarding patient's tremors/jitteriness. NP recommended Neurology consultation. RN informed Juvenal Malcolm MD. Neurology consulted.

## 2023-11-15 NOTE — PROGRESS NOTES
Facility/Department: [address] PROGRESSIVE CARE  Initial Assessment  DYSPHAGIA BEDSIDE SWALLOW EVALUATION     Patient: Arcadio Watkins   : 1945   MRN: [id number]      Evaluation Date: 11/15/2023   Admitting Diagnosis: Hyperkalemia [E87.5]  Colitis [K52.9]  ESRD on hemodialysis ([address]) [N18.6, Z99.2]  Intractable nausea and vomiting [R11.2]  Pain: Did not state                                                         Chart Review:   H&P:   Patient is 69years old male with past medical significant for end-stage renal disease on hemodialysis, hypertension, dyslipidemia, history of CVA with vascular mention, chronic anticoagulant with apixaban with history of blood, diet-controlled non-insulin-dependent type 2 diabetes mellitus, received last dialysis on Thursday and went back for the dialysis on Saturday as previously scheduled but could not have it done secondary to issues with the access within the fistula. This morning patient started having some abdominal pain with nausea and vomiting without any associated fever or chills constipation or diarrhea which prompted him to come to the emergency room for further evaluation. On arrival to the emergency room patient was noted to be afebrile with a temp of 98.2, with RR 66 and respirate of 13 and a stable blood pressure 130/63 with an SPO2 of 96% on room air. CT of the abdomen and pelvis was done which showed moderate mucosal thickening of the sigmoid colon and rectum with the possibility of acute colitis, infectious versus inflammatory, not consistent with acute diverticulitis. Labs including CBC was without any leukocytosis while serum chemistry showed renal failure with creatinine of 8.2 and a potassium of 5.5, lipase of 57 and lactic acid of 1.9. ED consulted on-call nephrology and was recommended to give a dose of [place] and to admit the patient for the dialysis in the morning. Patient denies any chest pain, shortness of breath, weight gain,.   In the emergency room patient was started on IV Rocephin and is being admitted to the floor for further work-up and management. Current Diet Level (prior to evaluation): Regular texture diet  Thin liquids    Respiratory Status:   [x]Room Air   []O2 via nasal cannula   []Other:    Imaging:  Chest X-ray: 11/13/2023  IMPRESSION:  Cardiomegaly. Minimal interstitial edema. No pneumothorax or pleural effusion. Modified Barium Swallow Study: n/a per EMR review    Reason for referral: SLP evaluation orders received due to coughing with pills with water     History/Prior Level of Function:   Living Status: admitted from [address] [place] [address] Drive: regular/thin per admission paperwork  Prior Dysphagia History: pt seen on prior admissions in 1/2022 with diet of regular/thin and again in 6/2022 with d/c recommendation for regular/nectar. Dysphagia Impressions/Diagnosis: Oropharyngeal Dysphagia   OROPHARYNGEAL DYSPHAGIA DIAGNOSIS:   Pt lethargic, oriented to self, place. Does not consistently follow directions, likely d/t lethargy. Oral motor exam reveals pt to be fully edentulous. Reduced lingual/labial strength, ROM and coordination appear to be related to lethargy. Pt does not follow directions for volitional swallow or throat clear. Pt does maintain wakefulness for PO trials. Mild oral dysphagia characterized by subjectively prolonged mastication and concern for premature loss to pharynx. Mild pharyngeal dysphagia characterized by subjectively delayed initiation and reduced laryngeal elevation. With initial presentation pt with rapid ingestion of thin via straw with immediate overt coughing noted. No further s/s aspiration were noted with both single and consecutive sips of thin water via straw. Recommend cautiously continue with current diet of regular/thin with close monitor for any s/s aspiration or changes in respiratory status. Hold PO if pt unable to maintain wakefulness.  Pt may benefit from medications in puree

## 2023-11-15 NOTE — CONSULTS
Neurology Consult Note  Reason for Consult: vascular surgery recommended a neurology consult because patient has been shaking more than usual    Chief complaint: difficult to obtain    Dr Tita See MD asked me to see Shereen Markham in consultation for evaluation of vascular surgery recommended a neurology consult because patient has been shaking more than usual    History of Present Illness:  Shereen Markham is a 69 y.o. male who presented with nausea and vomiting. I obtained my information via chart review and discussion w/ the patient's RN. The patient is sleepy so details are very hard to obtain. It appears the patient arrived to the ED 2 days ago. He did admit to having some nausea. It was documented that he was having both nausea and vomiting prior to arriving to the ED. He is a hemodialysis patient and his last dialysis treatment was on Thursday I believe due to issues w/ access. A CT scan of his abdomen showed possible colitis, cystitis/prostatitis. Treatment was initiated. We were asked to see the patient due to tremors. In looking back at previous neurology evaluations it was documented that he had a mild tremor. He does endorse having a tremor at baseline periodically. He said to me that over the past 2-3 days his has noticeably gotten worse. He denies any new neurologic symptoms.       Medical History:  Past Medical History:   Diagnosis Date    Anemia 03/2022    Anxiety     Arthritis     CAD (coronary artery disease) 01/2020    Cerebral infarction [facility])     Cognitive communication deficit     COVID-19     Diabetes mellitus ([address])     Dysphagia, oropharyngeal     End stage renal disease ([address])     Fatigue     Gastrointestinal hemorrhage     Hemodialysis patient ([address]) 2019    ckd-goes to dialysis Tuesday, Thurs, Sat [facility] [place])    History of colon cancer     Hx of blood clots     Hyperlipidemia     Hypertension     Hyponatremia     Risk for falls     Splenomegaly

## 2023-11-15 NOTE — BRIEF OP NOTE
Brief Postoperative Note    Julio Cesar Franz  YOB: 1945  [id number]    Pre-operative Diagnosis: Renal failure, Need for HD    Post-operative Diagnosis: Same    Procedure: Vascath/Temporary HD Catheter Placement, Central Venogram    Anesthesia: Local anesthesia    Surgeon: Michel Ayala MD    Estimated Blood Loss: < 5 mL    Complications: None    Findings:  Vascath successfully placed in right internal jugular vein. Please see dictated IR report.  OK to use      Electronically signed by Arian Dobbs MD on 11/15/2023 at 2:05 PM

## 2023-11-15 NOTE — PROGRESS NOTES
[address] [facility]     Nephrology Note         Patient ID: Vandana Washington  Referring/ Physician: Reba Mayo MD      Summary:   Vandana Washington is being seen by nephrology for ESRD. Reason for admission: nausea       Interval Hx:   Seen on dialysis in his room   Being seen by neurology for tremor. Has temp dialysis line placed today     /67  97% on room air     BUN 74 Cr 8.7  K 5.3  Na 134   Phos 6.8    Assessment/Plan:   -HD today   - getting duplex of fistula  - UF to dry weight. BP acceptable. ESRD  HD 3 times weekly  Has AV fistula which did not work today  Temporary line and vascular consult    Second hyperparathyroidism  Hyperphosphatemia phosphorus 6.5 at time of consult  On Renvela 800 mg 3 times daily    Hypertension  Blood pressure acceptable, no fluid overload on exam on room air    Anemia  Hemoglobin 10 at time of consult  ELENA as indicated per outpatient records. [facility] would like to thank you for the opportunity to serve this patient. Please call with any questions or concerns. Leola Wolf MD  [facility]  [facility], [address] [place] [address] Avenue  Fax: [phone]  Office: [phone]    [facility]  Vandana Washington is a 69 y.o. male  with  has a past medical history of Anemia, Anxiety, Arthritis, CAD (coronary artery disease), Cerebral infarction [facility]), Cognitive communication deficit, COVID-19, Diabetes mellitus ([address]), Dysphagia, oropharyngeal, End stage renal disease ([address]), Fatigue, Gastrointestinal hemorrhage, Hemodialysis patient ([address]), History of colon cancer, Hx of blood clots, Hyperlipidemia, Hypertension, Hyponatremia, Risk for falls, and Splenomegaly. who presented with abdominal pain and nausea. He apparently went to his dialysis unit on Saturday and could not get dialysis because his fistula was not able to be accessed. It was attempted again today at day of consult but again not able to be cannulated so vascular was consulted.   CT abdomen showed GLUCOSEU Negative 12/20/2022 05:39 PM    KETUA Negative 12/20/2022 05:39 PM    UROBILINOGEN 0.2 12/20/2022 05:39 PM    BILIRUBINUR Negative 12/20/2022 05:39 PM

## 2023-11-15 NOTE — PROGRESS NOTES
adenopathy  HEMATOLOGIC/LYMPHATICS:  no cervical, supraclavicular or axillary lymphadenopathy  LUNGS:  clear to auscultation bilaterally, No increased work of breathing, good air exchange, no crackles or wheezing  CARDIOVASCULAR:  Regular rate and rhythm, normal S1 and S2, no S3 or S4, and no significant murmurs, rubs or gallops noted. Normal apical impulse. ABDOMEN:  Normal active bowel sounds, soft, non-tender, non-distended, no masses palpated, no organomegally  EXTREMITIES. extremities atraumatic, no cyanosis or edema and Homans sign is negative, no sign of DVT. MENTAL STATUS: Awake, alert, oriented to name, place and time. NEUROLOGIC:  Cranial nerves II-XII are grossly intact. SKIN:  normal skin color, texture, turgor for age.     Data    CBC with Differential:    Lab Results   Component Value Date/Time    WBC 3.4 11/14/2023 11:46 PM    HGB 9.6 11/14/2023 11:46 PM    HCT 27.8 11/14/2023 11:46 PM     11/14/2023 11:46 PM    MCV 93.4 11/14/2023 11:46 PM    RDW 14.3 11/14/2023 11:46 PM    SEGSPCT 47.0 07/26/2011 05:00 AM    BANDSPCT 13.0 07/26/2011 05:00 AM    LYMPHOPCT 12.8 11/14/2023 11:46 PM    MONOPCT 6.7 11/14/2023 11:46 PM    EOSPCT 3.0 07/26/2011 05:00 AM    BASOPCT 0.4 11/14/2023 11:46 PM    MONOSABS 0.2 11/14/2023 11:46 PM    LYMPHSABS 0.4 11/14/2023 11:46 PM    EOSABS 0.0 11/14/2023 11:46 PM    BASOSABS 0.0 11/14/2023 11:46 PM    DIFFTYPE Manual-K 07/26/2011 05:00 AM     BMP:    Lab Results   Component Value Date/Time     11/14/2023 11:46 PM    K 5.3 11/14/2023 11:46 PM    CL 96 11/14/2023 11:46 PM    CO2 22 11/14/2023 11:46 PM    BUN 74 11/14/2023 11:46 PM    CREATININE 8.7 11/14/2023 11:46 PM    GLUCOSE 136 11/14/2023 11:46 PM    CALCIUM 8.2 11/14/2023 11:46 PM    GFRAA 32 09/15/2022 06:56 PM    GFRAA 35 07/26/2011 05:00 AM    LABGLOM 6 11/14/2023 11:46 PM     LFT:   Lab Results   Component Value Date/Time    ALKPHOS 88 11/14/2023 11:46 PM    ALT 7 11/14/2023 11:46 PM    AST 9 11/14/2023 11:46 PM    PROT 6.1 11/14/2023 11:46 PM    PROT 7.5 07/26/2011 05:00 AM    BILITOT 0.4 11/14/2023 11:46 PM    BILIDIR <0.2 11/13/2023 08:15 PM    IBILI see below 11/13/2023 08:15 PM     Magnesium:    Lab Results   Component Value Date/Time    MG 2.00 11/14/2023 11:46 PM     Phosphorus:    Lab Results   Component Value Date/Time    PHOS 6.8 11/14/2023 11:46 PM     PT/INR:  No results found for: \"PTINR\"  U/A:    Lab Results   Component Value Date/Time    LEUKOCYTESUR LARGE 12/20/2022 05:39 PM    WBCUA  12/20/2022 05:39 PM    RBCUA 21-50 12/20/2022 05:39 PM    SPECGRAV 1.020 12/20/2022 05:39 PM    UROBILINOGEN 0.2 12/20/2022 05:39 PM    BILIRUBINUR Negative 12/20/2022 05:39 PM    BLOODU LARGE 12/20/2022 05:39 PM    GLUCOSEU Negative 12/20/2022 05:39 PM    PROTEINU 100 12/20/2022 05:39 PM     ABG:    Lab Results   Component Value Date/Time    PHART 7.441 06/01/2022 11:25 PM    WTN9OWT 51.3 06/01/2022 11:25 PM    J6BMRIAZ 86.5 06/01/2022 11:25 PM    ICE5EGU 34.9 06/01/2022 11:25 PM    BEART 9.2 06/01/2022 11:25 PM    PO2ART 55.0 06/01/2022 11:25 PM    NSD7HXC 36.4 06/01/2022 11:25 PM           Intake/Output Summary (Last 24 hours) at 11/15/2023 1325  Last data filed at 11/15/2023 0930  Gross per 24 hour   Intake 120 ml   Output 200 ml   Net -80 ml       Consults:  IP CONSULT TO NEPHROLOGY  IP CONSULT TO VASCULAR SURGERY  IP CONSULT TO NEUROLOGY      Principal Problem:    Intractable nausea and vomiting  Resolved Problems:    * No resolved hospital problems. *      ASSESSMENT AND PLAN:    Acute colitis with nausea and vomiting  - Non-intractable vomiting with nausea - Will provide symptomatic treatment with Zofran and/or Phenergan as needed, maintenance of fluids and electrolytes.   - GI consult is appreciated    Increased \"shaking\" over baseline   - Neurology consulted    Left Upper Arm Brachial-Cephalic AVF - placed by Dr. Arpan Lockett in April 2022  - Left Arm AVF Duplex  - VasCath to be placed by IR for HD

## 2023-11-15 NOTE — ACP (ADVANCE CARE PLANNING)
Advance Care Planning     Advance Care Planning Activator (Inpatient)  Conversation Note      Date of ACP Conversation: 11/15/2023     Conversation Conducted with: Patient & daughter Janeen    ACP Activator: Lucero Hutchinson RN    Health Care Decision Maker:     Current Designated Health Care Decision Maker:     Primary Decision Maker: Sammye Kocher - [phone]    Secondary Decision Maker: Nga [facility] - Child - [phone]    Secondary Decision Maker: Enriqueta Dove Child - [phone]    Care Preferences    Ventilation: \"If you were in your present state of health and suddenly became very ill and were unable to breathe on your own, what would your preference be about the use of a ventilator (breathing machine) if it were available to you? \"      Would the patient desire the use of ventilator (breathing machine)?: yes    \"If your health worsens and it becomes clear that your chance of recovery is unlikely, what would your preference be about the use of a ventilator (breathing machine) if it were available to you? \"     Would the patient desire the use of ventilator (breathing machine)?: No      Resuscitation  \"CPR works best to restart the heart when there is a sudden event, like a heart attack, in someone who is otherwise healthy. Unfortunately, CPR does not typically restart the heart for people who have serious health conditions or who are very sick. \"    \"In the event your heart stopped as a result of an underlying serious health condition, would you want attempts to be made to restart your heart (answer \"yes\" for attempt to resuscitate) or would you prefer a natural death (answer \"no\" for do not attempt to resuscitate)? \" yes       [] Yes   [x] No   Educated Patient / Hurshel Sinks regarding differences between Advance Directives and portable DNR orders. Length of ACP Conversation in minutes: 5 minutes     Conversation Outcomes:  ACP discussion completed.  Reviewed prior ACP, confirmed no changes.      Electronically signed by Cameron Allison, [facility] Case Management on 11/15/2023 at 9:50 AM

## 2023-11-16 ENCOUNTER — PREP FOR PROCEDURE (OUTPATIENT)
Dept: VASCULAR SURGERY | Age: 78
End: 2023-11-16

## 2023-11-16 LAB
ANION GAP SERPL CALCULATED.3IONS-SCNC: 10 MMOL/L (ref 3–16)
ANION GAP SERPL CALCULATED.3IONS-SCNC: 12 MMOL/L (ref 3–16)
BUN SERPL-MCNC: 40 MG/DL (ref 7–20)
BUN SERPL-MCNC: 41 MG/DL (ref 7–20)
CALCIUM SERPL-MCNC: 8.7 MG/DL (ref 8.3–10.6)
CALCIUM SERPL-MCNC: 8.8 MG/DL (ref 8.3–10.6)
CHLORIDE SERPL-SCNC: 93 MMOL/L (ref 99–110)
CHLORIDE SERPL-SCNC: 94 MMOL/L (ref 99–110)
CO2 SERPL-SCNC: 25 MMOL/L (ref 21–32)
CO2 SERPL-SCNC: 28 MMOL/L (ref 21–32)
CREAT SERPL-MCNC: 6.1 MG/DL (ref 0.8–1.3)
CREAT SERPL-MCNC: 6.5 MG/DL (ref 0.8–1.3)
DEPRECATED RDW RBC AUTO: 14.2 % (ref 12.4–15.4)
DEPRECATED RDW RBC AUTO: 14.6 % (ref 12.4–15.4)
GFR SERPLBLD CREATININE-BSD FMLA CKD-EPI: 8 ML/MIN/{1.73_M2}
GFR SERPLBLD CREATININE-BSD FMLA CKD-EPI: 9 ML/MIN/{1.73_M2}
GLUCOSE BLD-MCNC: 101 MG/DL (ref 70–99)
GLUCOSE BLD-MCNC: 112 MG/DL (ref 70–99)
GLUCOSE BLD-MCNC: 117 MG/DL (ref 70–99)
GLUCOSE BLD-MCNC: 129 MG/DL (ref 70–99)
GLUCOSE SERPL-MCNC: 109 MG/DL (ref 70–99)
GLUCOSE SERPL-MCNC: 97 MG/DL (ref 70–99)
HCT VFR BLD AUTO: 27.9 % (ref 40.5–52.5)
HCT VFR BLD AUTO: 31.4 % (ref 40.5–52.5)
HGB BLD-MCNC: 10.6 G/DL (ref 13.5–17.5)
HGB BLD-MCNC: 9.6 G/DL (ref 13.5–17.5)
MCH RBC QN AUTO: 31.2 PG (ref 26–34)
MCH RBC QN AUTO: 31.7 PG (ref 26–34)
MCHC RBC AUTO-ENTMCNC: 33.9 G/DL (ref 31–36)
MCHC RBC AUTO-ENTMCNC: 34.4 G/DL (ref 31–36)
MCV RBC AUTO: 92 FL (ref 80–100)
MCV RBC AUTO: 92.1 FL (ref 80–100)
PERFORMED ON: ABNORMAL
PLATELET # BLD AUTO: 174 K/UL (ref 135–450)
PLATELET # BLD AUTO: 184 K/UL (ref 135–450)
PMV BLD AUTO: 7.2 FL (ref 5–10.5)
PMV BLD AUTO: 7.3 FL (ref 5–10.5)
POTASSIUM SERPL-SCNC: 4.1 MMOL/L (ref 3.5–5.1)
POTASSIUM SERPL-SCNC: 4.3 MMOL/L (ref 3.5–5.1)
RBC # BLD AUTO: 3.03 M/UL (ref 4.2–5.9)
RBC # BLD AUTO: 3.41 M/UL (ref 4.2–5.9)
SODIUM SERPL-SCNC: 130 MMOL/L (ref 136–145)
SODIUM SERPL-SCNC: 132 MMOL/L (ref 136–145)
WBC # BLD AUTO: 3.2 K/UL (ref 4–11)
WBC # BLD AUTO: 4.5 K/UL (ref 4–11)

## 2023-11-16 PROCEDURE — 36140 INTRO NDL ICATH UPR/LXTR ART: CPT

## 2023-11-16 PROCEDURE — B31J1ZZ FLUOROSCOPY OF LEFT UPPER EXTREMITY ARTERIES USING LOW OSMOLAR CONTRAST: ICD-10-PCS | Performed by: SURGERY

## 2023-11-16 PROCEDURE — 36901 INTRO CATH DIALYSIS CIRCUIT: CPT

## 2023-11-16 PROCEDURE — APPNB30 APP NON BILLABLE TIME 0-30 MINS: Performed by: NURSE PRACTITIONER

## 2023-11-16 PROCEDURE — C2623 CATH, TRANSLUMIN, DRUG-COAT: HCPCS

## 2023-11-16 PROCEDURE — 36907 BALO ANGIOP CTR DIALYSIS SEG: CPT

## 2023-11-16 PROCEDURE — 2500000003 HC RX 250 WO HCPCS

## 2023-11-16 PROCEDURE — 36907 BALO ANGIOP CTR DIALYSIS SEG: CPT | Performed by: SURGERY

## 2023-11-16 PROCEDURE — 36415 COLL VENOUS BLD VENIPUNCTURE: CPT

## 2023-11-16 PROCEDURE — 6360000002 HC RX W HCPCS

## 2023-11-16 PROCEDURE — C1769 GUIDE WIRE: HCPCS

## 2023-11-16 PROCEDURE — 05763ZZ DILATION OF LEFT SUBCLAVIAN VEIN, PERCUTANEOUS APPROACH: ICD-10-PCS | Performed by: SURGERY

## 2023-11-16 PROCEDURE — C1887 CATHETER, GUIDING: HCPCS | Performed by: SURGERY

## 2023-11-16 PROCEDURE — 6360000002 HC RX W HCPCS: Performed by: INTERNAL MEDICINE

## 2023-11-16 PROCEDURE — 2580000003 HC RX 258: Performed by: SURGERY

## 2023-11-16 PROCEDURE — 6370000000 HC RX 637 (ALT 250 FOR IP): Performed by: INTERNAL MEDICINE

## 2023-11-16 PROCEDURE — 057F3ZZ DILATION OF LEFT CEPHALIC VEIN, PERCUTANEOUS APPROACH: ICD-10-PCS | Performed by: SURGERY

## 2023-11-16 PROCEDURE — 6360000004 HC RX CONTRAST MEDICATION: Performed by: INTERNAL MEDICINE

## 2023-11-16 PROCEDURE — 85027 COMPLETE CBC AUTOMATED: CPT

## 2023-11-16 PROCEDURE — 2580000003 HC RX 258: Performed by: INTERNAL MEDICINE

## 2023-11-16 PROCEDURE — 2580000003 HC RX 258

## 2023-11-16 PROCEDURE — C1894 INTRO/SHEATH, NON-LASER: HCPCS | Performed by: SURGERY

## 2023-11-16 PROCEDURE — C1725 CATH, TRANSLUMIN NON-LASER: HCPCS | Performed by: SURGERY

## 2023-11-16 PROCEDURE — 2709999900 HC NON-CHARGEABLE SUPPLY: Performed by: SURGERY

## 2023-11-16 PROCEDURE — 36140 INTRO NDL ICATH UPR/LXTR ART: CPT | Performed by: SURGERY

## 2023-11-16 PROCEDURE — 2060000000 HC ICU INTERMEDIATE R&B

## 2023-11-16 PROCEDURE — 99153 MOD SED SAME PHYS/QHP EA: CPT

## 2023-11-16 PROCEDURE — 99152 MOD SED SAME PHYS/QHP 5/>YRS: CPT

## 2023-11-16 PROCEDURE — 93926 LOWER EXTREMITY STUDY: CPT

## 2023-11-16 PROCEDURE — 36902 INTRO CATH DIALYSIS CIRCUIT: CPT | Performed by: SURGERY

## 2023-11-16 PROCEDURE — C1769 GUIDE WIRE: HCPCS | Performed by: SURGERY

## 2023-11-16 PROCEDURE — 80048 BASIC METABOLIC PNL TOTAL CA: CPT

## 2023-11-16 RX ORDER — SODIUM CHLORIDE 0.9 % (FLUSH) 0.9 %
5-40 SYRINGE (ML) INJECTION PRN
Status: CANCELLED | OUTPATIENT
Start: 2023-11-16

## 2023-11-16 RX ORDER — SODIUM CHLORIDE 0.9 % (FLUSH) 0.9 %
5-40 SYRINGE (ML) INJECTION PRN
Status: DISCONTINUED | OUTPATIENT
Start: 2023-11-16 | End: 2023-11-18 | Stop reason: HOSPADM

## 2023-11-16 RX ORDER — SODIUM CHLORIDE 9 MG/ML
INJECTION, SOLUTION INTRAVENOUS PRN
Status: CANCELLED | OUTPATIENT
Start: 2023-11-16

## 2023-11-16 RX ORDER — SODIUM CHLORIDE 9 MG/ML
INJECTION, SOLUTION INTRAVENOUS PRN
Status: DISCONTINUED | OUTPATIENT
Start: 2023-11-16 | End: 2023-11-18 | Stop reason: HOSPADM

## 2023-11-16 RX ORDER — SODIUM CHLORIDE 0.9 % (FLUSH) 0.9 %
5-40 SYRINGE (ML) INJECTION EVERY 12 HOURS SCHEDULED
Status: DISCONTINUED | OUTPATIENT
Start: 2023-11-16 | End: 2023-11-18 | Stop reason: HOSPADM

## 2023-11-16 RX ORDER — SODIUM CHLORIDE 0.9 % (FLUSH) 0.9 %
5-40 SYRINGE (ML) INJECTION EVERY 12 HOURS SCHEDULED
Status: CANCELLED | OUTPATIENT
Start: 2023-11-16

## 2023-11-16 RX ADMIN — CEFTRIAXONE 1000 MG: 1 INJECTION, POWDER, FOR SOLUTION INTRAMUSCULAR; INTRAVENOUS at 20:10

## 2023-11-16 RX ADMIN — GABAPENTIN 100 MG: 100 CAPSULE ORAL at 20:43

## 2023-11-16 RX ADMIN — SODIUM CHLORIDE, PRESERVATIVE FREE 10 ML: 5 INJECTION INTRAVENOUS at 20:10

## 2023-11-16 RX ADMIN — TAMSULOSIN HYDROCHLORIDE 0.4 MG: 0.4 CAPSULE ORAL at 08:13

## 2023-11-16 RX ADMIN — IOPAMIDOL 75 ML: 755 INJECTION, SOLUTION INTRAVENOUS at 17:13

## 2023-11-16 RX ADMIN — DOCUSATE SODIUM 100 MG: 100 CAPSULE, LIQUID FILLED ORAL at 20:43

## 2023-11-16 RX ADMIN — DOCUSATE SODIUM 100 MG: 100 CAPSULE, LIQUID FILLED ORAL at 08:13

## 2023-11-16 RX ADMIN — SODIUM CHLORIDE, PRESERVATIVE FREE 10 ML: 5 INJECTION INTRAVENOUS at 08:12

## 2023-11-16 RX ADMIN — ATORVASTATIN CALCIUM 10 MG: 10 TABLET, FILM COATED ORAL at 20:43

## 2023-11-16 RX ADMIN — GABAPENTIN 100 MG: 100 CAPSULE ORAL at 08:13

## 2023-11-16 RX ADMIN — Medication 10 ML: at 20:43

## 2023-11-16 RX ADMIN — ASPIRIN 81 MG: 81 TABLET, CHEWABLE ORAL at 08:13

## 2023-11-16 RX ADMIN — SEVELAMER CARBONATE 800 MG: 800 TABLET, FILM COATED ORAL at 17:25

## 2023-11-16 RX ADMIN — PANTOPRAZOLE SODIUM 20 MG: 20 TABLET, DELAYED RELEASE ORAL at 08:13

## 2023-11-16 NOTE — PROGRESS NOTES
Neurology Progress Note    Updates  No acute events overnight. Mental status is better. Less myoclonus. Medical History:  Past Medical History:   Diagnosis Date    Anemia 03/2022    Anxiety     Arthritis     CAD (coronary artery disease) 01/2020    Cerebral infarction [facility])     Cognitive communication deficit     COVID-19     Diabetes mellitus ([address])     Dysphagia, oropharyngeal     End stage renal disease ([address])     Fatigue     Gastrointestinal hemorrhage     Hemodialysis patient ([address]) 2019    ckd-goes to dialysis Tuesday, Thurs, Sat [facility] [place])    History of colon cancer     Hx of blood clots     Hyperlipidemia     Hypertension     Hyponatremia     Risk for falls     Splenomegaly 02/10/2021     Past Surgical History:   Procedure Laterality Date    CARDIAC CATHETERIZATION  2019    CHOLECYSTECTOMY, LAPAROSCOPIC N/A 05/16/2022    LAPAROSCOPIC CHOLECYSTECTOMY WITH CHOLANGIOGRAM performed by Rigoberto Amaya MD at [address] N/A 01/26/2022    SIGMOID COLECTOMY, SIGMOIDOSCOPY performed by Rigoberto Amaya MD at [id number] [address] Left 04/29/2022    LEFT BRACHIAL CEPHALIC FISTULA performed by Blair Kern MD at [facility]    ERCP N/A 05/16/2022    ERCP SPHINCTER/PAPILLOTOMY performed by Veronica Leon MD at [address] [place] [address]    ERCP N/A 05/16/2022    ERCP STONE REMOVAL/BALLOON SWEEP performed by Veronica Leon MD at [facility]  02/19/2022    IR EMBOLIZATION HEMORRHAGE 2/19/2022 [facility] PROCED[facility]    IR NONTUNNELED VASCULAR CATHETER Right 11/15/2023    Dr. Irasema Munroe.  Non-Tunneled VasCath 20cm    IR NONTUNNELED VASCULAR CATHETER  11/15/2023    IR NONTUNNELED VASCULAR CATHETER 11/15/2023 [facility] PROC[facility]    IR PERC ARTERIOVENOUS FISTULA CREATION Left     unsure of date    IR TUNNELED CATHETER PLACEMENT GREATER THAN 5 YEARS  02/21/2022    IR TUNNELED CATHETER PLACEMENT GREATER THAN 5 YEARS 2/21/2022

## 2023-11-16 NOTE — PROGRESS NOTES
Patient received placement of VasCath in the right neck and Hemodialysis treatment today. Patient tolerated both well. Speech Therapist seen patient today. Patient tolerated visit well, no changes in diet order. Patient tolerated PO medications without complication throughout day. RN spoke with Jasper Gómez, NP with Neurology regarding patient's lethargy and tremors. NP ordered CT scan. CT scan unable to be performed today but will be performed tomorrow, 11/16. Patient is currently sitting up in bed eating dinner. Patient still has jitteriness/tremors and reports that he feels tired/sleepy. Bed alarm on, call button within reach, heels elevated off of bed. Care ongoing.

## 2023-11-16 NOTE — FLOWSHEET NOTE
PRE-PROCEDURE      DATE: 11/16/2023 ARRIVAL TO CATH LAB: 1300    ADMIT SOURCE: Inpatient    ID & ALLERGY BAND: On    CONSENT: Yes    NPO SINCE: Midnight    LABS:       LABS:  BUN/CREAT:  74 / 8.7  H/H:9.6/ 27.8  PLT:163    PULSES - see flowsheet    IV SITE : Patent in right forearm. with fluids infusing at  10  2:55 PM       BLEEDING PROBLEMS: Yes  BLEEDING RISK: 7.0    Low Risk < 2%  Intermediate Risk >2% or < 6.5%  High Risk >6.5%        LAST DOSE (if applicable):  ASA: 81 mg today at 0813  P2Y12 (Plavix, Effient, Brilinta): no  Anticoagulants (Coumadin, Xarelto, Eliquis): 11/15/2003  Other Blood Thinners: no      OTHER MEDICATIONS TAKEN AT HOME:      MEDICATION COMPLIANCE: Yes     Patient linens wet upon arrival to cath lab area. Complete bed linen change done and perineal care.   1600 Awaiting arrival of MD.

## 2023-11-16 NOTE — PROGRESS NOTES
Spoke to in patient HD RN to coordinate his HD treatment for today. Communicated patient has a fistulagram in cath lab at 1500, they would like to take patient from cath lab to HD post procedure. Reviewed that we will need to monitor his post cath site post procedure.

## 2023-11-16 NOTE — PROGRESS NOTES
SLP NOTE    Dysphagia tx attempted. Patient NPO for upcoming surgery scheduled for later this PM. ST to hold tx today and re-attempt at later date, as schedule permits unless otherwise notified. Thank you.   Katherine Contreras MA CCC-SLP #[id number]  Speech-Language Pathologist  Portable Phone: [phone]  11/16/23  10:39am

## 2023-11-16 NOTE — BRIEF OP NOTE
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    Post-Op Diagnosis: Same       Procedures:  Ultrasound guided access left radial artery with fistulagram  Balloon angioplasty peripheral venous segment of left brachiocephalic AV fistula, 7mm x 150mm Temple XANDER; 9mm x 80mm Carolina  Balloon angioplasty central venous outflow (subclavian vein) with 10mm x 60mm [place]    Monitored sedation    Surgeon:  Estefani Olson MD    Estimated Blood Loss (mL): Minimal    Complications: None    Specimens:   * No specimens in log *    Implants:  * No implants in log *      Drains: * No LDAs found *    Findings: Severe stenosis of peripheral venous segment (mid upper arm cephalic vein) and moderate stenosis of subclavian vein proximal to thoracic outlet. Both responded well to angioplasty.        Electronically signed by Estefani Olson MD on 11/16/2023 at 5:07 PM

## 2023-11-16 NOTE — PROGRESS NOTES
Treatment time: 3 hours  Net UF: 1900 ml     Pre weight: 98.1 kg  Post weight:96.2 kg  EDW: 96 kg    Access used: VasCath    Access function: good with  ml/min     Medications or blood products given: na     Regular outpatient schedule: TTS     Summary of response to treatment: Patient tolerated treatment well and without any complications. Patient remained stable throughout entire treatment. Copy of dialysis treatment record placed in chart, to be scanned into EMR.

## 2023-11-16 NOTE — PROGRESS NOTES
Slept well, easily aroused for routine checks. Increased agitation and mild confusion at HS, difficult to redirect, appeared to be sundowning. In bed, safety precautions in place.

## 2023-11-16 NOTE — PROGRESS NOTES
unit on Saturday and could not get dialysis because his fistula was not able to be accessed. It was attempted again today at day of consult but again not able to be cannulated so vascular was consulted. CT abdomen showed moderate mucosal thickening of the sigmoid colon and rectum consistent with acute colitis so started antibiotics. He has no shortness of breath or chest pain. He said he has not been having any issues with his AV fistula up until this Saturday. His last dialysis was on Thursday of last week. Review of Systems:   As above. PMH/SH/FH:    Medical Hx: reviewed and updated as appropriate  Past Medical History:   Diagnosis Date    Anemia 03/2022    Anxiety     Arthritis     CAD (coronary artery disease) 01/2020    Cerebral infarction ([address])     Cognitive communication deficit     COVID-19     Diabetes mellitus ([address])     Dysphagia, oropharyngeal     End stage renal disease ([address])     Fatigue     Gastrointestinal hemorrhage     Hemodialysis patient ([address]) 2019    ckd-goes to dialysis Tuesday, Thurs, Sat [facility] [place])    History of colon cancer     Hx of blood clots     Hyperlipidemia     Hypertension     Hyponatremia     Risk for falls     Splenomegaly 02/10/2021         Surgical Hx: reviewed and updated as appropriate   has a past surgical history that includes IR PERC ARTERIOVENOUS FISTULA CREATION (Left); colectomy (N/A, 01/26/2022); sigmoidoscopy (N/A, 02/17/2022); IR EMBOLIZATION HEMORRHAGE (02/19/2022); Tunneled venous catheter placement (Right, 02/21/2022); IR TUNNELED CVC PLACE WO SQ PORT/PUMP > 5 YEARS (02/21/2022); Cardiac catheterization (2019); Colonoscopy; Dialysis fistula creation (Left, 04/29/2022); Cholecystectomy, laparoscopic (N/A, 05/16/2022); ERCP (N/A, 05/16/2022); ERCP (N/A, 05/16/2022); Upper gastrointestinal endoscopy (05/16/2022); IR TUNNELED CVC PLACE WO SQ PORT/PUMP > 5 YEARS (Right, 12/28/2022);  IR TUNNELED CVC PLACE WO SQ PORT/PUMP > 5 YEARS (12/28/2022);

## 2023-11-16 NOTE — PLAN OF CARE
Problem: Discharge Planning  Goal: Discharge to home or other facility with appropriate resources  Outcome: Progressing  Flowsheets (Taken 11/15/2023 1000)  Discharge to home or other facility with appropriate resources:   Identify barriers to discharge with patient and caregiver   Arrange for needed discharge resources and transportation as appropriate   Identify discharge learning needs (meds, wound care, etc)     Problem: Skin/Tissue Integrity  Goal: Absence of new skin breakdown  Description: 1. Monitor for areas of redness and/or skin breakdown  2. Assess vascular access sites hourly  3. Every 4-6 hours minimum:  Change oxygen saturation probe site  4. Every 4-6 hours:  If on nasal continuous positive airway pressure, respiratory therapy assess nares and determine need for appliance change or resting period.   Outcome: Progressing     Problem: Safety - Adult  Goal: Free from fall injury  Outcome: Progressing     Problem: ABCDS Injury Assessment  Goal: Absence of physical injury  Outcome: Progressing     Problem: Neurosensory - Adult  Goal: Achieves stable or improved neurological status  Outcome: Progressing  Flowsheets (Taken 11/15/2023 1000)  Achieves stable or improved neurological status: Assess for and report changes in neurological status     Problem: Respiratory - Adult  Goal: Achieves optimal ventilation and oxygenation  Outcome: Progressing  Flowsheets (Taken 11/15/2023 1000)  Achieves optimal ventilation and oxygenation:   Assess for changes in respiratory status   Assess for changes in mentation and behavior   Position to facilitate oxygenation and minimize respiratory effort   Oxygen supplementation based on oxygen saturation or arterial blood gases     Problem: Cardiovascular - Adult  Goal: Maintains optimal cardiac output and hemodynamic stability  Outcome: Progressing  Flowsheets (Taken 11/15/2023 1000)  Maintains optimal cardiac output and hemodynamic stability: Monitor blood pressure and

## 2023-11-16 NOTE — PLAN OF CARE
Problem: Discharge Planning  Goal: Discharge to home or other facility with appropriate resources  11/15/2023 2313 by Jaiden Colon RN  Outcome: Progressing  Flowsheets (Taken 11/15/2023 1954)  Discharge to home or other facility with appropriate resources: Identify barriers to discharge with patient and caregiver     Problem: Skin/Tissue Integrity  Goal: Absence of new skin breakdown  Description: 1. Monitor for areas of redness and/or skin breakdown  2. Assess vascular access sites hourly  3. Every 4-6 hours minimum:  Change oxygen saturation probe site  4. Every 4-6 hours:  If on nasal continuous positive airway pressure, respiratory therapy assess nares and determine need for appliance change or resting period. 11/15/2023 2313 by Jaiden Colon RN  Outcome: Progressing     Problem: Safety - Adult  Goal: Free from fall injury  11/15/2023 2313 by Jaiden Colon RN  Outcome: Progressing     Problem: ABCDS Injury Assessment  Goal: Absence of physical injury  11/15/2023 2313 by Jaiden Colon RN  Outcome: Progressing     Problem: Respiratory - Adult  Goal: Achieves optimal ventilation and oxygenation  11/15/2023 2313 by Jaiden Colon RN  Outcome: Progressing     Problem: Cardiovascular - Adult  Goal: Maintains optimal cardiac output and hemodynamic stability  11/15/2023 2313 by Jaiden Colon RN  Outcome: Progressing  Flowsheets (Taken 11/15/2023 1954)  Maintains optimal cardiac output and hemodynamic stability: Monitor blood pressure and heart rate     Problem: Coping  Goal: Pt/Family able to verbalize concerns and demonstrate effective coping strategies  Description: INTERVENTIONS:  1. Assist patient/family to identify coping skills, available support systems and cultural and spiritual values  2. Provide emotional support, including active listening and acknowledgement of concerns of patient and caregivers  3. Reduce environmental stimuli, as able  4.  Instruct patient/family

## 2023-11-17 VITALS
DIASTOLIC BLOOD PRESSURE: 79 MMHG | HEART RATE: 99 BPM | SYSTOLIC BLOOD PRESSURE: 143 MMHG | TEMPERATURE: 97.7 F | WEIGHT: 212.3 LBS | RESPIRATION RATE: 20 BRPM | BODY MASS INDEX: 28.14 KG/M2 | HEIGHT: 73 IN | OXYGEN SATURATION: 96 %

## 2023-11-17 LAB
GLUCOSE BLD-MCNC: 100 MG/DL (ref 70–99)
PERFORMED ON: ABNORMAL

## 2023-11-17 PROCEDURE — 94760 N-INVAS EAR/PLS OXIMETRY 1: CPT

## 2023-11-17 PROCEDURE — 2580000003 HC RX 258: Performed by: INTERNAL MEDICINE

## 2023-11-17 PROCEDURE — 2580000003 HC RX 258: Performed by: SURGERY

## 2023-11-17 PROCEDURE — 6370000000 HC RX 637 (ALT 250 FOR IP): Performed by: INTERNAL MEDICINE

## 2023-11-17 PROCEDURE — 90935 HEMODIALYSIS ONE EVALUATION: CPT

## 2023-11-17 PROCEDURE — 97530 THERAPEUTIC ACTIVITIES: CPT

## 2023-11-17 PROCEDURE — 97166 OT EVAL MOD COMPLEX 45 MIN: CPT

## 2023-11-17 PROCEDURE — APPNB30 APP NON BILLABLE TIME 0-30 MINS: Performed by: NURSE PRACTITIONER

## 2023-11-17 PROCEDURE — 92526 ORAL FUNCTION THERAPY: CPT

## 2023-11-17 PROCEDURE — 97161 PT EVAL LOW COMPLEX 20 MIN: CPT

## 2023-11-17 RX ADMIN — SEVELAMER CARBONATE 800 MG: 800 TABLET, FILM COATED ORAL at 17:20

## 2023-11-17 RX ADMIN — APIXABAN 5 MG: 5 TABLET, FILM COATED ORAL at 08:17

## 2023-11-17 RX ADMIN — Medication 10 ML: at 08:19

## 2023-11-17 RX ADMIN — ASPIRIN 81 MG: 81 TABLET, CHEWABLE ORAL at 08:17

## 2023-11-17 RX ADMIN — TAMSULOSIN HYDROCHLORIDE 0.4 MG: 0.4 CAPSULE ORAL at 08:17

## 2023-11-17 RX ADMIN — PANTOPRAZOLE SODIUM 20 MG: 20 TABLET, DELAYED RELEASE ORAL at 08:17

## 2023-11-17 RX ADMIN — SODIUM CHLORIDE, PRESERVATIVE FREE 10 ML: 5 INJECTION INTRAVENOUS at 08:19

## 2023-11-17 RX ADMIN — GABAPENTIN 100 MG: 100 CAPSULE ORAL at 08:17

## 2023-11-17 RX ADMIN — SEVELAMER CARBONATE 800 MG: 800 TABLET, FILM COATED ORAL at 11:41

## 2023-11-17 RX ADMIN — DOCUSATE SODIUM 100 MG: 100 CAPSULE, LIQUID FILLED ORAL at 08:18

## 2023-11-17 RX ADMIN — SEVELAMER CARBONATE 800 MG: 800 TABLET, FILM COATED ORAL at 08:18

## 2023-11-17 ASSESSMENT — PAIN SCALES - GENERAL: PAINLEVEL_OUTOF10: 0

## 2023-11-17 NOTE — CARE COORDINATION
Case Management Discharge Note          Date / Time of Note: 11/17/2023 4:30 PM                  Patient Name: Kari Eugene   YOB: 1945  Diagnosis: Hyperkalemia [E87.5]  Colitis [K52.9]  ESRD on hemodialysis ([address]) [N18.6, Z99.2]  Intractable nausea and vomiting [R11.2]   Date / Time: 11/13/2023  8:01 PM    Financial:  Payor: Hema Franklin / Plan: Gogo Sam / Product Type: *No Product type* /      Pharmacy:    [facility] #[id number] - [place], [address] - [address] [facility] [address]  Phone: [phone] Fax: [phone] [facility] [id number] [place] [address] [address] [facility]  [address] [address]  Phone: [phone] Fax: [phone]      Assistance purchasing medications?: Potential Assistance Purchasing Medications: No  Assistance provided by Case Management: None at this time    DISCHARGE Disposition: [address] ([facility])    Nursing Facility:   Discharging to Facility/ Agency   Name: Jessica Delgadillo [facility]  Address:  [address] [place] [address]   Phone:  [phone]  Fax:  [phone]      LOC at discharge: [facility] Completed:  Yes             NURSING REPORT NUMBER: [phone]               NURSING FAX NUMBER: [phone]    Notification completed in [facility]/PAS?:  Not Applicable    Dialysis:  Dialysis patient: Yes    Dialysis Center:  [facility]  Address: [address] [address]  Phone: [phone]    Transportation:  Transportation PLAN for discharge: EMS transportation   Mode of Transport: Ambulance stretcher - S  Reason for medical transport: Bed confined: Meets the following criteria 1) unable to get out of bed without assistance or ambulate, 2) unable to safely sit up in a wheelchair, 3) unable to maintain erect seating position in a chair for time needed for transport  Name of Transport Company: [employer] [facility] -[phone]  Time of Transport: 8 pm    Transport form completed: Yes    IMM Completed:   Yes, Case management has presented and reviewed IMM letter #2. IMM Letter date given[de-identified] 11/17/23  IMM Letter time given[de-identified] 9615. Patient and/or family/POA verbalized understanding of their medicare rights and appeal process if needed. Patient and/or family/POA has signed, initialed and placed the date and time on IMM letter #2 on the the appropriate lines. Copy of letter offered and they are aware that the original copy of IMM letter #2 is available prior to discharge from the paper chart on the unit. Electronic documentation has been entered into epic for IMM letter #2 and original paper copy has been added to the paper chart at the nurses station. Additional CM Notes: Patient & wife agree to discharge to [facility] today. Quiana BUTTERFIELD made aware. Carola at facility informed of dc & able to pull orders via Epic. Rep at [facility] made aware patient will be dc'd and resume dialysis on Monday. ECF aware of dialysis on Monday instead of Tuesday d/t holiday week. The Plan for Transition of Care is related to the following treatment goals of Hyperkalemia [E87.5]  Colitis [K52.9]  ESRD on hemodialysis ([address]) [N18.6, Z99.2]  Intractable nausea and vomiting [R11.2]    The Patient and/or patient representative Barry Dalton and his family were provided with a choice of provider and agrees with the discharge plan Yes    Freedom of choice list was provided with basic dialogue that supports the patient's individualized plan of care/goals and shares the quality data associated with the providers.  Yes    Troy Yu, RN  [facility]   Case Management Department  Ph: [phone]

## 2023-11-17 NOTE — PROGRESS NOTES
the access within the fistula. This morning patient started having some abdominal pain with nausea and vomiting without any associated fever or chills constipation or diarrhea which prompted him to come to the emergency room for further evaluation. On arrival to the emergency room patient was noted to be afebrile with a temp of 98.2, with RR 66 and respirate of 13 and a stable blood pressure 130/63 with an SPO2 of 96% on room air. CT of the abdomen and pelvis was done which showed moderate mucosal thickening of the sigmoid colon and rectum with the possibility of acute colitis, infectious versus inflammatory, not consistent with acute diverticulitis. Labs including CBC was without any leukocytosis while serum chemistry showed renal failure with creatinine of 8.2 and a potassium of 5.5, lipase of 57 and lactic acid of 1.9. ED consulted on-call nephrology and was recommended to give a dose of Buster Hence and to admit the patient for the dialysis in the morning. Patient denies any chest pain, shortness of breath, weight gain,. In the emergency room patient was started on IV Rocephin and is being admitted to the floor for further work-up and management. 11/16/2023 - PPD #1 Left AVF Fistulagram with Angioplasty with Dr. Alexandra Louis    Respiratory Status:   [x]Room Air              []O2 via nasal cannula              []Other:     Imaging:  Chest X-ray: 11/13/2023  IMPRESSION:  Cardiomegaly. Minimal interstitial edema. No pneumothorax or pleural effusion. Modified Barium Swallow Study: n/a per EMR review     Reason for referral: SLP evaluation orders received due to coughing with pills with water      History/Prior Level of Function:   Living Status: admitted from [address] [place] [address] Drive: regular/thin per admission paperwork  Prior Dysphagia History: pt seen on prior admissions in 1/2022 with diet of regular/thin and again in 6/2022 with d/c recommendation for regular/nectar.     Subjective:     Current monitor. 2.  MEDICAL OR COGNITIVE/BEHAVIORAL FACTORS WHICH CAN EXACERBATE: lethargy, medical complications    Diet Recommendations:  Solids: regular  Liquids: thin  Medications: whole with thin or puree  Strategies: fully upright, slow rate, small sips, small bites    Education:  Provided education regarding role of SLP, results of assessment, recommendations and general speech pathology plan of care. [x] Pt verbalized understanding and agreement   [x] Pt requires ongoing learning   [] No evidence of comprehension     Dysphagia Prognosis: [x] good []fair  [x]guarded []poor  Barriers to progress: Limited insight into deficits and Medical complications    Plan:     Continue Dysphagia Therapy: yes  Interventions:  diet monitor, pt/family education  Duration/Frequency of therapy while on unit:  1-2x  Discharge Instructions:   Anticipate Yes____No_x_ for further skilled Speech Therapy for Dysphagia at discharge    This note serves as a D/C Summary in the event that this patient is discharged prior to the next therapy session.     Coded treatment time: 0  Total treatment time: 16    Time in: 6919  Time out: [facility]    Electronically signed by   Louise Marcos MA [address] [place] [address]  Speech-Language Pathologist  Portable Phone: [phone]  11/17/23  12:28pm

## 2023-11-17 NOTE — PLAN OF CARE
Problem: Discharge Planning  Goal: Discharge to home or other facility with appropriate resources  11/17/2023 0911 by Deepika Parham RN  Outcome: Progressing     Problem: Skin/Tissue Integrity  Goal: Absence of new skin breakdown  Description: 1. Monitor for areas of redness and/or skin breakdown  2. Assess vascular access sites hourly  3. Every 4-6 hours minimum:  Change oxygen saturation probe site  4. Every 4-6 hours:  If on nasal continuous positive airway pressure, respiratory therapy assess nares and determine need for appliance change or resting period.   Outcome: Progressing     Problem: Safety - Adult  Goal: Free from fall injury  11/17/2023 0911 by Deepika Parham RN  Outcome: Progressing     Problem: ABCDS Injury Assessment  Goal: Absence of physical injury  Outcome: Progressing     Problem: Neurosensory - Adult  Goal: Achieves stable or improved neurological status  11/17/2023 0911 by Deepika Parham RN  Outcome: Progressing     Problem: Respiratory - Adult  Goal: Achieves optimal ventilation and oxygenation  11/17/2023 0911 by Deepika Parham RN  Outcome: Progressing     Problem: Cardiovascular - Adult  Goal: Maintains optimal cardiac output and hemodynamic stability  11/17/2023 0911 by Deepika Parham RN  Outcome: Progressing     Problem: Skin/Tissue Integrity - Adult  Goal: Skin integrity remains intact  11/17/2023 0911 by Deepika Parham RN  Outcome: Progressing     Problem: Musculoskeletal - Adult  Goal: Return mobility to safest level of function  11/17/2023 0911 by Deepika Parham RN  Outcome: Progressing     Problem: Musculoskeletal - Adult  Goal: Return ADL status to a safe level of function  11/17/2023 0911 by Deepika Parham RN  Outcome: Progressing     Problem: Gastrointestinal - Adult  Goal: Minimal or absence of nausea and vomiting  11/17/2023 0911 by Deepika Parham RN  Outcome: Progressing     Problem: Gastrointestinal - Adult  Goal: Maintains adequate nutritional intake  11/17/2023 Patient's Chronic Conditions and Co-Morbidity Symptoms are Monitored and Maintained or Improved: Monitor and assess patient's chronic conditions and comorbid symptoms for stability, deterioration, or improvement

## 2023-11-17 NOTE — PROGRESS NOTES
Occupational Therapy  Facility/Department: [address] PROGRESSIVE CARE  Occupational Therapy Initial Assessment and Tentative D/C      Name: Shakila Pathak  : [birth year]  MRN: [id number]  Date of Service: 2023    Discharge Recommendations: Shakila Pathak scored a 16/24 on the AM-PAC ADL Inpatient form. Current research shows that an AM-PAC score of 17 or less is typically not associated with a discharge to the patient's home setting. Based on the patient's AM-PAC score and their current ADL deficits, it is recommended that the patient have 3-5 sessions per week of Occupational Therapy at d/c to increase the patient's independence. Please see assessment section for further patient specific details. If patient discharges prior to next session this note will serve as a discharge summary. Please see below for the latest assessment towards goals. [address] [place] [address] with OT  OT Equipment Recommendations  Equipment Needed: No       Patient Diagnosis(es): The primary encounter diagnosis was ESRD on hemodialysis ([address]). Diagnoses of Hyperkalemia and Colitis were also pertinent to this visit. Past Medical History:  has a past medical history of Anemia, Anxiety, Arthritis, CAD (coronary artery disease), Cerebral infarction ([address]), Cognitive communication deficit, COVID-19, Diabetes mellitus ([address]), Dysphagia, oropharyngeal, End stage renal disease ([address]), Fatigue, Gastrointestinal hemorrhage, Hemodialysis patient ([address]), History of colon cancer, Hx of blood clots, Hyperlipidemia, Hypertension, Hyponatremia, Risk for falls, and Splenomegaly. Past Surgical History:  has a past surgical history that includes IR PERC ARTERIOVENOUS FISTULA CREATION (Left); colectomy (N/A, 2022); sigmoidoscopy (N/A, 2022); IR EMBOLIZATION HEMORRHAGE (2022); Tunneled venous catheter placement (Right, 2022); IR TUNNELED CVC PLACE WO SQ PORT/PUMP > 5 YEARS (2022); Cardiac catheterization (2019); Colonoscopy;  Dialysis

## 2023-11-17 NOTE — PROGRESS NOTES
mobility (I)  Short Term Goal 2: Transfers Mod A  Short Term Goal 3: wc mobility 150' (I)  Patient Goals   Patient Goals :  [facility]     Education  Patient Education  Education Given To: Patient  Education Provided: Role of Therapy;Plan of Care;IADL Safety  Education Method: Demonstration;Verbal  Education Outcome: Continued education needed    Therapy Time   Individual Concurrent Group Co-treatment   Time In       5756   Time Out       1033   Minutes       38   Timed Code Treatment Minutes: 1515 Alverto Irene, PT   Electronically signed by Shani Michael, [place] [id number] on 11/17/2023 at 10:43 AM

## 2023-11-17 NOTE — PROGRESS NOTES
[address] [facility]     Nephrology Note         Patient ID: Shakila Pathak  Referring/ Physician: Theador Primrose, MD      Summary:   Shakila Pathak is being seen by nephrology for ESRD. Reason for admission: nausea       Interval Hx:   Seen on dialysis. His catheter is being using currently. Discussed with RN to switch to his fistula so we can confirm it is working after vascular intervention. + thrill and bruit of the fistula. No chest pain or SOB. /69  On room air       Assessment/Plan:   - if fistula works ok today after dialysis he could discharge. - the temporary catheter can be removed after dialysis if AVF was access without issues. - has outpatient dialysis on Monday         ESRD  HD 3 times weekly  Has AV fistula which did not work today  Temporary line and vascular consult    Second hyperparathyroidism  Hyperphosphatemia phosphorus 6.5 at time of consult  On Renvela 800 mg 3 times daily    Hypertension  Blood pressure acceptable, no fluid overload on exam on room air    Anemia  Hemoglobin 10 at time of consult  ELENA as indicated per outpatient records. [facility] would like to thank you for the opportunity to serve this patient. Please call with any questions or concerns. Dilcia Whitaker MD  [facility]  [facility], [facility] [address]  Fax: [phone]  Office: [phone]    HPI  Shakila Pathak is a 69 y.o. male  with  has a past medical history of Anemia, Anxiety, Arthritis, CAD (coronary artery disease), Cerebral infarction [facility]), Cognitive communication deficit, COVID-19, Diabetes mellitus ([address]), Dysphagia, oropharyngeal, End stage renal disease ([address]), Fatigue, Gastrointestinal hemorrhage, Hemodialysis patient ([address]), History of colon cancer, Hx of blood clots, Hyperlipidemia, Hypertension, Hyponatremia, Risk for falls, and Splenomegaly. who presented with abdominal pain and nausea.   He apparently went to his dialysis unit on Saturday and could not get 93*   CO2 22 28 25   BUN 74* 40* 41*   CREATININE 8.7* 6.1* 6.5*   GLUCOSE 136* 109* 97   MG 2.00  --   --    PHOS 6.8*  --   --      Lab Results   Component Value Date/Time    COLORU DARK YELLOW 12/20/2022 05:39 PM    NITRU Negative 12/20/2022 05:39 PM    GLUCOSEU Negative 12/20/2022 05:39 PM    KETUA Negative 12/20/2022 05:39 PM    UROBILINOGEN 0.2 12/20/2022 05:39 PM    BILIRUBINUR Negative 12/20/2022 05:39 PM

## 2023-11-17 NOTE — PROGRESS NOTES
Pt attempting to climb out of bed, requiring 2 staff members to assist. Pt disgruntled expressing frustration about not allowed to get in wheelchair and propel self about the unit. Reassurance successful at this time. Bed exit engaged.   Electronically signed by Anita Messer RN on 11/17/2023 at 9:14 AM

## 2023-11-17 NOTE — PROGRESS NOTES
Treatment time: 3 hours    Net UF: 1000 ml    Pre weight: 97.9 kg  Post weight: 96.9 kg  EDW: 96 kg    Access used: L arm fistula  Access function: . Access runs without malfunction. Medications or blood products given: None    Regular outpatient schedule: MWF    Summary of response to treatment: Pt. Tolerates treatment well. Copy of dialysis treatment record placed in chart, to be scanned into EMR.

## 2023-11-17 NOTE — PROGRESS NOTES
RN called [phone] [facility] to give report on patient, RN called 3 times, without success. No report given. Charge nurse notified.     Electronically signed by Calderon Gan RN on 11/17/2023 at 6:29 PM

## 2023-11-17 NOTE — DISCHARGE INSTR - COC
Continuity of Care Form    Patient Name: Vandana Washington   :    MRN:  [id number]    Admit date:  2023  Discharge date:  2023    Code Status Order: Full Code   Advance Directives:     Admitting Physician:  Alexander Hilton MD  PCP: Cathy Geiger    Discharging Nurse: Quiana Omaira Gregorio Unit/Room#: F8Q-3382/0534-55  Discharging Unit Phone Number: [phone]    Emergency Contact:   Extended Emergency Contact Information  Primary Emergency Contact: NikolayKerline  Address: [facility] [address] [place] [address], [address] [place] Phone: [phone]  Mobile Phone: [phone]  Relation: Spouse  Secondary Emergency Contact: [address] [facility] Phone: [phone]  Mobile Phone: [phone]  Relation: Child   needed? No    Past Surgical History:  Past Surgical History:   Procedure Laterality Date    CARDIAC CATHETERIZATION  2019    CHOLECYSTECTOMY, LAPAROSCOPIC N/A 2022    LAPAROSCOPIC CHOLECYSTECTOMY WITH CHOLANGIOGRAM performed by Samson Leach MD at [facility] N/A 2022    SIGMOID COLECTOMY, SIGMOIDOSCOPY performed by Samson Leach MD at [id number] [place] [address] 2022    LEFT BRACHIAL CEPHALIC FISTULA performed by George Alcantara MD at [facility] N/A 2022    ERCP SPHINCTER/PAPILLOTOMY performed by Dewayne Salamanca MD at [address] [place] [address]    ERCP N/A 2022    ERCP STONE REMOVAL/BALLOON SWEEP performed by Dewayne Salamanca MD at [address] [place] [address]  2022    IR EMBOLIZATION HEMORRHAGE 2022 [facility]    IR NONTUNNELED VASCULAR CATHETER Right 11/15/2023    Dr. Otho Cogan.  Non-Tunneled VasCath 20cm    IR NONTUNNELED VASCULAR CATHETER  11/15/2023    IR NONTUNNELED VASCULAR CATHETER 11/15/2023 [facility]    IR PERC ARTERIOVENOUS FISTULA CREATION Left     unsure of date    IR TUNNELED CATHETER PLACEMENT Odor None 11/15/23 1000   Pattie-wound Assessment Intact 11/16/23 2001   Margins Defined edges 11/15/23 1000   Number of days: 3        Elimination:  Continence: Bowel: No  Bladder: Yes and oliguric  Urinary Catheter: None   Colostomy/Ileostomy/Ileal Conduit: No       Date of Last BM: 11/14/2023    Intake/Output Summary (Last 24 hours) at 11/17/2023 1621  Last data filed at 11/17/2023 0910  Gross per 24 hour   Intake 600 ml   Output --   Net 600 ml     I/O last 3 completed shifts: In: 36 [P.O.:720; IV Piggyback:100]  Out: 500 [Urine:500]    Safety Concerns: At Risk for Falls and forgetful    Impairments/Disabilities:      Vision    Nutrition Therapy:  Current Nutrition Therapy:   - Oral Diet:  Renal    Routes of Feeding: Oral  Liquids: Thin Liquids  Daily Fluid Restriction: no  Last Modified Barium Swallow with Video (Video Swallowing Test): not done    Treatments at the Time of Hospital Discharge:   Respiratory Treatments: N/A  Oxygen Therapy:  is not on home oxygen therapy.   Ventilator:    - No ventilator support    Rehab Therapies: Physical Therapy, Occupational Therapy, and Speech/Language Therapy  Weight Bearing Status/Restrictions: No weight bearing restrictions  Other Medical Equipment (for information only, NOT a DME order):  wheelchair  Other Treatments: N/A    Patient's personal belongings (please select all that are sent with patient):  None    RN SIGNATURE:  Electronically signed by Tano Cohen RN on 11/17/23 at 4:52 PM EST    CASE MANAGEMENT/SOCIAL WORK SECTION    Inpatient Status Date: ***    Readmission Risk Assessment Score:  Readmission Risk              Risk of Unplanned Readmission:  31           Discharging to Facility/ Agency   Name:   Address:  Phone:  Fax:    Dialysis Facility (if applicable)   Name:  Address:  Dialysis Schedule:  Phone:  Fax:    / signature: {Esignature:[id number]}    PHYSICIAN SECTION    Prognosis: Good    Condition at Discharge:

## 2023-11-17 NOTE — CARE COORDINATION
Transport arranged originally at 6 pm but the patient needs IJ removed and just came back from HD. Spoke with [facility] and they rescheduled to 8 pm.  Nurse updated.   Deedee Connelly RN, BSN, Case Management  Phone: [phone]  Electronically signed by Deedee Connelly RN on 11/17/2023 at 6:03 PM

## 2023-11-18 NOTE — PROGRESS NOTES
Patient discharged back to [address] [place] [address] via [facility]. Patient was very combative/violent with staff. Patient's daughter had to be called to come assist with getting the patient to leave. All personal belonging were sent with the patient. Discharge paperwork/instructions were sent with [employer] to give to [address] [place] [address].

## 2023-11-20 NOTE — OP NOTE
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     Post-Op Diagnosis: Same       Procedures:  Ultrasound guided access left radial artery with fistulagram  Balloon angioplasty peripheral venous segment of left brachiocephalic AV fistula, 7mm x 150mm Hamlin XANDER; 9mm x 80mm Slinger  Balloon angioplasty central venous outflow (subclavian vein) with 10mm x 60mm Slinger    Monitored sedation    ASA:  4  Mallampati: 2     Surgeon:  Cam Merlos MD     Estimated Blood Loss (mL): Minimal     Complications: None     Specimens:   * No specimens in log *     Implants:  * No implants in log *      Drains: * No LDAs found *     Findings: Severe stenosis of peripheral venous segment (mid upper arm cephalic vein) and moderate stenosis of subclavian vein proximal to thoracic outlet. Both responded well to angioplasty. Details of Procedure: The patient was taken to the cardiac cath lab and placed in supine position. IV sedation was administered by the nursing team. The operative area was clipped, prepared and draped in sterile fashion. A dose of intravenous antibiotics was administered. A brief time out was performed per hospital protocol. The Left radial artery was accessed under ultrasound guidance with a micropuncture system. A 6 Divehi Slender sheath was placed over a wire and a solution of heparin 2000 units, verapamil 50 mcg and nitroglycerin 200 mcg was instilled in the side port. Fistulagram was then performed from the arterial anastomosis to the central venous outflow. Findings: There is an upper arm brachiocephalic arteriovenous fistula with widely patent arterial anastomosis just above the antecubitum. There is a severe stenosis measuring approximately 4 cm in length located in the peripheral venous segment of the cephalic vein in the mid upper arm.   The fistula then dilates to 11-12 mm and remains widely patent

## 2023-11-23 NOTE — PROGRESS NOTES
Physician Progress Note      Vlad Caraballo  CSN #:                  [id number]  :                       1945  ADMIT DATE:       2023 8:01 PM  [facility] DATE:        2023 11:12 PM  RESPONDING  PROVIDER #:        Thong Ku MD          QUERY TEXT:    Pt admitted with acute colitis with nausea and vomiting. Treated with IV   fluids, Rocephin, and Zofran. If possible, please document the type of   colitis in the medical record. The medical record reflects the following:  Risk Factors: ESRD on HD, DM  Clinical Indicators: nausea, vomiting; CT shows: Moderate mucosal thickening   of the sigmoid colon and rectum. Underlying  colitis with infectious or inflammatory etiologies considered  Treatment:  CT, IV fluids, Rocephin, and Zofran  Options provided:  -- Treating as bacterial colitis  -- Other - I will add my own diagnosis  -- Disagree - Not applicable / Not valid  -- Disagree - Clinically unable to determine / Unknown  -- Refer to Clinical Documentation Reviewer    PROVIDER RESPONSE TEXT:    Treating as bacterial colitis.     Query created by: Desirae Perez on 2023 7:37 AM      Electronically signed by:  Thong Ku MD 2023 6:33 AM

## 2023-11-24 NOTE — DISCHARGE SUMMARY
\"TROPONINT\"  Lactic Acid: No results for input(s): \"LACTA\" in the last 72 hours. BNP: No results for input(s): \"PROBNP\" in the last 72 hours. UA:  Lab Results   Component Value Date/Time    NITRU Negative 12/20/2022 05:39 PM    COLORU DARK YELLOW 12/20/2022 05:39 PM    PHUR 7.0 12/20/2022 05:39 PM    PHUR 7.0 12/20/2022 05:39 PM    WBCUA  12/20/2022 05:39 PM    RBCUA 21-50 12/20/2022 05:39 PM    MUCUS Rare 12/20/2022 05:39 PM    BACTERIA 1+ 12/20/2022 05:39 PM    CLARITYU CLOUDY 12/20/2022 05:39 PM    SPECGRAV 1.020 12/20/2022 05:39 PM    LEUKOCYTESUR LARGE 12/20/2022 05:39 PM    UROBILINOGEN 0.2 12/20/2022 05:39 PM    BILIRUBINUR Negative 12/20/2022 05:39 PM    BLOODU LARGE 12/20/2022 05:39 PM    GLUCOSEU Negative 12/20/2022 05:39 PM    KETUA Negative 12/20/2022 05:39 PM    AMORPHOUS 1+ 12/20/2022 05:39 PM     Urine Cultures:   Lab Results   Component Value Date/Time    LABURIN  12/20/2022 05:39 PM     <50,000 CFU/ml mixed skin/urogenital nakia. No further workup     Blood Cultures:   Lab Results   Component Value Date/Time    BC No Growth after 4 days of incubation. 12/22/2022 02:54 PM     Lab Results   Component Value Date/Time    BLOODCULT2 No Growth after 4 days of incubation.  12/22/2022 01:14 PM     Organism:   Lab Results   Component Value Date/Time    ORG Staphylococcus aureus 12/23/2022 11:10 AM       Time Spent Discharging patient 38 minutes    Electronically signed by Greg Shaver MD on 11/23/2023 at 9:41 PM

## 2023-12-04 ENCOUNTER — APPOINTMENT (OUTPATIENT)
Dept: GENERAL RADIOLOGY | Age: 78
End: 2023-12-04
Payer: COMMERCIAL

## 2023-12-04 ENCOUNTER — HOSPITAL ENCOUNTER (EMERGENCY)
Age: 78
Discharge: HOME | End: 2023-12-04
Attending: EMERGENCY MEDICINE
Payer: COMMERCIAL

## 2023-12-04 VITALS
OXYGEN SATURATION: 94 % | HEART RATE: 82 BPM | DIASTOLIC BLOOD PRESSURE: 57 MMHG | SYSTOLIC BLOOD PRESSURE: 112 MMHG | TEMPERATURE: 98.3 F | RESPIRATION RATE: 16 BRPM

## 2023-12-04 DIAGNOSIS — E86.0 MILD DEHYDRATION: ICD-10-CM

## 2023-12-04 DIAGNOSIS — R53.83 MALAISE AND FATIGUE: Primary | ICD-10-CM

## 2023-12-04 DIAGNOSIS — R53.81 MALAISE AND FATIGUE: Primary | ICD-10-CM

## 2023-12-04 DIAGNOSIS — R79.89 ELEVATED TROPONIN: ICD-10-CM

## 2023-12-04 LAB
ALBUMIN SERPL-MCNC: 2.9 G/DL (ref 3.4–5)
ALBUMIN/GLOB SERPL: 0.8 {RATIO} (ref 1.1–2.2)
ALP SERPL-CCNC: 74 U/L (ref 40–129)
ALT SERPL-CCNC: 16 U/L (ref 10–40)
ANION GAP SERPL CALCULATED.3IONS-SCNC: 10 MMOL/L (ref 3–16)
AST SERPL-CCNC: 21 U/L (ref 15–37)
BASOPHILS # BLD: 0 K/UL (ref 0–0.2)
BASOPHILS NFR BLD: 0.4 %
BILIRUB SERPL-MCNC: 0.5 MG/DL (ref 0–1)
BUN SERPL-MCNC: 47 MG/DL (ref 7–20)
CALCIUM SERPL-MCNC: 8.7 MG/DL (ref 8.3–10.6)
CHLORIDE SERPL-SCNC: 92 MMOL/L (ref 99–110)
CO2 SERPL-SCNC: 28 MMOL/L (ref 21–32)
CREAT SERPL-MCNC: 5.7 MG/DL (ref 0.8–1.3)
DEPRECATED RDW RBC AUTO: 14.9 % (ref 12.4–15.4)
EOSINOPHIL # BLD: 0 K/UL (ref 0–0.6)
EOSINOPHIL NFR BLD: 0.9 %
GFR SERPLBLD CREATININE-BSD FMLA CKD-EPI: 10 ML/MIN/{1.73_M2}
GLUCOSE SERPL-MCNC: 172 MG/DL (ref 70–99)
HCT VFR BLD AUTO: 24.1 % (ref 40.5–52.5)
HGB BLD-MCNC: 8.1 G/DL (ref 13.5–17.5)
LACTATE BLDV-SCNC: 1 MMOL/L (ref 0.4–1.9)
LYMPHOCYTES # BLD: 1.1 K/UL (ref 1–5.1)
LYMPHOCYTES NFR BLD: 19.3 %
MCH RBC QN AUTO: 30.7 PG (ref 26–34)
MCHC RBC AUTO-ENTMCNC: 33.7 G/DL (ref 31–36)
MCV RBC AUTO: 91 FL (ref 80–100)
MONOCYTES # BLD: 0.4 K/UL (ref 0–1.3)
MONOCYTES NFR BLD: 6.7 %
NEUTROPHILS # BLD: 4 K/UL (ref 1.7–7.7)
NEUTROPHILS NFR BLD: 72.7 %
PLATELET # BLD AUTO: 214 K/UL (ref 135–450)
PMV BLD AUTO: 7.3 FL (ref 5–10.5)
POTASSIUM SERPL-SCNC: 4.4 MMOL/L (ref 3.5–5.1)
PROT SERPL-MCNC: 6.6 G/DL (ref 6.4–8.2)
RBC # BLD AUTO: 2.65 M/UL (ref 4.2–5.9)
SODIUM SERPL-SCNC: 130 MMOL/L (ref 136–145)
TROPONIN, HIGH SENSITIVITY: 145 NG/L (ref 0–22)
TROPONIN, HIGH SENSITIVITY: 150 NG/L (ref 0–22)
WBC # BLD AUTO: 5.5 K/UL (ref 4–11)

## 2023-12-04 PROCEDURE — 83605 ASSAY OF LACTIC ACID: CPT

## 2023-12-04 PROCEDURE — 71045 X-RAY EXAM CHEST 1 VIEW: CPT

## 2023-12-04 PROCEDURE — 2580000003 HC RX 258: Performed by: EMERGENCY MEDICINE

## 2023-12-04 PROCEDURE — 99284 EMERGENCY DEPT VISIT MOD MDM: CPT

## 2023-12-04 PROCEDURE — 96360 HYDRATION IV INFUSION INIT: CPT

## 2023-12-04 PROCEDURE — 85025 COMPLETE CBC W/AUTO DIFF WBC: CPT

## 2023-12-04 PROCEDURE — 96361 HYDRATE IV INFUSION ADD-ON: CPT

## 2023-12-04 PROCEDURE — 84484 ASSAY OF TROPONIN QUANT: CPT

## 2023-12-04 PROCEDURE — 80053 COMPREHEN METABOLIC PANEL: CPT

## 2023-12-04 PROCEDURE — 36415 COLL VENOUS BLD VENIPUNCTURE: CPT

## 2023-12-04 PROCEDURE — 87040 BLOOD CULTURE FOR BACTERIA: CPT

## 2023-12-04 RX ORDER — 0.9 % SODIUM CHLORIDE 0.9 %
250 INTRAVENOUS SOLUTION INTRAVENOUS ONCE
Status: COMPLETED | OUTPATIENT
Start: 2023-12-04 | End: 2023-12-04

## 2023-12-04 RX ORDER — 0.9 % SODIUM CHLORIDE 0.9 %
1000 INTRAVENOUS SOLUTION INTRAVENOUS ONCE
Status: DISCONTINUED | OUTPATIENT
Start: 2023-12-04 | End: 2023-12-04

## 2023-12-04 RX ADMIN — SODIUM CHLORIDE 250 ML: 9 INJECTION, SOLUTION INTRAVENOUS at 02:20

## 2023-12-04 ASSESSMENT — PAIN - FUNCTIONAL ASSESSMENT: PAIN_FUNCTIONAL_ASSESSMENT: NONE - DENIES PAIN

## 2023-12-04 NOTE — ED NOTES
Discharge and education instructions reviewed with pt.'s daughter and EMT's. Daughter verbalized understanding, teach-back successful. Patient and pt.'s daughter denied questions at this time. No acute distress noted. Patient instructed to follow-up as noted - return to emergency department if symptoms worsen. Pt.'s daughter and pt. verbalized understanding. Discharged per EDMD with discharged instructions. D/c instructions reviewed with pt.  And pt.'s daughter by Night shift RN prior to his shift end.        Adeel Rice RN  12/04/23 0013

## 2023-12-04 NOTE — ED NOTES
Spoke with three [address] patient. They reported the family said he was more lethargic around 2030.   Pt was drowsy before the staff their gave him teetee Gillis RN  12/04/23 [id number]

## 2023-12-04 NOTE — ED PROVIDER NOTES
[address] [address]      Pt Name: June Mcnair  MRN: [id number]  [facility] 1945  Date of evaluation: 12/4/2023  Provider: Adelina Mortimer, DO    CHIEF COMPLAINT  Chief Complaint   Patient presents with    Fatigue     Family states he was acting \"tired all day. \"  Nurses called EMS after giving him ativan before bed and now believes he is lethargic          This patient is at risk for a communicable infection. Therefore, personal protection equipment consisting of a mask was worn for the exam.    HPI  June Mcnair is a 69 y.o. male who presents with complaint of more tired than usual.  Family feels that he is not alert as he normally is. Patient states that he normally has bed ridden and can only help transfer himself. He denies any fevers. Denies abdominal pain or chest pain. Denies any nausea vomiting or diarrhea. Family complained that he was more lethargic and the nurse gave him Ativan just before bedtime. ? REVIEW OF SYSTEMS  All systems negative except as noted in the HPI. Reviewed Nurses' notes and concur. History limited due to cognitive deficits. No LMP for male patient.     PAST MEDICAL HISTORY  Past Medical History:   Diagnosis Date    Anemia 03/2022    Anxiety     Arthritis     CAD (coronary artery disease) 01/2020    Cerebral infarction [facility])     Cognitive communication deficit     COVID-19     Diabetes mellitus ([address])     Dysphagia, oropharyngeal     End stage renal disease ([address])     Fatigue     Gastrointestinal hemorrhage     Hemodialysis patient ([address]) 2019    ckd-goes to dialysis Tuesday, Thurs, Sat [facility] [place])    History of colon cancer     Hx of blood clots     Hyperlipidemia     Hypertension     Hyponatremia     Risk for falls     Splenomegaly 02/10/2021       FAMILY HISTORY  Family History   Problem Relation Age of Onset    High Blood Pressure Father        SOCIAL HISTORY   reports that he has quit

## 2023-12-04 NOTE — ED NOTES
Report given to nursing home and patient is getting ready to transport back at this time with [facility], [address]  12/04/23 0750

## 2023-12-08 LAB — BACTERIA BLD CULT: NORMAL

## 2023-12-27 NOTE — PROGRESS NOTES
Hospitalist Progress Note    Patient:  Saige Birmingham  Unit/Bed:[id number]-0300/5107-01   YOB: 1945       MRN: [id number] Acct: [de-identified]  PCP: Natalie Haider    Date of Admission: 1/13/2022  --------------------------    Chief Complaint:     Mental status changes    Hospital Course:     Saige Birmingham is a 77 y.o. male hospitalized on 1/13/2022   multiple comorbidities presented to the ED found to have acute CVA. Assessment/plan:     Acute encephalopathy secondary to CVA with underlying cognitive impairment. -MRI of the brain showed punctate acute infarct in the posterior medial left parietal lobe  MRA of the head and neck showed no flow-limiting stenosis     Continue aspirin,   statin  -    PT and OT. Likely need ECF. End-stage renal failure, on hemodialysis  Continue hemodialysis. Discussed with nephrology, patient trying to relocate to [place], nephrologist is assisting with finding a location for his outpatient HD. Hypomagnesemia  Resolved      Hypokalemia  Resolved       Recent hospitalization for COVID  Currently on room air. History of colon cancer/GI bleed/acute blood loss anemia     -Hemoglobin remained stable, evaluated by GI, tolerating aspirin  -Surgery service consulted, awaiting their final recommendation in terms of doing the surgery here as the patient is relocating to [place]. Patient has a revised cardiac risk index score of 2 (with hx CVA and pre-operative creatinine > 2), indicating a class III risk, which carries a 10.1% 30-day risk of death, MI, or cardiac arrest. Patient is a moderate risk for the planned procedure and may proceed with the planned procedure as the benefit of the procedure outweigh the risks. Bacteriuria without urinary symptoms, culture data show Enterococcus faecalis  No symptoms, discontinue ceftriaxone. Obesity Body mass index is 29.93 kg/m². Complicating assessment and treatment.  Placing patient at risk for multiple co-morbidities Patient will be getting cycle 6 on 1/3 and is doing ok.  Will arrange scan for 1/10 to see how she is doing and if her chemo is delayed then would delay scans also.  Continue therapy o/w but need to watch counts closely.     as well as early death and contributing to the patient's presentation. Code Status: Full Code         DVT prophylaxis: SCD     Disposition:   Awaiting final surgery input whether the surgery will be done this admission  Patient is requesting to [facility], need ECF and HD chair    Discussed with the patient  ----------------      Subjective:     Patient seen and examined  Doing well, has no complaints. Diet: ADULT DIET; Regular; Low Sodium (2 gm); Low Potassium (Less than 3000 mg/day); Low Phosphorus (Less than 1000 mg); 2000 ml    OBJECTIVE     Exam:  BP (!) 146/87   Pulse 74   Temp 97.8 °F (36.6 °C)   Resp 18   Ht 6' 0.5\" (1.842 m)   Wt 223 lb 12.3 oz (101.5 kg)   SpO2 95%   BMI 29.93 kg/m²        Gen: Not in distress. Alert. Head: Normocephalic. Atraumatic. Eyes: Conjunctivae/corneas clear. ENT: Oral mucosa moist  Neck: No JVD. No obvious thyromegaly. CVS: Nml S1S2, no murmur  , RRR  Pulmomary: Clear bilaterally. No crackles. No wheezes. Gastrointestinal: Soft, non tender, non distend, . Musculoskeletal: Left upper extremity fistula  Neuro: No focal deficit. Moves extremity spontaneously. Psychiatry: Appropriate affect. Not agitated.       Medications:  Reviewed    Infusion Medications    sodium chloride 5 mL/hr at 01/15/22 2102     Scheduled Medications    aspirin  81 mg Oral Daily    atorvastatin  80 mg Oral Nightly    gabapentin  100 mg Oral TID    pantoprazole  20 mg Oral Nightly    sevelamer  800 mg Oral TID WC    tamsulosin  0.4 mg Oral QAM    sodium chloride flush  5-40 mL IntraVENous 2 times per day     PRN Meds: ondansetron **OR** ondansetron, polyethylene glycol, sodium chloride flush, sodium chloride, acetaminophen **OR** acetaminophen, perflutren lipid microspheres      Intake/Output Summary (Last 24 hours) at 1/20/2022 1345  Last data filed at 1/20/2022 1205  Gross per 24 hour   Intake 360 ml   Output 2000 ml   Net -1640 ml             Labs:   Recent Labs     01/18/22  7548 01/19/22 0449 01/20/22  0425   WBC 4.6 5.4 5.4   HGB 10.1* 8.6* 9.7*   HCT 30.8* 26.6* 29.5*    189 194     Recent Labs     01/18/22  0426 01/19/22 0448 01/20/22  0425    135* 139   K 4.6 4.7 4.4    100 101   CO2 21 24 24   BUN 40* 35* 42*   CREATININE 4.8* 4.1* 5.1*   CALCIUM 8.0* 8.2* 8.5     No results for input(s): AST, ALT, BILIDIR, BILITOT, ALKPHOS in the last 72 hours. No results for input(s): INR in the last 72 hours. No results for input(s): Kaitlin Divine in the last 72 hours. Urinalysis:      Lab Results   Component Value Date    NITRU Negative 01/13/2022    WBCUA >900 01/13/2022    RBCUA 0-2 01/13/2022    BLOODU LARGE 01/13/2022    SPECGRAV 1.025 01/13/2022    GLUCOSEU Negative 01/13/2022       Radiology:  MRA neck without contrast   Final Result   No convincing flow limiting stenosis of the visualized carotid/vertebral   arteries within the limitations of this exam.         MRA head without contrast   Final Result   No apparent intracranial arterial high grade stenosis, occlusion or aneurysm   head at 5 within constraints of acquisition. MRI BRAIN WO CONTRAST   Final Result   1. Punctate acute infarct in the posteromedial left parietal lobe, within the   deep white matter. 2. 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 RECOMMENDATIONS:   Unavailable         US RENAL COMPLETE   Final Result   1. Simple cysts in the left kidney with no other significant renal finding. 2. Borderline enlargement of the prostate. Correlate with urologic history. CT CHEST ABDOMEN PELVIS WO CONTRAST   Final Result   No evidence of acute intrathoracic, intraabdominal or intrapelvic injury. Multifocal airspace disease. This pattern may reflect COVID pneumonia. Correlate with COVID testing. CT HEAD WO CONTRAST   Final Result   No acute intracranial abnormality.   Specifically, no acute intracranial hemorrhage or mass effect. Extensive chronic small vessel ischemic disease.                      Electronically signed by Sophie Mehta MD on 1/20/2022 at 1:45 PM

## 2024-02-15 NOTE — PROGRESS NOTES
[facility]   Cardiac Electrophysiology Consultation   Date: 3/4/2024  Reason for Consultation:   Consult Requesting Physician: No att. providers found     Chief Complaint:   Chief Complaint   Patient presents with    Follow-up     6-8 Month follow up       HPI: Ben Link is a 78 y.o. male with PMH significant for HTN, HLD, DM, ESRD on HD, GIB, DVT on Eliquis, colon CA, CAD, who p/w hypotension and bradycardia (HR 30) from HD in the setting of hyperkalemia, atenolol stopped, placed on dopamine, EKG had shown junctional escape, no s/s, return of SR once K normalized, noted to have AFL CVR on tele, already of Eliquis for outpatient DVT. 15 day CAM (5/2023) showed 1 run of brief AT and 5% PAC, but no AFL. Echo (5/2023) showed preserved LVEF with grade 3 DD and LAE (4.2 cm).    Interval History:   Today, 6 mo f/u, presenting in AF CVR. He denies any symptoms, unaware that he is out of rhythm. Denies complaints of palpitations, dizziness, CP, SOB, orthopnea, BLE swelling, or syncope. He is using a wheelchair today.    Assessment:  AFL, single episode in setting of hyperkalemia, on Eliquis  Sinus bradycardia (w/ JALEEL), in setting of hyperkalemia  ESRD, on HD  HTN, stable    H/o DVT, on Eliquis  HLD, on statin  PAD / PVD  HFpEF     Plan:  Obtain 14 day CAM to assess AF burden and rate control  Will strive for rate control as his AF is asymptomatic and EF is preserved  No changes today, continue Eliquis  Follow up with EP NP in 6 months    Past Medical History:   Diagnosis Date    Anemia 03/2022    Anxiety     Arthritis     CAD (coronary artery disease) 01/2020    Cerebral infarction (HCC)     Cognitive communication deficit     COVID-19     Diabetes mellitus (HCC)     Dysphagia, oropharyngeal     End stage renal disease (HCC)     Fatigue     Gastrointestinal hemorrhage     Hemodialysis patient (H[facility]) 2019    ckd-goes to dialysis Tuesday, Thurs, Sat ([facility])    History of colon cancer     Hx of

## 2024-03-04 ENCOUNTER — OFFICE VISIT (OUTPATIENT)
Dept: CARDIOLOGY CLINIC | Age: 79
End: 2024-03-04
Payer: MEDICARE

## 2024-03-04 VITALS
HEART RATE: 80 BPM | WEIGHT: 212 LBS | BODY MASS INDEX: 28.28 KG/M2 | SYSTOLIC BLOOD PRESSURE: 110 MMHG | DIASTOLIC BLOOD PRESSURE: 62 MMHG

## 2024-03-04 DIAGNOSIS — I73.9 PVD (PERIPHERAL VASCULAR DISEASE) (HCC): ICD-10-CM

## 2024-03-04 DIAGNOSIS — I48.92 PAROXYSMAL ATRIAL FLUTTER (HCC): Primary | ICD-10-CM

## 2024-03-04 DIAGNOSIS — R00.1 SINUS BRADYCARDIA: ICD-10-CM

## 2024-03-04 DIAGNOSIS — E78.2 MIXED HYPERLIPIDEMIA: ICD-10-CM

## 2024-03-04 DIAGNOSIS — I10 BENIGN ESSENTIAL HTN: ICD-10-CM

## 2024-03-04 DIAGNOSIS — I73.9 PAD (PERIPHERAL ARTERY DISEASE) (HCC): ICD-10-CM

## 2024-03-04 PROCEDURE — G8419 CALC BMI OUT NRM PARAM NOF/U: HCPCS | Performed by: INTERNAL MEDICINE

## 2024-03-04 PROCEDURE — 1036F TOBACCO NON-USER: CPT | Performed by: INTERNAL MEDICINE

## 2024-03-04 PROCEDURE — 3074F SYST BP LT 130 MM HG: CPT | Performed by: INTERNAL MEDICINE

## 2024-03-04 PROCEDURE — G8427 DOCREV CUR MEDS BY ELIG CLIN: HCPCS | Performed by: INTERNAL MEDICINE

## 2024-03-04 PROCEDURE — 93000 ELECTROCARDIOGRAM COMPLETE: CPT | Performed by: INTERNAL MEDICINE

## 2024-03-04 PROCEDURE — 99214 OFFICE O/P EST MOD 30 MIN: CPT | Performed by: INTERNAL MEDICINE

## 2024-03-04 PROCEDURE — 93246 EXT ECG>7D<15D RECORDING: CPT | Performed by: INTERNAL MEDICINE

## 2024-03-04 PROCEDURE — 1123F ACP DISCUSS/DSCN MKR DOCD: CPT | Performed by: INTERNAL MEDICINE

## 2024-03-04 PROCEDURE — G8484 FLU IMMUNIZE NO ADMIN: HCPCS | Performed by: INTERNAL MEDICINE

## 2024-03-04 PROCEDURE — 3078F DIAST BP <80 MM HG: CPT | Performed by: INTERNAL MEDICINE

## 2024-03-04 NOTE — PROGRESS NOTES
Placed 14 day CAM [id number], patient will mail CAM box to [place] office since he is a nursing home resident

## 2024-03-29 PROCEDURE — 93248 EXT ECG>7D<15D REV&INTERPJ: CPT | Performed by: INTERNAL MEDICINE

## 2024-04-02 DIAGNOSIS — R00.2 PALPITATIONS: Primary | ICD-10-CM

## 2024-04-05 ENCOUNTER — TELEPHONE (OUTPATIENT)
Dept: CARDIOLOGY CLINIC | Age: 79
End: 2024-04-05

## 2024-04-05 DIAGNOSIS — R00.1 JUNCTIONAL BRADYCARDIA: ICD-10-CM

## 2024-04-05 DIAGNOSIS — I48.92 PAROXYSMAL ATRIAL FLUTTER (HCC): ICD-10-CM

## 2024-04-05 DIAGNOSIS — R94.31 ABNORMAL PATIENT-ACTIVATED CARDIAC EVENT MONITOR: Primary | ICD-10-CM

## 2024-04-05 DIAGNOSIS — R00.1 BRADYCARDIA: ICD-10-CM

## 2024-04-05 DIAGNOSIS — I48.91 ATRIAL FIBRILLATION, UNSPECIFIED TYPE (HCC): ICD-10-CM

## 2024-04-05 RX ORDER — METOPROLOL SUCCINATE 25 MG/1
25 TABLET, EXTENDED RELEASE ORAL DAILY
Qty: 90 TABLET | Refills: 3 | Status: SHIPPED | OUTPATIENT
Start: 2024-04-05

## 2024-04-05 NOTE — TELEPHONE ENCOUNTER
Per HIPAA, spoke w/wife; 12 Day CAM results reviewed, aware of new medication.  Per Dr Zamorano, start Toprol XL 25 mg daily  Pt resides at [address] [place] [address], ph: [phone]  Fax:[phone]  Spoke w/[address] [place] [address], order for Toprol XL faxed to facility.

## 2024-04-19 NOTE — PROGRESS NOTES
Physical Therapy  Attempt Note  Elfego Torres  M2S-[id number]/5105-01   PT order received. Pt off floor for dialysis at time of attempt. Will follow up as schedule and pt condition permit.   Electronically signed by John Sesay on 2/8/2022 at 11:52 AM      Declines

## 2024-08-12 NOTE — PROGRESS NOTES
[facility]   Electrophysiology  Office Visit  Date: 9/4/2024    Chief Complaint   Patient presents with    Atrial Fibrillation    Atrial Flutter    Bradycardia    Coronary Artery Disease    Hypertension     Cardiac HX: Ben Link is a 79 y.o. man with a h/o HTN, HLD, DM, ESRD on HD, GIB, DVT on Eliquis, colon CA, CAD, hypotension and bradycardia in the setting of hyperkalemia (5/2023), taken off atenolol, placed on dopamine, EKG w/ junctional escape, no s/s, developed AFL w/ HR controlled, already on Eliquis, 15 day CAM (5/2023) 1 brief AT, 5% PAC, no AF/AFL, in AF in f/u, 12 day CAM (3/2024) 100% AF.    Interval History/HPI: Patient is here for follow-up for bradycardia, paroxysmal AF and atrial flutter.  Patient was originally seen when he presented with hypotension and bradycardia in the setting of hyperkalemia in May 2023.  He was on atenolol at that time and that was discontinued.  He was placed on dopamine.  He is EKG showed a junctional escape rhythm.  He was asymptomatic and had no complaints of dizziness or lightheadedness.  He was then noted to have atrial flutter with a controlled ventricular response on telemetry.  He wore a 15-day CAM in May 2023 that showed a brief AT, a 5% PAC burden and no AF or AFL.  He then was seen in the office in March 2020 for an atrial fibrillation with a controlled ventricular response.  He wore a 12-day CAM that showed 100% AF burden with controlled ventricular rate.  Patient's EF has been normal and he has been asymptomatic and the plan for this time is rate management.  Today he presents in AF - HR 87.  Patient is asymptomatic in atrial fibrillation.  He denies any heart racing, palpitations or irregular heartbeats.  He remains on Eliquis 2.5 mg twice a day with no issues with bleeding or dark tarry stools.  His blood pressure is soft today he denies any dizziness, lightheadedness or syncopal events. Patient denies chest pain, shortness of breath, PND,

## 2024-09-04 ENCOUNTER — OFFICE VISIT (OUTPATIENT)
Dept: CARDIOLOGY CLINIC | Age: 79
End: 2024-09-04

## 2024-09-04 VITALS — SYSTOLIC BLOOD PRESSURE: 104 MMHG | DIASTOLIC BLOOD PRESSURE: 54 MMHG | HEART RATE: 87 BPM

## 2024-09-04 DIAGNOSIS — I48.20 CHRONIC ATRIAL FIBRILLATION (HCC): Primary | ICD-10-CM

## 2024-09-04 DIAGNOSIS — Z79.01 ON CONTINUOUS ORAL ANTICOAGULATION: ICD-10-CM

## 2024-09-04 DIAGNOSIS — I10 BENIGN ESSENTIAL HTN: ICD-10-CM

## 2024-09-04 DIAGNOSIS — R00.1 SINUS BRADYCARDIA: ICD-10-CM

## 2024-09-04 DIAGNOSIS — I25.10 CORONARY ARTERY DISEASE INVOLVING NATIVE CORONARY ARTERY OF NATIVE HEART WITHOUT ANGINA PECTORIS: ICD-10-CM

## 2024-09-04 DIAGNOSIS — I48.92 PAROXYSMAL ATRIAL FLUTTER (HCC): ICD-10-CM

## 2024-09-04 RX ORDER — CALCIUM ACETATE 667 MG/1
1 CAPSULE ORAL DAILY
COMMUNITY
Start: 2024-08-27

## 2024-09-04 RX ORDER — ACETAMINOPHEN 325 MG/1
650 TABLET ORAL EVERY 6 HOURS PRN
COMMUNITY

## 2024-10-08 ENCOUNTER — APPOINTMENT (OUTPATIENT)
Dept: GENERAL RADIOLOGY | Age: 79
End: 2024-10-08
Payer: MEDICARE

## 2024-10-08 ENCOUNTER — HOSPITAL ENCOUNTER (OUTPATIENT)
Age: 79
Setting detail: OBSERVATION
Discharge: OTHER INSTITUTION | End: 2024-10-09
Attending: EMERGENCY MEDICINE | Admitting: INTERNAL MEDICINE
Payer: MEDICARE

## 2024-10-08 DIAGNOSIS — E87.5 HYPERKALEMIA: ICD-10-CM

## 2024-10-08 DIAGNOSIS — I10 ESSENTIAL HYPERTENSION: ICD-10-CM

## 2024-10-08 DIAGNOSIS — J44.1 COPD EXACERBATION (HCC): Primary | ICD-10-CM

## 2024-10-08 DIAGNOSIS — Z99.2 ESRD NEEDING DIALYSIS (HCC): ICD-10-CM

## 2024-10-08 DIAGNOSIS — I73.9 PVD (PERIPHERAL VASCULAR DISEASE) (HCC): ICD-10-CM

## 2024-10-08 DIAGNOSIS — N18.6 ESRD NEEDING DIALYSIS (HCC): ICD-10-CM

## 2024-10-08 DIAGNOSIS — Z71.89 GOALS OF CARE, COUNSELING/DISCUSSION: ICD-10-CM

## 2024-10-08 DIAGNOSIS — I50.32 CHRONIC DIASTOLIC CONGESTIVE HEART FAILURE (HCC): ICD-10-CM

## 2024-10-08 PROBLEM — D63.8 ANEMIA, CHRONIC DISEASE: Status: ACTIVE | Noted: 2024-10-08

## 2024-10-08 PROBLEM — Z91.158 NONCOMPLIANCE WITH RENAL DIALYSIS (HCC): Status: ACTIVE | Noted: 2024-10-08

## 2024-10-08 PROBLEM — R09.02 HYPOXEMIA: Status: ACTIVE | Noted: 2024-10-08

## 2024-10-08 LAB
ALBUMIN SERPL-MCNC: 3.8 G/DL (ref 3.4–5)
ALBUMIN/GLOB SERPL: 1.1 {RATIO} (ref 1.1–2.2)
ALP SERPL-CCNC: 93 U/L (ref 40–129)
ALT SERPL-CCNC: 10 U/L (ref 10–40)
ANION GAP SERPL CALCULATED.3IONS-SCNC: 14 MMOL/L (ref 3–16)
AST SERPL-CCNC: 16 U/L (ref 15–37)
BASE EXCESS BLDV CALC-SCNC: 1.3 MMOL/L (ref -3–3)
BASOPHILS # BLD: 0 K/UL (ref 0–0.2)
BASOPHILS NFR BLD: 0.5 %
BILIRUB SERPL-MCNC: 0.4 MG/DL (ref 0–1)
BUN SERPL-MCNC: 69 MG/DL (ref 7–20)
CALCIUM SERPL-MCNC: 9.3 MG/DL (ref 8.3–10.6)
CHLORIDE SERPL-SCNC: 100 MMOL/L (ref 99–110)
CO2 BLDV-SCNC: 73 MMOL/L
CO2 SERPL-SCNC: 28 MMOL/L (ref 21–32)
COHGB MFR BLDV: 3.2 % (ref 0–1.5)
CREAT SERPL-MCNC: 7.1 MG/DL (ref 0.8–1.3)
DEPRECATED RDW RBC AUTO: 15.3 % (ref 12.4–15.4)
DO-HGB MFR BLDV: 27 %
EOSINOPHIL # BLD: 0.2 K/UL (ref 0–0.6)
EOSINOPHIL NFR BLD: 3.6 %
FLUAV RNA RESP QL NAA+PROBE: NOT DETECTED
FLUBV RNA RESP QL NAA+PROBE: NOT DETECTED
GFR SERPLBLD CREATININE-BSD FMLA CKD-EPI: 7 ML/MIN/{1.73_M2}
GLUCOSE BLD-MCNC: 151 MG/DL (ref 70–99)
GLUCOSE SERPL-MCNC: 150 MG/DL (ref 70–99)
HCO3 BLDV-SCNC: 30.1 MMOL/L (ref 23–29)
HCT VFR BLD AUTO: 32.3 % (ref 40.5–52.5)
HGB BLD-MCNC: 10.4 G/DL (ref 13.5–17.5)
LYMPHOCYTES # BLD: 1.2 K/UL (ref 1–5.1)
LYMPHOCYTES NFR BLD: 19.2 %
MCH RBC QN AUTO: 31.3 PG (ref 26–34)
MCHC RBC AUTO-ENTMCNC: 32.2 G/DL (ref 31–36)
MCV RBC AUTO: 97.1 FL (ref 80–100)
METHGB MFR BLDV: 0.5 %
MONOCYTES # BLD: 0.3 K/UL (ref 0–1.3)
MONOCYTES NFR BLD: 4.7 %
NEUTROPHILS # BLD: 4.5 K/UL (ref 1.7–7.7)
NEUTROPHILS NFR BLD: 72 %
NT-PROBNP SERPL-MCNC: ABNORMAL PG/ML (ref 0–449)
O2 CT VFR BLDV CALC: 11 VOL %
O2 THERAPY: ABNORMAL
PCO2 BLDV: 71.6 MMHG (ref 40–50)
PERFORMED ON: ABNORMAL
PH BLDV: 7.23 [PH] (ref 7.35–7.45)
PLATELET # BLD AUTO: 186 K/UL (ref 135–450)
PMV BLD AUTO: 7.8 FL (ref 5–10.5)
PO2 BLDV: 48.4 MMHG (ref 25–40)
POTASSIUM SERPL-SCNC: 4.5 MMOL/L (ref 3.5–5.1)
POTASSIUM SERPL-SCNC: 6.1 MMOL/L (ref 3.5–5.1)
POTASSIUM SERPL-SCNC: 6.3 MMOL/L (ref 3.5–5.1)
PROT SERPL-MCNC: 7.4 G/DL (ref 6.4–8.2)
RBC # BLD AUTO: 3.32 M/UL (ref 4.2–5.9)
SAO2 % BLDV: 72 %
SARS-COV-2 RNA RESP QL NAA+PROBE: NOT DETECTED
SODIUM SERPL-SCNC: 142 MMOL/L (ref 136–145)
TROPONIN, HIGH SENSITIVITY: 135 NG/L (ref 0–22)
TROPONIN, HIGH SENSITIVITY: 143 NG/L (ref 0–22)
WBC # BLD AUTO: 6.2 K/UL (ref 4–11)

## 2024-10-08 PROCEDURE — 2580000003 HC RX 258: Performed by: PHYSICIAN ASSISTANT

## 2024-10-08 PROCEDURE — 83880 ASSAY OF NATRIURETIC PEPTIDE: CPT

## 2024-10-08 PROCEDURE — 6370000000 HC RX 637 (ALT 250 FOR IP): Performed by: INTERNAL MEDICINE

## 2024-10-08 PROCEDURE — 87636 SARSCOV2 & INF A&B AMP PRB: CPT

## 2024-10-08 PROCEDURE — 80053 COMPREHEN METABOLIC PANEL: CPT

## 2024-10-08 PROCEDURE — 71045 X-RAY EXAM CHEST 1 VIEW: CPT

## 2024-10-08 PROCEDURE — 6360000002 HC RX W HCPCS: Performed by: PHYSICIAN ASSISTANT

## 2024-10-08 PROCEDURE — 93005 ELECTROCARDIOGRAM TRACING: CPT | Performed by: EMERGENCY MEDICINE

## 2024-10-08 PROCEDURE — 85025 COMPLETE CBC W/AUTO DIFF WBC: CPT

## 2024-10-08 PROCEDURE — 82803 BLOOD GASES ANY COMBINATION: CPT

## 2024-10-08 PROCEDURE — G0378 HOSPITAL OBSERVATION PER HR: HCPCS

## 2024-10-08 PROCEDURE — 96375 TX/PRO/DX INJ NEW DRUG ADDON: CPT

## 2024-10-08 PROCEDURE — 36415 COLL VENOUS BLD VENIPUNCTURE: CPT

## 2024-10-08 PROCEDURE — 96366 THER/PROPH/DIAG IV INF ADDON: CPT

## 2024-10-08 PROCEDURE — 84132 ASSAY OF SERUM POTASSIUM: CPT

## 2024-10-08 PROCEDURE — 99285 EMERGENCY DEPT VISIT HI MDM: CPT

## 2024-10-08 PROCEDURE — 1200000000 HC SEMI PRIVATE

## 2024-10-08 PROCEDURE — 6360000002 HC RX W HCPCS: Performed by: INTERNAL MEDICINE

## 2024-10-08 PROCEDURE — 94640 AIRWAY INHALATION TREATMENT: CPT

## 2024-10-08 PROCEDURE — 96365 THER/PROPH/DIAG IV INF INIT: CPT

## 2024-10-08 PROCEDURE — 6370000000 HC RX 637 (ALT 250 FOR IP): Performed by: PHYSICIAN ASSISTANT

## 2024-10-08 PROCEDURE — 2700000000 HC OXYGEN THERAPY PER DAY

## 2024-10-08 PROCEDURE — 90935 HEMODIALYSIS ONE EVALUATION: CPT

## 2024-10-08 PROCEDURE — 84484 ASSAY OF TROPONIN QUANT: CPT

## 2024-10-08 RX ORDER — ASPIRIN 81 MG/1
81 TABLET, CHEWABLE ORAL DAILY
Status: DISCONTINUED | OUTPATIENT
Start: 2024-10-09 | End: 2024-10-09 | Stop reason: HOSPADM

## 2024-10-08 RX ORDER — ALBUTEROL SULFATE 0.83 MG/ML
2.5 SOLUTION RESPIRATORY (INHALATION) EVERY 4 HOURS PRN
Status: DISCONTINUED | OUTPATIENT
Start: 2024-10-08 | End: 2024-10-09 | Stop reason: HOSPADM

## 2024-10-08 RX ORDER — TAMSULOSIN HYDROCHLORIDE 0.4 MG/1
0.4 CAPSULE ORAL EVERY MORNING
Status: DISCONTINUED | OUTPATIENT
Start: 2024-10-09 | End: 2024-10-09 | Stop reason: HOSPADM

## 2024-10-08 RX ORDER — ACETAMINOPHEN 325 MG/1
650 TABLET ORAL EVERY 6 HOURS PRN
Status: DISCONTINUED | OUTPATIENT
Start: 2024-10-08 | End: 2024-10-09 | Stop reason: HOSPADM

## 2024-10-08 RX ORDER — SEVELAMER CARBONATE 800 MG/1
1 TABLET, FILM COATED ORAL
COMMUNITY

## 2024-10-08 RX ORDER — ALBUTEROL SULFATE 0.83 MG/ML
5 SOLUTION RESPIRATORY (INHALATION) ONCE
Status: COMPLETED | OUTPATIENT
Start: 2024-10-08 | End: 2024-10-08

## 2024-10-08 RX ORDER — LANOLIN ALCOHOL/MO/W.PET/CERES
6 CREAM (GRAM) TOPICAL NIGHTLY PRN
Status: DISCONTINUED | OUTPATIENT
Start: 2024-10-08 | End: 2024-10-09 | Stop reason: HOSPADM

## 2024-10-08 RX ORDER — NEPHROCAP 1 MG
1 CAP ORAL DAILY
Status: DISCONTINUED | OUTPATIENT
Start: 2024-10-09 | End: 2024-10-09 | Stop reason: HOSPADM

## 2024-10-08 RX ORDER — ATORVASTATIN CALCIUM 10 MG/1
10 TABLET, FILM COATED ORAL NIGHTLY
Status: DISCONTINUED | OUTPATIENT
Start: 2024-10-08 | End: 2024-10-09 | Stop reason: HOSPADM

## 2024-10-08 RX ORDER — ONDANSETRON 4 MG/1
4 TABLET, ORALLY DISINTEGRATING ORAL EVERY 6 HOURS PRN
Status: DISCONTINUED | OUTPATIENT
Start: 2024-10-08 | End: 2024-10-09 | Stop reason: HOSPADM

## 2024-10-08 RX ORDER — IPRATROPIUM BROMIDE AND ALBUTEROL SULFATE 2.5; .5 MG/3ML; MG/3ML
1 SOLUTION RESPIRATORY (INHALATION) ONCE
Status: COMPLETED | OUTPATIENT
Start: 2024-10-08 | End: 2024-10-08

## 2024-10-08 RX ORDER — OXYCODONE HYDROCHLORIDE 5 MG/1
5 TABLET ORAL EVERY 6 HOURS PRN
Status: DISCONTINUED | OUTPATIENT
Start: 2024-10-08 | End: 2024-10-09 | Stop reason: HOSPADM

## 2024-10-08 RX ORDER — PANTOPRAZOLE SODIUM 40 MG/1
40 TABLET, DELAYED RELEASE ORAL
Status: DISCONTINUED | OUTPATIENT
Start: 2024-10-09 | End: 2024-10-09 | Stop reason: HOSPADM

## 2024-10-08 RX ORDER — CALCIUM GLUCONATE 20 MG/ML
1000 INJECTION, SOLUTION INTRAVENOUS ONCE
Status: COMPLETED | OUTPATIENT
Start: 2024-10-08 | End: 2024-10-08

## 2024-10-08 RX ORDER — DOCUSATE SODIUM 100 MG/1
100 CAPSULE, LIQUID FILLED ORAL DAILY
Status: DISCONTINUED | OUTPATIENT
Start: 2024-10-09 | End: 2024-10-09 | Stop reason: HOSPADM

## 2024-10-08 RX ORDER — GABAPENTIN 100 MG/1
100 CAPSULE ORAL 3 TIMES DAILY
Status: DISCONTINUED | OUTPATIENT
Start: 2024-10-08 | End: 2024-10-09 | Stop reason: HOSPADM

## 2024-10-08 RX ORDER — CALCIUM ACETATE 667 MG/1
1 CAPSULE ORAL DAILY
Status: DISCONTINUED | OUTPATIENT
Start: 2024-10-09 | End: 2024-10-09 | Stop reason: HOSPADM

## 2024-10-08 RX ORDER — LORAZEPAM 0.5 MG/1
0.5 TABLET ORAL EVERY 6 HOURS PRN
Status: DISCONTINUED | OUTPATIENT
Start: 2024-10-08 | End: 2024-10-09 | Stop reason: HOSPADM

## 2024-10-08 RX ORDER — POLYETHYLENE GLYCOL 3350 17 G/17G
17 POWDER, FOR SOLUTION ORAL DAILY PRN
Status: DISCONTINUED | OUTPATIENT
Start: 2024-10-08 | End: 2024-10-09 | Stop reason: HOSPADM

## 2024-10-08 RX ORDER — METOPROLOL SUCCINATE 25 MG/1
25 TABLET, EXTENDED RELEASE ORAL DAILY
Status: DISCONTINUED | OUTPATIENT
Start: 2024-10-09 | End: 2024-10-09 | Stop reason: HOSPADM

## 2024-10-08 RX ADMIN — LORAZEPAM 0.5 MG: 0.5 TABLET ORAL at 22:08

## 2024-10-08 RX ADMIN — CALCIUM GLUCONATE 1000 MG: 20 INJECTION, SOLUTION INTRAVENOUS at 15:45

## 2024-10-08 RX ADMIN — WATER 125 MG: 1 INJECTION INTRAMUSCULAR; INTRAVENOUS; SUBCUTANEOUS at 10:47

## 2024-10-08 RX ADMIN — IPRATROPIUM BROMIDE AND ALBUTEROL SULFATE 1 DOSE: .5; 3 SOLUTION RESPIRATORY (INHALATION) at 10:30

## 2024-10-08 RX ADMIN — APIXABAN 2.5 MG: 2.5 TABLET, FILM COATED ORAL at 22:08

## 2024-10-08 RX ADMIN — GABAPENTIN 100 MG: 100 CAPSULE ORAL at 22:08

## 2024-10-08 RX ADMIN — MELATONIN TAB 3 MG 6 MG: 3 TAB at 22:09

## 2024-10-08 RX ADMIN — ALBUTEROL SULFATE 5 MG: 2.5 SOLUTION RESPIRATORY (INHALATION) at 10:30

## 2024-10-08 RX ADMIN — ATORVASTATIN CALCIUM 10 MG: 10 TABLET, FILM COATED ORAL at 22:08

## 2024-10-08 ASSESSMENT — ENCOUNTER SYMPTOMS
DIARRHEA: 0
CHEST TIGHTNESS: 0
NAUSEA: 0
SINUS PAIN: 0
SINUS PRESSURE: 0
ABDOMINAL PAIN: 0
VOMITING: 0
RHINORRHEA: 0
WHEEZING: 1
SHORTNESS OF BREATH: 1
COUGH: 0

## 2024-10-08 ASSESSMENT — LIFESTYLE VARIABLES
HOW MANY STANDARD DRINKS CONTAINING ALCOHOL DO YOU HAVE ON A TYPICAL DAY: 1 OR 2
HOW OFTEN DO YOU HAVE A DRINK CONTAINING ALCOHOL: MONTHLY OR LESS

## 2024-10-08 NOTE — ED PROVIDER NOTES
mis-transcribed.)    Claribel Lutz MD (electronically signed)  Attending Emergency Physician       Claribel Lutz MD  10/08/24 0437    
JENNA SINGER  10/08/24 6250

## 2024-10-08 NOTE — PLAN OF CARE
I had notified HD teams at ~1:30PM and I had placed HD orders then.  HD team were waiting on admissions, I called ER to expedite and informed HD team again,    She will be going to HD soon now.   Confirmed w/ HD and ER team.        Thank you for allowing me to participate in this patient's care. Please do not hesitate to contact me for any questions/concerns. We will follow along with you.     Jesus Roberson MD  [facility]   Phone: [phone] or [employer]  Fax: [phone]

## 2024-10-09 VITALS
WEIGHT: 216 LBS | HEART RATE: 93 BPM | RESPIRATION RATE: 16 BRPM | TEMPERATURE: 97.6 F | BODY MASS INDEX: 28.63 KG/M2 | OXYGEN SATURATION: 98 % | DIASTOLIC BLOOD PRESSURE: 87 MMHG | SYSTOLIC BLOOD PRESSURE: 139 MMHG | HEIGHT: 73 IN

## 2024-10-09 LAB
ALBUMIN SERPL-MCNC: 3.6 G/DL (ref 3.4–5)
ANION GAP SERPL CALCULATED.3IONS-SCNC: 12 MMOL/L (ref 3–16)
BUN SERPL-MCNC: 41 MG/DL (ref 7–20)
CALCIUM SERPL-MCNC: 9.2 MG/DL (ref 8.3–10.6)
CHLORIDE SERPL-SCNC: 101 MMOL/L (ref 99–110)
CO2 SERPL-SCNC: 24 MMOL/L (ref 21–32)
CREAT SERPL-MCNC: 4.8 MG/DL (ref 0.8–1.3)
DEPRECATED RDW RBC AUTO: 15.4 % (ref 12.4–15.4)
GFR SERPLBLD CREATININE-BSD FMLA CKD-EPI: 12 ML/MIN/{1.73_M2}
GLUCOSE SERPL-MCNC: 130 MG/DL (ref 70–99)
HBV SURFACE AB SERPL IA-ACNC: 524 MIU/ML
HBV SURFACE AG SERPL QL IA: NORMAL
HCT VFR BLD AUTO: 29 % (ref 40.5–52.5)
HGB BLD-MCNC: 9.8 G/DL (ref 13.5–17.5)
MCH RBC QN AUTO: 32.2 PG (ref 26–34)
MCHC RBC AUTO-ENTMCNC: 33.6 G/DL (ref 31–36)
MCV RBC AUTO: 95.8 FL (ref 80–100)
PHOSPHATE SERPL-MCNC: 5.6 MG/DL (ref 2.5–4.9)
PLATELET # BLD AUTO: 186 K/UL (ref 135–450)
PMV BLD AUTO: 7.8 FL (ref 5–10.5)
POTASSIUM SERPL-SCNC: 5.2 MMOL/L (ref 3.5–5.1)
RBC # BLD AUTO: 3.03 M/UL (ref 4.2–5.9)
SODIUM SERPL-SCNC: 137 MMOL/L (ref 136–145)
WBC # BLD AUTO: 7.2 K/UL (ref 4–11)

## 2024-10-09 PROCEDURE — 87340 HEPATITIS B SURFACE AG IA: CPT

## 2024-10-09 PROCEDURE — 86706 HEP B SURFACE ANTIBODY: CPT

## 2024-10-09 PROCEDURE — 2500000003 HC RX 250 WO HCPCS: Performed by: INTERNAL MEDICINE

## 2024-10-09 PROCEDURE — 80069 RENAL FUNCTION PANEL: CPT

## 2024-10-09 PROCEDURE — 36415 COLL VENOUS BLD VENIPUNCTURE: CPT

## 2024-10-09 PROCEDURE — 99222 1ST HOSP IP/OBS MODERATE 55: CPT | Performed by: INTERNAL MEDICINE

## 2024-10-09 PROCEDURE — 6370000000 HC RX 637 (ALT 250 FOR IP): Performed by: INTERNAL MEDICINE

## 2024-10-09 PROCEDURE — 85027 COMPLETE CBC AUTOMATED: CPT

## 2024-10-09 PROCEDURE — G0378 HOSPITAL OBSERVATION PER HR: HCPCS

## 2024-10-09 RX ORDER — LORAZEPAM 0.5 MG/1
0.5 TABLET ORAL EVERY 8 HOURS PRN
Qty: 9 TABLET | Refills: 0 | Status: SHIPPED | OUTPATIENT
Start: 2024-10-09 | End: 2024-10-12

## 2024-10-09 RX ORDER — OXYCODONE HYDROCHLORIDE 5 MG/1
5 TABLET ORAL EVERY 6 HOURS PRN
Qty: 12 TABLET | Refills: 0 | Status: SHIPPED | OUTPATIENT
Start: 2024-10-09 | End: 2024-10-12

## 2024-10-09 RX ORDER — ALBUTEROL SULFATE 0.83 MG/ML
2.5 SOLUTION RESPIRATORY (INHALATION) EVERY 4 HOURS PRN
Qty: 120 EACH | Refills: 3 | Status: SHIPPED | OUTPATIENT
Start: 2024-10-09

## 2024-10-09 RX ADMIN — ASPIRIN 81 MG: 81 TABLET, CHEWABLE ORAL at 08:42

## 2024-10-09 RX ADMIN — METOPROLOL SUCCINATE 25 MG: 25 TABLET, EXTENDED RELEASE ORAL at 08:42

## 2024-10-09 RX ADMIN — CALCIUM ACETATE 667 MG: 667 CAPSULE ORAL at 08:42

## 2024-10-09 RX ADMIN — PANTOPRAZOLE SODIUM 40 MG: 40 TABLET, DELAYED RELEASE ORAL at 06:40

## 2024-10-09 RX ADMIN — Medication 1 MG: at 08:42

## 2024-10-09 RX ADMIN — APIXABAN 2.5 MG: 2.5 TABLET, FILM COATED ORAL at 08:42

## 2024-10-09 RX ADMIN — TAMSULOSIN HYDROCHLORIDE 0.4 MG: 0.4 CAPSULE ORAL at 08:42

## 2024-10-09 RX ADMIN — GABAPENTIN 100 MG: 100 CAPSULE ORAL at 08:42

## 2024-10-09 RX ADMIN — DOCUSATE SODIUM 100 MG: 100 CAPSULE, LIQUID FILLED ORAL at 08:42

## 2024-10-09 NOTE — PLAN OF CARE
Problem: Safety - Adult  Goal: Free from fall injury  Outcome: Progressing   Patient has remained free of falls. 2/4 bed rails up, bed locked and in lowest position, call light within reach. Patient instructed on use of call light but does not use appropriately. Bed alarm on. Non-skid footwear and fall band on.       Problem: Skin/Tissue Integrity  Goal: Absence of new skin breakdown  Description: 1.  Monitor for areas of redness and/or skin breakdown  2.  Assess vascular access sites hourly  3.  Every 4-6 hours minimum:  Change oxygen saturation probe site  4.  Every 4-6 hours:  If on nasal continuous positive airway pressure, respiratory therapy assess nares and determine need for appliance change or resting period.  Outcome: Progressing   Patient turned and repositioned every two hours. Barrier cream used on bottom. Sacral heart on buttocks. Heels elevated off of bed. Skin thoroughly assessed.

## 2024-10-09 NOTE — H&[place] [address]                           HISTORY & PHYSICAL      PATIENT NAME: ISABEL DUBOIS                  : 1945  MED REC NO: [id number]                      ROOM: [address] [place] [address]  ACCOUNT NO: [id number]                       ADMIT DATE: 10/08/2024  PROVIDER: Leslie Guallpa MD      HISTORY OF PRESENT ILLNESS:  The patient is a 79-year-old white American gentleman from [facility], came to the emergency room with a history of missing dialysis.  The patient presented with [facility] care facility for feeling shaky and having a low oxygen saturation.  Per EMS, patient was 82% on room air.  The patient was placed on 2 L of nasal cannula oxygen.  The patient was seen by advanced practitioner provider for a potential tremor and shakiness.  He reportedly had low oxygen at 82% prior to EMS.  The patient was 99% on 2 L nasal cannula.  He was taken of the oxygen here and his oxygen saturation remained 99% on room air.  The patient did have some wheezing.  There is no known history of COPD or asthma.  The patient missed his dialysis and is in emergent need of dialysis.  The patient is a full code.    PAST MEDICAL HISTORY:  Pertinent for end-stage renal disease, hypertension, osteoarthritis, bradycardia, cerebral infarction, cognitive communication deficit, type 2 diabetes mellitus, dysphagia, oropharyngeal encephalopathy, gastroesophageal reflux disease, history of colon cancer, history of deep venous thrombosis, hyperlipidemia, hyponatremia, insomnia, long-term oral anticoagulation, peripheral vascular disease, history of COVID, splenomegaly, atrial flutter, unsteadiness of the gait.    PAST SURGICAL HISTORY:  Pertinent for cardiac catheterization, colectomy, sigmoidoscopy, embolization, tunneled venous catheter placement, dialysis fistula creation, upper GI endoscopy, multiple ERCP, colonoscopy, AV fistula

## 2024-10-09 NOTE — PROGRESS NOTES
1449 Discharge report called to Alka at the SNF. Discussed SBAR, pain management, Skin, mobility, diet & Consults. All questions answered.     1530 ambulance service has yet to arrive. Daughter Janeen called and said she will drive him back to the facility.    1633 Daughter here transported patient back to the facility. At the time of discharge patient alert, respirations easy, NAD, patient had no IV access or tubes. Belongings packed and sent with patient. Written discharge instructions was given to Janeen for delivery. Out via w/c.    
4 Eyes Skin Assessment     NAME:  Ben Link  YOB: 1945  MEDICAL RECORD NUMBER:  [id number]    The patient is being assessed for  Admission    I agree that at least one RN has performed a thorough Head to Toe Skin Assessment on the patient. ALL assessment sites listed below have been assessed.      Areas assessed by both nurses:    Head, Face, Ears, Shoulders, Back, Chest, Arms, Elbows, Hands, Sacrum. Buttock, Coccyx, Ischium, Legs. Feet and Heels, and Under Medical Devices         Does the Patient have a Wound? Scattered scabbing and bruising, stage II and stage I to buttocks       Cordell Prevention initiated by RN: Yes  Wound Care Orders initiated by RN: No    Pressure Injury (Stage 3,4, Unstageable, DTI, NWPT, and Complex wounds) if present, place Wound referral order by RN under : No    New Ostomies, if present place, Ostomy referral order under : No     Nurse 1 eSignature: Electronically signed by Oksana Centeno RN on 10/9/24 at 1:29 AM EDT    **SHARE this note so that the co-signing nurse can place an eSignature**    Nurse 2 eSignature: Electronically signed by Cintia Spencer RN on 10/9/24 at 1:31 AM EDT   
Hospital Problems             Last Modified POA    * (Principal) Noncompliance with renal dialysis ([facility]) 10/8/2024 Yes    Hyperkalemia 10/8/2024 Yes    Anemia, chronic disease 10/8/2024 Yes    Chronic diastolic heart failure (HCC) 10/8/2024 Yes    Hypoxemia 10/8/2024 Yes    Essential hypertension 10/8/2024 Yes   H&P dictated     
Patient admitted to unit from dialysis. VSS. Denies pain. No-no sleeve on left arm for dialysis access. Patient oriented to self and time, disoriented to place and situation. Stage 2 and 1 on buttocks, cleansed and dressed. Patient had bowel movement. Incontinent of urine, male purwick in place. Tolerated dinner well. Blood sugar stable. Homemed rec completed via nursing home records. Lung sounds diminished, on 0.5 L oxygen. Tremors present but otherwise no neuro deficits. Abdomen soft and bowel sounds active. AFIB on tele, rate controlled. Patient oriented to room but can be impulsive. Takes pills whole. Daughter update on patient status and plan of care. Fall precautions in place.   
Patient is from [facility]-SNF.    Diet:  Consistent Carbohydrate/No added salt diet of regular texture, thin liquids consistency.  Limit Orange juice, bananas, potatoes, tomatoes and tomato products.  Include ProStat three times daily for supplement.    BP and IV in right arm only.    Code Status:  DNR-CC-Arrest    Grab bar to right side of bed for bed mobility.    Hemodialysis Tues, Thurs, Sat at [facility] [phone].    Hemodialysis order for today and patient being moved to HD unit before admission could be initiated except from the paperwork from [facility].  
Patient's nursing home facility sent DNR-CCA paperwork through fax. Patient currently has orders for full code status. On-call NP notified, awaiting response.   
Perfect serve message to dr. Guallpa per daughter request.  Daughter is here she is expecting discharge today she was hoping to take him to the nursing home before she has to go to work. she has to be there @ 10am.  
Treatment time: 3.5 hrs    Net UF: 1500 ml     Pre weight: 96.2 kg  Post weight: 94.7 kg     Access used: GADIEL AVF  Access function:  tolerated well,  BFR 400ml/min     Medications or blood products given: none     Regular outpatient schedule: TTHS     Summary of response to treatment: Pt tolerated well. Pt remained stable throughout entire treatment and upon exiting the hemodialysis suite.      Copy of dialysis treatment record placed in chart, to be scanned into EMR.      
1 tablet by mouth nightly  [DISCONTINUED] calcium acetate (PHOSLO) 667 MG CAPS capsule, Take 1 capsule by mouth daily (Patient not taking: Reported on 10/8/2024)  [DISCONTINUED] oxyCODONE (ROXICODONE) 5 MG immediate release tablet, Take 1 tablet by mouth every 6 hours as needed for Pain.  [DISCONTINUED] LORazepam (ATIVAN) 0.5 MG tablet, Take 1 tablet by mouth at bedtime.     Inpatient Medications:  Scheduled Meds:   apixaban  2.5 mg Oral BID    aspirin  81 mg Oral Daily    atorvastatin  10 mg Oral Nightly    Virt-Caps  1 capsule Oral Daily    calcium acetate  1 capsule Oral Daily    docusate sodium  100 mg Oral Daily    gabapentin  100 mg Oral TID    metoprolol succinate  25 mg Oral Daily    pantoprazole  40 mg Oral QAM AC    tamsulosin  0.4 mg Oral QAM     Continuous Infusions:  PRN Meds:.albuterol, acetaminophen, LORazepam, melatonin, ondansetron, oxyCODONE, polyethylene glycol         PHYSICAL EXAM: as needed for my evaluation.   Patient Vitals for the past 24 hrs:   BP Temp Temp src Pulse Resp SpO2 Weight   10/09/24 0830 139/87 97.6 °F (36.4 °C) Oral 93 16 98 % --   10/09/24 0700 -- -- -- -- -- -- 98 kg (216 lb)   10/09/24 0446 125/72 98.2 °F (36.8 °C) Oral 92 16 95 % --   10/08/24 2334 (!) 116/57 97.5 °F (36.4 °C) Oral 80 16 98 % --   10/08/24 2101 134/76 97.8 °F (36.6 °C) Oral 90 16 93 % --   10/08/24 2025 120/60 98.3 °F (36.8 °C) -- 90 18 -- 94.7 kg (208 lb 12.4 oz)   10/08/24 1645 (!) 152/89 98.6 °F (37 °C) -- (!) 117 18 -- 96.2 kg (212 lb 1.3 oz)       Intake/Output Summary (Last 24 hours) at 10/9/2024 1436  Last data filed at 10/9/2024 0503  Gross per 24 hour   Intake 360 ml   Output 400 ml   Net -40 ml         PHYSICAL EXAM: as needed for my evaluation.   General: No acute distress   CVS:  Heart sounds S1 S2 +     RS: Normal respiratory effort, Breat sound: diminished at bases.     Abd: bowel sounds +,  distension .   Extremities/MSK:  Edema            DATA:  Diagnostic tests reviewed for today's visit,

## 2024-10-09 NOTE — DISCHARGE INSTR - COC
Continuity of Care Form    Patient Name: Ben Link   :  1945  MRN:  [id number]    Admit date:  10/8/2024  Discharge date:  10/9/2024    Code Status Order: Full Code   Advance Directives:   Advance Care Flowsheet Documentation             Admitting Physician:  Leslie Guallpa MD  PCP: Alicia Cardenas MD    Discharging Nurse: [facility] Unit[facility] 3AN-3325/3325-01  Discharging Unit Phone Number: [phone]    Emergency Contact:   Extended Emergency Contact Information  Primary Emergency Contact: Kerline Link  Address: [address] [place] [address]  Home Phone: [phone]  Mobile Phone: [phone]  Relation: Spouse  Secondary Emergency Contact: Janeen Linares  Home Phone: [phone]  Mobile Phone: [phone]  Relation: Child   needed? No    Past Surgical History:  Past Surgical History:   Procedure Laterality Date    CARDIAC CATHETERIZATION  2019    CHOLECYSTECTOMY, LAPAROSCOPIC N/A 2022    LAPAROSCOPIC CHOLECYSTECTOMY WITH CHOLANGIOGRAM performed by Marco Vera MD at [facility] OR    COLECTOMY N/A 2022    SIGMOID COLECTOMY, SIGMOIDOSCOPY performed by Marco Vera MD at [facility] OR    COLONOSCOPY      DIALYSIS FISTULA CREATION Left 2022    LEFT BRACHIAL CEPHALIC FISTULA performed by Frankie Clements MD at [facility] OR    ERCP N/A 2022    ERCP SPHINCTER/PAPILLOTOMY performed by Tristen Frank MD at [facility] ENDOSCOPY    ERCP N/A 2022    ERCP STONE REMOVAL/BALLOON SWEEP performed by Tristen Frank MD at [facility] ENDOSCOPY    IR EMBOLIZATION HEMORRHAGE  2022    IR EMBOLIZATION HEMORRHAGE 2022 [facility] SPECIAL PROCEDURES    IR NONTUNNELED VASCULAR CATHETER Right 11/15/2023    Dr. Lemon. Non-Tunneled VasCath 20cm    IR NONTUNNELED VASCULAR CATHETER  11/15/2023    IR NONTUNNELED VASCULAR CATHETER 11/15/2023 [facility] SPECIAL PROCEDURES    IR PERC ARTERIOVENOUS FISTULA CREATION Left     unsure of

## 2024-10-09 NOTE — CARE COORDINATION
Case Management -  Discharge Note      Patient Name: Ben Link                   YOB: 1945            Readmission Risk (Low < 19, Mod (19-27), High > 27): Readmission Risk Score: 20    Current PCP: Alicia Cardenas MD      (IMM) Important Message from Medicare:    Has pt received appropriate IMM before discharge if required: N/A  Date: OBS status    PT AM-PAC:   /24  OT AM-PAC:   /24    Patient/patient representative has been educated on the benefits of SNF as well as the possible risks of declining recommended services. Patient/patient representative has acknowledged the information provided and decided on the following discharge plan. Patient/ patient representative has been provided freedom of choice regarding service provider, supported by basic dialogue that supports the patient's individualized plan of care/goals.    [facility]  [address]  [facility] [address]  Phone: [phone]  Fax: [phone]     Financial    Payor: HU[employer]NA MEDICARE / Plan: HU[employer]NA CHOICE-PPO MEDICARE / Product Type: *No Product type* /     Pharmacy:  Potential assistance Purchasing Medications:    Meds-to-Beds request: No      [id number] - [place], [place] - [address] - P [phone] - F [phone]  [address] [facility] [address]  Phone: [phone] Fax: [phone]    [facility] [address] -  [phone] - F [phone]  [address] [place] [address]  Phone: [phone] Fax: [phone]      Notes:    Additional Case Management Notes: PATRICK spoke with patient daughter Ping to confirm patient's return to [facility]. PATRICK spoke with Abi at [facility] and patient can return, no pre-cert needed. PATRICK arranged transport with [facility] at 3PM.    Electronically signed by HOLLI Holguin on 10/9/24 at 12:18 PM EDT

## 2024-10-09 NOTE — FLOWSHEET NOTE
10/09/24 0830   Vital Signs   Temp 97.6 °F (36.4 °C)   Temp Source Oral   Pulse 93   Heart Rate Source Monitor   Respirations 16   /87   MAP (Calculated) 104   Pain Assessment   Pain Assessment None - Denies Pain   Oxygen Therapy   SpO2 98 %   O2 Device None (Room air)   Rhythm Interpretation   Cardiac Rhythm Atrial fib       Lab Results   Component Value Date/Time    WBC 7.2 10/09/2024 06:17 AM    HGB 9.8 (L) 10/09/2024 06:17 AM    HCT 29.0 (L) 10/09/2024 06:17 AM     10/09/2024 06:17 AM     10/09/2024 06:17 AM    K 5.2 (H) 10/09/2024 06:17 AM    K 4.4 12/04/2023 02:00 AM    BUN 41 (H) 10/09/2024 06:17 AM    CREATININE 4.8 (H) 10/09/2024 06:17 AM    MG 2.00 11/14/2023 11:46 PM        AM Assessment completed.  Patient in bed.  Awake, Alert and oriented. Respirations easy unlabored.    Daughter at bedside. Brought patient [employer].  Plan of care, education and safety measures reviewed and mutually agreed upon with the patient.   Calls appropriately. Needed items including call light in reach and exit alarm in place. Avasys in room for safety.

## 2024-10-09 NOTE — CONSULTS
MD Jesus Matthews MD Aldo Estella, DO              Office: [phone]                      Fax: [phone]          NEPHROLOGY INITIAL CONSULT NOTE:     PATIENT NAME: Ben Link  : 1945  MRN: [id number]  REASON FOR CONSULT: I am asked to see this patient in consultation for my opinion regarding management of ESRD. My recommendations will be communicated by way of shared medical record.      IMPRESSION / RECOMMENDATION:      Admitted on:  10/8/2024  For:        1. ESRD on iHD: TThS.   - [facility]Dr. Stewart  - Access:  Hemodialysis-AV Fistula-Standard, Left Upper Arm, Brachial Artery to Cephalic Vein  Access Placed on 2022    - last HD outpt 10-5 -Saturday   - next HD today ASAP    -I have requested HD team and HD orders been placed  - Will try to expedite admission that is requested for inpatient dialysis per HD team.      2. Hypertension/Volume Status:  - BP hypertension okay control continue current plan:   - Na wnl   - EDW 95 kg : Slightly above on admission  - fluid removal to DW     3. Electrolytes/acid-base:  K: Hyperkalemia - 6s on admission source hemolyzed repeat still high  Give IV calcium  Urgently added on for dialysis  Hold temporary low rising K agent, is arranging for dialysis urgently  Low K diet  Avoid RAAS blockade now  Follow-up recheck after dialysis and in morning    4. Anemia of renal failure: at goal  - Iron: /or- ELENA:   Not needed currently    5. BMD:  - Phos, calcium - follow in morning labs-      - follow iPTH outpt      : Other supportive care :   - Check daily renal function panel with electrolytes-phosphorus  - Strict monitoring of I/Os, daily weight  - Renal feeds/diet  - Current medications reviewed.    - Nephrotoxic medications have been discontinued.    - Dose adjusted and appropriate.      - Dose meds for eGFR <15 mL/min/1.73m2.    - Avoid heavy opioids due to renal failure - may 
TAPSE is 1.89 cm.      Signature      ------------------------------------------------------------------   Electronically signed by Adilson Haddad MD (Interpreting   physician) on 05/08/2023 at 01:52 PM   ------------------------------------------------------------------      Findings      Left Ventricle   Left ventricular cavity size is normal. There is moderately increased left   ventricular wall thickness. Ejection fraction is visually estimated to be   65-70 %. No regional wall motion abnormalities are noted.   Grade III diastolic dysfunction with elevated LV filling pressures.      Mitral Valve   Mildly thickened mitral valve leaflets. Mild mitral annular calcification.   No mitral stenosis. Trace mitral regurgitation.      Left Atrium   The left atrium is moderate to severely dilated.      Aortic Valve   Trileaflet aortic valve with focal thickening of the aortic valve cusps. No   aortic stenosis. Trace aortic regurgitation.      Aorta   The aortic root is normal in size. Normal size proximal ascending aorta.      Right Ventricle   The right ventricle is normal in size and function. TAPSE is 1.89 cm.      Tricuspid Valve   Tricuspid valve is structurally normal.   No evidence of tricuspid stenosis.   Trivial tricuspid regurgitation.      Right Atrium   The right atrium is mildly dilated.      Pulmonic Valve   The pulmonic valve is structurally normal.   No evidence of pulmonic valve stenosis.   No evidence of pulmonic valve regurgitation.      Pericardial Effusion   No pericardial effusion noted.      Pleural Effusion   No pleural effusion.      Miscellaneous   IVC not well visualized.   Unable to estimate pulmonary artery pressure secondary to incomplete TR jet   envelope.     M-Mode/2D Measurements (cm)      LV Diastolic Dimension: 4.63 cm LV Systolic Dimension: 2.41 cm   LV Septum Diastolic: 1.55 cm   LV PW Diastolic: 1.42 cm        AO Root Dimension: 3.7 cm   RV Diastolic Dimension: 4 cm    LA Dimension:

## 2024-10-10 LAB
EKG DIAGNOSIS: NORMAL
EKG Q-T INTERVAL: 330 MS
EKG QRS DURATION: 86 MS
EKG QTC CALCULATION (BAZETT): 410 MS
EKG R AXIS: -25 DEGREES
EKG T AXIS: 16 DEGREES
EKG VENTRICULAR RATE: 93 BPM

## 2024-10-10 PROCEDURE — 93010 ELECTROCARDIOGRAM REPORT: CPT | Performed by: INTERNAL MEDICINE

## 2024-10-30 ENCOUNTER — HOSPITAL ENCOUNTER (INPATIENT)
Age: 79
LOS: 7 days | Discharge: HOME | DRG: 393 | End: 2024-11-06
Attending: EMERGENCY MEDICINE | Admitting: INTERNAL MEDICINE
Payer: MEDICARE

## 2024-10-30 ENCOUNTER — APPOINTMENT (OUTPATIENT)
Dept: CT IMAGING | Age: 79
DRG: 393 | End: 2024-10-30
Payer: MEDICARE

## 2024-10-30 DIAGNOSIS — K62.5 RECTAL BLEEDING: Primary | ICD-10-CM

## 2024-10-30 DIAGNOSIS — Z99.2 HEMODIALYSIS PATIENT (HCC): ICD-10-CM

## 2024-10-30 DIAGNOSIS — D64.9 ANEMIA, UNSPECIFIED TYPE: ICD-10-CM

## 2024-10-30 DIAGNOSIS — Z79.01 CHRONIC ANTICOAGULATION: ICD-10-CM

## 2024-10-30 DIAGNOSIS — I73.9 PVD (PERIPHERAL VASCULAR DISEASE) (HCC): ICD-10-CM

## 2024-10-30 DIAGNOSIS — Z85.038 HISTORY OF COLON CANCER: ICD-10-CM

## 2024-10-30 PROBLEM — K92.2 LOWER GI HEMORRHAGE: Status: ACTIVE | Noted: 2024-10-30

## 2024-10-30 PROBLEM — N39.0 UTI (URINARY TRACT INFECTION): Status: ACTIVE | Noted: 2024-10-30

## 2024-10-30 PROBLEM — F03.90 DEMENTIA (HCC): Status: ACTIVE | Noted: 2024-10-30

## 2024-10-30 LAB
ABO + RH BLD: NORMAL
ALBUMIN SERPL-MCNC: 3.5 G/DL (ref 3.4–5)
ALBUMIN/GLOB SERPL: 1.2 {RATIO} (ref 1.1–2.2)
ALP SERPL-CCNC: 83 U/L (ref 40–129)
ALT SERPL-CCNC: 12 U/L (ref 10–40)
ANION GAP SERPL CALCULATED.3IONS-SCNC: 9 MMOL/L (ref 3–16)
APTT BLD: 31.9 SEC (ref 22.1–36.4)
AST SERPL-CCNC: 15 U/L (ref 15–37)
BASOPHILS # BLD: 0 K/UL (ref 0–0.2)
BASOPHILS NFR BLD: 0.6 %
BILIRUB SERPL-MCNC: 0.4 MG/DL (ref 0–1)
BLD GP AB SCN SERPL QL: NORMAL
BUN SERPL-MCNC: 38 MG/DL (ref 7–20)
CALCIUM SERPL-MCNC: 8.6 MG/DL (ref 8.3–10.6)
CHLORIDE SERPL-SCNC: 98 MMOL/L (ref 99–110)
CO2 SERPL-SCNC: 32 MMOL/L (ref 21–32)
CREAT SERPL-MCNC: 4.7 MG/DL (ref 0.8–1.3)
DEPRECATED RDW RBC AUTO: 14.7 % (ref 12.4–15.4)
EOSINOPHIL # BLD: 0.2 K/UL (ref 0–0.6)
EOSINOPHIL NFR BLD: 3.5 %
GFR SERPLBLD CREATININE-BSD FMLA CKD-EPI: 12 ML/MIN/{1.73_M2}
GLUCOSE SERPL-MCNC: 149 MG/DL (ref 70–99)
HCT VFR BLD AUTO: 25.8 % (ref 40.5–52.5)
HEMOCCULT STL QL: ABNORMAL
HGB BLD-MCNC: 8.4 G/DL (ref 13.5–17.5)
INR PPP: 1.27 (ref 0.85–1.15)
LIPASE SERPL-CCNC: 28 U/L (ref 13–60)
LYMPHOCYTES # BLD: 1.1 K/UL (ref 1–5.1)
LYMPHOCYTES NFR BLD: 17.8 %
MCH RBC QN AUTO: 31.5 PG (ref 26–34)
MCHC RBC AUTO-ENTMCNC: 32.7 G/DL (ref 31–36)
MCV RBC AUTO: 96.3 FL (ref 80–100)
MONOCYTES # BLD: 0.4 K/UL (ref 0–1.3)
MONOCYTES NFR BLD: 6.4 %
NEUTROPHILS # BLD: 4.3 K/UL (ref 1.7–7.7)
NEUTROPHILS NFR BLD: 71.7 %
PLATELET # BLD AUTO: 180 K/UL (ref 135–450)
PMV BLD AUTO: 8 FL (ref 5–10.5)
POTASSIUM SERPL-SCNC: 4.6 MMOL/L (ref 3.5–5.1)
PROT SERPL-MCNC: 6.5 G/DL (ref 6.4–8.2)
PROTHROMBIN TIME: 16.1 SEC (ref 11.9–14.9)
RBC # BLD AUTO: 2.67 M/UL (ref 4.2–5.9)
SODIUM SERPL-SCNC: 139 MMOL/L (ref 136–145)
WBC # BLD AUTO: 6.1 K/UL (ref 4–11)

## 2024-10-30 PROCEDURE — 86901 BLOOD TYPING SEROLOGIC RH(D): CPT

## 2024-10-30 PROCEDURE — 86900 BLOOD TYPING SEROLOGIC ABO: CPT

## 2024-10-30 PROCEDURE — 99285 EMERGENCY DEPT VISIT HI MDM: CPT

## 2024-10-30 PROCEDURE — 2700000000 HC OXYGEN THERAPY PER DAY

## 2024-10-30 PROCEDURE — 83690 ASSAY OF LIPASE: CPT

## 2024-10-30 PROCEDURE — 85730 THROMBOPLASTIN TIME PARTIAL: CPT

## 2024-10-30 PROCEDURE — 6370000000 HC RX 637 (ALT 250 FOR IP): Performed by: INTERNAL MEDICINE

## 2024-10-30 PROCEDURE — 74176 CT ABD & PELVIS W/O CONTRAST: CPT

## 2024-10-30 PROCEDURE — 2580000003 HC RX 258: Performed by: INTERNAL MEDICINE

## 2024-10-30 PROCEDURE — 94760 N-INVAS EAR/PLS OXIMETRY 1: CPT

## 2024-10-30 PROCEDURE — 1200000000 HC SEMI PRIVATE

## 2024-10-30 PROCEDURE — 82270 OCCULT BLOOD FECES: CPT

## 2024-10-30 PROCEDURE — 6360000002 HC RX W HCPCS: Performed by: INTERNAL MEDICINE

## 2024-10-30 PROCEDURE — 85025 COMPLETE CBC W/AUTO DIFF WBC: CPT

## 2024-10-30 PROCEDURE — 80053 COMPREHEN METABOLIC PANEL: CPT

## 2024-10-30 PROCEDURE — 86850 RBC ANTIBODY SCREEN: CPT

## 2024-10-30 PROCEDURE — 85610 PROTHROMBIN TIME: CPT

## 2024-10-30 RX ORDER — ONDANSETRON 4 MG/1
4 TABLET, ORALLY DISINTEGRATING ORAL EVERY 6 HOURS PRN
Status: DISCONTINUED | OUTPATIENT
Start: 2024-10-30 | End: 2024-11-06 | Stop reason: HOSPADM

## 2024-10-30 RX ORDER — TAMSULOSIN HYDROCHLORIDE 0.4 MG/1
0.4 CAPSULE ORAL EVERY MORNING
Status: DISCONTINUED | OUTPATIENT
Start: 2024-10-31 | End: 2024-11-06 | Stop reason: HOSPADM

## 2024-10-30 RX ORDER — POLYETHYLENE GLYCOL 3350 17 G/17G
17 POWDER, FOR SOLUTION ORAL DAILY PRN
Status: DISCONTINUED | OUTPATIENT
Start: 2024-10-30 | End: 2024-11-06 | Stop reason: HOSPADM

## 2024-10-30 RX ORDER — ATORVASTATIN CALCIUM 10 MG/1
10 TABLET, FILM COATED ORAL NIGHTLY
Status: DISCONTINUED | OUTPATIENT
Start: 2024-10-30 | End: 2024-11-06 | Stop reason: HOSPADM

## 2024-10-30 RX ORDER — OXYCODONE HYDROCHLORIDE 5 MG/1
5 TABLET ORAL EVERY 6 HOURS PRN
Status: DISCONTINUED | OUTPATIENT
Start: 2024-10-30 | End: 2024-11-06 | Stop reason: HOSPADM

## 2024-10-30 RX ORDER — LORAZEPAM 0.5 MG/1
0.5 TABLET ORAL NIGHTLY
COMMUNITY

## 2024-10-30 RX ORDER — M-VIT,TX,IRON,MINS/CALC/FOLIC 27MG-0.4MG
1 TABLET ORAL DAILY
Status: DISCONTINUED | OUTPATIENT
Start: 2024-10-31 | End: 2024-11-06 | Stop reason: HOSPADM

## 2024-10-30 RX ORDER — METOPROLOL SUCCINATE 25 MG/1
25 TABLET, EXTENDED RELEASE ORAL DAILY
Status: DISCONTINUED | OUTPATIENT
Start: 2024-10-31 | End: 2024-11-06 | Stop reason: HOSPADM

## 2024-10-30 RX ORDER — ACETAMINOPHEN 325 MG/1
650 TABLET ORAL EVERY 6 HOURS PRN
Status: DISCONTINUED | OUTPATIENT
Start: 2024-10-30 | End: 2024-11-06 | Stop reason: HOSPADM

## 2024-10-30 RX ORDER — SODIUM CHLORIDE, SODIUM LACTATE, POTASSIUM CHLORIDE, CALCIUM CHLORIDE 600; 310; 30; 20 MG/100ML; MG/100ML; MG/100ML; MG/100ML
INJECTION, SOLUTION INTRAVENOUS CONTINUOUS
Status: DISCONTINUED | OUTPATIENT
Start: 2024-10-30 | End: 2024-10-30 | Stop reason: RX

## 2024-10-30 RX ORDER — LORAZEPAM 0.5 MG/1
0.5 TABLET ORAL NIGHTLY
Status: DISCONTINUED | OUTPATIENT
Start: 2024-10-30 | End: 2024-11-06 | Stop reason: HOSPADM

## 2024-10-30 RX ORDER — SEVELAMER CARBONATE 800 MG/1
800 TABLET, FILM COATED ORAL
Status: DISCONTINUED | OUTPATIENT
Start: 2024-10-31 | End: 2024-11-06 | Stop reason: HOSPADM

## 2024-10-30 RX ORDER — PANTOPRAZOLE SODIUM 40 MG/1
40 TABLET, DELAYED RELEASE ORAL DAILY
Status: DISCONTINUED | OUTPATIENT
Start: 2024-10-31 | End: 2024-11-06 | Stop reason: HOSPADM

## 2024-10-30 RX ORDER — ALBUTEROL SULFATE 0.83 MG/ML
2.5 SOLUTION RESPIRATORY (INHALATION) EVERY 4 HOURS PRN
Status: DISCONTINUED | OUTPATIENT
Start: 2024-10-30 | End: 2024-11-06 | Stop reason: HOSPADM

## 2024-10-30 RX ORDER — ASPIRIN 81 MG/1
81 TABLET, CHEWABLE ORAL DAILY
Status: DISCONTINUED | OUTPATIENT
Start: 2024-10-31 | End: 2024-11-06 | Stop reason: HOSPADM

## 2024-10-30 RX ORDER — DOCUSATE SODIUM 100 MG/1
200 CAPSULE, LIQUID FILLED ORAL DAILY
Status: DISCONTINUED | OUTPATIENT
Start: 2024-10-31 | End: 2024-11-06 | Stop reason: HOSPADM

## 2024-10-30 RX ORDER — LANOLIN ALCOHOL/MO/W.PET/CERES
6 CREAM (GRAM) TOPICAL NIGHTLY PRN
Status: DISCONTINUED | OUTPATIENT
Start: 2024-10-30 | End: 2024-11-06 | Stop reason: HOSPADM

## 2024-10-30 RX ORDER — GABAPENTIN 100 MG/1
100 CAPSULE ORAL 3 TIMES DAILY
Status: DISCONTINUED | OUTPATIENT
Start: 2024-10-30 | End: 2024-11-06 | Stop reason: HOSPADM

## 2024-10-30 RX ORDER — OXYCODONE HYDROCHLORIDE 5 MG/1
5 TABLET ORAL EVERY 6 HOURS PRN
Status: ON HOLD | COMMUNITY
End: 2024-11-06

## 2024-10-30 RX ORDER — SODIUM CHLORIDE 9 MG/ML
INJECTION, SOLUTION INTRAVENOUS CONTINUOUS
Status: DISCONTINUED | OUTPATIENT
Start: 2024-10-30 | End: 2024-10-31

## 2024-10-30 RX ADMIN — SODIUM CHLORIDE: 9 INJECTION, SOLUTION INTRAVENOUS at 17:57

## 2024-10-30 RX ADMIN — ATORVASTATIN CALCIUM 10 MG: 10 TABLET, FILM COATED ORAL at 22:22

## 2024-10-30 RX ADMIN — WATER 1000 MG: 1 INJECTION INTRAMUSCULAR; INTRAVENOUS; SUBCUTANEOUS at 22:22

## 2024-10-30 RX ADMIN — LORAZEPAM 0.5 MG: 0.5 TABLET ORAL at 22:22

## 2024-10-30 RX ADMIN — GABAPENTIN 100 MG: 100 CAPSULE ORAL at 22:22

## 2024-10-30 ASSESSMENT — PAIN - FUNCTIONAL ASSESSMENT: PAIN_FUNCTIONAL_ASSESSMENT: NONE - DENIES PAIN

## 2024-10-30 NOTE — ED PROVIDER NOTES
YEARS 2/21/2022 [facility] SPECIAL PROCEDURES    IR TUNNELED CATHETER PLACEMENT GREATER THAN 5 YEARS Right 12/28/2022    Permacath; RIJ access; 19cm; Dr. Merino    IR TUNNELED CATHETER PLACEMENT GREATER THAN 5 YEARS  12/28/2022    IR TUNNELED CATHETER PLACEMENT GREATER THAN 5 YEARS 12/28/2022 [facility] SPECIAL PROCEDURES    SIGMOIDOSCOPY N/A 02/17/2022    SIGMOIDOSCOPY CONTROL HEMORRHAGE performed by Oziel Tavares MD at [facility] ENDOSCOPY    TUNNELED VENOUS CATHETER PLACEMENT Right 02/21/2022    Permacath; RIJ access; 23cm; Dr. Baca    UPPER GASTROINTESTINAL ENDOSCOPY  05/16/2022    ERCP POLYP SNARE performed by Tristen Frank MD at [facility] ENDOSCOPY     MEDICATIONS:  No current facility-administered medications on file prior to encounter.     Current Outpatient Medications on File Prior to Encounter   Medication Sig Dispense Refill    albuterol (PROVENTIL) (2.5 MG/3ML) 0.083% nebulizer solution Take 3 mLs by nebulization every 4 hours as needed for Wheezing 120 each 3    sevelamer (RENVELA) 800 MG tablet Take 1 tablet by mouth 3 times daily (with meals)      acetaminophen (TYLENOL) 325 MG tablet Take 2 tablets by mouth every 6 hours as needed for Pain      apixaban (ELIQUIS) 2.5 MG TABS tablet Take 1 tablet by mouth 2 times daily      metoprolol succinate (TOPROL XL) 25 MG extended release tablet Take 1 tablet by mouth daily 90 tablet 3    ondansetron (ZOFRAN) 4 MG tablet Take 1 tablet by mouth every 6 hours as needed      B Complex-C-E-Zn (STRESS PLUS ZINC) TABS Take 1 tablet by mouth daily      docusate sodium (COLACE) 100 MG capsule Take 1 capsule by mouth daily      polyethylene glycol (GLYCOLAX) 17 GM/SCOOP powder Take 17 g by mouth daily as needed (Constipation)      aspirin 81 MG chewable tablet Take 1 tablet by mouth daily 30 tablet 0    melatonin 3 MG TABS tablet Take 2 tablets by mouth nightly as needed (sleep) 6 tablet 0    tamsulosin (FLOMAX) 0.4 MG capsule Take 1 capsule by mouth every morning      gabapentin

## 2024-10-30 NOTE — ED NOTES
How does patient ambulate?   []Low Fall Risk (ambulates by themselves without support)  []Stand by assist   []Contact Guard   []Front wheel walker  []Wheelchair   []Steady  []Bed bound  []History of Lower Extremity Amputation  [x]Unknown, did not assess in the emergency department   How does patient take pills?  []Whole with Water  []Crushed in applesauce  []Crushed in pudding  []Other  [x]Unknown no oral medications were given in the ED  Is patient alert?   [x]Alert  []Drowsy but responds to voice  []Doesn't respond to voice but responds to painful stimuli  []Unresponsive  Is patient oriented?   []To person  []To place  []To time  []To situation  [x]Confused  []Agitated  []Follows commands  If patient is disoriented or from a Skill Nursing Facility has family been notified of admission?   [x]Yes   []No  Patient belongings?   []Cell phone  []Wallet   []Dentures  [x]Clothing  Any specific patient or family belongings/needs/dynamics?     Miscellaneous comments/pending orders?      If there are any additional questions please reach out to the Emergency Department.

## 2024-10-31 LAB
ANION GAP SERPL CALCULATED.3IONS-SCNC: 12 MMOL/L (ref 3–16)
BUN SERPL-MCNC: 46 MG/DL (ref 7–20)
CALCIUM SERPL-MCNC: 8.6 MG/DL (ref 8.3–10.6)
CHLORIDE SERPL-SCNC: 100 MMOL/L (ref 99–110)
CO2 SERPL-SCNC: 29 MMOL/L (ref 21–32)
CREAT SERPL-MCNC: 5.5 MG/DL (ref 0.8–1.3)
GFR SERPLBLD CREATININE-BSD FMLA CKD-EPI: 10 ML/MIN/{1.73_M2}
GLUCOSE SERPL-MCNC: 130 MG/DL (ref 70–99)
HCT VFR BLD AUTO: 24.6 % (ref 40.5–52.5)
HCT VFR BLD AUTO: 24.9 % (ref 40.5–52.5)
HGB BLD-MCNC: 8.2 G/DL (ref 13.5–17.5)
HGB BLD-MCNC: 8.2 G/DL (ref 13.5–17.5)
POTASSIUM SERPL-SCNC: 4.6 MMOL/L (ref 3.5–5.1)
SODIUM SERPL-SCNC: 141 MMOL/L (ref 136–145)

## 2024-10-31 PROCEDURE — 6370000000 HC RX 637 (ALT 250 FOR IP): Performed by: PHYSICIAN ASSISTANT

## 2024-10-31 PROCEDURE — 6360000002 HC RX W HCPCS: Performed by: INTERNAL MEDICINE

## 2024-10-31 PROCEDURE — 90935 HEMODIALYSIS ONE EVALUATION: CPT

## 2024-10-31 PROCEDURE — 5A1D70Z PERFORMANCE OF URINARY FILTRATION, INTERMITTENT, LESS THAN 6 HOURS PER DAY: ICD-10-PCS | Performed by: INTERNAL MEDICINE

## 2024-10-31 PROCEDURE — 80048 BASIC METABOLIC PNL TOTAL CA: CPT

## 2024-10-31 PROCEDURE — 36415 COLL VENOUS BLD VENIPUNCTURE: CPT

## 2024-10-31 PROCEDURE — 85018 HEMOGLOBIN: CPT

## 2024-10-31 PROCEDURE — 85014 HEMATOCRIT: CPT

## 2024-10-31 PROCEDURE — 2580000003 HC RX 258: Performed by: INTERNAL MEDICINE

## 2024-10-31 PROCEDURE — 1200000000 HC SEMI PRIVATE

## 2024-10-31 PROCEDURE — 90945 DIALYSIS ONE EVALUATION: CPT

## 2024-10-31 PROCEDURE — 6370000000 HC RX 637 (ALT 250 FOR IP): Performed by: INTERNAL MEDICINE

## 2024-10-31 RX ORDER — PEG-3350, SODIUM SULFATE, SODIUM CHLORIDE, POTASSIUM CHLORIDE, SODIUM ASCORBATE AND ASCORBIC ACID 7.5-2.691G
100 KIT ORAL ONCE
Status: COMPLETED | OUTPATIENT
Start: 2024-10-31 | End: 2024-10-31

## 2024-10-31 RX ADMIN — METOPROLOL SUCCINATE 25 MG: 25 TABLET, EXTENDED RELEASE ORAL at 08:28

## 2024-10-31 RX ADMIN — Medication 1 TABLET: at 08:28

## 2024-10-31 RX ADMIN — PANTOPRAZOLE SODIUM 40 MG: 40 TABLET, DELAYED RELEASE ORAL at 05:43

## 2024-10-31 RX ADMIN — PEG-3350, SODIUM SULFATE, SODIUM CHLORIDE, POTASSIUM CHLORIDE, SODIUM ASCORBATE AND ASCORBIC ACID 100 G: KIT at 17:59

## 2024-10-31 RX ADMIN — GABAPENTIN 100 MG: 100 CAPSULE ORAL at 08:28

## 2024-10-31 RX ADMIN — EPOETIN ALFA-EPBX 10000 UNITS: 10000 INJECTION, SOLUTION INTRAVENOUS; SUBCUTANEOUS at 15:18

## 2024-10-31 RX ADMIN — PEG-3350, SODIUM SULFATE, SODIUM CHLORIDE, POTASSIUM CHLORIDE, SODIUM ASCORBATE AND ASCORBIC ACID 100 G: KIT at 20:54

## 2024-10-31 RX ADMIN — WATER 1000 MG: 1 INJECTION INTRAMUSCULAR; INTRAVENOUS; SUBCUTANEOUS at 21:50

## 2024-10-31 RX ADMIN — SEVELAMER CARBONATE 800 MG: 800 TABLET, FILM COATED ORAL at 08:27

## 2024-10-31 RX ADMIN — LORAZEPAM 0.5 MG: 0.5 TABLET ORAL at 20:53

## 2024-10-31 RX ADMIN — DOCUSATE SODIUM 200 MG: 100 CAPSULE, LIQUID FILLED ORAL at 08:28

## 2024-10-31 RX ADMIN — TAMSULOSIN HYDROCHLORIDE 0.4 MG: 0.4 CAPSULE ORAL at 08:28

## 2024-10-31 RX ADMIN — GABAPENTIN 100 MG: 100 CAPSULE ORAL at 20:53

## 2024-10-31 RX ADMIN — ATORVASTATIN CALCIUM 10 MG: 10 TABLET, FILM COATED ORAL at 20:53

## 2024-10-31 ASSESSMENT — PAIN SCALES - GENERAL
PAINLEVEL_OUTOF10: 0
PAINLEVEL_OUTOF10: 0

## 2024-10-31 NOTE — RT PROTOCOL NOTE
RT Nebulizer Bronchodilator Protocol Note    There is a bronchodilator order in the chart from a provider indicating to follow the RT Bronchodilator Protocol and there is an “Initiate RT Bronchodilator Protocol” order as well (see protocol at bottom of note).    CXR Findings:  No results found.    The findings from the last RT Protocol Assessment were as follows:  Smoking: None or smoker <15 pack years  Respiratory Pattern: Regular pattern and RR 12-20 bpm  Breath Sounds: Slightly diminished and/or crackles  Cough: Strong, spontaneous, non-productive  Indication for Bronchodilator Therapy: Decreased or absent breath sounds, On home bronchodilators  Bronchodilator Assessment Score: 2    Aerosolized bronchodilator medication orders have been revised according to the RT Nebulizer Bronchodilator Protocol below.    Respiratory Therapist to perform RT Therapy Protocol Assessment initially then follow the protocol.  Repeat RT Therapy Protocol Assessment PRN for score 0-3 or on second treatment, BID, and PRN for scores above 3.    No Indications - adjust the frequency to every 6 hours PRN wheezing or bronchospasm, if no treatments needed after 48 hours then discontinue using Per Protocol order mode.     If indication present, adjust the RT bronchodilator orders based on the Bronchodilator Assessment Score as indicated below.  If a patient is on this medication at home then do not decrease Frequency below that used at home.    0-3 - enter or revise RT bronchodilator order(s) to equivalent RT Bronchodilator order with Frequency of every 4 hours PRN for wheezing or increased work of breathing using Per Protocol order mode.       4-6 - enter or revise RT Bronchodilator order(s) to two equivalent RT bronchodilator orders with one order with BID Frequency and one order with Frequency of every 4 hours PRN wheezing or increased work of breathing using Per Protocol order mode.         7-10 - enter or revise RT Bronchodilator

## 2024-10-31 NOTE — H&P
[place] [address]                           HISTORY & PHYSICAL      PATIENT NAME: ISABEL DUBOIS                  : 1945  MED REC NO: [id number]                      ROOM: [address] [place] [address]  ACCOUNT NO: [id number]                       ADMIT DATE: 10/30/2024  PROVIDER: Leslie Guallpa MD      The patient is a 79-year-old white American man who came to the emergency room from [facility] with history of rectal bleeding.  The staff noted large amount of stool with blood in his brief.  The patient has no complaint at this time.  The patient does not recall any such incident because of his dementia.  He denies any abdominal pain.  He is a chronic hemodialysis patient.  There is no fever, no chills.  No active abdominal pain.  No nausea or vomiting.  No diarrhea.  He does usually wear a diaper.  He denies any hematemesis.  No chest pain.  He does have a known history of colon cancer and it was resected.  He is currently anticoagulated on Eliquis.  There is no obvious lightheadedness or dizziness.  He does have generalized weakness.  He does not ambulate.  He is usually in wheelchair.    PAST MEDICAL HISTORY:  Cholecystitis, urinary tract infection, anemia, anxiety, neurosis, osteoarthritis, bradycardia, coronary artery disease, cellulitis, stroke, chronic diastolic heart failure, cognitive-communication deficit, COPD, diabetes mellitus, dysphagia, unspecified encephalopathy, end-stage renal disease, chronic hemodialysis, gastroesophageal reflux disease without esophagitis, GI hemorrhage, hemodialysis patient, anorectal hemorrhage in the past, history of blood clots, hyperlipidemia, hypertension, remote history of atrial flutter, hyponatremia, hypotension, malignant colon cancer, peripheral arterial disease, COVID-19, frequent falls, dementia, splenomegaly, gait unsteadiness.    PAST SURGICAL HISTORY:  Pertinent for cardiac

## 2024-11-01 ENCOUNTER — ANESTHESIA (OUTPATIENT)
Dept: ENDOSCOPY | Age: 79
End: 2024-11-01
Payer: MEDICARE

## 2024-11-01 ENCOUNTER — ANESTHESIA EVENT (OUTPATIENT)
Dept: ENDOSCOPY | Age: 79
End: 2024-11-01
Payer: MEDICARE

## 2024-11-01 LAB
HCT VFR BLD AUTO: 26.5 % (ref 40.5–52.5)
HCT VFR BLD AUTO: 27.3 % (ref 40.5–52.5)
HGB BLD-MCNC: 8.9 G/DL (ref 13.5–17.5)
HGB BLD-MCNC: 9 G/DL (ref 13.5–17.5)

## 2024-11-01 PROCEDURE — 7100000001 HC PACU RECOVERY - ADDTL 15 MIN: Performed by: INTERNAL MEDICINE

## 2024-11-01 PROCEDURE — 2709999900 HC NON-CHARGEABLE SUPPLY: Performed by: INTERNAL MEDICINE

## 2024-11-01 PROCEDURE — 2720000010 HC SURG SUPPLY STERILE: Performed by: INTERNAL MEDICINE

## 2024-11-01 PROCEDURE — 36415 COLL VENOUS BLD VENIPUNCTURE: CPT

## 2024-11-01 PROCEDURE — 6360000002 HC RX W HCPCS: Performed by: NURSE ANESTHETIST, CERTIFIED REGISTERED

## 2024-11-01 PROCEDURE — 6360000002 HC RX W HCPCS: Performed by: INTERNAL MEDICINE

## 2024-11-01 PROCEDURE — 7100000000 HC PACU RECOVERY - FIRST 15 MIN: Performed by: INTERNAL MEDICINE

## 2024-11-01 PROCEDURE — 3700000001 HC ADD 15 MINUTES (ANESTHESIA): Performed by: INTERNAL MEDICINE

## 2024-11-01 PROCEDURE — 3700000000 HC ANESTHESIA ATTENDED CARE: Performed by: INTERNAL MEDICINE

## 2024-11-01 PROCEDURE — 0D7N8DZ DILATION OF SIGMOID COLON WITH INTRALUMINAL DEVICE, VIA NATURAL OR ARTIFICIAL OPENING ENDOSCOPIC: ICD-10-PCS | Performed by: INTERNAL MEDICINE

## 2024-11-01 PROCEDURE — 2500000003 HC RX 250 WO HCPCS: Performed by: NURSE ANESTHETIST, CERTIFIED REGISTERED

## 2024-11-01 PROCEDURE — 2580000003 HC RX 258: Performed by: INTERNAL MEDICINE

## 2024-11-01 PROCEDURE — 6370000000 HC RX 637 (ALT 250 FOR IP): Performed by: INTERNAL MEDICINE

## 2024-11-01 PROCEDURE — 1200000000 HC SEMI PRIVATE

## 2024-11-01 PROCEDURE — 85018 HEMOGLOBIN: CPT

## 2024-11-01 PROCEDURE — 3609008600 HC SIGMOIDOSCOPY CONTROL HEMORRHAGE: Performed by: INTERNAL MEDICINE

## 2024-11-01 PROCEDURE — 85014 HEMATOCRIT: CPT

## 2024-11-01 RX ORDER — LIDOCAINE HYDROCHLORIDE 20 MG/ML
INJECTION, SOLUTION INFILTRATION; PERINEURAL
Status: DISCONTINUED | OUTPATIENT
Start: 2024-11-01 | End: 2024-11-01 | Stop reason: SDUPTHER

## 2024-11-01 RX ORDER — EPINEPHRINE 1 MG/ML
INJECTION, SOLUTION, CONCENTRATE INTRAVENOUS PRN
Status: DISCONTINUED | OUTPATIENT
Start: 2024-11-01 | End: 2024-11-01 | Stop reason: ALTCHOICE

## 2024-11-01 RX ORDER — PROPOFOL 10 MG/ML
INJECTION, EMULSION INTRAVENOUS
Status: DISCONTINUED | OUTPATIENT
Start: 2024-11-01 | End: 2024-11-01 | Stop reason: SDUPTHER

## 2024-11-01 RX ADMIN — SEVELAMER CARBONATE 800 MG: 800 TABLET, FILM COATED ORAL at 14:29

## 2024-11-01 RX ADMIN — PROPOFOL 20 MG: 10 INJECTION, EMULSION INTRAVENOUS at 11:59

## 2024-11-01 RX ADMIN — WATER 1000 MG: 1 INJECTION INTRAMUSCULAR; INTRAVENOUS; SUBCUTANEOUS at 22:48

## 2024-11-01 RX ADMIN — TAMSULOSIN HYDROCHLORIDE 0.4 MG: 0.4 CAPSULE ORAL at 14:30

## 2024-11-01 RX ADMIN — LORAZEPAM 0.5 MG: 0.5 TABLET ORAL at 21:01

## 2024-11-01 RX ADMIN — GABAPENTIN 100 MG: 100 CAPSULE ORAL at 14:30

## 2024-11-01 RX ADMIN — SEVELAMER CARBONATE 800 MG: 800 TABLET, FILM COATED ORAL at 19:02

## 2024-11-01 RX ADMIN — PROPOFOL 20 MG: 10 INJECTION, EMULSION INTRAVENOUS at 12:12

## 2024-11-01 RX ADMIN — LIDOCAINE HYDROCHLORIDE 40 MG: 20 INJECTION, SOLUTION INFILTRATION; PERINEURAL at 11:55

## 2024-11-01 RX ADMIN — PROPOFOL 20 MG: 10 INJECTION, EMULSION INTRAVENOUS at 12:04

## 2024-11-01 RX ADMIN — PANTOPRAZOLE SODIUM 40 MG: 40 TABLET, DELAYED RELEASE ORAL at 06:50

## 2024-11-01 RX ADMIN — DOCUSATE SODIUM 200 MG: 100 CAPSULE, LIQUID FILLED ORAL at 14:29

## 2024-11-01 RX ADMIN — PROPOFOL 20 MG: 10 INJECTION, EMULSION INTRAVENOUS at 12:09

## 2024-11-01 RX ADMIN — ATORVASTATIN CALCIUM 10 MG: 10 TABLET, FILM COATED ORAL at 21:01

## 2024-11-01 RX ADMIN — Medication 1 TABLET: at 14:30

## 2024-11-01 RX ADMIN — PROPOFOL 40 MG: 10 INJECTION, EMULSION INTRAVENOUS at 11:55

## 2024-11-01 RX ADMIN — PROPOFOL 20 MG: 10 INJECTION, EMULSION INTRAVENOUS at 12:16

## 2024-11-01 RX ADMIN — GABAPENTIN 100 MG: 100 CAPSULE ORAL at 21:01

## 2024-11-01 RX ADMIN — METOPROLOL SUCCINATE 25 MG: 25 TABLET, EXTENDED RELEASE ORAL at 14:29

## 2024-11-01 ASSESSMENT — LIFESTYLE VARIABLES: SMOKING_STATUS: 0

## 2024-11-01 NOTE — ANESTHESIA PRE PROCEDURE
Elevated procalcitonin R79.89    On continuous oral anticoagulation Z79.01    Hemodialysis catheter infection ([facility]) T82.7XXA    Moderate malnutrition ([facility]) E44.0    Junctional bradycardia R00.1    Bradycardia R00.1    Arterial hypotension I95.9    Paroxysmal atrial flutter (HCC) I48.92    Intractable nausea and vomiting R11.2    Noncompliance with renal dialysis ([facility]) Z91.158    Hyperkalemia E87.5    Anemia, chronic disease D63.8    Chronic diastolic heart failure (HCC) I50.32    Hypoxemia R09.02    Primary hypertension I10    Lower GI hemorrhage K92.2    UTI (urinary tract infection) N39.0    Dementia ([facility]) F03.90    History of colon cancer Z85.038       Past Medical History:        Diagnosis Date    Acute cholecystitis     Acute cystitis without hematuria     Anemia 03/2022    Anxiety     Arthritis     Bradycardia, unspecified     CAD (coronary artery disease) 01/2020    Cellulitis, unspecified     Cerebral infarction ([facility])     Chronic diastolic (congestive) heart failure ([facility])     Cognitive communication deficit     COPD (chronic obstructive pulmonary disease) (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) 2019    ckd-goes to dialysis Tuesday, Thurs, Sat ([facility])    Hemorrhage of anus and rectum     History of colon cancer     Hx of blood clots     Hyperlipidemia     Hypertension     Hyponatremia     Hypotension, unspecified     Infection and inflammatory reaction due to other cardiac and vascular devices, implants and grafts, initial encounter ([facility])     Insomnia, unspecified     Long term (current) use of anticoagulants     Malignant neoplasm of colon, unspecified ([facility]) 05/18/2024    Need for assistance with personal care     Noninfective

## 2024-11-01 NOTE — BRIEF OP NOTE
Brief Postoperative Note      Patient: Ben Link  YOB: 1945  MRN: [id number]    Date of Procedure: 11/1/2024    Pre-Op Diagnosis Codes:      * Rectal bleed [K62.5]         Procedure(s):  SIGMOIDOSCOPY CONTROL HEMORRHAGE    Surgeon(s):  Ru Schreiber MD    Impression:        - Poor bowel prep.  Semisolid brown stool limiting endoscopic evaluation.         - At 20 cm from the anus, ulcerated stricture (minimal oozing was noted).  The            estimated luminal diameter was about 7 mm and would not allow passage of a            pediatric colonoscope. We then switch to a [id number] (OD 5.4 mm) and allowed me            to pass the stricture up to 40 cm from the anus. The ulcerated stricture was            treated with gold probe and 2 ml of diluted epinephrine was injected around the            area.          - Retroflexed view revealed small internal hemorrhoids         -  No specimens collected.  Recommendation:         - Monitor H&H         - Hold anticoagulation for now         - Gastrografin enema to evaluate the distal colonic anastomotic stricture            further.  Depending on result, patient might need surgical revision.    TO SEE DETAILED ENDOSCOPY REPORT IN EPIC: CHART REVIEW TAB --> NOTES TAB--> \"COLONOSCOPY\" (scanned PDF with pictures)      Electronically signed by Ru Schreiber MD on 11/1/2024 at 12:27 PM

## 2024-11-01 NOTE — ANESTHESIA POSTPROCEDURE EVALUATION
Department of Anesthesiology  Postprocedure Note    Patient: Ben Link  MRN: [id number]  YOB: 1945  Date of evaluation: 11/1/2024    Procedure Summary       Date: 11/01/24 Room / Location: [place] [address] / [facility]    Anesthesia Start: 1150 Anesthesia Stop: 1228    Procedure: SIGMOIDOSCOPY CONTROL HEMORRHAGE Diagnosis:       Rectal bleed      (Rectal bleed [K62.5])    Surgeons: Ru Schreiber MD Responsible Provider: Luciana Brooks DO    Anesthesia Type: MAC ASA Status: 3            Anesthesia Type: No value filed.    Nba Phase I: Nba Score: 10    Nba Phase II:      Anesthesia Post Evaluation    Patient location during evaluation: PACU  Patient participation: complete - patient participated  Level of consciousness: awake and alert  Pain score: 0  Airway patency: patent  Nausea & Vomiting: no nausea and no vomiting  Cardiovascular status: blood pressure returned to baseline  Respiratory status: nonlabored ventilation  Hydration status: euvolemic    No notable events documented.

## 2024-11-02 LAB
HCT VFR BLD AUTO: 25.5 % (ref 40.5–52.5)
HGB BLD-MCNC: 8.4 G/DL (ref 13.5–17.5)

## 2024-11-02 PROCEDURE — 6360000002 HC RX W HCPCS: Performed by: INTERNAL MEDICINE

## 2024-11-02 PROCEDURE — 85018 HEMOGLOBIN: CPT

## 2024-11-02 PROCEDURE — 90935 HEMODIALYSIS ONE EVALUATION: CPT

## 2024-11-02 PROCEDURE — 6370000000 HC RX 637 (ALT 250 FOR IP): Performed by: INTERNAL MEDICINE

## 2024-11-02 PROCEDURE — 2580000003 HC RX 258: Performed by: INTERNAL MEDICINE

## 2024-11-02 PROCEDURE — 36415 COLL VENOUS BLD VENIPUNCTURE: CPT

## 2024-11-02 PROCEDURE — 99223 1ST HOSP IP/OBS HIGH 75: CPT | Performed by: INTERNAL MEDICINE

## 2024-11-02 PROCEDURE — 1200000000 HC SEMI PRIVATE

## 2024-11-02 PROCEDURE — 85014 HEMATOCRIT: CPT

## 2024-11-02 PROCEDURE — 99222 1ST HOSP IP/OBS MODERATE 55: CPT | Performed by: SURGERY

## 2024-11-02 RX ADMIN — GABAPENTIN 100 MG: 100 CAPSULE ORAL at 21:49

## 2024-11-02 RX ADMIN — Medication 1 TABLET: at 07:51

## 2024-11-02 RX ADMIN — ATORVASTATIN CALCIUM 10 MG: 10 TABLET, FILM COATED ORAL at 21:49

## 2024-11-02 RX ADMIN — EPOETIN ALFA-EPBX 10000 UNITS: 10000 INJECTION, SOLUTION INTRAVENOUS; SUBCUTANEOUS at 11:59

## 2024-11-02 RX ADMIN — SEVELAMER CARBONATE 800 MG: 800 TABLET, FILM COATED ORAL at 07:51

## 2024-11-02 RX ADMIN — WATER 1000 MG: 1 INJECTION INTRAMUSCULAR; INTRAVENOUS; SUBCUTANEOUS at 21:47

## 2024-11-02 RX ADMIN — LORAZEPAM 0.5 MG: 0.5 TABLET ORAL at 21:49

## 2024-11-02 RX ADMIN — PANTOPRAZOLE SODIUM 40 MG: 40 TABLET, DELAYED RELEASE ORAL at 05:51

## 2024-11-02 RX ADMIN — METOPROLOL SUCCINATE 25 MG: 25 TABLET, EXTENDED RELEASE ORAL at 07:51

## 2024-11-02 RX ADMIN — SEVELAMER CARBONATE 800 MG: 800 TABLET, FILM COATED ORAL at 14:18

## 2024-11-02 RX ADMIN — SEVELAMER CARBONATE 800 MG: 800 TABLET, FILM COATED ORAL at 18:17

## 2024-11-02 RX ADMIN — GABAPENTIN 100 MG: 100 CAPSULE ORAL at 14:18

## 2024-11-02 RX ADMIN — GABAPENTIN 100 MG: 100 CAPSULE ORAL at 07:51

## 2024-11-02 RX ADMIN — DOCUSATE SODIUM 200 MG: 100 CAPSULE, LIQUID FILLED ORAL at 07:51

## 2024-11-02 RX ADMIN — TAMSULOSIN HYDROCHLORIDE 0.4 MG: 0.4 CAPSULE ORAL at 07:51

## 2024-11-03 LAB
ALBUMIN SERPL-MCNC: 3.3 G/DL (ref 3.4–5)
ALBUMIN/GLOB SERPL: 1 {RATIO} (ref 1.1–2.2)
ALP SERPL-CCNC: 82 U/L (ref 40–129)
ALT SERPL-CCNC: 8 U/L (ref 10–40)
ANION GAP SERPL CALCULATED.3IONS-SCNC: 10 MMOL/L (ref 3–16)
APTT BLD: 32.6 SEC (ref 22.1–36.4)
AST SERPL-CCNC: 11 U/L (ref 15–37)
BASOPHILS # BLD: 0.1 K/UL (ref 0–0.2)
BASOPHILS NFR BLD: 2.7 %
BILIRUB SERPL-MCNC: 0.3 MG/DL (ref 0–1)
BUN SERPL-MCNC: 26 MG/DL (ref 7–20)
CALCIUM SERPL-MCNC: 8.6 MG/DL (ref 8.3–10.6)
CHLORIDE SERPL-SCNC: 106 MMOL/L (ref 99–110)
CO2 SERPL-SCNC: 27 MMOL/L (ref 21–32)
CREAT SERPL-MCNC: 4.7 MG/DL (ref 0.8–1.3)
DEPRECATED RDW RBC AUTO: 15.5 % (ref 12.4–15.4)
EOSINOPHIL # BLD: 0.2 K/UL (ref 0–0.6)
EOSINOPHIL NFR BLD: 4.4 %
GFR SERPLBLD CREATININE-BSD FMLA CKD-EPI: 12 ML/MIN/{1.73_M2}
GLUCOSE SERPL-MCNC: 158 MG/DL (ref 70–99)
HCT VFR BLD AUTO: 24.9 % (ref 40.5–52.5)
HGB BLD-MCNC: 8.3 G/DL (ref 13.5–17.5)
INR PPP: 1.22 (ref 0.85–1.15)
LACTATE BLDV-SCNC: 0.9 MMOL/L (ref 0.4–2)
LYMPHOCYTES # BLD: 0.7 K/UL (ref 1–5.1)
LYMPHOCYTES NFR BLD: 14.2 %
MAGNESIUM SERPL-MCNC: 2.23 MG/DL (ref 1.8–2.4)
MCH RBC QN AUTO: 32.4 PG (ref 26–34)
MCHC RBC AUTO-ENTMCNC: 33.3 G/DL (ref 31–36)
MCV RBC AUTO: 97.4 FL (ref 80–100)
MONOCYTES # BLD: 0.2 K/UL (ref 0–1.3)
MONOCYTES NFR BLD: 4.9 %
NEUTROPHILS # BLD: 3.7 K/UL (ref 1.7–7.7)
NEUTROPHILS NFR BLD: 73.8 %
PHOSPHATE SERPL-MCNC: 4 MG/DL (ref 2.5–4.9)
PLATELET # BLD AUTO: 165 K/UL (ref 135–450)
PMV BLD AUTO: 7.4 FL (ref 5–10.5)
POTASSIUM SERPL-SCNC: 4 MMOL/L (ref 3.5–5.1)
PREALB SERPL-MCNC: 17.9 MG/DL (ref 20–40)
PROT SERPL-MCNC: 6.5 G/DL (ref 6.4–8.2)
PROTHROMBIN TIME: 15.6 SEC (ref 11.9–14.9)
RBC # BLD AUTO: 2.55 M/UL (ref 4.2–5.9)
SODIUM SERPL-SCNC: 143 MMOL/L (ref 136–145)
TRANSFERRIN SERPL-MCNC: 136 MG/DL (ref 200–360)
WBC # BLD AUTO: 5 K/UL (ref 4–11)

## 2024-11-03 PROCEDURE — 85730 THROMBOPLASTIN TIME PARTIAL: CPT

## 2024-11-03 PROCEDURE — 36415 COLL VENOUS BLD VENIPUNCTURE: CPT

## 2024-11-03 PROCEDURE — 1200000000 HC SEMI PRIVATE

## 2024-11-03 PROCEDURE — 85610 PROTHROMBIN TIME: CPT

## 2024-11-03 PROCEDURE — 80053 COMPREHEN METABOLIC PANEL: CPT

## 2024-11-03 PROCEDURE — 85025 COMPLETE CBC W/AUTO DIFF WBC: CPT

## 2024-11-03 PROCEDURE — 84100 ASSAY OF PHOSPHORUS: CPT

## 2024-11-03 PROCEDURE — 83605 ASSAY OF LACTIC ACID: CPT

## 2024-11-03 PROCEDURE — 99232 SBSQ HOSP IP/OBS MODERATE 35: CPT | Performed by: SURGERY

## 2024-11-03 PROCEDURE — 99231 SBSQ HOSP IP/OBS SF/LOW 25: CPT | Performed by: INTERNAL MEDICINE

## 2024-11-03 PROCEDURE — 84466 ASSAY OF TRANSFERRIN: CPT

## 2024-11-03 PROCEDURE — 83735 ASSAY OF MAGNESIUM: CPT

## 2024-11-03 PROCEDURE — 6370000000 HC RX 637 (ALT 250 FOR IP): Performed by: INTERNAL MEDICINE

## 2024-11-03 PROCEDURE — 6360000002 HC RX W HCPCS: Performed by: INTERNAL MEDICINE

## 2024-11-03 PROCEDURE — 2580000003 HC RX 258: Performed by: INTERNAL MEDICINE

## 2024-11-03 PROCEDURE — 84134 ASSAY OF PREALBUMIN: CPT

## 2024-11-03 RX ADMIN — GABAPENTIN 100 MG: 100 CAPSULE ORAL at 21:07

## 2024-11-03 RX ADMIN — WATER 1000 MG: 1 INJECTION INTRAMUSCULAR; INTRAVENOUS; SUBCUTANEOUS at 21:10

## 2024-11-03 RX ADMIN — PANTOPRAZOLE SODIUM 40 MG: 40 TABLET, DELAYED RELEASE ORAL at 06:48

## 2024-11-03 RX ADMIN — SEVELAMER CARBONATE 800 MG: 800 TABLET, FILM COATED ORAL at 17:44

## 2024-11-03 RX ADMIN — SEVELAMER CARBONATE 800 MG: 800 TABLET, FILM COATED ORAL at 12:17

## 2024-11-03 RX ADMIN — ATORVASTATIN CALCIUM 10 MG: 10 TABLET, FILM COATED ORAL at 21:07

## 2024-11-03 RX ADMIN — TAMSULOSIN HYDROCHLORIDE 0.4 MG: 0.4 CAPSULE ORAL at 12:17

## 2024-11-03 RX ADMIN — Medication 1 TABLET: at 12:17

## 2024-11-03 RX ADMIN — GABAPENTIN 100 MG: 100 CAPSULE ORAL at 12:17

## 2024-11-03 RX ADMIN — LORAZEPAM 0.5 MG: 0.5 TABLET ORAL at 21:07

## 2024-11-03 RX ADMIN — DOCUSATE SODIUM 200 MG: 100 CAPSULE, LIQUID FILLED ORAL at 12:17

## 2024-11-03 RX ADMIN — METOPROLOL SUCCINATE 25 MG: 25 TABLET, EXTENDED RELEASE ORAL at 12:17

## 2024-11-03 RX ADMIN — GABAPENTIN 100 MG: 100 CAPSULE ORAL at 16:30

## 2024-11-04 ENCOUNTER — APPOINTMENT (OUTPATIENT)
Dept: GENERAL RADIOLOGY | Age: 79
DRG: 393 | End: 2024-11-04
Payer: MEDICARE

## 2024-11-04 LAB
ANION GAP SERPL CALCULATED.3IONS-SCNC: 11 MMOL/L (ref 3–16)
BACTERIA URNS QL MICRO: ABNORMAL /HPF
BASOPHILS # BLD: 0 K/UL (ref 0–0.2)
BASOPHILS NFR BLD: 0.4 %
BILIRUB UR QL STRIP.AUTO: NEGATIVE
BUN SERPL-MCNC: 37 MG/DL (ref 7–20)
CALCIUM SERPL-MCNC: 8.8 MG/DL (ref 8.3–10.6)
CHLORIDE SERPL-SCNC: 105 MMOL/L (ref 99–110)
CLARITY UR: ABNORMAL
CO2 SERPL-SCNC: 26 MMOL/L (ref 21–32)
COLOR UR: ABNORMAL
CREAT SERPL-MCNC: 5.8 MG/DL (ref 0.8–1.3)
DEPRECATED RDW RBC AUTO: 15.6 % (ref 12.4–15.4)
EOSINOPHIL # BLD: 0.2 K/UL (ref 0–0.6)
EOSINOPHIL NFR BLD: 3.7 %
EPI CELLS #/AREA URNS AUTO: 0 /HPF (ref 0–5)
GFR SERPLBLD CREATININE-BSD FMLA CKD-EPI: 9 ML/MIN/{1.73_M2}
GLUCOSE SERPL-MCNC: 165 MG/DL (ref 70–99)
GLUCOSE UR STRIP.AUTO-MCNC: 250 MG/DL
HCT VFR BLD AUTO: 25.9 % (ref 40.5–52.5)
HGB BLD-MCNC: 8.3 G/DL (ref 13.5–17.5)
HGB UR QL STRIP.AUTO: NEGATIVE
HYALINE CASTS #/AREA URNS AUTO: 7 /LPF (ref 0–8)
KETONES UR STRIP.AUTO-MCNC: ABNORMAL MG/DL
LEUKOCYTE ESTERASE UR QL STRIP.AUTO: ABNORMAL
LYMPHOCYTES # BLD: 0.7 K/UL (ref 1–5.1)
LYMPHOCYTES NFR BLD: 12 %
MCH RBC QN AUTO: 31.8 PG (ref 26–34)
MCHC RBC AUTO-ENTMCNC: 32.2 G/DL (ref 31–36)
MCV RBC AUTO: 98.7 FL (ref 80–100)
MONOCYTES # BLD: 0.3 K/UL (ref 0–1.3)
MONOCYTES NFR BLD: 5.1 %
NEUTROPHILS # BLD: 4.9 K/UL (ref 1.7–7.7)
NEUTROPHILS NFR BLD: 78.8 %
NITRITE UR QL STRIP.AUTO: NEGATIVE
PH UR STRIP.AUTO: 5.5 [PH] (ref 5–8)
PLATELET # BLD AUTO: 164 K/UL (ref 135–450)
PMV BLD AUTO: 7.5 FL (ref 5–10.5)
POTASSIUM SERPL-SCNC: 3.9 MMOL/L (ref 3.5–5.1)
PROT UR STRIP.AUTO-MCNC: 100 MG/DL
RBC # BLD AUTO: 2.63 M/UL (ref 4.2–5.9)
RBC CLUMPS #/AREA URNS AUTO: 0 /HPF (ref 0–4)
SODIUM SERPL-SCNC: 142 MMOL/L (ref 136–145)
SP GR UR STRIP.AUTO: 1.02 (ref 1–1.03)
UA DIPSTICK W REFLEX MICRO PNL UR: YES
URN SPEC COLLECT METH UR: ABNORMAL
UROBILINOGEN UR STRIP-ACNC: 1 E.U./DL
WBC # BLD AUTO: 6.2 K/UL (ref 4–11)
WBC #/AREA URNS AUTO: 141 /HPF (ref 0–5)

## 2024-11-04 PROCEDURE — 74270 X-RAY XM COLON 1CNTRST STD: CPT

## 2024-11-04 PROCEDURE — APPNB30 APP NON BILLABLE TIME 0-30 MINS: Performed by: NURSE PRACTITIONER

## 2024-11-04 PROCEDURE — 92610 EVALUATE SWALLOWING FUNCTION: CPT

## 2024-11-04 PROCEDURE — 92526 ORAL FUNCTION THERAPY: CPT

## 2024-11-04 PROCEDURE — 80048 BASIC METABOLIC PNL TOTAL CA: CPT

## 2024-11-04 PROCEDURE — APPSS15 APP SPLIT SHARED TIME 0-15 MINUTES: Performed by: NURSE PRACTITIONER

## 2024-11-04 PROCEDURE — 2580000003 HC RX 258: Performed by: INTERNAL MEDICINE

## 2024-11-04 PROCEDURE — 6370000000 HC RX 637 (ALT 250 FOR IP): Performed by: PHYSICIAN ASSISTANT

## 2024-11-04 PROCEDURE — 85025 COMPLETE CBC W/AUTO DIFF WBC: CPT

## 2024-11-04 PROCEDURE — 36415 COLL VENOUS BLD VENIPUNCTURE: CPT

## 2024-11-04 PROCEDURE — 6370000000 HC RX 637 (ALT 250 FOR IP): Performed by: INTERNAL MEDICINE

## 2024-11-04 PROCEDURE — 99232 SBSQ HOSP IP/OBS MODERATE 35: CPT | Performed by: SURGERY

## 2024-11-04 PROCEDURE — 81001 URINALYSIS AUTO W/SCOPE: CPT

## 2024-11-04 PROCEDURE — 6360000002 HC RX W HCPCS: Performed by: INTERNAL MEDICINE

## 2024-11-04 PROCEDURE — 1200000000 HC SEMI PRIVATE

## 2024-11-04 RX ORDER — PEG-3350, SODIUM SULFATE, SODIUM CHLORIDE, POTASSIUM CHLORIDE, SODIUM ASCORBATE AND ASCORBIC ACID 7.5-2.691G
100 KIT ORAL ONCE
Status: COMPLETED | OUTPATIENT
Start: 2024-11-04 | End: 2024-11-04

## 2024-11-04 RX ORDER — DIATRIZOATE MEGLUMINE AND DIATRIZOATE SODIUM 660; 100 MG/ML; MG/ML
SOLUTION ORAL; RECTAL
Status: COMPLETED
Start: 2024-11-04 | End: 2024-11-04

## 2024-11-04 RX ADMIN — PEG-3350, SODIUM SULFATE, SODIUM CHLORIDE, POTASSIUM CHLORIDE, SODIUM ASCORBATE AND ASCORBIC ACID 100 G: KIT at 17:07

## 2024-11-04 RX ADMIN — TAMSULOSIN HYDROCHLORIDE 0.4 MG: 0.4 CAPSULE ORAL at 08:19

## 2024-11-04 RX ADMIN — LORAZEPAM 0.5 MG: 0.5 TABLET ORAL at 21:44

## 2024-11-04 RX ADMIN — METOPROLOL SUCCINATE 25 MG: 25 TABLET, EXTENDED RELEASE ORAL at 08:19

## 2024-11-04 RX ADMIN — SEVELAMER CARBONATE 800 MG: 800 TABLET, FILM COATED ORAL at 17:07

## 2024-11-04 RX ADMIN — GABAPENTIN 100 MG: 100 CAPSULE ORAL at 08:19

## 2024-11-04 RX ADMIN — WATER 1000 MG: 1 INJECTION INTRAMUSCULAR; INTRAVENOUS; SUBCUTANEOUS at 21:42

## 2024-11-04 RX ADMIN — PANTOPRAZOLE SODIUM 40 MG: 40 TABLET, DELAYED RELEASE ORAL at 06:00

## 2024-11-04 RX ADMIN — DIATRIZOATE MEGLUMINE AND DIATRIZOATE SODIUM 600 ML: 660; 100 SOLUTION ORAL; RECTAL at 10:42

## 2024-11-04 RX ADMIN — Medication 1 TABLET: at 08:19

## 2024-11-04 RX ADMIN — SEVELAMER CARBONATE 800 MG: 800 TABLET, FILM COATED ORAL at 08:19

## 2024-11-04 RX ADMIN — PEG-3350, SODIUM SULFATE, SODIUM CHLORIDE, POTASSIUM CHLORIDE, SODIUM ASCORBATE AND ASCORBIC ACID 100 G: KIT at 13:13

## 2024-11-04 RX ADMIN — GABAPENTIN 100 MG: 100 CAPSULE ORAL at 14:08

## 2024-11-04 RX ADMIN — GABAPENTIN 100 MG: 100 CAPSULE ORAL at 21:44

## 2024-11-04 RX ADMIN — ATORVASTATIN CALCIUM 10 MG: 10 TABLET, FILM COATED ORAL at 21:44

## 2024-11-04 RX ADMIN — SEVELAMER CARBONATE 800 MG: 800 TABLET, FILM COATED ORAL at 12:38

## 2024-11-04 NOTE — CONS[place] [address]                              CONSULTATION      PATIENT NAME: ISABEL DUBOIS                  : 1945  MED REC NO: [id number]                      ROOM: [address] [place] [address]  ACCOUNT NO: [id number]                       ADMIT DATE: 10/30/2024  PROVIDER: Mark Valadez MD      CONSULT DATE: 2024    REASON FOR CONSULTATION:  Please evaluate the patient for ongoing use of Eliquis and aspirin.    HISTORY OF PRESENT ILLNESS:  The patient is a 79-year-old gentleman.  He resides at the [facility] for early onset of dementia.  He to me has very poor understanding of his general health.  He does not know why he is in the hospital.  He states he feels fine and feels he should go back to his home.  He states he wants to be with his wife who also resides at the same center.  The patient is followed for end-stage renal disease, on hemodialysis; persistent atrial fibrillation, has been followed by Electrophysiology in our practice; history of hypertension; history of colon cancer with colon resection in .  He has heart failure with preserved ejection fraction with last echocardiogram having been done in .  He has had a deep venous thrombosis in the past and he also had a vascular graft thrombosis.  He has chronically been on Eliquis and aspirin.  He is known to have mild coronary artery disease from cardiac catheterization in the past.  The patient presents to the hospital with acute-on-chronic anemia with an acute lower gastrointestinal bleed.  The patient does not know why he is even in the hospital.  He did undergo colonoscopy which showed advanced colonic stricture and ongoing risk of bleeding from his GI tract.  Dr. Schreiber has advised that the patient should be off Eliquis and aspirin for the present time and possibly even to be considered to be off it permanently.    PAST MEDICAL HISTORY:  Notable for the 
            [facility]     Consult                                                     Patient Name: Ben Link  MRN: [id number]  YOB: 1945  Admission date: 10/30/2024  7:48 AM   Date of evaluation: 11/2/2024  Primary Care Physician: Alicia Cardenas MD  Reason for consult:  Bleeding per rectum  History of Present Illness:    Mr. Link is a 79 y.o. male who presents with with painless rectal bleeding. Surgical history includes open sigmoidectomy with coloproctostomy for adenocarcinoma with Dr. Vera 1/2022, +1/23 nodes with negative margins. Bleeding after surgery required IR embolization 2/2022. Endoscopy yesterday 11/1/24 showed an ulcerated stricture at 20cm at the anastomosis with 7mm opening that would not allow passage with pediatric colonoscope. Contrast enema ordered in 2 days because of decreased weekend in-house radiologist coverage to help decide if patient is amenable to endoscopic dilation or surgical intervention. Anticoagulated with Eliquis for atrial fibrillation, has been held. Cardiology consulted for assistance, input noted, does have history of atrial fibrillation, DVT, thrombosis of his vascular graft but now with acute GI bleed. For now recommended to hold Eliquis. Currently denies pain, nausea, distension and is passing stool but decreased amounts, non-bloody. Denies fevers, chills, chest pain, dyspnea, dysuria, change in appetite, weight, jaundice or other complaints. Medical conditions include atrial fibrillation, CAD, history of CVA, COPD, diabetes, ESRD on HD, history of blood clots. Abdominal surgical history also includes cholecystectomy    Past Medical History:        Diagnosis Date    Acute cholecystitis     Acute cystitis without hematuria     Anemia 03/2022    Anxiety     Arthritis     Bradycardia, unspecified     CAD (coronary artery disease) 01/2020    Cellulitis, unspecified     Cerebral infarction (HCC)     Chronic diastolic 
[facility]  Consultation Note    Reason for Consult: ESRD management  Requesting Physician:  Dr. Dupree    CHIEF COMPLAINT: Rectal bleeding    History obtained from records and patient.    HISTORY OF PRESENT ILLNESS:                Ben Link  is 79 y.o. y.o. male with significant past medical history of ESRD, on HD TTHS at [facility] under [place] [facility], hypertension, hyperkalemia, anemia due to CKD, diastolic CHF, who presents with rectal bleeding.  I am asked to see the patient with regards to his HD need.  He has a left AV fistula with good thrill.  Potassium of 4.6 and serum bicarbonate of 29.  Saturating 98% on room air.  No other complaints.    Past Medical History:     has a past medical history of Acute cholecystitis, Acute cystitis without hematuria, Anemia, Anxiety, Arthritis, Bradycardia, unspecified, CAD (coronary artery disease), Cellulitis, unspecified, Cerebral infarction ([facility]), Chronic diastolic (congestive) heart failure ([facility]), Cognitive communication deficit, COPD (chronic obstructive pulmonary disease) ([facility]), Diabetes mellitus (HCC), Dysphagia, oropharyngeal, Encephalopathy, unspecified, End stage renal disease (HCC), Fatigue, Gastro-esophageal reflux disease without esophagitis, Gastrointestinal hemorrhage, Hemodialysis patient ([facility]), Hemorrhage of anus and rectum, History of colon cancer, Hx of blood clots, Hyperlipidemia, Hypertension, Hyponatremia, Hypotension, unspecified, Infection and inflammatory reaction due to other cardiac and vascular devices, implants and grafts, initial encounter ([facility]), Insomnia, unspecified, Long term (current) use of anticoagulants, Malignant neoplasm of colon, unspecified ([facility]), Need for assistance with personal care, Noninfective gastroenteritis and colitis, unspecified, Other pulmonary embolism without acute cor pulmonale ([facility]), Peripheral vascular disease, unspecified ([facility]), Personal history of COVID-19, Risk 
Renal consult received  Full note to follow    Thank you  
age  Eyes: Anicteric  Head: Normocephalic, without obvious abnormality  Lungs: clear to auscultation bilaterally, Normal Effort  Heart: regular rate and rhythm, normal S1 and S2, no murmurs or rubs  Abdomen: soft, non-tender. Bowel sounds normal. No masses,  no organomegaly.   Extremities: atraumatic, no cyanosis or edema  Skin: warm and dry, no jaundice  Neuro: Grossly intact, A&OX2  Musculoskeletal: 5/5  strength BUE      Data Review:    Recent Labs     10/30/24  0843   WBC 6.1   HGB 8.4*   HCT 25.8*   MCV 96.3        Recent Labs     10/30/24  0843      K 4.6   CL 98*   CO2 32   BUN 38*   CREATININE 4.7*     Recent Labs     10/30/24  0843   AST 15   ALT 12   BILITOT 0.4   ALKPHOS 83     Recent Labs     10/30/24  0843   LIPASE 28.0     Recent Labs     10/30/24  0843   PROTIME 16.1*   INR 1.27*     Invalid input(s): \"PTT\"  No results for input(s): \"OCCULTBLD\" in the last 72 hours.    Imaging Studies:               CT-scan of abdomen and pelvis wo contrast 10/30/24 :    IMPRESSION:  Bladder wall thickening with surrounding fat stranding.  Recommend  correlation with urinalysis to exclude cystitis     Postsurgical change in the sigmoid.  Scattered colonic diverticula are seen.  No significant pericolonic fat stranding.  Appendix is normal in caliber.     Small left iliac chain node, which may simply be reactive.  Recommend 3 month  follow-up pelvis CT     Tiny pleural effusions are seen bilaterally. There is adjacent consolidation  at the lung bases, likely atelectasis in the absence of clinical signs of  pneumonia.               Assessment/Plan:     Hematochezia -began today.  CTA negative for active bleeding.  Could be diverticular bleed.  He does have history of colon cancer and is status post sigmoid resection for this.  Does not sound like he had a follow-up colonoscopy after his diagnosis in 11/2021.  -Clear liquid diet today  -Bowel prep tomorrow for colonoscopy Friday if patient/family

## 2024-11-04 NOTE — CARE COORDINATION
11/04/24 1128   Readmission Assessment   Number of Days since last admission? 8-30 days   Previous Disposition Long Term Care  ([facility])   Who is being Interviewed Unable to Complete  (Chart review. Patient has dementia)   What was the patient's/caregiver's perception as to why they think they needed to return back to the hospital? Other (Comment)  (Per [facility] facility the patient had bloody stool)   Did you visit your Primary Care Physician after you left the hospital, before you returned this time? No   Why weren't you able to visit your PCP? Did not have an appointment   Who advised the patient to return to the hospital? Caregiver  ([facility])   Does the patient report anything that got in the way of taking their medications? No   In our efforts to provide the best possible care to you and others like you, can you think of anything that we could have done to help you after you left the hospital the first time, so that you might not have needed to return so soon? Other (Comment)  (no)     Electronically signed by Yajaira Garcia on 11/4/24 at 11:36 AM EST

## 2024-11-04 NOTE — CARE COORDINATION
Case Management Assessment  Initial Evaluation    Date/Time of Evaluation: 11/4/2024 11:41 AM  Assessment Completed by: Yajaira Garcia    If patient is discharged prior to next notation, then this note serves as note for discharge by case management.    Patient Name: Ben Link                   YOB: 1945  Diagnosis: Rectal bleeding [K62.5]  Lower GI hemorrhage [K92.2]  Chronic anticoagulation [Z79.01]  Hemodialysis patient (HCC) [Z99.2]  History of colon cancer [Z85.038]  Anemia, unspecified type [D64.9]                   Date / Time: 10/30/2024  7:48 AM    Patient Admission Status: Inpatient   Readmission Risk (Low < 19, Mod (19-27), High > 27): Readmission Risk Score: 24.4    Current PCP: Alicia Cardenas MD  PCP verified by CM? Yes    Chart Reviewed: Yes      History Provided by: Medical Record ([facility])  Patient Orientation: Person    Patient Cognition: Dementia / Early Alzheimer's    Hospitalization in the last 30 days (Readmission):  Yes    If yes, Readmission Assessment in CM Navigator will be completed.    Advance Directives:      Code Status: DNR-CCA   Patient's Primary Decision Maker is: Legal Next of Kin    Primary Decision Maker: ClemensPing - Child - [phone]    Secondary Decision Maker: Kerline Link - Spouse - [phone]    Supplemental (Other) Decision Maker: Janeen Linares - Child - [phone]    Discharge Planning:    Patient expects to discharge to: Long-term care ([facility])  Plan for transportation at discharge:      Financial    Payor: [employer] MEDICARE / Plan: [employer] CHOICE-PPO MEDICARE / Product Type: *No Product type* /         Current Nursing Home Information:  Patient admitted from:  [facility]  [address]  [facility] [address]  Phone: [phone]  Fax: [phone]     Call to Laurie Alex,  who confirmed the patient is: Long Term Care, no Precert required for return.      Patient Covid vaccination status:    Internal

## 2024-11-05 ENCOUNTER — ANESTHESIA EVENT (OUTPATIENT)
Dept: ENDOSCOPY | Age: 79
DRG: 393 | End: 2024-11-05
Payer: MEDICARE

## 2024-11-05 ENCOUNTER — APPOINTMENT (OUTPATIENT)
Dept: GENERAL RADIOLOGY | Age: 79
DRG: 393 | End: 2024-11-05
Payer: MEDICARE

## 2024-11-05 ENCOUNTER — ANESTHESIA (OUTPATIENT)
Dept: ENDOSCOPY | Age: 79
DRG: 393 | End: 2024-11-05
Payer: MEDICARE

## 2024-11-05 LAB
ALBUMIN SERPL-MCNC: 3.2 G/DL (ref 3.4–5)
ANION GAP SERPL CALCULATED.3IONS-SCNC: 13 MMOL/L (ref 3–16)
BASOPHILS # BLD: 0 K/UL (ref 0–0.2)
BASOPHILS NFR BLD: 0.3 %
BUN SERPL-MCNC: 44 MG/DL (ref 7–20)
CALCIUM SERPL-MCNC: 8.3 MG/DL (ref 8.3–10.6)
CHLORIDE SERPL-SCNC: 103 MMOL/L (ref 99–110)
CO2 SERPL-SCNC: 26 MMOL/L (ref 21–32)
CREAT SERPL-MCNC: 6.7 MG/DL (ref 0.8–1.3)
DEPRECATED RDW RBC AUTO: 15.4 % (ref 12.4–15.4)
EOSINOPHIL # BLD: 0.2 K/UL (ref 0–0.6)
EOSINOPHIL NFR BLD: 2.7 %
GFR SERPLBLD CREATININE-BSD FMLA CKD-EPI: 8 ML/MIN/{1.73_M2}
GLUCOSE BLD-MCNC: 109 MG/DL (ref 70–99)
GLUCOSE SERPL-MCNC: 153 MG/DL (ref 70–99)
HCT VFR BLD AUTO: 25.4 % (ref 40.5–52.5)
HGB BLD-MCNC: 8.4 G/DL (ref 13.5–17.5)
LYMPHOCYTES # BLD: 0.9 K/UL (ref 1–5.1)
LYMPHOCYTES NFR BLD: 13.7 %
MCH RBC QN AUTO: 32.9 PG (ref 26–34)
MCHC RBC AUTO-ENTMCNC: 33.1 G/DL (ref 31–36)
MCV RBC AUTO: 99.4 FL (ref 80–100)
MONOCYTES # BLD: 0.4 K/UL (ref 0–1.3)
MONOCYTES NFR BLD: 5.1 %
NEUTROPHILS # BLD: 5.3 K/UL (ref 1.7–7.7)
NEUTROPHILS NFR BLD: 78.2 %
PERFORMED ON: ABNORMAL
PHOSPHATE SERPL-MCNC: 5.7 MG/DL (ref 2.5–4.9)
PLATELET # BLD AUTO: 171 K/UL (ref 135–450)
PMV BLD AUTO: 7.6 FL (ref 5–10.5)
POTASSIUM SERPL-SCNC: 4.5 MMOL/L (ref 3.5–5.1)
RBC # BLD AUTO: 2.56 M/UL (ref 4.2–5.9)
SODIUM SERPL-SCNC: 142 MMOL/L (ref 136–145)
WBC # BLD AUTO: 6.8 K/UL (ref 4–11)

## 2024-11-05 PROCEDURE — 2580000003 HC RX 258: Performed by: INTERNAL MEDICINE

## 2024-11-05 PROCEDURE — C1874 STENT, COATED/COV W/DEL SYS: HCPCS | Performed by: INTERNAL MEDICINE

## 2024-11-05 PROCEDURE — 71046 X-RAY EXAM CHEST 2 VIEWS: CPT

## 2024-11-05 PROCEDURE — C1769 GUIDE WIRE: HCPCS | Performed by: INTERNAL MEDICINE

## 2024-11-05 PROCEDURE — 85025 COMPLETE CBC W/AUTO DIFF WBC: CPT

## 2024-11-05 PROCEDURE — 80069 RENAL FUNCTION PANEL: CPT

## 2024-11-05 PROCEDURE — 7100000001 HC PACU RECOVERY - ADDTL 15 MIN: Performed by: INTERNAL MEDICINE

## 2024-11-05 PROCEDURE — 2580000003 HC RX 258: Performed by: STUDENT IN AN ORGANIZED HEALTH CARE EDUCATION/TRAINING PROGRAM

## 2024-11-05 PROCEDURE — 6360000002 HC RX W HCPCS: Performed by: INTERNAL MEDICINE

## 2024-11-05 PROCEDURE — 3700000000 HC ANESTHESIA ATTENDED CARE: Performed by: INTERNAL MEDICINE

## 2024-11-05 PROCEDURE — 3E0H8GC INTRODUCTION OF OTHER THERAPEUTIC SUBSTANCE INTO LOWER GI, VIA NATURAL OR ARTIFICIAL OPENING ENDOSCOPIC: ICD-10-PCS | Performed by: INTERNAL MEDICINE

## 2024-11-05 PROCEDURE — 99232 SBSQ HOSP IP/OBS MODERATE 35: CPT | Performed by: SURGERY

## 2024-11-05 PROCEDURE — 2709999900 HC NON-CHARGEABLE SUPPLY: Performed by: INTERNAL MEDICINE

## 2024-11-05 PROCEDURE — APPSS15 APP SPLIT SHARED TIME 0-15 MINUTES: Performed by: NURSE PRACTITIONER

## 2024-11-05 PROCEDURE — 2500000003 HC RX 250 WO HCPCS: Performed by: NURSE ANESTHETIST, CERTIFIED REGISTERED

## 2024-11-05 PROCEDURE — 90935 HEMODIALYSIS ONE EVALUATION: CPT

## 2024-11-05 PROCEDURE — 7100000000 HC PACU RECOVERY - FIRST 15 MIN: Performed by: INTERNAL MEDICINE

## 2024-11-05 PROCEDURE — 3609155200 HC COLONOSCOPY W/ENDOSCOPIC STENT PLACEMENT: Performed by: INTERNAL MEDICINE

## 2024-11-05 PROCEDURE — 1200000000 HC SEMI PRIVATE

## 2024-11-05 PROCEDURE — 6360000002 HC RX W HCPCS: Performed by: NURSE ANESTHETIST, CERTIFIED REGISTERED

## 2024-11-05 PROCEDURE — 6370000000 HC RX 637 (ALT 250 FOR IP): Performed by: INTERNAL MEDICINE

## 2024-11-05 PROCEDURE — 36415 COLL VENOUS BLD VENIPUNCTURE: CPT

## 2024-11-05 PROCEDURE — APPNB30 APP NON BILLABLE TIME 0-30 MINS: Performed by: NURSE PRACTITIONER

## 2024-11-05 PROCEDURE — 3700000001 HC ADD 15 MINUTES (ANESTHESIA): Performed by: INTERNAL MEDICINE

## 2024-11-05 DEVICE — STENT AND ELECTROCAUTERY - ENHANCED DELIVERY SYSTEM
Type: IMPLANTABLE DEVICE | Status: FUNCTIONAL
Brand: AXIOS™

## 2024-11-05 RX ORDER — SODIUM CHLORIDE 0.9 % (FLUSH) 0.9 %
5-40 SYRINGE (ML) INJECTION EVERY 12 HOURS SCHEDULED
Status: DISCONTINUED | OUTPATIENT
Start: 2024-11-05 | End: 2024-11-06 | Stop reason: HOSPADM

## 2024-11-05 RX ORDER — OXYCODONE HYDROCHLORIDE 5 MG/1
5 TABLET ORAL PRN
Status: ACTIVE | OUTPATIENT
Start: 2024-11-05 | End: 2024-11-05

## 2024-11-05 RX ORDER — PROPOFOL 10 MG/ML
INJECTION, EMULSION INTRAVENOUS
Status: DISCONTINUED | OUTPATIENT
Start: 2024-11-05 | End: 2024-11-05 | Stop reason: SDUPTHER

## 2024-11-05 RX ORDER — SODIUM CHLORIDE 9 MG/ML
INJECTION, SOLUTION INTRAVENOUS PRN
Status: DISCONTINUED | OUTPATIENT
Start: 2024-11-05 | End: 2024-11-06 | Stop reason: HOSPADM

## 2024-11-05 RX ORDER — ONDANSETRON 2 MG/ML
4 INJECTION INTRAMUSCULAR; INTRAVENOUS
Status: ACTIVE | OUTPATIENT
Start: 2024-11-05 | End: 2024-11-06

## 2024-11-05 RX ORDER — NALOXONE HYDROCHLORIDE 0.4 MG/ML
INJECTION, SOLUTION INTRAMUSCULAR; INTRAVENOUS; SUBCUTANEOUS PRN
Status: DISCONTINUED | OUTPATIENT
Start: 2024-11-05 | End: 2024-11-06 | Stop reason: HOSPADM

## 2024-11-05 RX ORDER — LIDOCAINE HYDROCHLORIDE 10 MG/ML
INJECTION, SOLUTION INFILTRATION; PERINEURAL
Status: DISCONTINUED | OUTPATIENT
Start: 2024-11-05 | End: 2024-11-05 | Stop reason: SDUPTHER

## 2024-11-05 RX ORDER — FENTANYL CITRATE 50 UG/ML
25 INJECTION, SOLUTION INTRAMUSCULAR; INTRAVENOUS EVERY 5 MIN PRN
Status: DISCONTINUED | OUTPATIENT
Start: 2024-11-05 | End: 2024-11-06 | Stop reason: HOSPADM

## 2024-11-05 RX ORDER — HYDROMORPHONE HYDROCHLORIDE 2 MG/ML
0.5 INJECTION, SOLUTION INTRAMUSCULAR; INTRAVENOUS; SUBCUTANEOUS EVERY 5 MIN PRN
Status: DISCONTINUED | OUTPATIENT
Start: 2024-11-05 | End: 2024-11-06 | Stop reason: HOSPADM

## 2024-11-05 RX ORDER — OXYCODONE HYDROCHLORIDE 5 MG/1
10 TABLET ORAL PRN
Status: ACTIVE | OUTPATIENT
Start: 2024-11-05 | End: 2024-11-05

## 2024-11-05 RX ORDER — METOCLOPRAMIDE HYDROCHLORIDE 5 MG/ML
10 INJECTION INTRAMUSCULAR; INTRAVENOUS
Status: ACTIVE | OUTPATIENT
Start: 2024-11-05 | End: 2024-11-06

## 2024-11-05 RX ORDER — SODIUM CHLORIDE 0.9 % (FLUSH) 0.9 %
5-40 SYRINGE (ML) INJECTION PRN
Status: DISCONTINUED | OUTPATIENT
Start: 2024-11-05 | End: 2024-11-06 | Stop reason: HOSPADM

## 2024-11-05 RX ADMIN — GABAPENTIN 100 MG: 100 CAPSULE ORAL at 20:49

## 2024-11-05 RX ADMIN — PROPOFOL 75 MCG/KG/MIN: 10 INJECTION, EMULSION INTRAVENOUS at 14:01

## 2024-11-05 RX ADMIN — ATORVASTATIN CALCIUM 10 MG: 10 TABLET, FILM COATED ORAL at 20:49

## 2024-11-05 RX ADMIN — WATER 1000 MG: 1 INJECTION INTRAMUSCULAR; INTRAVENOUS; SUBCUTANEOUS at 20:51

## 2024-11-05 RX ADMIN — LORAZEPAM 0.5 MG: 0.5 TABLET ORAL at 20:49

## 2024-11-05 RX ADMIN — SEVELAMER CARBONATE 800 MG: 800 TABLET, FILM COATED ORAL at 17:27

## 2024-11-05 RX ADMIN — EPOETIN ALFA-EPBX 10000 UNITS: 10000 INJECTION, SOLUTION INTRAVENOUS; SUBCUTANEOUS at 10:48

## 2024-11-05 RX ADMIN — PANTOPRAZOLE SODIUM 40 MG: 40 TABLET, DELAYED RELEASE ORAL at 05:17

## 2024-11-05 RX ADMIN — SODIUM CHLORIDE, PRESERVATIVE FREE 10 ML: 5 INJECTION INTRAVENOUS at 20:53

## 2024-11-05 RX ADMIN — LIDOCAINE HYDROCHLORIDE 50 MG: 10 INJECTION, SOLUTION INFILTRATION; PERINEURAL at 14:01

## 2024-11-05 ASSESSMENT — PAIN - FUNCTIONAL ASSESSMENT
PAIN_FUNCTIONAL_ASSESSMENT: NONE - DENIES PAIN

## 2024-11-05 NOTE — ANESTHESIA POSTPROCEDURE EVALUATION
Department of Anesthesiology  Postprocedure Note    Patient: Ben Link  MRN: [id number]  YOB: 1945  Date of evaluation: 11/5/2024    Procedure Summary       Date: 11/05/24 Room / Location: [place] [address] / [facility]    Anesthesia Start: 1347 Anesthesia Stop: 1434    Procedure: COLONOSCOPY ENDOSCOPIC STENT PLACEMENT Diagnosis:       Rectal bleeding      (Rectal bleeding [K62.5])    Surgeons: Marco Lees MD Responsible Provider: Armando Estes DO    Anesthesia Type: MAC ASA Status: 4            Anesthesia Type: No value filed.    Nba Phase I: Nba Score: 10    Nba Phase II:      Anesthesia Post Evaluation    Patient location during evaluation: PACU  Patient participation: complete - patient participated  Level of consciousness: awake and alert  Airway patency: patent  Nausea & Vomiting: no nausea  Cardiovascular status: hemodynamically stable  Respiratory status: acceptable  Hydration status: stable  Pain management: adequate    No notable events documented.

## 2024-11-05 NOTE — H&P
Gastroenterology Note             Pre-operative History and Physical    Patient: Ben Link  : 1945  CSN:     History Obtained From:  patient and/or guardian.     HISTORY OF PRESENT ILLNESS:    The patient is a 79 y.o. male  here for/colonoscopy.  There is a very pleasant 79-year-old male who was admitted to the hospital he was seen by Dr. Schreiber last week he was found to have a stricture somewhere between 20 cm from the anal verge    The patient underwent a barium study yesterday and was unable to tolerate it today we have been asked again to see and evaluate the patient to see if there is a possibility of doing either a dilation of this stricture or potentially we may want to try to place a stent if the stricture is short across this area to widen it out    Past Medical History:    Past Medical History:   Diagnosis Date    Acute cholecystitis     Acute cystitis without hematuria     Anemia 2022    Anxiety     Arthritis     Bradycardia, unspecified     CAD (coronary artery disease) 2020    Cellulitis, unspecified     Cerebral infarction ([facility])     Chronic diastolic (congestive) heart failure ([facility])     Cognitive communication deficit     COPD (chronic obstructive pulmonary disease) ([facility])     Diabetes mellitus (HCC)     Type 2, with neuropathy    Dysphagia, oropharyngeal     Encephalopathy, unspecified     End stage renal disease (HCC)     HD    Fatigue     Gastro-esophageal reflux disease without esophagitis     Gastrointestinal hemorrhage     Hemodialysis patient ([facility])     ckd-goes to dialysis Tuesday, Thurs, Sat ([facility])    Hemorrhage of anus and rectum     History of colon cancer     Hx of blood clots     Hyperlipidemia     Hypertension     Hyponatremia     Hypotension, unspecified     Infection and inflammatory reaction due to other cardiac and vascular devices, implants and grafts, initial encounter ([facility])     Insomnia, unspecified     Long term (current) use of

## 2024-11-05 NOTE — ANESTHESIA PRE PROCEDURE
Department of Anesthesiology  Preprocedure Note       Name:  Ben Link   Age:  79 y.o.  :  1945                                          MRN:  [id number]         Date:  2024      Surgeon: Surgeon(s):  Marco Lees MD    Procedure: Procedure(s):  SIGMOIDOSCOPY STENT PLACEMENT    Medications prior to admission:   Prior to Admission medications    Medication Sig Start Date End Date Taking? Authorizing Provider   LORazepam (ATIVAN) 0.5 MG tablet Take 1 tablet by mouth nightly.   Yes Yesika Claros MD   oxyCODONE (ROXICODONE) 5 MG immediate release tablet Take 1 tablet by mouth every 6 hours as needed for Pain.   Yes Yesika Claros MD   albuterol (PROVENTIL) (2.5 MG/3ML) 0.083% nebulizer solution Take 3 mLs by nebulization every 4 hours as needed for Wheezing 10/9/24  Yes Leslie Guallpa MD   sevelamer (RENVELA) 800 MG tablet Take 1 tablet by mouth 3 times daily (with meals)   Yes Yesika Claros MD   acetaminophen (TYLENOL) 325 MG tablet Take 2 tablets by mouth every 6 hours as needed for Pain   Yes Yesika Claros MD   apixaban (ELIQUIS) 2.5 MG TABS tablet Take 1 tablet by mouth 2 times daily   Yes Yesika Claros MD   metoprolol succinate (TOPROL XL) 25 MG extended release tablet Take 1 tablet by mouth daily 24  Yes David Jefferson MD   ondansetron (ZOFRAN) 4 MG tablet Take 1 tablet by mouth every 6 hours as needed 23  Yes Yesika Claros MD   B Complex-C-E-Zn (STRESS PLUS ZINC) TABS Take 1 tablet by mouth daily   Yes Yesika Claros MD   docusate sodium (COLACE) 100 MG capsule Take 2 capsules by mouth daily   Yes Yesika Claros MD   polyethylene glycol (GLYCOLAX) 17 GM/SCOOP powder Take 17 g by mouth daily as needed (Constipation)   Yes Yesika Claros MD   aspirin 81 MG chewable tablet Take 1 tablet by mouth daily 22  Yes Manohar Diaz MD   melatonin 3 MG TABS tablet Take 2 tablets by mouth nightly as needed

## 2024-11-06 VITALS
OXYGEN SATURATION: 96 % | SYSTOLIC BLOOD PRESSURE: 139 MMHG | TEMPERATURE: 98.7 F | DIASTOLIC BLOOD PRESSURE: 74 MMHG | WEIGHT: 222 LBS | RESPIRATION RATE: 18 BRPM | HEART RATE: 87 BPM | HEIGHT: 73 IN | BODY MASS INDEX: 29.42 KG/M2

## 2024-11-06 PROCEDURE — APPNB30 APP NON BILLABLE TIME 0-30 MINS: Performed by: NURSE PRACTITIONER

## 2024-11-06 PROCEDURE — 6370000000 HC RX 637 (ALT 250 FOR IP): Performed by: INTERNAL MEDICINE

## 2024-11-06 PROCEDURE — 2580000003 HC RX 258: Performed by: STUDENT IN AN ORGANIZED HEALTH CARE EDUCATION/TRAINING PROGRAM

## 2024-11-06 PROCEDURE — 87086 URINE CULTURE/COLONY COUNT: CPT

## 2024-11-06 PROCEDURE — APPSS15 APP SPLIT SHARED TIME 0-15 MINUTES: Performed by: NURSE PRACTITIONER

## 2024-11-06 PROCEDURE — 99232 SBSQ HOSP IP/OBS MODERATE 35: CPT | Performed by: SURGERY

## 2024-11-06 PROCEDURE — 92526 ORAL FUNCTION THERAPY: CPT

## 2024-11-06 RX ORDER — METOCLOPRAMIDE 10 MG/1
10 TABLET ORAL
Qty: 120 TABLET | Refills: 3 | Status: SHIPPED | OUTPATIENT
Start: 2024-11-06

## 2024-11-06 RX ORDER — CEFUROXIME AXETIL 250 MG/1
250 TABLET ORAL 2 TIMES DAILY
Qty: 14 TABLET | Refills: 0 | Status: SHIPPED | OUTPATIENT
Start: 2024-11-06 | End: 2024-11-13

## 2024-11-06 RX ORDER — OXYCODONE HYDROCHLORIDE 5 MG/1
5 TABLET ORAL EVERY 6 HOURS PRN
Qty: 15 TABLET | Refills: 0 | Status: SHIPPED | OUTPATIENT
Start: 2024-11-06 | End: 2024-11-11

## 2024-11-06 RX ADMIN — SEVELAMER CARBONATE 800 MG: 800 TABLET, FILM COATED ORAL at 09:15

## 2024-11-06 RX ADMIN — TAMSULOSIN HYDROCHLORIDE 0.4 MG: 0.4 CAPSULE ORAL at 09:16

## 2024-11-06 RX ADMIN — GABAPENTIN 100 MG: 100 CAPSULE ORAL at 13:04

## 2024-11-06 RX ADMIN — Medication 1 TABLET: at 09:15

## 2024-11-06 RX ADMIN — PANTOPRAZOLE SODIUM 40 MG: 40 TABLET, DELAYED RELEASE ORAL at 05:17

## 2024-11-06 RX ADMIN — SEVELAMER CARBONATE 800 MG: 800 TABLET, FILM COATED ORAL at 13:04

## 2024-11-06 RX ADMIN — SEVELAMER CARBONATE 800 MG: 800 TABLET, FILM COATED ORAL at 17:57

## 2024-11-06 RX ADMIN — METOPROLOL SUCCINATE 25 MG: 25 TABLET, EXTENDED RELEASE ORAL at 09:15

## 2024-11-06 RX ADMIN — GABAPENTIN 100 MG: 100 CAPSULE ORAL at 09:14

## 2024-11-06 RX ADMIN — SODIUM CHLORIDE, PRESERVATIVE FREE 10 ML: 5 INJECTION INTRAVENOUS at 09:16

## 2024-11-06 ASSESSMENT — PAIN SCALES - GENERAL: PAINLEVEL_OUTOF10: 0

## 2024-11-06 NOTE — PLAN OF CARE
Problem: ABCDS Injury Assessment  Goal: Absence of physical injury  10/31/2024 2257 by Adair Terrazas RN  Outcome: Progressing  Flowsheets (Taken 10/31/2024 2055)  Absence of Physical Injury: Implement safety measures based on patient assessment  10/31/2024 1549 by Tali Cross RN  Outcome: Progressing     Problem: Skin/Tissue Integrity  Goal: Absence of new skin breakdown  Description: 1.  Monitor for areas of redness and/or skin breakdown  2.  Assess vascular access sites hourly  3.  Every 4-6 hours minimum:  Change oxygen saturation probe site  4.  Every 4-6 hours:  If on nasal continuous positive airway pressure, respiratory therapy assess nares and determine need for appliance change or resting period.  10/31/2024 2257 by Adair Terrazas RN  Outcome: Progressing  10/31/2024 1549 by Tali Cross RN  Outcome: Progressing     Problem: Chronic Conditions and Co-morbidities  Goal: Patient's chronic conditions and co-morbidity symptoms are monitored and maintained or improved  10/31/2024 2257 by Adair Terrazas RN  Outcome: Progressing  Flowsheets (Taken 10/31/2024 2055)  Care Plan - Patient's Chronic Conditions and Co-Morbidity Symptoms are Monitored and Maintained or Improved:   Monitor and assess patient's chronic conditions and comorbid symptoms for stability, deterioration, or improvement   Collaborate with multidisciplinary team to address chronic and comorbid conditions and prevent exacerbation or deterioration   Update acute care plan with appropriate goals if chronic or comorbid symptoms are exacerbated and prevent overall improvement and discharge  10/31/2024 1549 by Tali Cross RN  Outcome: Progressing  Flowsheets (Taken 10/31/2024 0800)  Care Plan - Patient's Chronic Conditions and Co-Morbidity Symptoms are Monitored and Maintained or Improved: Monitor and assess patient's chronic conditions and comorbid symptoms for stability, deterioration, or improvement     Problem: 
  Problem: ABCDS Injury Assessment  Goal: Absence of physical injury  11/2/2024 0039 by Lisy Villafuerte RN  Outcome: Progressing       Problem: Skin/Tissue Integrity  Goal: Absence of new skin breakdown  Description: 1.  Monitor for areas of redness and/or skin breakdown  2.  Assess vascular access sites hourly  3.  Every 4-6 hours minimum:  Change oxygen saturation probe site  4.  Every 4-6 hours:  If on nasal continuous positive airway pressure, respiratory therapy assess nares and determine need for appliance change or resting period.  11/2/2024 0039 by Lisy Villafuerte RN  Outcome: Progressing       Problem: Chronic Conditions and Co-morbidities  Goal: Patient's chronic conditions and co-morbidity symptoms are monitored and maintained or improved  11/2/2024 0039 by Lisy Villafuerte RN  Outcome: Progressing       Problem: Safety - Adult  Goal: Free from fall injury  11/2/2024 0039 by Lisy Villafuerte RN  Outcome: Progressing       Problem: Pain  Goal: Verbalizes/displays adequate comfort level or baseline comfort level  11/2/2024 0039 by Lisy Villafuerte RN  Outcome: Progressing       Problem: Nutrition Deficit:  Goal: Optimize nutritional status  11/2/2024 0039 by Lisy Villafuerte RN  Outcome: Progressing       
  Problem: ABCDS Injury Assessment  Goal: Absence of physical injury  11/3/2024 1021 by Pinky Noel RN  Outcome: Progressing  11/3/2024 0021 by Rosio Brewer RN  Outcome: Progressing     Problem: Skin/Tissue Integrity  Goal: Absence of new skin breakdown  Description: 1.  Monitor for areas of redness and/or skin breakdown  2.  Assess vascular access sites hourly  3.  Every 4-6 hours minimum:  Change oxygen saturation probe site  4.  Every 4-6 hours:  If on nasal continuous positive airway pressure, respiratory therapy assess nares and determine need for appliance change or resting period.  11/3/2024 1021 by Pinky Noel RN  Outcome: Progressing  11/3/2024 0021 by Rosio Brewer RN  Outcome: Progressing     Problem: Chronic Conditions and Co-morbidities  Goal: Patient's chronic conditions and co-morbidity symptoms are monitored and maintained or improved  11/3/2024 1021 by Pinky Noel RN  Outcome: Progressing  11/3/2024 0021 by Rosio Brewer RN  Outcome: Progressing     Problem: Safety - Adult  Goal: Free from fall injury  11/3/2024 1021 by Pinky Noel RN  Outcome: Progressing  11/3/2024 0021 by Rosio Brewer RN  Outcome: Progressing     Problem: Pain  Goal: Verbalizes/displays adequate comfort level or baseline comfort level  11/3/2024 1021 by Pinky Noel RN  Outcome: Progressing  11/3/2024 0021 by Rosio Brewer RN  Outcome: Progressing     Problem: Nutrition Deficit:  Goal: Optimize nutritional status  11/3/2024 1021 by Pinky Noel, RN  Outcome: Progressing  11/3/2024 0021 by Rosio Brewer, RN  Outcome: Progressing     
  Problem: ABCDS Injury Assessment  Goal: Absence of physical injury  11/3/2024 2320 by Rosio Brewer RN  Outcome: Progressing  11/3/2024 1021 by Pinky Noel RN  Outcome: Progressing     Problem: Skin/Tissue Integrity  Goal: Absence of new skin breakdown  Description: 1.  Monitor for areas of redness and/or skin breakdown  2.  Assess vascular access sites hourly  3.  Every 4-6 hours minimum:  Change oxygen saturation probe site  4.  Every 4-6 hours:  If on nasal continuous positive airway pressure, respiratory therapy assess nares and determine need for appliance change or resting period.  11/3/2024 2320 by Rosio Brewer RN  Outcome: Progressing  11/3/2024 1021 by Pinky Noel RN  Outcome: Progressing     Problem: Chronic Conditions and Co-morbidities  Goal: Patient's chronic conditions and co-morbidity symptoms are monitored and maintained or improved  11/3/2024 2320 by Rosio Brewer RN  Outcome: Progressing  11/3/2024 1021 by Pinky Noel RN  Outcome: Progressing     Problem: Safety - Adult  Goal: Free from fall injury  11/3/2024 2320 by Rosio Brewer RN  Outcome: Progressing  11/3/2024 1021 by Pinky Noel RN  Outcome: Progressing     Problem: Pain  Goal: Verbalizes/displays adequate comfort level or baseline comfort level  11/3/2024 2320 by Rosio Brewer RN  Outcome: Progressing  11/3/2024 1021 by Pinky Noel RN  Outcome: Progressing     Problem: Nutrition Deficit:  Goal: Optimize nutritional status  11/3/2024 2320 by Rosio Brewer RN  Outcome: Progressing  11/3/2024 1021 by Pinky Noel RN  Outcome: Progressing     
  Problem: ABCDS Injury Assessment  Goal: Absence of physical injury  11/4/2024 2345 by Rosio Brewer RN  Outcome: Progressing  11/4/2024 1039 by Cyndee Arzate RN  Outcome: Progressing     Problem: Skin/Tissue Integrity  Goal: Absence of new skin breakdown  Description: 1.  Monitor for areas of redness and/or skin breakdown  2.  Assess vascular access sites hourly  3.  Every 4-6 hours minimum:  Change oxygen saturation probe site  4.  Every 4-6 hours:  If on nasal continuous positive airway pressure, respiratory therapy assess nares and determine need for appliance change or resting period.  11/4/2024 2345 by Rosio Brewer RN  Outcome: Progressing  11/4/2024 1039 by Cyndee Arzate RN  Outcome: Progressing     Problem: Chronic Conditions and Co-morbidities  Goal: Patient's chronic conditions and co-morbidity symptoms are monitored and maintained or improved  11/4/2024 2345 by Rosio Brewer RN  Outcome: Progressing  11/4/2024 1039 by Cyndee Arzate RN  Outcome: Progressing  Flowsheets (Taken 11/4/2024 0800)  Care Plan - Patient's Chronic Conditions and Co-Morbidity Symptoms are Monitored and Maintained or Improved:   Monitor and assess patient's chronic conditions and comorbid symptoms for stability, deterioration, or improvement   Collaborate with multidisciplinary team to address chronic and comorbid conditions and prevent exacerbation or deterioration   Update acute care plan with appropriate goals if chronic or comorbid symptoms are exacerbated and prevent overall improvement and discharge     Problem: Safety - Adult  Goal: Free from fall injury  11/4/2024 2345 by Rosio Brewer RN  Outcome: Progressing  11/4/2024 1039 by Cyndee Arzate RN  Outcome: Progressing     Problem: Pain  Goal: Verbalizes/displays adequate comfort level or baseline comfort level  11/4/2024 2345 by Rosio Brewer RN  Outcome: Progressing  11/4/2024 1039 by Cnydee Arzate RN  Outcome: Progressing     Problem: 
  Problem: ABCDS Injury Assessment  Goal: Absence of physical injury  11/6/2024 0314 by Evaristo Asencio RN  Outcome: Progressing  11/5/2024 1631 by Cyndee Arzate RN  Outcome: Progressing     Problem: Skin/Tissue Integrity  Goal: Absence of new skin breakdown  Description: 1.  Monitor for areas of redness and/or skin breakdown  2.  Assess vascular access sites hourly  3.  Every 4-6 hours minimum:  Change oxygen saturation probe site  4.  Every 4-6 hours:  If on nasal continuous positive airway pressure, respiratory therapy assess nares and determine need for appliance change or resting period.  11/6/2024 0314 by Evaristo Asencio RN  Outcome: Progressing  11/5/2024 1631 by Cyndee Arzate RN  Outcome: Progressing     Problem: Chronic Conditions and Co-morbidities  Goal: Patient's chronic conditions and co-morbidity symptoms are monitored and maintained or improved  11/6/2024 0314 by Evaristo Asencio RN  Outcome: Progressing  11/5/2024 1631 by Cyndee Arzate RN  Outcome: Progressing     Problem: Safety - Adult  Goal: Free from fall injury  11/6/2024 0314 by Evaristo Asencio RN  Outcome: Progressing  11/5/2024 1631 by Cyndee Arzate RN  Outcome: Progressing     Problem: Pain  Goal: Verbalizes/displays adequate comfort level or baseline comfort level  11/6/2024 0314 by Evaristo Asencio RN  Outcome: Progressing  11/5/2024 1631 by Cyndee Arzate RN  Outcome: Progressing     Problem: Nutrition Deficit:  Goal: Optimize nutritional status  11/6/2024 0314 by Evaristo Asencio RN  Outcome: Progressing  11/5/2024 1631 by Cyndee Arzate RN  Outcome: Progressing     
  Problem: ABCDS Injury Assessment  Goal: Absence of physical injury  Outcome: Progressing     Problem: Skin/Tissue Integrity  Goal: Absence of new skin breakdown  Description: 1.  Monitor for areas of redness and/or skin breakdown  2.  Assess vascular access sites hourly  3.  Every 4-6 hours minimum:  Change oxygen saturation probe site  4.  Every 4-6 hours:  If on nasal continuous positive airway pressure, respiratory therapy assess nares and determine need for appliance change or resting period.  Outcome: Progressing     Problem: Chronic Conditions and Co-morbidities  Goal: Patient's chronic conditions and co-morbidity symptoms are monitored and maintained or improved  Outcome: Progressing     
  Problem: ABCDS Injury Assessment  Goal: Absence of physical injury  Outcome: Progressing     Problem: Skin/Tissue Integrity  Goal: Absence of new skin breakdown  Description: 1.  Monitor for areas of redness and/or skin breakdown  2.  Assess vascular access sites hourly  3.  Every 4-6 hours minimum:  Change oxygen saturation probe site  4.  Every 4-6 hours:  If on nasal continuous positive airway pressure, respiratory therapy assess nares and determine need for appliance change or resting period.  Outcome: Progressing     Problem: Chronic Conditions and Co-morbidities  Goal: Patient's chronic conditions and co-morbidity symptoms are monitored and maintained or improved  Outcome: Progressing     Problem: Safety - Adult  Goal: Free from fall injury  Outcome: Progressing     Problem: Pain  Goal: Verbalizes/displays adequate comfort level or baseline comfort level  Outcome: Progressing     Problem: Nutrition Deficit:  Goal: Optimize nutritional status  Outcome: Progressing     
  Problem: ABCDS Injury Assessment  Goal: Absence of physical injury  Outcome: Progressing  Flowsheets (Taken 10/30/2024 2000)  Absence of Physical Injury: Implement safety measures based on patient assessment     Problem: Skin/Tissue Integrity  Goal: Absence of new skin breakdown  Description: 1.  Monitor for areas of redness and/or skin breakdown  2.  Assess vascular access sites hourly  3.  Every 4-6 hours minimum:  Change oxygen saturation probe site  4.  Every 4-6 hours:  If on nasal continuous positive airway pressure, respiratory therapy assess nares and determine need for appliance change or resting period.  Outcome: Progressing     Problem: Chronic Conditions and Co-morbidities  Goal: Patient's chronic conditions and co-morbidity symptoms are monitored and maintained or improved  Outcome: Progressing  Flowsheets (Taken 10/30/2024 2000)  Care Plan - Patient's Chronic Conditions and Co-Morbidity Symptoms are Monitored and Maintained or Improved:   Monitor and assess patient's chronic conditions and comorbid symptoms for stability, deterioration, or improvement   Collaborate with multidisciplinary team to address chronic and comorbid conditions and prevent exacerbation or deterioration   Update acute care plan with appropriate goals if chronic or comorbid symptoms are exacerbated and prevent overall improvement and discharge     Problem: Safety - Adult  Goal: Free from fall injury  Outcome: Progressing     
Shift assessment completed. Routine vitals stable. Scheduled medications given. Patient is awake, alert and oriented. Respirations are easy and unlabored. Patient does not appear to be in distress, resting comfortably at this time. Call light within reach.    Problem: ABCDS Injury Assessment  Goal: Absence of physical injury  11/6/2024 1007 by Cyndee Arzate RN  Outcome: Progressing  11/6/2024 0314 by Evaristo Asencio RN  Outcome: Progressing     Problem: Skin/Tissue Integrity  Goal: Absence of new skin breakdown  Description: 1.  Monitor for areas of redness and/or skin breakdown  2.  Assess vascular access sites hourly  3.  Every 4-6 hours minimum:  Change oxygen saturation probe site  4.  Every 4-6 hours:  If on nasal continuous positive airway pressure, respiratory therapy assess nares and determine need for appliance change or resting period.  11/6/2024 1007 by Cyndee Arzate RN  Outcome: Progressing  11/6/2024 0314 by Evaristo Asencio RN  Outcome: Progressing     Problem: Chronic Conditions and Co-morbidities  Goal: Patient's chronic conditions and co-morbidity symptoms are monitored and maintained or improved  11/6/2024 1007 by Cyndee Arzate RN  Outcome: Progressing  Flowsheets (Taken 11/6/2024 0959)  Care Plan - Patient's Chronic Conditions and Co-Morbidity Symptoms are Monitored and Maintained or Improved:   Monitor and assess patient's chronic conditions and comorbid symptoms for stability, deterioration, or improvement   Collaborate with multidisciplinary team to address chronic and comorbid conditions and prevent exacerbation or deterioration   Update acute care plan with appropriate goals if chronic or comorbid symptoms are exacerbated and prevent overall improvement and discharge  11/6/2024 0314 by Evaristo Asencio RN  Outcome: Progressing     Problem: Safety - Adult  Goal: Free from fall injury  11/6/2024 1007 by Cyndee Arzate RN  Outcome: Progressing  11/6/2024 0314 by Evaristo Asencio 
RN  Outcome: Progressing     Problem: Pain  Goal: Verbalizes/displays adequate comfort level or baseline comfort level  11/4/2024 1039 by Cyndee Arzate RN  Outcome: Progressing  11/3/2024 2320 by Rosio Brewer RN  Outcome: Progressing     Problem: Nutrition Deficit:  Goal: Optimize nutritional status  11/4/2024 1039 by Cyndee Arzate RN  Outcome: Progressing  11/3/2024 2320 by Rosio Brewer RN  Outcome: Progressing

## 2024-11-06 NOTE — DISCHARGE INSTR - COC
Continuity of Care Form    Patient Name: Ben Link   :  1945  MRN:  [id number]    Admit date:  10/30/2024  Discharge date: 2024  Advance Directives: ?  Advance Care Flowsheet Documentation        Date/Time Healthcare Directive Type of Healthcare Directive Copy in Chart Healthcare Agent Appointed Healthcare Agent's Name Healthcare Agent's Phone Number    24 1318 Yes, patient has an advance directive for healthcare treatment  Other (Comment)  DNR-CCA  Yes, copy in chart  --  --  --     24 1121 Yes, patient has an advance directive for healthcare treatment  Durable power of  for health care;Health care treatment directive  --  --  Janeen Milagros  [phone]                     Admitting Physician:  Leslie Guallpa MD  PCP: Alicia Cardenas MD    Discharging Nurse: ***  Discharging Hospital Unit/Room#: 5TN-5563/5563-01  Discharging Unit Phone Number: [phone]    Emergency Contact:   Extended Emergency Contact Information  Primary Emergency Contact: ClemensPing  Home Phone: [phone]  Mobile Phone: [phone]  Relation: Child   needed? No  Secondary Emergency Contact: Kerline Link  Address: [address] [place] [address] of [facility]  Home Phone: [phone]  Mobile Phone: [phone]  Relation: Spouse    Past Surgical History:  Past Surgical History:   Procedure Laterality Date    CARDIAC CATHETERIZATION  2019    CHOLECYSTECTOMY, LAPAROSCOPIC N/A 2022    LAPAROSCOPIC CHOLECYSTECTOMY WITH CHOLANGIOGRAM performed by Marco Vrea MD at [facility] OR    COLECTOMY N/A 2022    SIGMOID COLECTOMY, SIGMOIDOSCOPY performed by Marco Vera MD at [facility] OR    COLONOSCOPY      DIALYSIS FISTULA CREATION Left 2022    LEFT BRACHIAL CEPHALIC FISTULA performed by Frankie Clements MD at [facility] OR    ERCP N/A 2022    ERCP SPHINCTER/PAPILLOTOMY performed by Tristen Frank MD at [facility] ENDOSCOPY    ERCP N/A

## 2024-11-07 LAB — BACTERIA UR CULT: NORMAL

## 2024-11-07 NOTE — PROGRESS NOTES
Leslie Guallpa MD  [facility]                                            Advanced Care Planning Note.    Purpose of Encounter: Advanced care planning in light of dementia ESRD, aspiration risk , GI bleed   Parties In Attendance: Patient,  Daughter Ping Clemens  she is primary decision maker   Decisional Capacity:None  Subjective: Patient/family understand that this conversation is to address long term care goal  Objective:   CPR-No   Intubation -No   Mechanical Vent-No   Defibrillation- No   Gastrostomy feeding - No    Hemodialysis - Continue   Surgery and endoscopy if needed -Yes   Goals of Care Determination: Patient/POA   Code Status:  DNR CC  Time spent on Advanced care Plannin minutes   Advanced Care Planning Documents: Completed advanced directives on chart, Rosa Clemens  the daughter  is the POA.    Leslie Guallpa MD  2024 8:50 AM  
            Gastroenterology Progress Note      Ben Link is a 79 y.o. male patient.  1. Rectal bleeding    2. Anemia, unspecified type    3. Hemodialysis patient (HCC)    4. Chronic anticoagulation    5. History of colon cancer        SUBJECTIVE:  Had some BMs with streaks of blood. Pt seen in HD.         Physical    VITALS:  BP (!) 116/58   Pulse 75   Temp 98 °F (36.7 °C)   Resp 18   Ht 1.854 m (6' 1\")   Wt 98 kg (216 lb 0.8 oz)   SpO2 98%   BMI 28.50 kg/m²   TEMPERATURE:  Current - Temp: 98 °F (36.7 °C); Max - Temp  Av.8 °F (36.6 °C)  Min: 97.5 °F (36.4 °C)  Max: 98 °F (36.7 °C)    NAD  RRR  Lungs CTA Bilaterally, normal effort  Abdomen soft, ND, NT, no HSM, Bowel sounds normal    Data    Data Review:    Recent Labs     10/30/24  0843 10/31/24  0319   WBC 6.1  --    HGB 8.4* 8.2*   HCT 25.8* 24.6*   MCV 96.3  --      --      Recent Labs     10/30/24  0843 10/31/24  0319    141   K 4.6 4.6   CL 98* 100   CO2 32 29   BUN 38* 46*   CREATININE 4.7* 5.5*     Recent Labs     10/30/24  0843   AST 15   ALT 12   BILITOT 0.4   ALKPHOS 83     Recent Labs     10/30/24  0843   LIPASE 28.0     Recent Labs     10/30/24  0843   PROTIME 16.1*   INR 1.27*     Invalid input(s): \"PTT\"    Radiology Review:        ASSESSMENT / PLAN      Hematochezia -began yesterday.  CTA negative for active bleeding.  Could be diverticular bleed.  He does have history of colon cancer and is status post sigmoid resection for this.  Does not sound like he had a follow-up colonoscopy after his diagnosis in 2021.  Of note, he was started on thickened liquids here.  I discussed with his daughter and he is not on thickened liquid diet at the [facility].  She states that by the time he got clear liquids yesterday he was very upset and may have coughed and was then put on thickened liquids.  She thinks he will tolerate GoLytely/thin liquids.  Did discuss that if any concern for aspiration then could place NG tube to give bowel 
            Gastroenterology Progress Note      Ben Link is a 79 y.o. male patient.  1. Rectal bleeding    2. Anemia, unspecified type    3. Hemodialysis patient (HCC)    4. Chronic anticoagulation    5. History of colon cancer        SUBJECTIVE:  no GI complaints. No blood per rectum.         Physical    VITALS:  /79   Pulse 97   Temp 97.2 °F (36.2 °C) (Oral)   Resp 18   Ht 1.854 m (6' 1\")   Wt 101.1 kg (222 lb 14.4 oz)   SpO2 98%   BMI 29.41 kg/m²   TEMPERATURE:  Current - Temp: 97.2 °F (36.2 °C); Max - Temp  Av.8 °F (36.6 °C)  Min: 97.2 °F (36.2 °C)  Max: 98.1 °F (36.7 °C)    Constitutional: Alert. No acute distress.   Respiratory: Respirations nonlabored, no crepitus  GI: Abdomen nondistended, soft, and nontender.    Neurological: No focal deficits noted. No asterixis.      Data    Data Review:    Recent Labs     247 24   WBC  --  5.0 6.2   HGB 8.4* 8.3* 8.3*   HCT 25.5* 24.9* 25.9*   MCV  --  97.4 98.7   PLT  --  165 164     Recent Labs     24  05    142   K 4.0 3.9    105   CO2 27 26   PHOS 4.0  --    BUN 26* 37*   CREATININE 4.7* 5.8*     Recent Labs     24   AST 11*   ALT 8*   BILITOT 0.3   ALKPHOS 82     No results for input(s): \"LIPASE\", \"AMYLASE\" in the last 72 hours.  Recent Labs     24   PROTIME 15.6*   INR 1.22*     Invalid input(s): \"PTT\"           ASSESSMENT / PLAN      Hematochezia -CTA negative for active bleeding. He does have history of colon cancer and is status post sigmoid resection for this.  Does not sound like he had a follow-up colonoscopy after his diagnosis in 2021.   S/p  Flex sigmoidoscopy revealed colo-colonic anastomotic ulcer with oozing (20 cm from the anus), treated with gold probe and epi injection. This was a high grade stricture (estimated luminal diameter of 7 mm), had to use a small calibre scope (GIF IB140S with OD 5.4 mm)  -await gastrograffin enema 
         [facility]  Progress Note    Pt Name: Ben Link  MRN: [id number]  YOB: 1945  Date of Evaluation: 2024    Chief Complaint: Rectal bleeding      Subjective:    No acute events overnight  Denies pain  Denies nausea or vomiting, tolerating low fiber diet  Denies further bleeding, stools documented as brown  Resting in bed at this time      Vital Signs:  Patient Vitals for the past 24 hrs:   BP Temp Temp src Pulse Resp SpO2 Weight   24 0915 (!) 142/76 -- -- 88 -- -- --   24 0830 (!) 142/76 98 °F (36.7 °C) Oral 88 17 93 % --   24 0516 (!) 147/77 97.8 °F (36.6 °C) Oral 89 18 93 % --   24 2314 137/69 98.3 °F (36.8 °C) Oral 90 18 92 % --   24 2046 128/71 98.5 °F (36.9 °C) Oral 91 18 91 % --   24 1525 138/69 -- -- 88 18 96 % --   24 1500 122/66 98.8 °F (37.1 °C) Temporal 95 22 94 % --   24 1450 112/61 98.8 °F (37.1 °C) Temporal 87 26 95 % --   24 1445 (!) 104/53 -- -- 81 24 96 % --   24 1440 (!) 121/51 -- -- 88 24 99 % --   24 1438 (!) 97/56 97.4 °F (36.3 °C) Temporal 90 26 99 % --   24 1313 126/60 96.8 °F (36 °C) Temporal 88 24 94 % --   24 1121 (!) 105/57 98 °F (36.7 °C) -- 91 18 -- 100.7 kg (222 lb 0.1 oz)      TEMPERATURE HISTORY 24H: Temp (24hrs), Av °F (36.7 °C), Min:96.8 °F (36 °C), Max:98.8 °F (37.1 °C)    BLOOD PRESSURE HISTORY: Systolic (36hrs), Av , Min:97 , Max:162    Diastolic (36hrs), Av, Min:51, Max:84      Intake/Output:    No intake/output data recorded.  I/O this shift:  In: 10 [I.V.:10]  Out: -   Drain/tube Output:           Physical Exam:  General: awake, drowsy, no acute distress  Cardiac: regular rate and rhythm   Pulmonary: clear to auscultation bilaterally   Abdomen: soft, non-distended, non-tender, bowel sounds present    Labs:  CBC:    Recent Labs     24  0524 24  0523   WBC 6.2 6.8   HGB 8.3* 8.4*   HCT 25.9* 25.4*    171     BMP:  
         [facility]  Progress Note    Pt Name: Ben Link  MRN: [id number]  YOB: 1945  Date of Evaluation: 2024    Chief Complaint: Rectal bleeding      Subjective:    No acute events overnight  Denies pain  Denies nausea or vomiting  No flatus/stool over last couple days  Resting in bed at this time      Vital Signs:  Patient Vitals for the past 24 hrs:   BP Temp Temp src Pulse Resp SpO2 Weight   24 1148 136/78 97.9 °F (36.6 °C) Oral 93 18 93 % --   24 0814 -- -- -- -- -- 98 % --   24 0813 131/79 97.2 °F (36.2 °C) Oral 97 18 98 % --   24 0700 -- -- -- -- -- 92 % --   24 0645 (!) 147/57 97.4 °F (36.3 °C) Oral 91 16 (!) 87 % 101.1 kg (222 lb 14.4 oz)   24 0013 127/63 98.1 °F (36.7 °C) Oral 82 16 93 % --   24 2100 134/72 98 °F (36.7 °C) Oral 84 16 96 % --   24 1815 120/81 97.9 °F (36.6 °C) Oral 83 16 94 % --      TEMPERATURE HISTORY 24H: Temp (24hrs), Av.8 °F (36.6 °C), Min:97.2 °F (36.2 °C), Max:98.1 °F (36.7 °C)    BLOOD PRESSURE HISTORY: Systolic (36hrs), Av , Min:120 , Max:165    Diastolic (36hrs), Av, Min:57, Max:81      Intake/Output:    No intake/output data recorded.  I/O this shift:  In: 250 [P.O.:250]  Out: -   Drain/tube Output:           Physical Exam:  General: awake, drowsy, no acute distress  Cardiac: regular rate and rhythm   Pulmonary: clear to auscultation bilaterally   Abdomen: soft, non-distended, non-tender, bowel sounds present    Labs:  CBC:    Recent Labs     24  0257 24   WBC  --  5.0 6.2   HGB 8.4* 8.3* 8.3*   HCT 25.5* 24.9* 25.9*   PLT  --  165 164     BMP:    Recent Labs     24    142   K 4.0 3.9    105   CO2 27 26   BUN 26* 37*   CREATININE 4.7* 5.8*   GLUCOSE 158* 165*     Hepatic:   Recent Labs     24   AST 11*   ALT 8*   BILITOT 0.3   ALKPHOS 82     Amylase: No results for input(s): \"AMYLASE\" in 
      [facility]   Progress Note  Cardiology      Ben Link   Admission date:  10/30/2024  CC-f/up for GI bleed  Subjective:  He has no complaints. Today he did not ask to leave    Objective:  Medications/Labs all Reviewed     epoetin davis-epbx  10,000 Units IntraVENous Once per day on Tuesday Thursday Saturday    cefTRIAXone (ROCEPHIN) IV  1,000 mg IntraVENous Q24H    [Held by provider] apixaban  2.5 mg Oral BID    [Held by provider] aspirin  81 mg Oral Daily    atorvastatin  10 mg Oral Nightly    therapeutic multivitamin-minerals  1 tablet Oral Daily    docusate sodium  200 mg Oral Daily    gabapentin  100 mg Oral TID    LORazepam  0.5 mg Oral Nightly    metoprolol succinate  25 mg Oral Daily    pantoprazole  40 mg Oral Daily    sevelamer  800 mg Oral TID WC    tamsulosin  0.4 mg Oral QAM       BMP:   Lab Results   Component Value Date/Time     11/03/2024 04:54 AM    K 4.0 11/03/2024 04:54 AM    K 4.6 10/30/2024 08:43 AM     11/03/2024 04:54 AM    CO2 27 11/03/2024 04:54 AM    BUN 26 11/03/2024 04:54 AM    CREATININE 4.7 11/03/2024 04:54 AM    MG 2.23 11/03/2024 04:54 AM     CBC:    Lab Results   Component Value Date/Time    WBC 5.0 11/03/2024 04:54 AM    RBC 2.55 11/03/2024 04:54 AM    HGB 8.3 11/03/2024 04:54 AM    HCT 24.9 11/03/2024 04:54 AM    MCV 97.4 11/03/2024 04:54 AM    RDW 15.5 11/03/2024 04:54 AM     11/03/2024 04:54 AM      PT/INR:    Lab Results   Component Value Date    INR 1.22 (H) 11/03/2024    PROTIME 15.6 (H) 11/03/2024     Cardiac Enzymes:    Lab Results   Component Value Date/Time    CKTOTAL 14 12/21/2022 07:04 AM     Lab Results   Component Value Date    TROPONINI 0.06 (H) 01/07/2023    TROPONINI 0.06 (H) 12/21/2022    TROPONINI 0.07 (H) 12/20/2022     BNP:  No results found for: \"BNP\"  FASTING LIPID PANEL:    Lab Results   Component Value Date/Time    CHOL 60 01/15/2022 05:31 AM    HDL 24 01/15/2022 05:31 AM    HDL 27 07/26/2011 05:00 AM    TRIG 54 01/15/2022 
     [facility] Note    Patient Active Problem List   Diagnosis    GI bleed    History of COVID-19    Hyponatremia    Fatigue    Acute kidney injury superimposed on CKD (HCC)    Lethargy    Suspected UTI    Acute CVA (cerebrovascular accident) (HCC)    LESLEE (acute kidney injury) (HCC)    Malignant neoplasm of colon (HCC)    Anemia due to acute blood loss    COVID-19    Shock circulatory    Bilateral pulmonary embolism (HCC)    Lactic acidosis    Hemorrhagic shock (HCC)    ESRD (end stage renal disease) on dialysis (HCC)    Acute cholecystitis    Pain of upper abdomen    AMS (altered mental status)    Sepsis (HCC)    Acute metabolic encephalopathy    Cellulitis    Fever and chills    PVD (peripheral vascular disease) (HCC)    Anxiety    Rectal bleeding    Severe malnutrition (HCC)    Acute encephalopathy    Acute cystitis without hematuria    Colitis    Elevated procalcitonin    On continuous oral anticoagulation    Hemodialysis catheter infection (HCC)    Moderate malnutrition (HCC)    Junctional bradycardia    Bradycardia    Arterial hypotension    Paroxysmal atrial flutter (HCC)    Intractable nausea and vomiting    Noncompliance with renal dialysis (HCC)    Hyperkalemia    Anemia, chronic disease    Chronic diastolic heart failure (HCC)    Hypoxemia    Primary hypertension    Lower GI hemorrhage    UTI (urinary tract infection)    Dementia (HCC)    History of colon cancer       Past Medical History:   has a past medical history of Acute cholecystitis, Acute cystitis without hematuria, Anemia, Anxiety, Arthritis, Bradycardia, unspecified, CAD (coronary artery disease), Cellulitis, unspecified, Cerebral infarction (HCC), Chronic diastolic (congestive) heart failure (HCC), Cognitive communication deficit, COPD (chronic obstructive pulmonary disease) (HCC), Diabetes mellitus (HCC), Dysphagia, oropharyngeal, Encephalopathy, unspecified, End stage renal disease (HCC), Fatigue, Gastro-esophageal reflux disease 
     [facility] Note    Patient Active Problem List   Diagnosis    GI bleed    History of COVID-19    Hyponatremia    Fatigue    Acute kidney injury superimposed on CKD (HCC)    Lethargy    Suspected UTI    Acute CVA (cerebrovascular accident) (HCC)    LESLEE (acute kidney injury) (HCC)    Malignant neoplasm of colon (HCC)    Anemia due to acute blood loss    COVID-19    Shock circulatory    Bilateral pulmonary embolism (HCC)    Lactic acidosis    Hemorrhagic shock (HCC)    ESRD (end stage renal disease) on dialysis (HCC)    Acute cholecystitis    Pain of upper abdomen    AMS (altered mental status)    Sepsis (HCC)    Acute metabolic encephalopathy    Cellulitis    Fever and chills    PVD (peripheral vascular disease) (HCC)    Anxiety    Rectal bleeding    Severe malnutrition (HCC)    Acute encephalopathy    Acute cystitis without hematuria    Colitis    Elevated procalcitonin    On continuous oral anticoagulation    Hemodialysis catheter infection (HCC)    Moderate malnutrition (HCC)    Junctional bradycardia    Bradycardia    Arterial hypotension    Paroxysmal atrial flutter (HCC)    Intractable nausea and vomiting    Noncompliance with renal dialysis (HCC)    Hyperkalemia    Anemia, chronic disease    Chronic diastolic heart failure (HCC)    Hypoxemia    Primary hypertension    Lower GI hemorrhage    UTI (urinary tract infection)    Dementia (HCC)    History of colon cancer       Past Medical History:   has a past medical history of Acute cholecystitis, Acute cystitis without hematuria, Anemia, Anxiety, Arthritis, Bradycardia, unspecified, CAD (coronary artery disease), Cellulitis, unspecified, Cerebral infarction (HCC), Chronic diastolic (congestive) heart failure (HCC), Cognitive communication deficit, COPD (chronic obstructive pulmonary disease) (HCC), Diabetes mellitus (HCC), Dysphagia, oropharyngeal, Encephalopathy, unspecified, End stage renal disease (HCC), Fatigue, Gastro-esophageal reflux disease 
    Gastroenterology Progress Note      Ben Link is a 79 y.o. male patient.  Principal Problem:    Lower GI hemorrhage  Active Problems:    On continuous oral anticoagulation    Anemia due to acute blood loss    Primary hypertension    UTI (urinary tract infection)    Dementia (HCC)    History of colon cancer  Resolved Problems:    * No resolved hospital problems. *      SUBJECTIVE: Patient with a stricture of his anastomosis of the colon    Yesterday this was stented with a 15 x 15 Axios stent    Patient is doing well and is most likely going to be discharged today    ROS:  No fever, chills  No chest pain, palpitations  No SOB, cough  Gastrointestinal ROS: no abdominal pain, nausea, vomiting     Physical    VITALS:  /74   Pulse 87   Temp 98.7 °F (37.1 °C) (Oral)   Resp 18   Ht 1.854 m (6' 1\")   Wt 100.7 kg (222 lb 0.1 oz)   SpO2 96%   BMI 29.29 kg/m²   TEMPERATURE:  Current - Temp: 98.7 °F (37.1 °C); Max - Temp  Av.3 °F (36.8 °C)  Min: 97.8 °F (36.6 °C)  Max: 98.7 °F (37.1 °C)    NAD  RRR, Nl s1s2  Lungs CTA Bilaterally, normal effort  Abdomen soft, ND, NT, no HSM, Bowel sounds normal.    Data    Data Review:    Recent Labs     24   WBC 6.2 6.8   HGB 8.3* 8.4*   HCT 25.9* 25.4*   MCV 98.7 99.4    171     Recent Labs     24    142   K 3.9 4.5    103   CO2 26 26   PHOS  --  5.7*   BUN 37* 44*   CREATININE 5.8* 6.7*     No results for input(s): \"AST\", \"ALT\", \"BILIDIR\", \"BILITOT\", \"ALKPHOS\" in the last 72 hours.    Invalid input(s): \"ALB\"  No results for input(s): \"LIPASE\", \"AMYLASE\" in the last 72 hours.  No results for input(s): \"PROTIME\", \"INR\" in the last 72 hours.  Invalid input(s): \"PTT\"    Radiology Review:        ASSESSMENT   Colonic stricture  -Doing well with Axios stent  -Patient should follow-up with me in 2 to 4 weeks  -Thank you for this kind referral      Marco Lees MD        
    Gastroenterology Progress Note    Ben Link is a 79 y.o. male patient.  1. Rectal bleeding    2. Anemia, unspecified type    3. Hemodialysis patient (HCC)    4. Chronic anticoagulation    5. History of colon cancer        Admission Date: 10/30/2024  Hospital Day: Hospital Day: 4  Attending: Leslie Guallap MD  Date of service: 24    SUBJECTIVE:    \"I keep coughing when I drink water\"  No abdominal pain, N/V, recurrent GI bleeding     ROS:  Cardiovascular ROS: no chest pain or dyspnea on exertion  Gastrointestinal ROS: see above  Respiratory ROS: no cough, shortness of breath, or wheezing    Physical    VITALS:  BP (!) 146/69   Pulse 96   Temp 98 °F (36.7 °C) (Oral)   Resp 16   Ht 1.854 m (6' 1\")   Wt 96.4 kg (212 lb 8.4 oz)   SpO2 90%   BMI 28.04 kg/m²   TEMPERATURE:  Current - Temp: 98 °F (36.7 °C); Max - Temp  Av.6 °F (36.4 °C)  Min: 96.8 °F (36 °C)  Max: 98.2 °F (36.8 °C)    NAD  RRR, Nl s1s2  Lungs CTA Bilaterally, normal effort  Abdomen soft, ND, NT, no HSM, Bowel sounds normal  AAOx3, No asterixis     Data      CBC:   Recent Labs     10/30/24  0843 10/31/24  0319 11/01/24  0255 24  1435 24  0257   WBC 6.1  --   --   --   --    RBC 2.67*  --   --   --   --    HGB 8.4*   < > 8.9* 9.0* 8.4*   HCT 25.8*   < > 27.3* 26.5* 25.5*     --   --   --   --    MCV 96.3  --   --   --   --    MCH 31.5  --   --   --   --    MCHC 32.7  --   --   --   --    RDW 14.7  --   --   --   --     < > = values in this interval not displayed.        BMP:  Recent Labs     10/30/24  0843 10/31/24  0319    141   K 4.6 4.6   CL 98* 100   CO2 32 29   BUN 38* 46*   CREATININE 4.7* 5.5*   CALCIUM 8.6 8.6   GLUCOSE 149* 130*        Hepatic Function Panel:   Recent Labs     10/30/24  0843   AST 15   ALT 12   BILITOT 0.4   ALKPHOS 83       Recent Labs     10/30/24  0843   LIPASE 28.0     Recent Labs     10/30/24  0843   PROTIME 16.1*   INR 1.27*     Invalid input(s): \"PTT\"  No results for input(s): 
    Gastroenterology Progress Note    Ben Link is a 79 y.o. male patient.  1. Rectal bleeding    2. Anemia, unspecified type    3. Hemodialysis patient (HCC)    4. Chronic anticoagulation    5. History of colon cancer        Admission Date: 10/30/2024  Hospital Day: Hospital Day: 5  Attending: Leslie Guallpa MD  Date of service: 11/3/24    SUBJECTIVE:    No complaints  Eating a regular meal  No recurrent rectal bleeding     ROS:  Cardiovascular ROS: no chest pain or dyspnea on exertion  Gastrointestinal ROS: see above  Respiratory ROS: no cough, shortness of breath, or wheezing    Physical    VITALS:  BP (!) 165/69   Pulse 87   Temp 98.3 °F (36.8 °C) (Oral)   Resp 18   Ht 1.854 m (6' 1\")   Wt 96.4 kg (212 lb 8.4 oz)   SpO2 91%   BMI 28.04 kg/m²   TEMPERATURE:  Current - Temp: 98.3 °F (36.8 °C); Max - Temp  Av.2 °F (36.8 °C)  Min: 97.9 °F (36.6 °C)  Max: 98.3 °F (36.8 °C)    NAD  RRR, Nl s1s2  Lungs CTA Bilaterally, normal effort  Abdomen soft, ND, NT, no HSM, Bowel sounds normal  AAOx3, No asterixis     Data      CBC:   Recent Labs     24  1435 24  0257 24   WBC  --   --  5.0   RBC  --   --  2.55*   HGB 9.0* 8.4* 8.3*   HCT 26.5* 25.5* 24.9*   PLT  --   --  165   MCV  --   --  97.4   MCH  --   --  32.4   MCHC  --   --  33.3   RDW  --   --  15.5*        BMP:  Recent Labs     24      K 4.0      CO2 27   BUN 26*   CREATININE 4.7*   CALCIUM 8.6   GLUCOSE 158*        Hepatic Function Panel:   Recent Labs     24   AST 11*   ALT 8*   BILITOT 0.3   ALKPHOS 82       No results for input(s): \"LIPASE\", \"AMYLASE\" in the last 72 hours.  Recent Labs     24   PROTIME 15.6*   INR 1.22*     Invalid input(s): \"PTT\"  No results for input(s): \"OCCULTBLD\" in the last 72 hours.      Radiology Review:    CT ABDOMEN PELVIS WO CONTRAST Additional Contrast? None   Final Result   Bladder wall thickening with surrounding fat stranding.  Recommend 
    Low Risk Nutrition Note     Reason for Visit:    follow-up    Nutrition Assessment:  Pt continues on a regular, low fiber diet. Eating 100% and getting adequate protein for wound healing. Ffamily brings in food for some meals. No nutrition conerns.     Current Nutrition Therapies:    ADULT DIET; Regular; Low Fiber    Anthropometrics:   Current Height: 185.4 cm (6' 1\")  Current Weight - Scale: 100.7 kg (222 lb 0.1 oz)      Monitoring and Evaluation:  No nutrition diagnosis. Patient will be monitored per nutrition standards of care.     Consult Dietitian if nutrition intervention essential to patient care is needed.     Discharge Planning:  No needs    ALEXANDRE KHAN, LETICIA, LD      
   10/30/24 2205   RT Protocol   History Pulmonary Disease 0   Respiratory pattern 0   Breath sounds 2   Cough 0   Indications for Bronchodilator Therapy Decreased or absent breath sounds;On home bronchodilators   Bronchodilator Assessment Score 2       
   MD Jesus Matthews MD Aldo Estella, DO                 Office: [phone]                 Fax: [phone]         [facility]                          NEPHROLOGY INPATIENT PROGRESS  NOTE:     PATIENT NAME: Ben Link  : 1945  MRN: [id number]     Patient Active Problem List   Diagnosis    GI bleed    History of COVID-19    Hyponatremia    Fatigue    Acute kidney injury superimposed on CKD (HCC)    Lethargy    Suspected UTI    Acute CVA (cerebrovascular accident) (HCC)    LESLEE (acute kidney injury) (HCC)    Malignant neoplasm of colon (HCC)    Anemia due to acute blood loss    COVID-19    Shock circulatory    Bilateral pulmonary embolism (HCC)    Lactic acidosis    Hemorrhagic shock (HCC)    ESRD (end stage renal disease) on dialysis (HCC)    Acute cholecystitis    Pain of upper abdomen    AMS (altered mental status)    Sepsis (HCC)    Acute metabolic encephalopathy    Cellulitis    Fever and chills    PVD (peripheral vascular disease) (HCC)    Anxiety    Rectal bleeding    Severe malnutrition (HCC)    Acute encephalopathy    Acute cystitis without hematuria    Colitis    Elevated procalcitonin    On continuous oral anticoagulation    Hemodialysis catheter infection (HCC)    Moderate malnutrition (HCC)    Junctional bradycardia    Bradycardia    Arterial hypotension    Paroxysmal atrial flutter (HCC)    Intractable nausea and vomiting    Noncompliance with renal dialysis (HCC)    Hyperkalemia    Anemia, chronic disease    Chronic diastolic heart failure (HCC)    Hypoxemia    Primary hypertension    Lower GI hemorrhage    UTI (urinary tract infection)    Dementia (HCC)    History of colon cancer       Past Medical History:   has a past medical history of Acute cholecystitis, Acute cystitis without hematuria, Anemia, Anxiety, Arthritis, Bradycardia, unspecified, CAD (coronary artery disease), Cellulitis, unspecified, Cerebral infarction (HCC), Chronic diastolic (congestive) heart 
  [facility]     Daily Progress Note      Admit Date:  10/30/2024    Chief Complaint   Patient presents with    Rectal Bleeding     From [facility] via [facility] cc rectal bleeding. Staff noticed large amount of stool in brief this AM. Patient has no complaints at this time.        ASSESSMENT AND PLAN:  Acute blood loss anemia  Lower GI hemorrhage  Persistent atrial fibrillation  On continuous oral anticoagulation  Dementia (HCC)  History of colon cancer    Plan  -No cardiac contraindication to proceeding to have GI endoscopy  -Continue to hold Eliquis  -Recommend resuming Eliquis in about 2 weeks as an outpatient    Cardiology will sign-off at this time. Please call us if there are other issues or questions.     Subjective:  Mr. Link has no new complaints today    Objective:   /79   Pulse 97   Temp 97.2 °F (36.2 °C) (Oral)   Resp 18   Ht 1.854 m (6' 1\")   Wt 101.1 kg (222 lb 14.4 oz)   SpO2 98%   BMI 29.41 kg/m²   No intake or output data in the 24 hours ending 11/04/24 1030    Physical Exam:  General:  Awake, alert, oriented x 3, NAD  Skin:  Warm and dry  Neck:  JVD none  Chest:  normal air entry  Cardiovascular:  RRR S1S2, no S3, no murmur  Abdomen:  Soft, ND, NT, No HSM  Extremities:  No edema    Medications:    diatrizoate meglumine-sodium        epoetin davis-epbx  10,000 Units IntraVENous Once per day on Tuesday Thursday Saturday    cefTRIAXone (ROCEPHIN) IV  1,000 mg IntraVENous Q24H    [Held by provider] apixaban  2.5 mg Oral BID    [Held by provider] aspirin  81 mg Oral Daily    atorvastatin  10 mg Oral Nightly    therapeutic multivitamin-minerals  1 tablet Oral Daily    docusate sodium  200 mg Oral Daily    gabapentin  100 mg Oral TID    LORazepam  0.5 mg Oral Nightly    metoprolol succinate  25 mg Oral Daily    pantoprazole  40 mg Oral Daily    sevelamer  800 mg Oral TID WC    tamsulosin  0.4 mg Oral QAM       diatrizoate meglumine-sodium, acetaminophen, albuterol, 
  Speech Language Pathology  Attempt  Ben Link  1945    SLP attempted to complete dysphagia treatment follow up. Pt's chart reviewed and consult completed with pt's RN, pt is currently NPO for procedure and off the unit for dialysis. Will re-attempt as schedule allows and pt appropriate.       Carline Alvarez M.S. CCC-SLP #[id number]  Speech-Language Pathologist    
Adm to [facility] from endo per cart awakening. Respirations symmetrical. Abdomen soft. Denies pain. VSS. Report received from endo staff.   
Attempted to reach [facility] for update on patient. Report called to SCOUT Cesar at the facility. All questions answered.   PIV removed without issues. Dressing placed at site. Patient dressed in home clothes. Family at bedside. Patient to be discharged with transport anytime after 7:00PM.     
Awake alert & oriented to person & place. Respirations easy & symmetrical. Abdomen soft. Transferred to floor.   
Called patient floor nurse and informed that pt has an official DNR cc document found in chart. Nurse will inform his primary doctor.  
Clinical coordinator,  Genoveva spoke to patient's daughter Zabrina concerning patient having a speech therapy evaluation and she refused stating her dad has had this cough while eating for a long time when he eats and drinks.  
Department of Internal Medicine  General Internal Medicine   Progress Note      SUBJECTIVE:  no active bleed , s/p colonoscopy but no active bleed     History obtained from chart review, the patient, and nursing staff   General ROS: positive for  - fatigue and malaise  negative for - chills, fever, night sweats, or weight loss  Psychological ROS: negative  Ophthalmic ROS: negative  Respiratory ROS: no cough, shortness of breath, or wheezing  Cardiovascular ROS: positive for - dyspnea on exertion  negative for - chest pain, loss of consciousness, orthopnea, or shortness of breath  Gastrointestinal ROS: no abdominal pain, change in bowel habits, or black stools, but has blood in stools   Genito-Urinary ROS: no dysuria, trouble voiding, or hematuria  Musculoskeletal ROS: negative  Neurological ROS: no TIA or stroke symptoms  Dermatological ROS: negative    OBJECTIVE      Medications      Current Facility-Administered Medications: epoetin daivs-epbx (RETACRIT) injection 10,000 Units, 10,000 Units, IntraVENous, Once per day on Tuesday Thursday Saturday  cefTRIAXone (ROCEPHIN) 1,000 mg in sterile water 10 mL IV syringe, 1,000 mg, IntraVENous, Q24H  acetaminophen (TYLENOL) tablet 650 mg, 650 mg, Oral, Q6H PRN  albuterol (PROVENTIL) (2.5 MG/3ML) 0.083% nebulizer solution 2.5 mg, 2.5 mg, Nebulization, Q4H PRN  [Held by provider] apixaban (ELIQUIS) tablet 2.5 mg, 2.5 mg, Oral, BID  [Held by provider] aspirin chewable tablet 81 mg, 81 mg, Oral, Daily  atorvastatin (LIPITOR) tablet 10 mg, 10 mg, Oral, Nightly  therapeutic multivitamin-minerals 1 tablet, 1 tablet, Oral, Daily  docusate sodium (COLACE) capsule 200 mg, 200 mg, Oral, Daily  gabapentin (NEURONTIN) capsule 100 mg, 100 mg, Oral, TID  LORazepam (ATIVAN) tablet 0.5 mg, 0.5 mg, Oral, Nightly  melatonin tablet 6 mg, 6 mg, Oral, Nightly PRN  metoprolol succinate (TOPROL XL) extended release tablet 25 mg, 25 mg, Oral, Daily  ondansetron (ZOFRAN-ODT) disintegrating tablet 4 
Department of Internal Medicine  General Internal Medicine   Progress Note      SUBJECTIVE:  some swallowing concerns and aspiration risk as per Nursing staff observation no active GI bleed     History obtained from chart review, the patient, and nursing staff   General ROS: positive for  - fatigue and malaise  negative for - chills, fever, night sweats, or weight loss  Psychological ROS: negative  Ophthalmic ROS: negative  Respiratory ROS: no cough, shortness of breath, or wheezing  Cardiovascular ROS: positive for - dyspnea on exertion  negative for - chest pain, loss of consciousness, orthopnea, or shortness of breath  Gastrointestinal ROS: no abdominal pain, change in bowel habits, or black stools, but has blood in stools   Genito-Urinary ROS: no dysuria, trouble voiding, or hematuria  Musculoskeletal ROS: negative  Neurological ROS: no TIA or stroke symptoms  Dermatological ROS: negative    OBJECTIVE      Medications      Current Facility-Administered Medications: epoetin davis-epbx (RETACRIT) injection 10,000 Units, 10,000 Units, IntraVENous, Once per day on Tuesday Thursday Saturday  cefTRIAXone (ROCEPHIN) 1,000 mg in sterile water 10 mL IV syringe, 1,000 mg, IntraVENous, Q24H  acetaminophen (TYLENOL) tablet 650 mg, 650 mg, Oral, Q6H PRN  albuterol (PROVENTIL) (2.5 MG/3ML) 0.083% nebulizer solution 2.5 mg, 2.5 mg, Nebulization, Q4H PRN  [Held by provider] apixaban (ELIQUIS) tablet 2.5 mg, 2.5 mg, Oral, BID  [Held by provider] aspirin chewable tablet 81 mg, 81 mg, Oral, Daily  atorvastatin (LIPITOR) tablet 10 mg, 10 mg, Oral, Nightly  therapeutic multivitamin-minerals 1 tablet, 1 tablet, Oral, Daily  docusate sodium (COLACE) capsule 200 mg, 200 mg, Oral, Daily  gabapentin (NEURONTIN) capsule 100 mg, 100 mg, Oral, TID  LORazepam (ATIVAN) tablet 0.5 mg, 0.5 mg, Oral, Nightly  melatonin tablet 6 mg, 6 mg, Oral, Nightly PRN  metoprolol succinate (TOPROL XL) extended release tablet 25 mg, 25 mg, Oral, 
Department of Internal Medicine  General Internal Medicine   Progress Note      SUBJECTIVE: few streaks of fresh blood  denies abdominal pain fever currently doing colonoscopy prep     History obtained from chart review, the patient, and nursing staff   General ROS: positive for  - fatigue and malaise  negative for - chills, fever, night sweats, or weight loss  Psychological ROS: negative  Ophthalmic ROS: negative  Respiratory ROS: no cough, shortness of breath, or wheezing  Cardiovascular ROS: positive for - dyspnea on exertion  negative for - chest pain, loss of consciousness, orthopnea, or shortness of breath  Gastrointestinal ROS: no abdominal pain, change in bowel habits, or black stools, but has blood in stools   Genito-Urinary ROS: no dysuria, trouble voiding, or hematuria  Musculoskeletal ROS: negative  Neurological ROS: no TIA or stroke symptoms  Dermatological ROS: negative    OBJECTIVE      Medications      Current Facility-Administered Medications: epoetin davis-epbx (RETACRIT) injection 10,000 Units, 10,000 Units, IntraVENous, Once per day on Tuesday Thursday Saturday  cefTRIAXone (ROCEPHIN) 1,000 mg in sterile water 10 mL IV syringe, 1,000 mg, IntraVENous, Q24H  acetaminophen (TYLENOL) tablet 650 mg, 650 mg, Oral, Q6H PRN  albuterol (PROVENTIL) (2.5 MG/3ML) 0.083% nebulizer solution 2.5 mg, 2.5 mg, Nebulization, Q4H PRN  [Held by provider] apixaban (ELIQUIS) tablet 2.5 mg, 2.5 mg, Oral, BID  [Held by provider] aspirin chewable tablet 81 mg, 81 mg, Oral, Daily  atorvastatin (LIPITOR) tablet 10 mg, 10 mg, Oral, Nightly  therapeutic multivitamin-minerals 1 tablet, 1 tablet, Oral, Daily  docusate sodium (COLACE) capsule 200 mg, 200 mg, Oral, Daily  gabapentin (NEURONTIN) capsule 100 mg, 100 mg, Oral, TID  LORazepam (ATIVAN) tablet 0.5 mg, 0.5 mg, Oral, Nightly  melatonin tablet 6 mg, 6 mg, Oral, Nightly PRN  metoprolol succinate (TOPROL XL) extended release tablet 25 mg, 25 mg, Oral, Daily  ondansetron 
First half of moviprep started at 1313. Patient not drinking the prep by self. Patient need to be re-directed to drink the prep and needs assistance to drink the prep.   Patient in bed, resting. Pt being encouraged to drink prep for procedure.     
Hospital Problems             Last Modified POA    * (Principal) Lower GI hemorrhage 10/30/2024 Yes    On continuous oral anticoagulation 10/30/2024 Yes    Anemia due to acute blood loss 10/30/2024 Yes    Primary hypertension 10/30/2024 Yes    UTI (urinary tract infection) 10/30/2024 Yes    Dementia (HCC) 10/30/2024 Yes    History of colon cancer 10/30/2024 Yes     H&P dictated   
Initial assessment completed- see doc flowsheets. VSS. A&O x2. Cooperative with care but lethargic upon assessment. Follows commands appropriately. No complaints of pain/discomfort. No S/S of respiratory distress/ SOB. On O2 at 2LPM via NC and continuous O2 monitoring. Care ongoing.  
Noted an authorized paper in patient chart that he has a DNR cc order. Spoke with patient daughter ERIK Wadsworth,and confirmed DNR cc status. Dr Brooks spoke with Luz and DNR cc status suspended during procedure.  
Nutrition Note    RECOMMENDATIONS  PO Diet: Advance as tolerated to regular (receives CCC, VIRIDIANA modifiers at NH)  ONS: Order Ensure HP BID once diet is resumed/advanced     ASSESSMENT   Pt triggered for positive nutrition screen r/t wounds. Admitted from [facility] with GI bleed, currently NPO for colonoscopy. Hard chart on unit indicates pt receives ProStat TID at NH. RD will order ONS to promote wound healing once diet is resumed/advanced and monitor for adequate po intake. No wt loss noted in wt hx in EMR, appears well-nourished.     Malnutrition Status: No malnutrition    NUTRITION DIAGNOSIS   Increased nutrient needs related to increase demand for energy/nutrients as evidenced by wounds    Goals: PO intake 50% or greater, by next RD assessment     NUTRITION RELATED FINDINGS  Objective: LBM 11/1. Oriented x1 to person. No edema noted.  Wounds: Pressure Injury, Stage I, Stage II    CURRENT NUTRITION THERAPIES  Diet NPO Exceptions are: Sips of Water with Meds, Other (Specify); Specify Other Exceptions: bowel prep     PO Intake: NPO   PO Supplement Intake:NPO    ANTHROPOMETRICS  Current Height: 185.4 cm (6' 1\")  Current Weight - Scale: 96.4 kg (212 lb 8.4 oz)    Admission weight: 98 kg (216 lb 0.8 oz)  Ideal Body Weight (IBW): 184 lbs  (84 kg)        BMI: 28    COMPARATIVE STANDARDS  Total Energy Requirements (kcals/day): 3643-8570     Protein (g):  115-144       Fluid (mL/day):  8921-9397    EDUCATION  Education/Counseling not indicated     The patient will be monitored per nutrition standards of care. Consult dietitian if additional nutrition interventions are needed prior to RD reassessment.     Virginia Justin MS, RD, LD    Contact: [phone]   
Patient confused at this time. Daughter will be here tomorrow to sign consent. Consent printed and placed in patient chart.   
Patient is from [facility].  Admission information from the paperwork provided by the [facility].    Code Status:  DNR-CCA  Signed form sent with patient and on file in the EMR.    Diet:  Consistent Carbohydrate / No added salt (VIRIDIANA) with regular consistency / texture foods and thin liquids.  Limit orange juice, bananas, Potatoes, Tomatoes and Tomato products.  Dietary supplement:  Pro-Stat three times a day.  Patient does not eat much at the facility, but his daughters bring him in food daily.    Blood pressure in Right arm only.  LUE restriction due to HD access.    Hemodialysis Tuesday, Thursday and Saturday.    Patient left the facility prior to receiving any medications today.    Patient seen in ED, room 01.  Admission completed with the following exceptions:  4 Eyes Assessment, Immunizations, Vaccines, Rights and Responsibilities, Orientation to room, Plan of Care, Education/Learning Assessment and Education Plan, white board, height and weight, pain assessment and head to toe assessment.  Patient is alert and oriented X to person, place, situation and time.  Patient lives at [facility] and is being admitted for Lower GI Hemorrhage.     Home Medication reconciliation has been completed by This RN.      All patient's and/or family's questions answered.   Go in to see patient and family and answer questions.    Patient's wife is currently in the hospital on 3T.  Patient currently saying that he is not being admitted to the hospital today.      
Patient pulled out iv , full bed change and depends changed at this time. New iv placed.   
Patient received from ENDO/PACU post procedure. Patient is alert and awake upon receiving. Vitals checked and recorded.   External catheter replaced. Hygiene care provided.   
Patient remains off unit in HD.  
Patient upset over drinking prep and did not finish the first half of prep.  Family present at bedside and educated on importance of prep for procedure in the AM. Family verbalized understanding and would try to make patient drink second half of prep.   Second part of prep made and provided.   
Pt agitated this morning due to not being able to eat due to the patient was seen and heard coughing per night shift nurse and day shift clinical coordinator. Pt placed on NPO status until patient is seen per speech therapist to determine if patient can eat a regular diet. Morning medication held until after that visit as well.   
Pt arrived to endo preop per stretcher. Daughter at bedside. Pt is oriented to self and daughter. Pt daughter-Janeen VALENCIA at bedside. Dr Schreiber reviewed procedure to be done with daughter and patient.  
Pt discharged to [facility] with [facility].  
Pt is refusing to complete bowel prep. Stated that \"it is not helping him at all.\" Reinforced education regarding the reason of bowel prep and upcoming procedure. Keeps stating that he would rather not do it and wants to go home. Remains in bed. Care ongoing.  
Pt refused am labs states I have given so much blood. Pt wanted to sleep. Will reassess at later time    
Pt refused to sign \"right to refuse treatment\" after long conversation concerning him signing it to eat and drink a regular diet while he is heard coughing after doing both. Per Dr. Lucas , it is okay to get signature from A.  
Pt was made NPO due to coughing after eating and drinking. PT and POA daughter  Ping refused, to have Speech to evaluation.Daughter Ping gave the consent to go against medical advice for speech eval. Dr. Guallpa notified.  
Returned to room. Assisted to bed. Incontinent of moderate formed brown stool. Cleansed and brief applied. VSS. Report to Cyndee BUTTERFIELD  
Roseann, the dialysis nurse called to state that they will be up to get him around 8am.  
Shift assessment completed. Pt stated \"I am not drinking anymore of that\" referring to the bowel prep. He completed 1 of 2 bottles. He had a large brown soft bowel movement at 0500. Bowel prop is nowhere close to ready for colonoscopy.     VS have been stable with an elevated morning bp. Pt has been calm and cooperative (except with bowel prep). He has slept most of the night. Scheduled mediations give. Call light within reach.   
Shift assessment completed. Routine vitals obtained and stable. Scheduled medications given. Patient is awake, alert and oriented. Respirations are easy and unlabored. Patient does not appear to be in distress, resting comfortably in bed at this time. Call light within reach.   
Shift assessment completed. Routine vitals obtained and stable. Scheduled medications held due to procedure this afternoon. Patient is awake, alert and oriented. Respirations are easy and unlabored. Patient does not appear to be in distress, resting comfortably in bed at this time. Call light within reach.   
Sift assessment completed. Routine vitals stable. Scheduled medications given. Pt was calm and cooperative. He had coughing fits after drinking water, used thickened liquids and no straws seemed to help. There was an order put in for speech eval on 11/02.   
Teaching / education initiated regarding perioperative experience, expectations, and pain management during stay. Patient verbalized understanding.    
This Nurse was present at bedside alone with Dr. Guallpa. Pt indicated that he will not do the speech evaluation and do not make him NPO. This Nurse and Dr. Guallpa educated pt on the importance of having a speech evaluation due to pt coughing during and after he has eating and drank fluids.   
Treatment initiated without complications or complaints from patient. Patient resting comfortably with VSS upon dialysis RN exit from room. Tali Cross RN notified of start of treatment and hotline number located on top of machine for any questions about troubleshooting during after hours.    
Treatment time: 3 hours    Net UF: 1 liter     Pre weight: 96.4 kg  Post weight: 95.4 kg  EDW: 99 kg    Access used: Left AVF  Access function: Access site located on the Left AVF had good bruit and thrill. No signs of infection noted. No redness, hematoma, nor swelling was observed. No discharges was noted. Cleaned site aseptically and Cannulated without any problem.    Medications or blood products given: Retacrit 10, 000 units.    Regular outpatient schedule: Tues, Thurs, Saturday.     Summary of response to treatment: 09:00: Treatment set at 1 liter for 3 hours as he was below dry weight, Doctor Bette was aware via Left AVF. Access site had good bruit and thrill. No signs of infection noted. No redness, hematoma, nor swelling was observed. No discharges was seen. Cleaned site aseptically. Cannulation done with ease and no difficulty. parameters set Accordingly. Hooked to HD machine. Monitored from time to time. 11:30: Patient was seen and examined by doctor Emy SOUZA. 12: 08: Treatment Completed. Returned Blood aseptically. HD tolerated well.    Copy of dialysis treatment record placed in chart, to be scanned into EMR.  
Treatment time: 3 hours  Net UF: 1000 ml     Pre weight: 98 kg  Post weight:97 kg  EDW: 99 kg    Access used: GADIEL AVF    Access function: well with  ml/min     Medications or blood products given: Retacrit     Regular outpatient schedule: TTS     Summary of response to treatment: Patient tolerated treatment well and without any complications. Patient remained stable throughout entire treatment   Report given to Skylar Cross RN.  Primary was instructed to monitor bandages for bleeding.  No bleeding noted when the patient left the dialysis suite.  
Treatment time: 3 hours  Net UF: 3000 ml     Pre weight: 103.8 kg  Post weight:100.7 kg  EDW: 99 kg       Access used: GADIEL AVF    Access function: well with  ml/min     Medications or blood products given: Retacrit     Regular outpatient schedule: TTS     Summary of response to treatment: Patient tolerated treatment well and without any complications. Patient remained stable throughout entire treatment.   
Very difficult situation. Patient chronically ill with endstage renal disease, chronic a fib,colon cancer with surgery. H/o DVT,h/o acute thrombosis of vascular graft.    Now with acute lower GI bleed and discovered colonic stricture    He states he feels fine and wants to go back \"home\"      Rec- have to hold aspirin and eliquis for now but at some point need to consider restarting if  he can be cleared by GI service    Multiple reasons for being on eliquis that are non cardiac related in addition to chronic a fib. Watchman procedure would not eliminate need for anticoagulation but could reduce stroke risk. Based on my communication with him he would not be capable of consent    Discussed with both daughters-Janeen and Ping  
of Function:   Living Status: lives in a nursing home  Prior Dysphagia History: Pt was most recently seen in Nov 2023 with recommendations for regular texture diet with thin liquids, meds whole with water or in puree. Per pt and daughter, pt consumes regular textures at nursing home and daughter brings in food to facility often.   Reason for referral: SLP evaluation orders received due to poor diet tolerance , coughing while eating/drinking and after eating/drinking. Pt and pt daughter initially resistant to SLP evaluation but agreeable upon SLP arrival with education.       Evaluation/Treatment   DYSPHAGIA BEDSIDE SWALLOW EVALUATION   Dysphagia Impressions/Dysphagia Diagnosis: Oropharyngeal Dysphagia   Pt alert and responsive, with appropriate participation in conversation. Positioned Upright in bed . On room air with RR <20/min. Pt was noted to have audible, expiratory sounds upon SLP arrival. Oral motor exam revealed functional lingual, labial, and buccal ROM and coordination. Dentition was missing, no dentures in place. Laryngeal function assessment revealed strong volitional cough and clear vocal quality. Pt assessed with PO presentations, fed with set-up assistance: thin liquids (cup, straw), soft and bite-sized, and solids. Oral phase characterized by functional oral acceptance and functional labial seal with no anterior spillage. Pt demonstrated mildly prolonged mastication and suspected premature spillage to pharynx. One episode of a delayed cough was noted following solids although pt with persistent audible expiratory sounds throughout assessment. O2 sats ranged from 91-94%. No overt clinical s/s of aspiration were assessed although silent aspiration cannot be ruled out at bedside. There is no chest xray from this admission, therefore consider ordering imaging if there is concern for aspiration. A Modified Barium Swallow study (MBS) was recommended and rationale/education provided however pt and pt 
Range Status   11/04/2024 25.9 (L) 40.5 - 52.5 % Final     WBC   Date Value Ref Range Status   11/04/2024 6.2 4.0 - 11.0 K/uL Final     Platelets   Date Value Ref Range Status   11/04/2024 164 135 - 450 K/uL Final     Lab Results   Component Value Date    CREATININE 5.8 (HH) 11/04/2024    BUN 37 (H) 11/04/2024     11/04/2024    K 3.9 11/04/2024     11/04/2024    CO2 26 11/04/2024     No labs today    Assessment/Plan:  1.  ESRD-outpatient HD TTH S at [facility] under [place] [facility].  Next HD Tuesday.-Tomorrow  2.  Elevated serum bicarbonate-lower bicarbonate bath during HD  3.  History of hypertension-controlled  4.  Anemia due to CKD-ELENA with HD  5.  Rectal bleeding-Per Medicine and GI.   6.  History of diastolic CHF-controlled  High complexity  Jesus Roberson MD    
curvature  LUNGS:  No increased work of breathing, good air exchange, clear to auscultation bilaterally, no crackles or wheezing  CARDIOVASCULAR:  Normal apical impulse, regular rate and rhythm, normal S1 and S2, no S3 or S4, and no murmur noted  ABDOMEN:  soft BS + non tender   MUSCULOSKELETAL:  no distal edema   NEUROLOGIC:  no gross focal deficit   SKIN:  warm dry and Pale  and no bruising or bleeding    Data      Recent Results (from the past 96 hour(s))   CBC with Auto Differential    Collection Time: 11/03/24  4:54 AM   Result Value Ref Range    WBC 5.0 4.0 - 11.0 K/uL    RBC 2.55 (L) 4.20 - 5.90 M/uL    Hemoglobin 8.3 (L) 13.5 - 17.5 g/dL    Hematocrit 24.9 (L) 40.5 - 52.5 %    MCV 97.4 80.0 - 100.0 fL    MCH 32.4 26.0 - 34.0 pg    MCHC 33.3 31.0 - 36.0 g/dL    RDW 15.5 (H) 12.4 - 15.4 %    Platelets 165 135 - 450 K/uL    MPV 7.4 5.0 - 10.5 fL    Neutrophils % 73.8 %    Lymphocytes % 14.2 %    Monocytes % 4.9 %    Eosinophils % 4.4 %    Basophils % 2.7 %    Neutrophils Absolute 3.7 1.7 - 7.7 K/uL    Lymphocytes Absolute 0.7 (L) 1.0 - 5.1 K/uL    Monocytes Absolute 0.2 0.0 - 1.3 K/uL    Eosinophils Absolute 0.2 0.0 - 0.6 K/uL    Basophils Absolute 0.1 0.0 - 0.2 K/uL   Comprehensive Metabolic Panel    Collection Time: 11/03/24  4:54 AM   Result Value Ref Range    Sodium 143 136 - 145 mmol/L    Potassium 4.0 3.5 - 5.1 mmol/L    Chloride 106 99 - 110 mmol/L    CO2 27 21 - 32 mmol/L    Anion Gap 10 3 - 16    Glucose 158 (H) 70 - 99 mg/dL    BUN 26 (H) 7 - 20 mg/dL    Creatinine 4.7 (H) 0.8 - 1.3 mg/dL    Est, Glom Filt Rate 12 (A) >60    Calcium 8.6 8.3 - 10.6 mg/dL    Total Protein 6.5 6.4 - 8.2 g/dL    Albumin 3.3 (L) 3.4 - 5.0 g/dL    Albumin/Globulin Ratio 1.0 (L) 1.1 - 2.2    Total Bilirubin 0.3 0.0 - 1.0 mg/dL    Alkaline Phosphatase 82 40 - 129 U/L    ALT 8 (L) 10 - 40 U/L    AST 11 (L) 15 - 37 U/L   Lactic Acid    Collection Time: 11/03/24  4:54 AM   Result Value Ref Range    Lactic Acid 0.9 0.4 - 2.0 
grafts, initial encounter (HCC), Insomnia, unspecified, Long term (current) use of anticoagulants, Malignant neoplasm of colon, unspecified (HCC), Need for assistance with personal care, Noninfective gastroenteritis and colitis, unspecified, Other pulmonary embolism without acute cor pulmonale (HCC), Peripheral vascular disease, unspecified (HCC), Personal history of COVID-19, Risk for falls, Sepsis, unspecified organism (HCC), Shock, unspecified, Splenomegaly, Stroke (HCC), Unspecified atrial flutter (HCC), and Unsteadiness on feet.    Past Social History:   reports that he has quit smoking. He has never used smokeless tobacco. He reports that he does not currently use alcohol. He reports that he does not use drugs.    Subjective:      S/p HD Tuesday   W/ ~3L UF  Pre weight: 103.8 kg  Post weight:100.7 kg  EDW: 99 kg     Objective:    /70   Pulse 87   Temp 98.4 °F (36.9 °C) (Oral)   Resp 17   Ht 1.854 m (6' 1\")   Wt 100.7 kg (222 lb 0.1 oz)   SpO2 95%   BMI 29.29 kg/m²     Wt Readings from Last 3 Encounters:   11/05/24 100.7 kg (222 lb 0.1 oz)   10/09/24 98 kg (216 lb)   03/04/24 96.2 kg (212 lb)       BP Readings from Last 3 Encounters:   11/06/24 136/70   10/09/24 139/87   09/04/24 (!) 104/54       General: No acute distress   CVS:  Heart sounds S1 S2 +     RS: Normal respiratory effort, Breat sound: diminished at bases.     Abd: bowel sounds +,  distension .   Extremities/MSK:  Edema,          Labs  Hemoglobin   Date Value Ref Range Status   11/05/2024 8.4 (L) 13.5 - 17.5 g/dL Final     Hematocrit   Date Value Ref Range Status   11/05/2024 25.4 (L) 40.5 - 52.5 % Final     WBC   Date Value Ref Range Status   11/05/2024 6.8 4.0 - 11.0 K/uL Final     Platelets   Date Value Ref Range Status   11/05/2024 171 135 - 450 K/uL Final     Lab Results   Component Value Date    CREATININE 6.7 (HH) 11/05/2024    BUN 44 (H) 11/05/2024     11/05/2024    K 4.5 11/05/2024     11/05/2024    CO2 26 
speech difficulty, light-headedness and headaches.   Hematological:  Does not bruise/bleed easily.   Psychiatric/Behavioral:  Negative for agitation, confusion and hallucinations.        Objective:      /81   Pulse 86   Temp 97.9 °F (36.6 °C) (Oral)   Resp 16   Ht 1.854 m (6' 1\")   Wt 96.4 kg (212 lb 8.4 oz)   SpO2 95%   BMI 28.04 kg/m²     Wt Readings from Last 3 Encounters:   11/03/24 96.4 kg (212 lb 8.4 oz)   10/09/24 98 kg (216 lb)   03/04/24 96.2 kg (212 lb)       BP Readings from Last 3 Encounters:   11/03/24 129/81   10/09/24 139/87   09/04/24 (!) 104/54         Chest-rhonchi bilaterally  Heart-regular  Abd-soft  Ext-trace edema    Labs  Hemoglobin   Date Value Ref Range Status   11/03/2024 8.3 (L) 13.5 - 17.5 g/dL Final     Hematocrit   Date Value Ref Range Status   11/03/2024 24.9 (L) 40.5 - 52.5 % Final     WBC   Date Value Ref Range Status   11/03/2024 5.0 4.0 - 11.0 K/uL Final     Platelets   Date Value Ref Range Status   11/03/2024 165 135 - 450 K/uL Final     Lab Results   Component Value Date    CREATININE 4.7 (H) 11/03/2024    BUN 26 (H) 11/03/2024     11/03/2024    K 4.0 11/03/2024     11/03/2024    CO2 27 11/03/2024     No labs today    Assessment/Plan:  1.  ESRD-outpatient HD TTH S at [facility] under [place] [facility].  Next HD Tuesday.  2.  Elevated serum bicarbonate-lower bicarbonate bath during HD  3.  History of hypertension-controlled  4.  Anemia due to CKD-ELENA with HD  5.  Rectal bleeding-Per Medicine and GI.   6.  History of diastolic CHF-controlled    Boyd Amos DO    
agreement   Pt requires ongoing learning     If patient discharges prior to next visit, this note will serve as discharge.     Treatment time:  Timed Code Treatment Minutes: 0  Total Treatment Time Minutes: 15    Electronically signed by:    DUNG Caldera,  Clinician  Speech Language Pathology    The speech-language pathologist was present, directed the patient's care, made skilled judgment and was responsible for assessment and treatment.    Jada Vásquez MA CCC-SLP  Speech-Language Pathologist  SP. [id number]         
including but not limited to reviewing patient chart (reviewing patient chart, current encounter and prior encounter notes procedures and interventions, labs, imaging and other testing), interviewing and counseling patient and present family members, performing physical exam, documentation of my findings and subsequent follow up of ordered medication and testing, placing referrals and communication with patient care providers, coordinating future care as well as documentation in the EHR. This encompassed more than 50% of the total encounter time. In summary, my findings and plan are the following:   Mr. Link is a 79 y.o. male:  Assessment:  Bleeding per rectum  Acute blood loss anemia  Anastomotic stricture  History of sigmoidectomy for adenocarcinoma  ESRD on HD  Atrial fibrillation  Medical coagulopathy, Eliquis (held)  History of DVT  History of CVA  COPD  Diabetes     Plan:  Anticipate flexible sigmoidoscopy today with anastomotic stricture to be dilated and possible stent placement  PO anticoagulation per GI/primary team  Diet per GI team  Monitor bowel function  Trend hemoglobin, stable  Activity as able  Defer management of remainder of medical comorbidities to primary and consulting teams    Pascual Brumfield MD, FACS  11/5/2024  3:09 PM    
   Hematocrit 25.9 (L) 40.5 - 52.5 %    MCV 98.7 80.0 - 100.0 fL    MCH 31.8 26.0 - 34.0 pg    MCHC 32.2 31.0 - 36.0 g/dL    RDW 15.6 (H) 12.4 - 15.4 %    Platelets 164 135 - 450 K/uL    MPV 7.5 5.0 - 10.5 fL    Neutrophils % 78.8 %    Lymphocytes % 12.0 %    Monocytes % 5.1 %    Eosinophils % 3.7 %    Basophils % 0.4 %    Neutrophils Absolute 4.9 1.7 - 7.7 K/uL    Lymphocytes Absolute 0.7 (L) 1.0 - 5.1 K/uL    Monocytes Absolute 0.3 0.0 - 1.3 K/uL    Eosinophils Absolute 0.2 0.0 - 0.6 K/uL    Basophils Absolute 0.0 0.0 - 0.2 K/uL   CBC with Auto Differential    Collection Time: 11/05/24  5:23 AM   Result Value Ref Range    WBC 6.8 4.0 - 11.0 K/uL    RBC 2.56 (L) 4.20 - 5.90 M/uL    Hemoglobin 8.4 (L) 13.5 - 17.5 g/dL    Hematocrit 25.4 (L) 40.5 - 52.5 %    MCV 99.4 80.0 - 100.0 fL    MCH 32.9 26.0 - 34.0 pg    MCHC 33.1 31.0 - 36.0 g/dL    RDW 15.4 12.4 - 15.4 %    Platelets 171 135 - 450 K/uL    MPV 7.6 5.0 - 10.5 fL    Neutrophils % 78.2 %    Lymphocytes % 13.7 %    Monocytes % 5.1 %    Eosinophils % 2.7 %    Basophils % 0.3 %    Neutrophils Absolute 5.3 1.7 - 7.7 K/uL    Lymphocytes Absolute 0.9 (L) 1.0 - 5.1 K/uL    Monocytes Absolute 0.4 0.0 - 1.3 K/uL    Eosinophils Absolute 0.2 0.0 - 0.6 K/uL    Basophils Absolute 0.0 0.0 - 0.2 K/uL   Renal Function Panel    Collection Time: 11/05/24  5:23 AM   Result Value Ref Range    Sodium 142 136 - 145 mmol/L    Potassium 4.5 3.5 - 5.1 mmol/L    Chloride 103 99 - 110 mmol/L    CO2 26 21 - 32 mmol/L    Anion Gap 13 3 - 16    Glucose 153 (H) 70 - 99 mg/dL    BUN 44 (H) 7 - 20 mg/dL    Creatinine 6.7 (HH) 0.8 - 1.3 mg/dL    Est, Glom Filt Rate 8 (A) >60    Calcium 8.3 8.3 - 10.6 mg/dL    Phosphorus 5.7 (H) 2.5 - 4.9 mg/dL    Albumin 3.2 (L) 3.4 - 5.0 g/dL       ASSESSMENT AND PLAN     Hospital Problems             Last Modified POA    * (Principal) Lower GI hemorrhage 10/30/2024 Yes    On continuous oral anticoagulation 10/30/2024 Yes    Anemia due to acute blood loss 
tract infection) 10/30/2024 Yes    Dementia (HCC) 10/30/2024 Yes    History of colon cancer 10/30/2024 Yes   SLP eval  ordered , get Liability waiver signed stating Patient and family fully understand risk of aspiration pneumonia respiratory failure and death . He is DNR CCA      This Patient's cross coverage is provided by hospitalist services between 7.00 PM to 7.00 am, please contact hospitalist service on call for any emergent issue.     
bleeding that has been controlled. Contrast enema tomorrow to help determine if stricture amenable to endoscopic dilation or needs surgical intervention. Surgical intervention ranges from resection which may necessitate a colostomy that may be permanent, or permament diverting colostomy. Diversion would help with impending obstructive symptoms rather than anastomotic bleeding though. In addition will need to engage cardiology regarding perioperative risks and patient's own realistic goals of care  Holding PO anticoagulation, cardiology input noted and recommend restarting when able within the next 2-3 weeks and cleared by GI/Surgery pending which clinical team intervenes with stricture  Holding diet pending speech evaluation, possibly aspirating  Monitor bowel function, no recurrent bloody stool, no flatus or stool at all in the last day though  Trend hemoglobin  Activity as able  Defer management of remainder of medical comorbidities to primary and consulting teams    Pascual Brumfield MD, FACS  11/3/2024  3:25 PM

## 2024-11-26 ENCOUNTER — APPOINTMENT (OUTPATIENT)
Dept: GENERAL RADIOLOGY | Age: 79
End: 2024-11-26
Payer: MEDICARE

## 2024-11-26 ENCOUNTER — HOSPITAL ENCOUNTER (EMERGENCY)
Age: 79
Discharge: HOME | End: 2024-11-26
Attending: STUDENT IN AN ORGANIZED HEALTH CARE EDUCATION/TRAINING PROGRAM
Payer: MEDICARE

## 2024-11-26 ENCOUNTER — APPOINTMENT (OUTPATIENT)
Dept: CT IMAGING | Age: 79
End: 2024-11-26
Payer: MEDICARE

## 2024-11-26 VITALS
SYSTOLIC BLOOD PRESSURE: 122 MMHG | OXYGEN SATURATION: 100 % | TEMPERATURE: 98.1 F | DIASTOLIC BLOOD PRESSURE: 65 MMHG | RESPIRATION RATE: 19 BRPM | HEART RATE: 75 BPM | BODY MASS INDEX: 29.82 KG/M2 | HEIGHT: 73 IN | WEIGHT: 225 LBS

## 2024-11-26 DIAGNOSIS — S09.90XA CLOSED HEAD INJURY, INITIAL ENCOUNTER: ICD-10-CM

## 2024-11-26 DIAGNOSIS — S00.83XA TRAUMATIC HEMATOMA OF FOREHEAD, INITIAL ENCOUNTER: ICD-10-CM
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
## 2024-11-26 PROCEDURE — 70450 CT HEAD/BRAIN W/O DYE: CPT

## 2024-11-26 PROCEDURE — 72125 CT NECK SPINE W/O DYE: CPT

## 2024-11-26 PROCEDURE — 71045 X-RAY EXAM CHEST 1 VIEW: CPT

## 2024-11-26 PROCEDURE — 99284 EMERGENCY DEPT VISIT MOD MDM: CPT

## 2024-11-26 PROCEDURE — 73521 X-RAY EXAM HIPS BI 2 VIEWS: CPT

## 2024-11-26 ASSESSMENT — ENCOUNTER SYMPTOMS
DIARRHEA: 0
ABDOMINAL PAIN: 0
RHINORRHEA: 0
COUGH: 0
VOMITING: 0
NAUSEA: 0
SHORTNESS OF BREATH: 0

## 2024-11-26 ASSESSMENT — PAIN - FUNCTIONAL ASSESSMENT: PAIN_FUNCTIONAL_ASSESSMENT: NONE - DENIES PAIN

## 2024-11-26 NOTE — DISCHARGE INSTRUCTIONS
It is mandatory that you follow up with your primary care provider to ensure resolution/improvement of your symptoms.    MANDATORY return to the emergency department within 12-24 hours unless you are better.  If you feel worse or have any other concerns, return sooner.    If you experience any of the red flag signs/symptoms that we discussed, please return to the emergency department or call 911 immediately.    Please take medications as we discussed.

## 2024-11-26 NOTE — ED PROVIDER NOTES
EMERGENCY DEPARTMENT PROVIDER NOTE         PATIENT IDENTIFICATION     Name:   Ben Link  MRN:   [id number]  YOB: 1945  Date of Evaluation:   11/26/2024  Provider:   CHRISTI Foley; Devin Parker DO  PCP:   Alicia Cardenas MD        CHIEF COMPLAINT       Fall (Pt from [facility] via [facility] for unwitnessed fall, per NH take coumadin. PT has hematoma to left forehead, pt denies pain anywhere at this time. Pt told EMS he was trying to walk to his chair on his own when he fell.)        HISTORY OF PRESENT ILLNESS     I independently interviewed patient and/or caretaker(s).  See Advanced Practice Provider (JERE) note for full HPI.  In summary, Ben Link  is a(n) 79 y.o. male who presents with pain following a fall.  Was reportedly trying to walk to his chair when he sustained a mechanical fall.  He did hit his head and is on Eliquis, but denies loss of consciousness.  Is acting at baseline mentation.  However, he did receive Percocet, melatonin, and Ativan prior to arrival at the hospital and is rather somnolent because of this.        PHYSICAL EXAM     I reviewed physical exam performed and documented by JERE.  I performed an independent physical examination with findings as follows:  Fatigued appearing but otherwise well-appearing elderly gentleman answering questions appropriately.  He has a hematoma on the forehead, but normal neurologic examination        LAB RESULTS     No results found for this visit on 11/26/24.      IMAGING RESULTS     XR CHEST PORTABLE   Final Result   Shallow inflation with findings compatible with subsegmental atelectasis.         XR HIP BILATERAL W AP PELVIS (2 VIEWS)   Final Result   No acute osseous abnormality identified.      If there remains concern for occult fracture, further evaluation may be   warranted with CT or MRI.         CT CERVICAL SPINE WO CONTRAST   Final Result   Chronic findings in the brain without acute CT abnormality 
Normal rate and regular rhythm.   Pulmonary:      Effort: Pulmonary effort is normal. No respiratory distress.      Breath sounds: Normal breath sounds. No wheezing or rales.   Musculoskeletal:         General: Normal range of motion.      Cervical back: Normal range of motion and neck supple.      Comments: No midline cervical spinal tenderness.  No bony joint tenderness    Skin:     General: Skin is warm and dry.   Neurological:      General: No focal deficit present.      Mental Status: He is alert. Mental status is at baseline.   Psychiatric:         Behavior: Behavior normal.             DIAGNOSTIC RESULTS   LABS:    Labs Reviewed - No data to display    When ordered only abnormal lab results are displayed. All other labs were within normal range or not returned as of this dictation.    EKG: When ordered, EKG's are interpreted by the Emergency Department Physician in the absence of a cardiologist.  Please see their note for interpretation of EKG.    RADIOLOGY:   Non-plain film images such as CT, Ultrasound and MRI are read by the radiologist. Plain radiographic images are visualized and preliminarily interpreted by the ED Provider with the below findings:        Interpretation per the Radiologist below, if available at the time of this note:    XR CHEST PORTABLE   Final Result   Shallow inflation with findings compatible with subsegmental atelectasis.         XR HIP BILATERAL W AP PELVIS (2 VIEWS)   Final Result   No acute osseous abnormality identified.      If there remains concern for occult fracture, further evaluation may be   warranted with CT or MRI.         CT CERVICAL SPINE WO CONTRAST   Final Result   Chronic findings in the brain without acute CT abnormality identified.      No acute osseous abnormality identified in the cervical spine.         CT HEAD WO CONTRAST   Final Result   Chronic findings in the brain without acute CT abnormality identified.      No acute osseous abnormality identified in

## 2024-11-26 NOTE — ED NOTES
Pt's daughter taking pt back to his facility via private vehicle. This RN offered to arrange for transport back to [facility], but pt's daughter refused. This RN and Jose santacruz helped pt into car, discharge instructions given to daughter.

## 2024-11-29 PROBLEM — N39.0 UTI (URINARY TRACT INFECTION): Status: RESOLVED | Noted: 2024-10-30 | Resolved: 2024-11-29

## 2025-01-16 ENCOUNTER — APPOINTMENT (OUTPATIENT)
Dept: CT IMAGING | Age: 80
DRG: 177 | End: 2025-01-16
Payer: COMMERCIAL

## 2025-01-16 ENCOUNTER — HOSPITAL ENCOUNTER (INPATIENT)
Age: 80
LOS: 4 days | Discharge: NURSING, REHAB OR LONG-TERM CARE | DRG: 177 | End: 2025-01-20
Attending: EMERGENCY MEDICINE | Admitting: INTERNAL MEDICINE
Payer: COMMERCIAL

## 2025-01-16 ENCOUNTER — APPOINTMENT (OUTPATIENT)
Dept: GENERAL RADIOLOGY | Age: 80
DRG: 177 | End: 2025-01-16
Payer: COMMERCIAL

## 2025-01-16 DIAGNOSIS — R09.02 HYPOXIA: Primary | ICD-10-CM

## 2025-01-16 DIAGNOSIS — J18.9 PNEUMONIA OF RIGHT LOWER LOBE DUE TO INFECTIOUS ORGANISM: ICD-10-CM

## 2025-01-16 DIAGNOSIS — J96.01 ACUTE RESPIRATORY FAILURE WITH HYPOXIA: ICD-10-CM

## 2025-01-16 LAB
ALBUMIN SERPL-MCNC: 3.7 G/DL (ref 3.4–5)
ALBUMIN/GLOB SERPL: 1.2 {RATIO} (ref 1.1–2.2)
ALP SERPL-CCNC: 95 U/L (ref 40–129)
ALT SERPL-CCNC: 14 U/L (ref 10–40)
ANION GAP SERPL CALCULATED.3IONS-SCNC: 14 MMOL/L (ref 3–16)
AST SERPL-CCNC: 15 U/L (ref 15–37)
BASOPHILS # BLD: 0 K/UL (ref 0–0.2)
BASOPHILS NFR BLD: 0.5 %
BILIRUB SERPL-MCNC: 0.4 MG/DL (ref 0–1)
BUN SERPL-MCNC: 82 MG/DL (ref 7–20)
CALCIUM SERPL-MCNC: 9.6 MG/DL (ref 8.3–10.6)
CHLORIDE SERPL-SCNC: 96 MMOL/L (ref 99–110)
CO2 SERPL-SCNC: 30 MMOL/L (ref 21–32)
CREAT SERPL-MCNC: 6.8 MG/DL (ref 0.8–1.3)
D-DIMER QUANTITATIVE: 1.08 UG/ML FEU (ref 0–0.6)
DEPRECATED RDW RBC AUTO: 14.8 % (ref 12.4–15.4)
EOSINOPHIL # BLD: 0.3 K/UL (ref 0–0.6)
EOSINOPHIL NFR BLD: 4.5 %
GFR SERPLBLD CREATININE-BSD FMLA CKD-EPI: 8 ML/MIN/{1.73_M2}
GLUCOSE SERPL-MCNC: 190 MG/DL (ref 70–99)
HCT VFR BLD AUTO: 32.1 % (ref 40.5–52.5)
HGB BLD-MCNC: 10.5 G/DL (ref 13.5–17.5)
LYMPHOCYTES # BLD: 1.2 K/UL (ref 1–5.1)
LYMPHOCYTES NFR BLD: 19.5 %
MCH RBC QN AUTO: 31.3 PG (ref 26–34)
MCHC RBC AUTO-ENTMCNC: 32.7 G/DL (ref 31–36)
MCV RBC AUTO: 96 FL (ref 80–100)
MONOCYTES # BLD: 0.4 K/UL (ref 0–1.3)
MONOCYTES NFR BLD: 7.4 %
NEUTROPHILS # BLD: 4 K/UL (ref 1.7–7.7)
NEUTROPHILS NFR BLD: 68.1 %
PLATELET # BLD AUTO: 173 K/UL (ref 135–450)
PMV BLD AUTO: 7.8 FL (ref 5–10.5)
POTASSIUM SERPL-SCNC: 4.6 MMOL/L (ref 3.5–5.1)
PROCALCITONIN SERPL IA-MCNC: 0.39 NG/ML (ref 0–0.15)
PROT SERPL-MCNC: 6.9 G/DL (ref 6.4–8.2)
RBC # BLD AUTO: 3.34 M/UL (ref 4.2–5.9)
SODIUM SERPL-SCNC: 140 MMOL/L (ref 136–145)
TROPONIN, HIGH SENSITIVITY: 141 NG/L (ref 0–22)
TROPONIN, HIGH SENSITIVITY: 146 NG/L (ref 0–22)
WBC # BLD AUTO: 5.9 K/UL (ref 4–11)

## 2025-01-16 PROCEDURE — 2500000003 HC RX 250 WO HCPCS: Performed by: INTERNAL MEDICINE

## 2025-01-16 PROCEDURE — 93005 ELECTROCARDIOGRAM TRACING: CPT | Performed by: EMERGENCY MEDICINE

## 2025-01-16 PROCEDURE — 6360000002 HC RX W HCPCS: Performed by: EMERGENCY MEDICINE

## 2025-01-16 PROCEDURE — 6370000000 HC RX 637 (ALT 250 FOR IP): Performed by: STUDENT IN AN ORGANIZED HEALTH CARE EDUCATION/TRAINING PROGRAM

## 2025-01-16 PROCEDURE — 96375 TX/PRO/DX INJ NEW DRUG ADDON: CPT

## 2025-01-16 PROCEDURE — 96374 THER/PROPH/DIAG INJ IV PUSH: CPT

## 2025-01-16 PROCEDURE — 84145 PROCALCITONIN (PCT): CPT

## 2025-01-16 PROCEDURE — 71045 X-RAY EXAM CHEST 1 VIEW: CPT

## 2025-01-16 PROCEDURE — 2580000003 HC RX 258: Performed by: EMERGENCY MEDICINE

## 2025-01-16 PROCEDURE — 90935 HEMODIALYSIS ONE EVALUATION: CPT

## 2025-01-16 PROCEDURE — 99285 EMERGENCY DEPT VISIT HI MDM: CPT

## 2025-01-16 PROCEDURE — 36415 COLL VENOUS BLD VENIPUNCTURE: CPT

## 2025-01-16 PROCEDURE — 71260 CT THORAX DX C+: CPT

## 2025-01-16 PROCEDURE — 0202U NFCT DS 22 TRGT SARS-COV-2: CPT

## 2025-01-16 PROCEDURE — P9047 ALBUMIN (HUMAN), 25%, 50ML: HCPCS | Performed by: STUDENT IN AN ORGANIZED HEALTH CARE EDUCATION/TRAINING PROGRAM

## 2025-01-16 PROCEDURE — 2580000003 HC RX 258: Performed by: INTERNAL MEDICINE

## 2025-01-16 PROCEDURE — 87641 MR-STAPH DNA AMP PROBE: CPT

## 2025-01-16 PROCEDURE — 1200000000 HC SEMI PRIVATE

## 2025-01-16 PROCEDURE — 2500000003 HC RX 250 WO HCPCS: Performed by: EMERGENCY MEDICINE

## 2025-01-16 PROCEDURE — 6360000002 HC RX W HCPCS: Performed by: INTERNAL MEDICINE

## 2025-01-16 PROCEDURE — 80053 COMPREHEN METABOLIC PANEL: CPT

## 2025-01-16 PROCEDURE — 6360000004 HC RX CONTRAST MEDICATION: Performed by: EMERGENCY MEDICINE

## 2025-01-16 PROCEDURE — 5A1D70Z PERFORMANCE OF URINARY FILTRATION, INTERMITTENT, LESS THAN 6 HOURS PER DAY: ICD-10-PCS | Performed by: INTERNAL MEDICINE

## 2025-01-16 PROCEDURE — 6370000000 HC RX 637 (ALT 250 FOR IP): Performed by: INTERNAL MEDICINE

## 2025-01-16 PROCEDURE — 6360000002 HC RX W HCPCS: Performed by: STUDENT IN AN ORGANIZED HEALTH CARE EDUCATION/TRAINING PROGRAM

## 2025-01-16 PROCEDURE — 85379 FIBRIN DEGRADATION QUANT: CPT

## 2025-01-16 PROCEDURE — 84484 ASSAY OF TROPONIN QUANT: CPT

## 2025-01-16 PROCEDURE — 85025 COMPLETE CBC W/AUTO DIFF WBC: CPT

## 2025-01-16 RX ORDER — ACETAMINOPHEN 325 MG/1
650 TABLET ORAL EVERY 6 HOURS PRN
Status: DISCONTINUED | OUTPATIENT
Start: 2025-01-16 | End: 2025-01-16

## 2025-01-16 RX ORDER — ASPIRIN 81 MG/1
81 TABLET, CHEWABLE ORAL DAILY
Status: DISCONTINUED | OUTPATIENT
Start: 2025-01-17 | End: 2025-01-20 | Stop reason: HOSPADM

## 2025-01-16 RX ORDER — POLYETHYLENE GLYCOL 3350 17 G/17G
17 POWDER, FOR SOLUTION ORAL DAILY PRN
Status: DISCONTINUED | OUTPATIENT
Start: 2025-01-16 | End: 2025-01-16

## 2025-01-16 RX ORDER — ONDANSETRON 4 MG/1
4 TABLET, ORALLY DISINTEGRATING ORAL EVERY 8 HOURS PRN
Status: DISCONTINUED | OUTPATIENT
Start: 2025-01-16 | End: 2025-01-20 | Stop reason: HOSPADM

## 2025-01-16 RX ORDER — MIDODRINE HYDROCHLORIDE 5 MG/1
5 TABLET ORAL ONCE
Status: COMPLETED | OUTPATIENT
Start: 2025-01-16 | End: 2025-01-16

## 2025-01-16 RX ORDER — ATORVASTATIN CALCIUM 10 MG/1
10 TABLET, FILM COATED ORAL NIGHTLY
Status: DISCONTINUED | OUTPATIENT
Start: 2025-01-16 | End: 2025-01-20 | Stop reason: HOSPADM

## 2025-01-16 RX ORDER — OXYCODONE HYDROCHLORIDE 5 MG/1
5 TABLET ORAL EVERY 6 HOURS PRN
Status: ON HOLD | COMMUNITY
End: 2025-01-20 | Stop reason: HOSPADM

## 2025-01-16 RX ORDER — ONDANSETRON 4 MG/1
4 TABLET, FILM COATED ORAL EVERY 6 HOURS PRN
Status: DISCONTINUED | OUTPATIENT
Start: 2025-01-16 | End: 2025-01-16

## 2025-01-16 RX ORDER — GABAPENTIN 100 MG/1
100 CAPSULE ORAL 3 TIMES DAILY
Status: DISCONTINUED | OUTPATIENT
Start: 2025-01-16 | End: 2025-01-20 | Stop reason: HOSPADM

## 2025-01-16 RX ORDER — IOPAMIDOL 755 MG/ML
75 INJECTION, SOLUTION INTRAVASCULAR
Status: COMPLETED | OUTPATIENT
Start: 2025-01-16 | End: 2025-01-16

## 2025-01-16 RX ORDER — ONDANSETRON 2 MG/ML
4 INJECTION INTRAMUSCULAR; INTRAVENOUS EVERY 6 HOURS PRN
Status: DISCONTINUED | OUTPATIENT
Start: 2025-01-16 | End: 2025-01-20 | Stop reason: HOSPADM

## 2025-01-16 RX ORDER — ALBUTEROL SULFATE 0.83 MG/ML
2.5 SOLUTION RESPIRATORY (INHALATION) EVERY 4 HOURS PRN
Status: DISCONTINUED | OUTPATIENT
Start: 2025-01-16 | End: 2025-01-20 | Stop reason: HOSPADM

## 2025-01-16 RX ORDER — ACETAMINOPHEN 650 MG/1
650 SUPPOSITORY RECTAL EVERY 6 HOURS PRN
Status: DISCONTINUED | OUTPATIENT
Start: 2025-01-16 | End: 2025-01-20 | Stop reason: HOSPADM

## 2025-01-16 RX ORDER — AZITHROMYCIN MONOHYDRATE 500 MG/5ML
500 INJECTION, POWDER, LYOPHILIZED, FOR SOLUTION INTRAVENOUS ONCE
Status: DISCONTINUED | OUTPATIENT
Start: 2025-01-16 | End: 2025-01-16

## 2025-01-16 RX ORDER — METOCLOPRAMIDE 10 MG/1
10 TABLET ORAL
Status: DISCONTINUED | OUTPATIENT
Start: 2025-01-17 | End: 2025-01-18

## 2025-01-16 RX ORDER — SODIUM CHLORIDE 0.9 % (FLUSH) 0.9 %
10 SYRINGE (ML) INJECTION PRN
Status: DISCONTINUED | OUTPATIENT
Start: 2025-01-16 | End: 2025-01-20 | Stop reason: HOSPADM

## 2025-01-16 RX ORDER — ACETAMINOPHEN 325 MG/1
650 TABLET ORAL EVERY 6 HOURS PRN
Status: DISCONTINUED | OUTPATIENT
Start: 2025-01-16 | End: 2025-01-20 | Stop reason: HOSPADM

## 2025-01-16 RX ORDER — SEVELAMER CARBONATE 800 MG/1
800 TABLET, FILM COATED ORAL
Status: DISCONTINUED | OUTPATIENT
Start: 2025-01-17 | End: 2025-01-20 | Stop reason: HOSPADM

## 2025-01-16 RX ORDER — LINEZOLID 2 MG/ML
600 INJECTION, SOLUTION INTRAVENOUS EVERY 12 HOURS
Status: DISCONTINUED | OUTPATIENT
Start: 2025-01-16 | End: 2025-01-19

## 2025-01-16 RX ORDER — METOPROLOL SUCCINATE 25 MG/1
25 TABLET, EXTENDED RELEASE ORAL DAILY
Status: DISCONTINUED | OUTPATIENT
Start: 2025-01-17 | End: 2025-01-20 | Stop reason: HOSPADM

## 2025-01-16 RX ORDER — PANTOPRAZOLE SODIUM 20 MG/1
20 TABLET, DELAYED RELEASE ORAL DAILY
Status: DISCONTINUED | OUTPATIENT
Start: 2025-01-16 | End: 2025-01-16

## 2025-01-16 RX ORDER — DOCUSATE SODIUM 100 MG/1
200 CAPSULE, LIQUID FILLED ORAL DAILY
Status: DISCONTINUED | OUTPATIENT
Start: 2025-01-17 | End: 2025-01-20 | Stop reason: HOSPADM

## 2025-01-16 RX ORDER — SODIUM CHLORIDE 9 MG/ML
INJECTION, SOLUTION INTRAVENOUS PRN
Status: DISCONTINUED | OUTPATIENT
Start: 2025-01-16 | End: 2025-01-20 | Stop reason: HOSPADM

## 2025-01-16 RX ORDER — ALBUMIN (HUMAN) 12.5 G/50ML
25 SOLUTION INTRAVENOUS ONCE
Status: COMPLETED | OUTPATIENT
Start: 2025-01-16 | End: 2025-01-17

## 2025-01-16 RX ORDER — MULTIVITAMIN WITH IRON
1 TABLET ORAL DAILY
Status: DISCONTINUED | OUTPATIENT
Start: 2025-01-17 | End: 2025-01-20 | Stop reason: HOSPADM

## 2025-01-16 RX ORDER — POLYETHYLENE GLYCOL 3350 17 G/17G
17 POWDER, FOR SOLUTION ORAL DAILY PRN
Status: DISCONTINUED | OUTPATIENT
Start: 2025-01-16 | End: 2025-01-20 | Stop reason: HOSPADM

## 2025-01-16 RX ORDER — LORAZEPAM 0.5 MG/1
0.5 TABLET ORAL NIGHTLY
Status: DISCONTINUED | OUTPATIENT
Start: 2025-01-16 | End: 2025-01-20 | Stop reason: HOSPADM

## 2025-01-16 RX ORDER — SODIUM CHLORIDE 0.9 % (FLUSH) 0.9 %
10 SYRINGE (ML) INJECTION EVERY 12 HOURS SCHEDULED
Status: DISCONTINUED | OUTPATIENT
Start: 2025-01-16 | End: 2025-01-20 | Stop reason: HOSPADM

## 2025-01-16 RX ORDER — TAMSULOSIN HYDROCHLORIDE 0.4 MG/1
0.4 CAPSULE ORAL EVERY MORNING
Status: DISCONTINUED | OUTPATIENT
Start: 2025-01-17 | End: 2025-01-20 | Stop reason: HOSPADM

## 2025-01-16 RX ADMIN — CEFEPIME 1000 MG: 1 INJECTION, POWDER, FOR SOLUTION INTRAMUSCULAR; INTRAVENOUS at 22:59

## 2025-01-16 RX ADMIN — LINEZOLID 600 MG: 600 INJECTION, SOLUTION INTRAVENOUS at 23:00

## 2025-01-16 RX ADMIN — APIXABAN 2.5 MG: 2.5 TABLET, FILM COATED ORAL at 22:53

## 2025-01-16 RX ADMIN — GABAPENTIN 100 MG: 100 CAPSULE ORAL at 22:52

## 2025-01-16 RX ADMIN — MIDODRINE HYDROCHLORIDE 5 MG: 5 TABLET ORAL at 17:37

## 2025-01-16 RX ADMIN — ALBUMIN (HUMAN) 25 G: 0.25 INJECTION, SOLUTION INTRAVENOUS at 17:45

## 2025-01-16 RX ADMIN — AZITHROMYCIN MONOHYDRATE 500 MG: 500 INJECTION, POWDER, LYOPHILIZED, FOR SOLUTION INTRAVENOUS at 15:33

## 2025-01-16 RX ADMIN — IOPAMIDOL 75 ML: 755 INJECTION, SOLUTION INTRAVENOUS at 15:50

## 2025-01-16 RX ADMIN — LORAZEPAM 0.5 MG: 0.5 TABLET ORAL at 22:53

## 2025-01-16 RX ADMIN — WATER 1000 MG: 1 INJECTION INTRAMUSCULAR; INTRAVENOUS; SUBCUTANEOUS at 15:25

## 2025-01-16 RX ADMIN — ATORVASTATIN CALCIUM 10 MG: 10 TABLET, FILM COATED ORAL at 22:53

## 2025-01-16 RX ADMIN — Medication 10 ML: at 23:02

## 2025-01-16 ASSESSMENT — LIFESTYLE VARIABLES
HOW MANY STANDARD DRINKS CONTAINING ALCOHOL DO YOU HAVE ON A TYPICAL DAY: PATIENT DOES NOT DRINK
HOW OFTEN DO YOU HAVE A DRINK CONTAINING ALCOHOL: NEVER

## 2025-01-16 ASSESSMENT — PAIN - FUNCTIONAL ASSESSMENT: PAIN_FUNCTIONAL_ASSESSMENT: NONE - DENIES PAIN

## 2025-01-16 NOTE — ED PROVIDER NOTES
No rashes or lesions to exposed skin.   Neurologic: Alert and oriented X 3.          DIAGNOSTIC RESULTS     EKG: All EKG's are interpreted by the Emergency Department Physician who either signs or Co-signsthis chart in the absence of a cardiologist.    Atrial fibrillation at a rate of 100 beats a minute with no acute ST elevations or depressions or pathologic U waves.    RADIOLOGY:   Non-plain filmimages such as CT, Ultrasound and MRI are read by the radiologist. Plain radiographic images are visualized and preliminarily interpreted by the emergency physician with the below findings:    See below    Interpretation per the Radiologist below, if available at the time ofthis note:    All incidental findings were discussed with the patient.    XR CHEST PORTABLE   Final Result      CT CHEST PULMONARY EMBOLISM W CONTRAST    (Results Pending)         ED BEDSIDE ULTRASOUND:   Performed by ED Physician - none    LABS:  Labs Reviewed   CBC WITH AUTO DIFFERENTIAL - Abnormal; Notable for the following components:       Result Value    RBC 3.34 (*)     Hemoglobin 10.5 (*)     Hematocrit 32.1 (*)     All other components within normal limits   COMPREHENSIVE METABOLIC PANEL W/ REFLEX TO MG FOR LOW K - Abnormal; Notable for the following components:    Chloride 96 (*)     Glucose 190 (*)     BUN 82 (*)     Creatinine 6.8 (*)     Est, Glom Filt Rate 8 (*)     All other components within normal limits    Narrative:     CALL  Daniel  SF[facility] tel. [phone],  Previous panic on this admission - call not needed per SOP, 01/16/2025 13:01,  by [facility]   TROPONIN - Abnormal; Notable for the following components:    Troponin, High Sensitivity 141 (*)     All other components within normal limits    Narrative:     CALL  [facility]. [phone],  Previous panic on this admission - call not needed per SOP, 01/16/2025 13:01,  by [employer]   D-DIMER, QUANTITATIVE - Abnormal; Notable for the following components:    D-Dimer, Quant 1.08 (*)     All

## 2025-01-16 NOTE — CONSULTS
MD Jesus Matthews MD  Boyd BetteDO                 Office: [phone]                      Fax: [phone]             [facility]                   NEPHROLOGY CONSULT NOTE      IMPRESSION/RECOMMENDATIONS:      #ESKD on HD TTS  -goes to [facility], follows [facility].  -EDW 95kg per chart review  -above his dry weight on admission  -Access: LAVF  -continue TTS HD.   -HD today    #hypervolemia - likely due to missing HD   #HTN  -controlled, resume home antihypertensives    #Anemia of CKD  -hgb at goal 10-11.5, stable, monitor.    Thank you for the consult.  We will follow this patient along the hospitalization.  Boyd Amos DO     High complexity, at risk of impending organ failure needing  higher level of care/monitoring.   Time spent 34 minutes that included face-to-face meeting/discussion with patient, patient's family(wife), and treatment team (including primary/referring team and other consultants; included coordination of care with the treatment team; and review of patient's electronic medical records and ordering appropriates tests.             Reason for Consult:  ESKD management  Requesting Physician: Dr. Rubin Barber MD     CHIEF COMPLAINT:  sob    History obtained from records and patient.    HISTORY OF PRESENT ILLNESS:                Ben Link  is 79 y.o. y.o. male with significant past medical history of ESKD on HD MWF, Alzheimer's, COPD, CHF who presented with sob . Wife presented at bedside to provide HPI. Patient lives at SNF. Having productive cough clear sputum x1-2 days, with some n/v. Missed HD Saturday because he wasn't feeling well but went for HD Monday and had a full treatment. Does not make much urine, per wife. No dysuria or hematuria. Nephrology consulted for ESKD management.    Past Medical History:     has a past medical history of Acute cholecystitis, Acute cystitis without hematuria, Anemia, Anxiety, Arthritis, Bradycardia,

## 2025-01-16 NOTE — ED TRIAGE NOTES
Patient oriented to room and ED throughput process.  Safety measures with ED bed locked in lowest position and call light in reach.  Patient educated on all orders, including any medications.  Patient educated on chief complaint/symptoms. Patient encouraged to ask questions regarding care, medications or treatment plan.  Patient aware of how to reach staff with questions/concerns.    
5

## 2025-01-16 NOTE — H&P
Hospital Medicine History & Physical      PCP: No primary care provider on file.    Date of Admission: 1/16/2025    Date of Service: Pt seen/examined on 1/16/2025 11:48 AM and   Admitted to Inpatient with expected LOS greater than two midnights due to medical therapy.       Chief Complaint:  cough     History Of Present Illness:    79-year-old gentleman admitted from [facility] complaining of cough and some shortness of breath.  He has a history of previous colon cancer s/p resection recent ulcer at the resection site s/p colonic stent, recent GI bleed, atrial fibrillation on low-dose Eliquis, end-stage renal disease on dialysis, hypertension admitted to the hospital generally feeling tired  Reports cough for the last week has been productive for the last few days he has been producing yellowish [place] in his sputum  Denies any fevers  Some shortness of breath however unable to give me any details  Daughter at the bedside to also help with the history  Denies any diarrhea no abdominal pain no chest pain  No leg swelling  No hematemesis hemoptysis  Past Medical History:          Diagnosis Date    Acute cholecystitis     Acute cystitis without hematuria     Anemia 03/2022    Anxiety     Arthritis     Bradycardia, unspecified     CAD (coronary artery disease) 01/2020    Cellulitis, unspecified     Cerebral infarction (HCC)     Chronic diastolic (congestive) heart failure (HCC)     Cognitive communication deficit     COPD (chronic obstructive pulmonary disease) (HCC)     Diabetes mellitus (HCC)     Type 2, with neuropathy    Dysphagia, oropharyngeal     Encephalopathy, unspecified     End stage renal disease (HCC)     HD    Fatigue     Gastro-esophageal reflux disease without esophagitis     Gastrointestinal hemorrhage     Hemodialysis patient ([facility]) 2019    ckd-goes to dialysis Tuesday, Thurs, Sat ([facility])    Hemorrhage of anus and rectum     History of colon cancer     Hx of blood

## 2025-01-16 NOTE — CONSULTS
MD Jesus Matthews MD Aldo Estella, DO              Office: [phone]                      Fax: [phone]               [email or web address]                     Nephrology consult received. Full consult report will follow.     Thank you for allowing us to participate in this patient's care. Please do not hesitate to contact us anytime. We will follow along with you.     #ESKD on HD TTS  HD today vs tomorrow.    Boyd Amos DO

## 2025-01-17 LAB
ALBUMIN SERPL-MCNC: 3.7 G/DL (ref 3.4–5)
ALBUMIN/GLOB SERPL: 1.3 {RATIO} (ref 1.1–2.2)
ALP SERPL-CCNC: 82 U/L (ref 40–129)
ALT SERPL-CCNC: 11 U/L (ref 10–40)
ANION GAP SERPL CALCULATED.3IONS-SCNC: 11 MMOL/L (ref 3–16)
AST SERPL-CCNC: 12 U/L (ref 15–37)
BASE EXCESS BLDA CALC-SCNC: 3.2 MMOL/L (ref -3–3)
BASOPHILS # BLD: 0 K/UL (ref 0–0.2)
BASOPHILS NFR BLD: 0.6 %
BILIRUB SERPL-MCNC: 0.4 MG/DL (ref 0–1)
BUN SERPL-MCNC: 45 MG/DL (ref 7–20)
CALCIUM SERPL-MCNC: 9.5 MG/DL (ref 8.3–10.6)
CHLORIDE SERPL-SCNC: 100 MMOL/L (ref 99–110)
CO2 BLDA-SCNC: 71.6 MMOL/L
CO2 SERPL-SCNC: 28 MMOL/L (ref 21–32)
COHGB MFR BLDA: 1.2 % (ref 0–1.5)
CREAT SERPL-MCNC: 4.7 MG/DL (ref 0.8–1.3)
DEPRECATED RDW RBC AUTO: 14.7 % (ref 12.4–15.4)
EKG DIAGNOSIS: NORMAL
EKG Q-T INTERVAL: 324 MS
EKG QRS DURATION: 78 MS
EKG QTC CALCULATION (BAZETT): 417 MS
EKG R AXIS: -15 DEGREES
EKG T AXIS: 3 DEGREES
EKG VENTRICULAR RATE: 100 BPM
EOSINOPHIL # BLD: 0.3 K/UL (ref 0–0.6)
EOSINOPHIL NFR BLD: 6.2 %
GFR SERPLBLD CREATININE-BSD FMLA CKD-EPI: 12 ML/MIN/{1.73_M2}
GLUCOSE SERPL-MCNC: 160 MG/DL (ref 70–99)
HCO3 BLDA-SCNC: 30.1 MMOL/L (ref 21–29)
HCT VFR BLD AUTO: 29.9 % (ref 40.5–52.5)
HGB BLD-MCNC: 9.6 G/DL (ref 13.5–17.5)
HGB BLDA-MCNC: 9.7 G/DL (ref 13.5–17.5)
INR PPP: 1.38 (ref 0.85–1.15)
LYMPHOCYTES # BLD: 0.8 K/UL (ref 1–5.1)
LYMPHOCYTES NFR BLD: 13.9 %
MCH RBC QN AUTO: 31.2 PG (ref 26–34)
MCHC RBC AUTO-ENTMCNC: 32 G/DL (ref 31–36)
MCV RBC AUTO: 97.7 FL (ref 80–100)
METHGB MFR BLDA: 1.7 %
MONOCYTES # BLD: 0.5 K/UL (ref 0–1.3)
MONOCYTES NFR BLD: 9.6 %
MRSA DNA SPEC QL NAA+PROBE: NORMAL
NEUTROPHILS # BLD: 3.8 K/UL (ref 1.7–7.7)
NEUTROPHILS NFR BLD: 69.7 %
O2 THERAPY: ABNORMAL
PCO2 BLDA: 58.1 MMHG (ref 35–45)
PH BLDA: 7.32 [PH] (ref 7.35–7.45)
PLATELET # BLD AUTO: 167 K/UL (ref 135–450)
PMV BLD AUTO: 7.8 FL (ref 5–10.5)
PO2 BLDA: 93 MMHG (ref 75–108)
POTASSIUM SERPL-SCNC: 4.7 MMOL/L (ref 3.5–5.1)
PROT SERPL-MCNC: 6.6 G/DL (ref 6.4–8.2)
PROTHROMBIN TIME: 17.2 SEC (ref 11.9–14.9)
RBC # BLD AUTO: 3.06 M/UL (ref 4.2–5.9)
REPORT: NORMAL
RESP PATH DNA+RNA PNL NPH NAA+NON-PROBE: NORMAL
SAO2 % BLDA: 96.3 %
SODIUM SERPL-SCNC: 139 MMOL/L (ref 136–145)
WBC # BLD AUTO: 5.5 K/UL (ref 4–11)

## 2025-01-17 PROCEDURE — 93010 ELECTROCARDIOGRAM REPORT: CPT | Performed by: INTERNAL MEDICINE

## 2025-01-17 PROCEDURE — 6360000002 HC RX W HCPCS: Performed by: INTERNAL MEDICINE

## 2025-01-17 PROCEDURE — 85610 PROTHROMBIN TIME: CPT

## 2025-01-17 PROCEDURE — 80053 COMPREHEN METABOLIC PANEL: CPT

## 2025-01-17 PROCEDURE — 85025 COMPLETE CBC W/AUTO DIFF WBC: CPT

## 2025-01-17 PROCEDURE — 36415 COLL VENOUS BLD VENIPUNCTURE: CPT

## 2025-01-17 PROCEDURE — 82803 BLOOD GASES ANY COMBINATION: CPT

## 2025-01-17 PROCEDURE — 1200000000 HC SEMI PRIVATE

## 2025-01-17 PROCEDURE — 94760 N-INVAS EAR/PLS OXIMETRY 1: CPT

## 2025-01-17 PROCEDURE — 2580000003 HC RX 258: Performed by: INTERNAL MEDICINE

## 2025-01-17 PROCEDURE — 2500000003 HC RX 250 WO HCPCS: Performed by: INTERNAL MEDICINE

## 2025-01-17 PROCEDURE — 36600 WITHDRAWAL OF ARTERIAL BLOOD: CPT

## 2025-01-17 PROCEDURE — 6370000000 HC RX 637 (ALT 250 FOR IP): Performed by: INTERNAL MEDICINE

## 2025-01-17 PROCEDURE — 2700000000 HC OXYGEN THERAPY PER DAY

## 2025-01-17 RX ADMIN — THERA TABS 1 TABLET: TAB at 08:50

## 2025-01-17 RX ADMIN — LORAZEPAM 0.5 MG: 0.5 TABLET ORAL at 21:30

## 2025-01-17 RX ADMIN — Medication 10 ML: at 08:51

## 2025-01-17 RX ADMIN — METOCLOPRAMIDE 10 MG: 10 TABLET ORAL at 08:50

## 2025-01-17 RX ADMIN — GABAPENTIN 100 MG: 100 CAPSULE ORAL at 21:31

## 2025-01-17 RX ADMIN — METOPROLOL SUCCINATE 25 MG: 25 TABLET, FILM COATED, EXTENDED RELEASE ORAL at 08:50

## 2025-01-17 RX ADMIN — LINEZOLID 600 MG: 600 INJECTION, SOLUTION INTRAVENOUS at 08:57

## 2025-01-17 RX ADMIN — APIXABAN 2.5 MG: 2.5 TABLET, FILM COATED ORAL at 08:50

## 2025-01-17 RX ADMIN — SEVELAMER CARBONATE 800 MG: 800 TABLET, FILM COATED ORAL at 15:21

## 2025-01-17 RX ADMIN — GABAPENTIN 100 MG: 100 CAPSULE ORAL at 08:50

## 2025-01-17 RX ADMIN — METOCLOPRAMIDE 10 MG: 10 TABLET ORAL at 15:21

## 2025-01-17 RX ADMIN — APIXABAN 2.5 MG: 2.5 TABLET, FILM COATED ORAL at 21:30

## 2025-01-17 RX ADMIN — TAMSULOSIN HYDROCHLORIDE 0.4 MG: 0.4 CAPSULE ORAL at 08:50

## 2025-01-17 RX ADMIN — CEFEPIME 1000 MG: 1 INJECTION, POWDER, FOR SOLUTION INTRAMUSCULAR; INTRAVENOUS at 21:35

## 2025-01-17 RX ADMIN — GABAPENTIN 100 MG: 100 CAPSULE ORAL at 13:44

## 2025-01-17 RX ADMIN — ATORVASTATIN CALCIUM 10 MG: 10 TABLET, FILM COATED ORAL at 21:31

## 2025-01-17 RX ADMIN — Medication 10 ML: at 21:41

## 2025-01-17 RX ADMIN — SEVELAMER CARBONATE 800 MG: 800 TABLET, FILM COATED ORAL at 08:50

## 2025-01-17 RX ADMIN — LINEZOLID 600 MG: 600 INJECTION, SOLUTION INTRAVENOUS at 21:38

## 2025-01-17 RX ADMIN — DOCUSATE SODIUM 200 MG: 100 CAPSULE, LIQUID FILLED ORAL at 08:50

## 2025-01-17 RX ADMIN — ASPIRIN 81 MG: 81 TABLET, CHEWABLE ORAL at 08:50

## 2025-01-17 NOTE — PLAN OF CARE
Problem: Chronic Conditions and Co-morbidities  Goal: Patient's chronic conditions and co-morbidity symptoms are monitored and maintained or improved  1/17/2025 0910 by Yudy Iyer RN  Flowsheets (Taken 1/17/2025 0910)  Care Plan - Patient's Chronic Conditions and Co-Morbidity Symptoms are Monitored and Maintained or Improved:   Monitor and assess patient's chronic conditions and comorbid symptoms for stability, deterioration, or improvement   Collaborate with multidisciplinary team to address chronic and comorbid conditions and prevent exacerbation or deterioration     Problem: Skin/Tissue Integrity  Goal: Absence of new skin breakdown  Description: 1.  Monitor for areas of redness and/or skin breakdown  2.  Assess vascular access sites hourly  3.  Every 4-6 hours minimum:  Change oxygen saturation probe site  4.  Every 4-6 hours:  If on nasal continuous positive airway pressure, respiratory therapy assess nares and determine need for appliance change or resting period.  Outcome: Progressing     Problem: Discharge Planning  Goal: Discharge to home or other facility with appropriate resources  Flowsheets (Taken 1/17/2025 0910)  Discharge to home or other facility with appropriate resources:   Identify barriers to discharge with patient and caregiver   Identify discharge learning needs (meds, wound care, etc)   Arrange for needed discharge resources and transportation as appropriate     Problem: Safety - Adult  Goal: Free from fall injury  Flowsheets (Taken 1/17/2025 0910)  Free From Fall Injury: Instruct family/caregiver on patient safety

## 2025-01-17 NOTE — CARE COORDINATION
Case Management Assessment  Initial Evaluation    Date/Time of Evaluation: 1/17/2025 8:41 AM  Assessment Completed by: Noris Blanco RN    If patient is discharged prior to next notation, then this note serves as note for discharge by case management.    Patient Name: Ben Link                   YOB: 1945  Diagnosis: Pneumonia due to organism [J18.9]  Hypoxia [R09.02]  Acute respiratory failure with hypoxia [J96.01]  Pneumonia of right lower lobe due to infectious organism [J18.9]                   Date / Time: 1/16/2025 11:48 AM ROOM: [facility]4458/4458-01    Patient Admission Status: Inpatient   Readmission Risk (Low < 19, Mod (19-27), High > 27): Readmission Risk Score: 26.1    Current PCP: No primary care provider on file.  PCP verified by CM? Yes    Chart Reviewed: Yes      History Provided by: Medical Record, Other (see comment) ([facility], Abi)  Patient Orientation: Alert and Oriented, Person, Place    Patient Cognition: Alert    Hospitalization in the last 30 days (Readmission):  No    If yes, Readmission Assessment in CM Navigator will be completed.    Advance Directives:      Code Status: DNR-CCA   Patient's Primary Decision Maker is: Named in Scanned ACP Document    Primary Decision Maker: Ping Clemens - Child - [phone]    Supplemental (Other) Decision Maker: Janeen Linares - Child - [phone]    Discharge Planning:    Patient expects to discharge to: Long-term care  Plan for transportation at discharge:      Financial    Payor: [employer] / Plan: [email or web address] HMO / Product Type: *No Product type* /         Current Nursing Home Information:  Patient admitted from:  [facility]  [address]  [facility] [address]  Phone: [phone]  Fax: [phone]      Call to Triston Alex, at [facility] who confirmed the patient is: Long Term Care, no Precert required for return.      Patient Covid vaccination status:    Internal

## 2025-01-18 ENCOUNTER — APPOINTMENT (OUTPATIENT)
Dept: GENERAL RADIOLOGY | Age: 80
DRG: 177 | End: 2025-01-18
Payer: COMMERCIAL

## 2025-01-18 LAB
ALBUMIN SERPL-MCNC: 3.6 G/DL (ref 3.4–5)
ALBUMIN/GLOB SERPL: 1.2 {RATIO} (ref 1.1–2.2)
ALP SERPL-CCNC: 84 U/L (ref 40–129)
ALT SERPL-CCNC: 10 U/L (ref 10–40)
ANION GAP SERPL CALCULATED.3IONS-SCNC: 13 MMOL/L (ref 3–16)
AST SERPL-CCNC: 8 U/L (ref 15–37)
BASOPHILS # BLD: 0 K/UL (ref 0–0.2)
BASOPHILS NFR BLD: 0.4 %
BILIRUB SERPL-MCNC: 0.4 MG/DL (ref 0–1)
BUN SERPL-MCNC: 59 MG/DL (ref 7–20)
CALCIUM SERPL-MCNC: 9.1 MG/DL (ref 8.3–10.6)
CHLORIDE SERPL-SCNC: 100 MMOL/L (ref 99–110)
CO2 SERPL-SCNC: 26 MMOL/L (ref 21–32)
CREAT SERPL-MCNC: 6.4 MG/DL (ref 0.8–1.3)
DEPRECATED RDW RBC AUTO: 14.5 % (ref 12.4–15.4)
EOSINOPHIL # BLD: 0.4 K/UL (ref 0–0.6)
EOSINOPHIL NFR BLD: 8.9 %
GFR SERPLBLD CREATININE-BSD FMLA CKD-EPI: 8 ML/MIN/{1.73_M2}
GLUCOSE SERPL-MCNC: 121 MG/DL (ref 70–99)
HCT VFR BLD AUTO: 30 % (ref 40.5–52.5)
HGB BLD-MCNC: 9.9 G/DL (ref 13.5–17.5)
LYMPHOCYTES # BLD: 0.7 K/UL (ref 1–5.1)
LYMPHOCYTES NFR BLD: 14.3 %
MCH RBC QN AUTO: 31.8 PG (ref 26–34)
MCHC RBC AUTO-ENTMCNC: 33 G/DL (ref 31–36)
MCV RBC AUTO: 96.4 FL (ref 80–100)
MONOCYTES # BLD: 0.4 K/UL (ref 0–1.3)
MONOCYTES NFR BLD: 8 %
NEUTROPHILS # BLD: 3.4 K/UL (ref 1.7–7.7)
NEUTROPHILS NFR BLD: 68.4 %
PLATELET # BLD AUTO: 160 K/UL (ref 135–450)
PMV BLD AUTO: 8 FL (ref 5–10.5)
POTASSIUM SERPL-SCNC: 5 MMOL/L (ref 3.5–5.1)
PROT SERPL-MCNC: 6.6 G/DL (ref 6.4–8.2)
RBC # BLD AUTO: 3.12 M/UL (ref 4.2–5.9)
SODIUM SERPL-SCNC: 139 MMOL/L (ref 136–145)
WBC # BLD AUTO: 4.9 K/UL (ref 4–11)

## 2025-01-18 PROCEDURE — 1200000000 HC SEMI PRIVATE

## 2025-01-18 PROCEDURE — 99222 1ST HOSP IP/OBS MODERATE 55: CPT | Performed by: INTERNAL MEDICINE

## 2025-01-18 PROCEDURE — P9047 ALBUMIN (HUMAN), 25%, 50ML: HCPCS | Performed by: INTERNAL MEDICINE

## 2025-01-18 PROCEDURE — 2500000003 HC RX 250 WO HCPCS: Performed by: INTERNAL MEDICINE

## 2025-01-18 PROCEDURE — 71045 X-RAY EXAM CHEST 1 VIEW: CPT

## 2025-01-18 PROCEDURE — 92610 EVALUATE SWALLOWING FUNCTION: CPT

## 2025-01-18 PROCEDURE — 90935 HEMODIALYSIS ONE EVALUATION: CPT

## 2025-01-18 PROCEDURE — 6370000000 HC RX 637 (ALT 250 FOR IP): Performed by: INTERNAL MEDICINE

## 2025-01-18 PROCEDURE — 6360000002 HC RX W HCPCS: Performed by: INTERNAL MEDICINE

## 2025-01-18 PROCEDURE — 2580000003 HC RX 258: Performed by: INTERNAL MEDICINE

## 2025-01-18 PROCEDURE — 80053 COMPREHEN METABOLIC PANEL: CPT

## 2025-01-18 PROCEDURE — 85025 COMPLETE CBC W/AUTO DIFF WBC: CPT

## 2025-01-18 PROCEDURE — 92526 ORAL FUNCTION THERAPY: CPT

## 2025-01-18 PROCEDURE — 36415 COLL VENOUS BLD VENIPUNCTURE: CPT

## 2025-01-18 RX ORDER — METOCLOPRAMIDE 10 MG/1
5 TABLET ORAL
Status: DISCONTINUED | OUTPATIENT
Start: 2025-01-18 | End: 2025-01-20 | Stop reason: HOSPADM

## 2025-01-18 RX ORDER — ALBUMIN (HUMAN) 12.5 G/50ML
25 SOLUTION INTRAVENOUS ONCE
Status: COMPLETED | OUTPATIENT
Start: 2025-01-18 | End: 2025-01-18

## 2025-01-18 RX ADMIN — SEVELAMER CARBONATE 800 MG: 800 TABLET, FILM COATED ORAL at 17:30

## 2025-01-18 RX ADMIN — ASPIRIN 81 MG: 81 TABLET, CHEWABLE ORAL at 13:45

## 2025-01-18 RX ADMIN — LINEZOLID 600 MG: 600 INJECTION, SOLUTION INTRAVENOUS at 13:38

## 2025-01-18 RX ADMIN — Medication 10 ML: at 21:14

## 2025-01-18 RX ADMIN — GABAPENTIN 100 MG: 100 CAPSULE ORAL at 13:41

## 2025-01-18 RX ADMIN — ALBUMIN (HUMAN) 12.5 G: 0.25 INJECTION, SOLUTION INTRAVENOUS at 17:49

## 2025-01-18 RX ADMIN — ATORVASTATIN CALCIUM 10 MG: 10 TABLET, FILM COATED ORAL at 21:05

## 2025-01-18 RX ADMIN — APIXABAN 2.5 MG: 2.5 TABLET, FILM COATED ORAL at 13:45

## 2025-01-18 RX ADMIN — APIXABAN 2.5 MG: 2.5 TABLET, FILM COATED ORAL at 21:05

## 2025-01-18 RX ADMIN — LINEZOLID 600 MG: 600 INJECTION, SOLUTION INTRAVENOUS at 21:13

## 2025-01-18 RX ADMIN — METOCLOPRAMIDE 5 MG: 10 TABLET ORAL at 17:30

## 2025-01-18 RX ADMIN — GABAPENTIN 100 MG: 100 CAPSULE ORAL at 21:05

## 2025-01-18 RX ADMIN — LORAZEPAM 0.5 MG: 0.5 TABLET ORAL at 21:13

## 2025-01-18 RX ADMIN — ONDANSETRON 4 MG: 2 INJECTION, SOLUTION INTRAMUSCULAR; INTRAVENOUS at 13:48

## 2025-01-18 RX ADMIN — SEVELAMER CARBONATE 800 MG: 800 TABLET, FILM COATED ORAL at 13:47

## 2025-01-18 RX ADMIN — CEFEPIME 1000 MG: 1 INJECTION, POWDER, FOR SOLUTION INTRAMUSCULAR; INTRAVENOUS at 21:12

## 2025-01-18 RX ADMIN — Medication 10 ML: at 13:46

## 2025-01-18 NOTE — PLAN OF CARE
Problem: Chronic Conditions and Co-morbidities  Goal: Patient's chronic conditions and co-morbidity symptoms are monitored and maintained or improved  1/18/2025 1542 by Marleni Olea RN  Outcome: Progressing     Problem: Discharge Planning  Goal: Discharge to home or other facility with appropriate resources  1/18/2025 1542 by Marleni Olea RN  Outcome: Progressing     Problem: Safety - Adult  Goal: Free from fall injury  1/18/2025 1542 by Marleni Olea RN  Outcome: Progressing     Problem: Skin/Tissue Integrity  Goal: Absence of new skin breakdown  Description: 1.  Monitor for areas of redness and/or skin breakdown  2.  Assess vascular access sites hourly  3.  Every 4-6 hours minimum:  Change oxygen saturation probe site  4.  Every 4-6 hours:  If on nasal continuous positive airway pressure, respiratory therapy assess nares and determine need for appliance change or resting period.  1/18/2025 1542 by Marleni Olea RN  Outcome: Progressing

## 2025-01-18 NOTE — CONSULTS
[facility] Pulmonary and Critical Care   Consult Note      Reason for Consult: Acute hypoxic respiratory failure  Requesting Physician: Abi Castaneda    Subjective:   CHIEF COMPLAINT: Shortness of breath and cough with mucoid phlegm     HPI: Patient is a poor historian on account of Alzheimer's disease, was able to give me a reasonable history.  States that he has been increasingly short of breath with cough and mucoid phlegm for the past few days.  Denies any fevers or chest pain.  Denies fluid retention or leg edema.  Patient is requiring O2 during this hospitalization which is new and pulmonary consultation has been sought for an evaluation?  Concerns for pneumonia.    History of ESRD on HD TTS, currently receiving HD.  Former smoker quit approximately 3 years ago-typically smoked 1 pack every 3 days.  Denies any prior history of COPD, not using any inhalers.       The patient is a 79 y.o. male with significant past medical history of:      Diagnosis Date    Acute cholecystitis     Acute cystitis without hematuria     Anemia 03/2022    Anxiety     Arthritis     Bradycardia, unspecified     CAD (coronary artery disease) 01/2020    Cellulitis, unspecified     Cerebral infarction (HCC)     Chronic diastolic (congestive) heart failure (HCC)     Cognitive communication deficit     COPD (chronic obstructive pulmonary disease) (HCC)     Diabetes mellitus (HCC)     Type 2, with neuropathy    Dysphagia, oropharyngeal     Encephalopathy, unspecified     End stage renal disease (HCC)     HD    Fatigue     Gastro-esophageal reflux disease without esophagitis     Gastrointestinal hemorrhage     Hemodialysis patient ([facility]) 2019    ckd-goes to dialysis Tuesday, Thurs, Sat ([facility])    Hemorrhage of anus and rectum     History of colon cancer     Hx of blood clots     Hyperlipidemia     Hypertension     Hyponatremia     Hypotension, unspecified     Infection and inflammatory reaction due to other cardiac and vascular

## 2025-01-19 LAB
ANION GAP SERPL CALCULATED.3IONS-SCNC: 12 MMOL/L (ref 3–16)
BASOPHILS # BLD: 0.1 K/UL (ref 0–0.2)
BASOPHILS NFR BLD: 2.7 %
BUN SERPL-MCNC: 40 MG/DL (ref 7–20)
CALCIUM SERPL-MCNC: 9.3 MG/DL (ref 8.3–10.6)
CHLORIDE SERPL-SCNC: 97 MMOL/L (ref 99–110)
CO2 SERPL-SCNC: 26 MMOL/L (ref 21–32)
CREAT SERPL-MCNC: 5.1 MG/DL (ref 0.8–1.3)
DEPRECATED RDW RBC AUTO: 14.8 % (ref 12.4–15.4)
EOSINOPHIL # BLD: 0.4 K/UL (ref 0–0.6)
EOSINOPHIL NFR BLD: 8.6 %
GFR SERPLBLD CREATININE-BSD FMLA CKD-EPI: 11 ML/MIN/{1.73_M2}
GLUCOSE SERPL-MCNC: 121 MG/DL (ref 70–99)
HCT VFR BLD AUTO: 30.6 % (ref 40.5–52.5)
HGB BLD-MCNC: 10.1 G/DL (ref 13.5–17.5)
LYMPHOCYTES # BLD: 0.6 K/UL (ref 1–5.1)
LYMPHOCYTES NFR BLD: 13.3 %
MCH RBC QN AUTO: 31.6 PG (ref 26–34)
MCHC RBC AUTO-ENTMCNC: 32.9 G/DL (ref 31–36)
MCV RBC AUTO: 95.9 FL (ref 80–100)
MONOCYTES # BLD: 0.3 K/UL (ref 0–1.3)
MONOCYTES NFR BLD: 7.1 %
NEUTROPHILS # BLD: 2.9 K/UL (ref 1.7–7.7)
NEUTROPHILS NFR BLD: 68.3 %
PLATELET # BLD AUTO: 155 K/UL (ref 135–450)
PMV BLD AUTO: 7.7 FL (ref 5–10.5)
POTASSIUM SERPL-SCNC: 4.7 MMOL/L (ref 3.5–5.1)
RBC # BLD AUTO: 3.19 M/UL (ref 4.2–5.9)
SODIUM SERPL-SCNC: 135 MMOL/L (ref 136–145)
WBC # BLD AUTO: 4.3 K/UL (ref 4–11)

## 2025-01-19 PROCEDURE — 80048 BASIC METABOLIC PNL TOTAL CA: CPT

## 2025-01-19 PROCEDURE — 6360000002 HC RX W HCPCS: Performed by: INTERNAL MEDICINE

## 2025-01-19 PROCEDURE — 85025 COMPLETE CBC W/AUTO DIFF WBC: CPT

## 2025-01-19 PROCEDURE — 1200000000 HC SEMI PRIVATE

## 2025-01-19 PROCEDURE — 6370000000 HC RX 637 (ALT 250 FOR IP): Performed by: NURSE PRACTITIONER

## 2025-01-19 PROCEDURE — 99232 SBSQ HOSP IP/OBS MODERATE 35: CPT | Performed by: INTERNAL MEDICINE

## 2025-01-19 PROCEDURE — 2500000003 HC RX 250 WO HCPCS: Performed by: INTERNAL MEDICINE

## 2025-01-19 PROCEDURE — 36415 COLL VENOUS BLD VENIPUNCTURE: CPT

## 2025-01-19 PROCEDURE — 6370000000 HC RX 637 (ALT 250 FOR IP): Performed by: INTERNAL MEDICINE

## 2025-01-19 PROCEDURE — 2580000003 HC RX 258: Performed by: INTERNAL MEDICINE

## 2025-01-19 PROCEDURE — 87449 NOS EACH ORGANISM AG IA: CPT

## 2025-01-19 RX ORDER — PREDNISONE 20 MG/1
40 TABLET ORAL DAILY
Status: DISCONTINUED | OUTPATIENT
Start: 2025-01-19 | End: 2025-01-20 | Stop reason: HOSPADM

## 2025-01-19 RX ADMIN — ASPIRIN 81 MG: 81 TABLET, CHEWABLE ORAL at 09:23

## 2025-01-19 RX ADMIN — SEVELAMER CARBONATE 800 MG: 800 TABLET, FILM COATED ORAL at 09:23

## 2025-01-19 RX ADMIN — METOCLOPRAMIDE 5 MG: 10 TABLET ORAL at 09:23

## 2025-01-19 RX ADMIN — APIXABAN 2.5 MG: 2.5 TABLET, FILM COATED ORAL at 21:30

## 2025-01-19 RX ADMIN — GABAPENTIN 100 MG: 100 CAPSULE ORAL at 14:15

## 2025-01-19 RX ADMIN — ATORVASTATIN CALCIUM 10 MG: 10 TABLET, FILM COATED ORAL at 21:30

## 2025-01-19 RX ADMIN — LORAZEPAM 0.5 MG: 0.5 TABLET ORAL at 21:30

## 2025-01-19 RX ADMIN — MELATONIN TAB 3 MG 6 MG: 3 TAB at 23:39

## 2025-01-19 RX ADMIN — SEVELAMER CARBONATE 800 MG: 800 TABLET, FILM COATED ORAL at 14:15

## 2025-01-19 RX ADMIN — GABAPENTIN 100 MG: 100 CAPSULE ORAL at 09:22

## 2025-01-19 RX ADMIN — PREDNISONE 40 MG: 20 TABLET ORAL at 09:26

## 2025-01-19 RX ADMIN — APIXABAN 2.5 MG: 2.5 TABLET, FILM COATED ORAL at 09:22

## 2025-01-19 RX ADMIN — METOCLOPRAMIDE 5 MG: 10 TABLET ORAL at 14:15

## 2025-01-19 RX ADMIN — SEVELAMER CARBONATE 800 MG: 800 TABLET, FILM COATED ORAL at 17:28

## 2025-01-19 RX ADMIN — Medication 10 ML: at 21:31

## 2025-01-19 RX ADMIN — TAMSULOSIN HYDROCHLORIDE 0.4 MG: 0.4 CAPSULE ORAL at 09:22

## 2025-01-19 RX ADMIN — CEFEPIME 1000 MG: 1 INJECTION, POWDER, FOR SOLUTION INTRAMUSCULAR; INTRAVENOUS at 21:29

## 2025-01-19 RX ADMIN — DOCUSATE SODIUM 200 MG: 100 CAPSULE, LIQUID FILLED ORAL at 09:23

## 2025-01-19 RX ADMIN — Medication 10 ML: at 09:26

## 2025-01-19 RX ADMIN — THERA TABS 1 TABLET: TAB at 09:23

## 2025-01-19 RX ADMIN — METOCLOPRAMIDE 5 MG: 10 TABLET ORAL at 17:28

## 2025-01-19 RX ADMIN — GABAPENTIN 100 MG: 100 CAPSULE ORAL at 21:30

## 2025-01-19 RX ADMIN — METOPROLOL SUCCINATE 25 MG: 25 TABLET, FILM COATED, EXTENDED RELEASE ORAL at 09:23

## 2025-01-19 NOTE — PLAN OF CARE
Problem: Chronic Conditions and Co-morbidities  Goal: Patient's chronic conditions and co-morbidity symptoms are monitored and maintained or improved  1/18/2025 2222 by Ji Carr RN  Outcome: Progressing  1/18/2025 1542 by Marleni Olea RN  Outcome: Progressing     Problem: Discharge Planning  Goal: Discharge to home or other facility with appropriate resources  1/18/2025 2222 by Ji Carr RN  Outcome: Progressing  1/18/2025 1542 by Marleni Olea RN  Outcome: Progressing     Problem: Safety - Adult  Goal: Free from fall injury  1/18/2025 2222 by Ji Carr RN  Outcome: Progressing  1/18/2025 1542 by Marleni Olea RN  Outcome: Progressing     Problem: Skin/Tissue Integrity  Goal: Absence of new skin breakdown  Description: 1.  Monitor for areas of redness and/or skin breakdown  2.  Assess vascular access sites hourly  3.  Every 4-6 hours minimum:  Change oxygen saturation probe site  4.  Every 4-6 hours:  If on nasal continuous positive airway pressure, respiratory therapy assess nares and determine need for appliance change or resting period.  1/18/2025 2222 by Ji Carr RN  Outcome: Progressing  1/18/2025 1542 by Marleni Olea RN  Outcome: Progressing

## 2025-01-20 VITALS
TEMPERATURE: 97.9 F | HEIGHT: 73 IN | RESPIRATION RATE: 18 BRPM | HEART RATE: 80 BPM | OXYGEN SATURATION: 94 % | DIASTOLIC BLOOD PRESSURE: 72 MMHG | BODY MASS INDEX: 27.58 KG/M2 | SYSTOLIC BLOOD PRESSURE: 119 MMHG | WEIGHT: 208.11 LBS

## 2025-01-20 LAB
ANION GAP SERPL CALCULATED.3IONS-SCNC: 15 MMOL/L (ref 3–16)
BUN SERPL-MCNC: 55 MG/DL (ref 7–20)
CALCIUM SERPL-MCNC: 9 MG/DL (ref 8.3–10.6)
CHLORIDE SERPL-SCNC: 98 MMOL/L (ref 99–110)
CO2 SERPL-SCNC: 25 MMOL/L (ref 21–32)
CREAT SERPL-MCNC: 6.5 MG/DL (ref 0.8–1.3)
DEPRECATED RDW RBC AUTO: 14.2 % (ref 12.4–15.4)
GFR SERPLBLD CREATININE-BSD FMLA CKD-EPI: 8 ML/MIN/{1.73_M2}
GLUCOSE SERPL-MCNC: 163 MG/DL (ref 70–99)
HCT VFR BLD AUTO: 29 % (ref 40.5–52.5)
HGB BLD-MCNC: 9.5 G/DL (ref 13.5–17.5)
LEGIONELLA AG UR QL: NORMAL
MCH RBC QN AUTO: 31.7 PG (ref 26–34)
MCHC RBC AUTO-ENTMCNC: 32.9 G/DL (ref 31–36)
MCV RBC AUTO: 96.3 FL (ref 80–100)
PLATELET # BLD AUTO: 152 K/UL (ref 135–450)
PMV BLD AUTO: 8 FL (ref 5–10.5)
POTASSIUM SERPL-SCNC: 4.7 MMOL/L (ref 3.5–5.1)
RBC # BLD AUTO: 3.02 M/UL (ref 4.2–5.9)
S PNEUM AG UR QL: NORMAL
SODIUM SERPL-SCNC: 138 MMOL/L (ref 136–145)
WBC # BLD AUTO: 3.9 K/UL (ref 4–11)

## 2025-01-20 PROCEDURE — 6370000000 HC RX 637 (ALT 250 FOR IP): Performed by: INTERNAL MEDICINE

## 2025-01-20 PROCEDURE — 36415 COLL VENOUS BLD VENIPUNCTURE: CPT

## 2025-01-20 PROCEDURE — 80048 BASIC METABOLIC PNL TOTAL CA: CPT

## 2025-01-20 PROCEDURE — 99232 SBSQ HOSP IP/OBS MODERATE 35: CPT | Performed by: INTERNAL MEDICINE

## 2025-01-20 PROCEDURE — 6370000000 HC RX 637 (ALT 250 FOR IP): Performed by: NURSE PRACTITIONER

## 2025-01-20 PROCEDURE — 85027 COMPLETE CBC AUTOMATED: CPT

## 2025-01-20 PROCEDURE — 2500000003 HC RX 250 WO HCPCS: Performed by: INTERNAL MEDICINE

## 2025-01-20 PROCEDURE — 90935 HEMODIALYSIS ONE EVALUATION: CPT

## 2025-01-20 RX ORDER — METOCLOPRAMIDE 5 MG/1
5 TABLET ORAL
COMMUNITY
Start: 2025-01-20

## 2025-01-20 RX ORDER — PREDNISONE 20 MG/1
40 TABLET ORAL DAILY
Qty: 6 TABLET | Refills: 0 | Status: SHIPPED | OUTPATIENT
Start: 2025-01-21 | End: 2025-01-24

## 2025-01-20 RX ADMIN — METOCLOPRAMIDE 5 MG: 10 TABLET ORAL at 07:58

## 2025-01-20 RX ADMIN — TAMSULOSIN HYDROCHLORIDE 0.4 MG: 0.4 CAPSULE ORAL at 07:58

## 2025-01-20 RX ADMIN — Medication 10 ML: at 07:59

## 2025-01-20 RX ADMIN — GABAPENTIN 100 MG: 100 CAPSULE ORAL at 07:59

## 2025-01-20 RX ADMIN — SEVELAMER CARBONATE 800 MG: 800 TABLET, FILM COATED ORAL at 12:37

## 2025-01-20 RX ADMIN — METOPROLOL SUCCINATE 25 MG: 25 TABLET, FILM COATED, EXTENDED RELEASE ORAL at 07:58

## 2025-01-20 RX ADMIN — APIXABAN 2.5 MG: 2.5 TABLET, FILM COATED ORAL at 07:59

## 2025-01-20 RX ADMIN — PREDNISONE 40 MG: 20 TABLET ORAL at 07:58

## 2025-01-20 RX ADMIN — SEVELAMER CARBONATE 800 MG: 800 TABLET, FILM COATED ORAL at 07:58

## 2025-01-20 RX ADMIN — METOCLOPRAMIDE 5 MG: 10 TABLET ORAL at 12:37

## 2025-01-20 RX ADMIN — THERA TABS 1 TABLET: TAB at 07:58

## 2025-01-20 RX ADMIN — DOCUSATE SODIUM 200 MG: 100 CAPSULE, LIQUID FILLED ORAL at 07:58

## 2025-01-20 RX ADMIN — ASPIRIN 81 MG: 81 TABLET, CHEWABLE ORAL at 07:58

## 2025-01-20 NOTE — DISCHARGE SUMMARY
[facility] DISCHARGE SUMMARY    Patient Demographics    Patient. Ben Link  Date of Birth. 1945  MRN. [id number]     Primary care provider. No primary care provider on file.  (Tel: None)    Admit date: 1/16/2025    Discharge date (blank if same as Note Date):   Note Date: 1/20/2025     Reason for Hospitalization.   Chief Complaint   Patient presents with    Shortness of Breath     Pt [facility] from [facility] by [facility]. Pt C/O SOB since yesterday. Pt was given a duoneb on route to ED by EMS. Hx of COPD and CHF         Significant Findings.   Principal Problem:    Pneumonia due to organism  Resolved Problems:    * No resolved hospital problems. *       Problems and results from this hospitalization that need follow up.  Would benefit from outpatient PFT and sleep study  ESRD on HD    Significant test results and incidental findings.  XR CHEST PORTABLE   Final Result   No acute abnormality.           CT CHEST PULMONARY EMBOLISM W CONTRAST   Final Result   1. No evidence of pulmonary embolism or acute pulmonary abnormality.   2. 3 mm triangular lesion right middle lobe.  No further follow-up required.   3. Cardiomegaly and calcific coronary artery disease.  Possible mild CHF.     XR CHEST PORTABLE   Final Result   FINDINGS:  There is mild atelectasis or infiltrate noted in the the right lung base.  Pneumonia cannot be excluded.  The lungs otherwise clear.  There is mild  cardiomegaly.  Bony structures unremarkable.  Visualized upper abdomen  appears normal.    Invasive procedures and treatments.   None     Problem-based Hospital Course.  80 yo male presented with cough and shortness of breath. Admitted with pneumonia.     Suspected PNA,  Hypoxia  - Presented with cough, shortness of breath  - Noted to be hypoxic per EMS  - CXR suggestive RLL pneumonia  - CT pulmonary negative for PE  -

## 2025-01-20 NOTE — CARE COORDINATION
Case Management -  Discharge Note      Patient Name: Ben Link                   YOB: 1945  Room: [address] [place] [address]            Readmission Risk (Low < 19, Mod (19-27), High > 27): Readmission Risk Score: 26.2    Current PCP: No primary care provider on file.      (IMM) Important Message from Medicare:    Has pt received appropriate compliance notices before being discharged if required: yes  Compliance doc:  [x] 2nd IMM; [] Code 44 [] [place]  Date Given: 1/20/25 Given By: FLO Diamond     PT AM-PAC:   /24  OT AM-PAC:   /24    Patient/patient representative has been educated on the benefits of LTC as well as the possible risks of declining recommended services. Patient/patient representative has acknowledged the information provided and decided on the following discharge plan. Patient/ patient representative has been provided freedom of choice regarding service provider, supported by basic dialogue that supports the patient's individualized plan of care/goals.    Patient noted to have a discharge order.  Pt has been medically cleared to return to LTC (Long Term Care)    Patient discharged to   [facility] [place]  [address]  [facility] [address]  Phone: [phone]  Fax: [phone]        Pre-cert Required/obtained: n/a  Transportation scheduled for 1/20/25 at 2:00 pm  Transportation provided by [facility]  [phone]  AVS faxed and agency notified: yes  The following prescriptions sent with pt:  n/a  Family Notified: yes  Nurse to call report to facility    [facility] agency notified of discharge:  [] Yes [] No  [x] NA    Family notified of discharge:  [x] Yes  [] No  [] NA    Facility notified of discharge:  [x] Yes  [] No  [] NA     Financial    Payor: [employer] / Plan: [employer] HMO / Product Type: *No Product type* /     Pharmacy:  Potential assistance Purchasing Medications:    Meds-to-Beds request: No      [id number] [id number]

## 2025-01-20 NOTE — PROGRESS NOTES
[facility]    1/20/2025 10:08 AM        Name: Ben Link .              Admitted: 1/16/2025  Primary Care Provider: No primary care provider on file. (Tel: None)      Chief complaint: 80 yo male presented with cough and shortness of breath. Admitted with pneumonia.    [place] [facility] [place]    Subjective:  Limited secondary to dementia. Patient seen in dialysis. Offers no complaints. Denies shortness of breath, chest pain.     Reviewed interval ancillary notes    Current Medications  predniSONE (DELTASONE) tablet 40 mg, Daily  [START ON 1/21/2025] epoetin davis-epbx (RETACRIT) injection 5,000 Units, Once per day on Tuesday Thursday Saturday  metoclopramide (REGLAN) tablet 5 mg, TID WC  albuterol (PROVENTIL) (2.5 MG/3ML) 0.083% nebulizer solution 2.5 mg, Q4H PRN  apixaban (ELIQUIS) tablet 2.5 mg, BID  aspirin chewable tablet 81 mg, Daily  atorvastatin (LIPITOR) tablet 10 mg, Nightly  multivitamin 1 tablet, Daily  docusate sodium (COLACE) capsule 200 mg, Daily  gabapentin (NEURONTIN) capsule 100 mg, TID  LORazepam (ATIVAN) tablet 0.5 mg, Nightly  melatonin tablet 6 mg, Nightly PRN  metoprolol succinate (TOPROL XL) extended release tablet 25 mg, Daily  sevelamer (RENVELA) tablet 800 mg, TID WC  tamsulosin (FLOMAX) capsule 0.4 mg, QAM  sodium chloride flush 0.9 % injection 10 mL, 2 times per day  sodium chloride flush 0.9 % injection 10 mL, PRN  0.9 % sodium chloride infusion, PRN  ondansetron (ZOFRAN-ODT) disintegrating tablet 4 mg, Q8H PRN   Or  ondansetron (ZOFRAN) injection 4 mg, Q6H PRN  polyethylene glycol (GLYCOLAX) packet 17 g, Daily PRN  acetaminophen (TYLENOL) tablet 650 mg, Q6H PRN   Or  acetaminophen (TYLENOL) suppository 650 mg, Q6H PRN  cefepime (MAXIPIME) 1,000 mg in sodium chloride 0.9 % 50 mL IVPB (mini-bag), Q24H        Objective:  /65   Pulse 84   Temp 98.2 °F (36.8 °C) (Oral)   
    Hospitalist Progress Note      Name:  Ben Link /Age/Sex: 1945  (79 y.o. male)   MRN & CSN:  [id number] & [id number] Encounter Date/Time: 2025 9:10 AM EST   Location:  [address] [place], [place] [address] PCP: No primary care provider on file.     Attending:Clinton Gary MD       Hospital Day: 2    Subjective:   Chief Complaint:   Chief Complaint   Patient presents with    Shortness of Breath     Pt arrives from [facility] by [facility]. Pt C/O SOB since yesterday. Pt was given a duoneb on route to ED by EMS. Hx of COPD and CHF     Ben Link is a 79 y.o. male with a past medical history of hypertension, hyperlipidemia, COPD, CVA, CHF, COPD, dysphagia, bradycardia, ESRD on HD, colon CA, PVD, splenomegaly, PE, atrial flutter, GIB who presented with SOB and cough.     Interval History:  Today, he is resting in bed.  He is drowsy.  He was up this am eating breakfast and alert per nursing.  He is on 3 lpm and he is 96%, reduced to 1 lpm and he is 94%.  He denies pain.  Gradual improvement from yesterday.     Independently reviewed interval ancillary notes from emergency medicine.     Assessment and Recommendations   Problem List  Principal Problem:    Pneumonia due to organism  Resolved Problems:    * No resolved hospital problems. *     Assessment and Plan:    Cough and SOB with suspected gram negative PNA   - Hypoxia is improving, also with fluid overload   - CTPE was negative for PE   - Elevated trop appears chronically elevated 141->146   - Continue on IV cefepime and zyvox    -  Procal elevated chronically, no fever, no leukocytosis noted   - MRSA probe pending    - Strep and legionella UAT pending   - Intermittent drowsiness noted, ABG obtained, reviewed with Dr. Garcia ICU physician- repeat in am and monitor for changes in clinical status  ESRD on HD   - Hypervolemia due to missed HD   - Nephrology following  Colon CA s/p resection   -Recent GI bleed due to ulcer at resection site  - S/p 
    Hospitalist Progress Note      Name:  Ben Link /Age/Sex: 1945  (79 y.o. male)   MRN & CSN:  [id number] & [id number] Encounter Date/Time: 2025 9:10 AM EST   Location:  [address] [place], [place] [address]01 PCP: No primary care provider on file.     Attending:Clinton Gary MD       Hospital Day: 3    Subjective:   Chief Complaint:   Chief Complaint   Patient presents with    Shortness of Breath     Pt arrives from [facility] by [facility]. Pt C/O SOB since yesterday. Pt was given a duoneb on route to ED by EMS. Hx of COPD and CHF     Ben Link is a 79 y.o. male with a past medical history of hypertension, hyperlipidemia, COPD, CVA, CHF, COPD, dysphagia, bradycardia, ESRD on HD, colon CA, PVD, splenomegaly, PE, atrial flutter, GIB who presented with SOB and cough.     Interval History:  Today, he is being seen in HD.  He just finished his treatment and he is down 3 L.  States he feels better, he is awake and alert.  He reports that he missed one day of HD as OP, he didn't feel like going.      Independently reviewed interval ancillary notes from pulmonology and nephrology.     Assessment and Recommendations   Problem List  Principal Problem:    Pneumonia due to organism  Resolved Problems:    * No resolved hospital problems. *     Assessment and Plan:    Cough and SOB with suspected gram negative PNA   - Hypoxia is improving, also with fluid overload   - CTPE was negative for PE   - Elevated trop appears chronically elevated 141->146   - STOP IV cefepime and zyvox - discussed with pulmonology and agree no significant s/s of PNA and more likley due to fluid overload or bronchitis    -  Procal elevated chronically, no fever, no leukocytosis noted   - MRSA probe negative    - Strep and legionella UAT do not appear to have been sent, no need to resend, oliguric due to HD    - ABG showed hypercapnia slightly above baseline, he is awake and alert, will repeat today    - Pulmonology consulted- stop abd, 
    Hospitalist Progress Note      Name:  Ben Link /Age/Sex: 1945  (79 y.o. male)   MRN & CSN:  [id number] & [id number] Encounter Date/Time: 2025 9:10 AM EST   Location:  [address] [place] [address]01 PCP: No primary care provider on file.     Attending:Clinton Gary MD       Hospital Day: 4    Subjective:   Chief Complaint:   Chief Complaint   Patient presents with    Shortness of Breath     Pt arrives from [facility] by [facility]. Pt C/O SOB since yesterday. Pt was given a duoneb on route to ED by EMS. Hx of COPD and CHF     Ben Link is a 79 y.o. male with a past medical history of hypertension, hyperlipidemia, COPD, CVA, CHF, COPD, dysphagia, bradycardia, ESRD on HD, colon CA, PVD, splenomegaly, PE, atrial flutter, GIB who presented with SOB and cough.     Interval History:  Today, he is resting in bed.  He is sleepy today, however he is able to answer questions and responds to voice. His alertness waxes and wanes as well as his moods with underlying dementia.  Family has declined recommended dysphagia diet.  He is 96% on 1.5 lpm, weaned to RA @ 92%.        Independently reviewed interval ancillary notes from pulmonology and nephrology.     Assessment and Recommendations   Problem List  Principal Problem:    Pneumonia due to organism  Resolved Problems:    * No resolved hospital problems. *     Assessment and Plan:    Cough and SOB with suspected gram negative PNA   - Hypoxia is improving, on RA 92%   - CTPE was negative for PE   - Elevated trop appears chronically elevated 141->146   - STOP IV zyvox; continue cefepime to complete 5 day course    - Start prednisone for short 5 day course per pulmonology possible bronchitis, reasonable to treat with short course steroids and abx   -  Procal elevated chronically, no fever, no leukocytosis noted   - MRSA probe negative    - Strep and legionella UAT do not appear to have been sent, no need to resend, oliguric due to HD    - ABG showed 
   MD Jesus Matthews MD Aldo Estella,                  Office: [phone]                      Fax: [phone]             [employer]                   NEPHROLOGY PROGRESS NOTE    Subjective / interval history / medical decision making.  -comfortable, no new complaints.    IMPRESSION/RECOMMENDATIONS:      #ESKD on HD TTS  -goes to [facility], follows [facility].  -EDW 95kg per chart review.  ?Weight today 100.6 kg.  Removed 3 L with HD yesterday.  -above his dry weight on admission  -Access: LAVF  -continue TTS HD.   -UF tomorrow.    #hypervolemia - likely due to missing HD   #HTN  -bp at goal.  Follow with fluid removal.     #Anemia of CKD  -follow Hgb.  ELENA TThS.       Porsha Hernandez MD       Reason for Consult:  ESKD management  Requesting Physician: Dr. Rubin Barber MD     CHIEF COMPLAINT:  sob    History obtained from records and patient.    HISTORY OF PRESENT ILLNESS:                Ben Link  is 79 y.o. y.o. male with significant past medical history of ESKD on HD MWF, Alzheimer's, COPD, CHF who presented with sob . Wife presented at bedside to provide HPI. Patient lives at SNF. Having productive cough clear sputum x1-2 days, with some n/v. Missed HD Saturday because he wasn't feeling well but went for HD Monday and had a full treatment. Does not make much urine, per wife. No dysuria or hematuria. Nephrology consulted for ESKD management.    Past Medical History:     has a past medical history of Acute cholecystitis, Acute cystitis without hematuria, Anemia, Anxiety, Arthritis, Bradycardia, unspecified, CAD (coronary artery disease), Cellulitis, unspecified, Cerebral infarction (HCC), Chronic diastolic (congestive) heart failure (HCC), Cognitive communication deficit, COPD (chronic obstructive pulmonary disease) (HCC), Diabetes mellitus (HCC), Dysphagia, oropharyngeal, Encephalopathy, unspecified, End stage renal disease (HCC), Fatigue, 
   MD Jesus Matthews MD Aldo Estella, DO                 Office: [phone]                      Fax: [phone]             [employer]                   NEPHROLOGY PROGRESS NOTE    Subjective / interval history / medical decision making.  -comfortable, no new complaints.    IMPRESSION/RECOMMENDATIONS:      #ESKD on HD TTS  -goes to [facility]", follows Chris [facility].  -EDW 95kg per chart review.  Weight today 94.4kg. Removed 2.5L with extra UF today.  On RA.  No SOB.   -above his dry weight on admission  -Access: LAVF  -continue TTS HD.       #hypervolemia - likely due to missing HD.  Better.  Discussed need for compliance.     #HTN  -bp at goal.  Follow with fluid removal.     #Anemia of CKD  -follow Hgb.  ELENA TThS.     Okay for discharge from renal perspective and resume outpt. HD tomorrow.       Porsha Hernandez MD       Reason for Consult:  ESKD management  Requesting Physician: Dr. Rubin Barber MD     CHIEF COMPLAINT:  sob    History obtained from records and patient.    HISTORY OF PRESENT ILLNESS:                Ben Link  is 79 y.o. y.o. male with significant past medical history of ESKD on HD MWF, Alzheimer's, COPD, CHF who presented with sob . Wife presented at bedside to provide HPI. Patient lives at SNF. Having productive cough clear sputum x1-2 days, with some n/v. Missed HD Saturday because he wasn't feeling well but went for HD Monday and had a full treatment. Does not make much urine, per wife. No dysuria or hematuria. Nephrology consulted for ESKD management.    Past Medical History:     has a past medical history of Acute cholecystitis, Acute cystitis without hematuria, Anemia, Anxiety, Arthritis, Bradycardia, unspecified, CAD (coronary artery disease), Cellulitis, unspecified, Cerebral infarction (HCC), Chronic diastolic (congestive) heart failure (HCC), Cognitive communication deficit, COPD (chronic obstructive pulmonary disease) (HCC), 
   MD Jesus Matthews MD Aldo Estella, DO                 Office: [phone]                      Fax: [phone]             [employer]                   NEPHROLOGY PROGRESS NOTE    Subjective / interval history / medical decision making.  -inad, comfortable, no new complaints.    IMPRESSION/RECOMMENDATIONS:      #ESKD on HD TTS  -goes to [facility], follows [facility].  -EDW 95kg per chart review.  Weight today 98.7kg  -above his dry weight on admission  -Access: LAVF  -continue TTS HD.   -HD today, attempt 3L fluid removal.  35C temp.     #hypervolemia - likely due to missing HD   #HTN  -bp at goal.  Follow with fluid removal.     #Anemia of CKD  -follow Hgb.  ELENA with next HD.       Porsha Hernandez MD       Reason for Consult:  ESKD management  Requesting Physician: Dr. Rubin Barber MD     CHIEF COMPLAINT:  sob    History obtained from records and patient.    HISTORY OF PRESENT ILLNESS:                Ben Link  is 79 y.o. y.o. male with significant past medical history of ESKD on HD MWF, Alzheimer's, COPD, CHF who presented with sob . Wife presented at bedside to provide HPI. Patient lives at SNF. Having productive cough clear sputum x1-2 days, with some n/v. Missed HD Saturday because he wasn't feeling well but went for HD Monday and had a full treatment. Does not make much urine, per wife. No dysuria or hematuria. Nephrology consulted for ESKD management.    Past Medical History:     has a past medical history of Acute cholecystitis, Acute cystitis without hematuria, Anemia, Anxiety, Arthritis, Bradycardia, unspecified, CAD (coronary artery disease), Cellulitis, unspecified, Cerebral infarction (HCC), Chronic diastolic (congestive) heart failure (HCC), Cognitive communication deficit, COPD (chronic obstructive pulmonary disease) (HCC), Diabetes mellitus (HCC), Dysphagia, oropharyngeal, Encephalopathy, unspecified, End stage renal disease (HCC), Fatigue, 
  Speech Language Pathology  [facility]  [phone]  SLP Dysphagia Treatment       Patient: Ben Link   : 1945   MRN: [id number]      Evaluation Date: 2025      Admitting Dx: Pneumonia due to organism [J18.9]  Hypoxia [R09.02]  Acute respiratory failure with hypoxia [J96.01]  Pneumonia of right lower lobe due to infectious organism [J18.9]  Treatment Diagnosis: Oropharyngeal Dysphagia   Pain: Did not state                                  Recommendations      Recommended Diet and Intervention 2025:  Diet Solids Recommendation:  Dysphagia III Soft and bite sized   Liquid Consistency Recommendation:  Thin liquids  Recommended form of Meds: Meds whole with water or Meds in puree     Recommend set up assistance (including repositioning in bed) by staff    If the pt continues to present with overt signs/symptoms of penetration/aspiration with oral intake, Recommend completion of Modified Barium Swallow study to further assess pharyngeal phase of swallow      Compensatory strategies: Alternate solids/liquids , Upright as possible with all PO intake , Small bites/sips , Eat/feed slowly, Remain upright 30-45 min , Aspiration Precautions     Discharge Recommendations:  Discharge recommendations to be determined pending ongoing follow-up during acute care stay    History/Course of Treatment     H&P: 79-year-old gentleman admitted from [facility] for Alzheimer's complaining of cough and some shortness of breath.  He has a history of previous colon cancer s/p resection recent ulcer at the resection site s/p colonic stent, recent GI bleed, atrial fibrillation on low-dose Eliquis, end-stage renal disease on dialysis, hypertension admitted to the hospital generally feeling tired  Reports cough for the last week has been productive for the last few days he has been producing yellowish [place] in his sputum  Denies any fevers  Some shortness of breath however unable to 
Called report to Abi at [facility] [place]. Transportation here to get patient.   
[facility] Pulmonary/CCM Progress note      Admit Date: 1/16/2025    Chief Complaint: Shortness of breath and cough/mucoid phlegm    Subjective:     Interval History: Overall doing okay, still has some dyspnea and cough.  Requires 1 to 2 L O2    Scheduled Meds:   predniSONE  40 mg Oral Daily    [START ON 1/21/2025] epoetin davis-epbx  5,000 Units IntraVENous Once per day on Tuesday Thursday Saturday    metoclopramide  5 mg Oral TID WC    apixaban  2.5 mg Oral BID    aspirin  81 mg Oral Daily    atorvastatin  10 mg Oral Nightly    multivitamin  1 tablet Oral Daily    docusate sodium  200 mg Oral Daily    gabapentin  100 mg Oral TID    LORazepam  0.5 mg Oral Nightly    metoprolol succinate  25 mg Oral Daily    sevelamer  800 mg Oral TID WC    tamsulosin  0.4 mg Oral QAM    sodium chloride flush  10 mL IntraVENous 2 times per day    cefepime  1,000 mg IntraVENous Q24H     Continuous Infusions:   sodium chloride       PRN Meds:albuterol, melatonin, sodium chloride flush, sodium chloride, ondansetron **OR** ondansetron, polyethylene glycol, acetaminophen **OR** acetaminophen    Review of Systems  Constitutional: negative for fatigue, fevers, malaise and weight loss  Ears, nose, mouth, throat: negative for ear drainage, epistaxis, hoarseness, nasal congestion, sore throat and voice change  Respiratory: negative except for dyspnea on exertion and shortness of breath  Cardiovascular: negative for chest pain, chest pressure/discomfort, irregular heart beat, lower extremity edema and palpitations  Gastrointestinal: negative for abdominal pain, constipation, diarrhea, jaundice, melena, odynophagia, reflux symptoms and vomiting  Hematologic/lymphatic: negative for bleeding, easy bruising, lymphadenopathy and petechiae  Musculoskeletal:negative for arthralgias, bone pain, muscle weakness, neck pain and stiff joints  Neurological: negative for dizziness, gait problems, headaches, seizures, speech problems, tremors and 
Patient is down at dialysis 0810.     1330: patient back from dialysis removed 3L   Patient breathing better. Shift assessment done, VSS, A/O. Neuro checks WNL.. Denies pain at this time.  Meds given per MAR. Standard safety measures in place. The care plan and education has been reviewed and mutually agreed upon with the patient. Daughter crystal at bedside.       1345: patient chest xray was neg.     1400: saw speech therapy and rec a soft and bite size diet for patient.     1500: loss IV access DIT contacted.       
Pharmacy Home Medication Reconciliation Note    A medication reconciliation has been completed for Ben Link 1945    Pharmacy: [facility] [address], [place], [place]  Information provided by: facility nurse arvind Alex and paperwork    The patient's home medication list is as follows:  No current facility-administered medications on file prior to encounter.     Current Outpatient Medications on File Prior to Encounter   Medication Sig Dispense Refill    oxyCODONE (ROXICODONE) 5 MG immediate release tablet Take 1 tablet by mouth every 6 hours as needed for Pain. Max Daily Amount: 20 mg      metoclopramide (REGLAN) 10 MG tablet Take 1 tablet by mouth 3 times daily (with meals) 120 tablet 3    LORazepam (ATIVAN) 0.5 MG tablet Take 1 tablet by mouth nightly.      albuterol (PROVENTIL) (2.5 MG/3ML) 0.083% nebulizer solution Take 3 mLs by nebulization every 4 hours as needed for Wheezing 120 each 3    sevelamer (RENVELA) 800 MG tablet Take 1 tablet by mouth See Admin Instructions Take one tablet by mouth with each meal and snack.      apixaban (ELIQUIS) 2.5 MG TABS tablet Take 1 tablet by mouth 2 times daily      metoprolol succinate (TOPROL XL) 25 MG extended release tablet Take 1 tablet by mouth daily 90 tablet 3    B Complex-C-E-Zn (STRESS PLUS ZINC) TABS Take 1 tablet by mouth daily      docusate sodium (COLACE) 100 MG capsule Take 2 capsules by mouth daily      aspirin 81 MG chewable tablet Take 1 tablet by mouth daily 30 tablet 0    melatonin 3 MG TABS tablet Take 2 tablets by mouth nightly as needed (sleep) (Patient taking differently: Take 2 tablets by mouth nightly) 6 tablet 0    tamsulosin (FLOMAX) 0.4 MG capsule Take 1 capsule by mouth every morning      gabapentin (NEURONTIN) 100 MG capsule Take 1 capsule by mouth 3 times daily.      atorvastatin (LIPITOR) 10 MG tablet Take 1 tablet by mouth nightly      guaiFENesin (ROBITUSSIN) 100 MG/5ML syrup Take 10 mLs by mouth every 4 hours 
Shift assessment done, VSS,  Neuro checks WNL. Denies pain at this time. Meds given per MAR. Standard safety measures in place. The care plan and education has been reviewed and mutually agreed upon with the patient.  Electronically signed by Marleni Olea RN on 1/19/2025 at 6:53 PM      
[facility] Pulmonary/CCM Progress note      Admit Date: 1/16/2025    Chief Complaint: Shortness of breath and cough/mucoid phlegm    Subjective:     Interval History: States that his dyspnea is better, still requiring 1 to 2 L O2.  I particularly noticed that his O2 sats is lower when he is sleeping, then when he is awake and talking.  Denies any cough or wheezing today.    Scheduled Meds:   predniSONE  40 mg Oral Daily    [START ON 1/21/2025] epoetin davis-epbx  5,000 Units IntraVENous Once per day on Tuesday Thursday Saturday    metoclopramide  5 mg Oral TID WC    apixaban  2.5 mg Oral BID    aspirin  81 mg Oral Daily    atorvastatin  10 mg Oral Nightly    multivitamin  1 tablet Oral Daily    docusate sodium  200 mg Oral Daily    gabapentin  100 mg Oral TID    LORazepam  0.5 mg Oral Nightly    metoprolol succinate  25 mg Oral Daily    sevelamer  800 mg Oral TID WC    tamsulosin  0.4 mg Oral QAM    sodium chloride flush  10 mL IntraVENous 2 times per day    cefepime  1,000 mg IntraVENous Q24H     Continuous Infusions:   sodium chloride       PRN Meds:albuterol, melatonin, sodium chloride flush, sodium chloride, ondansetron **OR** ondansetron, polyethylene glycol, acetaminophen **OR** acetaminophen    Review of Systems  Constitutional: negative for fatigue, fevers, malaise and weight loss  Ears, nose, mouth, throat: negative for ear drainage, epistaxis, hoarseness, nasal congestion, sore throat and voice change  Respiratory: negative except for dyspnea on exertion and shortness of breath  Cardiovascular: negative for chest pain, chest pressure/discomfort, irregular heart beat, lower extremity edema and palpitations  Gastrointestinal: negative for abdominal pain, constipation, diarrhea, jaundice, melena, odynophagia, reflux symptoms and vomiting  Hematologic/lymphatic: negative for bleeding, easy bruising, lymphadenopathy and petechiae  Musculoskeletal:negative for arthralgias, bone pain, muscle weakness, neck pain and stiff 
Treatment time:   3 hours      Net UF:   3000 ml    Pre weight:   98.7 kg    Post weight:   95.7 kg    EDW:   TBD    Access used:   GADIEL AVF    Access function:   Maintain BFR 350cc/ min without issues.    Medications or blood products given:   NA    Regular outpatient schedule:   TTS [facility]    Summary of response to treatment:   Patient has completed 3 hour hemodialysis treatment. He tolerated treatment well and was able to remove 3000 ml of fluid. Patient returned to room per hospital bed.    Copy of dialysis treatment record placed in chart, to be scanned into EMR.  
Treatment time: 2 hours UF only  Net UF: 2500 ml     Pre weight: 96.9 kg  Post weight:94.4 kg     Access used: GADIEL AVF    Access function: well with  ml/min     Medications or blood products given: none     Regular outpatient schedule: MWF     Summary of response to treatment: Patient tolerated treatment well and without any complications. Patient remained stable throughout entire treatment and upon the exiting the dialysis suite via transport.     Report given to Abi Ruiz RN and copy of dialysis treatment record placed in chart, to be scanned into EMR.    
Treatment time: 2 hours UF only  Net UF: 2500 ml     Pre weight: 96.9 kg  Post weight:94.4 kg    Access used: GADIEL AVF    Access function: well with  ml/min     Medications or blood products given: none     Regular outpatient schedule: MWF     Summary of response to treatment: Patient tolerated treatment well and without any complications. Patient remained stable throughout entire treatment and upon the exiting the dialysis suite via transport.     Copy of dialysis treatment record placed in chart, to be scanned into EMR.  
Treatment time: 3 hours    Net UF: 2 liters as ordered by doctor Amos through PMD    Pre weight: 100.7 kg  Post weight: 98.7 kg  EDW: 95 kg    Access used: Left AVF  Access function: Access site located on the left upper arm had good bruit and thrill. No signs of infection noted. No redness, hematoma nor swelling was observed. No discharges was seen. Cleaned site aseptically and cannulated without any problem.     Medications or blood products given: Albumin 25 gram and midodrine 5 mg as ordered for low blood pressure    Regular outpatient schedule: M,W,F    Summary of response to treatment: 16:30: Treatment set at  3 liters for 3 hours as ordered, doctor Bette was aware of it via Right AVF. Access site located on the Right upper arm had good bruit and thrill. No signs of infection noted. No redness, hematoma, nor swelling was observed. No discharges was seen. Cleaned site aseptically and cannulated without any problem. Parameters set accordingly. Hooked to HD machine. Monitored from time to time. 17:00: Patient was sleeping no complain when asked. WCM. Doctor Amos ordered midodrine 5 mg and Albumin 25 grams to be given while on HD. 19: 33: Completed Treatment. Returned blood aseptically.    Copy of dialysis treatment record placed in chart, to be scanned into EMR.  
PORT/PUMP > 5 YEARS (Right, 12/28/2022); IR TUNNELED CVC PLACE WO SQ PORT/PUMP > 5 YEARS (12/28/2022); IR NONTUNNELED VASCULAR CATHETER > 5 YEARS (Right, 11/15/2023); IR NONTUNNELED VASCULAR CATHETER > 5 YEARS (11/15/2023); sigmoidoscopy (N/A, 11/1/2024); and Colonoscopy (N/A, 11/5/2024).   Current Medications:    Current Facility-Administered Medications: albuterol (PROVENTIL) (2.5 MG/3ML) 0.083% nebulizer solution 2.5 mg, 2.5 mg, Nebulization, Q4H PRN  apixaban (ELIQUIS) tablet 2.5 mg, 2.5 mg, Oral, BID  aspirin chewable tablet 81 mg, 81 mg, Oral, Daily  atorvastatin (LIPITOR) tablet 10 mg, 10 mg, Oral, Nightly  multivitamin 1 tablet, 1 tablet, Oral, Daily  docusate sodium (COLACE) capsule 200 mg, 200 mg, Oral, Daily  gabapentin (NEURONTIN) capsule 100 mg, 100 mg, Oral, TID  LORazepam (ATIVAN) tablet 0.5 mg, 0.5 mg, Oral, Nightly  melatonin tablet 6 mg, 6 mg, Oral, Nightly PRN  metoclopramide (REGLAN) tablet 10 mg, 10 mg, Oral, TID WC  metoprolol succinate (TOPROL XL) extended release tablet 25 mg, 25 mg, Oral, Daily  sevelamer (RENVELA) tablet 800 mg, 800 mg, Oral, TID WC  tamsulosin (FLOMAX) capsule 0.4 mg, 0.4 mg, Oral, QAM  sodium chloride flush 0.9 % injection 10 mL, 10 mL, IntraVENous, 2 times per day  sodium chloride flush 0.9 % injection 10 mL, 10 mL, IntraVENous, PRN  0.9 % sodium chloride infusion, , IntraVENous, PRN  ondansetron (ZOFRAN-ODT) disintegrating tablet 4 mg, 4 mg, Oral, Q8H PRN **OR** ondansetron (ZOFRAN) injection 4 mg, 4 mg, IntraVENous, Q6H PRN  polyethylene glycol (GLYCOLAX) packet 17 g, 17 g, Oral, Daily PRN  acetaminophen (TYLENOL) tablet 650 mg, 650 mg, Oral, Q6H PRN **OR** acetaminophen (TYLENOL) suppository 650 mg, 650 mg, Rectal, Q6H PRN  linezolid (ZYVOX) IVPB 600 mg, 600 mg, IntraVENous, Q12H  cefepime (MAXIPIME) 1,000 mg in sodium chloride 0.9 % 50 mL IVPB (mini-bag), 1,000 mg, IntraVENous, Q24H  Allergies:    Lisinopril   Social History:     reports that he has quit smoking. He 
    Plan of care: 3-5 times per week during acute care stay.      Education:  Provided education regarding role of SLP, results of assessment, recommendations and general speech pathology plan of care:  Pt requires ongoing learning   No evidence of comprehension    If patient discharges prior to next visit, this note will serve as discharge.     Treatment time:  Timed Code Treatment Minutes: 0  Total Treatment Time Minutes: 16 minutes    Electronically signed by:    Carline Alvarez M.S. CCC-SLP #[id number]  Speech-Language Pathologist

## 2025-01-20 NOTE — CARE COORDINATION
01/20/25 1219   IMM Letter   IMM Letter given to Patient/Family/Significant other/Guardian/POA/by: FLO Diamond  (Verbal from [facility], kimberlee Yates to discharge within 4 hour window)   IMM Letter date given: 01/20/25   IMM Letter time given: 1217

## 2025-01-20 NOTE — PLAN OF CARE
Problem: Chronic Conditions and Co-morbidities  Goal: Patient's chronic conditions and co-morbidity symptoms are monitored and maintained or improved  1/19/2025 2342 by Cassidy Parra RN  Outcome: Progressing  1/19/2025 1506 by Marleni Olea RN  Outcome: Progressing     Problem: Discharge Planning  Goal: Discharge to home or other facility with appropriate resources  1/19/2025 2342 by Cassidy Parra RN  Outcome: Progressing  1/19/2025 1506 by Marleni Olea RN  Outcome: Progressing     Problem: Safety - Adult  Goal: Free from fall injury  1/19/2025 2342 by Cassidy Parra RN  Outcome: Progressing  1/19/2025 1506 by Marleni Olea RN  Outcome: Progressing     Problem: Skin/Tissue Integrity  Goal: Absence of new skin breakdown  Description: 1.  Monitor for areas of redness and/or skin breakdown  2.  Assess vascular access sites hourly  3.  Every 4-6 hours minimum:  Change oxygen saturation probe site  4.  Every 4-6 hours:  If on nasal continuous positive airway pressure, respiratory therapy assess nares and determine need for appliance change or resting period.  1/19/2025 2342 by Cassidy Parra RN  Outcome: Progressing  1/19/2025 1506 by Marleni Olea RN  Outcome: Progressing

## 2025-01-20 NOTE — PLAN OF CARE
Problem: Chronic Conditions and Co-morbidities  Goal: Patient's chronic conditions and co-morbidity symptoms are monitored and maintained or improved  1/20/2025 1156 by Abi Ruiz, RN  Outcome: Progressing     Problem: Discharge Planning  Goal: Discharge to home or other facility with appropriate resources  1/20/2025 1156 by Abi Ruiz, RN  Outcome: Progressing     Problem: Safety - Adult  Goal: Free from fall injury  1/20/2025 1156 by Abi Ruiz, RN  Outcome: Progressing     Problem: Skin/Tissue Integrity  Goal: Absence of new skin breakdown  Description: 1.  Monitor for areas of redness and/or skin breakdown  2.  Assess vascular access sites hourly  3.  Every 4-6 hours minimum:  Change oxygen saturation probe site  4.  Every 4-6 hours:  If on nasal continuous positive airway pressure, respiratory therapy assess nares and determine need for appliance change or resting period.  1/20/2025 1156 by Abi Ruiz, RN  Outcome: Progressing

## 2025-01-20 NOTE — DISCHARGE INSTR - COC
Worker signature: Electronically signed by FLO Beach on 1/20/25 at 1:26 PM EST    PHYSICIAN SECTION    Prognosis: Guarded    Condition at Discharge: Stable    Rehab Potential (if transferring to Rehab): N/A    Recommended Labs or Other Treatments After Discharge:   Pulmonary follow up for outpatient PFT and sleep study   ESRD on HD  Continue O2, titrate to maintain O2 sats > 90%, continuous nocturnal    Physician Certification: I certify the above information and transfer of Ben Link  is necessary for the continuing treatment of the diagnosis listed and that he requires LTC for greater 30 days.     Update Admission H&P: No change in H&P    PHYSICIAN SIGNATURE:  Electronically signed by Dr Rossy Reno per Yola Bradley, APRN - CNP on 1/20/25 at 10:30 AM EST

## 2025-03-04 ENCOUNTER — APPOINTMENT (OUTPATIENT)
Dept: CT IMAGING | Age: 80
DRG: 205 | End: 2025-03-04
Payer: COMMERCIAL

## 2025-03-04 ENCOUNTER — HOSPITAL ENCOUNTER (INPATIENT)
Age: 80
LOS: 1 days | Discharge: NURSING, REHAB OR LONG-TERM CARE | DRG: 205 | End: 2025-03-05
Attending: STUDENT IN AN ORGANIZED HEALTH CARE EDUCATION/TRAINING PROGRAM | Admitting: STUDENT IN AN ORGANIZED HEALTH CARE EDUCATION/TRAINING PROGRAM
Payer: COMMERCIAL

## 2025-03-04 ENCOUNTER — APPOINTMENT (OUTPATIENT)
Dept: GENERAL RADIOLOGY | Age: 80
DRG: 205 | End: 2025-03-04
Payer: COMMERCIAL

## 2025-03-04 DIAGNOSIS — Z71.89 GOALS OF CARE, COUNSELING/DISCUSSION: ICD-10-CM

## 2025-03-04 DIAGNOSIS — N18.6 ESRD (END STAGE RENAL DISEASE) ON DIALYSIS (HCC): ICD-10-CM

## 2025-03-04 DIAGNOSIS — Z99.2 ESRD (END STAGE RENAL DISEASE) ON DIALYSIS (HCC): ICD-10-CM

## 2025-03-04 DIAGNOSIS — T17.308A CHOKING, INITIAL ENCOUNTER: ICD-10-CM

## 2025-03-04 DIAGNOSIS — J96.01 ACUTE RESPIRATORY FAILURE WITH HYPOXIA (HCC): ICD-10-CM

## 2025-03-04 DIAGNOSIS — J81.0 ACUTE PULMONARY EDEMA (HCC): Primary | ICD-10-CM

## 2025-03-04 PROBLEM — J96.02 ACUTE RESPIRATORY FAILURE WITH HYPOXIA AND HYPERCAPNIA: Status: ACTIVE | Noted: 2025-03-04

## 2025-03-04 LAB
ALBUMIN SERPL-MCNC: 3.7 G/DL (ref 3.4–5)
ALBUMIN/GLOB SERPL: 1.1 {RATIO} (ref 1.1–2.2)
ALP SERPL-CCNC: 97 U/L (ref 40–129)
ALT SERPL-CCNC: 13 U/L (ref 10–40)
ANION GAP SERPL CALCULATED.3IONS-SCNC: 11 MMOL/L (ref 3–16)
AST SERPL-CCNC: 16 U/L (ref 15–37)
BASE EXCESS BLDV CALC-SCNC: 8 MMOL/L (ref -3–3)
BASOPHILS # BLD: 0 K/UL (ref 0–0.2)
BASOPHILS NFR BLD: 0.2 %
BILIRUB SERPL-MCNC: 0.4 MG/DL (ref 0–1)
BUN SERPL-MCNC: 33 MG/DL (ref 7–20)
CALCIUM SERPL-MCNC: 9.5 MG/DL (ref 8.3–10.6)
CHLORIDE SERPL-SCNC: 94 MMOL/L (ref 99–110)
CO2 BLDV-SCNC: 86 MMOL/L
CO2 SERPL-SCNC: 32 MMOL/L (ref 21–32)
COHGB MFR BLDV: 2.2 % (ref 0–1.5)
CREAT SERPL-MCNC: 4.5 MG/DL (ref 0.8–1.3)
DEPRECATED RDW RBC AUTO: 17.5 % (ref 12.4–15.4)
DO-HGB MFR BLDV: 28 %
EOSINOPHIL # BLD: 0.2 K/UL (ref 0–0.6)
EOSINOPHIL NFR BLD: 3.2 %
FLUAV RNA RESP QL NAA+PROBE: NOT DETECTED
FLUBV RNA RESP QL NAA+PROBE: NOT DETECTED
GFR SERPLBLD CREATININE-BSD FMLA CKD-EPI: 13 ML/MIN/{1.73_M2}
GLUCOSE SERPL-MCNC: 252 MG/DL (ref 70–99)
HCO3 BLDV-SCNC: 36.1 MMOL/L (ref 23–29)
HCT VFR BLD AUTO: 32.5 % (ref 40.5–52.5)
HGB BLD-MCNC: 10.4 G/DL (ref 13.5–17.5)
LYMPHOCYTES # BLD: 1.3 K/UL (ref 1–5.1)
LYMPHOCYTES NFR BLD: 19.6 %
MCH RBC QN AUTO: 32.1 PG (ref 26–34)
MCHC RBC AUTO-ENTMCNC: 32.1 G/DL (ref 31–36)
MCV RBC AUTO: 99.8 FL (ref 80–100)
METHGB MFR BLDV: 0.8 %
MONOCYTES # BLD: 0.4 K/UL (ref 0–1.3)
MONOCYTES NFR BLD: 5.8 %
NEUTROPHILS # BLD: 4.9 K/UL (ref 1.7–7.7)
NEUTROPHILS NFR BLD: 71.2 %
NT-PROBNP SERPL-MCNC: ABNORMAL PG/ML (ref 0–449)
O2 CT VFR BLDV CALC: 10 VOL %
O2 THERAPY: ABNORMAL
PCO2 BLDV: 70.8 MMHG (ref 40–50)
PH BLDV: 7.32 [PH] (ref 7.35–7.45)
PLATELET # BLD AUTO: 197 K/UL (ref 135–450)
PMV BLD AUTO: 7.3 FL (ref 5–10.5)
PO2 BLDV: 45.2 MMHG (ref 25–40)
POTASSIUM SERPL-SCNC: 4.2 MMOL/L (ref 3.5–5.1)
PROT SERPL-MCNC: 7 G/DL (ref 6.4–8.2)
RBC # BLD AUTO: 3.26 M/UL (ref 4.2–5.9)
SAO2 % BLDV: 71 %
SARS-COV-2 RNA RESP QL NAA+PROBE: NOT DETECTED
SODIUM SERPL-SCNC: 137 MMOL/L (ref 136–145)
TROPONIN, HIGH SENSITIVITY: 153 NG/L (ref 0–22)
TROPONIN, HIGH SENSITIVITY: 157 NG/L (ref 0–22)
WBC # BLD AUTO: 6.8 K/UL (ref 4–11)

## 2025-03-04 PROCEDURE — 6360000002 HC RX W HCPCS: Performed by: NURSE PRACTITIONER

## 2025-03-04 PROCEDURE — 93005 ELECTROCARDIOGRAM TRACING: CPT | Performed by: NURSE PRACTITIONER

## 2025-03-04 PROCEDURE — 36415 COLL VENOUS BLD VENIPUNCTURE: CPT

## 2025-03-04 PROCEDURE — 85025 COMPLETE CBC W/AUTO DIFF WBC: CPT

## 2025-03-04 PROCEDURE — 2700000000 HC OXYGEN THERAPY PER DAY

## 2025-03-04 PROCEDURE — 82803 BLOOD GASES ANY COMBINATION: CPT

## 2025-03-04 PROCEDURE — 80053 COMPREHEN METABOLIC PANEL: CPT

## 2025-03-04 PROCEDURE — 83880 ASSAY OF NATRIURETIC PEPTIDE: CPT

## 2025-03-04 PROCEDURE — 84484 ASSAY OF TROPONIN QUANT: CPT

## 2025-03-04 PROCEDURE — 71250 CT THORAX DX C-: CPT

## 2025-03-04 PROCEDURE — 87636 SARSCOV2 & INF A&B AMP PRB: CPT

## 2025-03-04 PROCEDURE — 71045 X-RAY EXAM CHEST 1 VIEW: CPT

## 2025-03-04 PROCEDURE — 94640 AIRWAY INHALATION TREATMENT: CPT

## 2025-03-04 PROCEDURE — 99285 EMERGENCY DEPT VISIT HI MDM: CPT

## 2025-03-04 PROCEDURE — 1200000000 HC SEMI PRIVATE

## 2025-03-04 RX ORDER — ALBUTEROL SULFATE 0.83 MG/ML
5 SOLUTION RESPIRATORY (INHALATION) ONCE
Status: COMPLETED | OUTPATIENT
Start: 2025-03-04 | End: 2025-03-04

## 2025-03-04 RX ADMIN — ALBUTEROL SULFATE 5 MG: 2.5 SOLUTION RESPIRATORY (INHALATION) at 19:28

## 2025-03-04 ASSESSMENT — PAIN SCALES - GENERAL: PAINLEVEL_OUTOF10: 0

## 2025-03-04 ASSESSMENT — ENCOUNTER SYMPTOMS
CHOKING: 1
COUGH: 1
SHORTNESS OF BREATH: 1

## 2025-03-04 ASSESSMENT — PAIN - FUNCTIONAL ASSESSMENT: PAIN_FUNCTIONAL_ASSESSMENT: 0-10

## 2025-03-05 VITALS
BODY MASS INDEX: 27.83 KG/M2 | SYSTOLIC BLOOD PRESSURE: 114 MMHG | DIASTOLIC BLOOD PRESSURE: 66 MMHG | OXYGEN SATURATION: 93 % | TEMPERATURE: 97.9 F | WEIGHT: 210 LBS | HEIGHT: 73 IN | HEART RATE: 82 BPM | RESPIRATION RATE: 20 BRPM

## 2025-03-05 LAB
ANION GAP SERPL CALCULATED.3IONS-SCNC: 14 MMOL/L (ref 3–16)
BASOPHILS # BLD: 0 K/UL (ref 0–0.2)
BASOPHILS NFR BLD: 0.4 %
BUN SERPL-MCNC: 39 MG/DL (ref 7–20)
CALCIUM SERPL-MCNC: 9.5 MG/DL (ref 8.3–10.6)
CHLORIDE SERPL-SCNC: 96 MMOL/L (ref 99–110)
CO2 SERPL-SCNC: 29 MMOL/L (ref 21–32)
CREAT SERPL-MCNC: 5.3 MG/DL (ref 0.8–1.3)
DEPRECATED RDW RBC AUTO: 17.2 % (ref 12.4–15.4)
EKG DIAGNOSIS: NORMAL
EKG Q-T INTERVAL: 378 MS
EKG QRS DURATION: 76 MS
EKG QTC CALCULATION (BAZETT): 430 MS
EKG R AXIS: -3 DEGREES
EKG T AXIS: -12 DEGREES
EKG VENTRICULAR RATE: 78 BPM
EOSINOPHIL # BLD: 0.2 K/UL (ref 0–0.6)
EOSINOPHIL NFR BLD: 3.9 %
GFR SERPLBLD CREATININE-BSD FMLA CKD-EPI: 10 ML/MIN/{1.73_M2}
GLUCOSE SERPL-MCNC: 133 MG/DL (ref 70–99)
HCT VFR BLD AUTO: 31.5 % (ref 40.5–52.5)
HGB BLD-MCNC: 10.3 G/DL (ref 13.5–17.5)
LYMPHOCYTES # BLD: 1.1 K/UL (ref 1–5.1)
LYMPHOCYTES NFR BLD: 17.8 %
MCH RBC QN AUTO: 32.2 PG (ref 26–34)
MCHC RBC AUTO-ENTMCNC: 32.6 G/DL (ref 31–36)
MCV RBC AUTO: 98.8 FL (ref 80–100)
MONOCYTES # BLD: 0.4 K/UL (ref 0–1.3)
MONOCYTES NFR BLD: 7.5 %
NEUTROPHILS # BLD: 4.2 K/UL (ref 1.7–7.7)
NEUTROPHILS NFR BLD: 70.4 %
PLATELET # BLD AUTO: 192 K/UL (ref 135–450)
PMV BLD AUTO: 7.4 FL (ref 5–10.5)
POTASSIUM SERPL-SCNC: 4.4 MMOL/L (ref 3.5–5.1)
PROCALCITONIN SERPL IA-MCNC: 0.55 NG/ML (ref 0–0.15)
RBC # BLD AUTO: 3.18 M/UL (ref 4.2–5.9)
SODIUM SERPL-SCNC: 139 MMOL/L (ref 136–145)
WBC # BLD AUTO: 5.9 K/UL (ref 4–11)

## 2025-03-05 PROCEDURE — 6370000000 HC RX 637 (ALT 250 FOR IP): Performed by: PHYSICIAN ASSISTANT

## 2025-03-05 PROCEDURE — 5A1D70Z PERFORMANCE OF URINARY FILTRATION, INTERMITTENT, LESS THAN 6 HOURS PER DAY: ICD-10-PCS | Performed by: INTERNAL MEDICINE

## 2025-03-05 PROCEDURE — 85025 COMPLETE CBC W/AUTO DIFF WBC: CPT

## 2025-03-05 PROCEDURE — 93010 ELECTROCARDIOGRAM REPORT: CPT | Performed by: INTERNAL MEDICINE

## 2025-03-05 PROCEDURE — 92526 ORAL FUNCTION THERAPY: CPT

## 2025-03-05 PROCEDURE — 2500000003 HC RX 250 WO HCPCS: Performed by: PHYSICIAN ASSISTANT

## 2025-03-05 PROCEDURE — 36415 COLL VENOUS BLD VENIPUNCTURE: CPT

## 2025-03-05 PROCEDURE — 84145 PROCALCITONIN (PCT): CPT

## 2025-03-05 PROCEDURE — 80048 BASIC METABOLIC PNL TOTAL CA: CPT

## 2025-03-05 PROCEDURE — 92610 EVALUATE SWALLOWING FUNCTION: CPT

## 2025-03-05 RX ORDER — SENNOSIDES A AND B 8.6 MG/1
1 TABLET, FILM COATED ORAL DAILY PRN
Status: DISCONTINUED | OUTPATIENT
Start: 2025-03-05 | End: 2025-03-05 | Stop reason: HOSPADM

## 2025-03-05 RX ORDER — LORAZEPAM 0.5 MG/1
0.5 TABLET ORAL EVERY 6 HOURS PRN
COMMUNITY

## 2025-03-05 RX ORDER — SODIUM CHLORIDE 0.9 % (FLUSH) 0.9 %
5-40 SYRINGE (ML) INJECTION PRN
Status: DISCONTINUED | OUTPATIENT
Start: 2025-03-05 | End: 2025-03-05 | Stop reason: HOSPADM

## 2025-03-05 RX ORDER — TAMSULOSIN HYDROCHLORIDE 0.4 MG/1
0.4 CAPSULE ORAL EVERY MORNING
Status: DISCONTINUED | OUTPATIENT
Start: 2025-03-05 | End: 2025-03-05 | Stop reason: HOSPADM

## 2025-03-05 RX ORDER — ACETAMINOPHEN 650 MG/1
650 SUPPOSITORY RECTAL EVERY 6 HOURS PRN
Status: DISCONTINUED | OUTPATIENT
Start: 2025-03-05 | End: 2025-03-05 | Stop reason: HOSPADM

## 2025-03-05 RX ORDER — ATORVASTATIN CALCIUM 10 MG/1
10 TABLET, FILM COATED ORAL NIGHTLY
Status: DISCONTINUED | OUTPATIENT
Start: 2025-03-05 | End: 2025-03-05 | Stop reason: HOSPADM

## 2025-03-05 RX ORDER — ACETAMINOPHEN 325 MG/1
650 TABLET ORAL EVERY 6 HOURS PRN
Status: DISCONTINUED | OUTPATIENT
Start: 2025-03-05 | End: 2025-03-05 | Stop reason: HOSPADM

## 2025-03-05 RX ORDER — CIPROFLOXACIN 250 MG/1
250 TABLET, FILM COATED ORAL 2 TIMES DAILY
COMMUNITY
Start: 2025-03-04 | End: 2025-03-09

## 2025-03-05 RX ORDER — METOCLOPRAMIDE 10 MG/1
5 TABLET ORAL
Status: DISCONTINUED | OUTPATIENT
Start: 2025-03-05 | End: 2025-03-05 | Stop reason: HOSPADM

## 2025-03-05 RX ORDER — LORAZEPAM 0.5 MG/1
0.5 TABLET ORAL NIGHTLY
Status: DISCONTINUED | OUTPATIENT
Start: 2025-03-05 | End: 2025-03-05 | Stop reason: HOSPADM

## 2025-03-05 RX ORDER — OXYCODONE HYDROCHLORIDE 5 MG/1
5 TABLET ORAL EVERY 6 HOURS PRN
COMMUNITY

## 2025-03-05 RX ORDER — METOPROLOL SUCCINATE 25 MG/1
25 TABLET, EXTENDED RELEASE ORAL
Status: DISCONTINUED | OUTPATIENT
Start: 2025-03-07 | End: 2025-03-05 | Stop reason: HOSPADM

## 2025-03-05 RX ORDER — ONDANSETRON 2 MG/ML
4 INJECTION INTRAMUSCULAR; INTRAVENOUS EVERY 6 HOURS PRN
Status: DISCONTINUED | OUTPATIENT
Start: 2025-03-05 | End: 2025-03-05 | Stop reason: HOSPADM

## 2025-03-05 RX ORDER — METOPROLOL SUCCINATE 25 MG/1
25 TABLET, EXTENDED RELEASE ORAL DAILY
Status: DISCONTINUED | OUTPATIENT
Start: 2025-03-05 | End: 2025-03-05

## 2025-03-05 RX ORDER — GABAPENTIN 100 MG/1
100 CAPSULE ORAL 3 TIMES DAILY
Status: DISCONTINUED | OUTPATIENT
Start: 2025-03-05 | End: 2025-03-05 | Stop reason: HOSPADM

## 2025-03-05 RX ORDER — SODIUM CHLORIDE 9 MG/ML
INJECTION, SOLUTION INTRAVENOUS PRN
Status: DISCONTINUED | OUTPATIENT
Start: 2025-03-05 | End: 2025-03-05 | Stop reason: HOSPADM

## 2025-03-05 RX ORDER — SODIUM CHLORIDE 0.9 % (FLUSH) 0.9 %
5-40 SYRINGE (ML) INJECTION EVERY 12 HOURS SCHEDULED
Status: DISCONTINUED | OUTPATIENT
Start: 2025-03-05 | End: 2025-03-05 | Stop reason: HOSPADM

## 2025-03-05 RX ORDER — SEVELAMER CARBONATE 800 MG/1
800 TABLET, FILM COATED ORAL
Status: DISCONTINUED | OUTPATIENT
Start: 2025-03-05 | End: 2025-03-05 | Stop reason: HOSPADM

## 2025-03-05 RX ORDER — ASPIRIN 81 MG/1
81 TABLET, CHEWABLE ORAL DAILY
Status: DISCONTINUED | OUTPATIENT
Start: 2025-03-05 | End: 2025-03-05 | Stop reason: HOSPADM

## 2025-03-05 RX ADMIN — METOCLOPRAMIDE 5 MG: 10 TABLET ORAL at 11:31

## 2025-03-05 RX ADMIN — GABAPENTIN 100 MG: 100 CAPSULE ORAL at 11:37

## 2025-03-05 RX ADMIN — TAMSULOSIN HYDROCHLORIDE 0.4 MG: 0.4 CAPSULE ORAL at 11:31

## 2025-03-05 RX ADMIN — APIXABAN 2.5 MG: 5 TABLET, FILM COATED ORAL at 11:31

## 2025-03-05 RX ADMIN — GABAPENTIN 100 MG: 100 CAPSULE ORAL at 16:30

## 2025-03-05 RX ADMIN — SEVELAMER CARBONATE 800 MG: 800 TABLET, FILM COATED ORAL at 16:30

## 2025-03-05 RX ADMIN — LORAZEPAM 0.5 MG: 0.5 TABLET ORAL at 01:10

## 2025-03-05 RX ADMIN — METOPROLOL SUCCINATE 25 MG: 25 TABLET, EXTENDED RELEASE ORAL at 11:31

## 2025-03-05 RX ADMIN — SEVELAMER CARBONATE 800 MG: 800 TABLET, FILM COATED ORAL at 11:32

## 2025-03-05 RX ADMIN — SODIUM CHLORIDE, PRESERVATIVE FREE 10 ML: 5 INJECTION INTRAVENOUS at 11:39

## 2025-03-05 RX ADMIN — ASPIRIN 81 MG: 81 TABLET, CHEWABLE ORAL at 11:31

## 2025-03-05 RX ADMIN — METOCLOPRAMIDE 5 MG: 10 TABLET ORAL at 16:30

## 2025-03-05 ASSESSMENT — PAIN SCALES - GENERAL: PAINLEVEL_OUTOF10: 0

## 2025-03-05 NOTE — DISCHARGE INSTR - COC
Continuity of Care Form    Patient Name: Ben Link   :  1945  MRN:  [id number]    Admit date:  3/4/2025  Discharge date:  ***    Code Status Order: DNR-CCA   Advance Directives:   Advance Care Flowsheet Documentation             Admitting Physician:  Gildardo Burgess MD  PCP: No primary care provider on file.    Discharging Nurse: ***  Discharging Hospital Unit/Room#: ED-0015/15  Discharging Unit Phone Number: ***    Emergency Contact:   Extended Emergency Contact Information  Primary Emergency Contact: Ping Clemens  Home Phone: [phone]  Mobile Phone: [phone]  Relation: Child   needed? No  Secondary Emergency Contact: MilagrosJaneen  Home Phone: [phone]  Mobile Phone: [phone]  Relation: Child   needed? No    Past Surgical History:  Past Surgical History:   Procedure Laterality Date    CARDIAC CATHETERIZATION  2019    CHOLECYSTECTOMY, LAPAROSCOPIC N/A 2022    LAPAROSCOPIC CHOLECYSTECTOMY WITH CHOLANGIOGRAM performed by Marco Vera MD at [facility] OR    COLECTOMY N/A 2022    SIGMOID COLECTOMY, SIGMOIDOSCOPY performed by Marco Vera MD at [facility] OR    COLONOSCOPY      COLONOSCOPY N/A 2024    COLONOSCOPY ENDOSCOPIC STENT PLACEMENT performed by Marco Lees MD at [facility] ENDOSCOPY    DIALYSIS FISTULA CREATION Left 2022    LEFT BRACHIAL CEPHALIC FISTULA performed by Frankie Clements MD at [facility] OR    ERCP N/A 2022    ERCP SPHINCTER/PAPILLOTOMY performed by Tristen Frank MD at [facility] ENDOSCOPY    ERCP N/A 2022    ERCP STONE REMOVAL/BALLOON SWEEP performed by Tristen Frank MD at [facility] ENDOSCOPY    IR EMBOLIZATION HEMORRHAGE  2022    IR EMBOLIZATION HEMORRHAGE 2022 [facility] SPECIAL PROCEDURES    IR NONTUNNELED VASCULAR CATHETER > 5 YEARS Right 11/15/2023    Dr. Lemon. Non-Tunneled VasCath 20cm    IR NONTUNNELED VASCULAR CATHETER > 5 YEARS  11/15/2023    IR NONTUNNELED VASCULAR CATHETER 11/15/2023 [facility] SPECIAL

## 2025-03-05 NOTE — PROGRESS NOTES
Speech Language Pathology  [facility]  [phone]  SLP Clinical Swallow Evaluation       Patient: Ben Link   : 1945   MRN: [id number]      Evaluation Date: 3/5/2025      Admitting Dx: Acute respiratory failure with hypoxia and hypercapnia (HCC) [J96.01, J96.02]  Treatment Diagnosis: Oropharyngeal Dysphagia   Pain: Denies                                  Recommendations      Recommended Diet and Intervention 3/5/2025:  Diet Solids Recommendation:  Dysphagia III Soft and bite sized  Liquid Consistency Recommendation:  Thin liquids, No straws  Recommended form of Meds: Meds in puree  or Meds crushed as able in puree    Recommend completion of Modified Barium Swallow study to further assess pharyngeal phase of swallow   **If respiratory status declines, HOLD po diet pending SLP re-evaluation     Compensatory strategies: Upright as possible with all PO intake , No straws , Small bites/sips , Swallow 2 times per bite , Lingual Sweeps , Eat/feed slowly, Remain upright 30-45 min , Aspiration Precautions     Discharge Recommendations:  Discharge recommendations to be determined pending ongoing follow-up during acute care stay    History/Course of Treatment     H&P: Ben Link is a 79 y.o. male who presents to the ER with complaint of aspiration on solid food.  Patient is complaining of shortness of breath, does not offer further history.  He does have history of Alzheimer's, actually arrives today from an Alzheimer's unit, DNR CC signed on chart.  Written report from unit states that the patient was transported to dialysis today and upon return, he did choke/aspirate on solid food.  He was requiring supplemental oxygen.     The patient does arrive awake and alert.  He is not requiring any supplemental oxygen upon arrival to the ER and saturation is greater than 92%.  He does have increased work of breathing.    Imaging:  Chest X-ray:   IMPRESSION:  Patchy airspace

## 2025-03-05 NOTE — PROGRESS NOTES
Patient discharged. Ambulance transport. Daughter at bedside. AVS and plan reviewed with her. IV removed. All belongings taken with daughter.

## 2025-03-05 NOTE — ED PROVIDER NOTES
[facility] EMERGENCY DEPARTMENT  EMERGENCY DEPARTMENT ENCOUNTER        Pt Name: Ben Link  MRN: [id number]  Birthdate 1945  Date of evaluation: 3/4/2025  Provider: IRINA Babb CNP  PCP: No primary care provider on file.  Note Started: 7:44 PM EST 3/4/25      JERE. I have evaluated this patient.        CHIEF COMPLAINT       Chief Complaint   Patient presents with    Shortness of Breath     Pt brought in by [facility] from [facility] for possible aspiration. States they had him on 5L NC to maintain O2 sats above 92%. Upon arrival, pt 92% on RA. Pt did receive all of his dialysis before coming to ER       HISTORY OF PRESENT ILLNESS: 1 or more Elements     History From: EMS  Limitations to history : dementia    Ben Link is a 79 y.o. male who presents to the ER with complaint of aspiration on solid food.  Patient is complaining of shortness of breath, does not offer further history.  He does have history of Alzheimer's, actually arrives today from an Alzheimer's unit, DNR CC signed on chart.  Written report from unit states that the patient was transported to dialysis today and upon return, he did choke/aspirate on solid food.  He was requiring supplemental oxygen.    The patient does arrive awake and alert.  He is not requiring any supplemental oxygen upon arrival to the ER and saturation is greater than 92%.  He does have increased work of breathing.    Nursing Notes were all reviewed and agreed with or any disagreements were addressed in the HPI.    REVIEW OF SYSTEMS :      Review of Systems   Respiratory:  Positive for cough, choking and shortness of breath.        Positives and Pertinent negatives as per HPI.     SURGICAL HISTORY     Past Surgical History:   Procedure Laterality Date    CARDIAC CATHETERIZATION  2019    CHOLECYSTECTOMY, LAPAROSCOPIC N/A 05/16/2022    LAPAROSCOPIC CHOLECYSTECTOMY WITH CHOLANGIOGRAM performed by Marco Vera MD at [facility] OR

## 2025-03-05 NOTE — PROGRESS NOTES
Resource nurse entered ED 15 and spoke to patient's daughter.  She stated that the plan was for him to return to [facility] today, so admission was not initiated.

## 2025-03-05 NOTE — DISCHARGE INSTR - DIET

## 2025-03-05 NOTE — DISCHARGE INSTRUCTIONS
Follow up with your PCP within 2-3 days of discharge.  Have PCP arrange Modified barium swallow and sleep study  Follow up with nephrology as instructed   Take all your medications as prescribed.      Speech Therapy Bedside Swallow Recommendations: Upright as possible with all PO intake , No straws , Small bites/sips , Swallow 2 times per bite , Lingual Sweeps , Eat/feed slowly, Remain upright 30-45 min , Aspiration Precautions     Outpatient Modified Barium Swallow Study is recommended

## 2025-03-05 NOTE — PLAN OF CARE
Problem: Skin/Tissue Integrity  Goal: Skin integrity remains intact  Description: 1.  Monitor for areas of redness and/or skin breakdown  2.  Assess vascular access sites hourly  3.  Every 4-6 hours minimum:  Change oxygen saturation probe site  4.  Every 4-6 hours:  If on nasal continuous positive airway pressure, respiratory therapy assess nares and determine need for appliance change or resting period  Outcome: Completed     Problem: ABCDS Injury Assessment  Goal: Absence of physical injury  Outcome: Completed

## 2025-03-05 NOTE — PROGRESS NOTES
Talked with pt wife. Pt has alzheimers and is in a good facilty with good food and transportation. They have no GASPER or financial issues at this time

## 2025-03-05 NOTE — PROGRESS NOTES
Attempted to call report to [facility].  Spoke with . List call back number, as all of the nurses were on the floor and not available.

## 2025-03-05 NOTE — CONSULTS
metoclopramide (REGLAN) 5 MG tablet Take 1 tablet by mouth      guaiFENesin (ROBITUSSIN) 100 MG/5ML syrup Take 10 mLs by mouth every 4 hours as needed for Cough      metoprolol succinate (TOPROL XL) 25 MG extended release tablet Take 1 tablet by mouth daily (Patient taking differently: Take 1 tablet by mouth daily Takes every Monday, Wednesday, Friday) 90 tablet 3    ondansetron (ZOFRAN) 4 MG tablet Take 1 tablet by mouth every 6 hours as needed for Vomiting or Nausea      B Complex-C-E-Zn (STRESS PLUS ZINC) TABS Take 1 tablet by mouth daily      docusate sodium (COLACE) 100 MG capsule Take 2 capsules by mouth daily      polyethylene glycol (GLYCOLAX) 17 GM/SCOOP powder Take 17 g by mouth daily as needed (Constipation)      tamsulosin (FLOMAX) 0.4 MG capsule Take 1 capsule by mouth every morning         Not in a hospital admission.     Inpatient Medications:  Scheduled Meds:   aspirin  81 mg Oral Daily    atorvastatin  10 mg Oral Nightly    LORazepam  0.5 mg Oral Nightly    tamsulosin  0.4 mg Oral QAM    apixaban  2.5 mg Oral BID    gabapentin  100 mg Oral TID    metoclopramide  5 mg Oral TID AC    sevelamer  800 mg Oral TID WC    metoprolol succinate  25 mg Oral Daily    sodium chloride flush  5-40 mL IntraVENous 2 times per day     Continuous Infusions:   sodium chloride       PRN Meds:.melatonin, sodium chloride flush, sodium chloride, ondansetron, senna, acetaminophen **OR** acetaminophen        REVIEW OF SYSTEMS: as needed for my evaluation.   As mentioned in HPI and CC, subjective ,     All other 10-point review of systems: negative.         Allergies as needed for my evaluation. : Lisinopril        PHYSICAL EXAM: as needed for my evaluation.   Patient Vitals for the past 24 hrs:   BP Temp Temp src Pulse Resp SpO2 Height Weight   03/05/25 0600 (!) 96/53 -- -- 82 20 -- -- --   03/05/25 0500 (!) 96/58 -- -- 79 23 97 % -- --   03/05/25 0400 101/62 98 °F (36.7 °C) Oral 98 25 97 % -- --   03/04/25 2315 (!) 143/73

## 2025-03-05 NOTE — H&P
Hospital Medicine History & Physical        Name:  Ben Link /Age/Sex: 1945  (79 y.o. male)   MRN & CSN:  [id number] & [id number] Encounter Date/Time: 3/4/2025 11:16 PM EST   Location:  ED-0015/15 PCP: No primary care provider on file.         CHIEF COMPLAINT:   Chief Complaint   Patient presents with    Shortness of Breath     Pt brought in by [facility] from [facility] for possible aspiration. States they had him on 5L NC to maintain O2 sats above 92%. Upon arrival, pt 92% on RA. Pt did receive all of his dialysis before coming to ER         HISTORY OF PRESENT ILLNESS:      History from: EMR  Limitations to history : Dementia     Ben Link is a 79 y.o. male who presented to the ED with complaint of aspiration of solid food.  Patient complaining of shortness of breath but does not offer further history.  Patient has a history of Alzheimer's and actually arrives today from an Alzheimer's unit.  DNRCC signed on chart.  The written report from the unit states that the patient was transported to dialysis today and upon return, he did choke/aspirate on solid food.  He was requiring supplemental oxygen following the episode of choking.  Patient was noted to be hypoxic upon arrival with SpO2 87% on RA.  He was placed on 2L O2 with good response. Workup initiated in ED.  CT notable for findings consistent with pulmonary edema.  Family requested that patient be evaluated with swallow evaluation instead of just changing his diet to puree.        REVIEW OF SYSTEMS:   Pertinent positives as noted in the HPI. All other systems reviewed and negative.      PHYSICAL EXAM PERFORMED:  BP (!) 151/69   Pulse 86   Temp 98.7 °F (37.1 °C) (Oral)   Resp 27   Ht 1.854 m (6' 1\")   Wt 95.3 kg (210 lb)   SpO2 96%   BMI 27.71 kg/m²     General appearance:  Awake, alert, no apparent distress  HEENT:  Normocephalic, atraumatic without obvious deformity. PERRL. EOM intact. Conjunctivae/corneas

## 2025-03-05 NOTE — PROGRESS NOTES
Pharmacy Home Medication Reconciliation Note    A medication reconciliation has been completed for Ben Link 1945    Information provided by: facility medication list ([facility])    The patient's home medication list is as follows:  No current facility-administered medications on file prior to encounter.     Current Outpatient Medications on File Prior to Encounter   Medication Sig Dispense Refill    ciprofloxacin (CIPRO) 250 MG tablet Take 1 tablet by mouth 2 times daily      oxyCODONE (ROXICODONE) 5 MG immediate release tablet Take 1 tablet by mouth every 6 hours as needed for Pain. Max Daily Amount: 20 mg      LORazepam (ATIVAN) 0.5 MG tablet Take 1 tablet by mouth every 6 hours as needed for Anxiety. Max Daily Amount: 2 mg      carboxymethylcellulose 1 % ophthalmic solution Place 1 drop into both eyes 2 times daily as needed for Dry Eyes      metoclopramide (REGLAN) 5 MG tablet Take 1 tablet by mouth 3 times daily (with meals)      guaiFENesin (ROBITUSSIN) 100 MG/5ML syrup Take 10 mLs by mouth every 4 hours as needed for Cough      LORazepam (ATIVAN) 0.5 MG tablet Take 1 tablet by mouth nightly.      albuterol (PROVENTIL) (2.5 MG/3ML) 0.083% nebulizer solution Take 3 mLs by nebulization every 4 hours as needed for Wheezing 120 each 3    sevelamer (RENVELA) 800 MG tablet Take 1 tablet by mouth 3 times daily (with meals)      acetaminophen (TYLENOL) 325 MG tablet Take 2 tablets by mouth every 6 hours as needed for Pain      apixaban (ELIQUIS) 2.5 MG TABS tablet Take 1 tablet by mouth 2 times daily      metoprolol succinate (TOPROL XL) 25 MG extended release tablet Take 1 tablet by mouth daily (Patient taking differently: Take 1 tablet by mouth four times a week Takes on M-W-F-Sun (non-dialysis days)) 90 tablet 3    ondansetron (ZOFRAN) 4 MG tablet Take 1 tablet by mouth every 6 hours as needed for Vomiting or Nausea      B Complex-C-E-Zn (STRESS PLUS ZINC) TABS Take 1 tablet by mouth daily      docusate sodium

## 2025-03-06 NOTE — DISCHARGE SUMMARY
mouth at bedtimeHistorical Med      polyethylene glycol (GLYCOLAX) 17 GM/SCOOP powder Take 17 g by mouth daily as needed (Constipation)Historical Med      aspirin 81 MG chewable tablet Take 1 tablet by mouth daily, Disp-30 tablet, R-0NO PRINT      melatonin 3 MG TABS tablet Take 2 tablets by mouth nightly as needed (sleep), Disp-6 tablet, R-0Print      tamsulosin (FLOMAX) 0.4 MG capsule Take 1 capsule by mouth every morningHistorical Med      gabapentin (NEURONTIN) 100 MG capsule Take 1 capsule by mouth 3 times daily.Historical Med      atorvastatin (LIPITOR) 10 MG tablet Take 1 tablet by mouth nightlyHistorical Med       !! - Potential duplicate medications found. Please discuss with provider.        The patient was seen and examined on day of discharge and this discharge summary is in conjunction with any daily progress note from day of discharge.Time Spent on discharge is 45 minutes  in the examination, evaluation, counseling and review of medications and discharge plan.      Note that greater  than 30 minutes was spent in preparing discharge papers, discussing discharge with patient, medication review, etc.     Signed:    RICARDO AMOS MD   3/6/2025      Thank you No primary care provider on file. for the opportunity to be involved in this patient's care. If you have any questions or concerns please feel free to contact me at [phone].    Please note that some part of this chart was generated using Dragon dictation software. Although every effort was made to ensure the accuracy of this automated transcription, some errors in transcription may have occurred inadvertently. If you may need any clarification, please do not hesitate to contact me through EPIC.

## 2025-03-12 NOTE — PROGRESS NOTES
Nursing Home Patient Worksheet-Pre Admission Testing Department  : 47  Day of Surgery: 3/14/2025  Surgeon: Dr. aTvares    Arrival Time:  1030   Surgery Time: 1200  Facility: [facility]  Nurse or Care provider: Alka  Contact number:  [phone]   Fax number: [phone]    Day of Surgery Information    Can Patient sign own consent?   [x] YES    []  NO      H/P: Complete:      [] YES    []  NO    [x]  N/A    Labs:        [] YES    []  NO   [x]  N/A    Transportation confirmed: [facility] to transport    [x] YES    []  NO    Will STNA be provided:     [] YES    [x]  NO    Medication Reconciliation Complete:  [x] YES    []  NO no changes    Demographics (Med History) Complete:  [x] YES    []  NO no changes    Pt. Ambulation Status: Max Assist x2 pt does not ambulate.     Pt. LOC: alert and oriented.      PAT INTERVIEW COMPLETE:    [x] YES   []  NO    Additional Notes:    Daughter Janeen (POA) meeting him here.  Pt is DNR CC, scanned into chart. Per Alka, nurse, will continue to hold aspirin and eliquis. Last Dose 3/10/2025

## 2025-03-13 ENCOUNTER — ANESTHESIA EVENT (OUTPATIENT)
Dept: ENDOSCOPY | Age: 80
End: 2025-03-13
Payer: COMMERCIAL

## 2025-03-14 ENCOUNTER — HOSPITAL ENCOUNTER (OUTPATIENT)
Age: 80
Setting detail: OUTPATIENT SURGERY
Discharge: HOME | End: 2025-03-14
Attending: INTERNAL MEDICINE | Admitting: INTERNAL MEDICINE
Payer: COMMERCIAL

## 2025-03-14 ENCOUNTER — APPOINTMENT (OUTPATIENT)
Dept: ENDOSCOPY | Age: 80
End: 2025-03-14
Attending: INTERNAL MEDICINE
Payer: COMMERCIAL

## 2025-03-14 ENCOUNTER — ANESTHESIA (OUTPATIENT)
Dept: ENDOSCOPY | Age: 80
End: 2025-03-14
Payer: COMMERCIAL

## 2025-03-14 VITALS
RESPIRATION RATE: 16 BRPM | HEART RATE: 72 BPM | BODY MASS INDEX: 27.71 KG/M2 | TEMPERATURE: 97.4 F | SYSTOLIC BLOOD PRESSURE: 118 MMHG | WEIGHT: 210 LBS | DIASTOLIC BLOOD PRESSURE: 75 MMHG | OXYGEN SATURATION: 93 %

## 2025-03-14 DIAGNOSIS — R13.10 DYSPHAGIA, UNSPECIFIED TYPE: ICD-10-CM

## 2025-03-14 LAB
GLUCOSE BLD-MCNC: 147 MG/DL (ref 70–99)
GLUCOSE BLD-MCNC: 151 MG/DL (ref 70–99)
PERFORMED ON: ABNORMAL
PERFORMED ON: ABNORMAL
POTASSIUM BLD-SCNC: 4.4 MMOL/L (ref 3.5–5.1)

## 2025-03-14 PROCEDURE — 3609012700 HC EGD DILATION SAVORY: Performed by: INTERNAL MEDICINE

## 2025-03-14 PROCEDURE — 7100000011 HC PHASE II RECOVERY - ADDTL 15 MIN: Performed by: INTERNAL MEDICINE

## 2025-03-14 PROCEDURE — 88305 TISSUE EXAM BY PATHOLOGIST: CPT

## 2025-03-14 PROCEDURE — 3700000000 HC ANESTHESIA ATTENDED CARE: Performed by: INTERNAL MEDICINE

## 2025-03-14 PROCEDURE — 3609012400 HC EGD TRANSORAL BIOPSY SINGLE/MULTIPLE: Performed by: INTERNAL MEDICINE

## 2025-03-14 PROCEDURE — 7100000010 HC PHASE II RECOVERY - FIRST 15 MIN: Performed by: INTERNAL MEDICINE

## 2025-03-14 PROCEDURE — 2580000003 HC RX 258: Performed by: ANESTHESIOLOGY

## 2025-03-14 PROCEDURE — 6360000002 HC RX W HCPCS: Performed by: NURSE ANESTHETIST, CERTIFIED REGISTERED

## 2025-03-14 PROCEDURE — C1769 GUIDE WIRE: HCPCS | Performed by: INTERNAL MEDICINE

## 2025-03-14 PROCEDURE — 3700000001 HC ADD 15 MINUTES (ANESTHESIA): Performed by: INTERNAL MEDICINE

## 2025-03-14 PROCEDURE — 84132 ASSAY OF SERUM POTASSIUM: CPT

## 2025-03-14 PROCEDURE — 36415 COLL VENOUS BLD VENIPUNCTURE: CPT

## 2025-03-14 PROCEDURE — 7100000001 HC PACU RECOVERY - ADDTL 15 MIN: Performed by: INTERNAL MEDICINE

## 2025-03-14 PROCEDURE — 2709999900 HC NON-CHARGEABLE SUPPLY: Performed by: INTERNAL MEDICINE

## 2025-03-14 PROCEDURE — 7100000000 HC PACU RECOVERY - FIRST 15 MIN: Performed by: INTERNAL MEDICINE

## 2025-03-14 RX ORDER — SODIUM CHLORIDE 0.9 % (FLUSH) 0.9 %
5-40 SYRINGE (ML) INJECTION EVERY 12 HOURS SCHEDULED
Status: DISCONTINUED | OUTPATIENT
Start: 2025-03-14 | End: 2025-03-14 | Stop reason: HOSPADM

## 2025-03-14 RX ORDER — GLUCAGON 1 MG/ML
1 KIT INJECTION PRN
Status: DISCONTINUED | OUTPATIENT
Start: 2025-03-14 | End: 2025-03-14 | Stop reason: HOSPADM

## 2025-03-14 RX ORDER — NALOXONE HYDROCHLORIDE 0.4 MG/ML
INJECTION, SOLUTION INTRAMUSCULAR; INTRAVENOUS; SUBCUTANEOUS PRN
Status: DISCONTINUED | OUTPATIENT
Start: 2025-03-14 | End: 2025-03-14 | Stop reason: HOSPADM

## 2025-03-14 RX ORDER — PROPOFOL 10 MG/ML
INJECTION, EMULSION INTRAVENOUS
Status: DISCONTINUED | OUTPATIENT
Start: 2025-03-14 | End: 2025-03-14 | Stop reason: SDUPTHER

## 2025-03-14 RX ORDER — SODIUM CHLORIDE 9 MG/ML
INJECTION, SOLUTION INTRAVENOUS PRN
Status: DISCONTINUED | OUTPATIENT
Start: 2025-03-14 | End: 2025-03-14 | Stop reason: HOSPADM

## 2025-03-14 RX ORDER — SODIUM CHLORIDE 0.9 % (FLUSH) 0.9 %
5-40 SYRINGE (ML) INJECTION PRN
Status: DISCONTINUED | OUTPATIENT
Start: 2025-03-14 | End: 2025-03-14 | Stop reason: HOSPADM

## 2025-03-14 RX ORDER — DEXTROSE MONOHYDRATE 100 MG/ML
INJECTION, SOLUTION INTRAVENOUS CONTINUOUS PRN
Status: DISCONTINUED | OUTPATIENT
Start: 2025-03-14 | End: 2025-03-14 | Stop reason: HOSPADM

## 2025-03-14 RX ORDER — LIDOCAINE HYDROCHLORIDE 20 MG/ML
INJECTION, SOLUTION EPIDURAL; INFILTRATION; INTRACAUDAL; PERINEURAL
Status: DISCONTINUED | OUTPATIENT
Start: 2025-03-14 | End: 2025-03-14 | Stop reason: SDUPTHER

## 2025-03-14 RX ORDER — ONDANSETRON 2 MG/ML
4 INJECTION INTRAMUSCULAR; INTRAVENOUS
Status: DISCONTINUED | OUTPATIENT
Start: 2025-03-14 | End: 2025-03-14 | Stop reason: HOSPADM

## 2025-03-14 RX ADMIN — PROPOFOL 125 MCG/KG/MIN: 10 INJECTION, EMULSION INTRAVENOUS at 12:15

## 2025-03-14 RX ADMIN — PROPOFOL 100 MG: 10 INJECTION, EMULSION INTRAVENOUS at 12:14

## 2025-03-14 RX ADMIN — LIDOCAINE HYDROCHLORIDE 100 MG: 20 INJECTION, SOLUTION EPIDURAL; INFILTRATION; INTRACAUDAL; PERINEURAL at 12:14

## 2025-03-14 RX ADMIN — SODIUM CHLORIDE: 9 INJECTION, SOLUTION INTRAVENOUS at 12:01

## 2025-03-14 ASSESSMENT — PAIN SCALES - WONG BAKER
WONGBAKER_NUMERICALRESPONSE: NO HURT

## 2025-03-14 ASSESSMENT — PAIN - FUNCTIONAL ASSESSMENT
PAIN_FUNCTIONAL_ASSESSMENT: NONE - DENIES PAIN
PAIN_FUNCTIONAL_ASSESSMENT: NONE - DENIES PAIN

## 2025-03-14 NOTE — ANESTHESIA PRE PROCEDURE
Acute cystitis without hematuria     Acute respiratory failure with hypoxia (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, with neuropathy    Dysphagia, oropharyngeal     Encephalopathy, unspecified     End stage renal disease (HCC)     HD    Fatigue     Gastro-esophageal reflux disease without esophagitis     Gastrointestinal hemorrhage     Hemodialysis patient 2019    ckd-goes to dialysis Tuesday, Thurs, Sat ([facility])    Hemorrhage of anus and rectum     History of colon cancer     Hx of blood clots     Hyperkalemia     Hyperlipidemia     Hypertension     Hypo-osmolality and hyponatremia     Hyponatremia     Hypotension, unspecified     Infection and inflammatory reaction due to other cardiac and vascular devices, implants and grafts, initial encounter     Insomnia, unspecified     Long term (current) use of anticoagulants     Malignant neoplasm of colon, unspecified ([facility]) 05/18/2024    Need for assistance with personal care     Noninfective gastroenteritis and colitis, unspecified     Other pulmonary embolism without acute cor pulmonale ([facility])     Other symbolic dysfunctions     Other symptoms and signs involving cognitive functions and awareness     Oxygen dependent     continuously    Peripheral vascular disease, unspecified     Personal history of COVID-19     Pneumonia     Risk for falls     Sepsis, unspecified organism ([facility])     Severe protein-calorie malnutrition     Shock, unspecified ([facility])     Splenomegaly 02/10/2021    Stroke ([facility])     2022    Unspecified atrial flutter ([facility])     Unsteadiness on feet     UTI (urinary tract infection)      Past Surgical

## 2025-03-14 NOTE — OP NOTE
Endoscopy Note    Patient: Ben Link  : 1945  Acct#:     Procedure: Esophagogastroduodenoscopy with biopsy, esophageal dilation                         Date:  3/14/2025     Surgeon:   Oziel Tavares MD    Referring Physician:  No primary care provider on file.    Indications: This is a 79 y.o. male  who presents for EGD due to dysphagia.     Postoperative Diagnosis:    -Normal esophagus and exam for dysphagia.  The esophagus was empirically dilated with 54 Romanian savory dilator without resistance  -Mild gastric mucosal erythema. Biopsies were obtained from the gastric antrum, incisura and body to evaluate for H. Pylori.      Anesthesia:  MAC    Consent:  The patient or their legal guardian has signed an informed consent, and is aware of the potential risks, benefits, alternatives, and potential complications of this procedure.  These include, but are not limited to hemorrhage, bleeding, post procedural pain, perforation, phlebitis, aspiration, hypotension, hypoxia, cardiovascular events such as arryhthmia, and possibly death.     Description of Procedure: The patient was then taken to the endoscopy suite, placed in the left lateral decubitus position and the above IV sedation was administrered.    The Olympus video endoscope was placed through the patient's oropharynx without difficulty to the extent of the 2nd portion of the duodenum.  Both forward and retroflexed views of the stomach were obtained.      Findings:    Esophagus: The esophagus appeared normal without evidence of Leiva's esophagus or reflux esophagitis.  Z-line was located 36 cm from the incisors.  There was no stricture, ring, inflammation, web or other cause of dysphagia.  The esophagus was empirically dilated with 54 Romanian savory dilator without resistance  Stomach: The stomach examined on forward and retroflexed views, and notable for mild mucosal erythema without ulceration or erosion. Biopsies were obtained from the  gastric antrum, incisura and body to evaluate for H. Pylori.   Duodenum: The first and 2nd portions of the duodenum appeared normal with normal villous pattern    The scope was then withdrawn back into the stomach, it was decompressed, and the scope was completely withdrawn.    The patient tolerated the procedure well and was taken to the post anesthesia care unit in good condition.    Estimated Blood Loss: Minimal     Impression:   1) See post procedure diagnoses    Recommendations:   - Follow-up pathology results in 7-10 days by checking the patient portal via [facility]  - Resume regular medications.  - Resume diet as tolerated.  - Repeat EGD as needed for dysphagia.    Oziel Tavares MD  [facility]  [phone]

## 2025-03-14 NOTE — PROGRESS NOTES
Patient to phase 2 from PACU.  Received report from Maria Esther BUTTERFIELD at bedside. VSS. Patient alert and oriented x4.  Pain rating 0/10. Abdomen soft and nontender.  Notified family to come to bedside.  Patient refused snacks.

## 2025-03-14 NOTE — FLOWSHEET NOTE
Shortly after pt. Arrived in PACU, his sats dropped into the 70's and required jaw lift support and a 100% NRBM.

## 2025-03-14 NOTE — DISCHARGE INSTRUCTIONS
Endoscopy Discharge Instructions    Recommendations:   - Follow-up pathology results in 7-10 days by checking the patient portal via [employer] [facility]  - Resume regular medications.  - Resume diet as tolerated.  - Repeat EGD as needed for dysphagia.    Call the physician that did your procedure for any questions or concerns.    You may be drowsy or lightheaded after receiving sedation.  DO NOT operate  a vehicle (automobile, bicycle, motorcycle, machinery, or power tools), no  alcoholic beverages, and do not make any important decisions today.                 Plan on bed rest or quiet relaxation today.  Resume normal activities in the morning.     Resume normal activity tomorrow unless otherwise advised by your physician.            Eat a light first meal, avoiding spicy and fatty foods, then resume normal diet unless  you are told otherwise by your physician.    If the intravenous medication site is painful, apply warm compresses on the site until the soreness is relieved and elevate the arm above the heart. Call your physician if no improvement  in 2-3 days.       POSSIBLE SYMPTOMS TO WATCH:     1. fever (greater than 100) 5. increased abdominal bloating   2. severe pain   6. excessive bleeding   3. nausea and vomiting  7. chest pain   4. chills    8. shortness of breath       Notify your physician if these problems occur     Expected as normal and remedies:  Sore throat: use over the counter throat lozenges or gargle with warm salt water.  Redness or soreness at the IV site: apply warm compress  Gaseous discomfort: belching or passing flatus (gas).                    Call for biopsy results in :____7 days____________________                                Call for follow-up appointment on:[phone]______________                  Educational information given on:_____11/3/2020__________________                       Please review these discharge instructions this evening or tomorrow for

## 2025-03-14 NOTE — PROGRESS NOTES
RN called [facility] and gave report to nurse Rucker.  Called for transport with [facility] ([facility]) and spoke with Aaliyah.

## 2025-03-14 NOTE — PROGRESS NOTES
Pt discharged with CMT on stretcher to [facility]. Accompanied by granddaughter Shivani. Discharge instructions and personal belongings given to pt. Explanation of discharge medications and instructions understood by verbal statement, by patient, family member and nurse Alka (from [facility]), no questions, comments or concerns at this time.

## 2025-03-14 NOTE — H&P
Pre-operative History and Physical    Patient: Ben Link  : 1945  Acct#:     Intended Procedure: EGD    HISTORY OF PRESENT ILLNESS:  The patient is a 79 y.o. male  who presents for EGD due to dysphagia.      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, with neuropathy    Dysphagia, oropharyngeal     Encephalopathy, unspecified     End stage renal disease (HCC)     HD    Fatigue     Gastro-esophageal reflux disease without esophagitis     Gastrointestinal hemorrhage     Hemodialysis patient     ckd-goes to dialysis Tuesday, Thurs, Sat ([facility])    Hemorrhage of anus and rectum     History of colon cancer     Hx of blood clots     Hyperkalemia     Hyperlipidemia     Hypertension     Hypo-osmolality and hyponatremia     Hyponatremia     Hypotension, unspecified     Infection and inflammatory reaction due to other cardiac and vascular devices, implants and grafts, initial encounter     Insomnia, unspecified     Long term (current) use of anticoagulants     Malignant neoplasm of colon, unspecified ([facility]) 2024    Need for assistance with personal care     Noninfective gastroenteritis and colitis, unspecified     Other pulmonary embolism without acute cor pulmonale ([facility])     Other symbolic dysfunctions     Other symptoms and signs involving cognitive functions and awareness     Oxygen dependent     continuously    Peripheral vascular disease, unspecified     Personal history of COVID-19   1 tablet by mouth daily      docusate sodium (COLACE) 100 MG capsule Take 2 capsules by mouth at bedtime      polyethylene glycol (GLYCOLAX) 17 GM/SCOOP powder Take 17 g by mouth daily as needed (Constipation)      aspirin 81 MG chewable tablet Take 1 tablet by mouth daily 30 tablet 0    melatonin 3 MG TABS tablet Take 2 tablets by mouth nightly as needed (sleep) 6 tablet 0    tamsulosin (FLOMAX) 0.4 MG capsule Take 1 capsule by mouth every morning      gabapentin (NEURONTIN) 100 MG capsule Take 1 capsule by mouth 3 times daily.      atorvastatin (LIPITOR) 10 MG tablet Take 1 tablet by mouth nightly      apixaban (ELIQUIS) 2.5 MG TABS tablet Take 1 tablet by mouth 2 times daily          Allergies:  Lisinopril    Social History:   TOBACCO:   reports that he has quit smoking. He has never used smokeless tobacco.  ETOH:   reports that he does not currently use alcohol.  DRUGS:   reports no history of drug use.    PHYSICAL EXAM:      Vital Signs: /63   Pulse (!) 42   Temp 97 °F (36.1 °C) (Temporal)   Resp 18   Wt 95.3 kg (210 lb)   SpO2 95%   BMI 27.71 kg/m²    Airway: No stridor or wheezing noted.  Good air movement  Pulmonary: without wheezes.  Clear to auscultation  Cardiac:regular rate and rhythm without loud murmurs  Abdomen:soft, nontender,  Bowel sounds present    Pre-Procedure Assessment / Plan:  1) EGD    ASA Grade:  ASA 3 - Patient with moderate systemic disease with functional limitations  Mallampati Classification:  Class III    Level of Sedation Plan: MAC    Post Procedure plan: Return to same level of care    I assessed the patient and find that the patient is in satisfactory condition to proceed with the planned procedure and sedation plan.    I have explained the risk, benefits, and alternatives to the procedure; the patient understands and agrees to proceed.       Oziel Tavares MD  3/14/2025

## 2025-03-14 NOTE — ANESTHESIA POSTPROCEDURE EVALUATION
[facility] Department of Anesthesiology  Post-Anesthesia Note       Name:  Ben Link                                  Age:  79 y.o.  MRN:  [id number]     Last Vitals & Oxygen Saturation: /75   Pulse 72   Temp 97.4 °F (36.3 °C) (Oral)   Resp 16   Wt 95.3 kg (210 lb)   SpO2 93%   BMI 27.71 kg/m²   Patient Vitals for the past 4 hrs:   BP Temp Temp src Pulse Resp SpO2 Weight   03/14/25 1330 118/75 97.4 °F (36.3 °C) Oral 72 16 93 % --   03/14/25 1307 116/79 97.5 °F (36.4 °C) Oral 75 14 93 % --   03/14/25 1300 119/73 -- -- 91 11 94 % --   03/14/25 1255 97/64 96.8 °F (36 °C) -- 84 16 94 % --   03/14/25 1250 97/64 -- -- 91 14 95 % --   03/14/25 1245 (!) 87/57 -- -- 75 21 97 % --   03/14/25 1240 (!) 94/58 -- -- 94 18 100 % --   03/14/25 1237 107/66 -- -- 85 20 100 % --   03/14/25 1235 107/66 -- -- 80 24 100 % --   03/14/25 1233 98/64 -- -- 85 15 92 % --   03/14/25 1232 -- -- -- 85 22 (!) 74 % --   03/14/25 1231 -- -- -- 79 21 (!) 82 % --   03/14/25 1230 98/64 -- -- -- -- -- --   03/14/25 1229 (!) 103/55 96.8 °F (36 °C) Temporal 80 15 92 % --   03/14/25 1130 -- -- -- -- -- -- 95.3 kg (210 lb)   03/14/25 1119 106/63 97 °F (36.1 °C) Temporal (!) 42 18 95 % --       Level of consciousness:  Awake, alert to baseline    Respiratory: Respirations easy, no distress. Stable.    Cardiovascular: Hemodynamically stable.    Hydration: Adequate.    PONV: Adequately managed.    Post-op pain: Adequately controlled.    Post-op assessment: Tolerated anesthetic well without complication.    Complications:  None.    Rakesh Moore MD  March 14, 2025   1:44 PM

## 2025-04-16 ENCOUNTER — TELEPHONE (OUTPATIENT)
Dept: CARDIOLOGY CLINIC | Age: 80
End: 2025-04-16

## 2025-04-16 NOTE — TELEPHONE ENCOUNTER
Received results of echocardiogram, and EKG interp.  Patient had complaint of CP during Dialysis.  Tried to contact facility to schedule appointment to have him see Dr. Dodge.  RN not available-[facility] with information.  Tentatively scheduled for 5/8/2025 at 9 am.  Office number left to confirm or move appointment-if needed.

## 2025-04-23 NOTE — PLAN OF CARE
Enter Query Response Below      Query Response:   Enterococcus faecium sepsis due to UTI              If applicable, please update the problem list.      Problem: Discharge Planning  Goal: Discharge to home or other facility with appropriate resources  5/13/2022 1135 by Denis Cazares RN  Outcome: Progressing  5/12/2022 2310 by Zelda Bone RN  Outcome: Progressing     Problem: Pain  Goal: Verbalizes/displays adequate comfort level or baseline comfort level  5/13/2022 1135 by Denis Cazares RN  Outcome: Progressing  5/12/2022 2310 by Zelda Bone RN  Outcome: Progressing     Problem: Safety - Adult  Goal: Free from fall injury  5/13/2022 1135 by Denis Cazares RN  Outcome: Progressing  5/12/2022 2310 by Zelda Bone RN  Outcome: Progressing     Problem: ABCDS Injury Assessment  Goal: Absence of physical injury  5/13/2022 1135 by Denis Cazares RN  Outcome: Progressing  5/12/2022 2310 by Zelda Bone RN  Outcome: Progressing     Problem: Skin/Tissue Integrity  Goal: Absence of new skin breakdown  Description: 1. Monitor for areas of redness and/or skin breakdown  2. Assess vascular access sites hourly  3. Every 4-6 hours minimum:  Change oxygen saturation probe site  4. Every 4-6 hours:  If on nasal continuous positive airway pressure, respiratory therapy assess nares and determine need for appliance change or resting period.   5/13/2022 1135 by Denis Cazares RN  Outcome: Progressing  5/12/2022 2310 by Zelda Bone RN  Outcome: Progressing

## 2025-05-06 NOTE — PROGRESS NOTES
[facility]     Outpatient Cardiology         Patient Name:  Ben Link  Requesting Physician: No admitting provider for patient encounter.  Primary Care Physician: No primary care provider on file.    Reason for Consultation/Chief Complaint:   Chief Complaint   Patient presents with    Atrial Fibrillation     History of Present Illness:    HPI     Ben Montana a 79 y.o. male with PMH of HTN, HLD, CAD, HFpEF, DM II, aflutter, pAF, ESRD on HD, COPD, stroke, DVT on eliquis, GIB, colon CA, bradycardia.   HTN, controlled on current dose of metoprolol  HLD, on liptior 10 mg daily   Afib/aflutter, on eliquis 2.5 mg BID, toprol XL 25 mg M-W-F-Sun controlled overall.  HFpEF, Last EF 65-70%, grade III diastolic dysfunction (2023)   DVT, on eliquis   Doing well from a cardiac perspective, denies any symptoms.     PMH  Past Medical History:   Diagnosis Date    Acidosis     Acute cholecystitis     Acute cystitis without hematuria     Acute respiratory failure with hypoxia (HCC)     Altered mental status     Anemia 03/2022    Anxiety     Arthritis     Bradycardia, unspecified     CAD (coronary artery disease) 01/2020    Cellulitis, unspecified     Cerebral infarction (HCC)     Chronic diastolic (congestive) heart failure (HCC)     Cognitive communication deficit     COPD (chronic obstructive pulmonary disease) (HCC)     Diabetes mellitus (HCC)     Type 2, with neuropathy    Dysphagia, oropharyngeal     Encephalopathy, unspecified     End stage renal disease (HCC)     HD    Fatigue     Gastro-esophageal reflux disease without esophagitis     Gastrointestinal hemorrhage     Hemodialysis patient 2019    ckd-goes to dialysis Tuesday, Thurs, Sat ([facility])    Hemorrhage of anus and rectum     History of colon cancer     Hx of blood clots     Hyperkalemia     Hyperlipidemia     Hypertension     Hypo-osmolality and hyponatremia     Hyponatremia     Hypotension, unspecified     Infection and

## 2025-05-07 ENCOUNTER — OFFICE VISIT (OUTPATIENT)
Dept: CARDIOLOGY CLINIC | Age: 80
End: 2025-05-07
Payer: COMMERCIAL

## 2025-05-07 VITALS
HEIGHT: 72 IN | HEART RATE: 60 BPM | SYSTOLIC BLOOD PRESSURE: 100 MMHG | WEIGHT: 230.5 LBS | OXYGEN SATURATION: 98 % | BODY MASS INDEX: 31.22 KG/M2 | DIASTOLIC BLOOD PRESSURE: 60 MMHG

## 2025-05-07 DIAGNOSIS — E78.2 MIXED HYPERLIPIDEMIA: ICD-10-CM

## 2025-05-07 DIAGNOSIS — I48.92 PAROXYSMAL ATRIAL FLUTTER (HCC): Primary | ICD-10-CM

## 2025-05-07 DIAGNOSIS — I50.32 CHRONIC DIASTOLIC HEART FAILURE (HCC): ICD-10-CM

## 2025-05-07 PROCEDURE — 1159F MED LIST DOCD IN RCRD: CPT | Performed by: INTERNAL MEDICINE

## 2025-05-07 PROCEDURE — 1123F ACP DISCUSS/DSCN MKR DOCD: CPT | Performed by: INTERNAL MEDICINE

## 2025-05-07 PROCEDURE — G2211 COMPLEX E/M VISIT ADD ON: HCPCS | Performed by: INTERNAL MEDICINE

## 2025-05-07 PROCEDURE — 3078F DIAST BP <80 MM HG: CPT | Performed by: INTERNAL MEDICINE

## 2025-05-07 PROCEDURE — 99214 OFFICE O/P EST MOD 30 MIN: CPT | Performed by: INTERNAL MEDICINE

## 2025-05-07 PROCEDURE — 3074F SYST BP LT 130 MM HG: CPT | Performed by: INTERNAL MEDICINE

## 2025-05-07 ASSESSMENT — ENCOUNTER SYMPTOMS
BLOOD IN STOOL: 0
FACIAL SWELLING: 0
VOMITING: 0
COUGH: 0
BACK PAIN: 0
EYE DISCHARGE: 0
ABDOMINAL DISTENTION: 0
CHEST TIGHTNESS: 0
ABDOMINAL PAIN: 0
COLOR CHANGE: 0
WHEEZING: 0
SHORTNESS OF BREATH: 0

## 2025-05-07 NOTE — ASSESSMENT & PLAN NOTE
Fluid status per dialysis.  Unable to add Jardiance/Entresto given ESRD.  Blood pressure is well-controlled   DISPLAY PLAN FREE TEXT DISPLAY PLAN FREE TEXT

## (undated) DEVICE — TOWEL,OR,DSP,ST,WHITE,DLX,XR,4/PK,20PK/C: Brand: MEDLINE

## (undated) DEVICE — GLOVE ORANGE PI 7   MSG9070

## (undated) DEVICE — BITE BLOCK ENDOSCP AD 60 FR W/ ADJ STRP PLAS GRN BLOX

## (undated) DEVICE — AIR/WATER CLEANING ADAPTER FOR OLYMPUS® GI ENDOSCOPE: Brand: BULLDOG®

## (undated) DEVICE — SPONGE GZ W4XL4IN COT 12 PLY TYP VII WVN C FLD DSGN

## (undated) DEVICE — 3M™ TEGADERM™ TRANSPARENT FILM DRESSING FRAME STYLE, 1624W, 2-3/8 IN X 2-3/4 IN (6 CM X 7 CM), 100/CT 4CT/CASE: Brand: 3M™ TEGADERM™

## (undated) DEVICE — 12 IN KNEE IMMOBILIZER: Brand: DEROYAL

## (undated) DEVICE — ADTEC SINGLE USE HOOK SCISSORS, SHAFT ONLY, MONOPOLAR, STRAIGHT, WORKING LENGTH: 12 1/4", (310 MM), DIAM. 5 MM, BLUNT/BLUNT, INSULATED, SINGLE ACTION, STERILE, DISPOSABLE, PACKAGE OF 10 PIECES: Brand: AESCULAP

## (undated) DEVICE — STAPLER INT L28CM DIA29MM CLS STPL H10-2.5MM OPN LEG L5.5MM

## (undated) DEVICE — 3M™ TEGADERM™ TRANSPARENT FILM DRESSING FRAME STYLE, 1626W, 4 IN X 4-3/4 IN (10 CM X 12 CM), 50/CT 4CT/CASE: Brand: 3M™ TEGADERM™

## (undated) DEVICE — TROCARS: Brand: KII® BALLOON BLUNT TIP SYSTEM

## (undated) DEVICE — ORGANIZER MED DEV W76XL86CM PCH W25XL28CM FOR ENDOSCP TBL

## (undated) DEVICE — NEEDLE HYPO 18GA L1.5IN THN WALL PIVOTING SHLD BVL ORIENTED

## (undated) DEVICE — COVER,MAYO STAND,STERILE: Brand: MEDLINE

## (undated) DEVICE — AV FISTULA: Brand: MEDLINE INDUSTRIES, INC.

## (undated) DEVICE — ENDOSCOPY KIT: Brand: MEDLINE INDUSTRIES, INC.

## (undated) DEVICE — APPLIER CLP L9.38IN M LIG TI DISP STR RNG HNDL LIGACLP

## (undated) DEVICE — CLEANER,CAUTERY TIP,2X2",STERILE: Brand: MEDLINE

## (undated) DEVICE — SET INSUF TUBE HEAT ISO CONN DISP

## (undated) DEVICE — SUTURE VCRL + SZ 4-0 L27IN ABSRB WHT FS-2 3/8 CIR REV CUT VCP422H

## (undated) DEVICE — DRAPE,MINOR PROC,6X6 FEN, STER: Brand: MEDLINE

## (undated) DEVICE — COUNTER NDL 40 COUNT HLD 70 NUM FOAM BLK SGL MAG W BLDE REMV

## (undated) DEVICE — AGENT HEMSTAT W2XL14IN OXIDIZED REGENERATED CELOS ABSRB FOR

## (undated) DEVICE — SHEET, T, LAPAROTOMY, STERILE: Brand: MEDLINE

## (undated) DEVICE — GARMENT COMPR STD FOR 17IN CALF UNIF THER FLOTRN

## (undated) DEVICE — TISSUE RETRIEVAL SYSTEM: Brand: INZII RETRIEVAL SYSTEM

## (undated) DEVICE — YANKAUER,BULB TIP,W/O VENT,RIGID,STERILE: Brand: MEDLINE

## (undated) DEVICE — SOLUTION IRRIG 500ML STRL H2O NONPYROGENIC

## (undated) DEVICE — CLIP
Type: IMPLANTABLE DEVICE | Status: NON-FUNCTIONAL
Brand: MANTIS™ CLIP

## (undated) DEVICE — SOLUTION IV IRRIG POUR BRL 0.9% SODIUM CHL 2F7124

## (undated) DEVICE — STATION ISOLATN W1775XH205IN D675IN ICU WALL OR GLS MT P2

## (undated) DEVICE — STRIP,CLOSURE,WOUND,MEDI-STRIP,1/2X4: Brand: MEDLINE

## (undated) DEVICE — SYRINGE 20ML LL S/C 50

## (undated) DEVICE — PAD BD W4XH32XL73IN RASPBERRY PREM FOAM CONVOLUTED OVERLAY

## (undated) DEVICE — SPONGE LAP W18XL18IN WHT COT 4 PLY FLD STRUNG RADPQ DISP ST

## (undated) DEVICE — WOUND RETRACTOR AND PROTECTOR: Brand: ALEXIS O WOUND PROTECTOR-RETRACTOR

## (undated) DEVICE — GLOVE,SURG,SENSICARE SLT,LF,PF,7: Brand: MEDLINE

## (undated) DEVICE — COVER LT HNDL BLU PLAS

## (undated) DEVICE — REPLAY HEMOSTASIS CLIP, 16MM SPAN: Brand: REPLAY

## (undated) DEVICE — ENDOSCOPIC KIT 6X3/16 FT COLON W/ 1.1 OZ 2 GWN W/O BRSH

## (undated) DEVICE — APPLIER CLP M/L SHFT DIA5MM 15 LIG LIGAMAX 5

## (undated) DEVICE — TOWEL,OR,DSP,ST,BLUE,STD,4/PK,20PK/CS: Brand: MEDLINE

## (undated) DEVICE — APPLICATOR ST RAYON TIP

## (undated) DEVICE — SPHINCTEROTOME: Brand: JAGTOME RX 39

## (undated) DEVICE — SURGICAL SUCTION CONNECTING TUBE WITH MALE CONNECTOR AND SUCTION CLAMP, 2 FT. LONG (.6 M), 5 MM I.D.: Brand: CONMED

## (undated) DEVICE — RETRIEVAL BALLOON CATHETER: Brand: EXTRACTOR™ PRO RX

## (undated) DEVICE — SUTURE NONABSORBABLE MONOFILAMENT 7-0 BV-1 1X24 IN PROLENE 8702H

## (undated) DEVICE — LOOP LIG SUT SZ 0 L18IN ABSRB POLYDIOXANONE MFIL PDS II

## (undated) DEVICE — DEVICE LCK HLDR WIRE MICROVASIVE RAP EXCHG OLY FUJINON RX

## (undated) DEVICE — BIPOLAR ELECTROHEMOSTASIS CATHETER: Brand: INJECTION GOLD PROBE

## (undated) DEVICE — PENCIL ES L3M BTTN SWCH S STL HEX LOK BLDE ELECTRD HOLSTER

## (undated) DEVICE — GOWN SIRUS NONREIN LG W/TWL: Brand: MEDLINE INDUSTRIES, INC.

## (undated) DEVICE — 3M™ STERI-STRIP™ COMPOUND BENZOIN TINCTURE 40 BAGS/CARTON 4 CARTONS/CASE C1544: Brand: 3M™ STERI-STRIP™

## (undated) DEVICE — BW-412T DISP COMBO CLEANING BRUSH: Brand: SINGLE USE COMBINATION CLEANING BRUSH

## (undated) DEVICE — SOLUTION IV 500ML 0.9% SOD CHL PH 5 INJ USP VIAFLX PLAS

## (undated) DEVICE — MAJOR SET UP: Brand: MEDLINE INDUSTRIES, INC.

## (undated) DEVICE — TUBING, SUCTION, 1/4" X 12', STRAIGHT: Brand: MEDLINE

## (undated) DEVICE — SINGLE USE AIR/WATER, SUCTION AND BIOPSY VALVES SET: Brand: ORCAPOD™

## (undated) DEVICE — GUIDEWIRE ENDO L210CM MRK SPR TIP SAFEGUIDE

## (undated) DEVICE — GOWN AURORA NONREINF LG: Brand: MEDLINE INDUSTRIES, INC.

## (undated) DEVICE — FORMALIN CLEAR VIAL 20 ML 10%

## (undated) DEVICE — SNARES COLD OVAL 10MM THIN

## (undated) DEVICE — SOLUTION IV 1000ML 0.9% SOD CHL FOR IRRIG PLAS CONT

## (undated) DEVICE — STAPLER SKIN H3.9MM WIRE DIA0.58MM CRWN 6.9MM 35 STPL FIX

## (undated) DEVICE — SUTURE ABSORBABLE BRAIDED 3-0 12X18 IN COAT UD VICRYL + VCP110G

## (undated) DEVICE — ELECTRODE PT RET AD L9FT HI MOIST COND ADH HYDRGEL CORDED

## (undated) DEVICE — TROCAR: Brand: KII FIOS FIRST ENTRY

## (undated) DEVICE — Z DISCONTINUED USE 2716239 STAPLER INT STPL 51MM CUT LN L40MM STD TISS CRV CUT CR40B

## (undated) DEVICE — NEEDLE HYPO 25GA L1.5IN BVL ORIENTED ECLIPSE

## (undated) DEVICE — INDICATED FOR USE DURING OPEN AND LAPAROSCOPIC CHOLECYSTECTOMY PROCEDURES TO INJECT RADIOPAQUE MEDIA THROUGH THE CYSTIC DUCT INTO THE BILIARY TREE.: Brand: AEROSTAT®

## (undated) DEVICE — APPLIER CLP L9.375IN APER 2.1MM CLS L3.8MM 20 SM TI CLP

## (undated) DEVICE — ELECTRODE BLDE L6.5IN CAUT EXT DISP

## (undated) DEVICE — C-ARM: Brand: UNBRANDED

## (undated) DEVICE — SUTURE PROL SZ 7-0 L18IN NONABSORBABLE BLU L9.3MM BV-1 3/8 8701H

## (undated) DEVICE — TROCAR: Brand: KII SLEEVE

## (undated) DEVICE — SUTURE VCRL + SZ 4-0 L18IN ABSRB UD L19MM PS-2 3/8 CIR PRIM VCP496H

## (undated) DEVICE — GOWN SIRUS NONREIN XL W/TWL: Brand: MEDLINE INDUSTRIES, INC.

## (undated) DEVICE — COVER,TABLE,77X90,STERILE: Brand: MEDLINE

## (undated) DEVICE — GAUZE,SPONGE,2"X2",8PLY,STERILE,LF,2'S: Brand: MEDLINE

## (undated) DEVICE — LOOP VES W1.3MM THK0.9MM MINI YEL SIL FLD REPELLENT

## (undated) DEVICE — SYRINGE MED 30ML STD CLR PLAS LUERLOCK TIP N CTRL DISP

## (undated) DEVICE — FORCEPS BX 240CM 2.4MM L NDL RAD JAW 4 M00513334

## (undated) DEVICE — DILATOR ESOPHAGEAL CLEANGUIDE DISP OTW 18MM

## (undated) DEVICE — SEALER ENDOSCP NANO COAT OPN DIV CRV L JAW LIGASURE IMPACT

## (undated) DEVICE — GENERAL LAPAROSCOPY: Brand: MEDLINE INDUSTRIES, INC.

## (undated) DEVICE — TOTAL TRAY, 16FR 10ML SIL FOLEY, URN: Brand: MEDLINE

## (undated) DEVICE — SUTURE PROL SZ 6-0 L24IN NONABSORBABLE BLU L13MM C-1 3/8 8726H

## (undated) DEVICE — Device

## (undated) DEVICE — GUIDEWIRE ENDOSCP L450CM DIA0.035IN BILI STR RND TIP HI

## (undated) DEVICE — SYRINGE TB 1ML NDL 27GA L0.5IN PLAS W/ SFTY LOK PERM NDL

## (undated) DEVICE — SUTURE PERMA-HAND SZ 2-0 L30IN NONABSORBABLE BLK L26MM SH K833H